# Patient Record
Sex: MALE | Race: WHITE | Employment: OTHER | URBAN - NONMETROPOLITAN AREA
[De-identification: names, ages, dates, MRNs, and addresses within clinical notes are randomized per-mention and may not be internally consistent; named-entity substitution may affect disease eponyms.]

---

## 2018-03-03 ENCOUNTER — APPOINTMENT (OUTPATIENT)
Dept: GENERAL RADIOLOGY | Age: 78
End: 2018-03-03
Payer: COMMERCIAL

## 2018-03-03 ENCOUNTER — HOSPITAL ENCOUNTER (EMERGENCY)
Age: 78
Discharge: HOME | End: 2018-03-04
Attending: EMERGENCY MEDICINE
Payer: COMMERCIAL

## 2018-03-03 VITALS
HEIGHT: 67 IN | RESPIRATION RATE: 16 BRPM | SYSTOLIC BLOOD PRESSURE: 132 MMHG | BODY MASS INDEX: 31.39 KG/M2 | HEART RATE: 80 BPM | TEMPERATURE: 97.7 F | OXYGEN SATURATION: 95 % | DIASTOLIC BLOOD PRESSURE: 61 MMHG | WEIGHT: 200 LBS

## 2018-03-03 DIAGNOSIS — J44.1 COPD EXACERBATION (HCC): ICD-10-CM

## 2018-03-03 DIAGNOSIS — J18.9 PNEUMONIA DUE TO ORGANISM: Primary | ICD-10-CM

## 2018-03-03 LAB
ANION GAP SERPL CALCULATED.3IONS-SCNC: 16 MEQ/L (ref 8–16)
ANISOCYTOSIS: ABNORMAL
BASOPHILS # BLD: 3.2 %
BASOPHILS ABSOLUTE: 0.3 THOU/MM3 (ref 0–0.1)
BUN BLDV-MCNC: 28 MG/DL (ref 7–22)
CALCIUM SERPL-MCNC: 9.2 MG/DL (ref 8.5–10.5)
CHLORIDE BLD-SCNC: 104 MEQ/L (ref 98–111)
CO2: 22 MEQ/L (ref 23–33)
CREAT SERPL-MCNC: 1.6 MG/DL (ref 0.4–1.2)
EKG ATRIAL RATE: 80 BPM
EKG Q-T INTERVAL: 458 MS
EKG QRS DURATION: 170 MS
EKG QTC CALCULATION (BAZETT): 528 MS
EKG R AXIS: -52 DEGREES
EKG T AXIS: 108 DEGREES
EKG VENTRICULAR RATE: 80 BPM
EOSINOPHIL # BLD: 1.2 %
EOSINOPHILS ABSOLUTE: 0.1 THOU/MM3 (ref 0–0.4)
GFR SERPL CREATININE-BSD FRML MDRD: 42 ML/MIN/1.73M2
GLUCOSE BLD-MCNC: 110 MG/DL (ref 70–108)
HCT VFR BLD CALC: 35.3 % (ref 42–52)
HEMOGLOBIN: 11.9 GM/DL (ref 14–18)
LACTIC ACID: 2 MMOL/L (ref 0.5–2.2)
LYMPHOCYTES # BLD: 12.6 %
LYMPHOCYTES ABSOLUTE: 1 THOU/MM3 (ref 1–4.8)
MCH RBC QN AUTO: 32.3 PG (ref 27–31)
MCHC RBC AUTO-ENTMCNC: 33.8 GM/DL (ref 33–37)
MCV RBC AUTO: 95.6 FL (ref 80–94)
MONOCYTES # BLD: 11.7 %
MONOCYTES ABSOLUTE: 0.9 THOU/MM3 (ref 0.4–1.3)
NUCLEATED RED BLOOD CELLS: 0 /100 WBC
OSMOLALITY CALCULATION: 289.2 MOSMOL/KG (ref 275–300)
PDW BLD-RTO: 16.2 % (ref 11.5–14.5)
PLATELET # BLD: 163 THOU/MM3 (ref 130–400)
PMV BLD AUTO: 8.9 FL (ref 7.4–10.4)
POTASSIUM SERPL-SCNC: 4.7 MEQ/L (ref 3.5–5.2)
RBC # BLD: 3.69 MILL/MM3 (ref 4.7–6.1)
SEG NEUTROPHILS: 71.3 %
SEGMENTED NEUTROPHILS ABSOLUTE COUNT: 5.7 THOU/MM3 (ref 1.8–7.7)
SODIUM BLD-SCNC: 142 MEQ/L (ref 135–145)
WBC # BLD: 8 THOU/MM3 (ref 4.8–10.8)

## 2018-03-03 PROCEDURE — 6370000000 HC RX 637 (ALT 250 FOR IP): Performed by: EMERGENCY MEDICINE

## 2018-03-03 PROCEDURE — 85025 COMPLETE CBC W/AUTO DIFF WBC: CPT

## 2018-03-03 PROCEDURE — 93010 ELECTROCARDIOGRAM REPORT: CPT | Performed by: NUCLEAR MEDICINE

## 2018-03-03 PROCEDURE — 94640 AIRWAY INHALATION TREATMENT: CPT

## 2018-03-03 PROCEDURE — 96374 THER/PROPH/DIAG INJ IV PUSH: CPT

## 2018-03-03 PROCEDURE — 99285 EMERGENCY DEPT VISIT HI MDM: CPT

## 2018-03-03 PROCEDURE — 80048 BASIC METABOLIC PNL TOTAL CA: CPT

## 2018-03-03 PROCEDURE — 83605 ASSAY OF LACTIC ACID: CPT

## 2018-03-03 PROCEDURE — 87040 BLOOD CULTURE FOR BACTERIA: CPT

## 2018-03-03 PROCEDURE — 36415 COLL VENOUS BLD VENIPUNCTURE: CPT

## 2018-03-03 PROCEDURE — 6360000002 HC RX W HCPCS: Performed by: EMERGENCY MEDICINE

## 2018-03-03 PROCEDURE — 71046 X-RAY EXAM CHEST 2 VIEWS: CPT

## 2018-03-03 PROCEDURE — 93005 ELECTROCARDIOGRAM TRACING: CPT | Performed by: EMERGENCY MEDICINE

## 2018-03-03 RX ORDER — METOPROLOL SUCCINATE 50 MG/1
50 TABLET, EXTENDED RELEASE ORAL DAILY
Status: ON HOLD | COMMUNITY
End: 2022-04-24 | Stop reason: HOSPADM

## 2018-03-03 RX ORDER — WARFARIN SODIUM 2.5 MG/1
2.5 TABLET ORAL
Status: ON HOLD | COMMUNITY
End: 2018-04-24 | Stop reason: HOSPADM

## 2018-03-03 RX ORDER — IPRATROPIUM BROMIDE AND ALBUTEROL SULFATE 2.5; .5 MG/3ML; MG/3ML
1 SOLUTION RESPIRATORY (INHALATION)
Status: DISCONTINUED | OUTPATIENT
Start: 2018-03-04 | End: 2018-03-03

## 2018-03-03 RX ORDER — NITROGLYCERIN 0.4 MG/1
0.4 TABLET SUBLINGUAL EVERY 5 MIN PRN
COMMUNITY
End: 2018-11-07

## 2018-03-03 RX ORDER — ATORVASTATIN CALCIUM 80 MG/1
80 TABLET, FILM COATED ORAL NIGHTLY
COMMUNITY
End: 2022-06-28

## 2018-03-03 RX ORDER — PREDNISONE 20 MG/1
20 TABLET ORAL 2 TIMES DAILY
Qty: 10 TABLET | Refills: 0 | Status: SHIPPED | OUTPATIENT
Start: 2018-03-03 | End: 2018-03-08

## 2018-03-03 RX ORDER — METHYLPREDNISOLONE SODIUM SUCCINATE 125 MG/2ML
125 INJECTION, POWDER, LYOPHILIZED, FOR SOLUTION INTRAMUSCULAR; INTRAVENOUS ONCE
Status: COMPLETED | OUTPATIENT
Start: 2018-03-03 | End: 2018-03-03

## 2018-03-03 RX ORDER — LEVOFLOXACIN 500 MG/1
500 TABLET, FILM COATED ORAL DAILY
Qty: 7 TABLET | Refills: 0 | Status: SHIPPED | OUTPATIENT
Start: 2018-03-03 | End: 2018-03-10

## 2018-03-03 RX ORDER — LISINOPRIL 20 MG/1
10 TABLET ORAL DAILY
Status: ON HOLD | COMMUNITY
End: 2018-03-26 | Stop reason: SDUPTHER

## 2018-03-03 RX ORDER — IPRATROPIUM BROMIDE AND ALBUTEROL SULFATE 2.5; .5 MG/3ML; MG/3ML
1 SOLUTION RESPIRATORY (INHALATION) ONCE
Status: COMPLETED | OUTPATIENT
Start: 2018-03-03 | End: 2018-03-03

## 2018-03-03 RX ADMIN — METHYLPREDNISOLONE SODIUM SUCCINATE 125 MG: 125 INJECTION, POWDER, FOR SOLUTION INTRAMUSCULAR; INTRAVENOUS at 22:23

## 2018-03-03 RX ADMIN — IPRATROPIUM BROMIDE AND ALBUTEROL SULFATE 1 AMPULE: .5; 3 SOLUTION RESPIRATORY (INHALATION) at 21:43

## 2018-03-04 NOTE — ED PROVIDER NOTES
(Please note that portions of this note were completed with a voice recognition program.  Efforts were made to edit the dictations but occasionally words are mis-transcribed.)    Scribe:  [id number] 03/04/18 9:19 PM Scribing for and in the presence of Dulce Mcclain DO. Provider:  I personally performed the services described in the documentation, reviewed and edited the documentation which was dictated to the scribe in my presence, and it accurately records my words and actions.     Dulce Mcclain DO 03/04/18 12:53 AM                Dulce Mcclain DO  03/04/18 [id number]

## 2018-03-04 NOTE — ED NOTES
Ambulated pt per Dr. Mitch Hemphill. Pt tolerated well. Lowest O2 sat was 93%. Notified Dr. Mitch Hemphill.       Kelley Mccormack RN  03/03/18 1209

## 2018-03-04 NOTE — ED NOTES
Pt resting in bed. VSS at this time. Pt states he is breathing easier. Family at bedside. Call light within reach.      Ciara Campbell RN  03/03/18 6411

## 2018-03-09 LAB
BLOOD CULTURE, ROUTINE: NORMAL
BLOOD CULTURE, ROUTINE: NORMAL

## 2018-03-22 ENCOUNTER — APPOINTMENT (OUTPATIENT)
Dept: GENERAL RADIOLOGY | Age: 78
DRG: 853 | End: 2018-03-22
Payer: MEDICARE

## 2018-03-22 ENCOUNTER — HOSPITAL ENCOUNTER (INPATIENT)
Age: 78
LOS: 20 days | Discharge: NURSING, REHAB OR LONG-TERM CARE | DRG: 853 | End: 2018-04-11
Attending: INTERNAL MEDICINE | Admitting: INTERNAL MEDICINE
Payer: MEDICARE

## 2018-03-22 DIAGNOSIS — J81.0 FLASH PULMONARY EDEMA (HCC): ICD-10-CM

## 2018-03-22 DIAGNOSIS — I48.91 ATRIAL FIBRILLATION WITH RVR (HCC): Primary | ICD-10-CM

## 2018-03-22 DIAGNOSIS — J96.91 RESPIRATORY FAILURE WITH HYPOXIA, UNSPECIFIED CHRONICITY (HCC): ICD-10-CM

## 2018-03-22 LAB
ALBUMIN SERPL-MCNC: 4 G/DL (ref 3.5–5.1)
ALLEN TEST: POSITIVE
ALP BLD-CCNC: 93 U/L (ref 38–126)
ALT SERPL-CCNC: 36 U/L (ref 11–66)
ANION GAP SERPL CALCULATED.3IONS-SCNC: 17 MEQ/L (ref 8–16)
ANISOCYTOSIS: ABNORMAL
APTT: 40.2 SECONDS (ref 22–38)
AST SERPL-CCNC: 47 U/L (ref 5–40)
BACTERIA: ABNORMAL /HPF
BASE EXCESS (CALCULATED): -10.5 MMOL/L (ref -2.5–2.5)
BASE EXCESS (CALCULATED): -2.8 MMOL/L (ref -2.5–2.5)
BASE EXCESS (CALCULATED): -6.9 MMOL/L (ref -2.5–2.5)
BASOPHILS # BLD: 0.1 %
BASOPHILS ABSOLUTE: 0 THOU/MM3 (ref 0–0.1)
BILIRUB SERPL-MCNC: 1.8 MG/DL (ref 0.3–1.2)
BILIRUBIN DIRECT: 0.4 MG/DL (ref 0–0.3)
BILIRUBIN URINE: NEGATIVE
BLOOD, URINE: ABNORMAL
BUN BLDV-MCNC: 28 MG/DL (ref 7–22)
CALCIUM SERPL-MCNC: 9.3 MG/DL (ref 8.5–10.5)
CASTS 2: ABNORMAL /LPF
CASTS UA: ABNORMAL /LPF
CHARACTER, URINE: ABNORMAL
CHLORIDE BLD-SCNC: 98 MEQ/L (ref 98–111)
CO2: 23 MEQ/L (ref 23–33)
COLLECTED BY:: ABNORMAL
COLOR: YELLOW
CREAT SERPL-MCNC: 1.7 MG/DL (ref 0.4–1.2)
CRYSTALS, UA: ABNORMAL
DEVICE: ABNORMAL
EKG ATRIAL RATE: 163 BPM
EKG ATRIAL RATE: 97 BPM
EKG Q-T INTERVAL: 288 MS
EKG Q-T INTERVAL: 302 MS
EKG QRS DURATION: 104 MS
EKG QRS DURATION: 94 MS
EKG QTC CALCULATION (BAZETT): 412 MS
EKG QTC CALCULATION (BAZETT): 510 MS
EKG R AXIS: 76 DEGREES
EKG R AXIS: 98 DEGREES
EKG T AXIS: -54 DEGREES
EKG T AXIS: 1 DEGREES
EKG VENTRICULAR RATE: 123 BPM
EKG VENTRICULAR RATE: 172 BPM
EOSINOPHIL # BLD: 0 %
EOSINOPHILS ABSOLUTE: 0 THOU/MM3 (ref 0–0.4)
EPITHELIAL CELLS, UA: ABNORMAL /HPF
GFR SERPL CREATININE-BSD FRML MDRD: 39 ML/MIN/1.73M2
GLUCOSE BLD-MCNC: 96 MG/DL (ref 70–108)
GLUCOSE URINE: NEGATIVE MG/DL
HCO3: 18 MMOL/L (ref 23–28)
HCO3: 20 MMOL/L (ref 23–28)
HCO3: 22 MMOL/L (ref 23–28)
HCT VFR BLD CALC: 35.7 % (ref 42–52)
HEMOGLOBIN: 11.6 GM/DL (ref 14–18)
IFIO2: 100
IFIO2: 100
IFIO2: 70
INR BLD: 3.7 (ref 0.85–1.13)
KETONES, URINE: NEGATIVE
LACTIC ACID: 3.9 MMOL/L (ref 0.5–2.2)
LEUKOCYTE ESTERASE, URINE: NEGATIVE
LIPASE: 29.4 U/L (ref 5.6–51.3)
LV EF: 18 %
LVEF MODALITY: NORMAL
LYMPHOCYTES # BLD: 7.4 %
LYMPHOCYTES ABSOLUTE: 0.9 THOU/MM3 (ref 1–4.8)
MCH RBC QN AUTO: 31 PG (ref 27–31)
MCHC RBC AUTO-ENTMCNC: 32.5 GM/DL (ref 33–37)
MCV RBC AUTO: 95.3 FL (ref 80–94)
MISCELLANEOUS 2: ABNORMAL
MODE: AC
MODE: AC
MONOCYTES # BLD: 7.7 %
MONOCYTES ABSOLUTE: 1 THOU/MM3 (ref 0.4–1.3)
MRSA SCREEN RT-PCR: NEGATIVE
NITRITE, URINE: NEGATIVE
NUCLEATED RED BLOOD CELLS: 0 /100 WBC
O2 SATURATION: 85 %
O2 SATURATION: 95 %
O2 SATURATION: 98 %
OSMOLALITY CALCULATION: 281 MOSMOL/KG (ref 275–300)
PCO2: 32 MMHG (ref 35–45)
PCO2: 39 MMHG (ref 35–45)
PCO2: 63 MMHG (ref 35–45)
PDW BLD-RTO: 17.4 % (ref 11.5–14.5)
PH BLOOD GAS: 7.11 (ref 7.35–7.45)
PH BLOOD GAS: 7.35 (ref 7.35–7.45)
PH BLOOD GAS: 7.37 (ref 7.35–7.45)
PH UA: 5
PLATELET # BLD: 180 THOU/MM3 (ref 130–400)
PMV BLD AUTO: 9 FL (ref 7.4–10.4)
PO2: 106 MMHG (ref 71–104)
PO2: 68 MMHG (ref 71–104)
PO2: 79 MMHG (ref 71–104)
POTASSIUM SERPL-SCNC: 4.7 MEQ/L (ref 3.5–5.2)
PRO-BNP: 5581 PG/ML (ref 0–1800)
PROTEIN UA: 30
RBC # BLD: 3.75 MILL/MM3 (ref 4.7–6.1)
RBC URINE: ABNORMAL /HPF
RENAL EPITHELIAL, UA: ABNORMAL
SEG NEUTROPHILS: 84.8 %
SEGMENTED NEUTROPHILS ABSOLUTE COUNT: 10.9 THOU/MM3 (ref 1.8–7.7)
SET PEEP: 10 MMHG
SET PEEP: 10 MMHG
SET RESPIRATORY RATE: 20 BPM
SET RESPIRATORY RATE: 24 BPM
SET TIDAL VOLUME: 400 ML
SET TIDAL VOLUME: 400 ML
SODIUM BLD-SCNC: 138 MEQ/L (ref 135–145)
SOURCE, BLOOD GAS: ABNORMAL
SPECIFIC GRAVITY, URINE: 1.01 (ref 1–1.03)
TOTAL PROTEIN: 7.4 G/DL (ref 6.1–8)
TROPONIN T: 0.01 NG/ML
UROBILINOGEN, URINE: 0.2 EU/DL
WBC # BLD: 12.8 THOU/MM3 (ref 4.8–10.8)
WBC UA: ABNORMAL /HPF
YEAST: ABNORMAL

## 2018-03-22 PROCEDURE — 31500 INSERT EMERGENCY AIRWAY: CPT

## 2018-03-22 PROCEDURE — A5120 SKIN BARRIER, WIPE OR SWAB: HCPCS

## 2018-03-22 PROCEDURE — 83605 ASSAY OF LACTIC ACID: CPT

## 2018-03-22 PROCEDURE — 82803 BLOOD GASES ANY COMBINATION: CPT

## 2018-03-22 PROCEDURE — 96368 THER/DIAG CONCURRENT INF: CPT

## 2018-03-22 PROCEDURE — 6360000002 HC RX W HCPCS: Performed by: INTERNAL MEDICINE

## 2018-03-22 PROCEDURE — 93306 TTE W/DOPPLER COMPLETE: CPT

## 2018-03-22 PROCEDURE — 99285 EMERGENCY DEPT VISIT HI MDM: CPT

## 2018-03-22 PROCEDURE — 2500000003 HC RX 250 WO HCPCS: Performed by: INTERNAL MEDICINE

## 2018-03-22 PROCEDURE — 5A02210 ASSISTANCE WITH CARDIAC OUTPUT USING BALLOON PUMP, CONTINUOUS: ICD-10-PCS | Performed by: INTERNAL MEDICINE

## 2018-03-22 PROCEDURE — 93010 ELECTROCARDIOGRAM REPORT: CPT | Performed by: NUCLEAR MEDICINE

## 2018-03-22 PROCEDURE — 36620 INSERTION CATHETER ARTERY: CPT

## 2018-03-22 PROCEDURE — 84484 ASSAY OF TROPONIN QUANT: CPT

## 2018-03-22 PROCEDURE — 87081 CULTURE SCREEN ONLY: CPT

## 2018-03-22 PROCEDURE — 36415 COLL VENOUS BLD VENIPUNCTURE: CPT

## 2018-03-22 PROCEDURE — 93005 ELECTROCARDIOGRAM TRACING: CPT | Performed by: INTERNAL MEDICINE

## 2018-03-22 PROCEDURE — 5A1945Z RESPIRATORY VENTILATION, 24-96 CONSECUTIVE HOURS: ICD-10-PCS | Performed by: FAMILY MEDICINE

## 2018-03-22 PROCEDURE — 6360000002 HC RX W HCPCS

## 2018-03-22 PROCEDURE — 87641 MR-STAPH DNA AMP PROBE: CPT

## 2018-03-22 PROCEDURE — 87070 CULTURE OTHR SPECIMN AEROBIC: CPT

## 2018-03-22 PROCEDURE — 92960 CARDIOVERSION ELECTRIC EXT: CPT

## 2018-03-22 PROCEDURE — 2580000003 HC RX 258: Performed by: INTERNAL MEDICINE

## 2018-03-22 PROCEDURE — 2780000010 HC IMPLANT OTHER

## 2018-03-22 PROCEDURE — 99291 CRITICAL CARE FIRST HOUR: CPT | Performed by: INTERNAL MEDICINE

## 2018-03-22 PROCEDURE — C1769 GUIDE WIRE: HCPCS

## 2018-03-22 PROCEDURE — 81001 URINALYSIS AUTO W/SCOPE: CPT

## 2018-03-22 PROCEDURE — 87205 SMEAR GRAM STAIN: CPT

## 2018-03-22 PROCEDURE — 5A2204Z RESTORATION OF CARDIAC RHYTHM, SINGLE: ICD-10-PCS | Performed by: INTERNAL MEDICINE

## 2018-03-22 PROCEDURE — 83690 ASSAY OF LIPASE: CPT

## 2018-03-22 PROCEDURE — 83880 ASSAY OF NATRIURETIC PEPTIDE: CPT

## 2018-03-22 PROCEDURE — 96376 TX/PRO/DX INJ SAME DRUG ADON: CPT

## 2018-03-22 PROCEDURE — 85610 PROTHROMBIN TIME: CPT

## 2018-03-22 PROCEDURE — 96365 THER/PROPH/DIAG IV INF INIT: CPT

## 2018-03-22 PROCEDURE — 94002 VENT MGMT INPAT INIT DAY: CPT

## 2018-03-22 PROCEDURE — 92960 CARDIOVERSION ELECTRIC EXT: CPT | Performed by: INTERNAL MEDICINE

## 2018-03-22 PROCEDURE — 76000 FLUOROSCOPY <1 HR PHYS/QHP: CPT

## 2018-03-22 PROCEDURE — 80053 COMPREHEN METABOLIC PANEL: CPT

## 2018-03-22 PROCEDURE — 71045 X-RAY EXAM CHEST 1 VIEW: CPT

## 2018-03-22 PROCEDURE — 2500000003 HC RX 250 WO HCPCS

## 2018-03-22 PROCEDURE — 6370000000 HC RX 637 (ALT 250 FOR IP): Performed by: INTERNAL MEDICINE

## 2018-03-22 PROCEDURE — 33967 INSERT I-AORT PERCUT DEVICE: CPT | Performed by: INTERNAL MEDICINE

## 2018-03-22 PROCEDURE — 82248 BILIRUBIN DIRECT: CPT

## 2018-03-22 PROCEDURE — 85730 THROMBOPLASTIN TIME PARTIAL: CPT

## 2018-03-22 PROCEDURE — 2000000000 HC ICU R&B

## 2018-03-22 PROCEDURE — 87086 URINE CULTURE/COLONY COUNT: CPT

## 2018-03-22 PROCEDURE — 0BH17EZ INSERTION OF ENDOTRACHEAL AIRWAY INTO TRACHEA, VIA NATURAL OR ARTIFICIAL OPENING: ICD-10-PCS | Performed by: FAMILY MEDICINE

## 2018-03-22 PROCEDURE — 96375 TX/PRO/DX INJ NEW DRUG ADDON: CPT

## 2018-03-22 PROCEDURE — 2700000000 HC OXYGEN THERAPY PER DAY

## 2018-03-22 PROCEDURE — C1751 CATH, INF, PER/CENT/MIDLINE: HCPCS

## 2018-03-22 PROCEDURE — 85025 COMPLETE CBC W/AUTO DIFF WBC: CPT

## 2018-03-22 PROCEDURE — C1894 INTRO/SHEATH, NON-LASER: HCPCS

## 2018-03-22 PROCEDURE — 36600 WITHDRAWAL OF ARTERIAL BLOOD: CPT

## 2018-03-22 PROCEDURE — 96366 THER/PROPH/DIAG IV INF ADDON: CPT

## 2018-03-22 PROCEDURE — 87040 BLOOD CULTURE FOR BACTERIA: CPT

## 2018-03-22 RX ORDER — ACETAMINOPHEN 650 MG/1
650 SUPPOSITORY RECTAL ONCE
Status: COMPLETED | OUTPATIENT
Start: 2018-03-22 | End: 2018-03-22

## 2018-03-22 RX ORDER — CHLORHEXIDINE GLUCONATE 0.12 MG/ML
15 RINSE ORAL 2 TIMES DAILY
Status: DISCONTINUED | OUTPATIENT
Start: 2018-03-22 | End: 2018-03-22 | Stop reason: SDUPTHER

## 2018-03-22 RX ORDER — FUROSEMIDE 10 MG/ML
40 INJECTION INTRAMUSCULAR; INTRAVENOUS ONCE
Status: COMPLETED | OUTPATIENT
Start: 2018-03-22 | End: 2018-03-22

## 2018-03-22 RX ORDER — METOPROLOL TARTRATE 5 MG/5ML
INJECTION INTRAVENOUS
Status: COMPLETED
Start: 2018-03-22 | End: 2018-03-22

## 2018-03-22 RX ORDER — SODIUM CHLORIDE 0.9 % (FLUSH) 0.9 %
10 SYRINGE (ML) INJECTION PRN
Status: DISCONTINUED | OUTPATIENT
Start: 2018-03-22 | End: 2018-03-30 | Stop reason: SDUPTHER

## 2018-03-22 RX ORDER — LEVOFLOXACIN 5 MG/ML
500 INJECTION, SOLUTION INTRAVENOUS ONCE
Status: COMPLETED | OUTPATIENT
Start: 2018-03-23 | End: 2018-03-23

## 2018-03-22 RX ORDER — MIDAZOLAM HYDROCHLORIDE 1 MG/ML
INJECTION INTRAMUSCULAR; INTRAVENOUS
Status: COMPLETED
Start: 2018-03-22 | End: 2018-03-22

## 2018-03-22 RX ORDER — MIDAZOLAM HYDROCHLORIDE 1 MG/ML
2 INJECTION INTRAMUSCULAR; INTRAVENOUS ONCE
Status: DISCONTINUED | OUTPATIENT
Start: 2018-03-22 | End: 2018-04-11 | Stop reason: HOSPADM

## 2018-03-22 RX ORDER — IPRATROPIUM BROMIDE AND ALBUTEROL SULFATE 2.5; .5 MG/3ML; MG/3ML
1 SOLUTION RESPIRATORY (INHALATION) ONCE
Status: COMPLETED | OUTPATIENT
Start: 2018-03-22 | End: 2018-03-22

## 2018-03-22 RX ORDER — 0.9 % SODIUM CHLORIDE 0.9 %
200 INTRAVENOUS SOLUTION INTRAVENOUS ONCE
Status: COMPLETED | OUTPATIENT
Start: 2018-03-22 | End: 2018-03-22

## 2018-03-22 RX ORDER — NITROGLYCERIN 20 MG/100ML
5 INJECTION INTRAVENOUS CONTINUOUS
Status: DISCONTINUED | OUTPATIENT
Start: 2018-03-22 | End: 2018-03-23

## 2018-03-22 RX ORDER — FENTANYL CITRATE 50 UG/ML
100 INJECTION, SOLUTION INTRAMUSCULAR; INTRAVENOUS ONCE
Status: COMPLETED | OUTPATIENT
Start: 2018-03-22 | End: 2018-03-22

## 2018-03-22 RX ORDER — ATORVASTATIN CALCIUM 80 MG/1
80 TABLET, FILM COATED ORAL NIGHTLY
Status: DISCONTINUED | OUTPATIENT
Start: 2018-03-22 | End: 2018-04-11 | Stop reason: HOSPADM

## 2018-03-22 RX ORDER — 0.9 % SODIUM CHLORIDE 0.9 %
500 INTRAVENOUS SOLUTION INTRAVENOUS ONCE
Status: DISCONTINUED | OUTPATIENT
Start: 2018-03-22 | End: 2018-03-22

## 2018-03-22 RX ORDER — FUROSEMIDE 10 MG/ML
40 INJECTION INTRAMUSCULAR; INTRAVENOUS 2 TIMES DAILY
Status: DISCONTINUED | OUTPATIENT
Start: 2018-03-22 | End: 2018-03-23

## 2018-03-22 RX ORDER — CHLORHEXIDINE GLUCONATE 0.12 MG/ML
15 RINSE ORAL 2 TIMES DAILY
Status: DISCONTINUED | OUTPATIENT
Start: 2018-03-22 | End: 2018-03-31

## 2018-03-22 RX ORDER — BUMETANIDE 0.25 MG/ML
1 INJECTION, SOLUTION INTRAMUSCULAR; INTRAVENOUS ONCE
Status: DISCONTINUED | OUTPATIENT
Start: 2018-03-22 | End: 2018-03-22 | Stop reason: RX

## 2018-03-22 RX ORDER — SUCCINYLCHOLINE CHLORIDE 20 MG/ML
100 INJECTION INTRAMUSCULAR; INTRAVENOUS ONCE
Status: COMPLETED | OUTPATIENT
Start: 2018-03-22 | End: 2018-03-22

## 2018-03-22 RX ORDER — NITROGLYCERIN 0.4 MG/1
0.4 TABLET SUBLINGUAL EVERY 5 MIN PRN
Status: DISCONTINUED | OUTPATIENT
Start: 2018-03-22 | End: 2018-04-11 | Stop reason: HOSPADM

## 2018-03-22 RX ORDER — METOPROLOL SUCCINATE 25 MG/1
25 TABLET, EXTENDED RELEASE ORAL DAILY
Status: DISCONTINUED | OUTPATIENT
Start: 2018-03-23 | End: 2018-03-23

## 2018-03-22 RX ORDER — METOPROLOL TARTRATE 5 MG/5ML
5 INJECTION INTRAVENOUS ONCE
Status: DISCONTINUED | OUTPATIENT
Start: 2018-03-22 | End: 2018-04-11 | Stop reason: HOSPADM

## 2018-03-22 RX ORDER — FENTANYL CITRATE 50 UG/ML
INJECTION, SOLUTION INTRAMUSCULAR; INTRAVENOUS
Status: COMPLETED
Start: 2018-03-22 | End: 2018-03-22

## 2018-03-22 RX ORDER — ETOMIDATE 2 MG/ML
20 INJECTION INTRAVENOUS ONCE
Status: COMPLETED | OUTPATIENT
Start: 2018-03-22 | End: 2018-03-22

## 2018-03-22 RX ORDER — ACETAMINOPHEN 325 MG/1
650 TABLET ORAL EVERY 4 HOURS PRN
Status: DISCONTINUED | OUTPATIENT
Start: 2018-03-22 | End: 2018-03-30 | Stop reason: SDUPTHER

## 2018-03-22 RX ORDER — SODIUM CHLORIDE 0.9 % (FLUSH) 0.9 %
10 SYRINGE (ML) INJECTION EVERY 12 HOURS SCHEDULED
Status: DISCONTINUED | OUTPATIENT
Start: 2018-03-22 | End: 2018-03-30 | Stop reason: SDUPTHER

## 2018-03-22 RX ORDER — ONDANSETRON 2 MG/ML
4 INJECTION INTRAMUSCULAR; INTRAVENOUS EVERY 6 HOURS PRN
Status: DISCONTINUED | OUTPATIENT
Start: 2018-03-22 | End: 2018-03-23

## 2018-03-22 RX ORDER — FENTANYL CITRATE 50 UG/ML
50 INJECTION, SOLUTION INTRAMUSCULAR; INTRAVENOUS ONCE
Status: COMPLETED | OUTPATIENT
Start: 2018-03-22 | End: 2018-03-22

## 2018-03-22 RX ORDER — MIDAZOLAM HYDROCHLORIDE 1 MG/ML
4 INJECTION INTRAMUSCULAR; INTRAVENOUS ONCE
Status: COMPLETED | OUTPATIENT
Start: 2018-03-22 | End: 2018-03-22

## 2018-03-22 RX ADMIN — PROCAINAMIDE HYDROCHLORIDE 2 MG/MIN: 500 INJECTION INTRAMUSCULAR; INTRAVENOUS at 22:59

## 2018-03-22 RX ADMIN — AMIODARONE HYDROCHLORIDE 150 MG: 50 INJECTION, SOLUTION INTRAVENOUS at 15:43

## 2018-03-22 RX ADMIN — Medication 10 ML: at 22:30

## 2018-03-22 RX ADMIN — SODIUM CHLORIDE 200 ML: 9 INJECTION, SOLUTION INTRAVENOUS at 22:19

## 2018-03-22 RX ADMIN — AMIODARONE HYDROCHLORIDE 1 MG/MIN: 50 INJECTION, SOLUTION INTRAVENOUS at 16:10

## 2018-03-22 RX ADMIN — IPRATROPIUM BROMIDE AND ALBUTEROL SULFATE 1 AMPULE: .5; 3 SOLUTION RESPIRATORY (INHALATION) at 15:44

## 2018-03-22 RX ADMIN — VANCOMYCIN HYDROCHLORIDE 1500 MG: 10 INJECTION, POWDER, LYOPHILIZED, FOR SOLUTION INTRAVENOUS at 18:05

## 2018-03-22 RX ADMIN — MIDAZOLAM 1 MG/HR: 5 INJECTION INTRAMUSCULAR; INTRAVENOUS at 22:59

## 2018-03-22 RX ADMIN — FENTANYL CITRATE: 50 INJECTION INTRAMUSCULAR; INTRAVENOUS at 22:14

## 2018-03-22 RX ADMIN — Medication 2 MCG/MIN: at 22:29

## 2018-03-22 RX ADMIN — FENTANYL CITRATE 50 MCG: 50 INJECTION INTRAMUSCULAR; INTRAVENOUS at 22:16

## 2018-03-22 RX ADMIN — CEFEPIME HYDROCHLORIDE 2 G: 2 INJECTION, POWDER, FOR SOLUTION INTRAVENOUS at 17:58

## 2018-03-22 RX ADMIN — METOPROLOL TARTRATE: 5 INJECTION, SOLUTION INTRAVENOUS at 22:17

## 2018-03-22 RX ADMIN — ETOMIDATE 20 MG: 2 INJECTION, SOLUTION INTRAVENOUS at 16:05

## 2018-03-22 RX ADMIN — MIDAZOLAM: 1 INJECTION INTRAMUSCULAR; INTRAVENOUS at 22:15

## 2018-03-22 RX ADMIN — FENTANYL CITRATE 50 MCG/HR: 50 INJECTION, SOLUTION INTRAMUSCULAR; INTRAVENOUS at 16:41

## 2018-03-22 RX ADMIN — MIDAZOLAM HYDROCHLORIDE 4 MG: 1 INJECTION, SOLUTION INTRAMUSCULAR; INTRAVENOUS at 16:23

## 2018-03-22 RX ADMIN — FUROSEMIDE 40 MG: 10 INJECTION, SOLUTION INTRAMUSCULAR; INTRAVENOUS at 17:02

## 2018-03-22 RX ADMIN — FENTANYL CITRATE 100 MCG: 50 INJECTION INTRAMUSCULAR; INTRAVENOUS at 16:44

## 2018-03-22 RX ADMIN — MIDAZOLAM 2 MG/HR: 5 INJECTION INTRAMUSCULAR; INTRAVENOUS at 16:39

## 2018-03-22 RX ADMIN — FUROSEMIDE 40 MG: 10 INJECTION, SOLUTION INTRAMUSCULAR; INTRAVENOUS at 15:51

## 2018-03-22 RX ADMIN — ACETAMINOPHEN 650 MG: 650 SUPPOSITORY RECTAL at 18:21

## 2018-03-22 RX ADMIN — SUCCINYLCHOLINE CHLORIDE 100 MG: 20 INJECTION, SOLUTION INTRAMUSCULAR; INTRAVENOUS at 16:05

## 2018-03-22 ASSESSMENT — ENCOUNTER SYMPTOMS
BLOOD IN STOOL: 0
COUGH: 1
SORE THROAT: 0
DIARRHEA: 0
VOMITING: 0
SHORTNESS OF BREATH: 1
NAUSEA: 0
CHEST TIGHTNESS: 0
CONSTIPATION: 0
ABDOMINAL PAIN: 0
RHINORRHEA: 0
BACK PAIN: 0
WHEEZING: 0

## 2018-03-22 ASSESSMENT — PAIN SCALES - GENERAL
PAINLEVEL_OUTOF10: 0
PAINLEVEL_OUTOF10: 0
PAINLEVEL_OUTOF10: 5
PAINLEVEL_OUTOF10: 0
PAINLEVEL_OUTOF10: 5
PAINLEVEL_OUTOF10: 3

## 2018-03-22 ASSESSMENT — PULMONARY FUNCTION TESTS
PIF_VALUE: 17
PIF_VALUE: 17
PIF_VALUE: 16

## 2018-03-22 ASSESSMENT — PAIN DESCRIPTION - ORIENTATION: ORIENTATION: RIGHT

## 2018-03-22 ASSESSMENT — PAIN DESCRIPTION - PAIN TYPE: TYPE: ACUTE PAIN

## 2018-03-22 ASSESSMENT — PAIN DESCRIPTION - LOCATION: LOCATION: CHEST

## 2018-03-23 ENCOUNTER — APPOINTMENT (OUTPATIENT)
Dept: GENERAL RADIOLOGY | Age: 78
DRG: 853 | End: 2018-03-23
Payer: MEDICARE

## 2018-03-23 LAB
ALBUMIN SERPL-MCNC: 3 G/DL (ref 3.5–5.1)
ALBUMIN SERPL-MCNC: 3.1 G/DL (ref 3.5–5.1)
ALLEN TEST: POSITIVE
ALP BLD-CCNC: 71 U/L (ref 38–126)
ALP BLD-CCNC: 73 U/L (ref 38–126)
ALT SERPL-CCNC: 30 U/L (ref 11–66)
ALT SERPL-CCNC: 32 U/L (ref 11–66)
ANION GAP SERPL CALCULATED.3IONS-SCNC: 14 MEQ/L (ref 8–16)
ANION GAP SERPL CALCULATED.3IONS-SCNC: 15 MEQ/L (ref 8–16)
AST SERPL-CCNC: 53 U/L (ref 5–40)
AST SERPL-CCNC: 55 U/L (ref 5–40)
BASE EXCESS (CALCULATED): -0.2 MMOL/L (ref -2.5–2.5)
BASE EXCESS (CALCULATED): -0.9 MMOL/L (ref -2.5–2.5)
BILIRUB SERPL-MCNC: 1.3 MG/DL (ref 0.3–1.2)
BILIRUB SERPL-MCNC: 1.5 MG/DL (ref 0.3–1.2)
BUN BLDV-MCNC: 33 MG/DL (ref 7–22)
BUN BLDV-MCNC: 34 MG/DL (ref 7–22)
CALCIUM IONIZED: 1.01 MMOL/L (ref 1.12–1.32)
CALCIUM IONIZED: 1.02 MMOL/L (ref 1.12–1.32)
CALCIUM SERPL-MCNC: 7.6 MG/DL (ref 8.5–10.5)
CALCIUM SERPL-MCNC: 7.8 MG/DL (ref 8.5–10.5)
CHLORIDE BLD-SCNC: 98 MEQ/L (ref 98–111)
CHLORIDE BLD-SCNC: 99 MEQ/L (ref 98–111)
CHOLESTEROL, TOTAL: 91 MG/DL (ref 100–199)
CO2: 20 MEQ/L (ref 23–33)
CO2: 22 MEQ/L (ref 23–33)
COLLECTED BY:: NORMAL
COLLECTED BY:: NORMAL
CREAT SERPL-MCNC: 2.1 MG/DL (ref 0.4–1.2)
CREAT SERPL-MCNC: 2.3 MG/DL (ref 0.4–1.2)
DEVICE: NORMAL
DEVICE: NORMAL
EKG ATRIAL RATE: 147 BPM
EKG ATRIAL RATE: 82 BPM
EKG P AXIS: 97 DEGREES
EKG Q-T INTERVAL: 392 MS
EKG Q-T INTERVAL: 512 MS
EKG QRS DURATION: 100 MS
EKG QRS DURATION: 170 MS
EKG QTC CALCULATION (BAZETT): 457 MS
EKG QTC CALCULATION (BAZETT): 590 MS
EKG R AXIS: -67 DEGREES
EKG R AXIS: 74 DEGREES
EKG T AXIS: -121 DEGREES
EKG T AXIS: 115 DEGREES
EKG VENTRICULAR RATE: 80 BPM
EKG VENTRICULAR RATE: 82 BPM
GFR SERPL CREATININE-BSD FRML MDRD: 28 ML/MIN/1.73M2
GFR SERPL CREATININE-BSD FRML MDRD: 31 ML/MIN/1.73M2
GLUCOSE BLD-MCNC: 169 MG/DL (ref 70–108)
GLUCOSE BLD-MCNC: 210 MG/DL (ref 70–108)
GLUCOSE, WHOLE BLOOD: 148 MG/DL (ref 70–108)
GLUCOSE, WHOLE BLOOD: 186 MG/DL (ref 70–108)
GLUCOSE, WHOLE BLOOD: 198 MG/DL (ref 70–108)
HCO3: 24 MMOL/L (ref 23–28)
HCO3: 25 MMOL/L (ref 23–28)
HCT VFR BLD CALC: 30 % (ref 42–52)
HCT VFR BLD CALC: 30.5 % (ref 42–52)
HCT VFR BLD CALC: 31.7 % (ref 42–52)
HDLC SERPL-MCNC: 25 MG/DL
HEMOGLOBIN: 10.1 GM/DL (ref 14–18)
HEMOGLOBIN: 10.6 GM/DL (ref 14–18)
HEMOGLOBIN: 9.9 GM/DL (ref 14–18)
IFIO2: 40
IFIO2: 50
INR BLD: 5.8 (ref 0.85–1.13)
INR BLD: 6.73 (ref 0.85–1.13)
LACTIC ACID: 1.4 MMOL/L (ref 0.5–2.2)
LDL CHOLESTEROL CALCULATED: 36 MG/DL
MAGNESIUM: 2 MG/DL (ref 1.6–2.4)
MAGNESIUM: 2.1 MG/DL (ref 1.6–2.4)
MCH RBC QN AUTO: 30.9 PG (ref 27–31)
MCH RBC QN AUTO: 31.2 PG (ref 27–31)
MCH RBC QN AUTO: 31.8 PG (ref 27–31)
MCHC RBC AUTO-ENTMCNC: 32.9 GM/DL (ref 33–37)
MCHC RBC AUTO-ENTMCNC: 33.3 GM/DL (ref 33–37)
MCHC RBC AUTO-ENTMCNC: 33.5 GM/DL (ref 33–37)
MCV RBC AUTO: 93.9 FL (ref 80–94)
MCV RBC AUTO: 94 FL (ref 80–94)
MCV RBC AUTO: 94.8 FL (ref 80–94)
MODE: AC
MODE: NORMAL
O2 SATURATION: 96 %
O2 SATURATION: 97 %
PCO2: 37 MMHG (ref 35–45)
PCO2: 40 MMHG (ref 35–45)
PDW BLD-RTO: 16.2 % (ref 11.5–14.5)
PDW BLD-RTO: 16.6 % (ref 11.5–14.5)
PDW BLD-RTO: 17 % (ref 11.5–14.5)
PH BLOOD GAS: 7.39 (ref 7.35–7.45)
PH BLOOD GAS: 7.41 (ref 7.35–7.45)
PHOSPHORUS: 3.4 MG/DL (ref 2.4–4.7)
PLATELET # BLD: 120 THOU/MM3 (ref 130–400)
PLATELET # BLD: 134 THOU/MM3 (ref 130–400)
PLATELET # BLD: 164 THOU/MM3 (ref 130–400)
PMV BLD AUTO: 8.8 FL (ref 7.4–10.4)
PMV BLD AUTO: 9.1 FL (ref 7.4–10.4)
PMV BLD AUTO: 9.2 FL (ref 7.4–10.4)
PO2: 78 MMHG (ref 71–104)
PO2: 91 MMHG (ref 71–104)
POTASSIUM REFLEX MAGNESIUM: 4.7 MEQ/L (ref 3.5–5.2)
POTASSIUM SERPL-SCNC: 4.1 MEQ/L (ref 3.5–5.2)
PROCALCITONIN: 12.27 NG/ML (ref 0.01–0.09)
RBC # BLD: 3.19 MILL/MM3 (ref 4.7–6.1)
RBC # BLD: 3.25 MILL/MM3 (ref 4.7–6.1)
RBC # BLD: 3.34 MILL/MM3 (ref 4.7–6.1)
SET PEEP: 10 MMHG
SET PEEP: 5 MMHG
SET RESPIRATORY RATE: 20 BPM
SET TIDAL VOLUME: 400 ML
SODIUM BLD-SCNC: 133 MEQ/L (ref 135–145)
SODIUM BLD-SCNC: 135 MEQ/L (ref 135–145)
SOURCE, BLOOD GAS: NORMAL
SOURCE, BLOOD GAS: NORMAL
T4 FREE: 1.29 NG/DL (ref 0.93–1.76)
TOTAL PROTEIN: 5.5 G/DL (ref 6.1–8)
TOTAL PROTEIN: 5.6 G/DL (ref 6.1–8)
TRIGL SERPL-MCNC: 148 MG/DL (ref 0–199)
TROPONIN T: 0.16 NG/ML
TROPONIN T: 0.28 NG/ML
TROPONIN T: 0.29 NG/ML
WBC # BLD: 13 THOU/MM3 (ref 4.8–10.8)
WBC # BLD: 9.2 THOU/MM3 (ref 4.8–10.8)
WBC # BLD: 9.9 THOU/MM3 (ref 4.8–10.8)

## 2018-03-23 PROCEDURE — 99233 SBSQ HOSP IP/OBS HIGH 50: CPT | Performed by: NURSE PRACTITIONER

## 2018-03-23 PROCEDURE — 71045 X-RAY EXAM CHEST 1 VIEW: CPT

## 2018-03-23 PROCEDURE — 83605 ASSAY OF LACTIC ACID: CPT

## 2018-03-23 PROCEDURE — 94003 VENT MGMT INPAT SUBQ DAY: CPT

## 2018-03-23 PROCEDURE — 2580000003 HC RX 258: Performed by: INTERNAL MEDICINE

## 2018-03-23 PROCEDURE — 93005 ELECTROCARDIOGRAM TRACING: CPT | Performed by: INTERNAL MEDICINE

## 2018-03-23 PROCEDURE — 6360000002 HC RX W HCPCS: Performed by: INTERNAL MEDICINE

## 2018-03-23 PROCEDURE — 83735 ASSAY OF MAGNESIUM: CPT

## 2018-03-23 PROCEDURE — 36415 COLL VENOUS BLD VENIPUNCTURE: CPT

## 2018-03-23 PROCEDURE — 2700000000 HC OXYGEN THERAPY PER DAY

## 2018-03-23 PROCEDURE — 82947 ASSAY GLUCOSE BLOOD QUANT: CPT

## 2018-03-23 PROCEDURE — 84439 ASSAY OF FREE THYROXINE: CPT

## 2018-03-23 PROCEDURE — 85027 COMPLETE CBC AUTOMATED: CPT

## 2018-03-23 PROCEDURE — 84145 PROCALCITONIN (PCT): CPT

## 2018-03-23 PROCEDURE — 2000000000 HC ICU R&B

## 2018-03-23 PROCEDURE — 80053 COMPREHEN METABOLIC PANEL: CPT

## 2018-03-23 PROCEDURE — 80061 LIPID PANEL: CPT

## 2018-03-23 PROCEDURE — 93010 ELECTROCARDIOGRAM REPORT: CPT | Performed by: NUCLEAR MEDICINE

## 2018-03-23 PROCEDURE — 6370000000 HC RX 637 (ALT 250 FOR IP): Performed by: INTERNAL MEDICINE

## 2018-03-23 PROCEDURE — 85610 PROTHROMBIN TIME: CPT

## 2018-03-23 PROCEDURE — 82803 BLOOD GASES ANY COMBINATION: CPT

## 2018-03-23 PROCEDURE — 84484 ASSAY OF TROPONIN QUANT: CPT

## 2018-03-23 PROCEDURE — 84100 ASSAY OF PHOSPHORUS: CPT

## 2018-03-23 PROCEDURE — 82330 ASSAY OF CALCIUM: CPT

## 2018-03-23 PROCEDURE — C9113 INJ PANTOPRAZOLE SODIUM, VIA: HCPCS | Performed by: INTERNAL MEDICINE

## 2018-03-23 PROCEDURE — 37799 UNLISTED PX VASCULAR SURGERY: CPT

## 2018-03-23 RX ORDER — DEXTROSE MONOHYDRATE 50 MG/ML
100 INJECTION, SOLUTION INTRAVENOUS PRN
Status: DISCONTINUED | OUTPATIENT
Start: 2018-03-23 | End: 2018-04-11 | Stop reason: HOSPADM

## 2018-03-23 RX ORDER — NICOTINE POLACRILEX 4 MG
15 LOZENGE BUCCAL PRN
Status: DISCONTINUED | OUTPATIENT
Start: 2018-03-23 | End: 2018-04-11 | Stop reason: HOSPADM

## 2018-03-23 RX ORDER — DEXTROSE MONOHYDRATE 25 G/50ML
12.5 INJECTION, SOLUTION INTRAVENOUS PRN
Status: DISCONTINUED | OUTPATIENT
Start: 2018-03-23 | End: 2018-04-11 | Stop reason: HOSPADM

## 2018-03-23 RX ORDER — SODIUM CHLORIDE 9 MG/ML
INJECTION, SOLUTION INTRAVENOUS CONTINUOUS
Status: DISCONTINUED | OUTPATIENT
Start: 2018-03-23 | End: 2018-03-27

## 2018-03-23 RX ORDER — SODIUM CHLORIDE 0.9 % (FLUSH) 0.9 %
10 SYRINGE (ML) INJECTION EVERY 12 HOURS SCHEDULED
Status: DISCONTINUED | OUTPATIENT
Start: 2018-03-23 | End: 2018-03-23 | Stop reason: SDUPTHER

## 2018-03-23 RX ORDER — SODIUM CHLORIDE 0.9 % (FLUSH) 0.9 %
10 SYRINGE (ML) INJECTION PRN
Status: DISCONTINUED | OUTPATIENT
Start: 2018-03-23 | End: 2018-03-23 | Stop reason: SDUPTHER

## 2018-03-23 RX ORDER — PANTOPRAZOLE SODIUM 40 MG/10ML
40 INJECTION, POWDER, LYOPHILIZED, FOR SOLUTION INTRAVENOUS 2 TIMES DAILY
Status: DISCONTINUED | OUTPATIENT
Start: 2018-03-23 | End: 2018-03-28 | Stop reason: ALTCHOICE

## 2018-03-23 RX ORDER — LIDOCAINE HYDROCHLORIDE 10 MG/ML
5 INJECTION, SOLUTION EPIDURAL; INFILTRATION; INTRACAUDAL; PERINEURAL ONCE
Status: DISCONTINUED | OUTPATIENT
Start: 2018-03-23 | End: 2018-04-11 | Stop reason: HOSPADM

## 2018-03-23 RX ADMIN — PROCAINAMIDE HYDROCHLORIDE 2 MG/MIN: 500 INJECTION INTRAMUSCULAR; INTRAVENOUS at 16:45

## 2018-03-23 RX ADMIN — AMIODARONE HYDROCHLORIDE 1 MG/MIN: 50 INJECTION, SOLUTION INTRAVENOUS at 08:46

## 2018-03-23 RX ADMIN — FENTANYL CITRATE 25 MCG/HR: 50 INJECTION, SOLUTION INTRAMUSCULAR; INTRAVENOUS at 01:00

## 2018-03-23 RX ADMIN — Medication 15 ML: at 21:16

## 2018-03-23 RX ADMIN — Medication 10 ML: at 21:07

## 2018-03-23 RX ADMIN — LEVOFLOXACIN 500 MG: 5 INJECTION, SOLUTION INTRAVENOUS at 01:15

## 2018-03-23 RX ADMIN — SODIUM CHLORIDE: 9 INJECTION, SOLUTION INTRAVENOUS at 18:19

## 2018-03-23 RX ADMIN — Medication 1 UNITS: at 12:52

## 2018-03-23 RX ADMIN — PHYTONADIONE 1 MG: 10 INJECTION, EMULSION INTRAMUSCULAR; INTRAVENOUS; SUBCUTANEOUS at 18:35

## 2018-03-23 RX ADMIN — PANTOPRAZOLE SODIUM 40 MG: 40 INJECTION, POWDER, FOR SOLUTION INTRAVENOUS at 17:24

## 2018-03-23 RX ADMIN — ATORVASTATIN CALCIUM 80 MG: 80 TABLET, FILM COATED ORAL at 21:08

## 2018-03-23 RX ADMIN — Medication 1 UNITS: at 06:03

## 2018-03-23 RX ADMIN — AMIODARONE HYDROCHLORIDE 1 MG/MIN: 50 INJECTION, SOLUTION INTRAVENOUS at 00:31

## 2018-03-23 RX ADMIN — Medication 1 UNITS: at 19:50

## 2018-03-23 RX ADMIN — SODIUM CHLORIDE: 9 INJECTION, SOLUTION INTRAVENOUS at 08:00

## 2018-03-23 RX ADMIN — Medication 15 ML: at 10:46

## 2018-03-23 RX ADMIN — PANTOPRAZOLE SODIUM 40 MG: 40 INJECTION, POWDER, FOR SOLUTION INTRAVENOUS at 06:02

## 2018-03-23 RX ADMIN — CEFEPIME HYDROCHLORIDE 1 G: 2 INJECTION, POWDER, FOR SOLUTION INTRAVENOUS at 10:46

## 2018-03-23 RX ADMIN — FENTANYL CITRATE 25 MCG/HR: 50 INJECTION, SOLUTION INTRAMUSCULAR; INTRAVENOUS at 19:51

## 2018-03-23 ASSESSMENT — PULMONARY FUNCTION TESTS
PIF_VALUE: 15
PIF_VALUE: 20
PIF_VALUE: 17
PIF_VALUE: 10
PIF_VALUE: 17
PIF_VALUE: 18

## 2018-03-23 ASSESSMENT — PAIN SCALES - GENERAL
PAINLEVEL_OUTOF10: 0

## 2018-03-24 LAB
ALLEN TEST: ABNORMAL
ANION GAP SERPL CALCULATED.3IONS-SCNC: 16 MEQ/L (ref 8–16)
BASE EXCESS (CALCULATED): -3.6 MMOL/L (ref -2.5–2.5)
BUN BLDV-MCNC: 31 MG/DL (ref 7–22)
CALCIUM IONIZED: 1.02 MMOL/L (ref 1.12–1.32)
CALCIUM SERPL-MCNC: 7.5 MG/DL (ref 8.5–10.5)
CHLORIDE BLD-SCNC: 98 MEQ/L (ref 98–111)
CO2: 18 MEQ/L (ref 23–33)
COLLECTED BY:: ABNORMAL
COMMENT: ABNORMAL
CREAT SERPL-MCNC: 2.1 MG/DL (ref 0.4–1.2)
DEVICE: ABNORMAL
EKG ATRIAL RATE: 63 BPM
EKG ATRIAL RATE: 85 BPM
EKG P AXIS: 78 DEGREES
EKG P-R INTERVAL: 166 MS
EKG Q-T INTERVAL: 530 MS
EKG Q-T INTERVAL: 600 MS
EKG QRS DURATION: 130 MS
EKG QRS DURATION: 180 MS
EKG QTC CALCULATION (BAZETT): 611 MS
EKG QTC CALCULATION (BAZETT): 629 MS
EKG R AXIS: -62 DEGREES
EKG R AXIS: 8 DEGREES
EKG T AXIS: 113 DEGREES
EKG T AXIS: 115 DEGREES
EKG VENTRICULAR RATE: 66 BPM
EKG VENTRICULAR RATE: 80 BPM
GFR SERPL CREATININE-BSD FRML MDRD: 31 ML/MIN/1.73M2
GLUCOSE BLD-MCNC: 132 MG/DL (ref 70–108)
GLUCOSE BLD-MCNC: 177 MG/DL (ref 70–108)
GLUCOSE BLD-MCNC: 201 MG/DL (ref 70–108)
GLUCOSE, WHOLE BLOOD: 162 MG/DL (ref 70–108)
GLUCOSE, WHOLE BLOOD: 197 MG/DL (ref 70–108)
GRAM STAIN RESULT: NORMAL
HCO3: 21 MMOL/L (ref 23–28)
IFIO2: 40
INR BLD: 3.45 (ref 0.85–1.13)
MAGNESIUM: 2 MG/DL (ref 1.6–2.4)
MRSA SCREEN: NORMAL
O2 SATURATION: 98 %
PCO2: 35 MMHG (ref 35–45)
PH BLOOD GAS: 7.39 (ref 7.35–7.45)
PHOSPHORUS: 2.7 MG/DL (ref 2.4–4.7)
PO2: 98 MMHG (ref 71–104)
POTASSIUM SERPL-SCNC: 3.9 MEQ/L (ref 3.5–5.2)
RESPIRATORY CULTURE: NORMAL
SODIUM BLD-SCNC: 132 MEQ/L (ref 135–145)
SOURCE, BLOOD GAS: ABNORMAL
URINE CULTURE, ROUTINE: NORMAL
VRE CULTURE: NORMAL

## 2018-03-24 PROCEDURE — 2580000003 HC RX 258: Performed by: INTERNAL MEDICINE

## 2018-03-24 PROCEDURE — 36569 INSJ PICC 5 YR+ W/O IMAGING: CPT

## 2018-03-24 PROCEDURE — 82948 REAGENT STRIP/BLOOD GLUCOSE: CPT

## 2018-03-24 PROCEDURE — 93010 ELECTROCARDIOGRAM REPORT: CPT | Performed by: NUCLEAR MEDICINE

## 2018-03-24 PROCEDURE — 37799 UNLISTED PX VASCULAR SURGERY: CPT

## 2018-03-24 PROCEDURE — 6360000002 HC RX W HCPCS: Performed by: INTERNAL MEDICINE

## 2018-03-24 PROCEDURE — 2000000000 HC ICU R&B

## 2018-03-24 PROCEDURE — 2700000000 HC OXYGEN THERAPY PER DAY

## 2018-03-24 PROCEDURE — 02HV33Z INSERTION OF INFUSION DEVICE INTO SUPERIOR VENA CAVA, PERCUTANEOUS APPROACH: ICD-10-PCS | Performed by: INTERNAL MEDICINE

## 2018-03-24 PROCEDURE — 94003 VENT MGMT INPAT SUBQ DAY: CPT

## 2018-03-24 PROCEDURE — 80048 BASIC METABOLIC PNL TOTAL CA: CPT

## 2018-03-24 PROCEDURE — 82330 ASSAY OF CALCIUM: CPT

## 2018-03-24 PROCEDURE — 82947 ASSAY GLUCOSE BLOOD QUANT: CPT

## 2018-03-24 PROCEDURE — 6370000000 HC RX 637 (ALT 250 FOR IP): Performed by: INTERNAL MEDICINE

## 2018-03-24 PROCEDURE — 82803 BLOOD GASES ANY COMBINATION: CPT

## 2018-03-24 PROCEDURE — C1751 CATH, INF, PER/CENT/MIDLINE: HCPCS

## 2018-03-24 PROCEDURE — 83735 ASSAY OF MAGNESIUM: CPT

## 2018-03-24 PROCEDURE — 76937 US GUIDE VASCULAR ACCESS: CPT

## 2018-03-24 PROCEDURE — 36592 COLLECT BLOOD FROM PICC: CPT

## 2018-03-24 PROCEDURE — 93005 ELECTROCARDIOGRAM TRACING: CPT | Performed by: INTERNAL MEDICINE

## 2018-03-24 PROCEDURE — 99233 SBSQ HOSP IP/OBS HIGH 50: CPT | Performed by: INTERNAL MEDICINE

## 2018-03-24 PROCEDURE — 85610 PROTHROMBIN TIME: CPT

## 2018-03-24 PROCEDURE — A4212 NON CORING NEEDLE OR STYLET: HCPCS

## 2018-03-24 PROCEDURE — 84100 ASSAY OF PHOSPHORUS: CPT

## 2018-03-24 PROCEDURE — C9113 INJ PANTOPRAZOLE SODIUM, VIA: HCPCS | Performed by: INTERNAL MEDICINE

## 2018-03-24 PROCEDURE — 36415 COLL VENOUS BLD VENIPUNCTURE: CPT

## 2018-03-24 RX ADMIN — Medication 10 ML: at 21:13

## 2018-03-24 RX ADMIN — Medication 1 UNITS: at 06:55

## 2018-03-24 RX ADMIN — PANTOPRAZOLE SODIUM 40 MG: 40 INJECTION, POWDER, FOR SOLUTION INTRAVENOUS at 15:34

## 2018-03-24 RX ADMIN — FENTANYL CITRATE 25 MCG/HR: 50 INJECTION, SOLUTION INTRAMUSCULAR; INTRAVENOUS at 14:07

## 2018-03-24 RX ADMIN — SODIUM CHLORIDE: 9 INJECTION, SOLUTION INTRAVENOUS at 07:49

## 2018-03-24 RX ADMIN — ATORVASTATIN CALCIUM 80 MG: 80 TABLET, FILM COATED ORAL at 21:13

## 2018-03-24 RX ADMIN — Medication 15 ML: at 09:20

## 2018-03-24 RX ADMIN — Medication 1 UNITS: at 12:17

## 2018-03-24 RX ADMIN — Medication 10 ML: at 09:12

## 2018-03-24 RX ADMIN — Medication 15 ML: at 21:12

## 2018-03-24 RX ADMIN — PANTOPRAZOLE SODIUM 40 MG: 40 INJECTION, POWDER, FOR SOLUTION INTRAVENOUS at 09:12

## 2018-03-24 RX ADMIN — AMIODARONE HYDROCHLORIDE 1 MG/MIN: 50 INJECTION, SOLUTION INTRAVENOUS at 01:08

## 2018-03-24 RX ADMIN — Medication 1 UNITS: at 01:48

## 2018-03-24 RX ADMIN — AMIODARONE HYDROCHLORIDE 1 MG/MIN: 50 INJECTION, SOLUTION INTRAVENOUS at 09:08

## 2018-03-24 RX ADMIN — SODIUM CHLORIDE: 9 INJECTION, SOLUTION INTRAVENOUS at 17:56

## 2018-03-24 RX ADMIN — ACETAMINOPHEN 650 MG: 325 TABLET ORAL at 21:12

## 2018-03-24 RX ADMIN — CEFEPIME HYDROCHLORIDE 1 G: 2 INJECTION, POWDER, FOR SOLUTION INTRAVENOUS at 09:20

## 2018-03-24 ASSESSMENT — PULMONARY FUNCTION TESTS
PIF_VALUE: 12
PIF_VALUE: 14
PIF_VALUE: 13
PIF_VALUE: 11
PIF_VALUE: 11

## 2018-03-25 ENCOUNTER — APPOINTMENT (OUTPATIENT)
Dept: GENERAL RADIOLOGY | Age: 78
DRG: 853 | End: 2018-03-25
Payer: MEDICARE

## 2018-03-25 LAB
ALLEN TEST: ABNORMAL
ANION GAP SERPL CALCULATED.3IONS-SCNC: 11 MEQ/L (ref 8–16)
BASE EXCESS (CALCULATED): -3.6 MMOL/L (ref -2.5–2.5)
BUN BLDV-MCNC: 39 MG/DL (ref 7–22)
CALCIUM SERPL-MCNC: 7.4 MG/DL (ref 8.5–10.5)
CHLORIDE BLD-SCNC: 101 MEQ/L (ref 98–111)
CO2: 20 MEQ/L (ref 23–33)
COLLECTED BY:: ABNORMAL
COMMENT: ABNORMAL
CREAT SERPL-MCNC: 2.1 MG/DL (ref 0.4–1.2)
DEVICE: ABNORMAL
EKG ATRIAL RATE: 68 BPM
EKG P AXIS: -109 DEGREES
EKG Q-T INTERVAL: 490 MS
EKG QRS DURATION: 94 MS
EKG QTC CALCULATION (BAZETT): 521 MS
EKG R AXIS: -4 DEGREES
EKG T AXIS: 134 DEGREES
EKG VENTRICULAR RATE: 68 BPM
GFR SERPL CREATININE-BSD FRML MDRD: 31 ML/MIN/1.73M2
GLUCOSE BLD-MCNC: 148 MG/DL (ref 70–108)
GLUCOSE BLD-MCNC: 191 MG/DL (ref 70–108)
GLUCOSE BLD-MCNC: 206 MG/DL (ref 70–108)
GLUCOSE, WHOLE BLOOD: 225 MG/DL (ref 70–108)
HCO3: 21 MMOL/L (ref 23–28)
IFIO2: 30
INR BLD: 2.43 (ref 0.85–1.13)
MAGNESIUM: 2.2 MG/DL (ref 1.6–2.4)
O2 SATURATION: 98 %
PCO2: 34 MMHG (ref 35–45)
PH BLOOD GAS: 7.4 (ref 7.35–7.45)
PO2: 98 MMHG (ref 71–104)
POTASSIUM SERPL-SCNC: 4.6 MEQ/L (ref 3.5–5.2)
SET PEEP: 5 MMHG
SODIUM BLD-SCNC: 132 MEQ/L (ref 135–145)
SOURCE, BLOOD GAS: ABNORMAL

## 2018-03-25 PROCEDURE — 6360000002 HC RX W HCPCS: Performed by: INTERNAL MEDICINE

## 2018-03-25 PROCEDURE — 80048 BASIC METABOLIC PNL TOTAL CA: CPT

## 2018-03-25 PROCEDURE — 2700000000 HC OXYGEN THERAPY PER DAY

## 2018-03-25 PROCEDURE — 93005 ELECTROCARDIOGRAM TRACING: CPT

## 2018-03-25 PROCEDURE — 94003 VENT MGMT INPAT SUBQ DAY: CPT

## 2018-03-25 PROCEDURE — 6370000000 HC RX 637 (ALT 250 FOR IP): Performed by: INTERNAL MEDICINE

## 2018-03-25 PROCEDURE — 85610 PROTHROMBIN TIME: CPT

## 2018-03-25 PROCEDURE — 82803 BLOOD GASES ANY COMBINATION: CPT

## 2018-03-25 PROCEDURE — 2580000003 HC RX 258: Performed by: INTERNAL MEDICINE

## 2018-03-25 PROCEDURE — 83735 ASSAY OF MAGNESIUM: CPT

## 2018-03-25 PROCEDURE — 82947 ASSAY GLUCOSE BLOOD QUANT: CPT

## 2018-03-25 PROCEDURE — 2000000000 HC ICU R&B

## 2018-03-25 PROCEDURE — 71045 X-RAY EXAM CHEST 1 VIEW: CPT

## 2018-03-25 PROCEDURE — 82948 REAGENT STRIP/BLOOD GLUCOSE: CPT

## 2018-03-25 PROCEDURE — A5120 SKIN BARRIER, WIPE OR SWAB: HCPCS

## 2018-03-25 PROCEDURE — C9113 INJ PANTOPRAZOLE SODIUM, VIA: HCPCS | Performed by: INTERNAL MEDICINE

## 2018-03-25 PROCEDURE — 36415 COLL VENOUS BLD VENIPUNCTURE: CPT

## 2018-03-25 PROCEDURE — 99233 SBSQ HOSP IP/OBS HIGH 50: CPT | Performed by: INTERNAL MEDICINE

## 2018-03-25 PROCEDURE — 37799 UNLISTED PX VASCULAR SURGERY: CPT

## 2018-03-25 RX ORDER — FUROSEMIDE 10 MG/ML
20 INJECTION INTRAMUSCULAR; INTRAVENOUS ONCE
Status: COMPLETED | OUTPATIENT
Start: 2018-03-26 | End: 2018-03-26

## 2018-03-25 RX ORDER — FUROSEMIDE 10 MG/ML
20 INJECTION INTRAMUSCULAR; INTRAVENOUS ONCE
Status: COMPLETED | OUTPATIENT
Start: 2018-03-25 | End: 2018-03-26

## 2018-03-25 RX ORDER — FUROSEMIDE 10 MG/ML
20 INJECTION INTRAMUSCULAR; INTRAVENOUS EVERY 12 HOURS
Status: COMPLETED | OUTPATIENT
Start: 2018-03-26 | End: 2018-03-27

## 2018-03-25 RX ADMIN — PROCAINAMIDE HYDROCHLORIDE 1 MG/MIN: 500 INJECTION INTRAMUSCULAR; INTRAVENOUS at 02:25

## 2018-03-25 RX ADMIN — Medication 2 UNITS: at 06:37

## 2018-03-25 RX ADMIN — CEFEPIME HYDROCHLORIDE 1 G: 2 INJECTION, POWDER, FOR SOLUTION INTRAVENOUS at 10:04

## 2018-03-25 RX ADMIN — ATORVASTATIN CALCIUM 80 MG: 80 TABLET, FILM COATED ORAL at 21:48

## 2018-03-25 RX ADMIN — FENTANYL CITRATE 25 MCG/HR: 50 INJECTION, SOLUTION INTRAMUSCULAR; INTRAVENOUS at 06:38

## 2018-03-25 RX ADMIN — Medication 15 ML: at 21:58

## 2018-03-25 RX ADMIN — Medication 1 UNITS: at 12:42

## 2018-03-25 RX ADMIN — PANTOPRAZOLE SODIUM 40 MG: 40 INJECTION, POWDER, FOR SOLUTION INTRAVENOUS at 15:02

## 2018-03-25 RX ADMIN — SODIUM CHLORIDE: 9 INJECTION, SOLUTION INTRAVENOUS at 04:11

## 2018-03-25 RX ADMIN — FENTANYL CITRATE 100 MCG/HR: 50 INJECTION, SOLUTION INTRAMUSCULAR; INTRAVENOUS at 15:33

## 2018-03-25 RX ADMIN — PANTOPRAZOLE SODIUM 40 MG: 40 INJECTION, POWDER, FOR SOLUTION INTRAVENOUS at 06:44

## 2018-03-25 RX ADMIN — Medication 1 UNITS: at 02:25

## 2018-03-25 RX ADMIN — SODIUM CHLORIDE: 9 INJECTION, SOLUTION INTRAVENOUS at 15:02

## 2018-03-25 RX ADMIN — FENTANYL CITRATE 100 MCG/HR: 50 INJECTION, SOLUTION INTRAMUSCULAR; INTRAVENOUS at 21:14

## 2018-03-25 RX ADMIN — Medication 15 ML: at 08:18

## 2018-03-25 RX ADMIN — Medication 10 ML: at 21:47

## 2018-03-25 ASSESSMENT — PULMONARY FUNCTION TESTS
PIF_VALUE: 10
PIF_VALUE: 10
PIF_VALUE: 11
PIF_VALUE: 10

## 2018-03-25 ASSESSMENT — PAIN SCALES - GENERAL: PAINLEVEL_OUTOF10: 0

## 2018-03-26 ENCOUNTER — APPOINTMENT (OUTPATIENT)
Dept: GENERAL RADIOLOGY | Age: 78
DRG: 853 | End: 2018-03-26
Payer: MEDICARE

## 2018-03-26 LAB
ALLEN TEST: ABNORMAL
ANION GAP SERPL CALCULATED.3IONS-SCNC: 13 MEQ/L (ref 8–16)
BASE EXCESS (CALCULATED): -3.2 MMOL/L (ref -2.5–2.5)
BUN BLDV-MCNC: 37 MG/DL (ref 7–22)
CALCIUM IONIZED: 1.05 MMOL/L (ref 1.12–1.32)
CALCIUM SERPL-MCNC: 7.9 MG/DL (ref 8.5–10.5)
CHLORIDE BLD-SCNC: 105 MEQ/L (ref 98–111)
CO2: 20 MEQ/L (ref 23–33)
COLLECTED BY:: ABNORMAL
COMMENT: ABNORMAL
CREAT SERPL-MCNC: 1.7 MG/DL (ref 0.4–1.2)
DEVICE: ABNORMAL
EKG ATRIAL RATE: 80 BPM
EKG Q-T INTERVAL: 442 MS
EKG QRS DURATION: 112 MS
EKG QTC CALCULATION (BAZETT): 509 MS
EKG R AXIS: 65 DEGREES
EKG T AXIS: -115 DEGREES
EKG VENTRICULAR RATE: 80 BPM
GFR SERPL CREATININE-BSD FRML MDRD: 39 ML/MIN/1.73M2
GLUCOSE BLD-MCNC: 100 MG/DL (ref 70–108)
GLUCOSE BLD-MCNC: 132 MG/DL (ref 70–108)
GLUCOSE BLD-MCNC: 203 MG/DL (ref 70–108)
GLUCOSE BLD-MCNC: 81 MG/DL (ref 70–108)
HCO3: 22 MMOL/L (ref 23–28)
HCT VFR BLD CALC: 27.4 % (ref 42–52)
HEMOGLOBIN: 9.2 GM/DL (ref 14–18)
IFIO2: 30
INR BLD: 2.47 (ref 0.85–1.13)
MAGNESIUM: 2.4 MG/DL (ref 1.6–2.4)
MCH RBC QN AUTO: 31.9 PG (ref 27–31)
MCHC RBC AUTO-ENTMCNC: 33.6 GM/DL (ref 33–37)
MCV RBC AUTO: 95 FL (ref 80–94)
O2 SATURATION: 97 %
PCO2: 38 MMHG (ref 35–45)
PDW BLD-RTO: 16.2 % (ref 11.5–14.5)
PH BLOOD GAS: 7.37 (ref 7.35–7.45)
PLATELET # BLD: 120 THOU/MM3 (ref 130–400)
PMV BLD AUTO: 10 FL (ref 7.4–10.4)
PO2: 92 MMHG (ref 71–104)
POTASSIUM SERPL-SCNC: 4.6 MEQ/L (ref 3.5–5.2)
RBC # BLD: 2.88 MILL/MM3 (ref 4.7–6.1)
SODIUM BLD-SCNC: 138 MEQ/L (ref 135–145)
SOURCE, BLOOD GAS: ABNORMAL
WBC # BLD: 11 THOU/MM3 (ref 4.8–10.8)

## 2018-03-26 PROCEDURE — 85027 COMPLETE CBC AUTOMATED: CPT

## 2018-03-26 PROCEDURE — 82948 REAGENT STRIP/BLOOD GLUCOSE: CPT

## 2018-03-26 PROCEDURE — 83735 ASSAY OF MAGNESIUM: CPT

## 2018-03-26 PROCEDURE — 36415 COLL VENOUS BLD VENIPUNCTURE: CPT

## 2018-03-26 PROCEDURE — 36600 WITHDRAWAL OF ARTERIAL BLOOD: CPT

## 2018-03-26 PROCEDURE — 2580000003 HC RX 258: Performed by: INTERNAL MEDICINE

## 2018-03-26 PROCEDURE — 2000000000 HC ICU R&B

## 2018-03-26 PROCEDURE — 94003 VENT MGMT INPAT SUBQ DAY: CPT

## 2018-03-26 PROCEDURE — 74018 RADEX ABDOMEN 1 VIEW: CPT

## 2018-03-26 PROCEDURE — 99291 CRITICAL CARE FIRST HOUR: CPT | Performed by: INTERNAL MEDICINE

## 2018-03-26 PROCEDURE — 94640 AIRWAY INHALATION TREATMENT: CPT

## 2018-03-26 PROCEDURE — 6370000000 HC RX 637 (ALT 250 FOR IP): Performed by: INTERNAL MEDICINE

## 2018-03-26 PROCEDURE — 6360000002 HC RX W HCPCS: Performed by: INTERNAL MEDICINE

## 2018-03-26 PROCEDURE — 82803 BLOOD GASES ANY COMBINATION: CPT

## 2018-03-26 PROCEDURE — C9113 INJ PANTOPRAZOLE SODIUM, VIA: HCPCS | Performed by: INTERNAL MEDICINE

## 2018-03-26 PROCEDURE — 2700000000 HC OXYGEN THERAPY PER DAY

## 2018-03-26 PROCEDURE — 93005 ELECTROCARDIOGRAM TRACING: CPT | Performed by: INTERNAL MEDICINE

## 2018-03-26 PROCEDURE — 36592 COLLECT BLOOD FROM PICC: CPT

## 2018-03-26 PROCEDURE — 80048 BASIC METABOLIC PNL TOTAL CA: CPT

## 2018-03-26 PROCEDURE — 82330 ASSAY OF CALCIUM: CPT

## 2018-03-26 PROCEDURE — 85610 PROTHROMBIN TIME: CPT

## 2018-03-26 PROCEDURE — 71045 X-RAY EXAM CHEST 1 VIEW: CPT

## 2018-03-26 RX ORDER — FUROSEMIDE 20 MG/1
20 TABLET ORAL DAILY
Status: ON HOLD | COMMUNITY
End: 2018-04-24 | Stop reason: HOSPADM

## 2018-03-26 RX ORDER — DOCUSATE SODIUM 100 MG/1
100 CAPSULE, LIQUID FILLED ORAL DAILY
Status: DISCONTINUED | OUTPATIENT
Start: 2018-03-26 | End: 2018-04-11 | Stop reason: HOSPADM

## 2018-03-26 RX ORDER — METFORMIN HYDROCHLORIDE 500 MG/1
500 TABLET, EXTENDED RELEASE ORAL DAILY
Status: ON HOLD | COMMUNITY
End: 2018-04-24 | Stop reason: HOSPADM

## 2018-03-26 RX ORDER — IPRATROPIUM BROMIDE AND ALBUTEROL SULFATE 2.5; .5 MG/3ML; MG/3ML
1 SOLUTION RESPIRATORY (INHALATION)
Status: DISCONTINUED | OUTPATIENT
Start: 2018-03-26 | End: 2018-03-27

## 2018-03-26 RX ORDER — BISACODYL 10 MG
10 SUPPOSITORY, RECTAL RECTAL DAILY PRN
Status: DISCONTINUED | OUTPATIENT
Start: 2018-03-26 | End: 2018-04-11 | Stop reason: HOSPADM

## 2018-03-26 RX ORDER — LISINOPRIL 10 MG/1
10 TABLET ORAL DAILY
Status: ON HOLD | COMMUNITY
End: 2018-04-24 | Stop reason: HOSPADM

## 2018-03-26 RX ADMIN — FENTANYL CITRATE 75 MCG/HR: 50 INJECTION, SOLUTION INTRAMUSCULAR; INTRAVENOUS at 03:37

## 2018-03-26 RX ADMIN — ATORVASTATIN CALCIUM 80 MG: 80 TABLET, FILM COATED ORAL at 20:46

## 2018-03-26 RX ADMIN — PANTOPRAZOLE SODIUM 40 MG: 40 INJECTION, POWDER, FOR SOLUTION INTRAVENOUS at 16:41

## 2018-03-26 RX ADMIN — IPRATROPIUM BROMIDE AND ALBUTEROL SULFATE 1 AMPULE: .5; 3 SOLUTION RESPIRATORY (INHALATION) at 11:40

## 2018-03-26 RX ADMIN — FUROSEMIDE 20 MG: 10 INJECTION, SOLUTION INTRAMUSCULAR; INTRAVENOUS at 17:58

## 2018-03-26 RX ADMIN — Medication 2 UNITS: at 00:27

## 2018-03-26 RX ADMIN — PROCAINAMIDE HYDROCHLORIDE 2 MG/MIN: 500 INJECTION INTRAMUSCULAR; INTRAVENOUS at 11:35

## 2018-03-26 RX ADMIN — Medication 15 ML: at 08:33

## 2018-03-26 RX ADMIN — SODIUM CHLORIDE: 9 INJECTION, SOLUTION INTRAVENOUS at 01:20

## 2018-03-26 RX ADMIN — PANTOPRAZOLE SODIUM 40 MG: 40 INJECTION, POWDER, FOR SOLUTION INTRAVENOUS at 06:29

## 2018-03-26 RX ADMIN — SODIUM CHLORIDE: 9 INJECTION, SOLUTION INTRAVENOUS at 14:34

## 2018-03-26 RX ADMIN — ACETAMINOPHEN 650 MG: 325 TABLET ORAL at 20:45

## 2018-03-26 RX ADMIN — Medication 10 ML: at 20:46

## 2018-03-26 RX ADMIN — FUROSEMIDE 20 MG: 10 INJECTION, SOLUTION INTRAMUSCULAR; INTRAVENOUS at 00:02

## 2018-03-26 RX ADMIN — DOCUSATE SODIUM 100 MG: 100 CAPSULE ORAL at 20:48

## 2018-03-26 RX ADMIN — IPRATROPIUM BROMIDE AND ALBUTEROL SULFATE 1 AMPULE: .5; 3 SOLUTION RESPIRATORY (INHALATION) at 19:22

## 2018-03-26 RX ADMIN — Medication 10 ML: at 08:34

## 2018-03-26 RX ADMIN — CEFEPIME HYDROCHLORIDE 1 G: 2 INJECTION, POWDER, FOR SOLUTION INTRAVENOUS at 09:46

## 2018-03-26 RX ADMIN — FUROSEMIDE 20 MG: 10 INJECTION, SOLUTION INTRAMUSCULAR; INTRAVENOUS at 06:28

## 2018-03-26 RX ADMIN — IPRATROPIUM BROMIDE AND ALBUTEROL SULFATE 1 AMPULE: .5; 3 SOLUTION RESPIRATORY (INHALATION) at 23:47

## 2018-03-26 RX ADMIN — IPRATROPIUM BROMIDE AND ALBUTEROL SULFATE 1 AMPULE: .5; 3 SOLUTION RESPIRATORY (INHALATION) at 15:21

## 2018-03-26 RX ADMIN — Medication 15 ML: at 20:45

## 2018-03-26 ASSESSMENT — PULMONARY FUNCTION TESTS
PIF_VALUE: 10
PIF_VALUE: 11
PIF_VALUE: 10

## 2018-03-26 ASSESSMENT — PAIN SCALES - GENERAL
PAINLEVEL_OUTOF10: 0

## 2018-03-27 LAB
ALBUMIN SERPL-MCNC: 3 G/DL (ref 3.5–5.1)
ALP BLD-CCNC: 78 U/L (ref 38–126)
ALT SERPL-CCNC: 30 U/L (ref 11–66)
ANION GAP SERPL CALCULATED.3IONS-SCNC: 12 MEQ/L (ref 8–16)
AST SERPL-CCNC: 36 U/L (ref 5–40)
BILIRUB SERPL-MCNC: 1.4 MG/DL (ref 0.3–1.2)
BILIRUBIN DIRECT: 0.4 MG/DL (ref 0–0.3)
BUN BLDV-MCNC: 32 MG/DL (ref 7–22)
CALCIUM SERPL-MCNC: 7.9 MG/DL (ref 8.5–10.5)
CHLORIDE BLD-SCNC: 103 MEQ/L (ref 98–111)
CO2: 24 MEQ/L (ref 23–33)
CREAT SERPL-MCNC: 1.5 MG/DL (ref 0.4–1.2)
EKG ATRIAL RATE: 77 BPM
EKG Q-T INTERVAL: 412 MS
EKG QRS DURATION: 112 MS
EKG QTC CALCULATION (BAZETT): 523 MS
EKG R AXIS: 86 DEGREES
EKG T AXIS: -81 DEGREES
EKG VENTRICULAR RATE: 97 BPM
GFR SERPL CREATININE-BSD FRML MDRD: 45 ML/MIN/1.73M2
GLUCOSE BLD-MCNC: 113 MG/DL (ref 70–108)
GLUCOSE BLD-MCNC: 149 MG/DL (ref 70–108)
GLUCOSE BLD-MCNC: 90 MG/DL (ref 70–108)
GLUCOSE BLD-MCNC: 92 MG/DL (ref 70–108)
GLUCOSE BLD-MCNC: 98 MG/DL (ref 70–108)
INR BLD: 3.1 (ref 0.85–1.13)
MAGNESIUM: 2.1 MG/DL (ref 1.6–2.4)
POTASSIUM SERPL-SCNC: 4.3 MEQ/L (ref 3.5–5.2)
SODIUM BLD-SCNC: 139 MEQ/L (ref 135–145)
TOTAL PROTEIN: 6.3 G/DL (ref 6.1–8)

## 2018-03-27 PROCEDURE — 82948 REAGENT STRIP/BLOOD GLUCOSE: CPT

## 2018-03-27 PROCEDURE — 93005 ELECTROCARDIOGRAM TRACING: CPT | Performed by: INTERNAL MEDICINE

## 2018-03-27 PROCEDURE — 2580000003 HC RX 258: Performed by: INTERNAL MEDICINE

## 2018-03-27 PROCEDURE — 6360000002 HC RX W HCPCS: Performed by: INTERNAL MEDICINE

## 2018-03-27 PROCEDURE — 94669 MECHANICAL CHEST WALL OSCILL: CPT

## 2018-03-27 PROCEDURE — 82248 BILIRUBIN DIRECT: CPT

## 2018-03-27 PROCEDURE — 6370000000 HC RX 637 (ALT 250 FOR IP): Performed by: INTERNAL MEDICINE

## 2018-03-27 PROCEDURE — C9113 INJ PANTOPRAZOLE SODIUM, VIA: HCPCS | Performed by: INTERNAL MEDICINE

## 2018-03-27 PROCEDURE — 83735 ASSAY OF MAGNESIUM: CPT

## 2018-03-27 PROCEDURE — 80053 COMPREHEN METABOLIC PANEL: CPT

## 2018-03-27 PROCEDURE — 36415 COLL VENOUS BLD VENIPUNCTURE: CPT

## 2018-03-27 PROCEDURE — 2500000003 HC RX 250 WO HCPCS: Performed by: INTERNAL MEDICINE

## 2018-03-27 PROCEDURE — 94640 AIRWAY INHALATION TREATMENT: CPT

## 2018-03-27 PROCEDURE — 2700000000 HC OXYGEN THERAPY PER DAY

## 2018-03-27 PROCEDURE — 36592 COLLECT BLOOD FROM PICC: CPT

## 2018-03-27 PROCEDURE — 2000000000 HC ICU R&B

## 2018-03-27 PROCEDURE — 99291 CRITICAL CARE FIRST HOUR: CPT | Performed by: INTERNAL MEDICINE

## 2018-03-27 PROCEDURE — 85610 PROTHROMBIN TIME: CPT

## 2018-03-27 RX ORDER — UREA 10 %
3 LOTION (ML) TOPICAL NIGHTLY PRN
Status: DISCONTINUED | OUTPATIENT
Start: 2018-03-27 | End: 2018-04-11 | Stop reason: HOSPADM

## 2018-03-27 RX ORDER — TRAZODONE HYDROCHLORIDE 50 MG/1
50 TABLET ORAL NIGHTLY
Status: DISCONTINUED | OUTPATIENT
Start: 2018-03-27 | End: 2018-04-11 | Stop reason: HOSPADM

## 2018-03-27 RX ORDER — METOPROLOL TARTRATE 5 MG/5ML
5 INJECTION INTRAVENOUS ONCE
Status: COMPLETED | OUTPATIENT
Start: 2018-03-27 | End: 2018-03-27

## 2018-03-27 RX ORDER — POLYETHYLENE GLYCOL 3350 17 G/17G
17 POWDER, FOR SOLUTION ORAL DAILY
Status: DISCONTINUED | OUTPATIENT
Start: 2018-03-27 | End: 2018-04-11 | Stop reason: HOSPADM

## 2018-03-27 RX ORDER — METOPROLOL TARTRATE 50 MG/1
50 TABLET, FILM COATED ORAL 2 TIMES DAILY
Status: DISCONTINUED | OUTPATIENT
Start: 2018-03-27 | End: 2018-04-11 | Stop reason: HOSPADM

## 2018-03-27 RX ORDER — ALPRAZOLAM 0.5 MG/1
0.5 TABLET ORAL 2 TIMES DAILY PRN
Status: DISCONTINUED | OUTPATIENT
Start: 2018-03-27 | End: 2018-03-28

## 2018-03-27 RX ORDER — FUROSEMIDE 10 MG/ML
20 INJECTION INTRAMUSCULAR; INTRAVENOUS EVERY 12 HOURS
Status: COMPLETED | OUTPATIENT
Start: 2018-03-27 | End: 2018-03-28

## 2018-03-27 RX ADMIN — ACETAMINOPHEN 650 MG: 325 TABLET ORAL at 03:18

## 2018-03-27 RX ADMIN — Medication 1 UNITS: at 18:54

## 2018-03-27 RX ADMIN — IPRATROPIUM BROMIDE 0.5 MG: 0.5 SOLUTION RESPIRATORY (INHALATION) at 12:59

## 2018-03-27 RX ADMIN — PANTOPRAZOLE SODIUM 40 MG: 40 INJECTION, POWDER, FOR SOLUTION INTRAVENOUS at 16:48

## 2018-03-27 RX ADMIN — POLYETHYLENE GLYCOL 3350 17 G: 17 POWDER, FOR SOLUTION ORAL at 16:52

## 2018-03-27 RX ADMIN — TRAZODONE HYDROCHLORIDE 50 MG: 50 TABLET ORAL at 20:59

## 2018-03-27 RX ADMIN — Medication 3 MG: at 20:59

## 2018-03-27 RX ADMIN — ALPRAZOLAM 0.5 MG: 0.5 TABLET ORAL at 14:44

## 2018-03-27 RX ADMIN — METOPROLOL TARTRATE 50 MG: 50 TABLET, FILM COATED ORAL at 20:59

## 2018-03-27 RX ADMIN — PANTOPRAZOLE SODIUM 40 MG: 40 INJECTION, POWDER, FOR SOLUTION INTRAVENOUS at 07:35

## 2018-03-27 RX ADMIN — IPRATROPIUM BROMIDE 0.5 MG: 0.5 SOLUTION RESPIRATORY (INHALATION) at 16:02

## 2018-03-27 RX ADMIN — Medication 15 ML: at 09:02

## 2018-03-27 RX ADMIN — ATORVASTATIN CALCIUM 80 MG: 80 TABLET, FILM COATED ORAL at 20:59

## 2018-03-27 RX ADMIN — IPRATROPIUM BROMIDE 0.5 MG: 0.5 SOLUTION RESPIRATORY (INHALATION) at 19:44

## 2018-03-27 RX ADMIN — DOCUSATE SODIUM 100 MG: 100 CAPSULE ORAL at 09:02

## 2018-03-27 RX ADMIN — METOPROLOL TARTRATE 5 MG: 5 INJECTION INTRAVENOUS at 14:19

## 2018-03-27 RX ADMIN — PROCAINAMIDE HYDROCHLORIDE 2 MG/MIN: 500 INJECTION INTRAMUSCULAR; INTRAVENOUS at 05:57

## 2018-03-27 RX ADMIN — FUROSEMIDE 20 MG: 10 INJECTION, SOLUTION INTRAMUSCULAR; INTRAVENOUS at 23:54

## 2018-03-27 RX ADMIN — METOPROLOL TARTRATE 50 MG: 50 TABLET, FILM COATED ORAL at 14:57

## 2018-03-27 RX ADMIN — PROCAINAMIDE HYDROCHLORIDE 2 MG/MIN: 500 INJECTION INTRAMUSCULAR; INTRAVENOUS at 23:52

## 2018-03-27 RX ADMIN — Medication 10 ML: at 09:02

## 2018-03-27 RX ADMIN — IPRATROPIUM BROMIDE AND ALBUTEROL SULFATE 1 AMPULE: .5; 3 SOLUTION RESPIRATORY (INHALATION) at 06:35

## 2018-03-27 RX ADMIN — ACETAMINOPHEN 650 MG: 325 TABLET ORAL at 14:18

## 2018-03-27 RX ADMIN — FUROSEMIDE 20 MG: 10 INJECTION, SOLUTION INTRAMUSCULAR; INTRAVENOUS at 05:53

## 2018-03-27 RX ADMIN — Medication 10 ML: at 20:59

## 2018-03-27 RX ADMIN — FUROSEMIDE 20 MG: 10 INJECTION, SOLUTION INTRAMUSCULAR; INTRAVENOUS at 13:34

## 2018-03-27 RX ADMIN — Medication 15 ML: at 20:59

## 2018-03-27 RX ADMIN — CEFEPIME HYDROCHLORIDE 1 G: 2 INJECTION, POWDER, FOR SOLUTION INTRAVENOUS at 11:07

## 2018-03-27 ASSESSMENT — PAIN SCALES - GENERAL
PAINLEVEL_OUTOF10: 0
PAINLEVEL_OUTOF10: 2
PAINLEVEL_OUTOF10: 0
PAINLEVEL_OUTOF10: 2

## 2018-03-28 ENCOUNTER — APPOINTMENT (OUTPATIENT)
Dept: GENERAL RADIOLOGY | Age: 78
DRG: 853 | End: 2018-03-28
Payer: MEDICARE

## 2018-03-28 ENCOUNTER — APPOINTMENT (OUTPATIENT)
Dept: ULTRASOUND IMAGING | Age: 78
DRG: 853 | End: 2018-03-28
Payer: MEDICARE

## 2018-03-28 LAB
ABO: NORMAL
ALBUMIN SERPL-MCNC: 2.9 G/DL (ref 3.5–5.1)
ALP BLD-CCNC: 75 U/L (ref 38–126)
ALT SERPL-CCNC: 28 U/L (ref 11–66)
ANION GAP SERPL CALCULATED.3IONS-SCNC: 14 MEQ/L (ref 8–16)
ANTIBODY SCREEN: NORMAL
AST SERPL-CCNC: 35 U/L (ref 5–40)
BILIRUB SERPL-MCNC: 1.4 MG/DL (ref 0.3–1.2)
BILIRUBIN DIRECT: 0.4 MG/DL (ref 0–0.3)
BLOOD CULTURE, ROUTINE: NORMAL
BLOOD CULTURE, ROUTINE: NORMAL
BUN BLDV-MCNC: 34 MG/DL (ref 7–22)
CALCIUM SERPL-MCNC: 8.7 MG/DL (ref 8.5–10.5)
CHLORIDE BLD-SCNC: 101 MEQ/L (ref 98–111)
CO2: 25 MEQ/L (ref 23–33)
CREAT SERPL-MCNC: 1.7 MG/DL (ref 0.4–1.2)
GFR SERPL CREATININE-BSD FRML MDRD: 39 ML/MIN/1.73M2
GLUCOSE BLD-MCNC: 101 MG/DL (ref 70–108)
GLUCOSE BLD-MCNC: 129 MG/DL (ref 70–108)
GLUCOSE BLD-MCNC: 168 MG/DL (ref 70–108)
GLUCOSE BLD-MCNC: 90 MG/DL (ref 70–108)
GLUCOSE BLD-MCNC: 95 MG/DL (ref 70–108)
INR BLD: 4.35 (ref 0.85–1.13)
LV EF: 33 %
LVEF MODALITY: NORMAL
MAGNESIUM: 2.2 MG/DL (ref 1.6–2.4)
PHOSPHORUS: 3.3 MG/DL (ref 2.4–4.7)
POTASSIUM SERPL-SCNC: 4.1 MEQ/L (ref 3.5–5.2)
PRO-BNP: ABNORMAL PG/ML (ref 0–1800)
RH FACTOR: NORMAL
SODIUM BLD-SCNC: 140 MEQ/L (ref 135–145)
TOTAL PROTEIN: 6.3 G/DL (ref 6.1–8)

## 2018-03-28 PROCEDURE — 93306 TTE W/DOPPLER COMPLETE: CPT

## 2018-03-28 PROCEDURE — 6370000000 HC RX 637 (ALT 250 FOR IP): Performed by: INTERNAL MEDICINE

## 2018-03-28 PROCEDURE — 6360000002 HC RX W HCPCS: Performed by: INTERNAL MEDICINE

## 2018-03-28 PROCEDURE — 36415 COLL VENOUS BLD VENIPUNCTURE: CPT

## 2018-03-28 PROCEDURE — 2580000003 HC RX 258: Performed by: INTERNAL MEDICINE

## 2018-03-28 PROCEDURE — 80053 COMPREHEN METABOLIC PANEL: CPT

## 2018-03-28 PROCEDURE — 86900 BLOOD TYPING SEROLOGIC ABO: CPT

## 2018-03-28 PROCEDURE — 36592 COLLECT BLOOD FROM PICC: CPT

## 2018-03-28 PROCEDURE — 83735 ASSAY OF MAGNESIUM: CPT

## 2018-03-28 PROCEDURE — 82948 REAGENT STRIP/BLOOD GLUCOSE: CPT

## 2018-03-28 PROCEDURE — 83880 ASSAY OF NATRIURETIC PEPTIDE: CPT

## 2018-03-28 PROCEDURE — 86901 BLOOD TYPING SEROLOGIC RH(D): CPT

## 2018-03-28 PROCEDURE — 71045 X-RAY EXAM CHEST 1 VIEW: CPT

## 2018-03-28 PROCEDURE — 2700000000 HC OXYGEN THERAPY PER DAY

## 2018-03-28 PROCEDURE — 85610 PROTHROMBIN TIME: CPT

## 2018-03-28 PROCEDURE — 84100 ASSAY OF PHOSPHORUS: CPT

## 2018-03-28 PROCEDURE — 94669 MECHANICAL CHEST WALL OSCILL: CPT

## 2018-03-28 PROCEDURE — 86850 RBC ANTIBODY SCREEN: CPT

## 2018-03-28 PROCEDURE — 94640 AIRWAY INHALATION TREATMENT: CPT

## 2018-03-28 PROCEDURE — 2000000000 HC ICU R&B

## 2018-03-28 PROCEDURE — 99291 CRITICAL CARE FIRST HOUR: CPT | Performed by: INTERNAL MEDICINE

## 2018-03-28 PROCEDURE — 76705 ECHO EXAM OF ABDOMEN: CPT

## 2018-03-28 PROCEDURE — 82248 BILIRUBIN DIRECT: CPT

## 2018-03-28 RX ORDER — 0.9 % SODIUM CHLORIDE 0.9 %
250 INTRAVENOUS SOLUTION INTRAVENOUS ONCE
Status: DISCONTINUED | OUTPATIENT
Start: 2018-03-28 | End: 2018-04-11 | Stop reason: HOSPADM

## 2018-03-28 RX ORDER — PANTOPRAZOLE SODIUM 40 MG/1
40 TABLET, DELAYED RELEASE ORAL
Status: DISCONTINUED | OUTPATIENT
Start: 2018-03-28 | End: 2018-04-05

## 2018-03-28 RX ORDER — ALPRAZOLAM 0.5 MG/1
0.5 TABLET ORAL 3 TIMES DAILY PRN
Status: DISCONTINUED | OUTPATIENT
Start: 2018-03-28 | End: 2018-04-05

## 2018-03-28 RX ORDER — GUAIFENESIN 600 MG/1
600 TABLET, EXTENDED RELEASE ORAL 2 TIMES DAILY
Status: DISCONTINUED | OUTPATIENT
Start: 2018-03-28 | End: 2018-04-11 | Stop reason: HOSPADM

## 2018-03-28 RX ADMIN — TRAZODONE HYDROCHLORIDE 50 MG: 50 TABLET ORAL at 20:22

## 2018-03-28 RX ADMIN — Medication 15 ML: at 20:27

## 2018-03-28 RX ADMIN — PANTOPRAZOLE SODIUM 40 MG: 40 TABLET, DELAYED RELEASE ORAL at 17:37

## 2018-03-28 RX ADMIN — METOPROLOL TARTRATE 50 MG: 50 TABLET, FILM COATED ORAL at 08:32

## 2018-03-28 RX ADMIN — IPRATROPIUM BROMIDE 0.5 MG: 0.5 SOLUTION RESPIRATORY (INHALATION) at 15:43

## 2018-03-28 RX ADMIN — GUAIFENESIN 600 MG: 600 TABLET, EXTENDED RELEASE ORAL at 09:52

## 2018-03-28 RX ADMIN — ALPRAZOLAM 0.5 MG: 0.5 TABLET ORAL at 10:36

## 2018-03-28 RX ADMIN — CEFEPIME HYDROCHLORIDE 1 G: 2 INJECTION, POWDER, FOR SOLUTION INTRAVENOUS at 09:52

## 2018-03-28 RX ADMIN — PHYTONADIONE 1 MG: 10 INJECTION, EMULSION INTRAMUSCULAR; INTRAVENOUS; SUBCUTANEOUS at 13:16

## 2018-03-28 RX ADMIN — IPRATROPIUM BROMIDE 0.5 MG: 0.5 SOLUTION RESPIRATORY (INHALATION) at 19:53

## 2018-03-28 RX ADMIN — FUROSEMIDE 20 MG: 10 INJECTION, SOLUTION INTRAMUSCULAR; INTRAVENOUS at 12:18

## 2018-03-28 RX ADMIN — FUROSEMIDE 20 MG: 10 INJECTION, SOLUTION INTRAMUSCULAR; INTRAVENOUS at 23:11

## 2018-03-28 RX ADMIN — IPRATROPIUM BROMIDE 0.5 MG: 0.5 SOLUTION RESPIRATORY (INHALATION) at 08:31

## 2018-03-28 RX ADMIN — PANTOPRAZOLE SODIUM 40 MG: 40 TABLET, DELAYED RELEASE ORAL at 06:38

## 2018-03-28 RX ADMIN — IPRATROPIUM BROMIDE 0.5 MG: 0.5 SOLUTION RESPIRATORY (INHALATION) at 10:46

## 2018-03-28 RX ADMIN — Medication 10 ML: at 20:23

## 2018-03-28 RX ADMIN — DOCUSATE SODIUM 100 MG: 100 CAPSULE ORAL at 08:32

## 2018-03-28 RX ADMIN — PROCAINAMIDE HYDROCHLORIDE 2 MG/MIN: 500 INJECTION INTRAMUSCULAR; INTRAVENOUS at 20:23

## 2018-03-28 RX ADMIN — GUAIFENESIN 600 MG: 600 TABLET, EXTENDED RELEASE ORAL at 20:22

## 2018-03-28 RX ADMIN — POLYETHYLENE GLYCOL 3350 17 G: 17 POWDER, FOR SOLUTION ORAL at 09:36

## 2018-03-28 RX ADMIN — Medication 10 ML: at 09:37

## 2018-03-28 RX ADMIN — METOPROLOL TARTRATE 50 MG: 50 TABLET, FILM COATED ORAL at 20:22

## 2018-03-28 RX ADMIN — ATORVASTATIN CALCIUM 80 MG: 80 TABLET, FILM COATED ORAL at 20:22

## 2018-03-28 RX ADMIN — Medication 1 UNITS: at 17:41

## 2018-03-28 RX ADMIN — Medication 15 ML: at 09:36

## 2018-03-28 RX ADMIN — BISACODYL 10 MG: 10 SUPPOSITORY RECTAL at 08:33

## 2018-03-28 ASSESSMENT — PAIN SCALES - GENERAL
PAINLEVEL_OUTOF10: 0

## 2018-03-29 ENCOUNTER — APPOINTMENT (OUTPATIENT)
Dept: CARDIAC CATH/INVASIVE PROCEDURES | Age: 78
DRG: 853 | End: 2018-03-29
Payer: MEDICARE

## 2018-03-29 ENCOUNTER — APPOINTMENT (OUTPATIENT)
Dept: GENERAL RADIOLOGY | Age: 78
DRG: 853 | End: 2018-03-29
Payer: MEDICARE

## 2018-03-29 LAB
ANION GAP SERPL CALCULATED.3IONS-SCNC: 13 MEQ/L (ref 8–16)
BUN BLDV-MCNC: 35 MG/DL (ref 7–22)
CALCIUM SERPL-MCNC: 8.2 MG/DL (ref 8.5–10.5)
CHLORIDE BLD-SCNC: 100 MEQ/L (ref 98–111)
CO2: 26 MEQ/L (ref 23–33)
COLLECTED BY:: ABNORMAL
COLLECTED BY:: NORMAL
CREAT SERPL-MCNC: 1.9 MG/DL (ref 0.4–1.2)
GFR SERPL CREATININE-BSD FRML MDRD: 34 ML/MIN/1.73M2
GLUCOSE BLD-MCNC: 113 MG/DL (ref 70–108)
GLUCOSE BLD-MCNC: 117 MG/DL (ref 70–108)
GLUCOSE BLD-MCNC: 80 MG/DL (ref 70–108)
GLUCOSE BLD-MCNC: 86 MG/DL (ref 70–108)
HCT VFR BLD CALC: 27.2 % (ref 42–52)
HEMOGLOBIN: 9.1 GM/DL (ref 14–18)
INR BLD: 1.69 (ref 0.85–1.13)
INR BLD: 2.09 (ref 0.85–1.13)
MAGNESIUM: 2.2 MG/DL (ref 1.6–2.4)
MCH RBC QN AUTO: 31.3 PG (ref 27–31)
MCHC RBC AUTO-ENTMCNC: 33.7 GM/DL (ref 33–37)
MCV RBC AUTO: 92.8 FL (ref 80–94)
PDW BLD-RTO: 16.4 % (ref 11.5–14.5)
PHOSPHORUS: 3.1 MG/DL (ref 2.4–4.7)
PLATELET # BLD: 211 THOU/MM3 (ref 130–400)
PMV BLD AUTO: 9.1 FL (ref 7.4–10.4)
POC O2 SATURATION: 49 % (ref 94–97)
POC O2 SATURATION: 96 % (ref 94–97)
POTASSIUM SERPL-SCNC: 3.9 MEQ/L (ref 3.5–5.2)
RBC # BLD: 2.93 MILL/MM3 (ref 4.7–6.1)
SODIUM BLD-SCNC: 139 MEQ/L (ref 135–145)
SOURCE, BLOOD GAS: ABNORMAL
SOURCE, BLOOD GAS: NORMAL
WBC # BLD: 12.5 THOU/MM3 (ref 4.8–10.8)

## 2018-03-29 PROCEDURE — 6360000002 HC RX W HCPCS: Performed by: INTERNAL MEDICINE

## 2018-03-29 PROCEDURE — 2000000000 HC ICU R&B

## 2018-03-29 PROCEDURE — 2780000010 HC IMPLANT OTHER

## 2018-03-29 PROCEDURE — 2580000003 HC RX 258: Performed by: INTERNAL MEDICINE

## 2018-03-29 PROCEDURE — 84100 ASSAY OF PHOSPHORUS: CPT

## 2018-03-29 PROCEDURE — C1751 CATH, INF, PER/CENT/MIDLINE: HCPCS

## 2018-03-29 PROCEDURE — 93567 NJX CAR CTH SPRVLV AORTGRPHY: CPT | Performed by: INTERNAL MEDICINE

## 2018-03-29 PROCEDURE — B310YZZ FLUOROSCOPY OF THORACIC AORTA USING OTHER CONTRAST: ICD-10-PCS | Performed by: INTERNAL MEDICINE

## 2018-03-29 PROCEDURE — 99291 CRITICAL CARE FIRST HOUR: CPT | Performed by: INTERNAL MEDICINE

## 2018-03-29 PROCEDURE — 83735 ASSAY OF MAGNESIUM: CPT

## 2018-03-29 PROCEDURE — C1769 GUIDE WIRE: HCPCS

## 2018-03-29 PROCEDURE — 2720000010 HC SURG SUPPLY STERILE

## 2018-03-29 PROCEDURE — C1725 CATH, TRANSLUMIN NON-LASER: HCPCS

## 2018-03-29 PROCEDURE — 6370000000 HC RX 637 (ALT 250 FOR IP): Performed by: INTERNAL MEDICINE

## 2018-03-29 PROCEDURE — B215YZZ FLUOROSCOPY OF LEFT HEART USING OTHER CONTRAST: ICD-10-PCS | Performed by: INTERNAL MEDICINE

## 2018-03-29 PROCEDURE — 71045 X-RAY EXAM CHEST 1 VIEW: CPT

## 2018-03-29 PROCEDURE — 94669 MECHANICAL CHEST WALL OSCILL: CPT

## 2018-03-29 PROCEDURE — 82948 REAGENT STRIP/BLOOD GLUCOSE: CPT

## 2018-03-29 PROCEDURE — 93460 R&L HRT ART/VENTRICLE ANGIO: CPT | Performed by: INTERNAL MEDICINE

## 2018-03-29 PROCEDURE — 36415 COLL VENOUS BLD VENIPUNCTURE: CPT

## 2018-03-29 PROCEDURE — 85610 PROTHROMBIN TIME: CPT

## 2018-03-29 PROCEDURE — 94640 AIRWAY INHALATION TREATMENT: CPT

## 2018-03-29 PROCEDURE — B211YZZ FLUOROSCOPY OF MULTIPLE CORONARY ARTERIES USING OTHER CONTRAST: ICD-10-PCS | Performed by: INTERNAL MEDICINE

## 2018-03-29 PROCEDURE — 6360000002 HC RX W HCPCS

## 2018-03-29 PROCEDURE — 2700000000 HC OXYGEN THERAPY PER DAY

## 2018-03-29 PROCEDURE — 80048 BASIC METABOLIC PNL TOTAL CA: CPT

## 2018-03-29 PROCEDURE — 4A023N7 MEASUREMENT OF CARDIAC SAMPLING AND PRESSURE, LEFT HEART, PERCUTANEOUS APPROACH: ICD-10-PCS | Performed by: INTERNAL MEDICINE

## 2018-03-29 PROCEDURE — 82810 BLOOD GASES O2 SAT ONLY: CPT

## 2018-03-29 PROCEDURE — 2500000003 HC RX 250 WO HCPCS

## 2018-03-29 PROCEDURE — C1887 CATHETER, GUIDING: HCPCS

## 2018-03-29 PROCEDURE — 85027 COMPLETE CBC AUTOMATED: CPT

## 2018-03-29 PROCEDURE — C1894 INTRO/SHEATH, NON-LASER: HCPCS

## 2018-03-29 RX ADMIN — IPRATROPIUM BROMIDE 0.5 MG: 0.5 SOLUTION RESPIRATORY (INHALATION) at 21:09

## 2018-03-29 RX ADMIN — PROCAINAMIDE HYDROCHLORIDE 2 MG/MIN: 500 INJECTION INTRAMUSCULAR; INTRAVENOUS at 16:36

## 2018-03-29 RX ADMIN — Medication 15 ML: at 09:45

## 2018-03-29 RX ADMIN — IPRATROPIUM BROMIDE 0.5 MG: 0.5 SOLUTION RESPIRATORY (INHALATION) at 13:00

## 2018-03-29 RX ADMIN — ALPRAZOLAM 0.5 MG: 0.5 TABLET ORAL at 20:22

## 2018-03-29 RX ADMIN — IPRATROPIUM BROMIDE 0.5 MG: 0.5 SOLUTION RESPIRATORY (INHALATION) at 16:08

## 2018-03-29 RX ADMIN — Medication 10 ML: at 20:22

## 2018-03-29 RX ADMIN — ALPRAZOLAM 0.5 MG: 0.5 TABLET ORAL at 00:50

## 2018-03-29 RX ADMIN — TRAZODONE HYDROCHLORIDE 50 MG: 50 TABLET ORAL at 20:22

## 2018-03-29 RX ADMIN — PANTOPRAZOLE SODIUM 40 MG: 40 TABLET, DELAYED RELEASE ORAL at 16:40

## 2018-03-29 RX ADMIN — DOCUSATE SODIUM 100 MG: 100 CAPSULE ORAL at 09:45

## 2018-03-29 RX ADMIN — ATORVASTATIN CALCIUM 80 MG: 80 TABLET, FILM COATED ORAL at 20:22

## 2018-03-29 RX ADMIN — GUAIFENESIN 600 MG: 600 TABLET, EXTENDED RELEASE ORAL at 09:45

## 2018-03-29 RX ADMIN — METOPROLOL TARTRATE 50 MG: 50 TABLET, FILM COATED ORAL at 20:22

## 2018-03-29 RX ADMIN — GUAIFENESIN 600 MG: 600 TABLET, EXTENDED RELEASE ORAL at 20:22

## 2018-03-29 RX ADMIN — METOPROLOL TARTRATE 50 MG: 50 TABLET, FILM COATED ORAL at 09:45

## 2018-03-29 RX ADMIN — IPRATROPIUM BROMIDE 0.5 MG: 0.5 SOLUTION RESPIRATORY (INHALATION) at 09:09

## 2018-03-29 RX ADMIN — PANTOPRAZOLE SODIUM 40 MG: 40 TABLET, DELAYED RELEASE ORAL at 06:39

## 2018-03-29 RX ADMIN — ALPRAZOLAM 0.5 MG: 0.5 TABLET ORAL at 18:16

## 2018-03-29 RX ADMIN — CEFEPIME HYDROCHLORIDE 1 G: 2 INJECTION, POWDER, FOR SOLUTION INTRAVENOUS at 09:54

## 2018-03-29 RX ADMIN — Medication 10 ML: at 09:45

## 2018-03-29 ASSESSMENT — PAIN SCALES - GENERAL
PAINLEVEL_OUTOF10: 0

## 2018-03-30 ENCOUNTER — APPOINTMENT (OUTPATIENT)
Dept: INTERVENTIONAL RADIOLOGY/VASCULAR | Age: 78
DRG: 853 | End: 2018-03-30
Payer: MEDICARE

## 2018-03-30 LAB
ALLEN TEST: ABNORMAL
ANION GAP SERPL CALCULATED.3IONS-SCNC: 13 MEQ/L (ref 8–16)
ANISOCYTOSIS: ABNORMAL
APTT: 30.5 SECONDS (ref 22–38)
BASE EXCESS (CALCULATED): 3.1 MMOL/L (ref -2.5–2.5)
BASOPHILS # BLD: 0.2 %
BASOPHILS ABSOLUTE: 0 THOU/MM3 (ref 0–0.1)
BUN BLDV-MCNC: 38 MG/DL (ref 7–22)
CALCIUM IONIZED: 1.03 MMOL/L (ref 1.12–1.32)
CALCIUM SERPL-MCNC: 8.8 MG/DL (ref 8.5–10.5)
CHLORIDE BLD-SCNC: 100 MEQ/L (ref 98–111)
CO2: 27 MEQ/L (ref 23–33)
COLLECTED BY:: ABNORMAL
CREAT SERPL-MCNC: 1.7 MG/DL (ref 0.4–1.2)
DEVICE: ABNORMAL
EKG ATRIAL RATE: 61 BPM
EKG P AXIS: 71 DEGREES
EKG P-R INTERVAL: 160 MS
EKG Q-T INTERVAL: 546 MS
EKG QRS DURATION: 130 MS
EKG QTC CALCULATION (BAZETT): 549 MS
EKG R AXIS: 51 DEGREES
EKG T AXIS: -119 DEGREES
EKG VENTRICULAR RATE: 61 BPM
EOSINOPHIL # BLD: 0.9 %
EOSINOPHILS ABSOLUTE: 0.1 THOU/MM3 (ref 0–0.4)
GFR SERPL CREATININE-BSD FRML MDRD: 39 ML/MIN/1.73M2
GLUCOSE BLD-MCNC: 79 MG/DL (ref 70–108)
GLUCOSE BLD-MCNC: 88 MG/DL (ref 70–108)
GLUCOSE BLD-MCNC: 89 MG/DL (ref 70–108)
HCO3: 29 MMOL/L (ref 23–28)
HCT VFR BLD CALC: 27.7 % (ref 42–52)
HEMOGLOBIN: 9.1 GM/DL (ref 14–18)
IFIO2: 6
INR BLD: 1.79 (ref 0.85–1.13)
LYMPHOCYTES # BLD: 2.5 %
LYMPHOCYTES ABSOLUTE: 0.4 THOU/MM3 (ref 1–4.8)
MAGNESIUM: 2.2 MG/DL (ref 1.6–2.4)
MCH RBC QN AUTO: 30.7 PG (ref 27–31)
MCHC RBC AUTO-ENTMCNC: 32.9 GM/DL (ref 33–37)
MCV RBC AUTO: 93.4 FL (ref 80–94)
MONOCYTES # BLD: 5.7 %
MONOCYTES ABSOLUTE: 0.9 THOU/MM3 (ref 0.4–1.3)
NUCLEATED RED BLOOD CELLS: 0 /100 WBC
O2 SATURATION: 89 %
PCO2: 49 MMHG (ref 35–45)
PDW BLD-RTO: 16.5 % (ref 11.5–14.5)
PH BLOOD GAS: 7.38 (ref 7.35–7.45)
PHOSPHORUS: 5.1 MG/DL (ref 2.4–4.7)
PLATELET # BLD: 225 THOU/MM3 (ref 130–400)
PMV BLD AUTO: 8.8 FL (ref 7.4–10.4)
PO2: 59 MMHG (ref 71–104)
POTASSIUM SERPL-SCNC: 4.5 MEQ/L (ref 3.5–5.2)
RBC # BLD: 2.97 MILL/MM3 (ref 4.7–6.1)
SEG NEUTROPHILS: 90.7 %
SEGMENTED NEUTROPHILS ABSOLUTE COUNT: 14 THOU/MM3 (ref 1.8–7.7)
SODIUM BLD-SCNC: 140 MEQ/L (ref 135–145)
SOURCE, BLOOD GAS: ABNORMAL
WBC # BLD: 15.4 THOU/MM3 (ref 4.8–10.8)

## 2018-03-30 PROCEDURE — 2720000010 HC SURG SUPPLY STERILE

## 2018-03-30 PROCEDURE — 6370000000 HC RX 637 (ALT 250 FOR IP): Performed by: INTERNAL MEDICINE

## 2018-03-30 PROCEDURE — 82330 ASSAY OF CALCIUM: CPT

## 2018-03-30 PROCEDURE — 82803 BLOOD GASES ANY COMBINATION: CPT

## 2018-03-30 PROCEDURE — 85610 PROTHROMBIN TIME: CPT

## 2018-03-30 PROCEDURE — C1751 CATH, INF, PER/CENT/MIDLINE: HCPCS

## 2018-03-30 PROCEDURE — 99223 1ST HOSP IP/OBS HIGH 75: CPT | Performed by: THORACIC SURGERY (CARDIOTHORACIC VASCULAR SURGERY)

## 2018-03-30 PROCEDURE — 2580000003 HC RX 258: Performed by: INTERNAL MEDICINE

## 2018-03-30 PROCEDURE — 33968 REMOVE AORTIC ASSIST DEVICE: CPT | Performed by: INTERNAL MEDICINE

## 2018-03-30 PROCEDURE — 93005 ELECTROCARDIOGRAM TRACING: CPT | Performed by: INTERNAL MEDICINE

## 2018-03-30 PROCEDURE — 82948 REAGENT STRIP/BLOOD GLUCOSE: CPT

## 2018-03-30 PROCEDURE — 94669 MECHANICAL CHEST WALL OSCILL: CPT

## 2018-03-30 PROCEDURE — 2100000000 HC CCU R&B

## 2018-03-30 PROCEDURE — 2700000000 HC OXYGEN THERAPY PER DAY

## 2018-03-30 PROCEDURE — 6360000002 HC RX W HCPCS: Performed by: INTERNAL MEDICINE

## 2018-03-30 PROCEDURE — 85730 THROMBOPLASTIN TIME PARTIAL: CPT

## 2018-03-30 PROCEDURE — 37799 UNLISTED PX VASCULAR SURGERY: CPT

## 2018-03-30 PROCEDURE — 83735 ASSAY OF MAGNESIUM: CPT

## 2018-03-30 PROCEDURE — C1769 GUIDE WIRE: HCPCS

## 2018-03-30 PROCEDURE — 84100 ASSAY OF PHOSPHORUS: CPT

## 2018-03-30 PROCEDURE — 93971 EXTREMITY STUDY: CPT

## 2018-03-30 PROCEDURE — 85025 COMPLETE CBC W/AUTO DIFF WBC: CPT

## 2018-03-30 PROCEDURE — 36415 COLL VENOUS BLD VENIPUNCTURE: CPT

## 2018-03-30 PROCEDURE — 6360000002 HC RX W HCPCS

## 2018-03-30 PROCEDURE — 80048 BASIC METABOLIC PNL TOTAL CA: CPT

## 2018-03-30 PROCEDURE — 94640 AIRWAY INHALATION TREATMENT: CPT

## 2018-03-30 RX ORDER — AMLODIPINE BESYLATE 5 MG/1
5 TABLET ORAL EVERY 8 HOURS PRN
Status: DISCONTINUED | OUTPATIENT
Start: 2018-03-30 | End: 2018-04-11 | Stop reason: HOSPADM

## 2018-03-30 RX ORDER — ATROPINE SULFATE 0.4 MG/ML
0.5 AMPUL (ML) INJECTION
Status: ACTIVE | OUTPATIENT
Start: 2018-03-30 | End: 2018-03-30

## 2018-03-30 RX ORDER — METOPROLOL TARTRATE 5 MG/5ML
5 INJECTION INTRAVENOUS EVERY 6 HOURS PRN
Status: DISCONTINUED | OUTPATIENT
Start: 2018-03-30 | End: 2018-04-11 | Stop reason: HOSPADM

## 2018-03-30 RX ORDER — LOSARTAN POTASSIUM 50 MG/1
50 TABLET ORAL DAILY
Status: DISCONTINUED | OUTPATIENT
Start: 2018-03-30 | End: 2018-04-05

## 2018-03-30 RX ORDER — LORAZEPAM 2 MG/ML
INJECTION INTRAMUSCULAR
Status: COMPLETED
Start: 2018-03-30 | End: 2018-03-30

## 2018-03-30 RX ORDER — LORAZEPAM 2 MG/ML
1 INJECTION INTRAMUSCULAR EVERY 4 HOURS PRN
Status: DISCONTINUED | OUTPATIENT
Start: 2018-03-30 | End: 2018-04-11 | Stop reason: HOSPADM

## 2018-03-30 RX ORDER — SODIUM CHLORIDE 0.9 % (FLUSH) 0.9 %
10 SYRINGE (ML) INJECTION EVERY 12 HOURS SCHEDULED
Status: DISCONTINUED | OUTPATIENT
Start: 2018-03-30 | End: 2018-04-11 | Stop reason: HOSPADM

## 2018-03-30 RX ORDER — FUROSEMIDE 10 MG/ML
40 INJECTION INTRAMUSCULAR; INTRAVENOUS ONCE
Status: COMPLETED | OUTPATIENT
Start: 2018-03-30 | End: 2018-03-30

## 2018-03-30 RX ORDER — SODIUM CHLORIDE 0.9 % (FLUSH) 0.9 %
10 SYRINGE (ML) INJECTION PRN
Status: DISCONTINUED | OUTPATIENT
Start: 2018-03-30 | End: 2018-04-11 | Stop reason: HOSPADM

## 2018-03-30 RX ORDER — ACETAMINOPHEN 325 MG/1
650 TABLET ORAL EVERY 4 HOURS PRN
Status: DISCONTINUED | OUTPATIENT
Start: 2018-03-30 | End: 2018-04-11 | Stop reason: HOSPADM

## 2018-03-30 RX ADMIN — PANTOPRAZOLE SODIUM 40 MG: 40 TABLET, DELAYED RELEASE ORAL at 10:15

## 2018-03-30 RX ADMIN — GUAIFENESIN 600 MG: 600 TABLET, EXTENDED RELEASE ORAL at 10:16

## 2018-03-30 RX ADMIN — LOSARTAN POTASSIUM 50 MG: 50 TABLET, FILM COATED ORAL at 12:39

## 2018-03-30 RX ADMIN — ALPRAZOLAM 0.5 MG: 0.5 TABLET ORAL at 01:43

## 2018-03-30 RX ADMIN — IPRATROPIUM BROMIDE 0.5 MG: 0.5 SOLUTION RESPIRATORY (INHALATION) at 09:21

## 2018-03-30 RX ADMIN — METOPROLOL TARTRATE 50 MG: 50 TABLET, FILM COATED ORAL at 20:31

## 2018-03-30 RX ADMIN — TRAZODONE HYDROCHLORIDE 50 MG: 50 TABLET ORAL at 20:31

## 2018-03-30 RX ADMIN — PANTOPRAZOLE SODIUM 40 MG: 40 TABLET, DELAYED RELEASE ORAL at 19:56

## 2018-03-30 RX ADMIN — Medication 1 MG: at 02:03

## 2018-03-30 RX ADMIN — DOCUSATE SODIUM 100 MG: 100 CAPSULE ORAL at 10:15

## 2018-03-30 RX ADMIN — LORAZEPAM 1 MG: 2 INJECTION INTRAMUSCULAR at 02:03

## 2018-03-30 RX ADMIN — METOPROLOL TARTRATE 50 MG: 50 TABLET, FILM COATED ORAL at 12:39

## 2018-03-30 RX ADMIN — Medication 10 ML: at 20:31

## 2018-03-30 RX ADMIN — IPRATROPIUM BROMIDE 0.5 MG: 0.5 SOLUTION RESPIRATORY (INHALATION) at 18:39

## 2018-03-30 RX ADMIN — POLYETHYLENE GLYCOL 3350 17 G: 17 POWDER, FOR SOLUTION ORAL at 10:15

## 2018-03-30 RX ADMIN — AZITHROMYCIN MONOHYDRATE 500 MG: 500 INJECTION, POWDER, LYOPHILIZED, FOR SOLUTION INTRAVENOUS at 20:31

## 2018-03-30 RX ADMIN — Medication 10 ML: at 13:33

## 2018-03-30 RX ADMIN — FUROSEMIDE 40 MG: 10 INJECTION, SOLUTION INTRAMUSCULAR; INTRAVENOUS at 19:20

## 2018-03-30 RX ADMIN — CEFTRIAXONE SODIUM 1 G: 1 INJECTION, POWDER, FOR SOLUTION INTRAMUSCULAR; INTRAVENOUS at 20:31

## 2018-03-30 RX ADMIN — IPRATROPIUM BROMIDE 0.5 MG: 0.5 SOLUTION RESPIRATORY (INHALATION) at 14:42

## 2018-03-30 RX ADMIN — ATORVASTATIN CALCIUM 80 MG: 80 TABLET, FILM COATED ORAL at 20:31

## 2018-03-30 RX ADMIN — GUAIFENESIN 600 MG: 600 TABLET, EXTENDED RELEASE ORAL at 22:54

## 2018-03-30 ASSESSMENT — PAIN SCALES - GENERAL: PAINLEVEL_OUTOF10: 0

## 2018-03-31 ENCOUNTER — APPOINTMENT (OUTPATIENT)
Dept: GENERAL RADIOLOGY | Age: 78
DRG: 853 | End: 2018-03-31
Payer: MEDICARE

## 2018-03-31 LAB
ANION GAP SERPL CALCULATED.3IONS-SCNC: 10 MEQ/L (ref 8–16)
ANISOCYTOSIS: ABNORMAL
APTT: 32 SECONDS (ref 22–38)
BASOPHILS # BLD: 1.3 %
BASOPHILS ABSOLUTE: 0.2 THOU/MM3 (ref 0–0.1)
BUN BLDV-MCNC: 39 MG/DL (ref 7–22)
CALCIUM IONIZED: 1.09 MMOL/L (ref 1.12–1.32)
CALCIUM SERPL-MCNC: 8.9 MG/DL (ref 8.5–10.5)
CHLORIDE BLD-SCNC: 101 MEQ/L (ref 98–111)
CO2: 31 MEQ/L (ref 23–33)
CREAT SERPL-MCNC: 1.8 MG/DL (ref 0.4–1.2)
EOSINOPHIL # BLD: 3.1 %
EOSINOPHILS ABSOLUTE: 0.4 THOU/MM3 (ref 0–0.4)
GFR SERPL CREATININE-BSD FRML MDRD: 37 ML/MIN/1.73M2
GLUCOSE BLD-MCNC: 104 MG/DL (ref 70–108)
GLUCOSE BLD-MCNC: 167 MG/DL (ref 70–108)
GLUCOSE BLD-MCNC: 196 MG/DL (ref 70–108)
GLUCOSE BLD-MCNC: 289 MG/DL (ref 70–108)
GLUCOSE BLD-MCNC: 73 MG/DL (ref 70–108)
GLUCOSE BLD-MCNC: 74 MG/DL (ref 70–108)
HCT VFR BLD CALC: 26.7 % (ref 42–52)
HEMOGLOBIN: 9.1 GM/DL (ref 14–18)
INR BLD: 2.48 (ref 0.85–1.13)
LYMPHOCYTES # BLD: 5.1 %
LYMPHOCYTES ABSOLUTE: 0.7 THOU/MM3 (ref 1–4.8)
MAGNESIUM: 2.3 MG/DL (ref 1.6–2.4)
MCH RBC QN AUTO: 31.4 PG (ref 27–31)
MCHC RBC AUTO-ENTMCNC: 34 GM/DL (ref 33–37)
MCV RBC AUTO: 92.4 FL (ref 80–94)
MONOCYTES # BLD: 11.2 %
MONOCYTES ABSOLUTE: 1.6 THOU/MM3 (ref 0.4–1.3)
NUCLEATED RED BLOOD CELLS: 0 /100 WBC
PDW BLD-RTO: 15.8 % (ref 11.5–14.5)
PHOSPHORUS: 3.5 MG/DL (ref 2.4–4.7)
PLATELET # BLD: 242 THOU/MM3 (ref 130–400)
PMV BLD AUTO: 8.9 FL (ref 7.4–10.4)
POTASSIUM SERPL-SCNC: 3.9 MEQ/L (ref 3.5–5.2)
RBC # BLD: 2.89 MILL/MM3 (ref 4.7–6.1)
SEG NEUTROPHILS: 79.3 %
SEGMENTED NEUTROPHILS ABSOLUTE COUNT: 11.3 THOU/MM3 (ref 1.8–7.7)
SODIUM BLD-SCNC: 142 MEQ/L (ref 135–145)
WBC # BLD: 14.3 THOU/MM3 (ref 4.8–10.8)

## 2018-03-31 PROCEDURE — G8978 MOBILITY CURRENT STATUS: HCPCS

## 2018-03-31 PROCEDURE — G8987 SELF CARE CURRENT STATUS: HCPCS

## 2018-03-31 PROCEDURE — 2580000003 HC RX 258: Performed by: INTERNAL MEDICINE

## 2018-03-31 PROCEDURE — G8979 MOBILITY GOAL STATUS: HCPCS

## 2018-03-31 PROCEDURE — 82330 ASSAY OF CALCIUM: CPT

## 2018-03-31 PROCEDURE — 94640 AIRWAY INHALATION TREATMENT: CPT

## 2018-03-31 PROCEDURE — 82948 REAGENT STRIP/BLOOD GLUCOSE: CPT

## 2018-03-31 PROCEDURE — 36592 COLLECT BLOOD FROM PICC: CPT

## 2018-03-31 PROCEDURE — 85730 THROMBOPLASTIN TIME PARTIAL: CPT

## 2018-03-31 PROCEDURE — 6370000000 HC RX 637 (ALT 250 FOR IP): Performed by: INTERNAL MEDICINE

## 2018-03-31 PROCEDURE — 6360000002 HC RX W HCPCS: Performed by: INTERNAL MEDICINE

## 2018-03-31 PROCEDURE — 94669 MECHANICAL CHEST WALL OSCILL: CPT

## 2018-03-31 PROCEDURE — 97166 OT EVAL MOD COMPLEX 45 MIN: CPT

## 2018-03-31 PROCEDURE — 99233 SBSQ HOSP IP/OBS HIGH 50: CPT | Performed by: INTERNAL MEDICINE

## 2018-03-31 PROCEDURE — 83735 ASSAY OF MAGNESIUM: CPT

## 2018-03-31 PROCEDURE — G8988 SELF CARE GOAL STATUS: HCPCS

## 2018-03-31 PROCEDURE — 2700000000 HC OXYGEN THERAPY PER DAY

## 2018-03-31 PROCEDURE — 97110 THERAPEUTIC EXERCISES: CPT

## 2018-03-31 PROCEDURE — 85610 PROTHROMBIN TIME: CPT

## 2018-03-31 PROCEDURE — 84100 ASSAY OF PHOSPHORUS: CPT

## 2018-03-31 PROCEDURE — 97162 PT EVAL MOD COMPLEX 30 MIN: CPT

## 2018-03-31 PROCEDURE — 36415 COLL VENOUS BLD VENIPUNCTURE: CPT

## 2018-03-31 PROCEDURE — 71045 X-RAY EXAM CHEST 1 VIEW: CPT

## 2018-03-31 PROCEDURE — 85025 COMPLETE CBC W/AUTO DIFF WBC: CPT

## 2018-03-31 PROCEDURE — 2100000000 HC CCU R&B

## 2018-03-31 PROCEDURE — 80048 BASIC METABOLIC PNL TOTAL CA: CPT

## 2018-03-31 RX ORDER — POTASSIUM CHLORIDE 750 MG/1
20 TABLET, FILM COATED, EXTENDED RELEASE ORAL ONCE
Status: COMPLETED | OUTPATIENT
Start: 2018-03-31 | End: 2018-03-31

## 2018-03-31 RX ORDER — BUMETANIDE 0.25 MG/ML
2 INJECTION, SOLUTION INTRAMUSCULAR; INTRAVENOUS 2 TIMES DAILY
Status: DISCONTINUED | OUTPATIENT
Start: 2018-03-31 | End: 2018-03-31 | Stop reason: RX

## 2018-03-31 RX ORDER — FUROSEMIDE 10 MG/ML
80 INJECTION INTRAMUSCULAR; INTRAVENOUS 2 TIMES DAILY
Status: DISCONTINUED | OUTPATIENT
Start: 2018-03-31 | End: 2018-04-05 | Stop reason: SDUPTHER

## 2018-03-31 RX ORDER — POTASSIUM CHLORIDE 20 MEQ/1
10 TABLET, EXTENDED RELEASE ORAL 2 TIMES DAILY
Status: DISCONTINUED | OUTPATIENT
Start: 2018-03-31 | End: 2018-04-11 | Stop reason: HOSPADM

## 2018-03-31 RX ORDER — AMIODARONE HYDROCHLORIDE 200 MG/1
200 TABLET ORAL DAILY
Status: DISCONTINUED | OUTPATIENT
Start: 2018-03-31 | End: 2018-04-09

## 2018-03-31 RX ORDER — DIGOXIN 125 MCG
125 TABLET ORAL DAILY
Status: DISCONTINUED | OUTPATIENT
Start: 2018-03-31 | End: 2018-04-11 | Stop reason: HOSPADM

## 2018-03-31 RX ADMIN — Medication 10 ML: at 22:00

## 2018-03-31 RX ADMIN — PROCAINAMIDE HYDROCHLORIDE 2 MG/MIN: 500 INJECTION INTRAMUSCULAR; INTRAVENOUS at 04:32

## 2018-03-31 RX ADMIN — Medication 3 MG: at 19:58

## 2018-03-31 RX ADMIN — METOPROLOL TARTRATE 50 MG: 50 TABLET, FILM COATED ORAL at 19:58

## 2018-03-31 RX ADMIN — DIGOXIN 125 MCG: 0.12 TABLET ORAL at 20:59

## 2018-03-31 RX ADMIN — GUAIFENESIN 600 MG: 600 TABLET, EXTENDED RELEASE ORAL at 07:41

## 2018-03-31 RX ADMIN — TRAZODONE HYDROCHLORIDE 50 MG: 50 TABLET ORAL at 19:58

## 2018-03-31 RX ADMIN — INSULIN LISPRO 2 UNITS: 100 INJECTION, SOLUTION INTRAVENOUS; SUBCUTANEOUS at 20:47

## 2018-03-31 RX ADMIN — ATORVASTATIN CALCIUM 80 MG: 80 TABLET, FILM COATED ORAL at 19:58

## 2018-03-31 RX ADMIN — METOPROLOL TARTRATE 50 MG: 50 TABLET, FILM COATED ORAL at 07:40

## 2018-03-31 RX ADMIN — POTASSIUM CHLORIDE 10 MEQ: 1500 TABLET, EXTENDED RELEASE ORAL at 20:59

## 2018-03-31 RX ADMIN — IPRATROPIUM BROMIDE 0.5 MG: 0.5 SOLUTION RESPIRATORY (INHALATION) at 08:28

## 2018-03-31 RX ADMIN — AMIODARONE HYDROCHLORIDE 200 MG: 200 TABLET ORAL at 20:59

## 2018-03-31 RX ADMIN — POLYETHYLENE GLYCOL 3350 17 G: 17 POWDER, FOR SOLUTION ORAL at 07:42

## 2018-03-31 RX ADMIN — PANTOPRAZOLE SODIUM 40 MG: 40 TABLET, DELAYED RELEASE ORAL at 16:53

## 2018-03-31 RX ADMIN — IPRATROPIUM BROMIDE 0.5 MG: 0.5 SOLUTION RESPIRATORY (INHALATION) at 12:45

## 2018-03-31 RX ADMIN — LOSARTAN POTASSIUM 50 MG: 50 TABLET, FILM COATED ORAL at 07:41

## 2018-03-31 RX ADMIN — PANTOPRAZOLE SODIUM 40 MG: 40 TABLET, DELAYED RELEASE ORAL at 07:40

## 2018-03-31 RX ADMIN — DOCUSATE SODIUM 100 MG: 100 CAPSULE ORAL at 07:41

## 2018-03-31 RX ADMIN — FUROSEMIDE 80 MG: 10 INJECTION, SOLUTION INTRAMUSCULAR; INTRAVENOUS at 20:59

## 2018-03-31 RX ADMIN — IPRATROPIUM BROMIDE 0.5 MG: 0.5 SOLUTION RESPIRATORY (INHALATION) at 17:14

## 2018-03-31 RX ADMIN — GUAIFENESIN 600 MG: 600 TABLET, EXTENDED RELEASE ORAL at 19:58

## 2018-03-31 RX ADMIN — AZITHROMYCIN MONOHYDRATE 500 MG: 500 INJECTION, POWDER, LYOPHILIZED, FOR SOLUTION INTRAVENOUS at 21:00

## 2018-03-31 RX ADMIN — Medication 10 ML: at 07:41

## 2018-03-31 RX ADMIN — POTASSIUM CHLORIDE 20 MEQ: 750 TABLET, FILM COATED, EXTENDED RELEASE ORAL at 09:13

## 2018-03-31 RX ADMIN — CEFTRIAXONE SODIUM 1 G: 1 INJECTION, POWDER, FOR SOLUTION INTRAMUSCULAR; INTRAVENOUS at 20:02

## 2018-03-31 RX ADMIN — IPRATROPIUM BROMIDE 0.5 MG: 0.5 SOLUTION RESPIRATORY (INHALATION) at 22:19

## 2018-03-31 ASSESSMENT — PAIN SCALES - GENERAL
PAINLEVEL_OUTOF10: 0

## 2018-04-01 LAB
ANION GAP SERPL CALCULATED.3IONS-SCNC: 10 MEQ/L (ref 8–16)
ANISOCYTOSIS: ABNORMAL
APTT: 29.8 SECONDS (ref 22–38)
BASOPHILIA: SLIGHT
BASOPHILS # BLD: 0.1 %
BASOPHILS ABSOLUTE: 0 THOU/MM3 (ref 0–0.1)
BUN BLDV-MCNC: 43 MG/DL (ref 7–22)
CALCIUM IONIZED: 1.05 MMOL/L (ref 1.12–1.32)
CALCIUM SERPL-MCNC: 8.8 MG/DL (ref 8.5–10.5)
CHLORIDE BLD-SCNC: 97 MEQ/L (ref 98–111)
CO2: 31 MEQ/L (ref 23–33)
CREAT SERPL-MCNC: 2 MG/DL (ref 0.4–1.2)
EOSINOPHIL # BLD: 4 %
EOSINOPHILS ABSOLUTE: 0.7 THOU/MM3 (ref 0–0.4)
FERRITIN: 474 NG/ML (ref 22–322)
GFR SERPL CREATININE-BSD FRML MDRD: 32 ML/MIN/1.73M2
GLUCOSE BLD-MCNC: 107 MG/DL (ref 70–108)
GLUCOSE BLD-MCNC: 111 MG/DL (ref 70–108)
GLUCOSE BLD-MCNC: 191 MG/DL (ref 70–108)
GLUCOSE BLD-MCNC: 192 MG/DL (ref 70–108)
GLUCOSE BLD-MCNC: 198 MG/DL (ref 70–108)
HCT VFR BLD CALC: 28.8 % (ref 42–52)
HEMOGLOBIN: 9.4 GM/DL (ref 14–18)
INR BLD: 1.83 (ref 0.85–1.13)
LYMPHOCYTES # BLD: 7 %
LYMPHOCYTES ABSOLUTE: 1.2 THOU/MM3 (ref 1–4.8)
MCH RBC QN AUTO: 30.2 PG (ref 27–31)
MCHC RBC AUTO-ENTMCNC: 32.9 GM/DL (ref 33–37)
MCV RBC AUTO: 91.8 FL (ref 80–94)
MONOCYTES # BLD: 2.4 %
MONOCYTES ABSOLUTE: 0.4 THOU/MM3 (ref 0.4–1.3)
NUCLEATED RED BLOOD CELLS: 0 /100 WBC
PDW BLD-RTO: 16.5 % (ref 11.5–14.5)
PLATELET # BLD: 279 THOU/MM3 (ref 130–400)
PLATELET ESTIMATE: ADEQUATE
PMV BLD AUTO: 8.5 FL (ref 7.4–10.4)
POIKILOCYTES: SLIGHT
POTASSIUM SERPL-SCNC: 3.7 MEQ/L (ref 3.5–5.2)
RBC # BLD: 3.13 MILL/MM3 (ref 4.7–6.1)
SCAN OF BLOOD SMEAR: NORMAL
SEG NEUTROPHILS: 86.5 %
SEGMENTED NEUTROPHILS ABSOLUTE COUNT: 14.4 THOU/MM3 (ref 1.8–7.7)
SODIUM BLD-SCNC: 138 MEQ/L (ref 135–145)
WBC # BLD: 16.7 THOU/MM3 (ref 4.8–10.8)

## 2018-04-01 PROCEDURE — 6370000000 HC RX 637 (ALT 250 FOR IP): Performed by: INTERNAL MEDICINE

## 2018-04-01 PROCEDURE — 82728 ASSAY OF FERRITIN: CPT

## 2018-04-01 PROCEDURE — 2100000000 HC CCU R&B

## 2018-04-01 PROCEDURE — 2580000003 HC RX 258: Performed by: INTERNAL MEDICINE

## 2018-04-01 PROCEDURE — 80048 BASIC METABOLIC PNL TOTAL CA: CPT

## 2018-04-01 PROCEDURE — 2700000000 HC OXYGEN THERAPY PER DAY

## 2018-04-01 PROCEDURE — 85730 THROMBOPLASTIN TIME PARTIAL: CPT

## 2018-04-01 PROCEDURE — 82948 REAGENT STRIP/BLOOD GLUCOSE: CPT

## 2018-04-01 PROCEDURE — 82330 ASSAY OF CALCIUM: CPT

## 2018-04-01 PROCEDURE — 94640 AIRWAY INHALATION TREATMENT: CPT

## 2018-04-01 PROCEDURE — 85025 COMPLETE CBC W/AUTO DIFF WBC: CPT

## 2018-04-01 PROCEDURE — 36415 COLL VENOUS BLD VENIPUNCTURE: CPT

## 2018-04-01 PROCEDURE — 94669 MECHANICAL CHEST WALL OSCILL: CPT

## 2018-04-01 PROCEDURE — 85610 PROTHROMBIN TIME: CPT

## 2018-04-01 PROCEDURE — 6360000002 HC RX W HCPCS: Performed by: INTERNAL MEDICINE

## 2018-04-01 RX ORDER — HEPARIN SODIUM 10000 [USP'U]/100ML
12 INJECTION, SOLUTION INTRAVENOUS CONTINUOUS
Status: DISCONTINUED | OUTPATIENT
Start: 2018-04-01 | End: 2018-04-01 | Stop reason: SDUPTHER

## 2018-04-01 RX ADMIN — ATORVASTATIN CALCIUM 80 MG: 80 TABLET, FILM COATED ORAL at 20:13

## 2018-04-01 RX ADMIN — Medication 1 UNITS: at 17:00

## 2018-04-01 RX ADMIN — PANTOPRAZOLE SODIUM 40 MG: 40 TABLET, DELAYED RELEASE ORAL at 17:00

## 2018-04-01 RX ADMIN — TRAZODONE HYDROCHLORIDE 50 MG: 50 TABLET ORAL at 20:13

## 2018-04-01 RX ADMIN — AZITHROMYCIN MONOHYDRATE 500 MG: 500 INJECTION, POWDER, LYOPHILIZED, FOR SOLUTION INTRAVENOUS at 22:05

## 2018-04-01 RX ADMIN — IPRATROPIUM BROMIDE 0.5 MG: 0.5 SOLUTION RESPIRATORY (INHALATION) at 09:37

## 2018-04-01 RX ADMIN — POTASSIUM CHLORIDE 10 MEQ: 1500 TABLET, EXTENDED RELEASE ORAL at 09:57

## 2018-04-01 RX ADMIN — PANTOPRAZOLE SODIUM 40 MG: 40 TABLET, DELAYED RELEASE ORAL at 09:57

## 2018-04-01 RX ADMIN — AMIODARONE HYDROCHLORIDE 200 MG: 200 TABLET ORAL at 09:58

## 2018-04-01 RX ADMIN — POLYETHYLENE GLYCOL 3350 17 G: 17 POWDER, FOR SOLUTION ORAL at 09:56

## 2018-04-01 RX ADMIN — FUROSEMIDE 80 MG: 10 INJECTION, SOLUTION INTRAMUSCULAR; INTRAVENOUS at 17:22

## 2018-04-01 RX ADMIN — IPRATROPIUM BROMIDE 0.5 MG: 0.5 SOLUTION RESPIRATORY (INHALATION) at 14:19

## 2018-04-01 RX ADMIN — POTASSIUM CHLORIDE 10 MEQ: 1500 TABLET, EXTENDED RELEASE ORAL at 20:13

## 2018-04-01 RX ADMIN — CEFTRIAXONE SODIUM 1 G: 1 INJECTION, POWDER, FOR SOLUTION INTRAMUSCULAR; INTRAVENOUS at 21:03

## 2018-04-01 RX ADMIN — Medication 10 ML: at 20:23

## 2018-04-01 RX ADMIN — FUROSEMIDE 80 MG: 10 INJECTION, SOLUTION INTRAMUSCULAR; INTRAVENOUS at 09:58

## 2018-04-01 RX ADMIN — GUAIFENESIN 600 MG: 600 TABLET, EXTENDED RELEASE ORAL at 20:13

## 2018-04-01 RX ADMIN — Medication 3 MG: at 23:01

## 2018-04-01 RX ADMIN — GUAIFENESIN 600 MG: 600 TABLET, EXTENDED RELEASE ORAL at 09:58

## 2018-04-01 RX ADMIN — METOPROLOL TARTRATE 50 MG: 50 TABLET, FILM COATED ORAL at 09:30

## 2018-04-01 RX ADMIN — INSULIN LISPRO 1 UNITS: 100 INJECTION, SOLUTION INTRAVENOUS; SUBCUTANEOUS at 20:13

## 2018-04-01 RX ADMIN — DIGOXIN 125 MCG: 0.12 TABLET ORAL at 12:30

## 2018-04-01 RX ADMIN — LOSARTAN POTASSIUM 50 MG: 50 TABLET, FILM COATED ORAL at 09:58

## 2018-04-01 RX ADMIN — DOCUSATE SODIUM 100 MG: 100 CAPSULE ORAL at 09:58

## 2018-04-01 RX ADMIN — Medication 10 ML: at 10:29

## 2018-04-01 RX ADMIN — IPRATROPIUM BROMIDE 0.5 MG: 0.5 SOLUTION RESPIRATORY (INHALATION) at 20:45

## 2018-04-01 RX ADMIN — HEPARIN SODIUM 12 UNITS/KG/HR: 10000 INJECTION, SOLUTION INTRAVENOUS at 21:03

## 2018-04-01 RX ADMIN — METOPROLOL TARTRATE 50 MG: 50 TABLET, FILM COATED ORAL at 20:13

## 2018-04-01 ASSESSMENT — PAIN SCALES - GENERAL: PAINLEVEL_OUTOF10: 0

## 2018-04-02 LAB
ANISOCYTOSIS: ABNORMAL
APTT: 85.7 SECONDS (ref 22–38)
BACTERIA: ABNORMAL /HPF
BASOPHILS # BLD: 0 %
BASOPHILS ABSOLUTE: 0 THOU/MM3 (ref 0–0.1)
BILIRUBIN URINE: NEGATIVE
BLOOD, URINE: ABNORMAL
CALCIUM IONIZED: 1.07 MMOL/L (ref 1.12–1.32)
CASTS 2: ABNORMAL /LPF
CASTS UA: ABNORMAL /LPF
CHARACTER, URINE: CLEAR
COLOR: YELLOW
CRYSTALS, UA: ABNORMAL
EOSINOPHIL # BLD: 3.2 %
EOSINOPHILS ABSOLUTE: 0.7 THOU/MM3 (ref 0–0.4)
EPITHELIAL CELLS, UA: ABNORMAL /HPF
GLUCOSE BLD-MCNC: 158 MG/DL (ref 70–108)
GLUCOSE BLD-MCNC: 190 MG/DL (ref 70–108)
GLUCOSE URINE: NEGATIVE MG/DL
HCT VFR BLD CALC: 31.1 % (ref 42–52)
HEMOGLOBIN: 10.3 GM/DL (ref 14–18)
KETONES, URINE: NEGATIVE
LEUKOCYTE ESTERASE, URINE: NEGATIVE
LYMPHOCYTES # BLD: 5.8 %
LYMPHOCYTES ABSOLUTE: 1.2 THOU/MM3 (ref 1–4.8)
MCH RBC QN AUTO: 30.5 PG (ref 27–31)
MCHC RBC AUTO-ENTMCNC: 33.1 GM/DL (ref 33–37)
MCV RBC AUTO: 92.3 FL (ref 80–94)
MISCELLANEOUS 2: ABNORMAL
MONOCYTES # BLD: 8.3 %
MONOCYTES ABSOLUTE: 1.7 THOU/MM3 (ref 0.4–1.3)
NITRITE, URINE: NEGATIVE
NUCLEATED RED BLOOD CELLS: 0 /100 WBC
PDW BLD-RTO: 16 % (ref 11.5–14.5)
PH UA: 7.5
PLATELET # BLD: 322 THOU/MM3 (ref 130–400)
PMV BLD AUTO: 8.7 FL (ref 7.4–10.4)
PROTEIN UA: NEGATIVE
RBC # BLD: 3.37 MILL/MM3 (ref 4.7–6.1)
RBC URINE: ABNORMAL /HPF
RENAL EPITHELIAL, UA: ABNORMAL
SEG NEUTROPHILS: 82.7 %
SEGMENTED NEUTROPHILS ABSOLUTE COUNT: 17 THOU/MM3 (ref 1.8–7.7)
SPECIFIC GRAVITY, URINE: 1.01 (ref 1–1.03)
UROBILINOGEN, URINE: 0.2 EU/DL
WBC # BLD: 20.6 THOU/MM3 (ref 4.8–10.8)
WBC UA: ABNORMAL /HPF
YEAST: ABNORMAL

## 2018-04-02 PROCEDURE — 97116 GAIT TRAINING THERAPY: CPT

## 2018-04-02 PROCEDURE — 2580000003 HC RX 258: Performed by: INTERNAL MEDICINE

## 2018-04-02 PROCEDURE — 99233 SBSQ HOSP IP/OBS HIGH 50: CPT | Performed by: THORACIC SURGERY (CARDIOTHORACIC VASCULAR SURGERY)

## 2018-04-02 PROCEDURE — 6370000000 HC RX 637 (ALT 250 FOR IP): Performed by: INTERNAL MEDICINE

## 2018-04-02 PROCEDURE — 97110 THERAPEUTIC EXERCISES: CPT

## 2018-04-02 PROCEDURE — 36415 COLL VENOUS BLD VENIPUNCTURE: CPT

## 2018-04-02 PROCEDURE — 87205 SMEAR GRAM STAIN: CPT

## 2018-04-02 PROCEDURE — 82948 REAGENT STRIP/BLOOD GLUCOSE: CPT

## 2018-04-02 PROCEDURE — 82330 ASSAY OF CALCIUM: CPT

## 2018-04-02 PROCEDURE — 85025 COMPLETE CBC W/AUTO DIFF WBC: CPT

## 2018-04-02 PROCEDURE — 81001 URINALYSIS AUTO W/SCOPE: CPT

## 2018-04-02 PROCEDURE — 85730 THROMBOPLASTIN TIME PARTIAL: CPT

## 2018-04-02 PROCEDURE — 2100000000 HC CCU R&B

## 2018-04-02 PROCEDURE — 6360000002 HC RX W HCPCS: Performed by: INTERNAL MEDICINE

## 2018-04-02 PROCEDURE — 87040 BLOOD CULTURE FOR BACTERIA: CPT

## 2018-04-02 RX ADMIN — METOPROLOL TARTRATE 50 MG: 50 TABLET, FILM COATED ORAL at 08:00

## 2018-04-02 RX ADMIN — TRAZODONE HYDROCHLORIDE 50 MG: 50 TABLET ORAL at 20:59

## 2018-04-02 RX ADMIN — PANTOPRAZOLE SODIUM 40 MG: 40 TABLET, DELAYED RELEASE ORAL at 15:46

## 2018-04-02 RX ADMIN — Medication 10 ML: at 20:56

## 2018-04-02 RX ADMIN — GUAIFENESIN 600 MG: 600 TABLET, EXTENDED RELEASE ORAL at 20:59

## 2018-04-02 RX ADMIN — ACETAMINOPHEN 650 MG: 325 TABLET ORAL at 04:21

## 2018-04-02 RX ADMIN — GUAIFENESIN 600 MG: 600 TABLET, EXTENDED RELEASE ORAL at 07:59

## 2018-04-02 RX ADMIN — HEPARIN SODIUM 12 UNITS/KG/HR: 10000 INJECTION, SOLUTION INTRAVENOUS at 21:13

## 2018-04-02 RX ADMIN — AMIODARONE HYDROCHLORIDE 200 MG: 200 TABLET ORAL at 09:00

## 2018-04-02 RX ADMIN — IPRATROPIUM BROMIDE 0.5 MG: 0.5 SOLUTION RESPIRATORY (INHALATION) at 10:16

## 2018-04-02 RX ADMIN — POTASSIUM CHLORIDE 10 MEQ: 1500 TABLET, EXTENDED RELEASE ORAL at 08:00

## 2018-04-02 RX ADMIN — ATORVASTATIN CALCIUM 80 MG: 80 TABLET, FILM COATED ORAL at 20:58

## 2018-04-02 RX ADMIN — INSULIN LISPRO 1 UNITS: 100 INJECTION, SOLUTION INTRAVENOUS; SUBCUTANEOUS at 21:13

## 2018-04-02 RX ADMIN — FUROSEMIDE 80 MG: 10 INJECTION, SOLUTION INTRAMUSCULAR; INTRAVENOUS at 08:00

## 2018-04-02 RX ADMIN — LOSARTAN POTASSIUM 50 MG: 50 TABLET, FILM COATED ORAL at 08:00

## 2018-04-02 RX ADMIN — IPRATROPIUM BROMIDE 0.5 MG: 0.5 SOLUTION RESPIRATORY (INHALATION) at 18:15

## 2018-04-02 RX ADMIN — FUROSEMIDE 80 MG: 10 INJECTION, SOLUTION INTRAMUSCULAR; INTRAVENOUS at 20:54

## 2018-04-02 RX ADMIN — Medication 10 ML: at 08:01

## 2018-04-02 RX ADMIN — METOPROLOL TARTRATE 50 MG: 50 TABLET, FILM COATED ORAL at 20:58

## 2018-04-02 RX ADMIN — POTASSIUM CHLORIDE 10 MEQ: 1500 TABLET, EXTENDED RELEASE ORAL at 20:59

## 2018-04-02 RX ADMIN — Medication 1 UNITS: at 15:46

## 2018-04-02 RX ADMIN — IPRATROPIUM BROMIDE 0.5 MG: 0.5 SOLUTION RESPIRATORY (INHALATION) at 14:21

## 2018-04-02 RX ADMIN — DIGOXIN 125 MCG: 0.12 TABLET ORAL at 09:00

## 2018-04-02 RX ADMIN — PANTOPRAZOLE SODIUM 40 MG: 40 TABLET, DELAYED RELEASE ORAL at 05:00

## 2018-04-02 ASSESSMENT — PAIN SCALES - GENERAL
PAINLEVEL_OUTOF10: 0

## 2018-04-03 LAB
ANION GAP SERPL CALCULATED.3IONS-SCNC: 13 MEQ/L (ref 8–16)
ANISOCYTOSIS: ABNORMAL
APTT: 82.5 SECONDS (ref 22–38)
BASOPHILIA: SLIGHT
BASOPHILS # BLD: 0.4 %
BASOPHILS ABSOLUTE: 0.1 THOU/MM3 (ref 0–0.1)
BUN BLDV-MCNC: 37 MG/DL (ref 7–22)
CALCIUM IONIZED: 0.96 MMOL/L (ref 1.12–1.32)
CALCIUM IONIZED: 1.03 MMOL/L (ref 1.12–1.32)
CALCIUM SERPL-MCNC: 8.8 MG/DL (ref 8.5–10.5)
CHLORIDE BLD-SCNC: 89 MEQ/L (ref 98–111)
CO2: 35 MEQ/L (ref 23–33)
CREAT SERPL-MCNC: 2.3 MG/DL (ref 0.4–1.2)
EOSINOPHIL # BLD: 3 %
EOSINOPHIL SMEAR: NORMAL
EOSINOPHILS ABSOLUTE: 0.7 THOU/MM3 (ref 0–0.4)
GFR SERPL CREATININE-BSD FRML MDRD: 28 ML/MIN/1.73M2
GLUCOSE BLD-MCNC: 110 MG/DL (ref 70–108)
GLUCOSE BLD-MCNC: 123 MG/DL (ref 70–108)
GLUCOSE BLD-MCNC: 124 MG/DL (ref 70–108)
GLUCOSE BLD-MCNC: 307 MG/DL (ref 70–108)
GLUCOSE BLD-MCNC: 359 MG/DL (ref 70–108)
HCT VFR BLD CALC: 29.5 % (ref 42–52)
HEMOGLOBIN: 9.9 GM/DL (ref 14–18)
LYMPHOCYTES # BLD: 4.5 %
LYMPHOCYTES ABSOLUTE: 1.1 THOU/MM3 (ref 1–4.8)
MCH RBC QN AUTO: 30.8 PG (ref 27–31)
MCHC RBC AUTO-ENTMCNC: 33.6 GM/DL (ref 33–37)
MCV RBC AUTO: 91.7 FL (ref 80–94)
MONOCYTES # BLD: 7.7 %
MONOCYTES ABSOLUTE: 1.8 THOU/MM3 (ref 0.4–1.3)
NUCLEATED RED BLOOD CELLS: 0 /100 WBC
PDW BLD-RTO: 16 % (ref 11.5–14.5)
PLATELET # BLD: 295 THOU/MM3 (ref 130–400)
PLATELET ESTIMATE: ADEQUATE
PMV BLD AUTO: 9.1 FL (ref 7.4–10.4)
POIKILOCYTES: SLIGHT
POTASSIUM REFLEX MAGNESIUM: 3.6 MEQ/L (ref 3.5–5.2)
RBC # BLD: 3.21 MILL/MM3 (ref 4.7–6.1)
SCAN OF BLOOD SMEAR: NORMAL
SEG NEUTROPHILS: 84.4 %
SEGMENTED NEUTROPHILS ABSOLUTE COUNT: 20 THOU/MM3 (ref 1.8–7.7)
SODIUM BLD-SCNC: 137 MEQ/L (ref 135–145)
SPECIMEN: NORMAL
WBC # BLD: 23.7 THOU/MM3 (ref 4.8–10.8)

## 2018-04-03 PROCEDURE — 6370000000 HC RX 637 (ALT 250 FOR IP): Performed by: INTERNAL MEDICINE

## 2018-04-03 PROCEDURE — 87205 SMEAR GRAM STAIN: CPT

## 2018-04-03 PROCEDURE — 97110 THERAPEUTIC EXERCISES: CPT

## 2018-04-03 PROCEDURE — 82948 REAGENT STRIP/BLOOD GLUCOSE: CPT

## 2018-04-03 PROCEDURE — 6360000002 HC RX W HCPCS: Performed by: INTERNAL MEDICINE

## 2018-04-03 PROCEDURE — 85730 THROMBOPLASTIN TIME PARTIAL: CPT

## 2018-04-03 PROCEDURE — 87070 CULTURE OTHR SPECIMN AEROBIC: CPT

## 2018-04-03 PROCEDURE — 97116 GAIT TRAINING THERAPY: CPT

## 2018-04-03 PROCEDURE — 80048 BASIC METABOLIC PNL TOTAL CA: CPT

## 2018-04-03 PROCEDURE — 89190 NASAL SMEAR FOR EOSINOPHILS: CPT

## 2018-04-03 PROCEDURE — 2580000003 HC RX 258: Performed by: INTERNAL MEDICINE

## 2018-04-03 PROCEDURE — 36415 COLL VENOUS BLD VENIPUNCTURE: CPT

## 2018-04-03 PROCEDURE — 94669 MECHANICAL CHEST WALL OSCILL: CPT

## 2018-04-03 PROCEDURE — 94640 AIRWAY INHALATION TREATMENT: CPT

## 2018-04-03 PROCEDURE — 85025 COMPLETE CBC W/AUTO DIFF WBC: CPT

## 2018-04-03 PROCEDURE — 82330 ASSAY OF CALCIUM: CPT

## 2018-04-03 PROCEDURE — 2100000000 HC CCU R&B

## 2018-04-03 RX ORDER — POTASSIUM CHLORIDE 20 MEQ/1
20 TABLET, EXTENDED RELEASE ORAL ONCE
Status: COMPLETED | OUTPATIENT
Start: 2018-04-03 | End: 2018-04-03

## 2018-04-03 RX ADMIN — DIGOXIN 125 MCG: 0.12 TABLET ORAL at 10:24

## 2018-04-03 RX ADMIN — POTASSIUM CHLORIDE 20 MEQ: 1500 TABLET, EXTENDED RELEASE ORAL at 07:03

## 2018-04-03 RX ADMIN — Medication 4 UNITS: at 13:17

## 2018-04-03 RX ADMIN — PANTOPRAZOLE SODIUM 40 MG: 40 TABLET, DELAYED RELEASE ORAL at 07:03

## 2018-04-03 RX ADMIN — HEPARIN SODIUM 12 UNITS/KG/HR: 10000 INJECTION, SOLUTION INTRAVENOUS at 20:42

## 2018-04-03 RX ADMIN — GUAIFENESIN 600 MG: 600 TABLET, EXTENDED RELEASE ORAL at 21:14

## 2018-04-03 RX ADMIN — PANTOPRAZOLE SODIUM 40 MG: 40 TABLET, DELAYED RELEASE ORAL at 15:24

## 2018-04-03 RX ADMIN — CALCIUM GLUCONATE 1.5 G: 94 INJECTION, SOLUTION INTRAVENOUS at 07:00

## 2018-04-03 RX ADMIN — METOPROLOL TARTRATE 50 MG: 50 TABLET, FILM COATED ORAL at 21:14

## 2018-04-03 RX ADMIN — FUROSEMIDE 80 MG: 10 INJECTION, SOLUTION INTRAMUSCULAR; INTRAVENOUS at 10:25

## 2018-04-03 RX ADMIN — METOPROLOL TARTRATE 50 MG: 50 TABLET, FILM COATED ORAL at 10:30

## 2018-04-03 RX ADMIN — INSULIN LISPRO 3 UNITS: 100 INJECTION, SOLUTION INTRAVENOUS; SUBCUTANEOUS at 21:16

## 2018-04-03 RX ADMIN — POTASSIUM CHLORIDE 10 MEQ: 1500 TABLET, EXTENDED RELEASE ORAL at 21:14

## 2018-04-03 RX ADMIN — FUROSEMIDE 80 MG: 10 INJECTION, SOLUTION INTRAMUSCULAR; INTRAVENOUS at 17:35

## 2018-04-03 RX ADMIN — Medication 10 ML: at 17:35

## 2018-04-03 RX ADMIN — AMIODARONE HYDROCHLORIDE 200 MG: 200 TABLET ORAL at 10:25

## 2018-04-03 RX ADMIN — TRAZODONE HYDROCHLORIDE 50 MG: 50 TABLET ORAL at 21:14

## 2018-04-03 RX ADMIN — IPRATROPIUM BROMIDE 0.5 MG: 0.5 SOLUTION RESPIRATORY (INHALATION) at 16:15

## 2018-04-03 RX ADMIN — IPRATROPIUM BROMIDE 0.5 MG: 0.5 SOLUTION RESPIRATORY (INHALATION) at 12:20

## 2018-04-03 RX ADMIN — IPRATROPIUM BROMIDE 0.5 MG: 0.5 SOLUTION RESPIRATORY (INHALATION) at 20:03

## 2018-04-03 RX ADMIN — ATORVASTATIN CALCIUM 80 MG: 80 TABLET, FILM COATED ORAL at 21:14

## 2018-04-03 RX ADMIN — DOCUSATE SODIUM 100 MG: 100 CAPSULE ORAL at 10:24

## 2018-04-03 RX ADMIN — LOSARTAN POTASSIUM 50 MG: 50 TABLET, FILM COATED ORAL at 13:29

## 2018-04-03 RX ADMIN — GUAIFENESIN 600 MG: 600 TABLET, EXTENDED RELEASE ORAL at 10:24

## 2018-04-03 RX ADMIN — POTASSIUM CHLORIDE 10 MEQ: 1500 TABLET, EXTENDED RELEASE ORAL at 10:23

## 2018-04-03 ASSESSMENT — PAIN SCALES - GENERAL
PAINLEVEL_OUTOF10: 0

## 2018-04-03 ASSESSMENT — PAIN SCALES - WONG BAKER
WONGBAKER_NUMERICALRESPONSE: 0
WONGBAKER_NUMERICALRESPONSE: 0

## 2018-04-04 LAB
ANION GAP SERPL CALCULATED.3IONS-SCNC: 13 MEQ/L (ref 8–16)
ANISOCYTOSIS: ABNORMAL
APTT: 92.3 SECONDS (ref 22–38)
BANDED NEUTROPHILS ABSOLUTE COUNT: 0.5 THOU/MM3
BANDS PRESENT: 2 %
BASOPHILIA: SLIGHT
BASOPHILS # BLD: 0 %
BASOPHILS ABSOLUTE: 0 THOU/MM3 (ref 0–0.1)
BUN BLDV-MCNC: 39 MG/DL (ref 7–22)
CALCIUM IONIZED: 0.98 MMOL/L (ref 1.12–1.32)
CALCIUM SERPL-MCNC: 9 MG/DL (ref 8.5–10.5)
CHLORIDE BLD-SCNC: 92 MEQ/L (ref 98–111)
CO2: 32 MEQ/L (ref 23–33)
CREAT SERPL-MCNC: 2.3 MG/DL (ref 0.4–1.2)
DIFFERENTIAL, MANUAL: NORMAL
EOSINOPHIL # BLD: 5 %
EOSINOPHILS ABSOLUTE: 1.1 THOU/MM3 (ref 0–0.4)
GFR SERPL CREATININE-BSD FRML MDRD: 28 ML/MIN/1.73M2
GLUCOSE BLD-MCNC: 117 MG/DL (ref 70–108)
GLUCOSE BLD-MCNC: 142 MG/DL (ref 70–108)
GLUCOSE BLD-MCNC: 167 MG/DL (ref 70–108)
GLUCOSE BLD-MCNC: 231 MG/DL (ref 70–108)
HCT VFR BLD CALC: 29.3 % (ref 42–52)
HEMOGLOBIN: 9.7 GM/DL (ref 14–18)
LYMPHOCYTES # BLD: 8 %
LYMPHOCYTES ABSOLUTE: 1.8 THOU/MM3 (ref 1–4.8)
MCH RBC QN AUTO: 30.3 PG (ref 27–31)
MCHC RBC AUTO-ENTMCNC: 33.1 GM/DL (ref 33–37)
MCV RBC AUTO: 91.6 FL (ref 80–94)
MONOCYTES # BLD: 6 %
MONOCYTES ABSOLUTE: 1.4 THOU/MM3 (ref 0.4–1.3)
NUCLEATED RED BLOOD CELLS: 0 /100 WBC
PDW BLD-RTO: 16.2 % (ref 11.5–14.5)
PLATELET # BLD: 294 THOU/MM3 (ref 130–400)
PLATELET ESTIMATE: ADEQUATE
PMV BLD AUTO: 8.8 FL (ref 7.4–10.4)
POIKILOCYTES: SLIGHT
POTASSIUM REFLEX MAGNESIUM: 3.8 MEQ/L (ref 3.5–5.2)
RBC # BLD: 3.2 MILL/MM3 (ref 4.7–6.1)
SEG NEUTROPHILS: 79 %
SEGMENTED NEUTROPHILS ABSOLUTE COUNT: 18 THOU/MM3 (ref 1.8–7.7)
SODIUM BLD-SCNC: 137 MEQ/L (ref 135–145)
WBC # BLD: 22.8 THOU/MM3 (ref 4.8–10.8)

## 2018-04-04 PROCEDURE — 82948 REAGENT STRIP/BLOOD GLUCOSE: CPT

## 2018-04-04 PROCEDURE — 6370000000 HC RX 637 (ALT 250 FOR IP): Performed by: INTERNAL MEDICINE

## 2018-04-04 PROCEDURE — 97530 THERAPEUTIC ACTIVITIES: CPT

## 2018-04-04 PROCEDURE — 82330 ASSAY OF CALCIUM: CPT

## 2018-04-04 PROCEDURE — 2580000003 HC RX 258: Performed by: INTERNAL MEDICINE

## 2018-04-04 PROCEDURE — 6360000002 HC RX W HCPCS: Performed by: INTERNAL MEDICINE

## 2018-04-04 PROCEDURE — 36415 COLL VENOUS BLD VENIPUNCTURE: CPT

## 2018-04-04 PROCEDURE — 85730 THROMBOPLASTIN TIME PARTIAL: CPT

## 2018-04-04 PROCEDURE — 97535 SELF CARE MNGMENT TRAINING: CPT

## 2018-04-04 PROCEDURE — 2140000000 HC CCU INTERMEDIATE R&B

## 2018-04-04 PROCEDURE — 97110 THERAPEUTIC EXERCISES: CPT

## 2018-04-04 PROCEDURE — 80048 BASIC METABOLIC PNL TOTAL CA: CPT

## 2018-04-04 PROCEDURE — 2700000000 HC OXYGEN THERAPY PER DAY

## 2018-04-04 PROCEDURE — 94669 MECHANICAL CHEST WALL OSCILL: CPT

## 2018-04-04 PROCEDURE — 97116 GAIT TRAINING THERAPY: CPT

## 2018-04-04 PROCEDURE — 94640 AIRWAY INHALATION TREATMENT: CPT

## 2018-04-04 PROCEDURE — 85025 COMPLETE CBC W/AUTO DIFF WBC: CPT

## 2018-04-04 RX ORDER — CALCIUM CARBONATE 500(1250)
1000 TABLET ORAL
Status: DISPENSED | OUTPATIENT
Start: 2018-04-04 | End: 2018-04-04

## 2018-04-04 RX ORDER — LINEZOLID 2 MG/ML
600 INJECTION, SOLUTION INTRAVENOUS EVERY 12 HOURS
Status: DISCONTINUED | OUTPATIENT
Start: 2018-04-04 | End: 2018-04-11 | Stop reason: HOSPADM

## 2018-04-04 RX ORDER — POTASSIUM CHLORIDE 20 MEQ/1
20 TABLET, EXTENDED RELEASE ORAL ONCE
Status: COMPLETED | OUTPATIENT
Start: 2018-04-04 | End: 2018-04-04

## 2018-04-04 RX ADMIN — Medication 3 MG: at 20:09

## 2018-04-04 RX ADMIN — GUAIFENESIN 600 MG: 600 TABLET, EXTENDED RELEASE ORAL at 20:08

## 2018-04-04 RX ADMIN — METOPROLOL TARTRATE 50 MG: 50 TABLET, FILM COATED ORAL at 20:08

## 2018-04-04 RX ADMIN — CALCIUM 1000 MG: 500 TABLET ORAL at 05:44

## 2018-04-04 RX ADMIN — LOSARTAN POTASSIUM 50 MG: 50 TABLET, FILM COATED ORAL at 12:01

## 2018-04-04 RX ADMIN — DIGOXIN 125 MCG: 0.12 TABLET ORAL at 08:44

## 2018-04-04 RX ADMIN — DOCUSATE SODIUM 100 MG: 100 CAPSULE ORAL at 10:42

## 2018-04-04 RX ADMIN — POTASSIUM CHLORIDE 10 MEQ: 1500 TABLET, EXTENDED RELEASE ORAL at 20:09

## 2018-04-04 RX ADMIN — CEFEPIME HYDROCHLORIDE 1 G: 1 INJECTION, POWDER, FOR SOLUTION INTRAMUSCULAR; INTRAVENOUS at 10:44

## 2018-04-04 RX ADMIN — TRAZODONE HYDROCHLORIDE 50 MG: 50 TABLET ORAL at 20:08

## 2018-04-04 RX ADMIN — POTASSIUM CHLORIDE 10 MEQ: 1500 TABLET, EXTENDED RELEASE ORAL at 08:45

## 2018-04-04 RX ADMIN — AMIODARONE HYDROCHLORIDE 200 MG: 200 TABLET ORAL at 10:42

## 2018-04-04 RX ADMIN — METOPROLOL TARTRATE 50 MG: 50 TABLET, FILM COATED ORAL at 08:44

## 2018-04-04 RX ADMIN — Medication 10 ML: at 10:58

## 2018-04-04 RX ADMIN — FUROSEMIDE 80 MG: 10 INJECTION, SOLUTION INTRAMUSCULAR; INTRAVENOUS at 10:43

## 2018-04-04 RX ADMIN — PANTOPRAZOLE SODIUM 40 MG: 40 TABLET, DELAYED RELEASE ORAL at 17:15

## 2018-04-04 RX ADMIN — Medication 10 ML: at 20:08

## 2018-04-04 RX ADMIN — ACETAMINOPHEN 650 MG: 325 TABLET ORAL at 22:37

## 2018-04-04 RX ADMIN — FUROSEMIDE 80 MG: 10 INJECTION, SOLUTION INTRAMUSCULAR; INTRAVENOUS at 17:15

## 2018-04-04 RX ADMIN — IPRATROPIUM BROMIDE 0.5 MG: 0.5 SOLUTION RESPIRATORY (INHALATION) at 17:56

## 2018-04-04 RX ADMIN — CALCIUM 1000 MG: 500 TABLET ORAL at 10:42

## 2018-04-04 RX ADMIN — Medication 1 UNITS: at 08:50

## 2018-04-04 RX ADMIN — IPRATROPIUM BROMIDE 0.5 MG: 0.5 SOLUTION RESPIRATORY (INHALATION) at 10:22

## 2018-04-04 RX ADMIN — GUAIFENESIN 600 MG: 600 TABLET, EXTENDED RELEASE ORAL at 10:42

## 2018-04-04 RX ADMIN — LINEZOLID 600 MG: 600 INJECTION, SOLUTION INTRAVENOUS at 11:57

## 2018-04-04 RX ADMIN — ATORVASTATIN CALCIUM 80 MG: 80 TABLET, FILM COATED ORAL at 20:08

## 2018-04-04 RX ADMIN — HEPARIN SODIUM 12 UNITS/KG/HR: 10000 INJECTION, SOLUTION INTRAVENOUS at 20:05

## 2018-04-04 RX ADMIN — POTASSIUM CHLORIDE 20 MEQ: 1500 TABLET, EXTENDED RELEASE ORAL at 05:47

## 2018-04-04 RX ADMIN — INSULIN LISPRO 1 UNITS: 100 INJECTION, SOLUTION INTRAVENOUS; SUBCUTANEOUS at 20:05

## 2018-04-04 RX ADMIN — Medication 2 UNITS: at 17:15

## 2018-04-04 RX ADMIN — LINEZOLID 600 MG: 600 INJECTION, SOLUTION INTRAVENOUS at 22:37

## 2018-04-04 RX ADMIN — PANTOPRAZOLE SODIUM 40 MG: 40 TABLET, DELAYED RELEASE ORAL at 05:47

## 2018-04-04 ASSESSMENT — PAIN SCALES - GENERAL
PAINLEVEL_OUTOF10: 0

## 2018-04-05 ENCOUNTER — APPOINTMENT (OUTPATIENT)
Dept: GENERAL RADIOLOGY | Age: 78
DRG: 853 | End: 2018-04-05
Payer: MEDICARE

## 2018-04-05 ENCOUNTER — APPOINTMENT (OUTPATIENT)
Dept: ULTRASOUND IMAGING | Age: 78
DRG: 853 | End: 2018-04-05
Payer: MEDICARE

## 2018-04-05 LAB
ANION GAP SERPL CALCULATED.3IONS-SCNC: 13 MEQ/L (ref 8–16)
ANISOCYTOSIS: ABNORMAL
APTT: 82.5 SECONDS (ref 22–38)
BANDED NEUTROPHILS ABSOLUTE COUNT: 4.5 THOU/MM3
BANDS PRESENT: 13 %
BASOPHILIA: SLIGHT
BASOPHILS # BLD: 0 %
BASOPHILS ABSOLUTE: 0 THOU/MM3 (ref 0–0.1)
BUN BLDV-MCNC: 39 MG/DL (ref 7–22)
CALCIUM IONIZED: 0.98 MMOL/L (ref 1.12–1.32)
CALCIUM SERPL-MCNC: 8.7 MG/DL (ref 8.5–10.5)
CHLORIDE BLD-SCNC: 87 MEQ/L (ref 98–111)
CO2: 31 MEQ/L (ref 23–33)
CREAT SERPL-MCNC: 2.6 MG/DL (ref 0.4–1.2)
DIFFERENTIAL, MANUAL: NORMAL
EOSINOPHIL # BLD: 3 %
EOSINOPHILS ABSOLUTE: 1 THOU/MM3 (ref 0–0.4)
GFR SERPL CREATININE-BSD FRML MDRD: 24 ML/MIN/1.73M2
GLUCOSE BLD-MCNC: 141 MG/DL (ref 70–108)
GLUCOSE BLD-MCNC: 148 MG/DL (ref 70–108)
GLUCOSE BLD-MCNC: 160 MG/DL (ref 70–108)
GLUCOSE BLD-MCNC: 194 MG/DL (ref 70–108)
GLUCOSE BLD-MCNC: 205 MG/DL (ref 70–108)
GRAM STAIN RESULT: NORMAL
HCT VFR BLD CALC: 29.3 % (ref 42–52)
HEMOGLOBIN: 9.7 GM/DL (ref 14–18)
LYMPHOCYTES # BLD: 7 %
LYMPHOCYTES ABSOLUTE: 2.4 THOU/MM3 (ref 1–4.8)
MCH RBC QN AUTO: 30.4 PG (ref 27–31)
MCHC RBC AUTO-ENTMCNC: 33.2 GM/DL (ref 33–37)
MCV RBC AUTO: 91.4 FL (ref 80–94)
MONOCYTES # BLD: 6 %
MONOCYTES ABSOLUTE: 2.1 THOU/MM3 (ref 0.4–1.3)
NUCLEATED RED BLOOD CELLS: 0 /100 WBC
PATHOLOGIST REVIEW: ABNORMAL
PDW BLD-RTO: 16.2 % (ref 11.5–14.5)
PHOSPHORUS: 2.8 MG/DL (ref 2.4–4.7)
PLATELET # BLD: 325 THOU/MM3 (ref 130–400)
PLATELET ESTIMATE: ADEQUATE
PMV BLD AUTO: 9.4 FL (ref 7.4–10.4)
POIKILOCYTES: SLIGHT
POTASSIUM REFLEX MAGNESIUM: 4.1 MEQ/L (ref 3.5–5.2)
RBC # BLD: 3.21 MILL/MM3 (ref 4.7–6.1)
RESPIRATORY CULTURE: NORMAL
SEG NEUTROPHILS: 71 %
SEGMENTED NEUTROPHILS ABSOLUTE COUNT: 24.4 THOU/MM3 (ref 1.8–7.7)
SODIUM BLD-SCNC: 131 MEQ/L (ref 135–145)
TOXIC GRANULATION: ABNORMAL
WBC # BLD: 34.3 THOU/MM3 (ref 4.8–10.8)

## 2018-04-05 PROCEDURE — 85025 COMPLETE CBC W/AUTO DIFF WBC: CPT

## 2018-04-05 PROCEDURE — 2700000000 HC OXYGEN THERAPY PER DAY

## 2018-04-05 PROCEDURE — 76770 US EXAM ABDO BACK WALL COMP: CPT

## 2018-04-05 PROCEDURE — 94640 AIRWAY INHALATION TREATMENT: CPT

## 2018-04-05 PROCEDURE — 6370000000 HC RX 637 (ALT 250 FOR IP): Performed by: INTERNAL MEDICINE

## 2018-04-05 PROCEDURE — 6360000002 HC RX W HCPCS: Performed by: INTERNAL MEDICINE

## 2018-04-05 PROCEDURE — 97530 THERAPEUTIC ACTIVITIES: CPT

## 2018-04-05 PROCEDURE — 2580000003 HC RX 258: Performed by: INTERNAL MEDICINE

## 2018-04-05 PROCEDURE — 84100 ASSAY OF PHOSPHORUS: CPT

## 2018-04-05 PROCEDURE — APPSS60 APP SPLIT SHARED TIME 46-60 MINUTES: Performed by: NURSE PRACTITIONER

## 2018-04-05 PROCEDURE — 2140000000 HC CCU INTERMEDIATE R&B

## 2018-04-05 PROCEDURE — 94669 MECHANICAL CHEST WALL OSCILL: CPT

## 2018-04-05 PROCEDURE — 80048 BASIC METABOLIC PNL TOTAL CA: CPT

## 2018-04-05 PROCEDURE — 36415 COLL VENOUS BLD VENIPUNCTURE: CPT

## 2018-04-05 PROCEDURE — 82948 REAGENT STRIP/BLOOD GLUCOSE: CPT

## 2018-04-05 PROCEDURE — 85730 THROMBOPLASTIN TIME PARTIAL: CPT

## 2018-04-05 PROCEDURE — 99999 PR OFFICE/OUTPT VISIT,PROCEDURE ONLY: CPT | Performed by: NURSE PRACTITIONER

## 2018-04-05 PROCEDURE — 97110 THERAPEUTIC EXERCISES: CPT

## 2018-04-05 PROCEDURE — 99232 SBSQ HOSP IP/OBS MODERATE 35: CPT | Performed by: INTERNAL MEDICINE

## 2018-04-05 PROCEDURE — 6370000000 HC RX 637 (ALT 250 FOR IP): Performed by: NURSE PRACTITIONER

## 2018-04-05 PROCEDURE — 71045 X-RAY EXAM CHEST 1 VIEW: CPT

## 2018-04-05 PROCEDURE — 82330 ASSAY OF CALCIUM: CPT

## 2018-04-05 RX ORDER — CALCIUM CARBONATE 500(1250)
1000 TABLET ORAL EVERY 4 HOURS
Status: COMPLETED | OUTPATIENT
Start: 2018-04-05 | End: 2018-04-05

## 2018-04-05 RX ORDER — FUROSEMIDE 10 MG/ML
80 INJECTION INTRAMUSCULAR; INTRAVENOUS 2 TIMES DAILY
Status: DISCONTINUED | OUTPATIENT
Start: 2018-04-06 | End: 2018-04-05

## 2018-04-05 RX ORDER — HEPARIN SODIUM 1000 [USP'U]/ML
4000 INJECTION, SOLUTION INTRAVENOUS; SUBCUTANEOUS PRN
Status: DISCONTINUED | OUTPATIENT
Start: 2018-04-05 | End: 2018-04-09

## 2018-04-05 RX ORDER — HEPARIN SODIUM 1000 [USP'U]/ML
2000 INJECTION, SOLUTION INTRAVENOUS; SUBCUTANEOUS PRN
Status: DISCONTINUED | OUTPATIENT
Start: 2018-04-05 | End: 2018-04-09

## 2018-04-05 RX ORDER — FUROSEMIDE 10 MG/ML
20 INJECTION INTRAMUSCULAR; INTRAVENOUS 2 TIMES DAILY
Status: DISCONTINUED | OUTPATIENT
Start: 2018-04-06 | End: 2018-04-06

## 2018-04-05 RX ORDER — FAMOTIDINE 20 MG/1
20 TABLET, FILM COATED ORAL DAILY
Status: DISCONTINUED | OUTPATIENT
Start: 2018-04-05 | End: 2018-04-11 | Stop reason: HOSPADM

## 2018-04-05 RX ADMIN — GUAIFENESIN 600 MG: 600 TABLET, EXTENDED RELEASE ORAL at 10:09

## 2018-04-05 RX ADMIN — CEFEPIME HYDROCHLORIDE 1 G: 1 INJECTION, POWDER, FOR SOLUTION INTRAMUSCULAR; INTRAVENOUS at 10:19

## 2018-04-05 RX ADMIN — ATORVASTATIN CALCIUM 80 MG: 80 TABLET, FILM COATED ORAL at 21:15

## 2018-04-05 RX ADMIN — LINEZOLID 600 MG: 600 INJECTION, SOLUTION INTRAVENOUS at 21:29

## 2018-04-05 RX ADMIN — IPRATROPIUM BROMIDE 0.5 MG: 0.5 SOLUTION RESPIRATORY (INHALATION) at 08:56

## 2018-04-05 RX ADMIN — IPRATROPIUM BROMIDE 0.5 MG: 0.5 SOLUTION RESPIRATORY (INHALATION) at 20:09

## 2018-04-05 RX ADMIN — AMIODARONE HYDROCHLORIDE 200 MG: 200 TABLET ORAL at 10:10

## 2018-04-05 RX ADMIN — POTASSIUM CHLORIDE 10 MEQ: 1500 TABLET, EXTENDED RELEASE ORAL at 10:11

## 2018-04-05 RX ADMIN — DOCUSATE SODIUM 100 MG: 100 CAPSULE ORAL at 10:10

## 2018-04-05 RX ADMIN — POTASSIUM CHLORIDE 10 MEQ: 1500 TABLET, EXTENDED RELEASE ORAL at 21:15

## 2018-04-05 RX ADMIN — METOPROLOL TARTRATE 50 MG: 50 TABLET, FILM COATED ORAL at 21:15

## 2018-04-05 RX ADMIN — Medication 10 ML: at 10:19

## 2018-04-05 RX ADMIN — HEPARIN SODIUM 12 UNITS/KG/HR: 10000 INJECTION, SOLUTION INTRAVENOUS at 20:46

## 2018-04-05 RX ADMIN — CALCIUM 1000 MG: 500 TABLET ORAL at 08:13

## 2018-04-05 RX ADMIN — FAMOTIDINE 20 MG: 20 TABLET, FILM COATED ORAL at 16:47

## 2018-04-05 RX ADMIN — LINEZOLID 600 MG: 600 INJECTION, SOLUTION INTRAVENOUS at 10:20

## 2018-04-05 RX ADMIN — Medication 2 UNITS: at 11:55

## 2018-04-05 RX ADMIN — Medication 10 ML: at 22:15

## 2018-04-05 RX ADMIN — FUROSEMIDE 80 MG: 10 INJECTION, SOLUTION INTRAMUSCULAR; INTRAVENOUS at 10:12

## 2018-04-05 RX ADMIN — DIGOXIN 125 MCG: 0.12 TABLET ORAL at 10:10

## 2018-04-05 RX ADMIN — CALCIUM 1000 MG: 500 TABLET ORAL at 11:54

## 2018-04-05 RX ADMIN — LOSARTAN POTASSIUM 50 MG: 50 TABLET, FILM COATED ORAL at 11:54

## 2018-04-05 RX ADMIN — PANTOPRAZOLE SODIUM 40 MG: 40 TABLET, DELAYED RELEASE ORAL at 06:24

## 2018-04-05 RX ADMIN — Medication 1 UNITS: at 16:48

## 2018-04-05 RX ADMIN — IPRATROPIUM BROMIDE 0.5 MG: 0.5 SOLUTION RESPIRATORY (INHALATION) at 13:56

## 2018-04-05 RX ADMIN — METOPROLOL TARTRATE 50 MG: 50 TABLET, FILM COATED ORAL at 10:09

## 2018-04-05 RX ADMIN — INSULIN LISPRO 1 UNITS: 100 INJECTION, SOLUTION INTRAVENOUS; SUBCUTANEOUS at 21:15

## 2018-04-05 RX ADMIN — GUAIFENESIN 600 MG: 600 TABLET, EXTENDED RELEASE ORAL at 21:15

## 2018-04-05 RX ADMIN — POLYETHYLENE GLYCOL 3350 17 G: 17 POWDER, FOR SOLUTION ORAL at 10:17

## 2018-04-05 ASSESSMENT — PAIN SCALES - GENERAL
PAINLEVEL_OUTOF10: 0

## 2018-04-06 LAB
ANION GAP SERPL CALCULATED.3IONS-SCNC: 13 MEQ/L (ref 8–16)
ANISOCYTOSIS: ABNORMAL
APTT: 105.2 SECONDS (ref 22–38)
APTT: 87 SECONDS (ref 22–38)
APTT: 89.3 SECONDS (ref 22–38)
APTT: 89.6 SECONDS (ref 22–38)
BANDED NEUTROPHILS ABSOLUTE COUNT: 2.8 THOU/MM3
BANDS PRESENT: 8 %
BASOPHILS # BLD: 0 %
BASOPHILS ABSOLUTE: 0 THOU/MM3 (ref 0–0.1)
BUN BLDV-MCNC: 42 MG/DL (ref 7–22)
CALCIUM IONIZED: 0.97 MMOL/L (ref 1.12–1.32)
CALCIUM IONIZED: 0.99 MMOL/L (ref 1.12–1.32)
CALCIUM SERPL-MCNC: 8.6 MG/DL (ref 8.5–10.5)
CHLORIDE BLD-SCNC: 90 MEQ/L (ref 98–111)
CO2: 32 MEQ/L (ref 23–33)
CREAT SERPL-MCNC: 3.3 MG/DL (ref 0.4–1.2)
DIFFERENTIAL, MANUAL: NORMAL
EOSINOPHIL # BLD: 7 %
EOSINOPHILS ABSOLUTE: 2.4 THOU/MM3 (ref 0–0.4)
GFR SERPL CREATININE-BSD FRML MDRD: 18 ML/MIN/1.73M2
GLUCOSE BLD-MCNC: 120 MG/DL (ref 70–108)
GLUCOSE BLD-MCNC: 126 MG/DL (ref 70–108)
GLUCOSE BLD-MCNC: 130 MG/DL (ref 70–108)
GLUCOSE BLD-MCNC: 153 MG/DL (ref 70–108)
GLUCOSE BLD-MCNC: 214 MG/DL (ref 70–108)
HCT VFR BLD CALC: 29.9 % (ref 42–52)
HEMOGLOBIN: 10 GM/DL (ref 14–18)
INR BLD: 1.53 (ref 0.85–1.13)
LYMPHOCYTES # BLD: 9 %
LYMPHOCYTES ABSOLUTE: 3.1 THOU/MM3 (ref 1–4.8)
MCH RBC QN AUTO: 30.4 PG (ref 27–31)
MCHC RBC AUTO-ENTMCNC: 33.5 GM/DL (ref 33–37)
MCV RBC AUTO: 90.7 FL (ref 80–94)
MONOCYTES # BLD: 5 %
MONOCYTES ABSOLUTE: 1.7 THOU/MM3 (ref 0.4–1.3)
NUCLEATED RED BLOOD CELLS: 0 /100 WBC
PDW BLD-RTO: 16.8 % (ref 11.5–14.5)
PLATELET # BLD: 367 THOU/MM3 (ref 130–400)
PLATELET ESTIMATE: ADEQUATE
PMV BLD AUTO: 8.9 FL (ref 7.4–10.4)
POIKILOCYTES: SLIGHT
POTASSIUM SERPL-SCNC: 4.5 MEQ/L (ref 3.5–5.2)
RBC # BLD: 3.29 MILL/MM3 (ref 4.7–6.1)
SCHISTOCYTES: ABNORMAL
SEG NEUTROPHILS: 71 %
SEGMENTED NEUTROPHILS ABSOLUTE COUNT: 24.5 THOU/MM3 (ref 1.8–7.7)
SODIUM BLD-SCNC: 135 MEQ/L (ref 135–145)
WBC # BLD: 34.5 THOU/MM3 (ref 4.8–10.8)

## 2018-04-06 PROCEDURE — 6360000002 HC RX W HCPCS: Performed by: INTERNAL MEDICINE

## 2018-04-06 PROCEDURE — 97535 SELF CARE MNGMENT TRAINING: CPT

## 2018-04-06 PROCEDURE — 82330 ASSAY OF CALCIUM: CPT

## 2018-04-06 PROCEDURE — 2580000003 HC RX 258: Performed by: INTERNAL MEDICINE

## 2018-04-06 PROCEDURE — 85610 PROTHROMBIN TIME: CPT

## 2018-04-06 PROCEDURE — 6370000000 HC RX 637 (ALT 250 FOR IP): Performed by: INTERNAL MEDICINE

## 2018-04-06 PROCEDURE — 94640 AIRWAY INHALATION TREATMENT: CPT

## 2018-04-06 PROCEDURE — 82948 REAGENT STRIP/BLOOD GLUCOSE: CPT

## 2018-04-06 PROCEDURE — 2700000000 HC OXYGEN THERAPY PER DAY

## 2018-04-06 PROCEDURE — 85025 COMPLETE CBC W/AUTO DIFF WBC: CPT

## 2018-04-06 PROCEDURE — 97110 THERAPEUTIC EXERCISES: CPT

## 2018-04-06 PROCEDURE — 6370000000 HC RX 637 (ALT 250 FOR IP)

## 2018-04-06 PROCEDURE — APPSS30 APP SPLIT SHARED TIME 16-30 MINUTES: Performed by: NURSE PRACTITIONER

## 2018-04-06 PROCEDURE — 85730 THROMBOPLASTIN TIME PARTIAL: CPT

## 2018-04-06 PROCEDURE — 36415 COLL VENOUS BLD VENIPUNCTURE: CPT

## 2018-04-06 PROCEDURE — 6370000000 HC RX 637 (ALT 250 FOR IP): Performed by: NURSE PRACTITIONER

## 2018-04-06 PROCEDURE — 97116 GAIT TRAINING THERAPY: CPT

## 2018-04-06 PROCEDURE — 99232 SBSQ HOSP IP/OBS MODERATE 35: CPT | Performed by: INTERNAL MEDICINE

## 2018-04-06 PROCEDURE — 2140000000 HC CCU INTERMEDIATE R&B

## 2018-04-06 PROCEDURE — 99223 1ST HOSP IP/OBS HIGH 75: CPT | Performed by: INTERNAL MEDICINE

## 2018-04-06 PROCEDURE — 80048 BASIC METABOLIC PNL TOTAL CA: CPT

## 2018-04-06 RX ORDER — WARFARIN SODIUM 2.5 MG/1
2.5 TABLET ORAL
Status: COMPLETED | OUTPATIENT
Start: 2018-04-06 | End: 2018-04-06

## 2018-04-06 RX ORDER — FUROSEMIDE 10 MG/ML
100 INJECTION INTRAMUSCULAR; INTRAVENOUS 2 TIMES DAILY
Status: DISCONTINUED | OUTPATIENT
Start: 2018-04-06 | End: 2018-04-07

## 2018-04-06 RX ORDER — CALCIUM CARBONATE 500(1250)
1000 TABLET ORAL
Status: COMPLETED | OUTPATIENT
Start: 2018-04-06 | End: 2018-04-06

## 2018-04-06 RX ORDER — CALCIUM CARBONATE 200(500)MG
2000 TABLET,CHEWABLE ORAL
Status: COMPLETED | OUTPATIENT
Start: 2018-04-06 | End: 2018-04-06

## 2018-04-06 RX ADMIN — POTASSIUM CHLORIDE 10 MEQ: 1500 TABLET, EXTENDED RELEASE ORAL at 08:56

## 2018-04-06 RX ADMIN — Medication 1 UNITS: at 16:21

## 2018-04-06 RX ADMIN — CALCIUM 1000 MG: 500 TABLET ORAL at 21:43

## 2018-04-06 RX ADMIN — IPRATROPIUM BROMIDE 0.5 MG: 0.5 SOLUTION RESPIRATORY (INHALATION) at 09:32

## 2018-04-06 RX ADMIN — FUROSEMIDE 100 MG: 10 INJECTION INTRAVENOUS at 17:47

## 2018-04-06 RX ADMIN — Medication 10 ML: at 19:56

## 2018-04-06 RX ADMIN — FUROSEMIDE 20 MG: 10 INJECTION, SOLUTION INTRAMUSCULAR; INTRAVENOUS at 08:58

## 2018-04-06 RX ADMIN — LINEZOLID 600 MG: 600 INJECTION, SOLUTION INTRAVENOUS at 21:27

## 2018-04-06 RX ADMIN — POTASSIUM CHLORIDE 10 MEQ: 1500 TABLET, EXTENDED RELEASE ORAL at 19:51

## 2018-04-06 RX ADMIN — IPRATROPIUM BROMIDE 0.5 MG: 0.5 SOLUTION RESPIRATORY (INHALATION) at 21:01

## 2018-04-06 RX ADMIN — GUAIFENESIN 600 MG: 600 TABLET, EXTENDED RELEASE ORAL at 08:57

## 2018-04-06 RX ADMIN — Medication 10 ML: at 08:58

## 2018-04-06 RX ADMIN — Medication 2 UNITS: at 11:12

## 2018-04-06 RX ADMIN — DIGOXIN 125 MCG: 0.12 TABLET ORAL at 08:54

## 2018-04-06 RX ADMIN — IPRATROPIUM BROMIDE 0.5 MG: 0.5 SOLUTION RESPIRATORY (INHALATION) at 17:15

## 2018-04-06 RX ADMIN — GUAIFENESIN 600 MG: 600 TABLET, EXTENDED RELEASE ORAL at 19:51

## 2018-04-06 RX ADMIN — WARFARIN SODIUM 2.5 MG: 2.5 TABLET ORAL at 17:47

## 2018-04-06 RX ADMIN — CALCIUM 1000 MG: 500 TABLET ORAL at 20:27

## 2018-04-06 RX ADMIN — FAMOTIDINE 20 MG: 20 TABLET, FILM COATED ORAL at 08:54

## 2018-04-06 RX ADMIN — VITAMIN D, TAB 1000IU (100/BT) 1000 UNITS: 25 TAB at 08:54

## 2018-04-06 RX ADMIN — AMIODARONE HYDROCHLORIDE 200 MG: 200 TABLET ORAL at 08:54

## 2018-04-06 RX ADMIN — CEFEPIME HYDROCHLORIDE 1 G: 1 INJECTION, POWDER, FOR SOLUTION INTRAMUSCULAR; INTRAVENOUS at 09:02

## 2018-04-06 RX ADMIN — ATORVASTATIN CALCIUM 80 MG: 80 TABLET, FILM COATED ORAL at 19:51

## 2018-04-06 RX ADMIN — METOPROLOL TARTRATE 50 MG: 50 TABLET, FILM COATED ORAL at 19:51

## 2018-04-06 RX ADMIN — HEPARIN SODIUM 10 UNITS/KG/HR: 10000 INJECTION, SOLUTION INTRAVENOUS at 20:02

## 2018-04-06 RX ADMIN — LINEZOLID 600 MG: 600 INJECTION, SOLUTION INTRAVENOUS at 09:38

## 2018-04-06 RX ADMIN — ANTACID TABLETS 2000 MG: 500 TABLET, CHEWABLE ORAL at 11:12

## 2018-04-06 RX ADMIN — METOPROLOL TARTRATE 50 MG: 50 TABLET, FILM COATED ORAL at 08:56

## 2018-04-06 RX ADMIN — ANTACID TABLETS 2000 MG: 500 TABLET, CHEWABLE ORAL at 09:02

## 2018-04-06 RX ADMIN — IPRATROPIUM BROMIDE 0.5 MG: 0.5 SOLUTION RESPIRATORY (INHALATION) at 12:31

## 2018-04-06 ASSESSMENT — PAIN SCALES - GENERAL
PAINLEVEL_OUTOF10: 0

## 2018-04-07 LAB
ALBUMIN SERPL-MCNC: 2.6 G/DL (ref 3.5–5.1)
ALP BLD-CCNC: 93 U/L (ref 38–126)
ALT SERPL-CCNC: 40 U/L (ref 11–66)
ANION GAP SERPL CALCULATED.3IONS-SCNC: 14 MEQ/L (ref 8–16)
ANISOCYTOSIS: ABNORMAL
APTT: 73.1 SECONDS (ref 22–38)
APTT: 83.7 SECONDS (ref 22–38)
APTT: 84.4 SECONDS (ref 22–38)
AST SERPL-CCNC: 44 U/L (ref 5–40)
BANDED NEUTROPHILS ABSOLUTE COUNT: 0.6 THOU/MM3
BANDS PRESENT: 2 %
BASOPHILIA: SLIGHT
BASOPHILS # BLD: 0 %
BASOPHILS ABSOLUTE: 0 THOU/MM3 (ref 0–0.1)
BILIRUB SERPL-MCNC: 0.9 MG/DL (ref 0.3–1.2)
BUN BLDV-MCNC: 47 MG/DL (ref 7–22)
CALCIUM IONIZED: 0.97 MMOL/L (ref 1.12–1.32)
CALCIUM SERPL-MCNC: 8.6 MG/DL (ref 8.5–10.5)
CHLORIDE BLD-SCNC: 87 MEQ/L (ref 98–111)
CO2: 29 MEQ/L (ref 23–33)
CREAT SERPL-MCNC: 3.7 MG/DL (ref 0.4–1.2)
DIFFERENTIAL, MANUAL: NORMAL
EOSINOPHIL # BLD: 12 %
EOSINOPHILS ABSOLUTE: 3.9 THOU/MM3 (ref 0–0.4)
GFR SERPL CREATININE-BSD FRML MDRD: 16 ML/MIN/1.73M2
GLUCOSE BLD-MCNC: 124 MG/DL (ref 70–108)
GLUCOSE BLD-MCNC: 137 MG/DL (ref 70–108)
GLUCOSE BLD-MCNC: 167 MG/DL (ref 70–108)
GLUCOSE BLD-MCNC: 249 MG/DL (ref 70–108)
GLUCOSE BLD-MCNC: 279 MG/DL (ref 70–108)
HCT VFR BLD CALC: 29.4 % (ref 42–52)
HEMOGLOBIN: 9.7 GM/DL (ref 14–18)
HYPOCHROMIA: SLIGHT
INR BLD: 1.45 (ref 0.85–1.13)
LYMPHOCYTES # BLD: 4 %
LYMPHOCYTES ABSOLUTE: 1.3 THOU/MM3 (ref 1–4.8)
MCH RBC QN AUTO: 30.1 PG (ref 27–31)
MCHC RBC AUTO-ENTMCNC: 33.1 GM/DL (ref 33–37)
MCV RBC AUTO: 90.8 FL (ref 80–94)
MONOCYTES # BLD: 2 %
MONOCYTES ABSOLUTE: 0.6 THOU/MM3 (ref 0.4–1.3)
NUCLEATED RED BLOOD CELLS: 0 /100 WBC
OVALOCYTES: ABNORMAL
PDW BLD-RTO: 16.2 % (ref 11.5–14.5)
PLATELET # BLD: 333 THOU/MM3 (ref 130–400)
PLATELET ESTIMATE: ADEQUATE
PMV BLD AUTO: 9.1 FL (ref 7.4–10.4)
POTASSIUM SERPL-SCNC: 4.2 MEQ/L (ref 3.5–5.2)
RBC # BLD: 3.24 MILL/MM3 (ref 4.7–6.1)
SEG NEUTROPHILS: 80 %
SEGMENTED NEUTROPHILS ABSOLUTE COUNT: 25.7 THOU/MM3 (ref 1.8–7.7)
SODIUM BLD-SCNC: 130 MEQ/L (ref 135–145)
TARGET CELLS: ABNORMAL
TOTAL PROTEIN: 6.7 G/DL (ref 6.1–8)
WBC # BLD: 32.1 THOU/MM3 (ref 4.8–10.8)

## 2018-04-07 PROCEDURE — 85610 PROTHROMBIN TIME: CPT

## 2018-04-07 PROCEDURE — 36415 COLL VENOUS BLD VENIPUNCTURE: CPT

## 2018-04-07 PROCEDURE — 2140000000 HC CCU INTERMEDIATE R&B

## 2018-04-07 PROCEDURE — 6360000002 HC RX W HCPCS: Performed by: INTERNAL MEDICINE

## 2018-04-07 PROCEDURE — 99232 SBSQ HOSP IP/OBS MODERATE 35: CPT | Performed by: INTERNAL MEDICINE

## 2018-04-07 PROCEDURE — 2700000000 HC OXYGEN THERAPY PER DAY

## 2018-04-07 PROCEDURE — 6370000000 HC RX 637 (ALT 250 FOR IP): Performed by: INTERNAL MEDICINE

## 2018-04-07 PROCEDURE — 2580000003 HC RX 258: Performed by: INTERNAL MEDICINE

## 2018-04-07 PROCEDURE — 80053 COMPREHEN METABOLIC PANEL: CPT

## 2018-04-07 PROCEDURE — 82330 ASSAY OF CALCIUM: CPT

## 2018-04-07 PROCEDURE — 94640 AIRWAY INHALATION TREATMENT: CPT

## 2018-04-07 PROCEDURE — 85730 THROMBOPLASTIN TIME PARTIAL: CPT

## 2018-04-07 PROCEDURE — 85025 COMPLETE CBC W/AUTO DIFF WBC: CPT

## 2018-04-07 PROCEDURE — 6370000000 HC RX 637 (ALT 250 FOR IP)

## 2018-04-07 PROCEDURE — 6370000000 HC RX 637 (ALT 250 FOR IP): Performed by: NURSE PRACTITIONER

## 2018-04-07 PROCEDURE — 82948 REAGENT STRIP/BLOOD GLUCOSE: CPT

## 2018-04-07 RX ORDER — WARFARIN SODIUM 4 MG/1
4 TABLET ORAL
Status: COMPLETED | OUTPATIENT
Start: 2018-04-07 | End: 2018-04-07

## 2018-04-07 RX ORDER — FUROSEMIDE 10 MG/ML
40 INJECTION INTRAMUSCULAR; INTRAVENOUS 2 TIMES DAILY
Status: DISCONTINUED | OUTPATIENT
Start: 2018-04-08 | End: 2018-04-09

## 2018-04-07 RX ORDER — CALCIUM CARBONATE 500(1250)
1000 TABLET ORAL ONCE
Status: COMPLETED | OUTPATIENT
Start: 2018-04-07 | End: 2018-04-07

## 2018-04-07 RX ADMIN — HEPARIN SODIUM 10 UNITS/KG/HR: 10000 INJECTION, SOLUTION INTRAVENOUS at 23:50

## 2018-04-07 RX ADMIN — CEFEPIME HYDROCHLORIDE 1 G: 1 INJECTION, POWDER, FOR SOLUTION INTRAMUSCULAR; INTRAVENOUS at 10:02

## 2018-04-07 RX ADMIN — Medication 10 ML: at 08:23

## 2018-04-07 RX ADMIN — INSULIN LISPRO 1 UNITS: 100 INJECTION, SOLUTION INTRAVENOUS; SUBCUTANEOUS at 20:28

## 2018-04-07 RX ADMIN — ATORVASTATIN CALCIUM 80 MG: 80 TABLET, FILM COATED ORAL at 20:04

## 2018-04-07 RX ADMIN — POTASSIUM CHLORIDE 10 MEQ: 1500 TABLET, EXTENDED RELEASE ORAL at 08:22

## 2018-04-07 RX ADMIN — VITAMIN D, TAB 1000IU (100/BT) 1000 UNITS: 25 TAB at 08:20

## 2018-04-07 RX ADMIN — LINEZOLID 600 MG: 600 INJECTION, SOLUTION INTRAVENOUS at 22:41

## 2018-04-07 RX ADMIN — METOPROLOL TARTRATE 50 MG: 50 TABLET, FILM COATED ORAL at 08:20

## 2018-04-07 RX ADMIN — Medication 3 UNITS: at 10:57

## 2018-04-07 RX ADMIN — WARFARIN SODIUM 4 MG: 4 TABLET ORAL at 16:44

## 2018-04-07 RX ADMIN — CALCIUM 1000 MG: 500 TABLET ORAL at 06:47

## 2018-04-07 RX ADMIN — IPRATROPIUM BROMIDE 0.5 MG: 0.5 SOLUTION RESPIRATORY (INHALATION) at 15:26

## 2018-04-07 RX ADMIN — AMIODARONE HYDROCHLORIDE 200 MG: 200 TABLET ORAL at 08:21

## 2018-04-07 RX ADMIN — LINEZOLID 600 MG: 600 INJECTION, SOLUTION INTRAVENOUS at 09:53

## 2018-04-07 RX ADMIN — DIGOXIN 125 MCG: 0.12 TABLET ORAL at 08:21

## 2018-04-07 RX ADMIN — GUAIFENESIN 600 MG: 600 TABLET, EXTENDED RELEASE ORAL at 08:20

## 2018-04-07 RX ADMIN — GUAIFENESIN 600 MG: 600 TABLET, EXTENDED RELEASE ORAL at 20:04

## 2018-04-07 RX ADMIN — Medication 10 ML: at 20:04

## 2018-04-07 RX ADMIN — IPRATROPIUM BROMIDE 0.5 MG: 0.5 SOLUTION RESPIRATORY (INHALATION) at 11:55

## 2018-04-07 RX ADMIN — IPRATROPIUM BROMIDE 0.5 MG: 0.5 SOLUTION RESPIRATORY (INHALATION) at 20:37

## 2018-04-07 RX ADMIN — METOPROLOL TARTRATE 50 MG: 50 TABLET, FILM COATED ORAL at 20:04

## 2018-04-07 RX ADMIN — Medication 1 UNITS: at 16:41

## 2018-04-07 RX ADMIN — POTASSIUM CHLORIDE 10 MEQ: 1500 TABLET, EXTENDED RELEASE ORAL at 20:04

## 2018-04-07 RX ADMIN — FAMOTIDINE 20 MG: 20 TABLET, FILM COATED ORAL at 08:20

## 2018-04-07 ASSESSMENT — PAIN SCALES - GENERAL
PAINLEVEL_OUTOF10: 0

## 2018-04-08 ENCOUNTER — APPOINTMENT (OUTPATIENT)
Dept: GENERAL RADIOLOGY | Age: 78
DRG: 853 | End: 2018-04-08
Payer: MEDICARE

## 2018-04-08 LAB
ANION GAP SERPL CALCULATED.3IONS-SCNC: 16 MEQ/L (ref 8–16)
ANISOCYTOSIS: ABNORMAL
APTT: 84.9 SECONDS (ref 22–38)
APTT: 94.6 SECONDS (ref 22–38)
BASOPHILS # BLD: 0.5 %
BASOPHILS ABSOLUTE: 0.1 THOU/MM3 (ref 0–0.1)
BLOOD CULTURE, ROUTINE: NORMAL
BLOOD CULTURE, ROUTINE: NORMAL
BUN BLDV-MCNC: 47 MG/DL (ref 7–22)
CALCIUM IONIZED: 0.83 MMOL/L (ref 1.12–1.32)
CALCIUM SERPL-MCNC: 8.9 MG/DL (ref 8.5–10.5)
CHLORIDE BLD-SCNC: 87 MEQ/L (ref 98–111)
CO2: 25 MEQ/L (ref 23–33)
CREAT SERPL-MCNC: 3.4 MG/DL (ref 0.4–1.2)
EOSINOPHIL # BLD: 26.4 %
EOSINOPHILS ABSOLUTE: 7 THOU/MM3 (ref 0–0.4)
GFR SERPL CREATININE-BSD FRML MDRD: 18 ML/MIN/1.73M2
GLUCOSE BLD-MCNC: 138 MG/DL (ref 70–108)
GLUCOSE BLD-MCNC: 139 MG/DL (ref 70–108)
GLUCOSE BLD-MCNC: 170 MG/DL (ref 70–108)
GLUCOSE BLD-MCNC: 187 MG/DL (ref 70–108)
GLUCOSE BLD-MCNC: 211 MG/DL (ref 70–108)
HCT VFR BLD CALC: 31.7 % (ref 42–52)
HEMOGLOBIN: 10.6 GM/DL (ref 14–18)
INR BLD: 1.43 (ref 0.85–1.13)
LYMPHOCYTES # BLD: 5.1 %
LYMPHOCYTES ABSOLUTE: 1.4 THOU/MM3 (ref 1–4.8)
MCH RBC QN AUTO: 30.5 PG (ref 27–31)
MCHC RBC AUTO-ENTMCNC: 33.4 GM/DL (ref 33–37)
MCV RBC AUTO: 91.1 FL (ref 80–94)
MONOCYTES # BLD: 4.8 %
MONOCYTES ABSOLUTE: 1.3 THOU/MM3 (ref 0.4–1.3)
NUCLEATED RED BLOOD CELLS: 0 /100 WBC
PDW BLD-RTO: 15.8 % (ref 11.5–14.5)
PLATELET # BLD: 341 THOU/MM3 (ref 130–400)
PMV BLD AUTO: 8.9 FL (ref 7.4–10.4)
POTASSIUM SERPL-SCNC: 5 MEQ/L (ref 3.5–5.2)
RBC # BLD: 3.48 MILL/MM3 (ref 4.7–6.1)
SEG NEUTROPHILS: 63.2 %
SEGMENTED NEUTROPHILS ABSOLUTE COUNT: 16.9 THOU/MM3 (ref 1.8–7.7)
SODIUM BLD-SCNC: 128 MEQ/L (ref 135–145)
WBC # BLD: 26.7 THOU/MM3 (ref 4.8–10.8)

## 2018-04-08 PROCEDURE — 6360000002 HC RX W HCPCS: Performed by: INTERNAL MEDICINE

## 2018-04-08 PROCEDURE — 6370000000 HC RX 637 (ALT 250 FOR IP): Performed by: INTERNAL MEDICINE

## 2018-04-08 PROCEDURE — 85025 COMPLETE CBC W/AUTO DIFF WBC: CPT

## 2018-04-08 PROCEDURE — 85730 THROMBOPLASTIN TIME PARTIAL: CPT

## 2018-04-08 PROCEDURE — 82948 REAGENT STRIP/BLOOD GLUCOSE: CPT

## 2018-04-08 PROCEDURE — 85610 PROTHROMBIN TIME: CPT

## 2018-04-08 PROCEDURE — 6370000000 HC RX 637 (ALT 250 FOR IP): Performed by: NURSE PRACTITIONER

## 2018-04-08 PROCEDURE — 2580000003 HC RX 258: Performed by: INTERNAL MEDICINE

## 2018-04-08 PROCEDURE — 2700000000 HC OXYGEN THERAPY PER DAY

## 2018-04-08 PROCEDURE — 82330 ASSAY OF CALCIUM: CPT

## 2018-04-08 PROCEDURE — 99232 SBSQ HOSP IP/OBS MODERATE 35: CPT | Performed by: INTERNAL MEDICINE

## 2018-04-08 PROCEDURE — 94640 AIRWAY INHALATION TREATMENT: CPT

## 2018-04-08 PROCEDURE — 71045 X-RAY EXAM CHEST 1 VIEW: CPT

## 2018-04-08 PROCEDURE — 36415 COLL VENOUS BLD VENIPUNCTURE: CPT

## 2018-04-08 PROCEDURE — 2140000000 HC CCU INTERMEDIATE R&B

## 2018-04-08 PROCEDURE — 6370000000 HC RX 637 (ALT 250 FOR IP): Performed by: PHARMACIST

## 2018-04-08 PROCEDURE — 80048 BASIC METABOLIC PNL TOTAL CA: CPT

## 2018-04-08 RX ORDER — WARFARIN SODIUM 5 MG/1
5 TABLET ORAL ONCE
Status: COMPLETED | OUTPATIENT
Start: 2018-04-08 | End: 2018-04-08

## 2018-04-08 RX ADMIN — FAMOTIDINE 20 MG: 20 TABLET, FILM COATED ORAL at 07:45

## 2018-04-08 RX ADMIN — AMIODARONE HYDROCHLORIDE 200 MG: 200 TABLET ORAL at 07:45

## 2018-04-08 RX ADMIN — IPRATROPIUM BROMIDE 0.5 MG: 0.5 SOLUTION RESPIRATORY (INHALATION) at 20:28

## 2018-04-08 RX ADMIN — Medication 10 ML: at 07:45

## 2018-04-08 RX ADMIN — WARFARIN SODIUM 5 MG: 5 TABLET ORAL at 16:46

## 2018-04-08 RX ADMIN — GUAIFENESIN 600 MG: 600 TABLET, EXTENDED RELEASE ORAL at 07:45

## 2018-04-08 RX ADMIN — INSULIN LISPRO 1 UNITS: 100 INJECTION, SOLUTION INTRAVENOUS; SUBCUTANEOUS at 19:53

## 2018-04-08 RX ADMIN — Medication 2 UNITS: at 11:18

## 2018-04-08 RX ADMIN — IPRATROPIUM BROMIDE 0.5 MG: 0.5 SOLUTION RESPIRATORY (INHALATION) at 07:38

## 2018-04-08 RX ADMIN — POTASSIUM CHLORIDE 10 MEQ: 1500 TABLET, EXTENDED RELEASE ORAL at 19:52

## 2018-04-08 RX ADMIN — CEFEPIME HYDROCHLORIDE 1 G: 1 INJECTION, POWDER, FOR SOLUTION INTRAMUSCULAR; INTRAVENOUS at 09:55

## 2018-04-08 RX ADMIN — METOPROLOL TARTRATE 50 MG: 50 TABLET, FILM COATED ORAL at 19:52

## 2018-04-08 RX ADMIN — IPRATROPIUM BROMIDE 0.5 MG: 0.5 SOLUTION RESPIRATORY (INHALATION) at 15:15

## 2018-04-08 RX ADMIN — FUROSEMIDE 40 MG: 10 INJECTION, SOLUTION INTRAMUSCULAR; INTRAVENOUS at 07:47

## 2018-04-08 RX ADMIN — VITAMIN D, TAB 1000IU (100/BT) 1000 UNITS: 25 TAB at 07:44

## 2018-04-08 RX ADMIN — ATORVASTATIN CALCIUM 80 MG: 80 TABLET, FILM COATED ORAL at 19:53

## 2018-04-08 RX ADMIN — Medication 1 UNITS: at 16:44

## 2018-04-08 RX ADMIN — LINEZOLID 600 MG: 600 INJECTION, SOLUTION INTRAVENOUS at 21:44

## 2018-04-08 RX ADMIN — DIGOXIN 125 MCG: 0.12 TABLET ORAL at 07:45

## 2018-04-08 RX ADMIN — IPRATROPIUM BROMIDE 0.5 MG: 0.5 SOLUTION RESPIRATORY (INHALATION) at 11:22

## 2018-04-08 RX ADMIN — METOPROLOL TARTRATE 50 MG: 50 TABLET, FILM COATED ORAL at 07:44

## 2018-04-08 RX ADMIN — LINEZOLID 600 MG: 600 INJECTION, SOLUTION INTRAVENOUS at 10:28

## 2018-04-08 RX ADMIN — Medication 10 ML: at 19:53

## 2018-04-08 RX ADMIN — CALCIUM GLUCONATE 1.5 G: 94 INJECTION, SOLUTION INTRAVENOUS at 06:04

## 2018-04-08 RX ADMIN — GUAIFENESIN 600 MG: 600 TABLET, EXTENDED RELEASE ORAL at 19:52

## 2018-04-08 ASSESSMENT — PAIN SCALES - GENERAL
PAINLEVEL_OUTOF10: 0
PAINLEVEL_OUTOF10: 0
PAINLEVEL_OUTOF10: 2

## 2018-04-08 ASSESSMENT — PAIN DESCRIPTION - PAIN TYPE: TYPE: ACUTE PAIN

## 2018-04-08 ASSESSMENT — PAIN DESCRIPTION - LOCATION: LOCATION: LEG

## 2018-04-08 ASSESSMENT — PAIN DESCRIPTION - ORIENTATION: ORIENTATION: LEFT

## 2018-04-09 LAB
ANISOCYTOSIS: ABNORMAL
APTT: 83.3 SECONDS (ref 22–38)
BASOPHILIA: SLIGHT
BASOPHILS # BLD: 0.1 %
BASOPHILS ABSOLUTE: 0 THOU/MM3 (ref 0–0.1)
CALCIUM IONIZED: 1.04 MMOL/L (ref 1.12–1.32)
EOSINOPHIL # BLD: 23.8 %
EOSINOPHILS ABSOLUTE: 5.2 THOU/MM3 (ref 0–0.4)
GLUCOSE BLD-MCNC: 139 MG/DL (ref 70–108)
GLUCOSE BLD-MCNC: 144 MG/DL (ref 70–108)
GLUCOSE BLD-MCNC: 162 MG/DL (ref 70–108)
GLUCOSE BLD-MCNC: 207 MG/DL (ref 70–108)
HCT VFR BLD CALC: 30.5 % (ref 42–52)
HEMOGLOBIN: 10 GM/DL (ref 14–18)
INR BLD: 1.99 (ref 0.85–1.13)
LYMPHOCYTES # BLD: 5.4 %
LYMPHOCYTES ABSOLUTE: 1.2 THOU/MM3 (ref 1–4.8)
MCH RBC QN AUTO: 30.1 PG (ref 27–31)
MCHC RBC AUTO-ENTMCNC: 32.9 GM/DL (ref 33–37)
MCV RBC AUTO: 91.4 FL (ref 80–94)
MONOCYTES # BLD: 6.5 %
MONOCYTES ABSOLUTE: 1.4 THOU/MM3 (ref 0.4–1.3)
NUCLEATED RED BLOOD CELLS: 0 /100 WBC
PDW BLD-RTO: 16.7 % (ref 11.5–14.5)
PLATELET # BLD: 352 THOU/MM3 (ref 130–400)
PLATELET ESTIMATE: ADEQUATE
PMV BLD AUTO: 8.7 FL (ref 7.4–10.4)
RBC # BLD: 3.33 MILL/MM3 (ref 4.7–6.1)
SCAN OF BLOOD SMEAR: NORMAL
SEG NEUTROPHILS: 64.2 %
SEGMENTED NEUTROPHILS ABSOLUTE COUNT: 13.9 THOU/MM3 (ref 1.8–7.7)
TARGET CELLS: ABNORMAL
WBC # BLD: 21.7 THOU/MM3 (ref 4.8–10.8)

## 2018-04-09 PROCEDURE — 6360000002 HC RX W HCPCS: Performed by: INTERNAL MEDICINE

## 2018-04-09 PROCEDURE — APPSS30 APP SPLIT SHARED TIME 16-30 MINUTES: Performed by: NURSE PRACTITIONER

## 2018-04-09 PROCEDURE — 2140000000 HC CCU INTERMEDIATE R&B

## 2018-04-09 PROCEDURE — 97116 GAIT TRAINING THERAPY: CPT

## 2018-04-09 PROCEDURE — 94664 DEMO&/EVAL PT USE INHALER: CPT

## 2018-04-09 PROCEDURE — 85610 PROTHROMBIN TIME: CPT

## 2018-04-09 PROCEDURE — 97110 THERAPEUTIC EXERCISES: CPT

## 2018-04-09 PROCEDURE — 97535 SELF CARE MNGMENT TRAINING: CPT

## 2018-04-09 PROCEDURE — 6370000000 HC RX 637 (ALT 250 FOR IP): Performed by: PHARMACIST

## 2018-04-09 PROCEDURE — 82330 ASSAY OF CALCIUM: CPT

## 2018-04-09 PROCEDURE — 6370000000 HC RX 637 (ALT 250 FOR IP): Performed by: INTERNAL MEDICINE

## 2018-04-09 PROCEDURE — 36415 COLL VENOUS BLD VENIPUNCTURE: CPT

## 2018-04-09 PROCEDURE — 2700000000 HC OXYGEN THERAPY PER DAY

## 2018-04-09 PROCEDURE — 94640 AIRWAY INHALATION TREATMENT: CPT

## 2018-04-09 PROCEDURE — 99232 SBSQ HOSP IP/OBS MODERATE 35: CPT | Performed by: INTERNAL MEDICINE

## 2018-04-09 PROCEDURE — 82948 REAGENT STRIP/BLOOD GLUCOSE: CPT

## 2018-04-09 PROCEDURE — 2580000003 HC RX 258: Performed by: INTERNAL MEDICINE

## 2018-04-09 PROCEDURE — 94669 MECHANICAL CHEST WALL OSCILL: CPT

## 2018-04-09 PROCEDURE — 6370000000 HC RX 637 (ALT 250 FOR IP): Performed by: NURSE PRACTITIONER

## 2018-04-09 PROCEDURE — 85730 THROMBOPLASTIN TIME PARTIAL: CPT

## 2018-04-09 PROCEDURE — 85025 COMPLETE CBC W/AUTO DIFF WBC: CPT

## 2018-04-09 RX ORDER — WARFARIN SODIUM 2.5 MG/1
2.5 TABLET ORAL ONCE
Status: COMPLETED | OUTPATIENT
Start: 2018-04-09 | End: 2018-04-09

## 2018-04-09 RX ORDER — BUMETANIDE 1 MG/1
2 TABLET ORAL DAILY
Status: DISCONTINUED | OUTPATIENT
Start: 2018-04-09 | End: 2018-04-11 | Stop reason: HOSPADM

## 2018-04-09 RX ADMIN — LINEZOLID 600 MG: 600 INJECTION, SOLUTION INTRAVENOUS at 22:10

## 2018-04-09 RX ADMIN — IPRATROPIUM BROMIDE 0.5 MG: 0.5 SOLUTION RESPIRATORY (INHALATION) at 17:24

## 2018-04-09 RX ADMIN — VITAMIN D, TAB 1000IU (100/BT) 1000 UNITS: 25 TAB at 08:07

## 2018-04-09 RX ADMIN — LINEZOLID 600 MG: 600 INJECTION, SOLUTION INTRAVENOUS at 10:35

## 2018-04-09 RX ADMIN — Medication 1 UNITS: at 16:26

## 2018-04-09 RX ADMIN — POLYETHYLENE GLYCOL 3350 17 G: 17 POWDER, FOR SOLUTION ORAL at 08:07

## 2018-04-09 RX ADMIN — GUAIFENESIN 600 MG: 600 TABLET, EXTENDED RELEASE ORAL at 22:03

## 2018-04-09 RX ADMIN — Medication 2 UNITS: at 12:41

## 2018-04-09 RX ADMIN — TRAZODONE HYDROCHLORIDE 50 MG: 50 TABLET ORAL at 22:02

## 2018-04-09 RX ADMIN — WARFARIN SODIUM 2.5 MG: 2.5 TABLET ORAL at 18:04

## 2018-04-09 RX ADMIN — Medication 10 ML: at 22:18

## 2018-04-09 RX ADMIN — INSULIN LISPRO 1 UNITS: 100 INJECTION, SOLUTION INTRAVENOUS; SUBCUTANEOUS at 21:24

## 2018-04-09 RX ADMIN — FAMOTIDINE 20 MG: 20 TABLET, FILM COATED ORAL at 08:07

## 2018-04-09 RX ADMIN — Medication 10 ML: at 18:04

## 2018-04-09 RX ADMIN — METOPROLOL TARTRATE 50 MG: 50 TABLET, FILM COATED ORAL at 23:19

## 2018-04-09 RX ADMIN — DOCUSATE SODIUM 100 MG: 100 CAPSULE ORAL at 08:07

## 2018-04-09 RX ADMIN — HEPARIN SODIUM 10 UNITS/KG/HR: 10000 INJECTION, SOLUTION INTRAVENOUS at 04:23

## 2018-04-09 RX ADMIN — IPRATROPIUM BROMIDE 0.5 MG: 0.5 SOLUTION RESPIRATORY (INHALATION) at 11:54

## 2018-04-09 RX ADMIN — AMIODARONE HYDROCHLORIDE 200 MG: 200 TABLET ORAL at 08:07

## 2018-04-09 RX ADMIN — GUAIFENESIN 600 MG: 600 TABLET, EXTENDED RELEASE ORAL at 08:07

## 2018-04-09 RX ADMIN — POTASSIUM CHLORIDE 10 MEQ: 1500 TABLET, EXTENDED RELEASE ORAL at 08:06

## 2018-04-09 RX ADMIN — ATORVASTATIN CALCIUM 80 MG: 80 TABLET, FILM COATED ORAL at 22:03

## 2018-04-09 RX ADMIN — IPRATROPIUM BROMIDE 0.5 MG: 0.5 SOLUTION RESPIRATORY (INHALATION) at 21:48

## 2018-04-09 RX ADMIN — IPRATROPIUM BROMIDE 0.5 MG: 0.5 SOLUTION RESPIRATORY (INHALATION) at 07:38

## 2018-04-09 RX ADMIN — CEFEPIME HYDROCHLORIDE 1 G: 1 INJECTION, POWDER, FOR SOLUTION INTRAMUSCULAR; INTRAVENOUS at 10:35

## 2018-04-09 RX ADMIN — DIGOXIN 125 MCG: 0.12 TABLET ORAL at 08:07

## 2018-04-09 RX ADMIN — FUROSEMIDE 40 MG: 10 INJECTION, SOLUTION INTRAMUSCULAR; INTRAVENOUS at 18:06

## 2018-04-09 RX ADMIN — POTASSIUM CHLORIDE 10 MEQ: 1500 TABLET, EXTENDED RELEASE ORAL at 21:02

## 2018-04-09 RX ADMIN — FUROSEMIDE 40 MG: 10 INJECTION, SOLUTION INTRAMUSCULAR; INTRAVENOUS at 08:07

## 2018-04-09 ASSESSMENT — PAIN DESCRIPTION - ORIENTATION: ORIENTATION: LEFT

## 2018-04-09 ASSESSMENT — PAIN SCALES - GENERAL
PAINLEVEL_OUTOF10: 0
PAINLEVEL_OUTOF10: 4
PAINLEVEL_OUTOF10: 0

## 2018-04-09 ASSESSMENT — PULMONARY FUNCTION TESTS: PEFR_L/MIN: 16

## 2018-04-09 ASSESSMENT — PAIN DESCRIPTION - LOCATION: LOCATION: LEG

## 2018-04-09 ASSESSMENT — PAIN DESCRIPTION - PAIN TYPE: TYPE: ACUTE PAIN

## 2018-04-10 ENCOUNTER — APPOINTMENT (OUTPATIENT)
Dept: INTERVENTIONAL RADIOLOGY/VASCULAR | Age: 78
DRG: 853 | End: 2018-04-10
Payer: MEDICARE

## 2018-04-10 LAB
ANION GAP SERPL CALCULATED.3IONS-SCNC: 16 MEQ/L (ref 8–16)
ANISOCYTOSIS: ABNORMAL
BASOPHILIA: SLIGHT
BASOPHILS # BLD: 0.1 %
BASOPHILS ABSOLUTE: 0 THOU/MM3 (ref 0–0.1)
BUN BLDV-MCNC: 37 MG/DL (ref 7–22)
CALCIUM IONIZED: 1.03 MMOL/L (ref 1.12–1.32)
CALCIUM SERPL-MCNC: 8.9 MG/DL (ref 8.5–10.5)
CHLORIDE BLD-SCNC: 91 MEQ/L (ref 98–111)
CO2: 28 MEQ/L (ref 23–33)
CREAT SERPL-MCNC: 2.9 MG/DL (ref 0.4–1.2)
EKG ATRIAL RATE: 63 BPM
EKG Q-T INTERVAL: 450 MS
EKG QRS DURATION: 180 MS
EKG QTC CALCULATION (BAZETT): 519 MS
EKG R AXIS: -51 DEGREES
EKG T AXIS: 104 DEGREES
EKG VENTRICULAR RATE: 80 BPM
EOSINOPHIL # BLD: 21.5 %
EOSINOPHILS ABSOLUTE: 4.1 THOU/MM3 (ref 0–0.4)
GFR SERPL CREATININE-BSD FRML MDRD: 21 ML/MIN/1.73M2
GLUCOSE BLD-MCNC: 131 MG/DL (ref 70–108)
GLUCOSE BLD-MCNC: 160 MG/DL (ref 70–108)
GLUCOSE BLD-MCNC: 166 MG/DL (ref 70–108)
GLUCOSE BLD-MCNC: 201 MG/DL (ref 70–108)
GLUCOSE BLD-MCNC: 236 MG/DL (ref 70–108)
HCT VFR BLD CALC: 29.6 % (ref 42–52)
HEMOGLOBIN: 9.8 GM/DL (ref 14–18)
HYPOCHROMIA: SLIGHT
INR BLD: 2.65 (ref 0.85–1.13)
LYMPHOCYTES # BLD: 8.2 %
LYMPHOCYTES ABSOLUTE: 1.5 THOU/MM3 (ref 1–4.8)
MCH RBC QN AUTO: 30.3 PG (ref 27–31)
MCHC RBC AUTO-ENTMCNC: 33 GM/DL (ref 33–37)
MCV RBC AUTO: 91.7 FL (ref 80–94)
MONOCYTES # BLD: 7.1 %
MONOCYTES ABSOLUTE: 1.3 THOU/MM3 (ref 0.4–1.3)
NUCLEATED RED BLOOD CELLS: 0 /100 WBC
OVALOCYTES: ABNORMAL
PDW BLD-RTO: 16.9 % (ref 11.5–14.5)
PLATELET # BLD: 309 THOU/MM3 (ref 130–400)
PLATELET ESTIMATE: ADEQUATE
PMV BLD AUTO: 8.5 FL (ref 7.4–10.4)
POTASSIUM SERPL-SCNC: 4.5 MEQ/L (ref 3.5–5.2)
RBC # BLD: 3.23 MILL/MM3 (ref 4.7–6.1)
SCAN OF BLOOD SMEAR: NORMAL
SEG NEUTROPHILS: 63.1 %
SEGMENTED NEUTROPHILS ABSOLUTE COUNT: 11.9 THOU/MM3 (ref 1.8–7.7)
SODIUM BLD-SCNC: 135 MEQ/L (ref 135–145)
STOMATOCYTES: ABNORMAL
WBC # BLD: 18.9 THOU/MM3 (ref 4.8–10.8)

## 2018-04-10 PROCEDURE — 36415 COLL VENOUS BLD VENIPUNCTURE: CPT

## 2018-04-10 PROCEDURE — 99232 SBSQ HOSP IP/OBS MODERATE 35: CPT | Performed by: INTERNAL MEDICINE

## 2018-04-10 PROCEDURE — 85025 COMPLETE CBC W/AUTO DIFF WBC: CPT

## 2018-04-10 PROCEDURE — 2580000003 HC RX 258: Performed by: INTERNAL MEDICINE

## 2018-04-10 PROCEDURE — 82330 ASSAY OF CALCIUM: CPT

## 2018-04-10 PROCEDURE — 6370000000 HC RX 637 (ALT 250 FOR IP): Performed by: INTERNAL MEDICINE

## 2018-04-10 PROCEDURE — 2700000000 HC OXYGEN THERAPY PER DAY

## 2018-04-10 PROCEDURE — 94640 AIRWAY INHALATION TREATMENT: CPT

## 2018-04-10 PROCEDURE — 97530 THERAPEUTIC ACTIVITIES: CPT

## 2018-04-10 PROCEDURE — 6360000002 HC RX W HCPCS: Performed by: INTERNAL MEDICINE

## 2018-04-10 PROCEDURE — 97110 THERAPEUTIC EXERCISES: CPT

## 2018-04-10 PROCEDURE — 85610 PROTHROMBIN TIME: CPT

## 2018-04-10 PROCEDURE — 93010 ELECTROCARDIOGRAM REPORT: CPT | Performed by: NUCLEAR MEDICINE

## 2018-04-10 PROCEDURE — 6370000000 HC RX 637 (ALT 250 FOR IP): Performed by: NURSE PRACTITIONER

## 2018-04-10 PROCEDURE — 2140000000 HC CCU INTERMEDIATE R&B

## 2018-04-10 PROCEDURE — 82948 REAGENT STRIP/BLOOD GLUCOSE: CPT

## 2018-04-10 PROCEDURE — 80048 BASIC METABOLIC PNL TOTAL CA: CPT

## 2018-04-10 PROCEDURE — 97116 GAIT TRAINING THERAPY: CPT

## 2018-04-10 PROCEDURE — 93971 EXTREMITY STUDY: CPT

## 2018-04-10 PROCEDURE — 93005 ELECTROCARDIOGRAM TRACING: CPT | Performed by: INTERNAL MEDICINE

## 2018-04-10 RX ORDER — HYDROCODONE BITARTRATE AND ACETAMINOPHEN 7.5; 325 MG/1; MG/1
1 TABLET ORAL ONCE
Status: COMPLETED | OUTPATIENT
Start: 2018-04-10 | End: 2018-04-10

## 2018-04-10 RX ADMIN — HYDROCODONE BITARTRATE AND ACETAMINOPHEN 1 TABLET: 7.5; 325 TABLET ORAL at 18:37

## 2018-04-10 RX ADMIN — POLYETHYLENE GLYCOL 3350 17 G: 17 POWDER, FOR SOLUTION ORAL at 08:20

## 2018-04-10 RX ADMIN — GUAIFENESIN 600 MG: 600 TABLET, EXTENDED RELEASE ORAL at 08:20

## 2018-04-10 RX ADMIN — POTASSIUM CHLORIDE 10 MEQ: 1500 TABLET, EXTENDED RELEASE ORAL at 08:20

## 2018-04-10 RX ADMIN — POTASSIUM CHLORIDE 10 MEQ: 1500 TABLET, EXTENDED RELEASE ORAL at 20:20

## 2018-04-10 RX ADMIN — Medication 3 MG: at 20:22

## 2018-04-10 RX ADMIN — LINEZOLID 600 MG: 600 INJECTION, SOLUTION INTRAVENOUS at 21:44

## 2018-04-10 RX ADMIN — VITAMIN D, TAB 1000IU (100/BT) 1000 UNITS: 25 TAB at 08:20

## 2018-04-10 RX ADMIN — GUAIFENESIN 600 MG: 600 TABLET, EXTENDED RELEASE ORAL at 20:20

## 2018-04-10 RX ADMIN — ACETAMINOPHEN 650 MG: 325 TABLET ORAL at 17:41

## 2018-04-10 RX ADMIN — Medication 1 UNITS: at 17:08

## 2018-04-10 RX ADMIN — INSULIN LISPRO 1 UNITS: 100 INJECTION, SOLUTION INTRAVENOUS; SUBCUTANEOUS at 20:21

## 2018-04-10 RX ADMIN — IPRATROPIUM BROMIDE 0.5 MG: 0.5 SOLUTION RESPIRATORY (INHALATION) at 21:10

## 2018-04-10 RX ADMIN — IPRATROPIUM BROMIDE 0.5 MG: 0.5 SOLUTION RESPIRATORY (INHALATION) at 09:39

## 2018-04-10 RX ADMIN — IPRATROPIUM BROMIDE 0.5 MG: 0.5 SOLUTION RESPIRATORY (INHALATION) at 13:42

## 2018-04-10 RX ADMIN — DIGOXIN 125 MCG: 0.12 TABLET ORAL at 08:20

## 2018-04-10 RX ADMIN — LINEZOLID 600 MG: 600 INJECTION, SOLUTION INTRAVENOUS at 10:10

## 2018-04-10 RX ADMIN — IPRATROPIUM BROMIDE 0.5 MG: 0.5 SOLUTION RESPIRATORY (INHALATION) at 16:44

## 2018-04-10 RX ADMIN — Medication 10 ML: at 10:15

## 2018-04-10 RX ADMIN — FAMOTIDINE 20 MG: 20 TABLET, FILM COATED ORAL at 08:20

## 2018-04-10 RX ADMIN — BUMETANIDE 2 MG: 1 TABLET ORAL at 08:20

## 2018-04-10 RX ADMIN — Medication 2 UNITS: at 11:24

## 2018-04-10 RX ADMIN — Medication 10 ML: at 20:21

## 2018-04-10 RX ADMIN — Medication 1 UNITS: at 08:21

## 2018-04-10 RX ADMIN — ATORVASTATIN CALCIUM 80 MG: 80 TABLET, FILM COATED ORAL at 20:20

## 2018-04-10 RX ADMIN — METOPROLOL TARTRATE 50 MG: 50 TABLET, FILM COATED ORAL at 20:21

## 2018-04-10 RX ADMIN — METOPROLOL TARTRATE 50 MG: 50 TABLET, FILM COATED ORAL at 08:20

## 2018-04-10 RX ADMIN — ACETAMINOPHEN 650 MG: 325 TABLET ORAL at 08:47

## 2018-04-10 RX ADMIN — DOCUSATE SODIUM 100 MG: 100 CAPSULE ORAL at 08:20

## 2018-04-10 ASSESSMENT — PAIN SCALES - GENERAL
PAINLEVEL_OUTOF10: 0
PAINLEVEL_OUTOF10: 0
PAINLEVEL_OUTOF10: 6
PAINLEVEL_OUTOF10: 0
PAINLEVEL_OUTOF10: 0
PAINLEVEL_OUTOF10: 3
PAINLEVEL_OUTOF10: 3
PAINLEVEL_OUTOF10: 0

## 2018-04-10 ASSESSMENT — PAIN DESCRIPTION - PAIN TYPE: TYPE: ACUTE PAIN

## 2018-04-10 ASSESSMENT — PAIN DESCRIPTION - DESCRIPTORS: DESCRIPTORS: ACHING

## 2018-04-10 ASSESSMENT — PAIN DESCRIPTION - FREQUENCY: FREQUENCY: INTERMITTENT

## 2018-04-10 ASSESSMENT — PAIN DESCRIPTION - ORIENTATION: ORIENTATION: LEFT

## 2018-04-10 ASSESSMENT — PAIN DESCRIPTION - ONSET: ONSET: ON-GOING

## 2018-04-10 ASSESSMENT — PULMONARY FUNCTION TESTS: PEFR_L/MIN: 0

## 2018-04-10 ASSESSMENT — PAIN DESCRIPTION - LOCATION: LOCATION: LEG

## 2018-04-11 ENCOUNTER — HOSPITAL ENCOUNTER (INPATIENT)
Age: 78
LOS: 14 days | Discharge: HOME | DRG: 306 | End: 2018-04-25
Attending: FAMILY MEDICINE | Admitting: FAMILY MEDICINE
Payer: MEDICARE

## 2018-04-11 VITALS
RESPIRATION RATE: 18 BRPM | HEIGHT: 67 IN | HEART RATE: 80 BPM | WEIGHT: 181 LBS | BODY MASS INDEX: 28.41 KG/M2 | SYSTOLIC BLOOD PRESSURE: 129 MMHG | DIASTOLIC BLOOD PRESSURE: 60 MMHG | TEMPERATURE: 98 F | OXYGEN SATURATION: 93 %

## 2018-04-11 DIAGNOSIS — I50.43 CHF (CONGESTIVE HEART FAILURE), NYHA CLASS I, ACUTE ON CHRONIC, COMBINED (HCC): ICD-10-CM

## 2018-04-11 DIAGNOSIS — I35.0 AORTIC STENOSIS, SEVERE: ICD-10-CM

## 2018-04-11 DIAGNOSIS — N18.30 CKD (CHRONIC KIDNEY DISEASE) STAGE 3, GFR 30-59 ML/MIN (HCC): Primary | ICD-10-CM

## 2018-04-11 DIAGNOSIS — N17.9 AKI (ACUTE KIDNEY INJURY) (HCC): ICD-10-CM

## 2018-04-11 LAB
ALBUMIN SERPL-MCNC: 3.3 G/DL (ref 3.5–5.1)
ALP BLD-CCNC: 122 U/L (ref 38–126)
ALT SERPL-CCNC: 51 U/L (ref 11–66)
ANION GAP SERPL CALCULATED.3IONS-SCNC: 18 MEQ/L (ref 8–16)
ANISOCYTOSIS: ABNORMAL
AST SERPL-CCNC: 44 U/L (ref 5–40)
BASOPHILS # BLD: 0.2 %
BASOPHILS ABSOLUTE: 0 THOU/MM3 (ref 0–0.1)
BILIRUB SERPL-MCNC: 0.8 MG/DL (ref 0.3–1.2)
BUN BLDV-MCNC: 39 MG/DL (ref 7–22)
CALCIUM IONIZED: 1.05 MMOL/L (ref 1.12–1.32)
CALCIUM SERPL-MCNC: 9.1 MG/DL (ref 8.5–10.5)
CHLORIDE BLD-SCNC: 90 MEQ/L (ref 98–111)
CO2: 26 MEQ/L (ref 23–33)
CREAT SERPL-MCNC: 2.6 MG/DL (ref 0.4–1.2)
EOSINOPHIL # BLD: 7.5 %
EOSINOPHILS ABSOLUTE: 1.3 THOU/MM3 (ref 0–0.4)
GFR SERPL CREATININE-BSD FRML MDRD: 24 ML/MIN/1.73M2
GLUCOSE BLD-MCNC: 133 MG/DL (ref 70–108)
GLUCOSE BLD-MCNC: 170 MG/DL (ref 70–108)
GLUCOSE BLD-MCNC: 173 MG/DL (ref 70–108)
GLUCOSE BLD-MCNC: 173 MG/DL (ref 70–108)
GLUCOSE BLD-MCNC: 295 MG/DL (ref 70–108)
HCT VFR BLD CALC: 30.5 % (ref 42–52)
HEMOGLOBIN: 10.1 GM/DL (ref 14–18)
INR BLD: 2.92 (ref 0.85–1.13)
LYMPHOCYTES # BLD: 6.7 %
LYMPHOCYTES ABSOLUTE: 1.1 THOU/MM3 (ref 1–4.8)
MCH RBC QN AUTO: 30.4 PG (ref 27–31)
MCHC RBC AUTO-ENTMCNC: 33.1 GM/DL (ref 33–37)
MCV RBC AUTO: 91.7 FL (ref 80–94)
MONOCYTES # BLD: 8.8 %
MONOCYTES ABSOLUTE: 1.5 THOU/MM3 (ref 0.4–1.3)
NUCLEATED RED BLOOD CELLS: 0 /100 WBC
PDW BLD-RTO: 17.1 % (ref 11.5–14.5)
PLATELET # BLD: 308 THOU/MM3 (ref 130–400)
PMV BLD AUTO: 8.6 FL (ref 7.4–10.4)
POTASSIUM SERPL-SCNC: 4.9 MEQ/L (ref 3.5–5.2)
RBC # BLD: 3.33 MILL/MM3 (ref 4.7–6.1)
SEG NEUTROPHILS: 76.8 %
SEGMENTED NEUTROPHILS ABSOLUTE COUNT: 13.1 THOU/MM3 (ref 1.8–7.7)
SODIUM BLD-SCNC: 134 MEQ/L (ref 135–145)
TOTAL PROTEIN: 8.1 G/DL (ref 6.1–8)
WBC # BLD: 17.1 THOU/MM3 (ref 4.8–10.8)

## 2018-04-11 PROCEDURE — 6360000002 HC RX W HCPCS: Performed by: INTERNAL MEDICINE

## 2018-04-11 PROCEDURE — 82330 ASSAY OF CALCIUM: CPT

## 2018-04-11 PROCEDURE — 94640 AIRWAY INHALATION TREATMENT: CPT

## 2018-04-11 PROCEDURE — 85610 PROTHROMBIN TIME: CPT

## 2018-04-11 PROCEDURE — 6370000000 HC RX 637 (ALT 250 FOR IP): Performed by: INTERNAL MEDICINE

## 2018-04-11 PROCEDURE — 80053 COMPREHEN METABOLIC PANEL: CPT

## 2018-04-11 PROCEDURE — 85025 COMPLETE CBC W/AUTO DIFF WBC: CPT

## 2018-04-11 PROCEDURE — 6370000000 HC RX 637 (ALT 250 FOR IP): Performed by: NURSE PRACTITIONER

## 2018-04-11 PROCEDURE — 97530 THERAPEUTIC ACTIVITIES: CPT

## 2018-04-11 PROCEDURE — 1290000000 HC SEMI PRIVATE OTHER R&B

## 2018-04-11 PROCEDURE — 82948 REAGENT STRIP/BLOOD GLUCOSE: CPT

## 2018-04-11 PROCEDURE — 99232 SBSQ HOSP IP/OBS MODERATE 35: CPT | Performed by: INTERNAL MEDICINE

## 2018-04-11 PROCEDURE — 36415 COLL VENOUS BLD VENIPUNCTURE: CPT

## 2018-04-11 PROCEDURE — 0220000000 HC SKILLED NURSING FACILITY

## 2018-04-11 PROCEDURE — 2580000003 HC RX 258: Performed by: INTERNAL MEDICINE

## 2018-04-11 RX ORDER — UREA 10 %
3 LOTION (ML) TOPICAL NIGHTLY PRN
Status: DISCONTINUED | OUTPATIENT
Start: 2018-04-11 | End: 2018-04-25 | Stop reason: HOSPADM

## 2018-04-11 RX ORDER — LIDOCAINE HYDROCHLORIDE 10 MG/ML
5 INJECTION, SOLUTION EPIDURAL; INFILTRATION; INTRACAUDAL; PERINEURAL ONCE
Status: CANCELLED | OUTPATIENT
Start: 2018-04-11 | End: 2018-04-11

## 2018-04-11 RX ORDER — NITROGLYCERIN 0.4 MG/1
0.4 TABLET SUBLINGUAL EVERY 5 MIN PRN
Status: CANCELLED | OUTPATIENT
Start: 2018-04-11

## 2018-04-11 RX ORDER — METOPROLOL TARTRATE 50 MG/1
50 TABLET, FILM COATED ORAL 2 TIMES DAILY
Status: CANCELLED | OUTPATIENT
Start: 2018-04-11

## 2018-04-11 RX ORDER — ATORVASTATIN CALCIUM 80 MG/1
80 TABLET, FILM COATED ORAL NIGHTLY
Status: CANCELLED | OUTPATIENT
Start: 2018-04-11

## 2018-04-11 RX ORDER — SODIUM CHLORIDE 0.9 % (FLUSH) 0.9 %
10 SYRINGE (ML) INJECTION PRN
Status: DISCONTINUED | OUTPATIENT
Start: 2018-04-11 | End: 2018-04-25 | Stop reason: HOSPADM

## 2018-04-11 RX ORDER — DIGOXIN 125 MCG
125 TABLET ORAL DAILY
Status: DISCONTINUED | OUTPATIENT
Start: 2018-04-12 | End: 2018-04-25 | Stop reason: HOSPADM

## 2018-04-11 RX ORDER — FAMOTIDINE 20 MG/1
20 TABLET, FILM COATED ORAL DAILY
Status: CANCELLED | OUTPATIENT
Start: 2018-04-12

## 2018-04-11 RX ORDER — ACETAMINOPHEN 325 MG/1
650 TABLET ORAL EVERY 4 HOURS PRN
Status: CANCELLED | OUTPATIENT
Start: 2018-04-11

## 2018-04-11 RX ORDER — BISACODYL 10 MG
10 SUPPOSITORY, RECTAL RECTAL DAILY PRN
Status: DISCONTINUED | OUTPATIENT
Start: 2018-04-11 | End: 2018-04-25 | Stop reason: HOSPADM

## 2018-04-11 RX ORDER — BUMETANIDE 1 MG/1
2 TABLET ORAL DAILY
Status: CANCELLED | OUTPATIENT
Start: 2018-04-12

## 2018-04-11 RX ORDER — DOCUSATE SODIUM 100 MG/1
100 CAPSULE, LIQUID FILLED ORAL DAILY
Status: CANCELLED | OUTPATIENT
Start: 2018-04-12

## 2018-04-11 RX ORDER — POTASSIUM CHLORIDE 20 MEQ/1
10 TABLET, EXTENDED RELEASE ORAL 2 TIMES DAILY
Status: DISCONTINUED | OUTPATIENT
Start: 2018-04-12 | End: 2018-04-18

## 2018-04-11 RX ORDER — SODIUM CHLORIDE 0.9 % (FLUSH) 0.9 %
10 SYRINGE (ML) INJECTION PRN
Status: CANCELLED | OUTPATIENT
Start: 2018-04-11

## 2018-04-11 RX ORDER — NICOTINE POLACRILEX 4 MG
15 LOZENGE BUCCAL PRN
Status: CANCELLED | OUTPATIENT
Start: 2018-04-11

## 2018-04-11 RX ORDER — METOPROLOL TARTRATE 50 MG/1
50 TABLET, FILM COATED ORAL 2 TIMES DAILY
Status: DISCONTINUED | OUTPATIENT
Start: 2018-04-12 | End: 2018-04-25 | Stop reason: HOSPADM

## 2018-04-11 RX ORDER — NICOTINE POLACRILEX 4 MG
15 LOZENGE BUCCAL PRN
Status: DISCONTINUED | OUTPATIENT
Start: 2018-04-11 | End: 2018-04-25 | Stop reason: HOSPADM

## 2018-04-11 RX ORDER — ATORVASTATIN CALCIUM 80 MG/1
80 TABLET, FILM COATED ORAL NIGHTLY
Status: DISCONTINUED | OUTPATIENT
Start: 2018-04-12 | End: 2018-04-25 | Stop reason: HOSPADM

## 2018-04-11 RX ORDER — DIGOXIN 125 MCG
125 TABLET ORAL DAILY
Status: CANCELLED | OUTPATIENT
Start: 2018-04-12

## 2018-04-11 RX ORDER — LINEZOLID 2 MG/ML
600 INJECTION, SOLUTION INTRAVENOUS EVERY 12 HOURS
Status: CANCELLED | OUTPATIENT
Start: 2018-04-11 | End: 2018-04-11

## 2018-04-11 RX ORDER — GUAIFENESIN 600 MG/1
600 TABLET, EXTENDED RELEASE ORAL 2 TIMES DAILY
Status: DISCONTINUED | OUTPATIENT
Start: 2018-04-12 | End: 2018-04-23

## 2018-04-11 RX ORDER — POLYETHYLENE GLYCOL 3350 17 G/17G
17 POWDER, FOR SOLUTION ORAL DAILY
Status: DISCONTINUED | OUTPATIENT
Start: 2018-04-12 | End: 2018-04-25 | Stop reason: HOSPADM

## 2018-04-11 RX ORDER — AMLODIPINE BESYLATE 5 MG/1
5 TABLET ORAL EVERY 8 HOURS PRN
Status: DISCONTINUED | OUTPATIENT
Start: 2018-04-11 | End: 2018-04-15

## 2018-04-11 RX ORDER — DOCUSATE SODIUM 100 MG/1
100 CAPSULE, LIQUID FILLED ORAL DAILY
Status: DISCONTINUED | OUTPATIENT
Start: 2018-04-12 | End: 2018-04-25 | Stop reason: HOSPADM

## 2018-04-11 RX ORDER — AMLODIPINE BESYLATE 5 MG/1
5 TABLET ORAL EVERY 8 HOURS PRN
Status: CANCELLED | OUTPATIENT
Start: 2018-04-11

## 2018-04-11 RX ORDER — LINEZOLID 2 MG/ML
600 INJECTION, SOLUTION INTRAVENOUS EVERY 12 HOURS
Status: COMPLETED | OUTPATIENT
Start: 2018-04-11 | End: 2018-04-12

## 2018-04-11 RX ORDER — METOPROLOL TARTRATE 5 MG/5ML
5 INJECTION INTRAVENOUS EVERY 6 HOURS PRN
Status: DISCONTINUED | OUTPATIENT
Start: 2018-04-11 | End: 2018-04-12

## 2018-04-11 RX ORDER — UREA 10 %
3 LOTION (ML) TOPICAL NIGHTLY PRN
Status: CANCELLED | OUTPATIENT
Start: 2018-04-11

## 2018-04-11 RX ORDER — LIDOCAINE HYDROCHLORIDE 10 MG/ML
5 INJECTION, SOLUTION EPIDURAL; INFILTRATION; INTRACAUDAL; PERINEURAL ONCE
Status: DISCONTINUED | OUTPATIENT
Start: 2018-04-11 | End: 2018-04-11

## 2018-04-11 RX ORDER — 0.9 % SODIUM CHLORIDE 0.9 %
250 INTRAVENOUS SOLUTION INTRAVENOUS ONCE
Status: CANCELLED | OUTPATIENT
Start: 2018-04-11 | End: 2018-04-11

## 2018-04-11 RX ORDER — FAMOTIDINE 20 MG/1
20 TABLET, FILM COATED ORAL DAILY
Status: DISCONTINUED | OUTPATIENT
Start: 2018-04-12 | End: 2018-04-25 | Stop reason: HOSPADM

## 2018-04-11 RX ORDER — NITROGLYCERIN 0.4 MG/1
0.4 TABLET SUBLINGUAL EVERY 5 MIN PRN
Status: DISCONTINUED | OUTPATIENT
Start: 2018-04-11 | End: 2018-04-25 | Stop reason: HOSPADM

## 2018-04-11 RX ORDER — POTASSIUM CHLORIDE 20 MEQ/1
10 TABLET, EXTENDED RELEASE ORAL 2 TIMES DAILY
Status: CANCELLED | OUTPATIENT
Start: 2018-04-11

## 2018-04-11 RX ORDER — SODIUM CHLORIDE 0.9 % (FLUSH) 0.9 %
10 SYRINGE (ML) INJECTION EVERY 12 HOURS SCHEDULED
Status: DISCONTINUED | OUTPATIENT
Start: 2018-04-12 | End: 2018-04-25 | Stop reason: HOSPADM

## 2018-04-11 RX ORDER — GUAIFENESIN 600 MG/1
600 TABLET, EXTENDED RELEASE ORAL 2 TIMES DAILY
Status: CANCELLED | OUTPATIENT
Start: 2018-04-11

## 2018-04-11 RX ORDER — SODIUM CHLORIDE 0.9 % (FLUSH) 0.9 %
10 SYRINGE (ML) INJECTION EVERY 12 HOURS SCHEDULED
Status: CANCELLED | OUTPATIENT
Start: 2018-04-11

## 2018-04-11 RX ORDER — 0.9 % SODIUM CHLORIDE 0.9 %
250 INTRAVENOUS SOLUTION INTRAVENOUS ONCE
Status: DISCONTINUED | OUTPATIENT
Start: 2018-04-11 | End: 2018-04-11

## 2018-04-11 RX ORDER — DEXTROSE MONOHYDRATE 25 G/50ML
12.5 INJECTION, SOLUTION INTRAVENOUS PRN
Status: CANCELLED | OUTPATIENT
Start: 2018-04-11

## 2018-04-11 RX ORDER — BISACODYL 10 MG
10 SUPPOSITORY, RECTAL RECTAL DAILY PRN
Status: CANCELLED | OUTPATIENT
Start: 2018-04-11

## 2018-04-11 RX ORDER — DEXTROSE MONOHYDRATE 25 G/50ML
12.5 INJECTION, SOLUTION INTRAVENOUS PRN
Status: DISCONTINUED | OUTPATIENT
Start: 2018-04-11 | End: 2018-04-25 | Stop reason: HOSPADM

## 2018-04-11 RX ORDER — BUMETANIDE 1 MG/1
2 TABLET ORAL DAILY
Status: DISCONTINUED | OUTPATIENT
Start: 2018-04-12 | End: 2018-04-18

## 2018-04-11 RX ORDER — ACETAMINOPHEN 325 MG/1
650 TABLET ORAL EVERY 4 HOURS PRN
Status: DISCONTINUED | OUTPATIENT
Start: 2018-04-11 | End: 2018-04-25 | Stop reason: HOSPADM

## 2018-04-11 RX ORDER — METOPROLOL TARTRATE 5 MG/5ML
5 INJECTION INTRAVENOUS EVERY 6 HOURS PRN
Status: CANCELLED | OUTPATIENT
Start: 2018-04-11

## 2018-04-11 RX ORDER — POLYETHYLENE GLYCOL 3350 17 G/17G
17 POWDER, FOR SOLUTION ORAL DAILY
Status: CANCELLED | OUTPATIENT
Start: 2018-04-12

## 2018-04-11 RX ADMIN — IPRATROPIUM BROMIDE 0.5 MG: 0.5 SOLUTION RESPIRATORY (INHALATION) at 14:40

## 2018-04-11 RX ADMIN — BUMETANIDE 2 MG: 1 TABLET ORAL at 09:29

## 2018-04-11 RX ADMIN — POLYETHYLENE GLYCOL 3350 17 G: 17 POWDER, FOR SOLUTION ORAL at 09:30

## 2018-04-11 RX ADMIN — APIXABAN 2.5 MG: 2.5 TABLET, FILM COATED ORAL at 09:28

## 2018-04-11 RX ADMIN — FAMOTIDINE 20 MG: 20 TABLET, FILM COATED ORAL at 09:29

## 2018-04-11 RX ADMIN — Medication 3 UNITS: at 11:53

## 2018-04-11 RX ADMIN — IPRATROPIUM BROMIDE 0.5 MG: 0.5 SOLUTION RESPIRATORY (INHALATION) at 17:39

## 2018-04-11 RX ADMIN — VITAMIN D, TAB 1000IU (100/BT) 1000 UNITS: 25 TAB at 09:29

## 2018-04-11 RX ADMIN — GUAIFENESIN 600 MG: 600 TABLET, EXTENDED RELEASE ORAL at 09:27

## 2018-04-11 RX ADMIN — IPRATROPIUM BROMIDE 0.5 MG: 0.5 SOLUTION RESPIRATORY (INHALATION) at 09:55

## 2018-04-11 RX ADMIN — DIGOXIN 125 MCG: 0.12 TABLET ORAL at 09:29

## 2018-04-11 RX ADMIN — Medication 1 UNITS: at 09:31

## 2018-04-11 RX ADMIN — Medication 10 ML: at 09:32

## 2018-04-11 RX ADMIN — IPRATROPIUM BROMIDE 0.5 MG: 0.5 SOLUTION RESPIRATORY (INHALATION) at 19:56

## 2018-04-11 RX ADMIN — METOPROLOL TARTRATE 50 MG: 50 TABLET, FILM COATED ORAL at 09:27

## 2018-04-11 RX ADMIN — POTASSIUM CHLORIDE 10 MEQ: 1500 TABLET, EXTENDED RELEASE ORAL at 09:30

## 2018-04-11 RX ADMIN — LINEZOLID 600 MG: 600 INJECTION, SOLUTION INTRAVENOUS at 09:33

## 2018-04-11 ASSESSMENT — PAIN SCALES - GENERAL
PAINLEVEL_OUTOF10: 0

## 2018-04-12 PROBLEM — I10 ESSENTIAL HYPERTENSION: Status: ACTIVE | Noted: 2018-04-12

## 2018-04-12 PROBLEM — N18.4 CKD (CHRONIC KIDNEY DISEASE) STAGE 4, GFR 15-29 ML/MIN (HCC): Status: ACTIVE | Noted: 2018-04-12

## 2018-04-12 LAB
GLUCOSE BLD-MCNC: 107 MG/DL (ref 70–108)
GLUCOSE BLD-MCNC: 156 MG/DL (ref 70–108)
GLUCOSE BLD-MCNC: 195 MG/DL (ref 70–108)

## 2018-04-12 PROCEDURE — 1290000000 HC SEMI PRIVATE OTHER R&B

## 2018-04-12 PROCEDURE — 2500000003 HC RX 250 WO HCPCS: Performed by: INTERNAL MEDICINE

## 2018-04-12 PROCEDURE — 99231 SBSQ HOSP IP/OBS SF/LOW 25: CPT | Performed by: INTERNAL MEDICINE

## 2018-04-12 PROCEDURE — 6360000002 HC RX W HCPCS: Performed by: INTERNAL MEDICINE

## 2018-04-12 PROCEDURE — 82948 REAGENT STRIP/BLOOD GLUCOSE: CPT

## 2018-04-12 PROCEDURE — 97162 PT EVAL MOD COMPLEX 30 MIN: CPT

## 2018-04-12 PROCEDURE — 6370000000 HC RX 637 (ALT 250 FOR IP): Performed by: INTERNAL MEDICINE

## 2018-04-12 PROCEDURE — 2700000000 HC OXYGEN THERAPY PER DAY

## 2018-04-12 PROCEDURE — 6370000000 HC RX 637 (ALT 250 FOR IP): Performed by: FAMILY MEDICINE

## 2018-04-12 PROCEDURE — 97530 THERAPEUTIC ACTIVITIES: CPT

## 2018-04-12 PROCEDURE — 97110 THERAPEUTIC EXERCISES: CPT

## 2018-04-12 PROCEDURE — 2580000003 HC RX 258: Performed by: INTERNAL MEDICINE

## 2018-04-12 PROCEDURE — 94640 AIRWAY INHALATION TREATMENT: CPT

## 2018-04-12 PROCEDURE — 97166 OT EVAL MOD COMPLEX 45 MIN: CPT

## 2018-04-12 PROCEDURE — 97535 SELF CARE MNGMENT TRAINING: CPT

## 2018-04-12 RX ORDER — TESTOSTERONE CYPIONATE 200 MG/ML
200 INJECTION INTRAMUSCULAR ONCE
Status: COMPLETED | OUTPATIENT
Start: 2018-04-12 | End: 2018-04-12

## 2018-04-12 RX ADMIN — POTASSIUM CHLORIDE 10 MEQ: 20 TABLET, EXTENDED RELEASE ORAL at 08:08

## 2018-04-12 RX ADMIN — GUAIFENESIN 600 MG: 600 TABLET, EXTENDED RELEASE ORAL at 20:25

## 2018-04-12 RX ADMIN — METOPROLOL TARTRATE 50 MG: 50 TABLET, FILM COATED ORAL at 20:25

## 2018-04-12 RX ADMIN — Medication 1 UNITS: at 09:07

## 2018-04-12 RX ADMIN — Medication 1 UNITS: at 12:31

## 2018-04-12 RX ADMIN — FAMOTIDINE 20 MG: 20 TABLET ORAL at 08:08

## 2018-04-12 RX ADMIN — GUAIFENESIN 600 MG: 600 TABLET, EXTENDED RELEASE ORAL at 08:08

## 2018-04-12 RX ADMIN — Medication 5 UNITS: at 04:00

## 2018-04-12 RX ADMIN — LINAGLIPTIN 5 MG: 5 TABLET, FILM COATED ORAL at 20:25

## 2018-04-12 RX ADMIN — IPRATROPIUM BROMIDE 0.5 MG: 0.5 SOLUTION RESPIRATORY (INHALATION) at 12:49

## 2018-04-12 RX ADMIN — Medication 10 ML: at 20:38

## 2018-04-12 RX ADMIN — METOPROLOL TARTRATE 50 MG: 50 TABLET, FILM COATED ORAL at 08:08

## 2018-04-12 RX ADMIN — POTASSIUM CHLORIDE 10 MEQ: 20 TABLET, EXTENDED RELEASE ORAL at 20:26

## 2018-04-12 RX ADMIN — VITAMIN D, TAB 1000IU (100/BT) 1000 UNITS: 25 TAB at 08:08

## 2018-04-12 RX ADMIN — LINEZOLID 600 MG: 600 INJECTION, SOLUTION INTRAVENOUS at 03:31

## 2018-04-12 RX ADMIN — Medication 10 ML: at 08:09

## 2018-04-12 RX ADMIN — IPRATROPIUM BROMIDE 0.5 MG: 0.5 SOLUTION RESPIRATORY (INHALATION) at 21:32

## 2018-04-12 RX ADMIN — IPRATROPIUM BROMIDE 0.5 MG: 0.5 SOLUTION RESPIRATORY (INHALATION) at 09:13

## 2018-04-12 RX ADMIN — BUMETANIDE 2 MG: 1 TABLET ORAL at 08:08

## 2018-04-12 RX ADMIN — TESTOSTERONE CYPIONATE 200 MG: 200 INJECTION, SOLUTION INTRAMUSCULAR at 12:37

## 2018-04-12 RX ADMIN — DIGOXIN 125 MCG: 0.12 TABLET ORAL at 08:10

## 2018-04-12 RX ADMIN — IPRATROPIUM BROMIDE 0.5 MG: 0.5 SOLUTION RESPIRATORY (INHALATION) at 17:26

## 2018-04-12 RX ADMIN — ATORVASTATIN CALCIUM 80 MG: 80 TABLET, FILM COATED ORAL at 20:25

## 2018-04-12 ASSESSMENT — PAIN SCALES - GENERAL: PAINLEVEL_OUTOF10: 0

## 2018-04-13 LAB
BACTERIA: ABNORMAL /HPF
BILIRUBIN URINE: NEGATIVE
BLOOD, URINE: ABNORMAL
CASTS 2: ABNORMAL /LPF
CASTS UA: ABNORMAL /LPF
CHARACTER, URINE: CLEAR
COLOR: YELLOW
CRYSTALS, UA: ABNORMAL
EPITHELIAL CELLS, UA: ABNORMAL /HPF
GLUCOSE URINE: NEGATIVE MG/DL
KETONES, URINE: NEGATIVE
LEUKOCYTE ESTERASE, URINE: ABNORMAL
MISCELLANEOUS 2: ABNORMAL
NITRITE, URINE: NEGATIVE
PH UA: 6
PROTEIN UA: 30
RBC URINE: ABNORMAL /HPF
RENAL EPITHELIAL, UA: ABNORMAL
SPECIFIC GRAVITY, URINE: 1.02 (ref 1–1.03)
UROBILINOGEN, URINE: 0.2 EU/DL
WBC UA: ABNORMAL /HPF
YEAST: ABNORMAL

## 2018-04-13 PROCEDURE — 99231 SBSQ HOSP IP/OBS SF/LOW 25: CPT | Performed by: INTERNAL MEDICINE

## 2018-04-13 PROCEDURE — 2700000000 HC OXYGEN THERAPY PER DAY

## 2018-04-13 PROCEDURE — 6360000002 HC RX W HCPCS: Performed by: INTERNAL MEDICINE

## 2018-04-13 PROCEDURE — 1290000000 HC SEMI PRIVATE OTHER R&B

## 2018-04-13 PROCEDURE — 97116 GAIT TRAINING THERAPY: CPT

## 2018-04-13 PROCEDURE — 97110 THERAPEUTIC EXERCISES: CPT

## 2018-04-13 PROCEDURE — 6370000000 HC RX 637 (ALT 250 FOR IP): Performed by: FAMILY MEDICINE

## 2018-04-13 PROCEDURE — 97535 SELF CARE MNGMENT TRAINING: CPT

## 2018-04-13 PROCEDURE — 94640 AIRWAY INHALATION TREATMENT: CPT

## 2018-04-13 PROCEDURE — 97530 THERAPEUTIC ACTIVITIES: CPT

## 2018-04-13 PROCEDURE — 87086 URINE CULTURE/COLONY COUNT: CPT

## 2018-04-13 PROCEDURE — 6370000000 HC RX 637 (ALT 250 FOR IP): Performed by: INTERNAL MEDICINE

## 2018-04-13 PROCEDURE — 81001 URINALYSIS AUTO W/SCOPE: CPT

## 2018-04-13 RX ADMIN — POTASSIUM CHLORIDE 10 MEQ: 20 TABLET, EXTENDED RELEASE ORAL at 21:47

## 2018-04-13 RX ADMIN — GUAIFENESIN 600 MG: 600 TABLET, EXTENDED RELEASE ORAL at 08:59

## 2018-04-13 RX ADMIN — APIXABAN 2.5 MG: 2.5 TABLET, FILM COATED ORAL at 08:59

## 2018-04-13 RX ADMIN — GUAIFENESIN 600 MG: 600 TABLET, EXTENDED RELEASE ORAL at 21:47

## 2018-04-13 RX ADMIN — POTASSIUM CHLORIDE 10 MEQ: 20 TABLET, EXTENDED RELEASE ORAL at 09:00

## 2018-04-13 RX ADMIN — BUMETANIDE 2 MG: 1 TABLET ORAL at 08:59

## 2018-04-13 RX ADMIN — IPRATROPIUM BROMIDE 0.5 MG: 0.5 SOLUTION RESPIRATORY (INHALATION) at 12:56

## 2018-04-13 RX ADMIN — METOPROLOL TARTRATE 50 MG: 50 TABLET, FILM COATED ORAL at 21:46

## 2018-04-13 RX ADMIN — FAMOTIDINE 20 MG: 20 TABLET ORAL at 08:59

## 2018-04-13 RX ADMIN — VITAMIN D, TAB 1000IU (100/BT) 1000 UNITS: 25 TAB at 08:59

## 2018-04-13 RX ADMIN — DOCUSATE SODIUM 100 MG: 100 CAPSULE, LIQUID FILLED ORAL at 09:00

## 2018-04-13 RX ADMIN — DIGOXIN 125 MCG: 0.12 TABLET ORAL at 08:59

## 2018-04-13 RX ADMIN — LINAGLIPTIN 5 MG: 5 TABLET, FILM COATED ORAL at 08:59

## 2018-04-13 RX ADMIN — ATORVASTATIN CALCIUM 80 MG: 80 TABLET, FILM COATED ORAL at 21:47

## 2018-04-13 RX ADMIN — APIXABAN 2.5 MG: 2.5 TABLET, FILM COATED ORAL at 21:47

## 2018-04-13 RX ADMIN — METOPROLOL TARTRATE 50 MG: 50 TABLET, FILM COATED ORAL at 08:59

## 2018-04-13 RX ADMIN — IPRATROPIUM BROMIDE 0.5 MG: 0.5 SOLUTION RESPIRATORY (INHALATION) at 16:17

## 2018-04-13 RX ADMIN — IPRATROPIUM BROMIDE 0.5 MG: 0.5 SOLUTION RESPIRATORY (INHALATION) at 20:35

## 2018-04-13 RX ADMIN — IPRATROPIUM BROMIDE 0.5 MG: 0.5 SOLUTION RESPIRATORY (INHALATION) at 07:42

## 2018-04-13 ASSESSMENT — PAIN SCALES - GENERAL
PAINLEVEL_OUTOF10: 0
PAINLEVEL_OUTOF10: 0

## 2018-04-14 ENCOUNTER — APPOINTMENT (OUTPATIENT)
Dept: GENERAL RADIOLOGY | Age: 78
DRG: 306 | End: 2018-04-14
Attending: FAMILY MEDICINE
Payer: MEDICARE

## 2018-04-14 LAB
ALBUMIN SERPL-MCNC: 3.1 G/DL (ref 3.5–5.1)
ALP BLD-CCNC: 98 U/L (ref 38–126)
ALT SERPL-CCNC: 37 U/L (ref 11–66)
ANION GAP SERPL CALCULATED.3IONS-SCNC: 14 MEQ/L (ref 8–16)
AST SERPL-CCNC: 29 U/L (ref 5–40)
BILIRUB SERPL-MCNC: 0.9 MG/DL (ref 0.3–1.2)
BUN BLDV-MCNC: 41 MG/DL (ref 7–22)
CALCIUM SERPL-MCNC: 8.5 MG/DL (ref 8.5–10.5)
CHLORIDE BLD-SCNC: 96 MEQ/L (ref 98–111)
CO2: 27 MEQ/L (ref 23–33)
CREAT SERPL-MCNC: 2.1 MG/DL (ref 0.4–1.2)
EKG ATRIAL RATE: 76 BPM
EKG Q-T INTERVAL: 358 MS
EKG QRS DURATION: 98 MS
EKG QTC CALCULATION (BAZETT): 415 MS
EKG R AXIS: 81 DEGREES
EKG T AXIS: -91 DEGREES
EKG VENTRICULAR RATE: 81 BPM
GFR SERPL CREATININE-BSD FRML MDRD: 31 ML/MIN/1.73M2
GLUCOSE BLD-MCNC: 137 MG/DL (ref 70–108)
GLUCOSE BLD-MCNC: 223 MG/DL (ref 70–108)
HCT VFR BLD CALC: 27.1 % (ref 42–52)
HEMOGLOBIN: 8.9 GM/DL (ref 14–18)
MCH RBC QN AUTO: 29.8 PG (ref 27–31)
MCHC RBC AUTO-ENTMCNC: 33 GM/DL (ref 33–37)
MCV RBC AUTO: 90.2 FL (ref 80–94)
PDW BLD-RTO: 16.8 % (ref 11.5–14.5)
PLATELET # BLD: 197 THOU/MM3 (ref 130–400)
PMV BLD AUTO: 8.4 FL (ref 7.4–10.4)
POTASSIUM SERPL-SCNC: 4.6 MEQ/L (ref 3.5–5.2)
PRO-BNP: 6388 PG/ML (ref 0–1800)
RBC # BLD: 3 MILL/MM3 (ref 4.7–6.1)
SODIUM BLD-SCNC: 137 MEQ/L (ref 135–145)
TOTAL PROTEIN: 6.9 G/DL (ref 6.1–8)
TROPONIN T: 0.03 NG/ML
TROPONIN T: 0.03 NG/ML
WBC # BLD: 12.2 THOU/MM3 (ref 4.8–10.8)

## 2018-04-14 PROCEDURE — 82948 REAGENT STRIP/BLOOD GLUCOSE: CPT

## 2018-04-14 PROCEDURE — 6360000002 HC RX W HCPCS: Performed by: INTERNAL MEDICINE

## 2018-04-14 PROCEDURE — 94640 AIRWAY INHALATION TREATMENT: CPT

## 2018-04-14 PROCEDURE — 93005 ELECTROCARDIOGRAM TRACING: CPT | Performed by: FAMILY MEDICINE

## 2018-04-14 PROCEDURE — 36415 COLL VENOUS BLD VENIPUNCTURE: CPT

## 2018-04-14 PROCEDURE — 99231 SBSQ HOSP IP/OBS SF/LOW 25: CPT | Performed by: INTERNAL MEDICINE

## 2018-04-14 PROCEDURE — 83880 ASSAY OF NATRIURETIC PEPTIDE: CPT

## 2018-04-14 PROCEDURE — 6370000000 HC RX 637 (ALT 250 FOR IP): Performed by: INTERNAL MEDICINE

## 2018-04-14 PROCEDURE — 2700000000 HC OXYGEN THERAPY PER DAY

## 2018-04-14 PROCEDURE — 6370000000 HC RX 637 (ALT 250 FOR IP): Performed by: FAMILY MEDICINE

## 2018-04-14 PROCEDURE — 1290000000 HC SEMI PRIVATE OTHER R&B

## 2018-04-14 PROCEDURE — 71045 X-RAY EXAM CHEST 1 VIEW: CPT

## 2018-04-14 PROCEDURE — 85027 COMPLETE CBC AUTOMATED: CPT

## 2018-04-14 PROCEDURE — 80053 COMPREHEN METABOLIC PANEL: CPT

## 2018-04-14 PROCEDURE — 2580000003 HC RX 258: Performed by: INTERNAL MEDICINE

## 2018-04-14 PROCEDURE — 93010 ELECTROCARDIOGRAM REPORT: CPT | Performed by: NUCLEAR MEDICINE

## 2018-04-14 PROCEDURE — 84484 ASSAY OF TROPONIN QUANT: CPT

## 2018-04-14 RX ORDER — FUROSEMIDE 10 MG/ML
40 INJECTION INTRAMUSCULAR; INTRAVENOUS ONCE
Status: COMPLETED | OUTPATIENT
Start: 2018-04-14 | End: 2018-04-14

## 2018-04-14 RX ADMIN — ACETAMINOPHEN 650 MG: 325 TABLET ORAL at 10:26

## 2018-04-14 RX ADMIN — DIGOXIN 125 MCG: 0.12 TABLET ORAL at 10:00

## 2018-04-14 RX ADMIN — GUAIFENESIN 600 MG: 600 TABLET, EXTENDED RELEASE ORAL at 21:58

## 2018-04-14 RX ADMIN — APIXABAN 2.5 MG: 2.5 TABLET, FILM COATED ORAL at 21:59

## 2018-04-14 RX ADMIN — POTASSIUM CHLORIDE 10 MEQ: 20 TABLET, EXTENDED RELEASE ORAL at 10:26

## 2018-04-14 RX ADMIN — IPRATROPIUM BROMIDE 0.5 MG: 0.5 SOLUTION RESPIRATORY (INHALATION) at 22:38

## 2018-04-14 RX ADMIN — IPRATROPIUM BROMIDE 0.5 MG: 0.5 SOLUTION RESPIRATORY (INHALATION) at 17:54

## 2018-04-14 RX ADMIN — IPRATROPIUM BROMIDE 0.5 MG: 0.5 SOLUTION RESPIRATORY (INHALATION) at 11:43

## 2018-04-14 RX ADMIN — Medication 10 ML: at 21:59

## 2018-04-14 RX ADMIN — POTASSIUM CHLORIDE 10 MEQ: 20 TABLET, EXTENDED RELEASE ORAL at 21:58

## 2018-04-14 RX ADMIN — ATORVASTATIN CALCIUM 80 MG: 80 TABLET, FILM COATED ORAL at 21:59

## 2018-04-14 RX ADMIN — LINAGLIPTIN 5 MG: 5 TABLET, FILM COATED ORAL at 10:00

## 2018-04-14 RX ADMIN — FUROSEMIDE 40 MG: 10 INJECTION, SOLUTION INTRAVENOUS at 13:19

## 2018-04-14 RX ADMIN — VITAMIN D, TAB 1000IU (100/BT) 1000 UNITS: 25 TAB at 10:00

## 2018-04-14 RX ADMIN — FAMOTIDINE 20 MG: 20 TABLET ORAL at 10:00

## 2018-04-14 RX ADMIN — METOPROLOL TARTRATE 50 MG: 50 TABLET, FILM COATED ORAL at 10:00

## 2018-04-14 RX ADMIN — APIXABAN 2.5 MG: 2.5 TABLET, FILM COATED ORAL at 10:01

## 2018-04-14 RX ADMIN — IPRATROPIUM BROMIDE 0.5 MG: 0.5 SOLUTION RESPIRATORY (INHALATION) at 07:29

## 2018-04-14 RX ADMIN — BUMETANIDE 2 MG: 1 TABLET ORAL at 10:00

## 2018-04-14 RX ADMIN — GUAIFENESIN 600 MG: 600 TABLET, EXTENDED RELEASE ORAL at 10:00

## 2018-04-14 RX ADMIN — METOPROLOL TARTRATE 50 MG: 50 TABLET, FILM COATED ORAL at 21:58

## 2018-04-14 ASSESSMENT — PAIN DESCRIPTION - LOCATION: LOCATION: SHOULDER

## 2018-04-14 ASSESSMENT — PAIN SCALES - GENERAL
PAINLEVEL_OUTOF10: 3
PAINLEVEL_OUTOF10: 0
PAINLEVEL_OUTOF10: 3
PAINLEVEL_OUTOF10: 0

## 2018-04-14 ASSESSMENT — PAIN DESCRIPTION - ORIENTATION: ORIENTATION: LEFT

## 2018-04-14 ASSESSMENT — PAIN DESCRIPTION - FREQUENCY: FREQUENCY: INTERMITTENT

## 2018-04-14 ASSESSMENT — PAIN DESCRIPTION - ONSET: ONSET: ON-GOING

## 2018-04-14 ASSESSMENT — PAIN DESCRIPTION - DESCRIPTORS: DESCRIPTORS: ACHING

## 2018-04-14 ASSESSMENT — PAIN DESCRIPTION - PAIN TYPE: TYPE: ACUTE PAIN

## 2018-04-14 ASSESSMENT — PAIN DESCRIPTION - PROGRESSION: CLINICAL_PROGRESSION: NOT CHANGED

## 2018-04-15 PROBLEM — M70.72 ILIOPSOAS BURSITIS OF LEFT HIP: Status: ACTIVE | Noted: 2018-04-15

## 2018-04-15 LAB — URINE CULTURE REFLEX: NORMAL

## 2018-04-15 PROCEDURE — 1290000000 HC SEMI PRIVATE OTHER R&B

## 2018-04-15 PROCEDURE — 6370000000 HC RX 637 (ALT 250 FOR IP): Performed by: INTERNAL MEDICINE

## 2018-04-15 PROCEDURE — 94640 AIRWAY INHALATION TREATMENT: CPT

## 2018-04-15 PROCEDURE — 94669 MECHANICAL CHEST WALL OSCILL: CPT

## 2018-04-15 PROCEDURE — 97110 THERAPEUTIC EXERCISES: CPT

## 2018-04-15 PROCEDURE — 99232 SBSQ HOSP IP/OBS MODERATE 35: CPT | Performed by: INTERNAL MEDICINE

## 2018-04-15 PROCEDURE — 2580000003 HC RX 258: Performed by: INTERNAL MEDICINE

## 2018-04-15 PROCEDURE — 2700000000 HC OXYGEN THERAPY PER DAY

## 2018-04-15 PROCEDURE — 6360000002 HC RX W HCPCS: Performed by: INTERNAL MEDICINE

## 2018-04-15 PROCEDURE — 6370000000 HC RX 637 (ALT 250 FOR IP): Performed by: FAMILY MEDICINE

## 2018-04-15 RX ADMIN — METOPROLOL TARTRATE 50 MG: 50 TABLET, FILM COATED ORAL at 23:02

## 2018-04-15 RX ADMIN — POTASSIUM CHLORIDE 10 MEQ: 20 TABLET, EXTENDED RELEASE ORAL at 08:25

## 2018-04-15 RX ADMIN — BUMETANIDE 2 MG: 1 TABLET ORAL at 08:24

## 2018-04-15 RX ADMIN — IPRATROPIUM BROMIDE 0.5 MG: 0.5 SOLUTION RESPIRATORY (INHALATION) at 18:19

## 2018-04-15 RX ADMIN — DOCUSATE SODIUM 100 MG: 100 CAPSULE, LIQUID FILLED ORAL at 08:26

## 2018-04-15 RX ADMIN — APIXABAN 2.5 MG: 2.5 TABLET, FILM COATED ORAL at 23:02

## 2018-04-15 RX ADMIN — GUAIFENESIN 600 MG: 600 TABLET, EXTENDED RELEASE ORAL at 08:24

## 2018-04-15 RX ADMIN — FAMOTIDINE 20 MG: 20 TABLET ORAL at 08:24

## 2018-04-15 RX ADMIN — VITAMIN D, TAB 1000IU (100/BT) 1000 UNITS: 25 TAB at 08:24

## 2018-04-15 RX ADMIN — APIXABAN 2.5 MG: 2.5 TABLET, FILM COATED ORAL at 08:24

## 2018-04-15 RX ADMIN — METOPROLOL TARTRATE 50 MG: 50 TABLET, FILM COATED ORAL at 08:24

## 2018-04-15 RX ADMIN — IPRATROPIUM BROMIDE 0.5 MG: 0.5 SOLUTION RESPIRATORY (INHALATION) at 22:07

## 2018-04-15 RX ADMIN — POTASSIUM CHLORIDE 10 MEQ: 20 TABLET, EXTENDED RELEASE ORAL at 23:04

## 2018-04-15 RX ADMIN — IPRATROPIUM BROMIDE 0.5 MG: 0.5 SOLUTION RESPIRATORY (INHALATION) at 13:42

## 2018-04-15 RX ADMIN — ATORVASTATIN CALCIUM 80 MG: 80 TABLET, FILM COATED ORAL at 23:02

## 2018-04-15 RX ADMIN — Medication 10 ML: at 23:09

## 2018-04-15 RX ADMIN — Medication 10 ML: at 08:26

## 2018-04-15 RX ADMIN — LINAGLIPTIN 5 MG: 5 TABLET, FILM COATED ORAL at 08:24

## 2018-04-15 RX ADMIN — DIGOXIN 125 MCG: 0.12 TABLET ORAL at 08:24

## 2018-04-15 RX ADMIN — IPRATROPIUM BROMIDE 0.5 MG: 0.5 SOLUTION RESPIRATORY (INHALATION) at 07:39

## 2018-04-15 RX ADMIN — Medication 3 MG: at 23:02

## 2018-04-15 RX ADMIN — GUAIFENESIN 600 MG: 600 TABLET, EXTENDED RELEASE ORAL at 23:02

## 2018-04-15 ASSESSMENT — PAIN SCALES - GENERAL: PAINLEVEL_OUTOF10: 7

## 2018-04-16 ENCOUNTER — APPOINTMENT (OUTPATIENT)
Dept: INTERVENTIONAL RADIOLOGY/VASCULAR | Age: 78
DRG: 306 | End: 2018-04-16
Attending: FAMILY MEDICINE
Payer: MEDICARE

## 2018-04-16 LAB
ANION GAP SERPL CALCULATED.3IONS-SCNC: 13 MEQ/L (ref 8–16)
ANISOCYTOSIS: ABNORMAL
BASOPHILS # BLD: 0.3 %
BASOPHILS ABSOLUTE: 0 THOU/MM3 (ref 0–0.1)
BUN BLDV-MCNC: 41 MG/DL (ref 7–22)
CALCIUM SERPL-MCNC: 8.7 MG/DL (ref 8.5–10.5)
CHLORIDE BLD-SCNC: 96 MEQ/L (ref 98–111)
CO2: 27 MEQ/L (ref 23–33)
CREAT SERPL-MCNC: 2.3 MG/DL (ref 0.4–1.2)
EOSINOPHIL # BLD: 3.7 %
EOSINOPHILS ABSOLUTE: 0.6 THOU/MM3 (ref 0–0.4)
GFR SERPL CREATININE-BSD FRML MDRD: 28 ML/MIN/1.73M2
GLUCOSE BLD-MCNC: 140 MG/DL (ref 70–108)
GLUCOSE BLD-MCNC: 159 MG/DL (ref 70–108)
HCT VFR BLD CALC: 28.8 % (ref 42–52)
HEMOGLOBIN: 9.5 GM/DL (ref 14–18)
LYMPHOCYTES # BLD: 10.7 %
LYMPHOCYTES ABSOLUTE: 1.6 THOU/MM3 (ref 1–4.8)
MCH RBC QN AUTO: 30.1 PG (ref 27–31)
MCHC RBC AUTO-ENTMCNC: 32.9 GM/DL (ref 33–37)
MCV RBC AUTO: 91.5 FL (ref 80–94)
MONOCYTES # BLD: 15.9 %
MONOCYTES ABSOLUTE: 2.4 THOU/MM3 (ref 0.4–1.3)
NUCLEATED RED BLOOD CELLS: 0 /100 WBC
PDW BLD-RTO: 16.8 % (ref 11.5–14.5)
PLATELET # BLD: 185 THOU/MM3 (ref 130–400)
PMV BLD AUTO: 8.6 FL (ref 7.4–10.4)
POTASSIUM SERPL-SCNC: 4.2 MEQ/L (ref 3.5–5.2)
RBC # BLD: 3.15 MILL/MM3 (ref 4.7–6.1)
SEG NEUTROPHILS: 69.4 %
SEGMENTED NEUTROPHILS ABSOLUTE COUNT: 10.5 THOU/MM3 (ref 1.8–7.7)
SODIUM BLD-SCNC: 136 MEQ/L (ref 135–145)
WBC # BLD: 15.2 THOU/MM3 (ref 4.8–10.8)

## 2018-04-16 PROCEDURE — 97530 THERAPEUTIC ACTIVITIES: CPT

## 2018-04-16 PROCEDURE — 97116 GAIT TRAINING THERAPY: CPT

## 2018-04-16 PROCEDURE — 99232 SBSQ HOSP IP/OBS MODERATE 35: CPT | Performed by: INTERNAL MEDICINE

## 2018-04-16 PROCEDURE — 2580000003 HC RX 258: Performed by: INTERNAL MEDICINE

## 2018-04-16 PROCEDURE — 97110 THERAPEUTIC EXERCISES: CPT

## 2018-04-16 PROCEDURE — 36415 COLL VENOUS BLD VENIPUNCTURE: CPT

## 2018-04-16 PROCEDURE — 1290000000 HC SEMI PRIVATE OTHER R&B

## 2018-04-16 PROCEDURE — 6370000000 HC RX 637 (ALT 250 FOR IP): Performed by: INTERNAL MEDICINE

## 2018-04-16 PROCEDURE — 85025 COMPLETE CBC W/AUTO DIFF WBC: CPT

## 2018-04-16 PROCEDURE — 94640 AIRWAY INHALATION TREATMENT: CPT

## 2018-04-16 PROCEDURE — 97535 SELF CARE MNGMENT TRAINING: CPT

## 2018-04-16 PROCEDURE — 6370000000 HC RX 637 (ALT 250 FOR IP): Performed by: FAMILY MEDICINE

## 2018-04-16 PROCEDURE — 93880 EXTRACRANIAL BILAT STUDY: CPT

## 2018-04-16 PROCEDURE — 6360000002 HC RX W HCPCS: Performed by: INTERNAL MEDICINE

## 2018-04-16 PROCEDURE — 82948 REAGENT STRIP/BLOOD GLUCOSE: CPT

## 2018-04-16 PROCEDURE — 80048 BASIC METABOLIC PNL TOTAL CA: CPT

## 2018-04-16 PROCEDURE — 2700000000 HC OXYGEN THERAPY PER DAY

## 2018-04-16 RX ORDER — SODIUM CHLORIDE 9 MG/ML
INJECTION, SOLUTION INTRAVENOUS CONTINUOUS
Status: DISCONTINUED | OUTPATIENT
Start: 2018-04-16 | End: 2018-04-18

## 2018-04-16 RX ADMIN — Medication 10 ML: at 09:10

## 2018-04-16 RX ADMIN — METOPROLOL TARTRATE 50 MG: 50 TABLET, FILM COATED ORAL at 09:04

## 2018-04-16 RX ADMIN — POLYETHYLENE GLYCOL 3350 17 G: 17 POWDER, FOR SOLUTION ORAL at 09:04

## 2018-04-16 RX ADMIN — APIXABAN 2.5 MG: 2.5 TABLET, FILM COATED ORAL at 22:01

## 2018-04-16 RX ADMIN — POTASSIUM CHLORIDE 10 MEQ: 20 TABLET, EXTENDED RELEASE ORAL at 09:09

## 2018-04-16 RX ADMIN — GUAIFENESIN 600 MG: 600 TABLET, EXTENDED RELEASE ORAL at 09:04

## 2018-04-16 RX ADMIN — IPRATROPIUM BROMIDE 0.5 MG: 0.5 SOLUTION RESPIRATORY (INHALATION) at 09:12

## 2018-04-16 RX ADMIN — IPRATROPIUM BROMIDE 0.5 MG: 0.5 SOLUTION RESPIRATORY (INHALATION) at 14:24

## 2018-04-16 RX ADMIN — BUMETANIDE 2 MG: 1 TABLET ORAL at 09:04

## 2018-04-16 RX ADMIN — DIGOXIN 125 MCG: 0.12 TABLET ORAL at 09:04

## 2018-04-16 RX ADMIN — FAMOTIDINE 20 MG: 20 TABLET ORAL at 09:04

## 2018-04-16 RX ADMIN — ACETYLCYSTEINE 1000 MG: 500 TABLET, EFFERVESCENT ORAL at 18:06

## 2018-04-16 RX ADMIN — ATORVASTATIN CALCIUM 80 MG: 80 TABLET, FILM COATED ORAL at 22:01

## 2018-04-16 RX ADMIN — POTASSIUM CHLORIDE 10 MEQ: 20 TABLET, EXTENDED RELEASE ORAL at 22:01

## 2018-04-16 RX ADMIN — SODIUM CHLORIDE: 9 INJECTION, SOLUTION INTRAVENOUS at 18:06

## 2018-04-16 RX ADMIN — METOPROLOL TARTRATE 50 MG: 50 TABLET, FILM COATED ORAL at 22:01

## 2018-04-16 RX ADMIN — DOCUSATE SODIUM 100 MG: 100 CAPSULE, LIQUID FILLED ORAL at 09:09

## 2018-04-16 RX ADMIN — IPRATROPIUM BROMIDE 0.5 MG: 0.5 SOLUTION RESPIRATORY (INHALATION) at 22:05

## 2018-04-16 RX ADMIN — VITAMIN D, TAB 1000IU (100/BT) 1000 UNITS: 25 TAB at 09:04

## 2018-04-16 RX ADMIN — GUAIFENESIN 600 MG: 600 TABLET, EXTENDED RELEASE ORAL at 22:01

## 2018-04-16 RX ADMIN — APIXABAN 2.5 MG: 2.5 TABLET, FILM COATED ORAL at 09:04

## 2018-04-16 RX ADMIN — LINAGLIPTIN 5 MG: 5 TABLET, FILM COATED ORAL at 09:04

## 2018-04-16 RX ADMIN — IPRATROPIUM BROMIDE 0.5 MG: 0.5 SOLUTION RESPIRATORY (INHALATION) at 18:15

## 2018-04-16 ASSESSMENT — PAIN SCALES - GENERAL: PAINLEVEL_OUTOF10: 3

## 2018-04-17 ENCOUNTER — APPOINTMENT (OUTPATIENT)
Dept: INTERVENTIONAL RADIOLOGY/VASCULAR | Age: 78
DRG: 306 | End: 2018-04-17
Attending: FAMILY MEDICINE
Payer: MEDICARE

## 2018-04-17 LAB
ANION GAP SERPL CALCULATED.3IONS-SCNC: 13 MEQ/L (ref 8–16)
BUN BLDV-MCNC: 38 MG/DL (ref 7–22)
CALCIUM SERPL-MCNC: 8.8 MG/DL (ref 8.5–10.5)
CHLORIDE BLD-SCNC: 97 MEQ/L (ref 98–111)
CO2: 26 MEQ/L (ref 23–33)
CREAT SERPL-MCNC: 2 MG/DL (ref 0.4–1.2)
GFR SERPL CREATININE-BSD FRML MDRD: 32 ML/MIN/1.73M2
GLUCOSE BLD-MCNC: 117 MG/DL (ref 70–108)
GLUCOSE BLD-MCNC: 168 MG/DL (ref 70–108)
POTASSIUM SERPL-SCNC: 3.9 MEQ/L (ref 3.5–5.2)
SODIUM BLD-SCNC: 136 MEQ/L (ref 135–145)

## 2018-04-17 PROCEDURE — A6402 STERILE GAUZE <= 16 SQ IN: HCPCS

## 2018-04-17 PROCEDURE — 97116 GAIT TRAINING THERAPY: CPT

## 2018-04-17 PROCEDURE — 97535 SELF CARE MNGMENT TRAINING: CPT

## 2018-04-17 PROCEDURE — 6370000000 HC RX 637 (ALT 250 FOR IP): Performed by: INTERNAL MEDICINE

## 2018-04-17 PROCEDURE — 80048 BASIC METABOLIC PNL TOTAL CA: CPT

## 2018-04-17 PROCEDURE — 82948 REAGENT STRIP/BLOOD GLUCOSE: CPT

## 2018-04-17 PROCEDURE — 36415 COLL VENOUS BLD VENIPUNCTURE: CPT

## 2018-04-17 PROCEDURE — 1290000000 HC SEMI PRIVATE OTHER R&B

## 2018-04-17 PROCEDURE — 2580000003 HC RX 258: Performed by: INTERNAL MEDICINE

## 2018-04-17 PROCEDURE — 94640 AIRWAY INHALATION TREATMENT: CPT

## 2018-04-17 PROCEDURE — 2700000000 HC OXYGEN THERAPY PER DAY

## 2018-04-17 PROCEDURE — 99231 SBSQ HOSP IP/OBS SF/LOW 25: CPT | Performed by: INTERNAL MEDICINE

## 2018-04-17 PROCEDURE — 97110 THERAPEUTIC EXERCISES: CPT

## 2018-04-17 PROCEDURE — 97530 THERAPEUTIC ACTIVITIES: CPT

## 2018-04-17 PROCEDURE — 6360000002 HC RX W HCPCS: Performed by: INTERNAL MEDICINE

## 2018-04-17 PROCEDURE — 93971 EXTREMITY STUDY: CPT

## 2018-04-17 PROCEDURE — 6370000000 HC RX 637 (ALT 250 FOR IP): Performed by: FAMILY MEDICINE

## 2018-04-17 RX ADMIN — DOCUSATE SODIUM 100 MG: 100 CAPSULE, LIQUID FILLED ORAL at 09:50

## 2018-04-17 RX ADMIN — FAMOTIDINE 20 MG: 20 TABLET ORAL at 09:50

## 2018-04-17 RX ADMIN — POTASSIUM CHLORIDE 10 MEQ: 20 TABLET, EXTENDED RELEASE ORAL at 22:20

## 2018-04-17 RX ADMIN — APIXABAN 2.5 MG: 2.5 TABLET, FILM COATED ORAL at 09:50

## 2018-04-17 RX ADMIN — IPRATROPIUM BROMIDE 0.5 MG: 0.5 SOLUTION RESPIRATORY (INHALATION) at 16:03

## 2018-04-17 RX ADMIN — APIXABAN 2.5 MG: 2.5 TABLET, FILM COATED ORAL at 22:19

## 2018-04-17 RX ADMIN — METOPROLOL TARTRATE 50 MG: 50 TABLET, FILM COATED ORAL at 22:29

## 2018-04-17 RX ADMIN — ATORVASTATIN CALCIUM 80 MG: 80 TABLET, FILM COATED ORAL at 22:20

## 2018-04-17 RX ADMIN — SODIUM CHLORIDE: 9 INJECTION, SOLUTION INTRAVENOUS at 17:07

## 2018-04-17 RX ADMIN — METOPROLOL TARTRATE 50 MG: 50 TABLET, FILM COATED ORAL at 09:50

## 2018-04-17 RX ADMIN — VITAMIN D, TAB 1000IU (100/BT) 1000 UNITS: 25 TAB at 09:50

## 2018-04-17 RX ADMIN — GUAIFENESIN 600 MG: 600 TABLET, EXTENDED RELEASE ORAL at 09:50

## 2018-04-17 RX ADMIN — ACETYLCYSTEINE 1000 MG: 500 TABLET, EFFERVESCENT ORAL at 06:42

## 2018-04-17 RX ADMIN — LINAGLIPTIN 5 MG: 5 TABLET, FILM COATED ORAL at 09:50

## 2018-04-17 RX ADMIN — IPRATROPIUM BROMIDE 0.5 MG: 0.5 SOLUTION RESPIRATORY (INHALATION) at 11:36

## 2018-04-17 RX ADMIN — DIGOXIN 125 MCG: 0.12 TABLET ORAL at 09:50

## 2018-04-17 RX ADMIN — POTASSIUM CHLORIDE 10 MEQ: 20 TABLET, EXTENDED RELEASE ORAL at 09:51

## 2018-04-17 RX ADMIN — BUMETANIDE 2 MG: 1 TABLET ORAL at 09:50

## 2018-04-17 RX ADMIN — ACETYLCYSTEINE 1000 MG: 500 TABLET, EFFERVESCENT ORAL at 17:07

## 2018-04-17 RX ADMIN — GUAIFENESIN 600 MG: 600 TABLET, EXTENDED RELEASE ORAL at 22:20

## 2018-04-17 RX ADMIN — IPRATROPIUM BROMIDE 0.5 MG: 0.5 SOLUTION RESPIRATORY (INHALATION) at 21:58

## 2018-04-17 RX ADMIN — IPRATROPIUM BROMIDE 0.5 MG: 0.5 SOLUTION RESPIRATORY (INHALATION) at 08:28

## 2018-04-17 ASSESSMENT — PAIN DESCRIPTION - PAIN TYPE: TYPE: ACUTE PAIN

## 2018-04-17 ASSESSMENT — PAIN SCALES - WONG BAKER: WONGBAKER_NUMERICALRESPONSE: 8

## 2018-04-17 ASSESSMENT — PAIN DESCRIPTION - LOCATION: LOCATION: SHOULDER;ARM

## 2018-04-17 ASSESSMENT — PAIN DESCRIPTION - PROGRESSION: CLINICAL_PROGRESSION: NOT CHANGED

## 2018-04-17 ASSESSMENT — PAIN DESCRIPTION - DESCRIPTORS: DESCRIPTORS: ACHING;CONSTANT;SORE;TIGHTNESS

## 2018-04-17 ASSESSMENT — PAIN SCALES - GENERAL
PAINLEVEL_OUTOF10: 2
PAINLEVEL_OUTOF10: 8

## 2018-04-17 ASSESSMENT — PAIN DESCRIPTION - FREQUENCY: FREQUENCY: CONTINUOUS

## 2018-04-17 ASSESSMENT — PAIN DESCRIPTION - ORIENTATION: ORIENTATION: LEFT;UPPER

## 2018-04-17 ASSESSMENT — PAIN DESCRIPTION - ONSET: ONSET: ON-GOING

## 2018-04-18 LAB
ANION GAP SERPL CALCULATED.3IONS-SCNC: 11 MEQ/L (ref 8–16)
BUN BLDV-MCNC: 34 MG/DL (ref 7–22)
CALCIUM SERPL-MCNC: 8.7 MG/DL (ref 8.5–10.5)
CHLORIDE BLD-SCNC: 97 MEQ/L (ref 98–111)
CO2: 28 MEQ/L (ref 23–33)
CREAT SERPL-MCNC: 2.2 MG/DL (ref 0.4–1.2)
GFR SERPL CREATININE-BSD FRML MDRD: 29 ML/MIN/1.73M2
GLUCOSE BLD-MCNC: 312 MG/DL (ref 70–108)
POTASSIUM SERPL-SCNC: 5.3 MEQ/L (ref 3.5–5.2)
SODIUM BLD-SCNC: 136 MEQ/L (ref 135–145)

## 2018-04-18 PROCEDURE — 36415 COLL VENOUS BLD VENIPUNCTURE: CPT

## 2018-04-18 PROCEDURE — 1290000000 HC SEMI PRIVATE OTHER R&B

## 2018-04-18 PROCEDURE — 6370000000 HC RX 637 (ALT 250 FOR IP): Performed by: INTERNAL MEDICINE

## 2018-04-18 PROCEDURE — 97116 GAIT TRAINING THERAPY: CPT

## 2018-04-18 PROCEDURE — 94640 AIRWAY INHALATION TREATMENT: CPT

## 2018-04-18 PROCEDURE — 80048 BASIC METABOLIC PNL TOTAL CA: CPT

## 2018-04-18 PROCEDURE — 94729 DIFFUSING CAPACITY: CPT

## 2018-04-18 PROCEDURE — 94726 PLETHYSMOGRAPHY LUNG VOLUMES: CPT

## 2018-04-18 PROCEDURE — 97530 THERAPEUTIC ACTIVITIES: CPT

## 2018-04-18 PROCEDURE — 6370000000 HC RX 637 (ALT 250 FOR IP): Performed by: FAMILY MEDICINE

## 2018-04-18 PROCEDURE — 6360000002 HC RX W HCPCS: Performed by: INTERNAL MEDICINE

## 2018-04-18 PROCEDURE — 0220000000 HC SKILLED NURSING FACILITY

## 2018-04-18 PROCEDURE — 97535 SELF CARE MNGMENT TRAINING: CPT

## 2018-04-18 PROCEDURE — 94010 BREATHING CAPACITY TEST: CPT

## 2018-04-18 PROCEDURE — 97110 THERAPEUTIC EXERCISES: CPT

## 2018-04-18 PROCEDURE — 99232 SBSQ HOSP IP/OBS MODERATE 35: CPT | Performed by: NURSE PRACTITIONER

## 2018-04-18 PROCEDURE — 2700000000 HC OXYGEN THERAPY PER DAY

## 2018-04-18 RX ADMIN — VITAMIN D, TAB 1000IU (100/BT) 1000 UNITS: 25 TAB at 11:43

## 2018-04-18 RX ADMIN — ACETYLCYSTEINE 1000 MG: 500 TABLET, EFFERVESCENT ORAL at 19:16

## 2018-04-18 RX ADMIN — APIXABAN 2.5 MG: 2.5 TABLET, FILM COATED ORAL at 22:14

## 2018-04-18 RX ADMIN — IPRATROPIUM BROMIDE 0.5 MG: 0.5 SOLUTION RESPIRATORY (INHALATION) at 16:03

## 2018-04-18 RX ADMIN — APIXABAN 2.5 MG: 2.5 TABLET, FILM COATED ORAL at 11:44

## 2018-04-18 RX ADMIN — ACETYLCYSTEINE 1000 MG: 500 TABLET, EFFERVESCENT ORAL at 06:32

## 2018-04-18 RX ADMIN — GUAIFENESIN 600 MG: 600 TABLET, EXTENDED RELEASE ORAL at 22:14

## 2018-04-18 RX ADMIN — IPRATROPIUM BROMIDE 0.5 MG: 0.5 SOLUTION RESPIRATORY (INHALATION) at 12:16

## 2018-04-18 RX ADMIN — FAMOTIDINE 20 MG: 20 TABLET ORAL at 11:44

## 2018-04-18 RX ADMIN — ATORVASTATIN CALCIUM 80 MG: 80 TABLET, FILM COATED ORAL at 22:14

## 2018-04-18 RX ADMIN — POTASSIUM CHLORIDE 10 MEQ: 20 TABLET, EXTENDED RELEASE ORAL at 11:43

## 2018-04-18 RX ADMIN — SALINE NASAL SPRAY 1 SPRAY: 1.5 SOLUTION NASAL at 22:15

## 2018-04-18 RX ADMIN — IPRATROPIUM BROMIDE 0.5 MG: 0.5 SOLUTION RESPIRATORY (INHALATION) at 20:09

## 2018-04-18 RX ADMIN — DOCUSATE SODIUM 100 MG: 100 CAPSULE, LIQUID FILLED ORAL at 11:43

## 2018-04-18 RX ADMIN — METOPROLOL TARTRATE 50 MG: 50 TABLET, FILM COATED ORAL at 22:14

## 2018-04-18 RX ADMIN — IPRATROPIUM BROMIDE 0.5 MG: 0.5 SOLUTION RESPIRATORY (INHALATION) at 08:11

## 2018-04-18 RX ADMIN — METOPROLOL TARTRATE 50 MG: 50 TABLET, FILM COATED ORAL at 11:43

## 2018-04-18 RX ADMIN — GUAIFENESIN 600 MG: 600 TABLET, EXTENDED RELEASE ORAL at 11:43

## 2018-04-18 RX ADMIN — BUMETANIDE 2 MG: 1 TABLET ORAL at 11:44

## 2018-04-18 RX ADMIN — DIGOXIN 125 MCG: 0.12 TABLET ORAL at 11:44

## 2018-04-18 RX ADMIN — LINAGLIPTIN 5 MG: 5 TABLET, FILM COATED ORAL at 11:44

## 2018-04-18 ASSESSMENT — PAIN SCALES - GENERAL
PAINLEVEL_OUTOF10: 0
PAINLEVEL_OUTOF10: 0

## 2018-04-19 LAB
ANION GAP SERPL CALCULATED.3IONS-SCNC: 14 MEQ/L (ref 8–16)
BUN BLDV-MCNC: 32 MG/DL (ref 7–22)
CALCIUM SERPL-MCNC: 8.8 MG/DL (ref 8.5–10.5)
CHLORIDE BLD-SCNC: 98 MEQ/L (ref 98–111)
CO2: 27 MEQ/L (ref 23–33)
CREAT SERPL-MCNC: 2.1 MG/DL (ref 0.4–1.2)
GFR SERPL CREATININE-BSD FRML MDRD: 31 ML/MIN/1.73M2
GLUCOSE BLD-MCNC: 142 MG/DL (ref 70–108)
GLUCOSE BLD-MCNC: 156 MG/DL (ref 70–108)
POTASSIUM SERPL-SCNC: 4 MEQ/L (ref 3.5–5.2)
SODIUM BLD-SCNC: 139 MEQ/L (ref 135–145)

## 2018-04-19 PROCEDURE — 6360000002 HC RX W HCPCS: Performed by: INTERNAL MEDICINE

## 2018-04-19 PROCEDURE — 97530 THERAPEUTIC ACTIVITIES: CPT

## 2018-04-19 PROCEDURE — 80048 BASIC METABOLIC PNL TOTAL CA: CPT

## 2018-04-19 PROCEDURE — 6370000000 HC RX 637 (ALT 250 FOR IP): Performed by: INTERNAL MEDICINE

## 2018-04-19 PROCEDURE — 97110 THERAPEUTIC EXERCISES: CPT

## 2018-04-19 PROCEDURE — 82948 REAGENT STRIP/BLOOD GLUCOSE: CPT

## 2018-04-19 PROCEDURE — 97535 SELF CARE MNGMENT TRAINING: CPT

## 2018-04-19 PROCEDURE — 1290000000 HC SEMI PRIVATE OTHER R&B

## 2018-04-19 PROCEDURE — 97116 GAIT TRAINING THERAPY: CPT

## 2018-04-19 PROCEDURE — 93005 ELECTROCARDIOGRAM TRACING: CPT | Performed by: FAMILY MEDICINE

## 2018-04-19 PROCEDURE — 99231 SBSQ HOSP IP/OBS SF/LOW 25: CPT | Performed by: INTERNAL MEDICINE

## 2018-04-19 PROCEDURE — 2700000000 HC OXYGEN THERAPY PER DAY

## 2018-04-19 PROCEDURE — 36415 COLL VENOUS BLD VENIPUNCTURE: CPT

## 2018-04-19 PROCEDURE — 2500000003 HC RX 250 WO HCPCS: Performed by: INTERNAL MEDICINE

## 2018-04-19 PROCEDURE — 6370000000 HC RX 637 (ALT 250 FOR IP): Performed by: FAMILY MEDICINE

## 2018-04-19 PROCEDURE — 84484 ASSAY OF TROPONIN QUANT: CPT

## 2018-04-19 PROCEDURE — 94640 AIRWAY INHALATION TREATMENT: CPT

## 2018-04-19 RX ADMIN — IPRATROPIUM BROMIDE 0.5 MG: 0.5 SOLUTION RESPIRATORY (INHALATION) at 11:45

## 2018-04-19 RX ADMIN — METOPROLOL TARTRATE 50 MG: 50 TABLET, FILM COATED ORAL at 09:17

## 2018-04-19 RX ADMIN — GUAIFENESIN 600 MG: 600 TABLET, EXTENDED RELEASE ORAL at 09:17

## 2018-04-19 RX ADMIN — APIXABAN 2.5 MG: 2.5 TABLET, FILM COATED ORAL at 09:17

## 2018-04-19 RX ADMIN — DIGOXIN 125 MCG: 0.12 TABLET ORAL at 09:17

## 2018-04-19 RX ADMIN — ACETYLCYSTEINE 1000 MG: 500 TABLET, EFFERVESCENT ORAL at 06:04

## 2018-04-19 RX ADMIN — ATORVASTATIN CALCIUM 80 MG: 80 TABLET, FILM COATED ORAL at 21:53

## 2018-04-19 RX ADMIN — LINAGLIPTIN 5 MG: 5 TABLET, FILM COATED ORAL at 09:17

## 2018-04-19 RX ADMIN — SALINE NASAL SPRAY 1 SPRAY: 1.5 SOLUTION NASAL at 06:11

## 2018-04-19 RX ADMIN — IPRATROPIUM BROMIDE 0.5 MG: 0.5 SOLUTION RESPIRATORY (INHALATION) at 15:25

## 2018-04-19 RX ADMIN — FAMOTIDINE 20 MG: 20 TABLET ORAL at 09:17

## 2018-04-19 RX ADMIN — GUAIFENESIN 600 MG: 600 TABLET, EXTENDED RELEASE ORAL at 21:53

## 2018-04-19 RX ADMIN — ACETAMINOPHEN 650 MG: 325 TABLET ORAL at 13:42

## 2018-04-19 RX ADMIN — DOCUSATE SODIUM 100 MG: 100 CAPSULE, LIQUID FILLED ORAL at 09:17

## 2018-04-19 RX ADMIN — SALINE NASAL SPRAY 1 SPRAY: 1.5 SOLUTION NASAL at 00:39

## 2018-04-19 RX ADMIN — METOPROLOL TARTRATE 50 MG: 50 TABLET, FILM COATED ORAL at 21:52

## 2018-04-19 RX ADMIN — APIXABAN 2.5 MG: 2.5 TABLET, FILM COATED ORAL at 21:52

## 2018-04-19 RX ADMIN — IPRATROPIUM BROMIDE 0.5 MG: 0.5 SOLUTION RESPIRATORY (INHALATION) at 07:48

## 2018-04-19 RX ADMIN — Medication 5 UNITS: at 09:17

## 2018-04-19 RX ADMIN — ACETYLCYSTEINE 1000 MG: 500 TABLET, EFFERVESCENT ORAL at 17:47

## 2018-04-19 RX ADMIN — VITAMIN D, TAB 1000IU (100/BT) 1000 UNITS: 25 TAB at 09:17

## 2018-04-19 RX ADMIN — IPRATROPIUM BROMIDE 0.5 MG: 0.5 SOLUTION RESPIRATORY (INHALATION) at 20:44

## 2018-04-19 ASSESSMENT — PAIN DESCRIPTION - LOCATION: LOCATION: CHEST

## 2018-04-19 ASSESSMENT — PAIN SCALES - GENERAL
PAINLEVEL_OUTOF10: 5
PAINLEVEL_OUTOF10: 0
PAINLEVEL_OUTOF10: 4
PAINLEVEL_OUTOF10: 0

## 2018-04-19 ASSESSMENT — PAIN DESCRIPTION - PAIN TYPE: TYPE: ACUTE PAIN

## 2018-04-19 ASSESSMENT — PAIN DESCRIPTION - ORIENTATION: ORIENTATION: RIGHT;UPPER

## 2018-04-20 LAB
EKG ATRIAL RATE: 82 BPM
EKG Q-T INTERVAL: 444 MS
EKG QRS DURATION: 174 MS
EKG QTC CALCULATION (BAZETT): 512 MS
EKG R AXIS: -53 DEGREES
EKG T AXIS: 106 DEGREES
EKG VENTRICULAR RATE: 80 BPM
GLUCOSE BLD-MCNC: 148 MG/DL (ref 70–108)
TROPONIN T: 0.04 NG/ML
TROPONIN T: 0.04 NG/ML

## 2018-04-20 PROCEDURE — 6370000000 HC RX 637 (ALT 250 FOR IP): Performed by: FAMILY MEDICINE

## 2018-04-20 PROCEDURE — 36415 COLL VENOUS BLD VENIPUNCTURE: CPT

## 2018-04-20 PROCEDURE — 6370000000 HC RX 637 (ALT 250 FOR IP): Performed by: INTERNAL MEDICINE

## 2018-04-20 PROCEDURE — 82948 REAGENT STRIP/BLOOD GLUCOSE: CPT

## 2018-04-20 PROCEDURE — 97110 THERAPEUTIC EXERCISES: CPT

## 2018-04-20 PROCEDURE — 2700000000 HC OXYGEN THERAPY PER DAY

## 2018-04-20 PROCEDURE — 1290000000 HC SEMI PRIVATE OTHER R&B

## 2018-04-20 PROCEDURE — 6360000002 HC RX W HCPCS: Performed by: INTERNAL MEDICINE

## 2018-04-20 PROCEDURE — 99232 SBSQ HOSP IP/OBS MODERATE 35: CPT | Performed by: NURSE PRACTITIONER

## 2018-04-20 PROCEDURE — 94640 AIRWAY INHALATION TREATMENT: CPT

## 2018-04-20 PROCEDURE — 97530 THERAPEUTIC ACTIVITIES: CPT

## 2018-04-20 PROCEDURE — 97116 GAIT TRAINING THERAPY: CPT

## 2018-04-20 PROCEDURE — 84484 ASSAY OF TROPONIN QUANT: CPT

## 2018-04-20 RX ADMIN — VITAMIN D, TAB 1000IU (100/BT) 1000 UNITS: 25 TAB at 10:24

## 2018-04-20 RX ADMIN — APIXABAN 2.5 MG: 2.5 TABLET, FILM COATED ORAL at 10:24

## 2018-04-20 RX ADMIN — IPRATROPIUM BROMIDE 0.5 MG: 0.5 SOLUTION RESPIRATORY (INHALATION) at 07:52

## 2018-04-20 RX ADMIN — DIGOXIN 125 MCG: 0.12 TABLET ORAL at 10:24

## 2018-04-20 RX ADMIN — GUAIFENESIN 600 MG: 600 TABLET, EXTENDED RELEASE ORAL at 10:23

## 2018-04-20 RX ADMIN — METOPROLOL TARTRATE 50 MG: 50 TABLET, FILM COATED ORAL at 10:24

## 2018-04-20 RX ADMIN — GUAIFENESIN 600 MG: 600 TABLET, EXTENDED RELEASE ORAL at 21:41

## 2018-04-20 RX ADMIN — IPRATROPIUM BROMIDE 0.5 MG: 0.5 SOLUTION RESPIRATORY (INHALATION) at 20:08

## 2018-04-20 RX ADMIN — IPRATROPIUM BROMIDE 0.5 MG: 0.5 SOLUTION RESPIRATORY (INHALATION) at 12:08

## 2018-04-20 RX ADMIN — IPRATROPIUM BROMIDE 0.5 MG: 0.5 SOLUTION RESPIRATORY (INHALATION) at 15:48

## 2018-04-20 RX ADMIN — FAMOTIDINE 20 MG: 20 TABLET ORAL at 10:24

## 2018-04-20 RX ADMIN — LINAGLIPTIN 5 MG: 5 TABLET, FILM COATED ORAL at 10:24

## 2018-04-20 RX ADMIN — APIXABAN 2.5 MG: 2.5 TABLET, FILM COATED ORAL at 21:41

## 2018-04-20 RX ADMIN — ACETYLCYSTEINE 1000 MG: 500 TABLET, EFFERVESCENT ORAL at 05:59

## 2018-04-20 RX ADMIN — METOPROLOL TARTRATE 50 MG: 50 TABLET, FILM COATED ORAL at 21:41

## 2018-04-20 RX ADMIN — ATORVASTATIN CALCIUM 80 MG: 80 TABLET, FILM COATED ORAL at 21:41

## 2018-04-20 ASSESSMENT — PAIN SCALES - GENERAL
PAINLEVEL_OUTOF10: 0
PAINLEVEL_OUTOF10: 0

## 2018-04-21 LAB
ANION GAP SERPL CALCULATED.3IONS-SCNC: 11 MEQ/L (ref 8–16)
BUN BLDV-MCNC: 25 MG/DL (ref 7–22)
CALCIUM SERPL-MCNC: 8.6 MG/DL (ref 8.5–10.5)
CHLORIDE BLD-SCNC: 99 MEQ/L (ref 98–111)
CO2: 29 MEQ/L (ref 23–33)
CREAT SERPL-MCNC: 2 MG/DL (ref 0.4–1.2)
GFR SERPL CREATININE-BSD FRML MDRD: 32 ML/MIN/1.73M2
GLUCOSE BLD-MCNC: 120 MG/DL (ref 70–108)
GLUCOSE BLD-MCNC: 146 MG/DL (ref 70–108)
POTASSIUM SERPL-SCNC: 4.3 MEQ/L (ref 3.5–5.2)
SODIUM BLD-SCNC: 139 MEQ/L (ref 135–145)

## 2018-04-21 PROCEDURE — 36415 COLL VENOUS BLD VENIPUNCTURE: CPT

## 2018-04-21 PROCEDURE — 1290000000 HC SEMI PRIVATE OTHER R&B

## 2018-04-21 PROCEDURE — 82948 REAGENT STRIP/BLOOD GLUCOSE: CPT

## 2018-04-21 PROCEDURE — 6370000000 HC RX 637 (ALT 250 FOR IP): Performed by: INTERNAL MEDICINE

## 2018-04-21 PROCEDURE — 6360000002 HC RX W HCPCS: Performed by: INTERNAL MEDICINE

## 2018-04-21 PROCEDURE — 94640 AIRWAY INHALATION TREATMENT: CPT

## 2018-04-21 PROCEDURE — 80048 BASIC METABOLIC PNL TOTAL CA: CPT

## 2018-04-21 PROCEDURE — 6370000000 HC RX 637 (ALT 250 FOR IP): Performed by: FAMILY MEDICINE

## 2018-04-21 RX ADMIN — GUAIFENESIN 600 MG: 600 TABLET, EXTENDED RELEASE ORAL at 20:20

## 2018-04-21 RX ADMIN — APIXABAN 2.5 MG: 2.5 TABLET, FILM COATED ORAL at 20:20

## 2018-04-21 RX ADMIN — GUAIFENESIN 600 MG: 600 TABLET, EXTENDED RELEASE ORAL at 08:16

## 2018-04-21 RX ADMIN — VITAMIN D, TAB 1000IU (100/BT) 1000 UNITS: 25 TAB at 08:16

## 2018-04-21 RX ADMIN — APIXABAN 2.5 MG: 2.5 TABLET, FILM COATED ORAL at 08:17

## 2018-04-21 RX ADMIN — FAMOTIDINE 20 MG: 20 TABLET ORAL at 08:16

## 2018-04-21 RX ADMIN — IPRATROPIUM BROMIDE 0.5 MG: 0.5 SOLUTION RESPIRATORY (INHALATION) at 12:12

## 2018-04-21 RX ADMIN — METOPROLOL TARTRATE 50 MG: 50 TABLET, FILM COATED ORAL at 20:20

## 2018-04-21 RX ADMIN — IPRATROPIUM BROMIDE 0.5 MG: 0.5 SOLUTION RESPIRATORY (INHALATION) at 15:46

## 2018-04-21 RX ADMIN — LINAGLIPTIN 5 MG: 5 TABLET, FILM COATED ORAL at 08:16

## 2018-04-21 RX ADMIN — METOPROLOL TARTRATE 50 MG: 50 TABLET, FILM COATED ORAL at 08:17

## 2018-04-21 RX ADMIN — IPRATROPIUM BROMIDE 0.5 MG: 0.5 SOLUTION RESPIRATORY (INHALATION) at 07:25

## 2018-04-21 RX ADMIN — IPRATROPIUM BROMIDE 0.5 MG: 0.5 SOLUTION RESPIRATORY (INHALATION) at 19:40

## 2018-04-21 RX ADMIN — DIGOXIN 125 MCG: 0.12 TABLET ORAL at 08:16

## 2018-04-21 RX ADMIN — ATORVASTATIN CALCIUM 80 MG: 80 TABLET, FILM COATED ORAL at 20:20

## 2018-04-21 ASSESSMENT — PAIN SCALES - GENERAL
PAINLEVEL_OUTOF10: 0
PAINLEVEL_OUTOF10: 0

## 2018-04-22 LAB
ANISOCYTOSIS: ABNORMAL
BASOPHILS # BLD: 0.5 %
BASOPHILS ABSOLUTE: 0.1 THOU/MM3 (ref 0–0.1)
EOSINOPHIL # BLD: 3.8 %
EOSINOPHILS ABSOLUTE: 0.5 THOU/MM3 (ref 0–0.4)
GLUCOSE BLD-MCNC: 156 MG/DL (ref 70–108)
HCT VFR BLD CALC: 27.3 % (ref 42–52)
HEMOGLOBIN: 8.9 GM/DL (ref 14–18)
LYMPHOCYTES # BLD: 6.5 %
LYMPHOCYTES ABSOLUTE: 0.9 THOU/MM3 (ref 1–4.8)
MCH RBC QN AUTO: 30.2 PG (ref 27–31)
MCHC RBC AUTO-ENTMCNC: 32.8 GM/DL (ref 33–37)
MCV RBC AUTO: 92.1 FL (ref 80–94)
MONOCYTES # BLD: 10.1 %
MONOCYTES ABSOLUTE: 1.5 THOU/MM3 (ref 0.4–1.3)
NUCLEATED RED BLOOD CELLS: 0 /100 WBC
PDW BLD-RTO: 17.3 % (ref 11.5–14.5)
PLATELET # BLD: 259 THOU/MM3 (ref 130–400)
PMV BLD AUTO: 8.7 FL (ref 7.4–10.4)
RBC # BLD: 2.96 MILL/MM3 (ref 4.7–6.1)
SEG NEUTROPHILS: 79.1 %
SEGMENTED NEUTROPHILS ABSOLUTE COUNT: 11.4 THOU/MM3 (ref 1.8–7.7)
WBC # BLD: 14.4 THOU/MM3 (ref 4.8–10.8)

## 2018-04-22 PROCEDURE — 36415 COLL VENOUS BLD VENIPUNCTURE: CPT

## 2018-04-22 PROCEDURE — 1290000000 HC SEMI PRIVATE OTHER R&B

## 2018-04-22 PROCEDURE — 94640 AIRWAY INHALATION TREATMENT: CPT

## 2018-04-22 PROCEDURE — 6360000002 HC RX W HCPCS: Performed by: INTERNAL MEDICINE

## 2018-04-22 PROCEDURE — 97530 THERAPEUTIC ACTIVITIES: CPT

## 2018-04-22 PROCEDURE — 85025 COMPLETE CBC W/AUTO DIFF WBC: CPT

## 2018-04-22 PROCEDURE — 6370000000 HC RX 637 (ALT 250 FOR IP): Performed by: FAMILY MEDICINE

## 2018-04-22 PROCEDURE — 97110 THERAPEUTIC EXERCISES: CPT

## 2018-04-22 PROCEDURE — 6370000000 HC RX 637 (ALT 250 FOR IP): Performed by: INTERNAL MEDICINE

## 2018-04-22 PROCEDURE — 82948 REAGENT STRIP/BLOOD GLUCOSE: CPT

## 2018-04-22 PROCEDURE — 97116 GAIT TRAINING THERAPY: CPT

## 2018-04-22 RX ADMIN — IPRATROPIUM BROMIDE 0.5 MG: 0.5 SOLUTION RESPIRATORY (INHALATION) at 07:34

## 2018-04-22 RX ADMIN — APIXABAN 2.5 MG: 2.5 TABLET, FILM COATED ORAL at 20:12

## 2018-04-22 RX ADMIN — METOPROLOL TARTRATE 50 MG: 50 TABLET, FILM COATED ORAL at 20:12

## 2018-04-22 RX ADMIN — APIXABAN 2.5 MG: 2.5 TABLET, FILM COATED ORAL at 09:37

## 2018-04-22 RX ADMIN — GUAIFENESIN 600 MG: 600 TABLET, EXTENDED RELEASE ORAL at 20:12

## 2018-04-22 RX ADMIN — IPRATROPIUM BROMIDE 0.5 MG: 0.5 SOLUTION RESPIRATORY (INHALATION) at 15:14

## 2018-04-22 RX ADMIN — LINAGLIPTIN 5 MG: 5 TABLET, FILM COATED ORAL at 09:37

## 2018-04-22 RX ADMIN — IPRATROPIUM BROMIDE 0.5 MG: 0.5 SOLUTION RESPIRATORY (INHALATION) at 19:10

## 2018-04-22 RX ADMIN — METOPROLOL TARTRATE 50 MG: 50 TABLET, FILM COATED ORAL at 09:37

## 2018-04-22 RX ADMIN — IPRATROPIUM BROMIDE 0.5 MG: 0.5 SOLUTION RESPIRATORY (INHALATION) at 11:30

## 2018-04-22 RX ADMIN — GUAIFENESIN 600 MG: 600 TABLET, EXTENDED RELEASE ORAL at 09:37

## 2018-04-22 RX ADMIN — ATORVASTATIN CALCIUM 80 MG: 80 TABLET, FILM COATED ORAL at 20:12

## 2018-04-22 RX ADMIN — VITAMIN D, TAB 1000IU (100/BT) 1000 UNITS: 25 TAB at 09:37

## 2018-04-22 RX ADMIN — DIGOXIN 125 MCG: 0.12 TABLET ORAL at 09:37

## 2018-04-22 RX ADMIN — FAMOTIDINE 20 MG: 20 TABLET ORAL at 15:48

## 2018-04-22 ASSESSMENT — PAIN SCALES - GENERAL
PAINLEVEL_OUTOF10: 0

## 2018-04-23 LAB
D-DIMER QUANTITATIVE: 1248 NG/ML FEU (ref 0–500)
GLUCOSE BLD-MCNC: 128 MG/DL (ref 70–108)

## 2018-04-23 PROCEDURE — 82948 REAGENT STRIP/BLOOD GLUCOSE: CPT

## 2018-04-23 PROCEDURE — 97530 THERAPEUTIC ACTIVITIES: CPT

## 2018-04-23 PROCEDURE — 6370000000 HC RX 637 (ALT 250 FOR IP): Performed by: NURSE PRACTITIONER

## 2018-04-23 PROCEDURE — 6360000002 HC RX W HCPCS: Performed by: INTERNAL MEDICINE

## 2018-04-23 PROCEDURE — 94640 AIRWAY INHALATION TREATMENT: CPT

## 2018-04-23 PROCEDURE — 97116 GAIT TRAINING THERAPY: CPT

## 2018-04-23 PROCEDURE — 6370000000 HC RX 637 (ALT 250 FOR IP): Performed by: FAMILY MEDICINE

## 2018-04-23 PROCEDURE — 6370000000 HC RX 637 (ALT 250 FOR IP): Performed by: INTERNAL MEDICINE

## 2018-04-23 PROCEDURE — 97110 THERAPEUTIC EXERCISES: CPT

## 2018-04-23 PROCEDURE — 99232 SBSQ HOSP IP/OBS MODERATE 35: CPT | Performed by: INTERNAL MEDICINE

## 2018-04-23 PROCEDURE — 1290000000 HC SEMI PRIVATE OTHER R&B

## 2018-04-23 PROCEDURE — 36415 COLL VENOUS BLD VENIPUNCTURE: CPT

## 2018-04-23 PROCEDURE — 85379 FIBRIN DEGRADATION QUANT: CPT

## 2018-04-23 RX ORDER — ACETYLCYSTEINE 200 MG/ML
1200 SOLUTION ORAL; RESPIRATORY (INHALATION) 2 TIMES DAILY
Status: DISCONTINUED | OUTPATIENT
Start: 2018-04-23 | End: 2018-04-23 | Stop reason: CLARIF

## 2018-04-23 RX ORDER — SODIUM CHLORIDE 9 MG/ML
INJECTION, SOLUTION INTRAVENOUS CONTINUOUS
Status: ACTIVE | OUTPATIENT
Start: 2018-04-24 | End: 2018-04-25

## 2018-04-23 RX ADMIN — GUAIFENESIN 600 MG: 600 TABLET, EXTENDED RELEASE ORAL at 09:55

## 2018-04-23 RX ADMIN — ACETYLCYSTEINE 1000 MG: 500 TABLET, EFFERVESCENT ORAL at 23:47

## 2018-04-23 RX ADMIN — IPRATROPIUM BROMIDE 0.5 MG: 0.5 SOLUTION RESPIRATORY (INHALATION) at 20:02

## 2018-04-23 RX ADMIN — METOPROLOL TARTRATE 50 MG: 50 TABLET, FILM COATED ORAL at 23:46

## 2018-04-23 RX ADMIN — ATORVASTATIN CALCIUM 80 MG: 80 TABLET, FILM COATED ORAL at 23:46

## 2018-04-23 RX ADMIN — APIXABAN 2.5 MG: 2.5 TABLET, FILM COATED ORAL at 09:55

## 2018-04-23 RX ADMIN — METOPROLOL TARTRATE 50 MG: 50 TABLET, FILM COATED ORAL at 09:55

## 2018-04-23 RX ADMIN — ACETYLCYSTEINE 1000 MG: 500 TABLET, EFFERVESCENT ORAL at 14:45

## 2018-04-23 RX ADMIN — FAMOTIDINE 20 MG: 20 TABLET ORAL at 09:55

## 2018-04-23 RX ADMIN — LINAGLIPTIN 5 MG: 5 TABLET, FILM COATED ORAL at 09:55

## 2018-04-23 RX ADMIN — DOCUSATE SODIUM 100 MG: 100 CAPSULE, LIQUID FILLED ORAL at 09:55

## 2018-04-23 RX ADMIN — IPRATROPIUM BROMIDE 0.5 MG: 0.5 SOLUTION RESPIRATORY (INHALATION) at 15:35

## 2018-04-23 RX ADMIN — VITAMIN D, TAB 1000IU (100/BT) 1000 UNITS: 25 TAB at 09:55

## 2018-04-23 RX ADMIN — IPRATROPIUM BROMIDE 0.5 MG: 0.5 SOLUTION RESPIRATORY (INHALATION) at 07:45

## 2018-04-23 RX ADMIN — IPRATROPIUM BROMIDE 0.5 MG: 0.5 SOLUTION RESPIRATORY (INHALATION) at 11:40

## 2018-04-23 RX ADMIN — APIXABAN 2.5 MG: 2.5 TABLET, FILM COATED ORAL at 23:46

## 2018-04-23 RX ADMIN — DIGOXIN 125 MCG: 0.12 TABLET ORAL at 09:55

## 2018-04-23 ASSESSMENT — PAIN SCALES - GENERAL
PAINLEVEL_OUTOF10: 0
PAINLEVEL_OUTOF10: 0

## 2018-04-24 LAB — GLUCOSE BLD-MCNC: 141 MG/DL (ref 70–108)

## 2018-04-24 PROCEDURE — 97530 THERAPEUTIC ACTIVITIES: CPT

## 2018-04-24 PROCEDURE — 97110 THERAPEUTIC EXERCISES: CPT

## 2018-04-24 PROCEDURE — 6370000000 HC RX 637 (ALT 250 FOR IP): Performed by: FAMILY MEDICINE

## 2018-04-24 PROCEDURE — 94640 AIRWAY INHALATION TREATMENT: CPT

## 2018-04-24 PROCEDURE — 6370000000 HC RX 637 (ALT 250 FOR IP): Performed by: NURSE PRACTITIONER

## 2018-04-24 PROCEDURE — 1290000000 HC SEMI PRIVATE OTHER R&B

## 2018-04-24 PROCEDURE — 6370000000 HC RX 637 (ALT 250 FOR IP): Performed by: INTERNAL MEDICINE

## 2018-04-24 PROCEDURE — 97116 GAIT TRAINING THERAPY: CPT

## 2018-04-24 PROCEDURE — 82948 REAGENT STRIP/BLOOD GLUCOSE: CPT

## 2018-04-24 PROCEDURE — 99232 SBSQ HOSP IP/OBS MODERATE 35: CPT | Performed by: NURSE PRACTITIONER

## 2018-04-24 PROCEDURE — 2580000003 HC RX 258: Performed by: NURSE PRACTITIONER

## 2018-04-24 PROCEDURE — 6360000002 HC RX W HCPCS: Performed by: INTERNAL MEDICINE

## 2018-04-24 RX ORDER — DIGOXIN 125 MCG
125 TABLET ORAL DAILY
Qty: 30 TABLET | Refills: 0 | Status: SHIPPED | OUTPATIENT
Start: 2018-04-25 | End: 2019-10-23 | Stop reason: ALTCHOICE

## 2018-04-24 RX ORDER — ACETAMINOPHEN 325 MG/1
650 TABLET ORAL EVERY 4 HOURS PRN
COMMUNITY
Start: 2018-04-24 | End: 2019-10-23 | Stop reason: ALTCHOICE

## 2018-04-24 RX ADMIN — VITAMIN D, TAB 1000IU (100/BT) 1000 UNITS: 25 TAB at 08:02

## 2018-04-24 RX ADMIN — APIXABAN 2.5 MG: 2.5 TABLET, FILM COATED ORAL at 08:02

## 2018-04-24 RX ADMIN — SODIUM CHLORIDE: 9 INJECTION, SOLUTION INTRAVENOUS at 19:17

## 2018-04-24 RX ADMIN — ATORVASTATIN CALCIUM 80 MG: 80 TABLET, FILM COATED ORAL at 19:17

## 2018-04-24 RX ADMIN — DOCUSATE SODIUM 100 MG: 100 CAPSULE, LIQUID FILLED ORAL at 08:02

## 2018-04-24 RX ADMIN — METOPROLOL TARTRATE 50 MG: 50 TABLET, FILM COATED ORAL at 08:01

## 2018-04-24 RX ADMIN — DIGOXIN 125 MCG: 0.12 TABLET ORAL at 08:02

## 2018-04-24 RX ADMIN — APIXABAN 2.5 MG: 2.5 TABLET, FILM COATED ORAL at 19:17

## 2018-04-24 RX ADMIN — ACETYLCYSTEINE 1000 MG: 500 TABLET, EFFERVESCENT ORAL at 19:17

## 2018-04-24 RX ADMIN — LINAGLIPTIN 5 MG: 5 TABLET, FILM COATED ORAL at 08:02

## 2018-04-24 RX ADMIN — ACETYLCYSTEINE 1000 MG: 500 TABLET, EFFERVESCENT ORAL at 08:02

## 2018-04-24 RX ADMIN — IPRATROPIUM BROMIDE 0.5 MG: 0.5 SOLUTION RESPIRATORY (INHALATION) at 08:50

## 2018-04-24 RX ADMIN — FAMOTIDINE 20 MG: 20 TABLET ORAL at 08:02

## 2018-04-24 RX ADMIN — METOPROLOL TARTRATE 50 MG: 50 TABLET, FILM COATED ORAL at 19:17

## 2018-04-24 ASSESSMENT — PAIN SCALES - GENERAL
PAINLEVEL_OUTOF10: 0
PAINLEVEL_OUTOF10: 0

## 2018-04-25 VITALS
OXYGEN SATURATION: 97 % | BODY MASS INDEX: 28.5 KG/M2 | HEIGHT: 67 IN | DIASTOLIC BLOOD PRESSURE: 63 MMHG | TEMPERATURE: 97.6 F | HEART RATE: 80 BPM | WEIGHT: 181.6 LBS | RESPIRATION RATE: 18 BRPM | SYSTOLIC BLOOD PRESSURE: 138 MMHG

## 2018-04-25 LAB
ANION GAP SERPL CALCULATED.3IONS-SCNC: 9 MEQ/L (ref 8–16)
BUN BLDV-MCNC: 21 MG/DL (ref 7–22)
CALCIUM SERPL-MCNC: 8.5 MG/DL (ref 8.5–10.5)
CHLORIDE BLD-SCNC: 103 MEQ/L (ref 98–111)
CO2: 28 MEQ/L (ref 23–33)
CREAT SERPL-MCNC: 2 MG/DL (ref 0.4–1.2)
GFR SERPL CREATININE-BSD FRML MDRD: 32 ML/MIN/1.73M2
GLUCOSE BLD-MCNC: 122 MG/DL (ref 70–108)
GLUCOSE BLD-MCNC: 129 MG/DL (ref 70–108)
POTASSIUM SERPL-SCNC: 4.7 MEQ/L (ref 3.5–5.2)
SODIUM BLD-SCNC: 140 MEQ/L (ref 135–145)

## 2018-04-25 PROCEDURE — 97110 THERAPEUTIC EXERCISES: CPT

## 2018-04-25 PROCEDURE — 6360000004 HC RX CONTRAST MEDICATION: Performed by: NURSE PRACTITIONER

## 2018-04-25 PROCEDURE — 97530 THERAPEUTIC ACTIVITIES: CPT

## 2018-04-25 PROCEDURE — 6370000000 HC RX 637 (ALT 250 FOR IP): Performed by: INTERNAL MEDICINE

## 2018-04-25 PROCEDURE — 6370000000 HC RX 637 (ALT 250 FOR IP): Performed by: FAMILY MEDICINE

## 2018-04-25 PROCEDURE — 82948 REAGENT STRIP/BLOOD GLUCOSE: CPT

## 2018-04-25 PROCEDURE — 80048 BASIC METABOLIC PNL TOTAL CA: CPT

## 2018-04-25 PROCEDURE — A6454 SELF-ADHER BAND W>=3" <5"/YD: HCPCS

## 2018-04-25 PROCEDURE — 36415 COLL VENOUS BLD VENIPUNCTURE: CPT

## 2018-04-25 PROCEDURE — 99231 SBSQ HOSP IP/OBS SF/LOW 25: CPT | Performed by: INTERNAL MEDICINE

## 2018-04-25 RX ADMIN — DIGOXIN 125 MCG: 0.12 TABLET ORAL at 10:03

## 2018-04-25 RX ADMIN — APIXABAN 2.5 MG: 2.5 TABLET, FILM COATED ORAL at 10:04

## 2018-04-25 RX ADMIN — LINAGLIPTIN 5 MG: 5 TABLET, FILM COATED ORAL at 10:03

## 2018-04-25 RX ADMIN — IOPAMIDOL 125 ML: 755 INJECTION, SOLUTION INTRAVENOUS at 15:11

## 2018-04-25 RX ADMIN — FAMOTIDINE 20 MG: 20 TABLET ORAL at 10:03

## 2018-04-25 RX ADMIN — METOPROLOL TARTRATE 50 MG: 50 TABLET, FILM COATED ORAL at 10:04

## 2018-04-25 RX ADMIN — VITAMIN D, TAB 1000IU (100/BT) 1000 UNITS: 25 TAB at 10:04

## 2018-04-25 ASSESSMENT — PAIN SCALES - GENERAL: PAINLEVEL_OUTOF10: 0

## 2018-04-30 ENCOUNTER — HOSPITAL ENCOUNTER (OUTPATIENT)
Age: 78
Setting detail: SPECIMEN
Discharge: HOME | End: 2018-04-30
Payer: MEDICARE

## 2018-04-30 LAB
ANION GAP SERPL CALCULATED.3IONS-SCNC: 13 MEQ/L (ref 8–16)
ANISOCYTOSIS: ABNORMAL
BASOPHILS # BLD: 0.7 %
BASOPHILS ABSOLUTE: 0 THOU/MM3 (ref 0–0.1)
BUN BLDV-MCNC: 17 MG/DL (ref 7–22)
CALCIUM SERPL-MCNC: 8.9 MG/DL (ref 8.5–10.5)
CHLORIDE BLD-SCNC: 101 MEQ/L (ref 98–111)
CO2: 25 MEQ/L (ref 23–33)
CREAT SERPL-MCNC: 1.6 MG/DL (ref 0.4–1.2)
EOSINOPHIL # BLD: 3.3 %
EOSINOPHILS ABSOLUTE: 0.2 THOU/MM3 (ref 0–0.4)
GFR SERPL CREATININE-BSD FRML MDRD: 42 ML/MIN/1.73M2
GLUCOSE BLD-MCNC: 225 MG/DL (ref 70–108)
HCT VFR BLD CALC: 33 % (ref 42–52)
HEMOGLOBIN: 10.6 GM/DL (ref 14–18)
LYMPHOCYTES # BLD: 11.3 %
LYMPHOCYTES ABSOLUTE: 0.7 THOU/MM3 (ref 1–4.8)
MCH RBC QN AUTO: 30.2 PG (ref 27–31)
MCHC RBC AUTO-ENTMCNC: 32 GM/DL (ref 33–37)
MCV RBC AUTO: 94.1 FL (ref 80–94)
MONOCYTES # BLD: 12.6 %
MONOCYTES ABSOLUTE: 0.8 THOU/MM3 (ref 0.4–1.3)
NUCLEATED RED BLOOD CELLS: 0 /100 WBC
PDW BLD-RTO: 19.9 % (ref 11.5–14.5)
PLATELET # BLD: 249 THOU/MM3 (ref 130–400)
PMV BLD AUTO: 8.6 FL (ref 7.4–10.4)
POTASSIUM SERPL-SCNC: 4.4 MEQ/L (ref 3.5–5.2)
RBC # BLD: 3.5 MILL/MM3 (ref 4.7–6.1)
SEG NEUTROPHILS: 72.1 %
SEGMENTED NEUTROPHILS ABSOLUTE COUNT: 4.3 THOU/MM3 (ref 1.8–7.7)
SODIUM BLD-SCNC: 139 MEQ/L (ref 135–145)
WBC # BLD: 6 THOU/MM3 (ref 4.8–10.8)

## 2018-04-30 PROCEDURE — 80048 BASIC METABOLIC PNL TOTAL CA: CPT

## 2018-04-30 PROCEDURE — 85025 COMPLETE CBC W/AUTO DIFF WBC: CPT

## 2018-05-02 ENCOUNTER — OFFICE VISIT (OUTPATIENT)
Dept: NEPHROLOGY | Age: 78
End: 2018-05-02
Payer: MEDICARE

## 2018-05-02 VITALS
HEART RATE: 80 BPM | OXYGEN SATURATION: 98 % | RESPIRATION RATE: 18 BRPM | HEIGHT: 68 IN | DIASTOLIC BLOOD PRESSURE: 78 MMHG | BODY MASS INDEX: 27.89 KG/M2 | WEIGHT: 184 LBS | SYSTOLIC BLOOD PRESSURE: 138 MMHG

## 2018-05-02 DIAGNOSIS — N18.30 CKD (CHRONIC KIDNEY DISEASE), STAGE III (HCC): Primary | ICD-10-CM

## 2018-05-02 PROCEDURE — 1123F ACP DISCUSS/DSCN MKR DOCD: CPT | Performed by: INTERNAL MEDICINE

## 2018-05-02 PROCEDURE — G8419 CALC BMI OUT NRM PARAM NOF/U: HCPCS | Performed by: INTERNAL MEDICINE

## 2018-05-02 PROCEDURE — 99213 OFFICE O/P EST LOW 20 MIN: CPT | Performed by: INTERNAL MEDICINE

## 2018-05-02 PROCEDURE — 4040F PNEUMOC VAC/ADMIN/RCVD: CPT | Performed by: INTERNAL MEDICINE

## 2018-05-02 PROCEDURE — G8427 DOCREV CUR MEDS BY ELIG CLIN: HCPCS | Performed by: INTERNAL MEDICINE

## 2018-05-02 PROCEDURE — G8598 ASA/ANTIPLAT THER USED: HCPCS | Performed by: INTERNAL MEDICINE

## 2018-05-02 PROCEDURE — 1036F TOBACCO NON-USER: CPT | Performed by: INTERNAL MEDICINE

## 2018-05-02 PROCEDURE — 1111F DSCHRG MED/CURRENT MED MERGE: CPT | Performed by: INTERNAL MEDICINE

## 2018-05-10 ENCOUNTER — ANESTHESIA (OUTPATIENT)
Dept: CARDIAC CATH/INVASIVE PROCEDURES | Age: 78
End: 2018-05-10

## 2018-05-10 ENCOUNTER — HOSPITAL ENCOUNTER (INPATIENT)
Dept: CARDIAC CATH/INVASIVE PROCEDURES | Age: 78
LOS: 2 days | Discharge: HOME | DRG: 267 | End: 2018-05-12
Attending: INTERNAL MEDICINE | Admitting: INTERNAL MEDICINE
Payer: MEDICARE

## 2018-05-10 ENCOUNTER — ANESTHESIA EVENT (OUTPATIENT)
Dept: CARDIAC CATH/INVASIVE PROCEDURES | Age: 78
End: 2018-05-10

## 2018-05-10 VITALS — OXYGEN SATURATION: 95 %

## 2018-05-10 DIAGNOSIS — I35.0 SEVERE AORTIC STENOSIS: ICD-10-CM

## 2018-05-10 LAB
% CKMB: 14.6 % (ref 0–3.5)
ACTIVATED CLOTTING TIME: 205 SEC (ref 79–149)
ACTIVATED CLOTTING TIME: 261 SEC (ref 79–149)
ACTIVATED CLOTTING TIME: 343 SEC (ref 79–149)
ALBUMIN SERPL-MCNC: 3.3 G/DL (ref 3.5–5.2)
ANION GAP SERPL CALCULATED.3IONS-SCNC: 10 MMOL/L (ref 9–17)
BILIRUB SERPL-MCNC: 0.82 MG/DL (ref 0.3–1.2)
BNP INTERPRETATION: ABNORMAL
BUN BLDV-MCNC: 19 MG/DL (ref 8–23)
BUN/CREAT BLD: ABNORMAL (ref 9–20)
CALCIUM SERPL-MCNC: 8.4 MG/DL (ref 8.6–10.4)
CHLORIDE BLD-SCNC: 107 MMOL/L (ref 98–107)
CK MB: 21 NG/ML
CKMB INTERPRETATION: ABNORMAL
CO2: 25 MMOL/L (ref 20–31)
CREAT SERPL-MCNC: 1.77 MG/DL (ref 0.7–1.2)
EKG ATRIAL RATE: 78 BPM
EKG Q-T INTERVAL: 426 MS
EKG QRS DURATION: 166 MS
EKG QTC CALCULATION (BAZETT): 491 MS
EKG R AXIS: -57 DEGREES
EKG T AXIS: 118 DEGREES
EKG VENTRICULAR RATE: 80 BPM
GFR AFRICAN AMERICAN: 45 ML/MIN
GFR NON-AFRICAN AMERICAN: 37 ML/MIN
GFR SERPL CREATININE-BSD FRML MDRD: ABNORMAL ML/MIN/{1.73_M2}
GFR SERPL CREATININE-BSD FRML MDRD: ABNORMAL ML/MIN/{1.73_M2}
GLUCOSE BLD-MCNC: 109 MG/DL (ref 70–99)
HCT VFR BLD CALC: 35.7 % (ref 40.7–50.3)
HCT VFR BLD CALC: 35.8 % (ref 40.7–50.3)
HEMOGLOBIN: 10.8 G/DL (ref 13–17)
HEMOGLOBIN: 11 G/DL (ref 13–17)
INR BLD: 1.1
MCH RBC QN AUTO: 30 PG (ref 25.2–33.5)
MCHC RBC AUTO-ENTMCNC: 30.3 G/DL (ref 28.4–34.8)
MCV RBC AUTO: 99.2 FL (ref 82.6–102.9)
NRBC AUTOMATED: 0 PER 100 WBC
PARTIAL THROMBOPLASTIN TIME: 28 SEC (ref 20.5–30.5)
PDW BLD-RTO: 18 % (ref 11.8–14.4)
PLATELET # BLD: 172 K/UL (ref 138–453)
PMV BLD AUTO: 10.6 FL (ref 8.1–13.5)
POTASSIUM SERPL-SCNC: 4.3 MMOL/L (ref 3.7–5.3)
PRO-BNP: 4326 PG/ML
PROTHROMBIN TIME: 11.3 SEC (ref 9–12)
RBC # BLD: 3.6 M/UL (ref 4.21–5.77)
SODIUM BLD-SCNC: 142 MMOL/L (ref 135–144)
TOTAL CK: 144 U/L (ref 39–308)
WBC # BLD: 6.7 K/UL (ref 3.5–11.3)

## 2018-05-10 PROCEDURE — 93005 ELECTROCARDIOGRAM TRACING: CPT

## 2018-05-10 PROCEDURE — 02RF3KZ REPLACEMENT OF AORTIC VALVE WITH NONAUTOLOGOUS TISSUE SUBSTITUTE, PERCUTANEOUS APPROACH: ICD-10-PCS | Performed by: THORACIC SURGERY (CARDIOTHORACIC VASCULAR SURGERY)

## 2018-05-10 PROCEDURE — 33361 REPLACE AORTIC VALVE PERQ: CPT | Performed by: THORACIC SURGERY (CARDIOTHORACIC VASCULAR SURGERY)

## 2018-05-10 PROCEDURE — 76937 US GUIDE VASCULAR ACCESS: CPT

## 2018-05-10 PROCEDURE — 2780000006 HC MISC HEART VALVE

## 2018-05-10 PROCEDURE — 33361 REPLACE AORTIC VALVE PERQ: CPT | Performed by: INTERNAL MEDICINE

## 2018-05-10 PROCEDURE — 99223 1ST HOSP IP/OBS HIGH 75: CPT | Performed by: INTERNAL MEDICINE

## 2018-05-10 PROCEDURE — 85347 COAGULATION TIME ACTIVATED: CPT

## 2018-05-10 PROCEDURE — 86901 BLOOD TYPING SEROLOGIC RH(D): CPT

## 2018-05-10 PROCEDURE — 85018 HEMOGLOBIN: CPT

## 2018-05-10 PROCEDURE — 36415 COLL VENOUS BLD VENIPUNCTURE: CPT

## 2018-05-10 PROCEDURE — 82040 ASSAY OF SERUM ALBUMIN: CPT

## 2018-05-10 PROCEDURE — C1760 CLOSURE DEV, VASC: HCPCS

## 2018-05-10 PROCEDURE — C1725 CATH, TRANSLUMIN NON-LASER: HCPCS

## 2018-05-10 PROCEDURE — 85027 COMPLETE CBC AUTOMATED: CPT

## 2018-05-10 PROCEDURE — 6370000000 HC RX 637 (ALT 250 FOR IP): Performed by: INTERNAL MEDICINE

## 2018-05-10 PROCEDURE — 6360000002 HC RX W HCPCS: Performed by: NURSE ANESTHETIST, CERTIFIED REGISTERED

## 2018-05-10 PROCEDURE — 83880 ASSAY OF NATRIURETIC PEPTIDE: CPT

## 2018-05-10 PROCEDURE — 2580000003 HC RX 258: Performed by: NURSE ANESTHETIST, CERTIFIED REGISTERED

## 2018-05-10 PROCEDURE — 86850 RBC ANTIBODY SCREEN: CPT

## 2018-05-10 PROCEDURE — 85014 HEMATOCRIT: CPT

## 2018-05-10 PROCEDURE — 80048 BASIC METABOLIC PNL TOTAL CA: CPT

## 2018-05-10 PROCEDURE — C1769 GUIDE WIRE: HCPCS

## 2018-05-10 PROCEDURE — 85610 PROTHROMBIN TIME: CPT

## 2018-05-10 PROCEDURE — 2500000003 HC RX 250 WO HCPCS: Performed by: NURSE ANESTHETIST, CERTIFIED REGISTERED

## 2018-05-10 PROCEDURE — 3700000000 HC ANESTHESIA ATTENDED CARE

## 2018-05-10 PROCEDURE — 6360000002 HC RX W HCPCS

## 2018-05-10 PROCEDURE — 6360000004 HC RX CONTRAST MEDICATION

## 2018-05-10 PROCEDURE — 2580000003 HC RX 258: Performed by: INTERNAL MEDICINE

## 2018-05-10 PROCEDURE — C1756 CATH, PACING, TRANSESOPH: HCPCS

## 2018-05-10 PROCEDURE — 85730 THROMBOPLASTIN TIME PARTIAL: CPT

## 2018-05-10 PROCEDURE — 3700000001 HC ADD 15 MINUTES (ANESTHESIA)

## 2018-05-10 PROCEDURE — 2720000001 HC MISC SURG SUPPLY STERILE $51-500

## 2018-05-10 PROCEDURE — 86900 BLOOD TYPING SEROLOGIC ABO: CPT

## 2018-05-10 PROCEDURE — 82550 ASSAY OF CK (CPK): CPT

## 2018-05-10 PROCEDURE — 82553 CREATINE MB FRACTION: CPT

## 2018-05-10 PROCEDURE — 2580000003 HC RX 258

## 2018-05-10 PROCEDURE — 6360000002 HC RX W HCPCS: Performed by: INTERNAL MEDICINE

## 2018-05-10 PROCEDURE — 82247 BILIRUBIN TOTAL: CPT

## 2018-05-10 PROCEDURE — C1894 INTRO/SHEATH, NON-LASER: HCPCS

## 2018-05-10 PROCEDURE — 2000000000 HC ICU R&B

## 2018-05-10 PROCEDURE — 86920 COMPATIBILITY TEST SPIN: CPT

## 2018-05-10 RX ORDER — HYDRALAZINE HYDROCHLORIDE 20 MG/ML
5 INJECTION INTRAMUSCULAR; INTRAVENOUS EVERY 10 MIN PRN
Status: DISCONTINUED | OUTPATIENT
Start: 2018-05-10 | End: 2018-05-12 | Stop reason: HOSPADM

## 2018-05-10 RX ORDER — DIPHENHYDRAMINE HYDROCHLORIDE 50 MG/ML
12.5 INJECTION INTRAMUSCULAR; INTRAVENOUS
Status: ACTIVE | OUTPATIENT
Start: 2018-05-10 | End: 2018-05-10

## 2018-05-10 RX ORDER — FENTANYL CITRATE 50 UG/ML
25 INJECTION, SOLUTION INTRAMUSCULAR; INTRAVENOUS EVERY 5 MIN PRN
Status: DISCONTINUED | OUTPATIENT
Start: 2018-05-10 | End: 2018-05-12 | Stop reason: HOSPADM

## 2018-05-10 RX ORDER — HEPARIN SODIUM 1000 [USP'U]/ML
INJECTION, SOLUTION INTRAVENOUS; SUBCUTANEOUS PRN
Status: DISCONTINUED | OUTPATIENT
Start: 2018-05-10 | End: 2018-05-10 | Stop reason: SDUPTHER

## 2018-05-10 RX ORDER — SODIUM CHLORIDE 0.9 % (FLUSH) 0.9 %
10 SYRINGE (ML) INJECTION EVERY 12 HOURS SCHEDULED
Status: DISCONTINUED | OUTPATIENT
Start: 2018-05-10 | End: 2018-05-12 | Stop reason: HOSPADM

## 2018-05-10 RX ORDER — ACETAMINOPHEN 325 MG/1
650 TABLET ORAL EVERY 4 HOURS PRN
Status: DISCONTINUED | OUTPATIENT
Start: 2018-05-10 | End: 2018-05-12 | Stop reason: HOSPADM

## 2018-05-10 RX ORDER — PROTAMINE SULFATE 10 MG/ML
INJECTION, SOLUTION INTRAVENOUS PRN
Status: DISCONTINUED | OUTPATIENT
Start: 2018-05-10 | End: 2018-05-10 | Stop reason: SDUPTHER

## 2018-05-10 RX ORDER — ASPIRIN 81 MG/1
81 TABLET, CHEWABLE ORAL DAILY
Status: DISCONTINUED | OUTPATIENT
Start: 2018-05-11 | End: 2018-05-12 | Stop reason: HOSPADM

## 2018-05-10 RX ORDER — ATORVASTATIN CALCIUM 80 MG/1
80 TABLET, FILM COATED ORAL NIGHTLY
Status: DISCONTINUED | OUTPATIENT
Start: 2018-05-10 | End: 2018-05-12 | Stop reason: HOSPADM

## 2018-05-10 RX ORDER — FENTANYL CITRATE 50 UG/ML
INJECTION, SOLUTION INTRAMUSCULAR; INTRAVENOUS PRN
Status: DISCONTINUED | OUTPATIENT
Start: 2018-05-10 | End: 2018-05-10 | Stop reason: SDUPTHER

## 2018-05-10 RX ORDER — METOPROLOL SUCCINATE 25 MG/1
25 TABLET, EXTENDED RELEASE ORAL DAILY
Status: DISCONTINUED | OUTPATIENT
Start: 2018-05-11 | End: 2018-05-12 | Stop reason: HOSPADM

## 2018-05-10 RX ORDER — MIDAZOLAM HYDROCHLORIDE 1 MG/ML
INJECTION INTRAMUSCULAR; INTRAVENOUS PRN
Status: DISCONTINUED | OUTPATIENT
Start: 2018-05-10 | End: 2018-05-10 | Stop reason: SDUPTHER

## 2018-05-10 RX ORDER — DIGOXIN 125 MCG
125 TABLET ORAL DAILY
Status: DISCONTINUED | OUTPATIENT
Start: 2018-05-11 | End: 2018-05-12 | Stop reason: HOSPADM

## 2018-05-10 RX ORDER — LABETALOL HYDROCHLORIDE 5 MG/ML
INJECTION, SOLUTION INTRAVENOUS PRN
Status: DISCONTINUED | OUTPATIENT
Start: 2018-05-10 | End: 2018-05-10 | Stop reason: SDUPTHER

## 2018-05-10 RX ORDER — SODIUM CHLORIDE 9 MG/ML
INJECTION, SOLUTION INTRAVENOUS CONTINUOUS
Status: DISCONTINUED | OUTPATIENT
Start: 2018-05-10 | End: 2018-05-12 | Stop reason: HOSPADM

## 2018-05-10 RX ORDER — SODIUM CHLORIDE 0.9 % (FLUSH) 0.9 %
10 SYRINGE (ML) INJECTION PRN
Status: DISCONTINUED | OUTPATIENT
Start: 2018-05-10 | End: 2018-05-12 | Stop reason: HOSPADM

## 2018-05-10 RX ORDER — OXYCODONE HYDROCHLORIDE AND ACETAMINOPHEN 5; 325 MG/1; MG/1
1 TABLET ORAL EVERY 4 HOURS PRN
Status: DISCONTINUED | OUTPATIENT
Start: 2018-05-10 | End: 2018-05-12 | Stop reason: HOSPADM

## 2018-05-10 RX ORDER — PROPOFOL 10 MG/ML
INJECTION, EMULSION INTRAVENOUS CONTINUOUS PRN
Status: DISCONTINUED | OUTPATIENT
Start: 2018-05-10 | End: 2018-05-10 | Stop reason: SDUPTHER

## 2018-05-10 RX ORDER — CLOPIDOGREL BISULFATE 75 MG/1
75 TABLET ORAL DAILY
Status: DISCONTINUED | OUTPATIENT
Start: 2018-05-11 | End: 2018-05-12 | Stop reason: HOSPADM

## 2018-05-10 RX ORDER — ONDANSETRON 2 MG/ML
4 INJECTION INTRAMUSCULAR; INTRAVENOUS EVERY 6 HOURS PRN
Status: DISCONTINUED | OUTPATIENT
Start: 2018-05-10 | End: 2018-05-12 | Stop reason: HOSPADM

## 2018-05-10 RX ORDER — ONDANSETRON 2 MG/ML
4 INJECTION INTRAMUSCULAR; INTRAVENOUS
Status: ACTIVE | OUTPATIENT
Start: 2018-05-10 | End: 2018-05-10

## 2018-05-10 RX ORDER — SODIUM CHLORIDE 9 MG/ML
INJECTION, SOLUTION INTRAVENOUS CONTINUOUS PRN
Status: DISCONTINUED | OUTPATIENT
Start: 2018-05-10 | End: 2018-05-10 | Stop reason: SDUPTHER

## 2018-05-10 RX ORDER — VANCOMYCIN HYDROCHLORIDE 1 G/200ML
1000 INJECTION, SOLUTION INTRAVENOUS EVERY 12 HOURS
Status: DISPENSED | OUTPATIENT
Start: 2018-05-10 | End: 2018-05-11

## 2018-05-10 RX ORDER — LABETALOL HYDROCHLORIDE 5 MG/ML
5 INJECTION, SOLUTION INTRAVENOUS EVERY 10 MIN PRN
Status: DISCONTINUED | OUTPATIENT
Start: 2018-05-10 | End: 2018-05-12 | Stop reason: HOSPADM

## 2018-05-10 RX ORDER — ACETAMINOPHEN 325 MG/1
650 TABLET ORAL EVERY 8 HOURS PRN
Status: DISCONTINUED | OUTPATIENT
Start: 2018-05-10 | End: 2018-05-12 | Stop reason: HOSPADM

## 2018-05-10 RX ADMIN — ATORVASTATIN CALCIUM 80 MG: 80 TABLET, FILM COATED ORAL at 21:19

## 2018-05-10 RX ADMIN — Medication 10 ML: at 21:19

## 2018-05-10 RX ADMIN — PHENYLEPHRINE HYDROCHLORIDE 25 MCG/MIN: 10 INJECTION INTRAVENOUS at 13:30

## 2018-05-10 RX ADMIN — SODIUM CHLORIDE: 9 INJECTION, SOLUTION INTRAVENOUS at 13:09

## 2018-05-10 RX ADMIN — FENTANYL CITRATE 50 MCG: 50 INJECTION INTRAMUSCULAR; INTRAVENOUS at 15:44

## 2018-05-10 RX ADMIN — SODIUM CHLORIDE: 9 INJECTION, SOLUTION INTRAVENOUS at 12:24

## 2018-05-10 RX ADMIN — HEPARIN SODIUM 15000 UNITS: 1000 INJECTION, SOLUTION INTRAVENOUS; SUBCUTANEOUS at 14:27

## 2018-05-10 RX ADMIN — SODIUM CHLORIDE: 900 INJECTION INTRAVENOUS at 13:32

## 2018-05-10 RX ADMIN — VANCOMYCIN HYDROCHLORIDE 1000 MG: 1 INJECTION, SOLUTION INTRAVENOUS at 23:58

## 2018-05-10 RX ADMIN — HEPARIN SODIUM 7000 UNITS: 1000 INJECTION, SOLUTION INTRAVENOUS; SUBCUTANEOUS at 14:40

## 2018-05-10 RX ADMIN — VANCOMYCIN HYDROCHLORIDE 1000 MG: 1 INJECTION, POWDER, LYOPHILIZED, FOR SOLUTION INTRAVENOUS at 13:21

## 2018-05-10 RX ADMIN — FENTANYL CITRATE 50 MCG: 50 INJECTION INTRAMUSCULAR; INTRAVENOUS at 15:47

## 2018-05-10 RX ADMIN — LABETALOL HYDROCHLORIDE 10 MG: 5 INJECTION, SOLUTION INTRAVENOUS at 15:50

## 2018-05-10 RX ADMIN — MIDAZOLAM HYDROCHLORIDE 1 MG: 1 INJECTION, SOLUTION INTRAMUSCULAR; INTRAVENOUS at 13:17

## 2018-05-10 RX ADMIN — SODIUM CHLORIDE: 900 INJECTION INTRAVENOUS at 14:40

## 2018-05-10 RX ADMIN — PROTAMINE SULFATE 50 MG: 10 INJECTION, SOLUTION INTRAVENOUS at 15:10

## 2018-05-10 RX ADMIN — FENTANYL CITRATE 50 MCG: 50 INJECTION INTRAMUSCULAR; INTRAVENOUS at 13:17

## 2018-05-10 RX ADMIN — PROPOFOL 50 MCG/KG/MIN: 10 INJECTION, EMULSION INTRAVENOUS at 13:21

## 2018-05-10 ASSESSMENT — LIFESTYLE VARIABLES: SMOKING_STATUS: 1

## 2018-05-11 LAB
ABO/RH: NORMAL
ANION GAP SERPL CALCULATED.3IONS-SCNC: 11 MMOL/L (ref 9–17)
ANTIBODY SCREEN: NEGATIVE
ARM BAND NUMBER: NORMAL
BLD PROD TYP BPU: NORMAL
BLD PROD TYP BPU: NORMAL
BUN BLDV-MCNC: 21 MG/DL (ref 8–23)
BUN/CREAT BLD: ABNORMAL (ref 9–20)
CALCIUM SERPL-MCNC: 7.9 MG/DL (ref 8.6–10.4)
CHLORIDE BLD-SCNC: 103 MMOL/L (ref 98–107)
CO2: 23 MMOL/L (ref 20–31)
CREAT SERPL-MCNC: 1.61 MG/DL (ref 0.7–1.2)
CROSSMATCH RESULT: NORMAL
CROSSMATCH RESULT: NORMAL
DISPENSE STATUS BLOOD BANK: NORMAL
DISPENSE STATUS BLOOD BANK: NORMAL
EXPIRATION DATE: NORMAL
GFR AFRICAN AMERICAN: 50 ML/MIN
GFR NON-AFRICAN AMERICAN: 42 ML/MIN
GFR SERPL CREATININE-BSD FRML MDRD: ABNORMAL ML/MIN/{1.73_M2}
GFR SERPL CREATININE-BSD FRML MDRD: ABNORMAL ML/MIN/{1.73_M2}
GLUCOSE BLD-MCNC: 179 MG/DL (ref 70–99)
INR BLD: 1
MAGNESIUM: 2 MG/DL (ref 1.6–2.6)
POTASSIUM SERPL-SCNC: 4.2 MMOL/L (ref 3.7–5.3)
PROTHROMBIN TIME: 11.1 SEC (ref 9–12)
SODIUM BLD-SCNC: 137 MMOL/L (ref 135–144)
TRANSFUSION STATUS: NORMAL
TRANSFUSION STATUS: NORMAL
UNIT DIVISION: 0
UNIT DIVISION: 0
UNIT NUMBER: NORMAL
UNIT NUMBER: NORMAL

## 2018-05-11 PROCEDURE — 2580000003 HC RX 258: Performed by: INTERNAL MEDICINE

## 2018-05-11 PROCEDURE — 80048 BASIC METABOLIC PNL TOTAL CA: CPT

## 2018-05-11 PROCEDURE — 6370000000 HC RX 637 (ALT 250 FOR IP): Performed by: INTERNAL MEDICINE

## 2018-05-11 PROCEDURE — 99291 CRITICAL CARE FIRST HOUR: CPT | Performed by: INTERNAL MEDICINE

## 2018-05-11 PROCEDURE — 83735 ASSAY OF MAGNESIUM: CPT

## 2018-05-11 PROCEDURE — 2060000000 HC ICU INTERMEDIATE R&B

## 2018-05-11 PROCEDURE — 36415 COLL VENOUS BLD VENIPUNCTURE: CPT

## 2018-05-11 PROCEDURE — 94762 N-INVAS EAR/PLS OXIMTRY CONT: CPT

## 2018-05-11 PROCEDURE — 85610 PROTHROMBIN TIME: CPT

## 2018-05-11 RX ORDER — CLOPIDOGREL 300 MG/1
600 TABLET, FILM COATED ORAL ONCE
Status: COMPLETED | OUTPATIENT
Start: 2018-05-11 | End: 2018-05-11

## 2018-05-11 RX ORDER — WARFARIN SODIUM 5 MG/1
5 TABLET ORAL ONCE
Status: COMPLETED | OUTPATIENT
Start: 2018-05-11 | End: 2018-05-11

## 2018-05-11 RX ADMIN — DIGOXIN 125 MCG: 125 TABLET ORAL at 08:17

## 2018-05-11 RX ADMIN — WARFARIN SODIUM 5 MG: 5 TABLET ORAL at 18:04

## 2018-05-11 RX ADMIN — METOPROLOL SUCCINATE 25 MG: 25 TABLET, FILM COATED, EXTENDED RELEASE ORAL at 08:17

## 2018-05-11 RX ADMIN — ATORVASTATIN CALCIUM 80 MG: 80 TABLET, FILM COATED ORAL at 22:04

## 2018-05-11 RX ADMIN — ASPIRIN 81 MG: 81 TABLET, CHEWABLE ORAL at 08:17

## 2018-05-11 RX ADMIN — CLOPIDOGREL 75 MG: 75 TABLET, FILM COATED ORAL at 08:17

## 2018-05-11 RX ADMIN — VITAMIN D, TAB 1000IU (100/BT) 1000 UNITS: 25 TAB at 08:17

## 2018-05-11 RX ADMIN — CLOPIDOGREL BISULFATE 600 MG: 300 TABLET, FILM COATED ORAL at 10:04

## 2018-05-11 RX ADMIN — Medication 10 ML: at 22:04

## 2018-05-12 VITALS
TEMPERATURE: 98 F | SYSTOLIC BLOOD PRESSURE: 158 MMHG | WEIGHT: 181.5 LBS | OXYGEN SATURATION: 97 % | HEART RATE: 70 BPM | DIASTOLIC BLOOD PRESSURE: 85 MMHG | BODY MASS INDEX: 27.51 KG/M2 | RESPIRATION RATE: 14 BRPM | HEIGHT: 68 IN

## 2018-05-12 LAB
HCT VFR BLD CALC: 29.5 % (ref 40.7–50.3)
HEMOGLOBIN: 8.8 G/DL (ref 13–17)
INR BLD: 1
LV EF: 50 %
LVEF MODALITY: NORMAL
MCH RBC QN AUTO: 29.3 PG (ref 25.2–33.5)
MCHC RBC AUTO-ENTMCNC: 29.8 G/DL (ref 28.4–34.8)
MCV RBC AUTO: 98.3 FL (ref 82.6–102.9)
NRBC AUTOMATED: 0 PER 100 WBC
PDW BLD-RTO: 18.3 % (ref 11.8–14.4)
PLATELET # BLD: 109 K/UL (ref 138–453)
PMV BLD AUTO: 11.3 FL (ref 8.1–13.5)
PROTHROMBIN TIME: 10.9 SEC (ref 9–12)
RBC # BLD: 3 M/UL (ref 4.21–5.77)
WBC # BLD: 9.3 K/UL (ref 3.5–11.3)

## 2018-05-12 PROCEDURE — 85610 PROTHROMBIN TIME: CPT

## 2018-05-12 PROCEDURE — 6370000000 HC RX 637 (ALT 250 FOR IP): Performed by: INTERNAL MEDICINE

## 2018-05-12 PROCEDURE — 85027 COMPLETE CBC AUTOMATED: CPT

## 2018-05-12 PROCEDURE — 36415 COLL VENOUS BLD VENIPUNCTURE: CPT

## 2018-05-12 PROCEDURE — 94762 N-INVAS EAR/PLS OXIMTRY CONT: CPT

## 2018-05-12 PROCEDURE — 2580000003 HC RX 258: Performed by: INTERNAL MEDICINE

## 2018-05-12 PROCEDURE — 93306 TTE W/DOPPLER COMPLETE: CPT

## 2018-05-12 RX ORDER — WARFARIN SODIUM 5 MG/1
5 TABLET ORAL ONCE
Status: DISCONTINUED | OUTPATIENT
Start: 2018-05-12 | End: 2018-05-12 | Stop reason: HOSPADM

## 2018-05-12 RX ORDER — CLOPIDOGREL BISULFATE 75 MG/1
75 TABLET ORAL DAILY
Qty: 30 TABLET | Refills: 3 | Status: SHIPPED | OUTPATIENT
Start: 2018-05-13 | End: 2018-05-17 | Stop reason: SDUPTHER

## 2018-05-12 RX ORDER — ASPIRIN 81 MG/1
81 TABLET, CHEWABLE ORAL DAILY
Qty: 30 TABLET | Refills: 3 | Status: SHIPPED | OUTPATIENT
Start: 2018-05-13 | End: 2018-11-07 | Stop reason: ALTCHOICE

## 2018-05-12 RX ORDER — WARFARIN SODIUM 5 MG/1
5 TABLET ORAL DAILY
Qty: 30 TABLET | Refills: 3 | Status: SHIPPED | OUTPATIENT
Start: 2018-05-12 | End: 2020-08-03

## 2018-05-12 RX ADMIN — CLOPIDOGREL 75 MG: 75 TABLET, FILM COATED ORAL at 08:20

## 2018-05-12 RX ADMIN — ASPIRIN 81 MG: 81 TABLET, CHEWABLE ORAL at 08:20

## 2018-05-12 RX ADMIN — METOPROLOL SUCCINATE 25 MG: 25 TABLET, FILM COATED, EXTENDED RELEASE ORAL at 08:19

## 2018-05-12 RX ADMIN — Medication 10 ML: at 08:19

## 2018-05-12 RX ADMIN — VITAMIN D, TAB 1000IU (100/BT) 1000 UNITS: 25 TAB at 08:19

## 2018-05-12 RX ADMIN — DIGOXIN 125 MCG: 125 TABLET ORAL at 08:20

## 2018-05-17 ENCOUNTER — OFFICE VISIT (OUTPATIENT)
Dept: CARDIOLOGY CLINIC | Age: 78
End: 2018-05-17
Payer: MEDICARE

## 2018-05-17 VITALS
BODY MASS INDEX: 27.17 KG/M2 | HEART RATE: 72 BPM | HEIGHT: 68 IN | DIASTOLIC BLOOD PRESSURE: 62 MMHG | WEIGHT: 179.3 LBS | SYSTOLIC BLOOD PRESSURE: 134 MMHG

## 2018-05-17 DIAGNOSIS — Z95.2 S/P TAVR (TRANSCATHETER AORTIC VALVE REPLACEMENT): Primary | ICD-10-CM

## 2018-05-17 DIAGNOSIS — I48.91 ATRIAL FIBRILLATION WITH RVR (HCC): ICD-10-CM

## 2018-05-17 PROCEDURE — 1123F ACP DISCUSS/DSCN MKR DOCD: CPT | Performed by: INTERNAL MEDICINE

## 2018-05-17 PROCEDURE — 1036F TOBACCO NON-USER: CPT | Performed by: INTERNAL MEDICINE

## 2018-05-17 PROCEDURE — G8598 ASA/ANTIPLAT THER USED: HCPCS | Performed by: INTERNAL MEDICINE

## 2018-05-17 PROCEDURE — 4040F PNEUMOC VAC/ADMIN/RCVD: CPT | Performed by: INTERNAL MEDICINE

## 2018-05-17 PROCEDURE — G8427 DOCREV CUR MEDS BY ELIG CLIN: HCPCS | Performed by: INTERNAL MEDICINE

## 2018-05-17 PROCEDURE — 99213 OFFICE O/P EST LOW 20 MIN: CPT | Performed by: INTERNAL MEDICINE

## 2018-05-17 PROCEDURE — 93000 ELECTROCARDIOGRAM COMPLETE: CPT | Performed by: INTERNAL MEDICINE

## 2018-05-17 PROCEDURE — 1111F DSCHRG MED/CURRENT MED MERGE: CPT | Performed by: INTERNAL MEDICINE

## 2018-05-17 PROCEDURE — G8419 CALC BMI OUT NRM PARAM NOF/U: HCPCS | Performed by: INTERNAL MEDICINE

## 2018-05-20 RX ORDER — CLOPIDOGREL BISULFATE 75 MG/1
75 TABLET ORAL DAILY
Qty: 90 TABLET | Refills: 3 | Status: SHIPPED | OUTPATIENT
Start: 2018-05-20 | End: 2018-11-07 | Stop reason: ALTCHOICE

## 2018-05-20 ASSESSMENT — ENCOUNTER SYMPTOMS
ABDOMINAL DISTENTION: 0
APNEA: 0
EYE PAIN: 0
SORE THROAT: 0
CONSTIPATION: 0
SHORTNESS OF BREATH: 0
EYE DISCHARGE: 0
NAUSEA: 0
BLOOD IN STOOL: 0
CHEST TIGHTNESS: 0
ABDOMINAL PAIN: 0
COUGH: 0
BACK PAIN: 0
SINUS PRESSURE: 0
EYE REDNESS: 0
EYE ITCHING: 0
DIARRHEA: 0

## 2018-06-11 ENCOUNTER — HOSPITAL ENCOUNTER (OUTPATIENT)
Dept: NON INVASIVE DIAGNOSTICS | Age: 78
Discharge: HOME | End: 2018-06-11
Payer: MEDICARE

## 2018-06-11 ENCOUNTER — TELEPHONE (OUTPATIENT)
Dept: CARDIOLOGY CLINIC | Age: 78
End: 2018-06-11

## 2018-06-11 DIAGNOSIS — Z95.2 S/P TAVR (TRANSCATHETER AORTIC VALVE REPLACEMENT): ICD-10-CM

## 2018-06-11 DIAGNOSIS — I48.91 ATRIAL FIBRILLATION WITH RVR (HCC): ICD-10-CM

## 2018-06-11 LAB
LV EF: 53 %
LVEF MODALITY: NORMAL

## 2018-06-11 PROCEDURE — 93306 TTE W/DOPPLER COMPLETE: CPT

## 2018-06-13 ENCOUNTER — OFFICE VISIT (OUTPATIENT)
Dept: CARDIOLOGY CLINIC | Age: 78
End: 2018-06-13
Payer: MEDICARE

## 2018-06-13 ENCOUNTER — HOSPITAL ENCOUNTER (OUTPATIENT)
Age: 78
Discharge: HOME | End: 2018-06-13
Payer: MEDICARE

## 2018-06-13 VITALS
BODY MASS INDEX: 28.82 KG/M2 | HEART RATE: 76 BPM | WEIGHT: 183.6 LBS | SYSTOLIC BLOOD PRESSURE: 150 MMHG | HEIGHT: 67 IN | DIASTOLIC BLOOD PRESSURE: 85 MMHG

## 2018-06-13 DIAGNOSIS — I50.20 NYHA CLASS 2 HEART FAILURE WITH REDUCED EJECTION FRACTION (HCC): Primary | ICD-10-CM

## 2018-06-13 DIAGNOSIS — Z95.2 S/P TAVR (TRANSCATHETER AORTIC VALVE REPLACEMENT): ICD-10-CM

## 2018-06-13 DIAGNOSIS — I50.43 CHF (CONGESTIVE HEART FAILURE), NYHA CLASS III, ACUTE ON CHRONIC, COMBINED (HCC): ICD-10-CM

## 2018-06-13 DIAGNOSIS — I48.91 ATRIAL FIBRILLATION, UNSPECIFIED TYPE (HCC): ICD-10-CM

## 2018-06-13 DIAGNOSIS — I25.119 CORONARY ARTERY DISEASE INVOLVING NATIVE CORONARY ARTERY OF NATIVE HEART WITH ANGINA PECTORIS (HCC): ICD-10-CM

## 2018-06-13 LAB
ANION GAP SERPL CALCULATED.3IONS-SCNC: 14 MEQ/L (ref 8–16)
BUN BLDV-MCNC: 19 MG/DL (ref 7–22)
CALCIUM SERPL-MCNC: 9.3 MG/DL (ref 8.5–10.5)
CHLORIDE BLD-SCNC: 103 MEQ/L (ref 98–111)
CO2: 25 MEQ/L (ref 23–33)
CREAT SERPL-MCNC: 1.8 MG/DL (ref 0.4–1.2)
GFR SERPL CREATININE-BSD FRML MDRD: 37 ML/MIN/1.73M2
GLUCOSE BLD-MCNC: 91 MG/DL (ref 70–108)
HCT VFR BLD CALC: 39.4 % (ref 42–52)
HEMOGLOBIN: 12.9 GM/DL (ref 14–18)
MCH RBC QN AUTO: 29.4 PG (ref 27–31)
MCHC RBC AUTO-ENTMCNC: 32.7 GM/DL (ref 33–37)
MCV RBC AUTO: 89.9 FL (ref 80–94)
PDW BLD-RTO: 18.5 % (ref 11.5–14.5)
PLATELET # BLD: 199 THOU/MM3 (ref 130–400)
PMV BLD AUTO: 9.6 FL (ref 7.4–10.4)
POTASSIUM SERPL-SCNC: 5.2 MEQ/L (ref 3.5–5.2)
RBC # BLD: 4.38 MILL/MM3 (ref 4.7–6.1)
SODIUM BLD-SCNC: 142 MEQ/L (ref 135–145)
WBC # BLD: 8.3 THOU/MM3 (ref 4.8–10.8)

## 2018-06-13 PROCEDURE — G8419 CALC BMI OUT NRM PARAM NOF/U: HCPCS | Performed by: INTERNAL MEDICINE

## 2018-06-13 PROCEDURE — 85027 COMPLETE CBC AUTOMATED: CPT

## 2018-06-13 PROCEDURE — 36415 COLL VENOUS BLD VENIPUNCTURE: CPT

## 2018-06-13 PROCEDURE — 4040F PNEUMOC VAC/ADMIN/RCVD: CPT | Performed by: INTERNAL MEDICINE

## 2018-06-13 PROCEDURE — 80048 BASIC METABOLIC PNL TOTAL CA: CPT

## 2018-06-13 PROCEDURE — 1036F TOBACCO NON-USER: CPT | Performed by: INTERNAL MEDICINE

## 2018-06-13 PROCEDURE — G8427 DOCREV CUR MEDS BY ELIG CLIN: HCPCS | Performed by: INTERNAL MEDICINE

## 2018-06-13 PROCEDURE — 99213 OFFICE O/P EST LOW 20 MIN: CPT | Performed by: INTERNAL MEDICINE

## 2018-06-13 PROCEDURE — G8598 ASA/ANTIPLAT THER USED: HCPCS | Performed by: INTERNAL MEDICINE

## 2018-06-13 PROCEDURE — 1123F ACP DISCUSS/DSCN MKR DOCD: CPT | Performed by: INTERNAL MEDICINE

## 2018-07-23 ENCOUNTER — APPOINTMENT (OUTPATIENT)
Dept: GENERAL RADIOLOGY | Age: 78
End: 2018-07-23
Payer: MEDICARE

## 2018-07-23 ENCOUNTER — HOSPITAL ENCOUNTER (EMERGENCY)
Age: 78
Discharge: HOME | End: 2018-07-23
Attending: EMERGENCY MEDICINE
Payer: MEDICARE

## 2018-07-23 VITALS
HEART RATE: 70 BPM | WEIGHT: 180 LBS | OXYGEN SATURATION: 91 % | BODY MASS INDEX: 28.25 KG/M2 | SYSTOLIC BLOOD PRESSURE: 142 MMHG | RESPIRATION RATE: 18 BRPM | HEIGHT: 67 IN | TEMPERATURE: 97.9 F | DIASTOLIC BLOOD PRESSURE: 60 MMHG

## 2018-07-23 DIAGNOSIS — R77.8 ELEVATED TROPONIN I LEVEL: ICD-10-CM

## 2018-07-23 DIAGNOSIS — R05.9 COUGH: ICD-10-CM

## 2018-07-23 DIAGNOSIS — J40 BRONCHITIS: ICD-10-CM

## 2018-07-23 DIAGNOSIS — R07.9 CHEST PAIN, UNSPECIFIED TYPE: Primary | ICD-10-CM

## 2018-07-23 LAB
ANION GAP SERPL CALCULATED.3IONS-SCNC: 16 MEQ/L (ref 8–16)
BASOPHILS # BLD: 0.3 %
BASOPHILS ABSOLUTE: 0 THOU/MM3 (ref 0–0.1)
BUN BLDV-MCNC: 29 MG/DL (ref 7–22)
CALCIUM SERPL-MCNC: 9.3 MG/DL (ref 8.5–10.5)
CHLORIDE BLD-SCNC: 99 MEQ/L (ref 98–111)
CO2: 25 MEQ/L (ref 23–33)
CREAT SERPL-MCNC: 2.2 MG/DL (ref 0.4–1.2)
EKG ATRIAL RATE: 68 BPM
EKG Q-T INTERVAL: 448 MS
EKG QRS DURATION: 180 MS
EKG QTC CALCULATION (BAZETT): 483 MS
EKG R AXIS: -69 DEGREES
EKG T AXIS: 95 DEGREES
EKG VENTRICULAR RATE: 70 BPM
EOSINOPHIL # BLD: 1.3 %
EOSINOPHILS ABSOLUTE: 0.1 THOU/MM3 (ref 0–0.4)
ERYTHROCYTE [DISTWIDTH] IN BLOOD BY AUTOMATED COUNT: 17.1 % (ref 11.5–14.5)
ERYTHROCYTE [DISTWIDTH] IN BLOOD BY AUTOMATED COUNT: 57.1 FL (ref 35–45)
GFR SERPL CREATININE-BSD FRML MDRD: 29 ML/MIN/1.73M2
GLUCOSE BLD-MCNC: 187 MG/DL (ref 70–108)
HCT VFR BLD CALC: 40.6 % (ref 42–52)
HEMOGLOBIN: 13.1 GM/DL (ref 14–18)
IMMATURE GRANS (ABS): 0.04 THOU/MM3 (ref 0–0.07)
IMMATURE GRANULOCYTES: 0.4 %
LYMPHOCYTES # BLD: 9.1 %
LYMPHOCYTES ABSOLUTE: 1 THOU/MM3 (ref 1–4.8)
MCH RBC QN AUTO: 29.4 PG (ref 26–33)
MCHC RBC AUTO-ENTMCNC: 32.3 GM/DL (ref 32.2–35.5)
MCV RBC AUTO: 91.2 FL (ref 80–94)
MONOCYTES # BLD: 11 %
MONOCYTES ABSOLUTE: 1.2 THOU/MM3 (ref 0.4–1.3)
NUCLEATED RED BLOOD CELLS: 0 /100 WBC
OSMOLALITY CALCULATION: 290.1 MOSMOL/KG (ref 275–300)
PLATELET # BLD: 175 THOU/MM3 (ref 130–400)
PMV BLD AUTO: 11 FL (ref 9.4–12.4)
POTASSIUM SERPL-SCNC: 4 MEQ/L (ref 3.5–5.2)
PRO-BNP: 1981 PG/ML (ref 0–1800)
RBC # BLD: 4.45 MILL/MM3 (ref 4.7–6.1)
SEG NEUTROPHILS: 77.9 %
SEGMENTED NEUTROPHILS ABSOLUTE COUNT: 8.2 THOU/MM3 (ref 1.8–7.7)
SODIUM BLD-SCNC: 140 MEQ/L (ref 135–145)
TROPONIN T: 0.01 NG/ML
WBC # BLD: 10.5 THOU/MM3 (ref 4.8–10.8)

## 2018-07-23 PROCEDURE — 84484 ASSAY OF TROPONIN QUANT: CPT

## 2018-07-23 PROCEDURE — 36415 COLL VENOUS BLD VENIPUNCTURE: CPT

## 2018-07-23 PROCEDURE — 71045 X-RAY EXAM CHEST 1 VIEW: CPT

## 2018-07-23 PROCEDURE — 83880 ASSAY OF NATRIURETIC PEPTIDE: CPT

## 2018-07-23 PROCEDURE — 99285 EMERGENCY DEPT VISIT HI MDM: CPT

## 2018-07-23 PROCEDURE — 93005 ELECTROCARDIOGRAM TRACING: CPT | Performed by: EMERGENCY MEDICINE

## 2018-07-23 PROCEDURE — 85025 COMPLETE CBC W/AUTO DIFF WBC: CPT

## 2018-07-23 PROCEDURE — 80048 BASIC METABOLIC PNL TOTAL CA: CPT

## 2018-07-23 RX ORDER — BUMETANIDE 1 MG/1
1 TABLET ORAL DAILY
COMMUNITY
End: 2021-07-13 | Stop reason: SDUPTHER

## 2018-07-23 RX ORDER — DOXYCYCLINE 100 MG/1
100 TABLET ORAL 2 TIMES DAILY
Qty: 20 TABLET | Refills: 0 | Status: SHIPPED | OUTPATIENT
Start: 2018-07-23 | End: 2018-08-02

## 2018-07-23 ASSESSMENT — PAIN DESCRIPTION - LOCATION: LOCATION: CHEST

## 2018-07-23 ASSESSMENT — PAIN DESCRIPTION - ORIENTATION: ORIENTATION: RIGHT

## 2018-07-23 ASSESSMENT — PAIN DESCRIPTION - FREQUENCY: FREQUENCY: INTERMITTENT

## 2018-07-23 ASSESSMENT — PAIN DESCRIPTION - DESCRIPTORS: DESCRIPTORS: SORE

## 2018-07-23 ASSESSMENT — PAIN SCALES - GENERAL: PAINLEVEL_OUTOF10: 4

## 2018-07-23 ASSESSMENT — PAIN DESCRIPTION - PAIN TYPE: TYPE: ACUTE PAIN

## 2018-07-23 NOTE — Clinical Note
Patient left without being seen after triage. Patient was called for a room three times with no response.

## 2018-07-24 PROCEDURE — 93010 ELECTROCARDIOGRAM REPORT: CPT | Performed by: INTERNAL MEDICINE

## 2018-07-24 NOTE — ED TRIAGE NOTES
Pt to ED with c/o right sided CP that started this am while just sitting around. Pt states the pain is intermittent, not dependent on exertion. Pt denies SOB or nausea. Pt is worried about retaining water as his diuretic has been changed a lot lately. Pt has 1+ pitting edema bilateral extremities.

## 2018-07-27 NOTE — ED PROVIDER NOTES
[facility] EMERGENCY DEPT      CHIEF COMPLAINT       Chief Complaint   Patient presents with    Chest Pain       Nurses Notes reviewed and I agree except as noted in the HPI. HISTORY OF PRESENT ILLNESS    Mike Singh is a 77 y.o. male who presents Sent presented with complaint of chest pain, cough. Also feels like retaining fluid. Onset: subacute  Duration: weeks  Timing: constant when coughing  Location of Pain: anterior chest  Intesity/severity: mild  Modifying Factors: cough makes pain worse  Relieved by;  Previous Episodes; Tx Before arrival: had a course of z-pack afew weeks ago  REVIEW OF SYSTEMS      Review of Systems   Constitutional: Negative for fever, chills, diaphoresis and fatigue. HENT: Negative for congestion, drooling, facial swelling and sore throat. Eyes: Negative for photophobia, pain and discharge. Respiratory: Positive for cough; Neg for shortness of breath, wheezing and stridor. Cardiovascular: Positive for chest pain; Neg for palpitations and leg swelling. Gastrointestinal: Negative for abdominal pain, blood in stool and abdominal distention. Endocrine: Negative for cold intolerance, heat intolerance, polydipsia and polyuria. Genitourinary: Negative for dysuria, urgency, hematuria and difficulty urinating. Musculoskeletal: Negative for gait problem, neck pain and neck stiffness. Allergic/Immunologic: Negative for environmental allergies, food allergies and immunocompromised state. Skin; No rash, No itching  Neurological: Negative for seizures, weakness and numbness. Hematological: Negative for adenopathy. Does not bruise/bleed easily. Psychiatric/Behavioral: Negative for hallucinations, confusion and agitation. PAST MEDICAL HISTORY    has a past medical history of MILLY (acute kidney injury) ([facility] [address] Atrial fibrillation ([facility] [address]  Cerebral artery occlusion with cerebral infarction [facility]); CHF (congestive heart failure), NYHA class III, acute on chronic, combined ([facility] [address] Coronary artery disease involving native coronary artery of native heart with angina pectoris ([facility] [address] Hypertension; and Pneumonia. SURGICAL HISTORY      has a past surgical history that includes Pacemaker insertion; hernia repair; Cardiac surgery; Coronary angioplasty with stent; and other surgical history (05/10/2018). CURRENT MEDICATIONS       Discharge Medication List as of 2018 11:05 PM      CONTINUE these medications which have NOT CHANGED    Details   metFORMIN (GLUCOPHAGE) 500 MG tablet Take 500 mg by mouth daily (with breakfast)Historical Med      bumetanide (BUMEX) 0.5 MG tablet Take 0.5 mg by mouth dailyHistorical Med      clopidogrel (PLAVIX) 75 MG tablet Take 1 tablet by mouth daily, Disp-90 tablet, R-3Normal      aspirin 81 MG chewable tablet Take 1 tablet by mouth daily, Disp-30 tablet, R-3Normal      warfarin (COUMADIN) 5 MG tablet Take 1 tablet by mouth daily, Disp-30 tablet, R-3Normal      acetaminophen (TYLENOL) 325 MG tablet Take 2 tablets by mouth every 4 hours as needed for Pain or FeverOTC      digoxin (LANOXIN) 125 MCG tablet Take 1 tablet by mouth daily Additional doses per Dr Gema Peng, Disp-30 tablet, R-0Normal      vitamin D (CHOLECALCIFEROL) 1000 UNIT TABS tablet Take 1 tablet by mouth dailyOTC      atorvastatin (LIPITOR) 80 MG tablet Take 80 mg by mouth nightlyHistorical Med      metoprolol succinate (TOPROL XL) 50 MG extended release tablet Take 50 mg by mouth daily Half tap daily Historical Med      nitroGLYCERIN (NITROSTAT) 0.4 MG SL tablet Place 0.4 mg under the tongue every 5 minutes as needed for Chest pain up to max of 3 total doses. If no relief after 1 dose, call 911. Historical Med             ALLERGIES     is allergic to xanax [alprazolam]. FAMILY HISTORY     indicated that his mother is . He indicated that his father is . Family history is unknown by patient. SOCIAL HISTORY      reports that he has quit smoking.  His smoking use included Cigarettes. He has a 50.00 pack-year smoking history. He has never used smokeless tobacco. He reports that he drinks alcohol. He reports that he does not use drugs. PHYSICAL EXAM     INITIAL VITALS:  height is 5' 7\" (1.702 m) and weight is 180 lb (81.6 kg). His oral temperature is 97.9 °F (36.6 °C). His blood pressure is 142/60 (abnormal) and his pulse is 70. His respiration is 18 and oxygen saturation is 91%. Physical Exam   Constitutional:  well-developed and well-nourished. HENT: Head: Normocephalic, atraumatic, Bilateral external ears normal, Oropharynx mosit, No oral exudates, Nose normal.   Eyes: PERRL, EOMI, Conjunctiva normal, No discharge. No scleral icterus  Neck: Normal range of motion, No tenderness, Supple  Lympatics: No lymphadenopathy. Cardiovascular: Normal rate, regular rhythm, S1 normal and S2 normal.  Exam reveals no gallop. Pulmonary/Chest: Effort normal and breath sounds normal. No accessory muscle usage or stridor. No respiratory distress. no wheezes. has no rales. exhibits no tenderness. Abdominal: Soft. Bowel sounds are normal.  exhibits no distension. There is no tenderness. There is no rebound and no guarding. Extremities: No edema, no tenderness, no cyanosis, no clubbing. Musculoskeletal: Good range of motion in major joints is observed. No major deformities noted. Neurological: Alert and oriented ×3, normal motor function, normal sensory function, no focal deficits. GCS 15  Skin: Skin is warm, dry and intact. No rash noted. No erythema.    Psychiatric: Affect normal, judgment normal, mood normal.  DIFFERENTIAL DIAGNOSIS:       DIAGNOSTIC RESULTS     EKG: All EKG's are interpreted by the Emergency Department Physician who either signs or Co-signs this chart in the absence of a cardiologist.      RADIOLOGY: non-plain film images(s) such as CT, Ultrasound and MRI are read by the radiologist.  Plain radiographic images are visualized and preliminarily taking these medications    Details   doxycycline monohydrate (ADOXA) 100 MG tablet Take 1 tablet by mouth 2 times daily for 10 days, Disp-20 tablet, R-0Print             (Please note that portions of this note were completed with a voice recognition program.  Efforts were made to edit the dictations but occasionally words are mis-transcribed.)    Floyd Zazueta, [facility],   07/27/18 4941

## 2018-10-30 ENCOUNTER — HOSPITAL ENCOUNTER (OUTPATIENT)
Age: 78
Discharge: HOME | End: 2018-10-30
Payer: MEDICARE

## 2018-10-30 DIAGNOSIS — N18.30 CKD (CHRONIC KIDNEY DISEASE), STAGE III (HCC): ICD-10-CM

## 2018-10-30 LAB
ANION GAP SERPL CALCULATED.3IONS-SCNC: 14 MEQ/L (ref 8–16)
BASOPHILS # BLD: 0.4 %
BASOPHILS ABSOLUTE: 0.1 THOU/MM3 (ref 0–0.1)
BUN BLDV-MCNC: 32 MG/DL (ref 7–22)
CALCIUM SERPL-MCNC: 9.4 MG/DL (ref 8.5–10.5)
CHLORIDE BLD-SCNC: 96 MEQ/L (ref 98–111)
CO2: 28 MEQ/L (ref 23–33)
CREAT SERPL-MCNC: 2.3 MG/DL (ref 0.4–1.2)
EOSINOPHIL # BLD: 1.5 %
EOSINOPHILS ABSOLUTE: 0.2 THOU/MM3 (ref 0–0.4)
ERYTHROCYTE [DISTWIDTH] IN BLOOD BY AUTOMATED COUNT: 16.1 % (ref 11.5–14.5)
ERYTHROCYTE [DISTWIDTH] IN BLOOD BY AUTOMATED COUNT: 56.6 FL (ref 35–45)
GFR SERPL CREATININE-BSD FRML MDRD: 28 ML/MIN/1.73M2
GLUCOSE BLD-MCNC: 222 MG/DL (ref 70–108)
HCT VFR BLD CALC: 41.5 % (ref 42–52)
HEMOGLOBIN: 13.8 GM/DL (ref 14–18)
IMMATURE GRANS (ABS): 0.07 THOU/MM3 (ref 0–0.07)
IMMATURE GRANULOCYTES: 0.5 %
LYMPHOCYTES # BLD: 11.2 %
LYMPHOCYTES ABSOLUTE: 1.5 THOU/MM3 (ref 1–4.8)
MCH RBC QN AUTO: 31.7 PG (ref 26–33)
MCHC RBC AUTO-ENTMCNC: 33.3 GM/DL (ref 32.2–35.5)
MCV RBC AUTO: 95.2 FL (ref 80–94)
MONOCYTES # BLD: 10.2 %
MONOCYTES ABSOLUTE: 1.3 THOU/MM3 (ref 0.4–1.3)
NUCLEATED RED BLOOD CELLS: 0 /100 WBC
PLATELET # BLD: 167 THOU/MM3 (ref 130–400)
PMV BLD AUTO: 11.7 FL (ref 9.4–12.4)
POTASSIUM SERPL-SCNC: 4.2 MEQ/L (ref 3.5–5.2)
RBC # BLD: 4.36 MILL/MM3 (ref 4.7–6.1)
SEG NEUTROPHILS: 76.2 %
SEGMENTED NEUTROPHILS ABSOLUTE COUNT: 10 THOU/MM3 (ref 1.8–7.7)
SODIUM BLD-SCNC: 138 MEQ/L (ref 135–145)
WBC # BLD: 13.1 THOU/MM3 (ref 4.8–10.8)

## 2018-10-30 PROCEDURE — 36415 COLL VENOUS BLD VENIPUNCTURE: CPT

## 2018-10-30 PROCEDURE — 80048 BASIC METABOLIC PNL TOTAL CA: CPT

## 2018-10-30 PROCEDURE — 85025 COMPLETE CBC W/AUTO DIFF WBC: CPT

## 2018-11-07 ENCOUNTER — OFFICE VISIT (OUTPATIENT)
Dept: NEPHROLOGY | Age: 78
End: 2018-11-07
Payer: MEDICARE

## 2018-11-07 VITALS
HEIGHT: 67 IN | HEART RATE: 70 BPM | OXYGEN SATURATION: 96 % | SYSTOLIC BLOOD PRESSURE: 129 MMHG | WEIGHT: 196 LBS | DIASTOLIC BLOOD PRESSURE: 80 MMHG | BODY MASS INDEX: 30.76 KG/M2

## 2018-11-07 DIAGNOSIS — N18.30 CKD (CHRONIC KIDNEY DISEASE), STAGE III (HCC): Primary | ICD-10-CM

## 2018-11-07 PROCEDURE — 1101F PT FALLS ASSESS-DOCD LE1/YR: CPT | Performed by: INTERNAL MEDICINE

## 2018-11-07 PROCEDURE — G8598 ASA/ANTIPLAT THER USED: HCPCS | Performed by: INTERNAL MEDICINE

## 2018-11-07 PROCEDURE — 1123F ACP DISCUSS/DSCN MKR DOCD: CPT | Performed by: INTERNAL MEDICINE

## 2018-11-07 PROCEDURE — 99213 OFFICE O/P EST LOW 20 MIN: CPT | Performed by: INTERNAL MEDICINE

## 2018-11-07 PROCEDURE — G8484 FLU IMMUNIZE NO ADMIN: HCPCS | Performed by: INTERNAL MEDICINE

## 2018-11-07 PROCEDURE — 1036F TOBACCO NON-USER: CPT | Performed by: INTERNAL MEDICINE

## 2018-11-07 PROCEDURE — G8427 DOCREV CUR MEDS BY ELIG CLIN: HCPCS | Performed by: INTERNAL MEDICINE

## 2018-11-07 PROCEDURE — 4040F PNEUMOC VAC/ADMIN/RCVD: CPT | Performed by: INTERNAL MEDICINE

## 2018-11-07 PROCEDURE — G8417 CALC BMI ABV UP PARAM F/U: HCPCS | Performed by: INTERNAL MEDICINE

## 2018-11-07 RX ORDER — LOSARTAN POTASSIUM 25 MG/1
25 TABLET ORAL NIGHTLY
COMMUNITY
End: 2021-05-28 | Stop reason: SDUPTHER

## 2018-11-07 RX ORDER — ALBUTEROL SULFATE 1.25 MG/3ML
1 SOLUTION RESPIRATORY (INHALATION) EVERY 6 HOURS PRN
COMMUNITY
End: 2022-01-04 | Stop reason: SDUPTHER

## 2018-11-07 RX ORDER — LEVOFLOXACIN 500 MG/1
500 TABLET, FILM COATED ORAL DAILY
Qty: 10 TABLET | Refills: 0 | Status: SHIPPED | OUTPATIENT
Start: 2018-11-07 | End: 2018-11-17

## 2018-11-18 ENCOUNTER — APPOINTMENT (OUTPATIENT)
Dept: GENERAL RADIOLOGY | Age: 78
End: 2018-11-18
Payer: MEDICARE

## 2018-11-18 ENCOUNTER — HOSPITAL ENCOUNTER (EMERGENCY)
Age: 78
Discharge: HOME | End: 2018-11-18
Payer: MEDICARE

## 2018-11-18 VITALS
TEMPERATURE: 98 F | DIASTOLIC BLOOD PRESSURE: 67 MMHG | BODY MASS INDEX: 28.98 KG/M2 | SYSTOLIC BLOOD PRESSURE: 150 MMHG | HEART RATE: 73 BPM | WEIGHT: 185 LBS | RESPIRATION RATE: 16 BRPM | OXYGEN SATURATION: 97 %

## 2018-11-18 DIAGNOSIS — J06.9 ACUTE UPPER RESPIRATORY INFECTION: Primary | ICD-10-CM

## 2018-11-18 PROCEDURE — 99283 EMERGENCY DEPT VISIT LOW MDM: CPT

## 2018-11-18 PROCEDURE — 71046 X-RAY EXAM CHEST 2 VIEWS: CPT

## 2018-11-18 PROCEDURE — 6370000000 HC RX 637 (ALT 250 FOR IP): Performed by: NURSE PRACTITIONER

## 2018-11-18 RX ORDER — BENZONATATE 100 MG/1
100 CAPSULE ORAL 3 TIMES DAILY PRN
Qty: 30 CAPSULE | Refills: 0 | Status: SHIPPED | OUTPATIENT
Start: 2018-11-18 | End: 2018-11-25

## 2018-11-18 RX ORDER — BENZONATATE 100 MG/1
100 CAPSULE ORAL ONCE
Status: COMPLETED | OUTPATIENT
Start: 2018-11-18 | End: 2018-11-18

## 2018-11-18 RX ORDER — DOXYCYCLINE 100 MG/1
100 TABLET ORAL 2 TIMES DAILY
Qty: 14 TABLET | Refills: 0 | Status: SHIPPED | OUTPATIENT
Start: 2018-11-18 | End: 2018-11-25

## 2018-11-18 RX ADMIN — BENZONATATE 100 MG: 100 CAPSULE ORAL at 10:56

## 2018-11-18 ASSESSMENT — ENCOUNTER SYMPTOMS
SINUS PRESSURE: 0
ABDOMINAL DISTENTION: 0
RHINORRHEA: 0
COLOR CHANGE: 0
COUGH: 1
NAUSEA: 0
CHEST TIGHTNESS: 0
CONSTIPATION: 0
BLOOD IN STOOL: 0
DIARRHEA: 0
PHOTOPHOBIA: 0
WHEEZING: 0
ABDOMINAL PAIN: 0
SORE THROAT: 1
VOMITING: 0
VOICE CHANGE: 1
EYE REDNESS: 0
SHORTNESS OF BREATH: 0
BACK PAIN: 0

## 2018-11-18 NOTE — ED PROVIDER NOTES
[address]        Chief Complaint   Patient presents with    Cough    Pharyngitis       Nurses Notesreviewed and I agree except as noted in the HPI. HISTORY OF PRESENT ILLNESS   Hugo Barahona is a 77 y.o. male who presents to the ED with a medical history of MI, CHF, HTN, and pneumonia for evaluation of a productive cough and pharyngitis which became present approximately 2 days ago. He states that his symptoms presented similarly to how his pneumonia presented in March 2018 causing him to come to the ED for evaluation. He reports to be experiencing rhinorrhea, voice change, and a decreased appetite secondary to his presenting symptoms. He denies any chest pain, shortness of breath, nausea, or vomiting. He states to have at home nebulizer breathing treatments. The patient reports to have 5 cardiac stents which are overseen by Dr. Washington Franks (cardiology). He reports a past social history of smoking. The patient reports no additional symptoms or complaints at this time. HPI was provided by the patient. REVIEW OF SYSTEMS     Review of Systems   Constitutional: Positive for appetite change (decreased). Negative for chills, diaphoresis, fatigue, fever and unexpected weight change. HENT: Positive for sore throat and voice change. Negative for congestion, hearing loss, postnasal drip, rhinorrhea and sinus pressure. Eyes: Negative for photophobia, redness and visual disturbance. Respiratory: Positive for cough (productive). Negative for chest tightness, shortness of breath and wheezing. Cardiovascular: Negative for chest pain and palpitations. Gastrointestinal: Negative for abdominal distention, abdominal pain, blood in stool, constipation, diarrhea, nausea and vomiting. Endocrine: Negative for cold intolerance, heat intolerance, polydipsia, polyphagia and polyuria.    Genitourinary: Negative for decreased urine volume, difficulty urinating, dysuria, flank

## 2018-12-11 ENCOUNTER — HOSPITAL ENCOUNTER (OUTPATIENT)
Age: 78
Discharge: HOME | End: 2018-12-11
Payer: MEDICARE

## 2018-12-11 DIAGNOSIS — N18.30 CKD (CHRONIC KIDNEY DISEASE), STAGE III (HCC): ICD-10-CM

## 2018-12-11 LAB
ANION GAP SERPL CALCULATED.3IONS-SCNC: 12 MEQ/L (ref 8–16)
BASOPHILS # BLD: 0.5 %
BASOPHILS ABSOLUTE: 0 THOU/MM3 (ref 0–0.1)
BUN BLDV-MCNC: 26 MG/DL (ref 7–22)
CALCIUM SERPL-MCNC: 9.5 MG/DL (ref 8.5–10.5)
CHLORIDE BLD-SCNC: 97 MEQ/L (ref 98–111)
CO2: 29 MEQ/L (ref 23–33)
CREAT SERPL-MCNC: 2.1 MG/DL (ref 0.4–1.2)
EOSINOPHIL # BLD: 3 %
EOSINOPHILS ABSOLUTE: 0.2 THOU/MM3 (ref 0–0.4)
ERYTHROCYTE [DISTWIDTH] IN BLOOD BY AUTOMATED COUNT: 14.7 % (ref 11.5–14.5)
ERYTHROCYTE [DISTWIDTH] IN BLOOD BY AUTOMATED COUNT: 53.1 FL (ref 35–45)
GFR SERPL CREATININE-BSD FRML MDRD: 31 ML/MIN/1.73M2
GLUCOSE BLD-MCNC: 207 MG/DL (ref 70–108)
HCT VFR BLD CALC: 41.8 % (ref 42–52)
HEMOGLOBIN: 13.8 GM/DL (ref 14–18)
IMMATURE GRANS (ABS): 0.03 THOU/MM3 (ref 0–0.07)
IMMATURE GRANULOCYTES: 0.4 %
LYMPHOCYTES # BLD: 17.8 %
LYMPHOCYTES ABSOLUTE: 1.4 THOU/MM3 (ref 1–4.8)
MCH RBC QN AUTO: 31.8 PG (ref 26–33)
MCHC RBC AUTO-ENTMCNC: 33 GM/DL (ref 32.2–35.5)
MCV RBC AUTO: 96.3 FL (ref 80–94)
MONOCYTES # BLD: 13.5 %
MONOCYTES ABSOLUTE: 1.1 THOU/MM3 (ref 0.4–1.3)
NUCLEATED RED BLOOD CELLS: 0 /100 WBC
PLATELET # BLD: 156 THOU/MM3 (ref 130–400)
PMV BLD AUTO: 10.6 FL (ref 9.4–12.4)
POTASSIUM SERPL-SCNC: 4.8 MEQ/L (ref 3.5–5.2)
RBC # BLD: 4.34 MILL/MM3 (ref 4.7–6.1)
SEG NEUTROPHILS: 64.8 %
SEGMENTED NEUTROPHILS ABSOLUTE COUNT: 5.2 THOU/MM3 (ref 1.8–7.7)
SODIUM BLD-SCNC: 138 MEQ/L (ref 135–145)
WBC # BLD: 8 THOU/MM3 (ref 4.8–10.8)

## 2018-12-11 PROCEDURE — 85025 COMPLETE CBC W/AUTO DIFF WBC: CPT

## 2018-12-11 PROCEDURE — 36415 COLL VENOUS BLD VENIPUNCTURE: CPT

## 2018-12-11 PROCEDURE — 80048 BASIC METABOLIC PNL TOTAL CA: CPT

## 2018-12-19 ENCOUNTER — OFFICE VISIT (OUTPATIENT)
Dept: NEPHROLOGY | Age: 78
End: 2018-12-19
Payer: MEDICARE

## 2018-12-19 VITALS
WEIGHT: 195 LBS | HEIGHT: 67 IN | DIASTOLIC BLOOD PRESSURE: 68 MMHG | BODY MASS INDEX: 30.61 KG/M2 | SYSTOLIC BLOOD PRESSURE: 123 MMHG | OXYGEN SATURATION: 96 % | HEART RATE: 70 BPM

## 2018-12-19 DIAGNOSIS — N18.30 CKD (CHRONIC KIDNEY DISEASE), STAGE III (HCC): Primary | ICD-10-CM

## 2018-12-19 PROCEDURE — 1101F PT FALLS ASSESS-DOCD LE1/YR: CPT | Performed by: INTERNAL MEDICINE

## 2018-12-19 PROCEDURE — 99213 OFFICE O/P EST LOW 20 MIN: CPT | Performed by: INTERNAL MEDICINE

## 2018-12-19 PROCEDURE — 1036F TOBACCO NON-USER: CPT | Performed by: INTERNAL MEDICINE

## 2018-12-19 PROCEDURE — G8484 FLU IMMUNIZE NO ADMIN: HCPCS | Performed by: INTERNAL MEDICINE

## 2018-12-19 PROCEDURE — 4040F PNEUMOC VAC/ADMIN/RCVD: CPT | Performed by: INTERNAL MEDICINE

## 2018-12-19 PROCEDURE — G8598 ASA/ANTIPLAT THER USED: HCPCS | Performed by: INTERNAL MEDICINE

## 2018-12-19 PROCEDURE — G8417 CALC BMI ABV UP PARAM F/U: HCPCS | Performed by: INTERNAL MEDICINE

## 2018-12-19 PROCEDURE — G8427 DOCREV CUR MEDS BY ELIG CLIN: HCPCS | Performed by: INTERNAL MEDICINE

## 2018-12-19 PROCEDURE — 1123F ACP DISCUSS/DSCN MKR DOCD: CPT | Performed by: INTERNAL MEDICINE

## 2018-12-19 RX ORDER — NITROGLYCERIN 0.4 MG/1
0.4 TABLET SUBLINGUAL EVERY 5 MIN PRN
COMMUNITY
End: 2020-07-06

## 2018-12-19 NOTE — PROGRESS NOTES
[facility]    [address]  [address], [address]  [phone]       Progress Note    12/19/2018 1:35 PM    Pt Name:    Benito Mack:    1940  Primary Care Physician:  Tina Nguyen MD       Chief Complaint:   Chief Complaint   Patient presents with    Chronic Kidney Disease     iii    Hypertension        History of Chief Complaint: recovery from MILLY and CKD stage IIIA from HTN     Subjective:  I last saw the patient in clinic 11/07/18. I follow the patient for Chronic Kidney disease stage IIIB. Since our last visit the patient has not been hospitalized. The patient is sleeping well at night with 1-2 times per night nocturia. The patient has a good appetite and is remaining active. The patient denied N/V/C/D/SOB/CP. Last six eGFR readings:  Lab Results   Component Value Date    LABGLOM 31 12/11/2018    LABGLOM 28 10/30/2018    LABGLOM 29 07/23/2018    LABGLOM 37 06/13/2018    LABGLOM 42 05/11/2018    LABGLOM 37 05/10/2018    LABGLOM 42 04/30/2018    LABGLOM 32 04/25/2018          Objective:  VITALS:  /68 (Site: Right Upper Arm, Position: Sitting, Cuff Size: Large Adult)   Pulse 70   Ht 5' 7\" (1.702 m)   Wt 195 lb (88.5 kg)   SpO2 96%   BMI 30.54 kg/m²   Weight:   Wt Readings from Last 3 Encounters:   12/19/18 195 lb (88.5 kg)   11/18/18 185 lb (83.9 kg)   11/07/18 196 lb (88.9 kg)     Body mass index is 30.54 kg/m². Physical examination    General:  Alert and cooperative with exam  HEENT:  Head: Normocephalic, no lesions, without obvious abnormality. Neck:   No JVD and no bruits.  Thyroid gland is normal  Lungs:  clear to auscultation bilaterally  Heart:  regular rate and rhythm, S1, S2 normal, no murmur, click, rub or gallop  Abdomen:  soft, non-tender; bowel sounds normal; no masses,  no organomegaly  Extremities:  extremities normal, atraumatic, no cyanosis or edema  Neurologic:  Mental status: Alert, oriented, thought content

## 2019-03-07 ENCOUNTER — HOSPITAL ENCOUNTER (OUTPATIENT)
Age: 79
Discharge: HOME | End: 2019-03-07
Payer: MEDICARE

## 2019-03-07 DIAGNOSIS — N18.30 CKD (CHRONIC KIDNEY DISEASE), STAGE III (HCC): ICD-10-CM

## 2019-03-07 LAB
ANION GAP SERPL CALCULATED.3IONS-SCNC: 16 MEQ/L (ref 8–16)
BASOPHILS # BLD: 0.5 %
BASOPHILS ABSOLUTE: 0.1 THOU/MM3 (ref 0–0.1)
BUN BLDV-MCNC: 30 MG/DL (ref 7–22)
CALCIUM SERPL-MCNC: 9.4 MG/DL (ref 8.5–10.5)
CHLORIDE BLD-SCNC: 96 MEQ/L (ref 98–111)
CO2: 27 MEQ/L (ref 23–33)
CREAT SERPL-MCNC: 2.4 MG/DL (ref 0.4–1.2)
EOSINOPHIL # BLD: 1.5 %
EOSINOPHILS ABSOLUTE: 0.2 THOU/MM3 (ref 0–0.4)
ERYTHROCYTE [DISTWIDTH] IN BLOOD BY AUTOMATED COUNT: 14.6 % (ref 11.5–14.5)
ERYTHROCYTE [DISTWIDTH] IN BLOOD BY AUTOMATED COUNT: 50.8 FL (ref 35–45)
GFR SERPL CREATININE-BSD FRML MDRD: 26 ML/MIN/1.73M2
GLUCOSE BLD-MCNC: 148 MG/DL (ref 70–108)
HCT VFR BLD CALC: 39.8 % (ref 42–52)
HEMOGLOBIN: 13.2 GM/DL (ref 14–18)
IMMATURE GRANS (ABS): 0.06 THOU/MM3 (ref 0–0.07)
IMMATURE GRANULOCYTES: 0.5 %
LYMPHOCYTES # BLD: 16.2 %
LYMPHOCYTES ABSOLUTE: 2.1 THOU/MM3 (ref 1–4.8)
MCH RBC QN AUTO: 31.7 PG (ref 26–33)
MCHC RBC AUTO-ENTMCNC: 33.2 GM/DL (ref 32.2–35.5)
MCV RBC AUTO: 95.4 FL (ref 80–94)
MONOCYTES # BLD: 9.4 %
MONOCYTES ABSOLUTE: 1.2 THOU/MM3 (ref 0.4–1.3)
NUCLEATED RED BLOOD CELLS: 0 /100 WBC
PLATELET # BLD: 174 THOU/MM3 (ref 130–400)
PMV BLD AUTO: 11.6 FL (ref 9.4–12.4)
POTASSIUM SERPL-SCNC: 4.4 MEQ/L (ref 3.5–5.2)
RBC # BLD: 4.17 MILL/MM3 (ref 4.7–6.1)
SEG NEUTROPHILS: 71.9 %
SEGMENTED NEUTROPHILS ABSOLUTE COUNT: 9.3 THOU/MM3 (ref 1.8–7.7)
SODIUM BLD-SCNC: 139 MEQ/L (ref 135–145)
WBC # BLD: 12.9 THOU/MM3 (ref 4.8–10.8)

## 2019-03-07 PROCEDURE — 80048 BASIC METABOLIC PNL TOTAL CA: CPT

## 2019-03-07 PROCEDURE — 85025 COMPLETE CBC W/AUTO DIFF WBC: CPT

## 2019-03-07 PROCEDURE — 36415 COLL VENOUS BLD VENIPUNCTURE: CPT

## 2019-03-14 ENCOUNTER — OFFICE VISIT (OUTPATIENT)
Dept: NEPHROLOGY | Age: 79
End: 2019-03-14
Payer: MEDICARE

## 2019-03-14 VITALS
HEART RATE: 70 BPM | WEIGHT: 191.2 LBS | RESPIRATION RATE: 18 BRPM | BODY MASS INDEX: 29.95 KG/M2 | DIASTOLIC BLOOD PRESSURE: 75 MMHG | SYSTOLIC BLOOD PRESSURE: 108 MMHG | OXYGEN SATURATION: 96 %

## 2019-03-14 DIAGNOSIS — N18.30 CKD (CHRONIC KIDNEY DISEASE), STAGE III (HCC): Primary | ICD-10-CM

## 2019-03-14 PROCEDURE — 4040F PNEUMOC VAC/ADMIN/RCVD: CPT | Performed by: INTERNAL MEDICINE

## 2019-03-14 PROCEDURE — G8427 DOCREV CUR MEDS BY ELIG CLIN: HCPCS | Performed by: INTERNAL MEDICINE

## 2019-03-14 PROCEDURE — 1036F TOBACCO NON-USER: CPT | Performed by: INTERNAL MEDICINE

## 2019-03-14 PROCEDURE — 1123F ACP DISCUSS/DSCN MKR DOCD: CPT | Performed by: INTERNAL MEDICINE

## 2019-03-14 PROCEDURE — G8417 CALC BMI ABV UP PARAM F/U: HCPCS | Performed by: INTERNAL MEDICINE

## 2019-03-14 PROCEDURE — G8599 NO ASA/ANTIPLAT THER USE RNG: HCPCS | Performed by: INTERNAL MEDICINE

## 2019-03-14 PROCEDURE — 99213 OFFICE O/P EST LOW 20 MIN: CPT | Performed by: INTERNAL MEDICINE

## 2019-03-14 PROCEDURE — G8484 FLU IMMUNIZE NO ADMIN: HCPCS | Performed by: INTERNAL MEDICINE

## 2019-03-14 PROCEDURE — 1101F PT FALLS ASSESS-DOCD LE1/YR: CPT | Performed by: INTERNAL MEDICINE

## 2019-03-22 ENCOUNTER — TELEPHONE (OUTPATIENT)
Dept: CARDIOLOGY CLINIC | Age: 79
End: 2019-03-22

## 2019-03-22 DIAGNOSIS — Z95.2 S/P TAVR (TRANSCATHETER AORTIC VALVE REPLACEMENT): Primary | ICD-10-CM

## 2019-03-22 NOTE — TELEPHONE ENCOUNTER
Orders received to obtain a CBC, BMP, and ECHO prior to 1 year post TAVR appointment with Dr. Roscoe Escobedo on 5/15/2019.

## 2019-05-06 ENCOUNTER — HOSPITAL ENCOUNTER (OUTPATIENT)
Dept: NON INVASIVE DIAGNOSTICS | Age: 79
Discharge: HOME | End: 2019-05-06
Payer: MEDICARE

## 2019-05-06 DIAGNOSIS — Z95.2 S/P TAVR (TRANSCATHETER AORTIC VALVE REPLACEMENT): ICD-10-CM

## 2019-05-06 LAB
LV EF: 48 %
LVEF MODALITY: NORMAL

## 2019-05-06 PROCEDURE — 93306 TTE W/DOPPLER COMPLETE: CPT

## 2019-05-14 ENCOUNTER — HOSPITAL ENCOUNTER (EMERGENCY)
Age: 79
Discharge: HOME | End: 2019-05-14
Attending: INTERNAL MEDICINE
Payer: MEDICARE

## 2019-05-14 ENCOUNTER — APPOINTMENT (OUTPATIENT)
Dept: INTERVENTIONAL RADIOLOGY/VASCULAR | Age: 79
End: 2019-05-14
Payer: MEDICARE

## 2019-05-14 VITALS
SYSTOLIC BLOOD PRESSURE: 137 MMHG | WEIGHT: 190 LBS | OXYGEN SATURATION: 92 % | HEART RATE: 70 BPM | RESPIRATION RATE: 18 BRPM | TEMPERATURE: 98.2 F | BODY MASS INDEX: 29.76 KG/M2 | DIASTOLIC BLOOD PRESSURE: 70 MMHG

## 2019-05-14 DIAGNOSIS — M79.604 RIGHT LEG PAIN: Primary | ICD-10-CM

## 2019-05-14 LAB
ALBUMIN SERPL-MCNC: 3.9 G/DL (ref 3.5–5.1)
ALP BLD-CCNC: 86 U/L (ref 38–126)
ALT SERPL-CCNC: 18 U/L (ref 11–66)
ANION GAP SERPL CALCULATED.3IONS-SCNC: 13 MEQ/L (ref 8–16)
AST SERPL-CCNC: 23 U/L (ref 5–40)
BASOPHILS # BLD: 0.5 %
BASOPHILS ABSOLUTE: 0.1 THOU/MM3 (ref 0–0.1)
BILIRUB SERPL-MCNC: 0.6 MG/DL (ref 0.3–1.2)
BUN BLDV-MCNC: 32 MG/DL (ref 7–22)
CALCIUM SERPL-MCNC: 9.7 MG/DL (ref 8.5–10.5)
CHLORIDE BLD-SCNC: 100 MEQ/L (ref 98–111)
CO2: 27 MEQ/L (ref 23–33)
CREAT SERPL-MCNC: 2.2 MG/DL (ref 0.4–1.2)
EKG ATRIAL RATE: 357 BPM
EKG Q-T INTERVAL: 456 MS
EKG QRS DURATION: 172 MS
EKG QTC CALCULATION (BAZETT): 492 MS
EKG R AXIS: -76 DEGREES
EKG T AXIS: 88 DEGREES
EKG VENTRICULAR RATE: 70 BPM
EOSINOPHIL # BLD: 2.8 %
EOSINOPHILS ABSOLUTE: 0.3 THOU/MM3 (ref 0–0.4)
ERYTHROCYTE [DISTWIDTH] IN BLOOD BY AUTOMATED COUNT: 14 % (ref 11.5–14.5)
ERYTHROCYTE [DISTWIDTH] IN BLOOD BY AUTOMATED COUNT: 49.2 FL (ref 35–45)
GFR SERPL CREATININE-BSD FRML MDRD: 29 ML/MIN/1.73M2
GLUCOSE BLD-MCNC: 127 MG/DL (ref 70–108)
HCT VFR BLD CALC: 40.3 % (ref 42–52)
HEMOGLOBIN: 13.7 GM/DL (ref 14–18)
IMMATURE GRANS (ABS): 0.04 THOU/MM3 (ref 0–0.07)
IMMATURE GRANULOCYTES: 0.4 %
LYMPHOCYTES # BLD: 16.1 %
LYMPHOCYTES ABSOLUTE: 1.6 THOU/MM3 (ref 1–4.8)
MCH RBC QN AUTO: 32.5 PG (ref 26–33)
MCHC RBC AUTO-ENTMCNC: 34 GM/DL (ref 32.2–35.5)
MCV RBC AUTO: 95.7 FL (ref 80–94)
MONOCYTES # BLD: 11.3 %
MONOCYTES ABSOLUTE: 1.2 THOU/MM3 (ref 0.4–1.3)
NUCLEATED RED BLOOD CELLS: 0 /100 WBC
OSMOLALITY CALCULATION: 287.9 MOSMOL/KG (ref 275–300)
PLATELET # BLD: 159 THOU/MM3 (ref 130–400)
PMV BLD AUTO: 11.5 FL (ref 9.4–12.4)
POTASSIUM SERPL-SCNC: 4.5 MEQ/L (ref 3.5–5.2)
RBC # BLD: 4.21 MILL/MM3 (ref 4.7–6.1)
SEG NEUTROPHILS: 68.9 %
SEGMENTED NEUTROPHILS ABSOLUTE COUNT: 7 THOU/MM3 (ref 1.8–7.7)
SODIUM BLD-SCNC: 140 MEQ/L (ref 135–145)
TOTAL PROTEIN: 6.9 G/DL (ref 6.1–8)
WBC # BLD: 10.2 THOU/MM3 (ref 4.8–10.8)

## 2019-05-14 PROCEDURE — 85025 COMPLETE CBC W/AUTO DIFF WBC: CPT

## 2019-05-14 PROCEDURE — 93005 ELECTROCARDIOGRAM TRACING: CPT | Performed by: INTERNAL MEDICINE

## 2019-05-14 PROCEDURE — 36415 COLL VENOUS BLD VENIPUNCTURE: CPT

## 2019-05-14 PROCEDURE — 93971 EXTREMITY STUDY: CPT

## 2019-05-14 PROCEDURE — 99284 EMERGENCY DEPT VISIT MOD MDM: CPT

## 2019-05-14 PROCEDURE — 93010 ELECTROCARDIOGRAM REPORT: CPT | Performed by: INTERNAL MEDICINE

## 2019-05-14 PROCEDURE — 80053 COMPREHEN METABOLIC PANEL: CPT

## 2019-05-14 ASSESSMENT — PAIN DESCRIPTION - ORIENTATION: ORIENTATION: RIGHT

## 2019-05-14 ASSESSMENT — ENCOUNTER SYMPTOMS
RHINORRHEA: 0
ABDOMINAL PAIN: 0
WHEEZING: 0
EYE DISCHARGE: 0
COUGH: 0
SHORTNESS OF BREATH: 0
NAUSEA: 0
SORE THROAT: 0
VOMITING: 0
DIARRHEA: 0
EYE REDNESS: 0
BACK PAIN: 0

## 2019-05-14 ASSESSMENT — PAIN DESCRIPTION - LOCATION: LOCATION: LEG

## 2019-05-14 ASSESSMENT — PAIN SCALES - GENERAL: PAINLEVEL_OUTOF10: 4

## 2019-05-14 ASSESSMENT — PAIN DESCRIPTION - PAIN TYPE: TYPE: ACUTE PAIN

## 2019-05-14 NOTE — ED PROVIDER NOTES
adenopathy. Psychiatric/Behavioral: Negative for confusion and suicidal ideas. The patient is not nervous/anxious. PAST MEDICAL HISTORY    has a past medical history of MILLY (acute kidney injury) ([facility] [address] Atrial fibrillation ([facility] [address] Cerebral artery occlusion with cerebral infarction ([facility] [address] CHF (congestive heart failure), NYHA class III, acute on chronic, combined ([facility] [address] Coronary artery disease involving native coronary artery of native heart with angina pectoris ([facility] [address] Hypertension, and Pneumonia. SURGICAL HISTORY    has a past surgical history that includes Pacemaker insertion; hernia repair; Cardiac surgery; Coronary angioplasty with stent; and other surgical history (05/10/2018). CURRENT MEDICATIONS       Previous Medications    ACETAMINOPHEN (TYLENOL) 325 MG TABLET    Take 2 tablets by mouth every 4 hours as needed for Pain or Fever    ALBUTEROL (ACCUNEB) 1.25 MG/3ML NEBULIZER SOLUTION    Inhale 1 ampule into the lungs every 6 hours as needed for Wheezing    ATORVASTATIN (LIPITOR) 80 MG TABLET    Take 80 mg by mouth nightly    BUMETANIDE (BUMEX) 0.5 MG TABLET    Take 1 mg by mouth 2 times daily     DIGOXIN (LANOXIN) 125 MCG TABLET    Take 1 tablet by mouth daily Additional doses per Dr Aggie Tay (COZAAR) 25 MG TABLET    Take 25 mg by mouth daily    METFORMIN (GLUCOPHAGE) 500 MG TABLET    Take 500 mg by mouth 2 times daily (with meals)     METOPROLOL SUCCINATE (TOPROL XL) 50 MG EXTENDED RELEASE TABLET    Take 50 mg by mouth daily Half tap daily     NITROGLYCERIN (NITROSTAT) 0.4 MG SL TABLET    Place 0.4 mg under the tongue every 5 minutes as needed for Chest pain up to max of 3 total doses. If no relief after 1 dose, call 911. VITAMIN D (CHOLECALCIFEROL) 1000 UNIT TABS TABLET    Take 1 tablet by mouth daily    WARFARIN (COUMADIN) 5 MG TABLET    Take 1 tablet by mouth daily       ALLERGIES     is allergic to aspirin; benzonatate; and xanax [alprazolam].     FAMILY HISTORY     indicated that his mother is . He indicated that his father is . Family history is unknown by patient. SOCIAL HISTORY      reports that he has quit smoking. His smoking use included cigarettes. He has a 50.00 pack-year smoking history. He has never used smokeless tobacco. He reports that he drinks alcohol. He reports that he does not use drugs. PHYSICAL EXAM     INITIAL VITALS:  weight is 190 lb (86.2 kg). His oral temperature is 98.2 °F (36.8 °C). His blood pressure is 137/70 and his pulse is 70. His respiration is 18 and oxygen saturation is 92%. Physical Exam   Constitutional: He is oriented to person, place, and time. He appears well-developed and well-nourished. Non-toxic appearance. HENT:   Head: Normocephalic and atraumatic. Right Ear: Tympanic membrane and external ear normal.   Left Ear: Tympanic membrane and external ear normal.   Nose: Nose normal.   Mouth/Throat: Oropharynx is clear and moist and mucous membranes are normal. No oropharyngeal exudate, posterior oropharyngeal edema or posterior oropharyngeal erythema. Eyes: Conjunctivae and EOM are normal.   Neck: Normal range of motion. Neck supple. No JVD present. Cardiovascular: Normal rate, regular rhythm, normal heart sounds, intact distal pulses and normal pulses. Exam reveals no gallop and no friction rub. No murmur heard. Pulmonary/Chest: Effort normal and breath sounds normal. No respiratory distress. He has no decreased breath sounds. He has no wheezes. He has no rhonchi. He has no rales. Abdominal: Soft. Bowel sounds are normal. He exhibits no distension. There is no tenderness. There is no rebound, no guarding and no CVA tenderness. Musculoskeletal: Normal range of motion. He exhibits no edema. RLE swelling and tenderness. No warmth or erythema. Neurological: He is alert and oriented to person, place, and time. He exhibits normal muscle tone. Coordination normal.   Skin: Skin is warm and dry. No rash noted.  He is not diaphoretic. Nursing note and vitals reviewed. DIAGNOSTIC RESULTS     EKG: All EKG's are interpreted by the Emergency Department Physician who either signs or Co-signs this chart in the absence of a cardiologist.  EKG interpreted by Cooper Baez MD:    Kisha. Rate: 70 bpm  GA interval: * ms  QRS duration: 172 ms  QTc: 492 ms  P-R-T axes: *, -76, 88  Ventricular-paced rhythm   No STEMI. No previous ECGs. RADIOLOGY: non-plain film images(s) such as CT, Ultrasound and MRI are read by the radiologist.    VL DUP LOWER EXTREMITY VENOUS RIGHT   Final Result   Normal venous ultrasound. No evidence for acute deep venous thrombosis. **This report has been created using voice recognition software. It may contain minor errors which are inherent in voice recognition technology. **      Final report electronically signed by Dr. Landy Miles on 5/14/2019 9:57 AM          LABS:   Labs Reviewed   CBC WITH AUTO DIFFERENTIAL - Abnormal; Notable for the following components:       Result Value    RBC 4.21 (*)     Hemoglobin 13.7 (*)     Hematocrit 40.3 (*)     MCV 95.7 (*)     RDW-SD 49.2 (*)     All other components within normal limits   COMPREHENSIVE METABOLIC PANEL - Abnormal; Notable for the following components:    Glucose 127 (*)     CREATININE 2.2 (*)     BUN 32 (*)     All other components within normal limits   GLOMERULAR FILTRATION RATE, ESTIMATED - Abnormal; Notable for the following components:    Est, Glom Filt Rate 29 (*)     All other components within normal limits   ANION GAP   OSMOLALITY       EMERGENCY DEPARTMENT COURSE:   Vitals:    Vitals:    05/14/19 0722 05/14/19 0852   BP: (!) 169/81 137/70   Pulse: 70    Resp: 18    Temp: 98.2 °F (36.8 °C)    TempSrc: Oral    SpO2: 99% 92%   Weight: 190 lb (86.2 kg)        7:29 AM: The patient was seen and evaluated. MDM:  Patient did have leg pain since this morning. Patient was worried about a clot. Patient's venous Doppler is negative.  White count, hemoglobin, and other labs are also reviewed and they were good. Patient will be discharged home to follow with his primary care physician. Is also advised to come back to the emergency department with symptoms get worse. Channel history of chronic renal insufficiency. Creatinine was slightly improved today  Patient does not have any real allergy to aspirin, he was advised not to take it with Coumadin. For that reason patient was prescribed diclofenac gel  CRITICAL CARE:   None     CONSULTS:      PROCEDURES:  None     FINAL IMPRESSION      1. Right leg pain          DISPOSITION/PLAN   Discharge    PATIENT REFERRED TO:  Graham Helton MD  [address] [place] [address]  [phone]    Call today      [address] [facility] [address] [address] EMERGENCY DEPT  [address]  Go in 1 day  If symptoms worsen      DISCHARGE MEDICATIONS:  New Prescriptions    No medications on file       (Please note that portions of this note were completed with a voice recognition program.  Efforts were made to edit the dictations but occasionally words are mis-transcribed.)    Scribe:  Phil Ramirez 5/14/19 7:29 AM Scribing for and in the presence of Kim Tinoco MD.    Signed by: Rodrigue Barrios, 05/14/19 10:12 AM    Provider:  I personally performed the services described in the documentation, reviewed and edited the documentation which was dictated to the scribe in my presence, and it accurately records my words and actions.     Kim Tinoco MD 5/14/19 10:12 AM        Kim Tinoco MD  05/14/19 1012       Kim Tinoco MD  05/14/19 1025

## 2019-05-14 NOTE — ED TRIAGE NOTES
Patient complains of right leg pain. Patient right leg is more swollen then the left. Patient has a rash that he is also concerned patient was placed on medication but it isn't helping.

## 2019-05-15 ENCOUNTER — OFFICE VISIT (OUTPATIENT)
Dept: CARDIOLOGY CLINIC | Age: 79
End: 2019-05-15
Payer: MEDICARE

## 2019-05-15 VITALS
HEART RATE: 62 BPM | SYSTOLIC BLOOD PRESSURE: 128 MMHG | BODY MASS INDEX: 28.89 KG/M2 | DIASTOLIC BLOOD PRESSURE: 70 MMHG | HEIGHT: 68 IN

## 2019-05-15 DIAGNOSIS — I50.20 NYHA CLASS 2 HEART FAILURE WITH REDUCED EJECTION FRACTION (HCC): ICD-10-CM

## 2019-05-15 DIAGNOSIS — Z95.2 S/P TAVR (TRANSCATHETER AORTIC VALVE REPLACEMENT): Primary | ICD-10-CM

## 2019-05-15 DIAGNOSIS — I48.91 ATRIAL FIBRILLATION WITH RVR (HCC): ICD-10-CM

## 2019-05-15 PROCEDURE — 1036F TOBACCO NON-USER: CPT | Performed by: INTERNAL MEDICINE

## 2019-05-15 PROCEDURE — 99212 OFFICE O/P EST SF 10 MIN: CPT | Performed by: INTERNAL MEDICINE

## 2019-05-15 PROCEDURE — G8427 DOCREV CUR MEDS BY ELIG CLIN: HCPCS | Performed by: INTERNAL MEDICINE

## 2019-05-15 PROCEDURE — 1123F ACP DISCUSS/DSCN MKR DOCD: CPT | Performed by: INTERNAL MEDICINE

## 2019-05-15 PROCEDURE — G8599 NO ASA/ANTIPLAT THER USE RNG: HCPCS | Performed by: INTERNAL MEDICINE

## 2019-05-15 PROCEDURE — G8417 CALC BMI ABV UP PARAM F/U: HCPCS | Performed by: INTERNAL MEDICINE

## 2019-05-15 PROCEDURE — 4040F PNEUMOC VAC/ADMIN/RCVD: CPT | Performed by: INTERNAL MEDICINE

## 2019-05-15 NOTE — PROGRESS NOTES
[facility]  [address] [address]  Dept: [phone]  Dept Fax: [phone]  Loc: [phone]    Visit Date: 5/15/2019    Mr. Tracy Betancur is a [de-identified] y.o. male  who presented for:  Chief Complaint   Patient presents with    1 Year Follow Up    Follow-Up from [facility]       HPI:   HPI   [de-identified] yo M s/p TAVR 5/11/18 - 1 year follow-up. No chest pain, angina, LESTER, orthopnea, PND, sob at rest, palpitations, LE edema, or syncope. ADLs without issue. Wife states that when he walks he does not get as tired. EF 45-50%, RVSP 34 mmHg, no significant aortic insufficiency. He is on Warfarin only, he d/c'ed ASA due to issues with Warfarin interaction. Current Outpatient Medications:     diclofenac sodium 1 % GEL, Apply 4 g topically 4 times daily, Disp: 4 Tube, Rfl: 1    nitroGLYCERIN (NITROSTAT) 0.4 MG SL tablet, Place 0.4 mg under the tongue every 5 minutes as needed for Chest pain up to max of 3 total doses.  If no relief after 1 dose, call 911., Disp: , Rfl:     losartan (COZAAR) 25 MG tablet, Take 25 mg by mouth daily, Disp: , Rfl:     albuterol (ACCUNEB) 1.25 MG/3ML nebulizer solution, Inhale 1 ampule into the lungs every 6 hours as needed for Wheezing, Disp: , Rfl:     metFORMIN (GLUCOPHAGE) 500 MG tablet, Take 500 mg by mouth 2 times daily (with meals) , Disp: , Rfl:     bumetanide (BUMEX) 0.5 MG tablet, Take 1 mg by mouth 2 times daily , Disp: , Rfl:     warfarin (COUMADIN) 5 MG tablet, Take 1 tablet by mouth daily (Patient taking differently: Take 2.5 mg by mouth daily ), Disp: 30 tablet, Rfl: 3    acetaminophen (TYLENOL) 325 MG tablet, Take 2 tablets by mouth every 4 hours as needed for Pain or Fever, Disp: , Rfl:     digoxin (LANOXIN) 125 MCG tablet, Take 1 tablet by mouth daily Additional doses per Dr Pablo Alcantara, Disp: 30 tablet, Rfl: 0    vitamin D (CHOLECALCIFEROL) 1000 UNIT TABS tablet, Psychiatric/Behavioral: Negative for agitation, confusion, decreased concentration and dysphoric mood. Objective:     /70   Pulse 62   Ht 5' 8\" (1.727 m)   BMI 28.89 kg/m²     Wt Readings from Last 3 Encounters:   05/14/19 190 lb (86.2 kg)   03/14/19 191 lb 3.2 oz (86.7 kg)   12/19/18 195 lb (88.5 kg)     BP Readings from Last 3 Encounters:   05/15/19 128/70   05/14/19 137/70   03/14/19 108/75       Nursing note and vitals reviewed. Physical Exam   Constitutional: Oriented to person, place, and time. Appears well-developed and well-nourished. HENT:   Head: Normocephalic and atraumatic. Eyes: EOM are normal. Pupils are equal, round, and reactive to light. Neck: Normal range of motion. Neck supple. No JVD present. Cardiovascular: Normal rate, regular rhythm, normal heart sounds and intact distal pulses. No murmur heard. Pulmonary/Chest: Effort normal and breath sounds normal. No respiratory distress. No wheezes. No rales. Abdominal: Soft. Bowel sounds are normal. No distension. There is no tenderness. Musculoskeletal: Normal range of motion. No edema. Neurological: Alert and oriented to person, place, and time. No cranial nerve deficit. Coordination normal.   Skin: Skin is warm and dry. Psychiatric: Normal mood and affect.        Lab Results   Component Value Date    CKTOTAL 144 05/10/2018    CKMB 21.0 05/10/2018       Lab Results   Component Value Date    WBC 10.2 05/14/2019    RBC 4.21 05/14/2019    HGB 13.7 05/14/2019    HCT 40.3 05/14/2019    MCV 95.7 05/14/2019    MCH 32.5 05/14/2019    MCHC 34.0 05/14/2019    RDW 18.5 06/13/2018     05/14/2019    MPV 11.5 05/14/2019       Lab Results   Component Value Date     05/14/2019    K 4.5 05/14/2019    K 4.1 04/05/2018     05/14/2019    CO2 27 05/14/2019    BUN 32 05/14/2019    LABALBU 3.9 05/14/2019    CREATININE 2.2 05/14/2019    CALCIUM 9.7 05/14/2019    GFRAA 50 05/11/2018    LABGLOM 29 05/14/2019    GLUCOSE 127 History:Pulmonary edema, A-fib, Chronic kidney disease,  Congestive heart failure, TAVR- 1 year follow up    Patient Status: Routine    Height: 67 inches Weight: 191 pounds BSA: 1.98 m^2 BMI: 29.92 kg/m^2    BP: 108/75 mmHg    Allergies    - See Epic. Conclusions      Summary   Ejection fraction was estimated at 45-50%. Normal left ventricular size and mildly reduced systolic function. There was mild global hypokinesis. Wall thickness was within normal limits. Device lead/catheter seen in the right heart. S/p TAVR. Transaortic velocity was 1.2-1.6 m/s, mean gradient 4-6 mmHg, EOA 1.5-1.8   cm2. There was no evidence of aortic regurgitation. There was trace tricuspid regurgitation. Assuming RAP = 5 mmHg, the estimated RVSP = 34 mmHg. Ascending aorta - 3.1 cm. IVC size is within normal limits with normal respiratory phasic changes. Signature      ----------------------------------------------------------------   Electronically signed by Roselyn Charles MD (Interpreting   physician) on 05/06/2019 at 04:30 PM   ----------------------------------------------------------------      Findings      Mitral Valve   The mitral valve structure was normal with normal leaflet separation. DOPPLER: The transmitral velocity was within the normal range with no   evidence for mitral stenosis. There was no evidence of mitral   regurgitation. Aortic Valve   S/p TAVR. DOPPLER: Transaortic velocity was 1.2-1.6 m/s, mean gradient 4-6   mmHg, EOA 1.5-1.8 cm2. There was no evidence of aortic regurgitation. Tricuspid Valve   The tricuspid valve structure was normal with normal leaflet separation. DOPPLER: There was no evidence of tricuspid stenosis. There was trace   tricuspid regurgitation. Assuming RAP = 5 mmHg, the estimated RVSP = 34   mmHg. Pulmonic Valve   The pulmonic valve leaflets were not well seen.  DOPPLER: The transpulmonic   velocity was within the normal range with no evidence for regurgitation. Left Atrium   Left atrial size was normal.      Left Ventricle   Normal left ventricular size and mildly reduced systolic function. There was mild global hypokinesis. Wall thickness was within normal limits. Ejection fraction was estimated at 45-50%. Right Atrium   Right atrial size was normal.      Right Ventricle   The right ventricular size was normal with normal systolic function and   wall thickness. Device lead/catheter seen in the right heart. Pericardial Effusion   The pericardium was normal in appearance with no evidence of a pericardial   effusion. Pleural Effusion   No evidence of pleural effusion. Aorta / Great Vessels   -Ascending aorta - 3.1 cm. -IVC size is within normal limits with normal respiratory phasic changes.      M-Mode/2D Measurements & Calculations      LV Diastolic    LV Systolic Dimension: 3.6   LA Dimension: 4 cmAO Root   Dimension: 4.7  cm                           Dimension: 2.7 cmLA Area:   cm              LV Volume Diastolic: 861.8   36.7 cm^2   LV FS:23.4 %    ml   LV PW           LV Volume Systolic: 57.2 ml   Diastolic: 1 cm LV EDV/LV EDV Index: 115.4   Septum          ml/58 m^2LV ESV/LV ESV       RV Diastolic Dimension: 3.4   Diastolic: 0.9  Index: 90.8 ml/35 m^2        cm   cm              EF Calculated: 39.2 %                                                LA/Aorta: 1.48                   LV Length: 7.6 cm                                                LA volume/Index: 48.1 ml   LV Area         LVOT: 2.1 cm                 /33U^7   Diastolic: 60.2   cm^2   LV Area   Systolic: 28.0   cm^2     Doppler Measurements & Calculations      MV Peak E-Wave: 122 AV Peak Velocity: 159    LVOT Peak Velocity: 68.7 cm/s   cm/s                cm/s                     LVOT Mean Velocity: 48.4 cm/s   MV Peak A-Wave:     AV Peak Gradient: 10.11  LVOT Peak Gradient: 2   28.4 cm/s           mmHg                     mmHgLVOT Mean Gradient: 1   MV E/A

## 2019-05-19 RX ORDER — WARFARIN SODIUM 2.5 MG/1
2.5 TABLET ORAL DAILY
Qty: 10 TABLET | Refills: 0 | Status: SHIPPED | OUTPATIENT
Start: 2019-05-19 | End: 2021-07-30 | Stop reason: SDUPTHER

## 2019-06-15 LAB
ABSOLUTE BASO #: 0.1 K/UL (ref 0–0.1)
ABSOLUTE EOS #: 0.3 K/UL (ref 0.1–0.4)
ABSOLUTE LYMPH #: 1.7 K/UL (ref 0.8–5.2)
ABSOLUTE MONO #: 1.1 K/UL (ref 0.1–0.9)
ABSOLUTE NEUT #: 7.1 K/UL (ref 1.3–9.1)
ANION GAP SERPL CALCULATED.3IONS-SCNC: 7 MMOL/L (ref 4–12)
BASOPHILS RELATIVE PERCENT: 0.5 %
BUN BLDV-MCNC: 31 MG/DL (ref 5–27)
CALCIUM SERPL-MCNC: 8.7 MG/DL (ref 8.5–10.5)
CHLORIDE BLD-SCNC: 105 MMOL/L (ref 98–109)
CO2: 28 MMOL/L (ref 22–32)
CREAT SERPL-MCNC: 1.78 MG/DL (ref 0.6–1.3)
EGFR AFRICAN AMERICAN: 45 ML/MIN/1.73SQ.M
EGFR IF NONAFRICAN AMERICAN: 37 ML/MIN/1.73SQ.M
EOSINOPHILS RELATIVE PERCENT: 2.7 %
GLUCOSE: 116 MG/DL (ref 65–99)
HCT VFR BLD CALC: 39 % (ref 41.4–51)
HEMOGLOBIN: 13.3 G/DL (ref 13.8–17)
LYMPHOCYTE %: 16.3 %
MCH RBC QN AUTO: 32 PG (ref 27–34)
MCHC RBC AUTO-ENTMCNC: 34.1 G/DL (ref 31–36)
MCV RBC AUTO: 94 FL (ref 80–100)
MONOCYTES # BLD: 10.8 %
NEUTROPHILS RELATIVE PERCENT: 69.4 %
PDW BLD-RTO: 13.1 % (ref 10.8–14.8)
PLATELETS: 165 K/UL (ref 150–450)
POTASSIUM SERPL-SCNC: 4.1 MMOL/L (ref 3.5–5)
RBC: 4.15 M/UL (ref 4–5.5)
SODIUM BLD-SCNC: 140 MMOL/L (ref 134–146)
WBC: 10.2 K/UL (ref 3.7–10.8)

## 2019-06-26 ENCOUNTER — OFFICE VISIT (OUTPATIENT)
Dept: NEPHROLOGY | Age: 79
End: 2019-06-26
Payer: MEDICARE

## 2019-06-26 VITALS
RESPIRATION RATE: 18 BRPM | WEIGHT: 189 LBS | DIASTOLIC BLOOD PRESSURE: 67 MMHG | SYSTOLIC BLOOD PRESSURE: 98 MMHG | BODY MASS INDEX: 28.64 KG/M2 | OXYGEN SATURATION: 97 % | HEART RATE: 69 BPM | HEIGHT: 68 IN

## 2019-06-26 DIAGNOSIS — N18.30 CKD (CHRONIC KIDNEY DISEASE), STAGE III (HCC): Primary | ICD-10-CM

## 2019-06-26 PROCEDURE — 1123F ACP DISCUSS/DSCN MKR DOCD: CPT | Performed by: INTERNAL MEDICINE

## 2019-06-26 PROCEDURE — 4040F PNEUMOC VAC/ADMIN/RCVD: CPT | Performed by: INTERNAL MEDICINE

## 2019-06-26 PROCEDURE — 1036F TOBACCO NON-USER: CPT | Performed by: INTERNAL MEDICINE

## 2019-06-26 PROCEDURE — G8417 CALC BMI ABV UP PARAM F/U: HCPCS | Performed by: INTERNAL MEDICINE

## 2019-06-26 PROCEDURE — G8427 DOCREV CUR MEDS BY ELIG CLIN: HCPCS | Performed by: INTERNAL MEDICINE

## 2019-06-26 PROCEDURE — 99213 OFFICE O/P EST LOW 20 MIN: CPT | Performed by: INTERNAL MEDICINE

## 2019-06-26 PROCEDURE — G8598 ASA/ANTIPLAT THER USED: HCPCS | Performed by: INTERNAL MEDICINE

## 2019-06-26 RX ORDER — LEVOFLOXACIN 750 MG/1
750 TABLET ORAL DAILY
Qty: 10 TABLET | Refills: 0 | Status: SHIPPED | OUTPATIENT
Start: 2019-06-26 | End: 2019-07-06

## 2019-06-26 NOTE — PROGRESS NOTES
[facility]    [address]  [address], [address]  [phone]       Progress Note    6/26/2019 9:55 AM    Pt Name:    Tray Gutierres:    1940  Primary Care Physician:  Chetna Julian MD       Chief Complaint:   Chief Complaint   Patient presents with    Chronic Kidney Disease     iii    Hypertension        History of Chief Complaint: recovery from MILLY and CKD stage IIIA from HTN     Subjective:  I last saw the patient in clinic 03/14/19. I follow the patient for Chronic Kidney disease stage IIIA. Since our last visit the patient has not been hospitalized. The patient is sleeping well at night with 1-2 times per night nocturia. The patient has a good appetite and is remaining active. The patient denied N/V/C/D/SOB/CP. He is coughing up sputum and responded well to levaquin in the past and requested a refill. Last six eGFR readings:  Lab Results   Component Value Date    LABGLOM 29 05/14/2019    LABGLOM 26 03/07/2019    LABGLOM 31 12/11/2018    LABGLOM 28 10/30/2018    LABGLOM 29 07/23/2018    LABGLOM 37 06/13/2018          Objective:  VITALS:  BP 98/67 (Site: Left Upper Arm, Position: Sitting, Cuff Size: Large Adult)   Pulse 69   Resp 18   Ht 5' 8\" (1.727 m)   Wt 189 lb (85.7 kg)   SpO2 97%   BMI 28.74 kg/m²   Weight:   Wt Readings from Last 3 Encounters:   06/26/19 189 lb (85.7 kg)   05/14/19 190 lb (86.2 kg)   03/14/19 191 lb 3.2 oz (86.7 kg)     Body mass index is 28.74 kg/m². Physical examination    General:  Alert and cooperative with exam  HEENT:  Normocephalic. No lesions nor rashes. WENDY. EOMI  Neck:   No JVD and no bruits. Thyroid gland is normal  Lungs:  Breathing easily. No rales nor rhonchi. No cough nor sputum production. Heart[de-identified]            RRR. No murmurs nor rubs. PMI is not enlarged nor displaced. Abdomen:  Soft and non tender. Bowel sounds are active in all four quadrants.    Extremities:  No edema  Neurologic:  CN II-XII are intact. No deficits noted. Muscle strength and tone are equal throughout. Skin:                Warm and dry with no rashes. Muscles:         Hand  and leg strength are equal and strong bilaterally.      Lab Data      CBC:   Lab Results   Component Value Date    WBC 10.2 06/14/2019    HGB 13.3 (L) 06/14/2019    HCT 39.0 (L) 06/14/2019    MCV 94.0 06/14/2019     06/14/2019     BMP:    Lab Results   Component Value Date     06/14/2019     05/14/2019     03/07/2019    K 4.1 06/14/2019    K 4.5 05/14/2019    K 4.4 03/07/2019     06/14/2019     05/14/2019    CL 96 (L) 03/07/2019    CO2 28 06/14/2019    CO2 27 05/14/2019    CO2 27 03/07/2019    BUN 31 (H) 06/14/2019    BUN 32 (H) 05/14/2019    BUN 30 (H) 03/07/2019    CREATININE 1.78 (H) 06/14/2019    CREATININE 2.2 (H) 05/14/2019    CREATININE 2.4 (H) 03/07/2019    GLUCOSE 116 (H) 06/14/2019    GLUCOSE 127 (H) 05/14/2019    GLUCOSE 148 (H) 03/07/2019      Hepatic:   Lab Results   Component Value Date    AST 23 05/14/2019    AST 22 02/12/2019    AST 29 04/14/2018    ALT 18 05/14/2019    ALT 21 02/12/2019    ALT 37 04/14/2018    BILITOT 0.6 05/14/2019    BILITOT 0.7 02/12/2019    BILITOT 0.82 05/10/2018    ALKPHOS 86 05/14/2019    ALKPHOS 101 02/12/2019    ALKPHOS 98 04/14/2018     BNP: No results found for: BNP  Lipids:   Lab Results   Component Value Date    CHOL 135 02/12/2019    HDL 25.4 (L) 02/12/2019     INR:   Lab Results   Component Value Date    INR 1.0 05/12/2018    INR 1.0 05/11/2018    INR 1.1 05/10/2018     URINE: No results found for: NAUR, PROTUR  Lab Results   Component Value Date    NITRU NEGATIVE 04/13/2018    COLORU YELLOW 04/13/2018    PHUR 6.0 04/13/2018    WBCUA 5-10 04/13/2018    RBCUA 3-5 04/13/2018    YEAST NONE SEEN 04/13/2018    BACTERIA NONE 04/13/2018    LEUKOCYTESUR SMALL 04/13/2018    UROBILINOGEN 0.2 04/13/2018    BILIRUBINUR NEGATIVE 04/13/2018    BLOODU MODERATE 04/13/2018    GLUCOSEU NEGATIVE 04/13/2018    KETUA NEGATIVE 04/13/2018      Microalbumen/Creatinine ratio:  No components found for: RUCREAT        Medications:    Current Outpatient Medications   Medication Sig Dispense Refill    warfarin (COUMADIN) 2.5 MG tablet Take 1 tablet by mouth daily 10 tablet 0    nitroGLYCERIN (NITROSTAT) 0.4 MG SL tablet Place 0.4 mg under the tongue every 5 minutes as needed for Chest pain up to max of 3 total doses. If no relief after 1 dose, call 911.  losartan (COZAAR) 25 MG tablet Take 25 mg by mouth daily      albuterol (ACCUNEB) 1.25 MG/3ML nebulizer solution Inhale 1 ampule into the lungs every 6 hours as needed for Wheezing      metFORMIN (GLUCOPHAGE) 500 MG tablet Take 500 mg by mouth 2 times daily (with meals)       bumetanide (BUMEX) 1 MG tablet Take 1 mg by mouth 2 times daily       warfarin (COUMADIN) 5 MG tablet Take 1 tablet by mouth daily (Patient taking differently: Take 2.5 mg by mouth daily ) 30 tablet 3    acetaminophen (TYLENOL) 325 MG tablet Take 2 tablets by mouth every 4 hours as needed for Pain or Fever      digoxin (LANOXIN) 125 MCG tablet Take 1 tablet by mouth daily Additional doses per Dr Ba Pal 30 tablet 0    vitamin D (CHOLECALCIFEROL) 1000 UNIT TABS tablet Take 1 tablet by mouth daily      atorvastatin (LIPITOR) 80 MG tablet Take 80 mg by mouth nightly      metoprolol succinate (TOPROL XL) 50 MG extended release tablet Take 50 mg by mouth daily Half tap daily        No current facility-administered medications for this visit. IMPRESSIONS:      Kidney disease: CKD stage IIIA with improved kidney function  Acute bronchitis  Anemia: Anemia remains stable. No need for an erythrocyte stimulating agent (BRICE). Bone and mineral metabolism: stable       PLAN:  1. We discussed the eGFR today. 2. We will continue all current medications without changes. 3. Will Rx levaquin for bronchitis. He will take coumadin half dose every day while on levaquin.   4. We will see the patient back in 4 months.               _________________________________  Aung Sanford.  Simon Saleem  [facility]      Parker Clancy MD

## 2019-09-06 ENCOUNTER — HOSPITAL ENCOUNTER (OUTPATIENT)
Dept: WOMENS IMAGING | Age: 79
Discharge: HOME | End: 2019-09-06
Payer: MEDICARE

## 2019-09-06 DIAGNOSIS — N62 GYNECOMASTIA, MALE: ICD-10-CM

## 2019-09-06 PROCEDURE — 77066 DX MAMMO INCL CAD BI: CPT

## 2019-10-08 LAB
ABSOLUTE BASO #: 0.1 X10E9/L (ref 0–0.9)
ABSOLUTE EOS #: 0.3 X10E9/L (ref 0–0.4)
ABSOLUTE LYMPH #: 1.8 X10E9/L (ref 1–3.5)
ABSOLUTE MONO #: 1 X10E9/L (ref 0–0.9)
ABSOLUTE NEUT #: 5.5 X10E9/L (ref 1.5–6.6)
ANION GAP SERPL CALCULATED.3IONS-SCNC: 10 MMOL/L (ref 4–12)
BASOPHILS RELATIVE PERCENT: 0.6 %
BUN BLDV-MCNC: 35 MG/DL (ref 5–27)
CALCIUM SERPL-MCNC: 9 MG/DL (ref 8.5–10.5)
CHLORIDE BLD-SCNC: 101 MMOL/L (ref 98–109)
CO2: 29 MMOL/L (ref 22–32)
CREAT SERPL-MCNC: 2.11 MG/DL (ref 0.6–1.3)
EGFR AFRICAN AMERICAN: 37 ML/MIN/1.73SQ.M
EGFR IF NONAFRICAN AMERICAN: 30 ML/MIN/1.73SQ.M
EOSINOPHILS RELATIVE PERCENT: 3.3 %
GLUCOSE: 103 MG/DL (ref 65–99)
HCT VFR BLD CALC: 40.2 % (ref 36–53)
HEMOGLOBIN: 13.7 G/DL (ref 12.6–17.4)
LYMPHOCYTE %: 20.4 %
MCH RBC QN AUTO: 32.4 PG (ref 27–35)
MCHC RBC AUTO-ENTMCNC: 34 G/DL (ref 31–36)
MCV RBC AUTO: 95 FL (ref 81–101)
MONOCYTES # BLD: 11.6 %
NEUTROPHILS RELATIVE PERCENT: 64.1 %
PDW BLD-RTO: 15 % (ref 11.4–14.3)
PLATELETS: 163 X10E9/L (ref 150–450)
PMV BLD AUTO: 10.4 FL (ref 7–12)
POTASSIUM SERPL-SCNC: 3.8 MMOL/L (ref 3.5–5)
RBC: 4.22 X10E12/L (ref 3.3–5.5)
SODIUM BLD-SCNC: 140 MMOL/L (ref 134–146)
WBC: 8.6 X10E9/L (ref 4.4–12)

## 2019-10-23 ENCOUNTER — OFFICE VISIT (OUTPATIENT)
Dept: NEPHROLOGY | Age: 79
End: 2019-10-23
Payer: MEDICARE

## 2019-10-23 VITALS
OXYGEN SATURATION: 98 % | RESPIRATION RATE: 18 BRPM | DIASTOLIC BLOOD PRESSURE: 75 MMHG | HEART RATE: 71 BPM | SYSTOLIC BLOOD PRESSURE: 121 MMHG | WEIGHT: 181 LBS | HEIGHT: 68 IN | BODY MASS INDEX: 27.43 KG/M2

## 2019-10-23 DIAGNOSIS — D50.0 IRON DEFICIENCY ANEMIA DUE TO CHRONIC BLOOD LOSS: Primary | ICD-10-CM

## 2019-10-23 DIAGNOSIS — N18.30 CKD (CHRONIC KIDNEY DISEASE), STAGE III (HCC): ICD-10-CM

## 2019-10-23 PROCEDURE — G8484 FLU IMMUNIZE NO ADMIN: HCPCS | Performed by: INTERNAL MEDICINE

## 2019-10-23 PROCEDURE — 1036F TOBACCO NON-USER: CPT | Performed by: INTERNAL MEDICINE

## 2019-10-23 PROCEDURE — 1123F ACP DISCUSS/DSCN MKR DOCD: CPT | Performed by: INTERNAL MEDICINE

## 2019-10-23 PROCEDURE — G8417 CALC BMI ABV UP PARAM F/U: HCPCS | Performed by: INTERNAL MEDICINE

## 2019-10-23 PROCEDURE — 4040F PNEUMOC VAC/ADMIN/RCVD: CPT | Performed by: INTERNAL MEDICINE

## 2019-10-23 PROCEDURE — 99213 OFFICE O/P EST LOW 20 MIN: CPT | Performed by: INTERNAL MEDICINE

## 2019-10-23 PROCEDURE — G8427 DOCREV CUR MEDS BY ELIG CLIN: HCPCS | Performed by: INTERNAL MEDICINE

## 2019-10-23 PROCEDURE — G8598 ASA/ANTIPLAT THER USED: HCPCS | Performed by: INTERNAL MEDICINE

## 2020-05-12 ENCOUNTER — OFFICE VISIT (OUTPATIENT)
Dept: CARDIOLOGY CLINIC | Age: 80
End: 2020-05-12
Payer: MEDICARE

## 2020-05-12 ENCOUNTER — TELEPHONE (OUTPATIENT)
Dept: CARDIOLOGY CLINIC | Age: 80
End: 2020-05-12

## 2020-05-12 VITALS
BODY MASS INDEX: 28.35 KG/M2 | HEART RATE: 77 BPM | HEIGHT: 67 IN | SYSTOLIC BLOOD PRESSURE: 132 MMHG | DIASTOLIC BLOOD PRESSURE: 86 MMHG

## 2020-05-12 PROCEDURE — 99213 OFFICE O/P EST LOW 20 MIN: CPT | Performed by: NURSE PRACTITIONER

## 2020-05-12 PROCEDURE — 4040F PNEUMOC VAC/ADMIN/RCVD: CPT | Performed by: NURSE PRACTITIONER

## 2020-05-12 PROCEDURE — 1123F ACP DISCUSS/DSCN MKR DOCD: CPT | Performed by: NURSE PRACTITIONER

## 2020-05-12 PROCEDURE — 1036F TOBACCO NON-USER: CPT | Performed by: NURSE PRACTITIONER

## 2020-05-12 PROCEDURE — G8427 DOCREV CUR MEDS BY ELIG CLIN: HCPCS | Performed by: NURSE PRACTITIONER

## 2020-05-12 PROCEDURE — G8417 CALC BMI ABV UP PARAM F/U: HCPCS | Performed by: NURSE PRACTITIONER

## 2020-05-12 PROCEDURE — 93000 ELECTROCARDIOGRAM COMPLETE: CPT | Performed by: NURSE PRACTITIONER

## 2020-05-12 NOTE — PROGRESS NOTES
[facility] PROFESSIONAL SERVICES  [facility] [address]   [address] [address]   Dept: [phone]   Dept Fax: [phone]   Loc: [phone]      Chief Complaint   Patient presents with    1 Year Follow Up     1 year F/u office visit for [de-identified] y/o male with history of TAVR 5/11/18, AFB on coumadin, CHF, CAD, HTN. Denies chest pain, palpitations, sob, TEJAL, lightheadedness, dizziness or syncope. Cardiologist:  Dr. Arvind Cortez:   No fever, no chills, No fatigue or weight loss  Pulmonary:    No dyspnea, no wheezing  Cardiac:    Denies recent chest pain   GI:     No nausea or vomiting, no abdominal pain  Neuro:    No dizziness or light headedness  Musculoskeletal:  No recent active issues  Extremities:   No edema, good peripheral pulses      Past Medical History:   Diagnosis Date    [facility]     Atrial fibrillation ([address]     Cerebral artery occlusion with cerebral infarction ([facility] [address] [facility]     CHF (congestive heart failure), NYHA class III, acute on chronic, combined ([address]     Coronary artery disease involving native coronary artery of native heart with angina pectoris (HCC)     Hypertension     Pneumonia        Allergies   Allergen Reactions    Aspirin Other (See Comments)     Other reaction(s): Other - comment required  Excessive bruising  Excessive bruising    Benzonatate      Other reaction(s): Other - comment required  Pt states it makes him dizzy    Xanax [Alprazolam] Anxiety     Other reaction(s): Other - comment required  Causes confusion  Became combative and confused       Current Outpatient Medications   Medication Sig Dispense Refill    warfarin (COUMADIN) 2.5 MG tablet Take 1 tablet by mouth daily 10 tablet 0    nitroGLYCERIN (NITROSTAT) 0.4 MG SL tablet Place 0.4 mg under the tongue every 5 minutes as needed for Chest pain up to max of 3 total doses. If no relief after 1 dose, call 911.       losartan (COZAAR) 25 MG tablet activity: None   Other Topics Concern    None   Social History Narrative    None       Family History   Family history unknown: Yes       Blood pressure 132/86, pulse 77, height 5' 7\" (1.702 m). General:   Well developed, well nourished  Lungs:   Clear to auscultation  Heart:    Normal S1 S2, No murmur, rubs, or gallops  Abdomen:   Soft, non tender, no organomegalies, positive bowel sounds  Extremities:   No edema, no cyanosis, good peripheral pulses  Neurological:   Awake, alert, oriented. No obvious focal deficits  Musculoskeletal:  No obvious deformities    EKG: Paced     Echo: 5/16/19  Summary   Ejection fraction was estimated at 45-50%. Normal left ventricular size and mildly reduced systolic function. There was mild global hypokinesis. Wall thickness was within normal limits. Device lead/catheter seen in the right heart. S/p TAVR. Transaortic velocity was 1.2-1.6 m/s, mean gradient 4-6 mmHg, EOA 1.5-1.8   cm2. There was no evidence of aortic regurgitation. There was trace tricuspid regurgitation. Assuming RAP = 5 mmHg, the estimated RVSP = 34 mmHg. Ascending aorta - 3.1 cm. IVC size is within normal limits with normal respiratory phasic changes. Signature      ----------------------------------------------------------------   Electronically signed by Cosme Roberts MD    [facility]: 3/29/18  Left heart cath. The radial artery was cannulated with the 6-[place] radial  sheath. The left ventriculogram showed diffuse hypokinesia of the left  ventricle anterolateral wall hypokinesia and ejection fraction about  30-35%. The left ventricular end-diastolic pressure was 43 mmHg consistent  with severe diastolic dysfunction of the left ventricle.     There was no step-up in oxygen saturation from different chambers of the  right side of the heart.     The coronary angiogram showed the RCA was totally occluded.   The RCA has  been stented in the past.     Left main was very short, bifurcates to the LAD and to the circumflex. The  LAD does exhibit about 60% stenosis at the point of the origin of the  septal  branch. The distal LAD is diffusely diseased and becomes  small caliber artery. It is about 1.5 mm in diameter. Hardly reach the  apex.     The circumflex is the dominant artery and the circumflex gives rise to a  first obtuse marginal which gives rise to two large obtuse marginal  branches which were patent. Second obtuse marginal is small caliber artery  and patent. PDA was patent.     It was very hard to cannulate the left main as the patient could not lay  still and he had a very hard respiratory distress.     The patient does have severe stenosis at the ostium of moderate-sized  diagonal artery.     Some calcification of the proximal circumflex also was noticed.     The multiple catheters has to be utilized to be able to perform the  coronary angiogram.     The aortic root injection showed normal size of the aortic root 2+ AI.     The left ventriculogram showed distal anterior apical, apical inferior wall  hypokinesia, ejection fraction about 30-35%. The mean gradient across the  aortic valve was 46 mmHg, aortic valve area 0.5, end at 0.25, consistent  with severe aortic stenosis.     The intra-aortic balloon pump procedure was removed. There is diffuse  disease at iliac artery at the site of the entry not a candidate for  placement of Angio-Seal closure device.   In summary, patient with  moderately severe pulmonary hypertension.     Severe diastolic dysfunction of the left ventricle.     Systolic dysfunction of the left ventricle, ejection fraction about 32-35%  with distal anterior apical, apical inferior wall hypokinesia.     Aortic valve area 0.5 mmHg.     1+ AI, exacerbated by the aortic catheter replacement.     Total occlusion of the proximal RCA in the stented area.     Short left main patent.     Dominant circumflex with nonobstructive disease of the obtuse marginal

## 2020-05-29 ENCOUNTER — TELEPHONE (OUTPATIENT)
Dept: PHARMACY | Age: 80
End: 2020-05-29

## 2020-06-01 NOTE — TELEPHONE ENCOUNTER
Spoke with patient. He states he is next due for INR 6/15/20 and wants to personally come into the clinic. Scheduled for 11 am on 6/15/20.     Anais Delgado, PharmD, BCPS  6/1/2020  2:03 PM

## 2020-06-15 ENCOUNTER — HOSPITAL ENCOUNTER (OUTPATIENT)
Dept: PHARMACY | Age: 80
Setting detail: THERAPIES SERIES
Discharge: HOME | End: 2020-06-15
Payer: MEDICARE

## 2020-06-15 VITALS — TEMPERATURE: 98 F

## 2020-06-15 LAB — POC INR: 2.9 (ref 0.8–1.2)

## 2020-06-15 PROCEDURE — 36416 COLLJ CAPILLARY BLOOD SPEC: CPT

## 2020-06-15 PROCEDURE — 99213 OFFICE O/P EST LOW 20 MIN: CPT

## 2020-06-15 PROCEDURE — 85610 PROTHROMBIN TIME: CPT

## 2020-06-15 NOTE — PROGRESS NOTES
intake: ~3 servings/week, but it varies    Grapefruit/cranberry juice: None   Upcoming surgeries or procedures: None     Review of Systems    Objective    Vitals:    06/15/20 1104   Temp: 98 °F (36.7 °C)     There is no height or weight on file to calculate BMI. Wt Readings from Last 3 Encounters:   10/23/19 181 lb (82.1 kg)   06/26/19 189 lb (85.7 kg)   05/14/19 190 lb (86.2 kg)     Physical Exam    Assessment    Lab Results   Component Value Date    INR 2.90 (H) 06/15/2020    INR 1.0 05/12/2018    INR 1.0 05/11/2018    PROTIME 10.9 05/12/2018    PROTIME 11.1 05/11/2018    PROTIME 11.3 05/10/2018     INR therapeutic   Recent Labs     06/15/20  1114   INR 2.90*     Patient is therapeutic today on current regimen and states he has been stable on current dose for quite some time. Plan  Continue Coumadin 2.5 mg daily. Recheck INR 3 weeks. Patient reminded to call the Anticoagulation Clinic with any signs or symptoms of bleeding or with any medication changes. Patient given instructions utilizing the teach back method.  Printed patient teaching/instruction included with AVS.     Anais Delgado PharmD, BCPS  6/15/2020  11:35 AM    CLINICAL PHARMACY CONSULT: MED RECONCILIATION/REVIEW ADDENDUM    For Pharmacy Admin Tracking Only    PHSO: No  Total # of Interventions Recommended: 0  - New Therapy Lab Monitoring #: 1  Total Interventions Accepted: 0  Time Spent (min): 1154  [employer], PharmD

## 2020-06-17 LAB
ABSOLUTE BASO #: 0.1 X10E9/L (ref 0–0.9)
ABSOLUTE EOS #: 0.3 X10E9/L (ref 0–0.4)
ABSOLUTE LYMPH #: 1.5 X10E9/L (ref 1–3.5)
ABSOLUTE MONO #: 1.3 X10E9/L (ref 0–0.9)
ABSOLUTE NEUT #: 10.3 X10E9/L (ref 1.5–6.6)
ANION GAP SERPL CALCULATED.3IONS-SCNC: 9 MMOL/L (ref 5–15)
BASOPHILS RELATIVE PERCENT: 0.7 %
BUN BLDV-MCNC: 34 MG/DL (ref 5–27)
CALCIUM SERPL-MCNC: 9.5 MG/DL (ref 8.5–10.5)
CHLORIDE BLD-SCNC: 101 MMOL/L (ref 98–109)
CO2: 28 MMOL/L (ref 22–32)
CREAT SERPL-MCNC: 1.93 MG/DL (ref 0.6–1.3)
EGFR AFRICAN AMERICAN: 41 ML/MIN/1.73SQ.M
EGFR IF NONAFRICAN AMERICAN: 34 ML/MIN/1.73SQ.M
EOSINOPHILS RELATIVE PERCENT: 2 %
GLUCOSE: 91 MG/DL (ref 65–99)
HCT VFR BLD CALC: 41.3 % (ref 36–53)
HEMOGLOBIN: 13.4 G/DL (ref 12.6–17.4)
IRON SATURATION: 30 % SATURATION (ref 20–50)
IRON, SERUM: 88 UG/DL (ref 50–212)
LYMPHOCYTE %: 10.9 %
MCH RBC QN AUTO: 31.6 PG (ref 27–35)
MCHC RBC AUTO-ENTMCNC: 32.5 G/DL (ref 31–36)
MCV RBC AUTO: 97 FL (ref 81–101)
MONOCYTES # BLD: 10 %
NEUTROPHILS RELATIVE PERCENT: 76.4 %
PDW BLD-RTO: 14.9 % (ref 11.4–14.3)
PLATELETS: 196 X10E9/L (ref 150–450)
PMV BLD AUTO: 10.6 FL (ref 7–12)
POTASSIUM SERPL-SCNC: 4.3 MMOL/L (ref 3.5–5)
RBC: 4.24 X10E12/L (ref 3.3–5.5)
SODIUM BLD-SCNC: 138 MMOL/L (ref 134–146)
TOTAL IRON BINDING CAPACITY: 297 UG/DL (ref 250–425)
WBC: 13.5 X10E9/L (ref 4.4–12)

## 2020-07-02 PROBLEM — Z79.01 ANTICOAGULATED ON COUMADIN: Status: ACTIVE | Noted: 2020-07-02

## 2020-07-06 ENCOUNTER — HOSPITAL ENCOUNTER (OUTPATIENT)
Dept: PHARMACY | Age: 80
Setting detail: THERAPIES SERIES
Discharge: HOME | End: 2020-07-06
Payer: MEDICARE

## 2020-07-06 ENCOUNTER — OFFICE VISIT (OUTPATIENT)
Dept: NEPHROLOGY | Age: 80
End: 2020-07-06
Payer: MEDICARE

## 2020-07-06 VITALS
HEIGHT: 67 IN | OXYGEN SATURATION: 97 % | DIASTOLIC BLOOD PRESSURE: 82 MMHG | TEMPERATURE: 98.4 F | BODY MASS INDEX: 31.39 KG/M2 | WEIGHT: 200 LBS | SYSTOLIC BLOOD PRESSURE: 138 MMHG | HEART RATE: 69 BPM

## 2020-07-06 VITALS — TEMPERATURE: 97.6 F

## 2020-07-06 LAB — POC INR: 2.5 (ref 0.8–1.2)

## 2020-07-06 PROCEDURE — 1036F TOBACCO NON-USER: CPT | Performed by: INTERNAL MEDICINE

## 2020-07-06 PROCEDURE — 85610 PROTHROMBIN TIME: CPT

## 2020-07-06 PROCEDURE — 99211 OFF/OP EST MAY X REQ PHY/QHP: CPT

## 2020-07-06 PROCEDURE — G8427 DOCREV CUR MEDS BY ELIG CLIN: HCPCS | Performed by: INTERNAL MEDICINE

## 2020-07-06 PROCEDURE — G8417 CALC BMI ABV UP PARAM F/U: HCPCS | Performed by: INTERNAL MEDICINE

## 2020-07-06 PROCEDURE — 1123F ACP DISCUSS/DSCN MKR DOCD: CPT | Performed by: INTERNAL MEDICINE

## 2020-07-06 PROCEDURE — 36416 COLLJ CAPILLARY BLOOD SPEC: CPT

## 2020-07-06 PROCEDURE — 99213 OFFICE O/P EST LOW 20 MIN: CPT | Performed by: INTERNAL MEDICINE

## 2020-07-06 PROCEDURE — 4040F PNEUMOC VAC/ADMIN/RCVD: CPT | Performed by: INTERNAL MEDICINE

## 2020-07-06 NOTE — PROGRESS NOTES
Medication Management [address]   [phone] (phone)  [phone] (fax)      Mr. Enmanuel Spence is a 79 y.o.  male with history of Afib who presents today for anticoagulation monitoring and adjustment. Patient verifies current dosing regimen and tablet strength. No missed or extra doses. Patient denies s/s bleeding/bruising/swelling/SOB/chest pain  No blood in urine or stool. No dietary changes. No changes in medication/OTC agents/Herbals. No change in alcohol use or tobacco use. No change in activity level. Patient denies headaches/dizziness/lightheadedness/falls. No vomiting/diarrhea or acute illness. No Procedures scheduled in the future at this time. Assessment:   Lab Results   Component Value Date    INR 2.50 (H) 07/06/2020    INR 2.90 (H) 06/15/2020    INR 1.0 05/12/2018    PROTIME 10.9 05/12/2018    PROTIME 11.1 05/11/2018    PROTIME 11.3 05/10/2018     INR therapeutic   Recent Labs     07/06/20  0815   INR 2.50*     Patient interview completed and discussed with pharmacist by Josesito Guzman PharmD Candidate     Patient presents with second consecutive therapeutic INR since switching to [facility]. Plan:  Continue Coumadin 2.5 mg daily. Recheck INR in 4 weeks. Patient reminded to call the Anticoagulation Clinic with any signs or symptoms of bleeding or with any medication changes. Patient given instructions utilizing the teach back method. Discharged ambulatory in no apparent distress. After visit summary printed and reviewed with patient.       Medications reviewed and updated on home medication list Yes    Influenza vaccine:     [] given    [x] declined   [x] received previously   [] plans to receive at a later time   [] refused    [x] documented in [address] [place] [address], Ye, BCPS  7/6/2020  8:33 AM    CLINICAL PHARMACY CONSULT: MED RECONCILIATION/REVIEW ADDENDUM    For Pharmacy Admin Tracking Only    PHSO: No  Total # of Interventions Recommended: 0  - Maintenance Safety Lab Monitoring #: 1  Total Interventions Accepted: 0  Time Spent (min): 0405  [facility] [address], PharmD

## 2020-07-06 NOTE — PROGRESS NOTES
[facility]    [address]  [address], [address]  [phone]       Progress Note    7/6/2020 9:53 AM    Pt Name:    Eusebio Hernandez:    1940  Primary Care Physician:  Davon Prieto MD       Chief Complaint:   Chief Complaint   Patient presents with    Chronic Kidney Disease    Anemia    Hypertension    Other     acute kidney injury        History of Chief Complaint: recovery from MILLY and CKD stage G-IIIB-A1 from HTN     Subjective:  I last saw the patient in clinic 10/23/19. I follow the patient for Chronic Kidney disease stage G-IIIB-A1. Since our last visit the patient has not been hospitalized. The patient is sleeping poorly at night with 1-2 times per night nocturia. The patient has a good appetite and is remaining active. The patient denied N/V/C/D/SOB/CP. He has no trouble at bladder emptying. Iron data are normal.     Protein/creatinine ratio:     EGFR=34 ml/min    Last six eGFR readings:  Lab Results   Component Value Date    LABGLOM 29 05/14/2019    LABGLOM 26 03/07/2019    LABGLOM 31 12/11/2018    LABGLOM 28 10/30/2018    LABGLOM 29 07/23/2018    LABGLOM 37 06/13/2018          Objective:  VITALS:  /82 (Site: Left Upper Arm, Position: Sitting, Cuff Size: Small Adult)   Pulse 69   Temp 98.4 °F (36.9 °C)   Ht 5' 7\" (1.702 m)   Wt 200 lb (90.7 kg)   SpO2 97%   BMI 31.32 kg/m²   Weight:   Wt Readings from Last 3 Encounters:   07/06/20 200 lb (90.7 kg)   10/23/19 181 lb (82.1 kg)   06/26/19 189 lb (85.7 kg)     Body mass index is 31.32 kg/m². Physical examination    General:  Alert and cooperative with exam  HEENT:  Normocephalic. No lesions nor rashes. WENDY. EOMI  Neck:   No JVD and no bruits. Thyroid gland is normal  Lungs:  Breathing easily. No rales nor rhonchi. No cough nor sputum production. Heart[de-identified]            RRR. No murmurs nor rubs. PMI is not enlarged nor displaced. Abdomen:  Soft and non tender.  Bowel sounds are active in all four quadrants. Extremities:  No edema  Neurologic:  CN II-XII are intact. No deficits noted. Muscle strength and tone are equal throughout. Skin:                Warm and dry with no rashes. Muscles:         Hand  and leg strength are equal and strong bilaterally.      Lab Data      CBC:   Lab Results   Component Value Date    WBC 13.5 (H) 06/16/2020    HGB 13.4 06/16/2020    HCT 41.3 06/16/2020    MCV 97 06/16/2020     06/16/2020     BMP:    Lab Results   Component Value Date     06/16/2020     10/08/2019     06/14/2019    K 4.3 06/16/2020    K 3.8 10/08/2019    K 4.1 06/14/2019     06/16/2020     10/08/2019     06/14/2019    CO2 28 06/16/2020    CO2 29 10/08/2019    CO2 28 06/14/2019    BUN 34 (H) 06/16/2020    BUN 35 (H) 10/08/2019    BUN 31 (H) 06/14/2019    CREATININE 1.93 (H) 06/16/2020    CREATININE 2.11 (H) 10/08/2019    CREATININE 1.78 (H) 06/14/2019    GLUCOSE 91 06/16/2020    GLUCOSE 103 (H) 10/08/2019    GLUCOSE 116 (H) 06/14/2019      Hepatic:   Lab Results   Component Value Date    AST 23 05/14/2019    AST 22 02/12/2019    AST 29 04/14/2018    ALT 18 05/14/2019    ALT 21 02/12/2019    ALT 37 04/14/2018    BILITOT 0.6 05/14/2019    BILITOT 0.7 02/12/2019    BILITOT 0.82 05/10/2018    ALKPHOS 86 05/14/2019    ALKPHOS 101 02/12/2019    ALKPHOS 98 04/14/2018     BNP: No results found for: BNP  Lipids:   Lab Results   Component Value Date    CHOL 135 02/12/2019    HDL 25.4 (L) 02/12/2019     INR:   Lab Results   Component Value Date    INR 2.50 (H) 07/06/2020    INR 2.90 (H) 06/15/2020    INR 1.0 05/12/2018     URINE: No results found for: NAUR, PROTUR  Lab Results   Component Value Date    NITRU NEGATIVE 04/13/2018    COLORU YELLOW 04/13/2018    PHUR 6.0 04/13/2018    WBCUA 5-10 04/13/2018    RBCUA 3-5 04/13/2018    YEAST NONE SEEN 04/13/2018    BACTERIA NONE 04/13/2018    LEUKOCYTESUR SMALL 04/13/2018    UROBILINOGEN 0.2 04/13/2018    BILIRUBINUR NEGATIVE 04/13/2018    BLOODU MODERATE 04/13/2018    GLUCOSEU NEGATIVE 04/13/2018    KETUA NEGATIVE 04/13/2018      Microalbumen/Creatinine ratio:  No components found for: RUCREAT        Medications:    Current Outpatient Medications   Medication Sig Dispense Refill    warfarin (COUMADIN) 2.5 MG tablet Take 1 tablet by mouth daily 10 tablet 0    losartan (COZAAR) 25 MG tablet Take 25 mg by mouth daily      albuterol (ACCUNEB) 1.25 MG/3ML nebulizer solution Inhale 1 ampule into the lungs every 6 hours as needed for Wheezing      metFORMIN (GLUCOPHAGE) 500 MG tablet Take 500 mg by mouth 2 times daily (with meals)       bumetanide (BUMEX) 1 MG tablet Take 1 mg by mouth daily       warfarin (COUMADIN) 5 MG tablet Take 1 tablet by mouth daily 30 tablet 3    vitamin D (CHOLECALCIFEROL) 1000 UNIT TABS tablet Take 1 tablet by mouth daily      atorvastatin (LIPITOR) 80 MG tablet Take 80 mg by mouth nightly      metoprolol succinate (TOPROL XL) 50 MG extended release tablet Take 50 mg by mouth daily        No current facility-administered medications for this visit. IMPRESSIONS:        Kidney disease: CKD stage G-IIIA-A1  Anemia: Anemia remains stable. No need for an erythrocyte stimulating agent (BRICE). Bone and mineral metabolism: He has no bone pain. PLAN:  1. We discussed the eGFR today. 2. We will continue all current medications without changes. 3. We will see the patient back in 4 months.               _________________________________  Jaspreet Johns.  Mario Crow  [facility]      Chriss Thomas MD

## 2020-07-06 NOTE — PATIENT INSTRUCTIONS

## 2020-08-03 ENCOUNTER — HOSPITAL ENCOUNTER (OUTPATIENT)
Dept: PHARMACY | Age: 80
Setting detail: THERAPIES SERIES
Discharge: HOME | End: 2020-08-03
Payer: MEDICARE

## 2020-08-03 VITALS — TEMPERATURE: 97.9 F

## 2020-08-03 PROBLEM — J44.9 COPD (CHRONIC OBSTRUCTIVE PULMONARY DISEASE) (HCC): Status: ACTIVE | Noted: 2020-08-03

## 2020-08-03 PROBLEM — I48.91 ATRIAL FIBRILLATION (HCC): Status: ACTIVE | Noted: 2020-08-03

## 2020-08-03 PROBLEM — Z95.2 S/P TAVR (TRANSCATHETER AORTIC VALVE REPLACEMENT): Status: ACTIVE | Noted: 2018-06-15

## 2020-08-03 PROBLEM — I05.9 MITRAL VALVE DISEASE: Status: ACTIVE | Noted: 2020-08-03

## 2020-08-03 LAB — POC INR: 2.2 (ref 0.8–1.2)

## 2020-08-03 PROCEDURE — 99211 OFF/OP EST MAY X REQ PHY/QHP: CPT

## 2020-08-03 PROCEDURE — 85610 PROTHROMBIN TIME: CPT

## 2020-08-03 PROCEDURE — 36416 COLLJ CAPILLARY BLOOD SPEC: CPT

## 2020-08-03 RX ORDER — CALCIUM CITRATE/VITAMIN D3 200MG-6.25
TABLET ORAL
COMMUNITY
Start: 2020-07-21 | End: 2022-10-19

## 2020-08-03 RX ORDER — ISOPROPYL ALCOHOL 0.75 G/1
SWAB TOPICAL
COMMUNITY
Start: 2020-07-21

## 2020-08-03 RX ORDER — GLUCOSAM/CHON-MSM1/C/MANG/BOSW 500-416.6
TABLET ORAL
COMMUNITY
Start: 2020-07-21

## 2020-08-03 NOTE — PROGRESS NOTES
Medication Management [address] [place] [address]  [phone] (phone)  [phone] (fax)      Mr. Luci Nj is a 79 y.o.  male with history of atrial fib. , per Dr. Jarocho Davis referral, who presents today for Warfarin monitoring and adjustment (4 week visit). Patient verifies current dosing regimen and tablet strength. No missed or extra doses. Patient denies bleeding/bruising/chest pain. Had some SOB with carrying water into house. Had slight swelling of legs last night. No blood in urine or stool. Dietary changes: had more salad. No changes in medication/OTC agents/herbals. No change in alcohol use or tobacco use. No change in activity level. Patient denies headaches/dizziness/lightheadedness/falls. No vomiting/diarrhea or acute illness. No procedures scheduled in the future at this time. Assessment:   Lab Results   Component Value Date    INR 2.20 (H) 08/03/2020    INR 2.50 (H) 07/06/2020    INR 2.90 (H) 06/15/2020    PROTIME 10.9 05/12/2018    PROTIME 11.1 05/11/2018    PROTIME 11.3 05/10/2018     INR therapeutic - goal 2-3. Recent Labs     08/03/20  0917   INR 2.20*     Plan:  POCT INR ordered/performed/result reviewed. Continue Coumadin 2.5 mg daily PO. Recheck INR in 4.5 weeks; patient requested 9/3 - sees Dr. Finis Galeazzi that day. Patient reminded to call the Anticoagulation Clinic with any signs or symptoms of bleeding or with any medication changes. Patient given instructions utilizing the teach back method. Discharged ambulatory in no apparent distress, wearing mask. After visit summary printed and reviewed with patient.       Medications reviewed and updated on home medication list.    Influenza vaccine:     [] given    [] declined   [] received previously   [] plans to receive at a later time   [] refused    [] documented in EPIC

## 2020-09-03 ENCOUNTER — HOSPITAL ENCOUNTER (OUTPATIENT)
Dept: PHARMACY | Age: 80
Setting detail: THERAPIES SERIES
Discharge: HOME | End: 2020-09-03
Payer: MEDICARE

## 2020-09-03 VITALS — TEMPERATURE: 97.5 F

## 2020-09-03 LAB — POC INR: 2.1 (ref 0.8–1.2)

## 2020-09-03 PROCEDURE — 36416 COLLJ CAPILLARY BLOOD SPEC: CPT | Performed by: PHARMACIST

## 2020-09-03 PROCEDURE — 85610 PROTHROMBIN TIME: CPT | Performed by: PHARMACIST

## 2020-09-03 PROCEDURE — 99211 OFF/OP EST MAY X REQ PHY/QHP: CPT | Performed by: PHARMACIST

## 2020-09-03 NOTE — PROGRESS NOTES
Medication Management [address]  [phone] (phone)  [phone] (fax)      Mr. Maged Murdock is a 79 y.o.  male with history of Afib who presents today for anticoagulation monitoring and adjustment. Patient verifies current dosing regimen and tablet strength. No missed or extra doses. Patient denies s/s bleeding/bruising/swelling/SOB/chest pain  No blood in urine or stool. No dietary changes. No changes in medication/OTC agents/Herbals. No change in alcohol use or tobacco use. No change in activity level. Patient denies headaches/dizziness/lightheadedness/falls. No vomiting/diarrhea or acute illness. No Procedures scheduled in the future at this time. Assessment:   Lab Results   Component Value Date    INR 2.10 (H) 09/03/2020    INR 2.20 (H) 08/03/2020    INR 2.50 (H) 07/06/2020    PROTIME 10.9 05/12/2018    PROTIME 11.1 05/11/2018    PROTIME 11.3 05/10/2018     INR therapeutic   Recent Labs     09/03/20  0910   INR 2.10*       Plan:  Continue Coumadin 2.5mg daily. Recheck INR in 4 week(s). Patient reminded to call the Anticoagulation Clinic with any signs or symptoms of bleeding or with any medication changes. Patient given instructions utilizing the teach back method. Discharged ambulatory in no apparent distress. After visit summary printed and reviewed with patient.       Medications reviewed and updated on home medication list Yes    Influenza vaccine:     [] given    [x] declined   [] received previously   [] plans to receive at a later time   [x] refused    [] documented in [facility]: [address]: No  Total # of Interventions Recommended: 1  - Maintenance Safety Lab Monitoring #: 1  Total Interventions Accepted: 1  Time Spent (min): Rico Waiterad [address] Cassie Leggett, PharmD

## 2020-10-01 ENCOUNTER — HOSPITAL ENCOUNTER (OUTPATIENT)
Dept: PHARMACY | Age: 80
Setting detail: THERAPIES SERIES
Discharge: HOME | End: 2020-10-01
Payer: MEDICARE

## 2020-10-01 VITALS — TEMPERATURE: 97.6 F

## 2020-10-01 LAB — POC INR: 1.8 (ref 0.8–1.2)

## 2020-10-01 PROCEDURE — 36416 COLLJ CAPILLARY BLOOD SPEC: CPT

## 2020-10-01 PROCEDURE — 85610 PROTHROMBIN TIME: CPT

## 2020-10-01 PROCEDURE — 99211 OFF/OP EST MAY X REQ PHY/QHP: CPT

## 2020-10-01 NOTE — PROGRESS NOTES
Medication Management [address]  [phone] (phone)  [phone] (fax)      Mr. Sandy Stewart is a 79 y.o.  male with history of atrial fib. , per Dr. Rian Mercado referral, who presents today for Warfarin monitoring and adjustment (4 week visit). Patient verifies current dosing regimen and tablet strength. Uses pill box. No extra doses. Missed dose 3 nights ago - reminded to call this clinic next business day for instructions. Patient denies bleeding/chest pain. Has usual slight swelling of legs at times/easy bruising. Got SOB once when he overdid it. No blood in urine or stool. No dietary changes. No changes in medication/OTC agents/herbals. No change in alcohol use or tobacco use. No change in activity level. Had more stress with brother's death. Patient denies headaches/dizziness/lightheadedness/falls. No vomiting/diarrhea or acute illness. No procedures scheduled in the future at this time. Assessment:   Lab Results   Component Value Date    INR 1.80 (H) 10/01/2020    INR 2.10 (H) 09/03/2020    INR 2.20 (H) 08/03/2020    PROTIME 10.9 05/12/2018    PROTIME 11.1 05/11/2018    PROTIME 11.3 05/10/2018     INR subtherapeutic - goal 2-3. Recent Labs     10/01/20  0948   INR 1.80*     Plan:  POCT INR ordered/performed/result reviewed. 5 mg today, then continue Coumadin 2.5 mg daily PO. Recheck INR in 4.5 week(s) - wanted to come same day as Dr. Josh Longoria visit (lives a distance away). (Report given - orders entered by Chato Monk., PharmD.)   Patient reminded to call the Anticoagulation Clinic with any signs or symptoms of bleeding or with any medication changes. Patient given instructions utilizing the teach back method. Discharged ambulatory in no apparent distress, wearing mask. After visit summary printed and reviewed with patient.       Medications reviewed and updated on home medication list.    Influenza vaccine:     [] given    [x] declined   [] received previously   [x] plans to receive at a later time   [] refused    [] documented in Epic

## 2020-10-21 LAB
ABSOLUTE BASO #: 0.1 X10E9/L (ref 0–0.2)
ABSOLUTE EOS #: 0.2 X10E9/L (ref 0–0.4)
ABSOLUTE LYMPH #: 1.7 X10E9/L (ref 1–3.5)
ABSOLUTE MONO #: 0.9 X10E9/L (ref 0–0.9)
ABSOLUTE NEUT #: 4.7 X10E9/L (ref 1.5–6.6)
ANION GAP SERPL CALCULATED.3IONS-SCNC: 12 MMOL/L (ref 5–15)
BASOPHILS RELATIVE PERCENT: 0.8 %
BUN BLDV-MCNC: 27 MG/DL (ref 5–27)
CALCIUM SERPL-MCNC: 9.1 MG/DL (ref 8.5–10.5)
CHLORIDE BLD-SCNC: 99 MMOL/L (ref 98–109)
CO2: 27 MMOL/L (ref 22–32)
CREAT SERPL-MCNC: 1.97 MG/DL (ref 0.6–1.3)
EGFR AFRICAN AMERICAN: 40 ML/MIN/1.73SQ.M
EGFR IF NONAFRICAN AMERICAN: 33 ML/MIN/1.73SQ.M
EOSINOPHILS RELATIVE PERCENT: 2.1 %
GLUCOSE: 108 MG/DL (ref 65–99)
HCT VFR BLD CALC: 42 % (ref 39–49)
HEMOGLOBIN: 13.9 G/DL (ref 13–17)
LYMPHOCYTE %: 22.9 %
MCH RBC QN AUTO: 32.1 PG (ref 27–34)
MCHC RBC AUTO-ENTMCNC: 33.2 G/DL (ref 32–36)
MCV RBC AUTO: 97 FL (ref 80–100)
MONOCYTES # BLD: 12.4 %
NEUTROPHILS RELATIVE PERCENT: 61.8 %
PDW BLD-RTO: 15.7 % (ref 11.5–15)
PLATELETS: 166 X10E9/L (ref 150–450)
PMV BLD AUTO: 10.2 FL (ref 7–12)
POTASSIUM SERPL-SCNC: 4.1 MMOL/L (ref 3.5–5)
RBC: 4.33 X10E12/L (ref 4.1–5.7)
SODIUM BLD-SCNC: 138 MMOL/L (ref 134–146)
WBC: 7.6 X10E9/L (ref 4–11)

## 2020-11-02 ENCOUNTER — HOSPITAL ENCOUNTER (OUTPATIENT)
Dept: PHARMACY | Age: 80
Setting detail: THERAPIES SERIES
Discharge: HOME | End: 2020-11-02
Payer: MEDICARE

## 2020-11-02 ENCOUNTER — OFFICE VISIT (OUTPATIENT)
Dept: NEPHROLOGY | Age: 80
End: 2020-11-02
Payer: MEDICARE

## 2020-11-02 VITALS
OXYGEN SATURATION: 95 % | WEIGHT: 201 LBS | RESPIRATION RATE: 18 BRPM | HEART RATE: 70 BPM | SYSTOLIC BLOOD PRESSURE: 135 MMHG | TEMPERATURE: 98.1 F | HEIGHT: 67 IN | DIASTOLIC BLOOD PRESSURE: 88 MMHG | BODY MASS INDEX: 31.55 KG/M2

## 2020-11-02 VITALS — TEMPERATURE: 98 F

## 2020-11-02 LAB — POC INR: 2.7 (ref 0.8–1.2)

## 2020-11-02 PROCEDURE — G8484 FLU IMMUNIZE NO ADMIN: HCPCS | Performed by: INTERNAL MEDICINE

## 2020-11-02 PROCEDURE — G8427 DOCREV CUR MEDS BY ELIG CLIN: HCPCS | Performed by: INTERNAL MEDICINE

## 2020-11-02 PROCEDURE — G8417 CALC BMI ABV UP PARAM F/U: HCPCS | Performed by: INTERNAL MEDICINE

## 2020-11-02 PROCEDURE — 99211 OFF/OP EST MAY X REQ PHY/QHP: CPT

## 2020-11-02 PROCEDURE — 1036F TOBACCO NON-USER: CPT | Performed by: INTERNAL MEDICINE

## 2020-11-02 PROCEDURE — 4040F PNEUMOC VAC/ADMIN/RCVD: CPT | Performed by: INTERNAL MEDICINE

## 2020-11-02 PROCEDURE — 85610 PROTHROMBIN TIME: CPT

## 2020-11-02 PROCEDURE — 99213 OFFICE O/P EST LOW 20 MIN: CPT | Performed by: INTERNAL MEDICINE

## 2020-11-02 PROCEDURE — 1123F ACP DISCUSS/DSCN MKR DOCD: CPT | Performed by: INTERNAL MEDICINE

## 2020-11-02 PROCEDURE — 36416 COLLJ CAPILLARY BLOOD SPEC: CPT

## 2020-11-02 RX ORDER — CIPROFLOXACIN 500 MG/1
500 TABLET, FILM COATED ORAL 2 TIMES DAILY
Qty: 10 TABLET | Refills: 0 | Status: SHIPPED | OUTPATIENT
Start: 2020-11-02 | End: 2020-11-07

## 2020-11-02 NOTE — PATIENT INSTRUCTIONS
KNOW YOUR KIDNEY NUMBERS    Your kidney speed (eGFR) was 37 ml/min this visit (normal is  ml/min)(Ml/min=milliliters of blood filtered per minute). The higher this number is, the better your kidney function is. Your serum creatinine was 1.97 (normal 0.8-1.2 mg/dl at most labs). The higher this number is, the worse your kidney function is. You are in stage G-IIIB-A1 of chronic kidney disease. Your kidney function has improved as compared to your last visit. Your last eGFR was  29 Ml/Min. Stages of kidney disease    EGFR (estimated glomerular filtration rate)    G-I > 90 ml/min Kidney damage with normal kidney function (blood or protein in the urine)  G-II 60-89 ml/min Normal kidney function with mild damage with or without blood or protein in the urine  G-IIIA 45-59 ml/min Mild to moderate loss of kidney function. G-IIIB 30-44 ml/min Moderate to severe loss of kidney function  G-IV 15-29 ml/min Severe loss of kidney function  G-V < 15 mlmin     May need dialysis or kidney transplant    ACR (urine albumin/creatinine ratio) (Mg/Gm)    A-1      ACR<30 Normal to mildly increased protein in the urine. A-2 ACR  Moderate increase in urine protein loss. A-3 ACR >300 Severe increase in urine protein loss    Our goal is to keep your eGFR going as fast as possible ( ml/min is normal). If your eGFR declines to 15-24 ml/min and stays there without recovery,  we will begin to educate you about dialysis or kidney transplant. We also want to keep the protein in your urine as low as possible. The leading cause of kidney disease in the world is diabetes mellitus. Keep your sugar  as much as possible. The second leading cause is hypertension. Keep Your blood pressure 120-140/70-80 as much as possible. If you need refills, call the office or your drug store. You may call the office any time with any questions or concerns.     DUE TO THE CORONAVIRUS CONCERN, PLEASE LIMIT YOUR TIME IN PUBLIC. [address] YOUR HANDS COMPLETELY AND FREQUENTLY. Melissa Julian

## 2020-11-02 NOTE — PROGRESS NOTES
Medication Management [address]  [phone] (phone)  [phone] (fax)      Mr. Sage Mathur is a 79 y.o.  male with history of Afib who presents today for anticoagulation monitoring and adjustment. Patient verifies current dosing regimen and tablet strength. No missed or extra doses. Patient denies s/s bleeding/bruising/swelling/SOB/chest pain  No blood in urine or stool. No dietary changes. No changes in medication/OTC agents/Herbals. No change in alcohol use or tobacco use. No change in activity level. Patient denies headaches/dizziness/lightheadedness/falls. No vomiting/diarrhea or acute illness. No Procedures scheduled in the future at this time. Assessment:   Lab Results   Component Value Date    INR 2.70 (H) 11/02/2020    INR 1.80 (H) 10/01/2020    INR 2.10 (H) 09/03/2020    PROTIME 10.9 05/12/2018    PROTIME 11.1 05/11/2018    PROTIME 11.3 05/10/2018     INR therapeutic   Recent Labs     11/02/20  0910   INR 2.70*     Patient has been therapeutic on current regimen since June 2020 with only one slightly subtherapeutic INR since that time. Patient requests a 4 week INR check. Plan:  Continue Coumadin 2.5 mg daily. Recheck INR in 4 week(s). Patient reminded to call the Anticoagulation Clinic with any signs or symptoms of bleeding or with any medication changes. Patient given instructions utilizing the teach back method. Discharged ambulatory in no apparent distress. After visit summary printed and reviewed with patient.       Medications reviewed and updated on home medication list Yes    Influenza vaccine:     [] given    [x] declined   [x] received previously   [] plans to receive at a later time   [] refused    [x] documented in [address]:  [facility] [address]: No  Total # of Interventions Recommended: 0  - Maintenance Safety Lab Monitoring #: 1  Total Interventions Accepted: 0  Time Spent (min): 8901  [facility], PharmD, BCPS  11/2/2020  10:07 AM

## 2020-11-02 NOTE — PROGRESS NOTES
[facility]    [address]  [address], [address]  [phone]       Progress Note    11/2/2020 9:54 AM    Pt Name:    Waleska Horne  YOB: 1940  Primary Care Physician:  Genaro Simon MD       Chief Complaint:   Chief Complaint   Patient presents with    Chronic Kidney Disease    Hypertension    Other     green sputum production-Bronchitis.  Obesity        History of Chief Complaint: recovery from MILLY and CKD stage G-IIIB-A1 from HTN     Subjective:  I last saw the patient in clinic 07/06/2020. I follow the patient for Chronic Kidney disease stage G-IIIB-A1. Since our last visit the patient has not been hospitalized. The patient is sleeping well at night with 1-2 times per night nocturia. The patient has a good appetite and is remaining active. The patient denied N/V/C/D/SOB/CP. He has no trouble at bladder emptying. COVID-19 screening  Fever: none  Cough: none  Exposure: none  Shortness of breath: none    Protein/creatinine ratio:   eGFR: 33 Ml/min      Last six eGFR readings:  Lab Results   Component Value Date    LABGLOM 29 05/14/2019    LABGLOM 26 03/07/2019    LABGLOM 31 12/11/2018    LABGLOM 28 10/30/2018    LABGLOM 29 07/23/2018    LABGLOM 37 06/13/2018          Objective:  VITALS:  /88 (Site: Left Lower Arm, Position: Sitting, Cuff Size: Large Adult)   Pulse 70   Temp 98.1 °F (36.7 °C)   Resp 18   Ht 5' 7\" (1.702 m)   Wt 201 lb (91.2 kg)   SpO2 95%   BMI 31.48 kg/m²   Weight:   Wt Readings from Last 3 Encounters:   11/02/20 201 lb (91.2 kg)   07/06/20 200 lb (90.7 kg)   10/23/19 181 lb (82.1 kg)     Body mass index is 31.48 kg/m². Physical examination    General:  Alert and cooperative with exam  HEENT:  Normocephalic. No lesions nor rashes. WENDY. EOMI  Neck:   No JVD and no bruits. Thyroid gland is normal  Lungs:  Breathing easily. No rales nor rhonchi. No cough nor sputum production. Heart[de-identified]            RRR.  No murmurs nor rubs. PMI is not enlarged nor displaced. Abdomen:  Soft and non tender. Bowel sounds are active in all four quadrants. Extremities:  1+ edema   Neurologic:  CN II-XII are intact. No deficits noted. Muscle strength and tone are equal throughout. Skin:                Warm and dry with no rashes. Muscles:         Hand  and leg strength are equal and strong bilaterally.      Lab Data      CBC:   Lab Results   Component Value Date    WBC 7.6 10/21/2020    HGB 13.9 10/21/2020    HCT 42.0 10/21/2020    MCV 97 10/21/2020     10/21/2020     BMP:    Lab Results   Component Value Date     10/21/2020     06/16/2020     10/08/2019    K 4.1 10/21/2020    K 4.3 06/16/2020    K 3.8 10/08/2019    CL 99 10/21/2020     06/16/2020     10/08/2019    CO2 27 10/21/2020    CO2 28 06/16/2020    CO2 29 10/08/2019    BUN 27 10/21/2020    BUN 34 (H) 06/16/2020    BUN 35 (H) 10/08/2019    CREATININE 1.97 (H) 10/21/2020    CREATININE 1.93 (H) 06/16/2020    CREATININE 2.11 (H) 10/08/2019    GLUCOSE 108 (H) 10/21/2020    GLUCOSE 91 06/16/2020    GLUCOSE 103 (H) 10/08/2019      Hepatic:   Lab Results   Component Value Date    AST 23 05/14/2019    AST 22 02/12/2019    AST 29 04/14/2018    ALT 18 05/14/2019    ALT 21 02/12/2019    ALT 37 04/14/2018    BILITOT 0.6 05/14/2019    BILITOT 0.7 02/12/2019    BILITOT 0.82 05/10/2018    ALKPHOS 86 05/14/2019    ALKPHOS 101 02/12/2019    ALKPHOS 98 04/14/2018     BNP: No results found for: BNP  Lipids:   Lab Results   Component Value Date    CHOL 135 02/12/2019    HDL 25.4 (L) 02/12/2019     INR:   Lab Results   Component Value Date    INR 2.70 (H) 11/02/2020    INR 1.80 (H) 10/01/2020    INR 2.10 (H) 09/03/2020     URINE: No results found for: Bill López  Lab Results   Component Value Date    NITRU NEGATIVE 04/13/2018    COLORU YELLOW 04/13/2018    PHUR 6.0 04/13/2018    WBCUA 5-10 04/13/2018    RBCUA 3-5 04/13/2018    YEAST NONE SEEN 04/13/2018    BACTERIA NONE 04/13/2018    LEUKOCYTESUR SMALL 04/13/2018    UROBILINOGEN 0.2 04/13/2018    BILIRUBINUR NEGATIVE 04/13/2018    BLOODU MODERATE 04/13/2018    GLUCOSEU NEGATIVE 04/13/2018    KETUA NEGATIVE 04/13/2018      Microalbumen/Creatinine ratio:  No components found for: RUCREAT        Medications:    Current Outpatient Medications   Medication Sig Dispense Refill    TRUEplus Lancets 28G MISC       TRUE METRIX BLOOD GLUCOSE TEST strip       Alcohol Swabs (B-D SINGLE USE SWABS REGULAR) PADS       warfarin (COUMADIN) 2.5 MG tablet Take 1 tablet by mouth daily 10 tablet 0    losartan (COZAAR) 25 MG tablet Take 25 mg by mouth daily      albuterol (ACCUNEB) 1.25 MG/3ML nebulizer solution Inhale 1 ampule into the lungs every 6 hours as needed for Wheezing      metFORMIN (GLUCOPHAGE) 500 MG tablet Take 500 mg by mouth 2 times daily (with meals)       bumetanide (BUMEX) 1 MG tablet Take 1 mg by mouth daily       vitamin D (CHOLECALCIFEROL) 1000 UNIT TABS tablet Take 1 tablet by mouth daily      atorvastatin (LIPITOR) 80 MG tablet Take 80 mg by mouth nightly      metoprolol succinate (TOPROL XL) 50 MG extended release tablet Take 50 mg by mouth daily        No current facility-administered medications for this visit. IMPRESSIONS:        Kidney disease: CKD stage G-IIIB-A1  Anemia: Anemia remains stable. No need for an erythrocyte stimulating agent (BRICE). Bone and mineral metabolism: There is no complaint of bone pain. bronchitis       PLAN:  1. We discussed the eGFR today. 2. We will continue all current medications without changes. 3. cipro 500 mg PO BID for 5 days  4. We will see the patient back in 4 months.       _________________________________  Génesis Wilkins.  Hailey Kapadia  [facility]      Darian Tenorio MD

## 2020-11-03 PROBLEM — I48.91 ATRIAL FIBRILLATION (HCC): Status: RESOLVED | Noted: 2020-08-03 | Resolved: 2020-11-03

## 2020-12-02 ENCOUNTER — HOSPITAL ENCOUNTER (OUTPATIENT)
Dept: PHARMACY | Age: 80
Setting detail: THERAPIES SERIES
Discharge: HOME | End: 2020-12-02
Payer: MEDICARE

## 2020-12-02 VITALS — TEMPERATURE: 97 F

## 2020-12-02 LAB — POC INR: 2.7 (ref 0.8–1.2)

## 2020-12-02 PROCEDURE — 36416 COLLJ CAPILLARY BLOOD SPEC: CPT

## 2020-12-02 PROCEDURE — 85610 PROTHROMBIN TIME: CPT

## 2020-12-02 PROCEDURE — 99211 OFF/OP EST MAY X REQ PHY/QHP: CPT

## 2020-12-02 RX ORDER — LEVOFLOXACIN 750 MG/1
750 TABLET ORAL DAILY
Qty: 10 TABLET | Refills: 0 | Status: SHIPPED | OUTPATIENT
Start: 2020-12-02 | End: 2020-12-12

## 2020-12-02 NOTE — PROGRESS NOTES
Medication Management [address]  [phone] (phone)  [phone] (fax)      Mr. James Isaacs is a 79 y.o.  male with history of Afib who presents today for anticoagulation monitoring and adjustment. Patient verifies current dosing regimen and tablet strength. No missed or extra doses. Patient denies s/s bleeding/bruising/swelling/SOB/chest pain  No blood in urine or stool. No dietary changes. No changes in medication/OTC agents/Herbals. No change in alcohol use or tobacco use. No change in activity level. Patient states that he is a little less active due to the pandemic and not being able to get out much. Patient denies headaches/dizziness/lightheadedness/falls. No vomiting/diarrhea or acute illness. No Procedures scheduled in the future at this time. Assessment:   Lab Results   Component Value Date    INR 2.70 (H) 12/02/2020    INR 2.70 (H) 11/02/2020    INR 1.80 (H) 10/01/2020    PROTIME 10.9 05/12/2018    PROTIME 11.1 05/11/2018    PROTIME 11.3 05/10/2018     INR therapeutic   Recent Labs     12/02/20  1011   INR 2.70*     Patient interview completed and discussed with pharmacist by Kwame Ortiz PharmD Candidate 2021      Plan:  Continue Coumadin 2.5 mg daily. Recheck INR in 4 week(s). Patient reminded to call the Anticoagulation Clinic with any signs or symptoms of bleeding or with any medication changes. Patient given instructions utilizing the teach back method. Discharged ambulatory in no apparent distress. After visit summary printed and reviewed with patient.       Medications reviewed and updated on home medication list Yes    Influenza vaccine:     [] given    [x] declined   [x] received previously   [] plans to receive at a later time   [] refused    [] documented in [address]:  [facility] [address]: No  Total # of Interventions Recommended: 1  - Maintenance Safety Lab Monitoring #: 1  Total Interventions Accepted: 1  Time Spent (min): 30    Addy Martinez PharmD  12/2/2020  10:39 AM

## 2020-12-30 ENCOUNTER — HOSPITAL ENCOUNTER (OUTPATIENT)
Dept: PHARMACY | Age: 80
Setting detail: THERAPIES SERIES
Discharge: HOME | End: 2020-12-30
Payer: MEDICARE

## 2020-12-30 VITALS — TEMPERATURE: 97.4 F

## 2020-12-30 PROBLEM — B35.1 ONYCHOMYCOSIS OF TOENAIL: Status: ACTIVE | Noted: 2020-12-30

## 2020-12-30 PROBLEM — E11.9 DIABETES MELLITUS, TYPE 2 (HCC): Status: ACTIVE | Noted: 2020-12-30

## 2020-12-30 PROBLEM — M79.676 PAIN OF TOE: Status: ACTIVE | Noted: 2020-12-30

## 2020-12-30 LAB — POC INR: 2.6 (ref 0.8–1.2)

## 2020-12-30 PROCEDURE — 85610 PROTHROMBIN TIME: CPT

## 2020-12-30 PROCEDURE — 36416 COLLJ CAPILLARY BLOOD SPEC: CPT

## 2020-12-30 PROCEDURE — 99211 OFF/OP EST MAY X REQ PHY/QHP: CPT

## 2020-12-30 NOTE — PROGRESS NOTES
Medication Management [address]   [phone] (phone)  [phone] (fax)      Mr. Sophy Peraza is a 79 y.o.  male with history of atrial fib. , per Dr. Alessandro Wagoner referral, who presents today for Warfarin  monitoring and adjustment (4 week visit). Patient verifies current dosing regimen and tablet strength. No missed or extra doses. Patient denies bleeding/bruising/swelling/SOB/chest pain. No blood in urine or stool. No dietary changes. No changes in medication/OTC agents/herbals. No change in alcohol use or tobacco use. No change in activity level. Patient denies headaches/dizziness/lightheadedness/falls. No vomiting/diarrhea or acute illness. No procedures scheduled in the future at this time. Assessment:   Lab Results   Component Value Date    INR 2.60 (H) 12/30/2020    INR 2.70 (H) 12/02/2020    INR 2.70 (H) 11/02/2020    PROTIME 10.9 05/12/2018    PROTIME 11.1 05/11/2018    PROTIME 11.3 05/10/2018     INR therapeutic - goal 2-3. Recent Labs     12/30/20  1005   INR 2.60*         Plan:  POCT INR ordered/performed/result reviewed. Continue Coumadin 2.5 mg daily PO. Recheck INR in 4 week(s). Patient reminded to call the Anticoagulation Clinic with any signs or symptoms of bleeding or with any medication changes. Patient given instructions utilizing the teach back method. Discharged ambulatory in no apparent distress, wearing mask. After visit summary printed and reviewed with patient.       Medications reviewed and updated on home medication list.    Influenza vaccine:     [] given    [x] declined   [x] received previously   [] plans to receive at a later time   [] refused    [x] documented in Epic

## 2021-01-27 ENCOUNTER — HOSPITAL ENCOUNTER (OUTPATIENT)
Dept: PHARMACY | Age: 81
Setting detail: THERAPIES SERIES
Discharge: HOME | End: 2021-01-27
Payer: MEDICARE

## 2021-01-27 VITALS — TEMPERATURE: 98.1 F

## 2021-01-27 DIAGNOSIS — I48.91 ATRIAL FIBRILLATION, UNSPECIFIED TYPE (HCC): ICD-10-CM

## 2021-01-27 LAB — POC INR: 2.7 (ref 0.8–1.2)

## 2021-01-27 PROCEDURE — 85610 PROTHROMBIN TIME: CPT

## 2021-01-27 PROCEDURE — 99211 OFF/OP EST MAY X REQ PHY/QHP: CPT

## 2021-01-27 PROCEDURE — 36416 COLLJ CAPILLARY BLOOD SPEC: CPT

## 2021-01-27 NOTE — PROGRESS NOTES
Medication Management [address]  [phone] (phone)  [phone] (fax)      Mr. Lydia Cotto is a 79 y.o.  male with history of atrial fib. , per Dr. Vitor Campos referral, who presents today for Warfarin monitoring and adjustment (4 week visit). Patient verifies current dosing regimen and tablet strength. No missed or extra doses. Patient denies bleeding/chest pain. Had SOB one day with overexertion. Has usual easy bruising. Had swelling in legs yesterday - had fast food; none today. No blood in urine or stool. No dietary changes. No changes in medication/OTC agents/herbals. No change in alcohol use or tobacco use. No change in activity level. Patient denies headaches/dizziness/lightheadedness/falls. No vomiting/diarrhea or acute illness. No procedures scheduled in the future at this time. Has pacemaker check at Dr. Vitor Campos office late March. Assessment:   Lab Results   Component Value Date    INR 2.70 (H) 01/27/2021    INR 2.60 (H) 12/30/2020    INR 2.70 (H) 12/02/2020    PROTIME 10.9 05/12/2018    PROTIME 11.1 05/11/2018    PROTIME 11.3 05/10/2018     INR therapeutic - goal 2-3. Recent Labs     01/27/21  1000   INR 2.70*       Plan:  POCT INR ordered/performed/result reviewed. Continue Coumadin 2.5 mg daily PO. Recheck INR in 5 week(s); same day as Dr. Adalgisa Caba visit - lives out of town. Patient reminded to call the Anticoagulation Clinic with any signs or symptoms of bleeding or with any medication changes. Patient given instructions utilizing the teach back method. Discharged ambulatory in no apparent distress, wearing mask. After visit summary printed and reviewed with patient.       Medications reviewed and updated on home medication list.    Influenza vaccine:     [] given    [x] declined   [x] received previously   [] plans to receive at a later time   [] refused    [x] documented in Epic

## 2021-02-10 ENCOUNTER — TELEPHONE (OUTPATIENT)
Dept: PHARMACY | Age: 81
End: 2021-02-10

## 2021-02-10 NOTE — TELEPHONE ENCOUNTER
Late Entry:  Had received e-mail from manager forwarding e-mail from Informatics team asking that this observer  delete erroneous SARS-CoV-2 vaccines 4/12/18 and 4/19/18. Saw patient 1/27/21 in Coumadin Clinic. Deleted as requested. Was later informed by e-mail it was a communication error between [facility] and [address] [employer] [employer].

## 2021-02-17 ENCOUNTER — NURSE ONLY (OUTPATIENT)
Dept: LAB | Age: 81
End: 2021-02-17

## 2021-02-17 DIAGNOSIS — N18.32 STAGE 3B CHRONIC KIDNEY DISEASE (HCC): ICD-10-CM

## 2021-02-17 LAB
ANION GAP SERPL CALCULATED.3IONS-SCNC: 10 MEQ/L (ref 8–16)
BASOPHILS # BLD: 0.5 %
BASOPHILS ABSOLUTE: 0 THOU/MM3 (ref 0–0.1)
BUN BLDV-MCNC: 23 MG/DL (ref 7–22)
CALCIUM SERPL-MCNC: 9.8 MG/DL (ref 8.5–10.5)
CHLORIDE BLD-SCNC: 100 MEQ/L (ref 98–111)
CO2: 28 MEQ/L (ref 23–33)
CREAT SERPL-MCNC: 2.1 MG/DL (ref 0.4–1.2)
EOSINOPHIL # BLD: 2.3 %
EOSINOPHILS ABSOLUTE: 0.2 THOU/MM3 (ref 0–0.4)
ERYTHROCYTE [DISTWIDTH] IN BLOOD BY AUTOMATED COUNT: 14.8 % (ref 11.5–14.5)
ERYTHROCYTE [DISTWIDTH] IN BLOOD BY AUTOMATED COUNT: 54.3 FL (ref 35–45)
GFR SERPL CREATININE-BSD FRML MDRD: 30 ML/MIN/1.73M2
GLUCOSE BLD-MCNC: 109 MG/DL (ref 70–108)
HCT VFR BLD CALC: 45.2 % (ref 42–52)
HEMOGLOBIN: 14.6 GM/DL (ref 14–18)
IMMATURE GRANS (ABS): 0.03 THOU/MM3 (ref 0–0.07)
IMMATURE GRANULOCYTES: 0.3 %
LYMPHOCYTES # BLD: 18 %
LYMPHOCYTES ABSOLUTE: 1.7 THOU/MM3 (ref 1–4.8)
MCH RBC QN AUTO: 32.2 PG (ref 26–33)
MCHC RBC AUTO-ENTMCNC: 32.3 GM/DL (ref 32.2–35.5)
MCV RBC AUTO: 99.8 FL (ref 80–94)
MONOCYTES # BLD: 13 %
MONOCYTES ABSOLUTE: 1.3 THOU/MM3 (ref 0.4–1.3)
NUCLEATED RED BLOOD CELLS: 0 /100 WBC
PLATELET # BLD: 195 THOU/MM3 (ref 130–400)
PMV BLD AUTO: 11.7 FL (ref 9.4–12.4)
POTASSIUM SERPL-SCNC: 4.4 MEQ/L (ref 3.5–5.2)
RBC # BLD: 4.53 MILL/MM3 (ref 4.7–6.1)
SEG NEUTROPHILS: 65.9 %
SEGMENTED NEUTROPHILS ABSOLUTE COUNT: 6.4 THOU/MM3 (ref 1.8–7.7)
SODIUM BLD-SCNC: 138 MEQ/L (ref 135–145)
WBC # BLD: 9.7 THOU/MM3 (ref 4.8–10.8)

## 2021-03-01 ENCOUNTER — HOSPITAL ENCOUNTER (OUTPATIENT)
Dept: PHARMACY | Age: 81
Setting detail: THERAPIES SERIES
Discharge: HOME | End: 2021-03-01
Payer: MEDICARE

## 2021-03-01 ENCOUNTER — OFFICE VISIT (OUTPATIENT)
Dept: NEPHROLOGY | Age: 81
End: 2021-03-01
Payer: MEDICARE

## 2021-03-01 VITALS — TEMPERATURE: 98.4 F

## 2021-03-01 VITALS
RESPIRATION RATE: 18 BRPM | HEART RATE: 70 BPM | OXYGEN SATURATION: 96 % | SYSTOLIC BLOOD PRESSURE: 114 MMHG | BODY MASS INDEX: 32.49 KG/M2 | HEIGHT: 67 IN | WEIGHT: 207 LBS | TEMPERATURE: 98.4 F | DIASTOLIC BLOOD PRESSURE: 79 MMHG

## 2021-03-01 DIAGNOSIS — I48.91 ATRIAL FIBRILLATION, UNSPECIFIED TYPE (HCC): ICD-10-CM

## 2021-03-01 DIAGNOSIS — N18.32 STAGE 3B CHRONIC KIDNEY DISEASE (HCC): Primary | ICD-10-CM

## 2021-03-01 LAB — POC INR: 2.6 (ref 0.8–1.2)

## 2021-03-01 PROCEDURE — 1123F ACP DISCUSS/DSCN MKR DOCD: CPT | Performed by: INTERNAL MEDICINE

## 2021-03-01 PROCEDURE — G8417 CALC BMI ABV UP PARAM F/U: HCPCS | Performed by: INTERNAL MEDICINE

## 2021-03-01 PROCEDURE — 4040F PNEUMOC VAC/ADMIN/RCVD: CPT | Performed by: INTERNAL MEDICINE

## 2021-03-01 PROCEDURE — 85610 PROTHROMBIN TIME: CPT

## 2021-03-01 PROCEDURE — 99211 OFF/OP EST MAY X REQ PHY/QHP: CPT

## 2021-03-01 PROCEDURE — G8427 DOCREV CUR MEDS BY ELIG CLIN: HCPCS | Performed by: INTERNAL MEDICINE

## 2021-03-01 PROCEDURE — 36416 COLLJ CAPILLARY BLOOD SPEC: CPT

## 2021-03-01 PROCEDURE — 99214 OFFICE O/P EST MOD 30 MIN: CPT | Performed by: INTERNAL MEDICINE

## 2021-03-01 PROCEDURE — G8484 FLU IMMUNIZE NO ADMIN: HCPCS | Performed by: INTERNAL MEDICINE

## 2021-03-01 PROCEDURE — 1036F TOBACCO NON-USER: CPT | Performed by: INTERNAL MEDICINE

## 2021-03-01 NOTE — PROGRESS NOTES
[facility]    [address] [facility]  [address], [address]  [phone]       Progress Note    3/1/2021 10:06 AM    Pt Name:    Sara Arriola:    1940  Primary Care Physician:  Ximena Chen MD       Chief Complaint:   Chief Complaint   Patient presents with    Chronic Kidney Disease    Hypertension    Obesity    Proteinuria    Diabetes    Nephropathy        History of Chief Complaint: recovery from MILLY and CKD stage G-IIIB-A1 from HTN and DM. Subjective:  I last saw the patient in clinic 11/02/2020. I follow the patient for Chronic Kidney disease stage G-IIIB-A1. Since our last visit the patient has not been hospitalized. The patient is sleeping fairly well at night with 1-2 times per night nocturia. The patient has a good appetite and is remaining active. The patient denied N/V/C/D/SOB/CP. He has no trouble at bladder emptying. He had bronchitis at his last visit that improved with Cipro. COVID-19 screening  Fever: none  Cough: none  Exposure: none  Shortness of breath: none    Protein/creatinine ratio:   eGFR: 30 Ml/min (it was 33 Ml/Min last visit)  SCr: 2.1 Mg/Dl (It was 2.0 Mg/Dl last visit)      Last six eGFR readings:  Lab Results   Component Value Date    LABGLOM 30 02/17/2021    LABGLOM 29 05/14/2019    LABGLOM 26 03/07/2019    LABGLOM 31 12/11/2018    LABGLOM 28 10/30/2018    LABGLOM 29 07/23/2018          Objective:  VITALS:  /79 (Site: Left Upper Arm, Position: Sitting, Cuff Size: Large Adult)   Pulse 70   Temp 98.4 °F (36.9 °C)   Resp 18   Ht 5' 7\" (1.702 m)   Wt 207 lb (93.9 kg)   SpO2 96%   BMI 32.42 kg/m²   Weight:   Wt Readings from Last 3 Encounters:   03/01/21 207 lb (93.9 kg)   11/02/20 201 lb (91.2 kg)   07/06/20 200 lb (90.7 kg)     Body mass index is 32.42 kg/m². Physical examination    General:  Alert and cooperative with exam  HEENT:  Normocephalic. No lesions nor rashes. WENDY.  EOMI  Neck:   No JVD and no 04/13/2018    COLORU YELLOW 04/13/2018    PHUR 6.0 04/13/2018    WBCUA 5-10 04/13/2018    RBCUA 3-5 04/13/2018    YEAST NONE SEEN 04/13/2018    BACTERIA NONE 04/13/2018    LEUKOCYTESUR SMALL 04/13/2018    UROBILINOGEN 0.2 04/13/2018    BILIRUBINUR NEGATIVE 04/13/2018    BLOODU MODERATE 04/13/2018    GLUCOSEU NEGATIVE 04/13/2018    KETUA NEGATIVE 04/13/2018      Microalbumen/Creatinine ratio:  No components found for: RUCREAT        Medications:    Current Outpatient Medications   Medication Sig Dispense Refill    TRUEplus Lancets 28G MISC       TRUE METRIX BLOOD GLUCOSE TEST strip       Alcohol Swabs (B-D SINGLE USE SWABS REGULAR) PADS       warfarin (COUMADIN) 2.5 MG tablet Take 1 tablet by mouth daily 10 tablet 0    losartan (COZAAR) 25 MG tablet Take 25 mg by mouth daily      albuterol (ACCUNEB) 1.25 MG/3ML nebulizer solution Inhale 1 ampule into the lungs every 6 hours as needed for Wheezing      metFORMIN (GLUCOPHAGE) 500 MG tablet Take 500 mg by mouth 2 times daily (with meals)       bumetanide (BUMEX) 1 MG tablet Take 1 mg by mouth daily       vitamin D (CHOLECALCIFEROL) 1000 UNIT TABS tablet Take 1 tablet by mouth daily      atorvastatin (LIPITOR) 80 MG tablet Take 80 mg by mouth nightly      metoprolol succinate (TOPROL XL) 50 MG extended release tablet Take 50 mg by mouth daily        No current facility-administered medications for this visit. IMPRESSIONS:        Kidney disease: CKD stage G-IIIB-A1  Anemia: Anemia remains stable. No need for an erythrocyte stimulating agent (BRICE). Bone and mineral metabolism: There is no complaint of bone pain. PLAN:  1. We discussed the eGFR today. 2. We will continue all current medications without changes. 3. We will see the patient back in 2 months.       _________________________________  Adonayry China Cuevas Sickle  [facility]      Alondra Arias MD

## 2021-03-01 NOTE — PATIENT INSTRUCTIONS

## 2021-03-01 NOTE — PROGRESS NOTES
Medication Management [address]   [phone] (phone)  [phone] (fax)      Mr. Anna Encinas is a 79 y.o.  male with history of atrial fib. , per Dr. Radhika Lott referral, who presents today for Warfarin  monitoring and adjustment (5 week visit). Patient verifies current dosing regimen and tablet strength. No extra doses. Missed dose 2/5; reminded to call this clinic. Patient denies bleeding/bruising/swelling/chest pain. Has SOB only if overexerts. No blood in urine or stool. No dietary changes. No changes in medication/OTC agents/herbals. No change in alcohol use or tobacco use. No change in activity level. Patient denies headaches/dizziness/lightheadedness/falls. No vomiting/diarrhea or acute illness. No procedures scheduled in the future at this time. Assessment:   Lab Results   Component Value Date    INR 2.60 (H) 03/01/2021    INR 2.70 (H) 01/27/2021    INR 2.60 (H) 12/30/2020    PROTIME 10.9 05/12/2018    PROTIME 11.1 05/11/2018    PROTIME 11.3 05/10/2018     INR therapeutic - goal 2-3. Recent Labs     03/01/21  0910   INR 2.60*       Plan:  POCT INR ordered/performed/result reviewed. Continue Coumadin 2.5 mg daily PO. Recheck INR in 5 week(s). Patient reminded to call the Anticoagulation Clinic with any signs or symptoms of bleeding or with any medication changes. Patient given instructions utilizing the teach back method. Discharged ambulatory in no apparent distress, wearing mask. Sees Dr. Arlene Au next. After visit summary printed and reviewed with patient.       Medications reviewed and updated on home medication list.    Influenza vaccine:     [] given    [x] declined   [x] received previously   [] plans to receive at a later time   [] refused    [x] documented in Epic

## 2021-04-05 ENCOUNTER — HOSPITAL ENCOUNTER (OUTPATIENT)
Dept: PHARMACY | Age: 81
Setting detail: THERAPIES SERIES
Discharge: HOME | End: 2021-04-05
Payer: MEDICARE

## 2021-04-05 DIAGNOSIS — Z51.81 ENCOUNTER FOR THERAPEUTIC DRUG MONITORING: ICD-10-CM

## 2021-04-05 DIAGNOSIS — Z79.01 ANTICOAGULATED ON COUMADIN: ICD-10-CM

## 2021-04-05 DIAGNOSIS — I48.91 ATRIAL FIBRILLATION, UNSPECIFIED TYPE (HCC): ICD-10-CM

## 2021-04-05 LAB — POC INR: 2.5 (ref 0.8–1.2)

## 2021-04-05 PROCEDURE — 85610 PROTHROMBIN TIME: CPT

## 2021-04-05 PROCEDURE — 36416 COLLJ CAPILLARY BLOOD SPEC: CPT

## 2021-04-05 PROCEDURE — 99211 OFF/OP EST MAY X REQ PHY/QHP: CPT

## 2021-04-05 NOTE — PROGRESS NOTES
Medication Management [address]  [phone] (phone)  [phone] (fax)      Mr. Dominique Breaux is a 79 y.o.  male with history of atrial fib. , per Dr. Elaine Jackson referral, who presents today for Warfarin  monitoring and adjustment (5 week visit). Patient verifies current dosing regimen and tablet strength. Uses pill box. No missed or extra doses. Patient denies bleeding/swelling/chest pain. Has usual easy bruising. Has noticed increased SOB, but starting to improve - using nebulizer and Mucinex. No blood in urine or stool. No dietary changes. Describes finishing a Z-david 3/26 - before that, on something 10 days that was 4 times/day. Stressed importance of calling this clinic with especially any antibiotics. No change in alcohol use or tobacco use. No change in activity level. Patient denies headaches/dizziness/lightheadedness/falls. No vomiting/diarrhea or acute illness. No procedures scheduled in the future at this time. Assessment:   Lab Results   Component Value Date    INR 2.50 (H) 04/05/2021    INR 2.60 (H) 03/01/2021    INR 2.70 (H) 01/27/2021    PROTIME 10.9 05/12/2018    PROTIME 11.1 05/11/2018    PROTIME 11.3 05/10/2018     INR therapeutic - goal 2-3. Recent Labs     04/05/21  1003   INR 2.50*     Plan:  POCT INR ordered/performed/result reviewed. Continue Coumadin 2.5 mg daily PO. Recheck INR in 4 week(s) - patient requested to come same day as Dr. Montes Lob visit (lives out of town). Patient reminded to call the Anticoagulation Clinic with any signs or symptoms of bleeding or with any medication changes. Patient given instructions utilizing the teach back method. Discharged ambulatory in no apparent distress, wearing mask. After visit summary printed and reviewed with patient.       Medications reviewed and updated on home medication list.    Influenza vaccine:     [] given    [x] declined   [x] received previously   [] plans to receive at a later time   [] refused    [x] documented in Epic

## 2021-04-22 ENCOUNTER — HOSPITAL ENCOUNTER (EMERGENCY)
Age: 81
Discharge: HOME | End: 2021-04-22
Attending: EMERGENCY MEDICINE
Payer: MEDICARE

## 2021-04-22 ENCOUNTER — APPOINTMENT (OUTPATIENT)
Dept: CT IMAGING | Age: 81
End: 2021-04-22
Payer: MEDICARE

## 2021-04-22 VITALS
WEIGHT: 205 LBS | OXYGEN SATURATION: 96 % | HEART RATE: 79 BPM | BODY MASS INDEX: 32.18 KG/M2 | TEMPERATURE: 97.8 F | RESPIRATION RATE: 17 BRPM | HEIGHT: 67 IN | DIASTOLIC BLOOD PRESSURE: 71 MMHG | SYSTOLIC BLOOD PRESSURE: 153 MMHG

## 2021-04-22 DIAGNOSIS — R04.2 HEMOPTYSIS: Primary | ICD-10-CM

## 2021-04-22 DIAGNOSIS — Z79.01 ANTICOAGULATED ON COUMADIN: ICD-10-CM

## 2021-04-22 DIAGNOSIS — N18.30 CHRONIC RENAL IMPAIRMENT, STAGE 3 (MODERATE), UNSPECIFIED WHETHER STAGE 3A OR 3B CKD (HCC): ICD-10-CM

## 2021-04-22 DIAGNOSIS — R77.8 ELEVATED TROPONIN: ICD-10-CM

## 2021-04-22 LAB
ALBUMIN SERPL-MCNC: 4.1 G/DL (ref 3.5–5.1)
ALP BLD-CCNC: 99 U/L (ref 38–126)
ALT SERPL-CCNC: 17 U/L (ref 11–66)
ANION GAP SERPL CALCULATED.3IONS-SCNC: 13 MEQ/L (ref 8–16)
APTT: 36.1 SECONDS (ref 22–38)
AST SERPL-CCNC: 26 U/L (ref 5–40)
BASOPHILS # BLD: 0.3 %
BASOPHILS ABSOLUTE: 0 THOU/MM3 (ref 0–0.1)
BILIRUB SERPL-MCNC: 0.5 MG/DL (ref 0.3–1.2)
BUN BLDV-MCNC: 42 MG/DL (ref 7–22)
CALCIUM SERPL-MCNC: 9.2 MG/DL (ref 8.5–10.5)
CHLORIDE BLD-SCNC: 99 MEQ/L (ref 98–111)
CO2: 26 MEQ/L (ref 23–33)
CREAT SERPL-MCNC: 2.1 MG/DL (ref 0.4–1.2)
EOSINOPHIL # BLD: 1.6 %
EOSINOPHILS ABSOLUTE: 0.2 THOU/MM3 (ref 0–0.4)
ERYTHROCYTE [DISTWIDTH] IN BLOOD BY AUTOMATED COUNT: 14.6 % (ref 11.5–14.5)
ERYTHROCYTE [DISTWIDTH] IN BLOOD BY AUTOMATED COUNT: 51.7 FL (ref 35–45)
GFR SERPL CREATININE-BSD FRML MDRD: 30 ML/MIN/1.73M2
GLUCOSE BLD-MCNC: 132 MG/DL (ref 70–108)
HCT VFR BLD CALC: 40.2 % (ref 42–52)
HEMOGLOBIN: 13 GM/DL (ref 14–18)
IMMATURE GRANS (ABS): 0.04 THOU/MM3 (ref 0–0.07)
IMMATURE GRANULOCYTES: 0.4 %
INR BLD: 2.62 (ref 0.85–1.13)
LYMPHOCYTES # BLD: 14.5 %
LYMPHOCYTES ABSOLUTE: 1.4 THOU/MM3 (ref 1–4.8)
MCH RBC QN AUTO: 31.4 PG (ref 26–33)
MCHC RBC AUTO-ENTMCNC: 32.3 GM/DL (ref 32.2–35.5)
MCV RBC AUTO: 97.1 FL (ref 80–94)
MONOCYTES # BLD: 11.4 %
MONOCYTES ABSOLUTE: 1.1 THOU/MM3 (ref 0.4–1.3)
NUCLEATED RED BLOOD CELLS: 0 /100 WBC
OSMOLALITY CALCULATION: 288 MOSMOL/KG (ref 275–300)
PLATELET # BLD: 172 THOU/MM3 (ref 130–400)
PMV BLD AUTO: 11.1 FL (ref 9.4–12.4)
POTASSIUM SERPL-SCNC: 4 MEQ/L (ref 3.5–5.2)
PRO-BNP: 1671 PG/ML (ref 0–1800)
RBC # BLD: 4.14 MILL/MM3 (ref 4.7–6.1)
SEG NEUTROPHILS: 71.8 %
SEGMENTED NEUTROPHILS ABSOLUTE COUNT: 7 THOU/MM3 (ref 1.8–7.7)
SODIUM BLD-SCNC: 138 MEQ/L (ref 135–145)
TOTAL PROTEIN: 7 G/DL (ref 6.1–8)
TROPONIN T: 0.02 NG/ML
WBC # BLD: 9.8 THOU/MM3 (ref 4.8–10.8)

## 2021-04-22 PROCEDURE — 71250 CT THORAX DX C-: CPT

## 2021-04-22 PROCEDURE — 85730 THROMBOPLASTIN TIME PARTIAL: CPT

## 2021-04-22 PROCEDURE — 83880 ASSAY OF NATRIURETIC PEPTIDE: CPT

## 2021-04-22 PROCEDURE — 85610 PROTHROMBIN TIME: CPT

## 2021-04-22 PROCEDURE — 36415 COLL VENOUS BLD VENIPUNCTURE: CPT

## 2021-04-22 PROCEDURE — 80053 COMPREHEN METABOLIC PANEL: CPT

## 2021-04-22 PROCEDURE — 85025 COMPLETE CBC W/AUTO DIFF WBC: CPT

## 2021-04-22 PROCEDURE — 99283 EMERGENCY DEPT VISIT LOW MDM: CPT

## 2021-04-22 PROCEDURE — 84484 ASSAY OF TROPONIN QUANT: CPT

## 2021-04-22 ASSESSMENT — ENCOUNTER SYMPTOMS
EYE PAIN: 0
WHEEZING: 0
VOMITING: 0
COUGH: 0
EYE ITCHING: 0
NAUSEA: 0
ABDOMINAL PAIN: 0
SORE THROAT: 0
BACK PAIN: 0
CHEST TIGHTNESS: 0
EYE REDNESS: 0
ABDOMINAL DISTENTION: 0
PHOTOPHOBIA: 0
SHORTNESS OF BREATH: 0
CONSTIPATION: 0
DIARRHEA: 0
STRIDOR: 0
EYE DISCHARGE: 0
RHINORRHEA: 0

## 2021-04-22 NOTE — ED NOTES
Pt arrives to the ED for a cough producing bloody sputum. Pt states he has had a cough for about a month now and has been coughing up green and gray sputum. Pt states he started coughing up bloody tinged sputum last night into this morning. Pt states this occurred about 3 times. Pt states he was concerned and decided to come in. Pt denies any sob, fever, chest pain or cough. Pt respirations are even and unlabored. Pt vitals are stable.  Pt has pace maker     Barbara Desai RN  04/22/21 9146

## 2021-04-22 NOTE — ED PROVIDER NOTES
[address] ENCOUNTER      PATIENT NAME: Singh Magaña  MRN: [id number]  : 1940  LESTER: 2021  PROVIDER: Omar Ross MD      CHIEF COMPLAINT     No chief complaint on file. Nurses Notes reviewed and I agree except as noted in the HPI. HISTORY OF PRESENT ILLNESS    Singh Magaña is a 79 y.o. male who presents to Emergency Department with No chief complaint on file. Onset: sudden  Location: At home  Quality: Hemoptysis  Radiation: No pain  Severity: Mild  Timing: Since midnight  Modifying factors: None  Duration: Resolved when he arrived in the ED  Context: Past medical history remarkable for CKD, atrial fibrillation, CVA, CHF, COPD, diabetes, hypertension, pacemaker status, hyperlipidemia, and morbid obesity. Patient is on Coumadin. Patient had three episodes cough with blood-tinged phlegm since midnight. Patient has no chest pain, no shortness of breath. No abdominal pain. No hematemesis. No melena. This HPI was provided by the patient. REVIEW OF SYSTEMS     Review of Systems   Constitutional: Negative for activity change, appetite change, chills, fatigue, fever and unexpected weight change. HENT: Negative for congestion, ear discharge, ear pain, hearing loss, nosebleeds, rhinorrhea and sore throat. Eyes: Negative for photophobia, pain, discharge, redness and itching. Respiratory: Negative for cough, chest tightness, shortness of breath, wheezing and stridor. Hemoptysis   Cardiovascular: Negative for chest pain, palpitations and leg swelling. Gastrointestinal: Negative for abdominal distention, abdominal pain, constipation, diarrhea, nausea and vomiting. Endocrine: Negative for cold intolerance, heat intolerance, polydipsia and polyphagia. Genitourinary: Negative for dysuria, flank pain, frequency and hematuria.    Musculoskeletal: Negative for arthralgias, back pain, gait problem, myalgias, neck pain and neck stiffness. Skin: Negative for pallor, rash and wound. Allergic/Immunologic: Negative for environmental allergies and food allergies. Neurological: Negative for dizziness, tremors, syncope, weakness and headaches. Psychiatric/Behavioral: Negative for agitation, behavioral problems, confusion, self-injury, sleep disturbance and suicidal ideas.         PAST MEDICAL HISTORY     Past Medical History:   Diagnosis Date    [facility]) [facility]     Atrial fibrillation ([facility]     Cerebral artery occlusion with cerebral infarction ([facility]     CHF (congestive heart failure), NYHA class III, acute on chronic, combined ([facility]     COPD (chronic obstructive pulmonary disease) ([facility] 8/3/2020    Coronary artery disease involving native coronary artery of native heart with angina pectoris ([facility]     Diabetes mellitus, type 2 ([facility] 12/30/2020    Hyperlipidemia 2/20/2012    Hypertension     Pneumonia        SURGICAL HISTORY       Past Surgical History:   Procedure Laterality Date    CARDIAC SURGERY      heart cath    CORONARY ANGIOPLASTY WITH STENT PLACEMENT      HERNIA REPAIR      OTHER SURGICAL HISTORY  05/10/2018    PACEMAKER INSERTION         CURRENT MEDICATIONS       Previous Medications    ALBUTEROL (ACCUNEB) 1.25 MG/3ML NEBULIZER SOLUTION    Inhale 1 ampule into the lungs every 6 hours as needed for Wheezing    ALCOHOL SWABS (B-D SINGLE USE SWABS REGULAR) PADS        ATORVASTATIN (LIPITOR) 80 MG TABLET    Take 80 mg by mouth nightly    BUMETANIDE (BUMEX) 1 MG TABLET    Take 1 mg by mouth daily     DEXTROMETHORPHAN-GUAIFENESIN (MUCINEX DM PO)    Take by mouth See Admin Instructions Indications: Respiratory Congestion    LOSARTAN (COZAAR) 25 MG TABLET    Take 25 mg by mouth daily    METFORMIN (GLUCOPHAGE) 500 MG TABLET    Take 500 mg by mouth 2 times daily (with meals)     METOPROLOL SUCCINATE (TOPROL XL) 50 MG EXTENDED RELEASE TABLET    Take 50 mg by mouth daily     TRUE METRIX BLOOD GLUCOSE TEST STRIP TRUEPLUS LANCETS 28G MISC        VITAMIN D (CHOLECALCIFEROL) 1000 UNIT TABS TABLET    Take 1 tablet by mouth daily    WARFARIN (COUMADIN) 2.5 MG TABLET    Take 1 tablet by mouth daily       ALLERGIES     Aspirin, Benzonatate, and Xanax [alprazolam]    FAMILY HISTORY     He indicated that his mother is . He indicated that his father is . Family history is unknown by patient. SOCIAL HISTORY      reports that he has quit smoking. His smoking use included cigarettes. He has a 50.00 pack-year smoking history. He has never used smokeless tobacco. He reports current alcohol use. He reports that he does not use drugs. PHYSICAL EXAM     INITIAL VITALS:    height is 5' 7\" (1.702 m) and weight is 205 lb (93 kg). His oral temperature is 97.8 °F (36.6 °C). His blood pressure is 153/71 (abnormal) and his pulse is 79. His respiration is 17 and oxygen saturation is 96%. Physical Exam  Vitals signs and nursing note reviewed. Constitutional:       Appearance: He is well-developed. He is not diaphoretic. HENT:      Head: Normocephalic and atraumatic. Nose: Nose normal.   Eyes:      General: No scleral icterus. Right eye: No discharge. Left eye: No discharge. Conjunctiva/sclera: Conjunctivae normal.      Pupils: Pupils are equal, round, and reactive to light. Neck:      Musculoskeletal: Normal range of motion and neck supple. Vascular: No JVD. Trachea: No tracheal deviation. Cardiovascular:      Rate and Rhythm: Normal rate and regular rhythm. Heart sounds: Normal heart sounds. No murmur. No friction rub. No gallop. Pulmonary:      Effort: Pulmonary effort is normal. No respiratory distress. Breath sounds: Normal breath sounds. No stridor. No wheezing or rales. Chest:      Chest wall: No tenderness. Abdominal:      General: Bowel sounds are normal. There is no distension. Palpations: Abdomen is soft. There is no mass.       Tenderness: There is no abdominal tenderness. There is no guarding or rebound. Hernia: No hernia is present. Musculoskeletal:         General: No tenderness or deformity. Lymphadenopathy:      Cervical: No cervical adenopathy. Skin:     General: Skin is warm and dry. Capillary Refill: Capillary refill takes less than 2 seconds. Coloration: Skin is not pale. Findings: No erythema or rash. Neurological:      Mental Status: He is alert and oriented to person, place, and time. Cranial Nerves: No cranial nerve deficit. Sensory: No sensory deficit. Motor: No abnormal muscle tone. Coordination: Coordination normal.      Deep Tendon Reflexes: Reflexes normal.   Psychiatric:         Behavior: Behavior normal.         Thought Content: Thought content normal.         Judgment: Judgment normal.         MEDICAL DEDISION MAKINGS:   3:11 AM: Patient is seen and evaluated in a timely fashion. ED nurse's documentations are reviewed. DIFFERENTIAL DIAGNOSIS:  Hemoptysis, bronchitis, pneumonia, anticoagulation side effect    EKG:    Interpreted by ED physician  None    LAB RESULTS:  Results for orders placed or performed during the hospital encounter of 04/22/21   CBC Auto Differential   Result Value Ref Range    WBC 9.8 4.8 - 10.8 thou/mm3    RBC 4.14 (L) 4.70 - 6.10 mill/mm3    Hemoglobin 13.0 (L) 14.0 - 18.0 gm/dl    Hematocrit 40.2 (L) 42.0 - 52.0 %    MCV 97.1 (H) 80.0 - 94.0 fL    MCH 31.4 26.0 - 33.0 pg    MCHC 32.3 32.2 - 35.5 gm/dl    RDW-CV 14.6 (H) 11.5 - 14.5 %    RDW-SD 51.7 (H) 35.0 - 45.0 fL    Platelets 361 726 - 618 thou/mm3    MPV 11.1 9.4 - 12.4 fL    Seg Neutrophils 71.8 %    Lymphocytes 14.5 %    Monocytes 11.4 %    Eosinophils 1.6 %    Basophils 0.3 %    Immature Granulocytes 0.4 %    Segs Absolute 7.0 1 - 7 thou/mm3    Lymphocytes Absolute 1.4 1.0 - 4.8 thou/mm3    Monocytes Absolute 1.1 0.4 - 1.3 thou/mm3    Eosinophils Absolute 0.2 0.0 - 0.4 thou/mm3    Basophils Absolute 0.0 0.0 - 0.1 thou/mm3    Immature Grans (Abs) 0.04 0.00 - 0.07 thou/mm3    nRBC 0 /100 wbc   Comprehensive Metabolic Panel   Result Value Ref Range    Glucose 132 (H) 70 - 108 mg/dL    CREATININE 2.1 (H) 0.4 - 1.2 mg/dL    BUN 42 (H) 7 - 22 mg/dL    Sodium 138 135 - 145 meq/L    Potassium 4.0 3.5 - 5.2 meq/L    Chloride 99 98 - 111 meq/L    CO2 26 23 - 33 meq/L    Calcium 9.2 8.5 - 10.5 mg/dL    AST 26 5 - 40 U/L    Alkaline Phosphatase 99 38 - 126 U/L    Total Protein 7.0 6.1 - 8.0 g/dL    Albumin 4.1 3.5 - 5.1 g/dL    Total Bilirubin 0.5 0.3 - 1.2 mg/dL    ALT 17 11 - 66 U/L   Troponin   Result Value Ref Range    Troponin T 0.020 (A) ng/ml   Brain Natriuretic Peptide   Result Value Ref Range    Pro-BNP 1671.0 0.0 - 1800.0 pg/mL   APTT   Result Value Ref Range    aPTT 36.1 22.0 - 38.0 seconds   Protime-INR   Result Value Ref Range    INR 2.62 (H) 0.85 - 1.13   Glomerular Filtration Rate, Estimated   Result Value Ref Range    Est, Glom Filt Rate 30 (A) ml/min/1.73m2   Osmolality   Result Value Ref Range    Osmolality Calc 288.0 275.0 - 300.0 mOsmol/kg   Anion Gap   Result Value Ref Range    Anion Gap 13.0 8.0 - 16.0 meq/L       RADIOLOGY  CT CHEST WO CONTRAST   Final Result   Impression:   No consolidation. Mild nonspecific interstitial disease. Mediastinal adenopathy decreasing previous. This is indeterminate. Other findings as above. This document has been electronically signed by: Kiko Astorga MD on    04/22/2021 04:33 AM      All CTs at this facility use dose modulation techniques and iterative    reconstructions, and/or weight-based dosing   when appropriate to reduce radiation to a low as reasonably achievable. RATIONALE:    Patient has stable hemodynamics. No more hemoptysis in the ED. He has no chest pain, no shortness of breath.     His lab results were reassuring: Hemoglobin 13 at his baseline, metabolic panel showed a stable stage III renal insufficiency with creatinine 2.1 at his baseline. Troponin 0.02 at his baseline. INR 2.62. CT chest with contrast: No consolidation. Mild nonspecific interstitial disease. Mediastinal adenopathy decreasing previous. This is indeterminate. Other findings as above. No fever, no chills, WBC 9.8, no purulent phlegm, low suspicion there is bacterail lung infection. I do not feel he needs antibiotics. Given that patient has no more hemoptysis in ED, patient has no chest pain, no shortness of breath, patient has  therapeutic INR, I still suggest the patient continue Coumadin due to his multiple risk factors including previous atrial fibrillation and CVA. Patient certainly should hold Coumadin if more episodes of hemoptysis occur. Otherwise I feel he can be discharged with cardiology follow-up within the next 2-3 days. CRITICAL CARE:   None    CONSULTS:  None    PROCEDURES:  None    RE-EXAMINATION AND RE-EVALUATION   Stable and feeling better. VITALS IN ED  Vitals:    04/22/21 0224   BP: (!) 153/71   Pulse: 79   Resp: 17   Temp: 97.8 °F (36.6 °C)   TempSrc: Oral   SpO2: 96%   Weight: 205 lb (93 kg)   Height: 5' 7\" (1.702 m)       FINAL IMPRESSION      1. Hemoptysis    2. Anticoagulated on Coumadin    3. Chronic renal impairment, stage 3 (moderate), unspecified whether stage 3a or 3b CKD    4.  Elevated troponin          DISPOSITION/PLAN   Home    PATIENT REFERRED TO:  MD Carmine Stroud  [address] [facility] [id number] [phone]    In 3 days  ED discharge follow up      [address]:  New Prescriptions    No medications on file       (Please note that portions of this note were completed with a voice recognition program.  Efforts were made to edit the dictations but occasionally words aremis-transcribed.)    MD Lavell Chua MD  04/22/21 5908

## 2021-05-03 ENCOUNTER — HOSPITAL ENCOUNTER (OUTPATIENT)
Dept: PHARMACY | Age: 81
Setting detail: THERAPIES SERIES
Discharge: HOME | End: 2021-05-03
Payer: MEDICARE

## 2021-05-03 DIAGNOSIS — Z79.01 ANTICOAGULATED ON COUMADIN: ICD-10-CM

## 2021-05-03 DIAGNOSIS — I48.91 ATRIAL FIBRILLATION, UNSPECIFIED TYPE (HCC): ICD-10-CM

## 2021-05-03 DIAGNOSIS — Z51.81 ENCOUNTER FOR THERAPEUTIC DRUG MONITORING: ICD-10-CM

## 2021-05-03 LAB — POC INR: 2.7 (ref 0.8–1.2)

## 2021-05-03 PROCEDURE — 85610 PROTHROMBIN TIME: CPT

## 2021-05-03 PROCEDURE — 99211 OFF/OP EST MAY X REQ PHY/QHP: CPT

## 2021-05-03 PROCEDURE — 36416 COLLJ CAPILLARY BLOOD SPEC: CPT

## 2021-05-04 ENCOUNTER — NURSE ONLY (OUTPATIENT)
Dept: LAB | Age: 81
End: 2021-05-04

## 2021-05-04 DIAGNOSIS — N18.32 STAGE 3B CHRONIC KIDNEY DISEASE (HCC): ICD-10-CM

## 2021-05-04 LAB
ANION GAP SERPL CALCULATED.3IONS-SCNC: 16 MEQ/L (ref 8–16)
BASOPHILS # BLD: 0.4 %
BASOPHILS ABSOLUTE: 0 THOU/MM3 (ref 0–0.1)
BUN BLDV-MCNC: 33 MG/DL (ref 7–22)
CALCIUM SERPL-MCNC: 9.8 MG/DL (ref 8.5–10.5)
CHLORIDE BLD-SCNC: 99 MEQ/L (ref 98–111)
CO2: 23 MEQ/L (ref 23–33)
CREAT SERPL-MCNC: 2.4 MG/DL (ref 0.4–1.2)
EOSINOPHIL # BLD: 2.2 %
EOSINOPHILS ABSOLUTE: 0.2 THOU/MM3 (ref 0–0.4)
ERYTHROCYTE [DISTWIDTH] IN BLOOD BY AUTOMATED COUNT: 14.6 % (ref 11.5–14.5)
ERYTHROCYTE [DISTWIDTH] IN BLOOD BY AUTOMATED COUNT: 55 FL (ref 35–45)
GFR SERPL CREATININE-BSD FRML MDRD: 26 ML/MIN/1.73M2
GLUCOSE BLD-MCNC: 159 MG/DL (ref 70–108)
HCT VFR BLD CALC: 44.7 % (ref 42–52)
HEMOGLOBIN: 14 GM/DL (ref 14–18)
IMMATURE GRANS (ABS): 0.04 THOU/MM3 (ref 0–0.07)
IMMATURE GRANULOCYTES: 0.4 %
LYMPHOCYTES # BLD: 18.6 %
LYMPHOCYTES ABSOLUTE: 1.7 THOU/MM3 (ref 1–4.8)
MCH RBC QN AUTO: 31.6 PG (ref 26–33)
MCHC RBC AUTO-ENTMCNC: 31.3 GM/DL (ref 32.2–35.5)
MCV RBC AUTO: 100.9 FL (ref 80–94)
MONOCYTES # BLD: 12.8 %
MONOCYTES ABSOLUTE: 1.1 THOU/MM3 (ref 0.4–1.3)
NUCLEATED RED BLOOD CELLS: 0 /100 WBC
PLATELET # BLD: 163 THOU/MM3 (ref 130–400)
PMV BLD AUTO: 12.1 FL (ref 9.4–12.4)
POTASSIUM SERPL-SCNC: 4.1 MEQ/L (ref 3.5–5.2)
RBC # BLD: 4.43 MILL/MM3 (ref 4.7–6.1)
SEG NEUTROPHILS: 65.6 %
SEGMENTED NEUTROPHILS ABSOLUTE COUNT: 5.8 THOU/MM3 (ref 1.8–7.7)
SODIUM BLD-SCNC: 138 MEQ/L (ref 135–145)
WBC # BLD: 8.9 THOU/MM3 (ref 4.8–10.8)

## 2021-05-07 ENCOUNTER — OFFICE VISIT (OUTPATIENT)
Dept: FAMILY MEDICINE CLINIC | Age: 81
End: 2021-05-07
Payer: MEDICARE

## 2021-05-07 VITALS
OXYGEN SATURATION: 100 % | WEIGHT: 211.4 LBS | RESPIRATION RATE: 18 BRPM | BODY MASS INDEX: 35.22 KG/M2 | HEIGHT: 65 IN | SYSTOLIC BLOOD PRESSURE: 135 MMHG | TEMPERATURE: 97.8 F | DIASTOLIC BLOOD PRESSURE: 86 MMHG | HEART RATE: 61 BPM

## 2021-05-07 DIAGNOSIS — Z79.01 ANTICOAGULATED ON COUMADIN: ICD-10-CM

## 2021-05-07 DIAGNOSIS — E11.9 TYPE 2 DIABETES MELLITUS WITHOUT COMPLICATION, WITHOUT LONG-TERM CURRENT USE OF INSULIN (HCC): ICD-10-CM

## 2021-05-07 DIAGNOSIS — I42.8 OTHER CARDIOMYOPATHY (HCC): ICD-10-CM

## 2021-05-07 DIAGNOSIS — I48.91 ATRIAL FIBRILLATION WITH RVR (HCC): ICD-10-CM

## 2021-05-07 DIAGNOSIS — I35.0 AORTIC STENOSIS, SEVERE: ICD-10-CM

## 2021-05-07 DIAGNOSIS — I50.43 CHF (CONGESTIVE HEART FAILURE), NYHA CLASS III, ACUTE ON CHRONIC, COMBINED (HCC): ICD-10-CM

## 2021-05-07 DIAGNOSIS — Z95.2 S/P TAVR (TRANSCATHETER AORTIC VALVE REPLACEMENT): ICD-10-CM

## 2021-05-07 DIAGNOSIS — I10 ESSENTIAL HYPERTENSION: ICD-10-CM

## 2021-05-07 DIAGNOSIS — I49.5 SSS (SICK SINUS SYNDROME) (HCC): ICD-10-CM

## 2021-05-07 DIAGNOSIS — I25.119 CORONARY ARTERY DISEASE INVOLVING NATIVE CORONARY ARTERY OF NATIVE HEART WITH ANGINA PECTORIS (HCC): ICD-10-CM

## 2021-05-07 DIAGNOSIS — J01.90 ACUTE RHINOSINUSITIS: Primary | ICD-10-CM

## 2021-05-07 PROBLEM — B35.1 ONYCHOMYCOSIS OF TOENAIL: Status: RESOLVED | Noted: 2020-12-30 | Resolved: 2021-05-07

## 2021-05-07 PROBLEM — M70.72 ILIOPSOAS BURSITIS OF LEFT HIP: Status: RESOLVED | Noted: 2018-04-15 | Resolved: 2021-05-07

## 2021-05-07 PROBLEM — J81.0 FLASH PULMONARY EDEMA (HCC): Status: RESOLVED | Noted: 2018-03-22 | Resolved: 2021-05-07

## 2021-05-07 PROBLEM — M79.676 PAIN OF TOE: Status: RESOLVED | Noted: 2020-12-30 | Resolved: 2021-05-07

## 2021-05-07 LAB — HBA1C MFR BLD: 6.8 % (ref 4.3–5.7)

## 2021-05-07 PROCEDURE — 1036F TOBACCO NON-USER: CPT | Performed by: FAMILY MEDICINE

## 2021-05-07 PROCEDURE — 1123F ACP DISCUSS/DSCN MKR DOCD: CPT | Performed by: FAMILY MEDICINE

## 2021-05-07 PROCEDURE — 4040F PNEUMOC VAC/ADMIN/RCVD: CPT | Performed by: FAMILY MEDICINE

## 2021-05-07 PROCEDURE — G8427 DOCREV CUR MEDS BY ELIG CLIN: HCPCS | Performed by: FAMILY MEDICINE

## 2021-05-07 PROCEDURE — G8417 CALC BMI ABV UP PARAM F/U: HCPCS | Performed by: FAMILY MEDICINE

## 2021-05-07 PROCEDURE — 99204 OFFICE O/P NEW MOD 45 MIN: CPT | Performed by: FAMILY MEDICINE

## 2021-05-07 RX ORDER — AMOXICILLIN AND CLAVULANATE POTASSIUM 875; 125 MG/1; MG/1
1 TABLET, FILM COATED ORAL 2 TIMES DAILY
Qty: 20 TABLET | Refills: 0 | Status: SHIPPED | OUTPATIENT
Start: 2021-05-07 | End: 2021-05-17

## 2021-05-07 ASSESSMENT — ENCOUNTER SYMPTOMS
WHEEZING: 0
VOMITING: 0
DIARRHEA: 0
NAUSEA: 0
BACK PAIN: 0
COUGH: 1
ABDOMINAL PAIN: 0
SHORTNESS OF BREATH: 1
CONSTIPATION: 0
SORE THROAT: 1

## 2021-05-07 ASSESSMENT — PATIENT HEALTH QUESTIONNAIRE - PHQ9
SUM OF ALL RESPONSES TO PHQ9 QUESTIONS 1 & 2: 0
1. LITTLE INTEREST OR PLEASURE IN DOING THINGS: 0
SUM OF ALL RESPONSES TO PHQ QUESTIONS 1-9: 0

## 2021-05-07 NOTE — PROGRESS NOTES
Lola Kaye is a 79 y.o. male thatpresents for Established New Doctor and Chest Congestion          HPI    URI Symptoms    HPI:  Patient states that he was recently on augmentin for symptoms and states that symptoms did improve, but worsened after he stopped this. Symptoms have been present for 3 week(s). Symptoms did seem to worsen this morning    Fever? No  Runny nose or congestion? Yes   Cough? Yes - mildly productive  Sore throat? No  Headache, fatigue, joint pains, muscle aches? No  Shortness of breath/Wheezing? Yes - mild SOB  Nausea/Vomiting/Diarrhea? No  Double Sickening? yes  Sick contacts? No    Patient has tried augmentin, mucinex with temporary improvement. Diabetes Type 2    Glucose control:   Does patient check blood glucoses at home? Yes, FBG typically in the low 100s  Report of hypoglycemia: no  Lab Results   Component Value Date    LABA1C 6.8 (H) 05/07/2021     No results found for: EAG    Symptoms  Polyuria, Polydipsia or Polyphagia? No  Chest Pain  or Palpitations? -  No  New Vision complaints? No  Paresthesias of the extremities? No    Medications  Current medication were reviewed. Compliant with medications? yes  Medication side effects? No  On ACE-I or ARB? Yes  On antiplatelet therapy? No  On Statin? Yes      Exercise  Exercise? No  Wt Readings from Last 3 Encounters:   05/07/21 211 lb 6.4 oz (95.9 kg)   04/22/21 205 lb (93 kg)   03/01/21 207 lb (93.9 kg)       Diet discipline?:  Low salt, fat, sugar diet?  no    Blood pressure control:  BP Readings from Last 3 Encounters:   05/07/21 135/86   04/22/21 (!) 153/71   03/01/21 114/79       No results found for: Resshannone Bias    Lab Results   Component Value Date    [address] 64 02/12/2019     CAD  Has hx of AFib, pacemaker, aortic valve replacement as well. Follows primarily with Dr Florencio Murillo. Sees Dr Michelle Bautista d/t hx of TAVR. History of myocardial infarction? yes  History of heart failure? Yes.     Current PHYSICAL EXAM:  /86   Pulse 61   Temp 97.8 °F (36.6 °C) (Oral)   Resp 18   Ht 5' 5.4\" (1.661 m)   Wt 211 lb 6.4 oz (95.9 kg)   SpO2 100%   BMI 34.75 kg/m²     General Appearance: well developed and well- nourished, in no acute distress  Head: normocephalic and atraumatic  Eyes: pupils equal, round, and reactive to light,  conjunctivae and eye lids without erythema  ENT: external ear and ear canal normal bilaterally, nose without deformity, nasal mucosa and turbinates congested without polyps, oropharynx normal, dentition is normal for age, no lip or gum lesions noted  Neck: supple and non-tender without mass, no thyromegaly or thyroid nodules, no cervical lymphadenopathy  Pulmonary/Chest: clear to auscultation bilaterally- no wheezes, rales or rhonchi, normal air movement, no respiratory distress orretractions  Cardiovascular: normal rate, regular rhythm, normal S1 and S2, no murmurs, rubs, clicks, or gallops,distal pulses intact  Extremities: no cyanosis, clubbing or edema of the lowerextremities  Musculoskeletal: No joint swelling or gross deformity   Neuro:  Alert, 5/5 strength globally and symmetrically, normal speech, no focal findings or movement disorder noted, gait wnl  Psych:  Normal affect without evidence of depression or anxiety, insight and judgement are appropriate, memoryappears intact  Skin: warm and dry, no rash or erythema  Lymph:  No cervical, auricular or supraclavicular lymph nodes palpated    ASSESSMENT & PLAN  Neto Daniel was seen today for established new doctor and chest congestion. Diagnoses and all orders for this visit:    Acute rhinosinusitis  -     amoxicillin-clavulanate (AUGMENTIN) 875-125 MG per tablet;  Take 1 tablet by mouth 2 times daily for 10 days    Type 2 diabetes mellitus without complication, without long-term current use of insulin ([facility])  -     POCT glycosylated hemoglobin (Hb A1C)    CHF (congestive heart failure), NYHA class III, acute on chronic, combined ([address]    Other cardiomyopathy ([address]    Atrial fibrillation with RVR ([facility]    Aortic stenosis, severe    Anticoagulated on Coumadin    Coronary artery disease involving native coronary artery of native heart with angina pectoris (HCC)    Essential hypertension    SSS (sick sinus syndrome) (HCC)    S/P TAVR (transcatheter aortic valve replacement)    -Lung exam wnl, suspect ARBS as etiology of sx, will restart augmentin  -Other chronic issues are stable, continue current medications  -Advised to call if any issues      Return in about 6 months (around 11/7/2021). All copied or forwarded information in the progress note was verified by me to be accurate at the time of visit  Patient's past medical, surgical, social and family history were reviewed and updated     All patient questions answered. Patient voiced understanding.

## 2021-05-17 ENCOUNTER — OFFICE VISIT (OUTPATIENT)
Dept: CARDIOLOGY CLINIC | Age: 81
End: 2021-05-17
Payer: MEDICARE

## 2021-05-17 VITALS
SYSTOLIC BLOOD PRESSURE: 150 MMHG | BODY MASS INDEX: 32.65 KG/M2 | HEART RATE: 66 BPM | WEIGHT: 208 LBS | DIASTOLIC BLOOD PRESSURE: 82 MMHG | HEIGHT: 67 IN

## 2021-05-17 DIAGNOSIS — Z95.2 S/P TAVR (TRANSCATHETER AORTIC VALVE REPLACEMENT): Primary | ICD-10-CM

## 2021-05-17 PROCEDURE — G8417 CALC BMI ABV UP PARAM F/U: HCPCS | Performed by: INTERNAL MEDICINE

## 2021-05-17 PROCEDURE — 93000 ELECTROCARDIOGRAM COMPLETE: CPT | Performed by: INTERNAL MEDICINE

## 2021-05-17 PROCEDURE — 4040F PNEUMOC VAC/ADMIN/RCVD: CPT | Performed by: INTERNAL MEDICINE

## 2021-05-17 PROCEDURE — 1036F TOBACCO NON-USER: CPT | Performed by: INTERNAL MEDICINE

## 2021-05-17 PROCEDURE — G8427 DOCREV CUR MEDS BY ELIG CLIN: HCPCS | Performed by: INTERNAL MEDICINE

## 2021-05-17 PROCEDURE — 99213 OFFICE O/P EST LOW 20 MIN: CPT | Performed by: INTERNAL MEDICINE

## 2021-05-17 PROCEDURE — 1123F ACP DISCUSS/DSCN MKR DOCD: CPT | Performed by: INTERNAL MEDICINE

## 2021-05-17 NOTE — PROGRESS NOTES
[address] [facility] [place] [address] [place] [address]  Dept: [phone]  Dept Fax: [phone]  Loc: [phone]    Visit Date: 5/17/2021    Mr. Jack Patel is a 79 y.o. male  who presented for:  Chief Complaint   Patient presents with    1 Year Follow Up       HPI:   HPI   80 yo M s/p TAVR 5/11/18 presents for follow-up. No chest pain, angina, LESTER, orthopnea, PND, sob at rest, palpitations, LE edema, or syncope. He is on Coumadin for Afib. Breathing much improved. No bleeding.         Current Outpatient Medications:     amoxicillin-clavulanate (AUGMENTIN) 875-125 MG per tablet, Take 1 tablet by mouth 2 times daily for 10 days, Disp: 20 tablet, Rfl: 0    Dextromethorphan-guaiFENesin (MUCINEX DM PO), Take by mouth See Admin Instructions Indications: Respiratory Congestion, Disp: , Rfl:     TRUEplus Lancets 28G MISC, , Disp: , Rfl:     TRUE METRIX BLOOD GLUCOSE TEST strip, , Disp: , Rfl:     Alcohol Swabs (B-D SINGLE USE SWABS REGULAR) PADS, , Disp: , Rfl:     warfarin (COUMADIN) 2.5 MG tablet, Take 1 tablet by mouth daily, Disp: 10 tablet, Rfl: 0    losartan (COZAAR) 25 MG tablet, Take 25 mg by mouth nightly , Disp: , Rfl:     albuterol (ACCUNEB) 1.25 MG/3ML nebulizer solution, Inhale 1 ampule into the lungs every 6 hours as needed for Wheezing, Disp: , Rfl:     metFORMIN (GLUCOPHAGE) 500 MG tablet, Take 500 mg by mouth 2 times daily (with meals) , Disp: , Rfl:     bumetanide (BUMEX) 1 MG tablet, Take 1 mg by mouth daily , Disp: , Rfl:     vitamin D (CHOLECALCIFEROL) 1000 UNIT TABS tablet, Take 1 tablet by mouth daily, Disp: , Rfl:     atorvastatin (LIPITOR) 80 MG tablet, Take 80 mg by mouth nightly, Disp: , Rfl:     metoprolol succinate (TOPROL XL) 50 MG extended release tablet, Take 50 mg by mouth daily , Disp: , Rfl:     Past Medical History  Crystal Hackett  has a past medical history of MILLY (acute kidney injury) ([facility] Atrial fibrillation ([facility] Cerebral artery occlusion with cerebral infarction ([facility] [facility] CHF (congestive heart failure), NYHA class III, acute on chronic, combined ([facility] [facility] COPD (chronic obstructive pulmonary disease) ([facility] [address] Coronary artery disease involving native coronary artery of native heart with angina pectoris ([facility] [address] [facility] Diabetes mellitus, type 2 ([facility] Hyperlipidemia, Hypertension, and Pneumonia. Social History  Nesha Ardon  reports that he has quit smoking. His smoking use included cigarettes. He has a 50.00 pack-year smoking history. He has never used smokeless tobacco. He reports current alcohol use. He reports that he does not use drugs. Family History  Nesha Ardon Family history is unknown by patient. There is no family history of bicuspid aortic valve, aneurysms, heart transplant, pacemakers, defibrillators, or sudden cardiac death. Past Surgical History   Past Surgical History:   Procedure Laterality Date    CARDIAC SURGERY      heart cath    CORONARY ANGIOPLASTY WITH STENT PLACEMENT      HERNIA REPAIR      OTHER SURGICAL HISTORY  05/10/2018    PACEMAKER INSERTION         Review of Systems   Constitutional: Negative for chills and fever  HENT: Negative for congestion, sinus pressure, sneezing and sore throat. Eyes: Negative for pain, discharge, redness and itching. Respiratory: Negative for apnea, cough  Gastrointestinal: Negative for blood in stool, constipation, diarrhea   Endocrine: Negative for cold intolerance, heat intolerance, polydipsia. Genitourinary: Negative for dysuria, enuresis, flank pain and hematuria. Musculoskeletal: Negative for arthralgias, joint swelling and neck pain. Neurological: Negative for numbness and headaches. Psychiatric/Behavioral: Negative for agitation, confusion, decreased concentration and dysphoric mood.      Objective:     BP (!) 150/82   Pulse 66   Ht 5' 7\" (1.702 m)   Wt 208 lb (94.3 kg)   BMI 32.58 kg/m²     Wt Readings from Last 3 Encounters: 05/17/21 208 lb (94.3 kg)   05/07/21 211 lb 6.4 oz (95.9 kg)   04/22/21 205 lb (93 kg)     BP Readings from Last 3 Encounters:   05/17/21 (!) 150/82   05/07/21 135/86   04/22/21 (!) 153/71       Nursing note and vitals reviewed. Physical Exam   Constitutional: Oriented to person, place, and time. Appears well-developed and well-nourished. HENT:   Head: Normocephalic and atraumatic. Eyes: EOM are normal. Pupils are equal, round, and reactive to light. Neck: Normal range of motion. Neck supple. No JVD present. Cardiovascular: Normal rate, regular rhythm, normal heart sounds and intact distal pulses. No murmur heard. Pulmonary/Chest: Effort normal and breath sounds normal. No respiratory distress. No wheezes. No rales. Abdominal: Soft. Bowel sounds are normal. No distension. There is no tenderness. Musculoskeletal: Normal range of motion. No edema. Neurological: Alert and oriented to person, place, and time. No cranial nerve deficit. Coordination normal.   Skin: Skin is warm and dry. Psychiatric: Normal mood and affect.        Lab Results   Component Value Date    CKTOTAL 144 05/10/2018    CKMB 21.0 05/10/2018       Lab Results   Component Value Date    WBC 8.9 05/04/2021    RBC 4.43 05/04/2021    RBC 4.33 10/21/2020    HGB 14.0 05/04/2021    HCT 44.7 05/04/2021    .9 05/04/2021    MCH 31.6 05/04/2021    MCHC 31.3 05/04/2021    RDW 15.7 10/21/2020     05/04/2021    MPV 12.1 05/04/2021       Lab Results   Component Value Date     05/04/2021    K 4.1 05/04/2021    K 4.1 04/05/2018    CL 99 05/04/2021    CO2 23 05/04/2021    BUN 33 05/04/2021    LABALBU 4.1 04/22/2021    CREATININE 2.4 05/04/2021    CALCIUM 9.8 05/04/2021    GFRAA 50 05/11/2018    LABGLOM 26 05/04/2021    GLUCOSE 159 05/04/2021    GLUCOSE 108 10/21/2020       Lab Results   Component Value Date    ALKPHOS 99 04/22/2021    ALT 17 04/22/2021    AST 26 04/22/2021    PROT 7.0 04/22/2021    BILITOT 0.5 04/22/2021 size and mildly reduced systolic function. There was mild global hypokinesis. Wall thickness was within normal limits. Device lead/catheter seen in the right heart. S/p TAVR. Transaortic velocity was 1.2-1.6 m/s, mean gradient 4-6 mmHg, EOA 1.5-1.8  cm2. There was no evidence of aortic regurgitation. There was trace tricuspid regurgitation. Assuming RAP = 5 mmHg, the estimated RVSP = 34 mmHg. Ascending aorta - 3.1 cm. IVC size is within normal limits with normal respiratory phasic changes. Signature    ----------------------------------------------------------------  Electronically signed by Kelly Garcia MD (Interpreting  physician) on 05/06/2019 at 04:30 PM  ----------------------------------------------------------------    Findings    Mitral Valve  The mitral valve structure was normal with normal leaflet separation. DOPPLER: The transmitral velocity was within the normal range with no  evidence for mitral stenosis. There was no evidence of mitral  regurgitation. Aortic Valve  S/p TAVR. DOPPLER: Transaortic velocity was 1.2-1.6 m/s, mean gradient 4-6  mmHg, EOA 1.5-1.8 cm2. There was no evidence of aortic regurgitation. Tricuspid Valve  The tricuspid valve structure was normal with normal leaflet separation. DOPPLER: There was no evidence of tricuspid stenosis. There was trace  tricuspid regurgitation. Assuming RAP = 5 mmHg, the estimated RVSP = 34  mmHg. Pulmonic Valve  The pulmonic valve leaflets were not well seen. DOPPLER: The transpulmonic  velocity was within the normal range with no evidence for regurgitation. Left Atrium  Left atrial size was normal.    Left Ventricle  Normal left ventricular size and mildly reduced systolic function. There was mild global hypokinesis. Wall thickness was within normal limits. Ejection fraction was estimated at 45-50%.     Right Atrium  Right atrial size was normal.    Right Ventricle  The right ventricular size was normal with normal systolic function and  wall thickness. Device lead/catheter seen in the right heart. Pericardial Effusion  The pericardium was normal in appearance with no evidence of a pericardial  effusion. Pleural Effusion  No evidence of pleural effusion. Aorta / Great Vessels  -Ascending aorta - 3.1 cm. -IVC size is within normal limits with normal respiratory phasic changes.     M-Mode/2D Measurements & Calculations    LV Diastolic    LV Systolic Dimension: 3.6   LA Dimension: 4 cmAO Root  Dimension: 4.7  cm                           Dimension: 2.7 cmLA Area:  cm              LV Volume Diastolic: 385.1   23.9 cm^2  LV FS:23.4 %    ml  LV PW           LV Volume Systolic: 11.7 ml  Diastolic: 1 cm LV EDV/LV EDV Index: 115.4  Septum          ml/58 m^2LV ESV/LV ESV       RV Diastolic Dimension: 3.4  Diastolic: 0.9  Index: 02.2 ml/35 m^2        cm  cm              EF Calculated: 39.2 %  LA/Aorta: 1.48  LV Length: 7.6 cm  LA volume/Index: 48.1 ml  LV Area         LVOT: 2.1 cm                 /19J^1  Diastolic: 29.8  cm^2  LV Area  Systolic: 28.2  cm^2    Doppler Measurements & Calculations    MV Peak E-Wave: 122 AV Peak Velocity: 159    LVOT Peak Velocity: 68.7 cm/s  cm/s                cm/s                     LVOT Mean Velocity: 48.4 cm/s  MV Peak A-Wave:     AV Peak Gradient: 10.11  LVOT Peak Gradient: 2  28.4 cm/s           mmHg                     mmHgLVOT Mean Gradient: 1  MV E/A Ratio: 4.3   AV Mean Velocity: 118    mmHg  MV Peak Gradient:   cm/s  5.95 mmHg           AV Mean Gradient: 5 mmHg  AV VTI: 31.4 cm  MV Deceleration     AV Area                  TR Velocity:259 cm/s  Time: 271 msec      (Continuity):1.65 cm^2   TR Gradient:26.83 mmHg  PV Peak Velocity: 78.5 cm/s  LVOT VTI: 15 cm          PV Peak Gradient: 2.46 mmHg  MV E' Septal  Velocity: 3.2 cm/s  MV A' Septal  Velocity: 4.9 cm/s  AV DVI (VTI): 0.48AV DVI  MV E' Lateral       (Vmax):0.43  Velocity: 8 cm/s  MV A' Lateral  Velocity: 3.6 cm/s  E/E' septal: 38.12  E/E' lateral: 15.25    [id number]       Assessment/Plan   s/p TAVR 5/11/18   Cardiomyopathy, NYHA I-II  Afib  S/p D-pacer  Doing well, follows with Dr. Emi Paulino, no major issues, no bleeding. Re-check BP. Patient with D-pacer. Continue current therapy. Discussed diet/exercise/BP/weight loss/health lifestyle choices/lipids; the patient understands the goals and will try to comply.     Disposition:  prn         Electronically signed by Matthieu Nieto MD   5/17/2021 at 2:25 PM EDT

## 2021-05-26 ENCOUNTER — OFFICE VISIT (OUTPATIENT)
Dept: NEPHROLOGY | Age: 81
End: 2021-05-26
Payer: MEDICARE

## 2021-05-26 VITALS
SYSTOLIC BLOOD PRESSURE: 168 MMHG | HEART RATE: 67 BPM | RESPIRATION RATE: 18 BRPM | DIASTOLIC BLOOD PRESSURE: 81 MMHG | OXYGEN SATURATION: 93 % | TEMPERATURE: 97.3 F | HEIGHT: 67 IN | WEIGHT: 209 LBS | BODY MASS INDEX: 32.8 KG/M2

## 2021-05-26 DIAGNOSIS — N17.9 AKI (ACUTE KIDNEY INJURY) (HCC): Primary | ICD-10-CM

## 2021-05-26 PROCEDURE — 99214 OFFICE O/P EST MOD 30 MIN: CPT | Performed by: INTERNAL MEDICINE

## 2021-05-26 PROCEDURE — 1036F TOBACCO NON-USER: CPT | Performed by: INTERNAL MEDICINE

## 2021-05-26 PROCEDURE — 1123F ACP DISCUSS/DSCN MKR DOCD: CPT | Performed by: INTERNAL MEDICINE

## 2021-05-26 PROCEDURE — 4040F PNEUMOC VAC/ADMIN/RCVD: CPT | Performed by: INTERNAL MEDICINE

## 2021-05-26 PROCEDURE — G8427 DOCREV CUR MEDS BY ELIG CLIN: HCPCS | Performed by: INTERNAL MEDICINE

## 2021-05-26 PROCEDURE — G8417 CALC BMI ABV UP PARAM F/U: HCPCS | Performed by: INTERNAL MEDICINE

## 2021-05-26 RX ORDER — NITROGLYCERIN 0.4 MG/1
0.4 TABLET SUBLINGUAL EVERY 5 MIN PRN
COMMUNITY
End: 2021-09-13 | Stop reason: ALTCHOICE

## 2021-05-26 NOTE — PATIENT INSTRUCTIONS
KNOW YOUR KIDNEY NUMBERS    Your kidney speed (eGFR) was 26 ml/min this visit (normal is  ml/min)(Ml/min=milliliters of blood filtered per minute). The higher this number is, the better your kidney function is. Your serum creatinine was 2.4 (normal 0.8-1.2 mg/dl at most labs). The higher this number is, the worse your kidney function is. You are in stage G-IV-A1 of chronic kidney disease. Your kidney function has declined as compared to your last visit. Your last eGFR was  30 Ml/Min. Stages of kidney disease    EGFR (estimated glomerular filtration rate)    G-I > 90 ml/min Kidney damage with normal kidney function (blood or protein in the urine)  G-II 60-89 ml/min Normal kidney function with mild damage with or without blood or protein in the urine  G-IIIA 45-59 ml/min Mild to moderate loss of kidney function. G-IIIB 30-44 ml/min Moderate to severe loss of kidney function  G-IV 15-29 ml/min Severe loss of kidney function  G-V < 15 mlmin     May need dialysis or kidney transplant    ACR (urine albumin/creatinine ratio) (Mg/Gm)    A-1      ACR<30 Normal to mildly increased protein in the urine. A-2 ACR  Moderate increase in urine protein loss. A-3 ACR >300 Severe increase in urine protein loss    Our goal is to keep your eGFR going as fast as possible ( ml/min is normal). If your eGFR declines to 15-24 ml/min and stays there without recovery,  we will begin to educate you about dialysis or kidney transplant. We also want to keep the protein in your urine as low as possible. The leading cause of kidney disease in the world is diabetes mellitus. Keep your sugar  as much as possible. The second leading cause is hypertension. Keep Your blood pressure 120-140/70-80 as much as possible. If you need refills, call the office or your drug store. You may call the office any time with any questions or concerns. DUE TO THE CORONAVIRUS CONCERN, PLEASE LIMIT YOUR TIME IN PUBLIC. 8 Amna Vazquezidi YOUR HANDS COMPLETELY AND FREQUENTLY. Rolene Friday  Davonte Kumar

## 2021-05-26 NOTE — PROGRESS NOTES
nor rhonchi. No cough nor sputum production. Heart[de-identified]            RRR. No murmurs nor rubs. PMI is not enlarged nor displaced. Abdomen:  Soft and non tender. Bowel sounds are active in all four quadrants. Extremities:  no edema  Neurologic:  CN II-XII are intact. No deficits noted. Muscle strength and tone are equal throughout. Skin:                Warm and dry with no rashes. Muscles:         Hand  and leg strength are equal and strong bilaterally.      Lab Data      CBC:   Lab Results   Component Value Date    WBC 8.9 05/04/2021    HGB 14.0 05/04/2021    HCT 44.7 05/04/2021    .9 (H) 05/04/2021     05/04/2021     BMP:    Lab Results   Component Value Date     05/04/2021     04/22/2021     02/17/2021    K 4.1 05/04/2021    K 4.0 04/22/2021    K 4.4 02/17/2021    CL 99 05/04/2021    CL 99 04/22/2021     02/17/2021    CO2 23 05/04/2021    CO2 26 04/22/2021    CO2 28 02/17/2021    BUN 33 (H) 05/04/2021    BUN 42 (H) 04/22/2021    BUN 23 (H) 02/17/2021    CREATININE 2.4 (H) 05/04/2021    CREATININE 2.1 (H) 04/22/2021    CREATININE 2.1 (H) 02/17/2021    GLUCOSE 159 (H) 05/04/2021    GLUCOSE 132 (H) 04/22/2021    GLUCOSE 109 (H) 02/17/2021      Hepatic:   Lab Results   Component Value Date    AST 26 04/22/2021    AST 23 05/14/2019    AST 22 02/12/2019    ALT 17 04/22/2021    ALT 18 05/14/2019    ALT 21 02/12/2019    BILITOT 0.5 04/22/2021    BILITOT 0.6 05/14/2019    BILITOT 0.7 02/12/2019    ALKPHOS 99 04/22/2021    ALKPHOS 86 05/14/2019    ALKPHOS 101 02/12/2019     BNP: No results found for: BNP  Lipids:   Lab Results   Component Value Date    CHOL 135 02/12/2019    HDL 25.4 (L) 02/12/2019     INR:   Lab Results   Component Value Date    INR 2.70 (H) 05/03/2021    INR 2.62 (H) 04/22/2021    INR 2.50 (H) 04/05/2021     URINE: No results found for: Ronny Rubinstein  Lab Results   Component Value Date    NITRU NEGATIVE 04/13/2018    COLORU YELLOW 04/13/2018    PHUR 6.0 04/13/2018 WBCUA 5-10 04/13/2018    RBCUA 3-5 04/13/2018    YEAST NONE SEEN 04/13/2018    BACTERIA NONE 04/13/2018    LEUKOCYTESUR SMALL 04/13/2018    UROBILINOGEN 0.2 04/13/2018    BILIRUBINUR NEGATIVE 04/13/2018    BLOODU MODERATE 04/13/2018    GLUCOSEU NEGATIVE 04/13/2018    KETUA NEGATIVE 04/13/2018      Microalbumen/Creatinine ratio:  No components found for: RUCREAT        Medications:    Current Outpatient Medications   Medication Sig Dispense Refill    nitroGLYCERIN (NITROSTAT) 0.4 MG SL tablet Place 0.4 mg under the tongue every 5 minutes as needed for Chest pain up to max of 3 total doses. If no relief after 1 dose, call 911.  Dextromethorphan-guaiFENesin ([place] [address] DM PO) Take by mouth See Admin Instructions Indications: Respiratory Congestion      TRUEplus Lancets 28G MISC       TRUE METRIX BLOOD GLUCOSE TEST strip       Alcohol Swabs (B-D SINGLE USE SWABS REGULAR) PADS       warfarin (COUMADIN) 2.5 MG tablet Take 1 tablet by mouth daily 10 tablet 0    losartan (COZAAR) 25 MG tablet Take 25 mg by mouth nightly       albuterol (ACCUNEB) 1.25 MG/3ML nebulizer solution Inhale 1 ampule into the lungs every 6 hours as needed for Wheezing      metFORMIN (GLUCOPHAGE) 500 MG tablet Take 500 mg by mouth 2 times daily (with meals)       bumetanide (BUMEX) 1 MG tablet Take 1 mg by mouth daily       vitamin D (CHOLECALCIFEROL) 1000 UNIT TABS tablet Take 1 tablet by mouth daily      atorvastatin (LIPITOR) 80 MG tablet Take 80 mg by mouth nightly      metoprolol succinate (TOPROL XL) 50 MG extended release tablet Take 50 mg by mouth daily        No current facility-administered medications for this visit. IMPRESSIONS:        Kidney disease: CKD stage G-IV-A1  Anemia: Anemia remains stable. No need for an erythrocyte stimulating agent (BRICE). Bone and mineral metabolism: There is no complaint of bone pain. PLAN:  1. We discussed the eGFR today.   2. We will continue all current medications without changes. 3. He will drink more fluids  4. Urinalysis. 5. Checking urine microalbumin/creatinine ratio. 6. Kidney ultrasound  7. May need a kidney biopsy  8. We will see the patient back in 1 months.       _________________________________  Jacqueline Ardon.  Hany Paredes DO  [facility]      CC  Kelton Whittaker DO

## 2021-05-28 RX ORDER — LOSARTAN POTASSIUM 25 MG/1
25 TABLET ORAL NIGHTLY
Qty: 90 TABLET | Refills: 3 | Status: SHIPPED | OUTPATIENT
Start: 2021-05-28 | End: 2022-06-28

## 2021-06-10 ENCOUNTER — NURSE ONLY (OUTPATIENT)
Dept: LAB | Age: 81
End: 2021-06-10

## 2021-06-10 ENCOUNTER — HOSPITAL ENCOUNTER (OUTPATIENT)
Dept: ULTRASOUND IMAGING | Age: 81
Discharge: HOME | End: 2021-06-10
Payer: MEDICARE

## 2021-06-10 DIAGNOSIS — N17.9 AKI (ACUTE KIDNEY INJURY) (HCC): ICD-10-CM

## 2021-06-10 LAB
ALBUMIN SERPL-MCNC: 4.2 G/DL (ref 3.5–5.1)
ALP BLD-CCNC: 101 U/L (ref 38–126)
ALT SERPL-CCNC: 18 U/L (ref 11–66)
ANION GAP SERPL CALCULATED.3IONS-SCNC: 12 MEQ/L (ref 8–16)
AST SERPL-CCNC: 25 U/L (ref 5–40)
BACTERIA: NORMAL
BASOPHILS # BLD: 0.4 %
BASOPHILS ABSOLUTE: 0 THOU/MM3 (ref 0–0.1)
BILIRUB SERPL-MCNC: 0.9 MG/DL (ref 0.3–1.2)
BILIRUBIN URINE: NEGATIVE
BLOOD, URINE: NEGATIVE
BUN BLDV-MCNC: 22 MG/DL (ref 7–22)
CALCIUM SERPL-MCNC: 10.2 MG/DL (ref 8.5–10.5)
CASTS: NORMAL /LPF
CASTS: NORMAL /LPF
CHARACTER, URINE: CLEAR
CHLORIDE BLD-SCNC: 101 MEQ/L (ref 98–111)
CO2: 25 MEQ/L (ref 23–33)
COLOR: YELLOW
CREAT SERPL-MCNC: 1.9 MG/DL (ref 0.4–1.2)
CREATININE, URINE: 25.3 MG/DL
CRYSTALS: NORMAL
EOSINOPHIL # BLD: 2.4 %
EOSINOPHILS ABSOLUTE: 0.2 THOU/MM3 (ref 0–0.4)
EPITHELIAL CELLS, UA: NORMAL /HPF
ERYTHROCYTE [DISTWIDTH] IN BLOOD BY AUTOMATED COUNT: 14.5 % (ref 11.5–14.5)
ERYTHROCYTE [DISTWIDTH] IN BLOOD BY AUTOMATED COUNT: 52.5 FL (ref 35–45)
GFR SERPL CREATININE-BSD FRML MDRD: 34 ML/MIN/1.73M2
GLUCOSE BLD-MCNC: 107 MG/DL (ref 70–108)
GLUCOSE, URINE: NEGATIVE MG/DL
HCT VFR BLD CALC: 44.1 % (ref 42–52)
HEMOGLOBIN: 14.3 GM/DL (ref 14–18)
IMMATURE GRANS (ABS): 0.03 THOU/MM3 (ref 0–0.07)
IMMATURE GRANULOCYTES: 0.3 %
KETONES, URINE: NEGATIVE
LEUKOCYTE ESTERASE, URINE: NEGATIVE
LYMPHOCYTES # BLD: 16 %
LYMPHOCYTES ABSOLUTE: 1.5 THOU/MM3 (ref 1–4.8)
MCH RBC QN AUTO: 31.8 PG (ref 26–33)
MCHC RBC AUTO-ENTMCNC: 32.4 GM/DL (ref 32.2–35.5)
MCV RBC AUTO: 98.2 FL (ref 80–94)
MICROALBUMIN UR-MCNC: < 1.2 MG/DL
MICROALBUMIN/CREAT UR-RTO: 47 MG/G (ref 0–30)
MISCELLANEOUS LAB TEST RESULT: NORMAL
MONOCYTES # BLD: 10.2 %
MONOCYTES ABSOLUTE: 1 THOU/MM3 (ref 0.4–1.3)
NITRITE, URINE: NEGATIVE
NUCLEATED RED BLOOD CELLS: 0 /100 WBC
PH UA: 6.5 (ref 5–9)
PLATELET # BLD: 166 THOU/MM3 (ref 130–400)
PMV BLD AUTO: 12.2 FL (ref 9.4–12.4)
POTASSIUM SERPL-SCNC: 4.1 MEQ/L (ref 3.5–5.2)
PROTEIN UA: NEGATIVE MG/DL
RBC # BLD: 4.49 MILL/MM3 (ref 4.7–6.1)
RBC URINE: NORMAL /HPF
RENAL EPITHELIAL, UA: NORMAL
SEG NEUTROPHILS: 70.7 %
SEGMENTED NEUTROPHILS ABSOLUTE COUNT: 6.6 THOU/MM3 (ref 1.8–7.7)
SODIUM BLD-SCNC: 138 MEQ/L (ref 135–145)
SPECIFIC GRAVITY UA: 1.01 (ref 1–1.03)
TOTAL PROTEIN: 7.4 G/DL (ref 6.1–8)
UROBILINOGEN, URINE: 0.2 EU/DL (ref 0–1)
WBC # BLD: 9.4 THOU/MM3 (ref 4.8–10.8)
WBC UA: NORMAL /HPF
YEAST: NORMAL

## 2021-06-10 PROCEDURE — 76770 US EXAM ABDO BACK WALL COMP: CPT

## 2021-06-14 ENCOUNTER — HOSPITAL ENCOUNTER (OUTPATIENT)
Dept: PHARMACY | Age: 81
Setting detail: THERAPIES SERIES
Discharge: HOME | End: 2021-06-14
Payer: MEDICARE

## 2021-06-14 DIAGNOSIS — Z79.01 ANTICOAGULATED ON COUMADIN: Primary | ICD-10-CM

## 2021-06-14 DIAGNOSIS — Z51.81 ENCOUNTER FOR THERAPEUTIC DRUG MONITORING: ICD-10-CM

## 2021-06-14 LAB — POC INR: 2.6 (ref 0.8–1.2)

## 2021-06-14 PROCEDURE — 85610 PROTHROMBIN TIME: CPT

## 2021-06-14 PROCEDURE — 99211 OFF/OP EST MAY X REQ PHY/QHP: CPT

## 2021-06-14 PROCEDURE — 36416 COLLJ CAPILLARY BLOOD SPEC: CPT

## 2021-06-14 NOTE — PROGRESS NOTES
Medication Management [address]   [phone] (phone)  [phone] (fax)    Mr. Dominique Breaux is a 79 y.o.  male with history of Afib who presents today for anticoagulation monitoring and adjustment. Patient verifies current dosing regimen and tablet strength. No missed or extra doses. Patient denies s/s bleeding/bruising/swelling/SOB/chest pain  No blood in urine or stool. No dietary changes. No changes in medication/OTC agents/Herbals. No change in alcohol use or tobacco use. No change in activity level. Patient denies headaches/dizziness/lightheadedness/falls. No vomiting/diarrhea or acute illness. No Procedures scheduled in the future at this time. Assessment:   Lab Results   Component Value Date    INR 2.60 (H) 2021    INR 2.70 (H) 2021    INR 2.62 (H) 2021    PROTIME 10.9 2018    PROTIME 11.1 2018    PROTIME 11.3 05/10/2018     INR therapeutic   Recent Labs     21  0914   INR 2.60*         Plan:  Continue Coumadin 2.5 mg daily. Recheck INR in 6 week(s). Patient reminded to call the Anticoagulation Clinic with any signs or symptoms of bleeding or with any medication changes. Patient given instructions utilizing the teach back method. After visit summary printed and reviewed with patient. Discharged ambulatory in no apparent distress.         For Pharmacy Admin Tracking Only     Intervention Detail: Adherence Monitorin   Total # of Interventions Recommended: 1   Total # of Interventions Accepted: 1   Time Spent (min): 2735 Garland Chicas PharmD, BCPS  2021  9:28 AM

## 2021-06-23 ENCOUNTER — HOSPITAL ENCOUNTER (EMERGENCY)
Age: 81
Discharge: HOME | End: 2021-06-24
Attending: EMERGENCY MEDICINE
Payer: MEDICARE

## 2021-06-23 ENCOUNTER — APPOINTMENT (OUTPATIENT)
Dept: GENERAL RADIOLOGY | Age: 81
End: 2021-06-23
Payer: MEDICARE

## 2021-06-23 ENCOUNTER — OFFICE VISIT (OUTPATIENT)
Dept: NEPHROLOGY | Age: 81
End: 2021-06-23
Payer: MEDICARE

## 2021-06-23 VITALS
TEMPERATURE: 97.3 F | WEIGHT: 203 LBS | HEIGHT: 67 IN | DIASTOLIC BLOOD PRESSURE: 85 MMHG | SYSTOLIC BLOOD PRESSURE: 163 MMHG | RESPIRATION RATE: 18 BRPM | OXYGEN SATURATION: 97 % | BODY MASS INDEX: 31.86 KG/M2 | HEART RATE: 63 BPM

## 2021-06-23 DIAGNOSIS — J32.9 CHRONIC SINUSITIS, UNSPECIFIED LOCATION: Primary | ICD-10-CM

## 2021-06-23 DIAGNOSIS — J44.9 CHRONIC OBSTRUCTIVE PULMONARY DISEASE, UNSPECIFIED COPD TYPE (HCC): ICD-10-CM

## 2021-06-23 DIAGNOSIS — N18.32 STAGE 3B CHRONIC KIDNEY DISEASE (HCC): Primary | ICD-10-CM

## 2021-06-23 LAB
ALBUMIN SERPL-MCNC: 4.5 G/DL (ref 3.5–5.1)
ALP BLD-CCNC: 104 U/L (ref 38–126)
ALT SERPL-CCNC: 22 U/L (ref 11–66)
ANION GAP SERPL CALCULATED.3IONS-SCNC: 15 MEQ/L (ref 8–16)
AST SERPL-CCNC: 26 U/L (ref 5–40)
BASOPHILS # BLD: 0.3 %
BASOPHILS ABSOLUTE: 0 THOU/MM3 (ref 0–0.1)
BILIRUB SERPL-MCNC: 0.8 MG/DL (ref 0.3–1.2)
BILIRUBIN DIRECT: < 0.2 MG/DL (ref 0–0.3)
BUN BLDV-MCNC: 39 MG/DL (ref 7–22)
CALCIUM SERPL-MCNC: 9.7 MG/DL (ref 8.5–10.5)
CHLORIDE BLD-SCNC: 98 MEQ/L (ref 98–111)
CO2: 25 MEQ/L (ref 23–33)
CREAT SERPL-MCNC: 2.5 MG/DL (ref 0.4–1.2)
EOSINOPHIL # BLD: 0.3 %
EOSINOPHILS ABSOLUTE: 0 THOU/MM3 (ref 0–0.4)
ERYTHROCYTE [DISTWIDTH] IN BLOOD BY AUTOMATED COUNT: 14.6 % (ref 11.5–14.5)
ERYTHROCYTE [DISTWIDTH] IN BLOOD BY AUTOMATED COUNT: 52.6 FL (ref 35–45)
FLU A ANTIGEN: NEGATIVE
FLU B ANTIGEN: NEGATIVE
GFR SERPL CREATININE-BSD FRML MDRD: 25 ML/MIN/1.73M2
GLUCOSE BLD-MCNC: 134 MG/DL (ref 70–108)
HCT VFR BLD CALC: 44 % (ref 42–52)
HEMOGLOBIN: 14.3 GM/DL (ref 14–18)
IMMATURE GRANS (ABS): 0.05 THOU/MM3 (ref 0–0.07)
IMMATURE GRANULOCYTES: 0.4 %
LIPASE: 28.1 U/L (ref 5.6–51.3)
LYMPHOCYTES # BLD: 3.7 %
LYMPHOCYTES ABSOLUTE: 0.5 THOU/MM3 (ref 1–4.8)
MAGNESIUM: 1.7 MG/DL (ref 1.6–2.4)
MCH RBC QN AUTO: 31.5 PG (ref 26–33)
MCHC RBC AUTO-ENTMCNC: 32.5 GM/DL (ref 32.2–35.5)
MCV RBC AUTO: 96.9 FL (ref 80–94)
MONOCYTES # BLD: 6.5 %
MONOCYTES ABSOLUTE: 0.9 THOU/MM3 (ref 0.4–1.3)
NUCLEATED RED BLOOD CELLS: 0 /100 WBC
OSMOLALITY CALCULATION: 287.1 MOSMOL/KG (ref 275–300)
PLATELET # BLD: 206 THOU/MM3 (ref 130–400)
PMV BLD AUTO: 11.4 FL (ref 9.4–12.4)
POTASSIUM SERPL-SCNC: 4.5 MEQ/L (ref 3.5–5.2)
PRO-BNP: 1930 PG/ML (ref 0–1800)
RBC # BLD: 4.54 MILL/MM3 (ref 4.7–6.1)
SARS-COV-2, NAAT: NOT DETECTED
SEG NEUTROPHILS: 88.8 %
SEGMENTED NEUTROPHILS ABSOLUTE COUNT: 12.1 THOU/MM3 (ref 1.8–7.7)
SODIUM BLD-SCNC: 138 MEQ/L (ref 135–145)
TOTAL PROTEIN: 7.8 G/DL (ref 6.1–8)
TROPONIN T: 0.02 NG/ML
WBC # BLD: 13.6 THOU/MM3 (ref 4.8–10.8)

## 2021-06-23 PROCEDURE — 82248 BILIRUBIN DIRECT: CPT

## 2021-06-23 PROCEDURE — G8417 CALC BMI ABV UP PARAM F/U: HCPCS | Performed by: INTERNAL MEDICINE

## 2021-06-23 PROCEDURE — 4040F PNEUMOC VAC/ADMIN/RCVD: CPT | Performed by: INTERNAL MEDICINE

## 2021-06-23 PROCEDURE — 83690 ASSAY OF LIPASE: CPT

## 2021-06-23 PROCEDURE — 99214 OFFICE O/P EST MOD 30 MIN: CPT | Performed by: INTERNAL MEDICINE

## 2021-06-23 PROCEDURE — 93005 ELECTROCARDIOGRAM TRACING: CPT | Performed by: EMERGENCY MEDICINE

## 2021-06-23 PROCEDURE — 6360000002 HC RX W HCPCS: Performed by: EMERGENCY MEDICINE

## 2021-06-23 PROCEDURE — 36415 COLL VENOUS BLD VENIPUNCTURE: CPT

## 2021-06-23 PROCEDURE — 84484 ASSAY OF TROPONIN QUANT: CPT

## 2021-06-23 PROCEDURE — 1036F TOBACCO NON-USER: CPT | Performed by: INTERNAL MEDICINE

## 2021-06-23 PROCEDURE — G8427 DOCREV CUR MEDS BY ELIG CLIN: HCPCS | Performed by: INTERNAL MEDICINE

## 2021-06-23 PROCEDURE — 80053 COMPREHEN METABOLIC PANEL: CPT

## 2021-06-23 PROCEDURE — 83735 ASSAY OF MAGNESIUM: CPT

## 2021-06-23 PROCEDURE — 87804 INFLUENZA ASSAY W/OPTIC: CPT

## 2021-06-23 PROCEDURE — 1123F ACP DISCUSS/DSCN MKR DOCD: CPT | Performed by: INTERNAL MEDICINE

## 2021-06-23 PROCEDURE — 85025 COMPLETE CBC W/AUTO DIFF WBC: CPT

## 2021-06-23 PROCEDURE — 83880 ASSAY OF NATRIURETIC PEPTIDE: CPT

## 2021-06-23 PROCEDURE — 87635 SARS-COV-2 COVID-19 AMP PRB: CPT

## 2021-06-23 PROCEDURE — 96374 THER/PROPH/DIAG INJ IV PUSH: CPT

## 2021-06-23 PROCEDURE — 71045 X-RAY EXAM CHEST 1 VIEW: CPT

## 2021-06-23 PROCEDURE — 99285 EMERGENCY DEPT VISIT HI MDM: CPT

## 2021-06-23 RX ORDER — ONDANSETRON 2 MG/ML
4 INJECTION INTRAMUSCULAR; INTRAVENOUS ONCE
Status: COMPLETED | OUTPATIENT
Start: 2021-06-23 | End: 2021-06-23

## 2021-06-23 RX ADMIN — ONDANSETRON 4 MG: 2 INJECTION INTRAMUSCULAR; INTRAVENOUS at 22:50

## 2021-06-23 ASSESSMENT — ENCOUNTER SYMPTOMS
EYE DISCHARGE: 0
VOICE CHANGE: 0
SORE THROAT: 0
VOMITING: 0
COUGH: 1
DIARRHEA: 0
PHOTOPHOBIA: 0
TROUBLE SWALLOWING: 0
BLOOD IN STOOL: 0
EYE ITCHING: 0
NAUSEA: 1
SINUS PRESSURE: 0
EYE REDNESS: 0
CHEST TIGHTNESS: 0
ABDOMINAL PAIN: 0
BACK PAIN: 0
EYE PAIN: 0
WHEEZING: 0
ABDOMINAL DISTENTION: 0
CONSTIPATION: 0
RHINORRHEA: 0
SHORTNESS OF BREATH: 0
CHOKING: 0

## 2021-06-23 NOTE — PROGRESS NOTES
nor rashes. WENDY. EOMI  Neck:   No JVD and no bruits. Thyroid gland is normal  Lungs:  Breathing easily. No rales nor rhonchi. No cough nor sputum production. Heart[de-identified]            RRR. No murmurs nor rubs. PMI is not enlarged nor displaced. Abdomen:  Soft and non tender. Bowel sounds are active in all four quadrants. Extremities:  no edema  Neurologic:  CN II-XII are intact. No deficits noted. Muscle strength and tone are equal throughout. Skin:                Warm and dry with no rashes. Muscles:         Hand  and leg strength are equal and strong bilaterally.      Lab Data      CBC:   Lab Results   Component Value Date    WBC 9.4 06/10/2021    HGB 14.3 06/10/2021    HCT 44.1 06/10/2021    MCV 98.2 (H) 06/10/2021     06/10/2021     BMP:    Lab Results   Component Value Date     06/10/2021     05/04/2021     04/22/2021    K 4.1 06/10/2021    K 4.1 05/04/2021    K 4.0 04/22/2021     06/10/2021    CL 99 05/04/2021    CL 99 04/22/2021    CO2 25 06/10/2021    CO2 23 05/04/2021    CO2 26 04/22/2021    BUN 22 06/10/2021    BUN 33 (H) 05/04/2021    BUN 42 (H) 04/22/2021    CREATININE 1.9 (H) 06/10/2021    CREATININE 2.4 (H) 05/04/2021    CREATININE 2.1 (H) 04/22/2021    GLUCOSE 107 06/10/2021    GLUCOSE 159 (H) 05/04/2021    GLUCOSE 132 (H) 04/22/2021      Hepatic:   Lab Results   Component Value Date    AST 25 06/10/2021    AST 26 04/22/2021    AST 23 05/14/2019    ALT 18 06/10/2021    ALT 17 04/22/2021    ALT 18 05/14/2019    BILITOT 0.9 06/10/2021    BILITOT 0.5 04/22/2021    BILITOT 0.6 05/14/2019    ALKPHOS 101 06/10/2021    ALKPHOS 99 04/22/2021    ALKPHOS 86 05/14/2019     BNP: No results found for: BNP  Lipids:   Lab Results   Component Value Date    CHOL 135 02/12/2019    HDL 25.4 (L) 02/12/2019     INR:   Lab Results   Component Value Date    INR 2.60 (H) 06/14/2021    INR 2.70 (H) 05/03/2021    INR 2.62 (H) 04/22/2021     URINE: No results found for: [place] Results   Component Value Date    NITRU NEGATIVE 06/10/2021    COLORU YELLOW 06/10/2021    PHUR 6.5 06/10/2021    LABCAST NONE SEEN 06/10/2021    LABCAST NONE SEEN 06/10/2021    WBCUA NONE SEEN 06/10/2021    RBCUA NONE SEEN 06/10/2021    YEAST NONE SEEN 06/10/2021    BACTERIA NONE SEEN 06/10/2021    SPECGRAV 1.006 06/10/2021    LEUKOCYTESUR NEGATIVE 06/10/2021    UROBILINOGEN 0.2 06/10/2021    BILIRUBINUR NEGATIVE 06/10/2021    BLOODU NEGATIVE 06/10/2021    GLUCOSEU NEGATIVE 04/13/2018    KETUA NEGATIVE 06/10/2021      Microalbumen/Creatinine ratio:  No components found for: RUCREAT        Medications:    Current Outpatient Medications   Medication Sig Dispense Refill    losartan (COZAAR) 25 MG tablet Take 1 tablet by mouth nightly 90 tablet 3    metFORMIN (GLUCOPHAGE) 500 MG tablet Take 1 tablet by mouth 2 times daily (with meals) 180 tablet 3    nitroGLYCERIN (NITROSTAT) 0.4 MG SL tablet Place 0.4 mg under the tongue every 5 minutes as needed for Chest pain up to max of 3 total doses. If no relief after 1 dose, call 911.  Dextromethorphan-guaiFENesin ([place] [address] DM PO) Take by mouth See Admin Instructions Indications: Respiratory Congestion      TRUEplus Lancets 28G MISC       TRUE METRIX BLOOD GLUCOSE TEST strip       Alcohol Swabs (B-D SINGLE USE SWABS REGULAR) PADS       warfarin (COUMADIN) 2.5 MG tablet Take 1 tablet by mouth daily 10 tablet 0    albuterol (ACCUNEB) 1.25 MG/3ML nebulizer solution Inhale 1 ampule into the lungs every 6 hours as needed for Wheezing      bumetanide (BUMEX) 1 MG tablet Take 1 mg by mouth daily       vitamin D (CHOLECALCIFEROL) 1000 UNIT TABS tablet Take 1 tablet by mouth daily      atorvastatin (LIPITOR) 80 MG tablet Take 80 mg by mouth nightly      metoprolol succinate (TOPROL XL) 50 MG extended release tablet Take 50 mg by mouth daily        No current facility-administered medications for this visit.            IMPRESSIONS:      Kidney disease: CKD stage

## 2021-06-23 NOTE — PATIENT INSTRUCTIONS

## 2021-06-24 VITALS
OXYGEN SATURATION: 96 % | DIASTOLIC BLOOD PRESSURE: 60 MMHG | HEART RATE: 72 BPM | RESPIRATION RATE: 18 BRPM | BODY MASS INDEX: 31.86 KG/M2 | WEIGHT: 203 LBS | HEIGHT: 67 IN | TEMPERATURE: 98.9 F | SYSTOLIC BLOOD PRESSURE: 114 MMHG

## 2021-06-24 LAB
APTT: 36.9 SECONDS (ref 22–38)
EKG Q-T INTERVAL: 330 MS
EKG QRS DURATION: 138 MS
EKG QTC CALCULATION (BAZETT): 427 MS
EKG R AXIS: 46 DEGREES
EKG T AXIS: -95 DEGREES
EKG VENTRICULAR RATE: 101 BPM
INR BLD: 3.42 (ref 0.85–1.13)

## 2021-06-24 PROCEDURE — 85730 THROMBOPLASTIN TIME PARTIAL: CPT

## 2021-06-24 PROCEDURE — 93010 ELECTROCARDIOGRAM REPORT: CPT | Performed by: INTERNAL MEDICINE

## 2021-06-24 PROCEDURE — 6370000000 HC RX 637 (ALT 250 FOR IP): Performed by: EMERGENCY MEDICINE

## 2021-06-24 PROCEDURE — 85610 PROTHROMBIN TIME: CPT

## 2021-06-24 RX ORDER — PSEUDOEPHEDRINE HYDROCHLORIDE 30 MG/1
30 TABLET ORAL ONCE
Status: COMPLETED | OUTPATIENT
Start: 2021-06-24 | End: 2021-06-24

## 2021-06-24 RX ORDER — CETIRIZINE HYDROCHLORIDE 10 MG/1
10 TABLET ORAL DAILY
Qty: 30 TABLET | Refills: 0 | Status: SHIPPED | OUTPATIENT
Start: 2021-06-24 | End: 2021-07-24

## 2021-06-24 RX ORDER — GUAIFENESIN 600 MG/1
600 TABLET, EXTENDED RELEASE ORAL 2 TIMES DAILY
Qty: 30 TABLET | Refills: 0 | Status: ON HOLD | OUTPATIENT
Start: 2021-06-24 | End: 2021-07-06 | Stop reason: ALTCHOICE

## 2021-06-24 RX ORDER — ALBUTEROL SULFATE 90 UG/1
2 AEROSOL, METERED RESPIRATORY (INHALATION) EVERY 6 HOURS PRN
Status: DISCONTINUED | OUTPATIENT
Start: 2021-06-24 | End: 2021-06-24 | Stop reason: HOSPADM

## 2021-06-24 RX ORDER — FLUTICASONE PROPIONATE 50 MCG
1 SPRAY, SUSPENSION (ML) NASAL DAILY
Qty: 1 BOTTLE | Refills: 0 | Status: SHIPPED | OUTPATIENT
Start: 2021-06-24 | End: 2021-07-13 | Stop reason: SDUPTHER

## 2021-06-24 RX ADMIN — ALBUTEROL SULFATE 2 PUFF: 90 AEROSOL, METERED RESPIRATORY (INHALATION) at 01:14

## 2021-06-24 RX ADMIN — PSEUDOEPHEDRINE HCL 30 MG: 30 TABLET, FILM COATED ORAL at 01:05

## 2021-06-24 NOTE — ED NOTES
Discharge instructions and new medications discussed and explained with Pt. Pt. Verbalized understanding and has no further questions or needs at this time.         Drew Wallace RN  06/24/21 [id number]

## 2021-06-24 NOTE — ED NOTES
Pt. Resting in bed with even and unlabored respirations. Pt. Denies any pain. Lab at bedside Pt. Updated about plan of care and treatment. Family at bedside. Pt. Has no further concerns, questions or needs at this time. Call light within reach.         Meet Harris RN  06/24/21 [id number]

## 2021-06-24 NOTE — ED PROVIDER NOTES
[facility]  eMERGENCY dEPARTMENT eNCOUnter          [address] [facility]       Chief Complaint   Patient presents with    Cough    Nasal Congestion       Nurses Notes reviewed and I agree except as noted in the HPI. HISTORY OF PRESENT ILLNESS    Bryant Plascencia is a 79 y.o. male who presents cough and congestion. Apparently this is been going going for several days. He was at his nephrologist today who checked him out and said that he was okay. He comes in tonight because he started having coughing and then he felt like he was going to vomit. Patient states that he is not in any pain. He has no chest pain with exertion or shortness of breath with exertion. Patient states that his wife is suffering from bronchitis and he feels that this may be what it is. Patient does have stage IV chronic kidney disease, he is on Coumadin for atrial fibrillation. He has a history of heart failure he is not third spacing any fluids. He also has a history of mild COPD he is no longer a smoker. Currently the patient is resting comfortably on the cot no apparent distress no other physical complaints at this time. REVIEW OF SYSTEMS     Review of Systems   Constitutional: Negative for activity change, appetite change, diaphoresis, fatigue and unexpected weight change. HENT: Negative for congestion, ear discharge, ear pain, hearing loss, rhinorrhea, sinus pressure, sore throat, trouble swallowing and voice change. Eyes: Negative for photophobia, pain, discharge, redness and itching. Respiratory: Positive for cough. Negative for choking, chest tightness, shortness of breath and wheezing. Cardiovascular: Negative for chest pain, palpitations and leg swelling. Gastrointestinal: Positive for nausea. Negative for abdominal distention, abdominal pain, blood in stool, constipation, diarrhea and vomiting. Endocrine: Negative for polydipsia, polyphagia and polyuria.    Genitourinary: Negative for decreased urine volume, difficulty urinating, dysuria, enuresis, frequency, hematuria and urgency. Musculoskeletal: Negative for arthralgias, back pain, gait problem, myalgias, neck pain and neck stiffness. Skin: Negative for pallor and rash. Allergic/Immunologic: Negative for immunocompromised state. Neurological: Negative for dizziness, tremors, seizures, syncope, facial asymmetry, weakness, light-headedness, numbness and headaches. Hematological: Negative for adenopathy. Does not bruise/bleed easily. Psychiatric/Behavioral: Negative for agitation, hallucinations and suicidal ideas. The patient is not nervous/anxious. PAST MEDICAL HISTORY    has a past medical history of MILLY (acute kidney injury) ([facility] [address] Atrial fibrillation ([facility] Cerebral artery occlusion with cerebral infarction ([facility] [address] CHF (congestive heart failure), NYHA class III, acute on chronic, combined ([facility] [address] COPD (chronic obstructive pulmonary disease) ([facility] [address] Coronary artery disease involving native coronary artery of native heart with angina pectoris ([facility] [address] Diabetes mellitus, type 2 ([facility] [address] [facility] Hyperlipidemia, Hypertension, and Pneumonia. SURGICAL HISTORY      has a past surgical history that includes Pacemaker insertion; hernia repair; Cardiac surgery; Coronary angioplasty with stent; and other surgical history (05/10/2018).     CURRENT MEDICATIONS       Previous Medications    ALBUTEROL (ACCUNEB) 1.25 MG/3ML NEBULIZER SOLUTION    Inhale 1 ampule into the lungs every 6 hours as needed for Wheezing    ALCOHOL SWABS (B-D SINGLE USE SWABS REGULAR) PADS        ATORVASTATIN (LIPITOR) 80 MG TABLET    Take 80 mg by mouth nightly    BUMETANIDE (BUMEX) 1 MG TABLET    Take 1 mg by mouth daily     DEXTROMETHORPHAN-GUAIFENESIN (MUCINEX DM PO)    Take by mouth See Admin Instructions Indications: Respiratory Congestion    LOSARTAN (COZAAR) 25 MG TABLET    Take 1 tablet by mouth nightly    METFORMIN (GLUCOPHAGE) 500 MG TABLET    Take 1 tablet by mouth 2 times daily (with meals)    METOPROLOL SUCCINATE (TOPROL XL) 50 MG EXTENDED RELEASE TABLET    Take 50 mg by mouth daily     NITROGLYCERIN (NITROSTAT) 0.4 MG SL TABLET    Place 0.4 mg under the tongue every 5 minutes as needed for Chest pain up to max of 3 total doses. If no relief after 1 dose, call 911. TRUE METRIX BLOOD GLUCOSE TEST STRIP        TRUEPLUS LANCETS 28G MISC        VITAMIN D (CHOLECALCIFEROL) 1000 UNIT TABS TABLET    Take 1 tablet by mouth daily    WARFARIN (COUMADIN) 2.5 MG TABLET    Take 1 tablet by mouth daily       ALLERGIES     is allergic to aspirin, benzonatate, and xanax [alprazolam]. FAMILY HISTORY     He indicated that his mother is . He indicated that his father is . Family history is unknown by patient. SOCIAL HISTORY      reports that he has quit smoking. His smoking use included cigarettes. He has a 50.00 pack-year smoking history. He has never used smokeless tobacco. He reports current alcohol use. He reports that he does not use drugs. PHYSICAL EXAM     INITIAL VITALS:  height is 5' 7\" (1.702 m) and weight is 203 lb (92.1 kg). His oral temperature is 98.9 °F (37.2 °C). His blood pressure is 114/60 and his pulse is 72. His respiration is 18 and oxygen saturation is 96%. Physical Exam  Vitals and nursing note reviewed. Constitutional:       General: He is not in acute distress. Appearance: He is well-developed. He is not diaphoretic. HENT:      Head: Normocephalic and atraumatic. Right Ear: External ear normal.      Left Ear: External ear normal.      Nose: Congestion present. Mouth/Throat:      Mouth: Mucous membranes are moist.      Pharynx: Oropharynx is clear. Eyes:      General: Lids are normal. No scleral icterus. Right eye: No discharge. Left eye: No discharge. Conjunctiva/sclera: Conjunctivae normal.      Right eye: No exudate. Left eye: No exudate.      Pupils: Pupils are equal, round, and reactive to light. Neck:      Thyroid: No thyromegaly. Vascular: No JVD. Trachea: No tracheal deviation. Cardiovascular:      Rate and Rhythm: Normal rate. Rhythm irregularly irregular. Pulses: Normal pulses. Carotid pulses are 2+ on the right side and 2+ on the left side. Radial pulses are 2+ on the right side and 2+ on the left side. Femoral pulses are 2+ on the right side and 2+ on the left side. Popliteal pulses are 2+ on the right side and 2+ on the left side. Dorsalis pedis pulses are 2+ on the right side and 2+ on the left side. Posterior tibial pulses are 2+ on the right side and 2+ on the left side. Heart sounds: Normal heart sounds, S1 normal and S2 normal. No murmur heard. No friction rub. No gallop. Pulmonary:      Effort: Pulmonary effort is normal. No respiratory distress. Breath sounds: Normal breath sounds. No stridor. No decreased breath sounds, wheezing, rhonchi or rales. Chest:      Chest wall: No tenderness. Abdominal:      General: Bowel sounds are normal. There is no distension. Palpations: Abdomen is soft. There is no mass. Tenderness: There is no abdominal tenderness. There is no guarding or rebound. Musculoskeletal:         General: No tenderness. Normal range of motion. Cervical back: Normal range of motion and neck supple. Normal range of motion. Right lower leg: No edema. Left lower leg: No edema. Lymphadenopathy:      Cervical: No cervical adenopathy. Skin:     General: Skin is warm and dry. Capillary Refill: Capillary refill takes less than 2 seconds. Findings: No bruising, ecchymosis, lesion or rash. Neurological:      General: No focal deficit present. Mental Status: He is alert and oriented to person, place, and time. Mental status is at baseline. Cranial Nerves: No cranial nerve deficit. Sensory: No sensory deficit.       Coordination: Coordination normal.      Deep Tendon Reflexes: Reflexes are normal and symmetric. Psychiatric:         Speech: Speech normal.         Behavior: Behavior normal.         Thought Content: Thought content normal.         Judgment: Judgment normal.           DIFFERENTIAL DIAGNOSIS:   Pneumonia bronchitis COPD CHF influenza COVID-19    DIAGNOSTIC RESULTS     EKG: All EKG's are interpreted by the Emergency Department Physician who either signs or Co-signs this chart in the absence of a cardiologist.  EKG reveals atrial fibrillation ventricular rate of 101  QT interval 330 QTC of 427. RADIOLOGY: non-plain film images(s) such as CT, Ultrasound and MRI are read by the radiologist.  XR CHEST PORTABLE   Final Result   1. No acute findings.       This document has been electronically signed by: Bree Agarwal MD on    06/23/2021 11:31 PM          LABS:   Labs Reviewed   PROTIME-INR - Abnormal; Notable for the following components:       Result Value    INR 3.42 (*)     All other components within normal limits   CBC WITH AUTO DIFFERENTIAL - Abnormal; Notable for the following components:    WBC 13.6 (*)     RBC 4.54 (*)     MCV 96.9 (*)     RDW-CV 14.6 (*)     RDW-SD 52.6 (*)     Segs Absolute 12.1 (*)     Lymphocytes Absolute 0.5 (*)     All other components within normal limits   BRAIN NATRIURETIC PEPTIDE - Abnormal; Notable for the following components:    Pro-BNP 1930.0 (*)     All other components within normal limits   BASIC METABOLIC PANEL - Abnormal; Notable for the following components:    Glucose 134 (*)     BUN 39 (*)     CREATININE 2.5 (*)     All other components within normal limits   TROPONIN - Abnormal; Notable for the following components:    Troponin T 0.024 (*)     All other components within normal limits   GLOMERULAR FILTRATION RATE, ESTIMATED - Abnormal; Notable for the following components:    Est, Glom Filt Rate 25 (*)     All other components within normal limits   COVID-19, RAPID   RAPID INFLUENZA A/B ANTIGENS   APTT   HEPATIC FUNCTION PANEL   LIPASE   MAGNESIUM   ANION GAP   OSMOLALITY       EMERGENCY DEPARTMENT COURSE:   Vitals:    Vitals:    06/23/21 2216 06/23/21 2256 06/23/21 2303 06/24/21 0006   BP: (!) 188/78  125/72 114/60   Pulse:  79  72   Resp:  18  18   Temp:       TempSrc:       SpO2:  (!) 87% 92% 96%   Weight:       Height:         Patient was assessed at bedside appropriate labs and imaging were ordered. Patient was evaluated here. I believe he he has very bad sinusitis. He states he gets this way about every year and around the same time. 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 He has COPD. He does not currently have a lung doctor. Patient has no complaints of chest pain or shortness of breath or dyspnea on exertion. Here today his pulse ox went down a little bit while he was in bed however we got him up and walked in. And he states despite the fact that his pulse oximetry did drop down to 89 that he felt the same as usual and he felt better than when he came in. At this point I feel the patient has sinusitis exacerbating his COPD I did make suggestions to him to sleep sitting up. I will give him guaifenesin to be used during the day and I will give him cetirizine to use at night with Flonase. He is instructed to follow-up with his primary care physician and do so within the next 1 to 2 days. He will probably need a pulmonary function test performed to see if he is going to need oxygen. This was discussed extensively at bedside with the patient who understood and agreed with the plan. He felt good and is ready to go home. Patient will be discharged home in stable condition. Patient has what appears to be a sinusitis. This is worsening his COPD. Patient is instructed to use his albuterol nebulizers at home as prescribed. He has been given a puffer here he is instructed to use that as prescribed.   Patient has been given medications for his sinusitis he is instructed to use those as prescribed. Patient is instructed to follow-up with his primary care physician and call today for an appointment. Patient is instructed to return to the nearest emergency room immediately for any new or worsening complaints. CRITICAL CARE:   None    CONSULTS:  None    PROCEDURES:  None    FINAL IMPRESSION      1. Chronic sinusitis, unspecified location    2.  Chronic obstructive pulmonary disease, unspecified COPD type [facility])          DISPOSITION/PLAN   Discharge    PATIENT REFERRED TO:  Fernando Fairchild, DO  [address] [facility]   [phone]    Call today        DISCHARGE MEDICATIONS:  New Prescriptions    CETIRIZINE (ZYRTEC) 10 MG TABLET    Take 1 tablet by mouth daily    FLUTICASONE (FLONASE) 50 MCG/ACT NASAL SPRAY    1 spray by Each Nostril route daily    GUAIFENESIN (MUCINEX) 600 MG EXTENDED RELEASE TABLET    Take 1 tablet by mouth 2 times daily for 15 days       (Please note that portions of this note were completed with a voice recognition program.  Efforts were made to edit the dictations but occasionally words are mis-transcribed.)    Nat Encarnacion, [address] [place] [address], DO  06/24/21 2271

## 2021-06-24 NOTE — ED TRIAGE NOTES
Pt states he is having a \"hard time breathing\". RN asked him to clarify if he was SOB or only congestion. Pt states \"I guess just congestion\"  Pt states he has a productive cough with white phlegm. Pt denies NVD or fevers.

## 2021-06-25 ENCOUNTER — OFFICE VISIT (OUTPATIENT)
Dept: FAMILY MEDICINE CLINIC | Age: 81
End: 2021-06-25
Payer: MEDICARE

## 2021-06-25 VITALS
TEMPERATURE: 97.6 F | HEART RATE: 91 BPM | HEIGHT: 67 IN | SYSTOLIC BLOOD PRESSURE: 122 MMHG | BODY MASS INDEX: 32.11 KG/M2 | OXYGEN SATURATION: 92 % | DIASTOLIC BLOOD PRESSURE: 76 MMHG | WEIGHT: 204.6 LBS | RESPIRATION RATE: 16 BRPM

## 2021-06-25 DIAGNOSIS — I50.43 CHF (CONGESTIVE HEART FAILURE), NYHA CLASS III, ACUTE ON CHRONIC, COMBINED (HCC): ICD-10-CM

## 2021-06-25 DIAGNOSIS — J44.1 COPD EXACERBATION (HCC): Primary | ICD-10-CM

## 2021-06-25 DIAGNOSIS — I10 ESSENTIAL HYPERTENSION: ICD-10-CM

## 2021-06-25 PROCEDURE — 99214 OFFICE O/P EST MOD 30 MIN: CPT | Performed by: FAMILY MEDICINE

## 2021-06-25 PROCEDURE — G8926 SPIRO NO PERF OR DOC: HCPCS | Performed by: FAMILY MEDICINE

## 2021-06-25 PROCEDURE — G8427 DOCREV CUR MEDS BY ELIG CLIN: HCPCS | Performed by: FAMILY MEDICINE

## 2021-06-25 PROCEDURE — 1123F ACP DISCUSS/DSCN MKR DOCD: CPT | Performed by: FAMILY MEDICINE

## 2021-06-25 PROCEDURE — 4040F PNEUMOC VAC/ADMIN/RCVD: CPT | Performed by: FAMILY MEDICINE

## 2021-06-25 PROCEDURE — G8417 CALC BMI ABV UP PARAM F/U: HCPCS | Performed by: FAMILY MEDICINE

## 2021-06-25 PROCEDURE — 3023F SPIROM DOC REV: CPT | Performed by: FAMILY MEDICINE

## 2021-06-25 PROCEDURE — 1036F TOBACCO NON-USER: CPT | Performed by: FAMILY MEDICINE

## 2021-06-25 RX ORDER — PREDNISONE 20 MG/1
40 TABLET ORAL DAILY
Qty: 10 TABLET | Refills: 0 | Status: SHIPPED | OUTPATIENT
Start: 2021-06-25 | End: 2021-06-29

## 2021-06-25 RX ORDER — AZITHROMYCIN 250 MG/1
TABLET, FILM COATED ORAL
Qty: 1 PACKET | Refills: 0 | Status: SHIPPED | OUTPATIENT
Start: 2021-06-25 | End: 2021-06-29 | Stop reason: ALTCHOICE

## 2021-06-25 SDOH — ECONOMIC STABILITY: FOOD INSECURITY: WITHIN THE PAST 12 MONTHS, YOU WORRIED THAT YOUR FOOD WOULD RUN OUT BEFORE YOU GOT MONEY TO BUY MORE.: NEVER TRUE

## 2021-06-25 SDOH — ECONOMIC STABILITY: FOOD INSECURITY: WITHIN THE PAST 12 MONTHS, THE FOOD YOU BOUGHT JUST DIDN'T LAST AND YOU DIDN'T HAVE MONEY TO GET MORE.: NEVER TRUE

## 2021-06-25 ASSESSMENT — SOCIAL DETERMINANTS OF HEALTH (SDOH): HOW HARD IS IT FOR YOU TO PAY FOR THE VERY BASICS LIKE FOOD, HOUSING, MEDICAL CARE, AND HEATING?: NOT HARD AT ALL

## 2021-06-25 NOTE — PROGRESS NOTES
Fatoumata Bangura is a 79 y.o. male that presents for     Chief Complaint   Patient presents with    Follow-up     copd       /76   Pulse 91   Temp 97.6 °F (36.4 °C) (Infrared)   Resp 16   Ht 5' 7\" (1.702 m)   Wt 204 lb 9.6 oz (92.8 kg)   SpO2 92%   BMI 32.04 kg/m²       HPI    ER Follow Up:  Patient presents for ER follow up. Patient recently seen in ED at [facility] for evaluation and treatment of COPD. Symptoms prior to presenting to ER:  'I didn't feel good'. ER Course per ER provider note:      Patient was assessed at bedside appropriate labs and imaging were ordered. Patient was evaluated here. I believe he he has very bad sinusitis. He states he gets this way about every year and around the same time. 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 He has COPD. He does not currently have a lung doctor. Patient has no complaints of chest pain or shortness of breath or dyspnea on exertion. Here today his pulse ox went down a little bit while he was in bed however we got him up and walked in. And he states despite the fact that his pulse oximetry did drop down to 89 that he felt the same as usual and he felt better than when he came in. At this point I feel the patient has sinusitis exacerbating his COPD I did make suggestions to him to sleep sitting up. I will give him guaifenesin to be used during the day and I will give him cetirizine to use at night with Flonase. He is instructed to follow-up with his primary care physician and do so within the next 1 to 2 days. He will probably need a pulmonary function test performed to see if he is going to need oxygen. This was discussed extensively at bedside with the patient who understood and agreed with the plan. He felt good and is ready to go home. Patient will be discharged home in stable condition.     Patient has what appears to be a sinusitis. This is worsening his COPD.   Patient is instructed to use his albuterol nebulizers at home as prescribed. He has been given a puffer here he is instructed to use that as prescribed. Patient has been given medications for his sinusitis he is instructed to use those as prescribed. Patient is instructed to follow-up with his primary care physician and call today for an appointment. Patient is instructed to return to the nearest emergency room immediately for any new or worsening complaints. Medications/Treatments at discharge:  Zyrtec, mucinex    Clinical course since discharge:  Still having a productive cough, no significant improvement. SOB has been mild. Eating ok. Has some decreased exertional tolerance.       Health Maintenance   Topic Date Due    COVID-19 Vaccine (1) Never done    DTaP/Tdap/Td vaccine (1 - Tdap) Never done   [employer] Visit (AWV)  Never done    Lipid screen  02/12/2020    Potassium monitoring  06/23/2022    Creatinine monitoring  06/23/2022    Flu vaccine  Completed    Shingles Vaccine  Completed    Pneumococcal 65+ yrs at Risk Vaccine  Completed    Hepatitis A vaccine  Aged Out    Hib vaccine  Aged Out    Meningococcal (ACWY) vaccine  Aged Out       Past Medical History:   Diagnosis Date    MILLY (acute kidney injury) ([address]     Atrial fibrillation ([address]     Cerebral artery occlusion with cerebral infarction ([address]     CHF (congestive heart failure), NYHA class III, acute on chronic, combined ([address]     COPD (chronic obstructive pulmonary disease) ([address] 8/3/2020    Coronary artery disease involving native coronary artery of native heart with angina pectoris ([address]     Diabetes mellitus, type 2 ([address] 12/30/2020    Hyperlipidemia 2/20/2012    Hypertension     Pneumonia        Past Surgical History:   Procedure Laterality Date    CARDIAC SURGERY      heart cath    CORONARY ANGIOPLASTY WITH STENT PLACEMENT      HERNIA REPAIR      OTHER SURGICAL HISTORY  05/10/2018    PACEMAKER INSERTION         Social History     Tobacco Use    Smoking status: Former Smoker     Packs/day: 1.00     Years: 50.00     Pack years: 50.00     Types: Cigarettes    Smokeless tobacco: Never Used   Vaping Use    Vaping Use: Never used   Substance Use Topics    Alcohol use: Yes     Comment: rarely    Drug use: No       Family History   Family history unknown: Yes         I have reviewed the patient's past medical history, past surgical history, allergies, medications, social and family history and I have made updates where appropriate. Review of Systems        PHYSICAL EXAM:  /76   Pulse 91   Temp 97.6 °F (36.4 °C) (Infrared)   Resp 16   Ht 5' 7\" (1.702 m)   Wt 204 lb 9.6 oz (92.8 kg)   SpO2 92%   BMI 32.04 kg/m²     Physical Exam  Vitals and nursing note reviewed. Constitutional:       General: He is not in acute distress. Appearance: He is well-developed. HENT:      Head: Normocephalic and atraumatic. Right Ear: Hearing and external ear normal.      Left Ear: Hearing and external ear normal.      Nose: Nose normal.   Eyes:      General:         Right eye: No discharge. Left eye: No discharge. Conjunctiva/sclera: Conjunctivae normal.   Neck:      Thyroid: No thyromegaly. Trachea: No tracheal deviation. Cardiovascular:      Rate and Rhythm: Normal rate and regular rhythm. Heart sounds: Normal heart sounds. No murmur heard. No friction rub. No gallop. Pulmonary:      Effort: Pulmonary effort is normal. No respiratory distress. Breath sounds: Wheezing present. No rhonchi or rales. Chest:      Chest wall: No tenderness. Musculoskeletal:         General: No deformity. Cervical back: Normal range of motion and neck supple. Lymphadenopathy:      Cervical: No cervical adenopathy. Skin:     General: Skin is warm and dry. Findings: No erythema or rash. Neurological:      Mental Status: He is alert. Motor: No abnormal muscle tone.       Coordination: Coordination normal.   Psychiatric: Behavior: Behavior normal.         Thought Content: Thought content normal.         Judgment: Judgment normal.             ASSESSMENT & PLAN  Leola Kaye was seen today for follow-up. Diagnoses and all orders for this visit:    COPD exacerbation ([facility]  -     azithromycin (ZITHROMAX Z-NI) 250 MG tablet; 2 tabs PO x 1 on Day 1, then 1 tab PO q daily on Days 2-5.  -     predniSONE (DELTASONE) 20 MG tablet; Take 2 tablets by mouth daily for 5 days    CHF (congestive heart failure), NYHA class III, acute on chronic, combined ([facility]    Essential hypertension    -Sx consistent with a moderate COPD exacerbation, he appears reliable for outpatient treatment. Will start above treatment. ER precautions given to patient today.  -Other chronic issues are stable, continue current medications  -Advised to call if any issues      Return if symptoms worsen or fail to improve. The most recent results of the following tests were reviewed with the patient today: XRay of chest     All copied or forwarded information in the progress note was verified by me to be accurate at the time of visit  Patient's past medical, surgical, social and family history were reviewed and updated     All patient questions answered. Patient voiced understanding.

## 2021-06-29 ENCOUNTER — HOSPITAL ENCOUNTER (OUTPATIENT)
Age: 81
Discharge: HOME | End: 2021-06-29
Payer: MEDICARE

## 2021-06-29 ENCOUNTER — TELEPHONE (OUTPATIENT)
Dept: FAMILY MEDICINE CLINIC | Age: 81
End: 2021-06-29

## 2021-06-29 ENCOUNTER — HOSPITAL ENCOUNTER (OUTPATIENT)
Dept: GENERAL RADIOLOGY | Age: 81
Discharge: HOME | End: 2021-06-29
Payer: MEDICARE

## 2021-06-29 ENCOUNTER — OFFICE VISIT (OUTPATIENT)
Dept: FAMILY MEDICINE CLINIC | Age: 81
End: 2021-06-29
Payer: MEDICARE

## 2021-06-29 VITALS
TEMPERATURE: 97.3 F | DIASTOLIC BLOOD PRESSURE: 86 MMHG | HEIGHT: 67 IN | WEIGHT: 199.8 LBS | OXYGEN SATURATION: 90 % | RESPIRATION RATE: 22 BRPM | HEART RATE: 82 BPM | BODY MASS INDEX: 31.36 KG/M2 | SYSTOLIC BLOOD PRESSURE: 138 MMHG

## 2021-06-29 DIAGNOSIS — J44.1 COPD EXACERBATION (HCC): Primary | ICD-10-CM

## 2021-06-29 DIAGNOSIS — J44.1 COPD EXACERBATION (HCC): ICD-10-CM

## 2021-06-29 PROCEDURE — 4040F PNEUMOC VAC/ADMIN/RCVD: CPT | Performed by: NURSE PRACTITIONER

## 2021-06-29 PROCEDURE — 71046 X-RAY EXAM CHEST 2 VIEWS: CPT

## 2021-06-29 PROCEDURE — G8427 DOCREV CUR MEDS BY ELIG CLIN: HCPCS | Performed by: NURSE PRACTITIONER

## 2021-06-29 PROCEDURE — 1123F ACP DISCUSS/DSCN MKR DOCD: CPT | Performed by: NURSE PRACTITIONER

## 2021-06-29 PROCEDURE — 3023F SPIROM DOC REV: CPT | Performed by: NURSE PRACTITIONER

## 2021-06-29 PROCEDURE — 1036F TOBACCO NON-USER: CPT | Performed by: NURSE PRACTITIONER

## 2021-06-29 PROCEDURE — 99214 OFFICE O/P EST MOD 30 MIN: CPT | Performed by: NURSE PRACTITIONER

## 2021-06-29 PROCEDURE — G8926 SPIRO NO PERF OR DOC: HCPCS | Performed by: NURSE PRACTITIONER

## 2021-06-29 PROCEDURE — G8417 CALC BMI ABV UP PARAM F/U: HCPCS | Performed by: NURSE PRACTITIONER

## 2021-06-29 RX ORDER — AMOXICILLIN AND CLAVULANATE POTASSIUM 875; 125 MG/1; MG/1
1 TABLET, FILM COATED ORAL 2 TIMES DAILY
Qty: 14 TABLET | Refills: 0 | Status: ON HOLD | OUTPATIENT
Start: 2021-06-29 | End: 2021-07-06 | Stop reason: ALTCHOICE

## 2021-06-29 RX ORDER — LEVOFLOXACIN 250 MG/1
TABLET ORAL
Qty: 4 TABLET | Refills: 0 | Status: SHIPPED | OUTPATIENT
Start: 2021-06-29 | End: 2021-06-29

## 2021-06-29 RX ORDER — PREDNISONE 20 MG/1
40 TABLET ORAL DAILY
Qty: 14 TABLET | Refills: 0 | Status: ON HOLD | OUTPATIENT
Start: 2021-06-30 | End: 2021-07-09 | Stop reason: HOSPADM

## 2021-06-29 RX ORDER — LEVOFLOXACIN 500 MG/1
TABLET, FILM COATED ORAL
Qty: 1 TABLET | Refills: 0 | Status: SHIPPED | OUTPATIENT
Start: 2021-06-29 | End: 2021-06-29

## 2021-06-29 NOTE — PROGRESS NOTES
Other Topics Concern    Not on file   Social History Narrative    Not on file     Social Determinants of Health     Financial Resource Strain: Low Risk     Difficulty of Paying Living Expenses: Not hard at all   Food Insecurity: No Food Insecurity    Worried About Running Out of Food in the Last Year: Never true    [address] in the Last Year: Never true   Transportation Needs:     Lack of Transportation (Medical):  Lack of Transportation (Non-Medical):    Physical Activity:     Days of Exercise per Week:     Minutes of Exercise per Session:    Stress:     Feeling of Stress :    Social Connections:     Frequency of Communication with Friends and Family:     Frequency of Social Gatherings with Friends and Family:     Attends [employer] Services:     Active Member of Clubs or Organizations:     Attends Club or Organization Meetings:     Marital Status:    Intimate Partner Violence:     Fear of Current or Ex-Partner:     Emotionally Abused:     Physically Abused:     Sexually Abused:        Family History   Family history unknown: Yes           OBJECTIVE:  /86   Pulse 82   Temp 97.3 °F (36.3 °C) (Infrared)   Resp 22   Ht 5' 7\" (1.702 m)   Wt 199 lb 12.8 oz (90.6 kg)   SpO2 90%   BMI 31.29 kg/m²   General appearance: alert, well appearing, and in no distress. ENT exam reveals - ENT exam normal, no neck nodes or sinus tenderness. CVS exam: normal rate, regular rhythm, normal S1, S2, no murmurs, rubs, clicks or gallops. Chest:wheezing noted scattered t/o posteriorly, decreased air entry noted. Abdominal exam: soft, nontender, nondistended, no masses or organomegaly. Extremities:  No clubbing, cyanosis or edema  Skin exam - normal coloration and turgor, no rashes, no suspicious skin lesions noted. Psych -  Affect appropriate. Thought process is normal without evidence of depression or psychosis. Good insight and appropriae interaction.   Cognition and memory appear to be intact. ASSESSMENT & PLAN  Daly Cohen was seen today for discuss medications. Diagnoses and all orders for this visit:    COPD exacerbation ([facility]  -     predniSONE (DELTASONE) 20 MG tablet; Take 2 tablets by mouth daily for 7 days  -     Discontinue: levoFLOXacin (LEVAQUIN) 500 MG tablet; Take 1 tablet by mouth on 6/30/21  -     Discontinue: levoFLOXacin (LEVAQUIN) 250 MG tablet; Take 1 tab daily beginning 7/1/21      Start Augmentin for persistent cough  Restart Prednisone  Start Mucinex DM  Continue neb tx  ER precautions given  Chest xray today, will call with results  No follow-ups on file.     -Patient advised to call immediately or go to ER if any worsening of symptoms  -Patient counseled on conservative care including fluids, rest and OTC meds    Daly Cohen received counseling on the following healthy behaviors: medication adherence  Reviewed prior labs and health maintenance. Continue current medications, diet and exercise. Discussed use, benefit, and side effects of prescribed medications. Barriers to medication compliance addressed. Patient given educational materials - see patient instructions. All patient questions answered. Patient voiced understanding. I have reviewed this patient's history, habits, and medication list and have updated the chart where appropriate.     Electronically signed by WILBERTO Velasquez CNP on 6/29/2021 at 12:35 PM

## 2021-06-29 NOTE — PATIENT INSTRUCTIONS
Patient Education        Chronic Obstructive Pulmonary Disease (COPD): Care Instructions  Your Care Instructions     Chronic obstructive pulmonary disease (COPD) is a general term for a group of lung diseases, including emphysema and chronic bronchitis. People with COPD have decreased airflow in and out of the lungs, which makes it hard to breathe. The airways also can get clogged with thick mucus. Cigarette smoking is a major cause of COPD. Although there is no cure for COPD, you can slow its progress. Following your treatment plan and taking care of yourself can help you feel better and live longer. Follow-up care is a key part of your treatment and safety. Be sure to make and go to all appointments, and call your doctor if you are having problems. It's also a good idea to know your test results and keep a list of the medicines you take. How can you care for yourself at home? Staying healthy    · Do not smoke. This is the most important step you can take to prevent more damage to your lungs. If you need help quitting, talk to your doctor about stop-smoking programs and medicines. These can increase your chances of quitting for good.     · Avoid colds and flu. Get a pneumococcal vaccine shot. If you have had one before, ask your doctor whether you need a second dose. Get the flu vaccine every fall. If you must be around people with colds or the flu, wash your hands often.     · Avoid secondhand smoke, air pollution, and high altitudes. Also avoid cold, dry air and hot, humid air. Stay at home with your windows closed when air pollution is bad. Medicines and oxygen therapy    · Take your medicines exactly as prescribed. Call your doctor if you think you are having a problem with your medicine. You may be taking medicines such as:  ? Bronchodilators. These help open your airways and make breathing easier. They are either short-acting (work for 6 to 9 hours) or long-acting (work for 24 hours).  You inhale most bronchodilators, so they start to act quickly. Always carry your quick-relief inhaler with you in case you need it while you are away from home. ? Corticosteroids (prednisone, budesonide). These reduce airway inflammation. They come in pill or inhaled form. You must take these medicines every day for them to work well.     · Ask your doctor or pharmacist if a spacer is right for you. A spacer may help you get more inhaled medicine to your lungs. If you use one, ask how to use it properly.     · Do not take any vitamins, over-the-counter medicine, or herbal products without talking to your doctor first.     · If your doctor prescribed antibiotics, take them as directed. Do not stop taking them just because you feel better. You need to take the full course of antibiotics.     · If you use oxygen therapy, use the flow rate your doctor has recommended. Don't change it without talking to your doctor first. Oxygen therapy boosts the amount of oxygen in your blood and helps you breathe easier. Activity    · Get regular exercise. Walking is an easy way to get exercise. Start out slowly, and walk a little more each day.     · Pay attention to your breathing. You are exercising too hard if you can't talk while you exercise.     · Take short rest breaks when doing household chores and other activities.     · Learn breathing methods--such as breathing through pursed lips--to help you become less short of breath.     · If your doctor has not set you up with a pulmonary rehabilitation program, ask if rehab is right for you. Rehab includes exercise programs, education about your disease and how to manage it, help with diet and other changes, and emotional support. Diet    · Eat regular, healthy meals. Use bronchodilators about 1 hour before you eat to make it easier to eat. Eat several small meals instead of three large ones.  Drink beverages at the end of the meal. Avoid foods that are hard to chew.     · Eat foods that contain protein so you don't lose muscle mass.     · Talk with your doctor if you gain too much weight or if you lose weight without trying. Mental health    · Talk to your family, friends, or a therapist about your feelings. Some people feel frightened, angry, hopeless, helpless, and even guilty. Talking openly about bad feelings can help you cope. If these feelings last, talk to your doctor. When should you call for help? Call 911 anytime you think you may need emergency care. For example, call if:    · You have severe trouble breathing. Call your doctor now or seek immediate medical care if:    · You have new or worse trouble breathing.     · You cough up blood.     · You have a fever. Watch closely for changes in your health, and be sure to contact your doctor if:    · You cough more deeply or more often, especially if you notice more mucus or a change in the color of your mucus.     · You have new or worse swelling in your legs or belly.     · You are not getting better as expected. Where can you learn more? Go to https://[email or web address]. org and sign in to your epacube account. Enter R454 in the [employer] box to learn more about \"Chronic Obstructive Pulmonary Disease (COPD): Care Instructions. \"     If you do not have an account, please click on the \"Sign Up Now\" link. Current as of: October 26, 2020               Content Version: 12.9  © 2260-7830 [employer]. Care instructions adapted under license by [facility] ([facility]). If you have questions about a medical condition or this instruction, always ask your healthcare professional. [address] any warranty or liability for your use of this information.

## 2021-06-29 NOTE — TELEPHONE ENCOUNTER
Spoke with pt and SO regarding cxr  cxr showed infiltrates/atelectasis left lung base  Encouraged him to start Augmentin and Prednisone and start using the breathing tx as prescribed

## 2021-07-02 ENCOUNTER — OFFICE VISIT (OUTPATIENT)
Dept: FAMILY MEDICINE CLINIC | Age: 81
End: 2021-07-02
Payer: MEDICARE

## 2021-07-02 VITALS
HEIGHT: 67 IN | BODY MASS INDEX: 31.23 KG/M2 | WEIGHT: 199 LBS | SYSTOLIC BLOOD PRESSURE: 122 MMHG | TEMPERATURE: 97.3 F | HEART RATE: 84 BPM | RESPIRATION RATE: 18 BRPM | DIASTOLIC BLOOD PRESSURE: 82 MMHG | OXYGEN SATURATION: 90 %

## 2021-07-02 DIAGNOSIS — J44.1 COPD EXACERBATION (HCC): Primary | ICD-10-CM

## 2021-07-02 DIAGNOSIS — J01.90 ACUTE RHINOSINUSITIS: ICD-10-CM

## 2021-07-02 PROCEDURE — G8417 CALC BMI ABV UP PARAM F/U: HCPCS | Performed by: NURSE PRACTITIONER

## 2021-07-02 PROCEDURE — G8427 DOCREV CUR MEDS BY ELIG CLIN: HCPCS | Performed by: NURSE PRACTITIONER

## 2021-07-02 PROCEDURE — 1036F TOBACCO NON-USER: CPT | Performed by: NURSE PRACTITIONER

## 2021-07-02 PROCEDURE — 3023F SPIROM DOC REV: CPT | Performed by: NURSE PRACTITIONER

## 2021-07-02 PROCEDURE — G8926 SPIRO NO PERF OR DOC: HCPCS | Performed by: NURSE PRACTITIONER

## 2021-07-02 PROCEDURE — 99214 OFFICE O/P EST MOD 30 MIN: CPT | Performed by: NURSE PRACTITIONER

## 2021-07-02 PROCEDURE — 1123F ACP DISCUSS/DSCN MKR DOCD: CPT | Performed by: NURSE PRACTITIONER

## 2021-07-02 PROCEDURE — 4040F PNEUMOC VAC/ADMIN/RCVD: CPT | Performed by: NURSE PRACTITIONER

## 2021-07-02 RX ORDER — MONTELUKAST SODIUM 10 MG/1
10 TABLET ORAL DAILY
Qty: 30 TABLET | Refills: 3 | Status: SHIPPED | OUTPATIENT
Start: 2021-07-02 | End: 2021-09-27 | Stop reason: ALTCHOICE

## 2021-07-02 ASSESSMENT — ENCOUNTER SYMPTOMS
SHORTNESS OF BREATH: 1
COUGH: 1
WHEEZING: 1

## 2021-07-02 NOTE — PROGRESS NOTES
Augmenting and Prednisone  He will follow up next week     No follow-ups on file. Jerry Clifton received counseling on the following healthy behaviors: nutrition, exercise and medication adherence  Reviewedprior labs and health maintenance. Continue current medications, diet and exercise. Discussed use, benefit, and side effects of prescribed medications. Barriers to medication compliance addressed. Patient given educationalmaterials - see patient instructions. All patient questions answered. Patient voiced understanding.      Electronically signed by WILBERTO Tovar - CNP, APRN on 7/2/21 at 10:22 AM EDT

## 2021-07-03 ENCOUNTER — HOSPITAL ENCOUNTER (INPATIENT)
Age: 81
LOS: 6 days | Discharge: HOME | DRG: 871 | End: 2021-07-09
Attending: EMERGENCY MEDICINE
Payer: MEDICARE

## 2021-07-03 ENCOUNTER — APPOINTMENT (OUTPATIENT)
Dept: GENERAL RADIOLOGY | Age: 81
DRG: 871 | End: 2021-07-03
Payer: MEDICARE

## 2021-07-03 DIAGNOSIS — J18.9 PNEUMONIA DUE TO INFECTIOUS ORGANISM, UNSPECIFIED LATERALITY, UNSPECIFIED PART OF LUNG: Primary | ICD-10-CM

## 2021-07-03 DIAGNOSIS — J44.9 MODERATE COPD (CHRONIC OBSTRUCTIVE PULMONARY DISEASE) (HCC): ICD-10-CM

## 2021-07-03 DIAGNOSIS — J15.1 PNEUMONIA OF RIGHT LOWER LOBE DUE TO PSEUDOMONAS SPECIES (HCC): ICD-10-CM

## 2021-07-03 DIAGNOSIS — N18.30 STAGE 3 CHRONIC KIDNEY DISEASE, UNSPECIFIED WHETHER STAGE 3A OR 3B CKD (HCC): ICD-10-CM

## 2021-07-03 LAB
ANION GAP SERPL CALCULATED.3IONS-SCNC: 19 MEQ/L (ref 8–16)
BILIRUBIN URINE: NEGATIVE
BLOOD, URINE: NEGATIVE
BUN BLDV-MCNC: 52 MG/DL (ref 7–22)
CALCIUM SERPL-MCNC: 9.4 MG/DL (ref 8.5–10.5)
CHARACTER, URINE: CLEAR
CHLORIDE BLD-SCNC: 93 MEQ/L (ref 98–111)
CO2: 19 MEQ/L (ref 23–33)
COLOR: YELLOW
CREAT SERPL-MCNC: 2.2 MG/DL (ref 0.4–1.2)
EKG Q-T INTERVAL: 404 MS
EKG QRS DURATION: 140 MS
EKG QTC CALCULATION (BAZETT): 494 MS
EKG R AXIS: 52 DEGREES
EKG T AXIS: -140 DEGREES
EKG VENTRICULAR RATE: 90 BPM
GFR SERPL CREATININE-BSD FRML MDRD: 29 ML/MIN/1.73M2
GLUCOSE BLD-MCNC: 189 MG/DL (ref 70–108)
GLUCOSE BLD-MCNC: 214 MG/DL (ref 70–108)
GLUCOSE BLD-MCNC: 325 MG/DL (ref 70–108)
GLUCOSE URINE: NEGATIVE MG/DL
INR BLD: 3.84 (ref 0.85–1.13)
KETONES, URINE: NEGATIVE
LACTIC ACID, SEPSIS: 3.9 MMOL/L (ref 0.5–1.9)
LACTIC ACID, SEPSIS: 4.1 MMOL/L (ref 0.5–1.9)
LACTIC ACID: 5.2 MMOL/L (ref 0.5–2)
LEUKOCYTE ESTERASE, URINE: NEGATIVE
NITRITE, URINE: NEGATIVE
OSMOLALITY CALCULATION: 281.7 MOSMOL/KG (ref 275–300)
PH UA: 5 (ref 5–9)
POTASSIUM REFLEX MAGNESIUM: 3.8 MEQ/L (ref 3.5–5.2)
PRO-BNP: 3319 PG/ML (ref 0–1800)
PROCALCITONIN: 0.32 NG/ML (ref 0.01–0.09)
PROTEIN UA: NEGATIVE
SARS-COV-2, NAAT: NOT DETECTED
SCAN OF BLOOD SMEAR: NORMAL
SODIUM BLD-SCNC: 131 MEQ/L (ref 135–145)
SPECIFIC GRAVITY, URINE: 1.01 (ref 1–1.03)
TROPONIN T: 0.03 NG/ML
UROBILINOGEN, URINE: 0.2 EU/DL (ref 0–1)

## 2021-07-03 PROCEDURE — 71045 X-RAY EXAM CHEST 1 VIEW: CPT

## 2021-07-03 PROCEDURE — 6370000000 HC RX 637 (ALT 250 FOR IP): Performed by: EMERGENCY MEDICINE

## 2021-07-03 PROCEDURE — 93005 ELECTROCARDIOGRAM TRACING: CPT | Performed by: EMERGENCY MEDICINE

## 2021-07-03 PROCEDURE — 99223 1ST HOSP IP/OBS HIGH 75: CPT | Performed by: NURSE PRACTITIONER

## 2021-07-03 PROCEDURE — 6370000000 HC RX 637 (ALT 250 FOR IP): Performed by: NURSE PRACTITIONER

## 2021-07-03 PROCEDURE — 99283 EMERGENCY DEPT VISIT LOW MDM: CPT

## 2021-07-03 PROCEDURE — 2700000000 HC OXYGEN THERAPY PER DAY

## 2021-07-03 PROCEDURE — 2140000000 HC CCU INTERMEDIATE R&B

## 2021-07-03 PROCEDURE — 2580000003 HC RX 258: Performed by: EMERGENCY MEDICINE

## 2021-07-03 PROCEDURE — 80048 BASIC METABOLIC PNL TOTAL CA: CPT

## 2021-07-03 PROCEDURE — 87040 BLOOD CULTURE FOR BACTERIA: CPT

## 2021-07-03 PROCEDURE — 87635 SARS-COV-2 COVID-19 AMP PRB: CPT

## 2021-07-03 PROCEDURE — 94761 N-INVAS EAR/PLS OXIMETRY MLT: CPT

## 2021-07-03 PROCEDURE — 82948 REAGENT STRIP/BLOOD GLUCOSE: CPT

## 2021-07-03 PROCEDURE — 94669 MECHANICAL CHEST WALL OSCILL: CPT

## 2021-07-03 PROCEDURE — 81003 URINALYSIS AUTO W/O SCOPE: CPT

## 2021-07-03 PROCEDURE — 2580000003 HC RX 258: Performed by: NURSE PRACTITIONER

## 2021-07-03 PROCEDURE — 85025 COMPLETE CBC W/AUTO DIFF WBC: CPT

## 2021-07-03 PROCEDURE — 85610 PROTHROMBIN TIME: CPT

## 2021-07-03 PROCEDURE — 6360000002 HC RX W HCPCS: Performed by: NURSE PRACTITIONER

## 2021-07-03 PROCEDURE — 84145 PROCALCITONIN (PCT): CPT

## 2021-07-03 PROCEDURE — 83880 ASSAY OF NATRIURETIC PEPTIDE: CPT

## 2021-07-03 PROCEDURE — 83605 ASSAY OF LACTIC ACID: CPT

## 2021-07-03 PROCEDURE — 36415 COLL VENOUS BLD VENIPUNCTURE: CPT

## 2021-07-03 PROCEDURE — 84484 ASSAY OF TROPONIN QUANT: CPT

## 2021-07-03 PROCEDURE — 94640 AIRWAY INHALATION TREATMENT: CPT

## 2021-07-03 RX ORDER — MONTELUKAST SODIUM 10 MG/1
10 TABLET ORAL DAILY
Status: DISCONTINUED | OUTPATIENT
Start: 2021-07-03 | End: 2021-07-09 | Stop reason: HOSPADM

## 2021-07-03 RX ORDER — ALBUTEROL SULFATE 2.5 MG/3ML
2.5 SOLUTION RESPIRATORY (INHALATION)
Status: DISCONTINUED | OUTPATIENT
Start: 2021-07-03 | End: 2021-07-04

## 2021-07-03 RX ORDER — ONDANSETRON 2 MG/ML
4 INJECTION INTRAMUSCULAR; INTRAVENOUS EVERY 6 HOURS PRN
Status: DISCONTINUED | OUTPATIENT
Start: 2021-07-03 | End: 2021-07-09 | Stop reason: HOSPADM

## 2021-07-03 RX ORDER — SODIUM CHLORIDE 0.9 % (FLUSH) 0.9 %
5-40 SYRINGE (ML) INJECTION EVERY 12 HOURS SCHEDULED
Status: DISCONTINUED | OUTPATIENT
Start: 2021-07-03 | End: 2021-07-05

## 2021-07-03 RX ORDER — POLYETHYLENE GLYCOL 3350 17 G/17G
17 POWDER, FOR SOLUTION ORAL DAILY PRN
Status: DISCONTINUED | OUTPATIENT
Start: 2021-07-03 | End: 2021-07-09 | Stop reason: HOSPADM

## 2021-07-03 RX ORDER — DEXTROSE MONOHYDRATE 50 MG/ML
100 INJECTION, SOLUTION INTRAVENOUS PRN
Status: DISCONTINUED | OUTPATIENT
Start: 2021-07-03 | End: 2021-07-09 | Stop reason: HOSPADM

## 2021-07-03 RX ORDER — ONDANSETRON 4 MG/1
4 TABLET, ORALLY DISINTEGRATING ORAL EVERY 8 HOURS PRN
Status: DISCONTINUED | OUTPATIENT
Start: 2021-07-03 | End: 2021-07-09 | Stop reason: HOSPADM

## 2021-07-03 RX ORDER — 0.9 % SODIUM CHLORIDE 0.9 %
500 INTRAVENOUS SOLUTION INTRAVENOUS ONCE
Status: COMPLETED | OUTPATIENT
Start: 2021-07-03 | End: 2021-07-03

## 2021-07-03 RX ORDER — IPRATROPIUM BROMIDE AND ALBUTEROL SULFATE 2.5; .5 MG/3ML; MG/3ML
1 SOLUTION RESPIRATORY (INHALATION)
Status: DISCONTINUED | OUTPATIENT
Start: 2021-07-03 | End: 2021-07-03

## 2021-07-03 RX ORDER — BUMETANIDE 1 MG/1
1 TABLET ORAL DAILY
Status: DISCONTINUED | OUTPATIENT
Start: 2021-07-03 | End: 2021-07-09 | Stop reason: HOSPADM

## 2021-07-03 RX ORDER — NICOTINE POLACRILEX 4 MG
15 LOZENGE BUCCAL PRN
Status: DISCONTINUED | OUTPATIENT
Start: 2021-07-03 | End: 2021-07-09 | Stop reason: HOSPADM

## 2021-07-03 RX ORDER — SODIUM CHLORIDE 9 MG/ML
25 INJECTION, SOLUTION INTRAVENOUS PRN
Status: DISCONTINUED | OUTPATIENT
Start: 2021-07-03 | End: 2021-07-09 | Stop reason: HOSPADM

## 2021-07-03 RX ORDER — SODIUM CHLORIDE 0.9 % (FLUSH) 0.9 %
5-40 SYRINGE (ML) INJECTION PRN
Status: DISCONTINUED | OUTPATIENT
Start: 2021-07-03 | End: 2021-07-09 | Stop reason: HOSPADM

## 2021-07-03 RX ORDER — LOSARTAN POTASSIUM 25 MG/1
25 TABLET ORAL NIGHTLY
Status: DISCONTINUED | OUTPATIENT
Start: 2021-07-03 | End: 2021-07-09 | Stop reason: HOSPADM

## 2021-07-03 RX ORDER — 0.9 % SODIUM CHLORIDE 0.9 %
30 INTRAVENOUS SOLUTION INTRAVENOUS ONCE
Status: DISCONTINUED | OUTPATIENT
Start: 2021-07-03 | End: 2021-07-03

## 2021-07-03 RX ORDER — ATORVASTATIN CALCIUM 80 MG/1
80 TABLET, FILM COATED ORAL NIGHTLY
Status: DISCONTINUED | OUTPATIENT
Start: 2021-07-03 | End: 2021-07-09 | Stop reason: HOSPADM

## 2021-07-03 RX ORDER — ACETAMINOPHEN 650 MG/1
650 SUPPOSITORY RECTAL EVERY 6 HOURS PRN
Status: DISCONTINUED | OUTPATIENT
Start: 2021-07-03 | End: 2021-07-09 | Stop reason: HOSPADM

## 2021-07-03 RX ORDER — METOPROLOL SUCCINATE 50 MG/1
50 TABLET, EXTENDED RELEASE ORAL DAILY
Status: DISCONTINUED | OUTPATIENT
Start: 2021-07-03 | End: 2021-07-09 | Stop reason: HOSPADM

## 2021-07-03 RX ORDER — GUAIFENESIN 600 MG/1
600 TABLET, EXTENDED RELEASE ORAL 2 TIMES DAILY
Status: DISCONTINUED | OUTPATIENT
Start: 2021-07-03 | End: 2021-07-09 | Stop reason: HOSPADM

## 2021-07-03 RX ORDER — FLUTICASONE PROPIONATE 50 MCG
1 SPRAY, SUSPENSION (ML) NASAL DAILY
Status: DISCONTINUED | OUTPATIENT
Start: 2021-07-03 | End: 2021-07-09 | Stop reason: HOSPADM

## 2021-07-03 RX ORDER — DEXTROSE MONOHYDRATE 25 G/50ML
12.5 INJECTION, SOLUTION INTRAVENOUS PRN
Status: DISCONTINUED | OUTPATIENT
Start: 2021-07-03 | End: 2021-07-09 | Stop reason: HOSPADM

## 2021-07-03 RX ORDER — ACETAMINOPHEN 325 MG/1
650 TABLET ORAL EVERY 6 HOURS PRN
Status: DISCONTINUED | OUTPATIENT
Start: 2021-07-03 | End: 2021-07-09 | Stop reason: HOSPADM

## 2021-07-03 RX ORDER — CETIRIZINE HYDROCHLORIDE 10 MG/1
10 TABLET ORAL DAILY
Status: DISCONTINUED | OUTPATIENT
Start: 2021-07-03 | End: 2021-07-09 | Stop reason: HOSPADM

## 2021-07-03 RX ADMIN — CEFTRIAXONE SODIUM 1000 MG: 1 INJECTION, POWDER, FOR SOLUTION INTRAMUSCULAR; INTRAVENOUS at 18:30

## 2021-07-03 RX ADMIN — LOSARTAN POTASSIUM 25 MG: 25 TABLET, FILM COATED ORAL at 21:29

## 2021-07-03 RX ADMIN — ALBUTEROL SULFATE 2.5 MG: 2.5 SOLUTION RESPIRATORY (INHALATION) at 20:59

## 2021-07-03 RX ADMIN — ATORVASTATIN CALCIUM 80 MG: 80 TABLET, FILM COATED ORAL at 21:29

## 2021-07-03 RX ADMIN — AZITHROMYCIN DIHYDRATE 500 MG: 500 INJECTION, POWDER, LYOPHILIZED, FOR SOLUTION INTRAVENOUS at 19:51

## 2021-07-03 RX ADMIN — SODIUM CHLORIDE 500 ML: 9 INJECTION, SOLUTION INTRAVENOUS at 14:20

## 2021-07-03 RX ADMIN — IPRATROPIUM BROMIDE AND ALBUTEROL SULFATE 1 AMPULE: .5; 3 SOLUTION RESPIRATORY (INHALATION) at 14:18

## 2021-07-03 RX ADMIN — GUAIFENESIN 600 MG: 600 TABLET, EXTENDED RELEASE ORAL at 21:29

## 2021-07-03 ASSESSMENT — ENCOUNTER SYMPTOMS
WHEEZING: 1
VOMITING: 0
COUGH: 1
DIARRHEA: 0
NAUSEA: 0
SORE THROAT: 0
RHINORRHEA: 0
ABDOMINAL PAIN: 0
CONSTIPATION: 0
SHORTNESS OF BREATH: 1

## 2021-07-03 NOTE — H&P
prednisone recently along with the possibility of Zithromax; wife states he has been in the emergency department and also to his PCP twice here in the last couple weeks; he continues with a cough and continues with a cough with green phlegm and shortness of breath; he is fully alert and oriented, he denies any pain; he denies any fever chills. In the emergency department, chest x-ray reveals a left lower lobe pneumonia, lactic acid at 5.2, BNP at 3319; he has a chronic troponin elevation along with a CKD; he currently denies any lightheadedness or dizziness, chest pain, states \"I get short of breath when I start coughing\"; he denies any nausea, vomiting or diarrhea; he was started on Zithromax and Rocephin in the emergency department and is being admitted to hospital service for further care and evaluation. Past Medical History:          Diagnosis Date    MILLY (acute kidney injury) ([facility] [address]     Atrial fibrillation (HCC)     Cerebral artery occlusion with cerebral infarction ([facility] [address]     CHF (congestive heart failure), NYHA class III, acute on chronic, combined ([address]     COPD (chronic obstructive pulmonary disease) ([facility] [address] 8/3/2020    Coronary artery disease involving native coronary artery of native heart with angina pectoris ([facility] [address]     Diabetes mellitus, type 2 ([facility] [address] 12/30/2020    Hyperlipidemia 2/20/2012    Hypertension     Pneumonia        Past Surgical History:          Procedure Laterality Date    CARDIAC SURGERY      heart cath    CORONARY ANGIOPLASTY WITH STENT PLACEMENT      HERNIA REPAIR      OTHER SURGICAL HISTORY  05/10/2018    PACEMAKER INSERTION         Medications Prior to Admission:      Prior to Admission medications    Medication Sig Start Date End Date Taking?  Authorizing Provider   montelukast (SINGULAIR) 10 MG tablet Take 1 tablet by mouth daily 7/2/21   WILBERTO Lauren CNP   predniSONE (DELTASONE) 20 MG tablet Take 2 tablets by mouth daily for 7 days 6/30/21 7/7/21 WILBERTO Liu CNP   amoxicillin-clavulanate (AUGMENTIN) 875-125 MG per tablet Take 1 tablet by mouth 2 times daily for 7 days 6/29/21 7/6/21  WILBERTO Liu CNPaiFENesin [facility]) 600 MG extended release tablet Take 1 tablet by mouth 2 times daily for 15 days 6/24/21 7/9/21  Carnhair Rayo, DO   cetirizine (ZYRTEC) 10 MG tablet Take 1 tablet by mouth daily 6/24/21 7/24/21  Halimaella Girma, DO   fluticasone [facility]) 50 MCG/ACT nasal spray 1 spray by Each Nostril route daily 6/24/21   Daniel Rayo, DO   losartan (COZAAR) 25 MG tablet Take 1 tablet by mouth nightly 5/28/21   Cyndi Powell,    metFORMIN (GLUCOPHAGE) 500 MG tablet Take 1 tablet by mouth 2 times daily (with meals) 5/28/21   Cyndi Powell DO   nitroGLYCERIN (NITROSTAT) 0.4 MG SL tablet Place 0.4 mg under the tongue every 5 minutes as needed for Chest pain up to max of 3 total doses. If no relief after 1 dose, call 911.     Historical Provider, MD   Dextromethorphan-guaiFENesin [facility] DM PO) Take by mouth See Admin Instructions Indications: Respiratory Congestion    Historical Provider, MD   TRUEplus Lancets 28G [address] [facility]  7/21/20   Historical Provider, MD   TRUE METRIX BLOOD GLUCOSE TEST strip  7/21/20   Historical Provider, MD   Alcohol Swabs (B-D SINGLE USE SWABS REGULAR) PADS  7/21/20   Historical Provider, MD   warfarin (COUMADIN) 2.5 MG tablet Take 1 tablet by mouth daily 5/19/19   Bessie Pool MD   albuterol (ACCUNEB) 1.25 MG/3ML nebulizer solution Inhale 1 ampule into the lungs every 6 hours as needed for Wheezing    Historical Provider, MD   bumetanide (BUMEX) 1 MG tablet Take 1 mg by mouth daily     Historical Provider, MD   vitamin D (CHOLECALCIFEROL) 1000 UNIT TABS tablet Take 1 tablet by mouth daily 4/25/18   Edie Olszewski, MD   atorvastatin (LIPITOR) 80 MG tablet Take 80 mg by mouth nightly    Historical Provider, MD   metoprolol succinate (TOPROL XL) 50 MG extended release tablet Take 50 mg by mouth daily Historical Provider, MD       Allergies:  Aspirin, Benzonatate, and Xanax [alprazolam]    Social History:   reports that he has quit smoking. His smoking use included cigarettes. He has a 50.00 pack-year smoking history. He has never used smokeless tobacco. He reports current alcohol use. He reports that he does not use drugs. Family History:      Positive as follows:        Family history unknown: Yes       REVIEW OF SYSTEMS:     Constitutional: ROS: negative for - chills or fever  Head: no headache, no head injury, no migraine   Eyes ROS: denies blurred/double vision  Ears ROS: no hearing difficulty, no tinnitus  Mouth and Throat ROS: no ulceration, dysphagia, dental caries  Psychological ROS: no depression, no anxiety, no panic attacks, denies suicide/homicide ideation  Endocrine ROS: denies polyuria, polydypsia, no heat or cold intolerance  Respiratory ROS: positive for - cough, shortness of breath and sputum changes  Cardiovascular ROS: no chest pain or dyspnea on exertion  Gastrointestinal ROS: no abdominal pain, change in bowel habits, or black or bloody stools  Genito-Urinary ROS: denies dysuria, frequency, urgency; denies hematuria  Musculoskeletal ROS: negative  Neurological ROS: no syncope, no seizures, no numbness or tingling of hands, no numbness or tingling of feet, no paresis  Dermatology: no skin rash, no eczema  Endocrine: no polyuria, polydypsia, no heat/cold intolerance  Hematology: denies bruising easily, denies bleeding problems, denies clotting disorders    PHYSICAL EXAM:    BP (!) 169/73   Pulse 82   Temp 97.9 °F (36.6 °C) (Oral)   Resp 19   SpO2 94%     General appearance:  No apparent distress, appears stated age and cooperative. HEENT:  Normal cephalic, atraumatic without obvious deformity. Pupils equal, round, and reactive to light. Conjunctivae/corneas clear. Neck: Supple, with full range of motion. No jugular venous distention. Trachea midline.   Respiratory:  Normal respiratory effort. Rhonchi along with expiratory wheezing to auscultation, bilaterally. Speaks in complete sentences  Cardiovascular:  irregular rate and rhythm   Abdomen: Soft, non-tender, non-distended with normal bowel sounds. Musculoskeletal:  No clubbing, cyanosis or edema bilaterally. Full range of motion without deformity. Skin: Skin color, texture, turgor normal.    Neurologic:  Neurovascularly intact without any focal sensory/motor deficits. Cranial nerves: II-XII intact, grossly non-focal.  Psychiatric:  Alert and oriented x 4, thought content appropriate  Capillary Refill: Brisk,< 3 seconds   Peripheral Pulses: +2 palpable, equal bilaterally       Labs:     Recent Labs     07/03/21  1228   WBC 31.6*   HGB 14.9   HCT 44.4        Recent Labs     07/03/21  1228   *   K 3.8   CL 93*   CO2 19*   BUN 52*   CREATININE 2.2*   CALCIUM 9.4     No results for input(s): AST, ALT, BILIDIR, BILITOT, ALKPHOS in the last 72 hours. No results for input(s): INR in the last 72 hours. Recent Labs     07/03/21  1228   TROPONINT 0.034*     Procalcitonin:  No results for input(s): PROCAL in the last 72 hours. Lactic Acid:   Recent Labs     07/03/21  1308   LACTA 5.2*        Urinalysis:      Lab Results   Component Value Date    NITRU NEGATIVE 06/10/2021    WBCUA NONE SEEN 06/10/2021    BACTERIA NONE SEEN 06/10/2021    RBCUA NONE SEEN 06/10/2021    BLOODU NEGATIVE 06/10/2021    SPECGRAV 1.006 06/10/2021    GLUCOSEU NEGATIVE 04/13/2018       Radiology:     XR CHEST PORTABLE    Result Date: 7/3/2021  PROCEDURE: XR CHEST PORTABLE CLINICAL INFORMATION: Cough TECHNIQUE: Mobile AP chest radiograph. COMPARISON: PA and lateral chest radiographs 6/29/2021 FINDINGS: A left-sided cardiac device is in stable position. There is mild stable enlargement of the cardiac silhouette. Atherosclerotic calcifications are present in the thoracic aorta.  Reticular opacities at the left lung base are slightly more extensive than the prior study. Degenerative changes in the thoracic  spine are poorly visualized. 1. Left basilar atelectasis/infiltrate. 2. Mild stable cardiomegaly. **This report has been created using voice recognition software. It may contain minor errors which are inherent in voice recognition technology. ** Final report electronically signed by Dr. Luci Lockett on 7/3/2021 1:19 PM      EKG:  I have reviewed the EKG with the following interpretation: Atrial fibrillation heart rate 90      Thank you Abhijit Ho DO for the opportunity to be involved in this patient's care.     Electronically signed by WILBERTO Hicks CNP on 7/3/2021 at 2:12 PM

## 2021-07-03 NOTE — ED NOTES
Upon entrance into room, patient SP02 at 90% on 5L NC. Dr. Chandra Whitlock notified and gave verbal order of HI-ANTHONY at this time.      Amairani Rivera RN  07/03/21 1500

## 2021-07-03 NOTE — ED PROVIDER NOTES
[facility] [address] COMPLAINT       Chief Complaint   Patient presents with    Cough       Nurses Notes reviewed and I agree except as noted in the HPI. HISTORY OF PRESENT ILLNESS    Nuria Schulz is a 79 y.o. pleasant male who presents emergency department for cough and chills. Patient was seen by his PCP on June 29 that time for chills and cough and was started on Augmentin and prednisone after being diagnosed with sinusitis. He went back to the office on July 2 due to copious phlegm which has been clear. He was prescribed montelukast at that time. He reports that he was vaccinated for Covid in April and denies any known ill exposures. He has had ongoing cough, wheezing, and some difficulty breathing when he tries to get up and walk around. No chest pain, abdominal pain, vomiting, lower extremity pain or swelling. No other additional complaints or concerns. REVIEW OF SYSTEMS     Review of Systems   Constitutional: Positive for chills. Negative for diaphoresis and fever. HENT: Negative for congestion, rhinorrhea and sore throat. Eyes: Negative for visual disturbance. Respiratory: Positive for cough, shortness of breath and wheezing. Cardiovascular: Negative for chest pain, palpitations and leg swelling. Gastrointestinal: Negative for abdominal pain, constipation, diarrhea, nausea and vomiting. Endocrine: Negative for polyuria. Genitourinary: Negative for dysuria. Musculoskeletal: Negative for joint swelling. Skin: Negative for rash. Neurological: Negative for dizziness, seizures, syncope, speech difficulty, weakness, numbness and headaches. Hematological: Negative for adenopathy. Psychiatric/Behavioral: Negative for confusion and self-injury. All other systems reviewed and are negative.        PAST MEDICAL HISTORY    has a past medical history of MILLY (acute kidney injury) ([facility] [address] Atrial fibrillation ([address] Cerebral artery occlusion with cerebral infarction ([facility] CHF (congestive heart failure), NYHA class III, acute on chronic, combined ([facility] COPD (chronic obstructive pulmonary disease) ([facility] Coronary artery disease involving native coronary artery of native heart with angina pectoris ([facility] Diabetes mellitus, type 2 ([facility] Hyperlipidemia, Hypertension, and Pneumonia. SURGICAL HISTORY      has a past surgical history that includes Pacemaker insertion; hernia repair; Cardiac surgery; Coronary angioplasty with stent; and other surgical history (05/10/2018). CURRENT MEDICATIONS       Current Discharge Medication List      CONTINUE these medications which have NOT CHANGED    Details   montelukast (SINGULAIR) 10 MG tablet Take 1 tablet by mouth daily  Qty: 30 tablet, Refills: 3    Associated Diagnoses: COPD exacerbation ([facility] Acute rhinosinusitis      predniSONE (DELTASONE) 20 MG tablet Take 2 tablets by mouth daily for 7 days  Qty: 14 tablet, Refills: 0    Associated Diagnoses: COPD exacerbation ([facility])      guaiFENesin (MUCINEX) 600 MG extended release tablet Take 1 tablet by mouth 2 times daily for 15 days  Qty: 30 tablet, Refills: 0      cetirizine (ZYRTEC) 10 MG tablet Take 1 tablet by mouth daily  Qty: 30 tablet, Refills: 0      fluticasone (FLONASE) 50 MCG/ACT nasal spray 1 spray by Each Nostril route daily  Qty: 1 Bottle, Refills: 0      losartan (COZAAR) 25 MG tablet Take 1 tablet by mouth nightly  Qty: 90 tablet, Refills: 3      metFORMIN (GLUCOPHAGE) 500 MG tablet Take 1 tablet by mouth 2 times daily (with meals)  Qty: 180 tablet, Refills: 3      nitroGLYCERIN (NITROSTAT) 0.4 MG SL tablet Place 0.4 mg under the tongue every 5 minutes as needed for Chest pain up to max of 3 total doses. If no relief after 1 dose, call 911.       warfarin (COUMADIN) 2.5 MG tablet Take 1 tablet by mouth daily  Qty: 10 tablet, Refills: 0      albuterol (ACCUNEB) 1.25 MG/3ML nebulizer solution Inhale 1 ampule into the lungs every 6 hours as needed for Wheezing      bumetanide (BUMEX) 1 MG tablet Take 1 mg by mouth daily       vitamin D (CHOLECALCIFEROL) 1000 UNIT TABS tablet Take 1 tablet by mouth daily      atorvastatin (LIPITOR) 80 MG tablet Take 80 mg by mouth nightly      metoprolol succinate (TOPROL XL) 50 MG extended release tablet Take 50 mg by mouth daily       amoxicillin-clavulanate (AUGMENTIN) 875-125 MG per tablet Take 1 tablet by mouth 2 times daily for 7 days  Qty: 14 tablet, Refills: 0    Associated Diagnoses: COPD exacerbation ([address]      Dextromethorphan-guaiFENesin (MUCINEX DM PO) Take by mouth See Admin Instructions Indications: Respiratory Congestion      TRUEplus Lancets 28G MISC       TRUE METRIX BLOOD GLUCOSE TEST strip       Alcohol Swabs (B-D SINGLE USE SWABS REGULAR) PADS              ALLERGIES     is allergic to aspirin, benzonatate, and xanax [alprazolam]. FAMILY HISTORY     He indicated that his mother is . He indicated that his father is . Family history is unknown by patient. SOCIAL HISTORY      reports that he has quit smoking. His smoking use included cigarettes. He has a 50.00 pack-year smoking history. He has never used smokeless tobacco. He reports current alcohol use. He reports that he does not use drugs. PHYSICAL EXAM     INITIAL VITALS:  height is 5' 7\" (1.702 m) and weight is 201 lb (91.2 kg). His oral temperature is 99.7 °F (37.6 °C). His blood pressure is 94/62 and his pulse is 88. His respiration is 22 and oxygen saturation is 91%. CONSTITUTIONAL: [Awake, alert, non toxic, well developed, well nourished, no acute distress]  HEAD: [Normocephalic, atraumatic]  EYES: [Pupils equal, round & reactive to light, extraocular movements intact, no nystagmus, clear conjunctiva, non-icteric sclera]  ENT: [External ear canal clear without evidence of cerumen impaction or foreign body, TM's clear without erythema or bulging. Nares patent without drainage, septum appears midline. Moist mucus membranes, oropharynx clear without exudate, erythema, or mass. Uvula midline]  NECK: [Nontender and supple. No meningismus, no appreciated lymphadenopathy. Intact full range of motion. C-spine midline without vertebral tenderness. Trachea midline.]  CHEST: [Inspection normal, no lesions, equal rise. No crepitus or tenderness upon palpation.]  CARDIOVASCULAR: [Regular rate, rhythm, normal S1 and S2. No appreciated murmurs, rubs, or gallops. No pulse deficits appreciated. Intact distal perfusion. JVD not appreciated.]  PULMONARY: [Respiratory distress absent. Respiratory effort normal. Breath sounds with scattered bibasilar rales and wheezing. No accessory muscle use. No stridor]  ABDOMEN: [Inspection normal, without surgical scars. Soft, non-tender, non-distended, with normoactive bowel sounds. No palpable masses, rebound, or guarding]  BACK: [Intact ROM. No midline vertebral tenderness, step off, or crepitus. No CVA tenderness.]  MUSCULOSKELETAL: [Extremities nontender to palpation. No gross deformity or evidence of external trauma. Intact range of motion. Sensation intact. No clubbing, cyanosis, or edema.]  SKIN: [Warm, dry. No jaundice, rash, urticaria, or petechiae]  NEUROLOGIC: [Alert and oriented x 3, GCS 15, normal mentation for age. Moves all four extremities. No gross sensory deficit. Cerebellar function grossly normal.]  PSYCHIATRIC: [Normal mood and affect, thought process is clear and linear]     DIFFERENTIAL DIAGNOSIS:   Pneumonia, CHF, ? ?pleural effusion, and others    DIAGNOSTIC RESULTS     EKG: All EKG's are interpreted by the Emergency Department Physician who either signs or Co-signsthis chart in the absence of a cardiologist.  EKG shows atrial fibrillation with occasional paced complexes, left bundle branch block, rate of 90 bpm, QRS duration 140 ms,  ms, no ST elevation or depression.     RADIOLOGY: non-plain film images(s) such as CT,Ultrasound and MRI are read by the radiologist.    XR CHEST PORTABLE   Final Result   1. Left basilar atelectasis/infiltrate. 2. Mild stable cardiomegaly. **This report has been created using voice recognition software. It may contain minor errors which are inherent in voice recognition technology. **      Final report electronically signed by Dr. Denise Suero on 7/3/2021 1:19 PM        [] Visualized and interpreted by me   [] Radiologist's Wet Read Report Reviewed   [] Discussed withRadiologist.    LABS:   Labs Reviewed   BASIC METABOLIC PANEL W/ REFLEX TO MG FOR LOW K - Abnormal; Notable for the following components:       Result Value    Sodium 131 (*)     Chloride 93 (*)     CO2 19 (*)     Glucose 189 (*)     BUN 52 (*)     CREATININE 2.2 (*)     All other components within normal limits   TROPONIN - Abnormal; Notable for the following components:    Troponin T 0.034 (*)     All other components within normal limits   BRAIN NATRIURETIC PEPTIDE - Abnormal; Notable for the following components:    Pro-BNP 3319.0 (*)     All other components within normal limits   CBC WITH AUTO DIFFERENTIAL - Abnormal; Notable for the following components:    WBC 31.6 (*)     RDW-CV 14.6 (*)     RDW-SD 50.4 (*)     All other components within normal limits   LACTIC ACID, PLASMA - Abnormal; Notable for the following components:    Lactic Acid 5.2 (*)     All other components within normal limits   ANION GAP - Abnormal; Notable for the following components:    Anion Gap 19.0 (*)     All other components within normal limits   GLOMERULAR FILTRATION RATE, ESTIMATED - Abnormal; Notable for the following components:    Est, Glom Filt Rate 29 (*)     All other components within normal limits   LACTATE, SEPSIS - Abnormal; Notable for the following components:    Lactic Acid, Sepsis 4.1 (*)     All other components within normal limits   LACTATE, SEPSIS - Abnormal; Notable for the following components:    Lactic Acid, Sepsis 3.9 (*)     All other components within normal limits   PROTIME-INR - Abnormal; Notable for the following components:    INR 3.84 (*)     All other components within normal limits   PROCALCITONIN - Abnormal; Notable for the following components:    Procalcitonin 0.32 (*)     All other components within normal limits   BASIC METABOLIC PANEL W/ REFLEX TO MG FOR LOW K - Abnormal; Notable for the following components:    Chloride 97 (*)     BUN 45 (*)     CREATININE 2.0 (*)     All other components within normal limits   CBC - Abnormal; Notable for the following components:    WBC 19.6 (*)     RBC 4.25 (*)     Hemoglobin 13.1 (*)     Hematocrit 39.9 (*)     RDW-CV 14.8 (*)     RDW-SD 50.4 (*)     All other components within normal limits   PROTIME-INR - Abnormal; Notable for the following components:    INR 3.75 (*)     All other components within normal limits   GLOMERULAR FILTRATION RATE, ESTIMATED - Abnormal; Notable for the following components:    Est, Glom Filt Rate 32 (*)     All other components within normal limits   POCT GLUCOSE - Abnormal; Notable for the following components:    POC Glucose 214 (*)     All other components within normal limits   POCT GLUCOSE - Abnormal; Notable for the following components:    POC Glucose 325 (*)     All other components within normal limits   CULTURE, BLOOD 1    Narrative:     Source: blood-Adult-suboptimal <5.5oz./set volume       Site: Peripheral Vein            Current Antibiotics: not stated   CULTURE, BLOOD 2    Narrative:     Source: blood-Adult-suboptimal <5.5oz./set volume       Site: Peripheral Vein            Current Antibiotics: not stated   COVID-19, RAPID   STREP PNEUMONIAE ANTIGEN   LEGIONELLA ANTIGEN, URINE   URINE RT REFLEX TO CULTURE   OSMOLALITY   SCAN OF BLOOD SMEAR   ANION GAP   POCT GLUCOSE   POCT GLUCOSE   POCT GLUCOSE       EMERGENCY DEPARTMENT COURSE:   Vitals:    Vitals:    07/04/21 0345 07/04/21 0352 07/04/21 0449 07/04/21 0600   BP:  94/62     Pulse:  88     Resp:  24 22    Temp: 99.7 °F (37.6 °C)     TempSrc:  Oral     SpO2: (!) 86% 90% 91%    Weight:    201 lb (91.2 kg)   Height:         Patient admitted to hospitalist, abx and cultures ordered, sats improved on NC O2 in ED. Plan for admission d/w patient and wife who voice understanding & agreement with plan. CRITICAL CARE:   None    CONSULTS:  Hospitalist    PROCEDURES:  None    FINAL IMPRESSION      1. Pneumonia due to infectious organism, unspecified laterality, unspecified part of lung          DISPOSITION/PLAN   admit    PATIENT REFERRED TO:  No follow-up provider specified. DISCHARGE MEDICATIONS:  Current Discharge Medication List          (Please note that portions of this note were completed with a voice recognition program.  Efforts were made to edit the dictations but occasionally words are mis-transcribed.)    Provider:  I personally performed the services described in the documentation, reviewed and edited the documentation which was dictated, and it accurately records my words and actions.     Susu Munoz MD 7/3/21 7:07 AM                Susu Munoz MD  07/04/21 0938

## 2021-07-03 NOTE — ED PROVIDER NOTES
**This is a Medical/ PA/ APRN Student Note and is charted for educational purposes. The non-physician staff attested note is not to be used for billing purposes or to guide patient care. Please see the physician modifications/ attestation for treatment plan/suggestions. This note has been reviewed and feedback has been provided to the student. **    [facility] [facility], Dr. Villa Marking  ED visit Note  Pt Name: Funmi Ellis  Medical Record Number: [id number]  Date of Birth 1940   Today's Date: 7/3/2021    CHIEF COMPLAINT:     Chief Complaint   Patient presents with    Cough       HISTORY OF PRESENT ILLNESS   Reuben Reyna is a 79 y.o. male who presents to the ED c/o cough and phlegm that has been getting progressively worse. The Augmentin prescribed by his primary care doctor on June 29th has not helped. He also complains of SOB with exertion. {Always document- Location, Severity, Timing, Modifying factors, quality, duration, context, associated symptoms:[id number]}      REVIEW OF SYSTEMS:    Review of Systems  Denies nausea, vomiting, diarrhea, painful urinating, difficulty urinating, fever, night sweats, difficulty swallowing, and abdominal pain.       PAST MEDICAL HISTORY:     Past Medical History:   Diagnosis Date    MILLY (acute kidney injury) ([address]     Atrial fibrillation (HCC)     Cerebral artery occlusion with cerebral infarction ([address]     CHF (congestive heart failure), NYHA class III, acute on chronic, combined ([address]     COPD (chronic obstructive pulmonary disease) ([address] 8/3/2020    Coronary artery disease involving native coronary artery of native heart with angina pectoris ([address]     Diabetes mellitus, type 2 ([address] 12/30/2020    Hyperlipidemia 2/20/2012    Hypertension     Pneumonia      {Always document- Medical, surgical, allergies, medications, OB, GYN, sexual:[id number]}    SURGICAL HISTORY:     Past Surgical History: Procedure Laterality Date    CARDIAC SURGERY      heart cath    CORONARY ANGIOPLASTY WITH STENT PLACEMENT      HERNIA REPAIR      OTHER SURGICAL HISTORY  05/10/2018    PACEMAKER INSERTION         MEDICATIONS   Scheduled Meds:   ipratropium-albuterol  1 ampule Inhalation Q4H WA    sodium chloride  500 mL Intravenous Once    cefTRIAXone (ROCEPHIN) IV  1,000 mg Intravenous Once    azithromycin  500 mg Intravenous Once     Continuous Infusions:  PRN Meds:. ALLERGIES:     Allergies   Allergen Reactions    Aspirin Other (See Comments)     Other reaction(s): Other - comment required  Excessive bruising  Excessive bruising    Benzonatate      Other reaction(s): Other - comment required  Pt states it makes him dizzy    Xanax [Alprazolam] Anxiety     Other reaction(s): Other - comment required  Causes confusion  Became combative and confused       FAMILY HISTORY:     Family History   Family history unknown: Yes       SOCIAL HISTORY:     Social History     Tobacco Use    Smoking status: Former Smoker     Packs/day: 1.00     Years: 50.00     Pack years: 50.00     Types: Cigarettes    Smokeless tobacco: Never Used   Substance Use Topics    Alcohol use: Yes     Comment: rarely     {Always document- occupation, education, marital status, drug/alcohol use, family support, home/structured facility/homeless, pharmacy access:[id number]}    PREVIOUS RECORDS REVIEWED:   {Blank multiple:20240::\"This is this patient's first visit to [facility] ED, no previous records available on EMR. \",\"***\"}.  {Document what you reviewed and found instead of just the fact that you did :[id number]}    VITAL SIGNS   CURRENT VITALS:  blood pressure is 169/73 (abnormal) and his pulse is 82. His respiration is 19 and oxygen saturation is 94%.    Temperature Range (24h):  No data recorded  BP Range (51G): Systolic (68GSF), ITV:861 , Min:151 , VEU:296     Diastolic (34ZSA), RBQ:16, Min:73, Max:95    Pulse Range (24h): Pulse  Av.5  Min: 82  Max: 99  Respiration Range (24h): Resp  Av.5  Min: 16  Max: 19  Current Pulse Ox (24h):  SpO2: 94 %  Pulse Ox Range (24h):  SpO2  Av.7 %  Min: 89 %  Max: 94 %  Oxygen Amount and Delivery:    Initial vital signs and nursing assessment reviewed and {Blank multiple:76243::\"normal\",\"abnormal from ***\",\"***\"}. There is no height or weight on file to calculate BMI. Pulsoximetry is {Blank multiple:70920::\"abnormal\",\"normal\"} per my interpretation. PHYSICAL EXAM:   Physical Exam     +1 pitting edema bilaterally in lower extremities. Crackling and wheezing in lower posterior lung fields bilaterally.   Heart: RRR      LABS     Labs Reviewed   BASIC METABOLIC PANEL W/ REFLEX TO MG FOR LOW K - Abnormal; Notable for the following components:       Result Value    Sodium 131 (*)     Chloride 93 (*)     CO2 19 (*)     Glucose 189 (*)     BUN 52 (*)     CREATININE 2.2 (*)     All other components within normal limits   TROPONIN - Abnormal; Notable for the following components:    Troponin T 0.034 (*)     All other components within normal limits   BRAIN NATRIURETIC PEPTIDE - Abnormal; Notable for the following components:    Pro-BNP 3319.0 (*)     All other components within normal limits   CBC WITH AUTO DIFFERENTIAL - Abnormal; Notable for the following components:    WBC 31.6 (*)     RDW-CV 14.6 (*)     RDW-SD 50.4 (*)     All other components within normal limits   LACTIC ACID, PLASMA - Abnormal; Notable for the following components:    Lactic Acid 5.2 (*)     All other components within normal limits   ANION GAP - Abnormal; Notable for the following components:    Anion Gap 19.0 (*)     All other components within normal limits   GLOMERULAR FILTRATION RATE, ESTIMATED - Abnormal; Notable for the following components:    Est, Glom Filt Rate 29 (*)     All other components within normal limits   CULTURE, BLOOD 1   CULTURE, BLOOD 2   COVID-19, RAPID   OSMOLALITY   SCAN OF BLOOD SMEAR   URINE RT REFLEX TO CULTURE   LACTATE, SEPSIS   LACTATE, SEPSIS   PROTIME-INR       RADIOLOGY     XR CHEST PORTABLE   Final Result   1. Left basilar atelectasis/infiltrate. 2. Mild stable cardiomegaly. **This report has been created using voice recognition software. It may contain minor errors which are inherent in voice recognition technology. **      Final report electronically signed by Dr. Makayla Carlisle on 7/3/2021 1:19 PM          DIFFERENTIALS:   1. Sepsis  2. Pneumonia  3. CHF exacerbation  4. URI  5. covid     PLAN:   1. CBC  2. BMP  3. Sputum culture  4.  covid test  5. UA  6. Duo nebs  7. Conservative IV fluids due to CHF  8. Antibiotics (ceftriaxone)  9. BNP    Electronically signed by Gabrielle Mustafa on 7/3/2021 at 2:01 PM   **This is a Medical/ PA/ APRN Student Note and is charted for educational purposes. The non-physician staff attested note is not to be used for billing purposes or to guide patient care. Please see the physician modifications/ attestation for treatment plan/suggestions. This note has been reviewed and feedback has been provided to the student.  **

## 2021-07-03 NOTE — ED NOTES
Patient lying in bed and taking with resident and wife at the bedside. Patient respirations are regular and unlabored. Patient appears to be in no current distress. Patient VSS. Call light is within reach. Patient expresses no needs at this time.        Julia Fairbanks RN  07/03/21 [id number]

## 2021-07-03 NOTE — ED NOTES
Patient lying in bed with respiratory at the bedside. Patient respirations are regular and unlabored on 5L NC. Patient appears to be in no current distress. Patient VSS. Call light is within reach. Patient expresses no needs at this time.        Crow Valle RN  07/03/21 [id number]

## 2021-07-03 NOTE — PROGRESS NOTES
Clinical Pharmacy Note    Laure Crandall is a 79 y.o. male for whom pharmacy has been asked to manage warfarin therapy. Reason for Admission: Pneumonia    Consulting Physician: Leni Cedeno CNP  Warfarin dose prior to admission: 2.5mg daily  Warfarin indication: Atrial Fibrillation  Target INR range: 2 to 3   Outpatient warfarin provider: [facility]    Past Medical History:   Diagnosis Date    MILLY (acute kidney injury) ([facility]     Atrial fibrillation ([facility]     Cerebral artery occlusion with cerebral infarction ([facility]     CHF (congestive heart failure), NYHA class III, acute on chronic, combined (HCC)     COPD (chronic obstructive pulmonary disease) ([facility] 8/3/2020    Coronary artery disease involving native coronary artery of native heart with angina pectoris ([facility])     Diabetes mellitus, type 2 ([facility] 12/30/2020    Hyperlipidemia 2/20/2012    Hypertension     Pneumonia               Recent Labs     07/03/21  1352   INR 3.84*     Recent Labs     07/03/21  1228   HGB 14.9   HCT 44.4        Current warfarin drug-drug interactions: azithromycin    Date INR Warfarin Dose   7/3/21 3.84 No warfarin                                   Daily PT/INR until stable within therapeutic range. Thank you for the consult.       Prasanna LeavittD, BCPS 7/3/2021 4:22 PM

## 2021-07-04 LAB
ALLEN TEST: POSITIVE
ANION GAP SERPL CALCULATED.3IONS-SCNC: 15 MEQ/L (ref 8–16)
BASE EXCESS (CALCULATED): 1.2 MMOL/L (ref -2.5–2.5)
BASOPHILIA: ABNORMAL
BASOPHILS # BLD: 0.3 %
BASOPHILS ABSOLUTE: 0.1 THOU/MM3 (ref 0–0.1)
BUN BLDV-MCNC: 45 MG/DL (ref 7–22)
CALCIUM SERPL-MCNC: 8.5 MG/DL (ref 8.5–10.5)
CHLORIDE BLD-SCNC: 97 MEQ/L (ref 98–111)
CO2: 23 MEQ/L (ref 23–33)
COLLECTED BY:: ABNORMAL
CREAT SERPL-MCNC: 2 MG/DL (ref 0.4–1.2)
DEVICE: ABNORMAL
EOSINOPHIL # BLD: 0.1 %
EOSINOPHILS ABSOLUTE: 0 THOU/MM3 (ref 0–0.4)
ERYTHROCYTE [DISTWIDTH] IN BLOOD BY AUTOMATED COUNT: 14.6 % (ref 11.5–14.5)
ERYTHROCYTE [DISTWIDTH] IN BLOOD BY AUTOMATED COUNT: 14.8 % (ref 11.5–14.5)
ERYTHROCYTE [DISTWIDTH] IN BLOOD BY AUTOMATED COUNT: 50.4 FL (ref 35–45)
ERYTHROCYTE [DISTWIDTH] IN BLOOD BY AUTOMATED COUNT: 50.4 FL (ref 35–45)
GFR SERPL CREATININE-BSD FRML MDRD: 32 ML/MIN/1.73M2
GLUCOSE BLD-MCNC: 128 MG/DL (ref 70–108)
GLUCOSE BLD-MCNC: 142 MG/DL (ref 70–108)
GLUCOSE BLD-MCNC: 164 MG/DL (ref 70–108)
GLUCOSE BLD-MCNC: 334 MG/DL (ref 70–108)
GLUCOSE BLD-MCNC: 443 MG/DL (ref 70–108)
GLUCOSE BLD-MCNC: 92 MG/DL (ref 70–108)
HCO3: 23 MMOL/L (ref 23–28)
HCT VFR BLD CALC: 39.9 % (ref 42–52)
HCT VFR BLD CALC: 44.4 % (ref 42–52)
HEMOGLOBIN: 13.1 GM/DL (ref 14–18)
HEMOGLOBIN: 14.9 GM/DL (ref 14–18)
IFIO2: 80
IMMATURE GRANS (ABS): 0.46 THOU/MM3 (ref 0–0.07)
IMMATURE GRANULOCYTES: 1.5 %
INR BLD: 3.75 (ref 0.85–1.13)
LACTIC ACID: 2.7 MMOL/L (ref 0.5–2)
LYMPHOCYTES # BLD: 1.9 %
LYMPHOCYTES ABSOLUTE: 0.6 THOU/MM3 (ref 1–4.8)
MCH RBC QN AUTO: 30.8 PG (ref 26–33)
MCH RBC QN AUTO: 31.4 PG (ref 26–33)
MCHC RBC AUTO-ENTMCNC: 32.8 GM/DL (ref 32.2–35.5)
MCHC RBC AUTO-ENTMCNC: 33.6 GM/DL (ref 32.2–35.5)
MCV RBC AUTO: 93.7 FL (ref 80–94)
MCV RBC AUTO: 93.9 FL (ref 80–94)
MONOCYTES # BLD: 6.1 %
MONOCYTES ABSOLUTE: 1.9 THOU/MM3 (ref 0.4–1.3)
MRSA SCREEN RT-PCR: POSITIVE
NUCLEATED RED BLOOD CELLS: 0 /100 WBC
O2 SATURATION: 94 %
PATHOLOGIST REVIEW: ABNORMAL
PCO2: 27 MMHG (ref 35–45)
PH BLOOD GAS: 7.54 (ref 7.35–7.45)
PLATELET # BLD: 195 THOU/MM3 (ref 130–400)
PLATELET # BLD: 252 THOU/MM3 (ref 130–400)
PLATELET ESTIMATE: ABNORMAL
PMV BLD AUTO: 10.6 FL (ref 9.4–12.4)
PMV BLD AUTO: 10.8 FL (ref 9.4–12.4)
PO2: 60 MMHG (ref 71–104)
POIKILOCYTES: ABNORMAL
POTASSIUM REFLEX MAGNESIUM: 3.8 MEQ/L (ref 3.5–5.2)
RBC # BLD: 4.25 MILL/MM3 (ref 4.7–6.1)
RBC # BLD: 4.74 MILL/MM3 (ref 4.7–6.1)
SEG NEUTROPHILS: 90.1 %
SEGMENTED NEUTROPHILS ABSOLUTE COUNT: 28.5 THOU/MM3 (ref 1.8–7.7)
SODIUM BLD-SCNC: 135 MEQ/L (ref 135–145)
SOURCE, BLOOD GAS: ABNORMAL
WBC # BLD: 19.6 THOU/MM3 (ref 4.8–10.8)
WBC # BLD: 31.6 THOU/MM3 (ref 4.8–10.8)

## 2021-07-04 PROCEDURE — 6370000000 HC RX 637 (ALT 250 FOR IP): Performed by: FAMILY MEDICINE

## 2021-07-04 PROCEDURE — 6360000002 HC RX W HCPCS: Performed by: FAMILY MEDICINE

## 2021-07-04 PROCEDURE — 87070 CULTURE OTHR SPECIMN AEROBIC: CPT

## 2021-07-04 PROCEDURE — 94640 AIRWAY INHALATION TREATMENT: CPT

## 2021-07-04 PROCEDURE — 2700000000 HC OXYGEN THERAPY PER DAY

## 2021-07-04 PROCEDURE — 36415 COLL VENOUS BLD VENIPUNCTURE: CPT

## 2021-07-04 PROCEDURE — 80048 BASIC METABOLIC PNL TOTAL CA: CPT

## 2021-07-04 PROCEDURE — 6360000002 HC RX W HCPCS: Performed by: NURSE PRACTITIONER

## 2021-07-04 PROCEDURE — 2580000003 HC RX 258: Performed by: NURSE PRACTITIONER

## 2021-07-04 PROCEDURE — 6370000000 HC RX 637 (ALT 250 FOR IP): Performed by: NURSE PRACTITIONER

## 2021-07-04 PROCEDURE — 87641 MR-STAPH DNA AMP PROBE: CPT

## 2021-07-04 PROCEDURE — 2580000003 HC RX 258: Performed by: FAMILY MEDICINE

## 2021-07-04 PROCEDURE — 82803 BLOOD GASES ANY COMBINATION: CPT

## 2021-07-04 PROCEDURE — 87486 CHLMYD PNEUM DNA AMP PROBE: CPT

## 2021-07-04 PROCEDURE — 94669 MECHANICAL CHEST WALL OSCILL: CPT

## 2021-07-04 PROCEDURE — 87631 RESP VIRUS 3-5 TARGETS: CPT

## 2021-07-04 PROCEDURE — 87581 M.PNEUMON DNA AMP PROBE: CPT

## 2021-07-04 PROCEDURE — 87798 DETECT AGENT NOS DNA AMP: CPT

## 2021-07-04 PROCEDURE — 99222 1ST HOSP IP/OBS MODERATE 55: CPT | Performed by: INTERNAL MEDICINE

## 2021-07-04 PROCEDURE — 2140000000 HC CCU INTERMEDIATE R&B

## 2021-07-04 PROCEDURE — 99232 SBSQ HOSP IP/OBS MODERATE 35: CPT | Performed by: FAMILY MEDICINE

## 2021-07-04 PROCEDURE — 82948 REAGENT STRIP/BLOOD GLUCOSE: CPT

## 2021-07-04 PROCEDURE — 94760 N-INVAS EAR/PLS OXIMETRY 1: CPT

## 2021-07-04 PROCEDURE — 94761 N-INVAS EAR/PLS OXIMETRY MLT: CPT

## 2021-07-04 PROCEDURE — 36600 WITHDRAWAL OF ARTERIAL BLOOD: CPT

## 2021-07-04 PROCEDURE — 87541 LEGION PNEUMO DNA AMP PROB: CPT

## 2021-07-04 PROCEDURE — 85027 COMPLETE CBC AUTOMATED: CPT

## 2021-07-04 PROCEDURE — 87205 SMEAR GRAM STAIN: CPT

## 2021-07-04 PROCEDURE — 85610 PROTHROMBIN TIME: CPT

## 2021-07-04 PROCEDURE — 87150 DNA/RNA AMPLIFIED PROBE: CPT

## 2021-07-04 PROCEDURE — 83605 ASSAY OF LACTIC ACID: CPT

## 2021-07-04 PROCEDURE — 2500000003 HC RX 250 WO HCPCS: Performed by: FAMILY MEDICINE

## 2021-07-04 RX ORDER — PREDNISONE 20 MG/1
40 TABLET ORAL DAILY
Status: COMPLETED | OUTPATIENT
Start: 2021-07-05 | End: 2021-07-09

## 2021-07-04 RX ORDER — IPRATROPIUM BROMIDE AND ALBUTEROL SULFATE 2.5; .5 MG/3ML; MG/3ML
1 SOLUTION RESPIRATORY (INHALATION)
Status: DISCONTINUED | OUTPATIENT
Start: 2021-07-04 | End: 2021-07-09 | Stop reason: HOSPADM

## 2021-07-04 RX ORDER — METHYLPREDNISOLONE SODIUM SUCCINATE 40 MG/ML
40 INJECTION, POWDER, LYOPHILIZED, FOR SOLUTION INTRAMUSCULAR; INTRAVENOUS DAILY
Status: DISCONTINUED | OUTPATIENT
Start: 2021-07-05 | End: 2021-07-04

## 2021-07-04 RX ORDER — BUMETANIDE 0.25 MG/ML
1 INJECTION, SOLUTION INTRAMUSCULAR; INTRAVENOUS ONCE
Status: COMPLETED | OUTPATIENT
Start: 2021-07-04 | End: 2021-07-04

## 2021-07-04 RX ORDER — METHYLPREDNISOLONE SODIUM SUCCINATE 125 MG/2ML
125 INJECTION, POWDER, LYOPHILIZED, FOR SOLUTION INTRAMUSCULAR; INTRAVENOUS ONCE
Status: COMPLETED | OUTPATIENT
Start: 2021-07-04 | End: 2021-07-04

## 2021-07-04 RX ORDER — IPRATROPIUM BROMIDE AND ALBUTEROL SULFATE 2.5; .5 MG/3ML; MG/3ML
1 SOLUTION RESPIRATORY (INHALATION) EVERY 4 HOURS PRN
Status: DISCONTINUED | OUTPATIENT
Start: 2021-07-04 | End: 2021-07-04

## 2021-07-04 RX ADMIN — MONTELUKAST SODIUM 10 MG: 10 TABLET ORAL at 09:27

## 2021-07-04 RX ADMIN — SODIUM CHLORIDE, PRESERVATIVE FREE 10 ML: 5 INJECTION INTRAVENOUS at 20:38

## 2021-07-04 RX ADMIN — INSULIN LISPRO 2 UNITS: 100 INJECTION, SOLUTION INTRAVENOUS; SUBCUTANEOUS at 17:23

## 2021-07-04 RX ADMIN — FLUTICASONE PROPIONATE 1 SPRAY: 50 SPRAY, METERED NASAL at 09:27

## 2021-07-04 RX ADMIN — GUAIFENESIN 600 MG: 600 TABLET, EXTENDED RELEASE ORAL at 09:27

## 2021-07-04 RX ADMIN — BUMETANIDE 1 MG: 1 TABLET ORAL at 09:27

## 2021-07-04 RX ADMIN — PIPERACILLIN AND TAZOBACTAM 3375 MG: 3; .375 INJECTION, POWDER, LYOPHILIZED, FOR SOLUTION INTRAVENOUS at 23:47

## 2021-07-04 RX ADMIN — ALBUTEROL SULFATE 2.5 MG: 2.5 SOLUTION RESPIRATORY (INHALATION) at 12:27

## 2021-07-04 RX ADMIN — GUAIFENESIN 600 MG: 600 TABLET, EXTENDED RELEASE ORAL at 20:38

## 2021-07-04 RX ADMIN — VANCOMYCIN HYDROCHLORIDE 1500 MG: 5 INJECTION, POWDER, LYOPHILIZED, FOR SOLUTION INTRAVENOUS at 18:20

## 2021-07-04 RX ADMIN — METHYLPREDNISOLONE SODIUM SUCCINATE 125 MG: 125 INJECTION, POWDER, FOR SOLUTION INTRAMUSCULAR; INTRAVENOUS at 14:22

## 2021-07-04 RX ADMIN — INSULIN LISPRO 2 UNITS: 100 INJECTION, SOLUTION INTRAVENOUS; SUBCUTANEOUS at 12:58

## 2021-07-04 RX ADMIN — AZITHROMYCIN DIHYDRATE 500 MG: 500 INJECTION, POWDER, LYOPHILIZED, FOR SOLUTION INTRAVENOUS at 23:47

## 2021-07-04 RX ADMIN — METOPROLOL SUCCINATE 50 MG: 50 TABLET, FILM COATED, EXTENDED RELEASE ORAL at 09:27

## 2021-07-04 RX ADMIN — IPRATROPIUM BROMIDE AND ALBUTEROL SULFATE 1 AMPULE: .5; 3 SOLUTION RESPIRATORY (INHALATION) at 20:49

## 2021-07-04 RX ADMIN — BUMETANIDE 1 MG: 0.25 INJECTION, SOLUTION INTRAMUSCULAR; INTRAVENOUS at 14:22

## 2021-07-04 RX ADMIN — ALBUTEROL SULFATE 2.5 MG: 2.5 SOLUTION RESPIRATORY (INHALATION) at 04:48

## 2021-07-04 RX ADMIN — PIPERACILLIN AND TAZOBACTAM 3375 MG: 3; .375 INJECTION, POWDER, LYOPHILIZED, FOR SOLUTION INTRAVENOUS at 16:30

## 2021-07-04 RX ADMIN — CETIRIZINE HYDROCHLORIDE 10 MG: 10 TABLET, FILM COATED ORAL at 09:27

## 2021-07-04 RX ADMIN — ATORVASTATIN CALCIUM 80 MG: 80 TABLET, FILM COATED ORAL at 20:38

## 2021-07-04 RX ADMIN — SODIUM CHLORIDE, PRESERVATIVE FREE 10 ML: 5 INJECTION INTRAVENOUS at 09:27

## 2021-07-04 RX ADMIN — IPRATROPIUM BROMIDE AND ALBUTEROL SULFATE 1 AMPULE: .5; 3 SOLUTION RESPIRATORY (INHALATION) at 16:05

## 2021-07-04 RX ADMIN — ALBUTEROL SULFATE 2.5 MG: 2.5 SOLUTION RESPIRATORY (INHALATION) at 08:13

## 2021-07-04 RX ADMIN — LOSARTAN POTASSIUM 25 MG: 25 TABLET, FILM COATED ORAL at 20:38

## 2021-07-04 NOTE — PLAN OF CARE
Call light within reach, bed in lowest position, bed alarm on, non-slip footwear on, Room located near nurses station, wheels locked. Problem: Falls - Risk of:  Goal: Will remain free from falls  Description: Will remain free from falls  Outcome: Met This Shift  Goal: Absence of physical injury  Description: Absence of physical injury  Outcome: Met This Shift     Problem: Safety:  Goal: Free from accidental physical injury  Description: Free from accidental physical injury  Outcome: Met This Shift  Goal: Free from intentional harm  Description: Free from intentional harm  Outcome: Met This Shift     Patient turns and repositions self, heels elevated off bed.    Problem: Skin Integrity:  Goal: Risk for impaired skin integrity will decrease  Description: Risk for impaired skin integrity will decrease  Outcome: Ongoing

## 2021-07-04 NOTE — CONSULTS
[facility]    Patient - Sage Mathur   MRN -  [id number]   [facility] # - [de-identified]   - 1940      Date of Admission -  7/3/2021 12:11 PM  Date of evaluation -  2021  Room - 3B-25/[facility]-A   Hospital Day - [facility] Reji Hodge MD Primary Care Physician - Eros Varner DO   Chief Complaint   Acute hypoxic respiratory failure  Multilobar pneumonia   Active Hospital Problem List      Active Hospital Problems    Diagnosis Date Noted    Pneumonia [J18.9] 2021     HPI   Sage Mathur is a 79 y.o. male with past medical history of CAD, Afib, DM2, and COPD not on home Oxygen presented to ED due to SOB and productive cough. He was treated as outpatient for sinusitis and bronchitis/COPD exacerbation with oral Azithromycin which was later changed to Augmentin, and prednisone, he didn't report improvement. CXR showed bibasilar infiltrates. He is requiring increasing amount of O2 and is currently on 50% high flow. He smoked until , he was diagnosed with COPD by DT in 2018 and is using albuterol nebulizer. Used to work as a    Past Medical History         Diagnosis Date    MILLY (acute kidney injury) ([address]     Atrial fibrillation ([address]     Cerebral artery occlusion with cerebral infarction ([address]     CHF (congestive heart failure), NYHA class III, acute on chronic, combined ([address]     COPD (chronic obstructive pulmonary disease) ([address] 8/3/2020    Coronary artery disease involving native coronary artery of native heart with angina pectoris ([address]     Diabetes mellitus, type 2 ([address] 2020    Hyperlipidemia 2012    Hypertension     Pneumonia       Past Surgical History           Procedure Laterality Date    CARDIAC SURGERY      heart cath    CORONARY ANGIOPLASTY WITH STENT PLACEMENT      HERNIA REPAIR      OTHER SURGICAL HISTORY  05/10/2018    PACEMAKER INSERTION       Diet    ADULT DIET;  Regular; 4 carb choices (60 gm/meal)  Allergies    Aspirin, Benzonatate, and Xanax [alprazolam]  Social History     Social History     Socioeconomic History    Marital status:      Spouse name: Not on file    Number of children: Not on file    Years of education: Not on file    Highest education level: Not on file   Occupational History    Not on file   Tobacco Use    Smoking status: Former Smoker     Packs/day: 1.00     Years: 50.00     Pack years: 50.00     Types: Cigarettes    Smokeless tobacco: Never Used   Vaping Use    Vaping Use: Never used   Substance and Sexual Activity    Alcohol use: Yes     Comment: rarely    Drug use: No    Sexual activity: Yes     Partners: Female   Other Topics Concern    Not on file   Social History Narrative    Not on file     Social Determinants of Health     Financial Resource Strain: Low Risk     Difficulty of Paying Living Expenses: Not hard at all   Food Insecurity: No Food Insecurity    Worried About [employer] [employer] in the Last Year: Never true    [facility] in the Last Year: Never true   Transportation Needs:     Lack of Transportation (Medical):  Lack of Transportation (Non-Medical):    Physical Activity:     Days of Exercise per Week:     Minutes of Exercise per Session:    Stress:     Feeling of Stress :    Social Connections:     Frequency of Communication with Friends and Family:     Frequency of Social Gatherings with Friends and Family:     Attends Alevism Services:     Active Member of Clubs or Organizations:     Attends Club or Organization Meetings:     Marital Status:    Intimate Partner Violence:     Fear of Current or Ex-Partner:     Emotionally Abused:     Physically Abused:     Sexually Abused:      Family History          Family history unknown: Yes     Sleep History    No historty but positive risk factors   ROS    General/Constitutional: No recent loss of weight or appetite changes. No fever or chills. HENT: Negative.    Eyes: Negative. Upper respiratory tract: No nasal stuffiness or post nasal drip. Lower respiratory tract/ lungs: SOB, Cough, sputum production   Cardiovascular: No palpitations, chest pain or edema. Gastrointestinal: No nausea or vomiting. Neurological: No focal neurological weakness. Extremities: No tenderness. Musculoskeletal: no complaints  Genitourinary: No complaints. Hematological: Negative. Denies easy buising  Skin: No itching. Meds    Current Medications    methylPREDNISolone  125 mg Intravenous Once    bumetanide  1 mg Intravenous Once    ipratropium-albuterol  1 ampule Inhalation Q4H WA    atorvastatin  80 mg Oral Nightly    bumetanide  1 mg Oral Daily    cetirizine  10 mg Oral Daily    fluticasone  1 spray Each Nostril Daily    guaiFENesin  600 mg Oral BID    losartan  25 mg Oral Nightly    metoprolol succinate  50 mg Oral Daily    montelukast  10 mg Oral Daily    sodium chloride flush  5-40 mL Intravenous 2 times per day    cefTRIAXone (ROCEPHIN) IV  1,000 mg Intravenous Q24H    And    azithromycin  500 mg Intravenous Q24H    insulin lispro  0-12 Units Subcutaneous TID WC    insulin lispro  0-6 Units Subcutaneous Nightly    warfarin (COUMADIN) daily dosing (placeholder)   Other RX Placeholder     sodium chloride flush, sodium chloride, ondansetron **OR** ondansetron, polyethylene glycol, acetaminophen **OR** acetaminophen, glucose, dextrose, glucagon (rDNA), dextrose  IV Drips/Infusions   sodium chloride      dextrose       Vitals    Vitals    height is 5' 7\" (1.702 m) and weight is 201 lb (91.2 kg). His oral temperature is 99.4 °F (37.4 °C). His blood pressure is 161/74 (abnormal) and his pulse is 90. His respiration is 18 and oxygen saturation is 92%.      O2 Flow Rate (L/min): 60 L/min  I/O    Intake/Output Summary (Last 24 hours) at 7/4/2021 1345  Last data filed at 7/4/2021 1002  Gross per 24 hour   Intake 1264.24 ml   Output 1975 ml   Net -710.76 ml     Patient Vitals for the past 96 hrs (Last 3 readings):   Weight   07/04/21 0600 201 lb (91.2 kg)   07/03/21 1510 200 lb 8 oz (90.9 kg)   07/03/21 1412 199 lb (90.3 kg)     Exam   Constitutional: Patient appears obese, in mild resp distress    Head: Normocephalic and atraumatic. Mouth/Throat: Oropharynx is clear and moist.  No oral thrush. Eyes: Conjunctivae are normal. Pupils are equal, round, and reactive to light. No scleral icterus. Neck: Neck supple. No JVD or tracheal deviation present. Cardiovascular: Regular rate, regular rhythm, S1 and S2 with no murmur. No peripheral edema  Pulmonary/Chest: Diminished breath sounds bilateral, basal to mid lung rhonchi   Abdominal: Soft. Bowel sounds audible. No distension or tenderness to palp  Musculoskeletal: Moves all extremities  Lymphadenopathy:  No cervical adenopathy. Neurological: Patient is alert and oriented to person, place, and time. Skin: Skin is warm and dry. Labs   ABG  Lab Results   Component Value Date    PH 7.54 07/04/2021    PO2 60 07/04/2021    PCO2 27 07/04/2021    HCO3 23 07/04/2021    O2SAT 94 07/04/2021     Lab Results   Component Value Date    IFIO2 80 07/04/2021    MODE ASV 03/23/2018    SETTIDVOL 400 03/23/2018    SETPEEP 5.0 03/25/2018     CBC  Recent Labs     07/03/21  1228 07/04/21  0334   WBC 31.6* 19.6*   RBC 4.74 4.25*   HGB 14.9 13.1*   HCT 44.4 39.9*   MCV 93.7 93.9   MCH 31.4 30.8   MCHC 33.6 32.8    195   MPV 10.8 10.6      BMP  Recent Labs     07/03/21  1228 07/04/21  0334   * 135   K 3.8 3.8   CL 93* 97*   CO2 19* 23   BUN 52* 45*   CREATININE 2.2* 2.0*   GLUCOSE 189* 92   CALCIUM 9.4 8.5     LFT  No results for input(s): AST, ALT, ALB, BILITOT, ALKPHOS, LIPASE in the last 72 hours. Invalid input(s):   AMYLASE  TROP  Lab Results   Component Value Date    TROPONINT 0.034 07/03/2021    TROPONINT 0.024 06/23/2021    TROPONINT 0.020 04/22/2021     BNP  Lab Results   Component Value Date    PROBNP 3319.0 07/03/2021    PROBNP 1930.0 06/23/2021    PROBNP 1671.0 04/22/2021     D-Dimer  Lab Results   Component Value Date    DDIMER 1248.00 04/23/2018     Lactic Acid  Recent Labs     07/03/21  1308 07/04/21  1302   LACTA 5.2* 2.7*     INR  Recent Labs     07/03/21  1352 07/04/21  0334   INR 3.84* 3.75*     PTT  No results for input(s): APTT in the last 72 hours. Glucose  Recent Labs     07/03/21 2003 07/04/21  0832 07/04/21  1218   POCGLU 325* 128* 142*     UA   Recent Labs     07/03/21  1455   PHUR 5.0   COLORU YELLOW   PROTEINU NEGATIVE   BLOODU NEGATIVE   NITRU NEGATIVE   GLUCOSEU NEGATIVE   BILIRUBINUR NEGATIVE   UROBILINOGEN 0.2   KETUA NEGATIVE     PFTs 2018     Echo     Ejection fraction was estimated at 45-50%. Normal left ventricular size and mildly reduced systolic function. There was mild global hypokinesis. Wall thickness was within normal limits. Device lead/catheter seen in the right heart. S/p TAVR. Transaortic velocity was 1.2-1.6 m/s, mean gradient 4-6 mmHg, EOA 1.5-1.8   cm2. There was no evidence of aortic regurgitation. There was trace tricuspid regurgitation. Assuming RAP = 5 mmHg, the estimated RVSP = 34 mmHg. Ascending aorta - 3.1 cm. IVC size is within normal limits with normal respiratory phasic changes. Cultures    Procalcitonin  Lab Results   Component Value Date    PROCAL 0.32 07/03/2021    PROCAL 12.27 03/23/2018       Radiology    CXR    CT Scans    (See actual reports for details)    Assessment   Acute hypoxic respiratory failure  Multi lobar pneumonia- failed outpatient therapy   COPD exacerbation   CAD  Diastolic CHF  Afib   DM2  Recommendations     Continue HFNC  Sputum culture  Broad spectrum IV antibiotics- Zosyn and Vancomycin, systemic Steroids   Discontinue Diuretics   If no improvement and couldn't get a sputum sample, will check CT chest w/o contrast and consider diagnostic bronchoscopy       Thank you for the consult and allowing us to participate in the care of your patient.      Case discussed with nurse and patient/family. Questions and concerns addressed. Meds and Orders reviewed.     Electronically signed by     Hernan Solano MD on 7/4/2021 at 1:45 PM

## 2021-07-04 NOTE — PROGRESS NOTES
Clinical Pharmacy Note    Warfarin consult follow-up    Recent Labs     07/04/21  0334   INR 3.75*     Recent Labs     07/03/21  1228 07/04/21  0334   HGB 14.9 13.1*   HCT 44.4 39.9*    195       Significant Drug-Drug Interactions:  New warfarin drug-drug interactions: none  Discontinued drug-drug interactions: none  Current warfarin drug-drug interactions: azithromycin     Date INR Warfarin Dose   7/3/21 3.84 No warfarin    7/4/21   3.75   No warfarin                                              Notes:                   Daily PT/INR until stable within therapeutic range.      Rahat Arango, PharmD, BCPS   7/4/2021  8:07 AM

## 2021-07-04 NOTE — PROGRESS NOTES
Pharmacy Note  Vancomycin Consult    Sophy Peraza is a 79 y.o. male started on Vancomycin for pneumonia; consult received from Dr. Cira Mills to manage therapy. Also receiving the following antibiotics: Zosyn. Patient Active Problem List   Diagnosis    Atrial fibrillation with RVR ([facility])    CKD (chronic kidney disease) stage 3, GFR 30-59 ml/min ([facility])    Aortic stenosis, severe    Coronary artery disease involving native coronary artery of native heart with angina pectoris ([facility])    CHF (congestive heart failure), NYHA class III, acute on chronic, combined ([facility]    Cardiomyopathy ([facility]    Essential hypertension    CKD (chronic kidney disease) stage 4, GFR 15-29 ml/min ([facility])    Anticoagulated on Coumadin    CVA (cerebral infarction)    S/P TAVR (transcatheter aortic valve replacement)    S/P PTCA (percutaneous transluminal coronary angioplasty)    SSS (sick sinus syndrome) ([facility])    Hyperlipidemia    Mitral valve disease    COPD (chronic obstructive pulmonary disease) ([facility]    Diabetes mellitus, type 2 ([facility]    Encounter for therapeutic drug monitoring    Pneumonia     Allergies:  Aspirin, Benzonatate, and Xanax [alprazolam]     Temp max: 99.7    Recent Labs     07/03/21  1228 07/04/21  0334   BUN 52* 45*   CREATININE 2.2* 2.0*   WBC 31.6* 19.6*       Intake/Output Summary (Last 24 hours) at 7/4/2021 1640  Last data filed at 7/4/2021 1518  Gross per 24 hour   Intake 1314.24 ml   Output 3625 ml   Net -2310.76 ml     Culture Date      Source                       Results  7/3/21    BCx2   ngtd    Ht Readings from Last 1 Encounters:   07/03/21 5' 7\" (1.702 m)        Wt Readings from Last 1 Encounters:   07/04/21 201 lb (91.2 kg)       Body mass index is 31.48 kg/m². Estimated Creatinine Clearance: 31 mL/min (A) (based on SCr of 2 mg/dL (H)). Goal Trough Level: 10-20 mcg/mL    Assessment/Plan:  Will initiate Vancomycin with a one time loading dose of 1500 mg x1.  Subsequent doses will be determined based on renal function on 7/5/2021    Thank you for the consult. Will continue to follow.

## 2021-07-04 NOTE — PLAN OF CARE
Problem: Impaired respiratory status  Goal: Clear lung sounds  Description: Clear lung sounds  7/3/2021 2128 by Felicity Crenshaw RCP  Outcome: Ongoing   Continue breathing treatments to improve breath sounds.

## 2021-07-04 NOTE — PROGRESS NOTES
Hospitalist Progress Note    Patient:  Kelly Hernández      Unit/Bed:3B-25/025-A    YOB: 1940    MRN: [id number]       Acct: [de-identified]     PCP: Roda Olszewski, DO    Date of Admission: 7/3/2021    Chief Complaint: f/u for worsening cough     Hospital Course:    Per H/P note:    \"79 y.o. male who presented to [address] [place] [address] with a worsening of his cough; he has a past medical history of heart failure, diabetes, TAVR, CKD and atrial fibrillation; this patient has been on Augmentin and prednisone recently along with the possibility of Zithromax; wife states he has been in the emergency department and also to his PCP twice here in the last couple weeks; he continues with a cough and continues with a cough with green phlegm and shortness of breath; he is fully alert and oriented, he denies any pain; he denies any fever chills.     In the emergency department, chest x-ray reveals a left lower lobe pneumonia, lactic acid at 5.2, BNP at 3319; he has a chronic troponin elevation along with a CKD; he currently denies any lightheadedness or dizziness, chest pain, states \"I get short of breath when I start coughing\"; he denies any nausea, vomiting or diarrhea; he was started on Zithromax and Rocephin in the emergency department and is being admitted to hospital service for further care and evaluation. \"     Subjective:     Patient seen and examined. Pt reports that he has chronic productive cough for past few months worsening for past few weeks. He denies sob and fever, though report chills about 1 week ago. He denies chest pain, myalgia, diarrhea. He states that about 1 week ago, he was seen by his PCP and was prescribed azithromycin and apparently was seen in ER 1 week ago and was diagnosed with sinusitis, however, he said his PCP did not agree and was diagnosed with pna by PCP and was placed on augmentin and prednisone prescribed 6/29/21. Per pt, he was taking augmentin and prednisone til 7/3/21. Today, he states that his cough is about the same. He denies chest pain, fever, chills, legs swelling      Medications:  Reviewed    Infusion Medications    sodium chloride      dextrose       Scheduled Medications    methylPREDNISolone  125 mg Intravenous Once    bumetanide  1 mg Intravenous Once    ipratropium-albuterol  1 ampule Inhalation Q4H WA    atorvastatin  80 mg Oral Nightly    bumetanide  1 mg Oral Daily    cetirizine  10 mg Oral Daily    fluticasone  1 spray Each Nostril Daily    guaiFENesin  600 mg Oral BID    losartan  25 mg Oral Nightly    metoprolol succinate  50 mg Oral Daily    montelukast  10 mg Oral Daily    sodium chloride flush  5-40 mL Intravenous 2 times per day    cefTRIAXone (ROCEPHIN) IV  1,000 mg Intravenous Q24H    And    azithromycin  500 mg Intravenous Q24H    insulin lispro  0-12 Units Subcutaneous TID WC    insulin lispro  0-6 Units Subcutaneous Nightly    warfarin (COUMADIN) daily dosing (placeholder)   Other RX Placeholder     PRN Meds: sodium chloride flush, sodium chloride, ondansetron **OR** ondansetron, polyethylene glycol, acetaminophen **OR** acetaminophen, glucose, dextrose, glucagon (rDNA), dextrose      Intake/Output Summary (Last 24 hours) at 7/4/2021 1306  Last data filed at 7/4/2021 1002  Gross per 24 hour   Intake 1264.24 ml   Output 1975 ml   Net -710.76 ml       Diet:  ADULT DIET; Regular; 4 carb choices (60 gm/meal)    Exam:  BP (!) 161/74   Pulse 90   Temp 99.4 °F (37.4 °C) (Oral)   Resp 18   Ht 5' 7\" (1.702 m)   Wt 201 lb (91.2 kg)   SpO2 92%   BMI 31.48 kg/m²     General appearance: alert, not in acute distress   HEENT: Pupils equal, round, and reactive to light. Conjunctivae/corneas clear. Neck: Supple, with full range of motion. No jugular venous distention. Trachea midline. Respiratory: On HFNC.  Scattered wheezing on both anterior and posterior lung fields, no rales, no rhonchi    Cardiovascular: normal rate, irregularly irregular rhythm   Abdomen: Soft, non-tender, non-distended with normal bowel sounds. Musculoskeletal: passive and active ROM x 4 extremities. Skin: No rashes or lesions. Neurological exam reveals alert, oriented, normal speech, no focal findings or movement disorder noted. Exam of extremities: peripheral pulses normal, no pedal edema, (+) trace pitting edema on distal legs, no clubbing or cyanosis        Labs:   Recent Labs     07/03/21  1228 07/04/21  0334   WBC 31.6* 19.6*   HGB 14.9 13.1*   HCT 44.4 39.9*    195     Recent Labs     07/03/21  1228 07/04/21  0334   * 135   K 3.8 3.8   CL 93* 97*   CO2 19* 23   BUN 52* 45*   CREATININE 2.2* 2.0*   CALCIUM 9.4 8.5     No results for input(s): AST, ALT, BILIDIR, BILITOT, ALKPHOS in the last 72 hours. Recent Labs     07/03/21  1352 07/04/21  0334   INR 3.84* 3.75*     No results for input(s): CKTOTAL, TROPONINI in the last 72 hours. Urinalysis:      Lab Results   Component Value Date    NITRU NEGATIVE 07/03/2021    WBCUA NONE SEEN 06/10/2021    BACTERIA NONE SEEN 06/10/2021    RBCUA NONE SEEN 06/10/2021    BLOODU NEGATIVE 07/03/2021    SPECGRAV 1.006 06/10/2021    GLUCOSEU NEGATIVE 07/03/2021       Radiology:  XR CHEST PORTABLE   Final Result   1. Left basilar atelectasis/infiltrate. 2. Mild stable cardiomegaly. **This report has been created using voice recognition software. It may contain minor errors which are inherent in voice recognition technology. **      Final report electronically signed by Dr. Johnathan Montejo on 7/3/2021 1:19 PM          Diet: ADULT DIET; Regular; 4 carb choices (60 gm/meal)    DVT prophylaxis: [] Lovenox                                 [] SCDs                                 [] SQ Heparin                                 [] Encourage ambulation           [] Already on Anticoagulation       Assessment/Plan:      1. Acute hypoxic respiratory failure due to CAP and COPD exacerbation: currently on HFNC.  ABG Status: Full Code    Electronically signed by Sharee Evans MD on 7/4/2021 at 1:06 PM

## 2021-07-05 ENCOUNTER — APPOINTMENT (OUTPATIENT)
Dept: CT IMAGING | Age: 81
DRG: 871 | End: 2021-07-05
Payer: MEDICARE

## 2021-07-05 PROBLEM — J44.1 COPD EXACERBATION (HCC): Status: ACTIVE | Noted: 2020-08-03

## 2021-07-05 PROBLEM — I48.21 PERMANENT ATRIAL FIBRILLATION (HCC): Status: ACTIVE | Noted: 2020-08-03

## 2021-07-05 LAB
ACINETOBACTER CALCOACETICUS-BAUMANNII BY PCR: NOT DETECTED
ADENOVIRUS BY PCR: NOT DETECTED
ALLEN TEST: ABNORMAL
ALLEN TEST: POSITIVE
ANION GAP SERPL CALCULATED.3IONS-SCNC: 15 MEQ/L (ref 8–16)
BASE EXCESS (CALCULATED): 2.1 MMOL/L (ref -2.5–2.5)
BASE EXCESS (CALCULATED): 2.6 MMOL/L (ref -2.5–2.5)
BASOPHILS # BLD: 0.2 %
BASOPHILS ABSOLUTE: 0 THOU/MM3 (ref 0–0.1)
BUN BLDV-MCNC: 42 MG/DL (ref 7–22)
CALCIUM SERPL-MCNC: 9.1 MG/DL (ref 8.5–10.5)
CHLAMYDIA PNEUMONIAE BY PCR: NOT DETECTED
CHLORIDE BLD-SCNC: 97 MEQ/L (ref 98–111)
CO2: 23 MEQ/L (ref 23–33)
COLLECTED BY:: ABNORMAL
COLLECTED BY:: ABNORMAL
CREAT SERPL-MCNC: 1.8 MG/DL (ref 0.4–1.2)
DEVICE: ABNORMAL
DEVICE: ABNORMAL
ENTEROBACTER CLOACAE COMPLEX BY PCR: NOT DETECTED
EOSINOPHIL # BLD: 0.1 %
EOSINOPHILS ABSOLUTE: 0 THOU/MM3 (ref 0–0.4)
ERYTHROCYTE [DISTWIDTH] IN BLOOD BY AUTOMATED COUNT: 14.8 % (ref 11.5–14.5)
ERYTHROCYTE [DISTWIDTH] IN BLOOD BY AUTOMATED COUNT: 50.9 FL (ref 35–45)
ESCHERICHIA COLI BY PCR: NOT DETECTED
GFR SERPL CREATININE-BSD FRML MDRD: 36 ML/MIN/1.73M2
GLUCOSE BLD-MCNC: 115 MG/DL (ref 70–108)
GLUCOSE BLD-MCNC: 119 MG/DL (ref 70–108)
GLUCOSE BLD-MCNC: 203 MG/DL (ref 70–108)
GLUCOSE BLD-MCNC: 204 MG/DL (ref 70–108)
GLUCOSE BLD-MCNC: 251 MG/DL (ref 70–108)
GLUCOSE BLD-MCNC: 322 MG/DL (ref 70–108)
HAEMOPHILUS INFLUENZAE BY PCR: NOT DETECTED
HCO3: 25 MMOL/L (ref 23–28)
HCO3: 25 MMOL/L (ref 23–28)
HCT VFR BLD CALC: 42.8 % (ref 42–52)
HEMOGLOBIN: 14.1 GM/DL (ref 14–18)
IFIO2: 70
IFIO2: 80
IMMATURE GRANS (ABS): 0.21 THOU/MM3 (ref 0–0.07)
IMMATURE GRANULOCYTES: 1.1 %
INFLUENZA A BY PCR: NOT DETECTED
INFLUENZA B BY PCR: NOT DETECTED
INR BLD: 2.46 (ref 0.85–1.13)
KLEBSIELLA AEROGENES BY PCR: NOT DETECTED
KLEBSIELLA OXYTOCA BY PCR: NOT DETECTED
KLEBSIELLA PNEUMONIAE GROUP BY PCR: NOT DETECTED
LACTIC ACID: 1.2 MMOL/L (ref 0.5–2)
LACTIC ACID: 2.4 MMOL/L (ref 0.5–2)
LACTIC ACID: 2.6 MMOL/L (ref 0.5–2)
LEGIONELLA PNEUMOPHILIA BY PCR: NOT DETECTED
LV EF: 50 %
LVEF MODALITY: NORMAL
LYMPHOCYTES # BLD: 3.5 %
LYMPHOCYTES ABSOLUTE: 0.7 THOU/MM3 (ref 1–4.8)
MCH RBC QN AUTO: 31.1 PG (ref 26–33)
MCHC RBC AUTO-ENTMCNC: 32.9 GM/DL (ref 32.2–35.5)
MCV RBC AUTO: 94.5 FL (ref 80–94)
METAPNEUMOVIRUS BY PCR: NOT DETECTED
MODE: ABNORMAL
MONOCYTES # BLD: 4.9 %
MONOCYTES ABSOLUTE: 0.9 THOU/MM3 (ref 0.4–1.3)
MORAXELLA CATARRHALIS BY PCR: NOT DETECTED
MYCOPLASMA PNEUMONIAE BY PCR: NOT DETECTED
NON-SARS CORONAVIRUS: DETECTED
NUCLEATED RED BLOOD CELLS: 0 /100 WBC
O2 SATURATION: 94 %
O2 SATURATION: 99 %
PARAINFLUENZA VIRUS BY PCR: NOT DETECTED
PCO2: 33 MMHG (ref 35–45)
PCO2: 34 MMHG (ref 35–45)
PH BLOOD GAS: 7.48 (ref 7.35–7.45)
PH BLOOD GAS: 7.5 (ref 7.35–7.45)
PLATELET # BLD: 191 THOU/MM3 (ref 130–400)
PMV BLD AUTO: 11 FL (ref 9.4–12.4)
PO2: 104 MMHG (ref 71–104)
PO2: 66 MMHG (ref 71–104)
POTASSIUM SERPL-SCNC: 3.8 MEQ/L (ref 3.5–5.2)
PRO-BNP: 4816 PG/ML (ref 0–1800)
PROTEUS SPECIES BY PCR: NOT DETECTED
PSEUDOMONAS AERUGINOSA BY PCR: DETECTED
RBC # BLD: 4.53 MILL/MM3 (ref 4.7–6.1)
RESISTANT GENE CTX-M BY PCR: NOT DETECTED
RESISTANT GENE IMP BY PCR: NOT DETECTED
RESISTANT GENE KPC BY PCR: NOT DETECTED
RESISTANT GENE MECA/C & MREJ BY PCR: ABNORMAL
RESISTANT GENE NDM BY PCR: NOT DETECTED
RESISTANT GENE OXA-48-LIKE BY PCR: ABNORMAL
RESISTANT GENE VIM BY PCR: NOT DETECTED
RESPIRATORY SYNCYTIAL VIRUS BY PCR: NOT DETECTED
RHINOVIRUS ENTEROVIRUS PCR: NOT DETECTED
SARS-COV-2, PCR: NOT DETECTED
SEG NEUTROPHILS: 90.2 %
SEGMENTED NEUTROPHILS ABSOLUTE COUNT: 17 THOU/MM3 (ref 1.8–7.7)
SERRATIA MARCESCENS BY PCR: NOT DETECTED
SET PEEP: 10 MMHG
SODIUM BLD-SCNC: 135 MEQ/L (ref 135–145)
SOURCE, BLOOD GAS: ABNORMAL
SOURCE, BLOOD GAS: ABNORMAL
SOURCE: ABNORMAL
SPECIMEN ACCEPTABILITY: ABNORMAL
STAPH AUREUS BY PCR: NOT DETECTED
STREP AGALACTIAE BY PCR: NOT DETECTED
STREP PNEUMONIAE BY PCR: NOT DETECTED
STREP PYOGENES BY PCR: NOT DETECTED
VANCOMYCIN TROUGH: 9.9 UG/ML (ref 10–20)
WBC # BLD: 18.9 THOU/MM3 (ref 4.8–10.8)

## 2021-07-05 PROCEDURE — 6360000002 HC RX W HCPCS: Performed by: FAMILY MEDICINE

## 2021-07-05 PROCEDURE — 83880 ASSAY OF NATRIURETIC PEPTIDE: CPT

## 2021-07-05 PROCEDURE — 94660 CPAP INITIATION&MGMT: CPT

## 2021-07-05 PROCEDURE — 2580000003 HC RX 258: Performed by: FAMILY MEDICINE

## 2021-07-05 PROCEDURE — 80202 ASSAY OF VANCOMYCIN: CPT

## 2021-07-05 PROCEDURE — 6370000000 HC RX 637 (ALT 250 FOR IP): Performed by: NURSE PRACTITIONER

## 2021-07-05 PROCEDURE — 6370000000 HC RX 637 (ALT 250 FOR IP): Performed by: FAMILY MEDICINE

## 2021-07-05 PROCEDURE — 6360000002 HC RX W HCPCS: Performed by: STUDENT IN AN ORGANIZED HEALTH CARE EDUCATION/TRAINING PROGRAM

## 2021-07-05 PROCEDURE — 2580000003 HC RX 258: Performed by: STUDENT IN AN ORGANIZED HEALTH CARE EDUCATION/TRAINING PROGRAM

## 2021-07-05 PROCEDURE — 87449 NOS EACH ORGANISM AG IA: CPT

## 2021-07-05 PROCEDURE — 36600 WITHDRAWAL OF ARTERIAL BLOOD: CPT

## 2021-07-05 PROCEDURE — 80048 BASIC METABOLIC PNL TOTAL CA: CPT

## 2021-07-05 PROCEDURE — 2140000000 HC CCU INTERMEDIATE R&B

## 2021-07-05 PROCEDURE — 85025 COMPLETE CBC W/AUTO DIFF WBC: CPT

## 2021-07-05 PROCEDURE — 99233 SBSQ HOSP IP/OBS HIGH 50: CPT | Performed by: INTERNAL MEDICINE

## 2021-07-05 PROCEDURE — 36415 COLL VENOUS BLD VENIPUNCTURE: CPT

## 2021-07-05 PROCEDURE — 71250 CT THORAX DX C-: CPT

## 2021-07-05 PROCEDURE — 2580000003 HC RX 258: Performed by: NURSE PRACTITIONER

## 2021-07-05 PROCEDURE — 83605 ASSAY OF LACTIC ACID: CPT

## 2021-07-05 PROCEDURE — 99232 SBSQ HOSP IP/OBS MODERATE 35: CPT | Performed by: FAMILY MEDICINE

## 2021-07-05 PROCEDURE — 85610 PROTHROMBIN TIME: CPT

## 2021-07-05 PROCEDURE — 94760 N-INVAS EAR/PLS OXIMETRY 1: CPT

## 2021-07-05 PROCEDURE — 94640 AIRWAY INHALATION TREATMENT: CPT

## 2021-07-05 PROCEDURE — 93306 TTE W/DOPPLER COMPLETE: CPT

## 2021-07-05 PROCEDURE — 2580000003 HC RX 258: Performed by: PHARMACIST

## 2021-07-05 PROCEDURE — 87635 SARS-COV-2 COVID-19 AMP PRB: CPT

## 2021-07-05 PROCEDURE — 2700000000 HC OXYGEN THERAPY PER DAY

## 2021-07-05 PROCEDURE — 87899 AGENT NOS ASSAY W/OPTIC: CPT

## 2021-07-05 PROCEDURE — 82948 REAGENT STRIP/BLOOD GLUCOSE: CPT

## 2021-07-05 PROCEDURE — 82803 BLOOD GASES ANY COMBINATION: CPT

## 2021-07-05 PROCEDURE — 6360000002 HC RX W HCPCS: Performed by: PHARMACIST

## 2021-07-05 RX ORDER — WARFARIN SODIUM 2.5 MG/1
2.5 TABLET ORAL ONCE
Status: COMPLETED | OUTPATIENT
Start: 2021-07-05 | End: 2021-07-05

## 2021-07-05 RX ORDER — INSULIN GLARGINE 100 [IU]/ML
10 INJECTION, SOLUTION SUBCUTANEOUS NIGHTLY
Status: DISCONTINUED | OUTPATIENT
Start: 2021-07-05 | End: 2021-07-07

## 2021-07-05 RX ADMIN — IPRATROPIUM BROMIDE AND ALBUTEROL SULFATE 1 AMPULE: .5; 3 SOLUTION RESPIRATORY (INHALATION) at 16:58

## 2021-07-05 RX ADMIN — VANCOMYCIN HYDROCHLORIDE 1250 MG: 5 INJECTION, POWDER, LYOPHILIZED, FOR SOLUTION INTRAVENOUS at 23:38

## 2021-07-05 RX ADMIN — IPRATROPIUM BROMIDE AND ALBUTEROL SULFATE 1 AMPULE: .5; 3 SOLUTION RESPIRATORY (INHALATION) at 09:18

## 2021-07-05 RX ADMIN — CEFEPIME HYDROCHLORIDE 2000 MG: 2 INJECTION, POWDER, FOR SOLUTION INTRAVENOUS at 19:45

## 2021-07-05 RX ADMIN — METOPROLOL SUCCINATE 50 MG: 50 TABLET, FILM COATED, EXTENDED RELEASE ORAL at 09:30

## 2021-07-05 RX ADMIN — MONTELUKAST SODIUM 10 MG: 10 TABLET ORAL at 09:30

## 2021-07-05 RX ADMIN — IPRATROPIUM BROMIDE AND ALBUTEROL SULFATE 1 AMPULE: .5; 3 SOLUTION RESPIRATORY (INHALATION) at 13:53

## 2021-07-05 RX ADMIN — WARFARIN SODIUM 2.5 MG: 2.5 TABLET ORAL at 17:26

## 2021-07-05 RX ADMIN — SODIUM CHLORIDE, PRESERVATIVE FREE 10 ML: 5 INJECTION INTRAVENOUS at 09:30

## 2021-07-05 RX ADMIN — INSULIN GLARGINE 10 UNITS: 100 INJECTION, SOLUTION SUBCUTANEOUS at 20:23

## 2021-07-05 RX ADMIN — IPRATROPIUM BROMIDE AND ALBUTEROL SULFATE 1 AMPULE: .5; 3 SOLUTION RESPIRATORY (INHALATION) at 21:12

## 2021-07-05 RX ADMIN — PREDNISONE 40 MG: 20 TABLET ORAL at 09:30

## 2021-07-05 RX ADMIN — AZITHROMYCIN DIHYDRATE 500 MG: 500 INJECTION, POWDER, LYOPHILIZED, FOR SOLUTION INTRAVENOUS at 23:31

## 2021-07-05 RX ADMIN — FLUTICASONE PROPIONATE 1 SPRAY: 50 SPRAY, METERED NASAL at 09:30

## 2021-07-05 RX ADMIN — LOSARTAN POTASSIUM 25 MG: 25 TABLET, FILM COATED ORAL at 20:21

## 2021-07-05 RX ADMIN — CETIRIZINE HYDROCHLORIDE 10 MG: 10 TABLET, FILM COATED ORAL at 09:30

## 2021-07-05 RX ADMIN — PIPERACILLIN AND TAZOBACTAM 3375 MG: 3; .375 INJECTION, POWDER, LYOPHILIZED, FOR SOLUTION INTRAVENOUS at 17:19

## 2021-07-05 RX ADMIN — GUAIFENESIN 600 MG: 600 TABLET, EXTENDED RELEASE ORAL at 20:21

## 2021-07-05 RX ADMIN — ATORVASTATIN CALCIUM 80 MG: 80 TABLET, FILM COATED ORAL at 20:21

## 2021-07-05 RX ADMIN — GUAIFENESIN 600 MG: 600 TABLET, EXTENDED RELEASE ORAL at 09:30

## 2021-07-05 RX ADMIN — PIPERACILLIN AND TAZOBACTAM 3375 MG: 3; .375 INJECTION, POWDER, LYOPHILIZED, FOR SOLUTION INTRAVENOUS at 09:27

## 2021-07-05 ASSESSMENT — PAIN DESCRIPTION - DESCRIPTORS: DESCRIPTORS: ACHING;DISCOMFORT

## 2021-07-05 ASSESSMENT — PAIN DESCRIPTION - PAIN TYPE: TYPE: ACUTE PAIN

## 2021-07-05 ASSESSMENT — PAIN SCALES - GENERAL
PAINLEVEL_OUTOF10: 0
PAINLEVEL_OUTOF10: 3
PAINLEVEL_OUTOF10: 0

## 2021-07-05 ASSESSMENT — PAIN DESCRIPTION - LOCATION: LOCATION: ANKLE

## 2021-07-05 ASSESSMENT — PAIN DESCRIPTION - ORIENTATION: ORIENTATION: RIGHT;LEFT

## 2021-07-05 NOTE — PROGRESS NOTES
Clinical Pharmacy Note    Warfarin consult follow-up    Recent Labs     07/05/21  0545   INR 2.46*     Recent Labs     07/03/21  1228 07/04/21  0334 07/05/21  0545   HGB 14.9 13.1* 14.1   HCT 44.4 39.9* 42.8    195 191       Significant Drug-Drug Interactions:  New warfarin drug-drug interactions: none  Discontinued drug-drug interactions: none  Current warfarin drug-drug interactions: azithromycin, prednisone     Date INR Warfarin Dose   7/3/21 3.84 No warfarin    7/4/21   3.75   No warfarin    7/5/21  2.45  2.5 mg                                        Notes:                   Daily PT/INR until stable within therapeutic range.      Prasanna SchneiderD, BCPS   7/5/2021  7:44 AM

## 2021-07-05 NOTE — PROGRESS NOTES
[facility]    Patient - Cole Riojas   MRN -  [id number]   [facility] # - [de-identified]   - 1940      Date of Admission -  7/3/2021 12:11 PM  Date of evaluation -  2021  Room - 3B-25/025-A   Hospital Day - [facility] Fredy Lynn MD Primary Care Physician - Selina Brantley DO   Chief Complaint   Acute hypoxic respiratory failure  Multilobar pneumonia   Active Hospital Problem List      Active Hospital Problems    Diagnosis Date Noted    Pneumonia [J18.9] 2021     HPI   Cole Riojas is a 79 y.o. male with past medical history of CAD, Afib, DM2, and COPD not on home Oxygen presented to ED due to SOB and productive cough. He was treated as outpatient for sinusitis and bronchitis/COPD exacerbation with oral Azithromycin which was later changed to Augmentin, and prednisone, he didn't report improvement. CXR showed bibasilar infiltrates. He is requiring increasing amount of O2 and is currently on 50% high flow. He smoked until , he was diagnosed with COPD by DT in 2018 and is using albuterol nebulizer. Used to work as a      Subjective      Remained on HFNC 60%  Less wheezing and respiratory distress  CT chest showed LLL atelectasis and diffuse infiltartes     Biofire showed non SARS coronavirus and pseudomonas        Past Surgical History           Procedure Laterality Date    CARDIAC SURGERY      heart cath    CORONARY ANGIOPLASTY WITH STENT PLACEMENT      HERNIA REPAIR      OTHER SURGICAL HISTORY  05/10/2018    PACEMAKER INSERTION       Diet    ADULT DIET; Regular; 3 carb choices (45 gm/meal)  Allergies    Aspirin, Benzonatate, and Xanax [alprazolam]  Social History     Social History     Socioeconomic History    Marital status:       Spouse name: Not on file    Number of children: Not on file    Years of education: Not on file    Highest education level: Not on file   Occupational History    Not on file   Tobacco Use    Smoking status: Former Smoker     Packs/day: 1.00     Years: 50.00     Pack years: 50.00     Types: Cigarettes    Smokeless tobacco: Never Used   Vaping Use    Vaping Use: Never used   Substance and Sexual Activity    Alcohol use: Yes     Comment: rarely    Drug use: No    Sexual activity: Yes     Partners: Female   Other Topics Concern    Not on file   Social History Narrative    Not on file     Social Determinants of Health     Financial Resource Strain: Low Risk     Difficulty of Paying Living Expenses: Not hard at all   Food Insecurity: No Food Insecurity    Worried About Running Out of Food in the Last Year: Never true    Abiel of Food in the Last Year: Never true   Transportation Needs:     Lack of Transportation (Medical):      Lack of Transportation (Non-Medical):    Physical Activity:     Days of Exercise per Week:     Minutes of Exercise per Session:    Stress:     Feeling of Stress :    Social Connections:     Frequency of Communication with Friends and Family:     Frequency of Social Gatherings with Friends and Family:     Attends Spiritism Services:     Active Member of Clubs or Organizations:     Attends Club or Organization Meetings:     Marital Status:    Intimate Partner Violence:     Fear of Current or Ex-Partner:     Emotionally Abused:     Physically Abused:     Sexually Abused:      Family History          Family history unknown: Yes     Sleep History    No historty but positive risk factors   Meds    Current Medications    insulin glargine  10 Units Subcutaneous Nightly    warfarin  2.5 mg Oral Once    ipratropium-albuterol  1 ampule Inhalation Q4H WA    piperacillin-tazobactam  3,375 mg Intravenous Q8H    vancomycin (VANCOCIN) intermittent dosing (placeholder)   Other RX Placeholder    predniSONE  40 mg Oral Daily    azithromycin  500 mg Intravenous Q24H    insulin lispro  0-18 Units Subcutaneous TID WC    insulin lispro  0-9 Units Subcutaneous Nightly    atorvastatin  80 mg Oral Nightly    [Held by provider] bumetanide  1 mg Oral Daily    cetirizine  10 mg Oral Daily    fluticasone  1 spray Each Nostril Daily    guaiFENesin  600 mg Oral BID    losartan  25 mg Oral Nightly    metoprolol succinate  50 mg Oral Daily    montelukast  10 mg Oral Daily    sodium chloride flush  5-40 mL Intravenous 2 times per day    warfarin (COUMADIN) daily dosing (placeholder)   Other RX Placeholder     sodium chloride flush, sodium chloride, ondansetron **OR** ondansetron, polyethylene glycol, acetaminophen **OR** acetaminophen, glucose, dextrose, glucagon (rDNA), dextrose  IV Drips/Infusions   sodium chloride      dextrose       Vitals    Vitals    height is 5' 7\" (1.702 m) and weight is 193 lb 14.4 oz (88 kg). His axillary temperature is 96.2 °F (35.7 °C). His blood pressure is 133/67 and his pulse is 94. His respiration is 24 and oxygen saturation is 93%. O2 Flow Rate (L/min): 60 L/min  I/O    Intake/Output Summary (Last 24 hours) at 7/5/2021 1442  Last data filed at 7/5/2021 1419  Gross per 24 hour   Intake 2239.74 ml   Output 2375 ml   Net -135.26 ml     Patient Vitals for the past 96 hrs (Last 3 readings):   Weight   07/05/21 0316 193 lb 14.4 oz (88 kg)   07/04/21 0600 201 lb (91.2 kg)   07/03/21 1510 200 lb 8 oz (90.9 kg)     Exam   Constitutional: Patient appears obese, in mild resp distress    Head: Normocephalic and atraumatic. Mouth/Throat: Oropharynx is clear and moist.  No oral thrush. Eyes: Conjunctivae are normal. Pupils are equal, round, and reactive to light. No scleral icterus. Neck: Neck supple. No JVD or tracheal deviation present. Cardiovascular: Regular rate, regular rhythm, S1 and S2 with no murmur. No peripheral edema  Pulmonary/Chest: Diminished breath sounds bilateral, basal to mid lung rhonchi   Abdominal: Soft. Bowel sounds audible.  No distension or tenderness to palp  Musculoskeletal: Moves all extremities  Lymphadenopathy:  No cervical adenopathy. Neurological: Patient is alert and oriented to person, place, and time. Skin: Skin is warm and dry. Labs   ABG  Lab Results   Component Value Date    PH 7.50 07/05/2021    PO2 104 07/05/2021    PCO2 33 07/05/2021    HCO3 25 07/05/2021    O2SAT 99 07/05/2021     Lab Results   Component Value Date    IFIO2 80 07/05/2021    MODE NIV 07/05/2021    SETTIDVOL 400 03/23/2018    SETPEEP 10.0 07/05/2021     CBC  Recent Labs     07/03/21  1228 07/04/21  0334 07/05/21  0545   WBC 31.6* 19.6* 18.9*   RBC 4.74 4.25* 4.53*   HGB 14.9 13.1* 14.1   HCT 44.4 39.9* 42.8   MCV 93.7 93.9 94.5*   MCH 31.4 30.8 31.1   MCHC 33.6 32.8 32.9    195 191   MPV 10.8 10.6 11.0      BMP  Recent Labs     07/03/21  1228 07/04/21  0334 07/05/21  0545   * 135 135   K 3.8 3.8 3.8   CL 93* 97* 97*   CO2 19* 23 23   BUN 52* 45* 42*   CREATININE 2.2* 2.0* 1.8*   GLUCOSE 189* 92 115*   CALCIUM 9.4 8.5 9.1     LFT  No results for input(s): AST, ALT, ALB, BILITOT, ALKPHOS, LIPASE in the last 72 hours. Invalid input(s): AMYLASE  TROP  Lab Results   Component Value Date    TROPONINT 0.034 07/03/2021    TROPONINT 0.024 06/23/2021    TROPONINT 0.020 04/22/2021     BNP  Lab Results   Component Value Date    PROBNP 4816.0 07/05/2021    PROBNP 3319.0 07/03/2021    PROBNP 1930.0 06/23/2021     D-Dimer  Lab Results   Component Value Date    DDIMER 1248.00 04/23/2018     Lactic Acid  Recent Labs     07/04/21  1302 07/04/21  2345 07/05/21  0804   LACTA 2.7* 2.4* 2.6*     INR  Recent Labs     07/03/21  1352 07/04/21  0334 07/05/21  0545   INR 3.84* 3.75* 2.46*     PTT  No results for input(s): APTT in the last 72 hours.   Glucose  Recent Labs     07/05/21  0128 07/05/21  0755 07/05/21  1124   POCGLU 322* 119* 203*     UA   Recent Labs     07/03/21  1455   PHUR 5.0   [address] NEGATIVE   UROBILINOGEN 0. 590 [place]     PFTs 2018     Echo     Ejection fraction was estimated at 45-50%. Normal left ventricular size and mildly reduced systolic function. There was mild global hypokinesis. Wall thickness was within normal limits. Device lead/catheter seen in the right heart. S/p TAVR. Transaortic velocity was 1.2-1.6 m/s, mean gradient 4-6 mmHg, EOA 1.5-1.8   cm2. There was no evidence of aortic regurgitation. There was trace tricuspid regurgitation. Assuming RAP = 5 mmHg, the estimated RVSP = 34 mmHg. Ascending aorta - 3.1 cm. IVC size is within normal limits with normal respiratory phasic changes. Cultures    Procalcitonin  Lab Results   Component Value Date    PROCAL 0.32 07/03/2021    PROCAL 12.27 03/23/2018       Radiology    CXR      CT Scans        Assessment   Acute hypoxic respiratory failure  Multi lobar pneumonia- failed outpatient therapy   COPD exacerbation   LLL basal segment atelectasis suspect mucus plug   CAD  Diastolic CHF  Afib   DM2  Recommendations     Continue HFNC  Continue Zosyn  Solumedrol and Duoneb for COPD Ex  May need bronchoscopy for LLL atelectasis once oxygenation improved     Thank you for the consult and allowing us to participate in the care of your patient. Case discussed with nurse and patient/family. Questions and concerns addressed. Meds and Orders reviewed.     Electronically signed by     Hattie Kent MD on 7/5/2021 at 2:42 PM

## 2021-07-05 NOTE — PLAN OF CARE
Call light within reach, bed in lowest position, bed alarm on, non-slip footwear on, wheels locked, room located near nurses station, door open.      Problem: Falls - Risk of:  Goal: Will remain free from falls  Description: Will remain free from falls  Outcome: Met This Shift  Goal: Absence of physical injury  Description: Absence of physical injury  Outcome: Met This Shift     Problem: Safety:  Goal: Free from accidental physical injury  Description: Free from accidental physical injury  Outcome: Met This Shift  Goal: Free from intentional harm  Description: Free from intentional harm  Outcome: Met This Shift     Pain controlled  Problem: Pain:  Goal: Pain level will decrease  Description: Pain level will decrease  Outcome: Met This Shift  Goal: Control of acute pain  Description: Control of acute pain  Outcome: Met This Shift  Goal: Control of chronic pain  Description: Control of chronic pain  Outcome: Met This Shift

## 2021-07-06 LAB
ANION GAP SERPL CALCULATED.3IONS-SCNC: 13 MEQ/L (ref 8–16)
BASE EXCESS MIXED: 1.3 MMOL/L (ref -2–3)
BASOPHILS # BLD: 0.3 %
BASOPHILS ABSOLUTE: 0.1 THOU/MM3 (ref 0–0.1)
BUN BLDV-MCNC: 58 MG/DL (ref 7–22)
CALCIUM SERPL-MCNC: 8.6 MG/DL (ref 8.5–10.5)
CHLORIDE BLD-SCNC: 95 MEQ/L (ref 98–111)
CO2: 23 MEQ/L (ref 23–33)
COLLECTED BY:: NORMAL
CREAT SERPL-MCNC: 2.6 MG/DL (ref 0.4–1.2)
DEVICE: NORMAL
EOSINOPHIL # BLD: 0 %
EOSINOPHILS ABSOLUTE: 0 THOU/MM3 (ref 0–0.4)
ERYTHROCYTE [DISTWIDTH] IN BLOOD BY AUTOMATED COUNT: 14.5 % (ref 11.5–14.5)
ERYTHROCYTE [DISTWIDTH] IN BLOOD BY AUTOMATED COUNT: 50.8 FL (ref 35–45)
FIO2, MIXED VENOUS: 70
GFR SERPL CREATININE-BSD FRML MDRD: 24 ML/MIN/1.73M2
GLUCOSE BLD-MCNC: 156 MG/DL (ref 70–108)
GLUCOSE BLD-MCNC: 176 MG/DL (ref 70–108)
GLUCOSE BLD-MCNC: 197 MG/DL (ref 70–108)
GLUCOSE BLD-MCNC: 217 MG/DL (ref 70–108)
GLUCOSE BLD-MCNC: 302 MG/DL (ref 70–108)
HCO3, MIXED: 26 MMOL/L (ref 23–28)
HCT VFR BLD CALC: 41.6 % (ref 42–52)
HEMOGLOBIN: 13.6 GM/DL (ref 14–18)
IMMATURE GRANS (ABS): 0.43 THOU/MM3 (ref 0–0.07)
IMMATURE GRANULOCYTES: 1.6 %
INR BLD: 2.22 (ref 0.85–1.13)
LEGIONELLA PNEUMOPHILIA AG, URINE: NEGATIVE
LYMPHOCYTES # BLD: 3.8 %
LYMPHOCYTES ABSOLUTE: 1 THOU/MM3 (ref 1–4.8)
MAGNESIUM: 2.6 MG/DL (ref 1.6–2.4)
MCH RBC QN AUTO: 30.9 PG (ref 26–33)
MCHC RBC AUTO-ENTMCNC: 32.7 GM/DL (ref 32.2–35.5)
MCV RBC AUTO: 94.5 FL (ref 80–94)
MONOCYTES # BLD: 5.5 %
MONOCYTES ABSOLUTE: 1.5 THOU/MM3 (ref 0.4–1.3)
NUCLEATED RED BLOOD CELLS: 0 /100 WBC
O2 SAT, MIXED: 57 %
PCO2, MIXED VENOUS: 42 MMHG (ref 41–51)
PH, MIXED: 7.41 (ref 7.31–7.41)
PLATELET # BLD: 182 THOU/MM3 (ref 130–400)
PMV BLD AUTO: 11.1 FL (ref 9.4–12.4)
PO2 MIXED: 30 MMHG (ref 25–40)
POTASSIUM SERPL-SCNC: 3.5 MEQ/L (ref 3.5–5.2)
RBC # BLD: 4.4 MILL/MM3 (ref 4.7–6.1)
SEG NEUTROPHILS: 88.8 %
SEGMENTED NEUTROPHILS ABSOLUTE COUNT: 23.9 THOU/MM3 (ref 1.8–7.7)
SITE: NORMAL
SODIUM BLD-SCNC: 131 MEQ/L (ref 135–145)
STREP PNEUMO AG, UR: NEGATIVE
WBC # BLD: 26.9 THOU/MM3 (ref 4.8–10.8)

## 2021-07-06 PROCEDURE — 6370000000 HC RX 637 (ALT 250 FOR IP): Performed by: FAMILY MEDICINE

## 2021-07-06 PROCEDURE — 2140000000 HC CCU INTERMEDIATE R&B

## 2021-07-06 PROCEDURE — 6360000002 HC RX W HCPCS: Performed by: STUDENT IN AN ORGANIZED HEALTH CARE EDUCATION/TRAINING PROGRAM

## 2021-07-06 PROCEDURE — 6370000000 HC RX 637 (ALT 250 FOR IP): Performed by: PHARMACIST

## 2021-07-06 PROCEDURE — 36600 WITHDRAWAL OF ARTERIAL BLOOD: CPT

## 2021-07-06 PROCEDURE — 83735 ASSAY OF MAGNESIUM: CPT

## 2021-07-06 PROCEDURE — 6370000000 HC RX 637 (ALT 250 FOR IP): Performed by: NURSE PRACTITIONER

## 2021-07-06 PROCEDURE — 99232 SBSQ HOSP IP/OBS MODERATE 35: CPT | Performed by: INTERNAL MEDICINE

## 2021-07-06 PROCEDURE — 2700000000 HC OXYGEN THERAPY PER DAY

## 2021-07-06 PROCEDURE — APPSS30 APP SPLIT SHARED TIME 16-30 MINUTES: Performed by: NURSE PRACTITIONER

## 2021-07-06 PROCEDURE — 82948 REAGENT STRIP/BLOOD GLUCOSE: CPT

## 2021-07-06 PROCEDURE — 85025 COMPLETE CBC W/AUTO DIFF WBC: CPT

## 2021-07-06 PROCEDURE — 80048 BASIC METABOLIC PNL TOTAL CA: CPT

## 2021-07-06 PROCEDURE — 2580000003 HC RX 258: Performed by: STUDENT IN AN ORGANIZED HEALTH CARE EDUCATION/TRAINING PROGRAM

## 2021-07-06 PROCEDURE — 36415 COLL VENOUS BLD VENIPUNCTURE: CPT

## 2021-07-06 PROCEDURE — 82803 BLOOD GASES ANY COMBINATION: CPT

## 2021-07-06 PROCEDURE — 2580000003 HC RX 258: Performed by: FAMILY MEDICINE

## 2021-07-06 PROCEDURE — 99232 SBSQ HOSP IP/OBS MODERATE 35: CPT | Performed by: FAMILY MEDICINE

## 2021-07-06 PROCEDURE — 94640 AIRWAY INHALATION TREATMENT: CPT

## 2021-07-06 PROCEDURE — 85610 PROTHROMBIN TIME: CPT

## 2021-07-06 PROCEDURE — 94660 CPAP INITIATION&MGMT: CPT

## 2021-07-06 PROCEDURE — 94760 N-INVAS EAR/PLS OXIMETRY 1: CPT

## 2021-07-06 RX ORDER — POTASSIUM CHLORIDE 20 MEQ/1
40 TABLET, EXTENDED RELEASE ORAL PRN
Status: DISCONTINUED | OUTPATIENT
Start: 2021-07-06 | End: 2021-07-09 | Stop reason: HOSPADM

## 2021-07-06 RX ORDER — SODIUM CHLORIDE 9 MG/ML
INJECTION, SOLUTION INTRAVENOUS CONTINUOUS
Status: ACTIVE | OUTPATIENT
Start: 2021-07-06 | End: 2021-07-06

## 2021-07-06 RX ORDER — POTASSIUM CHLORIDE 7.45 MG/ML
10 INJECTION INTRAVENOUS PRN
Status: DISCONTINUED | OUTPATIENT
Start: 2021-07-06 | End: 2021-07-09 | Stop reason: HOSPADM

## 2021-07-06 RX ORDER — HYDRALAZINE HYDROCHLORIDE 25 MG/1
25 TABLET, FILM COATED ORAL EVERY 12 HOURS SCHEDULED
Status: DISCONTINUED | OUTPATIENT
Start: 2021-07-06 | End: 2021-07-07

## 2021-07-06 RX ORDER — POTASSIUM CHLORIDE 20 MEQ/1
20 TABLET, EXTENDED RELEASE ORAL PRN
Status: DISCONTINUED | OUTPATIENT
Start: 2021-07-06 | End: 2021-07-09 | Stop reason: HOSPADM

## 2021-07-06 RX ORDER — WARFARIN SODIUM 2.5 MG/1
2.5 TABLET ORAL ONCE
Status: COMPLETED | OUTPATIENT
Start: 2021-07-06 | End: 2021-07-06

## 2021-07-06 RX ADMIN — IPRATROPIUM BROMIDE AND ALBUTEROL SULFATE 1 AMPULE: .5; 3 SOLUTION RESPIRATORY (INHALATION) at 12:15

## 2021-07-06 RX ADMIN — ATORVASTATIN CALCIUM 80 MG: 80 TABLET, FILM COATED ORAL at 20:17

## 2021-07-06 RX ADMIN — IPRATROPIUM BROMIDE AND ALBUTEROL SULFATE 1 AMPULE: .5; 3 SOLUTION RESPIRATORY (INHALATION) at 15:59

## 2021-07-06 RX ADMIN — CEFEPIME HYDROCHLORIDE 2000 MG: 2 INJECTION, POWDER, FOR SOLUTION INTRAVENOUS at 05:44

## 2021-07-06 RX ADMIN — METOPROLOL SUCCINATE 50 MG: 50 TABLET, FILM COATED, EXTENDED RELEASE ORAL at 10:32

## 2021-07-06 RX ADMIN — INSULIN GLARGINE 10 UNITS: 100 INJECTION, SOLUTION SUBCUTANEOUS at 20:16

## 2021-07-06 RX ADMIN — SODIUM CHLORIDE: 9 INJECTION, SOLUTION INTRAVENOUS at 12:22

## 2021-07-06 RX ADMIN — POTASSIUM CHLORIDE 40 MEQ: 1500 TABLET, EXTENDED RELEASE ORAL at 06:02

## 2021-07-06 RX ADMIN — GUAIFENESIN 600 MG: 600 TABLET, EXTENDED RELEASE ORAL at 10:32

## 2021-07-06 RX ADMIN — CETIRIZINE HYDROCHLORIDE 10 MG: 10 TABLET, FILM COATED ORAL at 10:32

## 2021-07-06 RX ADMIN — HYDRALAZINE HYDROCHLORIDE 25 MG: 25 TABLET, FILM COATED ORAL at 12:17

## 2021-07-06 RX ADMIN — PREDNISONE 40 MG: 20 TABLET ORAL at 10:32

## 2021-07-06 RX ADMIN — MONTELUKAST SODIUM 10 MG: 10 TABLET ORAL at 10:32

## 2021-07-06 RX ADMIN — CEFEPIME HYDROCHLORIDE 2000 MG: 2 INJECTION, POWDER, FOR SOLUTION INTRAVENOUS at 18:45

## 2021-07-06 RX ADMIN — GUAIFENESIN 600 MG: 600 TABLET, EXTENDED RELEASE ORAL at 20:17

## 2021-07-06 RX ADMIN — HYDRALAZINE HYDROCHLORIDE 25 MG: 25 TABLET, FILM COATED ORAL at 20:17

## 2021-07-06 RX ADMIN — IPRATROPIUM BROMIDE AND ALBUTEROL SULFATE 1 AMPULE: .5; 3 SOLUTION RESPIRATORY (INHALATION) at 09:12

## 2021-07-06 RX ADMIN — WARFARIN SODIUM 2.5 MG: 2.5 TABLET ORAL at 18:11

## 2021-07-06 ASSESSMENT — PAIN SCALES - GENERAL
PAINLEVEL_OUTOF10: 0
PAINLEVEL_OUTOF10: 0

## 2021-07-06 NOTE — PROGRESS NOTES
Pharmacy Medication History Note      List of current medications patient is taking is complete. Source of information: Patient and wife    Changes made to medication list:  Medications removed (include reason, ex. therapy complete or physician discontinued):  Amoxicillin-clavulanate [phone]- physician D/C upon admission   Guaifenesin 600 mg ER tablet - patient is taking dextromethophan-guaifenesin combination product only      Other notes (ex. Recent course of antibiotics, Coumadin dosing):  Patient continues to take prednisone 40 mg inpatient     Denies use of other OTC or herbal medications.       Allergies reviewed      Electronically signed by Yuri Love on 7/6/2021 at 2:56 PM

## 2021-07-06 NOTE — PROGRESS NOTES
Comprehensive Nutrition Assessment    Type and Reason for Visit:  Initial, Positive Nutrition Screen (Weight Loss)    Nutrition Recommendations/Plan:   *Recommend a Multivitamin w/minerals daily. *Continue current diet as ordered. *Pt declines all ONS at this time. Nutrition Assessment: Pt. nutritionally compromised AEB pt with decreased appetite and intake of meals a few days PTA with unplanned weight loss of ~17 lbs in 2 months (-8.1% of body weight); however hx of CHF/edema shifts noted on Bumex at home. At risk for further nutrition compromise r/t admit d/t pneumonia and underlying medical condition (hx CHF, COPD, CAD, HTN, HLD and DM). Nutrition recommendations/interventions as per above. Malnutrition Assessment:  Malnutrition Status: At risk for malnutrition (Comment)    Context:  Acute Illness     Findings of the 6 clinical characteristics of malnutrition:  Energy Intake:  Mild decrease in energy intake (Comment) (decreased a few days PTA)  Weight Loss:  Unable to assess (hx CHF; -8.1% weight loss in 2 days PTA)     Body Fat Loss:  No significant body fat loss     Muscle Mass Loss:  No significant muscle mass loss    Fluid Accumulation:  No significant fluid accumulation Extremities   Strength:  Not Performed    Estimated Daily Nutrient Needs:  Energy (kcal):  9512-2603 kcal/day (15-18 kcal/kg); Weight Used for Energy Requirements:  Current (88 kg)     Protein (g):   g/day (1.2-1.5 g/kg);  Weight Used for Protein Requirements:  Ideal (IBW 67.3 kg)          Nutrition Related Findings:  admit d/t pneumonia; pt and spouse seen; pt reports decreased intake over the past few days PTA with unplanned weight loss of 20 lbs over the past few months; however hx CHF/edema/fluid shifts noted; pt denies N/V or chewing/swallowing difficulty with food; no BM since admit; pt states he is avoiding milk products d/t it causing phlem and does not want to try the Ensure Clear d/t it being too high in carbohydrates. Labs: Na 131, BUN 58, Cr. 2.6, POC Glucose 156. Rx includes: Lipitor, Zithromax, Lantus, Humalog and Prednisone. Wounds:  None       Current Nutrition Therapies:    ADULT DIET; Regular; 3 carb choices (45 gm/meal)    Anthropometric Measures:  · Height: 5' 7\" (170.2 cm)  · Current Body Weight: 194 lb 3.2 oz (88.1 kg) (7/6; +1 edema BLE; +non-pitting LUE)   · Admission Body Weight: 199 lb (90.3 kg) (7/3; +1 edema BLE; +non-pitting LUE)    · Usual Body Weight:  (215 lbs per pt report. Per EMR: 204 lb 9.6 oon 6/25/21; 211 lb 6.4 oz on 5/7/21)     · Ideal Body Weight: 148 lbs  · BMI: 30.4  · BMI Categories: Obese Class 1 (BMI 30.0-34. 9)       Nutrition Diagnosis:   · Inadequate oral intake related to inadequate protein-energy intake as evidenced by poor intake prior to admission    Nutrition Interventions:   Food and/or Nutrient Delivery:  Continue Current Diet, Vitamin Supplement  Nutrition Education/Counseling:  Education initiated (Encouraged po intake of meals at best effort)   Coordination of Nutrition Care:  Continue to monitor while inpatient    Goals:  Pt will consume 75% or more of meals during LOS       Nutrition Monitoring and Evaluation:   Behavioral-Environmental Outcomes:  None Identified   Food/Nutrient Intake Outcomes:  Diet Advancement/Tolerance, Food and Nutrient Intake, Vitamin/Mineral Intake  Physical Signs/Symptoms Outcomes:  Biochemical Data, Weight, Skin, Nutrition Focused Physical Findings, Fluid Status or Edema, Constipation, GI Status     Discharge Planning:     Too soon to determine     Electronically signed by Dafne Marte, MS, RD, LD on 7/6/21 at 2:03 PM EDT    Contact: [phone]

## 2021-07-06 NOTE — PROGRESS NOTES
Pharmacy Vancomycin Consult     Vancomycin Day: 2  Current Dosinmg x1  Current indication: CAP    Temp max:  98.2    Recent Labs     21  0334 21  0545   BUN 45* 42*   CREATININE 2.0* 1.8*   WBC 19.6* 18.9*       Intake/Output Summary (Last 24 hours) at 2021 2200  Last data filed at 2021 1817  Gross per 24 hour   Intake 1211.68 ml   Output 1450 ml   Net -238.32 ml     Culture Date      Source                       Results  7/3/21                  BCx2                          ngtd  21                  PNA PCR                   + pseudomonas aeruginosa    Ht Readings from Last 1 Encounters:   21 5' 7\" (1.702 m)        Wt Readings from Last 1 Encounters:   21 193 lb 14.4 oz (88 kg)       Body mass index is 30.37 kg/m². Estimated Creatinine Clearance: 34 mL/min (A) (based on SCr of 1.8 mg/dL (H)). Trough: 9.9    Assessment/Plan:  Vanc 1250mg x1  Scr improving  Unclear if patient needs vanc at the time due to PCR result and ngtd in [facility], [facility]. AND Piter HARGROVE, AMY, BCCCP 2021 10:07 PM

## 2021-07-06 NOTE — PROGRESS NOTES
PROGRESS NOTE      Patient:  Jessie Herzog      Unit/Bed:3B-31/031-A    YOB: 1940    MRN: [id number]       Acct: [de-identified]     PCP: Arnie Xiong DO    Date of Admission: 7/3/2021      Assessment/Plan:    Acute hypoxic respiratory failure  -2/2 to CAP and COPD exacerbation: currently on HFNC. ABG showed acute respiratory alkalosis. CPAP as ordered. Repeat ABG still showing respiratory alkalosis, cont CPAP. Please see below for COPD exacerbation and CAP management. Wean O2 as tolerated. Pulmonology on-board, appreciate pulmonology input. Chest CTA reviewed, pulmonology plan for possible bronchoscopy for LLL atelectasis once oxygenation improved     Sepsis due to CAP, resolving: on arrival, WBC 31.6K, NH 99, lactic acid 5.2. Leukocytosis worsening today most likely due to steroids. VS normal today. Lactic acidosis resolved. pt received IVF bolus 500 cc in ER, cautious in fluid resuscitation due to chronic HFrEF and CKD. Tachycardia resolved. Blood cultures no growth so far. -WBC worsened to 26.9 7/6, likely 2/2 to steroids, will repeat cbc in am    CAP, failed OP treatment: CXR on admission: Left basilar atelectasis/infiltrate, procalcitonin elevated at 0.32. treated with azithromycin 2 weeks ago in OP and Augmentin with prednisone started 6/29/21 to 7/3/21. Started on rocephin and azithromycin on admission, switched to broad spectrum antibiotics vancomycin and zosyn on 7/4/21. Azithromycin continued for atypical coverage. COVID-19 (-). MRSA PCR (+). Incentive spirometry. -pneumonia panel grew pseudomonas, changed zosyn to cefepime 7/5/21 ( re: on vancomycin + zosyn, possible toxicity to kidneys, given CKD IV, switch zosyn to cefepime), will cont vancomycin for now due to (+) MRSA PCR. COPD exacerbation due to CAP: give solumedrol 125 mg IV x 1 dose followed by prednisone 40 mg PO daily x 5 days. Duoneb     MILLY on CKD stage IV: creatinine 2.6 today from 1.8 yesterday.  Baseline creatinine 1.7 to 2.9.   -likely worsened from bumex dose.   -given gentle IV fluids 0.9ns @ 50 for 6 hours. -HOLD losartan     Chronic HFrEF, EF 45-50% per echo 5/2019. Pro-BNP chronically elevated likely due to CKD but more elevated from baseline. Received bumex 1 mg IV x 1 dose on 7/4/21. Repeat pro-BNP on 7/5/21 more elevated, received another bumex 1 mg IV x 1 dose on 7/5/21. Pulmonology recommend to HOLD bumex PO.   -still holding bumex  -ECHO 7/5 showed normal left ventricle size, mild left ventricular hypertrophy, EF estimated at 50%. Normally functioning s/p TAVR. DM type 2: A1C 6.8%, POC glucose uncontrolled, anticipate hyperglycemia from steroids. SSI. Home meds metformin on-hold. accu-check  -started lantus 10 units nightly on 7/5  -adjust insulin regimen prn     Permanent atrial fibrillation, rate controlled. Cont Toprol XL. CHADsVASC score 5, cont coumadin, pharmacy on-board to dose coumadin    Supratherapeutic INR, resolved    Hx of TAVR, noted    Mild hyponatremia, likely due to diuretics: BMP in am       Chief Complaint: worsening cough    Hospital Course:     Per H/P note:     \"79 y.o. male who presented to [address] [place] [address] with a worsening of his cough; he has a past medical history of heart failure, diabetes, TAVR, CKD and atrial fibrillation; this patient has been on Augmentin and prednisone recently along with the possibility of Zithromax; wife states he has been in the emergency department and also to his PCP twice here in the last couple weeks; he continues with a cough and continues with a cough with green phlegm and shortness of breath; he is fully alert and oriented, he denies any pain; he denies any fever chills.      In the emergency department, chest x-ray reveals a left lower lobe pneumonia, lactic acid at 5.2, BNP at 3319; he has a chronic troponin elevation along with a CKD; he currently denies any lightheadedness or dizziness, chest pain, states \"I get short of breath when I start potassium chloride **OR** potassium alternative oral replacement **OR** potassium chloride, sodium chloride flush, sodium chloride, ondansetron **OR** ondansetron, polyethylene glycol, acetaminophen **OR** acetaminophen, glucose, dextrose, glucagon (rDNA), dextrose      Intake/Output Summary (Last 24 hours) at 7/6/2021 0740  Last data filed at 7/6/2021 0325  Gross per 24 hour   Intake 307.68 ml   Output 725 ml   Net -417.32 ml       Diet:  ADULT DIET; Regular; 3 carb choices (45 gm/meal)    Exam:  BP (!) 167/87   Pulse 69   Temp 98.7 °F (37.1 °C) (Oral)   Resp 29   Ht 5' 7\" (1.702 m)   Wt 194 lb 3.2 oz (88.1 kg)   SpO2 99%   BMI 30.42 kg/m²     General appearance: No apparent distress, appears stated age and cooperative. HEENT: Normal cephalic, atraumatic without obvious deformity. Pupils equal, round, and reactive to light. Extra ocular muscles intact. Conjunctivae/corneas clear. Neck: Supple, with full range of motion. No jugular venous distention. Trachea midline. Respiratory:  on CPAP. Diminished breath sounds present bilaterally. Rhonchi have improved, however patient still has significant wheezing bilateral lung fields. Cardiovascular: normal rate, irregularly irregular rhythm with normal S1/S2 without murmurs, rubs or gallops. Abdomen: Soft, non-tender, non-distended with normal bowel sounds. Musculoskeletal:  No clubbing, cyanosis or edema bilaterally. Skin: Skin color, texture, turgor normal.  No rashes or lesions.   Neurologic:  Neurovascularly intact   Psychiatric: thought content appropriate, normal insight  Exam of extremities: peripheral pulses normal, no pedal or leg edema, no clubbing or cyanosis      Labs:   Recent Labs     07/04/21  0334 07/05/21  0545 07/06/21  0431   WBC 19.6* 18.9* 26.9*   HGB 13.1* 14.1 13.6*   HCT 39.9* 42.8 41.6*    191 182     Recent Labs     07/04/21  0334 07/05/21  0545 07/06/21  0431    135 131*   K 3.8 3.8 3.5   CL 97* 97* 95*   CO2 23 23 23 BUN 45* 42* 58*   CREATININE 2.0* 1.8* 2.6*   CALCIUM 8.5 9.1 8.6     No results for input(s): AST, ALT, BILIDIR, BILITOT, ALKPHOS in the last 72 hours. Recent Labs     07/04/21  0334 07/05/21  0545 07/06/21  0431   INR 3.75* 2.46* 2.22*     No results for input(s): CKTOTAL, TROPONINI in the last 72 hours. Urinalysis:      Lab Results   Component Value Date    NITRU NEGATIVE 07/03/2021    WBCUA NONE SEEN 06/10/2021    BACTERIA NONE SEEN 06/10/2021    RBCUA NONE SEEN 06/10/2021    BLOODU NEGATIVE 07/03/2021    SPECGRAV 1.006 06/10/2021    GLUCOSEU NEGATIVE 07/03/2021       Radiology:  CT CHEST WO CONTRAST   Final Result       1. Interval deterioration since previous CT scan of the chest dated 22nd of April 2021 with areas of abnormal density especially in the right lower lobe, lingula and to lesser extent right upper lobe. These are suggestive of inflammatory process possibly    Covid 19 infection. 2. Collapse in the left lower lobe posteriorly, new since previous CT scan of the chest dated 22nd of April 2021. 3. Small mediastinal lymph nodes. Calcified node in the subcarinal space. 4. Status post pacemaker placement. 5. TAVR in place. Atherosclerotic calcification in the thoracic aorta. 6. Thoracic spondylosis. **This report has been created using voice recognition software. It may contain minor errors which are inherent in voice recognition technology. **      Final report electronically signed by DR Daniel Mcdonnell on 7/5/2021 10:52 AM      XR CHEST PORTABLE   Final Result   1. Left basilar atelectasis/infiltrate. 2. Mild stable cardiomegaly. **This report has been created using voice recognition software. It may contain minor errors which are inherent in voice recognition technology. **      Final report electronically signed by Dr. Juliana Ortega on 7/3/2021 1:19 PM          Diet: ADULT DIET;  Regular; 3 carb choices (45 gm/meal)    DVT prophylaxis: [] Lovenox [] SCDs                                 [] SQ Heparin                                 [] Encourage ambulation           [x] Already on Anticoagulation (warfarin)     Disposition:    [] Home       [] TCU       [] Rehab       [] Psych       [] SNF       [] [place]       [x] Other-continue to assess needs.      Code Status: Full Code    PT/OT Eval Status: not yet consulted, consider as needs progress      Electronically signed by Yesica Esteban MD on 7/6/2021 at 7:40 AM

## 2021-07-06 NOTE — PROGRESS NOTES
[facility]    Patient - Cathy Khanna   MRN -  [id number]   [facility]   - 1940      Date of Admission -  7/3/2021 12:11 PM  Date of evaluation -  2021  Room - 3B--A   Hospital Day - [facility] [address] Zazueta MD Primary Care Physician - Mando Ahumada DO   Chief Complaint   Acute hypoxic respiratory failure, multilobar pneumonia  Active Hospital Problem List      Active Hospital Problems    Diagnosis Date Noted    Chronic HFrEF (heart failure with reduced ejection fraction) ([facility]) [I50.22]     Community acquired pneumonia of left lower lobe of lung [J18.9] 2021    COPD exacerbation ([address] [J44.1] 2020    Permanent atrial fibrillation ([address] [I48.21] 2020    History of transcatheter aortic valve replacement (TAVR) [Z95.2] 06/15/2018    Stage 4 chronic kidney disease ([address] [N18.4] 2018    Sepsis without acute organ dysfunction ([address] [A41.9]     Hyponatremia [E87.1]     Acute respiratory failure with hypoxia ([address] [J96.01]      HPI   Cathy Khanna is a 79 y.o. male with past medical history of CAD, Afib, DM2, and COPD not on home Oxygen presented to ED due to SOB and productive cough. He was treated as outpatient for sinusitis and bronchitis/COPD exacerbation with oral Azithromycin which was later changed to Augmentin, and prednisone, he didn't report improvement. CXR showed bibasilar infiltrates. He is requiring increasing amount of O2 and is currently on 50% high flow. He smoked until 2009, he was diagnosed with COPD by DT in 2018 and is using albuterol nebulizer.  Used to work as a      Subjective    -On HFNC at 60 LPM   -Ongoing congested cough  -Denies chest pain or hemoptysis  All other systems reviewed     Past Surgical History           Procedure Laterality Date    CARDIAC SURGERY      heart cath   [id number] [facility]      OTHER SURGICAL HISTORY  05/10/2018    PACEMAKER INSERTION       Diet    ADULT DIET; Regular; 3 carb choices (45 gm/meal)  Allergies    Aspirin, Benzonatate, and Xanax [alprazolam]  Social History     Social History     Socioeconomic History    Marital status:      Spouse name: Not on file    Number of children: Not on file    Years of education: Not on file    Highest education level: Not on file   Occupational History    Not on file   Tobacco Use    Smoking status: Former Smoker     Packs/day: 1.00     Years: 50.00     Pack years: 50.00     Types: Cigarettes    Smokeless tobacco: Never Used   Vaping Use    Vaping Use: Never used   Substance and Sexual Activity    Alcohol use: Yes     Comment: rarely    Drug use: No    Sexual activity: Yes     Partners: Female   Other Topics Concern    Not on file   Social History Narrative    Not on file     Social Determinants of Health     Financial Resource Strain: Low Risk     Difficulty of Paying Living Expenses: Not hard at all   Food Insecurity: No Food Insecurity    Worried About [address] [employer] in the Last Year: Never true    [address] [place] in the Last Year: Never true   Transportation Needs:     Lack of Transportation (Medical):      Lack of Transportation (Non-Medical):    Physical Activity:     Days of Exercise per Week:     Minutes of Exercise per Session:    Stress:     Feeling of Stress :    Social Connections:     Frequency of Communication with Friends and Family:     Frequency of Social Gatherings with Friends and Family:     Attends Yazidi Services:     Active Member of Clubs or Organizations:     Attends Club or Organization Meetings:     Marital Status:    Intimate Partner Violence:     Fear of Current or Ex-Partner:     Emotionally Abused:     Physically Abused:     Sexually Abused:      Family History          Family history unknown: Yes     Sleep History    No historty but positive risk factors   Meds    Current Medications    warfarin  2.5 mg Oral Once    hydrALAZINE  25 mg Oral 2 times per day    potassium (CARDIAC) replacement protocol   Other RX Placeholder    insulin glargine  10 Units Subcutaneous Nightly    cefepime  2,000 mg Intravenous Q12H    ipratropium-albuterol  1 ampule Inhalation Q4H WA    predniSONE  40 mg Oral Daily    azithromycin  500 mg Intravenous Q24H    insulin lispro  0-18 Units Subcutaneous TID WC    insulin lispro  0-9 Units Subcutaneous Nightly    atorvastatin  80 mg Oral Nightly    [Held by provider] bumetanide  1 mg Oral Daily    cetirizine  10 mg Oral Daily    fluticasone  1 spray Each Nostril Daily    guaiFENesin  600 mg Oral BID    [Held by provider] losartan  25 mg Oral Nightly    metoprolol succinate  50 mg Oral Daily    montelukast  10 mg Oral Daily    warfarin (COUMADIN) daily dosing (placeholder)   Other RX Placeholder     potassium chloride **OR** potassium alternative oral replacement **OR** potassium chloride, sodium chloride flush, sodium chloride, ondansetron **OR** ondansetron, polyethylene glycol, acetaminophen **OR** acetaminophen, glucose, dextrose, glucagon (rDNA), dextrose  IV Drips/Infusions   sodium chloride      dextrose       Vitals    Vitals    height is 5' 7\" (1.702 m) and weight is 194 lb 3.2 oz (88.1 kg). His oral temperature is 97.9 °F (36.6 °C). His blood pressure is 132/71 and his pulse is 70. His respiration is 20 and oxygen saturation is 94%. O2 Flow Rate (L/min): 60 L/min  I/O    Intake/Output Summary (Last 24 hours) at 7/6/2021 1025  Last data filed at 7/6/2021 0325  Gross per 24 hour   Intake 177.68 ml   Output 725 ml   Net -547.32 ml     Patient Vitals for the past 96 hrs (Last 3 readings):   Weight   07/06/21 0315 194 lb 3.2 oz (88.1 kg)   07/05/21 0316 193 lb 14.4 oz (88 kg)   07/04/21 0600 201 lb (91.2 kg)     Exam   Physical Exam   Constitutional: No distress on O2 per HFNC. Patient appears well built BMI 30. Head: Normocephalic and atraumatic. Mouth/Throat: Oropharynx is clear and moist.  No oral thrush. Eyes: Conjunctivae are normal. Pupils are equal, round. No scleral icterus. Neck: Neck supple. No tracheal deviation present. Cardiovascular: S1 and S2 with no murmur. No peripheral edema  Pulmonary/Chest: Normal effort with bilateral air entry, noted rhonchi throughout. No stridor. No respiratory distress. Patient exhibits no tenderness. Abdominal: Soft. Bowel sounds audible. No distension or tenderness to palp. Musculoskeletal: Moves all extremities  Neurological: Patient is alert and follows simple commands    Labs   ABG  Lab Results   Component Value Date    PH 7.50 07/05/2021    PO2 104 07/05/2021    PCO2 33 07/05/2021    HCO3 25 07/05/2021    O2SAT 99 07/05/2021     Lab Results   Component Value Date    IFIO2 80 07/05/2021    MODE NIV 07/05/2021    SETTIDVOL 400 03/23/2018    SETPEEP 10.0 07/05/2021     CBC  Recent Labs     07/04/21  0334 07/05/21  0545 07/06/21  0431   WBC 19.6* 18.9* 26.9*   RBC 4.25* 4.53* 4.40*   HGB 13.1* 14.1 13.6*   HCT 39.9* 42.8 41.6*   MCV 93.9 94.5* 94.5*   MCH 30.8 31.1 30.9   MCHC 32.8 32.9 32.7    191 182   MPV 10.6 11.0 11.1      BMP  Recent Labs     07/04/21  0334 07/05/21  0545 07/06/21  0431    135 131*   K 3.8 3.8 3.5   CL 97* 97* 95*   CO2 23 23 23   BUN 45* 42* 58*   CREATININE 2.0* 1.8* 2.6*   GLUCOSE 92 115* 197*   MG  --   --  2.6*   CALCIUM 8.5 9.1 8.6     LFT  No results for input(s): AST, ALT, ALB, BILITOT, ALKPHOS, LIPASE in the last 72 hours. Invalid input(s):   AMYLASE  TROP  Lab Results   Component Value Date    TROPONINT 0.034 07/03/2021    TROPONINT 0.024 06/23/2021    TROPONINT 0.020 04/22/2021     BNP  Lab Results   Component Value Date    PROBNP 4816.0 07/05/2021    PROBNP 3319.0 07/03/2021    PROBNP 1930.0 06/23/2021     D-Dimer  Lab Results   Component Value Date    DDIMER 1248.00 04/23/2018     Lactic Acid  Recent Labs     07/04/21  2345 07/05/21  0804 07/05/21  1331 LACTA 2.4* 2.6* 1.2     INR  Recent Labs     07/04/21  0334 07/05/21  0545 07/06/21  0431   INR 3.75* 2.46* 2.22*     PTT  No results for input(s): APTT in the last 72 hours. Glucose  Recent Labs     07/05/21  1648 07/05/21  1946 07/06/21  0741   POCGLU 251* 204* 176*     UA   Recent Labs     07/03/21  1455   PHUR 5.0   COLORU YELLOW   PROTEINU NEGATIVE   BLOODU NEGATIVE   NITRU NEGATIVE   GLUCOSEU NEGATIVE   BILIRUBINUR NEGATIVE   UROBILINOGEN 0.2   KETUA NEGATIVE     PFTs 2018     Echo     7/5/2021  Conclusions      Summary   Technically difficult examination. Left ventricle size is normal.   Mild concentric left ventricular hypertrophy. There were no regional wall motion abnormalities. Ejection fraction is visually estimated at 50%. S/p TAVR, normally functioning. Signature      ----------------------------------------------------------------   Electronically signed by Sharron Santiago MD (Interpreting   physician) on 07/05/2021 at 04:04 PM   ----------------------------------------------------------------    ECHO 5/6/2019    Ejection fraction was estimated at 45-50%. Normal left ventricular size and mildly reduced systolic function. There was mild global hypokinesis. Wall thickness was within normal limits. Device lead/catheter seen in the right heart. S/p TAVR. Transaortic velocity was 1.2-1.6 m/s, mean gradient 4-6 mmHg, EOA 1.5-1.8   cm2. There was no evidence of aortic regurgitation. There was trace tricuspid regurgitation. Assuming RAP = 5 mmHg, the estimated RVSP = 34 mmHg. Ascending aorta - 3.1 cm. IVC size is within normal limits with normal respiratory phasic changes. Cultures    Procalcitonin  Lab Results   Component Value Date    PROCAL 0.32 07/03/2021    PROCAL 12.27 03/23/2018       Radiology    CXR    XR CHEST PORTABLE   7/3/2021   FINDINGS: A left-sided cardiac device is in stable position. There is mild stable enlargement of the cardiac silhouette. Atherosclerotic calcifications are present in the thoracic aorta. Reticular opacities at the left lung base are slightly more extensive than the prior study. Degenerative changes in the thoracic  spine are poorly visualized. Impression:  1. Left basilar atelectasis/infiltrate. 2. Mild stable cardiomegaly. CT Scans  CT CHEST WO CONTRAST   7/5/2021   FINDINGS:   The heart size is normal in this patient status post pacemaker placement. . There is a TAVR in place. There is atherosclerotic calcification in the thoracic aorta. . There is no gross abnormality of the pulmonary artery. There are small mediastinal lymph nodes especially in the aortopulmonary window. This a calcified lymph node in the subcarinal space. , axillary or hilar adenopathy. There is no pericardial or pleural effusion. There is slight thickening of the interstitial lung markings with superimposed abnormal densities especially in the right lower lobe, lingula and to a lesser extent right upper lobe. These are  clearly worse than on remote CT scan dated 22nd of April 2021 suggestive of inflammatory process,  possibly Covid 19 infection. There is collapse in the left lower lobe posteriorly. There is mild thoracic spondylosis. .   Impression:  1. Interval deterioration since previous CT scan of the chest dated 22nd of April 2021 with areas of abnormal density especially in the right lower lobe, lingula and to lesser extent right upper lobe. These are suggestive of inflammatory process possibly  Covid 19 infection. 2. Collapse in the left lower lobe posteriorly, new since previous CT scan of the chest dated 22nd of April 2021. 3. Small mediastinal lymph nodes. Calcified node in the subcarinal space. 4. Status post pacemaker placement. 5. TAVR in place. Atherosclerotic calcification in the thoracic aorta. 6. Thoracic spondylosis.          Assessment   -Acute hypoxic respiratory failure  -Multi lobar pneumonia- failed outpatient therapy -COPD exacerbation   -LLL basal segment atelectasis suspect mucus plug   -CAD  -Diastolic CHF  -Afib on outpatient coumadin  -DM2  Recommendations   -Continue HFNC wean supplemental O2 as able to maintain SpO2 >90%  -Continue on ATB's azithro/cefepime per primary  -Duonebs Q4 hrs WA   -Add metaneb to improve pulmonary clearance  -Prednisone 40 gm PO Daily x 5 days  -DVT prophylaxis: on Warfarin 2/2 A-fib    Case discussed with nurse and patient/family. Questions and concerns addressed. Meds and Orders reviewed. Electronically signed by     WILBERTO Tyler - CNP on 7/6/2021 at 10:25 AM      Stable  Responding to treatment  Metaneb  Continue Prednisone total 5 days. Patient seen and examined independently by me. Above discussed and I agree with  CNP note Also see my additional comments. Labs, cultures, and radiographs when available were reviewed. Changes were made in the orders as necessary. I discussed patient concerns with [facility] and instructions were given. Respiratory care issues addressed. Please see our orders for the updated patient care plan.     Electronically signed by     Mila Álvarez MD on 7/6/2021 at 1:29 PM

## 2021-07-06 NOTE — CARE COORDINATION
7/6/21, 10:02 AM EDT  DISCHARGE PLANNING EVALUATION:    Jessie Herzog       Admitted: 7/3/2021/ [facility] day: 3   Location: 3B-31/031-A Reason for admit: Pneumonia [J18.9]   PMH:  has a past medical history of MILLY (acute kidney injury) ([facility] Atrial fibrillation ([facility] Cerebral artery occlusion with cerebral infarction ([facility] CHF (congestive heart failure), NYHA class III, acute on chronic, combined ([facility] COPD (chronic obstructive pulmonary disease) ([facility] Coronary artery disease involving native coronary artery of native heart with angina pectoris ([facility] Diabetes mellitus, type 2 ([facility] Hyperlipidemia, Hypertension, and Pneumonia. Procedure:   7/3 CXR - Left basilar atelectasis/infiltrate. 7/5 CTA chest - areas of abnormal density especially in the right lower lobe, lingula and to lesser extent right upper lobe. These are suggestive of inflammatory process possibly COVID 19. Collapse in the left lower lobe posteriorly. Small mediastinal lymph nodes. Calcified node in the subcarinal space. 7/5 Echo - EF 50%. Barriers to Discharge:  Admitted through ED with cough and SOB. COVID negative. WBC 31.6, 19.6, 18.9 and 26.9. Sodium 131, BUN 58, creatinine 2.6. BNP 4816.0. Consulted Pulmonology. Started on High flow O2, currently at 60L and 85% FiO2. Pulmonology feels pt may need bronchoscopy once oxygenation improves. Lipitor, Zithromax iv daily, Cefepime iv q12hr, Mucinex, Hydralazine q12hr, DuoNebs q4hr WA. Cozaar (held), Bumex (held), Toprol XL, Singulair, Prednisone, electrolyte replacements. Coumadin. Stopped Vancomycin. PCP: Arnie Xiong DO  Readmission Risk Score: 29%    Patient Goals/Plan/Treatment Preferences: Spoke with Jeremy Louie and his [facility], they live at home together. States he has a Nebulizer and a cane at home. Does not currently use the cane. Denies any HH currently.  Discussed possible need for [facility] and new Home O2, pt verbalized understanding and states he is unsure of where he would want home O2 from at this time. Explained would provide pt with list of agencies, pt appreciative. Verified pt's PCP and insurance. Transportation/Food Security/Housekeeping Addressed:  No issues identified.

## 2021-07-06 NOTE — FLOWSHEET NOTE
Pt was with his friend at the time of the visit. He was dealing with community acquired pneumonia. He was not giving up. Prayer was appreciated. 07/06/21 1719   Encounter Summary   Services provided to: Patient   Referral/Consult From: [address] [place] [address] Significant other   Continue Visiting Yes  (7/6 )   Complexity of Encounter Low   Length of Encounter 15 minutes   Routine   Type Initial   Assessment Approachable;Calm   Intervention Prayer;[place];Nurtured hope   Outcome Acceptance;Expressed gratitude;Encouraged; Hopeful

## 2021-07-07 LAB
ANION GAP SERPL CALCULATED.3IONS-SCNC: 12 MEQ/L (ref 8–16)
BASOPHILS # BLD: 0.2 %
BASOPHILS ABSOLUTE: 0 THOU/MM3 (ref 0–0.1)
BUN BLDV-MCNC: 56 MG/DL (ref 7–22)
CALCIUM SERPL-MCNC: 8.5 MG/DL (ref 8.5–10.5)
CHLORIDE BLD-SCNC: 98 MEQ/L (ref 98–111)
CO2: 23 MEQ/L (ref 23–33)
CREAT SERPL-MCNC: 1.8 MG/DL (ref 0.4–1.2)
EOSINOPHIL # BLD: 0 %
EOSINOPHILS ABSOLUTE: 0 THOU/MM3 (ref 0–0.4)
ERYTHROCYTE [DISTWIDTH] IN BLOOD BY AUTOMATED COUNT: 14.6 % (ref 11.5–14.5)
ERYTHROCYTE [DISTWIDTH] IN BLOOD BY AUTOMATED COUNT: 52.2 FL (ref 35–45)
GFR SERPL CREATININE-BSD FRML MDRD: 36 ML/MIN/1.73M2
GLUCOSE BLD-MCNC: 108 MG/DL (ref 70–108)
GLUCOSE BLD-MCNC: 119 MG/DL (ref 70–108)
GLUCOSE BLD-MCNC: 143 MG/DL (ref 70–108)
GLUCOSE BLD-MCNC: 188 MG/DL (ref 70–108)
GLUCOSE BLD-MCNC: 239 MG/DL (ref 70–108)
HCT VFR BLD CALC: 40.5 % (ref 42–52)
HEMOGLOBIN: 13.1 GM/DL (ref 14–18)
IMMATURE GRANS (ABS): 0.18 THOU/MM3 (ref 0–0.07)
IMMATURE GRANULOCYTES: 1.1 %
INR BLD: 2.44 (ref 0.85–1.13)
LYMPHOCYTES # BLD: 5.2 %
LYMPHOCYTES ABSOLUTE: 0.8 THOU/MM3 (ref 1–4.8)
MCH RBC QN AUTO: 31.3 PG (ref 26–33)
MCHC RBC AUTO-ENTMCNC: 32.3 GM/DL (ref 32.2–35.5)
MCV RBC AUTO: 96.7 FL (ref 80–94)
MONOCYTES # BLD: 7.2 %
MONOCYTES ABSOLUTE: 1.2 THOU/MM3 (ref 0.4–1.3)
NUCLEATED RED BLOOD CELLS: 0 /100 WBC
PLATELET # BLD: 184 THOU/MM3 (ref 130–400)
PMV BLD AUTO: 11.2 FL (ref 9.4–12.4)
POTASSIUM SERPL-SCNC: 5.3 MEQ/L (ref 3.5–5.2)
RBC # BLD: 4.19 MILL/MM3 (ref 4.7–6.1)
REASON FOR REJECTION: NORMAL
REJECTED TEST: NORMAL
SEG NEUTROPHILS: 86.3 %
SEGMENTED NEUTROPHILS ABSOLUTE COUNT: 14.1 THOU/MM3 (ref 1.8–7.7)
SODIUM BLD-SCNC: 133 MEQ/L (ref 135–145)
WBC # BLD: 16.3 THOU/MM3 (ref 4.8–10.8)

## 2021-07-07 PROCEDURE — 2580000003 HC RX 258: Performed by: STUDENT IN AN ORGANIZED HEALTH CARE EDUCATION/TRAINING PROGRAM

## 2021-07-07 PROCEDURE — 94660 CPAP INITIATION&MGMT: CPT

## 2021-07-07 PROCEDURE — 2700000000 HC OXYGEN THERAPY PER DAY

## 2021-07-07 PROCEDURE — 99232 SBSQ HOSP IP/OBS MODERATE 35: CPT | Performed by: FAMILY MEDICINE

## 2021-07-07 PROCEDURE — 80048 BASIC METABOLIC PNL TOTAL CA: CPT

## 2021-07-07 PROCEDURE — 85610 PROTHROMBIN TIME: CPT

## 2021-07-07 PROCEDURE — 2140000000 HC CCU INTERMEDIATE R&B

## 2021-07-07 PROCEDURE — 94761 N-INVAS EAR/PLS OXIMETRY MLT: CPT

## 2021-07-07 PROCEDURE — 6360000002 HC RX W HCPCS: Performed by: FAMILY MEDICINE

## 2021-07-07 PROCEDURE — 6370000000 HC RX 637 (ALT 250 FOR IP): Performed by: FAMILY MEDICINE

## 2021-07-07 PROCEDURE — 6370000000 HC RX 637 (ALT 250 FOR IP): Performed by: PHARMACIST

## 2021-07-07 PROCEDURE — 94669 MECHANICAL CHEST WALL OSCILL: CPT

## 2021-07-07 PROCEDURE — APPSS30 APP SPLIT SHARED TIME 16-30 MINUTES: Performed by: NURSE PRACTITIONER

## 2021-07-07 PROCEDURE — 2580000003 HC RX 258: Performed by: FAMILY MEDICINE

## 2021-07-07 PROCEDURE — 6370000000 HC RX 637 (ALT 250 FOR IP): Performed by: STUDENT IN AN ORGANIZED HEALTH CARE EDUCATION/TRAINING PROGRAM

## 2021-07-07 PROCEDURE — 82948 REAGENT STRIP/BLOOD GLUCOSE: CPT

## 2021-07-07 PROCEDURE — 6370000000 HC RX 637 (ALT 250 FOR IP): Performed by: NURSE PRACTITIONER

## 2021-07-07 PROCEDURE — 99232 SBSQ HOSP IP/OBS MODERATE 35: CPT | Performed by: INTERNAL MEDICINE

## 2021-07-07 PROCEDURE — 85025 COMPLETE CBC W/AUTO DIFF WBC: CPT

## 2021-07-07 PROCEDURE — 6360000002 HC RX W HCPCS: Performed by: STUDENT IN AN ORGANIZED HEALTH CARE EDUCATION/TRAINING PROGRAM

## 2021-07-07 PROCEDURE — 36415 COLL VENOUS BLD VENIPUNCTURE: CPT

## 2021-07-07 RX ORDER — HYDRALAZINE HYDROCHLORIDE 25 MG/1
25 TABLET, FILM COATED ORAL 4 TIMES DAILY PRN
Status: DISCONTINUED | OUTPATIENT
Start: 2021-07-07 | End: 2021-07-09 | Stop reason: HOSPADM

## 2021-07-07 RX ORDER — WARFARIN SODIUM 2 MG/1
2 TABLET ORAL ONCE
Status: COMPLETED | OUTPATIENT
Start: 2021-07-07 | End: 2021-07-07

## 2021-07-07 RX ORDER — INSULIN GLARGINE 100 [IU]/ML
12 INJECTION, SOLUTION SUBCUTANEOUS NIGHTLY
Status: DISCONTINUED | OUTPATIENT
Start: 2021-07-07 | End: 2021-07-09 | Stop reason: HOSPADM

## 2021-07-07 RX ADMIN — FLUTICASONE PROPIONATE 1 SPRAY: 50 SPRAY, METERED NASAL at 09:49

## 2021-07-07 RX ADMIN — CETIRIZINE HYDROCHLORIDE 10 MG: 10 TABLET, FILM COATED ORAL at 09:49

## 2021-07-07 RX ADMIN — WARFARIN SODIUM 2 MG: 2 TABLET ORAL at 17:53

## 2021-07-07 RX ADMIN — INSULIN GLARGINE 12 UNITS: 100 INJECTION, SOLUTION SUBCUTANEOUS at 20:32

## 2021-07-07 RX ADMIN — CEFEPIME HYDROCHLORIDE 2000 MG: 2 INJECTION, POWDER, FOR SOLUTION INTRAVENOUS at 06:06

## 2021-07-07 RX ADMIN — PREDNISONE 40 MG: 20 TABLET ORAL at 09:49

## 2021-07-07 RX ADMIN — CEFEPIME HYDROCHLORIDE 2000 MG: 2 INJECTION, POWDER, FOR SOLUTION INTRAVENOUS at 20:32

## 2021-07-07 RX ADMIN — METOPROLOL SUCCINATE 50 MG: 50 TABLET, FILM COATED, EXTENDED RELEASE ORAL at 09:56

## 2021-07-07 RX ADMIN — GUAIFENESIN 600 MG: 600 TABLET, EXTENDED RELEASE ORAL at 20:34

## 2021-07-07 RX ADMIN — IPRATROPIUM BROMIDE AND ALBUTEROL SULFATE 1 AMPULE: .5; 3 SOLUTION RESPIRATORY (INHALATION) at 11:10

## 2021-07-07 RX ADMIN — IPRATROPIUM BROMIDE AND ALBUTEROL SULFATE 1 AMPULE: .5; 3 SOLUTION RESPIRATORY (INHALATION) at 22:52

## 2021-07-07 RX ADMIN — AZITHROMYCIN DIHYDRATE 500 MG: 500 INJECTION, POWDER, LYOPHILIZED, FOR SOLUTION INTRAVENOUS at 00:25

## 2021-07-07 RX ADMIN — IPRATROPIUM BROMIDE AND ALBUTEROL SULFATE 1 AMPULE: .5; 3 SOLUTION RESPIRATORY (INHALATION) at 14:50

## 2021-07-07 RX ADMIN — ATORVASTATIN CALCIUM 80 MG: 80 TABLET, FILM COATED ORAL at 20:34

## 2021-07-07 RX ADMIN — MONTELUKAST SODIUM 10 MG: 10 TABLET ORAL at 09:49

## 2021-07-07 RX ADMIN — GUAIFENESIN 600 MG: 600 TABLET, EXTENDED RELEASE ORAL at 09:49

## 2021-07-07 RX ADMIN — IPRATROPIUM BROMIDE AND ALBUTEROL SULFATE 1 AMPULE: .5; 3 SOLUTION RESPIRATORY (INHALATION) at 07:36

## 2021-07-07 ASSESSMENT — PAIN SCALES - GENERAL
PAINLEVEL_OUTOF10: 0
PAINLEVEL_OUTOF10: 0

## 2021-07-07 NOTE — PLAN OF CARE
Problem: Impaired respiratory status  Goal: Clear lung sounds  Description: Clear lung sounds  Outcome: Ongoing  Note: Duoneb with metaneb to improve breath sounds.

## 2021-07-07 NOTE — CARE COORDINATION
7/7/21, 1:50 PM EDT    DISCHARGE ON GOING [address] [place] [address] day: 4  Location: -31/031-A Reason for admit: Pneumonia [J18.9]   Procedure:   7/3 CXR - Left basilar atelectasis/infiltrate. 7/5 CT chest - areas of abnormal density especially in the right lower lobe, lingula and to lesser extent right upper lobe. These are suggestive of inflammatory process possibly COVID 19. Collapse in the left lower lobe posteriorly. Small mediastinal lymph nodes. Calcified node in the subcarinal space. 7/5 Echo - EF 50%. Barriers to Discharge: Remains on high flow, settings down to 40L and 40% FiO2. Sats 96%. WBC 16.3. INR 2.44. Stopping Zithromax. Continues with Cefepime iv q12hr. Mucinex bid. DuoNeb q4hr [employer]. Coumadin. PCP: Bessy Sweeney DO  Readmission Risk Score: 29%  Patient Goals/Plan/Treatment Preferences: Plans home with S.O. Monitor for possible home O2 needs.

## 2021-07-07 NOTE — PROGRESS NOTES
PROGRESS NOTE      Patient:  Wesley Parikh      Unit/Bed:3B-31/031-A    YOB: 1940    MRN: [id number]       Acct: [de-identified]     PCP: Stevenson Albarado DO    Date of Admission: 7/3/2021      Assessment/Plan:    Acute hypoxic respiratory failure  -2/2 to CAP and COPD exacerbation: currently on HFNC. ABG showed acute respiratory alkalosis. CPAP as ordered. Repeat ABG still showing respiratory alkalosis, cont CPAP. Please see below for COPD exacerbation and CAP management. Wean O2 as tolerated. Pulmonology on-board, appreciate pulmonology input. Chest CTA reviewed, pulmonology plan for possible bronchoscopy for LLL atelectasis once oxygenation improved   -continue to wean as tolerated, pulmonology following  -day 3/5 for prednisone    Sepsis due to CAP, resolving: on arrival, WBC 31.6K, MD 99, lactic acid 5.2. Leukocytosis resolving. VS normal today. Lactic acidosis resolved. pt received IVF bolus 500 cc in ER, cautious in fluid resuscitation due to chronic HFrEF and CKD. Tachycardia resolved. Blood cultures no growth so far. -WBC improved to 16.3 today, will repeat cbc am    Pseudomonas Pneumonia, failed OP treatment: CXR on admission: Left basilar atelectasis/infiltrate, procalcitonin elevated at 0.32. treated with azithromycin 2 weeks ago in OP and Augmentin with prednisone started 6/29/21 to 7/3/21. Started on rocephin and azithromycin on admission, switched to broad spectrum antibiotics vancomycin and zosyn on 7/4/21. Azithromycin continued for atypical coverage. COVID-19 (-). MRSA PCR (+). Incentive spirometry. -pneumonia panel grew pseudomonas, changed zosyn to cefepime 7/5/21 ( re: on vancomycin + zosyn, possible toxicity to kidneys, given CKD IV, switch zosyn to cefepime), will cont vancomycin for now due to (+) MRSA PCR. COPD exacerbation due to CAP: give solumedrol 125 mg IV x 1 dose followed by prednisone 40 mg PO daily x 5 days.  Duoneb    -day 3/5 for steroids    Chronic HFrEF, EF 45-50% per echo 5/2019. Pro-BNP chronically elevated likely due to CKD but more elevated from baseline. Received bumex 1 mg IV x 1 dose on 7/4/21. Repeat pro-BNP today more elevated, give bumex 1 mg IV x 1 dose today. Pulmonology recommend to HOLD bumex PO.   -still holding bumex  -ECHO 7/5 showed normal left ventricle size, mild left ventricular hypertrophy, EF estimated at 50%. Normally functioning s/p TAVR. DM type 2: A1C 6.8%, POC glucose uncontrolled, anticipate hyperglycemia from steroids. SSI. Home meds metformin on-hold. accu-check  -started lantus 10 units nightly on 7/5    MILLY on CKD stage IV: Baseline creatinine 1.7 to 2.9.   -creat 1.8 today, improved with holding bumex and given gentle IV fluids yesterday  -repeat bmp am, consider restarting bumex if patient remains at baseline    Permanent atrial fibrillation, rate controlled. Cont Toprol XL. CHADsVASC score 5, cont coumadin, pharmacy on-board to dose coumadin    Supratherapeutic INR, resolved    Hx of TAVR, noted    Mild hyponatremia, resolved: BMP in am       Chief Complaint: worsening cough    Hospital Course:     Per H/P note:     \"79 y.o. male who presented to [address] [place] [address] with a worsening of his cough; he has a past medical history of heart failure, diabetes, TAVR, CKD and atrial fibrillation; this patient has been on Augmentin and prednisone recently along with the possibility of Zithromax; wife states he has been in the emergency department and also to his PCP twice here in the last couple weeks; he continues with a cough and continues with a cough with green phlegm and shortness of breath; he is fully alert and oriented, he denies any pain; he denies any fever chills.      In the emergency department, chest x-ray reveals a left lower lobe pneumonia, lactic acid at 5.2, BNP at 3319; he has a chronic troponin elevation along with a CKD; he currently denies any lightheadedness or dizziness, chest pain, states \"I get short of breath when I start coughing\"; he denies any nausea, vomiting or diarrhea; he was started on Zithromax and Rocephin in the emergency department and is being admitted to hospital service for further care and evaluation. \"     7/5: CT chest showed density in right lower lobe, lingula, and lesser extent right upper lobe suggesting inflammatory process possibly covid 19. Covid rapid and pcr, however were negative. CT also showed collapse in LLL posteriorly. Pneumonia panel positive for pseudomonas, antibiotics changed accordingly. 7/6: patient continues to need heated high flow nasal cannula. Currently 96% with FiO2 of 55 on 60L/min O2.    7/7: patient continues to improve with weaning of high flow nasal cannula. Currently 95% with FiO2 of 40 on 30L/min O2. Subjective:    Patient feel like breathing is getting a little bit better. He is not having any chest pain. He is eating and drinking normally without nausea, vomiting, diarrhea or constipation. He has no acute complaints/concerns at this time.      Medications:  Reviewed    Infusion Medications    sodium chloride      dextrose       Scheduled Medications    insulin glargine  10 Units Subcutaneous Nightly    cefepime  2,000 mg Intravenous Q12H    ipratropium-albuterol  1 ampule Inhalation Q4H WA    predniSONE  40 mg Oral Daily    insulin lispro  0-18 Units Subcutaneous TID WC    insulin lispro  0-9 Units Subcutaneous Nightly    atorvastatin  80 mg Oral Nightly    [Held by provider] bumetanide  1 mg Oral Daily    cetirizine  10 mg Oral Daily    fluticasone  1 spray Each Nostril Daily    guaiFENesin  600 mg Oral BID    [Held by provider] losartan  25 mg Oral Nightly    metoprolol succinate  50 mg Oral Daily    montelukast  10 mg Oral Daily    warfarin (COUMADIN) daily dosing (placeholder)   Other RX Placeholder     PRN Meds: hydrALAZINE, potassium chloride **OR** potassium alternative oral replacement **OR** potassium chloride, potassium chloride, sodium chloride flush, sodium chloride, ondansetron **OR** ondansetron, polyethylene glycol, acetaminophen **OR** acetaminophen, glucose, dextrose, glucagon (rDNA), dextrose      Intake/Output Summary (Last 24 hours) at 7/7/2021 1817  Last data filed at 7/7/2021 1448  Gross per 24 hour   Intake 679.94 ml   Output 1700 ml   Net -1020.06 ml       Diet:  ADULT DIET; Regular; 3 carb choices (45 gm/meal)    Exam:  /65   Pulse 77   Temp 98.2 °F (36.8 °C) (Oral)   Resp 17   Ht 5' 7\" (1.702 m)   Wt 194 lb 3.2 oz (88.1 kg)   SpO2 95%   BMI 30.42 kg/m²     General appearance: No apparent distress, appears stated age and cooperative. HEENT: Normal cephalic, atraumatic without obvious deformity. Pupils equal, round, and reactive to light. Extra ocular muscles intact. Conjunctivae/corneas clear. Neck: Supple, with full range of motion. No jugular venous distention. Trachea midline. Respiratory:  on CPAP. Diminished breath sounds present bilaterally. Better air movement throughout, however expiratory wheezing still present bilaterally. ,   Cardiovascular: normal rate, irregularly irregular rhythm with normal S1/S2 without murmurs, rubs or gallops. Abdomen: Soft, non-tender, non-distended with normal bowel sounds. Musculoskeletal:  No clubbing, cyanosis or edema bilaterally. Skin: Skin color, texture, turgor normal.  No rashes or lesions.   Neurologic:  Neurovascularly intact   Psychiatric: thought content appropriate, normal insight  Exam of extremities: peripheral pulses normal, no pedal or leg edema, no clubbing or cyanosis      Labs:   Recent Labs     07/05/21 0545 07/06/21  0431 07/07/21 0411   WBC 18.9* 26.9* 16.3*   HGB 14.1 13.6* 13.1*   HCT 42.8 41.6* 40.5*    182 184     Recent Labs     07/05/21  0545 07/06/21  0431 07/07/21  0411    131* 133*   K 3.8 3.5 5.3*   CL 97* 95* 98   CO2 23 23 23   BUN 42* 58* 56*   CREATININE 1.8* 2.6* 1.8*   CALCIUM 9.1 8.6 8.5     No results for input(s): AST, ALT, BILIDIR, BILITOT, ALKPHOS in the last 72 hours. Recent Labs     07/05/21  0545 07/06/21  0431 07/07/21  0411   INR 2.46* 2.22* 2.44*     No results for input(s): CKTOTAL, TROPONINI in the last 72 hours. Urinalysis:      Lab Results   Component Value Date    NITRU NEGATIVE 07/03/2021    WBCUA NONE SEEN 06/10/2021    BACTERIA NONE SEEN 06/10/2021    RBCUA NONE SEEN 06/10/2021    BLOODU NEGATIVE 07/03/2021    SPECGRAV 1.006 06/10/2021    GLUCOSEU NEGATIVE 07/03/2021       Radiology:  CT CHEST WO CONTRAST   Final Result       1. Interval deterioration since previous CT scan of the chest dated 22nd of April 2021 with areas of abnormal density especially in the right lower lobe, lingula and to lesser extent right upper lobe. These are suggestive of inflammatory process possibly    Covid 19 infection. 2. Collapse in the left lower lobe posteriorly, new since previous CT scan of the chest dated 22nd of April 2021. 3. Small mediastinal lymph nodes. Calcified node in the subcarinal space. 4. Status post pacemaker placement. 5. TAVR in place. Atherosclerotic calcification in the thoracic aorta. 6. Thoracic spondylosis. **This report has been created using voice recognition software. It may contain minor errors which are inherent in voice recognition technology. **      Final report electronically signed by DR Caitlin Ortiz on 7/5/2021 10:52 AM      XR CHEST PORTABLE   Final Result   1. Left basilar atelectasis/infiltrate. 2. Mild stable cardiomegaly. **This report has been created using voice recognition software. It may contain minor errors which are inherent in voice recognition technology. **      Final report electronically signed by Dr. Modesta Lopez on 7/3/2021 1:19 PM          Diet: ADULT DIET;  Regular; 3 carb choices (45 gm/meal)    DVT prophylaxis: [] Lovenox                                 [] SCDs                                 [] SQ Heparin                                 [] Encourage ambulation           [x] Already on Anticoagulation (warfarin)     Disposition:    [] Home       [] TCU       [] Rehab       [] Psych       [] SNF       [] [place]       [x] Other-continue to assess needs.      Code Status: Full Code    PT/OT Eval Status: to see      Electronically signed by Hernan Lin MD on 7/7/2021 at 6:17 PM

## 2021-07-07 NOTE — PROGRESS NOTES
Clinical Pharmacy Note    Warfarin consult follow-up    Recent Labs     07/07/21  0411   INR 2.44*     Recent Labs     07/05/21  0545 07/06/21  0431 07/07/21  0411   HGB 14.1 13.6* 13.1*   HCT 42.8 41.6* 40.5*    182 184       Significant Drug-Drug Interactions:  New warfarin drug-drug interactions: none  Discontinued drug-drug interactions: azithromycin  Current warfarin drug-drug interactions: prednisone     Date INR Warfarin Dose   7/3/21 3.84 No warfarin    7/4/21   3.75   No warfarin    7/5/21  2.45  2.5 mg     7/6/21 2.22  2.5 mg     7/7/21 2.44  2 mg                      Notes:                   Daily PT/INR until stable within therapeutic range.

## 2021-07-07 NOTE — PROGRESS NOTES
Laterality Date    CARDIAC SURGERY      heart cath    CORONARY ANGIOPLASTY WITH STENT PLACEMENT      HERNIA REPAIR      OTHER SURGICAL HISTORY  05/10/2018    PACEMAKER INSERTION       Diet    ADULT DIET; Regular; 3 carb choices (45 gm/meal)  Allergies    Aspirin, Benzonatate, and Xanax [alprazolam]  Social History     Social History     Socioeconomic History    Marital status:      Spouse name: Not on file    Number of children: Not on file    Years of education: Not on file    Highest education level: Not on file   Occupational History    Not on file   Tobacco Use    Smoking status: Former Smoker     Packs/day: 1.00     Years: 50.00     Pack years: 50.00     Types: Cigarettes    Smokeless tobacco: Never Used   Vaping Use    Vaping Use: Never used   Substance and Sexual Activity    Alcohol use: Yes     Comment: rarely    Drug use: No    Sexual activity: Yes     Partners: Female   Other Topics Concern    Not on file   Social History Narrative    Not on file     Social Determinants of Health     Financial Resource Strain: Low Risk     Difficulty of Paying Living Expenses: Not hard at all   Food Insecurity: No Food Insecurity    Worried About [address] Risk in the Last Year: Never true    [facility] in the Last Year: Never true   Transportation Needs:     Lack of Transportation (Medical):      Lack of Transportation (Non-Medical):    Physical Activity:     Days of Exercise per Week:     Minutes of Exercise per Session:    Stress:     Feeling of Stress :    Social Connections:     Frequency of Communication with Friends and Family:     Frequency of Social Gatherings with Friends and Family:     Attends Christian Services:     Active Member of Clubs or Organizations:     Attends Club or Organization Meetings:     Marital Status:    Intimate Partner Violence:     Fear of Current or Ex-Partner:     Emotionally Abused:     Physically Abused:     Sexually Abused: Family History          Family history unknown: Yes     Sleep History    No historty but positive risk factors   Meds    Current Medications    hydrALAZINE  25 mg Oral 2 times per day    insulin glargine  10 Units Subcutaneous Nightly    cefepime  2,000 mg Intravenous Q12H    ipratropium-albuterol  1 ampule Inhalation Q4H WA    predniSONE  40 mg Oral Daily    azithromycin  500 mg Intravenous Q24H    insulin lispro  0-18 Units Subcutaneous TID WC    insulin lispro  0-9 Units Subcutaneous Nightly    atorvastatin  80 mg Oral Nightly    [Held by provider] bumetanide  1 mg Oral Daily    cetirizine  10 mg Oral Daily    fluticasone  1 spray Each Nostril Daily    guaiFENesin  600 mg Oral BID    [Held by provider] losartan  25 mg Oral Nightly    metoprolol succinate  50 mg Oral Daily    montelukast  10 mg Oral Daily    warfarin (COUMADIN) daily dosing (placeholder)   Other RX Placeholder     potassium chloride **OR** potassium alternative oral replacement **OR** potassium chloride, potassium chloride, sodium chloride flush, sodium chloride, ondansetron **OR** ondansetron, polyethylene glycol, acetaminophen **OR** acetaminophen, glucose, dextrose, glucagon (rDNA), dextrose  IV Drips/Infusions   sodium chloride      dextrose       Vitals    Vitals    height is 5' 7\" (1.702 m) and weight is 194 lb 3.2 oz (88.1 kg). His axillary temperature is 98 °F (36.7 °C). His blood pressure is 136/91 (abnormal) and his pulse is 60. His respiration is 18 and oxygen saturation is 93%.      O2 Flow Rate (L/min): 60 L/min  I/O    Intake/Output Summary (Last 24 hours) at 7/7/2021 0827  Last data filed at 7/7/2021 0703  Gross per 24 hour   Intake 1863.27 ml   Output 1850 ml   Net 13.27 ml     Patient Vitals for the past 96 hrs (Last 3 readings):   Weight   07/06/21 0315 194 lb 3.2 oz (88.1 kg)   07/05/21 0316 193 lb 14.4 oz (88 kg)   07/04/21 0600 201 lb (91.2 kg)     Exam   Physical Exam   Constitutional: No distress on O2 per HFNC. Patient appears well built - BMI 30  Head: Normocephalic and atraumatic. Mouth/Throat: Oropharynx is clear and moist.  No oral thrush. Eyes: Conjunctivae are normal. Pupils are equal, round. No scleral icterus. Neck: Neck supple. No tracheal deviation present. Cardiovascular: S1 and S2 with no murmur. No peripheral edema  Pulmonary/Chest: Normal effort with bilateral air entry, diminished breath sounds with rhonchi. No stridor. No respiratory distress. Patient exhibits no tenderness. Abdominal: Soft. Bowel sounds audible. No distension or tenderness to palp. Musculoskeletal: Moves all extremities  Neurological: Patient is alert and able to follow simple commands. Skin: Warm and dry. Labs   ABG  Lab Results   Component Value Date    PH 7.50 07/05/2021    PO2 104 07/05/2021    PCO2 33 07/05/2021    HCO3 25 07/05/2021    O2SAT 99 07/05/2021     Lab Results   Component Value Date    IFIO2 80 07/05/2021    MODE NIV 07/05/2021    SETTIDVOL 400 03/23/2018    SETPEEP 10.0 07/05/2021     CBC  Recent Labs     07/05/21  0545 07/06/21  0431 07/07/21  0411   WBC 18.9* 26.9* 16.3*   RBC 4.53* 4.40* 4.19*   HGB 14.1 13.6* 13.1*   HCT 42.8 41.6* 40.5*   MCV 94.5* 94.5* 96.7*   MCH 31.1 30.9 31.3   MCHC 32.9 32.7 32.3    182 184   MPV 11.0 11.1 11.2      BMP  Recent Labs     07/05/21  0545 07/06/21  0431    131*   K 3.8 3.5   CL 97* 95*   CO2 23 23   BUN 42* 58*   CREATININE 1.8* 2.6*   GLUCOSE 115* 197*   MG  --  2.6*   CALCIUM 9.1 8.6     LFT  No results for input(s): AST, ALT, ALB, BILITOT, ALKPHOS, LIPASE in the last 72 hours. Invalid input(s):   AMYLASE  TROP  Lab Results   Component Value Date    TROPONINT 0.034 07/03/2021    TROPONINT 0.024 06/23/2021    TROPONINT 0.020 04/22/2021     BNP  Lab Results   Component Value Date    PROBNP 4816.0 07/05/2021    PROBNP 3319.0 07/03/2021    PROBNP 1930.0 06/23/2021     D-Dimer  Lab Results   Component Value Date    DDIMER 1248.00 04/23/2018 Negative  Respiratory Culture: Quality of sputum specimen: Specimen acceptable. Few segmented neutrophils observed. Few epithelial cells observed. Many gram positive cocci occurring singly and in pairs. Moderate budding yeast and pseudohyphae observed. Rare gram negative bacilli. Rapid COVID-19: Not Detected (7/5 & 7/3)  Molecular Pneumonia Panel: non-SARs coronavirus & Pseudomonas aeruginosa by PCR  Blood Cultures: Negative preliminary    Radiology    CXR    XR CHEST PORTABLE   7/3/2021   FINDINGS: A left-sided cardiac device is in stable position. There is mild stable enlargement of the cardiac silhouette. Atherosclerotic calcifications are present in the thoracic aorta. Reticular opacities at the left lung base are slightly more extensive than the prior study. Degenerative changes in the thoracic  spine are poorly visualized. Impression:  1. Left basilar atelectasis/infiltrate. 2. Mild stable cardiomegaly. CT Scans  CT CHEST WO CONTRAST   7/5/2021   FINDINGS:   The heart size is normal in this patient status post pacemaker placement. . There is a TAVR in place. There is atherosclerotic calcification in the thoracic aorta. . There is no gross abnormality of the pulmonary artery. There are small mediastinal lymph nodes especially in the aortopulmonary window. This a calcified lymph node in the subcarinal space. , axillary or hilar adenopathy. There is no pericardial or pleural effusion. There is slight thickening of the interstitial lung markings with superimposed abnormal densities especially in the right lower lobe, lingula and to a lesser extent right upper lobe. These are  clearly worse than on remote CT scan dated 22nd of April 2021 suggestive of inflammatory process,  possibly Covid 19 infection. There is collapse in the left lower lobe posteriorly. There is mild thoracic spondylosis. .   Impression:  1.  Interval deterioration since previous CT scan of the chest dated 22nd of April 2021 with areas of abnormal density especially in the right lower lobe, lingula and to lesser extent right upper lobe. These are suggestive of inflammatory process possibly  Covid 19 infection. 2. Collapse in the left lower lobe posteriorly, new since previous CT scan of the chest dated 22nd of April 2021. 3. Small mediastinal lymph nodes. Calcified node in the subcarinal space. 4. Status post pacemaker placement. 5. TAVR in place. Atherosclerotic calcification in the thoracic aorta. 6. Thoracic spondylosis. Assessment   -Acute hypoxic respiratory failure  -Multi lobar pneumonia- failed outpatient therapy   -COPD exacerbation   -LLL basal segment atelectasis suspect mucus plug   -CAD  -Diastolic CHF  -Afib on outpatient coumadin  -DM2  Recommendations   -Continue HFNC wean supplemental O2 as able to maintain SpO2 >90%  -Continue on ATB's azithro/cefepime per primary  -Duonebs Q4 hrs WA   -Continue metaneb to improve pulmonary clearance  -IS and Acapella (flutter valve) for pulmonary toileting   -Prednisone 40 gm PO Daily x 5 days (today is day 3)  -DVT prophylaxis: on Warfarin 2/2 A-fib    Case discussed with nurse and patient/family. Questions and concerns addressed. Meds and Orders reviewed. Electronically signed by     WILBERTO Matson - CNP on 7/7/2021 at 8:27 AM    Continue weaning HFNC as tolerated  Pulmonary interventions in place. Patient seen and examined independently by me. Above discussed and I agree with  CNP note Also see my additional comments. Labs, cultures, and radiographs when available were reviewed. Changes were made in the orders as necessary. I discussed patient concerns with [facility] Nikole STOREY R.N. and instructions were given. Respiratory care issues addressed. Please see our orders for the updated patient care plan.     Electronically signed by     Lon Moody MD on 7/7/2021 at 1:00 PM

## 2021-07-08 LAB
ANION GAP SERPL CALCULATED.3IONS-SCNC: 13 MEQ/L (ref 8–16)
BASOPHILS # BLD: 0.1 %
BASOPHILS ABSOLUTE: 0 THOU/MM3 (ref 0–0.1)
BUN BLDV-MCNC: 49 MG/DL (ref 7–22)
CALCIUM SERPL-MCNC: 8.8 MG/DL (ref 8.5–10.5)
CHLORIDE BLD-SCNC: 102 MEQ/L (ref 98–111)
CO2: 19 MEQ/L (ref 23–33)
CREAT SERPL-MCNC: 1.7 MG/DL (ref 0.4–1.2)
EKG Q-T INTERVAL: 370 MS
EKG QRS DURATION: 140 MS
EKG QTC CALCULATION (BAZETT): 491 MS
EKG R AXIS: 38 DEGREES
EKG T AXIS: -155 DEGREES
EKG VENTRICULAR RATE: 106 BPM
EOSINOPHIL # BLD: 0.4 %
EOSINOPHILS ABSOLUTE: 0.1 THOU/MM3 (ref 0–0.4)
ERYTHROCYTE [DISTWIDTH] IN BLOOD BY AUTOMATED COUNT: 14.6 % (ref 11.5–14.5)
ERYTHROCYTE [DISTWIDTH] IN BLOOD BY AUTOMATED COUNT: 51 FL (ref 35–45)
GFR SERPL CREATININE-BSD FRML MDRD: 39 ML/MIN/1.73M2
GLUCOSE BLD-MCNC: 116 MG/DL (ref 70–108)
GLUCOSE BLD-MCNC: 179 MG/DL (ref 70–108)
GLUCOSE BLD-MCNC: 264 MG/DL (ref 70–108)
GLUCOSE BLD-MCNC: 287 MG/DL (ref 70–108)
GLUCOSE BLD-MCNC: 83 MG/DL (ref 70–108)
GRAM STAIN RESULT: NORMAL
HCT VFR BLD CALC: 41.1 % (ref 42–52)
HEMOGLOBIN: 13.3 GM/DL (ref 14–18)
IMMATURE GRANS (ABS): 0.16 THOU/MM3 (ref 0–0.07)
IMMATURE GRANULOCYTES: 1 %
INR BLD: 2.33 (ref 0.85–1.13)
LYMPHOCYTES # BLD: 5.9 %
LYMPHOCYTES ABSOLUTE: 1 THOU/MM3 (ref 1–4.8)
MCH RBC QN AUTO: 31 PG (ref 26–33)
MCHC RBC AUTO-ENTMCNC: 32.4 GM/DL (ref 32.2–35.5)
MCV RBC AUTO: 95.8 FL (ref 80–94)
MONOCYTES # BLD: 8.5 %
MONOCYTES ABSOLUTE: 1.4 THOU/MM3 (ref 0.4–1.3)
NUCLEATED RED BLOOD CELLS: 0 /100 WBC
PLATELET # BLD: 156 THOU/MM3 (ref 130–400)
PMV BLD AUTO: 11.3 FL (ref 9.4–12.4)
POTASSIUM SERPL-SCNC: 4.3 MEQ/L (ref 3.5–5.2)
RBC # BLD: 4.29 MILL/MM3 (ref 4.7–6.1)
RESPIRATORY CULTURE: NORMAL
SEG NEUTROPHILS: 84.1 %
SEGMENTED NEUTROPHILS ABSOLUTE COUNT: 14 THOU/MM3 (ref 1.8–7.7)
SODIUM BLD-SCNC: 134 MEQ/L (ref 135–145)
WBC # BLD: 16.7 THOU/MM3 (ref 4.8–10.8)

## 2021-07-08 PROCEDURE — 36415 COLL VENOUS BLD VENIPUNCTURE: CPT

## 2021-07-08 PROCEDURE — 80048 BASIC METABOLIC PNL TOTAL CA: CPT

## 2021-07-08 PROCEDURE — 99232 SBSQ HOSP IP/OBS MODERATE 35: CPT | Performed by: INTERNAL MEDICINE

## 2021-07-08 PROCEDURE — 94761 N-INVAS EAR/PLS OXIMETRY MLT: CPT

## 2021-07-08 PROCEDURE — 99232 SBSQ HOSP IP/OBS MODERATE 35: CPT | Performed by: FAMILY MEDICINE

## 2021-07-08 PROCEDURE — 2140000000 HC CCU INTERMEDIATE R&B

## 2021-07-08 PROCEDURE — 93010 ELECTROCARDIOGRAM REPORT: CPT | Performed by: INTERNAL MEDICINE

## 2021-07-08 PROCEDURE — 6370000000 HC RX 637 (ALT 250 FOR IP): Performed by: FAMILY MEDICINE

## 2021-07-08 PROCEDURE — 6360000002 HC RX W HCPCS: Performed by: STUDENT IN AN ORGANIZED HEALTH CARE EDUCATION/TRAINING PROGRAM

## 2021-07-08 PROCEDURE — 2580000003 HC RX 258: Performed by: NURSE PRACTITIONER

## 2021-07-08 PROCEDURE — APPSS30 APP SPLIT SHARED TIME 16-30 MINUTES: Performed by: NURSE PRACTITIONER

## 2021-07-08 PROCEDURE — 94669 MECHANICAL CHEST WALL OSCILL: CPT

## 2021-07-08 PROCEDURE — 94640 AIRWAY INHALATION TREATMENT: CPT

## 2021-07-08 PROCEDURE — 82948 REAGENT STRIP/BLOOD GLUCOSE: CPT

## 2021-07-08 PROCEDURE — 6370000000 HC RX 637 (ALT 250 FOR IP): Performed by: STUDENT IN AN ORGANIZED HEALTH CARE EDUCATION/TRAINING PROGRAM

## 2021-07-08 PROCEDURE — 2580000003 HC RX 258: Performed by: STUDENT IN AN ORGANIZED HEALTH CARE EDUCATION/TRAINING PROGRAM

## 2021-07-08 PROCEDURE — 6370000000 HC RX 637 (ALT 250 FOR IP): Performed by: NURSE PRACTITIONER

## 2021-07-08 PROCEDURE — 93005 ELECTROCARDIOGRAM TRACING: CPT | Performed by: FAMILY MEDICINE

## 2021-07-08 PROCEDURE — 85610 PROTHROMBIN TIME: CPT

## 2021-07-08 PROCEDURE — 85025 COMPLETE CBC W/AUTO DIFF WBC: CPT

## 2021-07-08 RX ORDER — WARFARIN SODIUM 2.5 MG/1
2.5 TABLET ORAL
Status: COMPLETED | OUTPATIENT
Start: 2021-07-08 | End: 2021-07-08

## 2021-07-08 RX ADMIN — METOPROLOL SUCCINATE 50 MG: 50 TABLET, FILM COATED, EXTENDED RELEASE ORAL at 10:24

## 2021-07-08 RX ADMIN — CEFEPIME HYDROCHLORIDE 2000 MG: 2 INJECTION, POWDER, FOR SOLUTION INTRAVENOUS at 20:20

## 2021-07-08 RX ADMIN — SODIUM CHLORIDE, PRESERVATIVE FREE 10 ML: 5 INJECTION INTRAVENOUS at 20:21

## 2021-07-08 RX ADMIN — FLUTICASONE PROPIONATE 1 SPRAY: 50 SPRAY, METERED NASAL at 10:32

## 2021-07-08 RX ADMIN — WARFARIN SODIUM 2.5 MG: 2.5 TABLET ORAL at 17:24

## 2021-07-08 RX ADMIN — MONTELUKAST SODIUM 10 MG: 10 TABLET ORAL at 10:30

## 2021-07-08 RX ADMIN — CETIRIZINE HYDROCHLORIDE 10 MG: 10 TABLET, FILM COATED ORAL at 10:31

## 2021-07-08 RX ADMIN — GUAIFENESIN 600 MG: 600 TABLET, EXTENDED RELEASE ORAL at 10:31

## 2021-07-08 RX ADMIN — INSULIN GLARGINE 12 UNITS: 100 INJECTION, SOLUTION SUBCUTANEOUS at 20:20

## 2021-07-08 RX ADMIN — CEFEPIME HYDROCHLORIDE 2000 MG: 2 INJECTION, POWDER, FOR SOLUTION INTRAVENOUS at 10:30

## 2021-07-08 RX ADMIN — GUAIFENESIN 600 MG: 600 TABLET, EXTENDED RELEASE ORAL at 20:30

## 2021-07-08 RX ADMIN — ATORVASTATIN CALCIUM 80 MG: 80 TABLET, FILM COATED ORAL at 20:20

## 2021-07-08 RX ADMIN — IPRATROPIUM BROMIDE AND ALBUTEROL SULFATE 1 AMPULE: .5; 3 SOLUTION RESPIRATORY (INHALATION) at 15:04

## 2021-07-08 RX ADMIN — IPRATROPIUM BROMIDE AND ALBUTEROL SULFATE 1 AMPULE: .5; 3 SOLUTION RESPIRATORY (INHALATION) at 09:30

## 2021-07-08 RX ADMIN — IPRATROPIUM BROMIDE AND ALBUTEROL SULFATE 1 AMPULE: .5; 3 SOLUTION RESPIRATORY (INHALATION) at 20:36

## 2021-07-08 RX ADMIN — PREDNISONE 40 MG: 20 TABLET ORAL at 10:30

## 2021-07-08 ASSESSMENT — PAIN SCALES - GENERAL
PAINLEVEL_OUTOF10: 0

## 2021-07-08 NOTE — PLAN OF CARE
Problem: Impaired respiratory status  Goal: Clear lung sounds  Description: Clear lung sounds  Outcome: Ongoing  Goal: Clear lung sounds  Description: Clear lung sounds  Outcome: Ongoing

## 2021-07-08 NOTE — PROGRESS NOTES
[facility]    Patient - Vandana Sparks   MRN -  [id number]   [facility] - [de-identified]   - 1940      Date of Admission -  7/3/2021 12:11 PM  Date of evaluation -  2021  Room - 3B--ALVARADO Mar MD Primary Care Physician - Lorena Carvalho DO   Chief Complaint   Acute hypoxic respiratory failure with multilobar pneumonia  Active Hospital Problem List      Active Hospital Problems    Diagnosis Date Noted    Pneumonia due to Pseudomonas species ([address] [J15.1]     Community acquired pneumonia of left lower lobe of lung [J18.9]     Chronic HFrEF (heart failure with reduced ejection fraction) ([address] [I50.22]     Pneumonia due to infectious organism [J18.9] 2021    COPD exacerbation ([address] [J44.1] 2020    Permanent atrial fibrillation (HCC) [I48.21] 2020    History of transcatheter aortic valve replacement (TAVR) [Z95.2] 06/15/2018    Stage 4 chronic kidney disease ([address] [N18.4] 2018    Sepsis without acute organ dysfunction ([address] [A41.9]     Hyponatremia [E87.1]     Acute respiratory failure with hypoxia ([address] [J96.01]      DURAN Sparks is a 79 y.o. male with past medical history of CAD, Afib, DM2, and COPD not on home Oxygen presented to ED due to SOB and productive cough. He was treated as outpatient for sinusitis and bronchitis/COPD exacerbation with oral Azithromycin which was later changed to Augmentin, and prednisone, he didn't report improvement. CXR showed bibasilar infiltrates. He is requiring increasing amount of O2 and is currently on 50% high flow. He smoked until , he was diagnosed with COPD by DT in 2018 and is using albuterol nebulizer. Used to work as a      Subjective    -Sitting up in chair on HFNC 20 L and FiO2 40%. Has been working on IS and Acapella.  Primary RN, Sav Davila was changing patient to 6 L NC  -Afebrile, still has congested cough with Violence:     Fear of Current or Ex-Partner:     Emotionally Abused:     Physically Abused:     Sexually Abused:      Family History          Family history unknown: Yes     Sleep History    No historty but positive risk factors   Meds    Current Medications    insulin glargine  12 Units Subcutaneous Nightly    cefepime  2,000 mg Intravenous Q12H    ipratropium-albuterol  1 ampule Inhalation Q4H WA    predniSONE  40 mg Oral Daily    insulin lispro  0-18 Units Subcutaneous TID WC    insulin lispro  0-9 Units Subcutaneous Nightly    atorvastatin  80 mg Oral Nightly    [Held by provider] bumetanide  1 mg Oral Daily    cetirizine  10 mg Oral Daily    fluticasone  1 spray Each Nostril Daily    guaiFENesin  600 mg Oral BID    [Held by provider] losartan  25 mg Oral Nightly    metoprolol succinate  50 mg Oral Daily    montelukast  10 mg Oral Daily    warfarin (COUMADIN) daily dosing (placeholder)   Other RX Placeholder     hydrALAZINE, potassium chloride **OR** potassium alternative oral replacement **OR** potassium chloride, potassium chloride, sodium chloride flush, sodium chloride, ondansetron **OR** ondansetron, polyethylene glycol, acetaminophen **OR** acetaminophen, glucose, dextrose, glucagon (rDNA), dextrose  IV Drips/Infusions   sodium chloride      dextrose       Vitals    Vitals    height is 5' 7\" (1.702 m) and weight is 205 lb 1.6 oz (93 kg). His oral temperature is 98.3 °F (36.8 °C). His blood pressure is 173/80 (abnormal) and his pulse is 81. His respiration is 16 and oxygen saturation is 90%.      O2 Flow Rate (L/min): 20 L/min  I/O    Intake/Output Summary (Last 24 hours) at 7/8/2021 0925  Last data filed at 7/8/2021 0424  Gross per 24 hour   Intake 366.77 ml   Output 650 ml   Net -283.23 ml     Patient Vitals for the past 96 hrs (Last 3 readings):   Weight   07/08/21 0424 205 lb 1.6 oz (93 kg)   07/06/21 0315 194 lb 3.2 oz (88.1 kg)   07/05/21 0316 193 lb 14.4 oz (88 kg)     Exam 07/03/2021    PROBNP 1930.0 06/23/2021     D-Dimer  Lab Results   Component Value Date    DDIMER 1248.00 04/23/2018     Lactic Acid  Recent Labs     07/05/21  1331   LACTA 1.2     INR  Recent Labs     07/06/21  0431 07/07/21  0411 07/08/21  0531   INR 2.22* 2.44* 2.33*     PTT  No results for input(s): APTT in the last 72 hours. Glucose  Recent Labs     07/07/21  1701 07/07/21  2031 07/08/21  0751   POCGLU 239* 188* 83     UA   No results for input(s): SPECGRAV, PHUR, COLORU, CLARITYU, MUCUS, PROTEINU, BLOODU, RBCUA, WBCUA, BACTERIA, NITRU, GLUCOSEU, BILIRUBINUR, UROBILINOGEN, KETUA, LABCAST, LABCASTTY, AMORPHOS in the last 72 hours. Invalid input(s): CRYSTALS  PFTs 2018     Echo     7/5/2021  Conclusions      Summary   Technically difficult examination. Left ventricle size is normal.   Mild concentric left ventricular hypertrophy. There were no regional wall motion abnormalities. Ejection fraction is visually estimated at 50%. S/p TAVR, normally functioning. Signature      ----------------------------------------------------------------   Electronically signed by Tarun Matias MD (Interpreting   physician) on 07/05/2021 at 04:04 PM   ----------------------------------------------------------------    ECHO 5/6/2019    Ejection fraction was estimated at 45-50%. Normal left ventricular size and mildly reduced systolic function. There was mild global hypokinesis. Wall thickness was within normal limits. Device lead/catheter seen in the right heart. S/p TAVR. Transaortic velocity was 1.2-1.6 m/s, mean gradient 4-6 mmHg, EOA 1.5-1.8   cm2. There was no evidence of aortic regurgitation. There was trace tricuspid regurgitation. Assuming RAP = 5 mmHg, the estimated RVSP = 34 mmHg. Ascending aorta - 3.1 cm. IVC size is within normal limits with normal respiratory phasic changes.   Cultures    Procalcitonin  Lab Results   Component Value Date    PROCAL 0.32 07/03/2021    PROCAL 12.27 03/23/2018   Strep Pneumonia Antigen: Negative  Legionella Antigen: Negative  Respiratory Culture: Normal keena  Rapid COVID-19: Not Detected (7/5 & 7/3)  Molecular Pneumonia Panel: non-SARs coronavirus & Pseudomonas aeruginosa by PCR  Blood Cultures: Negative preliminary    Radiology    CXR    XR CHEST PORTABLE   7/3/2021   FINDINGS: A left-sided cardiac device is in stable position. There is mild stable enlargement of the cardiac silhouette. Atherosclerotic calcifications are present in the thoracic aorta. Reticular opacities at the left lung base are slightly more extensive than the prior study. Degenerative changes in the thoracic  spine are poorly visualized. Impression:  1. Left basilar atelectasis/infiltrate. 2. Mild stable cardiomegaly. CT Scans  CT CHEST WO CONTRAST   7/5/2021   FINDINGS:   The heart size is normal in this patient status post pacemaker placement. . There is a TAVR in place. There is atherosclerotic calcification in the thoracic aorta. . There is no gross abnormality of the pulmonary artery. There are small mediastinal lymph nodes especially in the aortopulmonary window. This a calcified lymph node in the subcarinal space. , axillary or hilar adenopathy. There is no pericardial or pleural effusion. There is slight thickening of the interstitial lung markings with superimposed abnormal densities especially in the right lower lobe, lingula and to a lesser extent right upper lobe. These are  clearly worse than on remote CT scan dated 22nd of April 2021 suggestive of inflammatory process,  possibly Covid 19 infection. There is collapse in the left lower lobe posteriorly. There is mild thoracic spondylosis. .   Impression:  1. Interval deterioration since previous CT scan of the chest dated 22nd of April 2021 with areas of abnormal density especially in the right lower lobe, lingula and to lesser extent right upper lobe.  These are suggestive of inflammatory process possibly  Covid 19 infection. 2. Collapse in the left lower lobe posteriorly, new since previous CT scan of the chest dated 22nd of April 2021. 3. Small mediastinal lymph nodes. Calcified node in the subcarinal space. 4. Status post pacemaker placement. 5. TAVR in place. Atherosclerotic calcification in the thoracic aorta. 6. Thoracic spondylosis. Assessment   -Acute hypoxic respiratory failure  -Multi lobar pneumonia- failed outpatient therapy   -COPD exacerbation   -LLL basal segment atelectasis suspect mucus plug   -CAD  -Diastolic CHF  -Afib on outpatient coumadin  -DM2  Recommendations   -Continue to wean supplemental O2 as able to maintain SpO2 >90%, will trial on low flow nasal cannula  -Continue on ATB's cefepime per primary. Azithromycin discontinued by primary service. -Duonebs Q4 hrs WA   -Continue metaneb to improve pulmonary clearance  -IS and Acapella (flutter valve) for pulmonary toileting   -Prednisone 40 gm PO Daily x 5 days (today is day 4)  -DVT prophylaxis: on Warfarin 2/2 A-fib    Case discussed with nurse and patient/family. Questions and concerns addressed. Meds and Orders reviewed. Electronically signed by     WILBERTO Flores - CNP on 7/8/2021 at 9:25 AM     Progressing slowly  Afebrile  Change to nasal cannula 6 lpm and monitor  Continue acapella, nebs    Patient seen and examined independently by me. Above discussed and I agree with  CNP note Also see my additional comments. Labs, cultures, and radiographs when available were reviewed. Changes were made in the orders as necessary. I discussed patient concerns with [facility] Nikole STOREY R.N. and instructions were given. Respiratory care issues addressed. Please see our orders for the updated patient care plan.     Electronically signed by     Bobbi Bey MD on 7/8/2021 at 4:57 PM

## 2021-07-08 NOTE — CARE COORDINATION
7/8/21, 2:06 PM EDT    DISCHARGE ON GOING [address] [place] [address] day: 5  Location: 3B-31/031-A Reason for admit: Pneumonia [J18.9]   Procedure:   7/3 CXR - Left basilar atelectasis/infiltrate.   7/5 CT chest - areas of abnormal density especially in the right lower lobe, lingula and to lesser extent right upper lobe. These are suggestive of inflammatory process possibly COVID 19. Collapse in the left lower lobe posteriorly. Small mediastinal lymph nodes. Calcified node in the subcarinal space.   7/5 Echo - EF 50%.   Barriers to Discharge: Pt weaned off high flow O2 this am to 2L/nc. Sats 94%. Pulmonology following with Hospitalist. Sodium 134, BUN 49, creatinine 1.7. WBC 16.7. INR 2.33. Cefepime iv q12hr. Mucinex bid. DuoNeb q4hr [employer]. Prednisone po daily for 5 days (today day #4). IS and Acapella. PT/OT ordered yesterday, await their recommendations. PCP: Twila Hoskins DO  Readmission Risk Score: 29%  Patient Goals/Plan/Treatment Preferences: Plans home with S.O. Monitor for possible home O2 needs. Await therapy recommendations.

## 2021-07-08 NOTE — FLOWSHEET NOTE
Pt was with his significant other at the time of the visit. He was dealing with pneumonia. He said that he was getting better. Prayer was appreciated. 07/08/21 1513   Encounter Summary   Services provided to: Patient and family together   Referral/Consult From: [address] [place] [address] Significant other   Continue Visiting Yes  (7/8 )   Complexity of Encounter Low   Length of Encounter 15 minutes   Routine   Type Follow up   Assessment Approachable;Calm   Intervention [place];Prayer;Nurtured hope; Active listening;Empowerment;Sustaining presence/ Ministry of presence   Outcome Acceptance;Expressed gratitude;Encouraged; Hopeful;Receptive

## 2021-07-08 NOTE — PLAN OF CARE
Problem: Falls - Risk of:  Goal: Will remain free from falls  Description: Will remain free from falls  Outcome: Ongoing  Note: Free from falls  Goal: Absence of physical injury  Description: Absence of physical injury  Outcome: Ongoing  Note: No injury     Problem: Gas Exchange - Impaired:  Goal: Levels of oxygenation will improve  Description: Levels of oxygenation will improve  Outcome: Ongoing  Note: Oxygen levels maintanied     Problem: Cardiac:  Goal: Ability to maintain an adequate cardiac output will improve  Description: Ability to maintain an adequate cardiac output will improve  Outcome: Ongoing  Note: Adequate cardiac output  Goal: Hemodynamic stability will improve  Description: Hemodynamic stability will improve  Outcome: Ongoing  Note: Hemodynamic stability     Problem: Fluid Volume:  Goal: Ability to achieve and maintain adequate urine output will improve  Description: Ability to achieve and maintain adequate urine output will improve  Outcome: Ongoing  Note: Urine output improved  Goal: Ability to maintain a balanced intake and output will improve  Description: Ability to maintain a balanced intake and output will improve  Outcome: Ongoing  Note: Improved intake output     Problem: Respiratory:  Goal: Respiratory status will improve  Description: Respiratory status will improve  Outcome: Ongoing  Note: Respiratory status improved     Problem: Metabolic:  Goal: Ability to maintain appropriate glucose levels will improve  Description: Ability to maintain appropriate glucose levels will improve  Outcome: Ongoing  Note: Glucose levels maintained     Problem: Skin Integrity:  Goal: Risk for impaired skin integrity will decrease  Description: Risk for impaired skin integrity will decrease  Outcome: Ongoing  Note: No skin breakdown     Problem: Infection:  Goal: Will remain free from infection  Description: Will remain free from infection  Outcome: Ongoing  Note:  Iv abx     Problem: Safety:  Goal: Free from accidental physical injury  Description: Free from accidental physical injury  Outcome: Ongoing  Note: No accidental injury  Goal: Free from intentional harm  Description: Free from intentional harm  Outcome: Ongoing  Note: No intentional harm     Problem: Daily Care:  Goal: Daily care needs are met  Description: Daily care needs are met  Outcome: Ongoing  Note: Daily care met     Problem: Pain:  Goal: Pain level will decrease  Description: Pain level will decrease  Outcome: Ongoing  Note: Denies pain  Goal: Control of acute pain  Description: Control of acute pain  Outcome: Ongoing  Note: Denies pain  Goal: Control of chronic pain  Description: Control of chronic pain  Outcome: Ongoing  Note: Denies pain     Problem: Nutrition  Goal: Optimal nutrition therapy  Outcome: Ongoing  Note: Optimal nutrition     . Care plan reviewed with patient and family. Patient and family verbalize understanding of the plan of care and contribute to goal setting.

## 2021-07-08 NOTE — PROGRESS NOTES
PROGRESS NOTE      [facility]  [facility]      Unit/Bed:3B-31/031-A    YOB: 1940    MRN: [id number]       Acct: [de-identified]     PCP: Mando Ahumada DO    Date of Admission: 7/3/2021      Assessment/Plan:    Acute hypoxic respiratory failure, improving  -2/2 to CAP and COPD exacerbation: currently on HFNC. ABG showed acute respiratory alkalosis. CPAP as ordered. Repeat ABG still showing respiratory alkalosis, cont CPAP. Please see below for COPD exacerbation and CAP management. Wean O2 as tolerated. Pulmonology on-board, appreciate pulmonology input. Chest CTA reviewed, pulmonology plan for possible bronchoscopy for LLL atelectasis once oxygenation improved   -continue to wean as tolerated, pulmonology following  -day 4/5 for prednisone  -patient currently satting 92% on 1LNC    Sepsis due to CAP, resolving: on arrival, WBC 31.6K, AR 99, lactic acid 5.2. Leukocytosis resolving. VS normal today. Lactic acidosis resolved. pt received IVF bolus 500 cc in ER, cautious in fluid resuscitation due to chronic HFrEF and CKD. Tachycardia resolved. Blood cultures no growth so far. -WBC improved to 16.7 today, will repeat cbc am    Pseudomonas Pneumonia, failed OP treatment: CXR on admission: Left basilar atelectasis/infiltrate, procalcitonin elevated at 0.32. treated with azithromycin 2 weeks ago in OP and Augmentin with prednisone started 6/29/21 to 7/3/21. Started on rocephin and azithromycin on admission, switched to broad spectrum antibiotics vancomycin and zosyn on 7/4/21. Azithromycin continued for atypical coverage. COVID-19 (-). MRSA PCR (+). Incentive spirometry. -pneumonia panel grew pseudomonas, changed zosyn to cefepime 7/5/21 ( re: on vancomycin + zosyn, possible toxicity to kidneys, given CKD IV, switch zosyn to cefepime), will cont vancomycin for now due to (+) MRSA PCR. COPD exacerbation due to CAP: give solumedrol 125 mg IV x 1 dose followed by prednisone 40 mg PO daily x 5 days. Duoneb    -day 4/5 for steroids    Chronic HFrEF, EF 45-50% per echo 5/2019. Pro-BNP chronically elevated likely due to CKD but more elevated from baseline. Received bumex 1 mg IV x 1 dose on 7/4/21. Repeat pro-BNP today more elevated, give bumex 1 mg IV x 1 dose today. Pulmonology recommend to HOLD bumex PO.   -still holding bumex  -ECHO 7/5 showed normal left ventricle size, mild left ventricular hypertrophy, EF estimated at 50%. Normally functioning s/p TAVR. DM type 2: A1C 6.8%, POC glucose uncontrolled, anticipate hyperglycemia from steroids. SSI. Home meds metformin on-hold. accu-check  -increased lantus to 12 units nightly 7/7    MILLY on CKD stage IV: Baseline creatinine 1.7 to 2.9.   -creat 1.8 today, improved with holding bumex and given gentle IV fluids yesterday  -repeat bmp am, consider restarting bumex if patient remains at baseline    Permanent atrial fibrillation, rate controlled. Cont Toprol XL. CHADsVASC score 5, cont coumadin, pharmacy on-board to dose coumadin    Supratherapeutic INR, resolved    Hx of TAVR, noted    Mild hyponatremia, resolved: BMP in am       Chief Complaint: worsening cough    Hospital Course:     Per H/P note:     \"79 y.o. male who presented to [address] [place] [address] with a worsening of his cough; he has a past medical history of heart failure, diabetes, TAVR, CKD and atrial fibrillation; this patient has been on Augmentin and prednisone recently along with the possibility of Zithromax; wife states he has been in the emergency department and also to his PCP twice here in the last couple weeks; he continues with a cough and continues with a cough with green phlegm and shortness of breath; he is fully alert and oriented, he denies any pain; he denies any fever chills.      In the emergency department, chest x-ray reveals a left lower lobe pneumonia, lactic acid at 5.2, BNP at 3319; he has a chronic troponin elevation along with a CKD; he currently denies any lightheadedness or dizziness, chest pain, states \"I get short of breath when I start coughing\"; he denies any nausea, vomiting or diarrhea; he was started on Zithromax and Rocephin in the emergency department and is being admitted to hospital service for further care and evaluation. \"     7/5: CT chest showed density in right lower lobe, lingula, and lesser extent right upper lobe suggesting inflammatory process possibly covid 19. Covid rapid and pcr, however were negative. CT also showed collapse in LLL posteriorly. Pneumonia panel positive for pseudomonas, antibiotics changed accordingly. 7/6: patient continues to need heated high flow nasal cannula. Currently 96% with FiO2 of 55 on 60L/min O2.    7/7: patient continues to improve with weaning of high flow nasal cannula. Currently 95% with FiO2 of 40 on 30L/min O2.    7/8: patient continues to improve, currently on 1LNC    Subjective:    Patient is working on improving his breathing, feels a little better. He reports no chest pain. He is still eating and drinking without nausea/vomiting/diarrhea/constipation. He has no other acute concerns at this time.      Medications:  Reviewed    Infusion Medications    sodium chloride      dextrose       Scheduled Medications    insulin glargine  12 Units Subcutaneous Nightly    cefepime  2,000 mg Intravenous Q12H    ipratropium-albuterol  1 ampule Inhalation Q4H WA    predniSONE  40 mg Oral Daily    insulin lispro  0-18 Units Subcutaneous TID     insulin lispro  0-9 Units Subcutaneous Nightly    atorvastatin  80 mg Oral Nightly    [Held by provider] bumetanide  1 mg Oral Daily    cetirizine  10 mg Oral Daily    fluticasone  1 spray Each Nostril Daily    guaiFENesin  600 mg Oral BID    [Held by provider] losartan  25 mg Oral Nightly    metoprolol succinate  50 mg Oral Daily    montelukast  10 mg Oral Daily    warfarin (COUMADIN) daily dosing (placeholder)   Other RX Placeholder     PRN Meds: hydrALAZINE, potassium chloride **OR** potassium alternative oral replacement **OR** potassium chloride, potassium chloride, sodium chloride flush, sodium chloride, ondansetron **OR** ondansetron, polyethylene glycol, acetaminophen **OR** acetaminophen, glucose, dextrose, glucagon (rDNA), dextrose      Intake/Output Summary (Last 24 hours) at 7/8/2021 0741  Last data filed at 7/8/2021 0424  Gross per 24 hour   Intake 366.77 ml   Output 650 ml   Net -283.23 ml       Diet:  ADULT DIET; Regular; 3 carb choices (45 gm/meal)    Exam:  BP (!) 155/75   Pulse 65   Temp 97.9 °F (36.6 °C) (Oral)   Resp 19   Ht 5' 7\" (1.702 m)   Wt 205 lb 1.6 oz (93 kg)   SpO2 90%   BMI 32.12 kg/m²     General appearance: No apparent distress, appears stated age and cooperative. HEENT: Normal cephalic, atraumatic without obvious deformity. Pupils equal, round, and reactive to light. Extra ocular muscles intact. Conjunctivae/corneas clear. Neck: Supple, with full range of motion. No jugular venous distention. Trachea midline. Respiratory:  Better air movement throughout, mild expiratory wheezing present bilaterally   Cardiovascular: normal rate, irregularly irregular rhythm with normal S1/S2 without murmurs, rubs or gallops. Abdomen: Soft, non-tender, non-distended with normal bowel sounds. Musculoskeletal:  No clubbing, cyanosis or edema bilaterally. Skin: Skin color, texture, turgor normal.  No rashes or lesions.   Neurologic:  Neurovascularly intact   Psychiatric: thought content appropriate, normal insight  Exam of extremities: peripheral pulses normal, no pedal or leg edema, no clubbing or cyanosis      Labs:   Recent Labs     07/06/21  0431 07/07/21  0411 07/08/21  0531   WBC 26.9* 16.3* 16.7*   HGB 13.6* 13.1* 13.3*   HCT 41.6* 40.5* 41.1*    184 156     Recent Labs     07/06/21  0431 07/07/21  0411 07/08/21  0531   * 133* 134*   K 3.5 5.3* 4.3   CL 95* 98 102   CO2 23 23 19*   BUN 58* 56* 49*   CREATININE 2.6* 1.8* 1.7*   CALCIUM 8.6 8.5 8.8     No results for input(s): AST, ALT, BILIDIR, BILITOT, ALKPHOS in the last 72 hours. Recent Labs     07/06/21  0431 07/07/21  0411 07/08/21  0531   INR 2.22* 2.44* 2.33*     No results for input(s): CKTOTAL, TROPONINI in the last 72 hours. Urinalysis:      Lab Results   Component Value Date    NITRU NEGATIVE 07/03/2021    WBCUA NONE SEEN 06/10/2021    BACTERIA NONE SEEN 06/10/2021    RBCUA NONE SEEN 06/10/2021    BLOODU NEGATIVE 07/03/2021    SPECGRAV 1.006 06/10/2021    GLUCOSEU NEGATIVE 07/03/2021       Radiology:  CT CHEST WO CONTRAST   Final Result       1. Interval deterioration since previous CT scan of the chest dated 22nd of April 2021 with areas of abnormal density especially in the right lower lobe, lingula and to lesser extent right upper lobe. These are suggestive of inflammatory process possibly    Covid 19 infection. 2. Collapse in the left lower lobe posteriorly, new since previous CT scan of the chest dated 22nd of April 2021. 3. Small mediastinal lymph nodes. Calcified node in the subcarinal space. 4. Status post pacemaker placement. 5. TAVR in place. Atherosclerotic calcification in the thoracic aorta. 6. Thoracic spondylosis. **This report has been created using voice recognition software. It may contain minor errors which are inherent in voice recognition technology. **      Final report electronically signed by DR Azam Ramirez on 7/5/2021 10:52 AM      XR CHEST PORTABLE   Final Result   1. Left basilar atelectasis/infiltrate. 2. Mild stable cardiomegaly. **This report has been created using voice recognition software. It may contain minor errors which are inherent in voice recognition technology. **      Final report electronically signed by Dr. Brock Marin on 7/3/2021 1:19 PM          Diet: ADULT DIET;  Regular; 3 carb choices (45 gm/meal)    DVT prophylaxis: [] Lovenox                                 [] SCDs [] SQ Heparin                                 [] Encourage ambulation           [x] Already on Anticoagulation (warfarin)     Disposition:    [] Home       [] TCU       [] Rehab       [] Psych       [] SNF       [] [facility]       [x] Other-continue to assess needs.      Code Status: Full Code    PT/OT Eval Status: to see      Electronically signed by Tarik Diaz MD on 7/8/2021 at 7:41 AM

## 2021-07-08 NOTE — PROGRESS NOTES
Clinical Pharmacy Note    Warfarin consult follow-up    Recent Labs     07/08/21  0531   INR 2.33*     Recent Labs     07/06/21  0431 07/07/21  0411 07/08/21  0531   HGB 13.6* 13.1* 13.3*   HCT 41.6* 40.5* 41.1*    184 156     Significant Drug-Drug Interactions:  New warfarin drug-drug interactions: none  Discontinued drug-drug interactions: azithromycin  Current warfarin drug-drug interactions: prednisone     Date INR Warfarin Dose   7/3/21 3.84 No warfarin    7/4/21   3.75   No warfarin    7/5/21  2.45  2.5 mg     7/6/21 2.22  2.5 mg     7/7/21 2.44  2 mg     7/8/21 2.33  2.5 mg              Notes:                   Daily PT/INR until stable within therapeutic range.

## 2021-07-09 VITALS
HEIGHT: 67 IN | RESPIRATION RATE: 16 BRPM | WEIGHT: 195.2 LBS | OXYGEN SATURATION: 94 % | BODY MASS INDEX: 30.64 KG/M2 | DIASTOLIC BLOOD PRESSURE: 73 MMHG | HEART RATE: 67 BPM | TEMPERATURE: 97.8 F | SYSTOLIC BLOOD PRESSURE: 143 MMHG

## 2021-07-09 LAB
ANION GAP SERPL CALCULATED.3IONS-SCNC: 8 MEQ/L (ref 8–16)
BASOPHILS # BLD: 0.1 %
BASOPHILS ABSOLUTE: 0 THOU/MM3 (ref 0–0.1)
BLOOD CULTURE, ROUTINE: NORMAL
BLOOD CULTURE, ROUTINE: NORMAL
BUN BLDV-MCNC: 45 MG/DL (ref 7–22)
CALCIUM SERPL-MCNC: 8.5 MG/DL (ref 8.5–10.5)
CHLORIDE BLD-SCNC: 101 MEQ/L (ref 98–111)
CO2: 24 MEQ/L (ref 23–33)
CREAT SERPL-MCNC: 1.9 MG/DL (ref 0.4–1.2)
EOSINOPHIL # BLD: 0.2 %
EOSINOPHILS ABSOLUTE: 0 THOU/MM3 (ref 0–0.4)
ERYTHROCYTE [DISTWIDTH] IN BLOOD BY AUTOMATED COUNT: 14.6 % (ref 11.5–14.5)
ERYTHROCYTE [DISTWIDTH] IN BLOOD BY AUTOMATED COUNT: 50.8 FL (ref 35–45)
GFR SERPL CREATININE-BSD FRML MDRD: 34 ML/MIN/1.73M2
GLUCOSE BLD-MCNC: 147 MG/DL (ref 70–108)
GLUCOSE BLD-MCNC: 166 MG/DL (ref 70–108)
GLUCOSE BLD-MCNC: 93 MG/DL (ref 70–108)
HCT VFR BLD CALC: 39.8 % (ref 42–52)
HEMOGLOBIN: 12.9 GM/DL (ref 14–18)
IMMATURE GRANS (ABS): 0.15 THOU/MM3 (ref 0–0.07)
IMMATURE GRANULOCYTES: 1 %
INR BLD: 2.64 (ref 0.85–1.13)
LYMPHOCYTES # BLD: 6.3 %
LYMPHOCYTES ABSOLUTE: 1 THOU/MM3 (ref 1–4.8)
MCH RBC QN AUTO: 30.9 PG (ref 26–33)
MCHC RBC AUTO-ENTMCNC: 32.4 GM/DL (ref 32.2–35.5)
MCV RBC AUTO: 95.2 FL (ref 80–94)
MONOCYTES # BLD: 8.4 %
MONOCYTES ABSOLUTE: 1.3 THOU/MM3 (ref 0.4–1.3)
NUCLEATED RED BLOOD CELLS: 0 /100 WBC
PLATELET # BLD: 180 THOU/MM3 (ref 130–400)
PMV BLD AUTO: 11.5 FL (ref 9.4–12.4)
POTASSIUM SERPL-SCNC: 4.8 MEQ/L (ref 3.5–5.2)
RBC # BLD: 4.18 MILL/MM3 (ref 4.7–6.1)
SEG NEUTROPHILS: 84 %
SEGMENTED NEUTROPHILS ABSOLUTE COUNT: 12.8 THOU/MM3 (ref 1.8–7.7)
SODIUM BLD-SCNC: 133 MEQ/L (ref 135–145)
WBC # BLD: 15.2 THOU/MM3 (ref 4.8–10.8)

## 2021-07-09 PROCEDURE — 82948 REAGENT STRIP/BLOOD GLUCOSE: CPT

## 2021-07-09 PROCEDURE — 36415 COLL VENOUS BLD VENIPUNCTURE: CPT

## 2021-07-09 PROCEDURE — 6370000000 HC RX 637 (ALT 250 FOR IP): Performed by: NURSE PRACTITIONER

## 2021-07-09 PROCEDURE — 6370000000 HC RX 637 (ALT 250 FOR IP): Performed by: FAMILY MEDICINE

## 2021-07-09 PROCEDURE — 99232 SBSQ HOSP IP/OBS MODERATE 35: CPT | Performed by: INTERNAL MEDICINE

## 2021-07-09 PROCEDURE — 99239 HOSP IP/OBS DSCHRG MGMT >30: CPT | Performed by: FAMILY MEDICINE

## 2021-07-09 PROCEDURE — 85025 COMPLETE CBC W/AUTO DIFF WBC: CPT

## 2021-07-09 PROCEDURE — 94669 MECHANICAL CHEST WALL OSCILL: CPT

## 2021-07-09 PROCEDURE — 97530 THERAPEUTIC ACTIVITIES: CPT

## 2021-07-09 PROCEDURE — 97165 OT EVAL LOW COMPLEX 30 MIN: CPT

## 2021-07-09 PROCEDURE — 94761 N-INVAS EAR/PLS OXIMETRY MLT: CPT

## 2021-07-09 PROCEDURE — 6360000002 HC RX W HCPCS: Performed by: STUDENT IN AN ORGANIZED HEALTH CARE EDUCATION/TRAINING PROGRAM

## 2021-07-09 PROCEDURE — 80048 BASIC METABOLIC PNL TOTAL CA: CPT

## 2021-07-09 PROCEDURE — 6370000000 HC RX 637 (ALT 250 FOR IP): Performed by: STUDENT IN AN ORGANIZED HEALTH CARE EDUCATION/TRAINING PROGRAM

## 2021-07-09 PROCEDURE — APPSS30 APP SPLIT SHARED TIME 16-30 MINUTES: Performed by: NURSE PRACTITIONER

## 2021-07-09 PROCEDURE — 85610 PROTHROMBIN TIME: CPT

## 2021-07-09 PROCEDURE — 2580000003 HC RX 258: Performed by: STUDENT IN AN ORGANIZED HEALTH CARE EDUCATION/TRAINING PROGRAM

## 2021-07-09 RX ORDER — CIPROFLOXACIN 500 MG/1
500 TABLET, FILM COATED ORAL 2 TIMES DAILY
Qty: 4 TABLET | Refills: 0 | Status: SHIPPED | OUTPATIENT
Start: 2021-07-09 | End: 2021-07-11

## 2021-07-09 RX ORDER — WARFARIN SODIUM 2 MG/1
2 TABLET ORAL
Status: DISCONTINUED | OUTPATIENT
Start: 2021-07-09 | End: 2021-07-09 | Stop reason: HOSPADM

## 2021-07-09 RX ORDER — ALBUTEROL SULFATE 90 UG/1
2 AEROSOL, METERED RESPIRATORY (INHALATION) EVERY 6 HOURS PRN
Qty: 3 INHALER | Refills: 1 | Status: SHIPPED | OUTPATIENT
Start: 2021-07-09 | End: 2021-09-27 | Stop reason: ALTCHOICE

## 2021-07-09 RX ORDER — GUAIFENESIN 600 MG/1
600 TABLET, EXTENDED RELEASE ORAL 2 TIMES DAILY
Qty: 60 TABLET | Refills: 0 | Status: SHIPPED | OUTPATIENT
Start: 2021-07-09 | End: 2021-09-27 | Stop reason: ALTCHOICE

## 2021-07-09 RX ADMIN — CETIRIZINE HYDROCHLORIDE 10 MG: 10 TABLET, FILM COATED ORAL at 09:16

## 2021-07-09 RX ADMIN — METOPROLOL SUCCINATE 50 MG: 50 TABLET, FILM COATED, EXTENDED RELEASE ORAL at 09:16

## 2021-07-09 RX ADMIN — HYDRALAZINE HYDROCHLORIDE 25 MG: 25 TABLET, FILM COATED ORAL at 04:29

## 2021-07-09 RX ADMIN — FLUTICASONE PROPIONATE 1 SPRAY: 50 SPRAY, METERED NASAL at 09:18

## 2021-07-09 RX ADMIN — IPRATROPIUM BROMIDE AND ALBUTEROL SULFATE 1 AMPULE: .5; 3 SOLUTION RESPIRATORY (INHALATION) at 11:29

## 2021-07-09 RX ADMIN — CEFEPIME HYDROCHLORIDE 2000 MG: 2 INJECTION, POWDER, FOR SOLUTION INTRAVENOUS at 09:20

## 2021-07-09 RX ADMIN — PREDNISONE 40 MG: 20 TABLET ORAL at 09:16

## 2021-07-09 RX ADMIN — HYDRALAZINE HYDROCHLORIDE 25 MG: 25 TABLET, FILM COATED ORAL at 09:17

## 2021-07-09 RX ADMIN — IPRATROPIUM BROMIDE AND ALBUTEROL SULFATE 1 AMPULE: .5; 3 SOLUTION RESPIRATORY (INHALATION) at 07:48

## 2021-07-09 RX ADMIN — MONTELUKAST SODIUM 10 MG: 10 TABLET ORAL at 09:16

## 2021-07-09 RX ADMIN — GUAIFENESIN 600 MG: 600 TABLET, EXTENDED RELEASE ORAL at 09:16

## 2021-07-09 ASSESSMENT — PAIN SCALES - GENERAL
PAINLEVEL_OUTOF10: 0

## 2021-07-09 NOTE — CARE COORDINATION
7/9/21, 12:29 PM EDT    Patient goals/plan/ treatment preferences discussed by  and . Patient goals/plan/ treatment preferences reviewed with patient/ family. Patient/ family verbalize understanding of discharge plan and are in agreement with goal/plan/treatment preferences. Understanding was demonstrated using the teach back method. AVS provided by RN at time of discharge, which includes all necessary medical information pertaining to the patients current course of illness, treatment, post-discharge goals of care, and treatment preferences. IMM Letter  IMM Letter given to Patient/Family/Significant other/Guardian/POA/by[de-identified]   IMM Letter date given[de-identified] 07/09/21  IMM Letter time given[de-identified] [address] discharge home today. Pt is declining HH. Pt did not meet needs for home O2.      Electronically signed by Lindsay Hu RN on 7/9/2021 at 12:30 PM

## 2021-07-09 NOTE — PROGRESS NOTES
Clinical Pharmacy Note    Warfarin consult follow-up    Recent Labs     07/09/21  0413   INR 2.64*     Recent Labs     07/07/21  0411 07/08/21  0531 07/09/21  0413   HGB 13.1* 13.3* 12.9*   HCT 40.5* 41.1* 39.8*    156 180       Significant Drug-Drug Interactions:  New warfarin drug-drug interactions: none  Discontinued drug-drug interactions: azithromycin  Current warfarin drug-drug interactions: prednisone     Date INR Warfarin Dose   7/3/21 3.84 No warfarin    7/4/21   3.75   No warfarin    7/5/21  2.45  2.5 mg     7/6/21 2.22  2.5 mg     7/7/21 2.44  2 mg     7/8/21 2.33  2.5 mg     7/9/21 2.64  2 mg       Notes:                   Daily PT/INR until stable within therapeutic range.

## 2021-07-09 NOTE — PLAN OF CARE
Problem: Falls - Risk of:  Goal: Will remain free from falls  Description: Will remain free from falls  Outcome: Ongoing   Patient remains free from falls. Call light in reach. Bed in low position and locked. Bed alarm on  Problem: Gas Exchange - Impaired:  Goal: Levels of oxygenation will improve  Description: Levels of oxygenation will improve  Outcome: Ongoing   Patient is on 1L NC from highflow  Problem: Cardiac:  Goal: Ability to maintain an adequate cardiac output will improve  Description: Ability to maintain an adequate cardiac output will improve  Outcome: Ongoing   V/S stable  Problem: Respiratory:  Goal: Respiratory status will improve  Description: Respiratory status will improve  Outcome: Ongoing   Rhonchi lung sounds  Problem: Skin Integrity:  Goal: Risk for impaired skin integrity will decrease  Description: Risk for impaired skin integrity will decrease  Outcome: Ongoing   Warm dry and intact    Problem: Infection:  Goal: Will remain free from infection  Description: Will remain free from infection  Outcome: Ongoing   IV Cefepime  Problem: Pain:  Goal: Pain level will decrease  Description: Pain level will decrease  Outcome: Ongoing   Patient denies pain at this time. Care plan reviewed with patient and verbalize understanding of the plan of care and contribute to goal setting.

## 2021-07-09 NOTE — PROGRESS NOTES
History           Procedure Laterality Date    CARDIAC SURGERY      heart cath    CORONARY ANGIOPLASTY WITH STENT PLACEMENT      HERNIA REPAIR      OTHER SURGICAL HISTORY  05/10/2018    PACEMAKER INSERTION       Diet    ADULT DIET; Regular; 3 carb choices (45 gm/meal)  Allergies    Aspirin, Benzonatate, and Xanax [alprazolam]  Social History     Social History     Socioeconomic History    Marital status:      Spouse name: Not on file    Number of children: Not on file    Years of education: Not on file    Highest education level: Not on file   Occupational History    Not on file   Tobacco Use    Smoking status: Former Smoker     Packs/day: 1.00     Years: 50.00     Pack years: 50.00     Types: Cigarettes    Smokeless tobacco: Never Used   Vaping Use    Vaping Use: Never used   Substance and Sexual Activity    Alcohol use: Yes     Comment: rarely    Drug use: No    Sexual activity: Yes     Partners: Female   Other Topics Concern    Not on file   Social History Narrative    Not on file     Social Determinants of Health     Financial Resource Strain: Low Risk     Difficulty of Paying Living Expenses: Not hard at all   Food Insecurity: No Food Insecurity    Worried About [address] [employer] in the Last Year: Never true    [facility] in the Last Year: Never true   Transportation Needs:     Lack of Transportation (Medical):      Lack of Transportation (Non-Medical):    Physical Activity:     Days of Exercise per Week:     Minutes of Exercise per Session:    Stress:     Feeling of Stress :    Social Connections:     Frequency of Communication with Friends and Family:     Frequency of Social Gatherings with Friends and Family:     Attends Hinduism Services:     Active Member of Clubs or Organizations:     Attends Club or Organization Meetings:     Marital Status:    Intimate Partner Violence:     Fear of Current or Ex-Partner:     Emotionally Abused:     Physically Abused:     Sexually Abused:      Family History          Family history unknown: Yes     Sleep History    No historty but positive risk factors   Meds    Current Medications    warfarin  2 mg Oral Once    insulin glargine  12 Units Subcutaneous Nightly    cefepime  2,000 mg Intravenous Q12H    ipratropium-albuterol  1 ampule Inhalation Q4H WA    predniSONE  40 mg Oral Daily    insulin lispro  0-18 Units Subcutaneous TID WC    insulin lispro  0-9 Units Subcutaneous Nightly    atorvastatin  80 mg Oral Nightly    [Held by provider] bumetanide  1 mg Oral Daily    cetirizine  10 mg Oral Daily    fluticasone  1 spray Each Nostril Daily    guaiFENesin  600 mg Oral BID    [Held by provider] losartan  25 mg Oral Nightly    metoprolol succinate  50 mg Oral Daily    montelukast  10 mg Oral Daily    warfarin (COUMADIN) daily dosing (placeholder)   Other RX Placeholder     hydrALAZINE, potassium chloride **OR** potassium alternative oral replacement **OR** potassium chloride, potassium chloride, sodium chloride flush, sodium chloride, ondansetron **OR** ondansetron, polyethylene glycol, acetaminophen **OR** acetaminophen, glucose, dextrose, glucagon (rDNA), dextrose  IV Drips/Infusions   sodium chloride      dextrose       Vitals    Vitals    height is 5' 7\" (1.702 m) and weight is 195 lb 3.2 oz (88.5 kg). His oral temperature is 97.7 °F (36.5 °C). His blood pressure is 164/96 (abnormal) and his pulse is 72. His respiration is 18 and oxygen saturation is 92%. O2 Flow Rate (L/min): 1 L/min  I/O    Intake/Output Summary (Last 24 hours) at 7/9/2021 0855  Last data filed at 7/9/2021 0708  Gross per 24 hour   Intake 1020.22 ml   Output 1725 ml   Net -704.78 ml     Patient Vitals for the past 96 hrs (Last 3 readings):   Weight   07/09/21 0356 195 lb 3.2 oz (88.5 kg)   07/08/21 0424 205 lb 1.6 oz (93 kg)   07/06/21 0315 194 lb 3.2 oz (88.1 kg)     Exam   Physical Exam   Constitutional: No distress on RA.  Patient appears elderly. Head: Normocephalic and atraumatic. Mouth/Throat: Oropharynx is clear and moist.  No oral thrush. Eyes: Conjunctivae are normal. Pupils are equal, round. No scleral icterus. Neck: Neck supple. No tracheal deviation present. Cardiovascular: S1 and S2 with no murmur. No peripheral edema  Pulmonary/Chest: Normal effort with bilateral air entry, wheezing that clears with cough. No stridor. No respiratory distress. Patient exhibits no tenderness. Abdominal: Soft. Bowel sounds audible. No distension or tenderness to palp. Musculoskeletal: Moves all extremities  Neurological: Patient is alert and able to follow simple commands. Skin: Warm and dry. Labs   ABG  Lab Results   Component Value Date    PH 7.50 07/05/2021    PO2 104 07/05/2021    PCO2 33 07/05/2021    HCO3 25 07/05/2021    O2SAT 99 07/05/2021     Lab Results   Component Value Date    IFIO2 80 07/05/2021    MODE NIV 07/05/2021    SETTIDVOL 400 03/23/2018    SETPEEP 10.0 07/05/2021     CBC  Recent Labs     07/07/21  0411 07/08/21  0531 07/09/21  0413   WBC 16.3* 16.7* 15.2*   RBC 4.19* 4.29* 4.18*   HGB 13.1* 13.3* 12.9*   HCT 40.5* 41.1* 39.8*   MCV 96.7* 95.8* 95.2*   MCH 31.3 31.0 30.9   MCHC 32.3 32.4 32.4    156 180   MPV 11.2 11.3 11.5      BMP  Recent Labs     07/07/21  0411 07/08/21  0531 07/09/21  0413   * 134* 133*   K 5.3* 4.3 4.8   CL 98 102 101   CO2 23 19* 24   BUN 56* 49* 45*   CREATININE 1.8* 1.7* 1.9*   GLUCOSE 119* 116* 147*   CALCIUM 8.5 8.8 8.5     LFT  No results for input(s): AST, ALT, ALB, BILITOT, ALKPHOS, LIPASE in the last 72 hours. Invalid input(s):   AMYLASE  TROP  Lab Results   Component Value Date    TROPONINT 0.034 07/03/2021    TROPONINT 0.024 06/23/2021    TROPONINT 0.020 04/22/2021     BNP  Lab Results   Component Value Date    PROBNP 4816.0 07/05/2021    PROBNP 3319.0 07/03/2021    PROBNP 1930.0 06/23/2021     D-Dimer  Lab Results   Component Value Date    DDIMER 1248.00 04/23/2018 Lactic Acid  No results for input(s): LACTA in the last 72 hours. INR  Recent Labs     07/07/21  0411 07/08/21  0531 07/09/21  0413   INR 2.44* 2.33* 2.64*     PTT  No results for input(s): APTT in the last 72 hours. Glucose  Recent Labs     07/08/21  1646 07/08/21 2016 07/09/21  0807   POCGLU 264* 287* 93     UA   No results for input(s): SPECGRAV, PHUR, COLORU, CLARITYU, MUCUS, PROTEINU, BLOODU, RBCUA, WBCUA, BACTERIA, NITRU, GLUCOSEU, BILIRUBINUR, UROBILINOGEN, KETUA, LABCAST, LABCASTTY, AMORPHOS in the last 72 hours. Invalid input(s): CRYSTALS  PFTs 2018     Echo     7/5/2021  Conclusions      Summary   Technically difficult examination. Left ventricle size is normal.   Mild concentric left ventricular hypertrophy. There were no regional wall motion abnormalities. Ejection fraction is visually estimated at 50%. S/p TAVR, normally functioning. Signature      ----------------------------------------------------------------   Electronically signed by Nguyễn Wheeler MD (Interpreting   physician) on 07/05/2021 at 04:04 PM   ----------------------------------------------------------------    ECHO 5/6/2019    Ejection fraction was estimated at 45-50%. Normal left ventricular size and mildly reduced systolic function. There was mild global hypokinesis. Wall thickness was within normal limits. Device lead/catheter seen in the right heart. S/p TAVR. Transaortic velocity was 1.2-1.6 m/s, mean gradient 4-6 mmHg, EOA 1.5-1.8   cm2. There was no evidence of aortic regurgitation. There was trace tricuspid regurgitation. Assuming RAP = 5 mmHg, the estimated RVSP = 34 mmHg. Ascending aorta - 3.1 cm. IVC size is within normal limits with normal respiratory phasic changes.   Cultures    Procalcitonin  Lab Results   Component Value Date    PROCAL 0.32 07/03/2021    PROCAL 12.27 03/23/2018   Strep Pneumonia Antigen: Negative  Legionella Antigen: Negative  Respiratory Culture: Normal keena  Rapid COVID-19: Not Detected (7/5 & 7/3)  Molecular Pneumonia Panel: non-SARs coronavirus & Pseudomonas aeruginosa by PCR  Blood Cultures: Negative preliminary    Radiology    CXR    XR CHEST PORTABLE   7/3/2021   FINDINGS: A left-sided cardiac device is in stable position. There is mild stable enlargement of the cardiac silhouette. Atherosclerotic calcifications are present in the thoracic aorta. Reticular opacities at the left lung base are slightly more extensive than the prior study. Degenerative changes in the thoracic  spine are poorly visualized. Impression:  1. Left basilar atelectasis/infiltrate. 2. Mild stable cardiomegaly. CT Scans  CT CHEST WO CONTRAST   7/5/2021   FINDINGS:   The heart size is normal in this patient status post pacemaker placement. . There is a TAVR in place. There is atherosclerotic calcification in the thoracic aorta. . There is no gross abnormality of the pulmonary artery. There are small mediastinal lymph nodes especially in the aortopulmonary window. This a calcified lymph node in the subcarinal space. , axillary or hilar adenopathy. There is no pericardial or pleural effusion. There is slight thickening of the interstitial lung markings with superimposed abnormal densities especially in the right lower lobe, lingula and to a lesser extent right upper lobe. These are  clearly worse than on remote CT scan dated 22nd of April 2021 suggestive of inflammatory process,  possibly Covid 19 infection. There is collapse in the left lower lobe posteriorly. There is mild thoracic spondylosis. .   Impression:  1. Interval deterioration since previous CT scan of the chest dated 22nd of April 2021 with areas of abnormal density especially in the right lower lobe, lingula and to lesser extent right upper lobe. These are suggestive of inflammatory process possibly  Covid 19 infection.    2. Collapse in the left lower lobe posteriorly, new since previous CT scan of the chest dated 22nd of April 2021. 3. Small mediastinal lymph nodes. Calcified node in the subcarinal space. 4. Status post pacemaker placement. 5. TAVR in place. Atherosclerotic calcification in the thoracic aorta. 6. Thoracic spondylosis. Home O2 eval by Sariah Landeros RCP 7/9/2021  A home oxygen evaluation has been completed.      [x]? Patient is an inpatient. It is expected that the patient will be discharged within the next 48 hours. Qualified provider to write order for home prescription if patient qualifies. Social service/care managers will arrange for home oxygen. If patient is active, arrange for Home Medical supplier to assess for Oxygen Conserving Device per pulse oximetry. []? Patient is an outpatient. Results will be faxed to the ordering provider. Qualified provider to write order for home prescription if patient qualifies and arranges for home oxygen.        Patient was placed on room air for 120 minutes. SpO2 was 92 % on room air at rest. can ambulate for exercise flow rate. Patients was ambulated, SpO2 was 91% on room air  to maintain SpO2 between 90-92% while exercising.           Note: For any SpO2 at 32% see policy and procedure for possible qualifications. Assessment   -Acute hypoxic respiratory failure-improved On RA today  -Multi lobar pneumonia - failed outpatient therapy resp cx pseudomonas  -Stage 2 GOLD Moderate COPD  -COPD exacerbation   -LLL basal segment atelectasis suspect mucus plug   -CAD  -Diastolic CHF  -Afib on outpatient coumadin  -DM2  Recommendations   -Continue to wean supplemental O2 as able to maintain SpO2 >90%  -Continue on ATB's cefepime per primary.  Day 5  -Duonebs Q4 hrs WA   -Continue metaneb to improve pulmonary clearance  -IS and Acapella (flutter valve) for pulmonary toileting   -Prednisone therapy completed  -DVT prophylaxis: on Warfarin 2/2 A-fib  -Start Stiolto at discharge for Stage 2 GOLD Moderate COPD  -Home O2 evaluation completed, patient did not qualify for supplemental O2 at home  -Stable from Pulmonary standpoint, schedule patient for follow-up at [facility]. [facility] in 3 months with CT Chest and Full PFT approximately 3 days prior to appointment with Timi Carias CNP or Dr. Juliet Rivero MD    Case discussed with nurse and patient/family. Questions and concerns addressed. Meds and Orders reviewed. Electronically signed by     Lawerence Bumpers, APRN - CNP on 7/9/2021 at 8:55 AM     Did not qualify for supplemental oxygen  On room air now  [address] upon DC  F/U pulm clinic as above. Patient seen and examined independently by me. Above discussed and I agree with  CNP note Also see my additional comments. Labs, cultures, and radiographs when available were reviewed. Changes were made in the orders as necessary. I discussed patient concerns with [facility] Nikole STOREY R.N. and instructions were given. Respiratory care issues addressed. Please see our orders for the updated patient care plan.     Electronically signed by     Erwin More MD on 7/9/2021 at 3:21 PM

## 2021-07-09 NOTE — PROGRESS NOTES
A home oxygen evaluation has been completed. [x]Patient is an inpatient. It is expected that the patient will be discharged within the next 48 hours. Qualified provider to write order for home prescription if patient qualifies. Social service/care managers will arrange for home oxygen. If patient is active, arrange for Home Medical supplier to assess for Oxygen Conserving Device per pulse oximetry. []Patient is an outpatient. Results will be faxed to the ordering provider. Qualified provider to write order for home prescription if patient qualifies and arranges for home oxygen. Patient was placed on room air for 120 minutes. SpO2 was 92 % on room air at rest. can ambulate for exercise flow rate. Patients was ambulated, SpO2 was 91% on room air  to maintain SpO2 between 90-92% while exercising. Note: For any SpO2 at 98% see policy and procedure for possible qualifications.

## 2021-07-09 NOTE — DISCHARGE SUMMARY
DISCHARGE SUMMARY      Patient Identification:   Mary Charles   : 1940  MRN: [id number]   Account: [de-identified]      Patient's PCP: Priti Ann DO    Admit Date: 7/3/2021     Discharge Date:   21    Admitting Physician: No admitting provider for patient encounter. Discharge Physician: Staci Thurman MD     Discharge Diagnoses: Active Hospital Problems    Diagnosis Date Noted    Pneumonia due to Pseudomonas species ([address] [J15.1]     Community acquired pneumonia of left lower lobe of lung [J18.9]     Chronic HFrEF (heart failure with reduced ejection fraction) ([address] [I50.22]     Pneumonia due to infectious organism [J18.9] 2021    COPD exacerbation ([address] [J44.1] 2020    Permanent atrial fibrillation (HCC) [I48.21] 2020    History of transcatheter aortic valve replacement (TAVR) [Z95.2] 06/15/2018    Stage 4 chronic kidney disease ([address] [N18.4] 2018    Sepsis without acute organ dysfunction ([address] [A41.9]     Hyponatremia [E87.1]     Acute respiratory failure with hypoxia ([address] [J96.01]        The patient was seen and examined on day of discharge and this discharge summary is in conjunction with any daily progress note from day of discharge. Hospital Course:   Mary Charles is a 79 y.o. male with pmhx significant for Afib, CAD, CHF, COPD, HLD who was admitted to [facility] on 7/3/2021 for worsening shortness of breath. HPI:   \"79 y. o. male who presented to [facility] with a worsening of his cough; he has a past medical history of heart failure, diabetes, TAVR, CKD and atrial fibrillation; this patient has been on Augmentin and prednisone recently along with the possibility of Zithromax; wife states he has been in the emergency department and also to his PCP twice here in the last couple weeks; he continues with a cough and continues with a cough with green phlegm and shortness of breath; he is fully alert and oriented, he denies any pain; he denies any fever chills.     In the emergency department, chest x-ray reveals a left lower lobe pneumonia, lactic acid at 5.2, BNP at 3319; he has a chronic troponin elevation along with a CKD; he currently denies any lightheadedness or dizziness, chest pain, states \"I get short of breath when I start coughing\"; he denies any nausea, vomiting or diarrhea; he was started on Zithromax and Rocephin in the emergency department and is being admitted to hospital service for further care and evaluation. \"      Patient had a CT chest showed density in right lower lobe, lingula, and lesser extent right upper lobe suggesting inflammatory process possibly covid 19. Covid rapid and pcr, however were negative. CT also showed collapse in LLL posteriorly. Pneumonia panel positive for pseudomonas, antibiotics changed accordingly. Patient was seen by pulmonology who suggested patient may need bronchoscopy when breathing improved, or outpatient follow up. On 7/6 patient continued to need heated high flow nasal cannula, was weaned as tolerated. On 7/7 patient continued to improve and by 7/8 patient was no longer requiring oxygen. Patient was discharged on 7/9 with two more days of antibiotics and appropriate recommendations and follow-ups in place. Exam:     Vitals:  Vitals:    07/09/21 0356 07/09/21 0748 07/09/21 0800 07/09/21 1210   BP: (!) 164/96  (!) 175/87 (!) 143/73   Pulse: 72  68 67   Resp: 18  18 16   Temp: 97.7 °F (36.5 °C)  97.4 °F (36.3 °C) 97.8 °F (36.6 °C)   TempSrc: Oral  Oral Oral   SpO2: 92% 92% 92% 94%   Weight: 195 lb 3.2 oz (88.5 kg)      Height:         Weight: Weight: 195 lb 3.2 oz (88.5 kg)     24 hour intake/output:    Intake/Output Summary (Last 24 hours) at 7/9/2021 1331  Last data filed at 7/9/2021 0708  Gross per 24 hour   Intake 900.22 ml   Output 1725 ml   Net -824.78 ml       General appearance: No apparent distress, appears stated age and cooperative.   HEENT: Normal cephalic, atraumatic without obvious deformity. Pupils equal, round, and reactive to light. Extra ocular muscles intact. Conjunctivae/corneas clear. Neck: Supple, with full range of motion. No jugular venous distention. Trachea midline. Respiratory:  good air movement throughout, only minimal expiratory wheezing noted. Cardiovascular: Regular rate, irregularly irregular rhythm. Normal S1/S2 without murmurs, rubs or gallops. Abdomen: Soft, non-tender, non-distended with normal bowel sounds. Musculoskeletal:  No clubbing, cyanosis or edema bilaterally. Skin: Skin color, texture, turgor normal.  No rashes or lesions. Neurologic:  Neurovascularly intact . Psychiatric: Alert and oriented, thought content appropriate, normal insight  Peripheral Pulses: +2 palpable, equal bilaterally     Labs: For convenience and continuity at follow-up the following most recent labs are provided:      CBC:    Lab Results   Component Value Date    WBC 15.2 07/09/2021    HGB 12.9 07/09/2021    HCT 39.8 07/09/2021     07/09/2021       Renal:    Lab Results   Component Value Date     07/09/2021    K 4.8 07/09/2021    K 3.8 07/04/2021     07/09/2021    CO2 24 07/09/2021    BUN 45 07/09/2021    CREATININE 1.9 07/09/2021    CALCIUM 8.5 07/09/2021    PHOS 2.8 04/05/2018         Significant Diagnostic Studies    Radiology:   CT CHEST WO CONTRAST   Final Result       1. Interval deterioration since previous CT scan of the chest dated 22nd of April 2021 with areas of abnormal density especially in the right lower lobe, lingula and to lesser extent right upper lobe. These are suggestive of inflammatory process possibly    Covid 19 infection. 2. Collapse in the left lower lobe posteriorly, new since previous CT scan of the chest dated 22nd of April 2021. 3. Small mediastinal lymph nodes. Calcified node in the subcarinal space. 4. Status post pacemaker placement. 5. TAVR in place.  Atherosclerotic calcification in the thoracic aorta. 6. Thoracic spondylosis. **This report has been created using voice recognition software. It may contain minor errors which are inherent in voice recognition technology. **      Final report electronically signed by DR Ted Ozuna on 7/5/2021 10:52 AM      XR CHEST PORTABLE   Final Result   1. Left basilar atelectasis/infiltrate. 2. Mild stable cardiomegaly. **This report has been created using voice recognition software. It may contain minor errors which are inherent in voice recognition technology. **      Final report electronically signed by Dr. Chrystal Guerin on 7/3/2021 1:19 PM      CT CHEST WO CONTRAST    (Results Pending)          Consults:     IP CONSULT TO PULMONOLOGY  IP CONSULT TO PHARMACY  IP CONSULT TO PHARMACY    Disposition:    [x] Home       [] TCU       [] Rehab       [] Psych       [] SNF       [] [place]       [] Other-    Condition at Discharge: Stable    Code Status:  Full Code     Patient Instructions:    Discharge lab work: n/a, follow up with pcp  Activity: activity as tolerated  Diet: ADULT DIET; Regular; 3 carb choices (45 gm/meal)      Follow-up visits:   No follow-up provider specified.        Discharge Medications:      Wade White   Home Medication Instructions QTL:[id number]    Printed on:07/09/21 1331   Medication Information                      albuterol (ACCUNEB) 1.25 MG/3ML nebulizer solution  Inhale 1 ampule into the lungs every 6 hours as needed for Wheezing             albuterol sulfate HFA (VENTOLIN HFA) 108 (90 Base) MCG/ACT inhaler  Inhale 2 puffs into the lungs every 6 hours as needed for Wheezing or Shortness of Breath             Alcohol Swabs (B-D SINGLE USE SWABS REGULAR) PADS               atorvastatin (LIPITOR) 80 MG tablet  Take 80 mg by mouth nightly             bumetanide (BUMEX) 1 MG tablet  Take 1 mg by mouth daily              cetirizine (ZYRTEC) 10 MG tablet  Take 1 tablet by mouth daily             ciprofloxacin (CIPRO) 500 MG tablet  Take 1 tablet by mouth 2 times daily for 2 days             Dextromethorphan-guaiFENesin (MUCINEX DM PO)  Indications: Respiratory Congestion Take 1-2 tablets by mouth every 12 hours             fluticasone (FLONASE) 50 MCG/ACT nasal spray  1 spray by Each Nostril route daily             guaiFENesin (MUCINEX) 600 MG extended release tablet  Take 1 tablet by mouth 2 times daily             losartan (COZAAR) 25 MG tablet  Take 1 tablet by mouth nightly             metFORMIN (GLUCOPHAGE) 500 MG tablet  Take 1 tablet by mouth 2 times daily (with meals)             metoprolol succinate (TOPROL XL) 50 MG extended release tablet  Take 50 mg by mouth daily              montelukast (SINGULAIR) 10 MG tablet  Take 1 tablet by mouth daily             nitroGLYCERIN (NITROSTAT) 0.4 MG SL tablet  Place 0.4 mg under the tongue every 5 minutes as needed for Chest pain up to max of 3 total doses. If no relief after 1 dose, call 911. tiotropium-olodaterol (STIOLTO) 2.5-2.5 MCG/ACT AERS  Inhale 2 puffs into the lungs daily             TRUE METRIX BLOOD GLUCOSE TEST strip               TRUEplus Lancets 28G MISC               vitamin D (CHOLECALCIFEROL) 1000 UNIT TABS tablet  Take 1 tablet by mouth daily             warfarin (COUMADIN) 2.5 MG tablet  Take 1 tablet by mouth daily                 Time Spent on discharge is more than 20 minutes in the examination, evaluation, counseling and review of medications and discharge plan. Signed: Thank you Berny Zaidi DO for the opportunity to be involved in this patient's care.     Electronically signed by Terrance Brown MD on 7/9/2021 at 1:31 PM

## 2021-07-09 NOTE — PROGRESS NOTES
[address] [place] [id number]  PHYSICAL THERAPY MISSED TREATMENT NOTE  ACUTE CARE  [facility] CCU-STEPDOWN 3B              Missed Treatment  Per OT eval this am, Pt is at baseline moving without difficulty with Supervision only. Will defer eval.Pt planning home today.

## 2021-07-09 NOTE — PROGRESS NOTES
[place] [address]  INPATIENT OCCUPATIONAL THERAPY  STRZ CCU-STEPDOWN 3B  EVALUATION    Time:   Time In: 815  Time Out: 0840  Timed Code Treatment Minutes: 10 Minutes  Minutes: 25          Date: 2021  Patient Name: Marilee Ventura,   Gender: male      MRN: [id number]  : 1940  (79 y.o.)  Referring Practitioner: Dr. Yuli Abreu MD  Diagnosis: Pneumonia  Additional Pertinent Hx: Pt presented to [address] [place] [address] with a worsening of his cough; he has a past medical history of heart failure, diabetes, TAVR, CKD and atrial fibrillation; this patient has been on Augmentin and prednisone recently along with the possibility of Zithromax; wife states he has been in the emergency department and also to his PCP twice here in the last couple weeks; he continues with a cough and continues with a cough with green phlegm and shortness of breath; he is fully alert and oriented, he denies any pain; he denies any fever chills. Restrictions/Precautions:  Restrictions/Precautions: Isolation    Subjective  Chart Reviewed: Yes, Orders, History and Physical    Subjective: Pleasant and cooperative  Comments: RN approved session. Pt asked to get up for breakfast.  He was breathing without any cough and was placed on room air. He saturated at 91%.     Pain:  Pain Assessment  Patient Currently in Pain: Denies    Vitals: Oxygen: Pt was using 1L of O2 at the start of the session; when placed on room air, he was showing 91% after he get up into a chair    Social/Functional History:  Lives With: Significant other  Type of Home: [address]: One level  Home Access: Stairs to enter with rails  Entrance Stairs - Number of Steps: 4 steps  Entrance Stairs - Rails: Both  Home Equipment: 4 wheeled walker, Cane   Bathroom Shower/Tub: Tub/Shower unit  Bathroom Toilet: Standard  Bathroom Accessibility: Accessible    Receives Help From: Family  ADL Assistance: [facility] [address] [facility] [address]: Needs assistance  Homemaking Responsibilities: Yes  Ambulation Assistance: Independent  Transfer Assistance: Independent    Active : Yes  Occupation: Retired  Leisure & Hobbies: Mowing the yard, following the news  Additional Comments: Pt was walking without any AD. He was doing the driving. His wife cooks and does the laundry. Pt stated that he is getting help with the yard at this time until the pollen count decreases. VISION:WFL    HEARING:  Hard of hearing    COGNITION: WFL    RANGE OF MOTION:  Bilateral Upper Extremity:  WFL    STRENGTH:  Bilateral Upper Extremity:  WFL    SENSATION:   WFL    ADL:   Feeding: Supervision. No difficulty noted with his breakfast tray   Pt refused other ADLs at this time. Sarah Dinh BALANCE:  Sitting Balance:  Supervision. scooting to the edge of the bed  Standing Balance: Stand By Assistance. preparing to walk; discussing his home set up    BED MOBILITY:  Rolling to Left: Supervision using the bedrail  Supine to Sit: Supervision with head of bed elevated  Scooting: Supervision using the bedrail    TRANSFERS:  Sit to Stand:  Supervision. from the edge of bed  Stand to Sit: Supervision. to the recliner chair    FUNCTIONAL MOBILITY:  Assistive Device: None  Assist Level:  Supervision. Distance: 5 ft   In his room with even step noted and no LOB     Exercise:  None completed at this time. Activity Tolerance:  Patient tolerance of  treatment: fair. Pt was able to stand for 1 minute duration with min fatigue shown. He was having 91% O2 saturation after sitting in the chair. Assessment:  Assessment: Pt presents with pneumonia. He has been having coughing at home and C/O SOB. Pt was not using any AD. He did not use any supplemental O2 at home. Pt walked in his room without difficulty. He appears to be at his baseline for functional activity. He demonstrated standing for a few minutes without any SOB while on room air.   Performance deficits / Impairments: Decreased high-level IADLs  Prognosis: Good  REQUIRES OT FOLLOW UP: No  Decision Making: Low Complexity    Treatment Initiated: Treatment and education initiated within context of evaluation. Evaluation time included review of current medical information, gathering information related to past medical, social and functional history, completion of standardized testing, formal and informal observation of tasks, assessment of data and development of plan of care and goals. Treatment time included skilled education and facilitation of tasks to increase safety and independence with ADL's for improved functional independence and quality of life. Reviewed with pt importance of picking his battles wisely and planning activities each day carefully so that he can minimize his exposure to the heat of the day. Pt indicated that he is having help with moving his yard at this time. Encouraged pt to get exercise in other ways such as walking. Pt states that he walks when he goes to the store. Discharge Recommendations:  Home with assist PRN    Patient Education:  OT Education: OT Role, Plan of Care  Patient Education: Importance of planning daily activities carefully according to when he can best complete them- minimizing his exposure to the humidity during the summer months. Equipment Recommendations: Other: May benefit from having a grabbar in the shower or a shower seat. Plan:  Times per week: Not applicable  Plan Comment: Pt would be able to return home with help from his significant other when medically stable and discharged home. No further OT recommended. Specific instructions for Next Treatment: Not applicable. See long-term goal time frame for expected duration of plan of care. If no long-term goals established, a short length of stay is anticipated. Goals:  Patient goals : \"Go home today. \" pt stated.   Short term goals  Time Frame for Short term goals: None at this time  Long term goals  Time Frame for Long term goals : Not applicable         Following session, patient left in safe position with all fall risk precautions in place.

## 2021-07-09 NOTE — PROGRESS NOTES
Clinical Pharmacy Note    Warfarin consult follow-up    Recent Labs     07/09/21  0991   INR 2.64*     Notes: Take 2 mg Coumadin before discharge today. Continue your home dose of Coumadin 2.5 mg daily starting tomorrow 7/10/2021. Call Coumadin clinic on Monday 7/12/21 for further instructions.

## 2021-07-09 NOTE — PLAN OF CARE
Problem: Impaired respiratory status  Goal: Clear lung sounds  Description: Clear lung sounds  Outcome: Ongoing  Pt coughs up and out into tissue during metaneb tx via mp  1 lpm nc 92%

## 2021-07-11 ENCOUNTER — TELEPHONE (OUTPATIENT)
Dept: FAMILY MEDICINE CLINIC | Age: 81
End: 2021-07-11

## 2021-07-12 ENCOUNTER — CARE COORDINATION (OUTPATIENT)
Dept: CASE MANAGEMENT | Age: 81
End: 2021-07-12

## 2021-07-12 ENCOUNTER — TELEPHONE (OUTPATIENT)
Dept: PHARMACY | Age: 81
End: 2021-07-12

## 2021-07-12 ENCOUNTER — TELEPHONE (OUTPATIENT)
Dept: FAMILY MEDICINE CLINIC | Age: 81
End: 2021-07-12

## 2021-07-12 NOTE — TELEPHONE ENCOUNTER
Patient was discharged from the hospital on 7/9/21 for PNA. Prior to hospitalization he was taking prednisone which is likely the reason for the elevated INR at admission. Spoke with patient and instructed to take Coumadin 2.5 mg tonight and rescheduled patient for 7/13/21 (normally a 6 week check). Patient agreed to plan.     Hospital dosing:      Date INR Warfarin Dose   7/3/21 3.84 No warfarin    7/4/21   3.75   No warfarin    7/5/21  2.45  2.5 mg     7/6/21 2.22  2.5 mg     7/7/21 2.44  2 mg     7/8/21 2.33  2.5 mg     7/9/21 2.64  2 mg           Prasanna MejiaD, BCPS  7/12/2021  10:40 AM

## 2021-07-12 NOTE — TELEPHONE ENCOUNTER
Rickie 45 Transitions Initial Follow Up Call    Outreach made within 2 business days of discharge: yes    Patient: Katty Hawk Patient : 1940   MRN: [id number]  Reason for Admission: There are no discharge diagnoses documented for the most recent discharge.   Discharge Date: 21       Spoke with:BRUCE to [facility]    Discharge department/facility:[facility]     TCM Interactive Patient Contact:      Mera Randle

## 2021-07-12 NOTE — CARE COORDINATION
Care Transitions Outreach Attempt    Call within 2 business days of discharge: Yes   Attempted to reach patient for transitions of care follow up. Unable to reach patient. Patient: Rosmery Lagos Patient : 1940 MRN: [id number]    Last Discharge [address] [address]       Complaint Diagnosis Description Type Department Provider    7/3/21 Cough Pneumonia due to infectious organism, unspecified laterality, unspecified part of lung . .. ED to Hosp-Admission (Discharged) (ADMITTED) STR 3B Sury Donnelly MD; Shabnam Armstrong. .. Was this an external facility discharge?  No Discharge Facility: [facility]    Noted following upcoming appointments from discharge chart review:   1215 Stevie Bennett follow up appointment(s):   Future Appointments   Date Time Provider Talha Almanza   2021 10:40 AM Negar Osborne, [address] [place] [address]   2021  1:00 PM DO SERGIO Kasper [facility]   2021  9:40 AM [facility]   2021  9:20 AM DO SERGIO Kasper [facility]     Non-[facility] follow up appointment(s):     Yamilet Baez [phone]  Care Transitions Nurse

## 2021-07-12 NOTE — TELEPHONE ENCOUNTER
----- Message from Blaine Sep sent at 7/9/2021  1:49 PM EDT -----  Subject: Message to Provider    QUESTIONS  Information for Provider? pt being discharged from the hospital needs a   follow up in 7 to 10 days nothing was available  ---------------------------------------------------------------------------  --------------  [address]  What is the best way for the office to contact you? OK to leave message on   voicemail  Preferred Call Back Phone Number? [phone]  ---------------------------------------------------------------------------  --------------  SCRIPT ANSWERS  Relationship to Patient? Third Party  Representative Name?  Bryanna Gil

## 2021-07-12 NOTE — TELEPHONE ENCOUNTER
Rickie 45 Transitions Initial Follow Up Call    Outreach made within 2 business days of discharge: Yes    Patient: Brandi Graf Patient : 1940   MRN: [id number]  Reason for Admission: There are no discharge diagnoses documented for the most recent discharge. Discharge Date: 21       Spoke with: Shan Gillis     Discharge department/facility: [facility] [address] Interactive Patient Contact:  Was patient able to fill all prescriptions: Yes  Was patient instructed to bring all medications to the follow-up visit: Yes  Is patient taking all medications as directed in the discharge summary?  Yes  Does patient understand their discharge instructions: Yes  Does patient have questions or concerns that need addressed prior to 7-14 day follow up office visit: yes - Questions on albuterol inhaler     Scheduled appointment with PCP within 7-14 days    Follow Up  Future Appointments   Date Time Provider Talha Almanza   2021 10:40 AM Cynthia Joyner [facility] [place]   2021  1:00 PM DO SERGIO Gonsales [facility] [place]   2021  9:40 AM [facility] [place]   2021  9:20 AM DO SERGIO Gonsales [facility] LPN

## 2021-07-13 ENCOUNTER — CARE COORDINATION (OUTPATIENT)
Dept: CASE MANAGEMENT | Age: 81
End: 2021-07-13

## 2021-07-13 ENCOUNTER — OFFICE VISIT (OUTPATIENT)
Dept: FAMILY MEDICINE CLINIC | Age: 81
End: 2021-07-13
Payer: MEDICARE

## 2021-07-13 ENCOUNTER — HOSPITAL ENCOUNTER (OUTPATIENT)
Dept: PHARMACY | Age: 81
Setting detail: THERAPIES SERIES
Discharge: HOME | End: 2021-07-13
Payer: MEDICARE

## 2021-07-13 VITALS
OXYGEN SATURATION: 94 % | SYSTOLIC BLOOD PRESSURE: 114 MMHG | HEIGHT: 67 IN | BODY MASS INDEX: 30.07 KG/M2 | DIASTOLIC BLOOD PRESSURE: 68 MMHG | WEIGHT: 191.6 LBS | RESPIRATION RATE: 16 BRPM | TEMPERATURE: 97.3 F | HEART RATE: 59 BPM

## 2021-07-13 DIAGNOSIS — I48.21 PERMANENT ATRIAL FIBRILLATION (HCC): Primary | ICD-10-CM

## 2021-07-13 DIAGNOSIS — J44.1 COPD EXACERBATION (HCC): Primary | ICD-10-CM

## 2021-07-13 DIAGNOSIS — Z79.01 ANTICOAGULATED ON COUMADIN: ICD-10-CM

## 2021-07-13 DIAGNOSIS — J01.90 ACUTE RHINOSINUSITIS: ICD-10-CM

## 2021-07-13 DIAGNOSIS — Z51.81 ENCOUNTER FOR THERAPEUTIC DRUG MONITORING: ICD-10-CM

## 2021-07-13 DIAGNOSIS — J96.01 ACUTE RESPIRATORY FAILURE WITH HYPOXIA (HCC): Primary | ICD-10-CM

## 2021-07-13 LAB — POC INR: 2.1 (ref 0.8–1.2)

## 2021-07-13 PROCEDURE — 1111F DSCHRG MED/CURRENT MED MERGE: CPT | Performed by: NURSE PRACTITIONER

## 2021-07-13 PROCEDURE — 1036F TOBACCO NON-USER: CPT | Performed by: NURSE PRACTITIONER

## 2021-07-13 PROCEDURE — G8427 DOCREV CUR MEDS BY ELIG CLIN: HCPCS | Performed by: NURSE PRACTITIONER

## 2021-07-13 PROCEDURE — 1123F ACP DISCUSS/DSCN MKR DOCD: CPT | Performed by: NURSE PRACTITIONER

## 2021-07-13 PROCEDURE — 99214 OFFICE O/P EST MOD 30 MIN: CPT | Performed by: NURSE PRACTITIONER

## 2021-07-13 PROCEDURE — 99211 OFF/OP EST MAY X REQ PHY/QHP: CPT | Performed by: PHARMACIST

## 2021-07-13 PROCEDURE — G8417 CALC BMI ABV UP PARAM F/U: HCPCS | Performed by: NURSE PRACTITIONER

## 2021-07-13 PROCEDURE — 1111F DSCHRG MED/CURRENT MED MERGE: CPT | Performed by: FAMILY MEDICINE

## 2021-07-13 PROCEDURE — 3023F SPIROM DOC REV: CPT | Performed by: NURSE PRACTITIONER

## 2021-07-13 PROCEDURE — 85610 PROTHROMBIN TIME: CPT | Performed by: PHARMACIST

## 2021-07-13 PROCEDURE — G8926 SPIRO NO PERF OR DOC: HCPCS | Performed by: NURSE PRACTITIONER

## 2021-07-13 PROCEDURE — 36416 COLLJ CAPILLARY BLOOD SPEC: CPT | Performed by: PHARMACIST

## 2021-07-13 PROCEDURE — 4040F PNEUMOC VAC/ADMIN/RCVD: CPT | Performed by: NURSE PRACTITIONER

## 2021-07-13 RX ORDER — BUMETANIDE 1 MG/1
1 TABLET ORAL DAILY
Qty: 90 TABLET | Refills: 3 | Status: SHIPPED | OUTPATIENT
Start: 2021-07-13 | End: 2022-05-02

## 2021-07-13 RX ORDER — FLUTICASONE PROPIONATE 50 MCG
2 SPRAY, SUSPENSION (ML) NASAL DAILY
Qty: 1 BOTTLE | Refills: 3 | Status: SHIPPED | OUTPATIENT
Start: 2021-07-13 | End: 2021-09-27 | Stop reason: ALTCHOICE

## 2021-07-13 NOTE — CARE COORDINATION
Rickie 45 Transitions Initial Follow Up Call    Call within 2 business days of discharge: Yes    Patient: Jessie Herzog Patient : 1940   MRN: [id number]  Reason for Admission: Pneumonia due to infectious organism, unspecified laterality, unspecified part of lung  Discharge Date: 21 RARS: Readmission Risk Score: 29      Last Discharge [address] [address]       Complaint Diagnosis Description Type Department Provider    7/3/21 Cough Pneumonia due to infectious organism, unspecified laterality, unspecified part of lung . .. ED to Hosp-Admission (Discharged) (ADMITTED) ABIMAEL 3B Shahid Calvert MD; Parrish Hudson. .. Spoke with: Maurice Miller, states he is doing good, has a SPO2 monitor at home, getting around 96 % room air, no oxygen. He is walking around denies SOB. He has all medicines, aware of upcoming appts today and tomorrow. He does not check his blood sugars will discuss with PCP. Transitions of Care Initial Call    Was this an external facility discharge? No Discharge Facility: [facility]      Challenges to be reviewed by the provider   Additional needs identified to be addressed with provider: No  none             Method of communication with provider : none      Advance Care Planning:   Does patient have an Advance Directive: reviewed and current. Was this a readmission? No  Patient stated reason for admission:  Pneumonia  Patients top risk factors for readmission: lack of knowledge about disease and polypharmacy    Care Transition Nurse (CTN) contacted the patient by telephone to perform post hospital discharge assessment. Verified name and  with patient as identifiers. Provided introduction to self, and explanation of the CTN role. CTN reviewed discharge instructions, medical action plan and red flags with patient who verbalized understanding. Patient given an opportunity to ask questions and does not have any further questions or concerns at this time.  Were discharge instructions available to patient? Yes. Reviewed appropriate site of care based on symptoms and resources available to patient including: PCP and Specialist. The patient agrees to contact the PCP office for questions related to their healthcare. Medication reconciliation was performed with patient, who verbalizes understanding of administration of home medications. Advised obtaining a 90-day supply of all daily and as-needed medications. Covid Risk Education     Educated patient about risk for severe COVID-19 due to risk factors according to CDC guidelines. CTN reviewed discharge instructions, medical action plan and red flag symptoms with the patient who verbalized understanding. Discussed COVID vaccination status: No. Education provided on COVID-19 vaccination as appropriate. Discussed exposure protocols and quarantine with CDC Guidelines. Patient was given an opportunity to verbalize any questions and concerns and agrees to contact CTN or health care provider for questions related to their healthcare. Reviewed and educated patient on any new and changed medications related to discharge diagnosis. Was patient discharged with a pulse oximeter? Yes Discussed and confirmed pulse oximeter discharge instructions and when to notify provider or seek emergency care. CTN provided contact information. Plan for follow-up call in 3-5 days based on severity of symptoms and risk factors.   Plan for next call: symptom management-sob, follow up appointment-PCP  and medication management-Coumadin      Non-face-to-face services provided:  Obtained and reviewed discharge summary and/or continuity of care documents    Care Transitions 24 Hour Call    Do you have any ongoing symptoms?: Yes  Patient-reported symptoms: Shortness of Breath  Do you have a copy of your discharge instructions?: Yes  Do you have all of your prescriptions and are they filled?: Yes  Have you been contacted by a [facility] Pharmacist?: No  Have you scheduled your follow up appointment?: Yes  How are you going to get to your appointment?: Car - family or friend to transport  Were you discharged with any Home Care or Post Acute Services: No  Do you feel like you have everything you need to keep you well at home?: Yes  Care Transitions Interventions         Follow Up  Future Appointments   Date Time Provider Talha Almanza   7/13/2021 10:40 AM Sai Kearney, [address] [facility] [place] [address] [place]   7/16/2021  1:00 PM DO SERGIO Frye [facility] - [place]   9/27/2021  9:40 AM DO MARGARITO Harris [facility] - [place] [address] [place] [address]   11/11/2021  9:20 AM DO SERGIO Frye [facility] RN

## 2021-07-13 NOTE — PROGRESS NOTES
[facility] MEDICINE  [address] [facility]  Dept: [phone]  Loc: [phone]  Visit Date: 7/13/2021      Chief Complaint:   Rosmery Lagos is a 79 y.o. male thatpresents for Shortness of Breath (worse when lying down wheezes) and Cough        HPI:  Comes in today with c/o not being able to breath through his nose well  Still having some SOB, not taking his breathing tx, said he was told to only take them if he needs them  Cough continues but significantly improved  Denies any CP, fever, chills  Tolerating diet  Voiding well  Having BMs    The patient comes in with his SO today. History obtained from pt, SO, and medical records. I have reviewed the patient's past medical history, past surgical history, allergies,medications, social and family history and I have made updates where appropriate. Past Medical History:   Diagnosis Date    MILLY (acute kidney injury) ([address]     Atrial fibrillation (HCC)     Cerebral artery occlusion with cerebral infarction ([address]     CHF (congestive heart failure), NYHA class III, acute on chronic, combined ([address]     COPD (chronic obstructive pulmonary disease) ([address] 8/3/2020    Coronary artery disease involving native coronary artery of native heart with angina pectoris ([address]     Diabetes mellitus, type 2 ([address] 12/30/2020    Hyperlipidemia 2/20/2012    Hypertension     Pneumonia        Past Surgical History:   Procedure Laterality Date    CARDIAC SURGERY      heart cath    CORONARY ANGIOPLASTY WITH STENT PLACEMENT      HERNIA REPAIR      OTHER SURGICAL HISTORY  05/10/2018    PACEMAKER INSERTION         Social History     Tobacco Use    Smoking status: Former Smoker     Packs/day: 1.00     Years: 50.00     Pack years: 50.00     Types: Cigarettes    Smokeless tobacco: Never Used   Vaping Use    Vaping Use: Never used   Substance Use Topics    Alcohol use: Yes     Comment: rarely    Drug use:  No Family History   Family history unknown: Yes         Review of Systems   HENT: Positive for congestion. Respiratory: Positive for cough and shortness of breath. PHYSICAL EXAM:  /68   Pulse 59   Temp 97.3 °F (36.3 °C) (Infrared)   Resp 16   Ht 5' 7\" (1.702 m)   Wt 191 lb 9.6 oz (86.9 kg)   SpO2 94%   BMI 30.01 kg/m²     Physical Exam  Constitutional:       Appearance: Normal appearance. HENT:      Head: Normocephalic and atraumatic. Right Ear: External ear normal.      Left Ear: External ear normal.      Nose: Congestion present. Mouth/Throat:      Mouth: Mucous membranes are moist.   Eyes:      Conjunctiva/sclera: Conjunctivae normal.   Cardiovascular:      Rate and Rhythm: Normal rate and regular rhythm. Pulses: Normal pulses. Heart sounds: Normal heart sounds. Pulmonary:      Effort: Pulmonary effort is normal.      Breath sounds: Wheezing present. Abdominal:      General: Bowel sounds are normal.      Palpations: Abdomen is soft. Musculoskeletal:         General: Normal range of motion. Cervical back: Normal range of motion. Skin:     General: Skin is warm and dry. Neurological:      General: No focal deficit present. Mental Status: He is alert and oriented to person, place, and time. Psychiatric:         Mood and Affect: Mood normal.         Behavior: Behavior normal.             ASSESSMENT & PLAN  Cee Caldera was seen today for shortness of breath and cough. Diagnoses and all orders for this visit:    COPD exacerbation ([address]  -     fluticasone (FLONASE) 50 MCG/ACT nasal spray; 2 sprays by Each Nostril route daily    Acute rhinosinusitis  -     bumetanide (BUMEX) 1 MG tablet;  Take 1 tablet by mouth daily  -     fluticasone (FLONASE) 50 MCG/ACT nasal spray; 2 sprays by Each Nostril route daily    Went over his AVS from the hospital at length with him and his SO  Breathing tx are prn, explained to him that it is ordered prn for SOB and wheezing, which he has both  Instructed him to begin nebulizers consistently over the next few days until his f/u Fri  Encouraged him to keep his Albuterol inhaler on him in case he needs it while he is out and about  Start Flonase for the congestion  Explained to him and wife several times that it is safe to take both Albuterol and Flonase  He was not taking his tiotropium-ololdaterol as ordered, explained several times to him and SO to take 2 puffs of this every morning. Reordered Bumex as they were getting letters from [facility] needing a new script  Instructed him to begin walking around inside his house hourly while awake, rest if needed   Continue Mucinex for cough and congestion  Continue Singulair at night  Drink plenty of water      No follow-ups on file. Nicole Iverson received counseling on the following healthy behaviors: nutrition, exercise and medication adherence  Reviewedprior labs and health maintenance. Continue current medications, diet and exercise. Discussed use, benefit, and side effects of prescribed medications. Barriers to medication compliance addressed. Patient given educationalmaterials - see patient instructions. All patient questions answered. Patient voiced understanding.      Electronically signed by WILBERTO Martinez - CNP, APRN on 7/13/21 at 3:37 PM EDT

## 2021-07-13 NOTE — PROGRESS NOTES
Medication Management [address]   [phone] (phone)  [phone] (fax)    Mr. Kelly Hernández is a 79 y.o.  male with history of Afib who presents today for anticoagulation monitoring and adjustment. Patient verifies current dosing regimen and tablet strength. Patient was admitted to the hospital 7/3/21-7/9/21 trackboard updated to reflect doses given in Hospital and at discharge. No missed or extra doses. Patient denies s/s bleeding/bruising/swelling/SOB/chest pain  No blood in urine or stool. No dietary changes. Changes in medication/OTC agents/Herbals. New tiotropium 2 puff daily on discharge from hospital. New albuterol sulfate inhaler 2puff q6H prn wheezing or SOB on discharge-has not had to use. Discussed proper technique with him and wife per request. Completed with antibiotics and prednisone (was on cipro at discharge for 2 days 7/9-7/10.)  No change in alcohol use or tobacco use. Change in activity level. Still weak but up moving daily now. Patient denies headaches/dizziness/lightheadedness/falls. No vomiting/diarrhea or acute illness. No Procedures scheduled in the future at this time. Patient is following up with Dr. Jacquelyn Boss this Friday 7/16/21. Assessment:   Lab Results   Component Value Date    INR 2.10 (H) 07/13/2021    INR 2.64 (H) 07/09/2021    INR 2.33 (H) 07/08/2021    PROTIME 10.9 05/12/2018    PROTIME 11.1 05/11/2018    PROTIME 11.3 05/10/2018     INR therapeutic   Recent Labs     07/13/21  1110   INR 2.10*       Plan:  Continue Coumadin 2.5mg daily. Recheck INR in 4 weeks (patient was a 6 week check). Patient reminded to call the Anticoagulation Clinic with any signs or symptoms of bleeding or with any medication changes. Patient given instructions utilizing the teach back method. After visit summary printed and reviewed with patient. Discharged ambulatory in no apparent distress.     For Pharmacy Admin Tracking Only     Intervention Detail:    Total # of Interventions Recommended: 0   Total # of Interventions Accepted: 0   Time Spent (min): Moises Mariscal, PharmD 7/13/2021 12:01 PM

## 2021-07-13 NOTE — PATIENT INSTRUCTIONS
Patient Education        COPD Exacerbation Plan: Care Instructions  Your Care Instructions     If you have chronic obstructive pulmonary disease (COPD), your usual shortness of breath could suddenly get worse. You may start coughing more and have more mucus. This flare-up is called a COPD exacerbation (say \"[id number]-[id number]-BAY-[id number]\"). A lung infection or air pollution could set off an exacerbation. Sometimes it can happen after a quick change in temperature or being around chemicals. Work with your doctor to make a plan for dealing with an exacerbation. You can better manage it if you plan ahead. Follow-up care is a key part of your treatment and safety. Be sure to make and go to all appointments, and call your doctor if you are having problems. It's also a good idea to know your test results and keep a list of the medicines you take. How can you care for yourself at home? During an exacerbation  · Do not panic if you start to have one. Quick treatment at home may help you prevent serious breathing problems. If you have a COPD exacerbation plan that you developed with your doctor, follow it. · Take your medicines exactly as your doctor tells you.  ? Use your inhaler as directed by your doctor. If your symptoms do not get better after you use your medicine, have someone take you to the emergency room. Call an ambulance if necessary. ? With inhaled medicines, a spacer or a nebulizer may help you get more medicine to your lungs. Ask your doctor or pharmacist how to use them properly. Practice using the spacer in front of a mirror before you have an exacerbation. This may help you get the medicine into your lungs quickly. ? If your doctor has given you steroid pills, take them as directed. ? Your doctor may have given you a prescription for antibiotics, which you can fill if you need it. ? Talk to your doctor if you have any problems with your medicine.  And call your doctor if you have to use your antibiotic or steroid pills. Preventing an exacerbation  · Do not smoke. This is the most important step you can take to prevent more damage to your lungs and prevent problems. If you already smoke, it is never too late to stop. If you need help quitting, talk to your doctor about stop-smoking programs and medicines. These can increase your chances of quitting for good. · Take your daily medicines as prescribed. · Avoid colds and flu. ? Get a pneumococcal vaccine. ? Get a flu vaccine each year, as soon as it is available. Ask those you live or work with to do the same, so they will not get the flu and infect you. ? Try to stay away from people with colds or the flu. ? Wash your hands often. · Avoid secondhand smoke; air pollution; cold, dry air; hot, humid air; and high altitudes. Stay at home with your windows closed when air pollution is bad. · Learn breathing techniques for COPD, such as breathing through pursed lips. These techniques can help you breathe easier during an exacerbation. When should you call for help? Call 911 anytime you think you may need emergency care. For example, call if:    · You have severe trouble breathing.     · You have severe chest pain. Call your doctor now or seek immediate medical care if:    · You have new or worse shortness of breath.     · You develop new chest pain.     · You are coughing more deeply or more often, especially if you notice more mucus or a change in the color of your mucus.     · You cough up blood.     · You have new or increased swelling in your legs or belly.     · You have a fever. Watch closely for changes in your health, and be sure to contact your doctor if:    · You need to use your antibiotic or steroid pills.     · Your symptoms are getting worse. Where can you learn more? Go to [email or web address] org and sign in to your [employer] account.  Enter V320 in the [facility]) box to learn more about \"COPD Exacerbation Plan: Care Instructions. \"     If you do not have an account, please click on the \"Sign Up Now\" link. Current as of: October 26, 2020               Content Version: 12.9  © 2006-2021 Healthwise, Incorporated. Care instructions adapted under license by [facility] ([facility]). If you have questions about a medical condition or this instruction, always ask your healthcare professional. [address] any warranty or liability for your use of this information.

## 2021-07-14 ASSESSMENT — ENCOUNTER SYMPTOMS
SHORTNESS OF BREATH: 1
COUGH: 1

## 2021-07-16 ENCOUNTER — OFFICE VISIT (OUTPATIENT)
Dept: FAMILY MEDICINE CLINIC | Age: 81
End: 2021-07-16
Payer: MEDICARE

## 2021-07-16 VITALS
DIASTOLIC BLOOD PRESSURE: 62 MMHG | SYSTOLIC BLOOD PRESSURE: 100 MMHG | BODY MASS INDEX: 30.29 KG/M2 | WEIGHT: 193 LBS | RESPIRATION RATE: 18 BRPM | HEART RATE: 84 BPM | HEIGHT: 67 IN | OXYGEN SATURATION: 95 % | TEMPERATURE: 97.2 F

## 2021-07-16 DIAGNOSIS — J18.9 PNEUMONIA OF LEFT LOWER LOBE DUE TO INFECTIOUS ORGANISM: ICD-10-CM

## 2021-07-16 DIAGNOSIS — J01.90 ACUTE RHINOSINUSITIS: ICD-10-CM

## 2021-07-16 DIAGNOSIS — I50.43 CHF (CONGESTIVE HEART FAILURE), NYHA CLASS III, ACUTE ON CHRONIC, COMBINED (HCC): ICD-10-CM

## 2021-07-16 DIAGNOSIS — J44.1 COPD EXACERBATION (HCC): Primary | ICD-10-CM

## 2021-07-16 DIAGNOSIS — I10 ESSENTIAL HYPERTENSION: ICD-10-CM

## 2021-07-16 PROCEDURE — 99495 TRANSJ CARE MGMT MOD F2F 14D: CPT | Performed by: FAMILY MEDICINE

## 2021-07-16 PROCEDURE — 1111F DSCHRG MED/CURRENT MED MERGE: CPT | Performed by: FAMILY MEDICINE

## 2021-07-16 NOTE — PROGRESS NOTES
Transition of Care Visit/Hospital Follow Up:      Coleen Arauz is a 79 y.o. male that presents for Follow-up        Date of Discharge:   7/9/21  Was patient contacted within 2 business days of discharge (see chart for documentation):  yes - 7/13/21      Patient presents for hospital follow up. Patient recently hospitalized at [facility] for treatment of COPD. Symptoms prior to admission:  SOB     Hospital Course per DC Summary:    Hospital Course:   Coleen Arauz is a 79 y.o. male with pmhx significant for Afib, CAD, CHF, COPD, HLD who was admitted to [address] [place] [address] on 7/3/2021 for worsening shortness of breath.         HPI:   \"79 y. o. male who presented to [address] [place] [address] with a worsening of his cough; he has a past medical history of heart failure, diabetes, TAVR, CKD and atrial fibrillation; this patient has been on Augmentin and prednisone recently along with the possibility of Zithromax; wife states he has been in the emergency department and also to his PCP twice here in the last couple weeks; he continues with a cough and continues with a cough with green phlegm and shortness of breath; he is fully alert and oriented, he denies any pain; he denies any fever chills.     In the emergency department, chest x-ray reveals a left lower lobe pneumonia, lactic acid at 5.2, BNP at 3319; he has a chronic troponin elevation along with a CKD; he currently denies any lightheadedness or dizziness, chest pain, states \"I get short of breath when I start coughing\"; he denies any nausea, vomiting or diarrhea; he was started on Zithromax and Rocephin in the emergency department and is being admitted to hospital service for further care and evaluation. \"      Patient had a CT chest showed density in right lower lobe, lingula, and lesser extent right upper lobe suggesting inflammatory process possibly covid 19. Covid rapid and pcr, however were negative. CT also showed collapse in LLL posteriorly. Pneumonia panel positive for pseudomonas, antibiotics changed accordingly. Patient was seen by pulmonology who suggested patient may need bronchoscopy when breathing improved, or outpatient follow up. On 7/6 patient continued to need heated high flow nasal cannula, was weaned as tolerated. On 7/7 patient continued to improve and by 7/8 patient was no longer requiring oxygen. Patient was discharged on 7/9 with two more days of antibiotics and appropriate recommendations and follow-ups in place. Medication changes at discharge:  Med list reconciled today    Clinical course since discharge:  States that he is feeling better overall. He is using his nebulizer more frequently now and this is helping more. Flonase is helping with his nasal congestion. Still having some exertional dyspnea. Cough is more productive now. Appetite is doing pretty well overall. Sleeping well recently. No constipation.           Current Outpatient Medications   Medication Sig Dispense Refill    bumetanide (BUMEX) 1 MG tablet Take 1 tablet by mouth daily 90 tablet 3    fluticasone (FLONASE) 50 MCG/ACT nasal spray 2 sprays by Each Nostril route daily 1 Bottle 3    tiotropium-olodaterol (STIOLTO) 2.5-2.5 MCG/ACT AERS Inhale 2 puffs into the lungs daily 1 Inhaler 11    albuterol sulfate HFA (VENTOLIN HFA) 108 (90 Base) MCG/ACT inhaler Inhale 2 puffs into the lungs every 6 hours as needed for Wheezing or Shortness of Breath 3 Inhaler 1    guaiFENesin (MUCINEX) 600 MG extended release tablet Take 1 tablet by mouth 2 times daily 60 tablet 0    montelukast (SINGULAIR) 10 MG tablet Take 1 tablet by mouth daily 30 tablet 3    cetirizine (ZYRTEC) 10 MG tablet Take 1 tablet by mouth daily 30 tablet 0    losartan (COZAAR) 25 MG tablet Take 1 tablet by mouth nightly 90 tablet 3    metFORMIN (GLUCOPHAGE) 500 MG tablet Take 1 tablet by mouth 2 times daily (with meals) 180 tablet 3    nitroGLYCERIN (NITROSTAT) 0.4 MG SL tablet Place 0.4 mg Vaping Use    Vaping Use: Never used   Substance Use Topics    Alcohol use: Yes     Comment: rarely    Drug use: No       Family History   Family history unknown: Yes         I have reviewed the patient's past medical history, past surgical history, allergies, medications, social and family history and I have made updates where appropriate. PHYSICAL EXAM:  /62   Pulse 84   Temp 97.2 °F (36.2 °C) (Infrared)   Resp 18   Ht 5' 7\" (1.702 m)   Wt 193 lb (87.5 kg)   SpO2 95%   BMI 30.23 kg/m²   General Appearance: well developed and well- nourished, in no acute distress  Head: normocephalic and atraumatic  ENT: external ear and ear canal normal bilaterally, nose without deformity, nasal mucosa and turbinates normal without polyps, oropharynx normal, dentition is normal for age, no lip or gum lesions noted  Neck: supple and non-tender without mass, no thyromegaly or thyroid nodules, no cervical lymphadenopathy  Pulmonary/Chest: clear to auscultation bilaterally- no wheezes, rales or rhonchi, normal air movement, no respiratory distress or retractions  Cardiovascular: normal rate, regular rhythm, normal S1 and S2, no murmurs, rubs, clicks, or gallops, distal pulses intact  Abdomen: soft, non-tender, non-distended, no rebound or guarding, no masses or hernias noted, no hepatospleenomegaly  Extremities: no cyanosis, clubbing or edema of the lower extremities  Psych:  Normal affect without evidence of depression or anxiety, insight and judgement are appropriate, memory appears intact  Skin: warm and dry, no rash or erythema      Diagnostic test results reviewed: inpatient labs    Patient risk of morbidity and mortality: moderate      ASSESSMENT & PLAN  Chapo Obrien was seen today for follow-up.     Diagnoses and all orders for this visit:    COPD exacerbation ([address]  -     OR DISCHARGE MEDS RECONCILED W/ CURRENT OUTPATIENT MED LIST    Acute rhinosinusitis    CHF (congestive heart failure), NYHA class III,

## 2021-07-21 ENCOUNTER — CARE COORDINATION (OUTPATIENT)
Dept: CASE MANAGEMENT | Age: 81
End: 2021-07-21

## 2021-07-21 NOTE — CARE COORDINATION
Rickie [facility] Transitions Follow Up Call    2021    Patient: Waleska Horne  Patient : 1940   MRN: <[id number]>  Reason for Admission: Pneumonia due to infectious organism  COPD  Discharge Date: 21 RARS: Readmission Risk Score: 29         Spoke with: Simon Monterroso states he is doing ok. He denies chest pain or shortness of breath at this time. SPO2 today is 95%. Pt. Uses his inhalers and nebulizer with good results. Appetite is good. Pt. States he is on a low sodium , diabetic diet. FBS today was 101. Takes all meds as directed. Last PT/INR was on 21 and he gets it checked every 4 weeks next check is 8/10/21. Santosdy  Pt. Denies need for HHC. His next appt with PCP is 21. will continue to follow. Care Transitions Follow Up Call    Needs to be reviewed by the provider   Additional needs identified to be addressed with provider: No  none             Method of communication with provider : none      Care Transition Nurse (CTN) contacted the patient by telephone to follow up after admission on7/3/21. Verified name and  with patient as identifiers. Addressed changes since last contact: none  Discussed follow-up appointments. If no appointment was previously scheduled, appointment scheduling offered: completed  Is follow up appointment scheduled within 7 days of discharge? Yes. Advance Care Planning:   Does patient have an Advance Directive: reviewed and current. CTN reviewed discharge instructions, medical action plan and red flags with patient and discussed any barriers to care and/or understanding of plan of care after discharge. Discussed appropriate site of care based on symptoms and resources available to patient including: PCP and Specialist. The patient agrees to contact the PCP office for questions related to their healthcare.      Patients top risk factors for readmission: medical condition-COPD  PNEUMONIA  Interventions to address risk factors: Obtained and reviewed discharge summary and/or continuity of care documents      Non-[facility] follow up appointment(s): N/A    CTN provided contact information for future needs. Plan for follow-up call in 7-10 days based on severity of symptoms and risk factors. Plan for next call: symptom management-DYSPNEA          Care Transitions Subsequent and Final Call    Subsequent and Final Calls  Do you have any ongoing symptoms?: No  Have your medications changed?: No  Do you have any questions related to your medications?: Yes  Do you currently have any active services?: No  Do you have any needs or concerns that I can assist you with?: No  Identified Barriers: None  Care Transitions Interventions  No Identified Needs  Other Interventions: Follow Up  Future Appointments   Date Time Provider Talha Almanza   8/10/2021 10:00 AM JUAN Chandra [facility]   9/27/2021  9:40 AM DO MARGARITO Duffy [facility], [facility]. [facility] - [facility]   11/11/2021  9:20 AM DO SERGIO Caldwell PCT [facility] - [facility] Jamil LPN     . Delgado Lemon LPN Care Transitions Nurse   [phone]

## 2021-07-26 ENCOUNTER — APPOINTMENT (OUTPATIENT)
Dept: PHARMACY | Age: 81
End: 2021-07-26
Payer: MEDICARE

## 2021-07-29 ENCOUNTER — CARE COORDINATION (OUTPATIENT)
Dept: CARE COORDINATION | Age: 81
End: 2021-07-29

## 2021-07-29 NOTE — CARE COORDINATION
Care Transitions Outreach Attempt    Call within 2 business days of discharge: No   Attempted to reach patient for transitions of care follow up. Was not able to leave due to  not being set up. Unable to reach patient. Patient: Funmi Ellis Patient : 1940 MRN: <[id number]>    Last Discharge [address] [address]       Complaint Diagnosis Description Type Department Provider    7/3/21 Cough Pneumonia due to infectious organism, unspecified laterality, unspecified part of lung . .. ED to Hosp-Admission (Discharged) (ADMITTED) [facility] Nicholas Jaimes MD; Tim Manning. .. Was this an external facility discharge?  No Discharge Facility: [facility]    Noted following upcoming appointments from discharge chart review:   [facility] follow up appointment(s):   Future Appointments   Date Time Provider Talha Almanza   8/10/2021 10:00 AM Triston Ordaz, [facility] [address]   2021  9:40 AM DO [facility]ITO [facility] [address]   2021  9:20 AM DO SERGIO Rios [address]     Non-[facility] follow up appointment(s):

## 2021-07-30 ENCOUNTER — OFFICE VISIT (OUTPATIENT)
Dept: FAMILY MEDICINE CLINIC | Age: 81
End: 2021-07-30
Payer: MEDICARE

## 2021-07-30 VITALS
BODY MASS INDEX: 31.11 KG/M2 | SYSTOLIC BLOOD PRESSURE: 104 MMHG | TEMPERATURE: 97.3 F | DIASTOLIC BLOOD PRESSURE: 72 MMHG | OXYGEN SATURATION: 95 % | HEIGHT: 67 IN | WEIGHT: 198.2 LBS | RESPIRATION RATE: 22 BRPM | HEART RATE: 62 BPM

## 2021-07-30 DIAGNOSIS — J44.9 MODERATE COPD (CHRONIC OBSTRUCTIVE PULMONARY DISEASE) (HCC): ICD-10-CM

## 2021-07-30 PROCEDURE — 1111F DSCHRG MED/CURRENT MED MERGE: CPT | Performed by: FAMILY MEDICINE

## 2021-07-30 PROCEDURE — 4040F PNEUMOC VAC/ADMIN/RCVD: CPT | Performed by: FAMILY MEDICINE

## 2021-07-30 PROCEDURE — 1036F TOBACCO NON-USER: CPT | Performed by: FAMILY MEDICINE

## 2021-07-30 PROCEDURE — 99213 OFFICE O/P EST LOW 20 MIN: CPT | Performed by: FAMILY MEDICINE

## 2021-07-30 PROCEDURE — 1123F ACP DISCUSS/DSCN MKR DOCD: CPT | Performed by: FAMILY MEDICINE

## 2021-07-30 PROCEDURE — 3023F SPIROM DOC REV: CPT | Performed by: FAMILY MEDICINE

## 2021-07-30 PROCEDURE — G8926 SPIRO NO PERF OR DOC: HCPCS | Performed by: FAMILY MEDICINE

## 2021-07-30 PROCEDURE — G8417 CALC BMI ABV UP PARAM F/U: HCPCS | Performed by: FAMILY MEDICINE

## 2021-07-30 PROCEDURE — G8427 DOCREV CUR MEDS BY ELIG CLIN: HCPCS | Performed by: FAMILY MEDICINE

## 2021-07-30 RX ORDER — WARFARIN SODIUM 2.5 MG/1
2.5 TABLET ORAL DAILY
Qty: 90 TABLET | Refills: 3 | Status: SHIPPED | OUTPATIENT
Start: 2021-07-30 | End: 2022-06-28

## 2021-07-30 NOTE — PROGRESS NOTES
SUBJECTIVE:  Jessie Herzog is a 79 y.o. y/o male that presents with Shortness of Breath (coughing up phelgm doesnt feel breathing treatment is helping ) and Edema (left foot at night)  . HPI:      Symptoms have been present for 1 day(s). Hospitalized earlier this month with COPD exacerbation. States that symptoms improved quite a bit until last night. Took a breath treatment and did not seem much improvement. Has not had to cough since this morning. He is scheduled with pulm. Symptoms are better since they initially started. Fever? No  Runny nose or congestion? No   Cough? Yes - but better now  Sore throat? No  Shortness of breath/Wheezing? No  Other associated symptoms?  none      Known COVID exposures or RFs? No  Smoker? No  Preexisting Respiratory conditions?  copd      Past Medical History:   Diagnosis Date    [facility]     Atrial fibrillation (HCC)     [facility]erebral artery occlusion with cerebral infarction ([facility]     CHF (congestive heart failure), NYHA class III, acute on chronic, combined ([facility]     COPD (chronic obstructive pulmonary disease) ([facility] 8/3/2020    Coronary artery disease involving native coronary artery of native heart with angina pectoris ([facility]     Diabetes mellitus, type 2 ([facility] 12/30/2020    Hyperlipidemia 2/20/2012    Hypertension     Pneumonia        Social History     Socioeconomic History    Marital status:       Spouse name: Not on file    Number of children: Not on file    Years of education: Not on file    Highest education level: Not on file   Occupational History    Not on file   Tobacco Use    Smoking status: Former Smoker     Packs/day: 1.00     Years: 50.00     Pack years: 50.00     Types: Cigarettes    Smokeless tobacco: Never Used   Vaping Use    Vaping Use: Never used   Substance and Sexual Activity    Alcohol use: Yes     Comment: rarely    Drug use: No    Sexual activity: Yes     Partners: Female   Other Topics shortness of breath and edema. Diagnoses and all orders for this visit:    Moderate COPD [facility]chronic obstructive [facility] disease) [facility]  -     tiotropium-olodaterol (STIOLTO) 2.5-2.5 MCG/ACT AERS; Inhale 2 puffs into the lungs daily        Return if symptoms worsen or fail to improve.     -Exam normal today, patient and wife just wanted to clarify when to take his COPD meds. All questions answered today. Advised to let me know if they have any further concerns. On the date of the visit, I have spent 20 minutes in face to face time with patient as well as coordinating care, reviewing previous notes and test results. I have reviewed this patient's history, habits, and medication list and have updated the chart where appropriate.

## 2021-08-03 ENCOUNTER — CARE COORDINATION (OUTPATIENT)
Dept: CASE MANAGEMENT | Age: 81
End: 2021-08-03

## 2021-08-03 NOTE — CARE COORDINATION
[facility]ie [facility] Transitions Initial Follow Up Call    Call within 2 business days of discharge: Yes    Patient: Marilee Ventura Patient : 1940   MRN: [id number]  Reason for Admission: Pneumonia due to infectious organism, unspecified laterality, unspecified part of lung  Discharge Date: 21 RARS: Readmission Risk Score: 29      Last Discharge [facility]       Complaint Diagnosis Description Type Department Provider    7/3/21 Cough Pneumonia due to infectious organism, unspecified laterality, unspecified part of lung . .. ED to Hosp-Admission (Discharged) (ADMITTED) [facility] Ciara Moreau MD; Marin Mcintyre. .. Spoke with: Beck Barahona, states he is doing good, has a SPO2 monitor at home, getting around 96 % room air, no oxygen. He saw his PCP last week no changes to medicatoins. He did have an issue with [employer] charging too miuch for inhalers but it supposed to be fixed this coming Friday. Transitions of Care Initial Call    Was this an external facility discharge? No Discharge Facility: [facility]      Challenges to be reviewed by the provider   Additional needs identified to be addressed with provider: No  none             Method of communication with provider : none      Advance Care Planning:   Does patient have an Advance Directive: reviewed and current. Was this a readmission? No  Patient stated reason for admission:  Pneumonia  Patients top risk factors for readmission: lack of knowledge about disease and polypharmacy    Care Transition Nurse (CTN) contacted the patient by telephone to perform post hospital discharge assessment. Verified name and  with patient as identifiers. Provided introduction to self, and explanation of the CTN role. CTN reviewed discharge instructions, medical action plan and red flags with patient who verbalized understanding. Patient given an opportunity to ask questions and does not have any further questions or concerns at this time.  Were discharge instructions available to patient? Yes. Reviewed appropriate site of care based on symptoms and resources available to patient including: PCP and Specialist. The patient agrees to contact the PCP office for questions related to their healthcare. Medication reconciliation was performed with patient, who verbalizes understanding of administration of home medications. Advised obtaining a 90-day supply of all daily and as-needed medications. Covid Risk Education     Educated patient about risk for severe COVID-19 due to risk factors according to CDC guidelines. CTN reviewed discharge instructions, medical action plan and red flag symptoms with the patient who verbalized understanding. Discussed COVID vaccination status: No. Education provided on COVID-19 vaccination as appropriate. Discussed exposure protocols and quarantine with CDC Guidelines. Patient was given an opportunity to verbalize any questions and concerns and agrees to contact CTN or health care provider for questions related to their healthcare. Reviewed and educated patient on any new and changed medications related to discharge diagnosis. Was patient discharged with a pulse oximeter? Yes Discussed and confirmed pulse oximeter discharge instructions and when to notify provider or seek emergency care. CTN provided contact information. Plan for follow-up call in 3-5 days based on severity of symptoms and risk factors.   Plan for next call: symptom management-sob, follow up appointment-PCP  and medication management-Coumadin      Non-face-to-face services provided:  Obtained and reviewed discharge summary and/or continuity of care documents    Care Transitions 24 Hour Call    Do you have any ongoing symptoms?: No  Do you have all of your prescriptions and are they filled?: Yes  Were you discharged with any Home Care or Post Acute Services: No  Care Transitions Interventions         Follow Up  Future Appointments   Date Time Provider Talha Almanza   8/10/2021 10:00 AM [facility]bin[facility] [address]   9/27/2021  9:40 AM [facility]  [address]   11/11/2021  9:20 AM Magy Vallejo DO [facility] Nikole Leos RN

## 2021-08-05 ENCOUNTER — TELEPHONE (OUTPATIENT)
Dept: FAMILY MEDICINE CLINIC | Age: 81
End: 2021-08-05

## 2021-08-05 ENCOUNTER — OFFICE VISIT (OUTPATIENT)
Dept: FAMILY MEDICINE CLINIC | Age: 81
End: 2021-08-05
Payer: MEDICARE

## 2021-08-05 VITALS
HEIGHT: 67 IN | BODY MASS INDEX: 30.89 KG/M2 | HEART RATE: 68 BPM | DIASTOLIC BLOOD PRESSURE: 82 MMHG | RESPIRATION RATE: 18 BRPM | OXYGEN SATURATION: 98 % | WEIGHT: 196.8 LBS | SYSTOLIC BLOOD PRESSURE: 136 MMHG | TEMPERATURE: 97.3 F

## 2021-08-05 DIAGNOSIS — K62.5 RECTAL BLEEDING: Primary | ICD-10-CM

## 2021-08-05 PROCEDURE — 1036F TOBACCO NON-USER: CPT | Performed by: NURSE PRACTITIONER

## 2021-08-05 PROCEDURE — 99214 OFFICE O/P EST MOD 30 MIN: CPT | Performed by: NURSE PRACTITIONER

## 2021-08-05 PROCEDURE — 1123F ACP DISCUSS/DSCN MKR DOCD: CPT | Performed by: NURSE PRACTITIONER

## 2021-08-05 PROCEDURE — 1111F DSCHRG MED/CURRENT MED MERGE: CPT | Performed by: NURSE PRACTITIONER

## 2021-08-05 PROCEDURE — 4040F PNEUMOC VAC/ADMIN/RCVD: CPT | Performed by: NURSE PRACTITIONER

## 2021-08-05 PROCEDURE — G8417 CALC BMI ABV UP PARAM F/U: HCPCS | Performed by: NURSE PRACTITIONER

## 2021-08-05 PROCEDURE — G8427 DOCREV CUR MEDS BY ELIG CLIN: HCPCS | Performed by: NURSE PRACTITIONER

## 2021-08-05 NOTE — PROGRESS NOTES
[address] MEDICINE [address]  [address] [place] [address]  Dept: [phone]  Loc: [phone]  Visit Date: 8/5/2021      Chief Complaint:   Brittany Finley is a 79 y.o. male thatpresents for Rectal Bleeding (3rd time in a week  doesnt happen everytime )        HPI:  Started about a week ago  Happened 3 times  Denies fever, chills, N, V, abdominal pain  Been constipated intermittently  On Coumadin, follows up with Med Mgmt next week    The patient comes in with his SO today. History obtained from pt and medical records. I have reviewed the patient's past medical history, past surgical history, allergies,medications, social and family history and I have made updates where appropriate. Past Medical History:   Diagnosis Date    MILLY (acute kidney injury) ([address]     Atrial fibrillation (HCC)     Cerebral artery occlusion with [facility] infarction [facility]     CHF (congestive heart failure), NYHA class III, acute on chronic, combined ([address]     COPD (chronic obstructive pulmonary disease) ([address] 8/3/2020    Coronary artery disease involving native coronary artery of native heart with angina pectoris ([address]     Diabetes mellitus, type 2 ([address] 12/30/2020    Hyperlipidemia 2/20/2012    Hypertension     Pneumonia        Past Surgical History:   Procedure Laterality Date    CARDIAC SURGERY      heart cath    CORONARY ANGIOPLASTY WITH STENT PLACEMENT      HERNIA REPAIR      OTHER SURGICAL HISTORY  05/10/2018    PACEMAKER INSERTION         Social History     Tobacco Use    Smoking status: Former Smoker     Packs/day: 1.00     Years: 50.00     Pack years: 50.00     Types: Cigarettes    Smokeless tobacco: Never Used   Vaping Use    Vaping Use: Never used   Substance Use Topics    Alcohol use: Yes     Comment: rarely    Drug use: No       Family History   Family history unknown:  Yes               PHYSICAL EXAM:  /82   Pulse 68   Temp 97.3 °F (36.3 °C)   Resp 18   Ht 5' 7\" (1.702 m)   Wt 196 lb 12.8 oz (89.3 kg)   SpO2 98%   BMI 30.82 kg/m²     Physical Exam  Constitutional:       Appearance: Normal appearance. HENT:      Head: Normocephalic and atraumatic. Right Ear: External ear normal.      Left Ear: External ear normal.      Nose: Nose normal.      Mouth/Throat:      Mouth: Mucous membranes are moist.   Eyes:      Conjunctiva/sclera: Conjunctivae normal.   Cardiovascular:      Rate and Rhythm: Normal rate and regular rhythm. Pulses: Normal pulses. Heart sounds: Normal heart sounds. Pulmonary:      Effort: Pulmonary effort is normal.      Breath sounds: Normal breath sounds. Abdominal:      General: Bowel sounds are normal.      Palpations: Abdomen is soft. Genitourinary:     Comments: Rectal exam done, difficult to palpate anything, some swelling noted, no masses palpated  Musculoskeletal:         General: Normal range of motion. Cervical back: Normal range of motion. Skin:     General: Skin is warm and dry. Neurological:      General: No focal deficit present. Mental Status: He is alert and oriented to person, place, and time. Psychiatric:         Mood and Affect: Mood normal.         Behavior: Behavior normal.             ASSESSMENT & PLAN  Judith Stephen was seen today for rectal bleeding. Diagnoses and all orders for this visit:    Rectal bleeding  -     AFL - Margareth Lowe MD, Gastroenterology, [facility] and Prep H supp  Will notify [facility] Clinic of rectal bleeding  ER precautions given    No follow-ups on file. Judith Stephen received counseling on the following healthy behaviors: medication adherence  Reviewedprior labs and health maintenance. Continue current medications, diet and exercise. Discussed use, benefit, and side effects of prescribed medications. Barriers to medication compliance addressed. Patient given educationalmaterials - see patient instructions. All patient questions answered. Patient voiced understanding.      Electronically signed by WILBERTO Giron - CNP, APRN on 8/5/21 at 10:42 AM EDT

## 2021-08-05 NOTE — PATIENT INSTRUCTIONS
Start Colace (also called Docusate) 100 mg once per day. May increase to 1 in the morning and 1 in the evening if needed. This is the stool softener    Start Prep H suppositories. Use as directed on the box.   Do not use longer than 7 days

## 2021-08-09 ENCOUNTER — TELEPHONE (OUTPATIENT)
Dept: PHARMACY | Age: 81
End: 2021-08-09

## 2021-08-09 NOTE — TELEPHONE ENCOUNTER
Received phone call from Kellee at Dr. Arslan Montoya office stating that patient is having rectal bleeding currently. Spoke with patient. He states that he only had it one time and has not had it since. He states he is to follow-up with a GI physician (Dr. Kiersten Lawson) on Saturday.  He is scheduled to see us in clinic tomorrow at [place], [place], [facility]  8/9/2021  3:58 PM

## 2021-08-10 ENCOUNTER — HOSPITAL ENCOUNTER (OUTPATIENT)
Dept: PHARMACY | Age: 81
Setting detail: THERAPIES SERIES
Discharge: HOME | End: 2021-08-10
Payer: MEDICARE

## 2021-08-10 DIAGNOSIS — I48.21 PERMANENT ATRIAL FIBRILLATION (HCC): Primary | ICD-10-CM

## 2021-08-10 DIAGNOSIS — Z51.81 ENCOUNTER FOR THERAPEUTIC DRUG MONITORING: ICD-10-CM

## 2021-08-10 DIAGNOSIS — Z79.01 ANTICOAGULATED ON COUMADIN: ICD-10-CM

## 2021-08-10 LAB — POC INR: 3.2 (ref 0.8–1.2)

## 2021-08-10 PROCEDURE — 85610 PROTHROMBIN TIME: CPT

## 2021-08-10 PROCEDURE — 99211 OFF/OP EST MAY X REQ PHY/QHP: CPT

## 2021-08-10 PROCEDURE — 36416 COLLJ CAPILLARY BLOOD SPEC: CPT

## 2021-08-10 NOTE — PROGRESS NOTES
Medication Management [address]  [phone] (phone)  [phone] (fax)    Mr. Chel Galo is a 79 y.o.  male with history of Afib who presents today for anticoagulation monitoring and adjustment. Patient verifies current dosing regimen and tablet strength. No missed or extra doses. Patient denies s/s bleeding/bruising/swelling/SOB/chest pain. Seeing GI associates this Saturday for follow up - blood in stool last week (resolved). No reference of colonoscopy. No blood in urine. No dietary changes. No changes in OTC agents/Herbals. Patient recently started Stiolto inhaler and has been taking Mucinex as needed. No change in alcohol use or tobacco use. No change in activity level. Patient denies headaches/dizziness/lightheadedness/falls. No vomiting/diarrhea or acute illness. No Procedures scheduled in the future at this time. Assessment:   Lab Results   Component Value Date    INR 3.20 (H) 08/10/2021    INR 2.10 (H) 07/13/2021    INR 2.64 (H) 07/09/2021    PROTIME 10.9 05/12/2018    PROTIME 11.1 05/11/2018    PROTIME 11.3 05/10/2018     INR supratherapeutic   Recent Labs     08/10/21  1016   INR 3.20*       Plan:  Coumadin 1.25mg today then continue Coumadin 2.5mg every day. Recheck INR in 3 week(s) due to recent blood in stool. Patient reminded to call the Anticoagulation Clinic with signs or symptoms of bleeding or with any medication changes. Patient given instructions utilizing the teach back method. After visit summary printed and reviewed with patient. Discharged ambulatory in no apparent distress.     For Pharmacy Admin Tracking Only     Intervention Detail: Dose Adjustment: 1, reason: Therapy De-escalation   Total # of Interventions Recommended: 1   Total # of Interventions Accepted: 1   Time Spent (min): 20        '

## 2021-08-11 ENCOUNTER — CARE COORDINATION (OUTPATIENT)
Dept: CASE MANAGEMENT | Age: 81
End: 2021-08-11

## 2021-08-11 NOTE — CARE COORDINATION
[facility] Transitions Follow Up Call    2021    Patient: Wesley Parikh  Patient : 1940   MRN: [id number]  Reason for Admission:  Pneumonia due to infectious organism, unspecified laterality, unspecified part of lung  Discharge Date: 21 RARS: Readmission Risk Score: 29    Spoke with: Saad Juárez states he is doing well, he barely has to use his nebulizer machine since getting out of the hospital and starting new medications. He is talking in full complete sentences denies any questions or concerns. Transitions of Care Initial Call    Was this an external facility discharge? No Discharge Facility: [facility]    Challenges to be reviewed by the provider   Additional needs identified to be addressed with provider: No  none             Method of communication with provider : none      Advance Care Planning:   Does patient have an Advance Directive: reviewed and current. Was this a readmission? No  Patient stated reason for admission:  Pneumonia  Patients top risk factors for readmission: Pneumonia   Care Transition Nurse (CTN) contacted the patient by telephone to perform post hospital discharge assessment. Verified name and  with patient as identifiers. Provided introduction to self, and explanation of the CTN role. CTN reviewed discharge instructions, medical action plan and red flags with patient who verbalized understanding. Patient given an opportunity to ask questions and does not have any further questions or concerns at this time. Were discharge instructions available to patient? No. Reviewed appropriate site of care based on symptoms and resources available to patient including: PCP. The patient agrees to contact the PCP office for questions related to their healthcare. Medication reconciliation was performed with patient, who verbalizes understanding of administration of home medications. Advised obtaining a 90-day supply of all daily and as-needed medications.      Covid Risk Education     Educated patient about risk for severe COVID-19 due to risk factors according to CDC guidelines. CTN reviewed discharge instructions, medical action plan and red flag symptoms with the patient who verbalized understanding. Discussed COVID vaccination status: No. Education provided on COVID-19 vaccination as appropriate. Discussed exposure protocols and quarantine with CDC Guidelines. Patient was given an opportunity to verbalize any questions and concerns and agrees to contact CTN or health care provider for questions related to their healthcare. Reviewed and educated patient on any new and changed medications related to discharge diagnosis. Was patient discharged with a pulse oximeter? No Discussed and confirmed pulse oximeter discharge instructions and when to notify provider or seek emergency care. CTN provided contact information. Plan for follow-up call in 5-7 days based on severity of symptoms and risk factors. Plan for next call: symptom management-shortness of breath and self management-pneumonia        Care Transitions Subsequent and Final Call    Subsequent and Final Calls  Do you have any ongoing symptoms?: No  Have your medications changed?: No  Do you have any questions related to your medications?: No  Do you currently have any active services?: No  Do you have any needs or concerns that I can assist you with?: No  Identified Barriers: None  Care Transitions Interventions  Other Interventions:            Follow Up  Future Appointments   Date Time Provider Talha Almanza   8/31/2021  9:40 AM JUAN Thornton [facility] Nikole [place] [email or web address]   9/27/2021  9:40 AM [facility] Nikole [place] [email or web address]   11/11/2021  9:20 AM DO SERGIO Gunter [facility] JACIEL Hurt RN

## 2021-08-20 ENCOUNTER — NURSE ONLY (OUTPATIENT)
Dept: LAB | Age: 81
End: 2021-08-20

## 2021-08-20 LAB
ALBUMIN SERPL-MCNC: 4.3 G/DL (ref 3.5–5.1)
ALP BLD-CCNC: 104 U/L (ref 38–126)
ALT SERPL-CCNC: 24 U/L (ref 11–66)
ANION GAP SERPL CALCULATED.3IONS-SCNC: 14 MEQ/L (ref 8–16)
AST SERPL-CCNC: 27 U/L (ref 5–40)
BILIRUB SERPL-MCNC: 0.7 MG/DL (ref 0.3–1.2)
BILIRUBIN DIRECT: < 0.2 MG/DL (ref 0–0.3)
BUN BLDV-MCNC: 30 MG/DL (ref 7–22)
CALCIUM SERPL-MCNC: 9.7 MG/DL (ref 8.5–10.5)
CHLORIDE BLD-SCNC: 102 MEQ/L (ref 98–111)
CHOLESTEROL, TOTAL: 120 MG/DL (ref 100–199)
CO2: 23 MEQ/L (ref 23–33)
CREAT SERPL-MCNC: 2.3 MG/DL (ref 0.4–1.2)
ERYTHROCYTE [DISTWIDTH] IN BLOOD BY AUTOMATED COUNT: 16.2 % (ref 11.5–14.5)
ERYTHROCYTE [DISTWIDTH] IN BLOOD BY AUTOMATED COUNT: 58 FL (ref 35–45)
GFR SERPL CREATININE-BSD FRML MDRD: 27 ML/MIN/1.73M2
GLUCOSE BLD-MCNC: 112 MG/DL (ref 70–108)
HCT VFR BLD CALC: 40.8 % (ref 42–52)
HDLC SERPL-MCNC: 33 MG/DL
HEMOGLOBIN: 12.9 GM/DL (ref 14–18)
LDL CHOLESTEROL CALCULATED: 63 MG/DL
MCH RBC QN AUTO: 31 PG (ref 26–33)
MCHC RBC AUTO-ENTMCNC: 31.6 GM/DL (ref 32.2–35.5)
MCV RBC AUTO: 98.1 FL (ref 80–94)
PLATELET # BLD: 184 THOU/MM3 (ref 130–400)
PMV BLD AUTO: 11.7 FL (ref 9.4–12.4)
POTASSIUM SERPL-SCNC: 3.7 MEQ/L (ref 3.5–5.2)
RBC # BLD: 4.16 MILL/MM3 (ref 4.7–6.1)
SODIUM BLD-SCNC: 139 MEQ/L (ref 135–145)
TOTAL PROTEIN: 7.1 G/DL (ref 6.1–8)
TRIGL SERPL-MCNC: 120 MG/DL (ref 0–199)
WBC # BLD: 10 THOU/MM3 (ref 4.8–10.8)

## 2021-08-27 ENCOUNTER — OFFICE VISIT (OUTPATIENT)
Dept: FAMILY MEDICINE CLINIC | Age: 81
End: 2021-08-27
Payer: MEDICARE

## 2021-08-27 VITALS
OXYGEN SATURATION: 96 % | HEART RATE: 69 BPM | DIASTOLIC BLOOD PRESSURE: 86 MMHG | TEMPERATURE: 97.7 F | WEIGHT: 198.2 LBS | BODY MASS INDEX: 31.11 KG/M2 | HEIGHT: 67 IN | SYSTOLIC BLOOD PRESSURE: 138 MMHG

## 2021-08-27 DIAGNOSIS — E11.9 TYPE 2 DIABETES MELLITUS WITHOUT COMPLICATION, WITHOUT LONG-TERM CURRENT USE OF INSULIN (HCC): ICD-10-CM

## 2021-08-27 DIAGNOSIS — I25.119 CORONARY ARTERY DISEASE INVOLVING NATIVE CORONARY ARTERY OF NATIVE HEART WITH ANGINA PECTORIS (HCC): ICD-10-CM

## 2021-08-27 DIAGNOSIS — N18.4 STAGE 4 CHRONIC KIDNEY DISEASE (HCC): ICD-10-CM

## 2021-08-27 DIAGNOSIS — J41.1 MUCOPURULENT CHRONIC BRONCHITIS (HCC): Primary | ICD-10-CM

## 2021-08-27 DIAGNOSIS — J44.1 COPD EXACERBATION (HCC): ICD-10-CM

## 2021-08-27 DIAGNOSIS — I10 ESSENTIAL HYPERTENSION: ICD-10-CM

## 2021-08-27 DIAGNOSIS — Z95.2 HISTORY OF TRANSCATHETER AORTIC VALVE REPLACEMENT (TAVR): ICD-10-CM

## 2021-08-27 DIAGNOSIS — J15.1 PNEUMONIA OF RIGHT LOWER LOBE DUE TO PSEUDOMONAS SPECIES (HCC): ICD-10-CM

## 2021-08-27 PROCEDURE — 4040F PNEUMOC VAC/ADMIN/RCVD: CPT | Performed by: NURSE PRACTITIONER

## 2021-08-27 PROCEDURE — 1036F TOBACCO NON-USER: CPT | Performed by: NURSE PRACTITIONER

## 2021-08-27 PROCEDURE — G8926 SPIRO NO PERF OR DOC: HCPCS | Performed by: NURSE PRACTITIONER

## 2021-08-27 PROCEDURE — 99214 OFFICE O/P EST MOD 30 MIN: CPT | Performed by: NURSE PRACTITIONER

## 2021-08-27 PROCEDURE — 1123F ACP DISCUSS/DSCN MKR DOCD: CPT | Performed by: NURSE PRACTITIONER

## 2021-08-27 PROCEDURE — G8417 CALC BMI ABV UP PARAM F/U: HCPCS | Performed by: NURSE PRACTITIONER

## 2021-08-27 PROCEDURE — G8427 DOCREV CUR MEDS BY ELIG CLIN: HCPCS | Performed by: NURSE PRACTITIONER

## 2021-08-27 PROCEDURE — 3023F SPIROM DOC REV: CPT | Performed by: NURSE PRACTITIONER

## 2021-08-27 RX ORDER — AMOXICILLIN AND CLAVULANATE POTASSIUM 500; 125 MG/1; MG/1
1 TABLET, FILM COATED ORAL 2 TIMES DAILY
Qty: 20 TABLET | Refills: 0 | Status: SHIPPED | OUTPATIENT
Start: 2021-08-27 | End: 2021-08-27

## 2021-08-27 RX ORDER — AMOXICILLIN AND CLAVULANATE POTASSIUM 500; 125 MG/1; MG/1
1 TABLET, FILM COATED ORAL 2 TIMES DAILY
Qty: 20 TABLET | Refills: 0 | Status: SHIPPED | OUTPATIENT
Start: 2021-08-27 | End: 2021-12-28 | Stop reason: SDUPTHER

## 2021-08-27 ASSESSMENT — ENCOUNTER SYMPTOMS
DIARRHEA: 0
EYE PAIN: 0
CHEST TIGHTNESS: 0
ABDOMINAL DISTENTION: 0
EYE REDNESS: 0
WHEEZING: 0
COUGH: 1
SHORTNESS OF BREATH: 0
RHINORRHEA: 1
ABDOMINAL PAIN: 0
CONSTIPATION: 0

## 2021-08-27 NOTE — PROGRESS NOTES
[address]  Dept: Abel Chaves (:  1940) is a 79 y.o. male,Established patient, here for evaluation of the following chief complaint(s):  New Patient (Establish Care) and Nasal Congestion (on going problem but can't get rid of it.)      ASSESSMENT/PLAN:  I have reviewed the patient's medical history in detail and updated the computerized patient record. HPI/ROS per the patient and caregiver. Overall non toxic in appearance. Answers questions appropriately. Conditions discussed and addressed this visit include:     Complex pt with multi system issues. Has had several COPD exac over the last 8 weeks. Pseudomonas pneumonia. Completed treatment for this. He has not see pulmonology. Will refer to them today. Treat for bronchitis for now, and follow up in 2 weeks. The patient is advised to reduce salt in diet and cooking, reduce exposure to stress, improve dietary compliance, continue current medications, continue current healthy lifestyle patterns and return for routine annual checkups. 1. Mucopurulent chronic bronchitis (HCC)  -     XR CHEST STANDARD (2 VW); Future  2. Pneumonia of right lower lobe due to Pseudomonas species ([address]  3. Coronary artery disease involving native coronary artery of native heart with angina pectoris ([address]  4. Essential hypertension  5. Stage 4 chronic kidney disease ([address]  6. Type 2 diabetes mellitus without complication, without long-term current use of insulin (HCC)  7. History of transcatheter aortic valve replacement (TAVR)      No follow-ups on file. SUBJECTIVE/OBJECTIVE:  HPI   Pt here to establish care   Complex pt.  Seeing multiple specialist   Chronic copd, large amount of phlegm singh at night   Weight stable   Ongoing issues with phlegm production that wakes him at night, can cause him to choke   No fever   Phlegm remains discolored.     INR stable     Review of Systems Constitutional: Positive for activity change, appetite change and fatigue. Negative for unexpected weight change. HENT: Positive for congestion, postnasal drip and rhinorrhea. Negative for ear discharge, ear pain, nosebleeds and tinnitus. Eyes: Negative for pain and redness. Respiratory: Positive for cough. Negative for chest tightness, shortness of breath and wheezing. Cardiovascular: Positive for leg swelling. Negative for chest pain. Gastrointestinal: Negative for abdominal distention, abdominal pain, constipation and diarrhea. Endocrine: Negative for cold intolerance and heat intolerance. Genitourinary: Negative for difficulty urinating and dysuria. Musculoskeletal: Positive for arthralgias and myalgias. Skin: Negative for pallor and rash. Allergic/Immunologic: Positive for environmental allergies. Negative for food allergies and immunocompromised state. Neurological: Negative for dizziness, weakness, light-headedness and numbness. Hematological: Negative for adenopathy. Does not bruise/bleed easily. Psychiatric/Behavioral: Negative for behavioral problems and decreased concentration. The patient is not nervous/anxious. All other systems reviewed and are negative. Physical Exam  Vitals and nursing note reviewed. Constitutional:       Appearance: Normal appearance. He is well-developed and normal weight. He is not ill-appearing. HENT:      Head: Normocephalic and atraumatic. Right Ear: Tympanic membrane, ear canal and external ear normal.      Left Ear: Tympanic membrane, ear canal and external ear normal.      Nose: Rhinorrhea present. Mouth/Throat:      Mouth: Mucous membranes are moist.      Pharynx: No oropharyngeal exudate. Eyes:      General:         Right eye: No discharge. Left eye: No discharge. Conjunctiva/sclera: Conjunctivae normal.   Neck:      Thyroid: No thyromegaly. Vascular: No carotid bruit.    Cardiovascular:      Rate and

## 2021-08-27 NOTE — PATIENT INSTRUCTIONS
Patient Education        Learning About Chronic Bronchitis  What is chronic bronchitis? Chronic bronchitis is long-term swelling and the buildup of mucus in the airways of your lungs. The airways (bronchial tubes) get inflamed and make a lot of mucus. This can narrow or block the airways, making it hard for you to breathe. It is a form of COPD (chronic obstructive pulmonary disease). Chronic bronchitis is usually caused by smoking. But chemical fumes, dust, or air pollution also can cause it over time. What can you expect when you have chronic bronchitis? Chronic bronchitis gets worse over time. You cannot undo the damage to your lungs. Over time, you may find that:  · You get short of breath even when you do simple things like get dressed or fix a meal.  · It is hard to eat or exercise. · You lose weight and feel weaker. Over many years, the swelling and mucus from chronic bronchitis make it more likely that you will get lung infections. But there are things you can do to prevent more damage and feel better. What are the symptoms? The main symptoms of chronic bronchitis are:  · A cough that will not go away. · Mucus that comes up when you cough. · Shortness of breath that gets worse when you exercise. At times, your symptoms may suddenly flare up and get much worse. This is a called an exacerbation (say \"egg-JOHNNA-er-BAY-lucille\"). When this happens, your usual symptoms quickly get worse and stay bad. This can be dangerous. You may have to go to the hospital.  How can you keep chronic bronchitis from getting worse? Don't smoke. That is the best way to keep chronic bronchitis from getting worse. If you already smoke, it is never too late to stop. If you need help quitting, talk to your doctor about stop-smoking programs and medicines. These can increase your chances of quitting for good. You can do other things to keep chronic bronchitis from getting worse:  · Avoid bad air.  Air pollution, chemical is right for you. Rehab includes exercise programs, education about your disease and how to manage it, help with diet and other changes, and emotional support. · Eat regular, healthy meals. Use bronchodilators about 1 hour before you eat to make it easier to eat. Eat several small meals instead of three large ones. Drink beverages at the end of the meal. Avoid foods that are hard to chew. Follow-up care is a key part of your treatment and safety. Be sure to make and go to all appointments, and call your doctor if you are having problems. It's also a good idea to know your test results and keep a list of the medicines you take. Where can you learn more? Go to https://WellocitiespeReplise.Spontacts. org and sign in to your [employer] account. Enter R364 in the [employer] box to learn more about \"Learning About Chronic Bronchitis. \"     If you do not have an account, please click on the \"Sign Up Now\" link. Current as of: October 26, 2020               Content Version: 12.9  © 2006-2021 [employer]. Care instructions adapted under license by [facility] If you have questions about a medical condition or this instruction, always ask your healthcare professional. [place] [address] any warranty or liability for your use of this information. Patient Education        Learning About Saving Energy When You Have a Chronic Condition  Introduction    Everyday tasks can be tiring when you have COPD, heart failure, or another long-term (chronic) condition. You may feel at times that you've lost your ability to live your life. But learning to conserve, or save, your energy can help you be less tired. Conserving your energy means finding ways of doing daily activities with as little effort as possible. With some small changes in the way you do things, you can get your tasks done more easily. Some treatments are available that might help.  Pulmonary rehabilitation can teach you ways to breathe easier. Cardiac rehabilitation can help make your heart stronger. You also may want to see an occupational or physical therapist. The therapist can give you more tips on building strength and moving with less effort. What can you do to conserve your energy? Planning  · Make a list of what you have to do every day. Group the tasks by location. · Do all the chores in one part of your house around the same time. · Go out for errands or do chores at the time of day when you have the most energy. · Plan rest periods into your day. Getting things done  · Sit down as often as you can when you get dressed, do chores, or cook. · Use a cart with wheels to roll items, such as laundry, from one room to another. · Push or slide boxes or other large items instead of lifting them. Reaching and bending  · Put things you use the most on shelves that are at the level of your waist or shoulder. · Use long-handled grabbers or other tools to reach items on a high shelf or to  things off the floor. Use long-handled dusters when you clean the house. · Use a raised toilet seat to avoid bending too far to sit or stand up. Eating  · Eat several small meals instead of three larger meals. · If you get too tired to eat much, try to choose healthy foods that have more calories. Have a yogurt-and-fruit smoothie for breakfast. Put avocado on a sandwich. Or add cheese or peanut butter to snacks. · If you don't feel very hungry, try to eat first and drink water or other fluids later, after a meal. This can help keep you from losing weight. Sip small amounts of fluids if you need to drink while you eat. Having sex  · Choose the time of day when you have more energy. · A dtoo-tp-ipiu position for sex can be less tiring. Sometimes you may want to focus more on caressing. Watch closely for changes in your health, and be sure to contact your doctor if you have any problems. Where can you learn more?   Go to https://chpepiceweb.Jobool. org and sign in to your [employer] account. Enter H190 in the [facility] box to learn more about \"Learning About Saving Energy When You Have a Chronic Condition. \"     If you do not have an account, please click on the \"Sign Up Now\" link. Current as of: October 26, 2020               Content Version: 12.9  © 2006-2021 [employer]. Care instructions adapted under license by [facility] ([facility]). If you have questions about a medical condition or this instruction, always ask your healthcare professional. [place] [address] any warranty or liability for your use of this information. Patient Education        Chronic Cough: Care Instructions  Your Care Instructions     A cough is your body's response to something that bothers your throat or airways. Many things can cause a cough. You might cough because of a cold or the flu, bronchitis, or asthma. Smoking, postnasal drip, allergies, and stomach acid that backs up into your throat also can cause a cough. A cough can be short-term (acute) or long-term (chronic). A chronic cough lasts more than 3 weeks. A chronic cough is often caused by a long-term problem, such as asthma. Another cause might be a medicine, such as an ACE inhibitor. A cough is a symptom, not a disease. To treat a chronic cough, you may need to treat the problem that causes it. You can take a few steps at home to cough less and feel better. Some people cough or clear their throat out of habit for no clear reason. Follow-up care is a key part of your treatment and safety. Be sure to make and go to all appointments, and call your doctor if you are having problems. It's also a good idea to know your test results and keep a list of the medicines you take. How can you care for yourself at home? · Drink plenty of water and other fluids. This may help soothe a dry or sore throat.  Honey or lemon juice in hot water or tea may ease a dry cough. · Prop up your head on pillows to help you breathe and ease a cough. · Do not smoke or allow others to smoke around you. Smoke can make a cough worse. If you need help quitting, talk to your doctor about stop-smoking programs and medicines. These can increase your chances of quitting for good. · Avoid exposure to smoke, dust, or other pollutants, or wear a face mask. Check with your doctor or pharmacist to find out which type of face mask will give you the most benefit. · Take cough medicine as directed by your doctor. · Try cough drops to soothe a dry or sore throat. Cough drops don't stop a cough. Medicine-flavored cough drops are no better than candy-flavored drops or hard candy. Throat clearing  When you have a chronic cough or a disease that may cause this type of cough, you may often feel like you want to clear your throat. This helps bring up mucus. But throat clearing does not always have a cause. Throat clearing can become a habit. The more you do it, the more you feel like you need to do it. But frequent throat clearing can be hard on your vocal cords. It's like slamming them together. To help lessen throat clearing, you can try:  · Taking small sips of water. · Not clearing your throat when you feel you need to. · Swallowing hard when you want to clear your throat. You may want to ask your doctor if a medicine that thins mucus would help. When should you call for help? Call 911 anytime you think you may need emergency care. For example, call if:    · You have severe trouble breathing. Call your doctor now or seek immediate medical care if:    · You cough up blood.     · You have new or worse trouble breathing.     · You have a new or higher fever.    Watch closely for changes in your health, and be sure to contact your doctor if:    · You cough more deeply or more often, especially if you notice more mucus or a change in the color of your mucus.     · You do not get better as expected. Where can you learn more? Go to https://chpepiceweb.healthJoincube.com. org and sign in to your [employer] account. Enter I413 in the [employer] box to learn more about \"Chronic Cough: Care Instructions. \"     If you do not have an account, please click on the \"Sign Up Now\" link. Current as of: October 26, 2020               Content Version: 12.9  © 2006-2021 [employer]. Care instructions adapted under license by [facility] ([facility]). If you have questions about a medical condition or this instruction, always ask your healthcare professional. [address] any warranty or liability for your use of this information.

## 2021-08-31 ENCOUNTER — HOSPITAL ENCOUNTER (OUTPATIENT)
Dept: PHARMACY | Age: 81
Setting detail: THERAPIES SERIES
Discharge: HOME | End: 2021-08-31
Payer: MEDICARE

## 2021-08-31 DIAGNOSIS — Z51.81 ENCOUNTER FOR THERAPEUTIC DRUG MONITORING: ICD-10-CM

## 2021-08-31 DIAGNOSIS — Z79.01 ANTICOAGULATED ON COUMADIN: ICD-10-CM

## 2021-08-31 DIAGNOSIS — I48.21 PERMANENT ATRIAL FIBRILLATION (HCC): Primary | ICD-10-CM

## 2021-08-31 LAB — POC INR: 2.2 (ref 0.8–1.2)

## 2021-08-31 PROCEDURE — 99211 OFF/OP EST MAY X REQ PHY/QHP: CPT | Performed by: PHARMACIST

## 2021-08-31 PROCEDURE — 36416 COLLJ CAPILLARY BLOOD SPEC: CPT | Performed by: PHARMACIST

## 2021-08-31 PROCEDURE — 85610 PROTHROMBIN TIME: CPT | Performed by: PHARMACIST

## 2021-08-31 NOTE — PROGRESS NOTES
Medication Management [address]  [phone] (phone)  [phone] (fax)    Mr. Greg Whitman is a 79 y.o.  male with history of Afib who presents today for anticoagulation monitoring and adjustment. Patient verifies current dosing regimen and tablet strength. No missed or extra doses. Patient denies s/s bleeding/bruising/swelling/SOB/chest pain  No blood in urine or stool. No dietary changes. No changes in OTC agents/Herbals. Taking Augmentin 500mg BID x 10 days. No change in alcohol use or tobacco use. No change in activity level. Patient denies headaches/dizziness/lightheadedness/falls. No vomiting/diarrhea or acute illness. No Procedures scheduled in the future at this time. Assessment:   Lab Results   Component Value Date    INR 2.20 (H) 08/31/2021    INR 3.20 (H) 08/10/2021    INR 2.10 (H) 07/13/2021    PROTIME 10.9 05/12/2018    PROTIME 11.1 05/11/2018    PROTIME 11.3 05/10/2018     INR therapeutic   Recent Labs     08/31/21  1003   INR 2.20*       Plan:  Continue Coumadin 2.5mg daily. Recheck INR in 4 week(s). Patient reminded to call the Anticoagulation Clinic with any signs or symptoms of bleeding or with any medication changes. Patient given instructions utilizing the teach back method. After visit summary printed and reviewed with patient. Discharged ambulatory in no apparent distress.       For Pharmacy Admin Tracking Only     Time Spent (min): 20

## 2021-09-02 ENCOUNTER — TELEPHONE (OUTPATIENT)
Dept: FAMILY MEDICINE CLINIC | Age: 81
End: 2021-09-02

## 2021-09-02 NOTE — TELEPHONE ENCOUNTER
----- Message from Meganisidoro Mortensen sent at 9/2/2021  3:11 PM EDT -----  Subject: Medication Problem    QUESTIONS  Name of Medication? amoxicillin-clavulanate (AUGMENTIN) 500-125 MG per   tablet  Patient-reported dosage and instructions? 0  What question or problem do you have with the medication? patient is not   sure if he should start taking medication or wait until he comes in on the   13th   Preferred Pharmacy? [facility], [address] [place] [address] [phone] - F [phone]  Pharmacy phone number (if available)? [phone]  Additional Information for Provider?   ---------------------------------------------------------------------------  --------------  [address]  What is the best way for the office to contact you? OK to leave message on   voicemail  Preferred Call Back Phone Number? [phone]  ---------------------------------------------------------------------------  --------------  SCRIPT ANSWERS  Relationship to Patient?  Self

## 2021-09-03 ENCOUNTER — TELEPHONE (OUTPATIENT)
Dept: FAMILY MEDICINE CLINIC | Age: 81
End: 2021-09-03

## 2021-09-03 NOTE — TELEPHONE ENCOUNTER
Yes, patient should have started that following his visit on 8/27. Start today unless respiratory symptoms have improved.

## 2021-09-03 NOTE — TELEPHONE ENCOUNTER
----- Message from Asia Saleem sent at 9/2/2021  4:01 PM EDT -----  Subject: Medication Problem    QUESTIONS  Name of Medication? amoxicillin-clavulanate (AUGMENTIN) 875-125 MG per   tablet  Patient-reported dosage and instructions? 875-125mg per tablet, 2 tablets   per day  What question or problem do you have with the medication? Pt is unclear if   he needs to continue taking the Augmentin prescribed by Ciara Manrique until his appt on 9/13. Preferred Pharmacy? [facility], [address] [place] [address] [phone] - F [phone]  Pharmacy phone number (if available)? [phone]  Additional Information for Provider?   ---------------------------------------------------------------------------  --------------  [address]  What is the best way for the office to contact you? OK to leave message on   voicemail  Preferred Call Back Phone Number? [phone]  ---------------------------------------------------------------------------  --------------  SCRIPT ANSWERS  Relationship to Patient?  Self

## 2021-09-03 NOTE — TELEPHONE ENCOUNTER
I called the patient and received voicemail. I left a detailed message that he is to take the Augmentin for the 10 days and we would see him in the office on 09- and if he had any other questions he can give the office a call back.

## 2021-09-13 ENCOUNTER — NURSE ONLY (OUTPATIENT)
Dept: LAB | Age: 81
End: 2021-09-13

## 2021-09-13 ENCOUNTER — HOSPITAL ENCOUNTER (OUTPATIENT)
Dept: GENERAL RADIOLOGY | Age: 81
Discharge: HOME | End: 2021-09-13
Payer: MEDICARE

## 2021-09-13 ENCOUNTER — OFFICE VISIT (OUTPATIENT)
Dept: FAMILY MEDICINE CLINIC | Age: 81
End: 2021-09-13
Payer: MEDICARE

## 2021-09-13 ENCOUNTER — HOSPITAL ENCOUNTER (OUTPATIENT)
Age: 81
Discharge: HOME | End: 2021-09-13
Payer: MEDICARE

## 2021-09-13 VITALS
SYSTOLIC BLOOD PRESSURE: 122 MMHG | DIASTOLIC BLOOD PRESSURE: 80 MMHG | HEART RATE: 74 BPM | TEMPERATURE: 97.9 F | OXYGEN SATURATION: 97 % | WEIGHT: 197.6 LBS | BODY MASS INDEX: 31.32 KG/M2

## 2021-09-13 DIAGNOSIS — N18.32 STAGE 3B CHRONIC KIDNEY DISEASE (HCC): ICD-10-CM

## 2021-09-13 DIAGNOSIS — J41.1 MUCOPURULENT CHRONIC BRONCHITIS (HCC): Primary | ICD-10-CM

## 2021-09-13 DIAGNOSIS — J41.1 MUCOPURULENT CHRONIC BRONCHITIS (HCC): ICD-10-CM

## 2021-09-13 LAB
ANION GAP SERPL CALCULATED.3IONS-SCNC: 13 MEQ/L (ref 8–16)
BASOPHILS # BLD: 0.5 %
BASOPHILS ABSOLUTE: 0 THOU/MM3 (ref 0–0.1)
BUN BLDV-MCNC: 26 MG/DL (ref 7–22)
CALCIUM SERPL-MCNC: 9.5 MG/DL (ref 8.5–10.5)
CHLORIDE BLD-SCNC: 102 MEQ/L (ref 98–111)
CO2: 24 MEQ/L (ref 23–33)
CREAT SERPL-MCNC: 2.1 MG/DL (ref 0.4–1.2)
CREATININE, URINE: 18.3 MG/DL
EOSINOPHIL # BLD: 3.1 %
EOSINOPHILS ABSOLUTE: 0.2 THOU/MM3 (ref 0–0.4)
ERYTHROCYTE [DISTWIDTH] IN BLOOD BY AUTOMATED COUNT: 16.4 % (ref 11.5–14.5)
ERYTHROCYTE [DISTWIDTH] IN BLOOD BY AUTOMATED COUNT: 60.4 FL (ref 35–45)
GFR SERPL CREATININE-BSD FRML MDRD: 30 ML/MIN/1.73M2
GLUCOSE BLD-MCNC: 113 MG/DL (ref 70–108)
HCT VFR BLD CALC: 40.7 % (ref 42–52)
HEMOGLOBIN: 12.9 GM/DL (ref 14–18)
IMMATURE GRANS (ABS): 0.04 THOU/MM3 (ref 0–0.07)
IMMATURE GRANULOCYTES: 0.5 %
LYMPHOCYTES # BLD: 20.2 %
LYMPHOCYTES ABSOLUTE: 1.5 THOU/MM3 (ref 1–4.8)
MCH RBC QN AUTO: 31.4 PG (ref 26–33)
MCHC RBC AUTO-ENTMCNC: 31.7 GM/DL (ref 32.2–35.5)
MCV RBC AUTO: 99 FL (ref 80–94)
MICROALBUMIN UR-MCNC: < 1.2 MG/DL
MICROALBUMIN/CREAT UR-RTO: 66 MG/G (ref 0–30)
MONOCYTES # BLD: 15.6 %
MONOCYTES ABSOLUTE: 1.2 THOU/MM3 (ref 0.4–1.3)
NUCLEATED RED BLOOD CELLS: 0 /100 WBC
PLATELET # BLD: 186 THOU/MM3 (ref 130–400)
PMV BLD AUTO: 11.4 FL (ref 9.4–12.4)
POTASSIUM SERPL-SCNC: 4 MEQ/L (ref 3.5–5.2)
RBC # BLD: 4.11 MILL/MM3 (ref 4.7–6.1)
SEG NEUTROPHILS: 60.1 %
SEGMENTED NEUTROPHILS ABSOLUTE COUNT: 4.4 THOU/MM3 (ref 1.8–7.7)
SODIUM BLD-SCNC: 139 MEQ/L (ref 135–145)
WBC # BLD: 7.4 THOU/MM3 (ref 4.8–10.8)

## 2021-09-13 PROCEDURE — G8926 SPIRO NO PERF OR DOC: HCPCS | Performed by: NURSE PRACTITIONER

## 2021-09-13 PROCEDURE — G8417 CALC BMI ABV UP PARAM F/U: HCPCS | Performed by: NURSE PRACTITIONER

## 2021-09-13 PROCEDURE — 71046 X-RAY EXAM CHEST 2 VIEWS: CPT

## 2021-09-13 PROCEDURE — G8427 DOCREV CUR MEDS BY ELIG CLIN: HCPCS | Performed by: NURSE PRACTITIONER

## 2021-09-13 PROCEDURE — 3023F SPIROM DOC REV: CPT | Performed by: NURSE PRACTITIONER

## 2021-09-13 PROCEDURE — 1123F ACP DISCUSS/DSCN MKR DOCD: CPT | Performed by: NURSE PRACTITIONER

## 2021-09-13 PROCEDURE — 99213 OFFICE O/P EST LOW 20 MIN: CPT | Performed by: NURSE PRACTITIONER

## 2021-09-13 PROCEDURE — 4040F PNEUMOC VAC/ADMIN/RCVD: CPT | Performed by: NURSE PRACTITIONER

## 2021-09-13 PROCEDURE — 1036F TOBACCO NON-USER: CPT | Performed by: NURSE PRACTITIONER

## 2021-09-13 RX ORDER — DEXTROMETHORPHAN HYDROBROMIDE AND PROMETHAZINE HYDROCHLORIDE 15; 6.25 MG/5ML; MG/5ML
5 SYRUP ORAL NIGHTLY
Qty: 150 ML | Refills: 0
Start: 2021-09-13 | End: 2021-10-08 | Stop reason: SDUPTHER

## 2021-09-13 RX ORDER — DEXTROMETHORPHAN HYDROBROMIDE AND PROMETHAZINE HYDROCHLORIDE 15; 6.25 MG/5ML; MG/5ML
5 SYRUP ORAL 4 TIMES DAILY PRN
Qty: 150 ML | Refills: 0 | Status: SHIPPED | OUTPATIENT
Start: 2021-09-13 | End: 2021-09-13

## 2021-09-13 ASSESSMENT — ENCOUNTER SYMPTOMS
DIARRHEA: 0
CHEST TIGHTNESS: 0
SHORTNESS OF BREATH: 1
EYE REDNESS: 0
EYE PAIN: 0
WHEEZING: 0
CONSTIPATION: 0
SINUS PRESSURE: 1
COUGH: 1
RHINORRHEA: 1
ABDOMINAL PAIN: 0
ABDOMINAL DISTENTION: 0

## 2021-09-13 NOTE — PATIENT INSTRUCTIONS
Patient Education        Learning About Chronic Bronchitis  What is chronic bronchitis? Chronic bronchitis is long-term swelling and the buildup of mucus in the airways of your lungs. The airways (bronchial tubes) get inflamed and make a lot of mucus. This can narrow or block the airways, making it hard for you to breathe. It is a form of COPD (chronic obstructive pulmonary disease). Chronic bronchitis is usually caused by smoking. But chemical fumes, dust, or air pollution also can cause it over time. What can you expect when you have chronic bronchitis? Chronic bronchitis gets worse over time. You cannot undo the damage to your lungs. Over time, you may find that:  · You get short of breath even when you do simple things like get dressed or fix a meal.  · It is hard to eat or exercise. · You lose weight and feel weaker. Over many years, the swelling and mucus from chronic bronchitis make it more likely that you will get lung infections. But there are things you can do to prevent more damage and feel better. What are the symptoms? The main symptoms of chronic bronchitis are:  · A cough that will not go away. · Mucus that comes up when you cough. · Shortness of breath that gets worse when you exercise. At times, your symptoms may suddenly flare up and get much worse. This is a called an exacerbation (say \"egg-JOHNNA-er-BAY-lucille\"). When this happens, your usual symptoms quickly get worse and stay bad. This can be dangerous. You may have to go to the hospital.  How can you keep chronic bronchitis from getting worse? Don't smoke. That is the best way to keep chronic bronchitis from getting worse. If you already smoke, it is never too late to stop. If you need help quitting, talk to your doctor about stop-smoking programs and medicines. These can increase your chances of quitting for good. You can do other things to keep chronic bronchitis from getting worse:  · Avoid bad air.  Air pollution, chemical fumes, and dust also can make chronic bronchitis worse. · Get a flu shot every year. A shot may keep the flu from turning into something more serious, like pneumonia. A flu shot also may lower your chances of having a flare-up. · Get a pneumococcal shot. A shot can prevent some of the serious complications of pneumonia. Ask your doctor how often you should get this shot. How is chronic bronchitis treated? Chronic bronchitis is treated with medicines and oxygen. You also can take steps at home to stay healthy and keep your condition from getting worse. Medicines and oxygen therapy  · You may be taking medicines such as:  ? Bronchodilators. These help open your airways and make breathing easier. Bronchodilators are either short-acting (work for 6 to 9 hours) or long-acting (work for 24 hours). You inhale most bronchodilators, so they start to act quickly. Always carry your quick-relief inhaler with you in case you need it while you are away from home. ? Corticosteroids. These reduce airway inflammation. They come in pill or inhaled form. You must take these medicines every day for them to work well. ? Antibiotics. These medicines are used when you have a bacterial lung infection. · Take your medicines exactly as prescribed. Call your doctor if you think you are having a problem with your medicine. · Oxygen therapy boosts the amount of oxygen in your blood and helps you breathe easier. Use the flow rate your doctor has recommended, and do not change it without talking to your doctor first.  Other care at home  · If your doctor recommends it, get more exercise. Walking is a good choice. Bit by bit, increase the amount you walk every day. Try for at least 30 minutes on most days of the week. · Learn breathing methods--such as breathing through pursed lips--to help you become less short of breath.   · If your doctor has not set you up with a pulmonary rehabilitation program, talk to him or her about whether rehab is right for you. Rehab includes exercise programs, education about your disease and how to manage it, help with diet and other changes, and emotional support. · Eat regular, healthy meals. Use bronchodilators about 1 hour before you eat to make it easier to eat. Eat several small meals instead of three large ones. Drink beverages at the end of the meal. Avoid foods that are hard to chew. Follow-up care is a key part of your treatment and safety. Be sure to make and go to all appointments, and call your doctor if you are having problems. It's also a good idea to know your test results and keep a list of the medicines you take. Where can you learn more? Go to https://Engage.Gen9. org and sign in to your Revolve. account. Enter H546 in the [employer] box to learn more about \"Learning About Chronic Bronchitis. \"     If you do not have an account, please click on the \"Sign Up Now\" link. Current as of: October 26, 2020               Content Version: 12.9  © 2006-2021 [employer]. Care instructions adapted under license by [facility] ([facility]). If you have questions about a medical condition or this instruction, always ask your healthcare professional. [place] [address] any warranty or liability for your use of this information. Patient Education        Chronic Obstructive Pulmonary Disease (COPD): Care Instructions  Your Care Instructions     Chronic obstructive pulmonary disease (COPD) is a general term for a group of lung diseases, including emphysema and chronic bronchitis. People with COPD have decreased airflow in and out of the lungs, which makes it hard to breathe. The airways also can get clogged with thick mucus. Cigarette smoking is a major cause of COPD. Although there is no cure for COPD, you can slow its progress. Following your treatment plan and taking care of yourself can help you feel better and live longer.   Follow-up care is a key part of your treatment and safety. Be sure to make and go to all appointments, and call your doctor if you are having problems. It's also a good idea to know your test results and keep a list of the medicines you take. How can you care for yourself at home? Staying healthy    · Do not smoke. This is the most important step you can take to prevent more damage to your lungs. If you need help quitting, talk to your doctor about stop-smoking programs and medicines. These can increase your chances of quitting for good.     · Avoid colds and flu. Get a pneumococcal vaccine shot. If you have had one before, ask your doctor whether you need a second dose. Get the flu vaccine every fall. If you must be around people with colds or the flu, wash your hands often.     · Avoid secondhand smoke, air pollution, and high altitudes. Also avoid cold, dry air and hot, humid air. Stay at home with your windows closed when air pollution is bad. Medicines and oxygen therapy    · Take your medicines exactly as prescribed. Call your doctor if you think you are having a problem with your medicine. You may be taking medicines such as:  ? Bronchodilators. These help open your airways and make breathing easier. They are either short-acting (work for 6 to 9 hours) or long-acting (work for 24 hours). You inhale most bronchodilators, so they start to act quickly. Always carry your quick-relief inhaler with you in case you need it while you are away from home. ? Corticosteroids (prednisone, budesonide). These reduce airway inflammation. They come in pill or inhaled form. You must take these medicines every day for them to work well.     · Ask your doctor or pharmacist if a spacer is right for you. A spacer may help you get more inhaled medicine to your lungs.  If you use one, ask how to use it properly.     · Do not take any vitamins, over-the-counter medicine, or herbal products without talking to your doctor first.     · If your doctor prescribed antibiotics, take them as directed. Do not stop taking them just because you feel better. You need to take the full course of antibiotics.     · If you use oxygen therapy, use the flow rate your doctor has recommended. Don't change it without talking to your doctor first. Oxygen therapy boosts the amount of oxygen in your blood and helps you breathe easier. Activity    · Get regular exercise. Walking is an easy way to get exercise. Start out slowly, and walk a little more each day.     · Pay attention to your breathing. You are exercising too hard if you can't talk while you exercise.     · Take short rest breaks when doing household chores and other activities.     · Learn breathing methods--such as breathing through pursed lips--to help you become less short of breath.     · If your doctor has not set you up with a pulmonary rehabilitation program, ask if rehab is right for you. Rehab includes exercise programs, education about your disease and how to manage it, help with diet and other changes, and emotional support. Diet    · Eat regular, healthy meals. Use bronchodilators about 1 hour before you eat to make it easier to eat. Eat several small meals instead of three large ones. Drink beverages at the end of the meal. Avoid foods that are hard to chew.     · Eat foods that contain protein so you don't lose muscle mass.     · Talk with your doctor if you gain too much weight or if you lose weight without trying. Mental health    · Talk to your family, friends, or a therapist about your feelings. Some people feel frightened, angry, hopeless, helpless, and even guilty. Talking openly about bad feelings can help you cope. If these feelings last, talk to your doctor. When should you call for help? Call 911 anytime you think you may need emergency care. For example, call if:    · You have severe trouble breathing.    Call your doctor now or seek immediate medical care if:    · You have new or worse trouble breathing.     · You cough up blood.     · You have a fever. Watch closely for changes in your health, and be sure to contact your doctor if:    · You cough more deeply or more often, especially if you notice more mucus or a change in the color of your mucus.     · You have new or worse swelling in your legs or belly.     · You are not getting better as expected. Where can you learn more? Go to https://Horseman Investigationspepatrickeweb.Tapatap. org and sign in to your mBeat Media account. Enter Y500 in the [employer] box to learn more about \"Chronic Obstructive Pulmonary Disease (COPD): Care Instructions. \"     If you do not have an account, please click on the \"Sign Up Now\" link. Current as of: October 26, 2020               Content Version: 12.9  © 2006-2021 [employer]. Care instructions adapted under license by [facility] ([facility]). If you have questions about a medical condition or this instruction, always ask your healthcare professional. [place] [address] any warranty or liability for your use of this information. Patient Education        Learning About Saving Energy When You Have a Chronic Condition  Introduction    Everyday tasks can be tiring when you have COPD, heart failure, or another long-term (chronic) condition. You may feel at times that you've lost your ability to live your life. But learning to conserve, or save, your energy can help you be less tired. Conserving your energy means finding ways of doing daily activities with as little effort as possible. With some small changes in the way you do things, you can get your tasks done more easily. Some treatments are available that might help. Pulmonary rehabilitation can teach you ways to breathe easier. Cardiac rehabilitation can help make your heart stronger. You also may want to see an occupational or physical therapist. The therapist can give you more tips on building strength and moving with less effort.   What can you do to conserve your energy? Planning  · Make a list of what you have to do every day. Group the tasks by location. · Do all the chores in one part of your house around the same time. · Go out for errands or do chores at the time of day when you have the most energy. · Plan rest periods into your day. Getting things done  · Sit down as often as you can when you get dressed, do chores, or cook. · Use a cart with wheels to roll items, such as laundry, from one room to another. · Push or slide boxes or other large items instead of lifting them. Reaching and bending  · Put things you use the most on shelves that are at the level of your waist or shoulder. · Use long-handled grabbers or other tools to reach items on a high shelf or to  things off the floor. Use long-handled dusters when you clean the house. · Use a raised toilet seat to avoid bending too far to sit or stand up. Eating  · Eat several small meals instead of three larger meals. · If you get too tired to eat much, try to choose healthy foods that have more calories. Have a yogurt-and-fruit smoothie for breakfast. Put avocado on a sandwich. Or add cheese or peanut butter to snacks. · If you don't feel very hungry, try to eat first and drink water or other fluids later, after a meal. This can help keep you from losing weight. Sip small amounts of fluids if you need to drink while you eat. Having sex  · Choose the time of day when you have more energy. · A kube-fn-sboo position for sex can be less tiring. Sometimes you may want to focus more on caressing. Watch closely for changes in your health, and be sure to contact your doctor if you have any problems. Where can you learn more? Go to https://XODISquentin.healthGreen Vision Systemspartners. org and sign in to your GenieTown account. Enter H190 in the W[employer]ch box to learn more about \"Learning About Saving Energy When You Have a Chronic Condition. \"     If you do not have an account, please click on the \"Sign Up Now\" link. Current as of: October 26, 2020               Content Version: 12.9  © 2006-2021 Healthwise, Incorporated. Care instructions adapted under license by [facility] ([facility]). If you have questions about a medical condition or this instruction, always ask your healthcare professional. [address] any warranty or liability for your use of this information.

## 2021-09-13 NOTE — PROGRESS NOTES
[address]  Dept: Abel Chaves (:  1940) is a 79 y.o. male,Established patient, here for evaluation of the following chief complaint(s):  Results (Chest x ray)      ASSESSMENT/PLAN:  I have reviewed the patient's medical history in detail and updated the computerized patient record. HPI/ROS per the patient and caregiver. Overall non toxic in appearance. Answers questions appropriately. Conditions discussed and addressed this visit include:     Chest xray today is clear, no pneumonia or atelectasis. Discussed with the patient the chest xray, heart size and congestive heart failure. Discussed patho of his cough and measures to help including elevating his HOB. Weight is stable. Minimal leg edema at this time  Discussing using the prom/dm only at night and side effects of the medication. 1. Mucopurulent chronic bronchitis (HCC)  -     promethazine-dextromethorphan (PROMETHAZINE-DM) 6.25-15 MG/5ML syrup; Take 5 mLs by mouth 4 times daily as needed for Cough, Disp-150 mL, R-0Normal      Return in about 3 months (around 2021) for Medication evaluation, Results review, Routine follow up. SUBJECTIVE/OBJECTIVE:  HPI   Pt here for follow up on his chest congestion.  He did complete the augmentin   Cardiology visit last week   Overall feels improved.  Chest congestion much less, coughing only at night.  Weight stable   Blood pressure stable.    Review of Systems   Constitutional: Negative for activity change, appetite change, fatigue and unexpected weight change. HENT: Positive for postnasal drip, rhinorrhea and sinus pressure. Negative for ear discharge, ear pain, nosebleeds and tinnitus. Eyes: Negative for pain and redness. Respiratory: Positive for cough and shortness of breath. Negative for chest tightness and wheezing. Cardiovascular: Negative for chest pain, palpitations and leg swelling. Gastrointestinal: Negative for abdominal distention, abdominal pain, constipation and diarrhea. Endocrine: Negative for cold intolerance and heat intolerance. Genitourinary: Negative for difficulty urinating and dysuria. Musculoskeletal: Negative for arthralgias and myalgias. Skin: Negative for pallor and rash. Allergic/Immunologic: Negative for environmental allergies, food allergies and immunocompromised state. Neurological: Negative for dizziness, weakness, light-headedness and numbness. Hematological: Negative for adenopathy. Does not bruise/bleed easily. Psychiatric/Behavioral: Negative for behavioral problems and decreased concentration. The patient is not nervous/anxious. All other systems reviewed and are negative. Physical Exam  Vitals and nursing note reviewed. Constitutional:       Appearance: Normal appearance. He is well-developed and normal weight. He is not ill-appearing. HENT:      Head: Normocephalic and atraumatic. Right Ear: Tympanic membrane, ear canal and external ear normal.      Left Ear: Tympanic membrane, ear canal and external ear normal.      Nose: Nose normal.      Mouth/Throat:      Mouth: Mucous membranes are moist.      Pharynx: No oropharyngeal exudate. Eyes:      General:         Right eye: No discharge. Left eye: No discharge. Conjunctiva/sclera: Conjunctivae normal.   Neck:      Thyroid: No thyromegaly. Vascular: No carotid bruit. Cardiovascular:      Rate and Rhythm: Normal rate and regular rhythm. Pulses: Normal pulses. Heart sounds: Normal heart sounds. Pulmonary:      Effort: Pulmonary effort is normal. No respiratory distress. Breath sounds: Normal breath sounds. No wheezing, rhonchi or rales. Chest:      Chest wall: No tenderness. Abdominal:      General: Bowel sounds are normal. There is no distension. Palpations: Abdomen is soft. Tenderness: There is no abdominal tenderness. Musculoskeletal:         General: No tenderness. Normal range of motion. Cervical back: Normal range of motion and neck supple. No tenderness. No muscular tenderness. Right lower leg: Edema present. Left lower leg: Edema (minimal today at bilateral ankles) present. Lymphadenopathy:      Cervical: No cervical adenopathy. Skin:     General: Skin is warm and dry. Capillary Refill: Capillary refill takes less than 2 seconds. Coloration: Skin is not pale. Findings: No erythema or rash. Neurological:      General: No focal deficit present. Mental Status: He is alert and oriented to person, place, and time. Mental status is at baseline. Cranial Nerves: No cranial nerve deficit. Motor: No abnormal muscle tone. Coordination: Coordination normal.      Deep Tendon Reflexes: Reflexes are normal and symmetric. Reflexes normal.   Psychiatric:         Behavior: Behavior normal.         Thought Content: Thought content normal.         Judgment: Judgment normal.                 An electronic signature was used to authenticate this note.     --Jocy Marinelli, APRMARGARITO - CNP

## 2021-09-14 NOTE — TELEPHONE ENCOUNTER
I called and spoke to the patient. I let him know Bailee's response. The patient voiced understanding and did not have any questions.

## 2021-09-14 NOTE — TELEPHONE ENCOUNTER
Marisa Cee came in requesting the opinion of Arturo Russell regarding the cough medicine she sent to the pharmacy yesterday. He stated that the cough medicine ordered is on backorder and the pharmacy recommended and over the counter (patient is unsure of name) and he wanted Bailee's opinion regarding the same. He requested a call on his cell at [phone].

## 2021-09-27 ENCOUNTER — OFFICE VISIT (OUTPATIENT)
Dept: NEPHROLOGY | Age: 81
End: 2021-09-27
Payer: MEDICARE

## 2021-09-27 ENCOUNTER — HOSPITAL ENCOUNTER (OUTPATIENT)
Dept: PHARMACY | Age: 81
Setting detail: THERAPIES SERIES
Discharge: HOME | End: 2021-09-27
Payer: MEDICARE

## 2021-09-27 VITALS
HEART RATE: 65 BPM | RESPIRATION RATE: 18 BRPM | TEMPERATURE: 97.7 F | OXYGEN SATURATION: 95 % | DIASTOLIC BLOOD PRESSURE: 85 MMHG | SYSTOLIC BLOOD PRESSURE: 159 MMHG | HEIGHT: 67 IN | BODY MASS INDEX: 31.39 KG/M2 | WEIGHT: 200 LBS

## 2021-09-27 DIAGNOSIS — Z51.81 ENCOUNTER FOR THERAPEUTIC DRUG MONITORING: ICD-10-CM

## 2021-09-27 DIAGNOSIS — I48.21 PERMANENT ATRIAL FIBRILLATION (HCC): Primary | ICD-10-CM

## 2021-09-27 DIAGNOSIS — N18.32 STAGE 3B CHRONIC KIDNEY DISEASE (HCC): Primary | ICD-10-CM

## 2021-09-27 DIAGNOSIS — Z79.01 ANTICOAGULATED ON COUMADIN: ICD-10-CM

## 2021-09-27 LAB — POC INR: 3.1 (ref 0.8–1.2)

## 2021-09-27 PROCEDURE — 99211 OFF/OP EST MAY X REQ PHY/QHP: CPT

## 2021-09-27 PROCEDURE — 99214 OFFICE O/P EST MOD 30 MIN: CPT | Performed by: INTERNAL MEDICINE

## 2021-09-27 PROCEDURE — 36416 COLLJ CAPILLARY BLOOD SPEC: CPT

## 2021-09-27 PROCEDURE — 1123F ACP DISCUSS/DSCN MKR DOCD: CPT | Performed by: INTERNAL MEDICINE

## 2021-09-27 PROCEDURE — 4040F PNEUMOC VAC/ADMIN/RCVD: CPT | Performed by: INTERNAL MEDICINE

## 2021-09-27 PROCEDURE — 85610 PROTHROMBIN TIME: CPT

## 2021-09-27 PROCEDURE — G8417 CALC BMI ABV UP PARAM F/U: HCPCS | Performed by: INTERNAL MEDICINE

## 2021-09-27 PROCEDURE — 1036F TOBACCO NON-USER: CPT | Performed by: INTERNAL MEDICINE

## 2021-09-27 PROCEDURE — G8427 DOCREV CUR MEDS BY ELIG CLIN: HCPCS | Performed by: INTERNAL MEDICINE

## 2021-09-27 RX ORDER — NITROGLYCERIN 0.4 MG/1
0.4 TABLET SUBLINGUAL EVERY 5 MIN PRN
COMMUNITY
End: 2021-09-27 | Stop reason: ALTCHOICE

## 2021-09-27 NOTE — PATIENT INSTRUCTIONS

## 2021-09-27 NOTE — PROGRESS NOTES
[facility]    [address]  [address], [address]  [phone]       Progress Note    9/27/2021 9:49 AM    Pt Name:    Rosmery Lagos  YOB: 1940  Primary Care Physician:  WILBERTO Parikh - CNP       Chief Complaint:   Chief Complaint   Patient presents with    Chronic Kidney Disease    Diabetes    Hypertension    Nephropathy    Obesity    Proteinuria    Other     history of cardiac related acute kidney injury. History of Chief Complaint: recovery from MILLY and CKD stage [id number] from HTN and DM. Subjective:  I last saw the patient in clinic 06/23/2021. I follow the patient for Chronic Kidney disease stage G-IIIB-A1. Since our last visit the patient has been hospitalized at [facility] for pneumonia from pseudomonas. The patient is sleeping well at night with 1-2 times per night nocturia. The patient has a good appetite and is remaining active. The patient denied N/V/C/D/SOB/CP. He has no trouble at bladder emptying. COVID-19 screening  Fever: none  Cough: none  Exposure: none  Shortness of breath: none    Micro albumin/creatinine ratio: 66  eGFR: 30 Ml/min (it was 34 Ml/Min last visit)  SCr: 2.1 Mg/Dl (It was 1.9 Mg/Dl last visit)      Last six eGFR readings:  Lab Results   Component Value Date    LABGLOM 30 09/13/2021    LABGLOM 27 08/20/2021    LABGLOM 34 07/09/2021    LABGLOM 39 07/08/2021    LABGLOM 36 07/07/2021    LABGLOM 24 07/06/2021          Objective:  VITALS:  BP (!) 159/85 (Site: Right Upper Arm, Position: Sitting, Cuff Size: Large Adult)   Pulse 65   Temp 97.7 °F (36.5 °C)   Resp 18   Ht 5' 6.6\" (1.692 m)   Wt 200 lb (90.7 kg)   SpO2 95%   BMI 31.70 kg/m²   Weight:   Wt Readings from Last 3 Encounters:   09/27/21 200 lb (90.7 kg)   09/13/21 197 lb 9.6 oz (89.6 kg)   08/27/21 198 lb 3.2 oz (89.9 kg)     Body mass index is 31.7 kg/m².      Physical examination    General:  Alert and cooperative with exam  HEENT: Normocephalic. No lesions nor rashes. WENDY. EOMI  Neck:   No JVD and no bruits. Thyroid gland is normal  Lungs:  Breathing easily. No rales nor rhonchi. No cough nor sputum production. Heart[de-identified]            RRR. No murmurs nor rubs. PMI is not enlarged nor displaced. Abdomen:  Soft and non tender. Bowel sounds are active in all four quadrants. Extremities:  1+ edema  Neurologic:  CN II-XII are intact. No deficits noted. Muscle strength and tone are equal throughout. Skin:                Warm and dry with no rashes. Muscles:         Hand  and leg strength are equal and strong bilaterally.      Lab Data      CBC:   Lab Results   Component Value Date    WBC 7.4 09/13/2021    HGB 12.9 (L) 09/13/2021    HCT 40.7 (L) 09/13/2021    MCV 99.0 (H) 09/13/2021     09/13/2021     BMP:    Lab Results   Component Value Date     09/13/2021     08/20/2021     (L) 07/09/2021    K 4.0 09/13/2021    K 3.7 08/20/2021    K 4.8 07/09/2021     09/13/2021     08/20/2021     07/09/2021    CO2 24 09/13/2021    CO2 23 08/20/2021    CO2 24 07/09/2021    BUN 26 (H) 09/13/2021    BUN 30 (H) 08/20/2021    BUN 45 (H) 07/09/2021    CREATININE 2.1 (H) 09/13/2021    CREATININE 2.3 (H) 08/20/2021    CREATININE 1.9 (H) 07/09/2021    GLUCOSE 113 (H) 09/13/2021    GLUCOSE 112 (H) 08/20/2021    GLUCOSE 147 (H) 07/09/2021      Hepatic:   Lab Results   Component Value Date    AST 27 08/20/2021    AST 26 06/23/2021    AST 25 06/10/2021    ALT 24 08/20/2021    ALT 22 06/23/2021    ALT 18 06/10/2021    BILITOT 0.7 08/20/2021    BILITOT 0.8 06/23/2021    BILITOT 0.9 06/10/2021    ALKPHOS 104 08/20/2021    ALKPHOS 104 06/23/2021    ALKPHOS 101 06/10/2021     BNP: No results found for: BNP  Lipids:   Lab Results   Component Value Date    CHOL 120 08/20/2021    HDL 33 08/20/2021     INR:   Lab Results   Component Value Date    INR 2.20 (H) 08/31/2021    INR 3.20 (H) 08/10/2021    INR 2.10 (H) 07/13/2021     URINE: No results found for: Aayush Hawkins  Lab Results   Component Value Date    NITRU NEGATIVE 07/03/2021    COLORU YELLOW 07/03/2021    PHUR 5.0 07/03/2021    LABCAST NONE SEEN 06/10/2021    LABCAST NONE SEEN 06/10/2021    WBCUA NONE SEEN 06/10/2021    RBCUA NONE SEEN 06/10/2021    YEAST NONE SEEN 06/10/2021    BACTERIA NONE SEEN 06/10/2021    SPECGRAV 1.006 06/10/2021    LEUKOCYTESUR NEGATIVE 07/03/2021    UROBILINOGEN 0.2 07/03/2021    BILIRUBINUR NEGATIVE 07/03/2021    BLOODU NEGATIVE 07/03/2021    GLUCOSEU NEGATIVE 07/03/2021    KETUA NEGATIVE 07/03/2021      Microalbumen/Creatinine ratio:  No components found for: RUCREAT        Medications:    Current Outpatient Medications   Medication Sig Dispense Refill    tiotropium-olodaterol (STIOLTO) 2.5-2.5 MCG/ACT AERS Inhale 2 puffs into the lungs daily 3 Inhaler 3    warfarin (COUMADIN) 2.5 MG tablet Take 1 tablet by mouth daily 90 tablet 3    bumetanide (BUMEX) 1 MG tablet Take 1 tablet by mouth daily 90 tablet 3    albuterol sulfate HFA (VENTOLIN HFA) 108 (90 Base) MCG/ACT inhaler Inhale 2 puffs into the lungs every 6 hours as needed for Wheezing or Shortness of Breath 3 Inhaler 1    losartan (COZAAR) 25 MG tablet Take 1 tablet by mouth nightly 90 tablet 3    metFORMIN (GLUCOPHAGE) 500 MG tablet Take 1 tablet by mouth 2 times daily (with meals) 180 tablet 3    TRUEplus Lancets 28G MISC       TRUE METRIX BLOOD GLUCOSE TEST strip       Alcohol Swabs (B-D SINGLE USE SWABS REGULAR) PADS       albuterol (ACCUNEB) 1.25 MG/3ML nebulizer solution Inhale 1 ampule into the lungs every 6 hours as needed for Wheezing      vitamin D (CHOLECALCIFEROL) 1000 UNIT TABS tablet Take 1 tablet by mouth daily      atorvastatin (LIPITOR) 80 MG tablet Take 80 mg by mouth nightly      metoprolol succinate (TOPROL XL) 50 MG extended release tablet Take 50 mg by mouth daily       promethazine-dextromethorphan (PROMETHAZINE-DM) 6.25-15 MG/5ML syrup Take 5 mLs by mouth nightly

## 2021-09-27 NOTE — PROGRESS NOTES
[facility] [address]   [phone] (phone)  [phone] (fax)    Mr. Ava Carlson is a 79 y.o.  male with history of Afib who presents today for anticoagulation monitoring and adjustment. Patient verifies current dosing regimen and tablet strength. No missed or extra doses. Patient denies s/s bleeding/bruising/swelling/chest pain. Patient reports some SOB only if he tries to do too much activity. No blood in urine or stool. No dietary changes. No changes in medication/OTC agents/Herbals. Not taking Ventolin inhaler, removed from medication list.  No change in alcohol use or tobacco use. No change in activity level. Patient denies headaches/dizziness/lightheadedness/falls. No vomiting/diarrhea or acute illness. No Procedures scheduled in the future at this time. Assessment:   Lab Results   Component Value Date    INR 3.10 (H) 09/27/2021    INR 2.20 (H) 08/31/2021    INR 3.20 (H) 08/10/2021    PROTIME 10.9 05/12/2018    PROTIME 11.1 05/11/2018    PROTIME 11.3 05/10/2018     INR supratherapeutic   Recent Labs     09/27/21  1104   INR 3.10*       Plan:  Take 1.25 mg today then continue Coumadin 2.5 mg daily. Recheck INR in 5 week(s). Plan discussed with Elizabeth Perez, 91Arian Gutierrez. Patient reminded to call the Anticoagulation Clinic with any signs or symptoms of bleeding or with any medication changes. Patient given instructions utilizing the teach back method. After visit summary printed and reviewed with patient. Discharged ambulatory in no apparent distress.     Kendal Knight, Ye  9/27/2021  11:27 AM    For Pharmacy Admin Tracking Only     Intervention Detail: Dose Adjustment: 1, reason: Therapy Optimization   Total # of Interventions Recommended: 1   Total # of Interventions Accepted: 1   Time Spent (min): 20

## 2021-10-08 ENCOUNTER — OFFICE VISIT (OUTPATIENT)
Dept: FAMILY MEDICINE CLINIC | Age: 81
End: 2021-10-08
Payer: MEDICARE

## 2021-10-08 VITALS
OXYGEN SATURATION: 98 % | BODY MASS INDEX: 31.58 KG/M2 | DIASTOLIC BLOOD PRESSURE: 84 MMHG | HEART RATE: 70 BPM | SYSTOLIC BLOOD PRESSURE: 136 MMHG | TEMPERATURE: 97.5 F | WEIGHT: 199.2 LBS

## 2021-10-08 DIAGNOSIS — J41.1 MUCOPURULENT CHRONIC BRONCHITIS (HCC): ICD-10-CM

## 2021-10-08 PROCEDURE — 99213 OFFICE O/P EST LOW 20 MIN: CPT | Performed by: NURSE PRACTITIONER

## 2021-10-08 PROCEDURE — 1036F TOBACCO NON-USER: CPT | Performed by: NURSE PRACTITIONER

## 2021-10-08 PROCEDURE — 3023F SPIROM DOC REV: CPT | Performed by: NURSE PRACTITIONER

## 2021-10-08 PROCEDURE — 1123F ACP DISCUSS/DSCN MKR DOCD: CPT | Performed by: NURSE PRACTITIONER

## 2021-10-08 PROCEDURE — G8417 CALC BMI ABV UP PARAM F/U: HCPCS | Performed by: NURSE PRACTITIONER

## 2021-10-08 PROCEDURE — G8484 FLU IMMUNIZE NO ADMIN: HCPCS | Performed by: NURSE PRACTITIONER

## 2021-10-08 PROCEDURE — 4040F PNEUMOC VAC/ADMIN/RCVD: CPT | Performed by: NURSE PRACTITIONER

## 2021-10-08 PROCEDURE — G8926 SPIRO NO PERF OR DOC: HCPCS | Performed by: NURSE PRACTITIONER

## 2021-10-08 PROCEDURE — G8427 DOCREV CUR MEDS BY ELIG CLIN: HCPCS | Performed by: NURSE PRACTITIONER

## 2021-10-08 RX ORDER — DEXTROMETHORPHAN HYDROBROMIDE AND PROMETHAZINE HYDROCHLORIDE 15; 6.25 MG/5ML; MG/5ML
5 SYRUP ORAL NIGHTLY
Qty: 450 ML | Refills: 0 | Status: SHIPPED | OUTPATIENT
Start: 2021-10-08 | End: 2021-12-07 | Stop reason: SDUPTHER

## 2021-10-08 ASSESSMENT — ENCOUNTER SYMPTOMS
CHEST TIGHTNESS: 1
EYES NEGATIVE: 1
SHORTNESS OF BREATH: 0
WHEEZING: 0
GASTROINTESTINAL NEGATIVE: 1
COUGH: 1

## 2021-10-08 NOTE — PATIENT INSTRUCTIONS
Patient Education        Learning About Chronic Bronchitis  What is chronic bronchitis? Chronic bronchitis is long-term swelling and the buildup of mucus in the airways of your lungs. The airways (bronchial tubes) get inflamed and make a lot of mucus. This can narrow or block the airways, making it hard for you to breathe. It can also make you cough. It is a type of COPD (chronic obstructive pulmonary disease). Chronic bronchitis is usually caused by smoking. But chemical fumes, dust, or air pollution also can cause it over time. What can you expect when you have chronic bronchitis? Chronic bronchitis gets worse over time. You cannot undo the damage to your lungs. Over time, you may find that:  · You get short of breath even when you do things like get dressed or fix a meal.  · It is hard to eat or exercise. · You feel weaker and limit activity. Over many years, the swelling and mucus from chronic bronchitis make it more likely that you will get lung infections. But there are things you can do to prevent more damage and feel better. What are the symptoms? The main symptoms of chronic bronchitis are:  · A cough that will not go away. · Mucus that comes up when you cough. · Shortness of breath that gets worse when you exercise. At times, your symptoms may suddenly flare up and get much worse. This is a called an exacerbation (say \"egg-ZAUSHA--BAY-lucille\"). When this happens, your usual symptoms quickly get worse and stay bad. This can be dangerous. You may have to go to the hospital.  How can you keep chronic bronchitis from getting worse? Don't smoke. That is the best way to keep chronic bronchitis from getting worse. If you already smoke, it is never too late to stop. If you need help quitting, talk to your doctor about stop-smoking programs and medicines. These can increase your chances of quitting for good. You can do other things to keep chronic bronchitis from getting worse:  · Avoid bad air. Air pollution, chemical fumes, and dust also can make chronic bronchitis worse. · Get a flu shot every year. A shot may keep the flu from turning into something more serious, like pneumonia. A flu shot also may lower your chances of having a flare-up. · Get a pneumococcal shot. A shot can prevent some of the serious complications of pneumonia. Ask your doctor how often you should get this shot. · Get a pertussis shot. A whooping cough (pertussis) shot may help protect you from getting whooping cough. How is chronic bronchitis treated? Chronic bronchitis is treated with medicines and oxygen. You also can take steps to stay healthy and keep your condition from getting worse. Medicines and oxygen therapy  · You may be taking medicines such as:  ? Bronchodilators. These help open your airways and make breathing easier. Bronchodilators are either short-acting (work for 4 to 9 hours) or long-acting (work for 12 to 24 hours). You inhale most bronchodilators, so they start to act quickly. Always carry your quick-relief inhaler with you in case you need it. ? Corticosteroids. These reduce airway inflammation. They come in inhaled or pill form. ? Antibiotics. These medicines are used when you have a bacterial lung infection. · Take your medicines exactly as prescribed. Call your doctor if you think you are having a problem with your medicine. · Oxygen therapy boosts the amount of oxygen in your blood and helps you breathe easier. Use the flow rate your doctor has recommended, and do not change it without talking to your doctor first.  Other care  · If your doctor recommends it, get more exercise. Walking is a good choice. Bit by bit, increase the amount you walk every day. Try for at least 30 minutes on most days of the week. · Learn breathing methods--such as breathing through pursed lips--to help you become less short of breath.   · If your doctor has not set you up with a pulmonary rehabilitation program, ask your doctor if rehab is right for you. Rehab includes exercise programs, education about your disease and how to manage it, help with diet and other changes, and emotional support. · Eat regular, healthy meals. Use bronchodilators about 1 hour before you eat to make it easier to eat. Try eating smaller, frequent meals so your stomach is never too full. A full stomach can push on the muscle that helps you breathe (your diaphragm) and make it harder to breathe. Drink beverages at the end of the meal. Avoid foods that are hard to chew. Follow-up care is a key part of your treatment and safety. Be sure to make and go to all appointments, and call your doctor if you are having problems. It's also a good idea to know your test results and keep a list of the medicines you take. Where can you learn more? Go to https://Muzeekvenkataeb.Glo [email or web address] org and sign in to your TopFun account. Enter B621 in the Palm box to learn more about \"Learning About Chronic Bronchitis. \"     If you do not have an account, please click on the \"Sign Up Now\" link. Current as of: July 6, 2021               Content Version: 13.0  © 9273-9337 [employer]. Care instructions adapted under license by [facility] ([facility]). If you have questions about a medical condition or this instruction, always ask your healthcare professional. [place] [address] any warranty or liability for your use of this information. Patient Education        Learning About Saving Energy When You Have a Chronic Condition  Introduction     Everyday tasks can be tiring when you have COPD, heart failure, or another long-term (chronic) condition. You may feel at times that you've lost your ability to live your life. But learning to conserve, or save, your energy can help you be less tired. Conserving your energy means finding ways of doing daily activities with as little effort as possible.  With some small changes in the way you do things, you can get your tasks done more easily. Some treatments are available that might help. Pulmonary rehabilitation can teach you ways to breathe easier. Cardiac rehabilitation can help make your heart stronger. You also may want to see an occupational or physical therapist. The therapist can give you more tips on building strength and moving with less effort. What can you do to conserve your energy? Planning  · Make a list of what you have to do every day. Group the tasks by location. · Do all the chores in one part of your house around the same time. · Go out for errands or do chores at the time of day when you have the most energy. · Plan rest periods into your day. Getting things done  · Sit down as often as you can when you get dressed, do chores, or cook. · Use a cart with wheels to roll items, such as laundry, from one room to another. · Push or slide boxes or other large items instead of lifting them. Reaching and bending  · Put things you use the most on shelves that are at the level of your waist or shoulder. · Use long-handled grabbers or other tools to reach items on a high shelf or to  things off the floor. Use long-handled dusters when you clean the house. · Use a raised toilet seat to avoid bending too far to sit or stand up. Eating  · Try eating small, frequent meals instead of three larger meals so your stomach is never too full. A full stomach can push on the muscle that helps you breathe (your diaphragm) and make it harder to breathe. · If you get too tired to eat much, try to choose healthy foods that have more calories. Have a yogurt-and-fruit smoothie for breakfast. Put avocado on a sandwich. Or add cheese or peanut butter to snacks. · If you don't feel very hungry, try to eat first and drink water or other fluids later, after a meal. This can help keep you from losing weight. Sip small amounts of fluids if you need to drink while you eat.   Having sex  · Choose the time of day when you have more energy. · A nxqr-xo-bsud position for sex can be less tiring. Sometimes you may want to focus more on caressing. Watch closely for changes in your health, and be sure to contact your doctor if you have any problems. Where can you learn more? Go to https://chpepicewchari.[email or web address] org and sign in to your [employer] account. Enter H190 in the [employer] box to learn more about \"Learning About Saving Energy When You Have a Chronic Condition. \"     If you do not have an account, please click on the \"Sign Up Now\" link. Current as of: July 6, 2021               Content Version: 13.0  © 2006-2021 [employer]. Care instructions adapted under license by [facility] ([facility]). If you have questions about a medical condition or this instruction, always ask your healthcare professional. [place] [address] any warranty or liability for your use of this information. Patient Education        Chronic Cough: Care Instructions  Your Care Instructions     A cough is your body's response to something that bothers your throat or airways. Many things can cause a cough. You might cough because of a cold or the flu, bronchitis, or asthma. Smoking, postnasal drip, allergies, and stomach acid that backs up into your throat also can cause a cough. A cough can be short-term (acute) or long-term (chronic). A chronic cough lasts more than 3 weeks. A chronic cough is often caused by a long-term problem, such as asthma. Another cause might be a medicine, such as an ACE inhibitor. A cough is a symptom, not a disease. To treat a chronic cough, you may need to treat the problem that causes it. You can take a few steps at home to cough less and feel better. Some people cough or clear their throat out of habit for no clear reason. Follow-up care is a key part of your treatment and safety.  Be sure to make and go to all appointments, and call your doctor if you are having problems. It's also a good idea to know your test results and keep a list of the medicines you take. How can you care for yourself at home? · Drink plenty of water and other fluids. This may help soothe a dry or sore throat. Honey or lemon juice in hot water or tea may ease a dry cough. · Prop up your head on pillows to help you breathe and ease a cough. · Do not smoke or allow others to smoke around you. Smoke can make a cough worse. If you need help quitting, talk to your doctor about stop-smoking programs and medicines. These can increase your chances of quitting for good. · Avoid exposure to smoke, dust, or other pollutants, or wear a face mask. Check with your doctor or pharmacist to find out which type of face mask will give you the most benefit. · Take cough medicine as directed by your doctor. · Try cough drops to soothe a dry or sore throat. Cough drops don't stop a cough. Medicine-flavored cough drops are no better than candy-flavored drops or hard candy. Throat clearing  When you have a chronic cough or a disease that may cause this type of cough, you may often feel like you want to clear your throat. This helps bring up mucus. But throat clearing does not always have a cause. Throat clearing can become a habit. The more you do it, the more you feel like you need to do it. But frequent throat clearing can be hard on your vocal cords. It's like slamming them together. To help lessen throat clearing, you can try:  · Taking small sips of water. · Not clearing your throat when you feel you need to. · Swallowing hard when you want to clear your throat. You may want to ask your doctor if a medicine that thins mucus would help. When should you call for help? Call 911 anytime you think you may need emergency care. For example, call if:    · You have severe trouble breathing.    Call your doctor now or seek immediate medical care if:    · You cough up blood.     · You have new or worse trouble breathing.     · You have a new or higher fever. Watch closely for changes in your health, and be sure to contact your doctor if:    · You cough more deeply or more often, especially if you notice more mucus or a change in the color of your mucus.     · You do not get better as expected. Where can you learn more? Go to https://Marro.wspepiceweb.Directly. org and sign in to your Behavio account. Enter K193 in the [employer] box to learn more about \"Chronic Cough: Care Instructions. \"     If you do not have an account, please click on the \"Sign Up Now\" link. Current as of: July 6, 2021               Content Version: 13.0  © 2006-2021 [employer]. Care instructions adapted under license by [facility] ([facility]). If you have questions about a medical condition or this instruction, always ask your healthcare professional. [place] [address] any warranty or liability for your use of this information. Patient Education        dextromethorphan  Pronunciation:  DEX troe me THOR fan  Brand:  Babee Cof, Buckleys Mixture, Creomulsion, DayQuil Cough, Delsym, Robafen Cough Liquidgels, Scot-Tussin Diabetic, Silphen DM  What is the most important information I should know about dextromethorphan? Do not use this medicine if you have used an MAO inhibitor in the past 14 days, such as isocarboxazid, linezolid, methylene blue injection, phenelzine, rasagiline, selegiline, or tranylcypromine. What is dextromethorphan? Dextromethorphan is a cough suppressant that is used to treat cough caused by the common cold or flu. Dextromethorphan will not treat a cough caused by smoking, asthma, or emphysema. There are many brands and forms of dextromethorphan available. Not all brands are listed on this leaflet. Dextromethorphan may also be used for purposes not listed in this medication guide. What should I discuss with my healthcare provider before taking dextromethorphan?   Do not use dextromethorphan if you have used an MAO inhibitor in the past 14 days. A dangerous drug interaction could occur. MAO inhibitors include isocarboxazid, linezolid, methylene blue injection, phenelzine, rasagiline, selegiline, tranylcypromine, and others. You should not use dextromethorphan if you are allergic to it. Ask a doctor or pharmacist if this medicine is safe to use if you have any medical conditions. Dextromethorphan should not be given to a child younger than 15years old. Ask a doctor before using this medicine if you are pregnant or breast-feeding. This medicine may contain phenylalanine. Check the medication label if you have phenylketonuria (PKU). How should I take dextromethorphan? Use exactly as directed on the label, or as prescribed by your doctor. Cold or cough medicine is only for short-term use until your symptoms clear up. Measure liquid medicine carefully. Use the dosing syringe provided, or use a medicine dose-measuring device (not a kitchen spoon). Call your doctor if your symptoms do not improve after 7 days, or if you have a fever, rash, or headaches. If you need surgery or medical tests, tell your surgeon or doctor ahead of time that you have taken dextromethorphan in the past few days. Store at room temperature away from moisture, heat, and light. Do not freeze. What happens if I miss a dose? Since cough or cold medicine is used when needed, you may not be on a dosing schedule. Skip any missed dose if it's almost time for your next dose. Do not use two doses at one time. What happens if I overdose? Seek emergency medical attention or call the Poison Help line at 1-920.914.5338. What should I avoid while taking dextromethorphan? Avoid driving or hazardous activity until you know how this medicine will affect you. Your reactions could be impaired. Drinking alcohol can increase certain side effects of this medicine.   Ask a doctor or pharmacist before using over-the-counter medicines that may contain ingredients similar to dextromethorphan. What are the possible side effects of dextromethorphan? Get emergency medical help if you have signs of an allergic reaction: hives; difficult breathing; swelling of your face, lips, tongue, or throat. Stop using dextromethorphan and call your doctor at once if you have:  · severe nervousness, confusion;  · severe dizziness or drowsiness;  · blurred vision; or  · painful or difficult urination. Common side effects may include:  · nausea, diarrhea, loss of appetite;  · dizziness;  · tingling; or  · feeling restless. This is not a complete list of side effects and others may occur. Call your doctor for medical advice about side effects. You may report side effects to FDA at 0-575-FDA-1674. What other drugs will affect dextromethorphan? Avoid using this medicine with other drugs that cause drowsiness or slow your breathing (such as opioid medicine, a muscle relaxer, or medicine for anxiety or seizures). Ask a doctor or pharmacist before using any other medication, including prescription and over-the-counter medicines, vitamins, and herbal products. Not all possible drug interactions are listed in this medication guide. Where can I get more information? Your pharmacist can provide more information about dextromethorphan. Remember, keep this and all other medicines out of the reach of children, never share your medicines with others, and use this medication only for the indication prescribed. Every effort has been made to ensure that the information provided by [address] is accurate, up-to-date, and complete, but no guarantee is made to that effect. Drug information contained herein may be time sensitive.  [facility] information has been compiled for use by healthcare practitioners and consumers in the [place] and therefore [employer] does not warrant that uses outside of the [place] [employer] are appropriate, unless specifically indicated otherwise. [facility] drug information does not endorse drugs, diagnose patients or recommend therapy. [facility] drug information is an informational resource designed to assist licensed healthcare practitioners in caring for their patients and/or to serve consumers viewing this service as a supplement to, and not a substitute for, the expertise, skill, knowledge and judgment of healthcare practitioners. The absence of a warning for a given drug or drug combination in no way should be construed to indicate that the drug or drug combination is safe, effective or appropriate for any given patient. [facility] does not assume any responsibility for any aspect of healthcare administered with the aid of information [facility] provides. The information contained herein is not intended to cover all possible uses, directions, precautions, warnings, drug interactions, allergic reactions, or adverse effects. If you have questions about the drugs you are taking, check with your doctor, nurse or pharmacist.  Copyright 3807-0846 [address]. Version: 8.01. Revision date: 4/22/2019. Care instructions adapted under license by [facility] ([facility]). If you have questions about a medical condition or this instruction, always ask your healthcare professional. [place] [address] any warranty or liability for your use of this information.

## 2021-10-08 NOTE — PROGRESS NOTES
[address]  Dept: Abel Chaves (:  1940) is a 79 y.o. male,Established patient, here for evaluation of the following chief complaint(s):  Cough      ASSESSMENT/PLAN:  I have reviewed the patient's medical history in detail and updated the computerized patient record. HPI/ROS per the patient and caregiver. Overall non toxic in appearance. Answers questions appropriately. Conditions discussed and addressed this visit include:     Overall unchanged at this time. Will order the prom/dm for at night only for cough and congestion  Use cool mist humidifer  Continue to weigh daily  Use the albuterol neb every 4-6 hours as needeed for sob and coughing    1. Mucopurulent chronic bronchitis (HCC)  -     promethazine-dextromethorphan (PROMETHAZINE-DM) 6.25-15 MG/5ML syrup; Take 5 mLs by mouth nightly, Disp-450 mL, R-0Normal      Return in about 3 months (around 2022), or if symptoms worsen or fail to improve, for Medication evaluation, Results review, Routine follow up. SUBJECTIVE/OBJECTIVE:  HPI   Here for follow up on cough   Overall feels better   Still coughing at night   Due to see pulmonology on 10/21   Cannot afford the inhalers, especially the stiolto   Denies chest pain, no sob   Edema is controlled   Saw kidney and no changes at this time   inr is stable.    Review of Systems   HENT: Negative. Eyes: Negative. Respiratory: Positive for cough and chest tightness. Negative for shortness of breath and wheezing. Cardiovascular: Negative for chest pain and leg swelling. Gastrointestinal: Negative. Endocrine: Negative. Genitourinary: Negative. Allergic/Immunologic: Positive for environmental allergies. Hematological: Negative for adenopathy. Psychiatric/Behavioral: Negative. Physical Exam  Vitals and nursing note reviewed. Constitutional:       Appearance: Normal appearance.  He is not ill-appearing. HENT:      Head: Normocephalic. Right Ear: External ear normal.      Left Ear: External ear normal.      Nose: Nose normal.      Mouth/Throat:      Pharynx: Oropharynx is clear. Eyes:      Conjunctiva/sclera: Conjunctivae normal.   Neck:      Vascular: No carotid bruit. Cardiovascular:      Rate and Rhythm: Normal rate and regular rhythm. Pulses: Normal pulses. Pulmonary:      Breath sounds: No wheezing, rhonchi or rales. Abdominal:      General: Abdomen is flat. Musculoskeletal:         General: Normal range of motion. Cervical back: Normal range of motion and neck supple. No tenderness. Lymphadenopathy:      Cervical: No cervical adenopathy. Skin:     General: Skin is warm and dry. Capillary Refill: Capillary refill takes less than 2 seconds. Neurological:      General: No focal deficit present. Mental Status: He is alert. Mental status is at baseline. Psychiatric:         Mood and Affect: Mood normal.         Behavior: Behavior normal.                 An electronic signature was used to authenticate this note.     --WILBERTO Narayan - CNP

## 2021-10-11 DIAGNOSIS — J44.9 MODERATE COPD (CHRONIC OBSTRUCTIVE PULMONARY DISEASE) (HCC): ICD-10-CM

## 2021-10-21 ENCOUNTER — OFFICE VISIT (OUTPATIENT)
Dept: PULMONOLOGY | Age: 81
End: 2021-10-21
Payer: MEDICARE

## 2021-10-21 VITALS
TEMPERATURE: 98 F | OXYGEN SATURATION: 95 % | HEART RATE: 84 BPM | WEIGHT: 202.8 LBS | BODY MASS INDEX: 31.83 KG/M2 | DIASTOLIC BLOOD PRESSURE: 78 MMHG | SYSTOLIC BLOOD PRESSURE: 122 MMHG | HEIGHT: 67 IN

## 2021-10-21 DIAGNOSIS — J44.9 CHRONIC OBSTRUCTIVE PULMONARY DISEASE, UNSPECIFIED COPD TYPE (HCC): ICD-10-CM

## 2021-10-21 DIAGNOSIS — J41.1 MUCOPURULENT CHRONIC BRONCHITIS (HCC): ICD-10-CM

## 2021-10-21 DIAGNOSIS — J47.9 BRONCHIECTASIS WITHOUT COMPLICATION (HCC): Primary | ICD-10-CM

## 2021-10-21 PROCEDURE — 1123F ACP DISCUSS/DSCN MKR DOCD: CPT | Performed by: INTERNAL MEDICINE

## 2021-10-21 PROCEDURE — G8427 DOCREV CUR MEDS BY ELIG CLIN: HCPCS | Performed by: INTERNAL MEDICINE

## 2021-10-21 PROCEDURE — G8926 SPIRO NO PERF OR DOC: HCPCS | Performed by: INTERNAL MEDICINE

## 2021-10-21 PROCEDURE — G8484 FLU IMMUNIZE NO ADMIN: HCPCS | Performed by: INTERNAL MEDICINE

## 2021-10-21 PROCEDURE — G8417 CALC BMI ABV UP PARAM F/U: HCPCS | Performed by: INTERNAL MEDICINE

## 2021-10-21 PROCEDURE — 4040F PNEUMOC VAC/ADMIN/RCVD: CPT | Performed by: INTERNAL MEDICINE

## 2021-10-21 PROCEDURE — 3023F SPIROM DOC REV: CPT | Performed by: INTERNAL MEDICINE

## 2021-10-21 PROCEDURE — 1036F TOBACCO NON-USER: CPT | Performed by: INTERNAL MEDICINE

## 2021-10-21 PROCEDURE — 99214 OFFICE O/P EST MOD 30 MIN: CPT | Performed by: INTERNAL MEDICINE

## 2021-10-21 RX ORDER — BUDESONIDE, GLYCOPYRROLATE, AND FORMOTEROL FUMARATE 160; 9; 4.8 UG/1; UG/1; UG/1
2 AEROSOL, METERED RESPIRATORY (INHALATION) 2 TIMES DAILY
Qty: 3 EACH | Refills: 1 | Status: SHIPPED | OUTPATIENT
Start: 2021-10-21 | End: 2022-01-19

## 2021-10-21 ASSESSMENT — ENCOUNTER SYMPTOMS
ALLERGIC/IMMUNOLOGIC NEGATIVE: 1
SHORTNESS OF BREATH: 1

## 2021-10-21 NOTE — PROGRESS NOTES
Subjective:      Patient ID: Nell Leblanc is a 79 y.o. male. Cc: COPD    HPI      Known to us from hospital admit with pseudomonas pneumonia, COPD, bronchiectasis    Did not do pre visit PFTs/CT chest for follow up. Wishes to stop Stiolto due ti price and perception the is causing hair loss. Otherwise stable from respiratory standpoint  Available PFTs in from 2018--moderate COPD  Minimal cough and no sputum production,     Hospital notes  Nell Leblanc is a 79 y.o. male with past medical history of CAD, Afib, DM2, and COPD not on home Oxygen presented to ED due to SOB and productive cough. He was treated as outpatient for sinusitis and bronchitis/COPD exacerbation with oral Azithromycin which was later changed to Augmentin, and prednisone, he didn't report improvement. CXR showed bibasilar infiltrates. He is requiring increasing amount of O2 and is currently on 50% high flow. He smoked until 2009, he was diagnosed with COPD by DT in 2018 and is using albuterol nebulizer. Used to work as a        Review of Systems   Constitutional: Positive for fatigue. Respiratory: Positive for shortness of breath. Cardiovascular: Positive for palpitations and leg swelling. Endocrine: Negative. Genitourinary: Negative. Allergic/Immunologic: Negative. Neurological: Negative. Hematological: Negative.           All other systems reviewed and are negative    Lung Nodule Screening     [] Qualifies    [x] Does not qualify   [] Declined   [] Completed  Past Medical History:   Diagnosis Date    MILLY (acute kidney injury) ([address]     Atrial fibrillation ([address]     Cerebral artery occlusion with cerebral infarction ([address]     CHF (congestive heart failure), NYHA class III, acute on chronic, combined ([address]     COPD (chronic obstructive pulmonary disease) ([address] 8/3/2020    Coronary artery disease involving native coronary artery of native heart with angina pectoris ([address]     Diabetes mellitus, for Wheezing      vitamin D (CHOLECALCIFEROL) 1000 UNIT TABS tablet Take 1 tablet by mouth daily      atorvastatin (LIPITOR) 80 MG tablet Take 80 mg by mouth nightly      metoprolol succinate (TOPROL XL) 50 MG extended release tablet Take 50 mg by mouth daily        No current facility-administered medications for this visit. LABS - none    There were no vitals taken for this visit. Wt Readings from Last 3 Encounters:   10/08/21 199 lb 3.2 oz (90.4 kg)   09/27/21 200 lb (90.7 kg)   09/13/21 197 lb 9.6 oz (89.6 kg)     Neck Circumference - 17.75 in;   Mallampati Score - 4              Objective:   Physical Exam  Constitutional:       Appearance: Normal appearance. He is obese. HENT:      Nose: Nose normal.      Mouth/Throat:      Pharynx: Oropharynx is clear. Eyes:      Pupils: Pupils are equal, round, and reactive to light. Pulmonary:      Effort: Pulmonary effort is normal.      Breath sounds: Wheezing and rhonchi present. Abdominal:      Palpations: Abdomen is soft. Musculoskeletal:         General: No swelling. Cervical back: Normal range of motion and neck supple. Right lower leg: No edema. Left lower leg: No edema. Neurological:      Mental Status: He is alert. CT chest:    CT CHEST WO CONTRAST   7/5/2021   FINDINGS:   The heart size is normal in this patient status post pacemaker placement. . There is a TAVR in place. There is atherosclerotic calcification in the thoracic aorta. . There is no gross abnormality of the pulmonary artery. There are small mediastinal lymph nodes especially in the aortopulmonary window. This a calcified lymph node in the subcarinal space. , axillary or hilar adenopathy. There is no pericardial or pleural effusion.  There is slight thickening of the interstitial lung markings with superimposed abnormal densities especially in the right lower lobe, lingula and to a lesser extent right upper lobe.  These are  clearly worse than on remote CT scan dated 22nd of April 2021 suggestive of inflammatory process,  possibly Covid 19 infection. There is collapse in the left lower lobe posteriorly. There is mild thoracic spondylosis. .   Impression:  1. Interval deterioration since previous CT scan of the chest dated 22nd of April 2021 with areas of abnormal density especially in the right lower lobe, lingula and to lesser extent right upper lobe. These are suggestive of inflammatory process possibly  Covid 19 infection. 2. Collapse in the left lower lobe posteriorly, new since previous CT scan of the chest dated 22nd of April 2021. 3. Small mediastinal lymph nodes. Calcified node in the subcarinal space. 4. Status post pacemaker placement. 5. TAVR in place. Atherosclerotic calcification in the thoracic aorta. 6. Thoracic spondylosis.      ECHO:     7/5/2021  Conclusions      Summary   Technically difficult examination.   Left ventricle size is normal.   Mild concentric left ventricular hypertrophy.   There were no regional wall motion abnormalities.   Ejection fraction is visually estimated at 50%.  S/p TAVR, normally functioning.      Signature      ----------------------------------------------------------------   Electronically signed by Concepcion Martinez MD (Interpreting   [id number]) on 07/05/2021 at 04:04 PM   ----------------------------------------------------------------     ECHO 5/6/2019     Ejection fraction was estimated at 45-50%.   Normal left ventricular size and mildly reduced systolic function.   There was mild global hypokinesis.   Wall thickness was within normal limits.   Device lead/catheter seen in the right heart.   S/p TAVR.   Transaortic velocity was 1.2-1.6 m/s, mean gradient 4-6 mmHg, EOA 1.5-1.8   cm2. There was no evidence of aortic regurgitation.   There was trace tricuspid regurgitation.   Assuming RAP = 5 mmHg, the estimated RVSP = 34 mmHg.   Ascending aorta - 3.1 cm.   [address] size is within normal limits with normal respiratory phasic changes. Assessment:       Diagnosis Orders   1. Bronchiectasis without complication ([facility]  CT CHEST WO CONTRAST    Full PFT Study With Bronchodilator   2. Chronic obstructive pulmonary disease, unspecified COPD type ([facility]  CT CHEST WO CONTRAST    Full PFT Study With Bronchodilator   3.  Mucopurulent chronic bronchitis ([facility]  CT CHEST WO CONTRAST    Full PFT Study With Bronchodilator           Plan:      Orders Placed This Encounter   Procedures    CT CHEST WO CONTRAST     Standing Status:   Future     Standing Expiration Date:   10/21/2022     Order Specific Question:   Reason for exam:     Answer:   Lung Nodules    Full PFT Study With Bronchodilator     If an ABG is needed along with this PFT procedure, please place the appropriate lab order     Standing Status:   Future     Standing Expiration Date:   10/21/2022      Orders Placed This Encounter   Medications    Budeson-Glycopyrrol-Formoterol (modulRZTRI AEROSPHERE) 160-9-4.8 MCG/ACT AERO     Sig: Inhale 2 puffs into the lungs 2 times daily     Dispense:  3 each     Refill:  1

## 2021-10-22 ENCOUNTER — TELEPHONE (OUTPATIENT)
Dept: FAMILY MEDICINE CLINIC | Age: 81
End: 2021-10-22

## 2021-10-22 NOTE — TELEPHONE ENCOUNTER
Ham Lopez called the office stating he had recently seen a lung specialist who switched him to [facility] aerosphere 160 mcg/9mcg/4.8 mcg 2 puffs q.a.m. and 2 puffs q.p.m. He is asking if he should still take his cough syrup with this? Ham Lopez can be reached at [phone].

## 2021-10-29 ENCOUNTER — HOSPITAL ENCOUNTER (EMERGENCY)
Age: 81
Discharge: LEFT BEFORE DISCHARGE | End: 2021-10-29
Payer: MEDICARE

## 2021-11-01 ENCOUNTER — HOSPITAL ENCOUNTER (OUTPATIENT)
Dept: PHARMACY | Age: 81
Setting detail: THERAPIES SERIES
Discharge: HOME | End: 2021-11-01
Payer: MEDICARE

## 2021-11-01 DIAGNOSIS — Z79.01 ANTICOAGULATED ON COUMADIN: ICD-10-CM

## 2021-11-01 DIAGNOSIS — Z51.81 ENCOUNTER FOR THERAPEUTIC DRUG MONITORING: ICD-10-CM

## 2021-11-01 DIAGNOSIS — I48.21 PERMANENT ATRIAL FIBRILLATION (HCC): Primary | ICD-10-CM

## 2021-11-01 LAB — POC INR: 2.4 (ref 0.8–1.2)

## 2021-11-01 PROCEDURE — 99211 OFF/OP EST MAY X REQ PHY/QHP: CPT

## 2021-11-01 PROCEDURE — 36416 COLLJ CAPILLARY BLOOD SPEC: CPT

## 2021-11-01 PROCEDURE — 85610 PROTHROMBIN TIME: CPT

## 2021-11-01 NOTE — PROGRESS NOTES
Medication Management [address]   [phone] (phone)  [phone] (fax)    Mr. Nell Leblanc is a 79 y.o.  male with history of atrial fib. , per Dr. Rory Marquez referral, who presents today for Warfarin monitoring and adjustment (5 week visit). Patient verifies current dosing regimen and tablet strength. No missed or extra doses. Patient denies bleeding/SOB/chest pain. Has usual swelling of legs/feet, plus usual easy bruising. No blood in urine or stool. No dietary changes. Changes in medication/OTC agents/herbals: now on Breztri. Using Promethazine-DM for cough per PCP's 10/8 note (went to ER 10/29 for cough/bringing up phlegm, but symptoms stopped on arrival, so left). No change in alcohol use or tobacco use. No change in activity level. Patient denies headaches/dizziness/lightheadedness/falls. No vomiting/diarrhea or acute illness. No procedures scheduled in the future at this time. Had high-dose flu vaccine 10/19. Assessment:   Lab Results   Component Value Date    INR 2.40 (H) 11/01/2021    INR 3.10 (H) 09/27/2021    INR 2.20 (H) 08/31/2021    PROTIME 10.9 05/12/2018    PROTIME 11.1 05/11/2018    PROTIME 11.3 05/10/2018     INR therapeutic - goal 2-3. Recent Labs     11/01/21  1024   INR 2.40*     Plan:  POCT INR ordered/performed/result reviewed. Continue Coumadin 2.5 mg daily PO. Recheck INR in 5 week(s). Patient reminded to call the Anticoagulation Clinic with any signs or symptoms of bleeding or with any medication changes. Patient given instructions utilizing the teach back method. After visit summary printed and reviewed with patient. Discharged ambulatory in no apparent distress, wearing mask.

## 2021-12-06 ENCOUNTER — HOSPITAL ENCOUNTER (OUTPATIENT)
Dept: PHARMACY | Age: 81
Setting detail: THERAPIES SERIES
Discharge: HOME | End: 2021-12-06
Payer: MEDICARE

## 2021-12-06 DIAGNOSIS — Z51.81 ENCOUNTER FOR THERAPEUTIC DRUG MONITORING: ICD-10-CM

## 2021-12-06 DIAGNOSIS — I48.21 PERMANENT ATRIAL FIBRILLATION (HCC): Primary | ICD-10-CM

## 2021-12-06 DIAGNOSIS — Z79.01 ANTICOAGULATED ON COUMADIN: ICD-10-CM

## 2021-12-06 LAB — POC INR: 3.2 (ref 0.8–1.2)

## 2021-12-06 PROCEDURE — 85610 PROTHROMBIN TIME: CPT

## 2021-12-06 PROCEDURE — 36416 COLLJ CAPILLARY BLOOD SPEC: CPT

## 2021-12-06 PROCEDURE — 99211 OFF/OP EST MAY X REQ PHY/QHP: CPT

## 2021-12-06 NOTE — PROGRESS NOTES
Medication Management [address]   [phone] (phone)  [phone] (fax)    Mr. Darrel Potts is a 79 y.o.  male with history of atrial fib. , per Dr. Misa López referral, who presents today for Warfarin monitoring and adjustment (5 week visit). Patient verifies current dosing regimen and tablet strength. No missed or extra doses. Patient denies bleeding/chest pain. Has usual easy bruising. Has SOB if rushes. Has usual intermittent slight swelling of legs. No blood in urine or stool. No dietary changes. No changes in medication/OTC agents/herbals. No change in alcohol use or tobacco use. No change in activity level. Patient denies headaches/dizziness/lightheadedness/falls. No vomiting/diarrhea or acute illness. No procedures scheduled in the future at this time. [place]. Assessment:   Lab Results   Component Value Date    INR 3.20 (H) 12/06/2021    INR 2.40 (H) 11/01/2021    INR 3.10 (H) 09/27/2021    PROTIME 10.9 05/12/2018    PROTIME 11.1 05/11/2018    PROTIME 11.3 05/10/2018     INR supratherapeutic - goal 2-3. Recent Labs     12/06/21  0909   INR 3.20*       Plan:  POCT INR ordered/performed/result reviewed. 1.25 mg today, M, then decrease PO Coumadin to 1.25 mg W, 2.5 mg MTThFSS (from 2.5 mg daily=7.1% decrease). Recheck INR in 3-4 week(s). (Report given - orders entered by WILMER Ordoenz President., PharmD.)   Patient reminded to call the Anticoagulation Clinic with any signs or symptoms of bleeding or with any medication changes. Patient given instructions utilizing the teach back method. After visit summary printed and reviewed with patient. Advised extra caution. Discharged ambulatory in no apparent distress, wearing mask.     For Pharmacy Admin Tracking Only     Intervention Detail: Dose Adjustment: 1, reason: Therapy De-escalation   Total # of Interventions Recommended: 1   Total # of Interventions Accepted: 1   Time Spent (min): 1

## 2021-12-07 ENCOUNTER — OFFICE VISIT (OUTPATIENT)
Dept: FAMILY MEDICINE CLINIC | Age: 81
End: 2021-12-07
Payer: MEDICARE

## 2021-12-07 VITALS
TEMPERATURE: 97.5 F | HEART RATE: 80 BPM | WEIGHT: 208.8 LBS | DIASTOLIC BLOOD PRESSURE: 88 MMHG | OXYGEN SATURATION: 98 % | BODY MASS INDEX: 32.7 KG/M2 | SYSTOLIC BLOOD PRESSURE: 136 MMHG

## 2021-12-07 DIAGNOSIS — J41.1 MUCOPURULENT CHRONIC BRONCHITIS (HCC): ICD-10-CM

## 2021-12-07 PROCEDURE — 99213 OFFICE O/P EST LOW 20 MIN: CPT | Performed by: NURSE PRACTITIONER

## 2021-12-07 PROCEDURE — G8427 DOCREV CUR MEDS BY ELIG CLIN: HCPCS | Performed by: NURSE PRACTITIONER

## 2021-12-07 PROCEDURE — G8417 CALC BMI ABV UP PARAM F/U: HCPCS | Performed by: NURSE PRACTITIONER

## 2021-12-07 PROCEDURE — 4040F PNEUMOC VAC/ADMIN/RCVD: CPT | Performed by: NURSE PRACTITIONER

## 2021-12-07 PROCEDURE — G8482 FLU IMMUNIZE ORDER/ADMIN: HCPCS | Performed by: NURSE PRACTITIONER

## 2021-12-07 PROCEDURE — 1123F ACP DISCUSS/DSCN MKR DOCD: CPT | Performed by: NURSE PRACTITIONER

## 2021-12-07 PROCEDURE — 1036F TOBACCO NON-USER: CPT | Performed by: NURSE PRACTITIONER

## 2021-12-07 PROCEDURE — G8926 SPIRO NO PERF OR DOC: HCPCS | Performed by: NURSE PRACTITIONER

## 2021-12-07 PROCEDURE — 3023F SPIROM DOC REV: CPT | Performed by: NURSE PRACTITIONER

## 2021-12-07 RX ORDER — DEXTROMETHORPHAN HYDROBROMIDE AND PROMETHAZINE HYDROCHLORIDE 15; 6.25 MG/5ML; MG/5ML
5 SYRUP ORAL NIGHTLY
Qty: 450 ML | Refills: 0 | Status: SHIPPED | OUTPATIENT
Start: 2021-12-07 | End: 2022-03-07 | Stop reason: SDUPTHER

## 2021-12-07 ASSESSMENT — ENCOUNTER SYMPTOMS
CHEST TIGHTNESS: 1
EYES NEGATIVE: 1
SHORTNESS OF BREATH: 0
COUGH: 1
WHEEZING: 0
GASTROINTESTINAL NEGATIVE: 1

## 2021-12-07 NOTE — PROGRESS NOTES
[address]  Dept: Abel Chaves (:  1940) is a 79 y.o. male,Established patient, here for evaluation of the following chief complaint(s):  Cough (follow up) and Medication Refill (cough medication)      ASSESSMENT/PLAN:  I have reviewed the patient's medical history in detail and updated the computerized patient record. HPI/ROS per the patient and caregiver. Overall non toxic in appearance. Answers questions appropriately. Conditions discussed and addressed this visit include:     Overall doing well   Continue meds as ordered. See back in 3 months or sooner for acute changes   1. Mucopurulent chronic bronchitis (HCC)  -     promethazine-dextromethorphan (PROMETHAZINE-DM) 6.25-15 MG/5ML syrup; Take 5 mLs by mouth nightly, Disp-450 mL, R-0Normal      Return for Results review, Routine follow up. SUBJECTIVE/OBJECTIVE:  HPI   Here for follow up on cough   Doing well with bevespi   Sleeping well   Taking the prom dm every night before bed   No recent falls   Secretions are clear.  inr stable, no recent changes. Review of Systems   HENT: Negative. Eyes: Negative. Respiratory: Positive for cough and chest tightness. Negative for shortness of breath and wheezing. Cardiovascular: Negative for chest pain and leg swelling. Gastrointestinal: Negative. Endocrine: Negative. Genitourinary: Negative. Allergic/Immunologic: Positive for environmental allergies. Hematological: Negative for adenopathy. Psychiatric/Behavioral: Negative. Physical Exam  Vitals and nursing note reviewed. Constitutional:       Appearance: Normal appearance. He is not ill-appearing. HENT:      Head: Normocephalic. Right Ear: External ear normal.      Left Ear: External ear normal.      Nose: Nose normal.      Mouth/Throat:      Pharynx: Oropharynx is clear.    Eyes:      Conjunctiva/sclera: Conjunctivae normal.   Neck: Vascular: No carotid bruit. Cardiovascular:      Rate and Rhythm: Normal rate and regular rhythm. Pulses: Normal pulses. Pulmonary:      Breath sounds: No wheezing, rhonchi or rales. Abdominal:      General: Abdomen is flat. Musculoskeletal:         General: Normal range of motion. Cervical back: Normal range of motion and neck supple. No tenderness. Lymphadenopathy:      Cervical: No cervical adenopathy. Skin:     General: Skin is warm and dry. Capillary Refill: Capillary refill takes less than 2 seconds. Neurological:      General: No focal deficit present. Mental Status: He is alert. Mental status is at baseline. Psychiatric:         Mood and Affect: Mood normal.         Behavior: Behavior normal.                 An electronic signature was used to authenticate this note.     --WILBERTO Varma - CNP

## 2021-12-07 NOTE — PATIENT INSTRUCTIONS
Patient Education        Chronic Obstructive Pulmonary Disease (COPD): Care Instructions  Overview     Chronic obstructive pulmonary disease (COPD) is a lung disease that makes it hard to breathe. With COPD, the airways that lead to the lungs are narrowed, and the tiny air sacs in the lungs are damaged and lose their stretch. People with COPD have decreased airflow in and out of the lungs, which makes it hard to breathe. The airways also can get clogged with thick mucus. Cigarette smoking is a major cause of COPD. Although there is no cure for COPD, you can slow its progress. Following your treatment plan and taking care of yourself can help you feel better and live longer. Follow-up care is a key part of your treatment and safety. Be sure to make and go to all appointments, and call your doctor if you are having problems. It's also a good idea to know your test results and keep a list of the medicines you take. How can you care for yourself at home? Staying healthy    · Do not smoke. This is the most important step you can take to prevent more damage to your lungs. If you need help quitting, talk to your doctor about stop-smoking programs and medicines. These can increase your chances of quitting for good.     · Avoid colds and other infections. Get the pneumococcal and whooping cough (pertussis) vaccines. If you have had these vaccines before, ask your doctor if you need another dose. Get the flu vaccine every fall. If you must be around people with colds or the flu, wash your hands often.     · Avoid secondhand smoke and air pollution. Try to stay inside with your windows closed when air pollution is bad. Medicines and oxygen therapy    · Take your medicines exactly as prescribed. Call your doctor if you think you are having a problem with your medicine. You may be taking medicines such as:  ? Bronchodilators. These help open your airways and make breathing easier.  They are either short-acting (work for 4 to 9 hours) or long-acting (work for 12 to 24 hours). You inhale most bronchodilators, so they start to act quickly. Always carry your quick-relief inhaler with you in case you need it. ? Corticosteroids (prednisone, budesonide). These reduce airway inflammation. They come in inhaled or pill form.     · Ask your doctor or pharmacist if you are using each type of inhaler correctly. With correct use, the medicine is more likely to get to your lungs.     · See if a spacer is right for you. A spacer may also help you get more inhaled medicine to your lungs. If you use one, ask how to use it properly.     · Do not take any vitamins, over-the-counter medicine, or herbal products without talking to your doctor first.     · If your doctor prescribed antibiotics, take them as directed. Do not stop taking them just because you feel better. You need to take the full course of antibiotics.     · If you use oxygen therapy, use the flow rate your doctor has recommended. Don't change it without talking to your doctor first. Oxygen therapy boosts the amount of oxygen in your blood and helps you breathe easier. Activity    · Get regular exercise. Walking is an easy way to get exercise. Start out slowly, and walk a little more each day.     · Pay attention to your breathing. You are exercising too hard if you can't talk while you exercise.     · Take short rest breaks when doing household chores and other activities.     · Learn breathing methods--such as breathing through pursed lips--to help you become less short of breath.     · If your doctor has not set you up with a pulmonary rehabilitation program, ask if rehab is right for you. Rehab includes exercise programs, education about your disease and how to manage it, help with diet and other changes, and emotional support.    Diet    · Eat regular, healthy meals.     · Use bronchodilators about 1 hour before you eat to make it easier to eat.     · Eat several smaller, frequent your healthcare professional. [address] any warranty or liability for your use of this information. Patient Education        Learning About COPD and Clearing Your Lungs  How does clearing your lungs affect COPD? When you have COPD, you may have too much mucus in your lungs. Learning to clear your lungs may help you save energy and improves your breathing. It may also help prevent lung infections. There are three ways to clear your lungs:  · Controlled coughing  · Postural drainage  · Chest percussion  How do you do controlled coughing? Coughing is how your body tries to get rid of mucus. But the kind of coughing you cannot control makes things worse. It causes your airways to close. It also traps the mucus in your lungs. Controlled coughing comes from deep in your lungs. It loosens mucus and moves it though your airways. It is best to do it after you use your inhaler or other medicine. 1. Sit on the edge of a chair. Keep both feet on the floor. 2. Lean forward a little. Relax. 3. Breathe in slowly through your nose. Fold your arms over your belly. 4. Lean forward as you breathe out. Push your arms against your belly. 5. Cough 2 or 3 times as you exhale with your mouth slightly open. Make the coughs short and sharp. Push on your belly with your arms as you cough. The first cough brings the mucus through the lung airways. The next coughs bring it up and out. 6. Inhale again, but do it slowly and gently through your nose. Do not take quick or deep breaths through your mouth. It can block the mucus coming out of the lungs. It also can cause uncontrolled coughing. 7. Rest, and repeat if you need to. How do you do postural drainage? Postural drainage means lying down in different positions to help drain mucus from your lungs. Hold each position for 5 minutes. Do it about 30 minutes after you use your inhaler. Make sure you have an empty stomach.  If you need to cough, sit up and do controlled coughing. 1. To drain the front of your lungs: Make sure your chest is lower than your hips. Put two pillows under your hips. Use a small pillow under your head. Keep your arms at your sides. Then follow these instructions for breathing:  ? Breathe in: With one hand on your belly and the other on your chest, breathe in. Push your belly out as far as possible. You should be able to feel the hand on your belly move out, while the hand on your chest should not move. ? Breathe out: When you breathe out, you should be able to feel the hand on your belly move in. This is called diaphragmatic breathing. You will use it in the other drainage positions too. 2. To drain the sides of your lungs: Place two or three pillows under your hips. Use a small pillow under your head. Make sure your chest is lower than your hips. Use diaphragmatic breathing. After 5 or 10 minutes, switch sides. 3. To drain the back of your lungs: Place two or three pillows under your hips. Use a small pillow under your head. Kneel over the pillows. Place your arms by your head. Use diaphragmatic breathing. How do you do chest percussion? Chest percussion means that you lightly tap your chest and back. The tapping loosens the mucus in your lungs. · Cup your hand, and lightly tap your chest and back. · Ask your doctor where the best spots are to tap. Avoid your spine and breastbone. · It may be easier to have someone do the tapping for you. Follow-up care is a key part of your treatment and safety. Be sure to make and go to all appointments, and call your doctor if you are having problems. It's also a good idea to know your test results and keep a list of the medicines you take. Where can you learn more? Go to https://chpeheroeb.[facility]. org and sign in to your [employer] account. Enter I722 in the [employer] box to learn more about \"Learning About COPD and Clearing Your Lungs. \"     If you do not have an account, please click on the \"Sign Up Now\" link. Current as of: July 6, 2021               Content Version: 13.0  © 2006-2021 Healthwise, Incorporated. Care instructions adapted under license by [facility] ([facility]). If you have questions about a medical condition or this instruction, always ask your healthcare professional. [address] any warranty or liability for your use of this information.

## 2021-12-15 DIAGNOSIS — J41.1 MUCOPURULENT CHRONIC BRONCHITIS (HCC): ICD-10-CM

## 2021-12-15 RX ORDER — DEXTROMETHORPHAN HYDROBROMIDE AND PROMETHAZINE HYDROCHLORIDE 15; 6.25 MG/5ML; MG/5ML
5 SYRUP ORAL NIGHTLY
OUTPATIENT
Start: 2021-12-15 | End: 2022-03-15

## 2021-12-15 NOTE — TELEPHONE ENCOUNTER
Clarence Daly needs refill of   Requested Prescriptions     Pending Prescriptions Disp Refills    promethazine-dextromethorphan (PROMETHAZINE-DM) 6.25-15 MG/5ML syrup [Pharmacy Med Name: PROMETHAZINE/DEXTROMETHORPHAN 6.25-15 MG/5ML Syrup] 450 mL 0     Sig: TAKE 5 MLS BY MOUTH NIGHTLY       Last Filled on:  12- #450 mL R-0 and sent to [place] [id number] by Ashish Curran CNP.       Last Visit Date:  12/07/2021    Next Visit Date:  03/07/2022

## 2021-12-27 ENCOUNTER — HOSPITAL ENCOUNTER (OUTPATIENT)
Dept: PHARMACY | Age: 81
Setting detail: THERAPIES SERIES
Discharge: HOME | End: 2021-12-27
Payer: MEDICARE

## 2021-12-27 DIAGNOSIS — Z51.81 ENCOUNTER FOR THERAPEUTIC DRUG MONITORING: ICD-10-CM

## 2021-12-27 DIAGNOSIS — I48.21 PERMANENT ATRIAL FIBRILLATION (HCC): Primary | ICD-10-CM

## 2021-12-27 DIAGNOSIS — Z79.01 ANTICOAGULATED ON COUMADIN: ICD-10-CM

## 2021-12-27 LAB — POC INR: 2.3 (ref 0.8–1.2)

## 2021-12-27 PROCEDURE — 99211 OFF/OP EST MAY X REQ PHY/QHP: CPT

## 2021-12-27 PROCEDURE — 36416 COLLJ CAPILLARY BLOOD SPEC: CPT

## 2021-12-27 PROCEDURE — 85610 PROTHROMBIN TIME: CPT

## 2021-12-27 NOTE — PROGRESS NOTES
Medication Management [address]   [phone] (phone)  [phone] (fax)    Mr. Randee Rolle is a 79 y.o.  male with history of atrial fib. , per Dr. Wu Ocasio referral, who presents today for Warfarin monitoring and adjustment (3 week visit after decreasing dose by 7.1%). Patient verifies current dosing regimen and tablet strength. No missed or extra doses. Patient denies bleeding/swelling/chest pain. Has usual easy bruising. Got SOB walking in today - was hurrying. No blood in urine or stool. No dietary changes. No changes in medication/OTC agents/herbals. No change in alcohol use or tobacco use. Change in activity level: didn't get to take his usual walk yesterday. Patient denies headaches/dizziness/lightheadedness/falls. No vomiting or acute illness. Had diarrhea yesterday - took nothing. No procedures scheduled in the future at this time. Assessment:   Lab Results   Component Value Date    INR 2.30 (H) 12/27/2021    INR 3.20 (H) 12/06/2021    INR 2.40 (H) 11/01/2021    PROTIME 10.9 05/12/2018    PROTIME 11.1 05/11/2018    PROTIME 11.3 05/10/2018     INR therapeutic - goal 2-3. Recent Labs     12/27/21  0904   INR 2.30*       Plan:  POCT INR ordered/performed/result reviewed. Continue PO Coumadin 1.25 mg W, 2.5 mg MTThFSS. Recheck INR in 4 week(s). Patient reminded to call the Anticoagulation Clinic with any signs or symptoms of bleeding or with any medication changes. Patient given instructions utilizing the teach back method. After visit summary printed and reviewed with patient. Discharged ambulatory in no apparent distress, wearing mask.

## 2021-12-28 ENCOUNTER — OFFICE VISIT (OUTPATIENT)
Dept: FAMILY MEDICINE CLINIC | Age: 81
End: 2021-12-28
Payer: MEDICARE

## 2021-12-28 VITALS
DIASTOLIC BLOOD PRESSURE: 76 MMHG | HEART RATE: 81 BPM | WEIGHT: 199.4 LBS | BODY MASS INDEX: 31.3 KG/M2 | HEIGHT: 67 IN | TEMPERATURE: 97.5 F | SYSTOLIC BLOOD PRESSURE: 118 MMHG

## 2021-12-28 DIAGNOSIS — J40 BRONCHITIS: ICD-10-CM

## 2021-12-28 DIAGNOSIS — R19.7 DIARRHEA, UNSPECIFIED TYPE: Primary | ICD-10-CM

## 2021-12-28 DIAGNOSIS — J44.9 MODERATE COPD (CHRONIC OBSTRUCTIVE PULMONARY DISEASE) (HCC): ICD-10-CM

## 2021-12-28 LAB
Lab: NORMAL
QC PASS/FAIL: NORMAL
SARS-COV-2 RDRP RESP QL NAA+PROBE: NEGATIVE

## 2021-12-28 PROCEDURE — G8417 CALC BMI ABV UP PARAM F/U: HCPCS | Performed by: NURSE PRACTITIONER

## 2021-12-28 PROCEDURE — G8427 DOCREV CUR MEDS BY ELIG CLIN: HCPCS | Performed by: NURSE PRACTITIONER

## 2021-12-28 PROCEDURE — G8926 SPIRO NO PERF OR DOC: HCPCS | Performed by: NURSE PRACTITIONER

## 2021-12-28 PROCEDURE — 4040F PNEUMOC VAC/ADMIN/RCVD: CPT | Performed by: NURSE PRACTITIONER

## 2021-12-28 PROCEDURE — 99213 OFFICE O/P EST LOW 20 MIN: CPT | Performed by: NURSE PRACTITIONER

## 2021-12-28 PROCEDURE — 87635 SARS-COV-2 COVID-19 AMP PRB: CPT | Performed by: NURSE PRACTITIONER

## 2021-12-28 PROCEDURE — 3023F SPIROM DOC REV: CPT | Performed by: NURSE PRACTITIONER

## 2021-12-28 PROCEDURE — 1036F TOBACCO NON-USER: CPT | Performed by: NURSE PRACTITIONER

## 2021-12-28 PROCEDURE — G8482 FLU IMMUNIZE ORDER/ADMIN: HCPCS | Performed by: NURSE PRACTITIONER

## 2021-12-28 PROCEDURE — 1123F ACP DISCUSS/DSCN MKR DOCD: CPT | Performed by: NURSE PRACTITIONER

## 2021-12-28 RX ORDER — AMOXICILLIN AND CLAVULANATE POTASSIUM 500; 125 MG/1; MG/1
1 TABLET, FILM COATED ORAL 2 TIMES DAILY
Qty: 20 TABLET | Refills: 0 | Status: SHIPPED | OUTPATIENT
Start: 2021-12-28 | End: 2022-01-04 | Stop reason: SINTOL

## 2021-12-28 ASSESSMENT — ENCOUNTER SYMPTOMS
ABDOMINAL PAIN: 0
ABDOMINAL DISTENTION: 0
ALLERGIC/IMMUNOLOGIC NEGATIVE: 1
EYES NEGATIVE: 1
VOMITING: 0
COUGH: 1
CHEST TIGHTNESS: 0
WHEEZING: 0
NAUSEA: 1
DIARRHEA: 1
SHORTNESS OF BREATH: 0

## 2021-12-28 NOTE — PATIENT INSTRUCTIONS
Patient Education        Diarrhea: Care Instructions  Overview     Diarrhea is loose, watery stools (bowel movements). The exact cause is often hard to find. Sometimes diarrhea is your body's way of getting rid of what caused an upset stomach. Viruses, food poisoning, and many medicines can cause diarrhea. Some people get diarrhea in response to emotional stress, anxiety, or certain foods. Almost everyone has diarrhea now and then. It usually isn't serious, and your stools will return to normal soon. The important thing to do is replace the fluids you have lost, so you can prevent dehydration. The doctor has checked you carefully, but problems can develop later. If you notice any problems or new symptoms, get medical treatment right away. Follow-up care is a key part of your treatment and safety. Be sure to make and go to all appointments, and call your doctor if you are having problems. It's also a good idea to know your test results and keep a list of the medicines you take. How can you care for yourself at home? · Watch for signs of dehydration, which means your body has lost too much water. Dehydration is a serious condition and should be treated right away. Signs of dehydration are:  ? Increasing thirst and dry eyes and mouth. ? Feeling faint or lightheaded. ? A smaller amount of urine than normal.  · To prevent dehydration, drink plenty of fluids. Choose water and other clear liquids until you feel better. If you have kidney, heart, or liver disease and have to limit fluids, talk with your doctor before you increase the amount of fluids you drink. · When you feel like eating, start with small amounts of food. · The doctor may recommend that you take over-the-counter medicine, such as loperamide (Imodium). Read and follow all instructions on the label. Do not use this medicine if you have bloody diarrhea, a high fever, or other signs of serious illness.  Call your doctor if you think you are having a problem with your medicine. When should you call for help? Call 911 anytime you think you may need emergency care. For example, call if:    · You passed out (lost consciousness).     · Your stools are maroon or very bloody. Call your doctor now or seek immediate medical care if:    · You are dizzy or lightheaded, or you feel like you may faint.     · Your stools are black and look like tar, or they have streaks of blood.     · You have new or worse belly pain.     · You have symptoms of dehydration, such as:  ? Dry eyes and a dry mouth. ? Passing only a little urine. ? Cannot keep fluids down.     · You have a new or higher fever. Watch closely for changes in your health, and be sure to contact your doctor if:    · Your diarrhea is getting worse.     · You see pus in the diarrhea.     · You are not getting better after 2 days (48 hours). Where can you learn more? Go to https://[employer]. org and sign in to your [employer] account. Enter U208 in the [place]epa box to learn more about \"Diarrhea: Care Instructions. \"     If you do not have an account, please click on the \"Sign Up Now\" link. Current as of: July 1, 2021               Content Version: 13.1  © 9149-9363 [employer], [employer]. Care instructions adapted under license by [address] If you have questions about a medical condition or this instruction, always ask your healthcare professional. [place] [address] any warranty or liability for your use of this information.

## 2021-12-28 NOTE — PROGRESS NOTES
[address] [facility]  Dept: Abel Chaves (:  1940) is a 79 y.o. male,Established patient, here for evaluation of the following chief complaint(s):  Diarrhea (x3days)      ASSESSMENT/PLAN:  I have reviewed the patient's medical history in detail and updated the computerized patient record. HPI/ROS per the patient and caregiver. Overall non toxic in appearance. Answers questions appropriately. Conditions discussed and addressed this visit include:     Covid test negative today  Symptoms have mostly resolved. Continue bland diet, and clear liquids  Will treat for bronchitis with worsening of lung sounds. Needs to do albuterol nebs at least twice per day  Continue to push fluids  Monitor blood sugar and o2 at home. Pt and spouse agreeable to plan of care. 1. Diarrhea, unspecified type  -     POCT COVID-19, Rapid  2. Bronchitis  -     amoxicillin-clavulanate (AUGMENTIN) 500-125 MG per tablet; Take 1 tablet by mouth 2 times daily for 10 days, Disp-20 tablet, R-0Normal  3. Moderate COPD (chronic obstructive pulmonary disease) (HCC)  -     amoxicillin-clavulanate (AUGMENTIN) 500-125 MG per tablet; Take 1 tablet by mouth 2 times daily for 10 days, Disp-20 tablet, R-0Normal      Return for Results review, Routine follow up. SUBJECTIVE/OBJECTIVE:  HPI   Here for diarrhea   Last 3 days   Change in taste per the patient.  Had covid booster on 21.  Watery stools, no abdominal pain no fever   Tolerated regular diet today. Paul Godoy   Review of Systems   Constitutional: Positive for activity change, appetite change and fatigue. Negative for fever. HENT: Negative. Eyes: Negative. Respiratory: Positive for cough. Negative for chest tightness, shortness of breath and wheezing. Cardiovascular: Negative for chest pain, palpitations and leg swelling. Gastrointestinal: Positive for diarrhea and nausea.  Negative for abdominal distention, abdominal pain and vomiting. Endocrine: Negative for cold intolerance and heat intolerance. Genitourinary: Negative. Musculoskeletal: Positive for arthralgias and myalgias. Skin: Negative. Allergic/Immunologic: Negative. Neurological: Negative for dizziness, light-headedness and headaches. Hematological: Negative for adenopathy. Does not bruise/bleed easily. Psychiatric/Behavioral: Negative. Physical Exam  Vitals and nursing note reviewed. Constitutional:       Appearance: Normal appearance. He is normal weight. HENT:      Head: Normocephalic. Right Ear: External ear normal.      Left Ear: External ear normal.      Nose: Nose normal.      Mouth/Throat:      Mouth: Mucous membranes are moist.   Eyes:      Conjunctiva/sclera: Conjunctivae normal.   Neck:      Vascular: No carotid bruit. Cardiovascular:      Rate and Rhythm: Normal rate and regular rhythm. Pulses: Normal pulses. Heart sounds: Normal heart sounds. Pulmonary:      Breath sounds: Rhonchi (bilateral upper and middle lobes. ) present. No wheezing or rales. Abdominal:      General: Abdomen is flat. Bowel sounds are normal.      Palpations: Abdomen is soft. Musculoskeletal:         General: Normal range of motion. Cervical back: Normal range of motion and neck supple. No tenderness. Skin:     General: Skin is warm and dry. Capillary Refill: Capillary refill takes less than 2 seconds. Coloration: Skin is not pale. Neurological:      General: No focal deficit present. Mental Status: He is alert and oriented to person, place, and time. Mental status is at baseline. Psychiatric:         Mood and Affect: Mood normal.         Thought Content: Thought content normal.                 An electronic signature was used to authenticate this note.     --Georgiana Springer, WILBERTO - CNP

## 2022-01-04 ENCOUNTER — NURSE ONLY (OUTPATIENT)
Dept: LAB | Age: 82
End: 2022-01-04

## 2022-01-04 DIAGNOSIS — N18.32 STAGE 3B CHRONIC KIDNEY DISEASE (HCC): ICD-10-CM

## 2022-01-04 LAB
ANION GAP SERPL CALCULATED.3IONS-SCNC: 16 MEQ/L (ref 8–16)
ATYPICAL LYMPHOCYTES: ABNORMAL %
BASOPHILS # BLD: 0.5 %
BASOPHILS ABSOLUTE: 0.1 THOU/MM3 (ref 0–0.1)
BUN BLDV-MCNC: 23 MG/DL (ref 7–22)
CALCIUM SERPL-MCNC: 9.2 MG/DL (ref 8.5–10.5)
CHLORIDE BLD-SCNC: 99 MEQ/L (ref 98–111)
CO2: 24 MEQ/L (ref 23–33)
CREAT SERPL-MCNC: 2.1 MG/DL (ref 0.4–1.2)
CREATININE, URINE: 25 MG/DL
EOSINOPHIL # BLD: 4.5 %
EOSINOPHILS ABSOLUTE: 0.6 THOU/MM3 (ref 0–0.4)
ERYTHROCYTE [DISTWIDTH] IN BLOOD BY AUTOMATED COUNT: 15.1 % (ref 11.5–14.5)
ERYTHROCYTE [DISTWIDTH] IN BLOOD BY AUTOMATED COUNT: 54 FL (ref 35–45)
GFR SERPL CREATININE-BSD FRML MDRD: 30 ML/MIN/1.73M2
GLUCOSE BLD-MCNC: 138 MG/DL (ref 70–108)
HCT VFR BLD CALC: 42.5 % (ref 42–52)
HEMOGLOBIN: 13.2 GM/DL (ref 14–18)
IMMATURE GRANS (ABS): 0.1 THOU/MM3 (ref 0–0.07)
IMMATURE GRANULOCYTES: 0.8 %
LYMPHOCYTES # BLD: 14.7 %
LYMPHOCYTES ABSOLUTE: 1.8 THOU/MM3 (ref 1–4.8)
MCH RBC QN AUTO: 30.7 PG (ref 26–33)
MCHC RBC AUTO-ENTMCNC: 31.1 GM/DL (ref 32.2–35.5)
MCV RBC AUTO: 98.8 FL (ref 80–94)
MICROALBUMIN UR-MCNC: < 1.2 MG/DL
MICROALBUMIN/CREAT UR-RTO: 48 MG/G (ref 0–30)
MONOCYTES # BLD: 9.3 %
MONOCYTES ABSOLUTE: 1.2 THOU/MM3 (ref 0.4–1.3)
NUCLEATED RED BLOOD CELLS: 0 /100 WBC
PLATELET # BLD: 249 THOU/MM3 (ref 130–400)
PMV BLD AUTO: 11.3 FL (ref 9.4–12.4)
POTASSIUM SERPL-SCNC: 3.8 MEQ/L (ref 3.5–5.2)
RBC # BLD: 4.3 MILL/MM3 (ref 4.7–6.1)
SCAN OF BLOOD SMEAR: NORMAL
SEG NEUTROPHILS: 70.2 %
SEGMENTED NEUTROPHILS ABSOLUTE COUNT: 8.7 THOU/MM3 (ref 1.8–7.7)
SODIUM BLD-SCNC: 139 MEQ/L (ref 135–145)
WBC # BLD: 12.4 THOU/MM3 (ref 4.8–10.8)

## 2022-01-04 RX ORDER — ALBUTEROL SULFATE 1.25 MG/3ML
1 SOLUTION RESPIRATORY (INHALATION) EVERY 6 HOURS PRN
Qty: 360 ML | Refills: 3 | Status: SHIPPED | OUTPATIENT
Start: 2022-01-04 | End: 2022-05-02 | Stop reason: SDUPTHER

## 2022-01-04 NOTE — TELEPHONE ENCOUNTER
Patient stopped in he states he took the antibiotic for 5 days but stopped because it gave him diarrhea he also states he did not  the medication for the nebulizer because it was $400.

## 2022-01-04 NOTE — TELEPHONE ENCOUNTER
----- Message from Rosemarie Guzmán sent at 2021 12:43 PM EST -----  Subject: Refill Request    QUESTIONS  Name of Medication? albuterol (ACCUNEB) 1.25 MG/3ML nebulizer solution  Patient-reported dosage and instructions? sometimes twice a day  How many days do you have left? 0  Preferred Pharmacy? [facility] phone number (if available)? [phone]  Additional Information for Provider? patient was unaware that his   albuterol he has at home is . He can be reached at [phone] i   ok to leave a message  ---------------------------------------------------------------------------  --------------  [address]  What is the best way for the office to contact you? OK to leave message on   voicemail  Preferred Call Back Phone Number?  [phone]

## 2022-01-10 NOTE — TELEPHONE ENCOUNTER
The patient called back because he never received a call back. I sent it right to Karan Landis and she answered it right away while the patient was still on the phone. I let the patient know Bailee's response and he said that he did not have any more cold symptoms. He said that he received the nebulizer on Saturday and wanted to know if he is suppose to take it twice a day or as needed because the directions say twice a day and Karan Landis told him to take it as needed. I asked Karan Landis and she said he can take it as needed but only up to twice a day. I let the patient know. The patient voiced understanding.

## 2022-01-10 NOTE — TELEPHONE ENCOUNTER
Pt will need to meet deductible before cost of the medication comes down. The price is higher if he has not met his deductible. . If pt is still having cold symptoms then I can send in different antibiotic.

## 2022-01-12 ENCOUNTER — HOSPITAL ENCOUNTER (OUTPATIENT)
Dept: PULMONOLOGY | Age: 82
Discharge: HOME | End: 2022-01-12
Payer: MEDICARE

## 2022-01-12 ENCOUNTER — HOSPITAL ENCOUNTER (OUTPATIENT)
Dept: CT IMAGING | Age: 82
Discharge: HOME | End: 2022-01-12
Payer: MEDICARE

## 2022-01-12 DIAGNOSIS — J44.9 CHRONIC OBSTRUCTIVE PULMONARY DISEASE, UNSPECIFIED COPD TYPE (HCC): ICD-10-CM

## 2022-01-12 DIAGNOSIS — J47.9 BRONCHIECTASIS WITHOUT COMPLICATION (HCC): ICD-10-CM

## 2022-01-12 DIAGNOSIS — J41.1 MUCOPURULENT CHRONIC BRONCHITIS (HCC): ICD-10-CM

## 2022-01-12 PROCEDURE — 94729 DIFFUSING CAPACITY: CPT

## 2022-01-12 PROCEDURE — 94060 EVALUATION OF WHEEZING: CPT

## 2022-01-12 PROCEDURE — 94726 PLETHYSMOGRAPHY LUNG VOLUMES: CPT

## 2022-01-12 PROCEDURE — 71250 CT THORAX DX C-: CPT

## 2022-01-19 ENCOUNTER — OFFICE VISIT (OUTPATIENT)
Dept: NEPHROLOGY | Age: 82
End: 2022-01-19
Payer: MEDICARE

## 2022-01-19 ENCOUNTER — OFFICE VISIT (OUTPATIENT)
Dept: PULMONOLOGY | Age: 82
End: 2022-01-19
Payer: MEDICARE

## 2022-01-19 VITALS
HEART RATE: 78 BPM | DIASTOLIC BLOOD PRESSURE: 81 MMHG | BODY MASS INDEX: 32.95 KG/M2 | HEIGHT: 66 IN | WEIGHT: 205 LBS | OXYGEN SATURATION: 98 % | TEMPERATURE: 98.3 F | SYSTOLIC BLOOD PRESSURE: 130 MMHG

## 2022-01-19 VITALS
DIASTOLIC BLOOD PRESSURE: 72 MMHG | BODY MASS INDEX: 32.85 KG/M2 | OXYGEN SATURATION: 93 % | WEIGHT: 204.4 LBS | SYSTOLIC BLOOD PRESSURE: 118 MMHG | TEMPERATURE: 97.9 F | HEART RATE: 72 BPM | HEIGHT: 66 IN

## 2022-01-19 DIAGNOSIS — N18.32 STAGE 3B CHRONIC KIDNEY DISEASE (HCC): Primary | ICD-10-CM

## 2022-01-19 DIAGNOSIS — R94.2 DIFFUSION CAPACITY OF LUNG (DL), DECREASED: ICD-10-CM

## 2022-01-19 DIAGNOSIS — J44.9 STAGE 1 MILD COPD BY GOLD CLASSIFICATION (HCC): ICD-10-CM

## 2022-01-19 DIAGNOSIS — J47.9 BRONCHIECTASIS WITHOUT COMPLICATION (HCC): Primary | ICD-10-CM

## 2022-01-19 PROCEDURE — 1123F ACP DISCUSS/DSCN MKR DOCD: CPT | Performed by: INTERNAL MEDICINE

## 2022-01-19 PROCEDURE — G8482 FLU IMMUNIZE ORDER/ADMIN: HCPCS | Performed by: INTERNAL MEDICINE

## 2022-01-19 PROCEDURE — G8427 DOCREV CUR MEDS BY ELIG CLIN: HCPCS | Performed by: INTERNAL MEDICINE

## 2022-01-19 PROCEDURE — 99213 OFFICE O/P EST LOW 20 MIN: CPT | Performed by: INTERNAL MEDICINE

## 2022-01-19 PROCEDURE — 4040F PNEUMOC VAC/ADMIN/RCVD: CPT | Performed by: INTERNAL MEDICINE

## 2022-01-19 PROCEDURE — 3023F SPIROM DOC REV: CPT | Performed by: INTERNAL MEDICINE

## 2022-01-19 PROCEDURE — 1036F TOBACCO NON-USER: CPT | Performed by: INTERNAL MEDICINE

## 2022-01-19 PROCEDURE — G8417 CALC BMI ABV UP PARAM F/U: HCPCS | Performed by: INTERNAL MEDICINE

## 2022-01-19 RX ORDER — NITROGLYCERIN 80 MG/1
1 PATCH TRANSDERMAL DAILY
Status: ON HOLD | COMMUNITY
End: 2022-05-09

## 2022-01-19 RX ORDER — ALBUTEROL SULFATE 90 UG/1
2 AEROSOL, METERED RESPIRATORY (INHALATION) 4 TIMES DAILY
Qty: 54 G | Refills: 1 | Status: SHIPPED | OUTPATIENT
Start: 2022-01-19 | End: 2022-03-11

## 2022-01-19 ASSESSMENT — ENCOUNTER SYMPTOMS
SHORTNESS OF BREATH: 1
ALLERGIC/IMMUNOLOGIC NEGATIVE: 1

## 2022-01-19 NOTE — PROGRESS NOTES
Subjective:      Patient ID: Susan Patten is a 79 y.o. male. Cc: COPD, Bronchiectasis    HPI    Progressing well, coughing some but mainly non productive  Comes with PFTand CT chest  PFTs reveal a decreased diffusion capacity and minimal  airway obstruction  CT chest (1/12/22) with resolution of RML opacity, mild bronchiectasis LLL, other findings as per CT report. Previous notes  Known to us from hospital admit with pseudomonas pneumonia, COPD, bronchiectasis    Did not do pre visit PFTs/CT chest for follow up. Wishes to stop Stiolto due ti price and perception the is causing hair loss. Otherwise stable from respiratory standpoint  Available PFTs in from 2018--moderate COPD  Minimal cough and no sputum production,     Hospital notes  Susan Patten is a 79 y.o. male with past medical history of CAD, Afib, DM2, and COPD not on home Oxygen presented to ED due to SOB and productive cough. He was treated as outpatient for sinusitis and bronchitis/COPD exacerbation with oral Azithromycin which was later changed to Augmentin, and prednisone, he didn't report improvement. CXR showed bibasilar infiltrates. He is requiring increasing amount of O2 and is currently on 50% high flow. He smoked until 2009, he was diagnosed with COPD by DT in 2018 and is using albuterol nebulizer. Used to work as a        Review of Systems   Constitutional: Positive for fatigue. Respiratory: Positive for shortness of breath. Cardiovascular: Positive for palpitations and leg swelling. Endocrine: Negative. Genitourinary: Negative. Allergic/Immunologic: Negative. Neurological: Negative. Hematological: Negative.           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 ([facility]     CHF (congestive heart failure), NYHA class III, acute on chronic, combined ([facility]     COPD (chronic obstructive pulmonary disease) ([facility] 8/3/2020    Coronary artery disease involving native coronary artery of native heart with angina pectoris ([facility]     Diabetes mellitus, type 2 ([facility] 12/30/2020    Hyperlipidemia 2/20/2012    Hypertension     Pneumonia      Past Surgical History:   Procedure Laterality Date    CARDIAC SURGERY      heart cath    CORONARY ANGIOPLASTY WITH STENT PLACEMENT      HERNIA REPAIR      OTHER SURGICAL HISTORY  05/10/2018    PACEMAKER INSERTION       Social History     Tobacco Use    Smoking status: Former Smoker     Packs/day: 1.00     Years: 50.00     Pack years: 50.00     Types: Cigarettes    Smokeless tobacco: Never Used   Vaping Use    Vaping Use: Never used   Substance Use Topics    Alcohol use: Not Currently     Comment: rarely    Drug use: No      Allergies   Allergen Reactions    Benzonatate      Other reaction(s): Other - comment required  Pt states it makes him dizzy    Xanax [Alprazolam] Anxiety     Other reaction(s):  Other - comment required  Causes confusion  Became combative and confused      Family History   Family history unknown: Yes     Current Outpatient Medications   Medication Sig Dispense Refill    nitroGLYCERIN (NITRODUR) 0.4 MG/HR Place 1 patch onto the skin daily      albuterol (ACCUNEB) 1.25 MG/3ML nebulizer solution Inhale 3 mLs into the lungs every 6 hours as needed for Wheezing 360 mL 3    promethazine-dextromethorphan (PROMETHAZINE-DM) 6.25-15 MG/5ML syrup Take 5 mLs by mouth nightly 450 mL 0    Budeson-Glycopyrrol-Formoterol (BREZTRI AEROSPHERE) 160-9-4.8 MCG/ACT AERO Inhale 2 puffs into the lungs 2 times daily 3 each 1    warfarin (COUMADIN) 2.5 MG tablet Take 1 tablet by mouth daily 90 tablet 3    bumetanide (BUMEX) 1 MG tablet Take 1 tablet by mouth daily 90 tablet 3    losartan (COZAAR) 25 MG tablet Take 1 tablet by mouth nightly 90 tablet 3    metFORMIN (GLUCOPHAGE) 500 MG tablet Take 1 tablet by mouth 2 times daily (with meals) 180 tablet 3    TRUEplus Lancets 28G MISC       TRUE METRIX BLOOD GLUCOSE TEST strip       Alcohol Swabs (B-D SINGLE USE SWABS REGULAR) PADS       vitamin D (CHOLECALCIFEROL) 1000 UNIT TABS tablet Take 1 tablet by mouth daily      atorvastatin (LIPITOR) 80 MG tablet Take 80 mg by mouth nightly      metoprolol succinate (TOPROL XL) 50 MG extended release tablet Take 50 mg by mouth daily        No current facility-administered medications for this visit. LABS - none    /72 (Site: Right Upper Arm, Position: Sitting, Cuff Size: Medium Adult)   Pulse 72   Temp 97.9 °F (36.6 °C) (Temporal)   Ht 5' 6\" (1.676 m)   Wt 204 lb 6.4 oz (92.7 kg)   SpO2 93%   BMI 32.99 kg/m²    Wt Readings from Last 3 Encounters:   01/19/22 204 lb 6.4 oz (92.7 kg)   01/19/22 205 lb (93 kg)   12/28/21 199 lb 6.4 oz (90.4 kg)     Neck Circumference - 17.75 in;   Mallampati Score - 2              Objective:   Physical Exam  Constitutional:       Appearance: Normal appearance. He is obese. HENT:      Nose: Nose normal.      Mouth/Throat:      Pharynx: Oropharynx is clear. Eyes:      Pupils: Pupils are equal, round, and reactive to light. Pulmonary:      Effort: Pulmonary effort is normal.      Breath sounds: Wheezing and rhonchi present. Abdominal:      Palpations: Abdomen is soft. Musculoskeletal:         General: No swelling. Cervical back: Normal range of motion and neck supple. Right lower leg: No edema. Left lower leg: No edema. Neurological:      Mental Status: He is alert.                    CT chest: 1/12/22  FINDINGS:        The central airways appear patent.       There is evidence of prior aortic valve repair.       There is a left-sided pacemaker present.       There is a 2 mm pulmonary nodule within the anterior right midlung on axial image 33 which is unchanged from April 2016 likely representing a poorly calcified granuloma.       There is thickening of the pleura at the dependent left lung base. This could be related to pleural scarring. However, recommend continued follow-up to document stability. Otherwise, the previously demonstrated area of focal confluent opacity within the    medial left lower lobe appears resolved.       Mild paraseptal emphysematous changes are noted at the right apex.       There are mild reticular linear and groundglass opacities at the lung bases posteriorly bilaterally which may be related to mild fibrotic scarring. There is bronchiectasis noted at the medial left lower lobe.       There is a mildly prominent lymph node within the pretracheal region of the mediastinum on axial image 28 along with several adjacent smaller mediastinal lymph nodes which do not appear pathologically enlarged. These appear increased in size and number    from the prior examination and are indeterminate. These may be reactive in nature. However, recommend short-term follow-up CT imaging in 3 months to document stability or resolution.       There is mild sludge and/or small stones demonstrated within the dependent gallbladder lumen. There is a small hiatal hernia.       No acute osseous findings are seen.           Impression   1. There is thickening of the pleura at the dependent left lung base. This could be related to pleural scarring. However, recommend continued follow-up to document stability. Otherwise, the previously demonstrated area of focal confluent opacity within    the medial left lower lobe appears resolved.       2. There are mild reticular linear and groundglass opacities at the lung bases posteriorly bilaterally which may be related to mild fibrotic scarring.  There is bronchiectasis noted at the medial left lower lobe.       **This report has been created using voice recognition software.  It may contain minor errors which are inherent in voice recognition technology. **       Final report electronically signed by Dr. Roscoe Herring on 1/12/2022 9:58 AM         CT CHEST WO CONTRAST   7/5/2021   FINDINGS:   The heart size is normal in this patient status post pacemaker placement. . There is a TAVR in place. There is atherosclerotic calcification in the thoracic aorta. . There is no gross abnormality of the pulmonary artery. There are small mediastinal lymph nodes especially in the aortopulmonary window. This a calcified lymph node in the subcarinal space. , axillary or hilar adenopathy. There is no pericardial or pleural effusion.  There is slight thickening of the interstitial lung markings with superimposed abnormal densities especially in the right lower lobe, lingula and to a lesser extent right upper lobe. These are  clearly worse than on remote CT scan dated 22nd of April 2021 suggestive of inflammatory process,  possibly Covid 19 infection. There is collapse in the left lower lobe posteriorly. There is mild thoracic spondylosis. .   Impression:  1. Interval deterioration since previous CT scan of the chest dated 22nd of April 2021 with areas of abnormal density especially in the right lower lobe, lingula and to lesser extent right upper lobe. These are suggestive of inflammatory process possibly  Covid 19 infection. 2. Collapse in the left lower lobe posteriorly, new since previous CT scan of the chest dated 22nd of April 2021. 3. Small mediastinal lymph nodes. Calcified node in the subcarinal space. 4. Status post pacemaker placement. 5. TAVR in place. Atherosclerotic calcification in the thoracic aorta. 6. Thoracic spondylosis.      ECHO:     7/5/2021  Conclusions      Summary   Technically difficult examination.   Left ventricle size is normal.   Mild concentric left ventricular hypertrophy.   There were no regional wall motion abnormalities.   Ejection fraction is visually estimated at 50%.    S/p TAVR, normally functioning.      Signature      ----------------------------------------------------------------   Electronically signed by Kayleen Santana MD (Interpreting   [email or web address]) on 07/05/2021 at 04:04 PM   ----------------------------------------------------------------     ECHO 5/6/2019     Ejection fraction was estimated at 45-50%.   Normal left ventricular size and mildly reduced systolic function.   There was mild global hypokinesis.   Wall thickness was within normal limits.   Device lead/catheter seen in the right heart.   S/p TAVR.   Transaortic velocity was 1.2-1.6 m/s, mean gradient 4-6 mmHg, EOA 1.5-1.8   cm2. There was no evidence of aortic regurgitation.   There was trace tricuspid regurgitation.   Assuming RAP = 5 mmHg, the estimated RVSP = 34 mmHg.   Ascending aorta - 3.1 cm. [address] size is within normal limits with normal respiratory phasic changes. PFTs:    PF Interpretation:  Although the FVC and FEV1 are within normal limits, the FEV1/FVC ratio is reduced. The lung volumes are reduced. Following  administration of bronchodilators, there is no significant response. The reduced diffusing capacity indicates a severe loss of functional  alveolar capillary surface. Pulmonary Function Diagnosis:  Spirometry: Spirometry and shape of flow volume loop are consistent with minimal obstructive defect with involvement of peripheral  airways. Lung Volumes: Lung volumes are consistent with mild restrictive defect. Diffusion: Severely decreased diffusion capacity. ««This interpretation has been electronically signed: Jhoan Wesley 01/21/2022 04:43:58 PM»»      Assessment:       Diagnosis Orders   1. Bronchiectasis without complication ([facility]     2. Diffusion capacity of lung (dl), decreased     3.  Stage 1 mild COPD by GOLD classification ([facility]             Plan:          Orders Placed This Encounter   Medications    albuterol sulfate HFA (PROVENTIL HFA) 108 (90 Base) MCG/ACT inhaler     Sig: Inhale 2 puffs into the lungs 4 times daily     Dispense:  54 g     Refill:  1      Stop Bevespi

## 2022-01-19 NOTE — PROGRESS NOTES
Neck Circumference -   17.75  Mallampati - 4    Lung Nodule Screening     [] Qualifies    [x] Does not qualify   [] Declined    [] Completed

## 2022-01-19 NOTE — PROGRESS NOTES
[address] [facility]  [address]  [facility] [address]  Dept: [phone]  Loc: [phone]  Progress Note  1/19/2022 10:42 AM      Pt Name:    Linden Webb  YOB: 1940  Primary Care Physician:  Marge Lemus, APRN - CNP     Chief Complaint:   Chief Complaint   Patient presents with    Follow-up     CKD III        History of Present Illness: This is a follow-up visit for CKD IIIB/IV. He is a former patient of Dr. Demetria Mathew. This is the first time I am seeing him. He has hx of CAD, DM, HTN, hyperlipidemia, Afib, pacemaker, EF 50%. Hx TAVR, COPD. Past renal US showed hypoplastic left kidney. Overall doing well. No recent hospitalizations. BP controlled. Sugars well controlled per patient. On Metformin. Has occasional edema. On bumex 1 mg daily. Pertinent items are noted in HPI.          Past History:  Past Medical History:   Diagnosis Date    MILLY (acute kidney injury) ([address]     Atrial fibrillation (HCC)     Cerebral artery occlusion with cerebral infarction ([address]     CHF (congestive heart failure), NYHA class III, acute on chronic, combined ([address]     COPD (chronic obstructive pulmonary disease) ([address] 8/3/2020    Coronary artery disease involving native coronary artery of native heart with angina pectoris ([address]     Diabetes mellitus, type 2 ([address] 12/30/2020    Hyperlipidemia 2/20/2012    Hypertension     Pneumonia      Past Surgical History:   Procedure Laterality Date    CARDIAC SURGERY      heart cath    CORONARY ANGIOPLASTY WITH STENT PLACEMENT      HERNIA REPAIR      OTHER SURGICAL HISTORY  05/10/2018    PACEMAKER INSERTION          VITALS:  /81 (Site: Left Upper Arm, Position: Sitting, Cuff Size: Large Adult)   Pulse 78   Temp 98.3 °F (36.8 °C) (Temporal)   Ht 5' 6\" (1.676 m)   Wt 205 lb (93 kg)   SpO2 98%   BMI 33.09 kg/m²   Wt Readings from Last 3 Encounters: 01/19/22 205 lb (93 kg)   12/28/21 199 lb 6.4 oz (90.4 kg)   12/07/21 208 lb 12.8 oz (94.7 kg)     Body mass index is 33.09 kg/m². General Appearance: alert and cooperative with exam, appears comfortable, no distress  HEENT: EOMI, moist oral mucus membranes  Neck: No jugular venous distention,   Lungs: Air entry B/L, no crackles or rales, no use of accessory muscles  Heart: S1, S2 heard,   GI: soft, non-tender, no guarding,   Extremities: trace-1+ LE edema B/L  Skin: warm, dry  Neurologic: no tremor, no asterixis, no focal neurologic deficits     Medications:  Current Outpatient Medications   Medication Sig Dispense Refill    albuterol (ACCUNEB) 1.25 MG/3ML nebulizer solution Inhale 3 mLs into the lungs every 6 hours as needed for Wheezing 360 mL 3    Budeson-Glycopyrrol-Formoterol (BREZTRI AEROSPHERE) 160-9-4.8 MCG/ACT AERO Inhale 2 puffs into the lungs 2 times daily 3 each 1    warfarin (COUMADIN) 2.5 MG tablet Take 1 tablet by mouth daily 90 tablet 3    bumetanide (BUMEX) 1 MG tablet Take 1 tablet by mouth daily 90 tablet 3    losartan (COZAAR) 25 MG tablet Take 1 tablet by mouth nightly 90 tablet 3    metFORMIN (GLUCOPHAGE) 500 MG tablet Take 1 tablet by mouth 2 times daily (with meals) 180 tablet 3    TRUEplus Lancets 28G MISC       TRUE METRIX BLOOD GLUCOSE TEST strip       Alcohol Swabs (B-D SINGLE USE SWABS REGULAR) PADS       vitamin D (CHOLECALCIFEROL) 1000 UNIT TABS tablet Take 1 tablet by mouth daily      atorvastatin (LIPITOR) 80 MG tablet Take 80 mg by mouth nightly      metoprolol succinate (TOPROL XL) 50 MG extended release tablet Take 50 mg by mouth daily       promethazine-dextromethorphan (PROMETHAZINE-DM) 6.25-15 MG/5ML syrup Take 5 mLs by mouth nightly (Patient not taking: Reported on 12/27/2021) 450 mL 0     No current facility-administered medications for this visit.         Laboratory & Diagnostics:  CBC:   Lab Results   Component Value Date    WBC 12.4 (H) 01/04/2022    HGB 13.2 (L) 01/04/2022    HCT 42.5 01/04/2022    MCV 98.8 (H) 01/04/2022     01/04/2022     BMP:    Lab Results   Component Value Date     01/04/2022     09/13/2021     08/20/2021    K 3.8 01/04/2022    K 4.0 09/13/2021    K 3.7 08/20/2021    CL 99 01/04/2022     09/13/2021     08/20/2021    CO2 24 01/04/2022    CO2 24 09/13/2021    CO2 23 08/20/2021    BUN 23 (H) 01/04/2022    BUN 26 (H) 09/13/2021    BUN 30 (H) 08/20/2021    CREATININE 2.1 (H) 01/04/2022    CREATININE 2.1 (H) 09/13/2021    CREATININE 2.3 (H) 08/20/2021    GLUCOSE 138 (H) 01/04/2022    GLUCOSE 113 (H) 09/13/2021    GLUCOSE 112 (H) 08/20/2021      Hepatic:   Lab Results   Component Value Date    AST 27 08/20/2021    AST 26 06/23/2021    AST 25 06/10/2021    ALT 24 08/20/2021    ALT 22 06/23/2021    ALT 18 06/10/2021    BILITOT 0.7 08/20/2021    BILITOT 0.8 06/23/2021    BILITOT 0.9 06/10/2021    ALKPHOS 104 08/20/2021    ALKPHOS 104 06/23/2021    ALKPHOS 101 06/10/2021     BNP: No results found for: BNP  Lipids:   Lab Results   Component Value Date    CHOL 120 08/20/2021    HDL 33 08/20/2021     INR:   Lab Results   Component Value Date    INR 2.30 (H) 12/27/2021    INR 3.20 (H) 12/06/2021    INR 2.40 (H) 11/01/2021     URINE: No results found for: NAUR, PROTUR  Lab Results   Component Value Date    NITRU NEGATIVE 07/03/2021    COLORU YELLOW 07/03/2021    PHUR 5.0 07/03/2021    LABCAST NONE SEEN 06/10/2021    LABCAST NONE SEEN 06/10/2021    WBCUA NONE SEEN 06/10/2021    RBCUA NONE SEEN 06/10/2021    YEAST NONE SEEN 06/10/2021    BACTERIA NONE SEEN 06/10/2021    SPECGRAV 1.006 06/10/2021    LEUKOCYTESUR NEGATIVE 07/03/2021    UROBILINOGEN 0.2 07/03/2021    BILIRUBINUR NEGATIVE 07/03/2021    BLOODU NEGATIVE 07/03/2021    GLUCOSEU NEGATIVE 07/03/2021    KETUA NEGATIVE 07/03/2021      Microalbumen/Creatinine ratio:  No components found for: RUCREAT        Impression/Plan:   1.   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 Goals of care include slowing rate of progression by controlling blood pressure, blood glucoses and albuminuria and by avoiding nephrotoxins such as NSAIDs and IV contrast.   - ok to stay on Metformin as long as GFR > 30 ml/min    2. Mild microalbuminuria: continue ARB  3. Hypoplastic left kidney  4. HTN; stable  5. DM   6. CAD, hx TAVR    Bloodwork and medications were reviewed and plan of care discussed with the patient. Return to clinic in 6 months  or sooner if the need arises.       Catherine Ghotra DO  [facility]

## 2022-01-24 ENCOUNTER — HOSPITAL ENCOUNTER (OUTPATIENT)
Dept: PHARMACY | Age: 82
Setting detail: THERAPIES SERIES
Discharge: HOME | End: 2022-01-24
Payer: MEDICARE

## 2022-01-24 DIAGNOSIS — Z79.01 ANTICOAGULATED ON COUMADIN: ICD-10-CM

## 2022-01-24 DIAGNOSIS — Z51.81 ENCOUNTER FOR THERAPEUTIC DRUG MONITORING: ICD-10-CM

## 2022-01-24 DIAGNOSIS — I48.21 PERMANENT ATRIAL FIBRILLATION (HCC): Primary | ICD-10-CM

## 2022-01-24 LAB — POC INR: 2.2 (ref 0.8–1.2)

## 2022-01-24 PROCEDURE — 36416 COLLJ CAPILLARY BLOOD SPEC: CPT

## 2022-01-24 PROCEDURE — 85610 PROTHROMBIN TIME: CPT

## 2022-01-24 PROCEDURE — 99211 OFF/OP EST MAY X REQ PHY/QHP: CPT

## 2022-01-24 NOTE — PROGRESS NOTES
Medication Management [address]   [phone] (phone)  [phone] (fax)    Mr. Ezio House is a 79 y.o.  male with history of atrial fib. , per Dr. Jose Agee referral, who presents today for Warfarin monitoring and adjustment (4 week visit). Patient verifies current dosing regimen and tablet strength. No missed or extra doses. Patient denies bleeding/swelling/chest pain. Has usual slight SOB/easy bruising. No blood in urine or stool. No dietary changes. Changes in medication/OTC agents/herbals: noticed his MVI had Vitamin K, so he stopped it 2 days ago. Added Vitamin C to list - not new. No change in alcohol use or tobacco use. Change in activity level: decreased. Patient denies headaches/dizziness/lightheadedness/falls. No vomiting/diarrhea or acute illness. (Was ordered 10 day course of Augmentin 12/28 for bronchitis, but notes reveal he stopped 1/2 way through due to diarrhea.)  No procedures scheduled in the future at this time. Has pacemaker check next month at Dr. Jose Agee office. Assessment:   Lab Results   Component Value Date    INR 2.20 (H) 01/24/2022    INR 2.30 (H) 12/27/2021    INR 3.20 (H) 12/06/2021    PROTIME 10.9 05/12/2018    PROTIME 11.1 05/11/2018    PROTIME 11.3 05/10/2018     INR therapeutic - goal 2-3. Recent Labs     01/24/22  0909   INR 2.20*       Plan:  POCT INR ordered/performed/result reviewed. Continue PO Coumadin 1.25 mg W, 2.5 mg MTThFSS. Recheck INR in 4 week(s). Patient reminded to call the Anticoagulation Clinic with any signs or symptoms of bleeding or with any medication changes. Patient given instructions utilizing the teach back method. After visit summary printed and reviewed with patient. Discharged ambulatory in no apparent distress, wearing mask.

## 2022-02-03 ENCOUNTER — NURSE TRIAGE (OUTPATIENT)
Dept: OTHER | Facility: CLINIC | Age: 82
End: 2022-02-03

## 2022-02-03 NOTE — TELEPHONE ENCOUNTER
Received call from [address] at [facility] with Red Flag Complaint. Subjective: Caller states \"swelling in legs, pain in let leg at night\"     Current Symptoms: swelling in both legs, pain in left leg at night, denies SOB, denies chest pain    Onset: 2 days ago; improving    Associated Symptoms: NA    Pain Severity: 0/10; N/A; none left leg pain only at night     Temperature: denies     What has been tried: albuterol neb-helps,     Recommended disposition: see in the office within 3 days, clinic/[facility] if unable to schedule    Care advice provided, patient verbalizes understanding; denies any other questions or concerns; instructed to call back for any new or worsening symptoms. Writer provided warm transfer to [facility] for appointment scheduling     Attention Provider: Thank you for allowing me to participate in the care of your patient. The patient was connected to triage in response to information provided to the E[facility]/PSC. Please do not respond through this encounter as the response is not directed to a shared pool.         Reason for Disposition   MILD swelling of both ankles (i.e., pedal edema) AND new-onset or worsening    Protocols used: LEG SWELLING AND EDEMA-ADULT-OH

## 2022-02-04 ENCOUNTER — TELEPHONE (OUTPATIENT)
Dept: FAMILY MEDICINE CLINIC | Age: 82
End: 2022-02-04

## 2022-02-04 NOTE — TELEPHONE ENCOUNTER
----- Message from Sandra Logan sent at 2/3/2022  1:58 PM EST -----  Subject: Message to Provider    QUESTIONS  Information for Provider? Pt has bronchitis and wants to know if Dr wants   to see him or if there is something he should be taking for it. Also has   swelling in leg. Has pain in the left leg but only at night.  ---------------------------------------------------------------------------  --------------  CALL BACK INFO  What is the best way for the office to contact you? OK to leave message on   voicemail  Preferred Call Back Phone Number? [phone]  ---------------------------------------------------------------------------  --------------  SCRIPT ANSWERS  Relationship to Patient?  Self

## 2022-02-07 ENCOUNTER — OFFICE VISIT (OUTPATIENT)
Dept: FAMILY MEDICINE CLINIC | Age: 82
End: 2022-02-07
Payer: MEDICARE

## 2022-02-07 VITALS
DIASTOLIC BLOOD PRESSURE: 82 MMHG | HEART RATE: 98 BPM | TEMPERATURE: 98.1 F | SYSTOLIC BLOOD PRESSURE: 148 MMHG | BODY MASS INDEX: 33.67 KG/M2 | OXYGEN SATURATION: 99 % | WEIGHT: 208.6 LBS

## 2022-02-07 DIAGNOSIS — I48.91 ATRIAL FIBRILLATION WITH RVR (HCC): ICD-10-CM

## 2022-02-07 DIAGNOSIS — J47.1 BRONCHIECTASIS WITH ACUTE EXACERBATION (HCC): Primary | ICD-10-CM

## 2022-02-07 DIAGNOSIS — I50.43 CHF (CONGESTIVE HEART FAILURE), NYHA CLASS III, ACUTE ON CHRONIC, COMBINED (HCC): ICD-10-CM

## 2022-02-07 PROCEDURE — G8482 FLU IMMUNIZE ORDER/ADMIN: HCPCS | Performed by: NURSE PRACTITIONER

## 2022-02-07 PROCEDURE — 1123F ACP DISCUSS/DSCN MKR DOCD: CPT | Performed by: NURSE PRACTITIONER

## 2022-02-07 PROCEDURE — 4040F PNEUMOC VAC/ADMIN/RCVD: CPT | Performed by: NURSE PRACTITIONER

## 2022-02-07 PROCEDURE — G8417 CALC BMI ABV UP PARAM F/U: HCPCS | Performed by: NURSE PRACTITIONER

## 2022-02-07 PROCEDURE — 99213 OFFICE O/P EST LOW 20 MIN: CPT | Performed by: NURSE PRACTITIONER

## 2022-02-07 PROCEDURE — G8427 DOCREV CUR MEDS BY ELIG CLIN: HCPCS | Performed by: NURSE PRACTITIONER

## 2022-02-07 PROCEDURE — 1036F TOBACCO NON-USER: CPT | Performed by: NURSE PRACTITIONER

## 2022-02-07 RX ORDER — GUAIFENESIN 600 MG/1
600 TABLET, EXTENDED RELEASE ORAL 2 TIMES DAILY
Qty: 28 TABLET | Refills: 1 | Status: SHIPPED | OUTPATIENT
Start: 2022-02-07 | End: 2022-02-21

## 2022-02-07 ASSESSMENT — ENCOUNTER SYMPTOMS
GASTROINTESTINAL NEGATIVE: 1
WHEEZING: 0
EYES NEGATIVE: 1
COUGH: 1

## 2022-02-07 NOTE — PATIENT INSTRUCTIONS
Patient Education        Learning About Chronic Bronchitis  What is chronic bronchitis? Chronic bronchitis is long-term swelling and the buildup of mucus in the airways of your lungs. The airways (bronchial tubes) get inflamed and make a lot of mucus. This can narrow or block the airways, making it hard for you to breathe. It can also make you cough. It is a type of COPD (chronic obstructive pulmonary disease). Chronic bronchitis is usually caused by smoking. But chemical fumes, dust, or air pollution also can cause it over time. What can you expect when you have chronic bronchitis? Chronic bronchitis gets worse over time. You cannot undo the damage to your lungs. Over time, you may find that:  · You get short of breath even when you do things like get dressed or fix a meal.  · It is hard to eat or exercise. · You feel weaker and limit activity. Over many years, the swelling and mucus from chronic bronchitis make it more likely that you will get lung infections. But there are things you can do to prevent more damage and feel better. What are the symptoms? The main symptoms of chronic bronchitis are:  · A cough that will not go away. · Mucus that comes up when you cough. · Shortness of breath that gets worse when you exercise. At times, your symptoms may suddenly flare up and get much worse. This is a called an exacerbation (say \"egg-ZAUSHA--BAY-lucille\"). When this happens, your usual symptoms quickly get worse and stay bad. This can be dangerous. You may have to go to the hospital.  How can you keep chronic bronchitis from getting worse? Don't smoke. That is the best way to keep chronic bronchitis from getting worse. If you already smoke, it is never too late to stop. If you need help quitting, talk to your doctor about stop-smoking programs and medicines. These can increase your chances of quitting for good. You can do other things to keep chronic bronchitis from getting worse:  · Avoid bad air. Air pollution, chemical fumes, and dust also can make chronic bronchitis worse. · Get a flu shot every year. A shot may keep the flu from turning into something more serious, like pneumonia. A flu shot also may lower your chances of having a flare-up. · Get a pneumococcal shot. A shot can prevent some of the serious complications of pneumonia. Ask your doctor how often you should get this shot. · Get a pertussis shot. A whooping cough (pertussis) shot may help protect you from getting whooping cough. How is chronic bronchitis treated? Chronic bronchitis is treated with medicines and oxygen. You also can take steps to stay healthy and keep your condition from getting worse. Medicines and oxygen therapy  · You may be taking medicines such as:  ? Bronchodilators. These help open your airways and make breathing easier. Bronchodilators are either short-acting (work for 4 to 9 hours) or long-acting (work for 12 to 24 hours). You inhale most bronchodilators, so they start to act quickly. Always carry your quick-relief inhaler with you in case you need it. ? Corticosteroids. These reduce airway inflammation. They come in inhaled or pill form. ? Antibiotics. These medicines are used when you have a bacterial lung infection. · Take your medicines exactly as prescribed. Call your doctor if you think you are having a problem with your medicine. · Oxygen therapy boosts the amount of oxygen in your blood and helps you breathe easier. Use the flow rate your doctor has recommended, and do not change it without talking to your doctor first.  Other care  · If your doctor recommends it, get more exercise. Walking is a good choice. Bit by bit, increase the amount you walk every day. Try for at least 30 minutes on most days of the week. · Learn breathing methods--such as breathing through pursed lips--to help you become less short of breath.   · If your doctor has not set you up with a pulmonary rehabilitation program, ask your doctor if rehab is right for you. Rehab includes exercise programs, education about your disease and how to manage it, help with diet and other changes, and emotional support. · Eat regular, healthy meals. Use bronchodilators about 1 hour before you eat to make it easier to eat. Try eating smaller, frequent meals so your stomach is never too full. A full stomach can push on the muscle that helps you breathe (your diaphragm) and make it harder to breathe. Drink beverages at the end of the meal. Avoid foods that are hard to chew. Follow-up care is a key part of your treatment and safety. Be sure to make and go to all appointments, and call your doctor if you are having problems. It's also a good idea to know your test results and keep a list of the medicines you take. Where can you learn more? Go to https://LYYNvenkataeb.NightOwl. org and sign in to your Visiarc account. Enter F351 in the Cotap box to learn more about \"Learning About Chronic Bronchitis. \"     If you do not have an account, please click on the \"Sign Up Now\" link. Current as of: July 6, 2021               Content Version: 13.1  © 6137-7762 JustInvesting. Care instructions adapted under license by [facility] ([facility]). If you have questions about a medical condition or this instruction, always ask your healthcare professional. [place] [address] any warranty or liability for your use of this information. Patient Education        Learning About Saving Energy When You Have a Chronic Condition  Introduction     Everyday tasks can be tiring when you have COPD, heart failure, or another long-term (chronic) condition. You may feel at times that you've lost your ability to live your life. But learning to conserve, or save, your energy can help you be less tired. Conserving your energy means finding ways of doing daily activities with as little effort as possible.  With some small changes in the way you do things, you can get your tasks done more easily. Some treatments are available that might help. Pulmonary rehabilitation can teach you ways to breathe easier. Cardiac rehabilitation can help make your heart stronger. You also may want to see an occupational or physical therapist. The therapist can give you more tips on building strength and moving with less effort. What can you do to conserve your energy? Planning  · Make a list of what you have to do every day. Group the tasks by location. · Do all the chores in one part of your house around the same time. · Go out for errands or do chores at the time of day when you have the most energy. · Plan rest periods into your day. Getting things done  · Sit down as often as you can when you get dressed, do chores, or cook. · Use a cart with wheels to roll items, such as laundry, from one room to another. · Push or slide boxes or other large items instead of lifting them. Reaching and bending  · Put things you use the most on shelves that are at the level of your waist or shoulder. · Use long-handled grabbers or other tools to reach items on a high shelf or to  things off the floor. Use long-handled dusters when you clean the house. · Use a raised toilet seat to avoid bending too far to sit or stand up. Eating  · Try eating small, frequent meals instead of three larger meals so your stomach is never too full. A full stomach can push on the muscle that helps you breathe (your diaphragm) and make it harder to breathe. · If you get too tired to eat much, try to choose healthy foods that have more calories. Have a yogurt-and-fruit smoothie for breakfast. Put avocado on a sandwich. Or add cheese or peanut butter to snacks. · If you don't feel very hungry, try to eat first and drink water or other fluids later, after a meal. This can help keep you from losing weight. Sip small amounts of fluids if you need to drink while you eat.   Having sex  · Choose the time of day when you have more energy. · A mxtz-go-clwv position for sex can be less tiring. Sometimes you may want to focus more on caressing. Watch closely for changes in your health, and be sure to contact your doctor if you have any problems. Where can you learn more? Go to https://chpetorres.healthHighfive. org and sign in to your readfy account. Enter H190 in the ZillionTV box to learn more about \"Learning About Saving Energy When You Have a Chronic Condition. \"     If you do not have an account, please click on the \"Sign Up Now\" link. Current as of: July 6, 2021               Content Version: 13.1  © 2006-2021 BrightScope. Care instructions adapted under license by [facility] ([facility]). If you have questions about a medical condition or this instruction, always ask your healthcare professional. [place] [address] any warranty or liability for your use of this information. Patient Education        Learning About Clearing Your Lungs  How does clearing your lungs help you breathe? When you have too much mucus in your lungs, learning to clear your lungs may help you save energy and improve your breathing. It may also help prevent lung infections. Here are three ways to clear your lungs:  · Postural drainage  · Chest and back percussion  · Controlled coughing  How do you do postural drainage? Postural drainage means lying down in different positions to help drain mucus from your lungs. Hold each position for at least 3 minutes. Your doctor or therapist will tell you how long. Do it about 30 minutes after you use your inhaler. Make sure you have an empty stomach. If you need to cough, sit up and do controlled coughing. 1. To drain the front of your lungs: Make sure your chest is lower than your hips. Put two pillows under your hips. Use a small pillow under your head if it helps you feel more comfortable. Keep your arms at your sides.  Then follow these instructions for breathing:  ? Breathe in: With one hand on your belly and the other on your chest, breathe in through your nose. Push your belly out as far as possible. You should be able to feel the hand on your belly move out, while the hand on your chest should not move. ? Breathe out: When you breathe out through your mouth, you should be able to feel the hand on your belly move in. This is called belly breathing, or diaphragmatic breathing. You will use it in the other drainage positions too. 2. To drain the sides of your lungs: Place two or three pillows under your hips. Use a small pillow under your head. Make sure your chest is lower than your hips. Do belly breathing (diaphragmatic breathing). After you drain one side, turn over and drain the other side. 3. To drain the back of your lungs: Place two or three pillows under your hips. Use a small pillow under your head if it helps you feel more comfortable. Kneel over the pillows. Place your arms by your head. Do belly breathing (diaphragmatic breathing). How do you do chest percussion? Chest percussion means that you lightly clap your chest and back. The clapping loosens the mucus in your lungs. · Cup your hand, and lightly clap your chest and back. · Ask your doctor where the best spots are to clap. Avoid your spine and breastbone. · It may be easier to have someone do the clapping for you. How do you do controlled coughing? Coughing is how your body tries to get rid of mucus. But the kind of coughing you cannot control makes things worse. It causes your airways to close. It also traps the mucus in your lungs. Controlled coughing loosens mucus and moves it though your airways. It is best to do it after you use your inhaler or other medicine. 1. Sit on the edge of a chair. Keep both feet on the floor. 2. Lean forward a little. Relax. 3. Breathe in slowly through your nose. Fold your arms over your belly.   4. Hold your breath for 3 to 5 seconds before the exhale. 5. Lean forward as you breathe out. Push your arms against your belly. 6. Cough 2 or 3 times as you exhale with your mouth slightly open. Make the coughs short and sharp. Push on your belly with your arms as you cough. The first cough brings the mucus through the lung airways. The next coughs bring it up and out. 7. Inhale again, but do it slowly and gently through your nose. Do not take quick or deep breaths through your mouth. It can block the mucus coming out of the lungs. It also can cause uncontrolled coughing. 8. Rest, and repeat if you need to. Follow-up care is a key part of your treatment and safety. Be sure to make and go to all appointments, and call your doctor if you are having problems. It's also a good idea to know your test results and keep a list of the medicines you take. Where can you learn more? Go to https://Night Node Software.HeyCrowd. org and sign in to your Tokutek account. Enter I065 in the Shenzhen Globalegrow E-Commerce box to learn more about \"Learning About Clearing Your Lungs. \"     If you do not have an account, please click on the \"Sign Up Now\" link. Current as of: July 6, 2021               Content Version: 13.1  © 6805-9154 Healthwise, Incorporated. Care instructions adapted under license by [facility] ([facility]). If you have questions about a medical condition or this instruction, always ask your healthcare professional. [place] [address] any warranty or liability for your use of this information. Patient Education        Fluid Restriction: Care Instructions  Your Care Instructions     A buildup of fluid in the body can cause low sodium levels in the blood. It may also cause symptoms such as swelling and pain. Your doctor may suggest that you limit liquids, including foods that contain a lot of liquid. Limiting liquids is called fluid restriction. Keeping track of the amount of fluids you take in may help you feel better.  Your doctor will tell you how much fluid you can have in a day. Follow-up care is a key part of your treatment and safety. Be sure to make and go to all appointments, and call your doctor if you are having problems. It's also a good idea to know your test results and keep a list of the medicines you take. How can you care for yourself at home? · Find a way of tracking the fluids you take in that works for you. Here are two methods you can try:  ? Write down how much you drink throughout the day. ? Keep a container filled with the amount of liquid allowed for the day. As you drink liquids during the day, such as a 6-ounce cup of coffee, pour that same amount out of the container. When the container is empty, you've had your liquid for the day. · Count any foods that will melt (such as ice cream, gelatin, or flavored ice treats) or liquid foods (such as soup) as part of your fluids for the day. Also count the liquid in canned fruits and vegetables as part of your daily intake, or drain them well before serving. · Space your liquids throughout the day. Then you won't be tempted to drink more than the amount your doctor recommends. · To relieve thirst without taking in extra water, try chewing gum, sucking on hard candy (sugarless if you have diabetes), or rinsing your mouth with water and spitting it out. Where can you learn more? Go to https://GroupTalent.Arktis Radiation Detectors. org and sign in to your ZAP account. Enter J781 in the Go-Page Digital Media box to learn more about \"Fluid Restriction: Care Instructions. \"     If you do not have an account, please click on the \"Sign Up Now\" link. Current as of: April 29, 2021               Content Version: 13.1  © 9050-0802 Healthwise, Incorporated. Care instructions adapted under license by [facility] ([facility]).  If you have questions about a medical condition or this instruction, always ask your healthcare professional. Cleve Rocha disclaims any warranty or liability for your use of this information.

## 2022-02-07 NOTE — PROGRESS NOTES
[address]  Dept: Abel Chaves (:  1940) is a 79 y.o. male,Established patient, here for evaluation of the following chief complaint(s):  Leg Swelling (and feet) and Other (Bronchitis)      ASSESSMENT/PLAN:  I have reviewed the patient's medical history in detail and updated the computerized patient record. HPI/ROS per the patient and caregiver. Overall non toxic in appearance. Answers questions appropriately. Conditions discussed and addressed this visit include:     Pt with worsening edema most likely related to his chronic disease states as well as his poor diet intake. High sodium intake on  Most days while eating out. Discussed his chronic conditions and reasons why he would have swelling  Low NA diet information given  Discussed using support hose and pt is adamant he will not wear these  Continue to monitor weight and blood sugar, blood pressure  No further diuretics due to his kidney function  Pt agreeable to plan of care. 1. Bronchiectasis with acute exacerbation (HCC)  -     guaiFENesin (MUCINEX) 600 MG extended release tablet; Take 1 tablet by mouth 2 times daily for 14 days, Disp-28 tablet, R-1Normal  2. Atrial fibrillation with RVR ([facility] [address]  3. CHF (congestive heart failure), NYHA class III, acute on chronic, combined ([address]      Return if symptoms worsen or fail to improve, for Results review, Routine follow up, Medication refill. SUBJECTIVE/OBJECTIVE:  HPI   Here for leg swelling for the last week   No chest pain   Sob with exertion and at times at rest   No change in urine output   INR stable   Not regularly checking blood sugar   Swelling of the legs does go down after elevation.  Is not wearing compression hose.  Eating at [facility] steer several times per week     Review of Systems   Constitutional: Positive for fatigue. Negative for activity change, appetite change and fever. HENT: Negative.     Eyes: Negative. Respiratory: Positive for cough and shortness of breath. Negative for chest tightness and wheezing. Cardiovascular: Positive for leg swelling. Negative for chest pain and palpitations. Gastrointestinal: Negative. Endocrine: Negative. Genitourinary: Negative. Musculoskeletal: Negative. Skin: Negative for pallor. Allergic/Immunologic: Positive for environmental allergies. Neurological: Negative for dizziness, weakness, light-headedness and headaches. Hematological: Negative for adenopathy. Psychiatric/Behavioral: Negative. Physical Exam  Vitals and nursing note reviewed. Constitutional:       Appearance: Normal appearance. He is not ill-appearing. HENT:      Head: Normocephalic. Right Ear: External ear normal.      Left Ear: External ear normal.      Nose: Nose normal. No rhinorrhea. Mouth/Throat:      Pharynx: Oropharynx is clear. No posterior oropharyngeal erythema. Eyes:      Conjunctiva/sclera: Conjunctivae normal.   Neck:      Vascular: No carotid bruit. Cardiovascular:      Rate and Rhythm: Normal rate and regular rhythm. Pulses: Normal pulses. Pulmonary:      Effort: Pulmonary effort is normal.      Breath sounds: Normal breath sounds. No wheezing, rhonchi ( right upper lobe only. ) or rales. Abdominal:      General: Abdomen is flat. Bowel sounds are normal.   Musculoskeletal:         General: Normal range of motion. Cervical back: Normal range of motion and neck supple. No tenderness. Right lower leg: Edema present. Left lower leg: Edema (mild ankle bilateral pitting. ) present. Lymphadenopathy:      Cervical: No cervical adenopathy. Skin:     General: Skin is warm and dry. Capillary Refill: Capillary refill takes less than 2 seconds. Neurological:      General: No focal deficit present. Mental Status: He is alert. Mental status is at baseline.    Psychiatric:         Mood and Affect: Mood normal.

## 2022-02-08 ENCOUNTER — TELEPHONE (OUTPATIENT)
Dept: PULMONOLOGY | Age: 82
End: 2022-02-08

## 2022-02-08 ASSESSMENT — ENCOUNTER SYMPTOMS
CHEST TIGHTNESS: 0
SHORTNESS OF BREATH: 1

## 2022-02-08 NOTE — TELEPHONE ENCOUNTER
Patient called in stating that on his office visit on 1/19/22 and was told that he didn't have Copd  and was to stop his inhalers, he states that he went to [facility] office and she states that she didn't see where patient did not have Copd, patient called upset because he threw all his inhalers away. He wanted clarification from you to see if he was suppose to be on his inhalers or not and his DX . Please advise.  Thank you

## 2022-02-15 ENCOUNTER — TELEPHONE (OUTPATIENT)
Dept: FAMILY MEDICINE CLINIC | Age: 82
End: 2022-02-15

## 2022-02-15 RX ORDER — BUDESONIDE, GLYCOPYRROLATE, AND FORMOTEROL FUMARATE 160; 9; 4.8 UG/1; UG/1; UG/1
2 AEROSOL, METERED RESPIRATORY (INHALATION) 2 TIMES DAILY
Qty: 3 EACH | Refills: 3 | Status: SHIPPED | OUTPATIENT
Start: 2022-02-15 | End: 2022-03-11 | Stop reason: SDUPTHER

## 2022-02-21 ENCOUNTER — TELEPHONE (OUTPATIENT)
Dept: FAMILY MEDICINE CLINIC | Age: 82
End: 2022-02-21

## 2022-02-21 ENCOUNTER — HOSPITAL ENCOUNTER (OUTPATIENT)
Dept: PHARMACY | Age: 82
Setting detail: THERAPIES SERIES
Discharge: HOME | End: 2022-02-21
Payer: MEDICARE

## 2022-02-21 DIAGNOSIS — I48.21 PERMANENT ATRIAL FIBRILLATION (HCC): Primary | ICD-10-CM

## 2022-02-21 DIAGNOSIS — Z79.01 ANTICOAGULATED ON COUMADIN: ICD-10-CM

## 2022-02-21 DIAGNOSIS — Z51.81 ENCOUNTER FOR THERAPEUTIC DRUG MONITORING: ICD-10-CM

## 2022-02-21 LAB — POC INR: 2.4 (ref 0.8–1.2)

## 2022-02-21 PROCEDURE — 99211 OFF/OP EST MAY X REQ PHY/QHP: CPT

## 2022-02-21 PROCEDURE — 85610 PROTHROMBIN TIME: CPT

## 2022-02-21 PROCEDURE — 36416 COLLJ CAPILLARY BLOOD SPEC: CPT

## 2022-02-21 NOTE — PROGRESS NOTES
Medication Management [address] [place] [address]  [phone] (phone)  [phone] (fax)    Mr. Brooklynn Julian is a 79 y.o.  male with history of atrial fib. , per Dr. Kena Vang referral, who presents today for Warfarin monitoring and adjustment (4 week visit - late for today's visit due to parking issues). Patient verifies current dosing regimen and tablet strength. Uses pill box. No missed or extra doses. Patient denies bleeding/chest pain. Has usual easy bruising. Having more SOB - waiting on Breztri to come by mail. Still using Mucinex. Continues to have swelling of legs - left>right. Eats salty foods - reminded that causes swelling. No blood in urine or stool. No dietary changes. No changes in medication/OTC agents/herbals. No change in alcohol use or tobacco use. No change in activity level. Had more stress. Patient denies headaches/dizziness/lightheadedness/falls. No vomiting/diarrhea or acute illness. No procedures scheduled in the future at this time. Assessment:   Lab Results   Component Value Date    INR 2.40 (H) 02/21/2022    INR 2.20 (H) 01/24/2022    INR 2.30 (H) 12/27/2021    PROTIME 10.9 05/12/2018    PROTIME 11.1 05/11/2018    PROTIME 11.3 05/10/2018     INR therapeutic - goal 2-3. Recent Labs     02/21/22  1027   INR 2.40*     Plan:  POCT INR ordered/performed/result reviewed. Continue PO Coumadin 1.25 mg W, 2.5 mg MTThFSS. Recheck INR in 4 week(s). Patient reminded to call the Anticoagulation Clinic with any signs or symptoms of bleeding or with any medication changes. Patient given instructions utilizing the teach back method. After visit summary printed and reviewed with patient. Discharged ambulatory in no apparent distress, wearing mask.

## 2022-02-21 NOTE — TELEPHONE ENCOUNTER
The patient called and said that he should be getting the inhaler that you prescribed to him maybe tomorrow and wanted to know if he should stop taking the mucinex once he gets his inhaler? Please advise.

## 2022-03-01 ENCOUNTER — TELEPHONE (OUTPATIENT)
Dept: FAMILY MEDICINE CLINIC | Age: 82
End: 2022-03-01

## 2022-03-01 NOTE — TELEPHONE ENCOUNTER
I called and spoke to the patient. I let him know Bailee's response. The patient voiced understanding.

## 2022-03-01 NOTE — TELEPHONE ENCOUNTER
The patient came into the office and said when he is coughing up phlegm after he uses the [place] he gets shortness of breath. He wants to know if he can use the nebulizer at that time? He said he has been and it helps him. Please advise.

## 2022-03-07 ENCOUNTER — OFFICE VISIT (OUTPATIENT)
Dept: FAMILY MEDICINE CLINIC | Age: 82
End: 2022-03-07
Payer: MEDICARE

## 2022-03-07 VITALS
SYSTOLIC BLOOD PRESSURE: 138 MMHG | WEIGHT: 210 LBS | OXYGEN SATURATION: 97 % | TEMPERATURE: 97.7 F | DIASTOLIC BLOOD PRESSURE: 82 MMHG | BODY MASS INDEX: 33.89 KG/M2 | HEART RATE: 53 BPM

## 2022-03-07 DIAGNOSIS — J44.1 COPD EXACERBATION (HCC): ICD-10-CM

## 2022-03-07 DIAGNOSIS — R25.2 CRAMPING OF HANDS: ICD-10-CM

## 2022-03-07 DIAGNOSIS — I25.119 CORONARY ARTERY DISEASE INVOLVING NATIVE CORONARY ARTERY OF NATIVE HEART WITH ANGINA PECTORIS (HCC): ICD-10-CM

## 2022-03-07 DIAGNOSIS — J41.1 MUCOPURULENT CHRONIC BRONCHITIS (HCC): ICD-10-CM

## 2022-03-07 DIAGNOSIS — R25.1 TREMOR OF RIGHT HAND: Primary | ICD-10-CM

## 2022-03-07 DIAGNOSIS — I48.91 ATRIAL FIBRILLATION WITH RVR (HCC): ICD-10-CM

## 2022-03-07 PROCEDURE — G8427 DOCREV CUR MEDS BY ELIG CLIN: HCPCS | Performed by: NURSE PRACTITIONER

## 2022-03-07 PROCEDURE — 99213 OFFICE O/P EST LOW 20 MIN: CPT | Performed by: NURSE PRACTITIONER

## 2022-03-07 PROCEDURE — 1123F ACP DISCUSS/DSCN MKR DOCD: CPT | Performed by: NURSE PRACTITIONER

## 2022-03-07 PROCEDURE — G8417 CALC BMI ABV UP PARAM F/U: HCPCS | Performed by: NURSE PRACTITIONER

## 2022-03-07 PROCEDURE — 1036F TOBACCO NON-USER: CPT | Performed by: NURSE PRACTITIONER

## 2022-03-07 PROCEDURE — 3023F SPIROM DOC REV: CPT | Performed by: NURSE PRACTITIONER

## 2022-03-07 PROCEDURE — G8482 FLU IMMUNIZE ORDER/ADMIN: HCPCS | Performed by: NURSE PRACTITIONER

## 2022-03-07 PROCEDURE — 4040F PNEUMOC VAC/ADMIN/RCVD: CPT | Performed by: NURSE PRACTITIONER

## 2022-03-07 RX ORDER — DEXTROMETHORPHAN HYDROBROMIDE AND PROMETHAZINE HYDROCHLORIDE 15; 6.25 MG/5ML; MG/5ML
5 SYRUP ORAL NIGHTLY
Qty: 450 ML | Refills: 0 | Status: ON HOLD | OUTPATIENT
Start: 2022-03-07 | End: 2022-05-09

## 2022-03-07 ASSESSMENT — ENCOUNTER SYMPTOMS
GASTROINTESTINAL NEGATIVE: 1
CHEST TIGHTNESS: 0
COUGH: 1
SHORTNESS OF BREATH: 0
WHEEZING: 0
EYES NEGATIVE: 1

## 2022-03-07 NOTE — PROGRESS NOTES
[address]  Dept: Abel Chaves (:  1940) is a 79 y.o. male,Established patient, here for evaluation of the following chief complaint(s):  3 Month Follow-Up, Discuss Medications (can he take the mucinex at night?), Referral - General (Pulmonary doctor), and Other (hands are cramping, it comes and goes)      ASSESSMENT/PLAN:  1. Tremor of right hand  -     AFL [facility] [place]  2. Cramping of hands  -     AFL - Tammy Love MD, Orthopedic Surgery, [facility] [place] [address]  3. Atrial fibrillation with RVR (HCC)  -     Lipid Panel; Future  -     Comprehensive Metabolic Panel; Future  4. Coronary artery disease involving native coronary artery of native heart with angina pectoris ([facility]  -     Lipid Panel; Future  -     Comprehensive Metabolic Panel; Future  5. COPD exacerbation ([facility]  6. Mucopurulent chronic bronchitis (HCC)  -     promethazine-dextromethorphan (PROMETHAZINE-DM) 6.25-15 MG/5ML syrup; Take 5 mLs by mouth nightly, Disp-450 mL, R-0Normal      Return in about 3 months (around 2022) for annual well visit. SUBJECTIVE/OBJECTIVE:  HPI   Here for follow up   Has breztri now for the last 2 weeks. Cost is a hardship for the patient.  Hands are cramping, hard to  objects, tremors at times.  Right hand mostly.  Happens at least weekly   Review of Systems   Constitutional: Positive for fatigue. Negative for activity change, appetite change and fever. HENT: Negative. Eyes: Negative. Respiratory: Positive for cough. Negative for chest tightness, shortness of breath and wheezing. Cardiovascular: Positive for leg swelling. Negative for chest pain and palpitations. Gastrointestinal: Negative. Endocrine: Negative. Genitourinary: Negative. Musculoskeletal: Positive for arthralgias, joint swelling and myalgias. Negative for neck pain. Skin: Negative for pallor.    Allergic/Immunologic: Positive for environmental allergies. Neurological: Negative for dizziness, weakness, light-headedness and headaches. Hematological: Negative for adenopathy. Psychiatric/Behavioral: Negative. Physical Exam  Vitals and nursing note reviewed. Constitutional:       Appearance: Normal appearance. He is normal weight. He is not ill-appearing. HENT:      Head: Normocephalic. Right Ear: External ear normal.      Left Ear: External ear normal.      Nose: Nose normal. No rhinorrhea. Mouth/Throat:      Pharynx: Oropharynx is clear. No posterior oropharyngeal erythema. Eyes:      Conjunctiva/sclera: Conjunctivae normal.   Neck:      Vascular: No carotid bruit. Cardiovascular:      Rate and Rhythm: Normal rate and regular rhythm. Pulses: Normal pulses. Pulmonary:      Effort: Pulmonary effort is normal.      Breath sounds: Rhonchi (bilateral upper lobes) present. No wheezing or rales. Abdominal:      General: Abdomen is flat. Bowel sounds are normal.   Musculoskeletal:         General: Normal range of motion. Cervical back: Normal range of motion and neck supple. No tenderness. Right lower leg: Edema present. Left lower leg: Edema (mild ankle bilateral pitting. ) present. Lymphadenopathy:      Cervical: No cervical adenopathy. Skin:     General: Skin is warm and dry. Capillary Refill: Capillary refill takes less than 2 seconds. Neurological:      General: No focal deficit present. Mental Status: He is alert. Mental status is at baseline. Psychiatric:         Mood and Affect: Mood normal.         Behavior: Behavior normal.         Thought Content: Thought content normal.                 An electronic signature was used to authenticate this note.     --Naman Valles, APRN - CNP

## 2022-03-07 NOTE — PATIENT INSTRUCTIONS
Patient Education        Carpal Tunnel Syndrome: Care Instructions  Overview     Carpal tunnel syndrome is numbness, tingling, weakness, and pain in your hand, wrist, and sometimes forearm. It is caused by pressure on the median nerve. This nerve and several tough tissues called tendons run through a space in the wrist. This space is called the carpal tunnel. The repeated hand motions used in work and some hobbies and sports can put pressure on the median nerve. Pregnancy can cause carpal tunnel syndrome. Several conditions, such as diabetes, arthritis, and an underactive thyroid, can also cause it. You may be able to limit an activity or change the way you do it to reduce your symptoms. You also can take other steps to feel better. If your symptoms are mild, 1 to 2 weeks of home treatment are likely to ease your pain. Surgery is needed only if other treatments do not work. Follow-up care is a key part of your treatment and safety. Be sure to make and go to all appointments, and call your doctor if you are having problems. It's also a good idea to know your test results and keep a list of the medicines you take. How can you care for yourself at home? · If possible, stop or reduce the activity that causes your symptoms. If you cannot stop the activity, take frequent breaks to rest and stretch or change hand positions to do a task. Try switching hands, such as when using a computer mouse. · Try to avoid bending or twisting your wrists. · Ask your doctor if you can take an over-the-counter pain medicine, such as acetaminophen (Tylenol), ibuprofen (Advil, Motrin), or naproxen (Aleve). Be safe with medicines. Read and follow all instructions on the label. · If your doctor prescribes corticosteroid medicine to help reduce pain and swelling, take it exactly as prescribed. Call your doctor if you think you are having a problem with your medicine.   · Put ice or a cold pack on your wrist for 10 to 20 minutes at a time to ease pain. Put a thin cloth between the ice and your skin. · If your doctor or your physical or occupational therapist tells you to wear a wrist splint, wear it as directed to keep your wrist in a neutral position. This also eases pressure on your median nerve. · Ask your doctor whether you should have physical or occupational therapy to learn how to do tasks differently. · Try a yoga class to stretch your muscles and build strength in your hands and wrists. Yoga has been shown to ease carpal tunnel symptoms. To prevent carpal tunnel  · When working at a [employer], keep your hands and wrists in line with your forearms. Hold your elbows close to your sides. Take a break every 10 to 15 minutes. · Try these exercises:  ? Warm up: Rotate your wrist up, down, and from side to side. Repeat this 4 times. Stretch your fingers far apart, relax them, then stretch them again. Repeat 4 times. Stretch your thumb by pulling it back gently, holding it, and then releasing it. Repeat 4 times. ? Prayer stretch: Start with your palms together in front of your chest just below your chin. Slowly lower your hands toward your waistline while keeping your hands close to your stomach and your palms together until you feel a mild to moderate stretch under your forearms. Hold for 10 to 20 seconds. Repeat 4 times. ? Wrist flexor stretch: Hold your arm in front of you with your palm up. Bend your wrist, pointing your hand toward the floor. With your other hand, gently bend your wrist further until you feel a mild to moderate stretch in your forearm. Hold for 10 to 20 seconds. Repeat 4 times. ? Wrist extensor stretch: Repeat the steps for the wrist flexor stretch, but begin with your extended hand palm down. · Squeeze a rubber exercise ball several times a day to keep your hands and fingers strong. · Avoid holding objects (such as a book) in one position for a long time. When possible, use your whole hand to grasp an object. Using just the thumb and index finger can put stress on the wrist.  · Do not smoke. It can make this condition worse by reducing blood flow to the median nerve. If you need help quitting, talk to your doctor about stop-smoking programs and medicines. These can increase your chances of quitting for good. When should you call for help? Watch closely for changes in your health, and be sure to contact your doctor if:    · Your pain or other problems do not get better with home care.     · You want more information about physical or occupational therapy.     · You have side effects of your corticosteroid medicine, such as:  ? Weight gain. ? Mood changes. ? Trouble sleeping. ? Bruising easily.     · You have any other problems with your medicine. Where can you learn more? Go to [email or web address]man.Ticketland. org and sign in to your [employer] account. Enter R432 in the eSnips box to learn more about \"Carpal Tunnel Syndrome: Care Instructions. \"     If you do not have an account, please click on the \"Sign Up Now\" link. Current as of: July 1, 2021               Content Version: 13.1  © 1230-0294 [employer]. Care instructions adapted under license by [facility] ([facility]). If you have questions about a medical condition or this instruction, always ask your healthcare professional. [place] [address] any warranty or liability for your use of this information. Patient Education        Carpal Tunnel Syndrome: Exercises  Introduction  Here are some examples of exercises for you to try. The exercises may be suggested for a condition or for rehabilitation. Start each exercise slowly. Ease off the exercises if you start to have pain. You will be told when to start these exercises and which ones will work best for you. Warm-up stretches  When you no longer have pain or numbness, you can do exercises to help prevent carpal tunnel syndrome from coming back.  Do not do any stretch or movement that is uncomfortable or painful. 1. Rotate your wrist up, down, and from side to side. Repeat 4 times. 2. Stretch your fingers far apart. Relax them, and then stretch them again. Repeat 4 times. 3. Stretch your thumb by pulling it back gently, holding it, and then releasing it. Repeat 4 times. How to do the exercises  Prayer stretch    1. Start with your palms together in front of your chest just below your chin. 2. Slowly lower your hands toward your waistline, keeping your hands close to your stomach and your palms together until you feel a mild to moderate stretch under your forearms. 3. Hold for at least 15 to 30 seconds. Repeat 2 to 4 times. Wrist flexor stretch    1. Extend your arm in front of you with your palm up. 2. Bend your wrist, pointing your hand toward the floor. 3. With your other hand, gently bend your wrist farther until you feel a mild to moderate stretch in your forearm. 4. Hold for at least 15 to 30 seconds. Repeat 2 to 4 times. Wrist extensor stretch    1. Repeat steps 1 through 4 of the stretch above, but begin with your extended hand palm down. Follow-up care is a key part of your treatment and safety. Be sure to make and go to all appointments, and call your doctor if you are having problems. It's also a good idea to know your test results and keep a list of the medicines you take. Where can you learn more? Go to https://"[employer]pepatrickewPermabit Technology.ChipIn. org and sign in to your [employer] account. Enter W905 in the [facility] box to learn more about \"Carpal Tunnel Syndrome: Exercises. \"     If you do not have an account, please click on the \"Sign Up Now\" link. Current as of: July 1, 2021               Content Version: 13.1  © 0190-1044 [employer] Incorporated. Care instructions adapted under license by [facility] ([facility]).  If you have questions about a medical condition or this instruction, always ask your healthcare professional. [employer] disclaims any warranty or liability for your use of this information.

## 2022-03-08 ENCOUNTER — APPOINTMENT (OUTPATIENT)
Dept: GENERAL RADIOLOGY | Age: 82
End: 2022-03-08
Payer: MEDICARE

## 2022-03-08 ENCOUNTER — HOSPITAL ENCOUNTER (OUTPATIENT)
Age: 82
Setting detail: OBSERVATION
Discharge: HOME | End: 2022-03-09
Attending: EMERGENCY MEDICINE | Admitting: FAMILY MEDICINE
Payer: MEDICARE

## 2022-03-08 DIAGNOSIS — J44.1 COPD EXACERBATION (HCC): Primary | ICD-10-CM

## 2022-03-08 DIAGNOSIS — Z95.2 HISTORY OF TRANSCATHETER AORTIC VALVE REPLACEMENT (TAVR): ICD-10-CM

## 2022-03-08 DIAGNOSIS — I48.21 PERMANENT ATRIAL FIBRILLATION (HCC): ICD-10-CM

## 2022-03-08 DIAGNOSIS — R77.8 TROPONIN LEVEL ELEVATED: ICD-10-CM

## 2022-03-08 PROBLEM — J44.9 COPD (CHRONIC OBSTRUCTIVE PULMONARY DISEASE) (HCC): Status: ACTIVE | Noted: 2022-03-08

## 2022-03-08 LAB
ANION GAP SERPL CALCULATED.3IONS-SCNC: 15 MEQ/L (ref 8–16)
BASOPHILS # BLD: 0.3 %
BASOPHILS ABSOLUTE: 0 THOU/MM3 (ref 0–0.1)
BUN BLDV-MCNC: 31 MG/DL (ref 7–22)
CALCIUM SERPL-MCNC: 9.1 MG/DL (ref 8.5–10.5)
CHLORIDE BLD-SCNC: 106 MEQ/L (ref 98–111)
CO2: 21 MEQ/L (ref 23–33)
CREAT SERPL-MCNC: 2.1 MG/DL (ref 0.4–1.2)
D-DIMER QUANTITATIVE: 480 NG/ML FEU (ref 0–500)
EKG Q-T INTERVAL: 436 MS
EKG QRS DURATION: 138 MS
EKG QTC CALCULATION (BAZETT): 486 MS
EKG R AXIS: 40 DEGREES
EKG T AXIS: -98 DEGREES
EKG VENTRICULAR RATE: 75 BPM
EOSINOPHIL # BLD: 1.3 %
EOSINOPHILS ABSOLUTE: 0.1 THOU/MM3 (ref 0–0.4)
ERYTHROCYTE [DISTWIDTH] IN BLOOD BY AUTOMATED COUNT: 16.4 % (ref 11.5–14.5)
ERYTHROCYTE [DISTWIDTH] IN BLOOD BY AUTOMATED COUNT: 61.6 FL (ref 35–45)
FLU A ANTIGEN: NEGATIVE
FLU B ANTIGEN: NEGATIVE
GFR SERPL CREATININE-BSD FRML MDRD: 30 ML/MIN/1.73M2
GLUCOSE BLD-MCNC: 175 MG/DL (ref 70–108)
HCT VFR BLD CALC: 40 % (ref 42–52)
HEMOGLOBIN: 12.7 GM/DL (ref 14–18)
IMMATURE GRANS (ABS): 0.07 THOU/MM3 (ref 0–0.07)
IMMATURE GRANULOCYTES: 0.7 %
INR BLD: 3.35 (ref 0.85–1.13)
LYMPHOCYTES # BLD: 12.4 %
LYMPHOCYTES ABSOLUTE: 1.2 THOU/MM3 (ref 1–4.8)
MCH RBC QN AUTO: 32.3 PG (ref 26–33)
MCHC RBC AUTO-ENTMCNC: 31.8 GM/DL (ref 32.2–35.5)
MCV RBC AUTO: 101.8 FL (ref 80–94)
MONOCYTES # BLD: 10 %
MONOCYTES ABSOLUTE: 0.9 THOU/MM3 (ref 0.4–1.3)
NUCLEATED RED BLOOD CELLS: 0 /100 WBC
OSMOLALITY CALCULATION: 293.9 MOSMOL/KG (ref 275–300)
PLATELET # BLD: 187 THOU/MM3 (ref 130–400)
PMV BLD AUTO: 10.7 FL (ref 9.4–12.4)
POTASSIUM REFLEX MAGNESIUM: 4.1 MEQ/L (ref 3.5–5.2)
PRO-BNP: 1636 PG/ML (ref 0–1800)
RBC # BLD: 3.93 MILL/MM3 (ref 4.7–6.1)
SARS-COV-2, NAAT: NOT  DETECTED
SEG NEUTROPHILS: 75.3 %
SEGMENTED NEUTROPHILS ABSOLUTE COUNT: 7.1 THOU/MM3 (ref 1.8–7.7)
SODIUM BLD-SCNC: 142 MEQ/L (ref 135–145)
TROPONIN T: 0.02 NG/ML
TROPONIN T: 0.03 NG/ML
WBC # BLD: 9.4 THOU/MM3 (ref 4.8–10.8)

## 2022-03-08 PROCEDURE — 94760 N-INVAS EAR/PLS OXIMETRY 1: CPT

## 2022-03-08 PROCEDURE — 93010 ELECTROCARDIOGRAM REPORT: CPT | Performed by: NUCLEAR MEDICINE

## 2022-03-08 PROCEDURE — 99285 EMERGENCY DEPT VISIT HI MDM: CPT

## 2022-03-08 PROCEDURE — 84484 ASSAY OF TROPONIN QUANT: CPT

## 2022-03-08 PROCEDURE — 80048 BASIC METABOLIC PNL TOTAL CA: CPT

## 2022-03-08 PROCEDURE — 85610 PROTHROMBIN TIME: CPT

## 2022-03-08 PROCEDURE — 99219 PR INITIAL OBSERVATION CARE/DAY 50 MINUTES: CPT | Performed by: FAMILY MEDICINE

## 2022-03-08 PROCEDURE — 6360000002 HC RX W HCPCS: Performed by: EMERGENCY MEDICINE

## 2022-03-08 PROCEDURE — 71045 X-RAY EXAM CHEST 1 VIEW: CPT

## 2022-03-08 PROCEDURE — 85379 FIBRIN DEGRADATION QUANT: CPT

## 2022-03-08 PROCEDURE — 83880 ASSAY OF NATRIURETIC PEPTIDE: CPT

## 2022-03-08 PROCEDURE — 6370000000 HC RX 637 (ALT 250 FOR IP)

## 2022-03-08 PROCEDURE — 87635 SARS-COV-2 COVID-19 AMP PRB: CPT

## 2022-03-08 PROCEDURE — 6370000000 HC RX 637 (ALT 250 FOR IP): Performed by: EMERGENCY MEDICINE

## 2022-03-08 PROCEDURE — 36415 COLL VENOUS BLD VENIPUNCTURE: CPT

## 2022-03-08 PROCEDURE — 87804 INFLUENZA ASSAY W/OPTIC: CPT

## 2022-03-08 PROCEDURE — G0378 HOSPITAL OBSERVATION PER HR: HCPCS

## 2022-03-08 PROCEDURE — 85025 COMPLETE CBC W/AUTO DIFF WBC: CPT

## 2022-03-08 PROCEDURE — 2580000003 HC RX 258

## 2022-03-08 PROCEDURE — 94640 AIRWAY INHALATION TREATMENT: CPT

## 2022-03-08 PROCEDURE — 93005 ELECTROCARDIOGRAM TRACING: CPT | Performed by: EMERGENCY MEDICINE

## 2022-03-08 RX ORDER — ATORVASTATIN CALCIUM 80 MG/1
80 TABLET, FILM COATED ORAL NIGHTLY
Status: DISCONTINUED | OUTPATIENT
Start: 2022-03-08 | End: 2022-03-09 | Stop reason: HOSPADM

## 2022-03-08 RX ORDER — SODIUM CHLORIDE 9 MG/ML
25 INJECTION, SOLUTION INTRAVENOUS PRN
Status: DISCONTINUED | OUTPATIENT
Start: 2022-03-08 | End: 2022-03-09 | Stop reason: HOSPADM

## 2022-03-08 RX ORDER — LOSARTAN POTASSIUM 25 MG/1
25 TABLET ORAL NIGHTLY
Status: DISCONTINUED | OUTPATIENT
Start: 2022-03-08 | End: 2022-03-09 | Stop reason: HOSPADM

## 2022-03-08 RX ORDER — PREDNISONE 20 MG/1
40 TABLET ORAL DAILY
Status: DISCONTINUED | OUTPATIENT
Start: 2022-03-09 | End: 2022-03-09 | Stop reason: HOSPADM

## 2022-03-08 RX ORDER — SODIUM CHLORIDE 0.9 % (FLUSH) 0.9 %
5-40 SYRINGE (ML) INJECTION PRN
Status: DISCONTINUED | OUTPATIENT
Start: 2022-03-08 | End: 2022-03-09 | Stop reason: HOSPADM

## 2022-03-08 RX ORDER — ALBUTEROL SULFATE 2.5 MG/3ML
10 SOLUTION RESPIRATORY (INHALATION) EVERY 6 HOURS PRN
Status: DISCONTINUED | OUTPATIENT
Start: 2022-03-08 | End: 2022-03-08 | Stop reason: HOSPADM

## 2022-03-08 RX ORDER — SODIUM CHLORIDE 0.9 % (FLUSH) 0.9 %
5-40 SYRINGE (ML) INJECTION EVERY 12 HOURS SCHEDULED
Status: DISCONTINUED | OUTPATIENT
Start: 2022-03-08 | End: 2022-03-09 | Stop reason: HOSPADM

## 2022-03-08 RX ORDER — ONDANSETRON 2 MG/ML
4 INJECTION INTRAMUSCULAR; INTRAVENOUS EVERY 6 HOURS PRN
Status: DISCONTINUED | OUTPATIENT
Start: 2022-03-08 | End: 2022-03-09 | Stop reason: HOSPADM

## 2022-03-08 RX ORDER — ONDANSETRON 4 MG/1
4 TABLET, ORALLY DISINTEGRATING ORAL EVERY 8 HOURS PRN
Status: DISCONTINUED | OUTPATIENT
Start: 2022-03-08 | End: 2022-03-09 | Stop reason: HOSPADM

## 2022-03-08 RX ORDER — WARFARIN SODIUM 2.5 MG/1
2.5 TABLET ORAL DAILY
Status: DISCONTINUED | OUTPATIENT
Start: 2022-03-09 | End: 2022-03-08

## 2022-03-08 RX ORDER — NITROGLYCERIN 80 MG/1
1 PATCH TRANSDERMAL DAILY
Status: DISCONTINUED | OUTPATIENT
Start: 2022-03-09 | End: 2022-03-09 | Stop reason: HOSPADM

## 2022-03-08 RX ORDER — BUMETANIDE 1 MG/1
1 TABLET ORAL DAILY
Status: DISCONTINUED | OUTPATIENT
Start: 2022-03-09 | End: 2022-03-09 | Stop reason: HOSPADM

## 2022-03-08 RX ORDER — IPRATROPIUM BROMIDE AND ALBUTEROL SULFATE 2.5; .5 MG/3ML; MG/3ML
1 SOLUTION RESPIRATORY (INHALATION) EVERY 4 HOURS
Status: DISCONTINUED | OUTPATIENT
Start: 2022-03-08 | End: 2022-03-09

## 2022-03-08 RX ORDER — POLYETHYLENE GLYCOL 3350 17 G/17G
17 POWDER, FOR SOLUTION ORAL DAILY PRN
Status: DISCONTINUED | OUTPATIENT
Start: 2022-03-08 | End: 2022-03-09 | Stop reason: HOSPADM

## 2022-03-08 RX ORDER — IPRATROPIUM BROMIDE AND ALBUTEROL SULFATE 2.5; .5 MG/3ML; MG/3ML
1 SOLUTION RESPIRATORY (INHALATION) EVERY 4 HOURS PRN
Status: DISCONTINUED | OUTPATIENT
Start: 2022-03-08 | End: 2022-03-09 | Stop reason: HOSPADM

## 2022-03-08 RX ORDER — METOPROLOL SUCCINATE 50 MG/1
50 TABLET, EXTENDED RELEASE ORAL DAILY
Status: DISCONTINUED | OUTPATIENT
Start: 2022-03-09 | End: 2022-03-09 | Stop reason: HOSPADM

## 2022-03-08 RX ORDER — ACETAMINOPHEN 650 MG/1
650 SUPPOSITORY RECTAL EVERY 6 HOURS PRN
Status: DISCONTINUED | OUTPATIENT
Start: 2022-03-08 | End: 2022-03-09 | Stop reason: HOSPADM

## 2022-03-08 RX ORDER — ACETAMINOPHEN 325 MG/1
650 TABLET ORAL EVERY 6 HOURS PRN
Status: DISCONTINUED | OUTPATIENT
Start: 2022-03-08 | End: 2022-03-09 | Stop reason: HOSPADM

## 2022-03-08 RX ORDER — PREDNISONE 20 MG/1
60 TABLET ORAL ONCE
Status: COMPLETED | OUTPATIENT
Start: 2022-03-08 | End: 2022-03-08

## 2022-03-08 RX ADMIN — ALBUTEROL SULFATE 10 MG: 2.5 SOLUTION RESPIRATORY (INHALATION) at 17:29

## 2022-03-08 RX ADMIN — IPRATROPIUM BROMIDE AND ALBUTEROL SULFATE 1 AMPULE: .5; 3 SOLUTION RESPIRATORY (INHALATION) at 23:30

## 2022-03-08 RX ADMIN — SODIUM CHLORIDE, PRESERVATIVE FREE 10 ML: 5 INJECTION INTRAVENOUS at 22:38

## 2022-03-08 RX ADMIN — PREDNISONE 60 MG: 20 TABLET ORAL at 17:27

## 2022-03-08 ASSESSMENT — ENCOUNTER SYMPTOMS
COUGH: 0
DIARRHEA: 0
SHORTNESS OF BREATH: 1
CONSTIPATION: 0
SINUS PAIN: 0
CHEST TIGHTNESS: 0
NAUSEA: 0
SINUS PRESSURE: 0
ABDOMINAL PAIN: 0
EYE PAIN: 0
BACK PAIN: 0

## 2022-03-08 ASSESSMENT — PAIN SCALES - GENERAL: PAINLEVEL_OUTOF10: 0

## 2022-03-08 NOTE — ED TRIAGE NOTES
Pt to the ED with c/o SOB. Pt states this has been ongoing for months. States he was taken off one of his inhalers and his breathing began getting worse over time. Pt states when he is active he starts getting more SOB.

## 2022-03-08 NOTE — ED NOTES
Patient resting in bed. Respirations easy and unlabored. No distress noted. Call light within reach.        Chiqui Daigle RN  03/08/22 2583

## 2022-03-08 NOTE — ED PROVIDER NOTES
[place] ENCOUNTER          Pt Name: Rosalie Hernandez  MRN: [id number]  [place] 1940  Date of evaluation: 3/8/2022  Physician: Jani Beckham MD, Shemar Moore, [address] [place] [address]       Chief Complaint   Patient presents with    Shortness of Breath     History obtained from chart review and the patient. HISTORY OF PRESENT ILLNESS    HPI  Rosalie Hernandez is a 79 y.o. male who presents to the emergency department for evaluation of shortness of breath. Patient states since about 5 AM today has been progressively feeling more and more short of breath. He uses albuterol nebulizer once and his long-acting inhaler once as well, after which he felt slightly better but his symptoms returned couple hours after that. Currently complains of mild shortness of breath. Patient has been evaluated by pulmonology and had pulmonary function test done recently. He is to smoke 1 to 2 packs a day, quit 20 years ago. Patient does have expectoration every day but states is not worse than usual.  The patient has no other acute complaints at this time. REVIEW OF SYSTEMS   Review of Systems   Constitutional: Negative for chills, fever and unexpected weight change. HENT: Negative for congestion, ear pain, nosebleeds, sinus pressure and sinus pain. Eyes: Negative for pain. Respiratory: Positive for shortness of breath. Negative for cough and chest tightness. Cardiovascular: Negative for chest pain. Gastrointestinal: Negative for abdominal pain, constipation, diarrhea and nausea. Endocrine: Negative for polyuria. Genitourinary: Negative for dysuria and flank pain. Musculoskeletal: Negative for arthralgias, back pain, myalgias and neck pain. Skin: Negative for rash. Neurological: Negative for weakness and headaches. All other systems reviewed and are negative.         PAST MEDICAL AND SURGICAL HISTORY     Past Medical History: Diagnosis Date    [facility]ILL[facility] kidney [facility]) [facility]     Atrial fibrillation ([facility]     Cerebral artery occlusion with cerebral infarction ([facility]     CHF (congestive heart failure), NYHA class III, acute on chronic, combined ([facility]     COPD (chronic obstructive pulmonary disease) ([facility] 8/3/2020    Coronary artery disease involving native coronary artery of native heart with angina pectoris ([facility]     Diabetes mellitus, type 2 ([facility] 12/30/2020    Hyperlipidemia 2/20/2012    Hypertension     Pneumonia      Past Surgical History:   Procedure Laterality Date    CARDIAC SURGERY      heart cath    CORONARY ANGIOPLASTY WITH STENT PLACEMENT      HERNIA REPAIR      OTHER SURGICAL HISTORY  05/10/2018    PACEMAKER INSERTION           MEDICATIONS     Current Facility-Administered Medications:     albuterol (PROVENTIL) nebulizer solution 10 mg, 10 mg, Nebulization, Q6H PRN, Zena Balbuena MD, 10 mg at 03/08/22 1729    Current Outpatient Medications:     promethazine-dextromethorphan (PROMETHAZINE-DM) 6.25-15 MG/5ML syrup, Take 5 mLs by mouth nightly, Disp: 450 mL, Rfl: 0    Budeson-Glycopyrrol-Formoterol (BREZTRI AEROSPHERE) 160-9-4.8 MCG/ACT AERO, Inhale 2 puffs into the lungs 2 times daily, Disp: 3 each, Rfl: 3    Ascorbic Acid (VITAMIN C ER PO), Take 1 tablet by mouth daily, Disp: , Rfl:     nitroGLYCERIN (NITRODUR) 0.4 MG/HR, Place 1 patch onto the skin daily, Disp: , Rfl:     albuterol sulfate HFA (PROVENTIL HFA) 108 (90 Base) MCG/ACT inhaler, Inhale 2 puffs into the lungs 4 times daily, Disp: 54 g, Rfl: 1    albuterol (ACCUNEB) 1.25 MG/3ML nebulizer solution, Inhale 3 mLs into the lungs every 6 hours as needed for Wheezing, Disp: 360 mL, Rfl: 3    warfarin (COUMADIN) 2.5 MG tablet, Take 1 tablet by mouth daily, Disp: 90 tablet, Rfl: 3    bumetanide (BUMEX) 1 MG tablet, Take 1 tablet by mouth daily, Disp: 90 tablet, Rfl: 3    losartan (COZAAR) 25 MG tablet, Take 1 tablet by mouth nightly, Disp: 90 tachypnea. Body mass index is 33.09 kg/m². Pulsoximetry is Borderline low per my interpretation. Additional Vital Signs:  Vitals:    03/08/22 1844   BP: (!) 164/90   Pulse: 78   Resp: 22   Temp:    SpO2: 91%       Physical Exam  Vitals and nursing note reviewed. Constitutional:       General: He is not in acute distress. Appearance: He is well-developed. Comments: Speaks full sentences   HENT:      Head: Normocephalic and atraumatic. Right Ear: External ear normal.      Left Ear: External ear normal.      Nose: Nose normal.      Mouth/Throat:      Mouth: Mucous membranes are moist.   Eyes:      Conjunctiva/sclera: Conjunctivae normal.      Pupils: Pupils are equal, round, and reactive to light. Cardiovascular:      Rate and Rhythm: Normal rate and regular rhythm. Heart sounds: Normal heart sounds. No murmur heard. No friction rub. No gallop. Pulmonary:      Effort: Pulmonary effort is normal. No respiratory distress. Breath sounds: No stridor. Examination of the right-lower field reveals wheezing and rhonchi. Examination of the left-lower field reveals wheezing. Wheezing and rhonchi present. No decreased breath sounds or rales. Musculoskeletal:      Cervical back: Neck supple. Skin:     General: Skin is warm and dry. Neurological:      Mental Status: He is alert and oriented to person, place, and time. Psychiatric:         Behavior: Behavior normal.             MEDICAL DECISION MAKING   Initial Assessment:   1. COPD exacerbation  2. Rule out pneumonia  3.  Rule out ACS  Plan:    I V line, labs   Imaging   Nebulized medication   Steroids   Observation        ED RESULTS   Laboratory results:  Labs Reviewed   CBC WITH AUTO DIFFERENTIAL - Abnormal; Notable for the following components:       Result Value    RBC 3.93 (*)     Hemoglobin 12.7 (*)     Hematocrit 40.0 (*)     .8 (*)     MCHC 31.8 (*)     RDW-CV 16.4 (*)     RDW-SD 61.6 (*)     All other components within normal limits   BASIC METABOLIC PANEL W/ REFLEX TO MG FOR LOW K - Abnormal; Notable for the following components:    CO2 21 (*)     Glucose 175 (*)     BUN 31 (*)     CREATININE 2.1 (*)     All other components within normal limits   TROPONIN - Abnormal; Notable for the following components:    Troponin T 0.020 (*)     All other components within normal limits   GLOMERULAR FILTRATION RATE, ESTIMATED - Abnormal; Notable for the following components:    Est, Glom Filt Rate 30 (*)     All other components within normal limits   COVID-19, RAPID   RAPID INFLUENZA A/B ANTIGENS   BRAIN NATRIURETIC PEPTIDE   ANION GAP   OSMOLALITY   D-DIMER, QUANTITATIVE   TROPONIN       Radiologic studies results:  XR CHEST PORTABLE   Final Result   1. No acute cardiopulmonary process. **This report has been created using voice recognition software. It may contain minor errors which are inherent in voice recognition technology. **      Final report electronically signed by Dr Khushbu Hudson on 3/8/2022 4:59 PM                ED COURSE   ED Medications administered this visit:   Medications   albuterol (PROVENTIL) nebulizer solution 10 mg (10 mg Nebulization Given 3/8/22 1729)   predniSONE (DELTASONE) tablet 60 mg (60 mg Oral Given 3/8/22 1727)       ED Course as of 03/08/22 1851   Tue Mar 08, 2022   1845 Evaluated patient, feeling slightly better after nebulized treatments. Oxygen saturation remains borderline low, which is a new finding for this patient. We will obtain a D-dimer with repeat troponin to consider pulmonary imaging if elevated.  [DT]      ED Course User Index  [DT] Sheldon Villa MD         MEDICATION CHANGES     New Prescriptions    No medications on file         FINAL DISPOSITION     Final diagnoses:   COPD exacerbation (HCC)   Troponin level elevated     Condition: condition: fair  Dispo: Transfer of care to Dr. Jovany Christian, pending lab results for likely admission and lung scan imaging in the morning. This transcription was electronically signed. It was dictated by use of voice recognition software and electronically transcribed. The transcription may contain errors not detected in proofreading.        Phil Moulton MD  03/08/22 1493

## 2022-03-08 NOTE — ED NOTES
Patient resting in bed. Respirations easy and unlabored. No distress noted. Medicated per order. Call light within reach.        Nichole Gallardo RN  03/08/22 9529

## 2022-03-08 NOTE — ACP (ADVANCE CARE PLANNING)
Advance Care Planning     Advance Care Planning Activator (Inpatient)  Conversation Note      Date of ACP Conversation: 3/8/2022     Conversation Conducted with: Patient with Anitha 51: Named in Advance Directive or Healthcare Power of  (name) Flores Jung and thaddeus dec maker    ACP Activator: [address] [place] Decision Maker:     Current Designated Health Care Decision Maker:     Primary Decision Maker: Maliha Carbajal - Domestic Partner - [phone]    Secondary Decision Maker: Erwin Vazquez - Niece/Nephew - [phone]    Secondary Decision Maker: Mark [place] - [phone]    Today we documented Decision Maker(s) consistent with ACP documents on file. Care Preferences    Ventilation: \"If you were in your present state of health and suddenly became very ill and were unable to breathe on your own, what would your preference be about the use of a ventilator (breathing machine) if it were available to you? \"      Would the patient desire the use of ventilator (breathing machine)?: no    \"If your health worsens and it becomes clear that your chance of recovery is unlikely, what would your preference be about the use of a ventilator (breathing machine) if it were available to you? \"     Would the patient desire the use of ventilator (breathing machine)?: No      Resuscitation  \"CPR works best to restart the heart when there is a sudden event, like a heart attack, in someone who is otherwise healthy. Unfortunately, CPR does not typically restart the heart for people who have serious health conditions or who are very sick. \"    \"In the event your heart stopped as a result of an underlying serious health condition, would you want attempts to be made to restart your heart (answer \"yes\" for attempt to resuscitate) or would you prefer a natural death (answer \"no\" for do not attempt to resuscitate)? \" no       [x] Yes   [] No   Educated Patient / Anna Oliveira regarding differences between Advance Directives and portable DNR orders. Length of ACP Conversation in minutes:  39  Conversation Outcomes:  [x] ACP discussion completed  [x] Existing advance directive reviewed with patient; no changes to patient's previously recorded wishes  [] New Advance Directive completed  [x] Portable Do Not Rescitate prepared for Provider review and signature  [] POLST/POST/MOLST/MOST prepared for Provider review and signature      Follow-up plan:    [] Schedule follow-up conversation to continue planning  [] Referred individual to Provider for additional questions/concerns   [x] Advised patient/agent/surrogate to review completed ACP document and update if needed with changes in condition, patient preferences or care setting    [] This note routed to one or more involved healthcare providers    Beverley Pacheco in ED w/ breathing difficulty; s/o Carissa Means bedside. Reviewed exisiting POA/LW; no changes. Corrected contacts in EPIC. New Limited x4 (& portable DNR-CCA ) prepared ; signed by Dr. Mary Ruiz. Copies to patient with instructions for posting at home.

## 2022-03-09 VITALS
HEART RATE: 83 BPM | OXYGEN SATURATION: 93 % | HEIGHT: 66 IN | BODY MASS INDEX: 33.75 KG/M2 | WEIGHT: 210 LBS | DIASTOLIC BLOOD PRESSURE: 81 MMHG | RESPIRATION RATE: 18 BRPM | SYSTOLIC BLOOD PRESSURE: 136 MMHG | TEMPERATURE: 97.7 F

## 2022-03-09 LAB
ANION GAP SERPL CALCULATED.3IONS-SCNC: 16 MEQ/L (ref 8–16)
BASOPHILS # BLD: 0.1 %
BASOPHILS ABSOLUTE: 0 THOU/MM3 (ref 0–0.1)
BUN BLDV-MCNC: 36 MG/DL (ref 7–22)
CALCIUM SERPL-MCNC: 8.9 MG/DL (ref 8.5–10.5)
CHLORIDE BLD-SCNC: 102 MEQ/L (ref 98–111)
CO2: 19 MEQ/L (ref 23–33)
CREAT SERPL-MCNC: 2.1 MG/DL (ref 0.4–1.2)
EOSINOPHIL # BLD: 0 %
EOSINOPHILS ABSOLUTE: 0 THOU/MM3 (ref 0–0.4)
ERYTHROCYTE [DISTWIDTH] IN BLOOD BY AUTOMATED COUNT: 16.1 % (ref 11.5–14.5)
ERYTHROCYTE [DISTWIDTH] IN BLOOD BY AUTOMATED COUNT: 59.7 FL (ref 35–45)
GFR SERPL CREATININE-BSD FRML MDRD: 30 ML/MIN/1.73M2
GLUCOSE BLD-MCNC: 184 MG/DL (ref 70–108)
GLUCOSE BLD-MCNC: 208 MG/DL (ref 70–108)
HCT VFR BLD CALC: 33.6 % (ref 42–52)
HEMOGLOBIN: 10.7 GM/DL (ref 14–18)
IMMATURE GRANS (ABS): 0.05 THOU/MM3 (ref 0–0.07)
IMMATURE GRANULOCYTES: 0.6 %
INR BLD: 3.53 (ref 0.85–1.13)
LYMPHOCYTES # BLD: 6 %
LYMPHOCYTES ABSOLUTE: 0.5 THOU/MM3 (ref 1–4.8)
MCH RBC QN AUTO: 31.8 PG (ref 26–33)
MCHC RBC AUTO-ENTMCNC: 31.8 GM/DL (ref 32.2–35.5)
MCV RBC AUTO: 100 FL (ref 80–94)
MONOCYTES # BLD: 4.1 %
MONOCYTES ABSOLUTE: 0.4 THOU/MM3 (ref 0.4–1.3)
NUCLEATED RED BLOOD CELLS: 0 /100 WBC
OSMOLALITY CALCULATION: 288.2 MOSMOL/KG (ref 275–300)
PLATELET # BLD: 164 THOU/MM3 (ref 130–400)
PMV BLD AUTO: 10.3 FL (ref 9.4–12.4)
POTASSIUM REFLEX MAGNESIUM: 4.4 MEQ/L (ref 3.5–5.2)
RBC # BLD: 3.36 MILL/MM3 (ref 4.7–6.1)
SEG NEUTROPHILS: 89.2 %
SEGMENTED NEUTROPHILS ABSOLUTE COUNT: 7.9 THOU/MM3 (ref 1.8–7.7)
SODIUM BLD-SCNC: 137 MEQ/L (ref 135–145)
WBC # BLD: 8.9 THOU/MM3 (ref 4.8–10.8)

## 2022-03-09 PROCEDURE — 94640 AIRWAY INHALATION TREATMENT: CPT

## 2022-03-09 PROCEDURE — 99225 PR SBSQ OBSERVATION CARE/DAY 25 MINUTES: CPT

## 2022-03-09 PROCEDURE — 80048 BASIC METABOLIC PNL TOTAL CA: CPT

## 2022-03-09 PROCEDURE — 85025 COMPLETE CBC W/AUTO DIFF WBC: CPT

## 2022-03-09 PROCEDURE — 6370000000 HC RX 637 (ALT 250 FOR IP)

## 2022-03-09 PROCEDURE — 82948 REAGENT STRIP/BLOOD GLUCOSE: CPT

## 2022-03-09 PROCEDURE — 94760 N-INVAS EAR/PLS OXIMETRY 1: CPT

## 2022-03-09 PROCEDURE — G0378 HOSPITAL OBSERVATION PER HR: HCPCS

## 2022-03-09 PROCEDURE — 85610 PROTHROMBIN TIME: CPT

## 2022-03-09 PROCEDURE — 2580000003 HC RX 258

## 2022-03-09 PROCEDURE — 36415 COLL VENOUS BLD VENIPUNCTURE: CPT

## 2022-03-09 RX ORDER — ALBUTEROL SULFATE 90 UG/1
2 AEROSOL, METERED RESPIRATORY (INHALATION) 4 TIMES DAILY PRN
Qty: 54 G | Refills: 1 | Status: SHIPPED | OUTPATIENT
Start: 2022-03-09 | End: 2022-08-17

## 2022-03-09 RX ORDER — BUDESONIDE AND FORMOTEROL FUMARATE DIHYDRATE 160; 4.5 UG/1; UG/1
2 AEROSOL RESPIRATORY (INHALATION) 2 TIMES DAILY
Qty: 1 EACH | Refills: 1 | Status: SHIPPED | OUTPATIENT
Start: 2022-03-09 | End: 2022-03-11 | Stop reason: ALTCHOICE

## 2022-03-09 RX ORDER — DEXTROSE MONOHYDRATE 50 MG/ML
100 INJECTION, SOLUTION INTRAVENOUS PRN
Status: DISCONTINUED | OUTPATIENT
Start: 2022-03-09 | End: 2022-03-09 | Stop reason: HOSPADM

## 2022-03-09 RX ORDER — IPRATROPIUM BROMIDE AND ALBUTEROL SULFATE 2.5; .5 MG/3ML; MG/3ML
1 SOLUTION RESPIRATORY (INHALATION) EVERY 12 HOURS SCHEDULED
Status: DISCONTINUED | OUTPATIENT
Start: 2022-03-09 | End: 2022-03-09 | Stop reason: HOSPADM

## 2022-03-09 RX ORDER — PREDNISONE 20 MG/1
40 TABLET ORAL DAILY
Qty: 6 TABLET | Refills: 0 | Status: SHIPPED | OUTPATIENT
Start: 2022-03-10 | End: 2022-03-13

## 2022-03-09 RX ORDER — TIOTROPIUM BROMIDE 18 UG/1
18 CAPSULE ORAL; RESPIRATORY (INHALATION) DAILY
Qty: 90 CAPSULE | Refills: 1 | Status: SHIPPED | OUTPATIENT
Start: 2022-03-09 | End: 2022-03-11 | Stop reason: ALTCHOICE

## 2022-03-09 RX ORDER — DEXTROSE MONOHYDRATE 25 G/50ML
12.5 INJECTION, SOLUTION INTRAVENOUS PRN
Status: DISCONTINUED | OUTPATIENT
Start: 2022-03-09 | End: 2022-03-09 | Stop reason: HOSPADM

## 2022-03-09 RX ADMIN — INSULIN LISPRO 1 UNITS: 100 INJECTION, SOLUTION INTRAVENOUS; SUBCUTANEOUS at 12:25

## 2022-03-09 RX ADMIN — PREDNISONE 40 MG: 20 TABLET ORAL at 08:05

## 2022-03-09 RX ADMIN — IPRATROPIUM BROMIDE AND ALBUTEROL SULFATE 1 AMPULE: .5; 3 SOLUTION RESPIRATORY (INHALATION) at 07:25

## 2022-03-09 RX ADMIN — SODIUM CHLORIDE, PRESERVATIVE FREE 10 ML: 5 INJECTION INTRAVENOUS at 08:05

## 2022-03-09 RX ADMIN — BUMETANIDE 1 MG: 1 TABLET ORAL at 08:05

## 2022-03-09 RX ADMIN — METOPROLOL SUCCINATE 50 MG: 50 TABLET, EXTENDED RELEASE ORAL at 08:05

## 2022-03-09 ASSESSMENT — PAIN SCALES - GENERAL: PAINLEVEL_OUTOF10: 0

## 2022-03-09 NOTE — PROGRESS NOTES
Pharmacy Medication History Note      List of current medications patient is taking is complete. Source of information: patient, patient's wife, medication list, medication fill history    Changes made to medication list:  Medications removed (include reason, ex. therapy complete or physician discontinued):  None    Medications added/doses adjusted:  None    Other notes (ex. Recent course of antibiotics, Coumadin dosing):  Patient follows with [address]  Patient reports paying ~$700 for Breztri - Is this something we could help with? Denies use of other OTC or herbal medications.       Allergies reviewed      Electronically signed by Manjinder Rodriguez [facility] on 3/8/2022 at 9:46 PM

## 2022-03-09 NOTE — DISCHARGE SUMMARY
Hospital Medicine Discharge Summary      Patient Identification:   Ramona Gonzalez   : 1940  MRN: [id number]   Account: [de-identified]      Patient's PCP: WILBERTO Carrasquillo CNP    Admit Date: 3/8/2022     Discharge Date: 3/9/2022      Admitting Physician: Piper Rosario MD     Discharging Nurse Practitioner: WILBERTO Mcwilliams CNP     Discharge Diagnoses with Assessment/Plan:    1. COPD w/ acute exacerbation:              - Satting 93% on RA. No hypoxic desaturations overnight. Denies worsening SOB at rest or LESTER.              - PFT 2018 notable for moderate obstructive pattern (FEV1 66%)              - CXR 3/8/22 negative for acute cardiopulmonary process. - Takes Breztri combination inhaler (ICS/LAMA/LABA) at home. Continue. - PRN bronchodilators. - Prednisone 40 mg x's 5 days. Received 60 mg in ED on 3/8. To be d/c home with remaining doses. -Pulmonary toilet: IS, Acapella, cough, deep breathe   -Of note patient was complaining that Breztri inhaler was costing him approximately $600. I wrote prescription for Symbicort and Spiriva for patient as alternative to current home inhaler to see if possible cheaper alternative. In addition, I wrote a refill prescription for patient albuterol rescue inhaler. Instructed patient that if he takes his Breztri inhaler, he should NOT take the symbicort or spiriva inhaler. Instructed that if he did decide he wanted to try the symbicort and spiriva inhaler, then he should NOT take the [facility] inhaler. Instructed patient to f/u with his pulmonologist within 1-2 weeks for further management of COPD. Patient stated \"I do not like my pulmonologist and he is the whole reason I'm in here in the first place. \" Patient stated \"I will follow up with my primary, [facility]. \" Instructed patient to f/u with PCP within 1 week of discharge for further management of COPD.      2.  CAD              - S/p PCI  - ECG (3/8) notable for stable LBBB. LBBB noted on telemetry. - Continue home losartan, lipitor, warfarin     3. [id number]              - Hemoglobin A1c (5/7/21) 6.8              - Takes metformin at home, continue              - POC Glucose 208 (3/9) prior to metformin administration, and then 184 after taking it. Acute elevation in glucoses likely d/t steroid therapy. - Carb controlled diet              - Hypoglycemia protocol PRN   - Patient refused to go home with low dose SSI or additional oral diabetic medications. Instructed patient to f/u with PCP within 1 wk for further management of diabetes.     4. Essential HTN, controlled              -Hypertensive prior to medication administration, but blood pressure response after meds are taken.              -Continue losartan, Bumex, metoprolol              - Monitor.     5. Hyperlipidemeia              -Continue home Lipitor     6. Chronic Persistent Atrial fibrillation              - S/p pacemaker, currently rate controlled              - [id number] score 8              - Takes warfarin at home. Goal range 2 - 3.               - Supra therapeutic INR 3.53 (3/9). Pharmacy consulted to help manage dose Warfarin while in patient. - Patient instructed to not take half tablet warfarin 3/9 & 3/10 given supratherapeutic INR. Patient scheduled with [facility] for 3/11/22 @ 9 a.m.               - Instructed to f/u with PCP within 1-2 wk for further management of Coumadin/INR levels.     7. Chronic Hypertroponinemia              - Chronically elevated troponin at baseline. Baseline range 0.02 - 0.04.               - Current troponin 0.032 (3/9), in patient baseline range. - Denies CP, no ischemic ECG changes noted              - Monitor.      8. HFrEF compensated              - Echo (7/3/21) notable for mild concentric LVH, no regional WMA, EF estimated at 50%. S/p TAVR, normally functioning.  Noted to have [facility] sore throat, body aches, or any other symptoms.     He has been hospitalized for COPD exacerbation in the past. He reports compliance with his medications. He is a former smoker with 1-2 PPD history for 55 years, quit 15-20 years ago. He denies any current alcohol or drug use. He denies any oxygen use at home.     Patient is a LIMITED x4 code.     ED Course:  Patient Vitals on Arrival T 97.5F RR 26 HR 79 /89 SpO2 95% on RA. Labwork significant for CO2 21, BUN 31, Cr 2.1, eGFR 30, glucose 175, Troponin 0.020 -> 0.032 over 2.5 hours, Hgb 12.7, INR 3.35. D-Dimer and Pro-BNP WNL, COVID and flu negative. Imaging showed no acute intrathoracic process. \"        3/9/22: Patient sitting up at the bedside, well-appearing and in no acute distress. Currently is satting 93% on room air, remains afebrile with hemodynamically stable vital signs. Patient states \"I am feeling a lot better today. \"  States he is not feeling short of breath while at rest or when he exerts himself walking to the bathroom or out in the hallways with his wife. Reports that his Breztri inhaler is costing him more than $600 for 90 days, and is asking for possible cheaper alternatives upon discharge. I wrote prescription for Symbicort and Spiriva for patient as alternative to current home inhaler to see if possible cheaper alternative. In addition, I wrote a refill prescription for patient albuterol rescue inhaler. Instructed patient that if he takes his Breztri inhaler, he should NOT take the symbicort or spiriva inhaler. Instructed that if he did decide he wanted to try the symbicort and spiriva inhaler, then he should NOT take the [facility] inhaler. Instructed patient to f/u with his pulmonologist within 1-2 weeks for further management of COPD. Patient states that he is not happy with his current pulmonologist, who practices with [facility]. States that his PCP told him he did not need to follow-up with pulmonology anymore. States that he intends to follow-up with his PCP upon discharge for further evaluation of his COPD and inhaler medications. Instructed to continue to use his incentive spirometer and Acapella devices a minimum of 10 times every hour to help exercise his lungs. She was also noted to have supratherapeutic INR of 3.53 today. Patient does take warfarin at home for goal INR of 2-3. Pharmacy was consulted to help with dosing and management of Coumadin therapy and INR levels. During discussion upon discharge patient informed me that his wife has been giving him his home medications while he was here in the hospital, and last took his Coumadin yesterday evening. Instructed patient to hold off on taking his half tablet of warfarin tonight 3/9 and his dose on 3/10, with instruction to follow-up at the Coumadin clinic on 3/11/2022 at 9 AM for repeat INR further management of Coumadin therapy. Patient was agreeable to this. Of note patient also noted to have mildly elevated blood sugar of 208 on his renal panel this morning. States he took his Metformin, and repeat POC glucose was 184. Advised patient that his glucoses might run high given steroid therapy. Patient was not agreeable to low-dose sliding scale insulin or other oral diabetic medications to go home with. Instructed patient that he would need to follow-up with his PCP within 1 week for further evaluation and monitoring of his glucoses while on steroid therapy. Patient and his wife were educated on hypoglycemic and hyperglycemic signs and symptoms, as well as diabetic emergencies. Prior to discharging the patient much time was spent discussing medications and follow-up with the patient and his wife. Much time was provided for the patient and his wife to ask questions, in which they were answered.         Exam:     Vitals:  Vitals:    03/09/22 0241 03/09/22 0728 03/09/22 0800 03/09/22 1114   BP: (!) 133/58  (!) 111/51 136/81   Pulse: 92  98 83   Resp: 20 18 18 18   Temp: 97.7 °F (36.5 °C)  97.8 °F (36.6 °C) 97.7 °F (36.5 °C)   TempSrc: Oral  Oral Oral   SpO2: 93% 91% 93% 93%   Weight:       Height:         Weight: Weight: 210 lb (95.3 kg)     24 hour intake/output:    Intake/Output Summary (Last 24 hours) at 3/9/2022 1715  Last data filed at 3/9/2022 1047  Gross per 24 hour   Intake 440 ml   Output --   Net 440 ml         General appearance: No apparent distress, appears older than stated age and cooperative. Chronically ill-appearing  HEENT: Pupils equal, round, and reactive to light. Conjunctivae/corneas clear. Neck: Supple, with full range of motion. No jugular venous distention. Trachea midline. Respiratory:  Normal respiratory effort. Clear to auscultation, bilaterally with Rales that cleared with coughing. Able to speak full clear sentences without dyspnea noted. No Wheezes/Rhonchi. Cardiovascular: Regular rate and rhythm with normal S1/S2 without murmurs, rubs or gallops. Abdomen: Soft, non-tender, non-distended with normal bowel sounds. Musculoskeletal: passive and active ROM x 4 extremities. Nonpitting dependent ankle edema. Skin: Skin color, texture, turgor normal.  No rashes or lesions. Neurologic:  Neurovascularly intact without any focal sensory/motor deficits. Cranial nerves: II-XII intact, grossly non-focal.  Psychiatric: Alert and oriented, thought content appropriate, normal insight  Capillary Refill: Brisk,< 3 seconds   Peripheral Pulses: +2 palpable, equal bilaterally       Labs:  For convenience and continuity at follow-up the following most recent labs are provided:      CBC:    Lab Results   Component Value Date    WBC 8.9 03/09/2022    HGB 10.7 03/09/2022    HCT 33.6 03/09/2022     03/09/2022       Renal:    Lab Results   Component Value Date     03/09/2022    K 4.4 03/09/2022     03/09/2022    CO2 19 03/09/2022    BUN 36 03/09/2022    CREATININE 2.1 03/09/2022    CALCIUM 8.9 03/09/2022    PHOS 2.8 04/05/2018 Cardiac:   Recent Labs     03/08/22  1640 03/08/22  1838   TROPONINT 0.020* 0.032*       Significant Diagnostic Studies    Radiology:   XR CHEST PORTABLE   Final Result   1. No acute cardiopulmonary process. **This report has been created using voice recognition software. It may contain minor errors which are inherent in voice recognition technology. **      Final report electronically signed by Dr Sheryl Tucker on 3/8/2022 4:59 PM             Consults:     IP CONSULT TO PHARMACY    Disposition:    [x] Home       [] TCU       [] Rehab       [] Psych       [] SNF       [] [place]       [] Other-    Condition at Discharge: Stable    Code Status:  Limited     Pending tests at discharge:     Patient Instructions:    Discharge lab work: PT-INR to be drawn on 3/11/2022  Activity: activity as tolerated  Diet: ADULT DIET; Regular; 4 carb choices (60 gm/meal)      Follow-up visits:   WILBERTO Damon - CNP  [id number]  [phone]    On 3/11/2022  Follow-up appointment March 11, 2022 at 11:30am.    Jeffery Dominguez MD  [address] [place] [address]    In 2 weeks  If symptoms worsen    [facility]  [address]  [address]  On 3/11/2022  Please follow up with the Coumadin Clinic at 9 a.m. for a repeat INR level and further dosing managment of Coumadin         Discharge Medications:        Medication List      START taking these medications    budesonide-formoterol 160-4.5 MCG/ACT Aero  Commonly known as: Symbicort  Inhale 2 puffs into the lungs 2 times daily     predniSONE 20 MG tablet  Commonly known as: DELTASONE  Take 2 tablets by mouth daily for 3 doses  Start taking on: March 10, 2022     Spiriva HandiHaler 18 MCG inhalation capsule  Generic drug: tiotropium  Inhale 1 capsule into the lungs daily        CHANGE how you take these medications * albuterol 1.25 MG/3ML nebulizer solution  Commonly known as: ACCUNEB  Inhale 3 mLs into the lungs every 6 hours as needed for Wheezing  What changed: Another medication with the same name was added. Make sure you understand how and when to take each. * albuterol sulfate  (90 Base) MCG/ACT inhaler  Commonly known as: Proventil HFA  Inhale 2 puffs into the lungs 4 times daily  What changed: Another medication with the same name was added. Make sure you understand how and when to take each. * albuterol sulfate  (90 Base) MCG/ACT inhaler  Commonly known as: Ventolin HFA  Inhale 2 puffs into the lungs 4 times daily as needed for Wheezing  What changed: You were already taking a medication with the same name, and this prescription was added. Make sure you understand how and when to take each. * This list has 3 medication(s) that are the same as other medications prescribed for you. Read the directions carefully, and ask your doctor or other care provider to review them with you.             CONTINUE taking these medications    atorvastatin 80 MG tablet  Commonly known as: LIPITOR     B-D SINGLE USE SWABS REGULAR Pads     Cecelianicolettebaldo Aerosphere 160-9-4.8 MCG/ACT Aero  Generic drug: Budeson-Glycopyrrol-Formoterol  Inhale 2 puffs into the lungs 2 times daily     bumetanide 1 MG tablet  Commonly known as: BUMEX  Take 1 tablet by mouth daily     losartan 25 MG tablet  Commonly known as: COZAAR  Take 1 tablet by mouth nightly     metFORMIN 500 MG tablet  Commonly known as: GLUCOPHAGE  Take 1 tablet by mouth 2 times daily (with meals)     metoprolol succinate 50 MG extended release tablet  Commonly known as: TOPROL XL     nitroGLYCERIN 0.4 MG/HR  Commonly known as: NITRODUR     promethazine-dextromethorphan 6.25-15 MG/5ML syrup  Commonly known as: PROMETHAZINE-DM  Take 5 mLs by mouth nightly     True Metrix Blood Glucose Test strip  Generic drug: blood glucose test strips     TRUEplus Lancets 28G Misc     VITAMIN C ER PO     vitamin D 1000 UNIT Tabs tablet  Commonly known as: CHOLECALCIFEROL  Take 1 tablet by mouth daily     warfarin 2.5 MG tablet  Commonly known as: COUMADIN  Take as directed. If you are unsure how to take this medication, talk to your nurse or doctor. Original instructions: Take 1 tablet by mouth daily           Where to Get Your Medications      These medications were sent to [address] [place] [address]  [address], [address] [facility]    Phone: [phone]   · albuterol sulfate  (90 Base) MCG/ACT inhaler  · budesonide-formoterol 160-4.5 MCG/ACT Aero  · predniSONE 20 MG tablet  · Spiriva HandiHaler 18 MCG inhalation capsule         Time Spent on discharge is more than 1.5 hours in the examination, evaluation, counseling and review of medications and discharge plan. Signed: Thank you WILBERTO Baer CNP for the opportunity to be involved in this patient's care.     Electronically signed by WILBERTO Decker CNP on 3/9/2022 at 5:15 PM

## 2022-03-09 NOTE — PROGRESS NOTES
Clinical Pharmacy Note    Ramona Gonzalez is a 79 y.o. male for whom pharmacy has been asked to manage warfarin therapy. Reason for Admission: COPD Exacerbation    Consulting Provider: Dr. Taz Ramos  Warfarin dose prior to admission: 1.25 mg W and 2.5 mg [id number]  Warfarin indication: Atrial fibrillation  Target INR range: 2-3   Outpatient warfarin provider: [facility]    Past Medical History:   Diagnosis Date    MILLY (acute kidney injury) [facility])     Atrial fibrillation ([facility]     Cerebral artery occlusion with cerebral infarction ([facility]     CHF (congestive heart failure), NYHA class III, acute on chronic, combined ([facility]     COPD (chronic obstructive pulmonary disease) ([facility] 8/3/2020    Coronary artery disease involving native coronary artery of native heart with angina pectoris (HCC)     Diabetes mellitus, type 2 ([facility] 12/30/2020    Hyperlipidemia 2/20/2012    Hypertension     Pneumonia               Recent Labs     03/09/22  0642   INR 3.53*     Recent Labs     03/08/22  1640 03/09/22  0642   HGB 12.7* 10.7*   HCT 40.0* 33.6*    164     Current warfarin drug-drug interactions: Prednisone    Date INR Warfarin Dose   3/9/22 3.53 No Coumadin                                   PT/INR or POC-INR will be monitored routinely until therapeutic INR is achieved.     Thank you for the consult,     Ricki Martinez, PrasannaD, BCPS  3/9/2022  8:44 AM

## 2022-03-09 NOTE — PLAN OF CARE
Problem: Breathing Pattern - Ineffective:  Goal: Ability to achieve and maintain a regular respiratory rate will improve  Description: Ability to achieve and maintain a regular respiratory rate will improve  Outcome: Met This Shift  Note: RR even and unlabored so far this shift. Patient with regular respiratory rate so far this shift. SOB with exertion noted. Problem: Gas Exchange - Impaired:  Goal: Levels of oxygenation will improve  Description: Levels of oxygenation will improve  Outcome: Met This Shift  Note: Patient with SOB with exertion. Respiratory treatments per MAR. Lung sounds rhonchi. O2 saturation above 90% on room air. Problem: Discharge Planning:  Goal: Discharged to appropriate level of care  Description: Discharged to appropriate level of care  Outcome: Ongoing  Note: Patient from home with wife, plans to return home with wife at discharge. Unknown discharge date at this time. Will continue to monitor for discharge needs. Patient on room air. Respiratory treatments per MAR. Problem: Activity Intolerance:  Goal: Ability to tolerate increased activity will improve  Description: Ability to tolerate increased activity will improve  Outcome: Ongoing  Note: Patient with SOB with exertion. Respiratory treatments per MAR. Lung sounds rhonchi. O2 saturation above 90% on room air. Problem: Airway Clearance - Ineffective:  Goal: Ability to maintain a clear airway will improve  Description: Ability to maintain a clear airway will improve  Outcome: Ongoing  Note: Patient with SOB with exertion. Respiratory treatments per MAR. Lung sounds rhonchi. O2 saturation above 90% on room air. RR even and unlabored. Patient able to maintain clear airway so far this shift. Patient participated in plan of care and contributed to goal setting.

## 2022-03-09 NOTE — PROGRESS NOTES
Patient and wife educated on discharge instructions. Provided patient with follow-up appointments and medication education/changes. Answered all questions appropriately.

## 2022-03-09 NOTE — PROGRESS NOTES
Pt admitted to  8A09 via in a wheelchair from ED. Complaints: Shortness of breath. 20g INT. No fluids infusing. IV site free of s/s of infection or infiltration. Vital signs obtained. Assessment and data collection initiated. Two nurse skin assessment performed by Suasn Haney and Lanette ALMANZA. Patient oriented to room and call light. Policies and procedures for 8AB explained. All questions answered with no further questions at this time. Fall prevention and safety brochure discussed with patient. Bed alarm on. Call light in reach.

## 2022-03-09 NOTE — PROGRESS NOTES
Hospitalist Progress Note    Patient:  Brooklynn Julian      Unit/Bed:8A-09/009-A    YOB: 1940    MRN: [id number]       Acct: [de-identified]     PCP: WILBERTO Arteaga CNP    Date of Admission: 3/8/2022    Assessment/Plan:    1. COPD w/ acute exacerbation:   - Satting 93% on RA. No hypoxic desaturations overnight. Denies worsening SOB at rest or LESTER.   - PFT 2018 notable for moderate obstructive pattern (FEV1 66%)   - CXR 3/8/22 negative for acute cardiopulmonary process. - Takes Breztri combination inhaler at home. Will hold. - Duonebs BID scheduled and Q4H PRN   - Prednisone 40 mg x's 5 days. Received 60 mg in ED on 3/8. -  Telemetry.   -Pulmonary toilet: IS, Acapella, cough, deep breathe    2. CAD   - S/p PCI 2002   - ECG (3/8) notable for stable LBBB. LBBB noted on telemetry. - Continue home losartan, lipitor, warfarin    3. [id number]   - Hemoglobin A1c (5/7/21) 6.8   - Takes metformin at home, continue   - POC Glucose 208 (3/9); acute elevation likely d/t steroid therapy. - Start low dose SSI.     - Carb controlled diet   - Hypoglycemia protocol PRN    4. Essential HTN   -Hypertensive prior to medication administration, but blood pressure response after meds are taken.   -Continue losartan, Bumex, metoprolol   - Monitor. 5. Hyperlipidemeia   -Continue home Lipitor    6. Chronic Persistent Atrial fibrillation   - S/p pacemaker, currently rate controlled   - [id number] score 8   - Takes warfarin at home. Goal range 2 - 3.    - Supra therapeutic INR 3.53 (3/9). Pharmacy consulted to help manage dose Warfarin while in patient. - Telemetry    7. Chronic Hypertroponinemia   - Chronically elevated troponin at baseline. Baseline range 0.02 - 0.04.    - Current troponin 0.032 (3/9), in patient baseline range. - Denies CP, no ECG changes noted   - Monitor.      8. HFrEF compensated   - Echo (7/3/21) notable for mild concentric LVH, no regional WMA, EF estimated at 50%. S/p TAVR, normally functioning. Noted to have [facility] Stage III CHF with EF 30-35% and 15-20% on previous ECHOs in 2018.   -Pro-BNP normal (3/8)   - Patient euvolemic and denies chest pain   -Daily weight   - Monitor I&O's    9. Chronic kidney disease stage IV, stable   - Baseline creatinine 1.7-2.3    -Creatinine 2.1 (3/9), stable from admission   - eGFR 30 (3/9)   - Monitor    10. Aortic stenosis s/p TAVR 5/11/2018:   -Noted. On warfarin, for goal INR of 2-3  . -Pharmacy consulted to help in dosage and management of warfarin therapy. 6. H/o CVA:   -Noted. States he has no deficits at baseline.   -Continue blood pressure control, glucose control of diabetes, and statin therapy. 12. Obesity   -BMI 33.89 kg/m²   -Discussed and educated lifestyle modifications. Disposition:    [x] Home       [] TCU       [] Rehab       [] Psych       [] SNF       [] [place]       [] Other-    Chief Complaint: Shortness of breath    Hospital Course: Per HPI documented 3/8/2022: Hayde Carlson is a 79 y.o. male who presents to [facility] ED 3/8/2022 with SOB. Patient is a former smoker with a PMH significant for A.Fib, stroke, [facility] Class III combined CHF, GOLD stage 2 COPD, CAD, type 2 DM, HTN, HLD.     The patient presented with SOB that started at around 12 PM today, with mildly improvement after breathing treatment at 1 PM. He states that he took a test with pulmonology in January 2021 and was told that \"he did not need his Bevespi inhaler anymore. \" However, after he stopped his inhaler, he noted that his symptoms have gotten worse and his PCP ordered another inhaler for him. He states that he chronically coughs up yellow-green phlegm, which decreased following his breathing treatment.  He denies any fever, chills, wheezing, chest pain, worsening leg swelling, nausea, vomiting, diarrhea, headache, dizziness, congestion, sore throat, body aches, or any other symptoms.     He has been hospitalized for COPD exacerbation in the past. He reports compliance with his medications. He is a former smoker with 1-2 PPD history for 55 years, quit 15-20 years ago. He denies any current alcohol or drug use. He denies any oxygen use at home.     Patient is a LIMITED x4 code.     ED Course:  Patient Vitals on Arrival T 97.5F RR 26 HR 79 /89 SpO2 95% on RA. Labwork significant for CO2 21, BUN 31, Cr 2.1, eGFR 30, glucose 175, Troponin 0.020 -> 0.032 over 2.5 hours, Hgb 12.7, INR 3.35. D-Dimer and Pro-BNP WNL, COVID and flu negative. Imaging showed no acute intrathoracic process. \"      3/9/22: Patient sitting up at the bedside, well-appearing and in no acute distress. Currently is satting 93% on room air, remains afebrile with hemodynamically stable vital signs. Patient states \"I am feeling a lot better today. \"  States he is not feeling short of breath while at rest or when he exerts himself walking to the bathroom or out in the hallways with his wife. Reports that his Breztri inhaler is costing him more than $600 for 90 days, and is asking for possible cheaper alternatives upon discharge. I discussed with patient needing to follow-up with pulmonology outpatient. Patient states that he is not happy with his current pulmonologist, who practices with [facility]. States that his PCP told him he did not need to follow-up with pulmonology anymore. States that he intends to follow-up with his PCP upon discharge for further evaluation of his COPD and inhaler medications. Subjective (past 24 hours):    Denies chest pain, palpitations, worsening shortness of breath, rest, dyspnea on exertion, abdominal pain, nausea, vomiting, fever, chills, productive cough, dizziness, lightheadedness, weakness.       Medications:  Reviewed    Infusion Medications    sodium chloride       Scheduled Medications    ipratropium-albuterol  1 ampule Inhalation 2 times per day    atorvastatin  80 mg Oral Nightly    bumetanide 1 mg Oral Daily    losartan  25 mg Oral Nightly    metoprolol succinate  50 mg Oral Daily    nitroGLYCERIN  1 patch TransDERmal Daily    sodium chloride flush  5-40 mL IntraVENous 2 times per day    predniSONE  40 mg Oral Daily    warfarin placeholder: dosing by pharmacy   Other RX Placeholder     PRN Meds: sodium chloride flush, sodium chloride, ondansetron **OR** ondansetron, polyethylene glycol, acetaminophen **OR** acetaminophen, ipratropium-albuterol      Intake/Output Summary (Last 24 hours) at 3/9/2022 0744  Last data filed at 3/9/2022 0244  Gross per 24 hour   Intake 260 ml   Output --   Net 260 ml       Diet:  ADULT DIET; Regular; 4 carb choices (60 gm/meal)    Exam:  BP (!) 133/58   Pulse 92   Temp 97.7 °F (36.5 °C) (Oral)   Resp 18   Ht 5' 6\" (1.676 m)   Wt 210 lb (95.3 kg)   SpO2 91%   BMI 33.89 kg/m²     General appearance: No apparent distress, appears older than stated age and cooperative. Chronically ill-appearing  HEENT: Pupils equal, round, and reactive to light. Conjunctivae/corneas clear. Neck: Supple, with full range of motion. No jugular venous distention. Trachea midline. Respiratory:  Normal respiratory effort. Clear to auscultation, bilaterally with Rales that cleared with coughing. No Wheezes/Rhonchi. Cardiovascular: Regular rate and rhythm with normal S1/S2 without murmurs, rubs or gallops. Abdomen: Soft, non-tender, non-distended with normal bowel sounds. Musculoskeletal: passive and active ROM x 4 extremities. Skin: Skin color, texture, turgor normal.  No rashes or lesions. Neurologic:  Neurovascularly intact without any focal sensory/motor deficits.  Cranial nerves: II-XII intact, grossly non-focal.  Psychiatric: Alert and oriented, thought content appropriate, normal insight  Capillary Refill: Brisk,< 3 seconds   Peripheral Pulses: +2 palpable, equal bilaterally       Labs:   Recent Labs     03/08/22  1640 03/09/22  0642   WBC 9.4 8.9   HGB 12.7* 10.7*   HCT 40.0* 33.6*    164     Recent Labs     03/08/22  1640 03/09/22  0642    137   K 4.1 4.4    102   CO2 21* 19*   BUN 31* 36*   CREATININE 2.1* 2.1*   CALCIUM 9.1 8.9     No results for input(s): AST, ALT, BILIDIR, BILITOT, ALKPHOS in the last 72 hours. Recent Labs     03/08/22  1838 03/09/22  0642   INR 3.35* 3.53*     No results for input(s): Myrarcenio Sotoh in the last 72 hours. Microbiology:      Urinalysis:      Lab Results   Component Value Date    NITRU NEGATIVE 07/03/2021    WBCUA NONE SEEN 06/10/2021    BACTERIA NONE SEEN 06/10/2021    RBCUA NONE SEEN 06/10/2021    BLOODU NEGATIVE 07/03/2021    SPECGRAV 1.006 06/10/2021    GLUCOSEU NEGATIVE 07/03/2021       Radiology:  XR CHEST PORTABLE    Result Date: 3/8/2022  PROCEDURE: XR CHEST PORTABLE CLINICAL INFORMATION: Shortness of breath COMPARISON: Chest radiograph 9/13/2021 TECHNIQUE: AP portable chest radiograph performed. FINDINGS: Cardiac conduction device is seen with 2 leads. The leads are intact. Aortic valve prosthesis is seen. No focal pulmonary consolidation. Cardiac silhouette is mildly enlarged. No pleural effusion. No pneumothorax. Aortic arch calcifications. No acute bony abnormality. 1. No acute cardiopulmonary process. **This report has been created using voice recognition software. It may contain minor errors which are inherent in voice recognition technology. ** Final report electronically signed by Dr Kamini High on 3/8/2022 4:59 PM      DVT prophylaxis: [] Lovenox                                 [] SCDs                                 [] SQ Heparin                                 [] Encourage ambulation           [x] Already on Anticoagulation     Code Status: Limited    PT/OT Eval Status: None    Tele:   [x] yes             [] no    Atrial fibrillation with controlled rate and LBBB.     Active Hospital Problems    Diagnosis Date Noted    [facility] [J44.9] 03/08/2022    [facility] [J44.1] 08/03/2020       Electronically signed by WILBERTO Roca CNP on 3/9/2022 at 7:44 AM

## 2022-03-09 NOTE — ED NOTES
Vs reassessed. PT updated on POC. Pt given ice water. Pt asking what is taking so long to go upstairs. RN explained process of admission. Wife and pt both verbalized understanding. No other needs at this time.       Raciel Mccain RN  03/08/22 2100

## 2022-03-09 NOTE — H&P
Hospitalist History and Physical          Patient:  Liam Benitez  YOB: 1940    MRN: [id number]     Acct: [de-identified]    PCP: WILBERTO Macias CNP    Date of Admission: 3/8/2022    Date of Service: Pt seen/examined on 03/08/22  and Admitted to Observation with expected LOS less than two midnights due to medical therapy. Chief Complaint:  SOB    ASSESSMENT/PLAN:    GOLD Stage II COPD Exacerbation: Moderate obstructive defect (FEV1 66% predicted) noted on PFT 2018. Physical exam findings consistent with COPD exacerbation. Currently not hypoxic. Patient does not meet GOLD criteria for antibiotic treatment at this time (no increased sputum production or purulence). - Duonebs BID scheduled and Q4H PRN. - Oral prednisone 40 mg for a total of 5 days (received 1 dose of 60 mg in the ED). - Recommend patient to be discharged with rescue inhaler and LAMA or LABA per GOLD guidelines. NOTE: Patient refused duonebs Q4H scheduled, stating that he normally only takes it in the morning and at night and he feels that he does not need it currently. He is currently maintaining O2 sat >90% and denies any SOB or respiratory symptoms. I spoke with the patient about the risks of worsening hypoxia and dyspnea, and he verbalized understanding and acceptance. [facility] Class I-II Combined CHF with Recovered EF, Compensated: Per ECHO on 7/3/21, EF 50% with no significant abnormalities. Noted to have [facility] Stage III CHF with EF 30-35% and 15-20% on previous ECHOs in 2018. BNP WNL. Currently stable. - Continue home bumex, losartan, metoprolol succinate    Long-Standing Persistent Atrial Fibrillation: s/p D-pacer. Currently rate-controlled. [id number] score 8, HAS-BLED 4. Supratherapeutic INR on admission. No evidence of current bleeding.  - Continue home warfarin, metoprolol (pharmacy to dose warfarin inpatient). CAD: s/p PCI 2002. Currently stable. Patient asymptomatic with chronic LBBB on EKG. Chronically elevated troponin.  - Continue home losartan, atorvastatin, warfarin  - Monitor clinically. Non-Insulin Dependent Type 2 DM: Glucose 170 on admission.  - Hold metformin. - Will defer insulin treatment at this time. - Consider starting low-dose SSI if glucose increases following prednisone treatment. Chronic Kidney Disease Stage IV: eGFR 30 on admission.  - Continue to monitor clinically    Hypertension: Mildly elevated on arrival.  - Continue home losartan, bumex, metoprolol. Hyperlipidemia:  - Continue home atorvastatin. Aortic stenosis: s/p TAVR 5/11/18.  - Noted. Obesity: BMI 33.09  -  weight loss    DVT Prophylaxis: Lovenox    History Of Present Illness:    Sae Franko is a 79 y.o. male who presents to [facility] ED 3/8/2022 with SOB. Patient is a former smoker with a PMH significant for A.Fib, stroke, [address] Class III combined CHF, GOLD stage 2 COPD, CAD, type 2 DM, HTN, HLD. The patient presented with SOB that started at around 12 PM today, with mildly improvement after breathing treatment at 1 PM. He states that he took a test with pulmonology in January 2021 and was told that \"he did not need his Bevespi inhaler anymore. \" However, after he stopped his inhaler, he noted that his symptoms have gotten worse and his PCP ordered another inhaler for him. He states that he chronically coughs up yellow-green phlegm, which decreased following his breathing treatment. He denies any fever, chills, wheezing, chest pain, worsening leg swelling, nausea, vomiting, diarrhea, headache, dizziness, congestion, sore throat, body aches, or any other symptoms. He has been hospitalized for COPD exacerbation in the past. He reports compliance with his medications. He is a former smoker with 1-2 PPD history for 55 years, quit 15-20 years ago. He denies any current alcohol or drug use. He denies any oxygen use at home. Patient is a LIMITED x4 code.     ED Course:  Patient Vitals on Arrival T 97.5F RR 26 HR 79 /89 SpO2 95% on RA. Labwork significant for CO2 21, BUN 31, Cr 2.1, eGFR 30, glucose 175, Troponin 0.020 -> 0.032 over 2.5 hours, Hgb 12.7, INR 3.35. D-Dimer and Pro-BNP WNL, COVID and flu negative. Imaging showed no acute intrathoracic process. Past Medical History:          Diagnosis Date    [facility]ILLY [facility]) [facility]     Atrial fibrillation (HCC)     Cerebral artery occlusion with cerebral infarction ([facility]     CHF (congestive heart failure), NYHA class III, acute on chronic, combined ([facility]     COPD (chronic obstructive pulmonary disease) ([facility] 8/3/2020    Coronary artery disease involving native coronary artery of native heart with angina pectoris ([facility]     [facility] 12/30/2020    Hyperlipidemia 2/20/2012    Hypertension     Pneumonia        Past Surgical History:          Procedure Laterality Date    CARDIAC SURGERY      heart cath    CORONARY ANGIOPLASTY WITH STENT PLACEMENT      HERNIA REPAIR      OTHER SURGICAL HISTORY  05/10/2018    PACEMAKER INSERTION         Medications Prior to Admission:      Prior to Admission medications    Medication Sig Start Date End Date Taking?  Authorizing Provider   losartan (COZAAR) 25 MG tablet Take 1 tablet by mouth nightly 5/28/21  Yes Ag Tracey DO   metFORMIN (GLUCOPHAGE) 500 MG tablet Take 1 tablet by mouth 2 times daily (with meals) 5/28/21  Yes Ag Tracey DO   vitamin D (CHOLECALCIFEROL) 1000 UNIT TABS tablet Take 1 tablet by mouth daily 4/25/18  Yes Lul Keller MD   atorvastatin (LIPITOR) 80 MG tablet Take 80 mg by mouth nightly   Yes Historical Provider, MD   promethazine-dextromethorphan (PROMETHAZINE-DM) 6.25-15 MG/5ML syrup Take 5 mLs by mouth nightly 3/7/22 6/5/22  WILBERTO Parish - CNP   Budeson-Glycopyrrol-Formoterol (BREZTRI AEROSPHERE) 160-9-4.8 MCG/ACT AERO Inhale 2 puffs into the lungs 2 times daily 2/15/22 5/16/22  WILBERTO Parish CNP   Ascorbic Acid (VITAMIN C ER PO) Take 1 tablet by mouth daily    Historical Provider, MD   nitroGLYCERIN (NITRODUR) 0.4 MG/HR Place 1 patch onto the skin daily    Historical Provider, MD   albuterol sulfate HFA (PROVENTIL HFA) 108 (90 Base) MCG/ACT inhaler Inhale 2 puffs into the lungs 4 times daily 1/19/22 4/19/22  Kelly Mccord MD   albuterol (ACCUNEB) 1.25 MG/3ML nebulizer solution Inhale 3 mLs into the lungs every 6 hours as needed for Wheezing 1/4/22   WILBERTO Santana Cha, CNP   warfarin (COUMADIN) 2.5 MG tablet Take 1 tablet by mouth daily 7/30/21   Loan Liao DO   bumetanide (BUMEX) 1 MG tablet Take 1 tablet by mouth daily 7/13/21   WILBERTO Barfield CNP   TRUEplus Lancets 28G MISC  7/21/20   Historical Provider, MD   TRUE METRIX BLOOD GLUCOSE TEST strip  7/21/20   Historical Provider, MD   Alcohol Swabs (B-D SINGLE USE SWABS REGULAR) PADS  7/21/20   Historical Provider, MD   metoprolol succinate (TOPROL XL) 50 MG extended release tablet Take 50 mg by mouth daily     Historical Provider, MD       Allergies:  Benzonatate and Xanax [alprazolam]    Social History:      The patient currently lives at home. TOBACCO:   reports that he has quit smoking. His smoking use included cigarettes. He has a 50.00 pack-year smoking history. He has never used smokeless tobacco.  ETOH:   reports previous alcohol use. Family History:      Reviewed in detail and negative for DM, CAD, Cancer, CVA. Positive as follows:        Family history unknown: Yes       Diet:  No diet orders on file    REVIEW OF SYSTEMS:   Pertinent positives as noted in the HPI. All other systems reviewed and negative. PHYSICAL EXAM:    BP (!) 173/89   Pulse 70   Temp 97.5 °F (36.4 °C) (Oral)   Resp 15   Ht 5' 6\" (1.676 m)   Wt 205 lb (93 kg)   SpO2 95%   BMI 33.09 kg/m²     General appearance:  No apparent distress, appears stated age and cooperative. HEENT:  Normal cephalic, atraumatic without obvious deformity. Pupils equal, round, and reactive to light. Extra ocular muscles intact. Conjunctivae/corneas clear. Neck: Supple, with full range of motion. No jugular venous distention. Trachea midline. Respiratory:  Frequent nonproductive cough during exam. Good air movement noted in lungs. Rhonchi and expiratory wheezes heard in bilateral lungs. No rales noted. Cardiovascular:  Irregular rate and rhythm. Normal S1 and S2 without murmurs, rubs, gallops, or ectopic beat  Abdomen: Soft, non-tender, non-distended with normal bowel sounds. Musculoskeletal:  No clubbing, cyanosis. 1+ pitting edema bilateral LEs. Skin: Skin color, texture, turgor normal.  No rashes or lesions. Neurologic: Alert and oriented x3, speech intact, no focal neurologic deficits. Psychiatric:  Thought content appropriate, normal insight  Capillary Refill: Brisk,< 3 seconds   Peripheral Pulses: +3 palpable, equal bilaterally       Labs:     Recent Labs     03/08/22  1640   WBC 9.4   HGB 12.7*   HCT 40.0*        Recent Labs     03/08/22  1640      K 4.1      CO2 21*   BUN 31*   CREATININE 2.1*   CALCIUM 9.1     No results for input(s): AST, ALT, BILIDIR, BILITOT, ALKPHOS in the last 72 hours. Recent Labs     03/08/22  1838   INR 3.35*     No results for input(s): Trip Min in the last 72 hours. Urinalysis:      Lab Results   Component Value Date    NITRU NEGATIVE 07/03/2021    WBCUA NONE SEEN 06/10/2021    BACTERIA NONE SEEN 06/10/2021    RBCUA NONE SEEN 06/10/2021    BLOODU NEGATIVE 07/03/2021    SPECGRAV 1.006 06/10/2021    GLUCOSEU NEGATIVE 07/03/2021       Intake & Output:  No intake/output data recorded. No intake/output data recorded. Radiology:   XR CHEST PORTABLE   Final Result   1. No acute cardiopulmonary process. **This report has been created using voice recognition software. It may contain minor errors which are inherent in voice recognition technology. **      Final report electronically signed by Dr Piyush Shahid on 3/8/2022 4:59 PM          CXR: I have reviewed the CXR with the following interpretation: No acute intrathoracic process  EKG:  I have reviewed the EKG with the following interpretation: rate-controlled atrial fibrillation with chronic LBBB    XR CHEST PORTABLE   Final Result   1. No acute cardiopulmonary process. **This report has been created using voice recognition software. It may contain minor errors which are inherent in voice recognition technology. **      Final report electronically signed by Dr Piyush Shahid on 3/8/2022 4:59 PM           DVT prophylaxis: Lovenox    Code Status: Limited      PT/OT Eval Status: Not Consulted    Disposition:Home      Thank you WILBERTO Santana Cha, CNP for the opportunity to be involved in this patient's care.     Electronically signed by Zena Schuler DO on 3/8/2022 at 9:08 PM

## 2022-03-09 NOTE — ED NOTES
Assumed care at this time. VS reassessed. Dr. Mariposa Moser in to assess pt. No acute respiratory distress noted at this time.  Will continue to monitor     Raciel Mccain RN  03/08/22 3882

## 2022-03-09 NOTE — PLAN OF CARE
Problem: Discharge Planning:  Goal: Discharged to appropriate level of care  Description: Discharged to appropriate level of care  Outcome: Completed     Problem:  Activity Intolerance:  Goal: Ability to tolerate increased activity will improve  Description: Ability to tolerate increased activity will improve  Outcome: Completed     Problem: Airway Clearance - Ineffective:  Goal: Ability to maintain a clear airway will improve  Description: Ability to maintain a clear airway will improve  Outcome: Completed     Problem: Breathing Pattern - Ineffective:  Goal: Ability to achieve and maintain a regular respiratory rate will improve  Description: Ability to achieve and maintain a regular respiratory rate will improve  Outcome: Completed     Problem: Gas Exchange - Impaired:  Goal: Levels of oxygenation will improve  Description: Levels of oxygenation will improve  Outcome: Completed

## 2022-03-09 NOTE — ED PROVIDER NOTES
Sign out received from Dr. Maureen Soto, patient here with sob, wheezing, c/w copd exacerbation. Pt given 10 mg continuous neb treatment. Noted to have borderline hypoxemia with o2 sats 90-92% at rest, baseline in upper 90s. Upon reeval after neb. Patient reports he is signficantly improved but still feeling short of breath. He is still wheezing, no respiratory distress. No LE edema/calf tenderness. Patient awake and alert, updated on plan. Will admit for copd exacerbation. Diagnosis: COPD exacerbating, elevated troponin level    Dispo:  Cole Loredo MD  03/08/22 4997

## 2022-03-09 NOTE — CARE COORDINATION
3/9/22, 7:44 AM EST  DISCHARGE PLANNING EVALUATION:    Jesusita Limb       Admitted: 3/8/2022/ [facility] day: 1   Location: 8A-09/[id number]-A Reason for admit: COPD (chronic obstructive pulmonary disease) ([facility] [J44.9]  COPD exacerbation ([facility] [J44.1]  Troponin level elevated [R77.8]   PMH:  has a past medical history of MILLY (acute kidney injury) ([facility] Atrial fibrillation ([facility] Cerebral artery occlusion with cerebral infarction ([facility] CHF (congestive heart failure), NYHA class III, acute on chronic, combined ([facility] COPD (chronic obstructive pulmonary disease) ([facility] Coronary artery disease involving native coronary artery of native heart with angina pectoris ([facility] Diabetes mellitus, type 2 ([facility] Hyperlipidemia, Hypertension, and Pneumonia. Procedure: No.  Barriers to Discharge: To ER with SOB. Follows with Pulmonology. Wheezes and rhonchi. Creat 2.1. Troponin bumped. PCP: WILBERTO Arrington CNP  Readmission Risk Score: 17 ( )%    Patient Goals/Plan/Treatment Preferences: Met with pt and his SO who resides with him. Pt typically self sufficient. He has no current home services. He does use a nebulizer. He has a PCP, he drives and no issues obtaining medications, although some are expensive and he does have med questions for his provider. CM to follow for needs. Transportation/Food Security/Housekeeping Addressed:  No issues identified.

## 2022-03-11 ENCOUNTER — OFFICE VISIT (OUTPATIENT)
Dept: FAMILY MEDICINE CLINIC | Age: 82
End: 2022-03-11
Payer: MEDICARE

## 2022-03-11 ENCOUNTER — HOSPITAL ENCOUNTER (OUTPATIENT)
Dept: PHARMACY | Age: 82
Setting detail: THERAPIES SERIES
Discharge: HOME | End: 2022-03-11
Payer: MEDICARE

## 2022-03-11 VITALS
HEART RATE: 80 BPM | SYSTOLIC BLOOD PRESSURE: 120 MMHG | DIASTOLIC BLOOD PRESSURE: 78 MMHG | OXYGEN SATURATION: 97 % | WEIGHT: 211.4 LBS | TEMPERATURE: 97.9 F | BODY MASS INDEX: 34.12 KG/M2

## 2022-03-11 DIAGNOSIS — Z79.01 ANTICOAGULATED ON COUMADIN: ICD-10-CM

## 2022-03-11 DIAGNOSIS — Z51.81 ENCOUNTER FOR THERAPEUTIC DRUG MONITORING: ICD-10-CM

## 2022-03-11 DIAGNOSIS — J44.1 COPD EXACERBATION (HCC): ICD-10-CM

## 2022-03-11 DIAGNOSIS — Z00.00 MEDICARE ANNUAL WELLNESS VISIT, SUBSEQUENT: Primary | ICD-10-CM

## 2022-03-11 DIAGNOSIS — I48.21 PERMANENT ATRIAL FIBRILLATION (HCC): Primary | ICD-10-CM

## 2022-03-11 LAB — POC INR: 1.9 (ref 0.8–1.2)

## 2022-03-11 PROCEDURE — 85610 PROTHROMBIN TIME: CPT | Performed by: PHARMACIST

## 2022-03-11 PROCEDURE — G0439 PPPS, SUBSEQ VISIT: HCPCS | Performed by: NURSE PRACTITIONER

## 2022-03-11 PROCEDURE — G8482 FLU IMMUNIZE ORDER/ADMIN: HCPCS | Performed by: NURSE PRACTITIONER

## 2022-03-11 PROCEDURE — 99211 OFF/OP EST MAY X REQ PHY/QHP: CPT | Performed by: PHARMACIST

## 2022-03-11 PROCEDURE — 1123F ACP DISCUSS/DSCN MKR DOCD: CPT | Performed by: NURSE PRACTITIONER

## 2022-03-11 PROCEDURE — 36416 COLLJ CAPILLARY BLOOD SPEC: CPT | Performed by: PHARMACIST

## 2022-03-11 PROCEDURE — 4040F PNEUMOC VAC/ADMIN/RCVD: CPT | Performed by: NURSE PRACTITIONER

## 2022-03-11 RX ORDER — BUDESONIDE, GLYCOPYRROLATE, AND FORMOTEROL FUMARATE 160; 9; 4.8 UG/1; UG/1; UG/1
2 AEROSOL, METERED RESPIRATORY (INHALATION) 2 TIMES DAILY
Qty: 3 EACH | Refills: 3 | Status: SHIPPED | OUTPATIENT
Start: 2022-03-11 | End: 2022-06-09

## 2022-03-11 RX ORDER — PREDNISONE 20 MG/1
20 TABLET ORAL 2 TIMES DAILY
Qty: 10 TABLET | Refills: 0 | Status: SHIPPED | OUTPATIENT
Start: 2022-03-11 | End: 2022-03-14

## 2022-03-11 SDOH — HEALTH STABILITY: PHYSICAL HEALTH: ON AVERAGE, HOW MANY MINUTES DO YOU ENGAGE IN EXERCISE AT THIS LEVEL?: 30 MIN

## 2022-03-11 SDOH — HEALTH STABILITY: PHYSICAL HEALTH: ON AVERAGE, HOW MANY DAYS PER WEEK DO YOU ENGAGE IN MODERATE TO STRENUOUS EXERCISE (LIKE A BRISK WALK)?: 6 DAYS

## 2022-03-11 ASSESSMENT — LIFESTYLE VARIABLES
HOW MANY STANDARD DRINKS CONTAINING ALCOHOL DO YOU HAVE ON A TYPICAL DAY: 1 OR 2
HOW OFTEN DO YOU HAVE A DRINK CONTAINING ALCOHOL: 1
HOW OFTEN DO YOU HAVE SIX OR MORE DRINKS ON ONE OCCASION: 1
HOW OFTEN DO YOU HAVE A DRINK CONTAINING ALCOHOL: NEVER
HOW MANY STANDARD DRINKS CONTAINING ALCOHOL DO YOU HAVE ON A TYPICAL DAY: 1
HOW MANY STANDARD DRINKS CONTAINING ALCOHOL DO YOU HAVE ON A TYPICAL DAY: 1 OR 2
HOW OFTEN DO YOU HAVE A DRINK CONTAINING ALCOHOL: NEVER

## 2022-03-11 ASSESSMENT — PATIENT HEALTH QUESTIONNAIRE - PHQ9
SUM OF ALL RESPONSES TO PHQ9 QUESTIONS 1 & 2: 0
SUM OF ALL RESPONSES TO PHQ QUESTIONS 1-9: 0
2. FEELING DOWN, DEPRESSED OR HOPELESS: 0
1. LITTLE INTEREST OR PLEASURE IN DOING THINGS: 0
SUM OF ALL RESPONSES TO PHQ QUESTIONS 1-9: 0

## 2022-03-11 ASSESSMENT — ENCOUNTER SYMPTOMS
WHEEZING: 0
COUGH: 1
SHORTNESS OF BREATH: 0
GASTROINTESTINAL NEGATIVE: 1
CHEST TIGHTNESS: 0
EYES NEGATIVE: 1

## 2022-03-11 NOTE — PATIENT INSTRUCTIONS
1. Keep the prednisone on hand for next copd exacerbation. 2. Only take for shortness of breath/COPD symptoms. 3. Let the office know if you have to take the as needed prednisone. 4. Take the full 3 days of the prednisone for a COPD exacerbation. 5. You should hear from Prescription Brady from [facility] regarding help with the [facility]. Personalized Preventive Plan for Diana Smith - 3/11/2022  Medicare offers a range of preventive health benefits. Some of the tests and screenings are paid in full while other may be subject to a deductible, co-insurance, and/or copay. Some of these benefits include a comprehensive review of your medical history including lifestyle, illnesses that may run in your family, and various assessments and screenings as appropriate. After reviewing your medical record and screening and assessments performed today your provider may have ordered immunizations, labs, imaging, and/or referrals for you. A list of these orders (if applicable) as well as your Preventive Care list are included within your After Visit Summary for your review. Other Preventive Recommendations:    · A preventive eye exam performed by an eye specialist is recommended every 1-2 years to screen for glaucoma; cataracts, macular degeneration, and other eye disorders. · A preventive dental visit is recommended every 6 months. · Try to get at least 150 minutes of exercise per week or 10,000 steps per day on a pedometer . · Order or download the FREE \"Exercise & Physical Activity: Your Everyday Guide\" from [employer]. Call 7-740.945.4992 or search [employer] online. · You need 8179-2406 mg of calcium and 2690-1612 IU of vitamin D per day.  It is possible to meet your calcium requirement with diet alone, but a vitamin D supplement is usually necessary to meet this goal.  · When exposed to the sun, use a sunscreen that protects against both UVA and UVB radiation with an SPF of 30 or greater. Reapply every 2 to 3 hours or after sweating, drying off with a towel, or swimming. · Always wear a seat belt when traveling in a car. Always wear a helmet when riding a bicycle or motorcycle. Patient Education        Chronic Obstructive Pulmonary Disease (COPD): Care Instructions  Overview     Chronic obstructive pulmonary disease (COPD) is a lung disease that makes it hard to breathe. With COPD, the airways that lead to the lungs are narrowed, and the tiny air sacs in the lungs are damaged and lose their stretch. People with COPD have decreased airflow in and out of the lungs, which makes it hard to breathe. The airways also can get clogged with thick mucus. Cigarette smoking is a major cause of COPD. Although there is no cure for COPD, you can slow its progress. Following your treatment plan and taking care of yourself can help you feel better and live longer. Follow-up care is a key part of your treatment and safety. Be sure to make and go to all appointments, and call your doctor if you are having problems. It's also a good idea to know your test results and keep a list of the medicines you take. How can you care for yourself at home? Staying healthy    Do not smoke. This is the most important step you can take to prevent more damage to your lungs. If you need help quitting, talk to your doctor about stop-smoking programs and medicines. These can increase your chances of quitting for good.     Avoid colds and other infections. Get the pneumococcal and whooping cough (pertussis) vaccines. If you have had these vaccines before, ask your doctor if you need another dose. Get the flu vaccine every fall. If you must be around people with colds or the flu, wash your hands often.     Avoid secondhand smoke and air pollution. Try to stay inside with your windows closed when air pollution is bad. Medicines and oxygen therapy    Take your medicines exactly as prescribed. Call your doctor if you think you are having a problem with your medicine. You may be taking medicines such as:  Bronchodilators. These help open your airways and make breathing easier. They are either short-acting (work for 4 to 9 hours) or long-acting (work for 12 to 24 hours). You inhale most bronchodilators, so they start to act quickly. Always carry your quick-relief inhaler with you in case you need it. Corticosteroids (prednisone, budesonide). These reduce airway inflammation. They come in inhaled or pill form.     Ask your doctor or pharmacist if you are using each type of inhaler correctly. With correct use, the medicine is more likely to get to your lungs.     See if a spacer is right for you. A spacer may also help you get more inhaled medicine to your lungs. If you use one, ask how to use it properly.     Do not take any vitamins, over-the-counter medicine, or herbal products without talking to your doctor first.     If your doctor prescribed antibiotics, take them as directed. Do not stop taking them just because you feel better. You need to take the full course of antibiotics.     If you use oxygen therapy, use the flow rate your doctor has recommended. Don't change it without talking to your doctor first. Oxygen therapy boosts the amount of oxygen in your blood and helps you breathe easier. Activity    Get regular exercise. Walking is an easy way to get exercise. Start out slowly, and walk a little more each day.     Pay attention to your breathing. You are exercising too hard if you can't talk while you exercise.     Take short rest breaks when doing household chores and other activities.     Learn breathing methods--such as breathing through pursed lips--to help you become less short of breath.     If your doctor has not set you up with a pulmonary rehabilitation program, ask if rehab is right for you.  Rehab includes exercise programs, education about your disease and how to manage it, help with diet and other changes, and emotional support. Diet    Eat regular, healthy meals.     Use bronchodilators about 1 hour before you eat to make it easier to eat.     Eat several smaller, frequent meals to prevent getting too full. A full stomach can push on the muscle that helps you breathe (your diaphragm) and make it harder to breathe.     Drink beverages at the end of the meal.     Avoid foods that are hard to chew.     Eat foods that contain protein so you don't lose muscle mass.     Talk with your doctor if you gain too much weight or if you lose weight without trying. Mental health    Talk to your family, friends, or a therapist about your feelings. Some people feel frightened, angry, hopeless, helpless, and even guilty. Talking openly about feelings may help you cope. If these feelings last, talk to your doctor. When should you call for help? Call 911 anytime you think you may need emergency care. For example, call if:    You have severe trouble breathing.     You are having chest pain that is different or worse than usual.   Call your doctor now or seek immediate medical care if:    You have new or worse trouble breathing.     You cough up blood.     You have a fever.     You have feelings of anxiety or depression. Watch closely for changes in your health, and be sure to contact your doctor if:    You cough more deeply or more often, especially if you notice more mucus or a change in the color of your mucus.     You have new or worse swelling in your legs or belly.     You are not getting better as expected. Where can you learn more? Go to https://Idle Gamingquentin.[employer]. org and sign in to your [employer] account. Enter C856 in the fav.or.it box to learn more about \"Chronic Obstructive Pulmonary Disease (COPD): Care Instructions. \"     If you do not have an account, please click on the \"Sign Up Now\" link.   Current as of: July 6, 2021               Content Version: 13.1  © 3992-4950 Healthwise, [employer]. Care instructions adapted under license by [facility] ([facility]). If you have questions about a medical condition or this instruction, always ask your healthcare professional. [address] any warranty or liability for your use of this information. Patient Education        Chronic Obstructive Pulmonary Disease (COPD) Flare-Ups: Care Instructions  Overview     Chronic obstructive pulmonary disease (COPD) is a lung disease that makes it hard to breathe. It is caused by damage to the lungs over many years, usually from smoking. Chronic bronchitis and emphysema are two lung problems that are types of COPD:  Chronic bronchitis: The airways that carry air to the lungs (bronchial tubes) get inflamed and make a lot of mucus. This can narrow or block the airways. It can also make you cough. Emphysema: The tiny air sacs (alveoli) at the end of the airways in the lungs are damaged. When the air sacs are damaged or destroyed, the inner walls break down, and the sacs become larger. These larger air sacs move less oxygen into the blood. This causes difficulty breathing or shortness of breath that gets worse over time. After air sacs are destroyed, they cannot be replaced. Many people with COPD have attacks called flare-ups or exacerbations. This is when your usual symptoms quickly get worse and stay worse. If you notice any problems or new symptoms, get medical treatment right away. Follow-up care is a key part of your treatment and safety. Be sure to make and go to all appointments, and call your doctor if you are having problems. It's also a good idea to know your test results and keep a list of the medicines you take. How can you care for yourself at home? Be safe with medicines. Take your medicines exactly as prescribed. Call your doctor if you think you are having a problem with your medicine. You may be taking medicines such as:  Bronchodilators.  These help open your airways and make breathing easier. Corticosteroids. These reduce airway inflammation. They may be given as pills, in a vein, or in an inhaled form. You may go home with pills in addition to an inhaler that you already use. A spacer may help you get more inhaled medicine to your lungs. Ask your doctor or pharmacist if a spacer is right for you. If it is, ask how to use it properly. If your doctor prescribed antibiotics, take them as directed. Do not stop taking them just because you feel better. You need to take the full course of antibiotics. If your doctor prescribed oxygen, use the flow rate your doctor has recommended. Do not change it without talking to your doctor first.  Di Fus not smoke. Smoking makes COPD worse. If you need help quitting, talk to your doctor about stop-smoking programs and medicines. These can increase your chances of quitting for good. When should you call for help? Call 911 anytime you think you may need emergency care. For example, call if:    You have severe trouble breathing. Call your doctor now or seek immediate medical care if:    You have new or worse trouble breathing.     Your coughing or wheezing gets worse.     You cough up dark brown or bloody mucus (sputum).     You have a new or higher fever.     You have severe chest pain, or chest pain is quickly getting worse. Watch closely for changes in your health, and be sure to contact your doctor if:    You notice more mucus or a change in the color of your mucus.     You need to use your antibiotic or steroid pills.     You do not get better as expected. Where can you learn more? Go to https://beti.[employer]. org and sign in to your [email or web address].[email or web address] account. Enter J666 in the [employer] box to learn more about \"Chronic Obstructive Pulmonary Disease (COPD) Flare-Ups: Care Instructions. \"     If you do not have an account, please click on the \"Sign Up Now\" link.   Current as of: July 6, 2021               Content Version: 13.1  © 3098-8103 [employer]. Care instructions adapted under license by [facility] ([facility]). If you have questions about a medical condition or this instruction, always ask your healthcare professional. [address] any warranty or liability for your use of this information. Patient Education        Learning About COPD Triggers  What are COPD triggers? When you have COPD, certain things can make your symptoms worse. These things are called triggers. Avoiding triggers can help you to keep your symptoms under control and reduce the impact of COPD on your life. Common COPD triggers include:  Illnesses like colds, flu, or pneumonia. Tobacco smoke or air pollution. Fumes from cleaning supplies or other chemicals. Wood or kerosene home heaters. Things you are allergic to, such as pollen, mold, dust mites, or pet hair. Not all people have the same triggers. What may cause symptoms in one person may not be a problem for another person. How do triggers affect COPD? Triggers can make it harder for your lungs to work as they should and can lead to sudden difficulty breathing and other symptoms. When you are around a trigger, a COPD flare-up is more likely. If your symptoms are severe, you may need emergency treatment or have to go to the hospital for treatment. If you know what your triggers are and can avoid them, you can reduce how often you have flare-ups and how much COPD affects your life. How can you manage a flare-up? Do not panic if you start to have a COPD flare-up. If you have a COPD action plan, follow the plan. In general:  Use your quick-relief inhaler as directed by your doctor. If your symptoms do not get better after you use your medicine, have someone take you to the emergency room. Call an ambulance if needed. Use a spacer with your metered-dose inhaler (MDI). If you have a nebulizer for inhaled medicine, use it.  A spacer or nebulizer may help get more medicine to your lungs. If your doctor has given you other inhaled medicines or steroid pills, take them as directed. If your doctor has given you a prescription for an antibiotic, fill it if you need to. Call your doctor if you have to use your antibiotic or steroid pills. How can you avoid COPD triggers? The first thing is to know your triggers. When you are having symptoms, note the things around you that might be causing them. Then look for patterns in what may be triggering your symptoms. When you have your list of possible triggers, work with your doctor to find ways to avoid them. Here are some ways to avoid a few common triggers. Do not smoke or allow others to smoke around you. If there is a lot of pollution, pollen, or dust outside, try to stay inside and keep your windows closed. Use an air conditioner or air filter. Check your local weather report or news for air quality and pollen reports. Get a flu vaccine every year. Talk to your doctor about getting the pneumococcal and whooping cough (pertussis) shots. Wash your hands often to prevent infections. Where can you learn more? Go to https://Yugma.[employer]. org and sign in to your Comparameglio.it account. Enter E839 in the [facility] box to learn more about \"Learning About COPD Triggers. \"     If you do not have an account, please click on the \"Sign Up Now\" link. Current as of: July 6, 2021               Content Version: 13.1  © 1062-1680 Healthwise, [employer]. Care instructions adapted under license by [facility] ([facility]). If you have questions about a medical condition or this instruction, always ask your healthcare professional. [place] [address] any warranty or liability for your use of this information.

## 2022-03-11 NOTE — PROGRESS NOTES
Medication Management [address]  [phone] (phone)  [phone] (fax)    Mr. Mercedes Araiza is a 79 y.o.  male with history of Afib who presents today for anticoagulation monitoring and adjustment. Patient verifies current dosing regimen and tablet strength. Patient took his own 2.5 mg tablet on Tuesday night at the hospital. Then the Wednesday and Thursday he held his doses as instructed on discharge. Patient denies s/s bleeding/bruising/swelling/chest pain. COPD exacerbation hospital stay Tuesday- Wednesday  No blood in urine or stool. No dietary changes. No changes in OTC agents/Herbals. On discharge was prescribed Prednisone 20 mg two tablets daily x 3 days, last dose due tomorrow am.     No change in alcohol use or tobacco use. No change in activity level. Patient denies headaches/dizziness/lightheadedness/falls. No vomiting/diarrhea or acute illness. No Procedures scheduled in the future at this time. Assessment:   Lab Results   Component Value Date    INR 1.90 (H) 03/11/2022    INR 3.53 (H) 03/09/2022    INR 3.35 (H) 03/08/2022    PROTIME 10.9 05/12/2018    PROTIME 11.1 05/11/2018    PROTIME 11.3 05/10/2018     INR subtherapeutic   Recent Labs     03/11/22  0911   INR 1.90*     Patient interview completed and discussed with pharmacist by Дмитрий John     Plan:  Continue Coumadin 1.25mg W and 2.5mg [id number]. Recheck INR in 2 week(s). Patient reminded to call the Anticoagulation Clinic with any signs or symptoms of bleeding or with any medication changes. Patient given instructions utilizing the teach back method. After visit summary printed and reviewed with patient.       Discharged ambulatory in no apparent distress.     For Pharmacy Admin Tracking Only     Time Spent (min): 20

## 2022-03-11 NOTE — PROGRESS NOTES
[address]  Dept: Abel Chaves (:  1940) is a 79 y.o. male,Established patient, here for evaluation of the following chief complaint(s):  Medicare AWV and Follow-Up from Hospital      ASSESSMENT/PLAN:  I have reviewed the patient's medical history in detail and updated the computerized patient record. HPI/ROS per the patient and caregiver. Overall non toxic in appearance. Answers questions appropriately. Conditions discussed and addressed this visit include:     Prn prednisone given to pt for copd exacerbations. Instructions provided on how to use  Also will send BREZTRI to [facility] for consideration for prescription assistance. 1. Medicare annual wellness visit, subsequent  2. COPD exacerbation (HCC)  -     predniSONE (DELTASONE) 20 MG tablet; Take 1 tablet by mouth 2 times daily for 3 days Only for copd exacerbation. , Disp-10 tablet, R-0Normal      Return in 3 months (on 2022) for Medicare Annual Wellness Visit in 1 year, Results review, Routine follow up, Medication refill. SUBJECTIVE/OBJECTIVE:  HPI   Here for copd follow up   Overall feels better   Little to no mucous   No fever   No sob or chest pain    No edema   Has 1 more day of prednisone left.    Review of Systems   Constitutional: Positive for fatigue. Negative for activity change, appetite change and fever. HENT: Negative. Eyes: Negative. Respiratory: Positive for cough. Negative for chest tightness, shortness of breath and wheezing. Cardiovascular: Positive for leg swelling. Negative for chest pain and palpitations. Gastrointestinal: Negative. Endocrine: Negative. Genitourinary: Negative. Musculoskeletal: Negative for arthralgias, joint swelling, myalgias and neck pain. Skin: Negative for pallor. Allergic/Immunologic: Positive for environmental allergies.    Neurological: Negative for dizziness, weakness, light-headedness and headaches. Hematological: Negative for adenopathy. Psychiatric/Behavioral: Negative. Physical Exam  Vitals and nursing note reviewed. Constitutional:       Appearance: Normal appearance. He is normal weight. He is not ill-appearing. HENT:      Head: Normocephalic. Right Ear: External ear normal.      Left Ear: External ear normal.      Nose: Nose normal. No rhinorrhea. Mouth/Throat:      Pharynx: Oropharynx is clear. No posterior oropharyngeal erythema. Eyes:      Conjunctiva/sclera: Conjunctivae normal.   Neck:      Vascular: No carotid bruit. Cardiovascular:      Rate and Rhythm: Normal rate and regular rhythm. Pulses: Normal pulses. Pulmonary:      Effort: Pulmonary effort is normal.      Breath sounds: Rhonchi (bilateral upper lobes) present. No wheezing or rales. Abdominal:      General: Abdomen is flat. Bowel sounds are normal.   Musculoskeletal:         General: Normal range of motion. Cervical back: Normal range of motion and neck supple. No tenderness. Right lower leg: No edema. Left lower leg: No edema. Lymphadenopathy:      Cervical: No cervical adenopathy. Skin:     General: Skin is warm and dry. Capillary Refill: Capillary refill takes less than 2 seconds. Neurological:      General: No focal deficit present. Mental Status: He is alert. Mental status is at baseline. Psychiatric:         Mood and Affect: Mood normal.         Behavior: Behavior normal.         Thought Content: Thought content normal.                 An electronic signature was used to authenticate this note. --Deny November, APRN - CNP   Medicare Annual Wellness Visit    Barrett Esquivel is here for Medicare AWV and Follow-Up from [facility]   Medicare annual wellness visit, subsequent  [facility] ([facility]  -     predniSONE (DELTASONE) 20 MG tablet;  Take 1 tablet by mouth 2 times daily for 3 days Only for copd exacerbation. , Disp-10 tablet, R-0Normal      Recommendations for Preventive Services Due: see orders and patient instructions/AVS.  Recommended screening schedule for the next 5-10 years is provided to the patient in written form: see Patient Instructions/AVS.     Return in 3 months (on 6/11/2022) for Medicare Annual Wellness Visit in 1 year, Results review, Routine follow up, Medication refill. Subjective   The following acute and/or chronic problems were also addressed today:  copd    Patient's complete Health Risk Assessment and screening values have been reviewed and are found in Flowsheets. The following problems were reviewed today and where indicated follow up appointments were made and/or referrals ordered. Positive Risk Factor Screenings with Interventions:              Health Habits/Nutrition:     Physical Activity: Sufficiently Active    Days of Exercise per Week: 6 days    Minutes of Exercise per Session: 30 min     Have you lost any weight without trying in the past 3 months?: No     Have you seen the dentist within the past year?: N/A - wear dentures    Health Habits/Nutrition Interventions:  High salt diet. Eating out 4-5 x per week.    · Nutritional issues:  educational materials for healthy, well-balanced diet provided, educational materials to promote weight loss provided     Safety:  Do you have working smoke detectors?: Yes  Do you have any tripping hazards - loose or unsecured carpets or rugs?: (!) Yes  Do you have any tripping hazards - clutter in doorways, halls, or stairs?: (!) Yes  Do you have either shower bars, grab bars, non-slip mats or non-slip surfaces in your shower or bathtub?: Yes  Do all of your stairways have a railing or banister?: Yes  Do you always fasten your seatbelt when you are in a car?: (!) No    Safety Interventions:  · Home safety tips provided           Objective   Vitals:    03/11/22 1126   BP: 120/78   Site: Right Upper Arm   Position: Sitting   Cuff Size: Medium Adult   Pulse: 80   Temp: 97.9 °F (36.6 °C)   TempSrc: Temporal   SpO2: 97%   Weight: 211 lb 6.4 oz (95.9 kg)      Body mass index is 34.12 kg/m². Allergies   Allergen Reactions    Benzonatate      Other reaction(s): Other - comment required  Pt states it makes him dizzy    Xanax [Alprazolam] Anxiety     Other reaction(s): Other - comment required  Causes confusion  Became combative and confused     Prior to Visit Medications    Medication Sig Taking? Authorizing Provider   predniSONE (DELTASONE) 20 MG tablet Take 1 tablet by mouth 2 times daily for 3 days Only for copd exacerbation.  Yes Leonardo SpaceWILBERTO - CNP   Budeson-Glycopyrrol-Formoterol (BREZTRI AEROSPHERE) 160-9-4.8 MCG/ACT AERO Inhale 2 puffs into the lungs 2 times daily Yes WILBERTO Arnold CNP   albuterol sulfate HFA (VENTOLIN HFA) 108 (90 Base) MCG/ACT inhaler Inhale 2 puffs into the lungs 4 times daily as needed for Wheezing Yes WILBERTO Boss CNP   predniSONE (DELTASONE) 20 MG tablet Take 2 tablets by mouth daily for 3 doses Yes WILBERTO Boss CNP   promethazine-dextromethorphan (PROMETHAZINE-DM) 6.25-15 MG/5ML syrup Take 5 mLs by mouth nightly Yes WILBERTO Arnold CNP   Ascorbic Acid (VITAMIN C ER PO) Take 1 tablet by mouth daily Yes Historical Provider, MD   nitroGLYCERIN (NITRODUR) 0.4 MG/HR Place 1 patch onto the skin daily Yes Historical Provider, MD   albuterol (ACCUNEB) 1.25 MG/3ML nebulizer solution Inhale 3 mLs into the lungs every 6 hours as needed for Wheezing Yes WILBERTO Arnold CNP   warfarin (COUMADIN) 2.5 MG tablet Take 1 tablet by mouth daily Yes Mele Mathis,    bumetanide (BUMEX) 1 MG tablet Take 1 tablet by mouth daily Yes WILBERTO Colon CNP   losartan (COZAAR) 25 MG tablet Take 1 tablet by mouth nightly Yes Danielito Garcia,    metFORMIN (GLUCOPHAGE) 500 MG tablet Take 1 tablet by mouth 2 times daily (with meals) Yes Fermin Ballesteros Del,    TRUEplus Lancets 28G MISC Use to test blood sugar as needed Yes Historical Provider, MD   TRUE METRIX BLOOD GLUCOSE TEST strip Use to test blood sugar as needed Yes Historical Provider, MD   Alcohol Swabs (B-D SINGLE USE SWABS REGULAR) PADS Use to test blood sugar as needed Yes Historical Provider, MD   vitamin D (CHOLECALCIFEROL) 1000 UNIT TABS tablet Take 1 tablet by mouth daily Yes Madeleine Marin MD   atorvastatin (LIPITOR) 80 MG tablet Take 80 mg by mouth nightly Yes Historical Provider, MD   metoprolol succinate (TOPROL XL) 50 MG extended release tablet Take 50 mg by mouth daily  Yes Historical Provider, MD [place] (Including outside providers/suppliers regularly involved in providing care):   Patient Care Team:  Elodia Dubin, APRN - CNP as PCP - General (Family Nurse Practitioner)  Elodia Dubin, APRN - CNP as PCP - [address] [place] [address] [facility]  Nba Hurley MD as Consulting Physician (Cardiology)    Reviewed and updated this visit:  Tobacco  Allergies  Meds  Problems  Med Hx  Surg Hx  Soc Hx  Fam Hx

## 2022-03-14 ENCOUNTER — TELEPHONE (OUTPATIENT)
Dept: FAMILY MEDICINE CLINIC | Age: 82
End: 2022-03-14

## 2022-03-14 RX ORDER — FLUTICASONE PROPIONATE 50 MCG
2 SPRAY, SUSPENSION (ML) NASAL DAILY
Qty: 48 G | Refills: 1 | Status: SHIPPED | OUTPATIENT
Start: 2022-03-14 | End: 2022-03-15 | Stop reason: SDUPTHER

## 2022-03-14 RX ORDER — LORATADINE 10 MG/1
10 TABLET ORAL DAILY
Qty: 90 TABLET | Refills: 1 | COMMUNITY
Start: 2022-03-14 | End: 2022-03-15 | Stop reason: SDUPTHER

## 2022-03-14 NOTE — TELEPHONE ENCOUNTER
Sent in claritin and flonase for him. Can also use plain nasal saline spray to each nostril as needed for congestion.

## 2022-03-14 NOTE — TELEPHONE ENCOUNTER
The patient's emergency contact, [place] called and said that the patient wanted her to call and ask Wendelyn Carry if there is something the patient can take for a stuffed up nose. He is having a hard time breathing through his nose and he wants something to help open it up. Please advise.

## 2022-03-14 NOTE — TELEPHONE ENCOUNTER
I called and spoke to the patient. I let him know Bailee's response. The patient voiced understanding. Chung Celaya, it looks like you sent the patient's Claritin to [place] [address] instead of [address] [facility]

## 2022-03-15 RX ORDER — FLUTICASONE PROPIONATE 50 MCG
2 SPRAY, SUSPENSION (ML) NASAL DAILY
Qty: 1 EACH | Refills: 0 | Status: ON HOLD | OUTPATIENT
Start: 2022-03-15 | End: 2022-05-09

## 2022-03-15 RX ORDER — LORATADINE 10 MG/1
10 TABLET ORAL DAILY
Qty: 30 TABLET | Refills: 0 | Status: ON HOLD | OUTPATIENT
Start: 2022-03-15 | End: 2022-05-09

## 2022-03-21 ENCOUNTER — OFFICE VISIT (OUTPATIENT)
Dept: FAMILY MEDICINE CLINIC | Age: 82
End: 2022-03-21
Payer: MEDICARE

## 2022-03-21 ENCOUNTER — APPOINTMENT (OUTPATIENT)
Dept: PHARMACY | Age: 82
End: 2022-03-21
Payer: MEDICARE

## 2022-03-21 VITALS
BODY MASS INDEX: 33.11 KG/M2 | SYSTOLIC BLOOD PRESSURE: 130 MMHG | HEART RATE: 105 BPM | DIASTOLIC BLOOD PRESSURE: 66 MMHG | OXYGEN SATURATION: 95 % | HEIGHT: 66 IN | TEMPERATURE: 97.2 F | WEIGHT: 206 LBS

## 2022-03-21 DIAGNOSIS — J44.1 COPD EXACERBATION (HCC): ICD-10-CM

## 2022-03-21 DIAGNOSIS — H61.23 BILATERAL IMPACTED CERUMEN: Primary | ICD-10-CM

## 2022-03-21 PROCEDURE — 99213 OFFICE O/P EST LOW 20 MIN: CPT | Performed by: NURSE PRACTITIONER

## 2022-03-21 PROCEDURE — 4040F PNEUMOC VAC/ADMIN/RCVD: CPT | Performed by: NURSE PRACTITIONER

## 2022-03-21 PROCEDURE — G8482 FLU IMMUNIZE ORDER/ADMIN: HCPCS | Performed by: NURSE PRACTITIONER

## 2022-03-21 PROCEDURE — 1123F ACP DISCUSS/DSCN MKR DOCD: CPT | Performed by: NURSE PRACTITIONER

## 2022-03-21 PROCEDURE — 1036F TOBACCO NON-USER: CPT | Performed by: NURSE PRACTITIONER

## 2022-03-21 PROCEDURE — G8427 DOCREV CUR MEDS BY ELIG CLIN: HCPCS | Performed by: NURSE PRACTITIONER

## 2022-03-21 PROCEDURE — 69210 REMOVE IMPACTED EAR WAX UNI: CPT | Performed by: NURSE PRACTITIONER

## 2022-03-21 PROCEDURE — G8417 CALC BMI ABV UP PARAM F/U: HCPCS | Performed by: NURSE PRACTITIONER

## 2022-03-21 ASSESSMENT — ENCOUNTER SYMPTOMS
GASTROINTESTINAL NEGATIVE: 1
BACK PAIN: 1
COUGH: 1
EYES NEGATIVE: 1

## 2022-03-21 NOTE — PATIENT INSTRUCTIONS
Patient Education        Earwax Blockage: Care Instructions  Your Care Instructions     Earwax is a natural substance that protects the ear canal. Normally, earwax drains from the ears and does not cause problems. Sometimes earwax builds up and hardens. Earwax blockage (also called cerumen impaction) can cause some loss of hearing and pain. When wax is tightly packed, you will need to have your doctor remove it. Follow-up care is a key part of your treatment and safety. Be sure to make and go to all appointments, and call your doctor if you are having problems. It's also a good idea to know your test results and keep a list of the medicines you take. How can you care for yourself at home? · Do not try to remove earwax with cotton swabs, fingers, or other objects. This can make the blockage worse and damage the eardrum. · If your doctor recommends that you try to remove earwax at home:  ? Soften and loosen the earwax with warm mineral oil. You also can try hydrogen peroxide mixed with an equal amount of room temperature water. Place 2 drops of the fluid, warmed to body temperature, in the ear two times a day for up to 5 days. ? Once the wax is loose and soft, all that is usually needed to remove it from the ear canal is a gentle, warm shower. Direct the water into the ear, then tip your head to let the earwax drain out. Dry your ear thoroughly with a hair dryer set on low. Hold the dryer several inches from your ear. ? If the warm mineral oil and shower do not work, use an over-the-counter wax softener. Read and follow all instructions on the label. After using the wax softener, use an ear syringe to gently flush the ear. Make sure the flushing solution is body temperature. Cool or hot fluids in the ear can cause dizziness. When should you call for help?    Call your doctor now or seek immediate medical care if:    · Pus or blood drains from your ear.     · Your ears are ringing or feel full.     · You have a loss of hearing. Watch closely for changes in your health, and be sure to contact your doctor if:    · You have pain or reduced hearing after 1 week of home treatment.     · You have any new symptoms, such as nausea or balance problems. Where can you learn more? Go to https://[email or web address].[email or web address]. org and sign in to your [employer] account. Enter F785 in the [employer] box to learn more about \"Earwax Blockage: Care Instructions. \"     If you do not have an account, please click on the \"Sign Up Now\" link. Current as of: July 1, 2021               Content Version: 13.1  © 1228-8976 Healthwise, [employer] [place]. Care instructions adapted under license by [facility] [address] If you have questions about a medical condition or this instruction, always ask your healthcare professional. [address] any warranty or liability for your use of this information.

## 2022-03-21 NOTE — PROGRESS NOTES
[address]  Dept: Abel Chaves (:  1940) is a 79 y.o. male,Established patient, here for evaluation of the following chief complaint(s):  Ear Fullness      ASSESSMENT/PLAN:  I have reviewed the patient's medical history in detail and updated the computerized patient record. HPI/ROS per the patient and caregiver. Overall non toxic in appearance. Answers questions appropriately. Conditions discussed and addressed this visit include:     Ceruminosis is noted. Wax is removed by syringing and manual debridement. Instructions for home care to prevent wax buildup are given. 1. Bilateral impacted cerumen  -     [address] - NE REMOVE IMPACTED EAR WAX  2. COPD exacerbation ([address]  Radha Maxwell was evaluated today and a DME order was entered for a nebulizer compressor in order to administer Albuterol due to the diagnosis of COPD. The need for this equipment and treatment was discussed with the patient and he understands and is in agreement. Using nebulizer up to 4 x per day as needed for sob and wheezing. Order sent to pharmacy for new machine. Return if symptoms worsen or fail to improve, for Results review, Routine follow up. SUBJECTIVE/OBJECTIVE:  HPI   Here for hearing difficulty   His SO reports he is having more trouble hearing   Had tried miracle ear int he past and this did not work for him   No drainage, pain, pressure in either ear   Denies dizziness.  Usually uses qtip to clean his ears. Review of Systems   Constitutional: Negative for activity change and appetite change. HENT: Positive for hearing loss. Negative for ear discharge and ear pain. Eyes: Negative. Respiratory: Positive for cough. Cardiovascular: Negative for chest pain and leg swelling. Gastrointestinal: Negative. Musculoskeletal: Positive for back pain and myalgias. Skin: Negative.     Neurological: Negative for facial asymmetry, weakness, light-headedness and headaches. Hematological: Negative for adenopathy. Does not bruise/bleed easily. Psychiatric/Behavioral: Negative. Physical Exam  Vitals and nursing note reviewed. Constitutional:       Appearance: Normal appearance. HENT:      Head: Normocephalic. Right Ear: Ear canal and external ear normal. There is impacted cerumen. Left Ear: Ear canal and external ear normal. There is impacted cerumen. Nose: Nose normal.      Mouth/Throat:      Mouth: Mucous membranes are moist.   Eyes:      Conjunctiva/sclera: Conjunctivae normal.   Cardiovascular:      Rate and Rhythm: Normal rate and regular rhythm. Pulses: Normal pulses. Heart sounds: Normal heart sounds. Pulmonary:      Effort: Pulmonary effort is normal.   Abdominal:      General: Abdomen is flat. Bowel sounds are normal.   Musculoskeletal:         General: Normal range of motion. Cervical back: Normal range of motion and neck supple. Skin:     General: Skin is warm and dry. Capillary Refill: Capillary refill takes less than 2 seconds. Neurological:      General: No focal deficit present. Mental Status: He is alert and oriented to person, place, and time. Mental status is at baseline. Psychiatric:         Mood and Affect: Mood normal.         Behavior: Behavior normal.         Thought Content: Thought content normal.                 An electronic signature was used to authenticate this note.     --Ashish Schaeffer, WILBERTO - CNP

## 2022-03-28 ENCOUNTER — HOSPITAL ENCOUNTER (OUTPATIENT)
Dept: PHARMACY | Age: 82
Setting detail: THERAPIES SERIES
Discharge: HOME | End: 2022-03-28
Payer: MEDICARE

## 2022-03-28 DIAGNOSIS — Z79.01 ANTICOAGULATED ON COUMADIN: ICD-10-CM

## 2022-03-28 DIAGNOSIS — I48.21 PERMANENT ATRIAL FIBRILLATION (HCC): Primary | ICD-10-CM

## 2022-03-28 DIAGNOSIS — Z51.81 ENCOUNTER FOR THERAPEUTIC DRUG MONITORING: ICD-10-CM

## 2022-03-28 LAB — POC INR: 2.4 (ref 0.8–1.2)

## 2022-03-28 PROCEDURE — 85610 PROTHROMBIN TIME: CPT

## 2022-03-28 PROCEDURE — 36416 COLLJ CAPILLARY BLOOD SPEC: CPT

## 2022-03-28 PROCEDURE — 99211 OFF/OP EST MAY X REQ PHY/QHP: CPT

## 2022-03-28 RX ORDER — PREDNISONE 20 MG/1
20 TABLET ORAL 2 TIMES DAILY
Status: ON HOLD | COMMUNITY
End: 2022-04-13

## 2022-03-28 NOTE — PROGRESS NOTES
Medication Management [address]  [phone] (phone)  [phone] (fax)    Mr. Susan Patten is a 79 y.o.  male with history of atrial fib. , per Dr. Karsten Lancaster referral, who presents today for Warfarin monitoring and adjustment (2.5 week visit). Patient verifies current dosing regimen and tablet strength. No missed or extra doses. Patient denies bleeding/chest pain. Has usual easy bruising/intermittent left leg swelling. Had SOB this morning when out in the cold air. No blood in urine or stool. No dietary changes. Changes in medication/OTC agents/herbals: back on Breztri, plus now on Flonase and Claritin. Finished Prednisone day after last visit, but has a prescription for 3 days of Prednisone 20 mg BID for COPD exacerbation (reminded to call this clinic if has to use it). No change in alcohol use or tobacco use. Change in activity level: decreased. Patient denies headaches/dizziness/lightheadedness/falls. No vomiting/diarrhea or acute illness. No procedures scheduled in the future at this time. [place]. Assessment:   Lab Results   Component Value Date    INR 2.40 (H) 03/28/2022    INR 1.90 (H) 03/11/2022    INR 3.53 (H) 03/09/2022    PROTIME 10.9 05/12/2018    PROTIME 11.1 05/11/2018    PROTIME 11.3 05/10/2018     INR therapeutic - goal 2-3. Recent Labs     03/28/22  0953   INR 2.40*       Plan:  POCT INR ordered/performed/result reviewed. Continue PO Coumadin 1.25 mg W, 2.5 mg MTThFSS. Recheck INR in 3 week(s). Patient reminded to call the Anticoagulation Clinic with any signs or symptoms of bleeding or with any medication changes. Patient given instructions utilizing the teach back method. After visit summary printed and reviewed with patient. Discharged ambulatory in no apparent distress, wearing mask.

## 2022-04-06 ENCOUNTER — TELEPHONE (OUTPATIENT)
Dept: FAMILY MEDICINE CLINIC | Age: 82
End: 2022-04-06

## 2022-04-06 DIAGNOSIS — J44.1 COPD EXACERBATION (HCC): Primary | ICD-10-CM

## 2022-04-06 NOTE — TELEPHONE ENCOUNTER
The patient called and said that he does not think his nebulizer machine is working. He changed the hose, etc but that did not help. He is not sure how old it is. He tried to call [facility] but can't get a hold of anyone. He said Harwood Prader would probably have to order him one.

## 2022-04-07 ENCOUNTER — TELEPHONE (OUTPATIENT)
Dept: FAMILY MEDICINE CLINIC | Age: 82
End: 2022-04-07

## 2022-04-07 NOTE — TELEPHONE ENCOUNTER
Patient came in and states that he called in yesterday about his nebulizer machine. He needs a new order for a new nebulizer machine sent to [facility] [place]. His nebulizer was ordered in 2005 by Dr. Elia Arriaga. Could we get a new order for this patient and get it faxed to them for him.  Please advise thanks

## 2022-04-13 ENCOUNTER — HOSPITAL ENCOUNTER (INPATIENT)
Age: 82
LOS: 11 days | Discharge: HOME | DRG: 291 | End: 2022-04-24
Attending: EMERGENCY MEDICINE | Admitting: INTERNAL MEDICINE
Payer: MEDICARE

## 2022-04-13 ENCOUNTER — APPOINTMENT (OUTPATIENT)
Dept: GENERAL RADIOLOGY | Age: 82
DRG: 291 | End: 2022-04-13
Payer: MEDICARE

## 2022-04-13 DIAGNOSIS — N18.30 STAGE 3 CHRONIC KIDNEY DISEASE, UNSPECIFIED WHETHER STAGE 3A OR 3B CKD (HCC): ICD-10-CM

## 2022-04-13 DIAGNOSIS — R77.8 TROPONIN LEVEL ELEVATED: ICD-10-CM

## 2022-04-13 DIAGNOSIS — J96.01 ACUTE HYPOXEMIC RESPIRATORY FAILURE (HCC): ICD-10-CM

## 2022-04-13 DIAGNOSIS — R06.02 SHORTNESS OF BREATH: Primary | ICD-10-CM

## 2022-04-13 DIAGNOSIS — N18.4 CHRONIC KIDNEY DISEASE (CKD), STAGE IV (SEVERE) (HCC): ICD-10-CM

## 2022-04-13 DIAGNOSIS — J12.9 VIRAL PNEUMONIA: ICD-10-CM

## 2022-04-13 DIAGNOSIS — Z79.01 CURRENT USE OF LONG TERM ANTICOAGULATION: ICD-10-CM

## 2022-04-13 DIAGNOSIS — J44.1 COPD EXACERBATION (HCC): ICD-10-CM

## 2022-04-13 DIAGNOSIS — I50.9 ACUTE ON CHRONIC CONGESTIVE HEART FAILURE, UNSPECIFIED HEART FAILURE TYPE (HCC): ICD-10-CM

## 2022-04-13 LAB
ALBUMIN SERPL-MCNC: 4 G/DL (ref 3.5–5.1)
ALP BLD-CCNC: 104 U/L (ref 38–126)
ALT SERPL-CCNC: 15 U/L (ref 11–66)
ANION GAP SERPL CALCULATED.3IONS-SCNC: 14 MEQ/L (ref 8–16)
APTT: 41.4 SECONDS (ref 22–38)
AST SERPL-CCNC: 18 U/L (ref 5–40)
BASOPHILS # BLD: 0.4 %
BASOPHILS ABSOLUTE: 0 THOU/MM3 (ref 0–0.1)
BILIRUB SERPL-MCNC: 1.1 MG/DL (ref 0.3–1.2)
BILIRUBIN DIRECT: 0.3 MG/DL (ref 0–0.3)
BUN BLDV-MCNC: 25 MG/DL (ref 7–22)
CALCIUM SERPL-MCNC: 9.1 MG/DL (ref 8.5–10.5)
CHLORIDE BLD-SCNC: 100 MEQ/L (ref 98–111)
CO2: 23 MEQ/L (ref 23–33)
CREAT SERPL-MCNC: 2.2 MG/DL (ref 0.4–1.2)
EOSINOPHIL # BLD: 0.9 %
EOSINOPHILS ABSOLUTE: 0.1 THOU/MM3 (ref 0–0.4)
ERYTHROCYTE [DISTWIDTH] IN BLOOD BY AUTOMATED COUNT: 15.1 % (ref 11.5–14.5)
ERYTHROCYTE [DISTWIDTH] IN BLOOD BY AUTOMATED COUNT: 54.5 FL (ref 35–45)
FLU A ANTIGEN: NEGATIVE
FLU B ANTIGEN: NEGATIVE
FOLATE: 17.9 NG/ML (ref 4.8–24.2)
GFR SERPL CREATININE-BSD FRML MDRD: 29 ML/MIN/1.73M2
GLUCOSE BLD-MCNC: 134 MG/DL (ref 70–108)
GLUCOSE BLD-MCNC: 308 MG/DL (ref 70–108)
GLUCOSE BLD-MCNC: 330 MG/DL (ref 70–108)
GLUCOSE BLD-MCNC: 339 MG/DL (ref 70–108)
HCT VFR BLD CALC: 39.8 % (ref 42–52)
HEMOGLOBIN: 13.1 GM/DL (ref 14–18)
IMMATURE GRANS (ABS): 0.07 THOU/MM3 (ref 0–0.07)
IMMATURE GRANULOCYTES: 0.7 %
INR BLD: 2.76 (ref 0.85–1.13)
LIPASE: 23.7 U/L (ref 5.6–51.3)
LYMPHOCYTES # BLD: 7.6 %
LYMPHOCYTES ABSOLUTE: 0.8 THOU/MM3 (ref 1–4.8)
MAGNESIUM: 1.9 MG/DL (ref 1.6–2.4)
MCH RBC QN AUTO: 32.3 PG (ref 26–33)
MCHC RBC AUTO-ENTMCNC: 32.9 GM/DL (ref 32.2–35.5)
MCV RBC AUTO: 98.3 FL (ref 80–94)
MONOCYTES # BLD: 10.8 %
MONOCYTES ABSOLUTE: 1.1 THOU/MM3 (ref 0.4–1.3)
NUCLEATED RED BLOOD CELLS: 0 /100 WBC
OSMOLALITY CALCULATION: 280.2 MOSMOL/KG (ref 275–300)
PLATELET # BLD: 170 THOU/MM3 (ref 130–400)
PMV BLD AUTO: 10.1 FL (ref 9.4–12.4)
POTASSIUM REFLEX MAGNESIUM: 4.3 MEQ/L (ref 3.5–5.2)
PRO-BNP: 2002 PG/ML (ref 0–1800)
PROCALCITONIN: 0.12 NG/ML (ref 0.01–0.09)
RBC # BLD: 4.05 MILL/MM3 (ref 4.7–6.1)
SARS-COV-2, NAAT: NOT  DETECTED
SEG NEUTROPHILS: 79.6 %
SEGMENTED NEUTROPHILS ABSOLUTE COUNT: 8.4 THOU/MM3 (ref 1.8–7.7)
SODIUM BLD-SCNC: 137 MEQ/L (ref 135–145)
TOTAL PROTEIN: 7.2 G/DL (ref 6.1–8)
TROPONIN T: 0.03 NG/ML
VITAMIN B-12: < 150 PG/ML (ref 211–911)
WBC # BLD: 10.6 THOU/MM3 (ref 4.8–10.8)

## 2022-04-13 PROCEDURE — 83690 ASSAY OF LIPASE: CPT

## 2022-04-13 PROCEDURE — 87804 INFLUENZA ASSAY W/OPTIC: CPT

## 2022-04-13 PROCEDURE — 85610 PROTHROMBIN TIME: CPT

## 2022-04-13 PROCEDURE — 71045 X-RAY EXAM CHEST 1 VIEW: CPT

## 2022-04-13 PROCEDURE — 94760 N-INVAS EAR/PLS OXIMETRY 1: CPT

## 2022-04-13 PROCEDURE — 2580000003 HC RX 258

## 2022-04-13 PROCEDURE — 6370000000 HC RX 637 (ALT 250 FOR IP)

## 2022-04-13 PROCEDURE — 82948 REAGENT STRIP/BLOOD GLUCOSE: CPT

## 2022-04-13 PROCEDURE — 85025 COMPLETE CBC W/AUTO DIFF WBC: CPT

## 2022-04-13 PROCEDURE — 83735 ASSAY OF MAGNESIUM: CPT

## 2022-04-13 PROCEDURE — 99285 EMERGENCY DEPT VISIT HI MDM: CPT

## 2022-04-13 PROCEDURE — 96374 THER/PROPH/DIAG INJ IV PUSH: CPT

## 2022-04-13 PROCEDURE — 82607 VITAMIN B-12: CPT

## 2022-04-13 PROCEDURE — 82746 ASSAY OF FOLIC ACID SERUM: CPT

## 2022-04-13 PROCEDURE — 6360000002 HC RX W HCPCS: Performed by: EMERGENCY MEDICINE

## 2022-04-13 PROCEDURE — 87635 SARS-COV-2 COVID-19 AMP PRB: CPT

## 2022-04-13 PROCEDURE — 6370000000 HC RX 637 (ALT 250 FOR IP): Performed by: EMERGENCY MEDICINE

## 2022-04-13 PROCEDURE — 96375 TX/PRO/DX INJ NEW DRUG ADDON: CPT

## 2022-04-13 PROCEDURE — 84145 PROCALCITONIN (PCT): CPT

## 2022-04-13 PROCEDURE — 1200000003 HC TELEMETRY R&B

## 2022-04-13 PROCEDURE — 84484 ASSAY OF TROPONIN QUANT: CPT

## 2022-04-13 PROCEDURE — 80048 BASIC METABOLIC PNL TOTAL CA: CPT

## 2022-04-13 PROCEDURE — 2700000000 HC OXYGEN THERAPY PER DAY

## 2022-04-13 PROCEDURE — 99223 1ST HOSP IP/OBS HIGH 75: CPT

## 2022-04-13 PROCEDURE — 94640 AIRWAY INHALATION TREATMENT: CPT

## 2022-04-13 PROCEDURE — 93005 ELECTROCARDIOGRAM TRACING: CPT | Performed by: EMERGENCY MEDICINE

## 2022-04-13 PROCEDURE — 80076 HEPATIC FUNCTION PANEL: CPT

## 2022-04-13 PROCEDURE — 83880 ASSAY OF NATRIURETIC PEPTIDE: CPT

## 2022-04-13 PROCEDURE — 85730 THROMBOPLASTIN TIME PARTIAL: CPT

## 2022-04-13 RX ORDER — CODEINE PHOSPHATE AND GUAIFENESIN 10; 100 MG/5ML; MG/5ML
5 SOLUTION ORAL ONCE
Status: COMPLETED | OUTPATIENT
Start: 2022-04-13 | End: 2022-04-13

## 2022-04-13 RX ORDER — BUMETANIDE 0.25 MG/ML
1 INJECTION, SOLUTION INTRAMUSCULAR; INTRAVENOUS 2 TIMES DAILY
Status: DISCONTINUED | OUTPATIENT
Start: 2022-04-14 | End: 2022-04-18

## 2022-04-13 RX ORDER — IPRATROPIUM BROMIDE AND ALBUTEROL SULFATE 2.5; .5 MG/3ML; MG/3ML
1 SOLUTION RESPIRATORY (INHALATION) ONCE
Status: COMPLETED | OUTPATIENT
Start: 2022-04-13 | End: 2022-04-13

## 2022-04-13 RX ORDER — ONDANSETRON 4 MG/1
4 TABLET, ORALLY DISINTEGRATING ORAL EVERY 8 HOURS PRN
Status: DISCONTINUED | OUTPATIENT
Start: 2022-04-13 | End: 2022-04-20 | Stop reason: CLARIF

## 2022-04-13 RX ORDER — DEXTROSE MONOHYDRATE 50 MG/ML
100 INJECTION, SOLUTION INTRAVENOUS PRN
Status: DISCONTINUED | OUTPATIENT
Start: 2022-04-13 | End: 2022-04-24 | Stop reason: HOSPADM

## 2022-04-13 RX ORDER — POLYETHYLENE GLYCOL 3350 17 G/17G
17 POWDER, FOR SOLUTION ORAL DAILY PRN
Status: DISCONTINUED | OUTPATIENT
Start: 2022-04-13 | End: 2022-04-24 | Stop reason: HOSPADM

## 2022-04-13 RX ORDER — ASCORBIC ACID 500 MG
500 TABLET ORAL DAILY
Status: DISCONTINUED | OUTPATIENT
Start: 2022-04-14 | End: 2022-04-24 | Stop reason: HOSPADM

## 2022-04-13 RX ORDER — LOSARTAN POTASSIUM 25 MG/1
25 TABLET ORAL NIGHTLY
Status: DISCONTINUED | OUTPATIENT
Start: 2022-04-13 | End: 2022-04-24 | Stop reason: HOSPADM

## 2022-04-13 RX ORDER — GUAIFENESIN/DEXTROMETHORPHAN 100-10MG/5
5 SYRUP ORAL EVERY 4 HOURS PRN
Status: DISCONTINUED | OUTPATIENT
Start: 2022-04-13 | End: 2022-04-14

## 2022-04-13 RX ORDER — WARFARIN SODIUM 1 MG/1
1 TABLET ORAL
Status: COMPLETED | OUTPATIENT
Start: 2022-04-13 | End: 2022-04-13

## 2022-04-13 RX ORDER — ALBUTEROL SULFATE 90 UG/1
2 AEROSOL, METERED RESPIRATORY (INHALATION) 4 TIMES DAILY PRN
Status: DISCONTINUED | OUTPATIENT
Start: 2022-04-13 | End: 2022-04-24 | Stop reason: HOSPADM

## 2022-04-13 RX ORDER — GUAIFENESIN 600 MG/1
600 TABLET, EXTENDED RELEASE ORAL 2 TIMES DAILY
Status: DISCONTINUED | OUTPATIENT
Start: 2022-04-13 | End: 2022-04-24 | Stop reason: HOSPADM

## 2022-04-13 RX ORDER — GUAIFENESIN 600 MG/1
600 TABLET, EXTENDED RELEASE ORAL 2 TIMES DAILY
COMMUNITY
End: 2022-05-02 | Stop reason: ALTCHOICE

## 2022-04-13 RX ORDER — ATORVASTATIN CALCIUM 80 MG/1
80 TABLET, FILM COATED ORAL NIGHTLY
Status: DISCONTINUED | OUTPATIENT
Start: 2022-04-13 | End: 2022-04-24 | Stop reason: HOSPADM

## 2022-04-13 RX ORDER — ACETAMINOPHEN 325 MG/1
650 TABLET ORAL EVERY 6 HOURS PRN
Status: DISCONTINUED | OUTPATIENT
Start: 2022-04-13 | End: 2022-04-24 | Stop reason: HOSPADM

## 2022-04-13 RX ORDER — FUROSEMIDE 10 MG/ML
40 INJECTION INTRAMUSCULAR; INTRAVENOUS ONCE
Status: COMPLETED | OUTPATIENT
Start: 2022-04-13 | End: 2022-04-13

## 2022-04-13 RX ORDER — SODIUM CHLORIDE 9 MG/ML
INJECTION, SOLUTION INTRAVENOUS PRN
Status: DISCONTINUED | OUTPATIENT
Start: 2022-04-13 | End: 2022-04-24 | Stop reason: HOSPADM

## 2022-04-13 RX ORDER — VITAMIN B COMPLEX
1000 TABLET ORAL DAILY
Status: DISCONTINUED | OUTPATIENT
Start: 2022-04-13 | End: 2022-04-24 | Stop reason: HOSPADM

## 2022-04-13 RX ORDER — DEXTROSE MONOHYDRATE 25 G/50ML
12.5 INJECTION, SOLUTION INTRAVENOUS PRN
Status: DISCONTINUED | OUTPATIENT
Start: 2022-04-13 | End: 2022-04-13

## 2022-04-13 RX ORDER — ONDANSETRON 2 MG/ML
4 INJECTION INTRAMUSCULAR; INTRAVENOUS EVERY 6 HOURS PRN
Status: DISCONTINUED | OUTPATIENT
Start: 2022-04-13 | End: 2022-04-20 | Stop reason: CLARIF

## 2022-04-13 RX ORDER — CETIRIZINE HYDROCHLORIDE 10 MG/1
5 TABLET ORAL DAILY
Status: DISCONTINUED | OUTPATIENT
Start: 2022-04-14 | End: 2022-04-24 | Stop reason: HOSPADM

## 2022-04-13 RX ORDER — METOPROLOL SUCCINATE 50 MG/1
50 TABLET, EXTENDED RELEASE ORAL DAILY
Status: DISCONTINUED | OUTPATIENT
Start: 2022-04-13 | End: 2022-04-20

## 2022-04-13 RX ORDER — ACETAMINOPHEN 650 MG/1
650 SUPPOSITORY RECTAL EVERY 6 HOURS PRN
Status: DISCONTINUED | OUTPATIENT
Start: 2022-04-13 | End: 2022-04-24 | Stop reason: HOSPADM

## 2022-04-13 RX ORDER — SODIUM CHLORIDE 0.9 % (FLUSH) 0.9 %
10 SYRINGE (ML) INJECTION PRN
Status: DISCONTINUED | OUTPATIENT
Start: 2022-04-13 | End: 2022-04-24 | Stop reason: HOSPADM

## 2022-04-13 RX ORDER — NICOTINE POLACRILEX 4 MG
15 LOZENGE BUCCAL PRN
Status: DISCONTINUED | OUTPATIENT
Start: 2022-04-13 | End: 2022-04-13

## 2022-04-13 RX ORDER — SODIUM CHLORIDE 0.9 % (FLUSH) 0.9 %
10 SYRINGE (ML) INJECTION EVERY 12 HOURS SCHEDULED
Status: DISCONTINUED | OUTPATIENT
Start: 2022-04-13 | End: 2022-04-24 | Stop reason: HOSPADM

## 2022-04-13 RX ORDER — METHYLPREDNISOLONE SODIUM SUCCINATE 125 MG/2ML
125 INJECTION, POWDER, LYOPHILIZED, FOR SOLUTION INTRAMUSCULAR; INTRAVENOUS ONCE
Status: COMPLETED | OUTPATIENT
Start: 2022-04-13 | End: 2022-04-13

## 2022-04-13 RX ORDER — PREDNISONE 20 MG/1
40 TABLET ORAL DAILY
Status: COMPLETED | OUTPATIENT
Start: 2022-04-13 | End: 2022-04-17

## 2022-04-13 RX ADMIN — LOSARTAN POTASSIUM 25 MG: 25 TABLET, FILM COATED ORAL at 21:01

## 2022-04-13 RX ADMIN — INSULIN LISPRO 4 UNITS: 100 INJECTION, SOLUTION INTRAVENOUS; SUBCUTANEOUS at 17:26

## 2022-04-13 RX ADMIN — SODIUM CHLORIDE, PRESERVATIVE FREE 10 ML: 5 INJECTION INTRAVENOUS at 12:31

## 2022-04-13 RX ADMIN — GUAIFENESIN AND CODEINE PHOSPHATE 5 ML: 100; 10 SOLUTION ORAL at 07:09

## 2022-04-13 RX ADMIN — FUROSEMIDE 40 MG: 10 INJECTION, SOLUTION INTRAMUSCULAR; INTRAVENOUS at 06:53

## 2022-04-13 RX ADMIN — GUAIFENESIN 600 MG: 600 TABLET, EXTENDED RELEASE ORAL at 12:30

## 2022-04-13 RX ADMIN — Medication 1000 UNITS: at 12:30

## 2022-04-13 RX ADMIN — INSULIN LISPRO 3 UNITS: 100 INJECTION, SOLUTION INTRAVENOUS; SUBCUTANEOUS at 21:02

## 2022-04-13 RX ADMIN — SODIUM CHLORIDE, PRESERVATIVE FREE 10 ML: 5 INJECTION INTRAVENOUS at 21:02

## 2022-04-13 RX ADMIN — GUAIFENESIN 600 MG: 600 TABLET, EXTENDED RELEASE ORAL at 21:02

## 2022-04-13 RX ADMIN — IPRATROPIUM BROMIDE AND ALBUTEROL SULFATE 1 AMPULE: .5; 3 SOLUTION RESPIRATORY (INHALATION) at 06:02

## 2022-04-13 RX ADMIN — WARFARIN SODIUM 1 MG: 1 TABLET ORAL at 21:01

## 2022-04-13 RX ADMIN — METHYLPREDNISOLONE SODIUM SUCCINATE 125 MG: 125 INJECTION, POWDER, FOR SOLUTION INTRAMUSCULAR; INTRAVENOUS at 06:00

## 2022-04-13 RX ADMIN — METOPROLOL SUCCINATE 50 MG: 50 TABLET, EXTENDED RELEASE ORAL at 12:30

## 2022-04-13 RX ADMIN — MOMETASONE FUROATE AND FORMOTEROL FUMARATE DIHYDRATE 2 PUFF: 200; 5 AEROSOL RESPIRATORY (INHALATION) at 16:38

## 2022-04-13 RX ADMIN — ATORVASTATIN CALCIUM 80 MG: 80 TABLET, FILM COATED ORAL at 21:02

## 2022-04-13 RX ADMIN — INSULIN LISPRO 4 UNITS: 100 INJECTION, SOLUTION INTRAVENOUS; SUBCUTANEOUS at 12:24

## 2022-04-13 RX ADMIN — PREDNISONE 40 MG: 20 TABLET ORAL at 12:30

## 2022-04-13 RX ADMIN — TIOTROPIUM BROMIDE INHALATION SPRAY 2 PUFF: 3.12 SPRAY, METERED RESPIRATORY (INHALATION) at 16:38

## 2022-04-13 ASSESSMENT — ENCOUNTER SYMPTOMS
WHEEZING: 0
ABDOMINAL PAIN: 0
CHEST TIGHTNESS: 0
EYE PAIN: 0
CONSTIPATION: 0
SORE THROAT: 0
VOICE CHANGE: 0
ABDOMINAL DISTENTION: 0
SHORTNESS OF BREATH: 1
BACK PAIN: 0
TROUBLE SWALLOWING: 0
SINUS PRESSURE: 0
NAUSEA: 0
RHINORRHEA: 0
DIARRHEA: 0
VOMITING: 0
CHOKING: 0
EYE DISCHARGE: 0
COUGH: 1
EYE ITCHING: 0
EYE REDNESS: 0
BLOOD IN STOOL: 0
PHOTOPHOBIA: 0

## 2022-04-13 ASSESSMENT — PAIN SCALES - GENERAL
PAINLEVEL_OUTOF10: 0

## 2022-04-13 NOTE — ED NOTES
ED nurse-to-nurse bedside report    Chief Complaint   Patient presents with    Cough      LOC: alert and orientated to name, place, date  Vital signs   Vitals:    04/13/22 0528 04/13/22 0600 04/13/22 0652   BP: (!) 159/82 (!) 171/75 (!) 146/93   Pulse: 85 75 103   Resp: 20 23 17   Temp: 98.5 °F (36.9 °C)     TempSrc: Oral     SpO2: 92% 91% 92%   Weight: 203 lb (92.1 kg)     Height: 5' 6\" (1.676 m)        Pain:    Pain Interventions: not applicable   Pain Goal: 0  Oxygen: No    Current needs required room air    Telemetry: Yes  LDAs:   Peripheral IV 04/13/22 Left (Active)   Site Assessment Clean;Dry; Intact 04/13/22 0537   Line Status Blood return noted; Flushed 04/13/22 0537   Dressing Status Clean;Dry; Intact 04/13/22 0537     Continuous Infusions:   Mobility: Independent  Esposito Fall Risk Score:    Fall Risk 3/11/2022 3/11/2022 5/7/2021   2 or more falls in past year? no - no   Fall with injury in past year? no no no     Fall Interventions: side rails raised x2, siva light is within reach  Report given to: Krystal Sims RN  04/13/22 5753

## 2022-04-13 NOTE — PROGRESS NOTES
Clinical Pharmacy Note    Waleska Horne is a 79 y.o. male for whom pharmacy has been asked to manage warfarin therapy. Reason for Admission: shortness of breath    Referring provider: KILEY Waldron  Warfarin dose PTA: 1.25 mg W, 2.5 mg all other days  Indication: a. fib  Goal INR: 2-3  Warfarin followed by: Amarilis Cotton     Past Medical History:   Diagnosis Date    MILLY (acute kidney injury) ([facility]     [facility]     Cerebral artery occlusion with cerebral infarction ([facility]     CHF (congestive heart failure), NYHA class III, acute on chronic, combined ([facility]     COPD (chronic obstructive pulmonary disease) ([facility] 8/3/2020    Coronary artery disease involving native coronary artery of native heart with angina pectoris (HCC)     Diabetes mellitus, type 2 ([facility] 12/30/2020    Hyperlipidemia 2/20/2012    Hypertension     Pneumonia               Recent Labs     04/13/22  0550   INR 2.76*     Recent Labs     04/13/22  0535   HGB 13.1*   HCT 39.8*        Current warfarin drug-drug interactions: prednisone    Date INR Warfarin Dose   04/13/22 2.76 1 mg                                    PT/INR or POC-INR will be monitored routinely until therapeutic INR is achieved.     Thank you for the consult,      Elma Liu PharmD  4/13/2022  11:28 AM

## 2022-04-13 NOTE — Clinical Note
Discharge Plan[de-identified] Other/[facility])   Telemetry/Cardiac Monitoring Required?: Yes

## 2022-04-13 NOTE — ED NOTES
Rn medicated pt per [facility] and gave bedside report to Maira dominguez RN at this time.       Jamel Albert RN  04/13/22 0700

## 2022-04-13 NOTE — ED PROVIDER NOTES
Signed out to me at shift change 7:15 AM. This patient has been seen and evaluated by previous shift physician, Dr. Juhi Campos. Please refer to his/her note for detailed history of present illness, physical exam and medical decision making. I was signed out to follow up CXR reading and possible discharge home. I have personally seen and evaluated this patient at time of sign-out. ED COURSE / MEDICAL DECISION MAKING:       Patient presents to ED with increasing shortness of breath. Past medical history of COPD. ED work-ups are reassuring including chronic elevated troponin, therapeutic INR, and a normal WBC. BNP is 2002, previous provider already gave patient a dose of IV Lasix. Patient received DuoNeb x2 and Solu-Medrol 125 mg IV. Vital signs stable at frist. Initial plan was to discharge home but his SaO2 dropped to 86% RA during re-assessment while he was resting. He is not on home oxygen. Admission is warranted. Discussed and admitted to Hospitalist service.      VITALS  Vitals:    04/13/22 0528 04/13/22 0600 04/13/22 0652   BP: (!) 159/82 (!) 171/75 (!) 146/93   Pulse: 85 75 103   Resp: 20 23 17   Temp: 98.5 °F (36.9 °C)     TempSrc: Oral     SpO2: 92% 91% 92%   Weight: 203 lb (92.1 kg)     Height: 5' 6\" (1.676 m)           LABS  Results for orders placed or performed during the hospital encounter of 04/13/22   COVID-19, Rapid    Specimen: Nasopharyngeal Swab   Result Value Ref Range    SARS-CoV-2, NAAT NOT  DETECTED NOT DETECTED   Rapid influenza A/B antigens    Specimen: Nasopharyngeal   Result Value Ref Range    Flu A Antigen Negative NEGATIVE    Flu B Antigen Negative NEGATIVE   Basic Metabolic Panel w/ Reflex to MG   Result Value Ref Range    Sodium 137 135 - 145 meq/L    Potassium reflex Magnesium 4.3 3.5 - 5.2 meq/L    Chloride 100 98 - 111 meq/L    CO2 23 23 - 33 meq/L    Glucose 134 (H) 70 - 108 mg/dL    BUN 25 (H) 7 - 22 mg/dL    CREATININE 2.2 (H) 0.4 - 1.2 mg/dL    Calcium 9.1 8.5 - 10.5 mg/dL   CBC with Auto Differential   Result Value Ref Range    WBC 10.6 4.8 - 10.8 thou/mm3    RBC 4.05 (L) 4.70 - 6.10 mill/mm3    Hemoglobin 13.1 (L) 14.0 - 18.0 gm/dl    Hematocrit 39.8 (L) 42.0 - 52.0 %    MCV 98.3 (H) 80.0 - 94.0 fL    MCH 32.3 26.0 - 33.0 pg    MCHC 32.9 32.2 - 35.5 gm/dl    RDW-CV 15.1 (H) 11.5 - 14.5 %    RDW-SD 54.5 (H) 35.0 - 45.0 fL    Platelets 052 421 - 210 thou/mm3    MPV 10.1 9.4 - 12.4 fL    Seg Neutrophils 79.6 %    Lymphocytes 7.6 %    Monocytes 10.8 %    Eosinophils 0.9 %    Basophils 0.4 %    Immature Granulocytes 0.7 %    Segs Absolute 8.4 (H) 1.8 - 7.7 thou/mm3    Lymphocytes Absolute 0.8 (L) 1.0 - 4.8 thou/mm3    Monocytes Absolute 1.1 0.4 - 1.3 thou/mm3    Eosinophils Absolute 0.1 0.0 - 0.4 thou/mm3    Basophils Absolute 0.0 0.0 - 0.1 thou/mm3    Immature Grans (Abs) 0.07 0.00 - 0.07 thou/mm3    nRBC 0 /100 wbc   Protime-INR   Result Value Ref Range    INR 2.76 (H) 0.85 - 1.13   APTT   Result Value Ref Range    aPTT 41.4 (H) 22.0 - 38.0 seconds   Brain Natriuretic Peptide   Result Value Ref Range    Pro-BNP 2002.0 (H) 0.0 - 1800.0 pg/mL   Magnesium   Result Value Ref Range    Magnesium 1.9 1.6 - 2.4 mg/dL   Hepatic Function Panel   Result Value Ref Range    Albumin 4.0 3.5 - 5.1 g/dL    Total Bilirubin 1.1 0.3 - 1.2 mg/dL    Bilirubin, Direct 0.3 0.0 - 0.3 mg/dL    Alkaline Phosphatase 104 38 - 126 U/L    AST 18 5 - 40 U/L    ALT 15 11 - 66 U/L    Total Protein 7.2 6.1 - 8.0 g/dL   Lipase   Result Value Ref Range    Lipase 23.7 5.6 - 51.3 U/L   Anion Gap   Result Value Ref Range    Anion Gap 14.0 8.0 - 16.0 meq/L   Osmolality   Result Value Ref Range    Osmolality Calc 280.2 275.0 - 300.0 mOsmol/kg   Glomerular Filtration Rate, Estimated   Result Value Ref Range    Est, Glom Filt Rate 29 (A) ml/min/1.73m2   Troponin   Result Value Ref Range    Troponin T 0.033 (A) ng/ml   EKG 12 Lead   Result Value Ref Range    Ventricular Rate 85 BPM    QRS Duration 142 ms    Q-T Interval 398 ms    QTc Calculation (Bazett) 473 ms    R Axis 9 degrees    T Axis 153 degrees         RADIOLOGY  XR CHEST PORTABLE   Final Result   Faint bibasilar interstitial opacities. This can represent mild pulmonary edema. **This report has been created using voice recognition software. It may contain minor errors which are inherent in voice recognition technology. **      Final report electronically signed by Dr. Christian Lazcano on 4/13/2022 7:19 AM            ED MEDICATIONS  Medications   ipratropium-albuterol (DUONEB) nebulizer solution 1 ampule (1 ampule Inhalation Given 4/13/22 0602)   ipratropium-albuterol (DUONEB) nebulizer solution 1 ampule (1 ampule Inhalation Given 4/13/22 0602)   methylPREDNISolone sodium (SOLU-MEDROL) injection 125 mg (125 mg IntraVENous Given 4/13/22 0600)   guaiFENesin-codeine (GUAIFENESIN AC) 100-10 MG/5ML liquid 5 mL (5 mLs Oral Given 4/13/22 0709)   furosemide (LASIX) injection 40 mg (40 mg IntraVENous Given 4/13/22 0653)     DIAGNOSIS  1. Shortness of breath    2. Acute hypoxemic respiratory failure (HCC)    3. COPD exacerbation ([address]    4. Current use of long term anticoagulation    5. Chronic kidney disease (CKD), stage IV (severe) ([address]    6. Acute on chronic congestive heart failure, unspecified heart failure type ([address]    7.  Troponin level elevated          DISPOSITION and PLAN  DISPOSITION Decision To Admit 04/13/2022 08:14:34 AM        MEDICATIONS ON DISCHARGE  New Prescriptions    No medications on file         Domenico Rivas M.D.       Domenico Rivas MD  04/13/22 1778

## 2022-04-13 NOTE — ED NOTES
Pt transported to [facility] by cart in stable condition.    Called [facility] and informed the charge nurse that the patient was on their way to the unit     Irena Schlatter, [facility]  12/93/52 1127

## 2022-04-13 NOTE — ED NOTES
RN medicated pt per [facility]. Pt is resting in cot with respirations even and unlabored. Pt voices no concern or need at this time. Call light is within reach. Will continue to monitor.       Neeta Calles RN  04/13/22 3529

## 2022-04-13 NOTE — ED NOTES
ED to inpatient nurses report    Chief Complaint   Patient presents with    Cough      Present to ED from home  LOC: alert and orientated to name, place, date  Vital signs   Vitals:    04/13/22 0722 04/13/22 0749 04/13/22 0809 04/13/22 0810   BP: 138/72 (!) 146/76     Pulse:  72     Resp: 20 20     Temp:       TempSrc:       SpO2: 91% 90% (!) 87% 95%   Weight:       Height:          Oxygen Baseline room air    Current needs required 2L NC Bipap/Cpap No  LDAs:   Peripheral IV 04/13/22 Left (Active)   Site Assessment Clean;Dry; Intact 04/13/22 0750   Line Status Normal saline locked 04/13/22 0750   Dressing Status Clean;Dry; Intact 04/13/22 0750     Mobility: Independent  Pending ED orders: complete  Present condition: stable      Electronically signed by Maddy Irving RN on 4/13/2022 at 8:27 AM       Maddy Irving RN  04/13/22 7069

## 2022-04-13 NOTE — ED PROVIDER NOTES
[facility]  eMERGENCY dEPARTMENT eNCOUnter          CHIEF COMPLAINT       Chief Complaint   Patient presents with    Cough       Nurses Notes reviewed and I agree except as noted in the HPI. HISTORY OF PRESENT ILLNESS    Raleigh Trujillo is a 79 y.o. male who presents with cough shortness of breath. Patient has a history of COPD also has a history of CHF. Patient states that he has been coughing up some mild phlegm. Patient states that he was recently taken off medication by his pulmonologist.  He had to be put back on it because he was having shortness of breath. Patient does have a history of bronchiectasis, COPD, CHF. Patient denies any fevers chills sweats at home. He has had no chest pain shortness of breath. Patient states he did his breathing treatments throughout the night but it did not help so he decided come in for evaluation and treatment. Patient is otherwise resting comfortably on the cot no apparent distress. Hemodynamically stable, oxygen saturation anywhere between 94 to 97%. No conversational dyspnea. REVIEW OF SYSTEMS     Review of Systems   Constitutional: Negative for activity change, appetite change, diaphoresis, fatigue and unexpected weight change. HENT: Negative for congestion, ear discharge, ear pain, hearing loss, rhinorrhea, sinus pressure, sore throat, trouble swallowing and voice change. Eyes: Negative for photophobia, pain, discharge, redness and itching. Respiratory: Positive for cough and shortness of breath. Negative for choking, chest tightness and wheezing. Cardiovascular: Negative for chest pain, palpitations and leg swelling. Gastrointestinal: Negative for abdominal distention, abdominal pain, blood in stool, constipation, diarrhea, nausea and vomiting. Endocrine: Negative for polydipsia, polyphagia and polyuria.    Genitourinary: Negative for decreased urine volume, difficulty urinating, dysuria, enuresis, frequency, hematuria and urgency. Musculoskeletal: Negative for arthralgias, back pain, gait problem, myalgias, neck pain and neck stiffness. Skin: Negative for pallor and rash. Allergic/Immunologic: Negative for immunocompromised state. Neurological: Negative for dizziness, tremors, seizures, syncope, facial asymmetry, weakness, light-headedness, numbness and headaches. Hematological: Negative for adenopathy. Does not bruise/bleed easily. Psychiatric/Behavioral: Negative for agitation, confusion, decreased concentration, hallucinations and suicidal ideas. The patient is not nervous/anxious. PAST MEDICAL HISTORY    has a past medical history of MILLY (acute kidney injury) ([facility] [address] Atrial fibrillation ([facility] [address] Cerebral artery occlusion with cerebral infarction ([facility] [address] CHF (congestive heart failure), NYHA class III, acute on chronic, combined ([facility] [address] COPD (chronic obstructive pulmonary disease) ([facility] [address] Coronary artery disease involving native coronary artery of native heart with angina pectoris ([facility] [address] Diabetes mellitus, type 2 ([facility] [address] Hyperlipidemia, Hypertension, and Pneumonia. SURGICAL HISTORY      has a past surgical history that includes Pacemaker insertion; hernia repair; Cardiac surgery; Coronary angioplasty with stent; other surgical history (05/10/2018); and Aortic valve replacement.     CURRENT MEDICATIONS       Previous Medications    ALBUTEROL (ACCUNEB) 1.25 MG/3ML NEBULIZER SOLUTION    Inhale 3 mLs into the lungs every 6 hours as needed for Wheezing    ALBUTEROL SULFATE HFA (VENTOLIN HFA) 108 (90 BASE) MCG/ACT INHALER    Inhale 2 puffs into the lungs 4 times daily as needed for Wheezing    ALCOHOL SWABS (B-D SINGLE USE SWABS REGULAR) PADS    Use to test blood sugar as needed    ASCORBIC ACID (VITAMIN C ER PO)    Take 1 tablet by mouth daily    ATORVASTATIN (LIPITOR) 80 MG TABLET    Take 80 mg by mouth nightly    BUDESON-GLYCOPYRROL-FORMOTEROL (BREZTRI AEROSPHERE) 160-9-4.8 MCG/ACT AERO    Inhale 2 puffs into the lungs 2 times daily    BUMETANIDE (BUMEX) 1 MG TABLET    Take 1 tablet by mouth daily    FLUTICASONE (FLONASE) 50 MCG/ACT NASAL SPRAY    2 sprays by Each Nostril route daily    LORATADINE (CLARITIN) 10 MG TABLET    Take 1 tablet by mouth daily    LOSARTAN (COZAAR) 25 MG TABLET    Take 1 tablet by mouth nightly    METFORMIN (GLUCOPHAGE) 500 MG TABLET    Take 1 tablet by mouth 2 times daily (with meals)    METOPROLOL SUCCINATE (TOPROL XL) 50 MG EXTENDED RELEASE TABLET    Take 50 mg by mouth daily     NITROGLYCERIN (NITRODUR) 0.4 MG/HR    Place 1 patch onto the skin daily    PREDNISONE (DELTASONE) 20 MG TABLET    Take 20 mg by mouth 2 times daily For 3 days for COPD flare up. Take with food. Call Coumad[facility] Clinic if have to take it. PROMETHAZINE-DEXTROMETHORPHAN (PROMETHAZINE-DM) 6.25-15 MG/5ML SYRUP    Take 5 mLs by mouth nightly    TRUE METRIX BLOOD GLUCOSE TEST STRIP    Use to test blood sugar as needed    TRUEPLUS LANCETS 28G MISC    Use to test blood sugar as needed    VITAMIN D (CHOLECALCIFEROL) 1000 UNIT TABS TABLET    Take 1 tablet by mouth daily    WARFARIN (COUMADIN) 2.5 MG TABLET    Take 1 tablet by mouth daily       ALLERGIES     is allergic to benzonatate and xanax [alprazolam]. FAMILY HISTORY     He indicated that his mother is . He indicated that his father is . Family history is unknown by patient. SOCIAL HISTORY      reports that he has quit smoking. His smoking use included cigarettes. He has a 50.00 pack-year smoking history. He has never used smokeless tobacco. He reports previous alcohol use. He reports that he does not use drugs. PHYSICAL EXAM     INITIAL VITALS:  height is 5' 6\" (1.676 m) and weight is 203 lb (92.1 kg). His oral temperature is 98.5 °F (36.9 °C). His blood pressure is 146/93 (abnormal) and his pulse is 103. His respiration is 17 and oxygen saturation is 92%. Physical Exam  Vitals and nursing note reviewed.    Constitutional:       General: He is not in acute distress. Appearance: He is well-developed. He is not diaphoretic. HENT:      Head: Normocephalic and atraumatic. Right Ear: External ear normal.      Left Ear: External ear normal.      Nose: Nose normal.   Eyes:      General: Lids are normal. No scleral icterus. Right eye: No discharge. Left eye: No discharge. Conjunctiva/sclera: Conjunctivae normal.      Right eye: No exudate. Left eye: No exudate. Pupils: Pupils are equal, round, and reactive to light. Neck:      Thyroid: No thyromegaly. Vascular: No JVD. Trachea: No tracheal deviation. Cardiovascular:      Rate and Rhythm: Normal rate and regular rhythm. Pulses: Normal pulses. Carotid pulses are 2+ on the right side and 2+ on the left side. Radial pulses are 2+ on the right side and 2+ on the left side. Femoral pulses are 2+ on the right side and 2+ on the left side. Popliteal pulses are 2+ on the right side and 2+ on the left side. Dorsalis pedis pulses are 2+ on the right side and 2+ on the left side. Posterior tibial pulses are 2+ on the right side and 2+ on the left side. Heart sounds: Normal heart sounds, S1 normal and S2 normal. No murmur heard. No friction rub. No gallop. Pulmonary:      Effort: Pulmonary effort is normal. No respiratory distress. Breath sounds: No stridor. Examination of the right-upper field reveals wheezing. Examination of the left-upper field reveals wheezing. Examination of the right-middle field reveals wheezing. Examination of the left-middle field reveals wheezing. Examination of the right-lower field reveals decreased breath sounds. Examination of the left-lower field reveals decreased breath sounds. Decreased breath sounds and wheezing present. No rhonchi or rales. Chest:      Chest wall: No tenderness. Abdominal:      General: Bowel sounds are normal. There is no distension.       Palpations: Abdomen is soft. There is no mass. Tenderness: There is no abdominal tenderness. There is no guarding or rebound. Musculoskeletal:         General: No tenderness. Normal range of motion. Cervical back: Normal range of motion and neck supple. Normal range of motion. Right lower le+ Pitting Edema present. Left lower le+ Pitting Edema present. Lymphadenopathy:      Cervical: No cervical adenopathy. Skin:     General: Skin is warm and dry. Capillary Refill: Capillary refill takes less than 2 seconds. Findings: No bruising, ecchymosis, lesion or rash. Neurological:      General: No focal deficit present. Mental Status: He is alert and oriented to person, place, and time. Mental status is at baseline. Cranial Nerves: No cranial nerve deficit. Sensory: No sensory deficit. Motor: No weakness. Coordination: Coordination normal.      Gait: Gait normal.      Deep Tendon Reflexes: Reflexes are normal and symmetric. Reflexes normal.   Psychiatric:         Mood and Affect: Mood normal.         Speech: Speech normal.         Behavior: Behavior normal.         Thought Content: Thought content normal.         Judgment: Judgment normal.           DIFFERENTIAL DIAGNOSIS:   COPD CHF pneumonia bronchitis influenza COVID    DIAGNOSTIC RESULTS     EKG: All EKG's are interpreted by the Emergency Department Physician who either signs or Co-signs this chart in the absence of a cardiologist.  EKG reveals atrial fibrillation left bundle branch block, ventricular rate 85 NE interval indeterminate QRS duration 142 QT interval of 398 QTC of 473. When compared with EKG dated 3/8/2022 no significant changes appreciated.     RADIOLOGY: non-plain film images(s) such as CT, Ultrasound and MRI are read by the radiologist.  XR CHEST PORTABLE    (Results Pending)         LABS:   Labs Reviewed   BASIC METABOLIC PANEL W/ REFLEX TO MG FOR LOW K - Abnormal; Notable for the following components:       Result Value    Glucose 134 (*)     BUN 25 (*)     CREATININE 2.2 (*)     All other components within normal limits   CBC WITH AUTO DIFFERENTIAL - Abnormal; Notable for the following components:    RBC 4.05 (*)     Hemoglobin 13.1 (*)     Hematocrit 39.8 (*)     MCV 98.3 (*)     RDW-CV 15.1 (*)     RDW-SD 54.5 (*)     Segs Absolute 8.4 (*)     Lymphocytes Absolute 0.8 (*)     All other components within normal limits   PROTIME-INR - Abnormal; Notable for the following components:    INR 2.76 (*)     All other components within normal limits   APTT - Abnormal; Notable for the following components:    aPTT 41.4 (*)     All other components within normal limits   BRAIN NATRIURETIC PEPTIDE - Abnormal; Notable for the following components:    Pro-BNP 2002.0 (*)     All other components within normal limits   GLOMERULAR FILTRATION RATE, ESTIMATED - Abnormal; Notable for the following components:    Est, Glom Filt Rate 29 (*)     All other components within normal limits   COVID-19, RAPID   RAPID INFLUENZA A/B ANTIGENS   MAGNESIUM   HEPATIC FUNCTION PANEL   LIPASE   ANION GAP   OSMOLALITY       EMERGENCY DEPARTMENT COURSE:   Vitals:    Vitals:    04/13/22 0528 04/13/22 0600 04/13/22 0652   BP: (!) 159/82 (!) 171/75 (!) 146/93   Pulse: 85 75 103   Resp: 20 23 17   Temp: 98.5 °F (36.9 °C)     TempSrc: Oral     SpO2: 92% 91% 92%   Weight: 203 lb (92.1 kg)     Height: 5' 6\" (1.676 m)       Patient was assessed at bedside labs and imaging were ordered. Breathing treatments ordered. Labs ordered. Patient's BNP is slightly elevated he was given 40 Lasix. Patient had improvement of his symptoms. He was also given cough medication for his bothersome cough. At this point we had been waiting for quite some time for radiology to read the chest x-ray. I came to the end of my shift. Patient is signed out to my morning colleague in stable condition.     CRITICAL CARE: None    CONSULTS:  None    PROCEDURES:  None    FINAL IMPRESSION      1. Shortness of breath          DISPOSITION/PLAN   ED observation/signout    PATIENT REFERRED TO:  No follow-up provider specified.     DISCHARGE MEDICATIONS:  New Prescriptions    No medications on file       (Please note that portions of this note were completed with a voice recognition program.  Efforts were made to edit the dictations but occasionally words are mis-transcribed.)    DO Reji Cho DO  04/13/22 0701

## 2022-04-13 NOTE — H&P
Hospitalist - History & Physical      Patient: Randolph Christie    Unit/Bed:12/012A  YOB: 1940  MRN: [id number]   Acct: [de-identified]   PCP: Franc Pop, WILBERTO - CNP    Date of Service: Pt seen/examined on 04/13/22  and Admitted to Inpatient with expected LOS greater than two midnights due to medical therapy. Chief Complaint:  Cough and shortness of breath    Assessment and Plan:  1. Acute hypoxic respiratory failure, secondary to acute HFpEF exacerbation:   SpO2 87% on RA in ED, now requiring 2L O2 NC. Pt afebrile, non-toxic appearing, with no acute leukocytosis. Has associated productive cough, and LE edema bilaterally, and rales appreciated on exam. Labs reveal elevated BNP, chronically elevated troponin. CXR today reveals mild bilateral pulmonary edema. Last ECHO 7/3/2021 reveals EF 50% with mild left ventricular hypertrophy. Folows with Dr. Mariaelena Johns. Given Lasix 40mg IV, duoneb, and solu-medrol in ED. Pt reports taking home bumex this AM. Continue Bumex 1mg BID, start prednisone 40mg QD x 5 days, mucinex PRN. Strict I&Os, daily weights, Cardiac diet and 2L fluid restriction. Repeat ECHO. Repeat BMP in the AM. Telemetry. 2. Possible acute COPD exacerbation:    Pt requiring 2L O2NC- baseline RA. Pt endorses productive cough with yellow-green sputum. Pt afebrile with no acute leukocytosis, Procal negative. Given solumedrol in ED. Continue home inhalers, prednisone 40mg QD x 5 days. Repeat CBC, BMP  in the AM. Wean O2 as tolerated. 3. Essential HTN:    Appears controlled. Continue Losartan, BB. Currently receiving additional diuretics. Continue to monitor BP closely. 4. Persistent A. Fib, with pacemaker:    INR 2.76 today. Pt appears rate controlled today. Continue BB. Pharmacy to dose warfarin. Telemetry. 5. CKD, stage III:    Cr. 2.2 today. Follows with Dr. Allan Grider. Appears to be around baseline. Pt reports no difficulties voiding.  To note, Patient is receiving diuretics for #1. Continue to monitor with I&Os, and repeat BMP in the AM.    6. CAD:    Stable. Pt does not endorse chest pain today. Troponin noted to be chronically elevated and appears to be around baseline today. EKG with no ischemic changes. Continue BB, statin, warfarin. 7. T2DM:    Glucose 134 today. Pt reports taking medications this AM. Hold home metformin. Start Low Dose SSI. Hypoglycemia protocol in place. POC glucose checks. Carb limited diet. Repeat BMP in the AM.     8. Chronic macrocytic anemia:    Hgb 13.1, Hct 39.8. VitB12 and folate ordered. No signs of acute bleeding. Repeat CBC in the AM.         History Of Present Illness:    Jhony Glez is a 78 y/o  male with a PMHx of COPD, HFpEF, HTN, Hx of CVA, CAD, S/p TAVR, persistent A. Fib on warfarin who presents to [facility] ED today for the evaluation of cough and shortness of breath that started last evening. The patient states he took a nebulizer treatment at home for shortness of breath before bed, but has had worsening SOB and cough all night. The cough is chronic, but significantly worsened and consistently productive of yellow-green sputum. Otherwise, the patient has also noted associated worsening SOB for the last few days with activity. He states his shortness of breath resolves with rest. He denies associated chest pain, lightheadedness, dizziness, abdominal pain, fever, chills, worsening LE edema. He states he has not noticed an unexpected weight change, and other wise states he feels well overall.        Past Medical History:        Diagnosis Date    MILLY (acute kidney injury) ([address]     Atrial fibrillation (HCC)     Cerebral artery occlusion with cerebral infarction ([address]     CHF (congestive heart failure), NYHA class III, acute on chronic, combined ([address]     COPD (chronic obstructive pulmonary disease) ([address] 8/3/2020    Coronary artery disease involving native coronary artery of native heart with angina pectoris ([address]     Diabetes mellitus, type 2 ([facility] 12/30/2020    Hyperlipidemia 2/20/2012    Hypertension     Pneumonia        Past Surgical History:        Procedure Laterality Date    AORTIC VALVE REPLACEMENT      CARDIAC SURGERY      heart cath    CORONARY ANGIOPLASTY WITH STENT PLACEMENT      HERNIA REPAIR      OTHER SURGICAL HISTORY  05/10/2018    PACEMAKER INSERTION         Home Medications:   No current facility-administered medications on file prior to encounter. Current Outpatient Medications on File Prior to Encounter   Medication Sig Dispense Refill    predniSONE (DELTASONE) 20 MG tablet Take 20 mg by mouth 2 times daily For 3 days for COPD flare up. Take with food. Call [facility] if have to take it.  (Patient not taking: Reported on 3/28/2022)      loratadine (CLARITIN) 10 MG tablet Take 1 tablet by mouth daily 30 tablet 0    fluticasone (FLONASE) 50 MCG/ACT nasal spray 2 sprays by Each Nostril route daily 1 each 0    Budeson-Glycopyrrol-Formoterol (BREZTRI AEROSPHERE) 160-9-4.8 MCG/ACT AERO Inhale 2 puffs into the lungs 2 times daily 3 each 3    albuterol sulfate HFA (VENTOLIN HFA) 108 (90 Base) MCG/ACT inhaler Inhale 2 puffs into the lungs 4 times daily as needed for Wheezing 54 g 1    promethazine-dextromethorphan (PROMETHAZINE-DM) 6.25-15 MG/5ML syrup Take 5 mLs by mouth nightly (Patient not taking: Reported on 3/28/2022) 450 mL 0    Ascorbic Acid (VITAMIN C ER PO) Take 1 tablet by mouth daily      nitroGLYCERIN (NITRODUR) 0.4 MG/HR Place 1 patch onto the skin daily      albuterol (ACCUNEB) 1.25 MG/3ML nebulizer solution Inhale 3 mLs into the lungs every 6 hours as needed for Wheezing (Patient not taking: Reported on 3/28/2022) 360 mL 3    warfarin (COUMADIN) 2.5 MG tablet Take 1 tablet by mouth daily 90 tablet 3    bumetanide (BUMEX) 1 MG tablet Take 1 tablet by mouth daily 90 tablet 3    losartan (COZAAR) 25 MG tablet Take 1 tablet by mouth nightly 90 tablet 3    metFORMIN (GLUCOPHAGE) 500 MG tablet Take 1 tablet by mouth 2 times daily (with meals) 180 tablet 3    TRUEplus Lancets 28G MISC Use to test blood sugar as needed      TRUE METRIX BLOOD GLUCOSE TEST strip Use to test blood sugar as needed      Alcohol Swabs (B-D SINGLE USE SWABS REGULAR) PADS Use to test blood sugar as needed      vitamin D (CHOLECALCIFEROL) 1000 UNIT TABS tablet Take 1 tablet by mouth daily      atorvastatin (LIPITOR) 80 MG tablet Take 80 mg by mouth nightly      metoprolol succinate (TOPROL XL) 50 MG extended release tablet Take 50 mg by mouth daily          Allergies:    Benzonatate and Xanax [alprazolam]    Social History:    reports that he has quit smoking. His smoking use included cigarettes. He has a 50.00 pack-year smoking history. He has never used smokeless tobacco. He reports previous alcohol use. He reports that he does not use drugs. Family History:       Family history unknown: Yes       Diet:  No diet orders on file    Review of systems:     Review of Systems   Constitutional: Positive for activity change. Negative for appetite change, chills, diaphoresis, fatigue, fever and unexpected weight change. HENT: Negative for congestion, rhinorrhea and sore throat. Respiratory: Positive for cough and shortness of breath. Negative for chest tightness and wheezing. Cardiovascular: Positive for leg swelling. Negative for chest pain and palpitations. Gastrointestinal: Negative for abdominal distention, abdominal pain, constipation, diarrhea, nausea and vomiting. Genitourinary: Negative for difficulty urinating, dysuria, frequency and urgency. Neurological: Negative for dizziness, tremors, weakness, light-headedness, numbness and headaches. PHYSICAL EXAM:  BP (!) 146/76   Pulse 72   Temp 98.5 °F (36.9 °C) (Oral)   Resp 20   Ht 5' 6\" (1.676 m)   Wt 203 lb (92.1 kg)   SpO2 95%   BMI 32.77 kg/m²   General appearance: No apparent distress. Appears stated age and cooperative.   Skin: Skin color, texture, turgor normal.  No rashes or lesions. HEENT: Normal cephalic, atraumatic without obvious deformity. Pupils equal, round, and reactive to light. Extra-ocular muscles intact. Conjunctivae/corneas clear. Neck: Trachea midline. Supple, with full range of motion. No jugular venous distention. Cardiovascular: Irregular rate and irregular rhythm with normal S1/S2. No murmurs, rubs or gallops. Respiratory: Rhonchi heard at upper right lung field. Light rales heard at left lower lung field. Normal respiratory effort. Abdomen: Epigastric and left lower quadrant tenderness noted with palpation. Slightly distended, but otherwise soft. Normal bowel sounds. Musculoskeletal:  Pitting edema noted in LE bilaterally. No weakness or instability noted. No edema, erythema, or gross deformity noted. Vascular:  Pulses +2 palpable, equal bilaterally. Neurologic:  Neurovascularly intact without any focal sensory/motor deficits. Cranial nerves: II-XII grossly intact. Psychiatric: Alert and oriented, thought content appropriate, normal insight      Labs:   Recent Labs     04/13/22  0535   WBC 10.6   HGB 13.1*   HCT 39.8*        Recent Labs     04/13/22  0535      K 4.3      CO2 23   BUN 25*   CREATININE 2.2*   CALCIUM 9.1     Recent Labs     04/13/22  0535   AST 18   ALT 15   BILIDIR 0.3   BILITOT 1.1   ALKPHOS 104     Recent Labs     04/13/22  0550   INR 2.76*     No results for input(s): [place] in the last 72 hours. Urinalysis:    Lab Results   Component Value Date    NITRU NEGATIVE 07/03/2021    WBCUA NONE SEEN 06/10/2021    BACTERIA NONE SEEN 06/10/2021    RBCUA NONE SEEN 06/10/2021    BLOODU NEGATIVE 07/03/2021    SPECGRAV 1.006 06/10/2021    GLUCOSEU NEGATIVE 07/03/2021       Radiology:   XR CHEST PORTABLE   Final Result   Faint bibasilar interstitial opacities. This can represent mild pulmonary edema.                **This report has been created using voice recognition software. It may contain minor errors which are inherent in voice recognition technology. **      Final report electronically signed by Dr. Amy Busby on 4/13/2022 7:19 AM        XR CHEST PORTABLE    Result Date: 4/13/2022  PROCEDURE: XR CHEST PORTABLE CLINICAL INFORMATION: Cough shortness of breath history of COPD and CHF. COMPARISON: Chest x-ray 3/8/2022. TECHNIQUE: AP upright view of the chest. FINDINGS: There is a pacemaker. There is a replaced aortic valve. There is cardiac negative. The mediastinum is not widened. There are some faint interstitial opacities in the mid and lower lung zones. There are no definite pleural effusions. There is no pneumothorax. The pulmonary vascularity is normal.     Faint bibasilar interstitial opacities. This can represent mild pulmonary edema. **This report has been created using voice recognition software. It may contain minor errors which are inherent in voice recognition technology. ** Final report electronically signed by Dr. Amy Busby on 4/13/2022 7:19 AM        EKG:  Atrial fibrillation with LBBB    Electronically signed by Samir Kc PA-C on 4/13/2022 at 8:37 AM              \

## 2022-04-13 NOTE — ED TRIAGE NOTES
Pt presents to the ED from home with complaints of having a productive cough all night long. Pt states last night he used did one of his respiratory treatments and after that he has been coughing and coughing up phlem. Pt states he is a little short of breath. Pt was 92% on room air. Pt states he has COPD. Pt denies any pain at this time. Pt is alert and oriented x4, with respirations even and unlabored.

## 2022-04-14 LAB
ANION GAP SERPL CALCULATED.3IONS-SCNC: 16 MEQ/L (ref 8–16)
BASOPHILS # BLD: 0.1 %
BASOPHILS ABSOLUTE: 0 THOU/MM3 (ref 0–0.1)
BUN BLDV-MCNC: 41 MG/DL (ref 7–22)
CALCIUM SERPL-MCNC: 8.8 MG/DL (ref 8.5–10.5)
CHLORIDE BLD-SCNC: 99 MEQ/L (ref 98–111)
CO2: 20 MEQ/L (ref 23–33)
CREAT SERPL-MCNC: 2.4 MG/DL (ref 0.4–1.2)
EKG Q-T INTERVAL: 398 MS
EKG QRS DURATION: 142 MS
EKG QTC CALCULATION (BAZETT): 473 MS
EKG R AXIS: 9 DEGREES
EKG T AXIS: 153 DEGREES
EKG VENTRICULAR RATE: 85 BPM
EOSINOPHIL # BLD: 0 %
EOSINOPHILS ABSOLUTE: 0 THOU/MM3 (ref 0–0.4)
ERYTHROCYTE [DISTWIDTH] IN BLOOD BY AUTOMATED COUNT: 14.6 % (ref 11.5–14.5)
ERYTHROCYTE [DISTWIDTH] IN BLOOD BY AUTOMATED COUNT: 53 FL (ref 35–45)
GFR SERPL CREATININE-BSD FRML MDRD: 26 ML/MIN/1.73M2
GLUCOSE BLD-MCNC: 208 MG/DL (ref 70–108)
GLUCOSE BLD-MCNC: 210 MG/DL (ref 70–108)
GLUCOSE BLD-MCNC: 243 MG/DL (ref 70–108)
GLUCOSE BLD-MCNC: 276 MG/DL (ref 70–108)
GLUCOSE BLD-MCNC: 328 MG/DL (ref 70–108)
HCT VFR BLD CALC: 36 % (ref 42–52)
HEMOGLOBIN: 11.7 GM/DL (ref 14–18)
IMMATURE GRANS (ABS): 0.08 THOU/MM3 (ref 0–0.07)
IMMATURE GRANULOCYTES: 0.8 %
INR BLD: 2.47 (ref 0.85–1.13)
LYMPHOCYTES # BLD: 6.2 %
LYMPHOCYTES ABSOLUTE: 0.6 THOU/MM3 (ref 1–4.8)
MCH RBC QN AUTO: 31.7 PG (ref 26–33)
MCHC RBC AUTO-ENTMCNC: 32.5 GM/DL (ref 32.2–35.5)
MCV RBC AUTO: 97.6 FL (ref 80–94)
MONOCYTES # BLD: 8.7 %
MONOCYTES ABSOLUTE: 0.9 THOU/MM3 (ref 0.4–1.3)
NUCLEATED RED BLOOD CELLS: 0 /100 WBC
PLATELET # BLD: 171 THOU/MM3 (ref 130–400)
PMV BLD AUTO: 11.1 FL (ref 9.4–12.4)
POTASSIUM REFLEX MAGNESIUM: 4.4 MEQ/L (ref 3.5–5.2)
RBC # BLD: 3.69 MILL/MM3 (ref 4.7–6.1)
SEG NEUTROPHILS: 84.2 %
SEGMENTED NEUTROPHILS ABSOLUTE COUNT: 8.5 THOU/MM3 (ref 1.8–7.7)
SODIUM BLD-SCNC: 135 MEQ/L (ref 135–145)
WBC # BLD: 10.1 THOU/MM3 (ref 4.8–10.8)

## 2022-04-14 PROCEDURE — 6370000000 HC RX 637 (ALT 250 FOR IP)

## 2022-04-14 PROCEDURE — 97162 PT EVAL MOD COMPLEX 30 MIN: CPT

## 2022-04-14 PROCEDURE — 85025 COMPLETE CBC W/AUTO DIFF WBC: CPT

## 2022-04-14 PROCEDURE — 94640 AIRWAY INHALATION TREATMENT: CPT

## 2022-04-14 PROCEDURE — 85610 PROTHROMBIN TIME: CPT

## 2022-04-14 PROCEDURE — 6370000000 HC RX 637 (ALT 250 FOR IP): Performed by: PHYSICIAN ASSISTANT

## 2022-04-14 PROCEDURE — 93325 DOPPLER ECHO COLOR FLOW MAPG: CPT

## 2022-04-14 PROCEDURE — 1200000003 HC TELEMETRY R&B

## 2022-04-14 PROCEDURE — 93307 TTE W/O DOPPLER COMPLETE: CPT

## 2022-04-14 PROCEDURE — 2700000000 HC OXYGEN THERAPY PER DAY

## 2022-04-14 PROCEDURE — 82948 REAGENT STRIP/BLOOD GLUCOSE: CPT

## 2022-04-14 PROCEDURE — 97116 GAIT TRAINING THERAPY: CPT

## 2022-04-14 PROCEDURE — 94760 N-INVAS EAR/PLS OXIMETRY 1: CPT

## 2022-04-14 PROCEDURE — 2500000003 HC RX 250 WO HCPCS

## 2022-04-14 PROCEDURE — 93010 ELECTROCARDIOGRAM REPORT: CPT | Performed by: INTERNAL MEDICINE

## 2022-04-14 PROCEDURE — 36415 COLL VENOUS BLD VENIPUNCTURE: CPT

## 2022-04-14 PROCEDURE — 99232 SBSQ HOSP IP/OBS MODERATE 35: CPT | Performed by: HOSPITALIST

## 2022-04-14 PROCEDURE — 80048 BASIC METABOLIC PNL TOTAL CA: CPT

## 2022-04-14 PROCEDURE — 2580000003 HC RX 258

## 2022-04-14 RX ORDER — WARFARIN SODIUM 2.5 MG/1
2.5 TABLET ORAL
Status: COMPLETED | OUTPATIENT
Start: 2022-04-14 | End: 2022-04-14

## 2022-04-14 RX ORDER — CODEINE PHOSPHATE AND GUAIFENESIN 10; 100 MG/5ML; MG/5ML
5 SOLUTION ORAL EVERY 6 HOURS PRN
Status: DISCONTINUED | OUTPATIENT
Start: 2022-04-14 | End: 2022-04-24 | Stop reason: HOSPADM

## 2022-04-14 RX ADMIN — SODIUM CHLORIDE, PRESERVATIVE FREE 10 ML: 5 INJECTION INTRAVENOUS at 21:21

## 2022-04-14 RX ADMIN — CETIRIZINE HYDROCHLORIDE 5 MG: 10 TABLET, FILM COATED ORAL at 08:35

## 2022-04-14 RX ADMIN — INSULIN LISPRO 2 UNITS: 100 INJECTION, SOLUTION INTRAVENOUS; SUBCUTANEOUS at 12:41

## 2022-04-14 RX ADMIN — BUMETANIDE 1 MG: 0.25 INJECTION, SOLUTION INTRAMUSCULAR; INTRAVENOUS at 08:30

## 2022-04-14 RX ADMIN — GUAIFENESIN 600 MG: 600 TABLET, EXTENDED RELEASE ORAL at 21:21

## 2022-04-14 RX ADMIN — MOMETASONE FUROATE AND FORMOTEROL FUMARATE DIHYDRATE 2 PUFF: 200; 5 AEROSOL RESPIRATORY (INHALATION) at 08:58

## 2022-04-14 RX ADMIN — INSULIN LISPRO 2 UNITS: 100 INJECTION, SOLUTION INTRAVENOUS; SUBCUTANEOUS at 08:37

## 2022-04-14 RX ADMIN — SODIUM CHLORIDE, PRESERVATIVE FREE 10 ML: 5 INJECTION INTRAVENOUS at 08:35

## 2022-04-14 RX ADMIN — GUAIFENESIN 600 MG: 600 TABLET, EXTENDED RELEASE ORAL at 08:35

## 2022-04-14 RX ADMIN — INSULIN LISPRO 1 UNITS: 100 INJECTION, SOLUTION INTRAVENOUS; SUBCUTANEOUS at 21:44

## 2022-04-14 RX ADMIN — GUAIFENESIN AND CODEINE PHOSPHATE 5 ML: 100; 10 SOLUTION ORAL at 05:23

## 2022-04-14 RX ADMIN — INSULIN LISPRO 4 UNITS: 100 INJECTION, SOLUTION INTRAVENOUS; SUBCUTANEOUS at 17:13

## 2022-04-14 RX ADMIN — METOPROLOL SUCCINATE 50 MG: 50 TABLET, EXTENDED RELEASE ORAL at 08:35

## 2022-04-14 RX ADMIN — WARFARIN SODIUM 2.5 MG: 2.5 TABLET ORAL at 17:48

## 2022-04-14 RX ADMIN — MOMETASONE FUROATE AND FORMOTEROL FUMARATE DIHYDRATE 2 PUFF: 200; 5 AEROSOL RESPIRATORY (INHALATION) at 18:27

## 2022-04-14 RX ADMIN — Medication 1000 UNITS: at 08:32

## 2022-04-14 RX ADMIN — LOSARTAN POTASSIUM 25 MG: 25 TABLET, FILM COATED ORAL at 21:21

## 2022-04-14 RX ADMIN — TIOTROPIUM BROMIDE INHALATION SPRAY 2 PUFF: 3.12 SPRAY, METERED RESPIRATORY (INHALATION) at 08:57

## 2022-04-14 RX ADMIN — OXYCODONE HYDROCHLORIDE AND ACETAMINOPHEN 500 MG: 500 TABLET ORAL at 08:34

## 2022-04-14 RX ADMIN — ATORVASTATIN CALCIUM 80 MG: 80 TABLET, FILM COATED ORAL at 21:21

## 2022-04-14 RX ADMIN — PREDNISONE 40 MG: 20 TABLET ORAL at 08:34

## 2022-04-14 ASSESSMENT — PAIN SCALES - GENERAL
PAINLEVEL_OUTOF10: 0

## 2022-04-14 NOTE — PROGRESS NOTES
[address] [address]  INPATIENT PHYSICAL THERAPY  EVALUATION  [facility] ONC MED 5K - [id number]    Time In: 8944  Time Out: 2145  Timed Code Treatment Minutes: 23 Minutes  Minutes: 32          Date: 2022  Patient Name: Vandana Sparks,  Gender:  male        MRN: [id number]  : 1940  (79 y.o.)      Referring Practitioner: Norman Gray MD  Diagnosis: shortness of breath  Additional Pertinent Hx: Negar Hernandez is a 78 y/o  male with a PMHx of COPD, HFpEF, HTN, Hx of CVA, CAD, S/p TAVR, persistent A. Fib on warfarin who presents to [facility] ED today for the evaluation of cough and shortness of breath that started last evening. The patient states he took a nebulizer treatment at home for shortness of breath before bed, but has had worsening SOB and cough all night. The cough is chronic, but significantly worsened and consistently productive of yellow-green sputum. Otherwise, the patient has also noted associated worsening SOB for the last few days with activity. He states his shortness of breath resolves with rest. He denies associated chest pain, lightheadedness, dizziness, abdominal pain, fever, chills, worsening LE edema. He states he has not noticed an unexpected weight change, and other wise states he feels well overall. Restrictions/Precautions:  Restrictions/Precautions: General Precautions,Fall Risk    Subjective:  Chart Reviewed: Yes  Patient assessed for rehabilitation services?: Yes  Family / Caregiver Present: Yes (s.o. and brother in law)  Subjective: RN approved session.  Pt pleasant and agreeable to therapy session    General:  Overall Orientation Status: Within Functional Limits  Follows Commands: Within Functional Limits    Vision: Within Functional Limits    Hearing: Within functional limits         Pain: 0/10: denies pain     Vitals: Nurse checked vitals prior to session  Oxygen: Pt on Room Air upon arrival, RN assessed vitals and pt PO2 88%, 2L NC placed on pt and he remained >90% rest of session    Social/Functional History:    Lives With: Significant other  Type of Home: Trailer  Home Layout: One level  Home Access: Stairs to enter with rails  Entrance Stairs - Number of Steps: 5  Entrance Stairs - Rails: Both  Home Equipment: Rolling walker             ADL Assistance: Independent  Homemaking Assistance: Independent  Ambulation Assistance: Independent (no AD)  Transfer Assistance: Independent               OBJECTIVE:  Range of Motion:  Bilateral Lower Extremity: WFL    Strength:  Bilateral Lower Extremity: WFL    Balance:  Static Sitting Balance:  Supervision  Static Standing Balance: Supervision    Bed Mobility:  Supine to Sit: Supervision  Sit to Supine: Supervision     Transfers:  Sit to Stand: Supervision  Stand to Sit:Supervision    Ambulation:  Stand By Assistance  Distance: 400'   Surface: Level Tile  Device:No Device  Gait Deviations:  Slow Krystle, Decreased Step Length Bilaterally and Decreased Gait Speed    Functional Outcome Measures: Completed  AM-PAC Inpatient Mobility Raw Score : 18  AM-PAC Inpatient T-Scale Score : 43.63    ASSESSMENT:  Activity Tolerance:  Patient tolerance of  treatment: good. Pt does demonstrates mild SOB with ambulation but saturations >90%       Treatment Initiated: Treatment and education initiated within context of evaluation. Evaluation time included review of current medical information, gathering information related to past medical, social and functional history, completion of standardized testing, formal and informal observation of tasks, assessment of data and development of plan of care and goals. Treatment time included skilled education and facilitation of tasks to increase safety and independence with functional mobility for improved independence and quality of life. Assessment:   Body structures, Functions, Activity limitations: Decreased functional mobility ,Decreased endurance,Decreased strength,Decreased balance  Assessment: Pt demonstrates a decrease in baseline by way of bed mobility, transfers and ambulation secondary to decreased activity tolerance, strength, fatigue, and balance deficits. Pt will benefit from skilled PT services throughout admission and beyond hospital discharge for improvements in functional mobility and in order to decrease fall risk and return pt to PLOF. Prognosis: Good    REQUIRES PT FOLLOW UP: Yes    Discharge Recommendations:  Discharge Recommendations: Home with assist PRN    Patient Education:  PT Education: Lydia Hermans of Care,Functional Mobility Training    Equipment Recommendations:  Equipment Needed: No    Plan:  Times per week: 3-5x GM  Times per day: Daily  Current Treatment Recommendations: Strengthening,Gait Training,Stair training,Functional Mobility Training    Goals:  Patient goals : none stated  Short term goals  Time Frame for Short term goals: by discharge  Short term goal 1: bed mobility with HOB flat, no rails, mod I for increased functional ind  Short term goal 2: sit<>stand from various surfaces with LRD mod I for safe transfers  Short term goal 3: ambulate 200' wiht LRD mod I and good O2 saturations for safe household/community distances  Short term goal 4: Navigate 5 steps with LRD mod I for safe household distances  Long term goals  Time Frame for Long term goals : NA d/t short ELOS    Following session, patient left in safe position with all fall risk precautions in place.     Mckenna Rae PT, DPT

## 2022-04-14 NOTE — PLAN OF CARE
Problem: Impaired respiratory status  Goal: Clear lung sounds  Outcome: Met This Shift  Note: Spiriva and Dulera to maintain clear breath sounds.

## 2022-04-14 NOTE — PROGRESS NOTES
Clinical Pharmacy Note    Warfarin consult follow-up    Recent Labs     04/14/22  0612   INR 2.47*     Recent Labs     04/13/22  0535 04/14/22  0612   HGB 13.1* 11.7*   HCT 39.8* 36.0*    171     Significant Drug-Drug Interactions:  New warfarin drug-drug interactions: ascorbic acid  Discontinued drug-drug interactions: none    Current warfarin drug-drug interactions: prednisone     Date INR Warfarin Dose   04/13/22 2.76 1 mg     04/14/22  2.47  2.5 mg                                             Notes:                   PT/INR or POC-INR will be monitored routinely until therapeutic INR is achieved.      Amelia Garzon, PharmD  4/14/2022  7:55 AM

## 2022-04-14 NOTE — PLAN OF CARE
Problem: Falls - Risk of:  Goal: Will remain free from falls  Description: Will remain free from falls  Outcome: Ongoing   All fall precautions in place. Bed in low position, alarm activated and appropriate use of call light. Problem: Pain:  Goal: Patient's pain/discomfort is manageable  Description: Patient's pain/discomfort is manageable  Outcome: Ongoing   Pain Assessment: 0-10  Pain Level: 0   Patient's Stated Pain Goal: 1   Is pain goal met at this time? Yes   Patient denies pain at this time, will continue to monitor and reassess. Problem: Discharge Planning:  Goal: Patients continuum of care needs are met  Description: Patients continuum of care needs are met  Outcome: Ongoing   Discharge date is unclear, plan is to return to home with significant other. Problem: Skin Integrity:  Goal: Skin integrity will stabilize  Description: Skin integrity will stabilize  Outcome: Ongoing   Skin assessment completed. Patient turned every 2 hours and as needed. No skin breakdown this shift. Care plan reviewed with patient. Patient verbalizes understanding of the plan of care and contributes to goal setting.

## 2022-04-14 NOTE — CARE COORDINATION
4/14/22, 11:53 AM EDT  DISCHARGE PLANNING EVALUATION:    Joanna Post       [facility]   Location: [address] [place] [address] Reason for admit: Shortness of breath [R06.02]  Chronic kidney disease (CKD), stage IV (severe) (HCC) [N18.4]  COPD exacerbation (HCC) [J44.1]  Troponin level elevated [R77.8]  Current use of long term anticoagulation [Z79.01]  Acute hypoxemic respiratory failure (HCC) [J96.01]  Acute on chronic congestive heart failure, unspecified heart failure type ([address] [I50.9]   PMH:  has a past medical history of MILLY (acute kidney injury) ([address] Atrial fibrillation ([address] Cerebral artery occlusion with cerebral infarction ([address] CHF (congestive heart failure), NYHA class III, acute on chronic, combined ([address] COPD (chronic obstructive pulmonary disease) ([address] Coronary artery disease involving native coronary artery of native heart with angina pectoris ([address] Diabetes mellitus, type 2 ([address] Hyperlipidemia, Hypertension, and Pneumonia. Procedure:   4/13 CXR:   Faint bibasilar interstitial opacities. This can represent mild pulmonary edema.           Barriers to Discharge:  Presented with sob. On room air. Receiving IV bumex bid. PCP: WILBERTO Kolb CNP  Readmission Risk Score: 19.5 ( )%    Patient Goals/Plan/Treatment Preferences: Spoke with Lester Hernandez and wife, plan is to return home at discharge. He is independent and does not use dme. He does not anticipate discharge needs. Transportation/Food Security/Housekeeping Addressed:  No issues identified.

## 2022-04-14 NOTE — PROGRESS NOTES
Hospitalist Progress Note    Patient:  Kingsley Hayes      Unit/Bed:5K-26/026-A    YOB: 1940    MRN: [id number]       Acct: [de-identified]     PCP: WILBERTO Hancock CNP    Date of Admission: 4/13/2022    Assessment/Plan:    1. Acute hypoxemic respiratory failure: Patient was hypoxic in the ED requiring 2 L nasal cannula wean O2, encourage. Pulmonary toiletry and incentive spirometry. 2. Acute on chronic systolic heart failure: Patient diuresed with IV Bumex, 2D echo shows ejection fraction 45 to 50%. We will hold diuretics at this time. 3. Acute on chronic kidney disease stage III: Creatinine is climbing up to 2.4 today, will hold evening time of Bumex and repeat renal function in a.m.  4. Acute exacerbation of COPD: Continue with prednisone 40 mg daily, patient reports feeling better after initiation of Mucinex. Continue with pulmonary toiletry and as needed bronchodilators  5. Hypertension: Continue with current blood pressure medications and monitor. 6. Persistent A. fib with history of pacemaker placement: Continue with beta-blocker and Coumadin      Subjective (past 24 hours):   Patient was seen and examined at bedside. Reports shortness of breath is slightly improved. Patient states his cough became more productive and felt better after taking Mucinex. No reports of chest pain.   No new complaints or acute overnight events      Medications:  Reviewed    Infusion Medications    sodium chloride      dextrose       Scheduled Medications    warfarin  2.5 mg Oral Once    ascorbic acid  500 mg Oral Daily    atorvastatin  80 mg Oral Nightly    cetirizine  5 mg Oral Daily    losartan  25 mg Oral Nightly    metoprolol succinate  50 mg Oral Daily    Vitamin D  1,000 Units Oral Daily    sodium chloride flush  10 mL IntraVENous 2 times per day    insulin lispro  0-6 Units SubCUTAneous TID     insulin lispro  0-3 Units SubCUTAneous Nightly    guaiFENesin  600 mg Oral BID    predniSONE  40 mg Oral Daily    warfarin placeholder: dosing by pharmacy   Other RX Placeholder    mometasone-formoterol  2 puff Inhalation BID    tiotropium  2 puff Inhalation Daily    bumetanide  1 mg IntraVENous BID     PRN Meds: guaiFENesin-codeine, albuterol sulfate HFA, sodium chloride flush, sodium chloride, ondansetron **OR** ondansetron, polyethylene glycol, acetaminophen **OR** acetaminophen, glucagon (rDNA), dextrose, dextrose bolus (hypoglycemia) **OR** dextrose bolus (hypoglycemia), glucose **OR** glucose, benzocaine-menthol      Intake/Output Summary (Last 24 hours) at 4/14/2022 1337  Last data filed at 4/14/2022 1311  Gross per 24 hour   Intake 610 ml   Output 850 ml   Net -240 ml       Diet:  ADULT DIET; Regular; 4 carb choices (60 gm/meal); Low Sodium (2 gm); 2000 ml    Exam:  BP (!) 106/56   Pulse 59   Temp 98.3 °F (36.8 °C) (Oral)   Resp 18   Ht 5' 5.98\" (1.676 m)   Wt 204 lb (92.5 kg)   SpO2 93%   BMI 32.94 kg/m²     General appearance: No apparent distress, appears stated age and cooperative. Respiratory:  Normal respiratory effort. Clear to auscultation, bilaterally without Rales/Wheezes/Rhonchi. Cardiovascular: Regular rate and rhythm with normal S1/S2 without murmurs, rubs or gallops. Abdomen: Soft, non-tender, non-distended with normal bowel sounds. Extremities: no pedal edema  Skin:  no rashes    Labs:   Recent Labs     04/13/22  0535 04/14/22  0612   WBC 10.6 10.1   HGB 13.1* 11.7*   HCT 39.8* 36.0*    171     Recent Labs     04/13/22  0535 04/14/22  0612    135   K 4.3 4.4    99   CO2 23 20*   BUN 25* 41*   CREATININE 2.2* 2.4*   CALCIUM 9.1 8.8     Recent Labs     04/13/22  0535   AST 18   ALT 15   BILIDIR 0.3   BILITOT 1.1   ALKPHOS 104     Recent Labs     04/13/22  0550 04/14/22  0612   INR 2.76* 2.47*     No results for input(s): Benji Alcantar in the last 72 hours.   Recent Labs     04/13/22  0535   PROCAL 0.12*       Microbiology: Urinalysis:      Lab Results   Component Value Date    NITRU NEGATIVE 07/03/2021    WBCUA NONE SEEN 06/10/2021    BACTERIA NONE SEEN 06/10/2021    RBCUA NONE SEEN 06/10/2021    BLOODU NEGATIVE 07/03/2021    SPECGRAV 1.006 06/10/2021    GLUCOSEU NEGATIVE 07/03/2021       Radiology:  Echocardiogram limited    Result Date: 4/14/2022  Transthoracic Echocardiography Report (TTE)  Demographics   Patient Name  Carlos Muro Gender            Male                L   MR #          [id number]      Race                                              Ethnicity   Account #     [de-identified]      Room Number       9680   Accession     [id number]     Date of Study     04/14/2022  Number   Date of Birth 1940     Referring         [facility]                               Physician         Mario Sasm PA-C   Age           79 year(s)     Sonographer       BRO Singh, RDCS,                                                 RDMS, RVT                                Interpreting      Nikkie Melvin MD                               Physician  Procedure Type of Study   TTE procedure:ECHOCARDIOGRAM LIMITED. Procedure Date Date: 04/14/2022 Start: 06:37 AM Study Location: Bedside Technical Quality: Limited visualization due to body habitus. Indications:Cardiomyopathy, Post TAVR and Shortness of breath. Additional Medical History:diabetes, pneumonia, hyperlipidemia, acute hypoxic respiratory failure, chronic obstructive pulmonary disease, hypertenison, atrial fibrillation, pacemaker, chronic kidney disease, coronary artery disease, anemia, history of cerebrovascular accident, TAVR, shortness of breath, stent, congestive heart failure, cardiomyopathy Patient Status: Routine Height: 65 inches Weight: 204 pounds BSA: 1.99 m^2 BMI: 33.95 kg/m^2 BP: 127/75 mmHg Allergies   - See Epic. Conclusions   Summary  Technically difficult examination. This is a suboptimal study due to poor echocardiographic window. Left ventricle size is normal.  Normal left ventricular wall thickness. Ejection fraction is visually estimated in the range of 45% to 50%. Unable to determine wall motion abnormalities due to poor image quality. s/p TAVAR WITH NORMAL FUNCTION  Structurally normal mitral valve. Mitral annular calcification is present. Normal mobility of both mitral valve leaflets. No evidence of mitral valve stenosis. Mild mitral regurgitation is present. Signature   ----------------------------------------------------------------  Electronically signed by Dada Monaco MD (Interpreting  physician) on 04/14/2022 at 12:10 PM  ----------------------------------------------------------------   Findings   Mitral Valve  Structurally normal mitral valve. Mitral annular calcification is present. Normal mobility of both mitral valve leaflets. No evidence of mitral valve stenosis. Mild mitral regurgitation is present. Aortic Valve  s/p TAVAR WITH NORMAL FUNCTION   Tricuspid Valve  Tricuspid valve is structurally normal.  No evidence of tricuspid stenosis. Mild tricuspid regurgitation visualized. Pulmonic Valve  The pulmonic valve was not well visualized . Left Atrium  Left atrium is of upper normal size. Left Ventricle  Left ventricle size is normal.  Normal left ventricular wall thickness. Ejection fraction is visually estimated in the range of 45% to 50%. Unable to determine wall motion abnormalities due to poor image quality. Right Atrium  The right atrium is of normal size. Right Ventricle  Mildly dilated right ventricle. Pericardial Effusion  No evidence of any pericardial effusion. Pleural Effusion  No evidence of pleural effusion. Aorta / Great Vessels  The aorta is within normal limits.   M-Mode/2D Measurements & Calculations   LV Diastolic      LV Systolic Dimension: 3 cm      LA Dimension: 3.9 cmLA  Dimension: 4.1 cm LV Volume Diastolic: 77.6 ml     Area: 20 cm^2  LV FS:26.8 %      LV Volume Systolic: 35 ml  LV PW Diastolic:  LV EDV/LV EDV Index: 74.2 ml/37  1.3 cm            m^2LV ESV/LV ESV Index: 35 EV/89  Septum Diastolic: m^2                              RV Diastolic Dimension:  1.4 cm            EF Calculated: 52.8 %            3 cm                                                      Ascending Aorta: 3.3 cm                                                     LA volume/Index: 69.8                    LVOT: 2 cm                       ml /35m^2  Doppler Measurements & Calculations    AV Peak Velocity: 172 cm/s      LVOT Peak Velocity: 58.7 cm/s   AV Peak Gradient: 11.83 mmHg    LVOT Mean Velocity: 41.4 cm/s   AV Mean Velocity: 115 cm/s      LVOT Peak Gradient: 1 mmHgLVOT Mean   AV Mean Gradient: 6 mmHg        Gradient: 1 mmHg   AV VTI: 37.5 cm   AV Area (Continuity):1.12 cm^2    LVOT VTI: 13.4 cm    AV DVI (VTI): 0.36AV DVI   (Vmax):0.34  [email or web address] [id number]    XR CHEST PORTABLE    Result Date: 4/13/2022  PROCEDURE: XR CHEST PORTABLE CLINICAL INFORMATION: Cough shortness of breath history of COPD and CHF. COMPARISON: Chest x-ray 3/8/2022. TECHNIQUE: AP upright view of the chest. FINDINGS: There is a pacemaker. There is a replaced aortic valve. There is cardiac negative. The mediastinum is not widened. There are some faint interstitial opacities in the mid and lower lung zones. There are no definite pleural effusions. There is no pneumothorax. The pulmonary vascularity is normal.     Faint bibasilar interstitial opacities. This can represent mild pulmonary edema. **This report has been created using voice recognition software. It may contain minor errors which are inherent in voice recognition technology. ** Final report electronically signed by Dr. Willy Zuniga on 4/13/2022 7:19 AM      DVT prophylaxis: [] Lovenox                                 [] SCDs [] SQ Heparin                                 [] Encourage ambulation           [x] Already on Anticoagulation     Code Status: Full Code    Tele:   [x] yes             [] no    Active Hospital Problems    Diagnosis Date Noted    Acute hypoxemic respiratory failure ([facility] [address] [J96.01] 04/13/2022       Electronically signed by Claudene Stabs, MD on 4/14/2022 at 1:37 PM

## 2022-04-15 ENCOUNTER — APPOINTMENT (OUTPATIENT)
Dept: GENERAL RADIOLOGY | Age: 82
DRG: 291 | End: 2022-04-15
Payer: MEDICARE

## 2022-04-15 LAB
ANION GAP SERPL CALCULATED.3IONS-SCNC: 11 MEQ/L (ref 8–16)
BASOPHILS # BLD: 0.1 %
BASOPHILS ABSOLUTE: 0 THOU/MM3 (ref 0–0.1)
BUN BLDV-MCNC: 49 MG/DL (ref 7–22)
CALCIUM SERPL-MCNC: 8.5 MG/DL (ref 8.5–10.5)
CHLORIDE BLD-SCNC: 99 MEQ/L (ref 98–111)
CO2: 25 MEQ/L (ref 23–33)
CREAT SERPL-MCNC: 2.3 MG/DL (ref 0.4–1.2)
EOSINOPHIL # BLD: 0 %
EOSINOPHILS ABSOLUTE: 0 THOU/MM3 (ref 0–0.4)
ERYTHROCYTE [DISTWIDTH] IN BLOOD BY AUTOMATED COUNT: 15 % (ref 11.5–14.5)
ERYTHROCYTE [DISTWIDTH] IN BLOOD BY AUTOMATED COUNT: 53.7 FL (ref 35–45)
GFR SERPL CREATININE-BSD FRML MDRD: 27 ML/MIN/1.73M2
GLUCOSE BLD-MCNC: 138 MG/DL (ref 70–108)
GLUCOSE BLD-MCNC: 152 MG/DL (ref 70–108)
GLUCOSE BLD-MCNC: 209 MG/DL (ref 70–108)
GLUCOSE BLD-MCNC: 312 MG/DL (ref 70–108)
GLUCOSE BLD-MCNC: 447 MG/DL (ref 70–108)
GLUCOSE BLD-MCNC: 457 MG/DL (ref 70–108)
HCT VFR BLD CALC: 37.5 % (ref 42–52)
HEMOGLOBIN: 12 GM/DL (ref 14–18)
IMMATURE GRANS (ABS): 0.1 THOU/MM3 (ref 0–0.07)
IMMATURE GRANULOCYTES: 0.7 %
INR BLD: 2.39 (ref 0.85–1.13)
LYMPHOCYTES # BLD: 7.3 %
LYMPHOCYTES ABSOLUTE: 1.1 THOU/MM3 (ref 1–4.8)
MCH RBC QN AUTO: 31.3 PG (ref 26–33)
MCHC RBC AUTO-ENTMCNC: 32 GM/DL (ref 32.2–35.5)
MCV RBC AUTO: 97.7 FL (ref 80–94)
MONOCYTES # BLD: 8.3 %
MONOCYTES ABSOLUTE: 1.2 THOU/MM3 (ref 0.4–1.3)
NUCLEATED RED BLOOD CELLS: 0 /100 WBC
PLATELET # BLD: 174 THOU/MM3 (ref 130–400)
PMV BLD AUTO: 10.7 FL (ref 9.4–12.4)
POTASSIUM SERPL-SCNC: 4.1 MEQ/L (ref 3.5–5.2)
RBC # BLD: 3.84 MILL/MM3 (ref 4.7–6.1)
SEG NEUTROPHILS: 83.6 %
SEGMENTED NEUTROPHILS ABSOLUTE COUNT: 12.5 THOU/MM3 (ref 1.8–7.7)
SODIUM BLD-SCNC: 135 MEQ/L (ref 135–145)
WBC # BLD: 15 THOU/MM3 (ref 4.8–10.8)

## 2022-04-15 PROCEDURE — 93005 ELECTROCARDIOGRAM TRACING: CPT | Performed by: HOSPITALIST

## 2022-04-15 PROCEDURE — 99232 SBSQ HOSP IP/OBS MODERATE 35: CPT | Performed by: HOSPITALIST

## 2022-04-15 PROCEDURE — 6360000002 HC RX W HCPCS: Performed by: HOSPITALIST

## 2022-04-15 PROCEDURE — 94760 N-INVAS EAR/PLS OXIMETRY 1: CPT

## 2022-04-15 PROCEDURE — 2500000003 HC RX 250 WO HCPCS: Performed by: HOSPITALIST

## 2022-04-15 PROCEDURE — 2700000000 HC OXYGEN THERAPY PER DAY

## 2022-04-15 PROCEDURE — 6370000000 HC RX 637 (ALT 250 FOR IP)

## 2022-04-15 PROCEDURE — 2580000003 HC RX 258: Performed by: HOSPITALIST

## 2022-04-15 PROCEDURE — 1200000003 HC TELEMETRY R&B

## 2022-04-15 PROCEDURE — 82948 REAGENT STRIP/BLOOD GLUCOSE: CPT

## 2022-04-15 PROCEDURE — 2580000003 HC RX 258

## 2022-04-15 PROCEDURE — 94640 AIRWAY INHALATION TREATMENT: CPT

## 2022-04-15 PROCEDURE — 36415 COLL VENOUS BLD VENIPUNCTURE: CPT

## 2022-04-15 PROCEDURE — 2500000003 HC RX 250 WO HCPCS

## 2022-04-15 PROCEDURE — 85025 COMPLETE CBC W/AUTO DIFF WBC: CPT

## 2022-04-15 PROCEDURE — 94761 N-INVAS EAR/PLS OXIMETRY MLT: CPT

## 2022-04-15 PROCEDURE — 6370000000 HC RX 637 (ALT 250 FOR IP): Performed by: HOSPITALIST

## 2022-04-15 PROCEDURE — 85610 PROTHROMBIN TIME: CPT

## 2022-04-15 PROCEDURE — 71045 X-RAY EXAM CHEST 1 VIEW: CPT

## 2022-04-15 PROCEDURE — 80048 BASIC METABOLIC PNL TOTAL CA: CPT

## 2022-04-15 RX ORDER — METHYLPREDNISOLONE SODIUM SUCCINATE 125 MG/2ML
125 INJECTION, POWDER, LYOPHILIZED, FOR SOLUTION INTRAMUSCULAR; INTRAVENOUS ONCE
Status: COMPLETED | OUTPATIENT
Start: 2022-04-15 | End: 2022-04-15

## 2022-04-15 RX ORDER — LEVALBUTEROL INHALATION SOLUTION 1.25 MG/3ML
1.25 SOLUTION RESPIRATORY (INHALATION)
Status: DISCONTINUED | OUTPATIENT
Start: 2022-04-15 | End: 2022-04-24 | Stop reason: HOSPADM

## 2022-04-15 RX ORDER — WARFARIN SODIUM 2.5 MG/1
2.5 TABLET ORAL
Status: COMPLETED | OUTPATIENT
Start: 2022-04-15 | End: 2022-04-15

## 2022-04-15 RX ADMIN — SODIUM CHLORIDE, PRESERVATIVE FREE 10 ML: 5 INJECTION INTRAVENOUS at 20:50

## 2022-04-15 RX ADMIN — MOMETASONE FUROATE AND FORMOTEROL FUMARATE DIHYDRATE 2 PUFF: 200; 5 AEROSOL RESPIRATORY (INHALATION) at 20:57

## 2022-04-15 RX ADMIN — METHYLPREDNISOLONE SODIUM SUCCINATE 125 MG: 125 INJECTION, POWDER, FOR SOLUTION INTRAMUSCULAR; INTRAVENOUS at 13:42

## 2022-04-15 RX ADMIN — BUMETANIDE 1 MG: 0.25 INJECTION, SOLUTION INTRAMUSCULAR; INTRAVENOUS at 20:49

## 2022-04-15 RX ADMIN — CEFTRIAXONE SODIUM 1000 MG: 10 INJECTION, POWDER, FOR SOLUTION INTRAVENOUS at 16:55

## 2022-04-15 RX ADMIN — PREDNISONE 40 MG: 20 TABLET ORAL at 09:16

## 2022-04-15 RX ADMIN — GUAIFENESIN 600 MG: 600 TABLET, EXTENDED RELEASE ORAL at 09:16

## 2022-04-15 RX ADMIN — BUMETANIDE 1 MG: 0.25 INJECTION, SOLUTION INTRAMUSCULAR; INTRAVENOUS at 12:42

## 2022-04-15 RX ADMIN — ATORVASTATIN CALCIUM 80 MG: 80 TABLET, FILM COATED ORAL at 20:49

## 2022-04-15 RX ADMIN — GUAIFENESIN 600 MG: 600 TABLET, EXTENDED RELEASE ORAL at 20:49

## 2022-04-15 RX ADMIN — TIOTROPIUM BROMIDE INHALATION SPRAY 2 PUFF: 3.12 SPRAY, METERED RESPIRATORY (INHALATION) at 06:33

## 2022-04-15 RX ADMIN — LEVALBUTEROL HYDROCHLORIDE 1.25 MG: 1.25 SOLUTION RESPIRATORY (INHALATION) at 17:01

## 2022-04-15 RX ADMIN — INSULIN LISPRO 3 UNITS: 100 INJECTION, SOLUTION INTRAVENOUS; SUBCUTANEOUS at 20:50

## 2022-04-15 RX ADMIN — CETIRIZINE HYDROCHLORIDE 5 MG: 10 TABLET, FILM COATED ORAL at 09:16

## 2022-04-15 RX ADMIN — MOMETASONE FUROATE AND FORMOTEROL FUMARATE DIHYDRATE 2 PUFF: 200; 5 AEROSOL RESPIRATORY (INHALATION) at 06:34

## 2022-04-15 RX ADMIN — AZITHROMYCIN DIHYDRATE 500 MG: 500 INJECTION, POWDER, LYOPHILIZED, FOR SOLUTION INTRAVENOUS at 13:47

## 2022-04-15 RX ADMIN — LEVALBUTEROL HYDROCHLORIDE 1.25 MG: 1.25 SOLUTION RESPIRATORY (INHALATION) at 13:22

## 2022-04-15 RX ADMIN — Medication 1000 UNITS: at 09:16

## 2022-04-15 RX ADMIN — INSULIN LISPRO 15 UNITS: 100 INJECTION, SOLUTION INTRAVENOUS; SUBCUTANEOUS at 23:20

## 2022-04-15 RX ADMIN — WARFARIN SODIUM 2.5 MG: 2.5 TABLET ORAL at 17:31

## 2022-04-15 RX ADMIN — OXYCODONE HYDROCHLORIDE AND ACETAMINOPHEN 500 MG: 500 TABLET ORAL at 09:16

## 2022-04-15 RX ADMIN — METOPROLOL TARTRATE 25 MG: 25 TABLET, FILM COATED ORAL at 15:48

## 2022-04-15 RX ADMIN — METOPROLOL SUCCINATE 50 MG: 50 TABLET, EXTENDED RELEASE ORAL at 09:16

## 2022-04-15 RX ADMIN — INSULIN LISPRO 4 UNITS: 100 INJECTION, SOLUTION INTRAVENOUS; SUBCUTANEOUS at 17:30

## 2022-04-15 RX ADMIN — ACETAMINOPHEN 650 MG: 325 TABLET ORAL at 12:50

## 2022-04-15 RX ADMIN — ALBUTEROL SULFATE 2 PUFF: 90 AEROSOL, METERED RESPIRATORY (INHALATION) at 10:33

## 2022-04-15 RX ADMIN — LEVALBUTEROL HYDROCHLORIDE 1.25 MG: 1.25 SOLUTION RESPIRATORY (INHALATION) at 20:57

## 2022-04-15 RX ADMIN — SODIUM CHLORIDE, PRESERVATIVE FREE 10 ML: 5 INJECTION INTRAVENOUS at 10:26

## 2022-04-15 ASSESSMENT — PAIN SCALES - GENERAL
PAINLEVEL_OUTOF10: 0

## 2022-04-15 NOTE — PLAN OF CARE
Problem: Falls - Risk of:  Goal: Will remain free from falls  Description: Will remain free from falls  7/43/4381 [id number] by Tucker Tomlin RN  Outcome: Ongoing   All fall precautions in place. Bed in low position, alarm activated and appropriate use of call light. Problem: Pain:  Goal: Patient's pain/discomfort is manageable  Description: Patient's pain/discomfort is manageable  0/98/8548 3130 by Tucker Tomlin RN  Outcome: Ongoing   Pain Assessment: 0-10  Pain Level: 0   Patient's Stated Pain Goal: 1   Is pain goal met at this time? No   Patient denies pain thus far during shift, will continue to monitor and reassess. Problem: Discharge Planning:  Goal: Patients continuum of care needs are met  Description: Patients continuum of care needs are met  1/74/0504 1148 by Tucker Tomlin RN  Outcome: Ongoing   Discharge date is unclear, plan is to return to home with wife. Problem: Impaired respiratory status  Goal: Clear lung sounds  0/88/0524 3326 by Tucker Tomlin RN  Outcome: Ongoing   Patient now on 6LO2 satting between 90-92%, patient does not wear oxygen at home. Will wean as able. Care plan reviewed with patient. Patient verbalizes understanding of the plan of care and contributes to goal setting.

## 2022-04-15 NOTE — CARE COORDINATION
4/15/22, 2:20 PM EDT    DISCHARGE ON [address] day: 2  Location: [facility]- Reason for admit: Shortness of breath [R06.02]  Chronic kidney disease (CKD), stage IV (severe) (HCC) [N18.4]  COPD exacerbation ([facility] [J44.1]  Troponin level elevated [R77.8]  Current use of long term anticoagulation [Z79.01]  Acute hypoxemic respiratory failure (HCC) [J96.01]  Acute on chronic congestive heart failure, unspecified heart failure type ([facility] [I50.9]   Procedure:   4/13 CXR:   Faint bibasilar interstitial opacities. This can represent mild pulmonary edema. 4/15 CXR:   1. Bilateral lower lung atelectasis/infiltrate. 2. Mild stable cardiomegaly. Barriers to Discharge: Remains on 6L NC (does not normally use home O2). Primary RN ambulated and sats dropped to 85%, did not recover with 5L. CXR completed. IV zithromax. IV bumex. IV rocephin. PCP: WILBERTO Parikh CNP  Readmission Risk Score: 19 ( )%  Patient Goals/Plan/Treatment Preferences: Plans home with wife, follow for O2 needs.

## 2022-04-15 NOTE — PROGRESS NOTES
[address] THERAPY MISSED TREATMENT NOTE  [facility]  MED 5K  5K-26/026-A      Date: 4/15/2022  Patient Name: Soraida Santos        CSN: [id number]   : 1940  (79 y.o.)  Gender: male   Referring Practitioner: Berny Rodrigez MD  Diagnosis: Shortness of breath    REASON FOR MISSED TREATMENT: Hold Treatment per Nursing     RN approving of OT session, pt resting in bed upon arrival and agreeable to OT session. Pt endorses feeling SOB when resting supine in bed with HOB elevated to approx 30*. This therapist checked O2 prior to initiating evaluation and mobility with O2 83% on 3L. Titrated O2 to 5L with pt remaining at 84%. RN notified and presenting to room to check vitals and care for patient. Will hold OT evaluation at this time and check back when patient is medically appropriate.

## 2022-04-15 NOTE — FLOWSHEET NOTE
[address] [facility] [address]  PHYSICAL THERAPY MISSED TREATMENT NOTE  [facility] ONC MED 5K    Date: 4/15/2022  Patient Name: Jessie Herzog        MRN: [id number]   : 1940  (79 y.o.)  Gender: male   Referring Practitioner: Mavis Ramirez MD  Diagnosis: shortness of breath         REASON FOR MISSED TREATMENT:  Hold treatment per nursing request. O2 has been increased and O2 sats are decreased. Will try back at a more appropriate date.

## 2022-04-15 NOTE — PROGRESS NOTES
First thing this morning patient was breathing fine and OO2 sats were 92 on 2L. Home O2 eval was done and after a short distance his O2 dropped to 85%. O2 turned up to 5L and SaO2 didn't rebound. O2 increased to 6L. Lung sounds were diminished from nipple line down. Dr Karl Marvin called and notified. She came to see the patient and orders were written  PCXray done. O2 rebounded to 93% on 6L.

## 2022-04-15 NOTE — PROGRESS NOTES
Hospitalist Progress Note    Patient:  Darryle Banning      Unit/Bed:5K-26/026-A    YOB: 1940    MRN: [id number]       Acct: [de-identified]     PCP: WILBERTO Recio CNP    Date of Admission: 4/13/2022    Assessment/Plan:    1. Acute hypoxemic respiratory failure: Patient was weaned to room air however desaturated when up and ambulating for home O2 eval not requiring 6 L. Complains of worsening shortness of breath. 2. Community-acquired pneumonia: Continue with IV Rocephin and azithromycin. 3. Acute on chronic systolic heart failure: Most recent ejection fraction 45 to 50%. We will resume Bumex 1 mg IV twice daily. Monitor intake and output, monitor electrolytes and renal function closely. 4. Acute on chronic kidney disease stage III: Monitor renal function  5. Acute exacerbation of COPD: Given worsening symptoms will give Solu-Medrol 125 mg IV x1, continue with prednisone 40 mg daily, patient reports feeling better after initiation of Mucinex. Continue with pulmonary toiletry and as needed bronchodilators  6. Hypertension: Continue with current blood pressure medications and monitor. 7. Persistent A. fib with history of pacemaker placement: Continue with beta-blocker and Coumadin      Subjective (past 24 hours):   Patient was seen and examined at bedside.   Complained of worsening SOB after home O2 eval    Medications:  Reviewed    Infusion Medications    sodium chloride      dextrose       Scheduled Medications    warfarin  2.5 mg Oral Once    cefTRIAXone (ROCEPHIN) IV  1,000 mg IntraVENous Q24H    azithromycin  500 mg IntraVENous Q24H    levalbuterol  1.25 mg Nebulization Q4H WA    metoprolol tartrate  25 mg Oral Once    ascorbic acid  500 mg Oral Daily    atorvastatin  80 mg Oral Nightly    cetirizine  5 mg Oral Daily    losartan  25 mg Oral Nightly    metoprolol succinate  50 mg Oral Daily    Vitamin D  1,000 Units Oral Daily    sodium chloride flush  10 mL IntraVENous 2 times per day    insulin lispro  0-6 Units SubCUTAneous TID WC    insulin lispro  0-3 Units SubCUTAneous Nightly    guaiFENesin  600 mg Oral BID    predniSONE  40 mg Oral Daily    warfarin placeholder: dosing by pharmacy   Other RX Placeholder    mometasone-formoterol  2 puff Inhalation BID    tiotropium  2 puff Inhalation Daily    bumetanide  1 mg IntraVENous BID     PRN Meds: guaiFENesin-codeine, albuterol sulfate HFA, sodium chloride flush, sodium chloride, ondansetron **OR** ondansetron, polyethylene glycol, acetaminophen **OR** acetaminophen, glucagon (rDNA), dextrose, dextrose bolus (hypoglycemia) **OR** dextrose bolus (hypoglycemia), glucose **OR** glucose, benzocaine-menthol      Intake/Output Summary (Last 24 hours) at 4/15/2022 1458  Last data filed at 4/15/2022 1322  Gross per 24 hour   Intake 710 ml   Output 2050 ml   Net -1340 ml       Diet:  ADULT DIET; Regular; 4 carb choices (60 gm/meal); Low Sodium (2 gm); 2000 ml    Exam:  /80   Pulse 110   Temp 99 °F (37.2 °C) (Oral)   Resp 24   Ht 5' 5.98\" (1.676 m)   Wt 204 lb (92.5 kg)   SpO2 93%   BMI 32.94 kg/m²     General appearance: No apparent distress, appears stated age and cooperative. Respiratory:  Diminished breath sounds  Cardiovascular: Regular rate and rhythm with normal S1/S2 without murmurs, rubs or gallops. Abdomen: Soft, non-tender, non-distended with normal bowel sounds.   Extremities: no pedal edema  Skin:  no rashes    Labs:   Recent Labs     04/13/22  0535 04/14/22  0612 04/15/22  0516   WBC 10.6 10.1 15.0*   HGB 13.1* 11.7* 12.0*   HCT 39.8* 36.0* 37.5*    171 174     Recent Labs     04/13/22  0535 04/14/22  0612 04/15/22  0516    135 135   K 4.3 4.4 4.1    99 99   CO2 23 20* 25   BUN 25* 41* 49*   CREATININE 2.2* 2.4* 2.3*   CALCIUM 9.1 8.8 8.5     Recent Labs     04/13/22  0535   AST 18   ALT 15   BILIDIR 0.3   BILITOT 1.1   ALKPHOS 104     Recent Labs     04/13/22  0550 04/14/22  0612 04/15/22  0516   INR 2.76* 2.47* 2.39*     No results for input(s): CKTOTAL, TROPONINI in the last 72 hours. Recent Labs     04/13/22  0535   PROCAL 0.12*       Microbiology:      Urinalysis:      Lab Results   Component Value Date    NITRU NEGATIVE 07/03/2021    WBCUA NONE SEEN 06/10/2021    BACTERIA NONE SEEN 06/10/2021    RBCUA NONE SEEN 06/10/2021    BLOODU NEGATIVE 07/03/2021    SPECGRAV 1.006 06/10/2021    GLUCOSEU NEGATIVE 07/03/2021       Radiology:  Echocardiogram limited    Result Date: 4/14/2022  Transthoracic Echocardiography Report (TTE)  Demographics   Patient Name  Kita Guzmán Gender            Male                KAILEY   MR #          [id number]      Race                                              Ethnicity   Account #     [de-identified]      Room Number       4007   Accession     [id number]     Date of Study     04/14/2022  Number   Date of Birth 1940     Referring         Donal Al CNP                               Physician         Jose LYNN   Age           79 year(s)     Sonographer       BRO Lara, RDCS,                                                 RDMS, RVT                                Interpreting      Bhavna Borrero MD                               Physician  Procedure Type of Study   TTE procedure:ECHOCARDIOGRAM LIMITED. Procedure Date Date: 04/14/2022 Start: 06:37 AM Study Location: Bedside Technical Quality: Limited visualization due to body habitus. Indications:Cardiomyopathy, Post TAVR and Shortness of breath.  Additional Medical History:diabetes, pneumonia, hyperlipidemia, acute hypoxic respiratory failure, chronic obstructive pulmonary disease, hypertenison, atrial fibrillation, pacemaker, chronic kidney disease, coronary artery disease, anemia, history of cerebrovascular accident, TAVR, shortness of breath, stent, congestive heart failure, cardiomyopathy Patient Status: Routine Height: 65 inches Weight: 204 pounds BSA: 1.99 m^2 BMI: 33.95 kg/m^2 BP: 127/75 mmHg Allergies   - See Epic. Conclusions   Summary  Technically difficult examination. This is a suboptimal study due to poor echocardiographic window. Left ventricle size is normal.  Normal left ventricular wall thickness. Ejection fraction is visually estimated in the range of 45% to 50%. Unable to determine wall motion abnormalities due to poor image quality. s/p TAVAR WITH NORMAL FUNCTION  Structurally normal mitral valve. Mitral annular calcification is present. Normal mobility of both mitral valve leaflets. No evidence of mitral valve stenosis. Mild mitral regurgitation is present. Signature   ----------------------------------------------------------------  Electronically signed by Tobias Graf MD (Interpreting  physician) on 04/14/2022 at 12:10 PM  ----------------------------------------------------------------   Findings   Mitral Valve  Structurally normal mitral valve. Mitral annular calcification is present. Normal mobility of both mitral valve leaflets. No evidence of mitral valve stenosis. Mild mitral regurgitation is present. Aortic Valve  s/p TAVAR WITH NORMAL FUNCTION   Tricuspid Valve  Tricuspid valve is structurally normal.  No evidence of tricuspid stenosis. Mild tricuspid regurgitation visualized. Pulmonic Valve  The pulmonic valve was not well visualized . Left Atrium  Left atrium is of upper normal size. Left Ventricle  Left ventricle size is normal.  Normal left ventricular wall thickness. Ejection fraction is visually estimated in the range of 45% to 50%. Unable to determine wall motion abnormalities due to poor image quality. Right Atrium  The right atrium is of normal size. Right Ventricle  Mildly dilated right ventricle. Pericardial Effusion  No evidence of any pericardial effusion. Pleural Effusion  No evidence of pleural effusion. Aorta / Great Vessels  The aorta is within normal limits.   M-Mode/2D Measurements & Calculations   LV Diastolic      LV Systolic Dimension: 3 cm      LA Dimension: 3.9 cmLA  Dimension: 4.1 cm LV Volume Diastolic: 78.7 ml     Area: 20 cm^2  LV FS:26.8 %      LV Volume Systolic: 35 ml  LV PW Diastolic:  LV EDV/LV EDV Index: 74.2 ml/37  1.3 cm            m^2LV ESV/LV ESV Index: 35 SD/95  Septum Diastolic: m^2                              RV Diastolic Dimension:  1.4 cm            EF Calculated: 52.8 %            3 cm                                                      Ascending Aorta: 3.3 cm                                                     LA volume/Index: 69.8                    LVOT: 2 cm                       ml /35m^2  Doppler Measurements & Calculations    AV Peak Velocity: 172 cm/s      LVOT Peak Velocity: 58.7 cm/s   AV Peak Gradient: 11.83 mmHg    LVOT Mean Velocity: 41.4 cm/s   AV Mean Velocity: 115 cm/s      LVOT Peak Gradient: 1 mmHgLVOT Mean   AV Mean Gradient: 6 mmHg        Gradient: 1 mmHg   AV VTI: 37.5 cm   AV Area (Continuity):1.12 cm^2    LVOT VTI: 13.4 cm    AV DVI (VTI): 0.36AV DVI   (Vmax):0.34  [email or web address] [id number]%3d    XR CHEST PORTABLE    Result Date: 4/13/2022  PROCEDURE: XR CHEST PORTABLE CLINICAL INFORMATION: Cough shortness of breath history of COPD and CHF. COMPARISON: Chest x-ray 3/8/2022. TECHNIQUE: AP upright view of the chest. FINDINGS: There is a pacemaker. There is a replaced aortic valve. There is cardiac negative. The mediastinum is not widened. There are some faint interstitial opacities in the mid and lower lung zones. There are no definite pleural effusions. There is no pneumothorax. The pulmonary vascularity is normal.     Faint bibasilar interstitial opacities. This can represent mild pulmonary edema. **This report has been created using voice recognition software.  It may contain minor errors which are inherent in voice recognition technology. ** Final report electronically signed by Dr. Amy Busby on 4/13/2022 7:19 AM      DVT prophylaxis: [] Lovenox                                 [] SCDs                                 [] SQ Heparin                                 [] Encourage ambulation           [x] Already on Anticoagulation     Code Status: Full Code    Tele:   [x] yes             [] no    Active Hospital Problems    Diagnosis Date Noted    Acute hypoxemic respiratory failure ([facility] [address] [J96.01] 04/13/2022       Electronically signed by Ashly Armenta MD on 4/15/2022 at 2:58 PM

## 2022-04-15 NOTE — PROGRESS NOTES
A home oxygen evaluation has been completed. [x]Patient is an inpatient. It is expected that the patient will be discharged within the next 48 hours. Qualified provider to write order for home prescription if patient qualifies. Social service/care managers will arrange for home oxygen. If patient is active, arrange for Home Medical supplier to assess for Oxygen Conserving Device per pulse oximetry. []Patient is an outpatient. Results will be faxed to the ordering provider. Qualified provider to write order for home prescription if patient qualifies and arranges for home oxygen. Patient was placed on room air for 3 minutes. SpO2 was 87 % on room air at rest. Patients SpO2 was below 89% and qualified for home oxygen. Oxygen was applied at 2 lpm via nasal cannula to maintain a SpO2 between 90-92% while at rest. Actual SpO2 was 92 %. Patient can ambulate for exercise flow rate. Patients was ambulated, SpO2 was 87% on 6 lpm to maintain SpO2 between 90-92% while exercising. If oxygen need is greater than 4 lpm the SpO2 on 4 lpm was 84%. Notified RN that patient was not ready to go home due to O2 requirements.

## 2022-04-15 NOTE — PLAN OF CARE
Problem: Falls - Risk of:  Goal: Will remain free from falls  Description: Will remain free from falls  Outcome: Ongoing  Note: Fall precautions in place. Bed in lowest position. Call light within reach. Bed/chair alarm on. Problem: Infection:  Goal: Will remain free from infection  Description: Will remain free from infection  Outcome: Ongoing  Note: Patient presents afebrile this shift. Vital signs WNL. Problem: Safety:  Goal: Free from accidental physical injury  Description: Free from accidental physical injury  Outcome: Ongoing  Note: Patient educated on safety measures for prevention of accidental injury. Patient and wife verablizes understanding. Problem: Pain:  Goal: Patient's pain/discomfort is manageable  Description: Patient's pain/discomfort is manageable  Outcome: Ongoing  Note: Patient denies c/o pain at this time. Will continue to assess and monitor. Problem: Discharge Planning:  Goal: Patients continuum of care needs are met  Description: Patients continuum of care needs are met  Outcome: Ongoing  Note: Discharge plans to home discussed with patient and wife. Discharge teaching and planning ongoing. Problem: Impaired respiratory status  Goal: Clear lung sounds  Outcome: Ongoing  Note: Pulse ox fluctating this shift between 89-92% on 2L NC. Patient and wife educated on importance of deep breathing to allow for lungs to expand and improve resp status. Will continue to monitor pulse ox and try to wean supplemental oxygen this shift. Care plan reviewed with patient and wife. Patient and wife verbalize understanding of the plan of care and contribute to goal setting.

## 2022-04-15 NOTE — FLOWSHEET NOTE
[facility]      Patient: Darryle Banning  Room #: 0A-60/661-J            YOB: 1940  Age: 79 y.o. Gender: male            Admit Date & Time: 4/13/2022  5:24 AM    Assessment:  Pt was sitting up in bed. Pt's nurse was attending to his needs during much of the encounter. Pt's significant other of 9 years was present. Pt is not have practicing Christianity beliefs. Pt shared and extensive life review. Interventions:   provided a listening and supportive presence.  explored pt's Christianity beliefs, support network and concerns about his health. Outcomes:  Pt's significant other expressed gratitude for the encounter. Plan:  1. Provide spiritual care and support. Electronically signed by Kamini Wan on 4/15/2022 at 4:34 PM.  [address] [facility]  [phone]       04/15/22 2186   Encounter Summary   Services provided to: Patient;Significant other   Referral/Consult From: [facility]   Support System Significant other;Family members   Continue Visiting Yes  (4/15)   Complexity of Encounter Moderate   Length of Encounter 30 minutes   Spiritual Assessment Completed Yes   Routine   Type Initial   Assessment Approachable;Calm   Intervention Active listening;Explored coping resources;Sustaining presence/ Ministry of presence   Outcome Shared life review;Engaged in conversation; Acceptance

## 2022-04-15 NOTE — PLAN OF CARE
Problem: Impaired respiratory status  Goal: Clear lung sounds  4/15/2022 0640 by Jannie Goldsmith RCP  Outcome: Ongoing  Note: Mdi to maintain open airways

## 2022-04-15 NOTE — PROGRESS NOTES
Clinical Pharmacy Note    Warfarin consult follow-up    Recent Labs     04/15/22  0516   INR 2.39*     Recent Labs     04/13/22  0535 04/14/22  0612 04/15/22  0516   HGB 13.1* 11.7* 12.0*   HCT 39.8* 36.0* 37.5*    171 174     Significant Drug-Drug Interactions:  New warfarin drug-drug interactions: none  Discontinued drug-drug interactions: none  Current warfarin drug-drug interactions: prednisone, ascorbic acid     Date INR Warfarin Dose   04/13/22 2.76 1 mg     04/14/22  2.47  2.5 mg    04/15/22  2.39  2.5 mg                                     Notes:                   PT/INR or POC-INR will be monitored routinely until therapeutic INR is achieved.       Elma Liu PharmD  4/15/2022  7:53 AM

## 2022-04-16 LAB
ANION GAP SERPL CALCULATED.3IONS-SCNC: 16 MEQ/L (ref 8–16)
BASOPHILS # BLD: 0.1 %
BASOPHILS ABSOLUTE: 0 THOU/MM3 (ref 0–0.1)
BUN BLDV-MCNC: 50 MG/DL (ref 7–22)
CALCIUM SERPL-MCNC: 8.7 MG/DL (ref 8.5–10.5)
CHLORIDE BLD-SCNC: 99 MEQ/L (ref 98–111)
CO2: 22 MEQ/L (ref 23–33)
CREAT SERPL-MCNC: 2.4 MG/DL (ref 0.4–1.2)
EOSINOPHIL # BLD: 0 %
EOSINOPHILS ABSOLUTE: 0 THOU/MM3 (ref 0–0.4)
ERYTHROCYTE [DISTWIDTH] IN BLOOD BY AUTOMATED COUNT: 15.1 % (ref 11.5–14.5)
ERYTHROCYTE [DISTWIDTH] IN BLOOD BY AUTOMATED COUNT: 54.3 FL (ref 35–45)
GFR SERPL CREATININE-BSD FRML MDRD: 26 ML/MIN/1.73M2
GLUCOSE BLD-MCNC: 127 MG/DL (ref 70–108)
GLUCOSE BLD-MCNC: 170 MG/DL (ref 70–108)
GLUCOSE BLD-MCNC: 243 MG/DL (ref 70–108)
GLUCOSE BLD-MCNC: 300 MG/DL (ref 70–108)
GLUCOSE BLD-MCNC: 301 MG/DL (ref 70–108)
GLUCOSE BLD-MCNC: 302 MG/DL (ref 70–108)
GLUCOSE BLD-MCNC: 318 MG/DL (ref 70–108)
GLUCOSE BLD-MCNC: 325 MG/DL (ref 70–108)
GLUCOSE BLD-MCNC: 428 MG/DL (ref 70–108)
HCT VFR BLD CALC: 39.3 % (ref 42–52)
HEMOGLOBIN: 12.9 GM/DL (ref 14–18)
IMMATURE GRANS (ABS): 0.14 THOU/MM3 (ref 0–0.07)
IMMATURE GRANULOCYTES: 0.8 %
INR BLD: 3.44 (ref 0.85–1.13)
LYMPHOCYTES # BLD: 3.3 %
LYMPHOCYTES ABSOLUTE: 0.6 THOU/MM3 (ref 1–4.8)
MCH RBC QN AUTO: 31.7 PG (ref 26–33)
MCHC RBC AUTO-ENTMCNC: 32.8 GM/DL (ref 32.2–35.5)
MCV RBC AUTO: 96.6 FL (ref 80–94)
MONOCYTES # BLD: 5.6 %
MONOCYTES ABSOLUTE: 1 THOU/MM3 (ref 0.4–1.3)
NUCLEATED RED BLOOD CELLS: 0 /100 WBC
PLATELET # BLD: 187 THOU/MM3 (ref 130–400)
PMV BLD AUTO: 11 FL (ref 9.4–12.4)
POTASSIUM SERPL-SCNC: 3.9 MEQ/L (ref 3.5–5.2)
RBC # BLD: 4.07 MILL/MM3 (ref 4.7–6.1)
SEG NEUTROPHILS: 90.2 %
SEGMENTED NEUTROPHILS ABSOLUTE COUNT: 16.4 THOU/MM3 (ref 1.8–7.7)
SODIUM BLD-SCNC: 137 MEQ/L (ref 135–145)
WBC # BLD: 18.2 THOU/MM3 (ref 4.8–10.8)

## 2022-04-16 PROCEDURE — 94640 AIRWAY INHALATION TREATMENT: CPT

## 2022-04-16 PROCEDURE — 94760 N-INVAS EAR/PLS OXIMETRY 1: CPT

## 2022-04-16 PROCEDURE — 99232 SBSQ HOSP IP/OBS MODERATE 35: CPT | Performed by: HOSPITALIST

## 2022-04-16 PROCEDURE — 80048 BASIC METABOLIC PNL TOTAL CA: CPT

## 2022-04-16 PROCEDURE — 2700000000 HC OXYGEN THERAPY PER DAY

## 2022-04-16 PROCEDURE — 97530 THERAPEUTIC ACTIVITIES: CPT

## 2022-04-16 PROCEDURE — 82948 REAGENT STRIP/BLOOD GLUCOSE: CPT

## 2022-04-16 PROCEDURE — 85610 PROTHROMBIN TIME: CPT

## 2022-04-16 PROCEDURE — 2580000003 HC RX 258: Performed by: HOSPITALIST

## 2022-04-16 PROCEDURE — 36415 COLL VENOUS BLD VENIPUNCTURE: CPT

## 2022-04-16 PROCEDURE — 1200000003 HC TELEMETRY R&B

## 2022-04-16 PROCEDURE — 2580000003 HC RX 258

## 2022-04-16 PROCEDURE — 97166 OT EVAL MOD COMPLEX 45 MIN: CPT

## 2022-04-16 PROCEDURE — 85025 COMPLETE CBC W/AUTO DIFF WBC: CPT

## 2022-04-16 PROCEDURE — 2500000003 HC RX 250 WO HCPCS

## 2022-04-16 PROCEDURE — 2500000003 HC RX 250 WO HCPCS: Performed by: HOSPITALIST

## 2022-04-16 PROCEDURE — 97535 SELF CARE MNGMENT TRAINING: CPT

## 2022-04-16 PROCEDURE — 6360000002 HC RX W HCPCS: Performed by: HOSPITALIST

## 2022-04-16 PROCEDURE — 6370000000 HC RX 637 (ALT 250 FOR IP): Performed by: HOSPITALIST

## 2022-04-16 PROCEDURE — 6370000000 HC RX 637 (ALT 250 FOR IP)

## 2022-04-16 RX ADMIN — LEVALBUTEROL HYDROCHLORIDE 1.25 MG: 1.25 SOLUTION RESPIRATORY (INHALATION) at 13:13

## 2022-04-16 RX ADMIN — MOMETASONE FUROATE AND FORMOTEROL FUMARATE DIHYDRATE 2 PUFF: 200; 5 AEROSOL RESPIRATORY (INHALATION) at 20:53

## 2022-04-16 RX ADMIN — PREDNISONE 40 MG: 20 TABLET ORAL at 07:56

## 2022-04-16 RX ADMIN — LEVALBUTEROL HYDROCHLORIDE 1.25 MG: 1.25 SOLUTION RESPIRATORY (INHALATION) at 07:26

## 2022-04-16 RX ADMIN — OXYCODONE HYDROCHLORIDE AND ACETAMINOPHEN 500 MG: 500 TABLET ORAL at 07:56

## 2022-04-16 RX ADMIN — GUAIFENESIN 600 MG: 600 TABLET, EXTENDED RELEASE ORAL at 20:38

## 2022-04-16 RX ADMIN — INSULIN LISPRO 4 UNITS: 100 INJECTION, SOLUTION INTRAVENOUS; SUBCUTANEOUS at 12:27

## 2022-04-16 RX ADMIN — BUMETANIDE 1 MG: 0.25 INJECTION, SOLUTION INTRAMUSCULAR; INTRAVENOUS at 07:56

## 2022-04-16 RX ADMIN — CEFTRIAXONE SODIUM 1000 MG: 10 INJECTION, POWDER, FOR SOLUTION INTRAVENOUS at 12:29

## 2022-04-16 RX ADMIN — GUAIFENESIN 600 MG: 600 TABLET, EXTENDED RELEASE ORAL at 07:56

## 2022-04-16 RX ADMIN — LEVALBUTEROL HYDROCHLORIDE 1.25 MG: 1.25 SOLUTION RESPIRATORY (INHALATION) at 02:59

## 2022-04-16 RX ADMIN — LEVALBUTEROL HYDROCHLORIDE 1.25 MG: 1.25 SOLUTION RESPIRATORY (INHALATION) at 17:16

## 2022-04-16 RX ADMIN — INSULIN LISPRO 15 UNITS: 100 INJECTION, SOLUTION INTRAVENOUS; SUBCUTANEOUS at 01:04

## 2022-04-16 RX ADMIN — AZITHROMYCIN DIHYDRATE 500 MG: 500 INJECTION, POWDER, LYOPHILIZED, FOR SOLUTION INTRAVENOUS at 12:37

## 2022-04-16 RX ADMIN — INSULIN LISPRO 15 UNITS: 100 INJECTION, SOLUTION INTRAVENOUS; SUBCUTANEOUS at 03:12

## 2022-04-16 RX ADMIN — METOPROLOL SUCCINATE 50 MG: 50 TABLET, EXTENDED RELEASE ORAL at 07:56

## 2022-04-16 RX ADMIN — SODIUM CHLORIDE, PRESERVATIVE FREE 10 ML: 5 INJECTION INTRAVENOUS at 07:57

## 2022-04-16 RX ADMIN — TIOTROPIUM BROMIDE INHALATION SPRAY 2 PUFF: 3.12 SPRAY, METERED RESPIRATORY (INHALATION) at 07:28

## 2022-04-16 RX ADMIN — MOMETASONE FUROATE AND FORMOTEROL FUMARATE DIHYDRATE 2 PUFF: 200; 5 AEROSOL RESPIRATORY (INHALATION) at 07:28

## 2022-04-16 RX ADMIN — Medication 1000 UNITS: at 07:56

## 2022-04-16 RX ADMIN — LEVALBUTEROL HYDROCHLORIDE 1.25 MG: 1.25 SOLUTION RESPIRATORY (INHALATION) at 20:53

## 2022-04-16 RX ADMIN — INSULIN LISPRO 2 UNITS: 100 INJECTION, SOLUTION INTRAVENOUS; SUBCUTANEOUS at 20:39

## 2022-04-16 RX ADMIN — INSULIN LISPRO 4 UNITS: 100 INJECTION, SOLUTION INTRAVENOUS; SUBCUTANEOUS at 17:26

## 2022-04-16 RX ADMIN — CETIRIZINE HYDROCHLORIDE 5 MG: 10 TABLET, FILM COATED ORAL at 07:56

## 2022-04-16 RX ADMIN — SODIUM CHLORIDE, PRESERVATIVE FREE 10 ML: 5 INJECTION INTRAVENOUS at 20:39

## 2022-04-16 RX ADMIN — ATORVASTATIN CALCIUM 80 MG: 80 TABLET, FILM COATED ORAL at 20:38

## 2022-04-16 RX ADMIN — LOSARTAN POTASSIUM 25 MG: 25 TABLET, FILM COATED ORAL at 20:38

## 2022-04-16 ASSESSMENT — PAIN SCALES - GENERAL: PAINLEVEL_OUTOF10: 0

## 2022-04-16 NOTE — PLAN OF CARE
Problem: Impaired respiratory status  Goal: Clear lung sounds  4/15/2022 2214 by Dk Lamb RCP  Outcome: Ongoing

## 2022-04-16 NOTE — PROGRESS NOTES
[facility]  INPATIENT OCCUPATIONAL THERAPY  [facility] ONC MED 5K  EVALUATION    Time:   Time In: 1137  Time Out: 1216  Timed Code Treatment Minutes: 31 Minutes  Minutes: 39          Date: 2022  Patient Name: Tr Carcamo,   Gender: male      MRN: [id number]  : 1940  (79 y.o.)  Referring Practitioner: Ezequiel Trimble MD  Diagnosis: Shortness of breath  Additional Pertinent Hx: Merline Doing is a 80 y/o  male with a PMHx of COPD, HFpEF, HTN, Hx of CVA, CAD, S/p TAVR, persistent A. Fib on warfarin who presents to [facility] ED today for the evaluation of cough and shortness of breath that started last evening. The patient states he took a nebulizer treatment at home for shortness of breath before bed, but has had worsening SOB and cough all night. The cough is chronic, but significantly worsened and consistently productive of yellow-green sputum. Otherwise, the patient has also noted associated worsening SOB for the last few days with activity. He states his shortness of breath resolves with rest. He denies associated chest pain, lightheadedness, dizziness, abdominal pain, fever, chills, worsening LE edema. He states he has not noticed an unexpected weight change, and other wise states he feels well overall. Restrictions/Precautions:  Restrictions/Precautions: General Precautions,Fall Risk  Position Activity Restriction  Other position/activity restrictions: monitor O2    Subjective  Chart Reviewed: Yes,Orders,Progress Notes,History and Physical  Patient assessed for rehabilitation services?: Yes    Subjective: RN okayed OT session. Upon arrival patient was resting in bed. Pt was agreeable to OT session. Pain:  Pain Assessment  Patient Currently in Pain: Denies    Vitals: Oxygen: Pt on 4 Liters O2 upon arrival at 91%. Pt increased to 6 Liters during ambulation and able to maintain above 90% throughout activities.     Social/Functional History:  Lives With: Significant other  Type of Home: Trailer  Home Layout: One level  Home Access: Stairs to enter with rails  Entrance Stairs - Number of Steps: 5  Entrance Stairs - Rails: Both  Home Equipment: Rolling walker   Bathroom Shower/Tub: Tub/Shower unit  Bathroom Toilet: Standard  Bathroom Accessibility: Accessible       ADL Assistance: Independent  Homemaking Assistance: Independent  Ambulation Assistance: Independent (no AD)  Transfer Assistance: Independent    Active : Yes  Mode of Transportation: Car  Occupation: Retired       VISION:Corrected    HEARING:  [place] [employer]    COGNITION: Decreased Problem Solving and Decreased Safety Awareness    RANGE OF MOTION:  Bilateral Upper Extremity:  WFL    STRENGTH:  Bilateral Upper Extremity:  Impaired - deconditioned    SENSATION:   WFL    ADL:   Upper Extremity Dressing: Supervision and with set-up. Lower Extremity Dressing: Minimal Assistance. Zina Graven BALANCE:  Sitting Balance:  Stand By Assistance. Standing Balance: Contact Guard Assistance. BED MOBILITY:  Not Tested    TRANSFERS:  Sit to Stand:  Contact Guard Assistance. Stand to Sit: Contact Guard Assistance. FUNCTIONAL MOBILITY:  Assistive Device: None  Assist Level:  Contact Guard Assistance. Distance: around unit  Slow pace, no LOB. Activity Tolerance:  Patient tolerance of  treatment: good. Assessment:  Assessment: This 79year old male requires skilled OT intervention to increase indep and endurance with all self cares, transfers, mobiliy, and IADLs while O2 stats remain above 90% to return to Indep PLOF. Performance deficits / Impairments: Decreased functional mobility ,Decreased ADL status,Decreased endurance,Decreased strength,Decreased balance  Prognosis: Good  REQUIRES OT FOLLOW UP: Yes    Treatment Initiated: Treatment and education initiated within context of evaluation.   Evaluation time included review of current medical information, gathering information related to past medical, social and functional history, completion of standardized testing, formal and informal observation of tasks, assessment of data and development of plan of care and goals. Treatment time included skilled education and facilitation of tasks to increase safety and independence with ADL's for improved functional independence and quality of life. Discharge Recommendations:  Continue to assess pending progress,Home with Home health OT    Patient Education:  OT Education: OT Role,Plan of Care,Precautions,ADL Adaptive Strategies,Transfer Training,Energy Conservation    Equipment Recommendations:  Equipment Needed: No    Plan:  Times per week: 3-5x  Current Treatment Recommendations: Strengthening,Functional Mobility Training,Endurance Training,Balance Training,Safety Education & Training,Self-Care / ADL,Patient/Caregiver Education & Training,Home Management Training,Equipment Evaluation, Education, & procurement. See long-term goal time frame for expected duration of plan of care. If no long-term goals established, a short length of stay is anticipated. Goals:  Patient goals : Go home  Short term goals  Time Frame for Short term goals: Until discharge  Short term goal 1: Pt will complete BUE strengthening exercises with min vcs for technique to increase indep and endurance with self cares. Short term goal 2: Pt will complete standing task x 4 minutes with SBA and min vcs for safety while O2 stats remain above 90% to increase endurance with grooming . Short term goal 3: Pt will complete functional mobility to/from BR and HH distaces with S while O2 stats remain above 90%. Short term goal 4: Pt will complete BADL routine with S and EC techniques to increase endurance in home environment. Following session, patient left in safe position with all fall risk precautions in place.

## 2022-04-16 NOTE — PLAN OF CARE
Problem: Falls - Risk of:  Goal: Will remain free from falls  Description: Will remain free from falls  Outcome: Ongoing   All fall precautions in place. Bed in low position, alarm activated and appropriate use of call light. Problem: Infection:  Goal: Will remain free from infection  Description: Will remain free from infection  Outcome: Ongoing   Patient receiving Rocephin and Zithromax daily. Problem: Pain:  Goal: Patient's pain/discomfort is manageable  Description: Patient's pain/discomfort is manageable  Outcome: Ongoing   Pain Assessment: 0-10  Pain Level: 0   Patient's Stated Pain Goal: 1   Is pain goal met at this time? Yes   Patient denies pain at this time, will continue to monitor and reassess. Problem: Discharge Planning:  Goal: Patients continuum of care needs are met  Description: Patients continuum of care needs are met  Outcome: Ongoing  Discharge date is unclear, plan is to return to home with significant other. Care plan reviewed with patient. Patient verbalizes understanding of the plan of care and contributes to goal setting.

## 2022-04-16 NOTE — PLAN OF CARE
Problem: Impaired respiratory status  Goal: Clear lung sounds  4/16/2022 0854 by Giovanny Cardenas RCP  Outcome: Ongoing  Patient receiving multiple breathing treatments to manage and treat impaired breath sounds.

## 2022-04-16 NOTE — PROGRESS NOTES
Hospitalist Progress Note    Patient:  Andrew Vaz      Unit/Bed:5K-26/026-A    YOB: 1940    MRN: [id number]       Acct: [de-identified]     PCP: WILBERTO Villalba CNP    Date of Admission: 4/13/2022    Assessment/Plan:    1. Acute hypoxemic respiratory failure: weaned down to [place] today. 2. Community-acquired pneumonia: Continue with IV Rocephin and azithromycin. 3. Acute on chronic systolic heart failure: Most recent ejection fraction 45 to 50%. Hold Bumex. Monitor intake and output, monitor electrolytes and renal function closely. 4. Acute on chronic kidney disease stage III: Monitor renal function  5. Acute exacerbation of COPD:  continue with prednisone 40 mg daily, mucinex pulmonary toiletry and as needed bronchodilators  6. Hypertension: Continue with current blood pressure medications and monitor. 7. Persistent A. fib with history of pacemaker placement: Continue with beta-blocker and Coumadin      Subjective (past 24 hours):   Patient was seen and examined at bedside. Reports SOB is improving. No acute overnight events, weaning O2.     Medications:  Reviewed    Infusion Medications    sodium chloride      dextrose       Scheduled Medications    cefTRIAXone (ROCEPHIN) IV  1,000 mg IntraVENous Q24H    azithromycin  500 mg IntraVENous Q24H    levalbuterol  1.25 mg Nebulization Q4H WA    ascorbic acid  500 mg Oral Daily    atorvastatin  80 mg Oral Nightly    cetirizine  5 mg Oral Daily    losartan  25 mg Oral Nightly    metoprolol succinate  50 mg Oral Daily    Vitamin D  1,000 Units Oral Daily    sodium chloride flush  10 mL IntraVENous 2 times per day    insulin lispro  0-6 Units SubCUTAneous TID     insulin lispro  0-3 Units SubCUTAneous Nightly    guaiFENesin  600 mg Oral BID    predniSONE  40 mg Oral Daily    warfarin placeholder: dosing by pharmacy   Other RX Placeholder    mometasone-formoterol  2 puff Inhalation BID    tiotropium  2 puff Inhalation Daily    bumetanide  1 mg IntraVENous BID     PRN Meds: guaiFENesin-codeine, albuterol sulfate HFA, sodium chloride flush, sodium chloride, ondansetron **OR** ondansetron, polyethylene glycol, acetaminophen **OR** acetaminophen, glucagon (rDNA), dextrose, dextrose bolus (hypoglycemia) **OR** dextrose bolus (hypoglycemia), glucose **OR** glucose, benzocaine-menthol      Intake/Output Summary (Last 24 hours) at 4/16/2022 1322  Last data filed at 4/16/2022 1030  Gross per 24 hour   Intake 380 ml   Output 3171 ml   Net -2791 ml       Diet:  ADULT DIET; Regular; 4 carb choices (60 gm/meal); Low Sodium (2 gm); 2000 ml    Exam:  /84   Pulse 82   Temp 97.7 °F (36.5 °C) (Oral)   Resp 18   Ht 5' 5.98\" (1.676 m)   Wt 203 lb 1.6 oz (92.1 kg)   SpO2 92%   BMI 32.80 kg/m²     General appearance: No apparent distress, appears stated age and cooperative. Respiratory:  Coarse air entry, no wheeze  Cardiovascular: Regular rate and rhythm with normal S1/S2 without murmurs, rubs or gallops. Abdomen: Soft, non-tender, non-distended with normal bowel sounds. Extremities: no pedal edema  Skin:  no rashes    Labs:   Recent Labs     04/14/22  0612 04/15/22  0516 04/16/22  0506   WBC 10.1 15.0* 18.2*   HGB 11.7* 12.0* 12.9*   HCT 36.0* 37.5* 39.3*    174 187     Recent Labs     04/14/22  0612 04/15/22  0516 04/16/22  0506    135 137   K 4.4 4.1 3.9   CL 99 99 99   CO2 20* 25 22*   BUN 41* 49* 50*   CREATININE 2.4* 2.3* 2.4*   CALCIUM 8.8 8.5 8.7     No results for input(s): AST, ALT, BILIDIR, BILITOT, ALKPHOS in the last 72 hours. Recent Labs     04/14/22  0612 04/15/22  0516 04/16/22  0506   INR 2.47* 2.39* 3.44*     No results for input(s): CKTOTAL, TROPONINI in the last 72 hours. No results for input(s): PROCAL in the last 72 hours.     Microbiology:      Urinalysis:      Lab Results   Component Value Date    NITRU NEGATIVE 07/03/2021    WBCUA NONE SEEN 06/10/2021    BACTERIA NONE SEEN 06/10/2021    RBCUA NONE SEEN 06/10/2021    BLOODU NEGATIVE 07/03/2021    SPECGRAV 1.006 06/10/2021    GLUCOSEU NEGATIVE 07/03/2021       Radiology:  Echocardiogram limited    Result Date: 4/14/2022  Transthoracic Echocardiography Report (TTE)  Demographics   Patient Name  Maria Luz Hunt Gender            Male                L   MR #          [id number]      Race                                              Ethnicity   Account #     [de-identified]      Room Number       8903   Accession     [id number]     Date of Study     04/14/2022  Number   Date of Birth 1940     Referring         Lucy Peña CNP                               Physician         Ashu Ta PA-C   Age           79 year(s)     Sonographer       BRO Murillo, RDCS,                                                 RDMS, RVT                                Interpreting      Parish Grider MD                               Physician  Procedure Type of Study   TTE procedure:ECHOCARDIOGRAM LIMITED. Procedure Date Date: 04/14/2022 Start: 06:37 AM Study Location: Bedside Technical Quality: Limited visualization due to body habitus. Indications:Cardiomyopathy, Post TAVR and Shortness of breath. Additional Medical History:diabetes, pneumonia, hyperlipidemia, acute hypoxic respiratory failure, chronic obstructive pulmonary disease, hypertenison, atrial fibrillation, pacemaker, chronic kidney disease, coronary artery disease, anemia, history of cerebrovascular accident, TAVR, shortness of breath, stent, congestive heart failure, cardiomyopathy Patient Status: Routine Height: 65 inches Weight: 204 pounds BSA: 1.99 m^2 BMI: 33.95 kg/m^2 BP: 127/75 mmHg Allergies   - See Epic. Conclusions   Summary  Technically difficult examination. This is a suboptimal study due to poor echocardiographic window. Left ventricle size is normal.  Normal left ventricular wall thickness.   Ejection fraction is visually estimated in the range of 45% to 50%.  Unable to determine wall motion abnormalities due to poor image quality. s/p TAVAR WITH NORMAL FUNCTION  Structurally normal mitral valve. Mitral annular calcification is present. Normal mobility of both mitral valve leaflets. No evidence of mitral valve stenosis. Mild mitral regurgitation is present. Signature   ----------------------------------------------------------------  Electronically signed by Mary Pearson MD (Interpreting  physician) on 04/14/2022 at 12:10 PM  ----------------------------------------------------------------   Findings   Mitral Valve  Structurally normal mitral valve. Mitral annular calcification is present. Normal mobility of both mitral valve leaflets. No evidence of mitral valve stenosis. Mild mitral regurgitation is present. Aortic Valve  s/p TAVAR WITH NORMAL FUNCTION   Tricuspid Valve  Tricuspid valve is structurally normal.  No evidence of tricuspid stenosis. Mild tricuspid regurgitation visualized. Pulmonic Valve  The pulmonic valve was not well visualized . Left Atrium  Left atrium is of upper normal size. Left Ventricle  Left ventricle size is normal.  Normal left ventricular wall thickness. Ejection fraction is visually estimated in the range of 45% to 50%. Unable to determine wall motion abnormalities due to poor image quality. Right Atrium  The right atrium is of normal size. Right Ventricle  Mildly dilated right ventricle. Pericardial Effusion  No evidence of any pericardial effusion. Pleural Effusion  No evidence of pleural effusion. Aorta / Great Vessels  The aorta is within normal limits.   M-Mode/2D Measurements & Calculations   LV Diastolic      LV Systolic Dimension: 3 cm      LA Dimension: 3.9 cmLA  Dimension: 4.1 cm LV Volume Diastolic: 38.7 ml     Area: 20 cm^2  LV FS:26.8 %      LV Volume Systolic: 35 ml  LV PW Diastolic:  LV EDV/LV EDV Index: 74.2 ml/37  1.3 cm            m^2LV ESV/LV ESV Index: 35 KU/69  Septum Diastolic: m^2 RV Diastolic Dimension:  1.4 cm            EF Calculated: 52.8 %            3 cm                                                      Ascending Aorta: 3.3 cm                                                     LA volume/Index: 69.8                    LVOT: 2 cm                       ml /35m^2  Doppler Measurements & Calculations    AV Peak Velocity: 172 cm/s      LVOT Peak Velocity: 58.7 cm/s   AV Peak Gradient: 11.83 mmHg    LVOT Mean Velocity: 41.4 cm/s   AV Mean Velocity: 115 cm/s      LVOT Peak Gradient: 1 mmHgLVOT Mean   AV Mean Gradient: 6 mmHg        Gradient: 1 mmHg   AV VTI: 37.5 cm   AV Area (Continuity):1.12 cm^2    LVOT VTI: 13.4 cm    AV DVI (VTI): 0.36AV DVI   (Vmax):0.34  [email or web address] [id number]    XR CHEST PORTABLE    Result Date: 4/13/2022  PROCEDURE: XR CHEST PORTABLE CLINICAL INFORMATION: Cough shortness of breath history of COPD and CHF. COMPARISON: Chest x-ray 3/8/2022. TECHNIQUE: AP upright view of the chest. FINDINGS: There is a pacemaker. There is a replaced aortic valve. There is cardiac negative. The mediastinum is not widened. There are some faint interstitial opacities in the mid and lower lung zones. There are no definite pleural effusions. There is no pneumothorax. The pulmonary vascularity is normal.     Faint bibasilar interstitial opacities. This can represent mild pulmonary edema. **This report has been created using voice recognition software. It may contain minor errors which are inherent in voice recognition technology. ** Final report electronically signed by Dr. Lorena Zamudio on 4/13/2022 7:19 AM      DVT prophylaxis: [] Lovenox                                 [] SCDs                                 [] SQ Heparin                                 [] Encourage ambulation           [x] Already on Anticoagulation     Code Status: Full Code    Tele:   [x] yes             [] no    Active Hospital Problems    Diagnosis Date Noted    [facility] [J96.01] 04/13/2022       Electronically signed by Ovidio Calderon MD on 4/16/2022 at 1:22 PM

## 2022-04-16 NOTE — PROGRESS NOTES
Clinical Pharmacy Note    Warfarin consult follow-up    Recent Labs     04/16/22  0506   INR 3.44*     Recent Labs     04/14/22  0612 04/15/22  0516 04/16/22  0506   HGB 11.7* 12.0* 12.9*   HCT 36.0* 37.5* 39.3*    174 187     Significant Drug-Drug Interactions:  New warfarin drug-drug interactions: none  Discontinued drug-drug interactions: none  Current warfarin drug-drug interactions: prednisone, ascorbic acid     Date INR Warfarin Dose   04/13/22 2.76 1 mg     04/14/22  2.47  2.5 mg    04/15/22  2.39  2.5 mg   04/16/22   3.44  No warfarin                              Notes:                   PT/INR or POC-INR will be monitored routinely until therapeutic INR is achieved.        Ar[place] Aranda, PharmD, BCPS   4/16/2022  2:32 PM

## 2022-04-17 LAB
ANION GAP SERPL CALCULATED.3IONS-SCNC: 13 MEQ/L (ref 8–16)
BASOPHILS # BLD: 0.1 %
BASOPHILS ABSOLUTE: 0 THOU/MM3 (ref 0–0.1)
BUN BLDV-MCNC: 60 MG/DL (ref 7–22)
CALCIUM SERPL-MCNC: 8.6 MG/DL (ref 8.5–10.5)
CHLORIDE BLD-SCNC: 99 MEQ/L (ref 98–111)
CO2: 25 MEQ/L (ref 23–33)
CREAT SERPL-MCNC: 2.7 MG/DL (ref 0.4–1.2)
EKG Q-T INTERVAL: 372 MS
EKG QRS DURATION: 138 MS
EKG QTC CALCULATION (BAZETT): 491 MS
EKG R AXIS: -25 DEGREES
EKG T AXIS: 102 DEGREES
EKG VENTRICULAR RATE: 105 BPM
EOSINOPHIL # BLD: 0 %
EOSINOPHILS ABSOLUTE: 0 THOU/MM3 (ref 0–0.4)
ERYTHROCYTE [DISTWIDTH] IN BLOOD BY AUTOMATED COUNT: 15.1 % (ref 11.5–14.5)
ERYTHROCYTE [DISTWIDTH] IN BLOOD BY AUTOMATED COUNT: 54.3 FL (ref 35–45)
GFR SERPL CREATININE-BSD FRML MDRD: 23 ML/MIN/1.73M2
GLUCOSE BLD-MCNC: 173 MG/DL (ref 70–108)
GLUCOSE BLD-MCNC: 215 MG/DL (ref 70–108)
GLUCOSE BLD-MCNC: 218 MG/DL (ref 70–108)
GLUCOSE BLD-MCNC: 306 MG/DL (ref 70–108)
GLUCOSE BLD-MCNC: 312 MG/DL (ref 70–108)
HCT VFR BLD CALC: 36.5 % (ref 42–52)
HEMOGLOBIN: 11.9 GM/DL (ref 14–18)
IMMATURE GRANS (ABS): 0.13 THOU/MM3 (ref 0–0.07)
IMMATURE GRANULOCYTES: 1.1 %
INR BLD: 3.25 (ref 0.85–1.13)
LYMPHOCYTES # BLD: 7.5 %
LYMPHOCYTES ABSOLUTE: 0.9 THOU/MM3 (ref 1–4.8)
MCH RBC QN AUTO: 31.8 PG (ref 26–33)
MCHC RBC AUTO-ENTMCNC: 32.6 GM/DL (ref 32.2–35.5)
MCV RBC AUTO: 97.6 FL (ref 80–94)
MONOCYTES # BLD: 8.2 %
MONOCYTES ABSOLUTE: 1 THOU/MM3 (ref 0.4–1.3)
NUCLEATED RED BLOOD CELLS: 0 /100 WBC
PLATELET # BLD: 172 THOU/MM3 (ref 130–400)
PMV BLD AUTO: 11.2 FL (ref 9.4–12.4)
POTASSIUM SERPL-SCNC: 4.7 MEQ/L (ref 3.5–5.2)
RBC # BLD: 3.74 MILL/MM3 (ref 4.7–6.1)
SEG NEUTROPHILS: 83.1 %
SEGMENTED NEUTROPHILS ABSOLUTE COUNT: 9.8 THOU/MM3 (ref 1.8–7.7)
SODIUM BLD-SCNC: 137 MEQ/L (ref 135–145)
WBC # BLD: 11.8 THOU/MM3 (ref 4.8–10.8)

## 2022-04-17 PROCEDURE — 85610 PROTHROMBIN TIME: CPT

## 2022-04-17 PROCEDURE — 6370000000 HC RX 637 (ALT 250 FOR IP)

## 2022-04-17 PROCEDURE — 99232 SBSQ HOSP IP/OBS MODERATE 35: CPT | Performed by: HOSPITALIST

## 2022-04-17 PROCEDURE — 93010 ELECTROCARDIOGRAM REPORT: CPT | Performed by: INTERNAL MEDICINE

## 2022-04-17 PROCEDURE — 85025 COMPLETE CBC W/AUTO DIFF WBC: CPT

## 2022-04-17 PROCEDURE — 2580000003 HC RX 258: Performed by: HOSPITALIST

## 2022-04-17 PROCEDURE — 82948 REAGENT STRIP/BLOOD GLUCOSE: CPT

## 2022-04-17 PROCEDURE — 6360000002 HC RX W HCPCS: Performed by: HOSPITALIST

## 2022-04-17 PROCEDURE — 80048 BASIC METABOLIC PNL TOTAL CA: CPT

## 2022-04-17 PROCEDURE — 1200000003 HC TELEMETRY R&B

## 2022-04-17 PROCEDURE — 94761 N-INVAS EAR/PLS OXIMETRY MLT: CPT

## 2022-04-17 PROCEDURE — 36415 COLL VENOUS BLD VENIPUNCTURE: CPT

## 2022-04-17 PROCEDURE — 94640 AIRWAY INHALATION TREATMENT: CPT

## 2022-04-17 PROCEDURE — 2500000003 HC RX 250 WO HCPCS: Performed by: HOSPITALIST

## 2022-04-17 PROCEDURE — 2700000000 HC OXYGEN THERAPY PER DAY

## 2022-04-17 PROCEDURE — 2580000003 HC RX 258

## 2022-04-17 RX ORDER — FLUTICASONE PROPIONATE 50 MCG
1 SPRAY, SUSPENSION (ML) NASAL DAILY
Status: DISCONTINUED | OUTPATIENT
Start: 2022-04-17 | End: 2022-04-24 | Stop reason: HOSPADM

## 2022-04-17 RX ADMIN — LEVALBUTEROL HYDROCHLORIDE 1.25 MG: 1.25 SOLUTION RESPIRATORY (INHALATION) at 08:11

## 2022-04-17 RX ADMIN — TIOTROPIUM BROMIDE INHALATION SPRAY 2 PUFF: 3.12 SPRAY, METERED RESPIRATORY (INHALATION) at 08:11

## 2022-04-17 RX ADMIN — INSULIN LISPRO 2 UNITS: 100 INJECTION, SOLUTION INTRAVENOUS; SUBCUTANEOUS at 08:47

## 2022-04-17 RX ADMIN — INSULIN LISPRO 4 UNITS: 100 INJECTION, SOLUTION INTRAVENOUS; SUBCUTANEOUS at 16:56

## 2022-04-17 RX ADMIN — METOPROLOL SUCCINATE 50 MG: 50 TABLET, EXTENDED RELEASE ORAL at 09:16

## 2022-04-17 RX ADMIN — MOMETASONE FUROATE AND FORMOTEROL FUMARATE DIHYDRATE 2 PUFF: 200; 5 AEROSOL RESPIRATORY (INHALATION) at 08:11

## 2022-04-17 RX ADMIN — GUAIFENESIN 600 MG: 600 TABLET, EXTENDED RELEASE ORAL at 09:15

## 2022-04-17 RX ADMIN — MOMETASONE FUROATE AND FORMOTEROL FUMARATE DIHYDRATE 2 PUFF: 200; 5 AEROSOL RESPIRATORY (INHALATION) at 20:53

## 2022-04-17 RX ADMIN — CEFTRIAXONE SODIUM 1000 MG: 10 INJECTION, POWDER, FOR SOLUTION INTRAVENOUS at 12:23

## 2022-04-17 RX ADMIN — PREDNISONE 40 MG: 20 TABLET ORAL at 09:15

## 2022-04-17 RX ADMIN — LOSARTAN POTASSIUM 25 MG: 25 TABLET, FILM COATED ORAL at 20:13

## 2022-04-17 RX ADMIN — Medication 1000 UNITS: at 09:15

## 2022-04-17 RX ADMIN — SODIUM CHLORIDE: 9 INJECTION, SOLUTION INTRAVENOUS at 13:29

## 2022-04-17 RX ADMIN — LEVALBUTEROL HYDROCHLORIDE 1.25 MG: 1.25 SOLUTION RESPIRATORY (INHALATION) at 16:04

## 2022-04-17 RX ADMIN — LEVALBUTEROL HYDROCHLORIDE 1.25 MG: 1.25 SOLUTION RESPIRATORY (INHALATION) at 11:52

## 2022-04-17 RX ADMIN — OXYCODONE HYDROCHLORIDE AND ACETAMINOPHEN 500 MG: 500 TABLET ORAL at 09:15

## 2022-04-17 RX ADMIN — INSULIN LISPRO 2 UNITS: 100 INJECTION, SOLUTION INTRAVENOUS; SUBCUTANEOUS at 20:20

## 2022-04-17 RX ADMIN — INSULIN LISPRO 1 UNITS: 100 INJECTION, SOLUTION INTRAVENOUS; SUBCUTANEOUS at 12:22

## 2022-04-17 RX ADMIN — SODIUM CHLORIDE, PRESERVATIVE FREE 10 ML: 5 INJECTION INTRAVENOUS at 20:14

## 2022-04-17 RX ADMIN — CETIRIZINE HYDROCHLORIDE 5 MG: 10 TABLET, FILM COATED ORAL at 09:15

## 2022-04-17 RX ADMIN — AZITHROMYCIN DIHYDRATE 500 MG: 500 INJECTION, POWDER, LYOPHILIZED, FOR SOLUTION INTRAVENOUS at 13:30

## 2022-04-17 RX ADMIN — SODIUM CHLORIDE, PRESERVATIVE FREE 10 ML: 5 INJECTION INTRAVENOUS at 09:15

## 2022-04-17 RX ADMIN — LEVALBUTEROL HYDROCHLORIDE 1.25 MG: 1.25 SOLUTION RESPIRATORY (INHALATION) at 20:53

## 2022-04-17 RX ADMIN — ATORVASTATIN CALCIUM 80 MG: 80 TABLET, FILM COATED ORAL at 20:13

## 2022-04-17 RX ADMIN — GUAIFENESIN 600 MG: 600 TABLET, EXTENDED RELEASE ORAL at 20:13

## 2022-04-17 ASSESSMENT — PAIN SCALES - GENERAL
PAINLEVEL_OUTOF10: 0

## 2022-04-17 NOTE — PLAN OF CARE
Problem: Impaired respiratory status  Goal: Clear lung sounds  4/17/2022 0817 by Yanci Ayala RCP  Outcome: Ongoing    Continue breathing treatments to improve breath sounds, increase aeration and decrease WOB.

## 2022-04-17 NOTE — PROGRESS NOTES
Clinical Pharmacy Note    Warfarin consult follow-up    Recent Labs     04/17/22  0416   INR 3.25*     Recent Labs     04/15/22  0516 04/16/22  0506 04/17/22  0416   HGB 12.0* 12.9* 11.9*   HCT 37.5* 39.3* 36.5*    187 172     Significant Drug-Drug Interactions:  New warfarin drug-drug interactions: none  Discontinued drug-drug interactions: prednisone,  Current warfarin drug-drug interactions: ascorbic acid     Date INR Warfarin Dose   04/13/22 2.76 1 mg     04/14/22  2.47  2.5 mg    04/15/22  2.39  2.5 mg   04/16/22   3.44  No warfarin     4/17/22 3.25  No warfarin                         Notes:                   PT/INR or POC-INR will be monitored routinely until therapeutic INR is achieved.     Laura Weber PharmD, BCPS 4/17/2022 2:22 PM

## 2022-04-17 NOTE — PLAN OF CARE
Problem: Falls - Risk of:  Goal: Will remain free from falls  Description: Will remain free from falls  4/17/2022 1412 by Jeana Winn RN  Outcome: Ongoing  Note: Patient remains free from falls this shift. Fall risk assessment complete. Fall sign posted. Non skid socks on. Bed in lowest position, 2/4 siderails up. Bed alarm on. Call light and all possessions within reach. Ambulates with assist. Rounding hourly       Problem: Infection:  Goal: Will remain free from infection  Description: Will remain free from infection  4/17/2022 1412 by Jeana Winn RN  Outcome: Ongoing  Note: Receiving IV zithromax and rocephin q24h. Problem: Safety:  Goal: Free from accidental physical injury  Description: Free from accidental physical injury  Outcome: Ongoing  Note: Patient free from injury this shift. Problem: Daily Care:  Goal: Daily care needs are met  Description: Daily care needs are met  Outcome: Ongoing  Note: Completes ADLs with minimal assist.      Problem: Pain:  Goal: Patient's pain/discomfort is manageable  Description: Patient's pain/discomfort is manageable  4/17/2022 1412 by Jeana Winn RN  Outcome: Ongoing  Note: No complaints of pain this shift. Will continue to monitor hourly. Problem: Skin Integrity:  Goal: Skin integrity will stabilize  Description: Skin integrity will stabilize  4/17/2022 1412 by Jeana Winn RN  Outcome: Ongoing  Note: No new skin issues noted this shift. Patient repositions self frequently in bed. Problem: Discharge Planning:  Goal: Patients continuum of care needs are met  Description: Patients continuum of care needs are met  4/17/2022 1412 by Jeana Winn RN  Outcome: Ongoing  Note: Plan to return home with wife at discharge. May need home O2     Problem: Impaired respiratory status  Goal: Clear lung sounds  4/17/2022 1412 by Jeana Winn RN  Outcome: Ongoing  Note: Rhonchi present throughout. Scheduled breathing treatments and PO predisone.       Problem: Metabolic:  Goal: Ability to maintain appropriate glucose levels will improve  Description: Ability to maintain appropriate glucose levels will improve  Outcome: Ongoing  Note: [place] ACHS. Patient receiving PO prednisone. Humalog sliding scale given per MAR. Care plan reviewed with patient and wife. Patient and wife verbalize understanding of the plan of care and contribute to goal setting.

## 2022-04-17 NOTE — PLAN OF CARE
Problem: Impaired respiratory status  Goal: Clear lung sounds  4/16/2022 2057 by Westley Zapata RCP  Outcome: Ongoing     Patient with rhonchi/expiratory wheezes at this time, will continue treatments as ordered to improve lung aeration. Will continue to work on [address] [facility] for bronchial hygiene.

## 2022-04-17 NOTE — PROGRESS NOTES
Hospitalist Progress Note    Patient:  Randolph Christie      Unit/Bed:5K-26/026-A    YOB: 1940    MRN: [id number]       Acct: [de-identified]     PCP: WILBERTO Gamez CNP    Date of Admission: 4/13/2022    Assessment/Plan:    1. Acute hypoxemic respiratory failure: currently on [facility], continue to wean. Home o2 eval on discharge   2. Community-acquired pneumonia: Continue with IV Rocephin and azithromycin. 3. Acute on chronic systolic heart failure: Most recent ejection fraction 45 to 50%. Hold Bumex. Monitor intake and output, monitor electrolytes and renal function closely. 4. Acute on chronic kidney disease stage III: Monitor renal function, diuretics on hold  5. Acute exacerbation of COPD:  continue with prednisone 40 mg daily, mucinex pulmonary toiletry and as needed bronchodilators  6. Hypertension: Continue with current blood pressure medications and monitor. 7. Persistent A. fib with history of pacemaker placement: Continue with beta-blocker and Coumadin      Subjective (past 24 hours):   Patient was seen and examined at bedside. SOB continues to improve. Weaning O2  No acute overnight events.     Medications:  Reviewed    Infusion Medications    sodium chloride      dextrose       Scheduled Medications    cefTRIAXone (ROCEPHIN) IV  1,000 mg IntraVENous Q24H    azithromycin  500 mg IntraVENous Q24H    levalbuterol  1.25 mg Nebulization Q4H WA    ascorbic acid  500 mg Oral Daily    atorvastatin  80 mg Oral Nightly    cetirizine  5 mg Oral Daily    losartan  25 mg Oral Nightly    metoprolol succinate  50 mg Oral Daily    Vitamin D  1,000 Units Oral Daily    sodium chloride flush  10 mL IntraVENous 2 times per day    insulin lispro  0-6 Units SubCUTAneous TID     insulin lispro  0-3 Units SubCUTAneous Nightly    guaiFENesin  600 mg Oral BID    warfarin placeholder: dosing by pharmacy   Other RX Placeholder    mometasone-formoterol  2 puff Inhalation BID  tiotropium  2 puff Inhalation Daily    [Held by provider] bumetanide  1 mg IntraVENous BID     PRN Meds: guaiFENesin-codeine, albuterol sulfate HFA, sodium chloride flush, sodium chloride, ondansetron **OR** ondansetron, polyethylene glycol, acetaminophen **OR** acetaminophen, glucagon (rDNA), dextrose, dextrose bolus (hypoglycemia) **OR** dextrose bolus (hypoglycemia), glucose **OR** glucose, benzocaine-menthol      Intake/Output Summary (Last 24 hours) at 4/17/2022 1235  Last data filed at 4/17/2022 1005  Gross per 24 hour   Intake 540 ml   Output 1875 ml   Net -1335 ml       Diet:  ADULT DIET; Regular; 4 carb choices (60 gm/meal); Low Sodium (2 gm); 2000 ml    Exam:  /74   Pulse 80   Temp 97.9 °F (36.6 °C) (Oral)   Resp 20   Ht 5' 5.98\" (1.676 m)   Wt 203 lb 1.6 oz (92.1 kg)   SpO2 91%   BMI 32.80 kg/m²     General appearance: No apparent distress, appears stated age and cooperative. Respiratory:  Coarse air entry, no wheeze  Cardiovascular: Regular rate and rhythm with normal S1/S2 without murmurs, rubs or gallops. Abdomen: Soft, non-tender, non-distended with normal bowel sounds. Extremities: no pedal edema  Skin:  no rashes    Labs:   Recent Labs     04/15/22  0516 04/16/22  0506 04/17/22  0416   WBC 15.0* 18.2* 11.8*   HGB 12.0* 12.9* 11.9*   HCT 37.5* 39.3* 36.5*    187 172     Recent Labs     04/15/22  0516 04/16/22  0506 04/17/22  0416    137 137   K 4.1 3.9 4.7   CL 99 99 99   CO2 25 22* 25   BUN 49* 50* 60*   CREATININE 2.3* 2.4* 2.7*   CALCIUM 8.5 8.7 8.6     No results for input(s): AST, ALT, BILIDIR, BILITOT, ALKPHOS in the last 72 hours. Recent Labs     04/15/22  0516 04/16/22  0506 04/17/22  0416   INR 2.39* 3.44* 3.25*     No results for input(s): CKTOTAL, TROPONINI in the last 72 hours. No results for input(s): PROCAL in the last 72 hours.     Microbiology:      Urinalysis:      Lab Results   Component Value Date    NITRU NEGATIVE 07/03/2021    WBCUA NONE SEEN 06/10/2021    BACTERIA NONE SEEN 06/10/2021    RBCUA NONE SEEN 06/10/2021    BLOODU NEGATIVE 07/03/2021    SPECGRAV 1.006 06/10/2021    GLUCOSEU NEGATIVE 07/03/2021       Radiology:  Echocardiogram limited    Result Date: 4/14/2022  Transthoracic Echocardiography Report (TTE)  Demographics   Patient Name  Arelis Warren Gender            Male                L   MR #          [id number]      Race                                              Ethnicity   Account #     [de-identified]      Room Number       8327   Accession     [id number]     Date of Study     04/14/2022  Number   Date of Birth 1940     Referring         Cori Will CNP                               Physician         Sabina Grider PA-C   Age           79 year(s)     Sonographer       BRO Jay, SHERWIN,                                                 RDMS, RVT                                Interpreting      Viola Calderón MD                               Physician  Procedure Type of Study   TTE procedure:ECHOCARDIOGRAM LIMITED. Procedure Date Date: 04/14/2022 Start: 06:37 AM Study Location: Bedside Technical Quality: Limited visualization due to body habitus. Indications:Cardiomyopathy, Post TAVR and Shortness of breath. Additional Medical History:diabetes, pneumonia, hyperlipidemia, acute hypoxic respiratory failure, chronic obstructive pulmonary disease, hypertenison, atrial fibrillation, pacemaker, chronic kidney disease, coronary artery disease, anemia, history of cerebrovascular accident, TAVR, shortness of breath, stent, congestive heart failure, cardiomyopathy Patient Status: Routine Height: 65 inches Weight: 204 pounds BSA: 1.99 m^2 BMI: 33.95 kg/m^2 BP: 127/75 mmHg Allergies   - See Epic. Conclusions   Summary  Technically difficult examination. This is a suboptimal study due to poor echocardiographic window. Left ventricle size is normal.  Normal left ventricular wall thickness.   Ejection fraction is visually estimated in the range of 45% to 50%. Unable to determine wall motion abnormalities due to poor image quality. s/p TAVAR WITH NORMAL FUNCTION  Structurally normal mitral valve. Mitral annular calcification is present. Normal mobility of both mitral valve leaflets. No evidence of mitral valve stenosis. Mild mitral regurgitation is present. Signature   ----------------------------------------------------------------  Electronically signed by Esther Abreu MD (Interpreting  physician) on 04/14/2022 at 12:10 PM  ----------------------------------------------------------------   Findings   Mitral Valve  Structurally normal mitral valve. Mitral annular calcification is present. Normal mobility of both mitral valve leaflets. No evidence of mitral valve stenosis. Mild mitral regurgitation is present. Aortic Valve  s/p TAVAR WITH NORMAL FUNCTION   Tricuspid Valve  Tricuspid valve is structurally normal.  No evidence of tricuspid stenosis. Mild tricuspid regurgitation visualized. Pulmonic Valve  The pulmonic valve was not well visualized . Left Atrium  Left atrium is of upper normal size. Left Ventricle  Left ventricle size is normal.  Normal left ventricular wall thickness. Ejection fraction is visually estimated in the range of 45% to 50%. Unable to determine wall motion abnormalities due to poor image quality. Right Atrium  The right atrium is of normal size. Right Ventricle  Mildly dilated right ventricle. Pericardial Effusion  No evidence of any pericardial effusion. Pleural Effusion  No evidence of pleural effusion. Aorta / Great Vessels  The aorta is within normal limits.   M-Mode/2D Measurements & Calculations   LV Diastolic      LV Systolic Dimension: 3 cm      LA Dimension: 3.9 cmLA  Dimension: 4.1 cm LV Volume Diastolic: 59.8 ml     Area: 20 cm^2  LV FS:26.8 %      LV Volume Systolic: 35 ml  LV PW Diastolic:  LV EDV/LV EDV Index: 74.2 ml/37  1.3 cm            m^2LV ESV/LV ESV Index: 35 BG/12  Septum Diastolic: m^2                              RV Diastolic Dimension:  1.4 cm            EF Calculated: 52.8 %            3 cm                                                      Ascending Aorta: 3.3 cm                                                     LA volume/Index: 69.8                    LVOT: 2 cm                       ml /35m^2  Doppler Measurements & Calculations    AV Peak Velocity: 172 cm/s      LVOT Peak Velocity: 58.7 cm/s   AV Peak Gradient: 11.83 mmHg    LVOT Mean Velocity: 41.4 cm/s   AV Mean Velocity: 115 cm/s      LVOT Peak Gradient: 1 mmHgLVOT Mean   AV Mean Gradient: 6 mmHg        Gradient: 1 mmHg   AV VTI: 37.5 cm   AV Area (Continuity):1.12 cm^2    LVOT VTI: 13.4 cm    AV DVI (VTI): 0.36AV DVI   (Vmax):0.34  [email or web address] [id number]    XR CHEST PORTABLE    Result Date: 4/13/2022  PROCEDURE: XR CHEST PORTABLE CLINICAL INFORMATION: Cough shortness of breath history of COPD and CHF. COMPARISON: Chest x-ray 3/8/2022. TECHNIQUE: AP upright view of the chest. FINDINGS: There is a pacemaker. There is a replaced aortic valve. There is cardiac negative. The mediastinum is not widened. There are some faint interstitial opacities in the mid and lower lung zones. There are no definite pleural effusions. There is no pneumothorax. The pulmonary vascularity is normal.     Faint bibasilar interstitial opacities. This can represent mild pulmonary edema. **This report has been created using voice recognition software. It may contain minor errors which are inherent in voice recognition technology. ** Final report electronically signed by Dr. Pat Rao on 4/13/2022 7:19 AM      DVT prophylaxis: [] Lovenox                                 [] SCDs                                 [] SQ Heparin                                 [] Encourage ambulation           [x] Already on Anticoagulation     Code Status: Full Code    Tele:   [x] yes             [] no    Active Hospital Problems    Diagnosis Date Noted    Acute hypoxemic respiratory failure ([facility] [address] [J96.01] 04/13/2022       Electronically signed by Zander Stephen MD on 4/17/2022 at 12:35 PM

## 2022-04-17 NOTE — PLAN OF CARE
Problem: Falls - Risk of:  Goal: Will remain free from falls  Description: Will remain free from falls  4/17/2022 0018 by Clem Watkins RN  Outcome: Ongoing   Fall sign posted. Arm band in place. Non-skid slippers on. Bed alarm on. Patient agreeable to use of call light. Call light within reach. Bed in lowest position. Problem: Impaired respiratory status  Goal: Clear lung sounds  4/17/2022 0018 by Clem Watkins RN  Outcome: Ongoing   Patient having rhonchi in lower bases this shift. Encouraging incentive spirometer and acapella usage. Breathing treatments ordered. On 3L O2. Will continue to wean if possible. Problem: Infection:  Goal: Will remain free from infection  Description: Will remain free from infection  4/17/2022 0018 by Clem Watkins RN  Outcome: Ongoing   IV atb given as ordered. VS within normal limits. Will continue to assess for new signs of infection. Problem: Pain:  Goal: Patient's pain/discomfort is manageable  Description: Patient's pain/discomfort is manageable  4/17/2022 0018 by Clem Watkins RN  Outcome: Ongoing   Patient denying pain so far this shift. Pain goal 0/10. Pain goal met this shift. Encouraged to rest and reposition for comfort. Problem: Skin Integrity:  Goal: Skin integrity will stabilize  Description: Skin integrity will stabilize  4/17/2022 0018 by Clem Watkins RN  Outcome: Ongoing   No new signs of skin breakdown so far this shift. Patient ambulates in room and turns self frequently in bed. Will continue to assess for signs of skin breakdown. Problem: Discharge Planning:  Goal: Patients continuum of care needs are met  Description: Patients continuum of care needs are met  4/17/2022 0018 by Clem Watkins RN  Outcome: Ongoing   Patient from home with significant other. Plans to return home at time of discharge. Unknown discharge date at this time. Care plan reviewed with patient and family.   Patient and family verbalize understanding of the plan of care and contribute to goal setting.

## 2022-04-18 ENCOUNTER — APPOINTMENT (OUTPATIENT)
Dept: PHARMACY | Age: 82
End: 2022-04-18
Payer: MEDICARE

## 2022-04-18 ENCOUNTER — APPOINTMENT (OUTPATIENT)
Dept: GENERAL RADIOLOGY | Age: 82
DRG: 291 | End: 2022-04-18
Payer: MEDICARE

## 2022-04-18 LAB
ANION GAP SERPL CALCULATED.3IONS-SCNC: 14 MEQ/L (ref 8–16)
BASOPHILS # BLD: 0.2 %
BASOPHILS ABSOLUTE: 0 THOU/MM3 (ref 0–0.1)
BUN BLDV-MCNC: 53 MG/DL (ref 7–22)
CALCIUM SERPL-MCNC: 8.6 MG/DL (ref 8.5–10.5)
CHLORIDE BLD-SCNC: 97 MEQ/L (ref 98–111)
CO2: 23 MEQ/L (ref 23–33)
CREAT SERPL-MCNC: 2 MG/DL (ref 0.4–1.2)
EKG Q-T INTERVAL: 392 MS
EKG QRS DURATION: 140 MS
EKG QTC CALCULATION (BAZETT): 484 MS
EKG R AXIS: 65 DEGREES
EKG T AXIS: -81 DEGREES
EKG VENTRICULAR RATE: 92 BPM
EOSINOPHIL # BLD: 0.8 %
EOSINOPHILS ABSOLUTE: 0.1 THOU/MM3 (ref 0–0.4)
ERYTHROCYTE [DISTWIDTH] IN BLOOD BY AUTOMATED COUNT: 14.9 % (ref 11.5–14.5)
ERYTHROCYTE [DISTWIDTH] IN BLOOD BY AUTOMATED COUNT: 54.3 FL (ref 35–45)
GFR SERPL CREATININE-BSD FRML MDRD: 32 ML/MIN/1.73M2
GLUCOSE BLD-MCNC: 165 MG/DL (ref 70–108)
GLUCOSE BLD-MCNC: 168 MG/DL (ref 70–108)
GLUCOSE BLD-MCNC: 211 MG/DL (ref 70–108)
GLUCOSE BLD-MCNC: 217 MG/DL (ref 70–108)
GLUCOSE BLD-MCNC: 228 MG/DL (ref 70–108)
HCT VFR BLD CALC: 37.8 % (ref 42–52)
HEMOGLOBIN: 12.2 GM/DL (ref 14–18)
IMMATURE GRANS (ABS): 0.16 THOU/MM3 (ref 0–0.07)
IMMATURE GRANULOCYTES: 1.4 %
INR BLD: 2.16 (ref 0.85–1.13)
LYMPHOCYTES # BLD: 5.7 %
LYMPHOCYTES ABSOLUTE: 0.6 THOU/MM3 (ref 1–4.8)
MCH RBC QN AUTO: 31.8 PG (ref 26–33)
MCHC RBC AUTO-ENTMCNC: 32.3 GM/DL (ref 32.2–35.5)
MCV RBC AUTO: 98.4 FL (ref 80–94)
MONOCYTES # BLD: 6.2 %
MONOCYTES ABSOLUTE: 0.7 THOU/MM3 (ref 0.4–1.3)
NUCLEATED RED BLOOD CELLS: 0 /100 WBC
PLATELET # BLD: 175 THOU/MM3 (ref 130–400)
PMV BLD AUTO: 10.8 FL (ref 9.4–12.4)
POTASSIUM SERPL-SCNC: 4.7 MEQ/L (ref 3.5–5.2)
RBC # BLD: 3.84 MILL/MM3 (ref 4.7–6.1)
SEG NEUTROPHILS: 85.7 %
SEGMENTED NEUTROPHILS ABSOLUTE COUNT: 9.6 THOU/MM3 (ref 1.8–7.7)
SODIUM BLD-SCNC: 134 MEQ/L (ref 135–145)
WBC # BLD: 11.2 THOU/MM3 (ref 4.8–10.8)

## 2022-04-18 PROCEDURE — 97116 GAIT TRAINING THERAPY: CPT

## 2022-04-18 PROCEDURE — 6370000000 HC RX 637 (ALT 250 FOR IP)

## 2022-04-18 PROCEDURE — 2700000000 HC OXYGEN THERAPY PER DAY

## 2022-04-18 PROCEDURE — 99232 SBSQ HOSP IP/OBS MODERATE 35: CPT | Performed by: HOSPITALIST

## 2022-04-18 PROCEDURE — 93005 ELECTROCARDIOGRAM TRACING: CPT | Performed by: HOSPITALIST

## 2022-04-18 PROCEDURE — 6360000002 HC RX W HCPCS: Performed by: HOSPITALIST

## 2022-04-18 PROCEDURE — 6370000000 HC RX 637 (ALT 250 FOR IP): Performed by: HOSPITALIST

## 2022-04-18 PROCEDURE — 6370000000 HC RX 637 (ALT 250 FOR IP): Performed by: INTERNAL MEDICINE

## 2022-04-18 PROCEDURE — 85025 COMPLETE CBC W/AUTO DIFF WBC: CPT

## 2022-04-18 PROCEDURE — 2580000003 HC RX 258: Performed by: HOSPITALIST

## 2022-04-18 PROCEDURE — 71045 X-RAY EXAM CHEST 1 VIEW: CPT

## 2022-04-18 PROCEDURE — 2500000003 HC RX 250 WO HCPCS: Performed by: HOSPITALIST

## 2022-04-18 PROCEDURE — 94640 AIRWAY INHALATION TREATMENT: CPT

## 2022-04-18 PROCEDURE — 85610 PROTHROMBIN TIME: CPT

## 2022-04-18 PROCEDURE — 97110 THERAPEUTIC EXERCISES: CPT

## 2022-04-18 PROCEDURE — 94761 N-INVAS EAR/PLS OXIMETRY MLT: CPT

## 2022-04-18 PROCEDURE — 1200000003 HC TELEMETRY R&B

## 2022-04-18 PROCEDURE — 82948 REAGENT STRIP/BLOOD GLUCOSE: CPT

## 2022-04-18 PROCEDURE — 36415 COLL VENOUS BLD VENIPUNCTURE: CPT

## 2022-04-18 PROCEDURE — 80048 BASIC METABOLIC PNL TOTAL CA: CPT

## 2022-04-18 PROCEDURE — 2580000003 HC RX 258

## 2022-04-18 PROCEDURE — 97530 THERAPEUTIC ACTIVITIES: CPT

## 2022-04-18 PROCEDURE — 97535 SELF CARE MNGMENT TRAINING: CPT

## 2022-04-18 RX ORDER — BUMETANIDE 1 MG/1
1 TABLET ORAL DAILY
Status: DISCONTINUED | OUTPATIENT
Start: 2022-04-18 | End: 2022-04-24 | Stop reason: HOSPADM

## 2022-04-18 RX ORDER — WARFARIN SODIUM 2.5 MG/1
2.5 TABLET ORAL
Status: COMPLETED | OUTPATIENT
Start: 2022-04-18 | End: 2022-04-18

## 2022-04-18 RX ADMIN — METOPROLOL SUCCINATE 50 MG: 50 TABLET, EXTENDED RELEASE ORAL at 08:26

## 2022-04-18 RX ADMIN — LEVALBUTEROL HYDROCHLORIDE 1.25 MG: 1.25 SOLUTION RESPIRATORY (INHALATION) at 20:16

## 2022-04-18 RX ADMIN — CETIRIZINE HYDROCHLORIDE 5 MG: 10 TABLET, FILM COATED ORAL at 08:25

## 2022-04-18 RX ADMIN — WARFARIN SODIUM 2.5 MG: 2.5 TABLET ORAL at 17:34

## 2022-04-18 RX ADMIN — GUAIFENESIN 600 MG: 600 TABLET, EXTENDED RELEASE ORAL at 08:25

## 2022-04-18 RX ADMIN — INSULIN LISPRO 1 UNITS: 100 INJECTION, SOLUTION INTRAVENOUS; SUBCUTANEOUS at 12:29

## 2022-04-18 RX ADMIN — INSULIN LISPRO 1 UNITS: 100 INJECTION, SOLUTION INTRAVENOUS; SUBCUTANEOUS at 08:26

## 2022-04-18 RX ADMIN — SODIUM CHLORIDE, PRESERVATIVE FREE 10 ML: 5 INJECTION INTRAVENOUS at 20:47

## 2022-04-18 RX ADMIN — GUAIFENESIN 600 MG: 600 TABLET, EXTENDED RELEASE ORAL at 20:47

## 2022-04-18 RX ADMIN — OXYCODONE HYDROCHLORIDE AND ACETAMINOPHEN 500 MG: 500 TABLET ORAL at 08:25

## 2022-04-18 RX ADMIN — LOSARTAN POTASSIUM 25 MG: 25 TABLET, FILM COATED ORAL at 20:47

## 2022-04-18 RX ADMIN — LEVALBUTEROL HYDROCHLORIDE 1.25 MG: 1.25 SOLUTION RESPIRATORY (INHALATION) at 08:43

## 2022-04-18 RX ADMIN — CEFTRIAXONE SODIUM 1000 MG: 10 INJECTION, POWDER, FOR SOLUTION INTRAVENOUS at 12:39

## 2022-04-18 RX ADMIN — FLUTICASONE PROPIONATE 1 SPRAY: 50 SPRAY, METERED NASAL at 08:25

## 2022-04-18 RX ADMIN — Medication 1000 UNITS: at 08:25

## 2022-04-18 RX ADMIN — AZITHROMYCIN DIHYDRATE 500 MG: 500 INJECTION, POWDER, LYOPHILIZED, FOR SOLUTION INTRAVENOUS at 12:33

## 2022-04-18 RX ADMIN — MOMETASONE FUROATE AND FORMOTEROL FUMARATE DIHYDRATE 2 PUFF: 200; 5 AEROSOL RESPIRATORY (INHALATION) at 16:33

## 2022-04-18 RX ADMIN — INSULIN LISPRO 1 UNITS: 100 INJECTION, SOLUTION INTRAVENOUS; SUBCUTANEOUS at 20:46

## 2022-04-18 RX ADMIN — LEVALBUTEROL HYDROCHLORIDE 1.25 MG: 1.25 SOLUTION RESPIRATORY (INHALATION) at 16:33

## 2022-04-18 RX ADMIN — TIOTROPIUM BROMIDE INHALATION SPRAY 2 PUFF: 3.12 SPRAY, METERED RESPIRATORY (INHALATION) at 08:55

## 2022-04-18 RX ADMIN — LEVALBUTEROL HYDROCHLORIDE 1.25 MG: 1.25 SOLUTION RESPIRATORY (INHALATION) at 13:07

## 2022-04-18 RX ADMIN — BUMETANIDE 1 MG: 1 TABLET ORAL at 13:45

## 2022-04-18 RX ADMIN — INSULIN LISPRO 2 UNITS: 100 INJECTION, SOLUTION INTRAVENOUS; SUBCUTANEOUS at 17:01

## 2022-04-18 RX ADMIN — ATORVASTATIN CALCIUM 80 MG: 80 TABLET, FILM COATED ORAL at 20:47

## 2022-04-18 RX ADMIN — SODIUM CHLORIDE, PRESERVATIVE FREE 10 ML: 5 INJECTION INTRAVENOUS at 08:25

## 2022-04-18 ASSESSMENT — PAIN SCALES - GENERAL
PAINLEVEL_OUTOF10: 0

## 2022-04-18 NOTE — PROGRESS NOTES
Clinical Pharmacy Note    Warfarin consult follow-up    Recent Labs     04/18/22  0436   INR 2.16*     Recent Labs     04/16/22  0506 04/17/22  0416 04/18/22  0436   HGB 12.9* 11.9* 12.2*   HCT 39.3* 36.5* 37.8*    172 175     Significant Drug-Drug Interactions:  New warfarin drug-drug interactions: none  Discontinued drug-drug interactions: none  Current warfarin drug-drug interactions: ascorbic acid     Date INR Warfarin Dose   04/13/22 2.76 1 mg     04/14/22  2.47  2.5 mg    04/15/22  2.39  2.5 mg   04/16/22   3.44  No warfarin     4/17/22 3.25  No warfarin     04/18/22  2.16  2.5 mg             Notes:                   PT/INR or POC-INR will be monitored routinely until therapeutic INR is achieved.       Dave Garcia, PharmD  4/18/2022  8:46 AM

## 2022-04-18 NOTE — PLAN OF CARE
Problem: Impaired respiratory status  Goal: Clear lung sounds  4/18/2022 0902 by Bairon Valles RCP  Outcome: Ongoing  Note: Txs to help improve lung aeration.

## 2022-04-18 NOTE — PLAN OF CARE
Problem: Impaired respiratory status  Goal: Clear lung sounds  4/17/2022 2100 by Julisa Earl RCP  Outcome: Ongoing  Note: Patient receiving Q4 WA Xopenex nebulizer treatments to improve aeration and Dulera MDI BID for maintenance. Patient also has IS for lung expansion. Will continue with therapies as ordered.

## 2022-04-18 NOTE — PLAN OF CARE
Problem: Falls - Risk of:  Goal: Will remain free from falls  Description: Will remain free from falls  4/17/2022 2257 by Morteza Givens RN  Outcome: Ongoing  Note: Pt free from falls this shift, non skid socks on when up, ambulates with steady gait, does not use ambulatory devices, uses call light for assistance, bed alarm zone 2, A&Ox4. Problem: Impaired respiratory status  Goal: Clear lung sounds  4/17/2022 2257 by Morteza Givens RN  Outcome: Ongoing  Note: Lung sounds clear with diminished bases. Occasional productive cough. NC 3L sating 95% this shift. Problem: Infection:  Goal: Will remain free from infection  Description: Will remain free from infection  4/17/2022 2257 by Morteza Givens RN  Outcome: Ongoing  Note: VSS, pt on Azithromycin Q24. Problem: Daily Care:  Goal: Daily care needs are met  Description: Daily care needs are met  4/17/2022 2257 by Morteza Givens RN  Outcome: Ongoing  Note: Pt able to do ADL's with minimal assist.      Problem: Pain:  Goal: Patient's pain/discomfort is manageable  Description: Patient's pain/discomfort is manageable  4/17/2022 2257 by Morteza Givens RN  Outcome: Ongoing  Note: Pt denies pain this shift. Problem: Skin Integrity:  Goal: Skin integrity will stabilize  Description: Skin integrity will stabilize  4/17/2022 2257 by Morteza Givens RN  Outcome: Ongoing  Note: Scattered abrasions and bruising noted, no other skin issues noted this shift. Turns self. Problem: Discharge Planning:  Goal: Patients continuum of care needs are met  Description: Patients continuum of care needs are met  4/17/2022 2257 by Morteza Givens RN  Outcome: Ongoing  Note: Pt plans to return home with support of wife at discharge. Denies needs this shift.      Problem: Metabolic:  Goal: Ability to maintain appropriate glucose levels will improve  Description: Ability to maintain appropriate glucose levels will improve  4/17/2022 2257 by Taryn Borja, RN  Outcome: Ongoing  Note:  this shift, 2 units humalog given. Care plan reviewed with patient and wife. Patient and wife verbalize understanding of the plan of care and contribute to goal setting.

## 2022-04-18 NOTE — PROGRESS NOTES
[address] [id number]  INPATIENT PHYSICAL THERAPY  DAILY NOTE  [facility] ON[facility] MED 5K - 4N-63/385-L    Time In: 2810  Time Out: 5504  Timed Code Treatment Minutes: 27 Minutes  Minutes: 27          Date: 2022  Patient Name: Greg Whitman,  Gender:  male        MRN: [id number]  : 1940  (79 y.o.)     Referring Practitioner: Tony Romero MD  Diagnosis: shortness of breath  Additional Pertinent Hx: Maya Bradford is a 78 y/o  male with a PMHx of COPD, HFpEF, HTN, Hx of CVA, CAD, S/p TAVR, persistent A. Fib on warfarin who presents to [facility] ED today for the evaluation of cough and shortness of breath that started last evening. The patient states he took a nebulizer treatment at home for shortness of breath before bed, but has had worsening SOB and cough all night. The cough is chronic, but significantly worsened and consistently productive of yellow-green sputum. Otherwise, the patient has also noted associated worsening SOB for the last few days with activity. He states his shortness of breath resolves with rest. He denies associated chest pain, lightheadedness, dizziness, abdominal pain, fever, chills, worsening LE edema. He states he has not noticed an unexpected weight change, and other wise states he feels well overall.      Prior Level of Function:  Lives With: Significant other  Type of Home: Trailer  Home Layout: One level  Home Access: Stairs to enter with rails  Entrance Stairs - Number of Steps: 5  Entrance Stairs - Rails: Both  Home Equipment: Rolling walker   Bathroom Shower/Tub: Tub/Shower unit  Bathroom Toilet: Standard  Bathroom Accessibility: Accessible    ADL Assistance: Independent  Homemaking Assistance: Independent  Ambulation Assistance: Independent (no AD)  Transfer Assistance: Independent  Active : Yes    Restrictions/Precautions:  Restrictions/Precautions: General Precautions,Fall Risk  Position Activity Restriction  Other position/activity restrictions: monitor O2 SUBJECTIVE: RN approved session. Patient in bed upon arrival, agreed and cooperative for therapy. Requesting to sit on EOB when finished with session. PAIN: No pain noted. Vitals: Oxygen: 91-96% while on 2L of 02 with/following activity  Heart Rate:  bpm with/following activity    OBJECTIVE:  Bed Mobility:  Supine to Sit: Supervision, with head of bed raised    Transfers:  Sit to Stand: Supervision  Stand to Sit:Supervision    Ambulation:  Stand By Assistance  Distance: 225 ft. x1 (several short standing rest breaks to monitor 02)   Surface: Level Tile  Device:No Device  Gait Deviations: Forward Flexed Posture, Slow Krystle, Decreased Step Length Bilaterally and Decreased Gait Speed    Balance:  Static Standing Balance: Supervision  Dynamic Standing Balance: Stand By Assistance    Exercise:  Patient was guided in 1 set(s) 15 reps of exercise to both lower extremities. Glut sets, Upper trunk rotations, Seated marches, Seated heel/toe raises, Long arc quads and Seated abduction/adduction. Exercises were completed for increased independence with functional mobility. Functional Outcome Measures: Not completed       ASSESSMENT:  Assessment: Patient progressing toward established goals. The patient tolerated session well, SBA for safety when ambulating longer distances. Sp02 staying at 96% with ambulation when on 2 L but did decrease to 91-92% after sitting and resting for short time. Would benefit from additional skilled PT to increase endurance for improved functional mobility. Activity Tolerance:  Patient tolerance of  treatment: good.       Equipment Recommendations:Equipment Needed: No  Discharge Recommendations: Home with assist PRN, HH PT  Plan: Times per week: 3-5x GM  Times per day: Daily  Current Treatment Recommendations: Strengthening,Gait Training,Stair training,Functional Mobility Training    Patient Education  Patient Education: Plan of Care, Precautions/Restrictions, Bed Mobility, Transfers, Reviewed Prior Education, Gait, Health Promotion and Wellness Education, Home Safety Education, Pursed Lip Breathing,  - Patient Verbalized Understanding, - Patient Requires Continued Education    Goals:  Patient goals : none stated  Short term goals  Time Frame for Short term goals: by discharge  Short term goal 1: bed mobility with HOB flat, no rails, mod I for increased functional ind  Short term goal 2: sit<>stand from various surfaces with LRD mod I for safe transfers  Short term goal 3: ambulate 200' wiht LRD mod I and good O2 saturations for safe household/community distances  Short term goal 4: Navigate 5 steps with LRD mod I for safe household distances  Long term goals  Time Frame for Long term goals : NA d/t short ELOS    Following session, patient left in safe position with all fall risk precautions in place.

## 2022-04-18 NOTE — PROGRESS NOTES
Hospitalist Progress Note    Patient:  Kingsley Hayes      Unit/Bed:5K-26/026-A    YOB: 1940    MRN: [id number]       Acct: [de-identified]     PCP: WILBERTO Hancock CNP    Date of Admission: 4/13/2022    Assessment/Plan:    1. Acute hypoxemic respiratory failure: currently on [facility], continue to wean. Home o2 eval done again today and he requires 2 L at rest and 3 with ambulation. 2. Community-acquired pneumonia: Continue with IV Rocephin and azithromycin. Repeat chest x-ray  3. Acute on chronic systolic heart failure: Most recent ejection fraction 45 to 50%. Monitor intake and output, monitor electrolytes and renal function closely. We will resume home Bumex dose 1 mg daily. 4. Acute on chronic kidney disease stage III: Monitor renal function, creatinine back to baseline. Home diuretics resumed. 5. Acute exacerbation of COPD:  continue with prednisone 40 mg daily, mucinex pulmonary toiletry and as needed bronchodilators  6. Hypertension: Continue with current blood pressure medications and monitor. 7. Persistent A. fib with history of pacemaker placement: Continue with beta-blocker and Coumadin  8. Dispo: Anticipate discharge in 1 to 2 days. Subjective (past 24 hours):   Patient was seen and examined at bedside. States he is still short of breath and coughing. He did better on his home O2 eval today and only required 3 L on ambulation. No acute overnight events.     Medications:  Reviewed    Infusion Medications    sodium chloride 250 mL/hr at 04/17/22 1329    dextrose       Scheduled Medications    warfarin  2.5 mg Oral Once    bumetanide  1 mg Oral Daily    fluticasone  1 spray Each Nostril Daily    cefTRIAXone (ROCEPHIN) IV  1,000 mg IntraVENous Q24H    azithromycin  500 mg IntraVENous Q24H    levalbuterol  1.25 mg Nebulization Q4H WA    ascorbic acid  500 mg Oral Daily    atorvastatin  80 mg Oral Nightly    cetirizine  5 mg Oral Daily    losartan  25 mg Oral Nightly    metoprolol succinate  50 mg Oral Daily    Vitamin D  1,000 Units Oral Daily    sodium chloride flush  10 mL IntraVENous 2 times per day    insulin lispro  0-6 Units SubCUTAneous TID     insulin lispro  0-3 Units SubCUTAneous Nightly    guaiFENesin  600 mg Oral BID    warfarin placeholder: dosing by pharmacy   Other RX Placeholder    mometasone-formoterol  2 puff Inhalation BID    tiotropium  2 puff Inhalation Daily     PRN Meds: guaiFENesin-codeine, albuterol sulfate HFA, sodium chloride flush, sodium chloride, ondansetron **OR** ondansetron, polyethylene glycol, acetaminophen **OR** acetaminophen, glucagon (rDNA), dextrose, dextrose bolus (hypoglycemia) **OR** dextrose bolus (hypoglycemia), glucose **OR** glucose, benzocaine-menthol      Intake/Output Summary (Last 24 hours) at 4/18/2022 1305  Last data filed at 4/18/2022 0526  Gross per 24 hour   Intake 830 ml   Output 1100 ml   Net -270 ml       Diet:  ADULT DIET; Regular; 4 carb choices (60 gm/meal); Low Sodium (2 gm); 2000 ml    Exam:  BP (!) 156/74   Pulse 85   Temp 97.8 °F (36.6 °C) (Oral)   Resp 18   Ht 5' 5.98\" (1.676 m)   Wt 201 lb 4.8 oz (91.3 kg)   SpO2 91%   BMI 32.51 kg/m²     General appearance: No apparent distress, appears stated age and cooperative. Respiratory:  Coarse air entry, no wheeze  Cardiovascular: Regular rate and rhythm with normal S1/S2 without murmurs, rubs or gallops. Abdomen: Soft, non-tender, non-distended with normal bowel sounds.   Extremities: no pedal edema  Skin:  no rashes    Labs:   Recent Labs     04/16/22  0506 04/17/22  0416 04/18/22  0436   WBC 18.2* 11.8* 11.2*   HGB 12.9* 11.9* 12.2*   HCT 39.3* 36.5* 37.8*    172 175     Recent Labs     04/16/22  0506 04/17/22  0416 04/18/22  0436    137 134*   K 3.9 4.7 4.7   CL 99 99 97*   CO2 22* 25 23   BUN 50* 60* 53*   CREATININE 2.4* 2.7* 2.0*   CALCIUM 8.7 8.6 8.6     No results for input(s): AST, ALT, BILIDIR, BILITOT, ALKPHOS Status: Routine Height: 65 inches Weight: 204 pounds BSA: 1.99 m^2 BMI: 33.95 kg/m^2 BP: 127/75 mmHg Allergies   - See Epic. Conclusions   Summary  Technically difficult examination. This is a suboptimal study due to poor echocardiographic window. Left ventricle size is normal.  Normal left ventricular wall thickness. Ejection fraction is visually estimated in the range of 45% to 50%. Unable to determine wall motion abnormalities due to poor image quality. s/p TAVAR WITH NORMAL FUNCTION  Structurally normal mitral valve. Mitral annular calcification is present. Normal mobility of both mitral valve leaflets. No evidence of mitral valve stenosis. Mild mitral regurgitation is present. Signature   ----------------------------------------------------------------  Electronically signed by Henrry Washington MD (Interpreting  physician) on 04/14/2022 at 12:10 PM  ----------------------------------------------------------------   Findings   Mitral Valve  Structurally normal mitral valve. Mitral annular calcification is present. Normal mobility of both mitral valve leaflets. No evidence of mitral valve stenosis. Mild mitral regurgitation is present. Aortic Valve  s/p TAVAR WITH NORMAL FUNCTION   Tricuspid Valve  Tricuspid valve is structurally normal.  No evidence of tricuspid stenosis. Mild tricuspid regurgitation visualized. Pulmonic Valve  The pulmonic valve was not well visualized . Left Atrium  Left atrium is of upper normal size. Left Ventricle  Left ventricle size is normal.  Normal left ventricular wall thickness. Ejection fraction is visually estimated in the range of 45% to 50%. Unable to determine wall motion abnormalities due to poor image quality. Right Atrium  The right atrium is of normal size. Right Ventricle  Mildly dilated right ventricle. Pericardial Effusion  No evidence of any pericardial effusion. Pleural Effusion  No evidence of pleural effusion.    Aorta / Great Vessels The aorta is within normal limits. M-Mode/2D Measurements & Calculations   LV Diastolic      LV Systolic Dimension: 3 cm      LA Dimension: 3.9 cmLA  Dimension: 4.1 cm LV Volume Diastolic: 27.0 ml     Area: 20 cm^2  LV FS:26.8 %      LV Volume Systolic: 35 ml  LV PW Diastolic:  LV EDV/LV EDV Index: 74.2 ml/37  1.3 cm            m^2LV ESV/LV ESV Index: 35 FT/03  Septum Diastolic: m^2                              RV Diastolic Dimension:  1.4 cm            EF Calculated: 52.8 %            3 cm                                                      Ascending Aorta: 3.3 cm                                                     LA volume/Index: 69.8                    LVOT: 2 cm                       ml /35m^2  Doppler Measurements & Calculations    AV Peak Velocity: 172 cm/s      LVOT Peak Velocity: 58.7 cm/s   AV Peak Gradient: 11.83 mmHg    LVOT Mean Velocity: 41.4 cm/s   AV Mean Velocity: 115 cm/s      LVOT Peak Gradient: 1 mmHgLVOT Mean   AV Mean Gradient: 6 mmHg        Gradient: 1 mmHg   AV VTI: 37.5 cm   AV Area (Continuity):1.12 cm^2    LVOT VTI: 13.4 cm    AV DVI (VTI): 0.36AV DVI   (Vmax):0.34  [email or web address] [id number]    XR CHEST PORTABLE    Result Date: 4/13/2022  PROCEDURE: XR CHEST PORTABLE CLINICAL INFORMATION: Cough shortness of breath history of COPD and CHF. COMPARISON: Chest x-ray 3/8/2022. TECHNIQUE: AP upright view of the chest. FINDINGS: There is a pacemaker. There is a replaced aortic valve. There is cardiac negative. The mediastinum is not widened. There are some faint interstitial opacities in the mid and lower lung zones. There are no definite pleural effusions. There is no pneumothorax. The pulmonary vascularity is normal.     Faint bibasilar interstitial opacities. This can represent mild pulmonary edema. **This report has been created using voice recognition software.  It may contain minor errors which are inherent in voice recognition technology. ** Final report electronically signed by Dr. Hillary Weiss on 4/13/2022 7:19 AM      DVT prophylaxis: [] Lovenox                                 [] SCDs                                 [] SQ Heparin                                 [] Encourage ambulation           [x] Already on Anticoagulation     Code Status: Full Code    Tele:   [x] yes             [] no    Active Hospital Problems    Diagnosis Date Noted    Acute hypoxemic respiratory failure ([facility] [address] [J96.01] 04/13/2022       Electronically signed by Alla Cooper MD on 4/18/2022 at 1:05 PM

## 2022-04-18 NOTE — PROGRESS NOTES
A home oxygen evaluation has been completed. [x]Patient is an inpatient. It is expected that the patient will be discharged within the next 48 hours. Qualified provider to write order for home prescription if patient qualifies. Social service/care managers will arrange for home oxygen. If patient is active, arrange for Home Medical supplier to assess for Oxygen Conserving Device per pulse oximetry. []Patient is an outpatient. Results will be faxed to the ordering provider. Qualified provider to write order for home prescription if patient qualifies and arranges for home oxygen. Patient was placed on room air for 30 minutes. SpO2 was 86 % on room air at rest. Patients SpO2 was below 89% and qualified for home oxygen. Oxygen was applied at 2 lpm via nasal cannula to maintain a SpO2 between 90-92% while at rest. Actual SpO2 was 91 %. Patient can ambulate for exercise flow rate. Patients was ambulated, SpO2 was 92% on 3 lpm to maintain SpO2 between 90-92% while exercising.

## 2022-04-18 NOTE — PROGRESS NOTES
[facility] 5[facility]  Occupational Therapy  Daily Note  Time:   Time In: 4443  Time Out: 1011  Timed Code Treatment Minutes: 26 Minutes  Minutes: 26          Date: 2022  Patient Name: Sage Mathur,   Gender: male      Room: [facility]026-A  MRN: [id number]  : 1940  (79 y.o.)  Referring Practitioner: Douglas Stroud MD  Diagnosis: Shortness of breath  Additional Pertinent Hx: Luigi Bland is a 80 y/o  male with a PMHx of COPD, HFpEF, HTN, Hx of CVA, CAD, S/p TAVR, persistent A. Fib on warfarin who presents to [facility] ED today for the evaluation of cough and shortness of breath that started last evening. The patient states he took a nebulizer treatment at home for shortness of breath before bed, but has had worsening SOB and cough all night. The cough is chronic, but significantly worsened and consistently productive of yellow-green sputum. Otherwise, the patient has also noted associated worsening SOB for the last few days with activity. He states his shortness of breath resolves with rest. He denies associated chest pain, lightheadedness, dizziness, abdominal pain, fever, chills, worsening LE edema. He states he has not noticed an unexpected weight change, and other wise states he feels well overall. Restrictions/Precautions:  Restrictions/Precautions: General Precautions,Fall Risk  Position Activity Restriction  Other position/activity restrictions: monitor O2     SUBJECTIVE: Patient supine in bed upon arrival; agreeable to therapy this date. Patient pleasant and cooperative throughout. Patient states he is doing well and will have help from girlfriend at home. PAIN: 0/10:     Vitals: Oxygen: 2L upon arrival requiring increase to 3L with mobility >92%    COGNITION: WFL    ADL:   Lower Extremity Dressing: Supervision. seated EOB in order to doff/marko B socks requiring increased time. BALANCE:  Sitting Balance:  Supervision. Standing Balance: Supervision.  no device    BED MOBILITY:  Supine to Sit: Supervision, X 1, with head of bed flat, with rail, with increased time for completion      TRANSFERS:  Sit to Stand:  Supervision. Stand to Sit: Supervision. FUNCTIONAL MOBILITY:  Assistive Device: None  Assist Level:  Stand By Assistance. Distance: 250 feet within unit  Steady pace, no LOB     ASSESSMENT:     Activity Tolerance:  Patient tolerance of  treatment: good. Discharge Recommendations: Home with assist as needed  Equipment Recommendations: Equipment Needed: No  Plan: Times per week: 3-5x  Current Treatment Recommendations: Strengthening,Functional Mobility Training,Endurance Training,Balance Training,Safety Education & Training,Self-Care / ADL,Patient/Caregiver Education & Training,Home Management Training,Equipment Evaluation, Education, & procurement    Patient Education  Patient Education: Role of OT, Plan of Care, ADL's, IADL's, Energy Conservation, Home Safety, Importance of Increasing Activity     Goals  Short term goals  Time Frame for Short term goals: Until discharge  Short term goal 1: Pt will complete BUE strengthening exercises with min vcs for technique to increase indep and endurance with self cares. Short term goal 2: Pt will complete standing task x 4 minutes with SBA and min vcs for safety while O2 stats remain above 90% to increase endurance with grooming . Short term goal 3: Pt will complete functional mobility to/from BR and HH distaces with S while O2 stats remain above 90%. Short term goal 4: Pt will complete BADL routine with S and EC techniques to increase endurance in home environment. Following session, patient left in safe position with all fall risk precautions in place.

## 2022-04-18 NOTE — CARE COORDINATION
4/18/22, 7:55 AM EDT    DISCHARGE ON [address] day: 5  Location: [address] [place] [address] Reason for admit: Shortness of breath [R06.02]  Chronic kidney disease (CKD), stage IV (severe) (HCC) [N18.4]  COPD exacerbation ([facility] [address] [J44.1]  Troponin level elevated [R77.8]  Current use of long term anticoagulation [Z79.01]  Acute hypoxemic respiratory failure (HCC) [J96.01]  Acute on chronic congestive heart failure, unspecified heart failure type ([facility] [address] [I50.9]   Procedure:   4/13 CXR: Faint bibasilar interstitial opacities. This can represent mild pulmonary edema  4/13 EKG: Atrial fibrillation, Left bundle branch block  4/13 ECHO: EF 45-50%, s/p TAVR  4/15 CXR: Bilateral lower lung atelectasis/infiltrate. Mild stable cardiomegaly. Barriers to Discharge: Admitted 4/13  Home O2 eval ordered. Pt is currently on 2L nasal cannula, baseline on admission is RA. On IV Zithromax q24hr, IV Rocephin q24hr. PO Mucinex, IV Bumex on hold  PCP: WILBERTO Fernandez CNP  Readmission Risk Score: 22.4 ( )%  Patient Goals/Plan/Treatment Preferences: Plans home with wife. Denies any needs for services. Pt would like [facility] if oxygen for his O2 needs. Message sent to provider for orders and CM to call to set up.      4/18/22, 12:08 PM EDT    Patient goals/plan/ treatment preferences discussed by  and . Patient goals/plan/ treatment preferences reviewed with patient/ family. Patient/ family verbalize understanding of discharge plan and are in agreement with goal/plan/treatment preferences. Understanding was demonstrated using the teach back method. AVS provided by RN at time of discharge, which includes all necessary medical information pertaining to the patients current course of illness, treatment, post-discharge goals of care, and treatment preferences.         IMM Letter  IMM Letter given to Patient/Family/Significant other/Guardian/POA/by[de-identified] [employer] RN CM  IMM Letter date

## 2022-04-18 NOTE — PLAN OF CARE
Problem: Falls - Risk of:  Goal: Will remain free from falls  Description: Will remain free from falls  Outcome: Ongoing  Note: Patient remains free from falls this shift. Fall risk assessment complete. Fall sign posted. Non skid socks on. Bed in lowest position, 2/4 siderails up. Bed alarm on. Call light and all possessions within reach. Ambulates with assist. Rounding hourly       Problem: Infection:  Goal: Will remain free from infection  Description: Will remain free from infection  Outcome: Ongoing  Note: Receiving IV zithromax and rocephin q24h. Problem: Safety:  Goal: Free from accidental physical injury  Description: Free from accidental physical injury  Outcome: Ongoing  Note: Patient free from injury this shift. Problem: Daily Care:  Goal: Daily care needs are met  Description: Daily care needs are met  Outcome: Ongoing  Note: Completes ADLs with minimal assist.      Problem: Pain:  Goal: Patient's pain/discomfort is manageable  Description: Patient's pain/discomfort is manageable  Outcome: Ongoing  Note: No complaints of pain this shift. Will continue to monitor hourly. Problem: Skin Integrity:  Goal: Skin integrity will stabilize  Description: Skin integrity will stabilize  Outcome: Ongoing  Note: No new skin issues noted this shift. Patient repositions self frequently in bed. Problem: Discharge Planning:  Goal: Patients continuum of care needs are met  Description: Patients continuum of care needs are met  Outcome: Ongoing  Note: Plan to return home with wife at discharge. Home O2 eval complete     Problem: Impaired respiratory status  Goal: Clear lung sounds  Outcome: Ongoing  Note: Rhonchi present throughout. Scheduled breathing treatments and PO predisone. Problem: Metabolic:  Goal: Ability to maintain appropriate glucose levels will improve  Description: Ability to maintain appropriate glucose levels will improve  Outcome: Ongoing  Note: Chems ACHS.  Patient receiving PO prednisone. Humalog sliding scale given per MAR. Care plan reviewed with patient and wife. Patient and wife verbalize understanding of the plan of care and contribute to goal setting.

## 2022-04-19 ENCOUNTER — APPOINTMENT (OUTPATIENT)
Dept: CT IMAGING | Age: 82
DRG: 291 | End: 2022-04-19
Payer: MEDICARE

## 2022-04-19 LAB
ACINETOBACTER CALCOACETICUS-BAUMANNII BY PCR: NOT DETECTED
ADENOVIRUS BY PCR: NOT DETECTED
ANION GAP SERPL CALCULATED.3IONS-SCNC: 15 MEQ/L (ref 8–16)
BASOPHILS # BLD: 0.3 %
BASOPHILS ABSOLUTE: 0 THOU/MM3 (ref 0–0.1)
BUN BLDV-MCNC: 54 MG/DL (ref 7–22)
CALCIUM SERPL-MCNC: 8.6 MG/DL (ref 8.5–10.5)
CHLAMYDIA PNEUMONIAE BY PCR: NOT DETECTED
CHLORIDE BLD-SCNC: 98 MEQ/L (ref 98–111)
CO2: 21 MEQ/L (ref 23–33)
CREAT SERPL-MCNC: 2.2 MG/DL (ref 0.4–1.2)
EKG Q-T INTERVAL: 390 MS
EKG QRS DURATION: 140 MS
EKG QTC CALCULATION (BAZETT): 490 MS
EKG R AXIS: 19 DEGREES
EKG T AXIS: -139 DEGREES
EKG VENTRICULAR RATE: 95 BPM
ENTEROBACTER CLOACAE COMPLEX BY PCR: NOT DETECTED
EOSINOPHIL # BLD: 4.1 %
EOSINOPHILS ABSOLUTE: 0.6 THOU/MM3 (ref 0–0.4)
ERYTHROCYTE [DISTWIDTH] IN BLOOD BY AUTOMATED COUNT: 14.9 % (ref 11.5–14.5)
ERYTHROCYTE [DISTWIDTH] IN BLOOD BY AUTOMATED COUNT: 52.3 FL (ref 35–45)
ESCHERICHIA COLI BY PCR: NOT DETECTED
GFR SERPL CREATININE-BSD FRML MDRD: 29 ML/MIN/1.73M2
GLUCOSE BLD-MCNC: 121 MG/DL (ref 70–108)
GLUCOSE BLD-MCNC: 168 MG/DL (ref 70–108)
GLUCOSE BLD-MCNC: 177 MG/DL (ref 70–108)
GLUCOSE BLD-MCNC: 185 MG/DL (ref 70–108)
GLUCOSE BLD-MCNC: 238 MG/DL (ref 70–108)
HAEMOPHILUS INFLUENZAE BY PCR: NOT DETECTED
HCT VFR BLD CALC: 39.4 % (ref 42–52)
HEMOGLOBIN: 13 GM/DL (ref 14–18)
IMMATURE GRANS (ABS): 0.36 THOU/MM3 (ref 0–0.07)
IMMATURE GRANULOCYTES: 2.4 %
INFLUENZA A BY PCR: NOT DETECTED
INFLUENZA B BY PCR: NOT DETECTED
INR BLD: 1.86 (ref 0.85–1.13)
KLEBSIELLA AEROGENES BY PCR: NOT DETECTED
KLEBSIELLA OXYTOCA BY PCR: NOT DETECTED
KLEBSIELLA PNEUMONIAE GROUP BY PCR: NOT DETECTED
LEGIONELLA PNEUMOPHILIA BY PCR: NOT DETECTED
LYMPHOCYTES # BLD: 4.4 %
LYMPHOCYTES ABSOLUTE: 0.7 THOU/MM3 (ref 1–4.8)
MCH RBC QN AUTO: 31.7 PG (ref 26–33)
MCHC RBC AUTO-ENTMCNC: 33 GM/DL (ref 32.2–35.5)
MCV RBC AUTO: 96.1 FL (ref 80–94)
METAPNEUMOVIRUS BY PCR: NOT DETECTED
MONOCYTES # BLD: 5.6 %
MONOCYTES ABSOLUTE: 0.9 THOU/MM3 (ref 0.4–1.3)
MORAXELLA CATARRHALIS BY PCR: NOT DETECTED
MYCOPLASMA PNEUMONIAE BY PCR: NOT DETECTED
NON-SARS CORONAVIRUS: NOT DETECTED
NUCLEATED RED BLOOD CELLS: 0 /100 WBC
PARAINFLUENZA VIRUS BY PCR: NOT DETECTED
PLATELET # BLD: 182 THOU/MM3 (ref 130–400)
PMV BLD AUTO: 10.6 FL (ref 9.4–12.4)
POTASSIUM SERPL-SCNC: 4.3 MEQ/L (ref 3.5–5.2)
PRO-BNP: 3774 PG/ML (ref 0–1800)
PROTEUS SPECIES BY PCR: NOT DETECTED
PSEUDOMONAS AERUGINOSA BY PCR: NOT DETECTED
RBC # BLD: 4.1 MILL/MM3 (ref 4.7–6.1)
REASON FOR REJECTION: NORMAL
REASON FOR REJECTION: NORMAL
REJECTED TEST: NORMAL
REJECTED TEST: NORMAL
RESISTANT GENE CTX-M BY PCR: ABNORMAL
RESISTANT GENE IMP BY PCR: ABNORMAL
RESISTANT GENE KPC BY PCR: ABNORMAL
RESISTANT GENE MECA/C & MREJ BY PCR: ABNORMAL
RESISTANT GENE NDM BY PCR: ABNORMAL
RESISTANT GENE OXA-48-LIKE BY PCR: ABNORMAL
RESISTANT GENE VIM BY PCR: ABNORMAL
RESPIRATORY SYNCYTIAL VIRUS BY PCR: NOT DETECTED
RHINOVIRUS ENTEROVIRUS PCR: DETECTED
SEG NEUTROPHILS: 83.2 %
SEGMENTED NEUTROPHILS ABSOLUTE COUNT: 12.6 THOU/MM3 (ref 1.8–7.7)
SERRATIA MARCESCENS BY PCR: NOT DETECTED
SODIUM BLD-SCNC: 134 MEQ/L (ref 135–145)
SOURCE: ABNORMAL
SPECIMEN ACCEPTABILITY: ABNORMAL
STAPH AUREUS BY PCR: NOT DETECTED
STREP AGALACTIAE BY PCR: NOT DETECTED
STREP PNEUMONIAE BY PCR: NOT DETECTED
STREP PYOGENES BY PCR: NOT DETECTED
WBC # BLD: 15.2 THOU/MM3 (ref 4.8–10.8)

## 2022-04-19 PROCEDURE — 36415 COLL VENOUS BLD VENIPUNCTURE: CPT

## 2022-04-19 PROCEDURE — 2700000000 HC OXYGEN THERAPY PER DAY

## 2022-04-19 PROCEDURE — 2580000003 HC RX 258: Performed by: HOSPITALIST

## 2022-04-19 PROCEDURE — 87486 CHLMYD PNEUM DNA AMP PROBE: CPT

## 2022-04-19 PROCEDURE — 80048 BASIC METABOLIC PNL TOTAL CA: CPT

## 2022-04-19 PROCEDURE — 85025 COMPLETE CBC W/AUTO DIFF WBC: CPT

## 2022-04-19 PROCEDURE — 87205 SMEAR GRAM STAIN: CPT

## 2022-04-19 PROCEDURE — 6370000000 HC RX 637 (ALT 250 FOR IP): Performed by: HOSPITALIST

## 2022-04-19 PROCEDURE — 85610 PROTHROMBIN TIME: CPT

## 2022-04-19 PROCEDURE — 87581 M.PNEUMON DNA AMP PROBE: CPT

## 2022-04-19 PROCEDURE — 1200000003 HC TELEMETRY R&B

## 2022-04-19 PROCEDURE — 83880 ASSAY OF NATRIURETIC PEPTIDE: CPT

## 2022-04-19 PROCEDURE — 82948 REAGENT STRIP/BLOOD GLUCOSE: CPT

## 2022-04-19 PROCEDURE — 94640 AIRWAY INHALATION TREATMENT: CPT

## 2022-04-19 PROCEDURE — 87631 RESP VIRUS 3-5 TARGETS: CPT

## 2022-04-19 PROCEDURE — 6360000002 HC RX W HCPCS: Performed by: HOSPITALIST

## 2022-04-19 PROCEDURE — 6370000000 HC RX 637 (ALT 250 FOR IP)

## 2022-04-19 PROCEDURE — 87798 DETECT AGENT NOS DNA AMP: CPT

## 2022-04-19 PROCEDURE — 93005 ELECTROCARDIOGRAM TRACING: CPT | Performed by: HOSPITALIST

## 2022-04-19 PROCEDURE — 2500000003 HC RX 250 WO HCPCS: Performed by: HOSPITALIST

## 2022-04-19 PROCEDURE — 71250 CT THORAX DX C-: CPT

## 2022-04-19 PROCEDURE — 87070 CULTURE OTHR SPECIMN AEROBIC: CPT

## 2022-04-19 PROCEDURE — 99232 SBSQ HOSP IP/OBS MODERATE 35: CPT | Performed by: HOSPITALIST

## 2022-04-19 PROCEDURE — 2580000003 HC RX 258

## 2022-04-19 PROCEDURE — 87541 LEGION PNEUMO DNA AMP PROB: CPT

## 2022-04-19 PROCEDURE — 6370000000 HC RX 637 (ALT 250 FOR IP): Performed by: PHARMACIST

## 2022-04-19 PROCEDURE — 94761 N-INVAS EAR/PLS OXIMETRY MLT: CPT

## 2022-04-19 PROCEDURE — 6370000000 HC RX 637 (ALT 250 FOR IP): Performed by: PHYSICIAN ASSISTANT

## 2022-04-19 RX ORDER — WARFARIN SODIUM 4 MG/1
4 TABLET ORAL ONCE
Status: COMPLETED | OUTPATIENT
Start: 2022-04-19 | End: 2022-04-19

## 2022-04-19 RX ORDER — METOPROLOL TARTRATE 5 MG/5ML
5 INJECTION INTRAVENOUS ONCE
Status: COMPLETED | OUTPATIENT
Start: 2022-04-19 | End: 2022-04-19

## 2022-04-19 RX ADMIN — GUAIFENESIN AND CODEINE PHOSPHATE 5 ML: 100; 10 SOLUTION ORAL at 14:55

## 2022-04-19 RX ADMIN — WARFARIN SODIUM 4 MG: 4 TABLET ORAL at 17:20

## 2022-04-19 RX ADMIN — SODIUM CHLORIDE, PRESERVATIVE FREE 10 ML: 5 INJECTION INTRAVENOUS at 20:17

## 2022-04-19 RX ADMIN — INSULIN LISPRO 1 UNITS: 100 INJECTION, SOLUTION INTRAVENOUS; SUBCUTANEOUS at 17:55

## 2022-04-19 RX ADMIN — MOMETASONE FUROATE AND FORMOTEROL FUMARATE DIHYDRATE 2 PUFF: 200; 5 AEROSOL RESPIRATORY (INHALATION) at 17:24

## 2022-04-19 RX ADMIN — SODIUM CHLORIDE, PRESERVATIVE FREE 10 ML: 5 INJECTION INTRAVENOUS at 09:00

## 2022-04-19 RX ADMIN — INSULIN LISPRO 2 UNITS: 100 INJECTION, SOLUTION INTRAVENOUS; SUBCUTANEOUS at 08:26

## 2022-04-19 RX ADMIN — METOPROLOL SUCCINATE 50 MG: 50 TABLET, EXTENDED RELEASE ORAL at 08:02

## 2022-04-19 RX ADMIN — LEVALBUTEROL HYDROCHLORIDE 1.25 MG: 1.25 SOLUTION RESPIRATORY (INHALATION) at 22:24

## 2022-04-19 RX ADMIN — LOSARTAN POTASSIUM 25 MG: 25 TABLET, FILM COATED ORAL at 20:17

## 2022-04-19 RX ADMIN — Medication 1000 UNITS: at 08:01

## 2022-04-19 RX ADMIN — LEVALBUTEROL HYDROCHLORIDE 1.25 MG: 1.25 SOLUTION RESPIRATORY (INHALATION) at 13:31

## 2022-04-19 RX ADMIN — LEVALBUTEROL HYDROCHLORIDE 1.25 MG: 1.25 SOLUTION RESPIRATORY (INHALATION) at 17:16

## 2022-04-19 RX ADMIN — METOPROLOL TARTRATE 5 MG: 5 INJECTION INTRAVENOUS at 09:11

## 2022-04-19 RX ADMIN — GUAIFENESIN 600 MG: 600 TABLET, EXTENDED RELEASE ORAL at 20:17

## 2022-04-19 RX ADMIN — ACETAMINOPHEN 650 MG: 325 TABLET ORAL at 08:10

## 2022-04-19 RX ADMIN — CETIRIZINE HYDROCHLORIDE 5 MG: 10 TABLET, FILM COATED ORAL at 08:01

## 2022-04-19 RX ADMIN — AZITHROMYCIN DIHYDRATE 500 MG: 500 INJECTION, POWDER, LYOPHILIZED, FOR SOLUTION INTRAVENOUS at 12:24

## 2022-04-19 RX ADMIN — GUAIFENESIN 600 MG: 600 TABLET, EXTENDED RELEASE ORAL at 08:12

## 2022-04-19 RX ADMIN — CEFTRIAXONE SODIUM 1000 MG: 10 INJECTION, POWDER, FOR SOLUTION INTRAVENOUS at 12:19

## 2022-04-19 RX ADMIN — ATORVASTATIN CALCIUM 80 MG: 80 TABLET, FILM COATED ORAL at 20:17

## 2022-04-19 RX ADMIN — MOMETASONE FUROATE AND FORMOTEROL FUMARATE DIHYDRATE 2 PUFF: 200; 5 AEROSOL RESPIRATORY (INHALATION) at 06:36

## 2022-04-19 RX ADMIN — LEVALBUTEROL HYDROCHLORIDE 1.25 MG: 1.25 SOLUTION RESPIRATORY (INHALATION) at 06:31

## 2022-04-19 RX ADMIN — TIOTROPIUM BROMIDE INHALATION SPRAY 2 PUFF: 3.12 SPRAY, METERED RESPIRATORY (INHALATION) at 06:36

## 2022-04-19 RX ADMIN — BUMETANIDE 1 MG: 1 TABLET ORAL at 08:02

## 2022-04-19 RX ADMIN — INSULIN LISPRO 1 UNITS: 100 INJECTION, SOLUTION INTRAVENOUS; SUBCUTANEOUS at 20:17

## 2022-04-19 RX ADMIN — FLUTICASONE PROPIONATE 1 SPRAY: 50 SPRAY, METERED NASAL at 09:45

## 2022-04-19 RX ADMIN — OXYCODONE HYDROCHLORIDE AND ACETAMINOPHEN 500 MG: 500 TABLET ORAL at 08:12

## 2022-04-19 RX ADMIN — GUAIFENESIN AND CODEINE PHOSPHATE 5 ML: 100; 10 SOLUTION ORAL at 08:10

## 2022-04-19 ASSESSMENT — PAIN SCALES - GENERAL
PAINLEVEL_OUTOF10: 0
PAINLEVEL_OUTOF10: 0
PAINLEVEL_OUTOF10: 4
PAINLEVEL_OUTOF10: 4
PAINLEVEL_OUTOF10: 0
PAINLEVEL_OUTOF10: 3

## 2022-04-19 NOTE — PROGRESS NOTES
Hospitalist Progress Note    Patient:  Ava Carlson      Unit/Bed:5K-26/026-A    YOB: 1940    MRN: [id number]       Acct: [de-identified]     PCP: WILBERTO Michelle CNP    Date of Admission: 4/13/2022    Assessment/Plan:    1. Acute hypoxemic respiratory failure: Patient with increasing shortness of breath and tachypnea today requiring 3 L nasal cannula. Will obtain CT chest, sputum cultures and pneumonia panel. 2. Community-acquired pneumonia: Patient completed his IV antibiotics today but having worsening symptoms. CT chest and pneumonia panel ordered. 3. Acute on chronic systolic heart failure: Most recent ejection fraction 45 to 50%. Monitor intake and output, monitor electrolytes and renal function closely. Currently on Bumex home dose of 1 mg daily. Check BNP and evaluate if further diuresis is needed. 4. Acute on chronic kidney disease stage III: Monitor renal function, creatinine back to baseline. Home diuretics resumed. 5. Acute exacerbation of COPD:  continue with prednisone 40 mg daily, mucinex pulmonary toiletry and as needed bronchodilators  6. Hypertension: Continue with current blood pressure medications and monitor. 7. Persistent A. fib with history of pacemaker placement: Continue with beta-blocker and Coumadin  8. Dispo: Anticipate discharge in 1 to 2 days. Subjective (past 24 hours):   Patient was seen and examined at bedside. Significant other at bedside. Patient reporting increase in shortness of breath and tachypnea today. Per staff A. fib with RVR.     Medications:  Reviewed    Infusion Medications    sodium chloride 250 mL/hr at 04/17/22 1329    dextrose       Scheduled Medications    warfarin  4 mg Oral Once    bumetanide  1 mg Oral Daily    fluticasone  1 spray Each Nostril Daily    cefTRIAXone (ROCEPHIN) IV  1,000 mg IntraVENous Q24H    azithromycin  500 mg IntraVENous Q24H    levalbuterol  1.25 mg Nebulization Q4H WA    ascorbic acid  500 mg Oral Daily    atorvastatin  80 mg Oral Nightly    cetirizine  5 mg Oral Daily    losartan  25 mg Oral Nightly    metoprolol succinate  50 mg Oral Daily    Vitamin D  1,000 Units Oral Daily    sodium chloride flush  10 mL IntraVENous 2 times per day    insulin lispro  0-6 Units SubCUTAneous TID WC    insulin lispro  0-3 Units SubCUTAneous Nightly    guaiFENesin  600 mg Oral BID    warfarin placeholder: dosing by pharmacy   Other RX Placeholder    mometasone-formoterol  2 puff Inhalation BID    tiotropium  2 puff Inhalation Daily     PRN Meds: guaiFENesin-codeine, albuterol sulfate HFA, sodium chloride flush, sodium chloride, ondansetron **OR** ondansetron, polyethylene glycol, acetaminophen **OR** acetaminophen, glucagon (rDNA), dextrose, dextrose bolus (hypoglycemia) **OR** dextrose bolus (hypoglycemia), glucose **OR** glucose, benzocaine-menthol      Intake/Output Summary (Last 24 hours) at 4/19/2022 0901  Last data filed at 4/19/2022 0222  Gross per 24 hour   Intake 480 ml   Output 2000 ml   Net -1520 ml       Diet:  ADULT DIET; Regular; 4 carb choices (60 gm/meal); Low Sodium (2 gm); 2000 ml    Exam:  /82   Pulse 140   Temp 98.4 °F (36.9 °C) (Oral)   Resp 24   Ht 5' 5.98\" (1.676 m)   Wt 202 lb 11.2 oz (91.9 kg)   SpO2 92%   BMI 32.73 kg/m²     General appearance: No apparent distress, appears stated age and cooperative. Respiratory:  Coarse air entry, no wheeze  Cardiovascular: Regular rate and rhythm with normal S1/S2 without murmurs, rubs or gallops. Abdomen: Soft, non-tender, non-distended with normal bowel sounds.   Extremities: no pedal edema  Skin:  no rashes    Labs:   Recent Labs     04/17/22 0416 04/18/22  0436 04/19/22  0526   WBC 11.8* 11.2* 15.2*   HGB 11.9* 12.2* 13.0*   HCT 36.5* 37.8* 39.4*    175 182     Recent Labs     04/17/22  0416 04/18/22  0436 04/19/22  0526    134* 134*   K 4.7 4.7 4.3   CL 99 97* 98   CO2 25 23 21*   BUN 60* 53* 54* CREATININE 2.7* 2.0* 2.2*   CALCIUM 8.6 8.6 8.6     No results for input(s): AST, ALT, BILIDIR, BILITOT, ALKPHOS in the last 72 hours. Recent Labs     04/17/22  0416 04/18/22  0436 04/19/22  0526   INR 3.25* 2.16* 1.86*     No results for input(s): CKTOTAL, TROPONINI in the last 72 hours. No results for input(s): PROCAL in the last 72 hours. Microbiology:      Urinalysis:      Lab Results   Component Value Date    NITRU NEGATIVE 07/03/2021    WBCUA NONE SEEN 06/10/2021    BACTERIA NONE SEEN 06/10/2021    RBCUA NONE SEEN 06/10/2021    BLOODU NEGATIVE 07/03/2021    SPECGRAV 1.006 06/10/2021    GLUCOSEU NEGATIVE 07/03/2021       Radiology:  Echocardiogram limited    Result Date: 4/14/2022  Transthoracic Echocardiography Report (TTE)  Demographics   Patient Name  Keith Vidal Gender            Male                L   MR #          [id number]      Race                                              Ethnicity   Account #     [de-identified]      Room Number       1195   Accession     [id number]     Date of Study     04/14/2022  Number   Date of Birth 1940     Referring         Nathan Woo CNP                               Physician         Noe Stanton PA-C   Age           79 year(s)     Sonographer       BRO Love, RDCS,                                                 RDMS, RVT                                Interpreting      Carrie Dallas MD                               Physician  Procedure Type of Study   TTE procedure:ECHOCARDIOGRAM LIMITED. Procedure Date Date: 04/14/2022 Start: 06:37 AM Study Location: Bedside Technical Quality: Limited visualization due to body habitus. Indications:Cardiomyopathy, Post TAVR and Shortness of breath.  Additional Medical History:diabetes, pneumonia, hyperlipidemia, acute hypoxic respiratory failure, chronic obstructive pulmonary disease, hypertenison, atrial fibrillation, pacemaker, chronic kidney disease, coronary artery disease, anemia, history of cerebrovascular accident, TAVR, shortness of breath, stent, congestive heart failure, cardiomyopathy Patient Status: Routine Height: 65 inches Weight: 204 pounds BSA: 1.99 m^2 BMI: 33.95 kg/m^2 BP: 127/75 mmHg Allergies   - See Epic. Conclusions   Summary  Technically difficult examination. This is a suboptimal study due to poor echocardiographic window. Left ventricle size is normal.  Normal left ventricular wall thickness. Ejection fraction is visually estimated in the range of 45% to 50%. Unable to determine wall motion abnormalities due to poor image quality. s/p TAVAR WITH NORMAL FUNCTION  Structurally normal mitral valve. Mitral annular calcification is present. Normal mobility of both mitral valve leaflets. No evidence of mitral valve stenosis. Mild mitral regurgitation is present. Signature   ----------------------------------------------------------------  Electronically signed by Mary Pearson MD (Interpreting  physician) on 04/14/2022 at 12:10 PM  ----------------------------------------------------------------   Findings   Mitral Valve  Structurally normal mitral valve. Mitral annular calcification is present. Normal mobility of both mitral valve leaflets. No evidence of mitral valve stenosis. Mild mitral regurgitation is present. Aortic Valve  s/p TAVAR WITH NORMAL FUNCTION   Tricuspid Valve  Tricuspid valve is structurally normal.  No evidence of tricuspid stenosis. Mild tricuspid regurgitation visualized. Pulmonic Valve  The pulmonic valve was not well visualized . Left Atrium  Left atrium is of upper normal size. Left Ventricle  Left ventricle size is normal.  Normal left ventricular wall thickness. Ejection fraction is visually estimated in the range of 45% to 50%. Unable to determine wall motion abnormalities due to poor image quality. Right Atrium  The right atrium is of normal size. Right Ventricle  Mildly dilated right ventricle.    Pericardial Effusion No evidence of any pericardial effusion. Pleural Effusion  No evidence of pleural effusion. Aorta / Great Vessels  The aorta is within normal limits. M-Mode/2D Measurements & Calculations   LV Diastolic      LV Systolic Dimension: 3 cm      LA Dimension: 3.9 cmLA  Dimension: 4.1 cm LV Volume Diastolic: 37.8 ml     Area: 20 cm^2  LV FS:26.8 %      LV Volume Systolic: 35 ml  LV PW Diastolic:  LV EDV/LV EDV Index: 74.2 ml/37  1.3 cm            m^2LV ESV/LV ESV Index: 35 MI/97  Septum Diastolic: m^2                              RV Diastolic Dimension:  1.4 cm            EF Calculated: 52.8 %            3 cm                                                      Ascending Aorta: 3.3 cm                                                     LA volume/Index: 69.8                    LVOT: 2 cm                       ml /35m^2  Doppler Measurements & Calculations    AV Peak Velocity: 172 cm/s      LVOT Peak Velocity: 58.7 cm/s   AV Peak Gradient: 11.83 mmHg    LVOT Mean Velocity: 41.4 cm/s   AV Mean Velocity: 115 cm/s      LVOT Peak Gradient: 1 mmHgLVOT Mean   AV Mean Gradient: 6 mmHg        Gradient: 1 mmHg   AV VTI: 37.5 cm   AV Area (Continuity):1.12 cm^2    LVOT VTI: 13.4 cm    AV DVI (VTI): 0.36AV DVI   (Vmax):0.34  [email or web address] [id number]    XR CHEST PORTABLE    Result Date: 4/13/2022  PROCEDURE: XR CHEST PORTABLE CLINICAL INFORMATION: Cough shortness of breath history of COPD and CHF. COMPARISON: Chest x-ray 3/8/2022. TECHNIQUE: AP upright view of the chest. FINDINGS: There is a pacemaker. There is a replaced aortic valve. There is cardiac negative. The mediastinum is not widened. There are some faint interstitial opacities in the mid and lower lung zones. There are no definite pleural effusions. There is no pneumothorax. The pulmonary vascularity is normal.     Faint bibasilar interstitial opacities.  This can represent mild pulmonary edema. **This report has been created using voice recognition software. It may contain minor errors which are inherent in voice recognition technology. ** Final report electronically signed by Dr. Yolanda Nassar on 4/13/2022 7:19 AM      DVT prophylaxis: [] Lovenox                                 [] SCDs                                 [] SQ Heparin                                 [] Encourage ambulation           [x] Already on Anticoagulation     Code Status: Full Code    Tele:   [x] yes             [] no    Active Hospital Problems    Diagnosis Date Noted    Acute hypoxemic respiratory failure ([facility] [address] [J96.01] 04/13/2022       Electronically signed by Berny Rodrigez MD on 4/19/2022 at 9:01 AM

## 2022-04-19 NOTE — PROGRESS NOTES
Clinical Pharmacy Note    Warfarin consult follow-up    Recent Labs     04/19/22  0526   INR 1.86*     Recent Labs     04/17/22  0416 04/18/22  0436 04/19/22  0526   HGB 11.9* 12.2* 13.0*   HCT 36.5* 37.8* 39.4*    175 182     Significant Drug-Drug Interactions:  New warfarin drug-drug interactions: none  Discontinued drug-drug interactions: none  Current warfarin drug-drug interactions: ascorbic acid. azithromycin     Date INR Warfarin Dose   04/13/22 2.76 1 mg     04/14/22  2.47  2.5 mg    04/15/22  2.39  2.5 mg   04/16/22   3.44  No warfarin     4/17/22 3.25  No warfarin     04/18/22  2.16  2.5 mg   4/19/22  1.86  3 mg      Notes:                   PT/INR or POC-INR will be monitored routinely until therapeutic INR is achieved.

## 2022-04-19 NOTE — CARE COORDINATION
4/19/22, 3:01 PM EDT    DISCHARGE ON [address] day: 6  Location: [facility] Reason for admit: Shortness of breath [R06.02]  Chronic kidney disease (CKD), stage IV (severe) (HCC) [N18.4]  COPD exacerbation ([facility] [J44.1]  Troponin level elevated [R77.8]  Current use of long term anticoagulation [Z79.01]  Acute hypoxemic respiratory failure (HCC) [J96.01]  Acute on chronic congestive heart failure, unspecified heart failure type ([facility] [I50.9]   Procedure:   4/13 CXR: Faint bibasilar interstitial opacities. This can represent mild pulmonary edema  4/13 EKG: Atrial fibrillation, Left bundle branch block  4/13 ECHO: EF 45-50%, s/p TAVR  4/19 CT Chest: Patchy multifocal bilateral upper and lower lobe groundglass opacities are consistent pneumonia in the appropriate clinical setting. Close clinical and imaging follow-up to ensure resolution is advised; Stable chronic findings are discussed    Barriers to Discharge: Home bumex resumed yesterday. Kept yesterday d/t patient c/o still short of breath and coughing. Today patient had increasing SOB and tachypnea, requiring 3L O2. CT Chest done. Respiratory culture sent. Tmax 99.7. Afib 90's. Sats 95% on 3L O2 - decreased to 2L. Ox4. PT/OT. Vit C, lipitor, po bumex 1 mg daily, IV rocephin, zyrtec, flonase, mucinex, SSI, nebs, cozaar, toprol xl, inhaler, coumadin - pharmacy dosing. IV zithromax stopped. Na+ 134, BUN 54, Creat 2.2, pro- bnp 3774, wbc 15.2, hgb 13, INR 1.86. PCP: WILBERTO Branch - CNP  Readmission Risk Score: 20.3 ( )%  Patient Goals/Plan/Treatment Preferences: Home with wife. Denies needs, declines HH. Wants [facility] if needs home O2.

## 2022-04-19 NOTE — PLAN OF CARE
Problem: Falls - Risk of:  Goal: Will remain free from falls  Description: Will remain free from falls  Outcome: Ongoing   Patient remains free from falls this shift. Fall risk assessment complete. Fall sign posted. Non skid socks on. Bed in lowest position, 2/4 siderails up. Bed alarm on. Call light and all possessions within reach. Ambulates  in room with  assistive devices. Rounding hourly. Wife at bedside. Problem: Pain:  Goal: Patient's pain/discomfort is manageable  Description: Patient's pain/discomfort is manageable  Outcome: Ongoing   Pain Assessment: 0-10  Pain Level: 0   Patient's Stated Pain Goal: No pain   Is pain goal met at this time? Yes   Denies pain this shift. Problem: Skin Integrity:  Goal: Skin integrity will stabilize  Description: Skin integrity will stabilize  Outcome: Ongoing   No new skin breakdown this shift. Turns self. Problem: Impaired respiratory status  Goal: Clear lung sounds  4/18/2022 2353 by Thanh Romero RN  Outcome: Ongoing  Rhonchi noted throughout. Scheduled breathing treatments and oral Mucinex given. IV Rocephin and Zithromax daily. Remains on 2L NC. Problem: Discharge Planning:  Goal: Patients continuum of care needs are met  Description: Patients continuum of care needs are met  Outcome: Ongoing   Plan to return home with wife. Will require home O2 at discharge. Care plan reviewed with patient and wife. Patient and wife verbalize understanding of the plan of care and contribute to goal setting.

## 2022-04-19 NOTE — PLAN OF CARE
Problem: Impaired respiratory status  Goal: Clear lung sounds  4/18/2022 2020 by Manolo Chen RCP  Outcome: Ongoing   Patient on scheduled treatments to improve lung aeration

## 2022-04-20 LAB
ANION GAP SERPL CALCULATED.3IONS-SCNC: 12 MEQ/L (ref 8–16)
BASOPHILS # BLD: 0.4 %
BASOPHILS ABSOLUTE: 0.1 THOU/MM3 (ref 0–0.1)
BUN BLDV-MCNC: 58 MG/DL (ref 7–22)
CALCIUM SERPL-MCNC: 8.7 MG/DL (ref 8.5–10.5)
CHLORIDE BLD-SCNC: 97 MEQ/L (ref 98–111)
CO2: 22 MEQ/L (ref 23–33)
CREAT SERPL-MCNC: 2.5 MG/DL (ref 0.4–1.2)
EKG Q-T INTERVAL: 346 MS
EKG QRS DURATION: 136 MS
EKG QTC CALCULATION (BAZETT): 524 MS
EKG R AXIS: 37 DEGREES
EKG T AXIS: -173 DEGREES
EKG VENTRICULAR RATE: 138 BPM
EOSINOPHIL # BLD: 3.2 %
EOSINOPHILS ABSOLUTE: 0.6 THOU/MM3 (ref 0–0.4)
ERYTHROCYTE [DISTWIDTH] IN BLOOD BY AUTOMATED COUNT: 15 % (ref 11.5–14.5)
ERYTHROCYTE [DISTWIDTH] IN BLOOD BY AUTOMATED COUNT: 53.6 FL (ref 35–45)
GFR SERPL CREATININE-BSD FRML MDRD: 25 ML/MIN/1.73M2
GLUCOSE BLD-MCNC: 151 MG/DL (ref 70–108)
GLUCOSE BLD-MCNC: 161 MG/DL (ref 70–108)
GLUCOSE BLD-MCNC: 178 MG/DL (ref 70–108)
GLUCOSE BLD-MCNC: 269 MG/DL (ref 70–108)
GLUCOSE BLD-MCNC: 276 MG/DL (ref 70–108)
HCT VFR BLD CALC: 39.3 % (ref 42–52)
HEMOGLOBIN: 12.8 GM/DL (ref 14–18)
IMMATURE GRANS (ABS): 0.44 THOU/MM3 (ref 0–0.07)
IMMATURE GRANULOCYTES: 2.4 %
INR BLD: 1.8 (ref 0.85–1.13)
LYMPHOCYTES # BLD: 3.6 %
LYMPHOCYTES ABSOLUTE: 0.7 THOU/MM3 (ref 1–4.8)
MCH RBC QN AUTO: 31.4 PG (ref 26–33)
MCHC RBC AUTO-ENTMCNC: 32.6 GM/DL (ref 32.2–35.5)
MCV RBC AUTO: 96.6 FL (ref 80–94)
MONOCYTES # BLD: 5.1 %
MONOCYTES ABSOLUTE: 0.9 THOU/MM3 (ref 0.4–1.3)
NUCLEATED RED BLOOD CELLS: 0 /100 WBC
PLATELET # BLD: 182 THOU/MM3 (ref 130–400)
PMV BLD AUTO: 11 FL (ref 9.4–12.4)
POTASSIUM SERPL-SCNC: 4.7 MEQ/L (ref 3.5–5.2)
RBC # BLD: 4.07 MILL/MM3 (ref 4.7–6.1)
SEG NEUTROPHILS: 85.3 %
SEGMENTED NEUTROPHILS ABSOLUTE COUNT: 15.6 THOU/MM3 (ref 1.8–7.7)
SODIUM BLD-SCNC: 131 MEQ/L (ref 135–145)
WBC # BLD: 18.3 THOU/MM3 (ref 4.8–10.8)

## 2022-04-20 PROCEDURE — 82948 REAGENT STRIP/BLOOD GLUCOSE: CPT

## 2022-04-20 PROCEDURE — 6360000002 HC RX W HCPCS: Performed by: HOSPITALIST

## 2022-04-20 PROCEDURE — 80048 BASIC METABOLIC PNL TOTAL CA: CPT

## 2022-04-20 PROCEDURE — 1200000003 HC TELEMETRY R&B

## 2022-04-20 PROCEDURE — 93005 ELECTROCARDIOGRAM TRACING: CPT

## 2022-04-20 PROCEDURE — 6370000000 HC RX 637 (ALT 250 FOR IP): Performed by: PHYSICIAN ASSISTANT

## 2022-04-20 PROCEDURE — 94760 N-INVAS EAR/PLS OXIMETRY 1: CPT

## 2022-04-20 PROCEDURE — 85025 COMPLETE CBC W/AUTO DIFF WBC: CPT

## 2022-04-20 PROCEDURE — 83036 HEMOGLOBIN GLYCOSYLATED A1C: CPT

## 2022-04-20 PROCEDURE — 85610 PROTHROMBIN TIME: CPT

## 2022-04-20 PROCEDURE — 94669 MECHANICAL CHEST WALL OSCILL: CPT

## 2022-04-20 PROCEDURE — 36415 COLL VENOUS BLD VENIPUNCTURE: CPT

## 2022-04-20 PROCEDURE — 2580000003 HC RX 258

## 2022-04-20 PROCEDURE — 6370000000 HC RX 637 (ALT 250 FOR IP)

## 2022-04-20 PROCEDURE — 2500000003 HC RX 250 WO HCPCS

## 2022-04-20 PROCEDURE — 2700000000 HC OXYGEN THERAPY PER DAY

## 2022-04-20 PROCEDURE — 51798 US URINE CAPACITY MEASURE: CPT

## 2022-04-20 PROCEDURE — 97110 THERAPEUTIC EXERCISES: CPT

## 2022-04-20 PROCEDURE — 99232 SBSQ HOSP IP/OBS MODERATE 35: CPT

## 2022-04-20 PROCEDURE — 94640 AIRWAY INHALATION TREATMENT: CPT

## 2022-04-20 PROCEDURE — 6360000002 HC RX W HCPCS

## 2022-04-20 RX ORDER — INSULIN LISPRO 100 [IU]/ML
0-3 INJECTION, SOLUTION INTRAVENOUS; SUBCUTANEOUS NIGHTLY
Status: DISCONTINUED | OUTPATIENT
Start: 2022-04-20 | End: 2022-04-24 | Stop reason: HOSPADM

## 2022-04-20 RX ORDER — METOPROLOL SUCCINATE 100 MG/1
100 TABLET, EXTENDED RELEASE ORAL DAILY
Status: DISCONTINUED | OUTPATIENT
Start: 2022-04-21 | End: 2022-04-24 | Stop reason: HOSPADM

## 2022-04-20 RX ORDER — INSULIN LISPRO 100 [IU]/ML
0-6 INJECTION, SOLUTION INTRAVENOUS; SUBCUTANEOUS
Status: DISCONTINUED | OUTPATIENT
Start: 2022-04-20 | End: 2022-04-24 | Stop reason: HOSPADM

## 2022-04-20 RX ORDER — LANOLIN ALCOHOL/MO/W.PET/CERES
1000 CREAM (GRAM) TOPICAL DAILY
Status: DISCONTINUED | OUTPATIENT
Start: 2022-04-20 | End: 2022-04-24 | Stop reason: HOSPADM

## 2022-04-20 RX ORDER — WARFARIN SODIUM 3 MG/1
3 TABLET ORAL
Status: COMPLETED | OUTPATIENT
Start: 2022-04-20 | End: 2022-04-20

## 2022-04-20 RX ORDER — METOPROLOL TARTRATE 5 MG/5ML
5 INJECTION INTRAVENOUS ONCE
Status: DISCONTINUED | OUTPATIENT
Start: 2022-04-20 | End: 2022-04-24 | Stop reason: HOSPADM

## 2022-04-20 RX ORDER — ONDANSETRON 4 MG/1
4 TABLET, ORALLY DISINTEGRATING ORAL EVERY 8 HOURS PRN
Status: DISCONTINUED | OUTPATIENT
Start: 2022-04-20 | End: 2022-04-24 | Stop reason: HOSPADM

## 2022-04-20 RX ORDER — ONDANSETRON 2 MG/ML
4 INJECTION INTRAMUSCULAR; INTRAVENOUS EVERY 6 HOURS PRN
Status: DISCONTINUED | OUTPATIENT
Start: 2022-04-20 | End: 2022-04-24 | Stop reason: HOSPADM

## 2022-04-20 RX ADMIN — LEVALBUTEROL HYDROCHLORIDE 1.25 MG: 1.25 SOLUTION RESPIRATORY (INHALATION) at 12:29

## 2022-04-20 RX ADMIN — ATORVASTATIN CALCIUM 80 MG: 80 TABLET, FILM COATED ORAL at 20:42

## 2022-04-20 RX ADMIN — Medication 1000 UNITS: at 09:18

## 2022-04-20 RX ADMIN — GUAIFENESIN 600 MG: 600 TABLET, EXTENDED RELEASE ORAL at 20:42

## 2022-04-20 RX ADMIN — METOPROLOL SUCCINATE 50 MG: 50 TABLET, EXTENDED RELEASE ORAL at 09:19

## 2022-04-20 RX ADMIN — LEVALBUTEROL HYDROCHLORIDE 1.25 MG: 1.25 SOLUTION RESPIRATORY (INHALATION) at 16:40

## 2022-04-20 RX ADMIN — GUAIFENESIN 600 MG: 600 TABLET, EXTENDED RELEASE ORAL at 09:18

## 2022-04-20 RX ADMIN — FLUTICASONE PROPIONATE 1 SPRAY: 50 SPRAY, METERED NASAL at 09:18

## 2022-04-20 RX ADMIN — LEVALBUTEROL HYDROCHLORIDE 1.25 MG: 1.25 SOLUTION RESPIRATORY (INHALATION) at 20:11

## 2022-04-20 RX ADMIN — INSULIN LISPRO 1 UNITS: 100 INJECTION, SOLUTION INTRAVENOUS; SUBCUTANEOUS at 09:26

## 2022-04-20 RX ADMIN — SODIUM CHLORIDE, PRESERVATIVE FREE 10 ML: 5 INJECTION INTRAVENOUS at 12:38

## 2022-04-20 RX ADMIN — CEFTRIAXONE SODIUM 1000 MG: 10 INJECTION, POWDER, FOR SOLUTION INTRAVENOUS at 12:37

## 2022-04-20 RX ADMIN — TIOTROPIUM BROMIDE INHALATION SPRAY 2 PUFF: 3.12 SPRAY, METERED RESPIRATORY (INHALATION) at 08:54

## 2022-04-20 RX ADMIN — SODIUM CHLORIDE, PRESERVATIVE FREE 10 ML: 5 INJECTION INTRAVENOUS at 20:42

## 2022-04-20 RX ADMIN — INSULIN LISPRO 2 UNITS: 100 INJECTION, SOLUTION INTRAVENOUS; SUBCUTANEOUS at 20:43

## 2022-04-20 RX ADMIN — Medication 1000 MCG: at 18:56

## 2022-04-20 RX ADMIN — CETIRIZINE HYDROCHLORIDE 5 MG: 10 TABLET, FILM COATED ORAL at 09:18

## 2022-04-20 RX ADMIN — LOSARTAN POTASSIUM 25 MG: 25 TABLET, FILM COATED ORAL at 20:42

## 2022-04-20 RX ADMIN — INSULIN LISPRO 1 UNITS: 100 INJECTION, SOLUTION INTRAVENOUS; SUBCUTANEOUS at 18:55

## 2022-04-20 RX ADMIN — OXYCODONE HYDROCHLORIDE AND ACETAMINOPHEN 500 MG: 500 TABLET ORAL at 09:19

## 2022-04-20 RX ADMIN — MOMETASONE FUROATE AND FORMOTEROL FUMARATE DIHYDRATE 2 PUFF: 200; 5 AEROSOL RESPIRATORY (INHALATION) at 20:13

## 2022-04-20 RX ADMIN — INSULIN LISPRO 3 UNITS: 100 INJECTION, SOLUTION INTRAVENOUS; SUBCUTANEOUS at 12:46

## 2022-04-20 RX ADMIN — WARFARIN SODIUM 3 MG: 3 TABLET ORAL at 18:56

## 2022-04-20 RX ADMIN — LEVALBUTEROL HYDROCHLORIDE 1.25 MG: 1.25 SOLUTION RESPIRATORY (INHALATION) at 08:46

## 2022-04-20 RX ADMIN — GUAIFENESIN AND CODEINE PHOSPHATE 5 ML: 100; 10 SOLUTION ORAL at 15:25

## 2022-04-20 RX ADMIN — MOMETASONE FUROATE AND FORMOTEROL FUMARATE DIHYDRATE 2 PUFF: 200; 5 AEROSOL RESPIRATORY (INHALATION) at 08:49

## 2022-04-20 ASSESSMENT — PAIN SCALES - GENERAL: PAINLEVEL_OUTOF10: 0

## 2022-04-20 NOTE — PROGRESS NOTES
Clinical Pharmacy Note    Warfarin consult follow-up    Recent Labs     04/20/22  0600   INR 1.80*     Recent Labs     04/18/22  0436 04/19/22  0526 04/20/22  0600   HGB 12.2* 13.0* 12.8*   HCT 37.8* 39.4* 39.3*    182 182     Significant Drug-Drug Interactions:  New warfarin drug-drug interactions: none  Discontinued drug-drug interactions: azithromycin  Current warfarin drug-drug interactions: ascorbic acid     Date INR Warfarin Dose   04/13/22 2.76 1 mg     04/14/22  2.47  2.5 mg    04/15/22  2.39  2.5 mg   04/16/22   3.44  No warfarin     4/17/22 3.25  No warfarin     04/18/22  2.16  2.5 mg   4/19/22  1.86  4 mg     04/20/22  1.80 3 mg                             Notes:                   PT/INR or POC-INR will be monitored routinely until therapeutic INR is achieved.        Tarun Velasquez, PharmD  4/20/2022  8:08 AM

## 2022-04-20 NOTE — PROGRESS NOTES
[facility]  Occupational Therapy  Daily Note  Time:  Time In: 7  Time Out: 1410  Timed Code Treatment Minutes: 38 Minutes  Minutes: 38          Date: 2022  Patient Name: Soraida Santos,   Gender: male      Room: [facility]/026-A  MRN: [id number]  : 1940  (79 y.o.)  Referring Practitioner: Berny Rodrigez MD  Diagnosis: Shortness of breath  Additional Pertinent Hx: Sheridan Su is a 78 y/o  male with a PMHx of COPD, HFpEF, HTN, Hx of CVA, CAD, S/p TAVR, persistent A. Fib on warfarin who presents to [facility] ED today for the evaluation of cough and shortness of breath that started last evening. The patient states he took a nebulizer treatment at home for shortness of breath before bed, but has had worsening SOB and cough all night. The cough is chronic, but significantly worsened and consistently productive of yellow-green sputum. Otherwise, the patient has also noted associated worsening SOB for the last few days with activity. He states his shortness of breath resolves with rest. He denies associated chest pain, lightheadedness, dizziness, abdominal pain, fever, chills, worsening LE edema. He states he has not noticed an unexpected weight change, and other wise states he feels well overall. Restrictions/Precautions:  Restrictions/Precautions: General Precautions,Fall Risk  Position Activity Restriction  Other position/activity restrictions: monitor O2     SUBJECTIVE: Nurse ok'd session. Patient call light on, patient requesting to get repositioned in bed. Patient agreeable to OT session     PAIN: Denies    Vitals: Oxygen: 2L at rest maintaining between 92-93%. Increased to 3L with activity fluctuating between 90-94%. Encouraged deep breathing in through nose and out through mouth  Heart Rate: Fluctuating between 62-95 bpm    COGNITION: Decreased Insight    ADL:   No ADL's completed this session. Barrett Caba BALANCE:  Sitting Balance:  Supervision.     Standing Balance: Stand By Assistance. BED MOBILITY:  Rolling to Left: Supervision - using side rail  Supine to Sit: Supervision - using side rail, increased time  Sit to Supine: Stand By Assistance - increased time to bring LEs into bed    TRANSFERS:  Sit to Stand:  Stand By Assistance. From EOB (multiple trials)  Stand to Sit: Stand By Assistance. To EOB (multiple trials)    FUNCTIONAL MOBILITY:  Assistive Device: None  Assist Level:  Contact Guard Assistance. - close SBA   Distance: Approx 220 ft x1 trial  Slow pace, slightly unsteady at times. Frequent standing rest breaks required due to increased SOB with activity. Cueing provided throughout for deep breathing in through nose and out through mouth. Increased O2 to 3L with mobility to maintain >90%. ADDITIONAL ACTIVITIES:  Patient completed BUE AROM exercises in all joints/planes, shoulder rolls and scapular retraction x10 reps x1 set. Completed 4 exercises in standing position requiring SBA for balance with no LOB noted. Completed remaining in seated position. Patient required occasional lengthy seated rest break due to increased fatigue/SOB. Patient began with coughing spell during exercises requiring increased time to complete exercises     ASSESSMENT:     Activity Tolerance:  Patient tolerance of  treatment: fair.  Limited by decreased activity tolerance      Discharge Recommendations: Continue to assess pending progress and Home with Home Health OT  Equipment Recommendations: Equipment Needed: No  Plan: Times per Week: 3-5x  Current Treatment Recommendations: Strengthening,Functional Mobility Training,Endurance Training,Balance Training,Safety Education & Training,Self-Care / ADL,Patient/Caregiver Education & Training,Home Management Training,Equipment Evaluation, Education, & procurement    Patient Education  Patient Education: safety with transfers and mobility, AROM, deep breathing    Goals  Short Term Goals  Time Frame for Short term goals: Until discharge  Short Term Goal 1: Pt will complete BUE strengthening exercises with min vcs for technique to increase indep and endurance with self cares. Short Term Goal 2: Pt will complete standing task x 4 minutes with SBA and min vcs for safety while O2 stats remain above 90% to increase endurance with grooming . Short Term Goal 3: Pt will complete functional mobility to/from BR and HH distaces with S while O2 stats remain above 90%. Short Term Goal 4: Pt will complete BADL routine with S and EC techniques to increase endurance in home environment. Following session, patient left in safe position with all fall risk precautions in place.

## 2022-04-20 NOTE — PLAN OF CARE
Problem: Impaired respiratory status  Goal: Clear lung sounds  4/20/2022 0900 by Pamela Rosales RCP  Outcome: Progressing

## 2022-04-20 NOTE — PROGRESS NOTES
Hospitalist Progress Note    Patient:  Soraida Poll      Unit/Bed:5K-26/026-A    YOB: 1940    MRN: [id number]       Acct: [de-identified]     PCP: WILBERTO Beal CNP    Date of Admission: 4/13/2022    Assessment/Plan:    1. Acute on chronic hypoxic respiratory failure    - Satting 91% on 2L/NC. Does not normally wear oxygen at baseline. Continue to wean oxygen as tolerated.   -Likely secondary to #2, #3, #4   -CT chest (4/19) notable for patchy multifocal bilateral upper and lower lobe groundglass opacities consistent with pneumonia R>L   -Pulmonary toilet: Incentive spirometer, Acapella, cough, deep breathe, OOB with assistance    2. Acute on chronic systolic heart failure   -Echo (4/14) notable for EF estimated range of 45-50%, s/p TAVR with normal function, normal mitral valve, mild mitral regurg   - BNP elevated, however patient has chronically elevated BNP, could be in the setting of CKD. - Holding home Bumex given creatinine elevation   - Strict I&O's   - Daily weight   - Cardiac diet: 2 g Na restriction, 2L fluid restriction      3. Acute COPD exacerbation   - Received Prednisone 40 mg x's 5 days (4/13 - 4/17)   - S/p IV Solu medrol (4/13, 4/15)   - Continue Mucinex   - Continue scheduled and as needed bronchodilators    4. Community-acquired pneumonia secondary to rhinovirus enterovirus   - Received 500 mg IV azithromycin 4/15 - 4/19   - Continue empiric ceftriaxone (initiated 4/14)    - Molecular pneumonia panel (4/19) notable for rhinovirus enterovirus   -Sputum culture showing normal keena   - Symptomatic support    5. Essential hypertension, controlled   -Continue Cozaar    6.  Chronic Persistent atrial fibrillation, with pacemaker   - Was in afib with -142 bpm this morning   - S/p 1 time dose IV metoprolol 4/19 & 4/20    -[id number] score 8   - Increase home metoprolol XL to 100 mg from 50 mg    - Continue Coumadin, goal INR range 2-3 -> pharmacy consulted, appreciate recs   -Continuous telemetry    7. Aortic stenosis status post TAVR 5/11/2018   -Noted. On warfarin, goal INR of 2-3   -Pharmacy consulted to help in dosage and management of warfarin therapy    8. MILLY vs CKD, stage IV   -Baseline creatinine 1.7-2.3   -Creatinine 2.5 (4/20)   -Hold Bumex   -Avoid nephrotoxic agents    9. CAD   - S/p PCI 2002   - ECG (4/20) notable for atrial fibrillation with RVR, left bundle branch block with T wave inversions in lateral leads, no new ischemic changes   -Continue home losartan, Lipitor, warfarin, Toprol    10. HLD   - Statin    11. T2DM   -Normally takes metformin at home. Hold metformin given #8    -Hemoglobin A1c ordered and pending   - Continue low dose SSI   -Accu-Cheks   - Hypoglycemia protocol   - Diabetic diet    12. Chronic macrocytic anemia   -H&H stable. Transfuse for hemoglobin < 7 g/dL   - Folic acid (3/11) normal, vitamin B12 low   - Start oral cyanocobalamin     13. History of CVA:    -Noted. States he has no deficits at baseline. Continue blood pressure control, glucose control of diabetes and statin therapy. 14. Obesity   -BMI 32.93 kg/m²   -Discussed and educated on lifestyle modifications  15. Expected discharge date: Pending clinical course    Disposition:    [x] Home       [] TCU       [] Rehab       [] Psych       [] SNF       [] [place]       [] Other-    Chief Complaint: Cough and shortness of breath    Hospital Course: Per HPI documented 4/13/2022: Rafiq Solano is a 78 y/o  male with a PMHx of COPD, HFpEF, HTN, Hx of CVA, CAD, S/p TAVR, persistent A. Fib on warfarin who presents to [facility] ED today for the evaluation of cough and shortness of breath that started last evening. The patient states he took a nebulizer treatment at home for shortness of breath before bed, but has had worsening SOB and cough all night.  The cough is chronic, but significantly worsened and consistently productive of yellow-green sputum. Otherwise, the patient has also noted associated worsening SOB for the last few days with activity. He states his shortness of breath resolves with rest. He denies associated chest pain, lightheadedness, dizziness, abdominal pain, fever, chills, worsening LE edema. He states he has not noticed an unexpected weight change, and other wise states he feels well overall. \"    4/20/22: Patient resting in bed, no apparent distress. Chronically ill-appearing. Patient is currently satting 91% on 2 L nasal cannula. Patient does not reportedly wear oxygen at baseline. Patient noted to be in atrial fibrillation RVR with rate of 112-142 bpm.  Patient is asymptomatic. Blood pressures remain hemodynamically stable. He remains afebrile. Ordered to give home medication 50 mg metoprolol XR, and one-time dose IV push metoprolol 5 mg. We will increase patient's oral metoprolol to 100 mg starting tomorrow. Creatinine elevated to 2.5 today after starting Bumex. We will hold his Bumex. Continue breathing treatments. Continue to wean oxygen. Of note, PT/OT evaluated patient and is recommending home health assistance for continued PT/OT. Discussed this with patient and his wife. Patient and his wife are refusing home health assistance at this time. Subjective (past 24 hours):     Denies chest pain, palpitations, inspiratory chest pain, abdominal pain, nausea, vomiting, fever, chills. Does report worsening shortness of breath at rest and with exertion. Reports intermittent productive cough with yellow phlegm.   Denies hemoptysis    Medications:  Reviewed    Infusion Medications    sodium chloride 250 mL/hr at 04/17/22 1329    dextrose       Scheduled Medications    warfarin  3 mg Oral Once    bumetanide  1 mg Oral Daily    fluticasone  1 spray Each Nostril Daily    cefTRIAXone (ROCEPHIN) IV  1,000 mg IntraVENous Q24H    levalbuterol  1.25 mg Nebulization Q4H WA    ascorbic acid  500 mg Oral Daily    atorvastatin  80 mg Oral Nightly    cetirizine  5 mg Oral Daily    losartan  25 mg Oral Nightly    metoprolol succinate  50 mg Oral Daily    Vitamin D  1,000 Units Oral Daily    sodium chloride flush  10 mL IntraVENous 2 times per day    insulin lispro  0-6 Units SubCUTAneous TID     insulin lispro  0-3 Units SubCUTAneous Nightly    guaiFENesin  600 mg Oral BID    warfarin placeholder: dosing by pharmacy   Other RX Placeholder    mometasone-formoterol  2 puff Inhalation BID    tiotropium  2 puff Inhalation Daily     PRN Meds: guaiFENesin-codeine, albuterol sulfate HFA, sodium chloride flush, sodium chloride, ondansetron **OR** ondansetron, polyethylene glycol, acetaminophen **OR** acetaminophen, glucagon (rDNA), dextrose, dextrose bolus (hypoglycemia) **OR** dextrose bolus (hypoglycemia), glucose **OR** glucose, benzocaine-menthol      Intake/Output Summary (Last 24 hours) at 4/20/2022 0837  Last data filed at 4/19/2022 2204  Gross per 24 hour   Intake 550 ml   Output 1650 ml   Net -1100 ml       Diet:  ADULT DIET; Regular; 4 carb choices (60 gm/meal); Low Sodium (2 gm); 2000 ml    Exam:  /70   Pulse 85   Temp 98.4 °F (36.9 °C) (Oral)   Resp 18   Ht 5' 5.98\" (1.676 m)   Wt 203 lb 14.4 oz (92.5 kg)   SpO2 92%   BMI 32.93 kg/m²     General appearance: No apparent distress, appears older than stated age and cooperative. Chronically ill-appearing  HEENT: Pupils equal, round, and reactive to light. Conjunctivae/corneas clear. Neck: Supple, with full range of motion. No jugular venous distention. Trachea midline. Respiratory:  Normal respiratory effort, able to speak full clear sentences. Clear diminished to auscultation, bilaterally without Rales/Wheezes/Rhonchi. Cardiovascular: Fast regular rate and irregular rhythm with normal S1/S2 without murmurs, rubs or gallops. No pitting or pedal edema noted  Abdomen: Soft, non-tender, non-distended with normal bowel sounds.   Musculoskeletal: passive and active ROM x 4 extremities. Skin: Skin color, texture, turgor normal.  No rashes or lesions. Neurologic:  Neurovascularly intact without any focal sensory/motor deficits. Cranial nerves: II-XII intact, grossly non-focal.  Psychiatric: Alert and oriented, thought content appropriate, normal insight  Capillary Refill: Brisk,< 3 seconds   Peripheral Pulses: +2 palpable, equal bilaterally       Labs:   Recent Labs     04/18/22 0436 04/19/22  0526 04/20/22  0600   WBC 11.2* 15.2* 18.3*   HGB 12.2* 13.0* 12.8*   HCT 37.8* 39.4* 39.3*    182 182     Recent Labs     04/18/22 0436 04/19/22  0526 04/20/22  0600   * 134* 131*   K 4.7 4.3 4.7   CL 97* 98 97*   CO2 23 21* 22*   BUN 53* 54* 58*   CREATININE 2.0* 2.2* 2.5*   CALCIUM 8.6 8.6 8.7     No results for input(s): AST, ALT, BILIDIR, BILITOT, ALKPHOS in the last 72 hours. Recent Labs     04/18/22 0436 04/19/22  0526 04/20/22  0600   INR 2.16* 1.86* 1.80*     No results for input(s): CKTOTAL, TROPONINI in the last 72 hours.     Microbiology:    Molecular pneumonia panel positive for rhinovirus enterovirus  Sputum culture showing normal keena    Urinalysis:      Lab Results   Component Value Date    NITRU NEGATIVE 07/03/2021    WBCUA NONE SEEN 06/10/2021    BACTERIA NONE SEEN 06/10/2021    RBCUA NONE SEEN 06/10/2021    BLOODU NEGATIVE 07/03/2021    SPECGRAV 1.006 06/10/2021    GLUCOSEU NEGATIVE 07/03/2021       Radiology:  Echocardiogram limited    Result Date: 4/14/2022  Transthoracic Echocardiography Report (TTE)  Demographics   Patient Name  Caleb Weller Gender            Male                L   MR #          [id number]      Race                                              Ethnicity   Account #     [de-identified]      Room Number       6511   Accession     [id number]     Date of Study     04/14/2022  Number   Date of Birth 1940     Referring         Heather Garcia CNP                               Physician         Nik Hemphill Saeid LYNN   Age           79 year(s)     Sonographer       BRO Santamaria, RDCS,                                                 RDMS, RVT                                Interpreting      Jakob Petersen MD                               Physician  Procedure Type of Study   TTE procedure:ECHOCARDIOGRAM LIMITED. Procedure Date Date: 04/14/2022 Start: 06:37 AM Study Location: Bedside Technical Quality: Limited visualization due to body habitus. Indications:Cardiomyopathy, Post TAVR and Shortness of breath. Additional Medical History:diabetes, pneumonia, hyperlipidemia, acute hypoxic respiratory failure, chronic obstructive pulmonary disease, hypertenison, atrial fibrillation, pacemaker, chronic kidney disease, coronary artery disease, anemia, history of cerebrovascular accident, TAVR, shortness of breath, stent, congestive heart failure, cardiomyopathy Patient Status: Routine Height: 65 inches Weight: 204 pounds BSA: 1.99 m^2 BMI: 33.95 kg/m^2 BP: 127/75 mmHg Allergies   - See Epic. Conclusions   Summary  Technically difficult examination. This is a suboptimal study due to poor echocardiographic window. Left ventricle size is normal.  Normal left ventricular wall thickness. Ejection fraction is visually estimated in the range of 45% to 50%. Unable to determine wall motion abnormalities due to poor image quality. s/p TAVAR WITH NORMAL FUNCTION  Structurally normal mitral valve. Mitral annular calcification is present. Normal mobility of both mitral valve leaflets. No evidence of mitral valve stenosis. Mild mitral regurgitation is present. Signature   ----------------------------------------------------------------  Electronically signed by Jakob Petersen MD (Interpreting  physician) on 04/14/2022 at 12:10 PM  ----------------------------------------------------------------   Findings   Mitral Valve  Structurally normal mitral valve. Mitral annular calcification is present.   Normal mobility of both mitral valve leaflets. No evidence of mitral valve stenosis. Mild mitral regurgitation is present. Aortic Valve  s/p TAVAR WITH NORMAL FUNCTION   Tricuspid Valve  Tricuspid valve is structurally normal.  No evidence of tricuspid stenosis. Mild tricuspid regurgitation visualized. Pulmonic Valve  The pulmonic valve was not well visualized . Left Atrium  Left atrium is of upper normal size. Left Ventricle  Left ventricle size is normal.  Normal left ventricular wall thickness. Ejection fraction is visually estimated in the range of 45% to 50%. Unable to determine wall motion abnormalities due to poor image quality. Right Atrium  The right atrium is of normal size. Right Ventricle  Mildly dilated right ventricle. Pericardial Effusion  No evidence of any pericardial effusion. Pleural Effusion  No evidence of pleural effusion. Aorta / Great Vessels  The aorta is within normal limits.   M-Mode/2D Measurements & Calculations   LV Diastolic      LV Systolic Dimension: 3 cm      LA Dimension: 3.9 cmLA  Dimension: 4.1 cm LV Volume Diastolic: 49.5 ml     Area: 20 cm^2  LV FS:26.8 %      LV Volume Systolic: 35 ml  LV PW Diastolic:  LV EDV/LV EDV Index: 74.2 ml/37  1.3 cm            m^2LV ESV/LV ESV Index: 35 RE/58  Septum Diastolic: m^2                              RV Diastolic Dimension:  1.4 cm            EF Calculated: 52.8 %            3 cm                                                      Ascending Aorta: 3.3 cm                                                     LA volume/Index: 69.8                    LVOT: 2 cm                       ml /35m^2  Doppler Measurements & Calculations    AV Peak Velocity: 172 cm/s      LVOT Peak Velocity: 58.7 cm/s   AV Peak Gradient: 11.83 mmHg    LVOT Mean Velocity: 41.4 cm/s   AV Mean Velocity: 115 cm/s      LVOT Peak Gradient: 1 mmHgLVOT Mean   AV Mean Gradient: 6 mmHg        Gradient: 1 mmHg   AV VTI: 37.5 cm   AV Area (Continuity):1.12 cm^2    LVOT VTI: 13.4 cm AV DVI (VTI): 0.36AV DVI   (Vmax):0.34  [id number]    XR CHEST PORTABLE    Result Date: 4/13/2022  PROCEDURE: XR CHEST PORTABLE CLINICAL INFORMATION: Cough shortness of breath history of COPD and CHF. COMPARISON: Chest x-ray 3/8/2022. TECHNIQUE: AP upright view of the chest. FINDINGS: There is a pacemaker. There is a replaced aortic valve. There is cardiac negative. The mediastinum is not widened. There are some faint interstitial opacities in the mid and lower lung zones. There are no definite pleural effusions. There is no pneumothorax. The pulmonary vascularity is normal.     Faint bibasilar interstitial opacities. This can represent mild pulmonary edema. **This report has been created using voice recognition software. It may contain minor errors which are inherent in voice recognition technology. ** Final report electronically signed by Dr. Enrike Klein on 4/13/2022 7:19 AM      DVT prophylaxis: [] Lovenox                                 [] SCDs                                 [] SQ Heparin                                 [x] Encourage ambulation           [x] Already on Anticoagulation     Code Status: Full Code    PT/OT Eval Status: Per OT note documented 4/20/2022: \"ASSESSMENT:  Activity Tolerance:  Patient tolerance of  treatment: fair.  Limited by decreased activity tolerance      Discharge Recommendations: Continue to assess pending progress and Home with Home Health OT  Equipment Recommendations: Equipment Needed: No  Plan: Times per Week: 3-5x  Current Treatment Recommendations: Strengthening,Functional Mobility Training,Endurance Training,Balance Training,Safety Education & Training,Self-Care / ADL,Patient/Caregiver Education & Training,Home Management Training,Equipment Evaluation, Education, & procurement\"    Per PT note documented 4/18/2022: \"ASSESSMENT:  Assessment: Patient progressing toward established goals. The patient tolerated session well, SBA for safety when ambulating longer distances. Sp02 staying at 96% with ambulation when on 2 L but did decrease to 91-92% after sitting and resting for short time. Would benefit from additional skilled PT to increase endurance for improved functional mobility. Activity Tolerance:  Patient tolerance of  treatment: good. Equipment Recommendations:Equipment Needed: No  Discharge Recommendations: Home with assist PRN, HH PT  Plan: Times per week: 3-5x GM  Times per day: Daily  Current Treatment Recommendations: Strengthening,Gait Training,Stair training,Functional Mobility Training\"      Tele:   [x] yes             [] no    Atrial fibrillation RVR rate 112-142 bpm with left bundle branch block noted.   No ectopy    Active Hospital Problems    Diagnosis Date Noted    Acute hypoxemic respiratory failure ([facility] [J96.01] 04/13/2022       Electronically signed by WILBERTO Hairston CNP on 4/20/2022 at 8:37 AM

## 2022-04-20 NOTE — PROGRESS NOTES
[address] THERAPY MISSED TREATMENT NOTE  [facility]  5K-26/026-A      Date: 2022  Patient Name: Kelly Hernández        CSN: [id number]   : 1940  (79 y.o.)  Gender: male   Referring Practitioner: Naina Wilkerson MD  Diagnosis: Shortness of breath         REASON FOR MISSED TREATMENT: Hold Treatment per Nursing. Nursing requesting to hold at this time and check back later. Patient just received breathing treatment and heart rate increased to 140s. Will check back as time allows.

## 2022-04-20 NOTE — PLAN OF CARE
Problem: Falls - Risk of:  Goal: Will remain free from falls  Description: Will remain free from falls  4/20/2022 0347 by Guy Dempsey RN  Outcome: Ongoing   Patient remains free from falls this shift. Fall risk assessment complete. Fall sign posted. Non skid socks on. Bed in lowest position, 2/4 siderails up. Bed alarm on. Call light and all possessions within reach. Ambulates  in room with  assistive devices. Rounding hourly   Problem: Infection:  Goal: Will remain free from infection  Description: Will remain free from infection  Outcome: Ongoing  No new s/s of infection. Afebrile. IV Rocephin daily. Problem: Pain:  Goal: Patient's pain/discomfort is manageable  Description: Patient's pain/discomfort is manageable  Outcome: Ongoing   Pain Assessment: 0-10  Pain Level: 0   Patient's Stated Pain Goal: No pain   Is pain goal met at this time? Yes   Patient denies pain this shift. Problem: Skin Integrity:  Goal: Skin integrity will stabilize  Description: Skin integrity will stabilize  Outcome: Ongoing   No new skin breakdown this shift. Turns self. Problem: Impaired respiratory status  Goal: Clear lung sounds  Outcome: Ongoing   Rhonchi noted in bilateral lung bases. Remains on 2L NC. Problem: Discharge Planning:  Goal: Patients continuum of care needs are met  Description: Patients continuum of care needs are met  Outcome: Ongoing   Plan to return home with wife at discharge. Care plan reviewed with patient and wife. Patient and wife verbalize understanding of the plan of care and contribute to goal setting.

## 2022-04-20 NOTE — CARE COORDINATION
4/20/22, 11:56 AM EDT    DISCHARGE ON [address] day: 7  Location: [address] [place] [address] Reason for admit: Shortness of breath [R06.02]  Chronic kidney disease (CKD), stage IV (severe) (HCC) [N18.4]  COPD exacerbation ([facility] [J44.1]  Troponin level elevated [R77.8]  Current use of long term anticoagulation [Z79.01]  Acute hypoxemic respiratory failure (HCC) [J96.01]  Acute on chronic congestive heart failure, unspecified heart failure type ([facility] [I50.9]   Procedure:   4/13 CXR: Faint bibasilar interstitial opacities. This can represent mild pulmonary edema  4/13 EKG: Atrial fibrillation, Left bundle branch block  4/13 ECHO: EF 45-50%, s/p TAVR  4/19 CT Chest: Patchy multifocal bilateral upper and lower lobe groundglass opacities are consistent pneumonia in the appropriate clinical setting. Close clinical and imaging follow-up to ensure resolution is advised; Stable chronic findings are discussed   4/20 EKG: Atrial fibrillation with rapid ventricular response  Barriers to Discharge:   Continues having issues with HR. Continues on supplemental O2 (2L). Pneumonia panel positive for Rhinovirus. Bumex held today. IV Rocephin. Mucinex BID. 1 x IV lopressor. Tmax 98.9. WBC 18.3  PCP: WILBERTO Hurtado CNP  Readmission Risk Score: 21.5 ( )%  Patient Goals/Plan/Treatment Preferences: Home with wife. Denies needs, declines HH.  Wants [facility] if needs home O2.

## 2022-04-21 LAB
ANION GAP SERPL CALCULATED.3IONS-SCNC: 12 MEQ/L (ref 8–16)
AVERAGE GLUCOSE: 177 MG/DL (ref 70–126)
BASOPHILIA: ABNORMAL
BASOPHILS # BLD: 0.3 %
BASOPHILS ABSOLUTE: 0.1 THOU/MM3 (ref 0–0.1)
BUN BLDV-MCNC: 52 MG/DL (ref 7–22)
CALCIUM SERPL-MCNC: 8.3 MG/DL (ref 8.5–10.5)
CHLORIDE BLD-SCNC: 99 MEQ/L (ref 98–111)
CO2: 23 MEQ/L (ref 23–33)
CREAT SERPL-MCNC: 2.3 MG/DL (ref 0.4–1.2)
EOSINOPHIL # BLD: 2.9 %
EOSINOPHILS ABSOLUTE: 0.5 THOU/MM3 (ref 0–0.4)
ERYTHROCYTE [DISTWIDTH] IN BLOOD BY AUTOMATED COUNT: 14.9 % (ref 11.5–14.5)
ERYTHROCYTE [DISTWIDTH] IN BLOOD BY AUTOMATED COUNT: 52.6 FL (ref 35–45)
GFR SERPL CREATININE-BSD FRML MDRD: 27 ML/MIN/1.73M2
GLUCOSE BLD-MCNC: 142 MG/DL (ref 70–108)
GLUCOSE BLD-MCNC: 144 MG/DL (ref 70–108)
GLUCOSE BLD-MCNC: 165 MG/DL (ref 70–108)
GLUCOSE BLD-MCNC: 207 MG/DL (ref 70–108)
GLUCOSE BLD-MCNC: 258 MG/DL (ref 70–108)
GRAM STAIN RESULT: ABNORMAL
HBA1C MFR BLD: 7.9 % (ref 4.4–6.4)
HCT VFR BLD CALC: 36.7 % (ref 42–52)
HEMOGLOBIN: 12.1 GM/DL (ref 14–18)
IMMATURE GRANS (ABS): 0.54 THOU/MM3 (ref 0–0.07)
IMMATURE GRANULOCYTES: 2.9 %
INR BLD: 2.21 (ref 0.85–1.13)
LYMPHOCYTES # BLD: 4.4 %
LYMPHOCYTES ABSOLUTE: 0.8 THOU/MM3 (ref 1–4.8)
MCH RBC QN AUTO: 31.7 PG (ref 26–33)
MCHC RBC AUTO-ENTMCNC: 33 GM/DL (ref 32.2–35.5)
MCV RBC AUTO: 96.1 FL (ref 80–94)
MONOCYTES # BLD: 5.7 %
MONOCYTES ABSOLUTE: 1.1 THOU/MM3 (ref 0.4–1.3)
NUCLEATED RED BLOOD CELLS: 0 /100 WBC
ORGANISM: ABNORMAL
PLATELET # BLD: 167 THOU/MM3 (ref 130–400)
PLATELET ESTIMATE: ADEQUATE
PMV BLD AUTO: 10.3 FL (ref 9.4–12.4)
POTASSIUM SERPL-SCNC: 4.4 MEQ/L (ref 3.5–5.2)
RBC # BLD: 3.82 MILL/MM3 (ref 4.7–6.1)
RESPIRATORY CULTURE: ABNORMAL
RESPIRATORY CULTURE: ABNORMAL
SCAN OF BLOOD SMEAR: NORMAL
SEG NEUTROPHILS: 83.8 %
SEGMENTED NEUTROPHILS ABSOLUTE COUNT: 15.5 THOU/MM3 (ref 1.8–7.7)
SODIUM BLD-SCNC: 134 MEQ/L (ref 135–145)
WBC # BLD: 18.5 THOU/MM3 (ref 4.8–10.8)

## 2022-04-21 PROCEDURE — 94640 AIRWAY INHALATION TREATMENT: CPT

## 2022-04-21 PROCEDURE — 6360000002 HC RX W HCPCS

## 2022-04-21 PROCEDURE — 2580000003 HC RX 258

## 2022-04-21 PROCEDURE — 2700000000 HC OXYGEN THERAPY PER DAY

## 2022-04-21 PROCEDURE — 6370000000 HC RX 637 (ALT 250 FOR IP)

## 2022-04-21 PROCEDURE — 85025 COMPLETE CBC W/AUTO DIFF WBC: CPT

## 2022-04-21 PROCEDURE — 94761 N-INVAS EAR/PLS OXIMETRY MLT: CPT

## 2022-04-21 PROCEDURE — 97116 GAIT TRAINING THERAPY: CPT

## 2022-04-21 PROCEDURE — 82948 REAGENT STRIP/BLOOD GLUCOSE: CPT

## 2022-04-21 PROCEDURE — 85610 PROTHROMBIN TIME: CPT

## 2022-04-21 PROCEDURE — 6360000002 HC RX W HCPCS: Performed by: HOSPITALIST

## 2022-04-21 PROCEDURE — 36415 COLL VENOUS BLD VENIPUNCTURE: CPT

## 2022-04-21 PROCEDURE — 6370000000 HC RX 637 (ALT 250 FOR IP): Performed by: PHYSICIAN ASSISTANT

## 2022-04-21 PROCEDURE — 97535 SELF CARE MNGMENT TRAINING: CPT

## 2022-04-21 PROCEDURE — 80048 BASIC METABOLIC PNL TOTAL CA: CPT

## 2022-04-21 PROCEDURE — 99232 SBSQ HOSP IP/OBS MODERATE 35: CPT

## 2022-04-21 PROCEDURE — 1200000003 HC TELEMETRY R&B

## 2022-04-21 PROCEDURE — 2500000003 HC RX 250 WO HCPCS

## 2022-04-21 PROCEDURE — 97530 THERAPEUTIC ACTIVITIES: CPT

## 2022-04-21 RX ORDER — WARFARIN SODIUM 1 MG/1
1 TABLET ORAL
Status: COMPLETED | OUTPATIENT
Start: 2022-04-21 | End: 2022-04-21

## 2022-04-21 RX ADMIN — GUAIFENESIN AND CODEINE PHOSPHATE 5 ML: 100; 10 SOLUTION ORAL at 22:56

## 2022-04-21 RX ADMIN — Medication 1000 UNITS: at 08:58

## 2022-04-21 RX ADMIN — LEVALBUTEROL HYDROCHLORIDE 1.25 MG: 1.25 SOLUTION RESPIRATORY (INHALATION) at 16:25

## 2022-04-21 RX ADMIN — FLUTICASONE PROPIONATE 1 SPRAY: 50 SPRAY, METERED NASAL at 09:01

## 2022-04-21 RX ADMIN — LOSARTAN POTASSIUM 25 MG: 25 TABLET, FILM COATED ORAL at 21:09

## 2022-04-21 RX ADMIN — TIOTROPIUM BROMIDE INHALATION SPRAY 2 PUFF: 3.12 SPRAY, METERED RESPIRATORY (INHALATION) at 12:25

## 2022-04-21 RX ADMIN — INSULIN LISPRO 1 UNITS: 100 INJECTION, SOLUTION INTRAVENOUS; SUBCUTANEOUS at 18:44

## 2022-04-21 RX ADMIN — SODIUM CHLORIDE, PRESERVATIVE FREE 10 ML: 5 INJECTION INTRAVENOUS at 21:09

## 2022-04-21 RX ADMIN — CEFTRIAXONE SODIUM 1000 MG: 10 INJECTION, POWDER, FOR SOLUTION INTRAVENOUS at 11:55

## 2022-04-21 RX ADMIN — LEVALBUTEROL HYDROCHLORIDE 1.25 MG: 1.25 SOLUTION RESPIRATORY (INHALATION) at 12:23

## 2022-04-21 RX ADMIN — GUAIFENESIN AND CODEINE PHOSPHATE 5 ML: 100; 10 SOLUTION ORAL at 10:11

## 2022-04-21 RX ADMIN — WARFARIN SODIUM 1 MG: 1 TABLET ORAL at 18:45

## 2022-04-21 RX ADMIN — METOPROLOL SUCCINATE 100 MG: 100 TABLET, FILM COATED, EXTENDED RELEASE ORAL at 08:58

## 2022-04-21 RX ADMIN — GUAIFENESIN 600 MG: 600 TABLET, EXTENDED RELEASE ORAL at 21:09

## 2022-04-21 RX ADMIN — MOMETASONE FUROATE AND FORMOTEROL FUMARATE DIHYDRATE 2 PUFF: 200; 5 AEROSOL RESPIRATORY (INHALATION) at 08:22

## 2022-04-21 RX ADMIN — ATORVASTATIN CALCIUM 80 MG: 80 TABLET, FILM COATED ORAL at 21:09

## 2022-04-21 RX ADMIN — Medication 1000 MCG: at 08:59

## 2022-04-21 RX ADMIN — LEVALBUTEROL HYDROCHLORIDE 1.25 MG: 1.25 SOLUTION RESPIRATORY (INHALATION) at 08:22

## 2022-04-21 RX ADMIN — OXYCODONE HYDROCHLORIDE AND ACETAMINOPHEN 500 MG: 500 TABLET ORAL at 08:58

## 2022-04-21 RX ADMIN — MOMETASONE FUROATE AND FORMOTEROL FUMARATE DIHYDRATE 2 PUFF: 200; 5 AEROSOL RESPIRATORY (INHALATION) at 20:53

## 2022-04-21 RX ADMIN — LEVALBUTEROL HYDROCHLORIDE 1.25 MG: 1.25 SOLUTION RESPIRATORY (INHALATION) at 20:51

## 2022-04-21 RX ADMIN — GUAIFENESIN 600 MG: 600 TABLET, EXTENDED RELEASE ORAL at 08:58

## 2022-04-21 RX ADMIN — INSULIN LISPRO 2 UNITS: 100 INJECTION, SOLUTION INTRAVENOUS; SUBCUTANEOUS at 21:10

## 2022-04-21 RX ADMIN — CETIRIZINE HYDROCHLORIDE 5 MG: 10 TABLET, FILM COATED ORAL at 08:59

## 2022-04-21 RX ADMIN — INSULIN LISPRO 2 UNITS: 100 INJECTION, SOLUTION INTRAVENOUS; SUBCUTANEOUS at 11:45

## 2022-04-21 ASSESSMENT — PAIN SCALES - GENERAL
PAINLEVEL_OUTOF10: 0
PAINLEVEL_OUTOF10: 0

## 2022-04-21 NOTE — PLAN OF CARE
Problem: Respiratory - Adult  Goal: Clear lung sounds  Description: Clear lung sounds  Outcome: Progressing  Note: Cont aerosol to improve aeration and cough effectiveness, mdis for home med maintenance

## 2022-04-21 NOTE — PROGRESS NOTES
Clinical Pharmacy Note    Warfarin consult follow-up    Recent Labs     04/21/22  0448   INR 2.21*     Recent Labs     04/19/22  0526 04/20/22  0600 04/21/22  0448   HGB 13.0* 12.8* 12.1*   HCT 39.4* 39.3* 36.7*    182 167     Significant Drug-Drug Interactions:  New warfarin drug-drug interactions: none  Discontinued drug-drug interactions: none  Current warfarin drug-drug interactions: ascorbic acid     Date INR Warfarin Dose   04/13/22 2.76 1 mg     04/14/22  2.47  2.5 mg    04/15/22  2.39  2.5 mg   04/16/22   3.44  No warfarin     4/17/22 3.25  No warfarin     04/18/22  2.16  2.5 mg   4/19/22  1.86  4 mg     04/20/22  1.80 3 mg     04/21/22  2.21 1 mg                     Notes:                   PT/INR or POC-INR will be monitored routinely until therapeutic INR is achieved.       Ovidio Hunter, PharmD  4/21/2022  8:20 AM

## 2022-04-21 NOTE — PROGRESS NOTES
[facility]  INPATIENT PHYSICAL THERAPY  DAILY NOTE  [facility] ONC MED 5K - [id number]-[id number]    Time In: 1034  Time Out: 1058  Timed Code Treatment Minutes: 24 Minutes  Minutes: 24          Date: 2022  Patient Name: Tr Carcamo,  Gender:  male        MRN: [id number]  : 1940  (79 y.o.)     Referring Practitioner: Ezequiel Trimble MD  Diagnosis: shortness of breath  Additional Pertinent Hx: Merline Doing is a 78 y/o  male with a PMHx of COPD, HFpEF, HTN, Hx of CVA, CAD, S/p TAVR, persistent A. Fib on warfarin who presents to [facility] ED today for the evaluation of cough and shortness of breath that started last evening. The patient states he took a nebulizer treatment at home for shortness of breath before bed, but has had worsening SOB and cough all night. The cough is chronic, but significantly worsened and consistently productive of yellow-green sputum. Otherwise, the patient has also noted associated worsening SOB for the last few days with activity. He states his shortness of breath resolves with rest. He denies associated chest pain, lightheadedness, dizziness, abdominal pain, fever, chills, worsening LE edema. He states he has not noticed an unexpected weight change, and other wise states he feels well overall.      Prior Level of Function:  Lives With: Significant other  Type of Home: Trailer  Home Layout: One level  Home Access: Stairs to enter with rails  Entrance Stairs - Number of Steps: 5  Entrance Stairs - Rails: Both  Home Equipment: Rolling walker   Bathroom Shower/Tub: Tub/Shower unit  Bathroom Toilet: Standard  Bathroom Accessibility: Accessible    ADL Assistance: Independent  Homemaking Assistance: Independent  Ambulation Assistance: Independent (no AD)  Transfer Assistance: Independent  Active : Yes    Restrictions/Precautions:  Restrictions/Precautions: General Precautions,Fall Risk  Position Activity Restriction  Other position/activity restrictions: monitor O2 SUBJECTIVE: RN approved session. Patient laying in bed upon arrival and agreeable to therapy. Patient's wife present in room but did not observe session. PAIN: denies    Vitals: Oxygen: on 2L nasal cannula, O2 sats maintained 88-91% with ambulation    OBJECTIVE:  Bed Mobility:  Supine to Sit: Stand By Assistance, with head of bed raised, with rail, with increased time for completion  Scooting: Stand By Assistance    Transfers:  Sit to Stand: Stand By Assistance, [employer], X 1  Stand to Sit:Stand By Assistance    Ambulation:  Stand By Assistance, [employer], X 1  Distance: ~200ft with few short standing rest breaks to catch breath and monitor O2 sats  Surface: Level Tile  Device:No Device  Gait Deviations: Forward Flexed Posture, Slow Krystle, Decreased Step Length Bilaterally, Decreased Arm Swing, Decreased Gait Speed and Decreased Heel Strike Bilaterally    Balance:  Static Standing Balance: Stand By Assistance    Exercise:  Patient was guided in 1 set(s) 10 reps of exercise to both lower extremities. Seated marches, Seated heel/toe raises and Long arc quads. Exercises were completed for increased independence with functional mobility. Functional Outcome Measures: Completed  AM-PAC Inpatient Mobility Raw Score : 18  AM-PAC Inpatient T-Scale Score : 43.63    ASSESSMENT:  Assessment: Patient progressing toward established goals. Activity Tolerance:  Patient tolerance of  treatment: good.       Equipment Recommendations:Equipment Needed: No  Discharge Recommendations: Continue to assess pending progress and Patient would benefit from continued PT at discharge  Plan: Times per week: 3-5x GM  Times per day: Daily  Current Treatment Recommendations: Strengthening,Gait Training,Stair training,Functional Mobility Training  Plan:  (3-5xGM)    Patient Education  Patient Education: Plan of Care, Precautions/Restrictions, Bed Mobility, Transfers, Gait, Up in Chair for All Meals, Verbal Exercise Instruction    Goals:  Patient goals : none stated  Short Term Goals  Time Frame for Short term goals: by discharge  Short term goal 1: bed mobility with HOB flat, no rails, mod I for increased functional ind  Short term goal 2: sit<>stand from various surfaces with LRD mod I for safe transfers  Short term goal 3: ambulate 200' wiht LRD mod I and good O2 saturations for safe household/community distances  Short term goal 4: Navigate 5 steps with LRD mod I for safe household distances  Long Term Goals  Time Frame for Long term goals : NA d/t short ELOS    Following session, patient left in safe position with all fall risk precautions in place.

## 2022-04-21 NOTE — PROGRESS NOTES
[address] [facility]  Occupational Therapy  Daily Note  Time:   Time In:   Time Out: 6262  Timed Code Treatment Minutes: 23 Minutes  Minutes: 23          Date: 2022  Patient Name: Laure Crandall,   Gender: male      Room: [facility]26/026-A  MRN: [id number]  : 1940  (79 y.o.)  Referring Practitioner: Ashly Armenta MD  Diagnosis: Shortness of breath  Additional Pertinent Hx: Karan Lyn is a 78 y/o  male with a PMHx of COPD, HFpEF, HTN, Hx of CVA, CAD, S/p TAVR, persistent A. Fib on warfarin who presents to [facility] ED today for the evaluation of cough and shortness of breath that started last evening. The patient states he took a nebulizer treatment at home for shortness of breath before bed, but has had worsening SOB and cough all night. The cough is chronic, but significantly worsened and consistently productive of yellow-green sputum. Otherwise, the patient has also noted associated worsening SOB for the last few days with activity. He states his shortness of breath resolves with rest. He denies associated chest pain, lightheadedness, dizziness, abdominal pain, fever, chills, worsening LE edema. He states he has not noticed an unexpected weight change, and other wise states he feels well overall. Restrictions/Precautions:  Restrictions/Precautions: General Precautions,Fall Risk  Position Activity Restriction  Other position/activity restrictions: monitor O2     SUBJECTIVE: Pt friendly and agreeable to Tx. Pt supine in bed upon arrival. Pt demo'd SOB/labored breathing throughout tx. Pt educated on deep breathing technique and vc's to breath through nose, not mouth      PAIN: No pain reported     Vitals: Oxygen: SpO2 on 2L at start of Tx 92% SpO2 on 2L at end of Tx 90/91% on 2L of O2  Heart Rate: HR at begining of Tx 66BPM and end of TX 76 BPM    COGNITION: WNL    ADL:   Toileting: Stand By Assistance.   Pt demo'd clothing manga and standing HH urinal during toileting  Toilet Transfer: Stand By Assistance. Pt demo'd good t/f to and from toilet with use of grab bar . BALANCE:  Sitting Balance:  Stand By Assistance. Standing Balance: Stand By Assistance. Pt demo'd good standing balance static and dynamic. Demo'd 0 LOB    BED MOBILITY:  Supine to Sit: Supervision      TRANSFERS:  Sit to Stand:  Stand By Assistance, [employer]. Stand to Sit: Stand By Assistance, [employer]. FUNCTIONAL MOBILITY:  Assistive Device: None  Assist Level:  Stand By Assistance and Contact Guard Assistance. Distance: To and from bathroom  Pt marco'freddy tubing manag and good safety awareness. ASSESSMENT:     Activity Tolerance:  Patient tolerance of  treatment: fair. Discharge Recommendations: Home with Home Health OT  Equipment Recommendations: Equipment Needed: No  Plan: Times per Week: 3-5x  Current Treatment Recommendations: Strengthening,Functional Mobility Training,Endurance Training,Balance Training,Safety Education & Training,Self-Care / ADL,Patient/Caregiver Education & Training,Home Management Training,Equipment Evaluation, Education, & procurement    Patient Education  Patient Education: Role of OT, Plan of Care, ADL's and Home Safety     Goals  Short Term Goals  Time Frame for Short term goals: Until discharge  Short Term Goal 1: Pt will complete BUE strengthening exercises with min vcs for technique to increase indep and endurance with self cares. Short Term Goal 2: Pt will complete standing task x 4 minutes with SBA and min vcs for safety while O2 stats remain above 90% to increase endurance with grooming . Short Term Goal 3: Pt will complete functional mobility to/from BR and HH distaces with S while O2 stats remain above 90%. Short Term Goal 4: Pt will complete BADL routine with S and EC techniques to increase endurance in home environment. Following session, patient left in safe position with all fall risk precautions in place.

## 2022-04-21 NOTE — CARE COORDINATION
4/21/22, 2:54 PM EDT    DISCHARGE ON [address] day: 8  Location: -26/026-A Reason for admit: Shortness of breath [R06.02]  Chronic kidney disease (CKD), stage IV (severe) (HCC) [N18.4]  COPD exacerbation ([facility] [J44.1]  Troponin level elevated [R77.8]  Current use of long term anticoagulation [Z79.01]  Acute hypoxemic respiratory failure (HCC) [J96.01]  Acute on chronic congestive heart failure, unspecified heart failure type ([facility] [I50.9]   Procedure:   4/13 CXR: Faint bibasilar interstitial opacities. This can represent mild pulmonary edema  4/13 EKG: Atrial fibrillation, Left bundle branch block  4/13 ECHO: EF 45-50%, s/p TAVR  4/19 CT Chest: Patchy multifocal bilateral upper and lower lobe groundglass opacities are consistent pneumonia in the appropriate clinical setting. Close clinical and imaging follow-up to ensure resolution is advised; Stable chronic findings are discussed   4/20 EKG: Atrial fibrillation with rapid ventricular response    Barriers to Discharge: Patient was in Afib 130's when this CM was on 5K this morning. Message sent to attending asking if may consider Cardiology consult. Attending responded stating toprol xl dose increased today and if does not consistently stay with rate , will consult and start diltiazem. Afebrile. Afib 110's. Sats 92% on 2L O2. Ox4. PT/OT. Telemetry, IS, acapella, SCDs. Lipitor, zyrtec, flonase, mucinex, SSI, nebs, cozaar, toprol xl, inhaler, coumadin- pharmacy dosing. IV rocephin stopped. Na+ 134, BUN 52, Creat down to 2.3, wbc 18.5, hgb 12.1, INR 2.21. PCP: WILBERTO Coates - CNP  Readmission Risk Score: 22.6 ( )%  Patient Goals/Plan/Treatment Preferences: Home with wife. Denies needs, declines HH.  Wants [facility] if needs home O2.

## 2022-04-21 NOTE — PROGRESS NOTES
Hospitalist Progress Note    Patient:  James Isaacs      Unit/Bed:5K-26/026-A    YOB: 1940    MRN: [id number]       Acct: [de-identified]     PCP: WILBERTO Walker - CNP    Date of Admission: 4/13/2022    Assessment/Plan:    1. Acute on chronic hypoxic respiratory failure    - Satting 93% on 2L/NC. Does not normally wear oxygen at baseline. Continue to wean oxygen as tolerated.   -Likely secondary to #2, #3, #4   -CT chest (4/19) notable for patchy multifocal bilateral upper and lower lobe groundglass opacities consistent with pneumonia R>L   -Pulmonary toilet: Incentive spirometer, Acapella, cough, deep breathe, OOB with assistance    2. Acute on chronic systolic heart failure   -Echo (4/14) notable for EF estimated range of 45-50%, s/p TAVR with normal function, normal mitral valve, mild mitral regurg   - BNP elevated, however patient has chronically elevated BNP, could be in the setting of CKD. - Holding home Bumex given creatinine elevation   - Strict I&O's   - Daily weight   - Cardiac diet: 2 g Na restriction, 2L fluid restriction      3. Acute COPD exacerbation   - Received Prednisone 40 mg x's 5 days (4/13 - 4/17)   - S/p IV Solu medrol (4/13, 4/15)   - Continue Mucinex   - Continue scheduled and as needed bronchodilators   - Pulmonary toilet   - Patient had recent COPD also patient requiring hospitalization back in March 2022. Discussed with patient and his wife that he needs to follow-up with a pulmonologist as outpatient for further management of COPD including recurrent exacerbations. Patient stated that he was to follow-up with his PCP, and states \"Bailee says I do not need a pulmonologist so I'm not going to see one. \"  We will continue to encourage patient and his wife to see pulmonologist as outpatient upon discharge from hospital.    4. Community-acquired pneumonia secondary to rhinovirus enterovirus   - Received 500 mg IV azithromycin 4/15 - 4/19   - Continue empiric ceftriaxone (initiated 4/15)    - Molecular pneumonia panel (4/19) notable for rhinovirus enterovirus   -Sputum culture showing normal keena   - Symptomatic support, pulmonary toilet    5. Essential hypertension, controlled   -Continue Cozaar    6. Chronic Persistent atrial fibrillation, with pacemaker   - Was in afib with controlled rate  bpm this morning   - S/p 1 time dose IV metoprolol 4/19     -[id number] score 8   - Increase home metoprolol XL to 100 mg from 50 mg    - Continue Coumadin, goal INR range 2-3 -> pharmacy consulted, appreciate recs   -Continuous telemetry    7. Aortic stenosis status post TAVR 5/11/2018   -Noted. On warfarin, goal INR of 2-3   -Pharmacy consulted to help in dosage and management of warfarin therapy    8. 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 CAD   - S/p PCI 2002   - ECG (4/20) notable for atrial fibrillation with RVR, left bundle branch block with T wave inversions in lateral leads, no new ischemic changes   -Continue home losartan, Lipitor, warfarin, Toprol    10. HLD   - Statin    11. T2DM   -Normally takes metformin at home. Hold metformin given #8    -Hemoglobin A1c 7.9 (4/20)   - Continue low dose SSI   - Accu-Cheks   - Hypoglycemia protocol   - Diabetic diet   - Recommend f/u with PCP 1-2 wks after d/c    12. Chronic macrocytic anemia   -H&H stable. Transfuse for hemoglobin < 7 g/dL   - Folic acid (3/85) normal, vitamin B12 low   - Continue oral cyanocobalamin     13. History of CVA:    -Noted. States he has no deficits at baseline. Continue blood pressure control, glucose control of diabetes and statin therapy. 14. Obesity   -BMI 32.93 kg/m²   -Discussed and educated on lifestyle modifications  15.        Expected discharge date: Pending clinical course    Disposition:    [x] Home       [] TCU       [] Rehab       [] Psych       [] SNF       [] Adriel       [] Other-    Chief Complaint: Cough and shortness of breath    Hospital Course: Per HPI documented 4/13/2022: Miguel Hull is a 80 y/o  male with a PMHx of COPD, HFpEF, HTN, Hx of CVA, CAD, S/p TAVR, persistent A. Fib on warfarin who presents to [facility] ED today for the evaluation of cough and shortness of breath that started last evening. The patient states he took a nebulizer treatment at home for shortness of breath before bed, but has had worsening SOB and cough all night. The cough is chronic, but significantly worsened and consistently productive of yellow-green sputum. Otherwise, the patient has also noted associated worsening SOB for the last few days with activity. He states his shortness of breath resolves with rest. He denies associated chest pain, lightheadedness, dizziness, abdominal pain, fever, chills, worsening LE edema. He states he has not noticed an unexpected weight change, and other wise states he feels well overall. \"    4/20/22: Patient resting in bed, no apparent distress. Chronically ill-appearing. Patient is currently satting 91% on 2 L nasal cannula. Patient does not reportedly wear oxygen at baseline. Patient noted to be in atrial fibrillation RVR with rate of 112-142 bpm.  Patient is asymptomatic. Blood pressures remain hemodynamically stable. He remains afebrile. Ordered to give home medication 50 mg metoprolol XR, and one-time dose IV push metoprolol 5 mg. We will increase patient's oral metoprolol to 100 mg starting tomorrow. Creatinine elevated to 2.5 today after starting Bumex. We will hold his Bumex. Continue breathing treatments. Continue to wean oxygen. Of note, PT/OT evaluated patient and is recommending home health assistance for continued PT/OT. Discussed this with patient and his wife. Patient and his wife are refusing home health assistance at this time.     4/21/22: Patient resting in bed, satting 93% on 2 L nasal cannula, remains afebrile with hemodynamically stable vital signs. Patient heart rate noted to be in atrial fibrillation with left bundle branch block with a rate controlled at  bpm.  I have increased his Toprol  mg which she will receive his first dose today. If patient remains in atrial fibrillation with RVR will consult cardiology and start diltiazem. Patient's creatinine continues to improve. We will hold his Bumex for now. Repeat home O2 eval to evaluate need for home oxygen upon discharge. I discussed with patient and his wife at length about home health assistance and patient is refusing this at this time. I also discussed with patient needing to follow-up with pulmonology outpatient given his recurrent COPD exacerbations with his most recent one being in March of this year. Patient stated that he was to follow-up with his PCP, and states \"Bailee says I do not need a pulmonologist so I'm not going to see one. \"  We will continue to encourage patient and his wife to see pulmonologist as outpatient upon discharge from hospital.      Subjective (past 24 hours):     Denies chest pain, palpitations, inspiratory chest pain, abdominal pain, nausea, vomiting, fever, chills. Reports some shortness of breath while at rest, and shortness of breath with exertion. States that he still having a productive cough with \"whitish to yellow\" mucus. Reports he is using his Acapella and incentive spirometer regularly.       Medications:  Reviewed    Infusion Medications    sodium chloride 250 mL/hr at 04/17/22 1329    dextrose       Scheduled Medications    cefTRIAXone (ROCEPHIN) IV  1,000 mg IntraVENous Q24H    metoprolol succinate  100 mg Oral Daily    metoprolol  5 mg IntraVENous Once    vitamin B-12  1,000 mcg Oral Daily    insulin lispro  0-3 Units SubCUTAneous Nightly    insulin lispro  0-6 Units SubCUTAneous TID WC    [Held by provider] bumetanide  1 mg Oral Daily    fluticasone  1 spray Each Nostril Daily  levalbuterol  1.25 mg Nebulization Q4H WA    ascorbic acid  500 mg Oral Daily    atorvastatin  80 mg Oral Nightly    cetirizine  5 mg Oral Daily    losartan  25 mg Oral Nightly    Vitamin D  1,000 Units Oral Daily    sodium chloride flush  10 mL IntraVENous 2 times per day    guaiFENesin  600 mg Oral BID    warfarin placeholder: dosing by pharmacy   Other RX Placeholder    mometasone-formoterol  2 puff Inhalation BID    tiotropium  2 puff Inhalation Daily     PRN Meds: ondansetron **OR** ondansetron, guaiFENesin-codeine, albuterol sulfate HFA, sodium chloride flush, sodium chloride, polyethylene glycol, acetaminophen **OR** acetaminophen, glucagon (rDNA), dextrose, dextrose bolus (hypoglycemia) **OR** dextrose bolus (hypoglycemia), glucose **OR** glucose, benzocaine-menthol      Intake/Output Summary (Last 24 hours) at 4/21/2022 0194  Last data filed at 4/21/2022 0516  Gross per 24 hour   Intake 280 ml   Output 1600 ml   Net -1320 ml       Diet:  ADULT DIET; Regular; 4 carb choices (60 gm/meal); Low Sodium (2 gm); 2000 ml    Exam:  BP (!) 161/97   Pulse 100   Temp 98.9 °F (37.2 °C) (Oral)   Resp 18   Ht 5' 5.98\" (1.676 m)   Wt 201 lb 11.2 oz (91.5 kg)   SpO2 93%   BMI 32.57 kg/m²     General appearance: No apparent distress, appears older than stated age and cooperative. Chronically ill-appearing  HEENT: Pupils equal, round, and reactive to light. Conjunctivae/corneas clear. Neck: Supple, with full range of motion. No jugular venous distention. Trachea midline. Respiratory:  Normal respiratory effort, able to speak full clear sentences. Clear diminished to auscultation, bilaterally with scattered rhonchi. No wheezes/rales  Cardiovascular:  Irregular rate and irregular rhythm with normal S1/S2 without murmurs, rubs or gallops. +1-2 RLE ankle edema, no edema LLE. Abdomen: Soft, non-tender, non-distended with normal bowel sounds.   Musculoskeletal: passive and active ROM x 4 extremities. Skin: Skin color, texture, turgor normal.  No rashes or lesions. Neurologic:  Neurovascularly intact without any focal sensory/motor deficits. Cranial nerves: II-XII intact, grossly non-focal.  Psychiatric: Alert and oriented, thought content appropriate, normal insight  Capillary Refill: Brisk,< 3 seconds   Peripheral Pulses: +2 palpable, equal bilaterally       Labs:   Recent Labs     04/19/22  0526 04/20/22  0600 04/21/22  0448   WBC 15.2* 18.3* 18.5*   HGB 13.0* 12.8* 12.1*   HCT 39.4* 39.3* 36.7*    182 167     Recent Labs     04/19/22 0526 04/20/22  0600 04/21/22  0448   * 131* 134*   K 4.3 4.7 4.4   CL 98 97* 99   CO2 21* 22* 23   BUN 54* 58* 52*   CREATININE 2.2* 2.5* 2.3*   CALCIUM 8.6 8.7 8.3*     No results for input(s): AST, ALT, BILIDIR, BILITOT, ALKPHOS in the last 72 hours. Recent Labs     04/19/22  0526 04/20/22  0600 04/21/22  0448   INR 1.86* 1.80* 2.21*     No results for input(s): CKTOTAL, TROPONINI in the last 72 hours.     Microbiology:    Molecular pneumonia panel positive for rhinovirus enterovirus  Sputum culture showing normal keena    Urinalysis:      Lab Results   Component Value Date    NITRU NEGATIVE 07/03/2021    WBCUA NONE SEEN 06/10/2021    BACTERIA NONE SEEN 06/10/2021    RBCUA NONE SEEN 06/10/2021    BLOODU NEGATIVE 07/03/2021    SPECGRAV 1.006 06/10/2021    GLUCOSEU NEGATIVE 07/03/2021       Radiology:  Echocardiogram limited    Result Date: 4/14/2022  Transthoracic Echocardiography Report (TTE)  Demographics   Patient Name  Rachael Jiang Gender            Male                L   MR #          [id number]      Race                                              Ethnicity   Account #     [de-identified]      Room Number       3809   Accession     [id number]     Date of Study     04/14/2022  Number   Date of Birth 1940     Referring         Antoinette Hodgkins CNP                               Physician         Tae Man PA-C   Age 81 year(s)     Sonographer       BRO Casper, RDCS,                                                 RDMS, RVT                                Interpreting      Nicki Mandujano MD                               Physician  Procedure Type of Study   TTE procedure:ECHOCARDIOGRAM LIMITED. Procedure Date Date: 04/14/2022 Start: 06:37 AM Study Location: Bedside Technical Quality: Limited visualization due to body habitus. Indications:Cardiomyopathy, Post TAVR and Shortness of breath. Additional Medical History:diabetes, pneumonia, hyperlipidemia, acute hypoxic respiratory failure, chronic obstructive pulmonary disease, hypertenison, atrial fibrillation, pacemaker, chronic kidney disease, coronary artery disease, anemia, history of cerebrovascular accident, TAVR, shortness of breath, stent, congestive heart failure, cardiomyopathy Patient Status: Routine Height: 65 inches Weight: 204 pounds BSA: 1.99 m^2 BMI: 33.95 kg/m^2 BP: 127/75 mmHg Allergies   - See Epic. Conclusions   Summary  Technically difficult examination. This is a suboptimal study due to poor echocardiographic window. Left ventricle size is normal.  Normal left ventricular wall thickness. Ejection fraction is visually estimated in the range of 45% to 50%. Unable to determine wall motion abnormalities due to poor image quality. s/p TAVAR WITH NORMAL FUNCTION  Structurally normal mitral valve. Mitral annular calcification is present. Normal mobility of both mitral valve leaflets. No evidence of mitral valve stenosis. Mild mitral regurgitation is present. Signature   ----------------------------------------------------------------  Electronically signed by Nicki Mandujano MD (Interpreting  physician) on 04/14/2022 at 12:10 PM  ----------------------------------------------------------------   Findings   Mitral Valve  Structurally normal mitral valve. Mitral annular calcification is present. Normal mobility of both mitral valve leaflets.   No evidence of mitral valve stenosis. Mild mitral regurgitation is present. Aortic Valve  s/p TAVAR WITH NORMAL FUNCTION   Tricuspid Valve  Tricuspid valve is structurally normal.  No evidence of tricuspid stenosis. Mild tricuspid regurgitation visualized. Pulmonic Valve  The pulmonic valve was not well visualized . Left Atrium  Left atrium is of upper normal size. Left Ventricle  Left ventricle size is normal.  Normal left ventricular wall thickness. Ejection fraction is visually estimated in the range of 45% to 50%. Unable to determine wall motion abnormalities due to poor image quality. Right Atrium  The right atrium is of normal size. Right Ventricle  Mildly dilated right ventricle. Pericardial Effusion  No evidence of any pericardial effusion. Pleural Effusion  No evidence of pleural effusion. Aorta / Great Vessels  The aorta is within normal limits.   M-Mode/2D Measurements & Calculations   LV Diastolic      LV Systolic Dimension: 3 cm      LA Dimension: 3.9 cmLA  Dimension: 4.1 cm LV Volume Diastolic: 09.8 ml     Area: 20 cm^2  LV FS:26.8 %      LV Volume Systolic: 35 ml  LV PW Diastolic:  LV EDV/LV EDV Index: 74.2 ml/37  1.3 cm            m^2LV ESV/LV ESV Index: 35 AZ/47  Septum Diastolic: m^2                              RV Diastolic Dimension:  1.4 cm            EF Calculated: 52.8 %            3 cm                                                      Ascending Aorta: 3.3 cm                                                     LA volume/Index: 69.8                    LVOT: 2 cm                       ml /35m^2  Doppler Measurements & Calculations    AV Peak Velocity: 172 cm/s      LVOT Peak Velocity: 58.7 cm/s   AV Peak Gradient: 11.83 mmHg    LVOT Mean Velocity: 41.4 cm/s   AV Mean Velocity: 115 cm/s      LVOT Peak Gradient: 1 mmHgLVOT Mean   AV Mean Gradient: 6 mmHg        Gradient: 1 mmHg   AV VTI: 37.5 cm   AV Area (Continuity):1.12 cm^2    LVOT VTI: 13.4 cm    AV DVI (VTI): 0.36AV DVI (Vmax):0.34  [email or web address] [id number]    XR CHEST PORTABLE    Result Date: 4/13/2022  PROCEDURE: XR CHEST PORTABLE CLINICAL INFORMATION: Cough shortness of breath history of COPD and CHF. COMPARISON: Chest x-ray 3/8/2022. TECHNIQUE: AP upright view of the chest. FINDINGS: There is a pacemaker. There is a replaced aortic valve. There is cardiac negative. The mediastinum is not widened. There are some faint interstitial opacities in the mid and lower lung zones. There are no definite pleural effusions. There is no pneumothorax. The pulmonary vascularity is normal.     Faint bibasilar interstitial opacities. This can represent mild pulmonary edema. **This report has been created using voice recognition software. It may contain minor errors which are inherent in voice recognition technology. ** Final report electronically signed by Dr. Kvng Starks on 4/13/2022 7:19 AM      DVT prophylaxis: [] Lovenox                                 [] SCDs                                 [] SQ Heparin                                 [x] Encourage ambulation           [x] Already on Anticoagulation     Code Status: Full Code    PT/OT Eval Status: Per OT note documented 4/20/2022: \"ASSESSMENT:  Activity Tolerance:  Patient tolerance of  treatment: fair.  Limited by decreased activity tolerance      Discharge Recommendations: Continue to assess pending progress and Home with Home Health OT  Equipment Recommendations: Equipment Needed: No  Plan: Times per Week: 3-5x  Current Treatment Recommendations: Strengthening,Functional Mobility Training,Endurance Training,Balance Training,Safety Education & Training,Self-Care / ADL,Patient/Caregiver Education & Training,Home Management Training,Equipment Evaluation, Education, & procurement\"    Per PT note documented 4/18/2022: \"ASSESSMENT:  Assessment: Patient progressing toward established goals. The patient tolerated session well, SBA for safety when ambulating longer distances. Sp02 staying at 96% with ambulation when on 2 L but did decrease to 91-92% after sitting and resting for short time. Would benefit from additional skilled PT to increase endurance for improved functional mobility. Activity Tolerance:  Patient tolerance of  treatment: good. Equipment Recommendations:Equipment Needed: No  Discharge Recommendations: Home with assist PRN, HH PT  Plan: Times per week: 3-5x GM  Times per day: Daily  Current Treatment Recommendations: Strengthening,Gait Training,Stair training,Functional Mobility Training\"      Tele:   [x] yes             [] no    Atrial fibrillation rate  bpm, with LBBB. No ectopy.     Active Hospital Problems    Diagnosis Date Noted    [facility] [J96.01] 04/13/2022       Electronically signed by WILBERTO Larsen CNP on 4/21/2022 at 6:42 AM

## 2022-04-21 NOTE — PLAN OF CARE
Problem: Impaired respiratory status  Goal: Clear lung sounds  4/20/2022 2015 by Clearance AROLDO Morrow  Outcome: Progressing   Breath sounds are diminished with rhonchi at this time. Continue with treatments as ordered.

## 2022-04-21 NOTE — PROGRESS NOTES
Type and Reason for Visit:    Initial,RD Nutrition Re-Screen/LOS     Nutrition Recommendations/Plan:   Continue current diet. Pt. Denies any questions on diet - follows pta. Nutrition Assessment:      Pt. At low nutrition risk per RD evaluation. Pt. Eating well - consuming % of meals. Tolerating diet well      Malnutrition Assessment:  No Malnutrition      Wounds:    None     Current Nutrition Therapies:    ADULT DIET; Regular; 4 carb choices (60 gm/meal); Low Sodium (2 gm); 2000 ml     Anthropometric Measures:  · Height:    5' 5.98\" (167.6 cm)  · Current Body Weight:   201 lb 11.2 oz (91.5 kg) (4/21 +1 edema)  · Admission Body Weight:    204 lb 9.6 oz (92.8 kg) (4/13 trace edema)  · Usual Body Weight:      (per pt. ~203#; per EMR: 2/7/22: 208# 9.6 oz, 3/11/22: 211# 6.4 oz)  · Ideal Body Weight:     142 lbs   · BMI:   32.6  · BMI Categories:    Obese Class 1 (BMI 30.0-34. 9)     Nutrition Diagnosis:   · No nutrition diagnosis at this time           Nutrition Interventions:   Food and/or Nutrient Delivery:    Continue Current Diet  Nutrition Education/Counseling:    Education initiated (4/21 Pt./wife deny any questions on diet - have been instructed on previous admission and report following pta.)  Coordination of Nutrition Care:   Continue to monitor while inpatient     Nutrition Monitoring and Evaluation:   Will monitor nutritional needs during LOS.        Discharge Planning:    RD following    Jose Goel RD, LD:    Contact Number: [phone]

## 2022-04-22 LAB
ALBUMIN SERPL-MCNC: 2.5 G/DL (ref 3.5–5.1)
ALP BLD-CCNC: 98 U/L (ref 38–126)
ALT SERPL-CCNC: 70 U/L (ref 11–66)
ANION GAP SERPL CALCULATED.3IONS-SCNC: 13 MEQ/L (ref 8–16)
AST SERPL-CCNC: 40 U/L (ref 5–40)
BACTERIA: ABNORMAL
BASOPHILS # BLD: 0.3 %
BASOPHILS ABSOLUTE: 0.1 THOU/MM3 (ref 0–0.1)
BILIRUB SERPL-MCNC: 0.8 MG/DL (ref 0.3–1.2)
BILIRUBIN DIRECT: 0.3 MG/DL (ref 0–0.3)
BILIRUBIN URINE: NEGATIVE
BLOOD, URINE: NEGATIVE
BUN BLDV-MCNC: 50 MG/DL (ref 7–22)
CALCIUM SERPL-MCNC: 8.5 MG/DL (ref 8.5–10.5)
CASTS: ABNORMAL /LPF
CASTS: ABNORMAL /LPF
CHARACTER, URINE: CLEAR
CHLORIDE BLD-SCNC: 99 MEQ/L (ref 98–111)
CO2: 20 MEQ/L (ref 23–33)
COLOR: YELLOW
CREAT SERPL-MCNC: 2 MG/DL (ref 0.4–1.2)
CRYSTALS: ABNORMAL
EOSINOPHIL # BLD: 3 %
EOSINOPHILS ABSOLUTE: 0.5 THOU/MM3 (ref 0–0.4)
EPITHELIAL CELLS, UA: ABNORMAL /HPF
ERYTHROCYTE [DISTWIDTH] IN BLOOD BY AUTOMATED COUNT: 14.9 % (ref 11.5–14.5)
ERYTHROCYTE [DISTWIDTH] IN BLOOD BY AUTOMATED COUNT: 53.2 FL (ref 35–45)
GFR SERPL CREATININE-BSD FRML MDRD: 32 ML/MIN/1.73M2
GLUCOSE BLD-MCNC: 119 MG/DL (ref 70–108)
GLUCOSE BLD-MCNC: 127 MG/DL (ref 70–108)
GLUCOSE BLD-MCNC: 129 MG/DL (ref 70–108)
GLUCOSE BLD-MCNC: 222 MG/DL (ref 70–108)
GLUCOSE BLD-MCNC: 250 MG/DL (ref 70–108)
GLUCOSE, URINE: NEGATIVE MG/DL
HCT VFR BLD CALC: 37.5 % (ref 42–52)
HEMOGLOBIN: 12.2 GM/DL (ref 14–18)
IMMATURE GRANS (ABS): 0.6 THOU/MM3 (ref 0–0.07)
IMMATURE GRANULOCYTES: 3.4 %
INR BLD: 2.8 (ref 0.85–1.13)
KETONES, URINE: NEGATIVE
LEUKOCYTE ESTERASE, URINE: NEGATIVE
LYMPHOCYTES # BLD: 5 %
LYMPHOCYTES ABSOLUTE: 0.9 THOU/MM3 (ref 1–4.8)
MCH RBC QN AUTO: 31.5 PG (ref 26–33)
MCHC RBC AUTO-ENTMCNC: 32.5 GM/DL (ref 32.2–35.5)
MCV RBC AUTO: 96.9 FL (ref 80–94)
MISCELLANEOUS LAB TEST RESULT: ABNORMAL
MONOCYTES # BLD: 5.2 %
MONOCYTES ABSOLUTE: 0.9 THOU/MM3 (ref 0.4–1.3)
MRSA SCREEN RT-PCR: POSITIVE
NITRITE, URINE: NEGATIVE
NUCLEATED RED BLOOD CELLS: 0 /100 WBC
PH UA: 5.5 (ref 5–9)
PLATELET # BLD: 159 THOU/MM3 (ref 130–400)
PLATELET ESTIMATE: ADEQUATE
PMV BLD AUTO: 10.6 FL (ref 9.4–12.4)
POTASSIUM SERPL-SCNC: 4.6 MEQ/L (ref 3.5–5.2)
PROCALCITONIN: 0.31 NG/ML (ref 0.01–0.09)
PROTEIN UA: ABNORMAL MG/DL
RBC # BLD: 3.87 MILL/MM3 (ref 4.7–6.1)
RBC URINE: ABNORMAL /HPF
RENAL EPITHELIAL, UA: ABNORMAL
SCAN OF BLOOD SMEAR: NORMAL
SEG NEUTROPHILS: 83.1 %
SEGMENTED NEUTROPHILS ABSOLUTE COUNT: 14.5 THOU/MM3 (ref 1.8–7.7)
SODIUM BLD-SCNC: 132 MEQ/L (ref 135–145)
SPECIFIC GRAVITY UA: 1.01 (ref 1–1.03)
TOTAL PROTEIN: 6.1 G/DL (ref 6.1–8)
UROBILINOGEN, URINE: 0.2 EU/DL (ref 0–1)
WBC # BLD: 17.5 THOU/MM3 (ref 4.8–10.8)
WBC UA: ABNORMAL /HPF
YEAST: ABNORMAL

## 2022-04-22 PROCEDURE — 1200000003 HC TELEMETRY R&B

## 2022-04-22 PROCEDURE — 82948 REAGENT STRIP/BLOOD GLUCOSE: CPT

## 2022-04-22 PROCEDURE — 99232 SBSQ HOSP IP/OBS MODERATE 35: CPT

## 2022-04-22 PROCEDURE — 84145 PROCALCITONIN (PCT): CPT

## 2022-04-22 PROCEDURE — 94640 AIRWAY INHALATION TREATMENT: CPT

## 2022-04-22 PROCEDURE — 85610 PROTHROMBIN TIME: CPT

## 2022-04-22 PROCEDURE — 6370000000 HC RX 637 (ALT 250 FOR IP)

## 2022-04-22 PROCEDURE — 80053 COMPREHEN METABOLIC PANEL: CPT

## 2022-04-22 PROCEDURE — 6360000002 HC RX W HCPCS: Performed by: HOSPITALIST

## 2022-04-22 PROCEDURE — 6370000000 HC RX 637 (ALT 250 FOR IP): Performed by: HOSPITALIST

## 2022-04-22 PROCEDURE — 85025 COMPLETE CBC W/AUTO DIFF WBC: CPT

## 2022-04-22 PROCEDURE — 6370000000 HC RX 637 (ALT 250 FOR IP): Performed by: PHYSICIAN ASSISTANT

## 2022-04-22 PROCEDURE — 2580000003 HC RX 258

## 2022-04-22 PROCEDURE — 2700000000 HC OXYGEN THERAPY PER DAY

## 2022-04-22 PROCEDURE — 94761 N-INVAS EAR/PLS OXIMETRY MLT: CPT

## 2022-04-22 PROCEDURE — 82248 BILIRUBIN DIRECT: CPT

## 2022-04-22 PROCEDURE — 94760 N-INVAS EAR/PLS OXIMETRY 1: CPT

## 2022-04-22 PROCEDURE — 81001 URINALYSIS AUTO W/SCOPE: CPT

## 2022-04-22 PROCEDURE — 87641 MR-STAPH DNA AMP PROBE: CPT

## 2022-04-22 PROCEDURE — 36415 COLL VENOUS BLD VENIPUNCTURE: CPT

## 2022-04-22 RX ORDER — WARFARIN SODIUM 1 MG/1
1 TABLET ORAL
Status: COMPLETED | OUTPATIENT
Start: 2022-04-22 | End: 2022-04-22

## 2022-04-22 RX ORDER — PROMETHAZINE HYDROCHLORIDE AND CODEINE PHOSPHATE 6.25; 1 MG/5ML; MG/5ML
5 SYRUP ORAL NIGHTLY PRN
Status: DISCONTINUED | OUTPATIENT
Start: 2022-04-22 | End: 2022-04-22

## 2022-04-22 RX ADMIN — METOPROLOL SUCCINATE 100 MG: 100 TABLET, FILM COATED, EXTENDED RELEASE ORAL at 09:14

## 2022-04-22 RX ADMIN — ACETAMINOPHEN 650 MG: 325 TABLET ORAL at 08:28

## 2022-04-22 RX ADMIN — GUAIFENESIN AND CODEINE PHOSPHATE 5 ML: 100; 10 SOLUTION ORAL at 22:10

## 2022-04-22 RX ADMIN — BUMETANIDE 1 MG: 1 TABLET ORAL at 09:16

## 2022-04-22 RX ADMIN — FLUTICASONE PROPIONATE 1 SPRAY: 50 SPRAY, METERED NASAL at 09:16

## 2022-04-22 RX ADMIN — CETIRIZINE HYDROCHLORIDE 5 MG: 10 TABLET, FILM COATED ORAL at 09:14

## 2022-04-22 RX ADMIN — MOMETASONE FUROATE AND FORMOTEROL FUMARATE DIHYDRATE 2 PUFF: 200; 5 AEROSOL RESPIRATORY (INHALATION) at 10:26

## 2022-04-22 RX ADMIN — OXYCODONE HYDROCHLORIDE AND ACETAMINOPHEN 500 MG: 500 TABLET ORAL at 09:15

## 2022-04-22 RX ADMIN — INSULIN LISPRO 2 UNITS: 100 INJECTION, SOLUTION INTRAVENOUS; SUBCUTANEOUS at 12:37

## 2022-04-22 RX ADMIN — GUAIFENESIN AND CODEINE PHOSPHATE 5 ML: 100; 10 SOLUTION ORAL at 15:19

## 2022-04-22 RX ADMIN — GUAIFENESIN 600 MG: 600 TABLET, EXTENDED RELEASE ORAL at 20:15

## 2022-04-22 RX ADMIN — Medication 1000 MCG: at 09:15

## 2022-04-22 RX ADMIN — LEVALBUTEROL HYDROCHLORIDE 1.25 MG: 1.25 SOLUTION RESPIRATORY (INHALATION) at 15:22

## 2022-04-22 RX ADMIN — ATORVASTATIN CALCIUM 80 MG: 80 TABLET, FILM COATED ORAL at 20:15

## 2022-04-22 RX ADMIN — LEVALBUTEROL HYDROCHLORIDE 1.25 MG: 1.25 SOLUTION RESPIRATORY (INHALATION) at 20:47

## 2022-04-22 RX ADMIN — LOSARTAN POTASSIUM 25 MG: 25 TABLET, FILM COATED ORAL at 20:15

## 2022-04-22 RX ADMIN — LEVALBUTEROL HYDROCHLORIDE 1.25 MG: 1.25 SOLUTION RESPIRATORY (INHALATION) at 10:26

## 2022-04-22 RX ADMIN — SODIUM CHLORIDE, PRESERVATIVE FREE 10 ML: 5 INJECTION INTRAVENOUS at 20:20

## 2022-04-22 RX ADMIN — WARFARIN SODIUM 1 MG: 1 TABLET ORAL at 17:44

## 2022-04-22 RX ADMIN — GUAIFENESIN AND CODEINE PHOSPHATE 5 ML: 100; 10 SOLUTION ORAL at 08:28

## 2022-04-22 RX ADMIN — INSULIN LISPRO 2 UNITS: 100 INJECTION, SOLUTION INTRAVENOUS; SUBCUTANEOUS at 20:14

## 2022-04-22 RX ADMIN — Medication 1000 UNITS: at 09:15

## 2022-04-22 RX ADMIN — TIOTROPIUM BROMIDE INHALATION SPRAY 2 PUFF: 3.12 SPRAY, METERED RESPIRATORY (INHALATION) at 10:26

## 2022-04-22 RX ADMIN — MOMETASONE FUROATE AND FORMOTEROL FUMARATE DIHYDRATE 2 PUFF: 200; 5 AEROSOL RESPIRATORY (INHALATION) at 20:47

## 2022-04-22 RX ADMIN — GUAIFENESIN 600 MG: 600 TABLET, EXTENDED RELEASE ORAL at 09:15

## 2022-04-22 ASSESSMENT — PAIN SCALES - GENERAL
PAINLEVEL_OUTOF10: 0
PAINLEVEL_OUTOF10: 3
PAINLEVEL_OUTOF10: 3

## 2022-04-22 ASSESSMENT — PAIN - FUNCTIONAL ASSESSMENT: PAIN_FUNCTIONAL_ASSESSMENT: ACTIVITIES ARE NOT PREVENTED

## 2022-04-22 ASSESSMENT — PAIN DESCRIPTION - ORIENTATION: ORIENTATION: MID

## 2022-04-22 ASSESSMENT — PAIN DESCRIPTION - DESCRIPTORS: DESCRIPTORS: ACHING

## 2022-04-22 ASSESSMENT — PAIN DESCRIPTION - LOCATION: LOCATION: ABDOMEN

## 2022-04-22 NOTE — PLAN OF CARE
Problem: Falls - Risk of:  Goal: Will remain free from falls  Description: Will remain free from falls  Outcome: Progressing  Fall assessment completed. Patient using call light appropriately to call for assistance with ambulation to bathroom. Personal items within reach. Patient is also compliant with use of non-skid slippers. Problem: Falls - Risk of:  Goal: Absence of physical injury  Description: Absence of physical injury  Outcome: Progressing  No falls noted this shift. Patient ambulates with x1 staff assistance without difficulty. Family member at bedside, spent the day. Bed kept in low position. Safe environment maintained. Bedside table & call light in reach. Uses call light appropriately when needing assistance. Problem: Infection:  Goal: Will remain free from infection  Description: Will remain free from infection  Outcome: Progressing  No s/s infection for shift. Problem: Safety:  Goal: Free from accidental physical injury  Description: Free from accidental physical injury  Outcome: Progressing     Patient free from injuries     Problem: Daily Care:  Goal: Daily care needs are met  Description: Daily care needs are met  Outcome: Progressing  Patient able to perform daily care. Problem: Pain:  Goal: Patient's pain/discomfort is manageable  Description: Patient's pain/discomfort is manageable  Outcome: Progressing  Patient states pain relief from PRN pain medications. Pain reassessed one hour post PRN pain medication given. Patient rates pain 3 on LILLIANA 0-10 scale. Problem: Skin Integrity:  Goal: Skin integrity will stabilize  Description: Skin integrity will stabilize  Outcome: Progressing  No skin breakdown this shift. Patient being assisted with turning. Patients states understanding of repositioning every two hours.       Problem: Discharge Planning:  Goal: Patients continuum of care needs are met  Description: Patients continuum of care needs are met  Outcome:

## 2022-04-22 NOTE — PLAN OF CARE
Problem: Respiratory - Adult  Goal: Clear lung sounds  Description: Clear lung sounds  4/21/2022 2054 by Rachael Clark RCP  Outcome: Progressing   Breath sounds are clear and diminished at this time. Continue with treatments as ordered.

## 2022-04-22 NOTE — PROGRESS NOTES
Clinical Pharmacy Note    Warfarin consult follow-up    Recent Labs     04/22/22  0442   INR 2.80*     Recent Labs     04/20/22  0600 04/21/22  0448 04/22/22  0442   HGB 12.8* 12.1* 12.2*   HCT 39.3* 36.7* 37.5*    167 159     Significant Drug-Drug Interactions:  New warfarin drug-drug interactions: none  Discontinued drug-drug interactions: none  Current warfarin drug-drug interactions: ascorbic acid     Date INR Warfarin Dose   04/13/22 2.76 1 mg     04/14/22  2.47  2.5 mg    04/15/22  2.39  2.5 mg   04/16/22   3.44  No warfarin     4/17/22 3.25  No warfarin     04/18/22  2.16  2.5 mg   4/19/22  1.86  4 mg     04/20/22  1.80 3 mg     04/21/22  2.21 1 mg   04/22/22   2.80 1 mg              Notes:                   PT/INR or POC-INR will be monitored routinely until therapeutic INR is achieved.     Leni Hidalgo, PharmD  4/22/2022  8:18 AM

## 2022-04-22 NOTE — PROGRESS NOTES
Hospitalist Progress Note    Patient:  Kelly Hernández      Unit/Bed:5K-26/026-A    YOB: 1940    MRN: [id number]       Acct: [de-identified]     PCP: WILBERTO Sanchez CNP    Date of Admission: 4/13/2022    Assessment/Plan:    1. Acute on chronic hypoxic respiratory failure    - Satting 93% on 2L/NC. Does not normally wear oxygen at baseline. Continue to wean oxygen as tolerated.   -Likely secondary to #2, #3, #4   - Home O2 eval ordered and pending result. -CT chest (4/19) notable for patchy multifocal bilateral upper and lower lobe groundglass opacities consistent with pneumonia R>L   -Pulmonary toilet: Incentive spirometer, Acapella, cough, deep breathe, OOB with assistance    2. Acute on chronic systolic heart failure   -Echo (4/14) notable for EF estimated range of 45-50%, s/p TAVR with normal function, normal mitral valve, mild mitral regurg   - BNP elevated, however patient has chronically elevated BNP, could be in the setting of CKD. - Restart home Bumex   - Strict I&O's   - Daily weight   - Cardiac diet: 2 g Na restriction, 2L fluid restriction      3. Acute COPD exacerbation   - Received Prednisone 40 mg x's 5 days (4/13 - 4/17)   - S/p IV Solu medrol (4/13, 4/15)   - Continue Mucinex   - Continue scheduled and as needed bronchodilators   - Pulmonary toilet   - Patient had recent COPD exacerbation requiring hospitalization back in March 2022. Discussed with patient and his wife that he needs to follow-up with a pulmonologist as outpatient for further management of COPD given recurrent exacerbations. Patient stated that he wants to follow-up with his PCP, and states \"Bailee says I do not need a pulmonologist so I'm not going to see one. \"  We will continue to encourage patient and his wife to see pulmonologist as outpatient upon discharge from hospital.    4. Community-acquired pneumonia secondary to rhinovirus enterovirus   - Received 500 mg IV azithromycin 4/15 - 4/19   - Received empiric ceftriaxone 4/15 - 4/21    - Molecular pneumonia panel (4/19) notable for rhinovirus enterovirus   -Sputum culture showing normal keena with moderate candida albicans growth -> patient is on ICS inhaler BID -> instructed and educated patient on need to rinse mouth out with water (without swallowing) after every dose. - Symptomatic support, pulmonary toilet    5. Essential hypertension, controlled   -Continue Cozaar    6. Chronic Persistent atrial fibrillation, with pacemaker   - Currently in afib with controlled rate   - S/p 1 time dose IV metoprolol 4/19     -RVO3AR2-LZBe score 8   - Continue metoprolol XL to 100 mg, increased from 50 mg    - Continue Coumadin, goal INR range 2-3 -> pharmacy consulted, appreciate recs   -Continuous telemetry    7. Aortic stenosis status post TAVR 5/11/2018   -Noted. On warfarin, goal INR of 2-3   -Pharmacy consulted to help in dosage and management of warfarin therapy    8. MILLY vs CKD, stage IV, improving   -Baseline creatinine ~ 1.7-2.3   -Creatinine 2.0 (4/22)   -Avoid nephrotoxic agents   - Daily lab, monitor    9. CAD   - S/p PCI 2002   -Continue home losartan, Lipitor, warfarin, Toprol    10. HLD   - Statin    11. T2DM   -Normally takes metformin at home. Hold metformin given #8    - Hemoglobin A1c 7.9 (4/20)   - Continue low dose SSI   - Accu-Cheks   - Hypoglycemia protocol   - Diabetic diet   - Recommend f/u with PCP 1-2 wks after d/c    12. Chronic macrocytic anemia   -H&H stable. Transfuse for hemoglobin < 7 g/dL   - Folic acid (0/26) normal, vitamin B12 low   - Continue oral cyanocobalamin     13. History of CVA:    -Noted. States he has no deficits at baseline. Continue blood pressure control, glucose control of diabetes and statin therapy. 14. Obesity   -BMI 32.93 kg/m²   -Discussed and educated on lifestyle modifications  15.        Expected discharge date: Hopefully tomorrow    Disposition:    [x] Home       [] TCU       [] Rehab       [] Psych       [] SNF       [] [place]       [] Other-    Chief Complaint: Cough and shortness of breath    Hospital Course: Per HPI documented 4/13/2022: Frank Hughes is a 80 y/o  male with a PMHx of COPD, HFpEF, HTN, Hx of CVA, CAD, S/p TAVR, persistent A. Fib on warfarin who presents to [facility] ED today for the evaluation of cough and shortness of breath that started last evening. The patient states he took a nebulizer treatment at home for shortness of breath before bed, but has had worsening SOB and cough all night. The cough is chronic, but significantly worsened and consistently productive of yellow-green sputum. Otherwise, the patient has also noted associated worsening SOB for the last few days with activity. He states his shortness of breath resolves with rest. He denies associated chest pain, lightheadedness, dizziness, abdominal pain, fever, chills, worsening LE edema. He states he has not noticed an unexpected weight change, and other wise states he feels well overall. \"    4/20/22: Patient resting in bed, no apparent distress. Chronically ill-appearing. Patient is currently satting 91% on 2 L nasal cannula. Patient does not reportedly wear oxygen at baseline. Patient noted to be in atrial fibrillation RVR with rate of 112-142 bpm.  Patient is asymptomatic. Blood pressures remain hemodynamically stable. He remains afebrile. Ordered to give home medication 50 mg metoprolol XR, and one-time dose IV push metoprolol 5 mg. We will increase patient's oral metoprolol to 100 mg starting tomorrow. Creatinine elevated to 2.5 today after starting Bumex. We will hold his Bumex. Continue breathing treatments. Continue to wean oxygen. Of note, PT/OT evaluated patient and is recommending home health assistance for continued PT/OT. Discussed this with patient and his wife. Patient and his wife are refusing home health assistance at this time.     4/21/22: Patient resting in bed, satting 93% on 2 L nasal cannula, remains afebrile with hemodynamically stable vital signs. Patient heart rate noted to be in atrial fibrillation with left bundle branch block with a rate controlled at  bpm.  I have increased his Toprol  mg which she will receive his first dose today. If patient remains in atrial fibrillation with RVR will consult cardiology and start diltiazem. Patient's creatinine continues to improve. We will hold his Bumex for now. Repeat home O2 eval to evaluate need for home oxygen upon discharge. I discussed with patient and his wife at length about home health assistance and patient is refusing this at this time. I also discussed with patient needing to follow-up with pulmonology outpatient given his recurrent COPD exacerbations with his most recent one being in March of this year. Patient stated that he was to follow-up with his PCP, and states \"Bailee says I do not need a pulmonologist so I'm not going to see one. \"  We will continue to encourage patient and his wife to see pulmonologist as outpatient upon discharge from hospital.    4/22/22: Patient afebrile, satting 93% on 2 L nasal cannula, hemodynamically stable vital signs. Patient's heart rate remains well controlled. Did notice a few episodes of A. fib RVR this morning on telemetry, however. Patient's nurse states he was coughing during that time. He reports that he still feels short of breath with rest and has some shortness of breath with exertion however he states he was able to work with physical therapy yesterday to walk the unit without feeling \"overly short of breath. \"  Patient's kidney function is doing better, we will restart his Bumex and repeat a BMP in the morning. Of note patient's sodium level is at 132, will continue to monitor. Additionally patient's white blood cell count has remained elevated. Currently downtrending to 17.5 from 18.5 yesterday.   Low suspicion for acute bacterial component given patient has completed IV antibiotic therapy and has remained afebrile. However will obtain MRSA swab further rule out. Leukocytosis likely secondary to IV steroid and p.o. steroid therapy. We will repeat patient's home oxygen evaluation as his prior is 3days old      Subjective (past 24 hours):     Denies chest pain, palpitations, inspiratory chest pain, abdominal pain, nausea, vomiting, fever, chills. Reports some shortness of breath while at rest, and LESTER. Continues to report productive cough with \"yellowish-green\" sputum. States that he was able to work with physical therapy and walk around the unit yesterday without feeling \"overly short of breath\" yesterday.     Medications:  Reviewed    Infusion Medications    sodium chloride 250 mL/hr at 04/17/22 1329    dextrose       Scheduled Medications    warfarin  1 mg Oral Once    metoprolol succinate  100 mg Oral Daily    metoprolol  5 mg IntraVENous Once    vitamin B-12  1,000 mcg Oral Daily    insulin lispro  0-3 Units SubCUTAneous Nightly    insulin lispro  0-6 Units SubCUTAneous TID     bumetanide  1 mg Oral Daily    fluticasone  1 spray Each Nostril Daily    levalbuterol  1.25 mg Nebulization Q4H WA    ascorbic acid  500 mg Oral Daily    atorvastatin  80 mg Oral Nightly    cetirizine  5 mg Oral Daily    losartan  25 mg Oral Nightly    Vitamin D  1,000 Units Oral Daily    sodium chloride flush  10 mL IntraVENous 2 times per day    guaiFENesin  600 mg Oral BID    warfarin placeholder: dosing by pharmacy   Other RX Placeholder    mometasone-formoterol  2 puff Inhalation BID    tiotropium  2 puff Inhalation Daily     PRN Meds: ondansetron **OR** ondansetron, guaiFENesin-codeine, albuterol sulfate HFA, sodium chloride flush, sodium chloride, polyethylene glycol, acetaminophen **OR** acetaminophen, glucagon (rDNA), dextrose, dextrose bolus (hypoglycemia) **OR** dextrose bolus (hypoglycemia), glucose **OR** glucose, benzocaine-menthol      Intake/Output Summary (Last 24 hours) at 4/22/2022 1413  Last data filed at 4/22/2022 1000  Gross per 24 hour   Intake 360 ml   Output 650 ml   Net -290 ml       Diet:  ADULT DIET; Regular; 4 carb choices (60 gm/meal); Low Sodium (2 gm); 1800 ml    Exam:  BP (!) 146/69   Pulse 80   Temp 98 °F (36.7 °C) (Oral)   Resp 16   Ht 5' 5.98\" (1.676 m)   Wt 202 lb 6.4 oz (91.8 kg)   SpO2 93%   BMI 32.68 kg/m²     General appearance: No apparent distress, appears older than stated age and cooperative. Chronically ill-appearing  HEENT: Pupils equal, round, and reactive to light. Conjunctivae/corneas clear. Neck: Supple, with full range of motion. No jugular venous distention. Trachea midline. Respiratory:  Normal respiratory effort, able to speak full clear sentences. Clear diminished to auscultation, bilaterally with scattered rhonchi that clears with coughing. No wheezes/rales  Cardiovascular:  Irregular rate and irregular rhythm with normal S1/S2 without murmurs, rubs or gallops. +0-1 RLE ankle edema, no edema LLE. Abdomen: Soft, non-tender, non-distended with normal bowel sounds. Musculoskeletal: passive and active ROM x 4 extremities. Skin: Skin color, texture, turgor normal.  No rashes or lesions. Neurologic:  Neurovascularly intact without any focal sensory/motor deficits.  Cranial nerves: II-XII intact, grossly non-focal.  Psychiatric: Alert and oriented, thought content appropriate, normal insight  Capillary Refill: Brisk,< 3 seconds   Peripheral Pulses: +2 palpable, equal bilaterally       Labs:   Recent Labs     04/20/22  0600 04/21/22  0448 04/22/22  0442   WBC 18.3* 18.5* 17.5*   HGB 12.8* 12.1* 12.2*   HCT 39.3* 36.7* 37.5*    167 159     Recent Labs     04/20/22  0600 04/21/22  0448 04/22/22  0442   * 134* 132*   K 4.7 4.4 4.6   CL 97* 99 99   CO2 22* 23 20*   BUN 58* 52* 50*   CREATININE 2.5* 2.3* 2.0*   CALCIUM 8.7 8.3* 8.5     Recent Labs     04/22/22  7048 AST 40   ALT 70*   BILIDIR 0.3   BILITOT 0.8   ALKPHOS 98     Recent Labs     04/20/22  0600 04/21/22  0448 04/22/22  0442   INR 1.80* 2.21* 2.80*     No results for input(s): CKTOTAL, TROPONINI in the last 72 hours. Microbiology:    Molecular pneumonia panel positive for rhinovirus enterovirus  Sputum culture showing normal keena    Urinalysis:      Lab Results   Component Value Date    NITRU NEGATIVE 07/03/2021    WBCUA NONE SEEN 06/10/2021    BACTERIA NONE SEEN 06/10/2021    RBCUA NONE SEEN 06/10/2021    BLOODU NEGATIVE 07/03/2021    SPECGRAV 1.006 06/10/2021    GLUCOSEU NEGATIVE 07/03/2021       Radiology:  Echocardiogram limited    Result Date: 4/14/2022  Transthoracic Echocardiography Report (TTE)  Demographics   Patient Name  Celia Wiggins Gender            Male                L   MR #          [id number]      Race                                              Ethnicity   Account #     [de-identified]      Room Number       8561   Accession     [id number]     Date of Study     04/14/2022  Number   Date of Birth 1940     Referring         Kamini Ely CNP                               Physician         Zainab Vázquez PA-C   Age           79 year(s)     Sonographer       BRO Best, RDCS,                                                 RDMS, RVT                                Interpreting      Sage Sequeira MD                               Physician  Procedure Type of Study   TTE procedure:ECHOCARDIOGRAM LIMITED. Procedure Date Date: 04/14/2022 Start: 06:37 AM Study Location: Bedside Technical Quality: Limited visualization due to body habitus. Indications:Cardiomyopathy, Post TAVR and Shortness of breath.  Additional Medical History:diabetes, pneumonia, hyperlipidemia, acute hypoxic respiratory failure, chronic obstructive pulmonary disease, hypertenison, atrial fibrillation, pacemaker, chronic kidney disease, coronary artery disease, anemia, history of cerebrovascular pericardial effusion. Pleural Effusion  No evidence of pleural effusion. Aorta / Great Vessels  The aorta is within normal limits. M-Mode/2D Measurements & Calculations   LV Diastolic      LV Systolic Dimension: 3 cm      LA Dimension: 3.9 cmLA  Dimension: 4.1 cm LV Volume Diastolic: 10.4 ml     Area: 20 cm^2  LV FS:26.8 %      LV Volume Systolic: 35 ml  LV PW Diastolic:  LV EDV/LV EDV Index: 74.2 ml/37  1.3 cm            m^2LV ESV/LV ESV Index: 35 ZW/73  Septum Diastolic: m^2                              RV Diastolic Dimension:  1.4 cm            EF Calculated: 52.8 %            3 cm                                                      Ascending Aorta: 3.3 cm                                                     LA volume/Index: 69.8                    LVOT: 2 cm                       ml /35m^2  Doppler Measurements & Calculations    AV Peak Velocity: 172 cm/s      LVOT Peak Velocity: 58.7 cm/s   AV Peak Gradient: 11.83 mmHg    LVOT Mean Velocity: 41.4 cm/s   AV Mean Velocity: 115 cm/s      LVOT Peak Gradient: 1 mmHgLVOT Mean   AV Mean Gradient: 6 mmHg        Gradient: 1 mmHg   AV VTI: 37.5 cm   AV Area (Continuity):1.12 cm^2    LVOT VTI: 13.4 cm    AV DVI (VTI): 0.36AV DVI   (Vmax):0.34  [email or web address] [id number]    XR CHEST PORTABLE    Result Date: 4/13/2022  PROCEDURE: XR CHEST PORTABLE CLINICAL INFORMATION: Cough shortness of breath history of COPD and CHF. COMPARISON: Chest x-ray 3/8/2022. TECHNIQUE: AP upright view of the chest. FINDINGS: There is a pacemaker. There is a replaced aortic valve. There is cardiac negative. The mediastinum is not widened. There are some faint interstitial opacities in the mid and lower lung zones. There are no definite pleural effusions. There is no pneumothorax. The pulmonary vascularity is normal.     Faint bibasilar interstitial opacities.  This can represent mild Diagnosis Date Noted    Acute hypoxemic respiratory failure ([facility] [J96.01] 04/13/2022       Electronically signed by WILBERTO Damon CNP on 4/22/2022 at 2:13 PM

## 2022-04-22 NOTE — CARE COORDINATION
4/22/22, 10:01 AM EDT    DISCHARGE ON [address] day: 9  Location: [address] [place] [address] Reason for admit: Shortness of breath [R06.02]  Chronic kidney disease (CKD), stage IV (severe) (HCC) [N18.4]  COPD exacerbation ([facility] [J44.1]  Troponin level elevated [R77.8]  Current use of long term anticoagulation [Z79.01]  Acute hypoxemic respiratory failure (HCC) [J96.01]  Acute on chronic congestive heart failure, unspecified heart failure type ([facility] [I50.9]   Procedure:   4/13 CXR: Faint bibasilar interstitial opacities. This can represent mild pulmonary edema  4/13 EKG: Atrial fibrillation, Left bundle branch block  4/13 ECHO: EF 45-50%, s/p TAVR  4/19 CT Chest: Patchy multifocal bilateral upper and lower lobe groundglass opacities are consistent pneumonia in the appropriate clinical setting. Close clinical and imaging follow-up to ensure resolution is advised; Stable chronic findings are discussed  4/20 EKG: Atrial fibrillation with rapid ventricular response  Barriers to Discharge:   HGB 12.2, HCT 37.5, INR 2.8. Request for cariology consult by CM 4/21. PCP: WILBERTO Michelle CNP  Readmission Risk Score: 23.1 ( )%  Patient Goals/Plan/Treatment Preferences: Home with wife. Denies needs, declines HH.  Wants [facility] if needs home O2.

## 2022-04-22 NOTE — PROGRESS NOTES
Initial Evaluation          Patient:   Sophy Peraza  YOB: 1940  Age:  79 y.o. Room:  [facility]/026  MRN:  [id number]   Acct: [de-identified]    Date of Admission:  4/13/2022  5:24 AM  Date of Service:  4/22/2022  Completed By:  Anuja Renee RN                 Reason for Palliative Care Evaluation:-             [x] Code Status Discussion              [] Goals of Care              [] Pain/Symptom Management               [] Emotional Support              [] Other:                   Current Issues:-  []  Pain  []  Fatigue  []  Nausea  []  Anxiety  []  Depression  [x]  Shortness of Breath  []  Constipation  []  Appetite  []  Other:             Advance Directives:-  [x] [place] DNR Form  [x] Living Will  [x] Medical POA             Current Code Status:-changed to DNR CCA after discussion  [x] Full Resuscitation  [] DNR-Comfort Care-Arrest  [] DNR-Comfort Care       [] Limited Resuscitation             [] No CPR            [] No shock            [] No ET intubation/reintubation            [] No resuscitative medications            [] Other limitation:              Palliative Performance Status:          [x] 60%  Ambulation reduced; Significant disease;Can't do hobbies/housework; intake normal or reduced; occasional assist; LOC full/confusion        [] 50%  Mainly sit/lie; Extensive disease; Can't do any work; Considerable assist; intake normal or reduced; LOC full/confusion        [] 40%  Mainly in bed; Extensive disease; Mainly assist; intake normal or reduced; LOC full/confusion         [] 30%  Bed Bound; Extensive disease; Total care; intake reduced; LOC full/confusion        [] 20%  Bed Bound; Extensive disease; Total care; intake minimal; Drowsy/coma        [] 10%  Bed Bound; Extensive disease;  Total care; Mouth care only; Drowsy/coma        [] 0  Death        Goals of care evaluation:-        The patient goals of care are to provide comfort care/supportive services/palliation & relieve suffering:  Goals of care discussed with:  [] Patient independently  [x] Patient and Family  [] Family or Healthcare DPOA independently  [] Unable to discuss with patient, family/DPOA not present         Family/Patient Discussion:  Patient sitting on the edge of the bed. Patient is alert and oriented to all spheres. Patient's significant other, Jose Garcia is at the bedside. Palliative care introduced. Discussed patient's advance directives on file and these are consistent with whom patient desires to make his medical decisions if he were unable. Discussed that it was noted that the patient has a DNR on file. Discussed with patient the difference in code status levels and patient confirms that he would not want resuscitated if his heart or breathing were to stop. Did discuss intubation for respiratory failure and patient would be OK with this if it were only for short term. Discussed [place] D[place] form and its use. New [place] DNR form completed (patient/SO unsure if they have a dnr at home) with patient signature. Emotional support provided. Plan/Follow-Up:  Updated primary RN, Johann Devine. Updated Candice Garcia CNP and she will change code status. [place] DNR form signed by Dr. Aliza Soto and original and 2 copies given to patient's significant other and copy placed in the current paper chart. Please call palliative care if further needs arise.         Electronically signed by Driss Paul RN on 4/22/2022 at [facility] Office: [phone]

## 2022-04-22 NOTE — PROGRESS NOTES
A home oxygen evaluation has been completed. []Patient is an inpatient. It is expected that the patient will be discharged within the next 48 hours. Qualified provider to write order for home prescription if patient qualifies. Social service/care managers will arrange for home oxygen. If patient is active, arrange for Home Medical supplier to assess for Oxygen Conserving Device per pulse oximetry. []Patient is an outpatient. Results will be faxed to the ordering provider. Qualified provider to write order for home prescription if patient qualifies and arranges for home oxygen. Patient was placed on room air for 20 minutes. SpO2 was 88 % on room air at rest. Patients SpO2 was below 89% and qualified for home oxygen. Oxygen was applied at 2 lpm via nasal cannula to maintain a SpO2 between 90-92% while at rest. Actual SpO2 was 92 %. Patient can ambulate for exercise flow rate. Patients was ambulated, SpO2 was 91% on 4 lpm to maintain SpO2 between 90-92% while exercising. If oxygen need is greater than 4 lpm the SpO2 on 4 lpm was . Note: For any SpO2 at 48% see policy and procedure for possible qualifications.

## 2022-04-23 LAB
ANION GAP SERPL CALCULATED.3IONS-SCNC: 11 MEQ/L (ref 8–16)
BASOPHILS # BLD: 0.3 %
BASOPHILS ABSOLUTE: 0 THOU/MM3 (ref 0–0.1)
BUN BLDV-MCNC: 56 MG/DL (ref 7–22)
CALCIUM SERPL-MCNC: 8.4 MG/DL (ref 8.5–10.5)
CHLORIDE BLD-SCNC: 100 MEQ/L (ref 98–111)
CO2: 21 MEQ/L (ref 23–33)
CREAT SERPL-MCNC: 2.1 MG/DL (ref 0.4–1.2)
EOSINOPHIL # BLD: 3.5 %
EOSINOPHILS ABSOLUTE: 0.5 THOU/MM3 (ref 0–0.4)
ERYTHROCYTE [DISTWIDTH] IN BLOOD BY AUTOMATED COUNT: 14.7 % (ref 11.5–14.5)
ERYTHROCYTE [DISTWIDTH] IN BLOOD BY AUTOMATED COUNT: 52.6 FL (ref 35–45)
GFR SERPL CREATININE-BSD FRML MDRD: 30 ML/MIN/1.73M2
GLUCOSE BLD-MCNC: 128 MG/DL (ref 70–108)
GLUCOSE BLD-MCNC: 135 MG/DL (ref 70–108)
GLUCOSE BLD-MCNC: 138 MG/DL (ref 70–108)
GLUCOSE BLD-MCNC: 214 MG/DL (ref 70–108)
GLUCOSE BLD-MCNC: 254 MG/DL (ref 70–108)
HCT VFR BLD CALC: 35.9 % (ref 42–52)
HEMOGLOBIN: 11.6 GM/DL (ref 14–18)
IMMATURE GRANS (ABS): 0.48 THOU/MM3 (ref 0–0.07)
IMMATURE GRANULOCYTES: 3.2 %
INR BLD: 3 (ref 0.85–1.13)
LYMPHOCYTES # BLD: 6.5 %
LYMPHOCYTES ABSOLUTE: 1 THOU/MM3 (ref 1–4.8)
MCH RBC QN AUTO: 31.3 PG (ref 26–33)
MCHC RBC AUTO-ENTMCNC: 32.3 GM/DL (ref 32.2–35.5)
MCV RBC AUTO: 96.8 FL (ref 80–94)
MONOCYTES # BLD: 5.5 %
MONOCYTES ABSOLUTE: 0.8 THOU/MM3 (ref 0.4–1.3)
NUCLEATED RED BLOOD CELLS: 0 /100 WBC
PLATELET # BLD: 169 THOU/MM3 (ref 130–400)
PLATELET ESTIMATE: ADEQUATE
PMV BLD AUTO: 10.3 FL (ref 9.4–12.4)
POTASSIUM SERPL-SCNC: 4.6 MEQ/L (ref 3.5–5.2)
RBC # BLD: 3.71 MILL/MM3 (ref 4.7–6.1)
SCAN OF BLOOD SMEAR: NORMAL
SEG NEUTROPHILS: 81 %
SEGMENTED NEUTROPHILS ABSOLUTE COUNT: 12.2 THOU/MM3 (ref 1.8–7.7)
SODIUM BLD-SCNC: 132 MEQ/L (ref 135–145)
WBC # BLD: 15 THOU/MM3 (ref 4.8–10.8)

## 2022-04-23 PROCEDURE — 6370000000 HC RX 637 (ALT 250 FOR IP)

## 2022-04-23 PROCEDURE — 94760 N-INVAS EAR/PLS OXIMETRY 1: CPT

## 2022-04-23 PROCEDURE — 85025 COMPLETE CBC W/AUTO DIFF WBC: CPT

## 2022-04-23 PROCEDURE — 85610 PROTHROMBIN TIME: CPT

## 2022-04-23 PROCEDURE — 99232 SBSQ HOSP IP/OBS MODERATE 35: CPT

## 2022-04-23 PROCEDURE — 1200000003 HC TELEMETRY R&B

## 2022-04-23 PROCEDURE — 2580000003 HC RX 258

## 2022-04-23 PROCEDURE — 80048 BASIC METABOLIC PNL TOTAL CA: CPT

## 2022-04-23 PROCEDURE — 94640 AIRWAY INHALATION TREATMENT: CPT

## 2022-04-23 PROCEDURE — 6370000000 HC RX 637 (ALT 250 FOR IP): Performed by: PHYSICIAN ASSISTANT

## 2022-04-23 PROCEDURE — 6370000000 HC RX 637 (ALT 250 FOR IP): Performed by: HOSPITALIST

## 2022-04-23 PROCEDURE — 6360000002 HC RX W HCPCS: Performed by: HOSPITALIST

## 2022-04-23 PROCEDURE — 82948 REAGENT STRIP/BLOOD GLUCOSE: CPT

## 2022-04-23 PROCEDURE — 36415 COLL VENOUS BLD VENIPUNCTURE: CPT

## 2022-04-23 PROCEDURE — 2700000000 HC OXYGEN THERAPY PER DAY

## 2022-04-23 RX ORDER — WARFARIN SODIUM 1 MG/1
1 TABLET ORAL ONCE
Status: COMPLETED | OUTPATIENT
Start: 2022-04-23 | End: 2022-04-23

## 2022-04-23 RX ADMIN — GUAIFENESIN 600 MG: 600 TABLET, EXTENDED RELEASE ORAL at 08:47

## 2022-04-23 RX ADMIN — METOPROLOL SUCCINATE 100 MG: 100 TABLET, FILM COATED, EXTENDED RELEASE ORAL at 08:47

## 2022-04-23 RX ADMIN — TIOTROPIUM BROMIDE INHALATION SPRAY 2 PUFF: 3.12 SPRAY, METERED RESPIRATORY (INHALATION) at 09:31

## 2022-04-23 RX ADMIN — Medication 1000 MCG: at 08:47

## 2022-04-23 RX ADMIN — LEVALBUTEROL HYDROCHLORIDE 1.25 MG: 1.25 SOLUTION RESPIRATORY (INHALATION) at 16:27

## 2022-04-23 RX ADMIN — INSULIN LISPRO 2 UNITS: 100 INJECTION, SOLUTION INTRAVENOUS; SUBCUTANEOUS at 20:43

## 2022-04-23 RX ADMIN — OXYCODONE HYDROCHLORIDE AND ACETAMINOPHEN 500 MG: 500 TABLET ORAL at 08:47

## 2022-04-23 RX ADMIN — LEVALBUTEROL HYDROCHLORIDE 1.25 MG: 1.25 SOLUTION RESPIRATORY (INHALATION) at 12:46

## 2022-04-23 RX ADMIN — FLUTICASONE PROPIONATE 1 SPRAY: 50 SPRAY, METERED NASAL at 08:48

## 2022-04-23 RX ADMIN — INSULIN LISPRO 2 UNITS: 100 INJECTION, SOLUTION INTRAVENOUS; SUBCUTANEOUS at 13:03

## 2022-04-23 RX ADMIN — BUMETANIDE 1 MG: 1 TABLET ORAL at 08:47

## 2022-04-23 RX ADMIN — ATORVASTATIN CALCIUM 80 MG: 80 TABLET, FILM COATED ORAL at 20:49

## 2022-04-23 RX ADMIN — GUAIFENESIN AND CODEINE PHOSPHATE 5 ML: 100; 10 SOLUTION ORAL at 20:49

## 2022-04-23 RX ADMIN — GUAIFENESIN 600 MG: 600 TABLET, EXTENDED RELEASE ORAL at 20:49

## 2022-04-23 RX ADMIN — GUAIFENESIN AND CODEINE PHOSPHATE 5 ML: 100; 10 SOLUTION ORAL at 08:54

## 2022-04-23 RX ADMIN — Medication 1000 UNITS: at 08:47

## 2022-04-23 RX ADMIN — MOMETASONE FUROATE AND FORMOTEROL FUMARATE DIHYDRATE 2 PUFF: 200; 5 AEROSOL RESPIRATORY (INHALATION) at 21:35

## 2022-04-23 RX ADMIN — WARFARIN SODIUM 1 MG: 1 TABLET ORAL at 18:05

## 2022-04-23 RX ADMIN — MOMETASONE FUROATE AND FORMOTEROL FUMARATE DIHYDRATE 2 PUFF: 200; 5 AEROSOL RESPIRATORY (INHALATION) at 09:17

## 2022-04-23 RX ADMIN — LEVALBUTEROL HYDROCHLORIDE 1.25 MG: 1.25 SOLUTION RESPIRATORY (INHALATION) at 09:16

## 2022-04-23 RX ADMIN — SODIUM CHLORIDE, PRESERVATIVE FREE 10 ML: 5 INJECTION INTRAVENOUS at 09:00

## 2022-04-23 RX ADMIN — SODIUM CHLORIDE, PRESERVATIVE FREE 10 ML: 5 INJECTION INTRAVENOUS at 20:50

## 2022-04-23 RX ADMIN — LEVALBUTEROL HYDROCHLORIDE 1.25 MG: 1.25 SOLUTION RESPIRATORY (INHALATION) at 21:35

## 2022-04-23 RX ADMIN — CETIRIZINE HYDROCHLORIDE 5 MG: 10 TABLET, FILM COATED ORAL at 08:47

## 2022-04-23 ASSESSMENT — PAIN SCALES - GENERAL
PAINLEVEL_OUTOF10: 0
PAINLEVEL_OUTOF10: 0

## 2022-04-23 NOTE — PLAN OF CARE
Per RT note documented 4/22/22: \"A home oxygen evaluation has been completed.      []? Patient is an inpatient. It is expected that the patient will be discharged within the next 48 hours. Qualified provider to write order for home prescription if patient qualifies. Social service/care managers will arrange for home oxygen. If patient is active, arrange for Home Medical supplier to assess for Oxygen Conserving Device per pulse oximetry. []? Patient is an outpatient. Results will be faxed to the ordering provider. Qualified provider to write order for home prescription if patient qualifies and arranges for home oxygen.        Patient was placed on room air for 20 minutes. SpO2 was 88 % on room air at rest. Patients SpO2 was below 89% and qualified for home oxygen. Oxygen was applied at 2 lpm via nasal cannula to maintain a SpO2 between 90-92% while at rest. Actual SpO2 was 92 %. Patient can ambulate for exercise flow rate. Patients was ambulated, SpO2 was 91% on 4 lpm to maintain SpO2 between 90-92% while exercising.     If oxygen need is greater than 4 lpm the SpO2 on 4 lpm was .      Note: For any SpO2 at 40% see policy and procedure for possible qualifications\"      Patient was evaluated today for the diagnosis of Acute on chronic hypoxic respiratory failure secondary to viral PNA with subsequent h/o COPD. I entered a DME order for home oxygen because the diagnosis and testing requires the patient to have supplemental oxygen. Condition will improve or be benefited by oxygen use. The patient is  able to perform good mobility in a home setting and therefore does require the use of a portable oxygen system. The need for this equipment was discussed with the patient and he understands and is in agreement.

## 2022-04-23 NOTE — PLAN OF CARE
Problem: Respiratory - Adult  Goal: Clear lung sounds  Description: Clear lung sounds  Outcome: Progressing     Continue breathing treatments to improve lung sounds. Deep breathing with cough and use of acapella 10x each hour encouraged to improve pulmonary toilet.

## 2022-04-23 NOTE — PLAN OF CARE
Problem: Falls - Risk of:  Goal: Will remain free from falls  Description: Will remain free from falls  4/22/2022 2245 by Enrique Beltran RN  Outcome: Progressing   All fall precautions in place. Bed in low position, alarm activated and appropriate use of call light. Problem: Infection:  Goal: Will remain free from infection  Description: Will remain free from infection  4/22/2022 2245 by Enrique Beltran RN  Outcome: Progressing     Problem: Safety:  Goal: Free from accidental physical injury  Description: Free from accidental physical injury  4/22/2022 2245 by Enrique Beltran RN  Outcome: Progressing     Problem: Pain:  Goal: Patient's pain/discomfort is manageable  Description: Patient's pain/discomfort is manageable  4/22/2022 2245 by Enrique Beltran RN  Outcome: Progressing   Pain Assessment: 0-10  Pain Level: 0   Patient's Stated Pain Goal: 0 - No pain   Is pain goal met at this time? Yes       Problem: Skin Integrity:  Goal: Skin integrity will stabilize  Description: Skin integrity will stabilize  4/22/2022 2245 by Enrique Beltran RN  Outcome: Progressing   Skin assessment completed. Patient turned every 2 hours and as needed. No skin breakdown this shift. Problem: ABCDS Injury Assessment  Goal: Absence of physical injury  4/22/2022 2245 by Enrique Beltran RN  Outcome: Progressing     Problem: Respiratory - Adult  Goal: Clear lung sounds  Description: Clear lung sounds  4/22/2022 2120 by Cate Narayanan RCP  Outcome: Progressing     Problem: Discharge Planning:  Goal: Patients continuum of care needs are met  Description: Patients continuum of care needs are met  4/22/2022 2245 by Enrique Beltran RN  Outcome: Progressing   Discharge is unclear at this time. Care plan reviewed with patient. Patient verbalizes understanding of the plan of care and contributes to goal setting.     Electronically signed by Enrique Beltran RN on 4/22/2022 at 10:46 PM

## 2022-04-23 NOTE — PROGRESS NOTES
Clinical Pharmacy Note    Warfarin consult follow-up    Recent Labs     04/23/22  0450   INR 3.00*     Recent Labs     04/21/22  0448 04/22/22  0442 04/23/22  0450   HGB 12.1* 12.2* 11.6*   HCT 36.7* 37.5* 35.9*    159 169     Significant Drug-Drug Interactions:  New warfarin drug-drug interactions: none  Discontinued drug-drug interactions: none  Current warfarin drug-drug interactions: ascorbic acid     Date INR Warfarin Dose   4/13/22 2.76 1 mg    4/14/22 2.47 2.5 mg   4/15/22 2.39 2.5 mg   4/16/22 3.44 No warfarin   4/17/22 3.25 No warfarin   4/18/22 2.16 2.5 mg   4/19/22 1.86 4 mg   4/20/22 1.80 3 mg   4/21/22 2.21 1 mg   4/22/22 2.80 1 mg   4/23/22 3 1 mg       Notes:                   PT/INR or POC-INR will be monitored routinely until therapeutic INR is achieved.

## 2022-04-23 NOTE — PROGRESS NOTES
Hospitalist Progress Note    Patient:  Ava Carlson      Unit/Bed:5K-26/026-A    YOB: 1940    MRN: [id number]       Acct: [de-identified]     PCP: WILBERTO Michelle CNP    Date of Admission: 4/13/2022    Assessment/Plan:    1. Acute on chronic hypoxic respiratory failure    - Satting 93% on 2L/NC. Does not normally wear oxygen at baseline. Continue to wean oxygen as tolerated.   -Likely secondary to #2, #3, #4   - Home O2 eval (4/22) notable that patient requires 2 L oxygen at rest and 4 L with activity. DME order placed for portable O2.   -CT chest (4/19) notable for patchy multifocal bilateral upper and lower lobe groundglass opacities consistent with pneumonia R>L   -Pulmonary toilet: Incentive spirometer, Acapella, cough, deep breathe, OOB with assistance    2. Acute on chronic systolic heart failure   -Echo (4/14) notable for EF estimated range of 45-50%, s/p TAVR with normal function, normal mitral valve, mild mitral regurg   - BNP elevated, however patient has chronically elevated BNP, could be in the setting of CKD. - Continue home Bumex   - Strict I&O's   - Daily weight   - Cardiac diet: 2 g Na restriction, 2L fluid restriction      3. Acute COPD exacerbation, improving   - Received Prednisone 40 mg x's 5 days (4/13 - 4/17)   - S/p IV Solu medrol (4/13, 4/15)   - Continue Mucinex   - Continue scheduled and as needed bronchodilators   - Pulmonary toilet   - Patient had recent COPD exacerbation requiring hospitalization back in March 2022. Discussed with patient and his wife that he needs to follow-up with a pulmonologist as outpatient for further management of COPD given recurrent exacerbations. Patient stated that he wants to follow-up with his PCP, and states \"Bailee says I do not need a pulmonologist so I'm not going to see one. \"  We will continue to encourage patient and his wife to see pulmonologist as outpatient upon discharge from hospital.    4. Community-acquired pneumonia secondary to rhinovirus enterovirus   - Received 500 mg IV azithromycin 4/15 - 4/19   - Received empiric ceftriaxone 4/15 - 4/21    - Molecular pneumonia panel (4/19) notable for rhinovirus enterovirus   -Sputum culture showing normal keena with moderate candida albicans growth -> patient is on ICS inhaler BID -> instructed and educated patient on need to rinse mouth out with water (without swallowing) after every dose and on good oral care   - Symptomatic support, pulmonary toilet    5. Essential hypertension, controlled   -Continue Cozaar    6. Chronic Persistent atrial fibrillation, with pacemaker   - Currently in afib with controlled rate. - S/p 1 time dose IV metoprolol 4/19     -[id number] score 8   - Continue metoprolol  mg, increased from 50 mg    - Continue Coumadin, goal INR range 2-3 -> pharmacy consulted, appreciate recs   -Continuous telemetry    7. Aortic stenosis status post TAVR 5/11/2018   -Noted. On warfarin, goal INR of 2-3   -Pharmacy consulted to help in dosage and management of warfarin therapy    8. 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 Follows Dr. Grace Landeros, [address] nephrology.   -Creatinine stable in baseline range   -Avoid nephrotoxic agents   - Daily lab, monitor   - Will need to f/u Dr. Grace Landeros in 1-2 weeks,upon discharge. 9. CAD   - S/p PCI 2002   -Continue home losartan, Lipitor, warfarin, Toprol    10. HLD   - Statin    11. T2DM   -Normally takes metformin at home. Hold metformin given #8    - Hemoglobin A1c 7.9 (4/20)   - Continue low dose SSI   - Accu-Cheks   - Hypoglycemia protocol   - Diabetic diet   - Recommend f/u with PCP 1-2 wks after d/c for further management    12. Chronic macrocytic anemia   - H&H stable. Transfuse for hemoglobin < 7 g/dL   - Folic acid (9/92) normal, vitamin B12 low   - Continue oral cyanocobalamin     13. History of CVA:    -Noted.   States he has no deficits at baseline. Continue blood pressure control, glucose control of diabetes and statin therapy. 14. Obesity   -BMI 32.93 kg/m²   -Discussed and educated on lifestyle modifications  15. Expected discharge date: Hopefully tomorrow    Disposition:    [x] Home       [] TCU       [] Rehab       [] Psych       [] SNF       [] [place]       [] Other-    Chief Complaint: Cough and shortness of breath    Hospital Course: Per HPI documented 4/13/2022: Marcos Jaimes is a 80 y/o  male with a PMHx of COPD, HFpEF, HTN, Hx of CVA, CAD, S/p TAVR, persistent A. Fib on warfarin who presents to [facility] ED today for the evaluation of cough and shortness of breath that started last evening. The patient states he took a nebulizer treatment at home for shortness of breath before bed, but has had worsening SOB and cough all night. The cough is chronic, but significantly worsened and consistently productive of yellow-green sputum. Otherwise, the patient has also noted associated worsening SOB for the last few days with activity. He states his shortness of breath resolves with rest. He denies associated chest pain, lightheadedness, dizziness, abdominal pain, fever, chills, worsening LE edema. He states he has not noticed an unexpected weight change, and other wise states he feels well overall. \"    4/20/22: Patient resting in bed, no apparent distress. Chronically ill-appearing. Patient is currently satting 91% on 2 L nasal cannula. Patient does not reportedly wear oxygen at baseline. Patient noted to be in atrial fibrillation RVR with rate of 112-142 bpm.  Patient is asymptomatic. Blood pressures remain hemodynamically stable. He remains afebrile. Ordered to give home medication 50 mg metoprolol XR, and one-time dose IV push metoprolol 5 mg. We will increase patient's oral metoprolol to 100 mg starting tomorrow. Creatinine elevated to 2.5 today after starting Bumex. We will hold his Bumex.   Continue breathing treatments. Continue to wean oxygen. Of note, PT/OT evaluated patient and is recommending home health assistance for continued PT/OT. Discussed this with patient and his wife. Patient and his wife are refusing home health assistance at this time. 4/21/22: Patient resting in bed, satting 93% on 2 L nasal cannula, remains afebrile with hemodynamically stable vital signs. Patient heart rate noted to be in atrial fibrillation with left bundle branch block with a rate controlled at  bpm.  I have increased his Toprol  mg which she will receive his first dose today. If patient remains in atrial fibrillation with RVR will consult cardiology and start diltiazem. Patient's creatinine continues to improve. We will hold his Bumex for now. Repeat home O2 eval to evaluate need for home oxygen upon discharge. I discussed with patient and his wife at length about home health assistance and patient is refusing this at this time. I also discussed with patient needing to follow-up with pulmonology outpatient given his recurrent COPD exacerbations with his most recent one being in March of this year. Patient stated that he was to follow-up with his PCP, and states \"Bailee says I do not need a pulmonologist so I'm not going to see one. \"  We will continue to encourage patient and his wife to see pulmonologist as outpatient upon discharge from hospital.    4/22/22: Patient afebrile, satting 93% on 2 L nasal cannula, hemodynamically stable vital signs. Patient's heart rate remains well controlled. Did notice a few episodes of A. fib RVR this morning on telemetry, however. Patient's nurse states he was coughing during that time. He reports that he still feels short of breath with rest and has some shortness of breath with exertion however he states he was able to work with physical therapy yesterday to walk the unit without feeling \"overly short of breath. \"  Patient's kidney function is doing better, we will restart his Bumex and repeat a BMP in the morning. Of note patient's sodium level is at 132, will continue to monitor. Additionally patient's white blood cell count has remained elevated. Currently downtrending to 17.5 from 18.5 yesterday. Low suspicion for acute bacterial component given patient has completed IV antibiotic therapy and has remained afebrile. However will obtain MRSA swab further rule out. Leukocytosis likely secondary to IV steroid and p.o. steroid therapy. We will repeat patient's home oxygen evaluation as his prior is 3days old      4/23/22: Patient afebrile, satting 93% on 2L with hemodynamically stable vital signs. Reports that he is feeling better and is hoping to go home soon. Heart rate of 157 bpm was documented yesterday afternoon. Nursing staff unclear if patient was ambulating or coughing during this time when HR was documented. They were also unsure if the patient's heart rate was actually assessed by PCA or just taken off telemetry monitor screen. I reviewed the telemetry which showed patient to be in 120s range with significant artifact baseline. Currently patient is in afib with controlled rate and has been throughout the night. Given prior occurrences of afib w/ RVR on this admission, will keep patient overnight for further monitoring. 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 Leukocytosis is downtrending and elevation most likely from IV and p.o. steroids administered on this admission. Kidney fxn is stable even after restarting Bumex. Subjective (past 24 hours):     Denies chest pain, worsening SOB, worsening LESTER, abd pain, N/V/D/F/C, dizziness. Reports he is feeling better today and that he is starting to be able to walk further distances without feeling \"overly short of breath. \" Patient wanted to know when he could start mowing his grass after discharge.  Educated patient to take a few weeks up and f/u with PCP and OP pulmonology for further medical clearance. Medications:  Reviewed    Infusion Medications    sodium chloride 250 mL/hr at 04/17/22 1329    dextrose       Scheduled Medications    warfarin  1 mg Oral Once    metoprolol succinate  100 mg Oral Daily    metoprolol  5 mg IntraVENous Once    vitamin B-12  1,000 mcg Oral Daily    insulin lispro  0-3 Units SubCUTAneous Nightly    insulin lispro  0-6 Units SubCUTAneous TID WC    bumetanide  1 mg Oral Daily    fluticasone  1 spray Each Nostril Daily    levalbuterol  1.25 mg Nebulization Q4H WA    ascorbic acid  500 mg Oral Daily    atorvastatin  80 mg Oral Nightly    cetirizine  5 mg Oral Daily    losartan  25 mg Oral Nightly    Vitamin D  1,000 Units Oral Daily    sodium chloride flush  10 mL IntraVENous 2 times per day    guaiFENesin  600 mg Oral BID    warfarin placeholder: dosing by pharmacy   Other RX Placeholder    mometasone-formoterol  2 puff Inhalation BID    tiotropium  2 puff Inhalation Daily     PRN Meds: ondansetron **OR** ondansetron, guaiFENesin-codeine, albuterol sulfate HFA, sodium chloride flush, sodium chloride, polyethylene glycol, acetaminophen **OR** acetaminophen, glucagon (rDNA), dextrose, dextrose bolus (hypoglycemia) **OR** dextrose bolus (hypoglycemia), glucose **OR** glucose, benzocaine-menthol      Intake/Output Summary (Last 24 hours) at 4/23/2022 1337  Last data filed at 4/23/2022 1323  Gross per 24 hour   Intake 680 ml   Output 700 ml   Net -20 ml       Diet:  ADULT DIET; Regular; 4 carb choices (60 gm/meal); Low Sodium (2 gm); 1800 ml    Exam:  BP (!) 121/56   Pulse 68   Temp 97.6 °F (36.4 °C) (Oral)   Resp 20   Ht 5' 5.98\" (1.676 m)   Wt 203 lb (92.1 kg)   SpO2 93%   BMI 32.78 kg/m²     General appearance: No apparent distress, appears older than stated age and cooperative. Chronically ill-appearing  HEENT: Pupils equal, round, and reactive to light. Conjunctivae/corneas clear.   Neck: Supple, with full range of motion. No jugular venous distention. Trachea midline. Respiratory:  Normal respiratory effort, able to speak full clear sentences. Clear diminished to auscultation, bilaterally faint rales in RLL, clears with coughing. No wheezes/rhonchi. Cardiovascular:  Irregular rate and irregular rhythm with normal S1/S2 without murmurs, rubs or gallops. No RLE ankle edema, no edema LLE. Abdomen: Soft, non-tender, non-distended with normal bowel sounds. Musculoskeletal: passive and active ROM x 4 extremities. Skin: Skin color, texture, turgor normal.  No rashes or lesions. Neurologic:  Neurovascularly intact without any focal sensory/motor deficits. Cranial nerves: II-XII intact, grossly non-focal.  Psychiatric: Alert and oriented, thought content appropriate, normal insight  Capillary Refill: Brisk,< 3 seconds   Peripheral Pulses: +2 palpable, equal bilaterally       Labs:   Recent Labs     04/21/22 0448 04/22/22 0442 04/23/22  0450   WBC 18.5* 17.5* 15.0*   HGB 12.1* 12.2* 11.6*   HCT 36.7* 37.5* 35.9*    159 169     Recent Labs     04/21/22 0448 04/22/22 0442 04/23/22  0450   * 132* 132*   K 4.4 4.6 4.6   CL 99 99 100   CO2 23 20* 21*   BUN 52* 50* 56*   CREATININE 2.3* 2.0* 2.1*   CALCIUM 8.3* 8.5 8.4*     Recent Labs     04/22/22 0442   AST 40   ALT 70*   BILIDIR 0.3   BILITOT 0.8   ALKPHOS 98     Recent Labs     04/21/22 0448 04/22/22 0442 04/23/22  0450   INR 2.21* 2.80* 3.00*     No results for input(s): CKTOTAL, TROPONINI in the last 72 hours.     Microbiology:    Molecular pneumonia panel positive for rhinovirus enterovirus  Sputum culture showing normal keena    Urinalysis:      Lab Results   Component Value Date    NITRU NEGATIVE 04/22/2022    WBCUA 0-2 04/22/2022    BACTERIA NONE SEEN 04/22/2022    RBCUA 0-2 04/22/2022    BLOODU NEGATIVE 04/22/2022    SPECGRAV 1.012 04/22/2022    GLUCOSEU NEGATIVE 07/03/2021       Radiology:  Echocardiogram limited    Result Date: 4/14/2022  Transthoracic Echocardiography Report (TTE)  Demographics   Patient Name  Nate Garcia Gender            Male                L   MR #          [id number]      Race                                              Ethnicity   Account #     [de-identified]      Room Number       4319   Accession     [id number]     Date of Study     04/14/2022  Number   Date of Birth 1940     Referring         Jocy Marinelli CNP                               Physician         Viktoriya Richards PA-C   Age           79 year(s)     Sonographer       BRO Ryao, RDCS,                                                 RDMS, RVT                                Interpreting      Josephine Figueroa MD                               Physician  Procedure Type of Study   TTE procedure:ECHOCARDIOGRAM LIMITED. Procedure Date Date: 04/14/2022 Start: 06:37 AM Study Location: Bedside Technical Quality: Limited visualization due to body habitus. Indications:Cardiomyopathy, Post TAVR and Shortness of breath. Additional Medical History:diabetes, pneumonia, hyperlipidemia, acute hypoxic respiratory failure, chronic obstructive pulmonary disease, hypertenison, atrial fibrillation, pacemaker, chronic kidney disease, coronary artery disease, anemia, history of cerebrovascular accident, TAVR, shortness of breath, stent, congestive heart failure, cardiomyopathy Patient Status: Routine Height: 65 inches Weight: 204 pounds BSA: 1.99 m^2 BMI: 33.95 kg/m^2 BP: 127/75 mmHg Allergies   - See Epic. Conclusions   Summary  Technically difficult examination. This is a suboptimal study due to poor echocardiographic window. Left ventricle size is normal.  Normal left ventricular wall thickness. Ejection fraction is visually estimated in the range of 45% to 50%. Unable to determine wall motion abnormalities due to poor image quality. s/p TAVAR WITH NORMAL FUNCTION  Structurally normal mitral valve. Mitral annular calcification is present. Normal mobility of both mitral valve leaflets. No evidence of mitral valve stenosis. Mild mitral regurgitation is present. Signature   ----------------------------------------------------------------  Electronically signed by Radha Unger MD (Interpreting  physician) on 04/14/2022 at 12:10 PM  ----------------------------------------------------------------   Findings   Mitral Valve  Structurally normal mitral valve. Mitral annular calcification is present. Normal mobility of both mitral valve leaflets. No evidence of mitral valve stenosis. Mild mitral regurgitation is present. Aortic Valve  s/p TAVAR WITH NORMAL FUNCTION   Tricuspid Valve  Tricuspid valve is structurally normal.  No evidence of tricuspid stenosis. Mild tricuspid regurgitation visualized. Pulmonic Valve  The pulmonic valve was not well visualized . Left Atrium  Left atrium is of upper normal size. Left Ventricle  Left ventricle size is normal.  Normal left ventricular wall thickness. Ejection fraction is visually estimated in the range of 45% to 50%. Unable to determine wall motion abnormalities due to poor image quality. Right Atrium  The right atrium is of normal size. Right Ventricle  Mildly dilated right ventricle. Pericardial Effusion  No evidence of any pericardial effusion. Pleural Effusion  No evidence of pleural effusion. Aorta / Great Vessels  The aorta is within normal limits.   M-Mode/2D Measurements & Calculations   LV Diastolic      LV Systolic Dimension: 3 cm      LA Dimension: 3.9 cmLA  Dimension: 4.1 cm LV Volume Diastolic: 14.7 ml     Area: 20 cm^2  LV FS:26.8 %      LV Volume Systolic: 35 ml  LV PW Diastolic:  LV EDV/LV EDV Index: 74.2 ml/37  1.3 cm            m^2LV ESV/LV ESV Index: 35 UD/58  Septum Diastolic: m^2                              RV Diastolic Dimension:  1.4 cm            EF Calculated: 52.8 %            3 cm                                                      Ascending Aorta: 3.3 cm LA volume/Index: 69.8                    LVOT: 2 cm                       ml /35m^2  Doppler Measurements & Calculations    AV Peak Velocity: 172 cm/s      LVOT Peak Velocity: 58.7 cm/s   AV Peak Gradient: 11.83 mmHg    LVOT Mean Velocity: 41.4 cm/s   AV Mean Velocity: 115 cm/s      LVOT Peak Gradient: 1 mmHgLVOT Mean   AV Mean Gradient: 6 mmHg        Gradient: 1 mmHg   AV VTI: 37.5 cm   AV Area (Continuity):1.12 cm^2    LVOT VTI: 13.4 cm    AV DVI (VTI): 0.36AV DVI   (Vmax):0.34  [email or web address] [id number]    XR CHEST PORTABLE    Result Date: 4/13/2022  PROCEDURE: XR CHEST PORTABLE CLINICAL INFORMATION: Cough shortness of breath history of COPD and CHF. COMPARISON: Chest x-ray 3/8/2022. TECHNIQUE: AP upright view of the chest. FINDINGS: There is a pacemaker. There is a replaced aortic valve. There is cardiac negative. The mediastinum is not widened. There are some faint interstitial opacities in the mid and lower lung zones. There are no definite pleural effusions. There is no pneumothorax. The pulmonary vascularity is normal.     Faint bibasilar interstitial opacities. This can represent mild pulmonary edema. **This report has been created using voice recognition software. It may contain minor errors which are inherent in voice recognition technology. ** Final report electronically signed by Dr. Sharron Ballesteros on 4/13/2022 7:19 AM      DVT prophylaxis: [] Lovenox                                 [] SCDs                                 [] SQ Heparin                                 [x] Encourage ambulation           [x] Already on Anticoagulation     Code Status: DNR-CCA    PT/OT Eval Status:     Per OT note documented 4/21/2022: \"ASSESSMENT:  Activity Tolerance:  Patient tolerance of  treatment: fair.         Discharge Recommendations: Home with Home Health OT  Equipment Recommendations: Equipment Needed: No  Plan: Times per Week: 3-5x  Current Treatment Recommendations: Strengthening,Functional Mobility Training,Endurance Training,Balance Training,Safety Education & Training,Self-Care / ADL,Patient/Caregiver Education & Training,Home Management Training,Equipment Evaluation, Education, & procurement\"      Per PT note documented 4/21/2022: \"ASSESSMENT:  Assessment: Patient progressing toward established goals. Activity Tolerance:  Patient tolerance of  treatment: good. Equipment Recommendations:Equipment Needed: No  Discharge Recommendations: Continue to assess pending progress and Patient would benefit from continued PT at discharge  Plan: Times per week: 3-5x GM  Times per day: Daily  Current Treatment Recommendations: Strengthening,Gait Training,Stair training,Functional Mobility Training  Plan:  (3-5xGM)\"    Patient and his wife are still refusing HH at this time. Tele:   [x] yes             [] no    Atrial fibrillation with controlled rate.     Active Hospital Problems    Diagnosis Date Noted    Acute hypoxemic respiratory failure ([facility] [address] [J96.01] 04/13/2022       Electronically signed by WILBERTO Emanuel CNP on 4/23/2022 at 1:37 PM

## 2022-04-23 NOTE — PLAN OF CARE
Problem: Falls - Risk of:  Goal: Will remain free from falls  Description: Will remain free from falls  Outcome: Progressing  Fall assessment completed. Patient using call light appropriately to call for assistance with ambulation to bathroom. Personal items within reach. Patient is also compliant with use of non-skid slippers. Problem: Falls - Risk of:  Goal: Absence of physical injury  Description: Absence of physical injury  Outcome: Progressing  No falls noted this shift. Patient ambulates with x1 staff assistance without difficulty. Family member at bedside, spent the day. Bed kept in low position. Safe environment maintained. Bedside table & call light in reach. Uses call light appropriately when needing assistance. Problem: Infection:  Goal: Will remain free from infection  Description: Will remain free from infection  Outcome: Progressing  No s/s infection for shift. Problem: Safety:  Goal: Free from accidental physical injury  Description: Free from accidental physical injury  Outcome: Progressing     Patient free from injuries     Problem: Daily Care:  Goal: Daily care needs are met  Description: Daily care needs are met  Outcome: Progressing  Patient able to perform daily care. Problem: Pain:  Goal: Patient's pain/discomfort is manageable  Description: Patient's pain/discomfort is manageable  Outcome: Progressing  Patient states pain relief from PRN pain medications. Pain reassessed one hour post PRN pain medication given. Patient rates pain 3 on LILLIANA 0-10 scale. Problem: Skin Integrity:  Goal: Skin integrity will stabilize  Description: Skin integrity will stabilize  Outcome: Progressing  No skin breakdown this shift. Patient being assisted with turning. Patients states understanding of repositioning every two hours.       Problem: Discharge Planning:  Goal: Patients continuum of care needs are met  Description: Patients continuum of care needs are met  Outcome: Progressing  Discharge plan is in process. Plan discharge home with spouse. Problem: Metabolic:  Goal: Ability to maintain appropriate glucose levels will improve  Description: Ability to maintain appropriate glucose levels will improve  Outcome: Progressing  Glucose level - WNL. Problem: Pain  Goal: Verbalizes/displays adequate comfort level or baseline comfort level  Outcome: Progressing  Patient verbalized pain level improving. Problem: ABCDS Injury Assessment  Goal: Absence of physical injury  Outcome: Progressing  No injuries for  shift. Care plan reviewed with patient and family. Patient and family verbalize understanding of the plan of care and contribute to goal setting.

## 2022-04-24 VITALS
OXYGEN SATURATION: 93 % | WEIGHT: 204.1 LBS | RESPIRATION RATE: 16 BRPM | TEMPERATURE: 97.7 F | HEART RATE: 88 BPM | HEIGHT: 66 IN | BODY MASS INDEX: 32.8 KG/M2 | SYSTOLIC BLOOD PRESSURE: 129 MMHG | DIASTOLIC BLOOD PRESSURE: 92 MMHG

## 2022-04-24 LAB
ANION GAP SERPL CALCULATED.3IONS-SCNC: 15 MEQ/L (ref 8–16)
ATYPICAL LYMPHOCYTES: ABNORMAL %
BASOPHILS # BLD: 0.4 %
BASOPHILS ABSOLUTE: 0.1 THOU/MM3 (ref 0–0.1)
BUN BLDV-MCNC: 57 MG/DL (ref 7–22)
CALCIUM SERPL-MCNC: 8.6 MG/DL (ref 8.5–10.5)
CHLORIDE BLD-SCNC: 98 MEQ/L (ref 98–111)
CO2: 19 MEQ/L (ref 23–33)
CREAT SERPL-MCNC: 2.3 MG/DL (ref 0.4–1.2)
EOSINOPHIL # BLD: 2.8 %
EOSINOPHILS ABSOLUTE: 0.5 THOU/MM3 (ref 0–0.4)
ERYTHROCYTE [DISTWIDTH] IN BLOOD BY AUTOMATED COUNT: 14.8 % (ref 11.5–14.5)
ERYTHROCYTE [DISTWIDTH] IN BLOOD BY AUTOMATED COUNT: 53.8 FL (ref 35–45)
GFR SERPL CREATININE-BSD FRML MDRD: 27 ML/MIN/1.73M2
GLUCOSE BLD-MCNC: 132 MG/DL (ref 70–108)
GLUCOSE BLD-MCNC: 133 MG/DL (ref 70–108)
HCT VFR BLD CALC: 39.2 % (ref 42–52)
HEMOGLOBIN: 12.5 GM/DL (ref 14–18)
IMMATURE GRANS (ABS): 0.57 THOU/MM3 (ref 0–0.07)
IMMATURE GRANULOCYTES: 3.2 %
INR BLD: 2.59 (ref 0.85–1.13)
LYMPHOCYTES # BLD: 6.2 %
LYMPHOCYTES ABSOLUTE: 1.1 THOU/MM3 (ref 1–4.8)
MCH RBC QN AUTO: 31.4 PG (ref 26–33)
MCHC RBC AUTO-ENTMCNC: 31.9 GM/DL (ref 32.2–35.5)
MCV RBC AUTO: 98.5 FL (ref 80–94)
MONOCYTES # BLD: 5.6 %
MONOCYTES ABSOLUTE: 1 THOU/MM3 (ref 0.4–1.3)
NUCLEATED RED BLOOD CELLS: 0 /100 WBC
PLATELET # BLD: 192 THOU/MM3 (ref 130–400)
PMV BLD AUTO: 10.6 FL (ref 9.4–12.4)
POTASSIUM SERPL-SCNC: 4.8 MEQ/L (ref 3.5–5.2)
RBC # BLD: 3.98 MILL/MM3 (ref 4.7–6.1)
SCAN OF BLOOD SMEAR: NORMAL
SEG NEUTROPHILS: 81.8 %
SEGMENTED NEUTROPHILS ABSOLUTE COUNT: 14.6 THOU/MM3 (ref 1.8–7.7)
SODIUM BLD-SCNC: 132 MEQ/L (ref 135–145)
WBC # BLD: 17.9 THOU/MM3 (ref 4.8–10.8)

## 2022-04-24 PROCEDURE — 94760 N-INVAS EAR/PLS OXIMETRY 1: CPT

## 2022-04-24 PROCEDURE — 6370000000 HC RX 637 (ALT 250 FOR IP)

## 2022-04-24 PROCEDURE — 82948 REAGENT STRIP/BLOOD GLUCOSE: CPT

## 2022-04-24 PROCEDURE — 6360000002 HC RX W HCPCS: Performed by: HOSPITALIST

## 2022-04-24 PROCEDURE — 2700000000 HC OXYGEN THERAPY PER DAY

## 2022-04-24 PROCEDURE — 99239 HOSP IP/OBS DSCHRG MGMT >30: CPT

## 2022-04-24 PROCEDURE — 6370000000 HC RX 637 (ALT 250 FOR IP): Performed by: HOSPITALIST

## 2022-04-24 PROCEDURE — 85610 PROTHROMBIN TIME: CPT

## 2022-04-24 PROCEDURE — 80048 BASIC METABOLIC PNL TOTAL CA: CPT

## 2022-04-24 PROCEDURE — 85025 COMPLETE CBC W/AUTO DIFF WBC: CPT

## 2022-04-24 PROCEDURE — 36415 COLL VENOUS BLD VENIPUNCTURE: CPT

## 2022-04-24 PROCEDURE — 94640 AIRWAY INHALATION TREATMENT: CPT

## 2022-04-24 PROCEDURE — 2580000003 HC RX 258

## 2022-04-24 RX ORDER — WARFARIN SODIUM 2 MG/1
2 TABLET ORAL ONCE
Status: COMPLETED | OUTPATIENT
Start: 2022-04-24 | End: 2022-04-24

## 2022-04-24 RX ORDER — METOPROLOL SUCCINATE 100 MG/1
100 TABLET, EXTENDED RELEASE ORAL DAILY
Qty: 30 TABLET | Refills: 3 | Status: SHIPPED | OUTPATIENT
Start: 2022-04-24 | End: 2022-07-01 | Stop reason: SDUPTHER

## 2022-04-24 RX ADMIN — FLUTICASONE PROPIONATE 1 SPRAY: 50 SPRAY, METERED NASAL at 08:42

## 2022-04-24 RX ADMIN — GUAIFENESIN 600 MG: 600 TABLET, EXTENDED RELEASE ORAL at 08:42

## 2022-04-24 RX ADMIN — OXYCODONE HYDROCHLORIDE AND ACETAMINOPHEN 500 MG: 500 TABLET ORAL at 08:42

## 2022-04-24 RX ADMIN — Medication 1000 UNITS: at 08:42

## 2022-04-24 RX ADMIN — LEVALBUTEROL HYDROCHLORIDE 1.25 MG: 1.25 SOLUTION RESPIRATORY (INHALATION) at 10:34

## 2022-04-24 RX ADMIN — BUMETANIDE 1 MG: 1 TABLET ORAL at 08:42

## 2022-04-24 RX ADMIN — WARFARIN SODIUM 2 MG: 2 TABLET ORAL at 11:38

## 2022-04-24 RX ADMIN — Medication 1000 MCG: at 08:42

## 2022-04-24 RX ADMIN — CETIRIZINE HYDROCHLORIDE 5 MG: 10 TABLET, FILM COATED ORAL at 08:42

## 2022-04-24 RX ADMIN — METOPROLOL SUCCINATE 100 MG: 100 TABLET, FILM COATED, EXTENDED RELEASE ORAL at 08:42

## 2022-04-24 RX ADMIN — TIOTROPIUM BROMIDE INHALATION SPRAY 2 PUFF: 3.12 SPRAY, METERED RESPIRATORY (INHALATION) at 10:34

## 2022-04-24 RX ADMIN — SODIUM CHLORIDE, PRESERVATIVE FREE 10 ML: 5 INJECTION INTRAVENOUS at 08:42

## 2022-04-24 NOTE — CARE COORDINATION
4/24/22, 11:34 AM EDT    Patient goals/plan/ treatment preferences discussed by  and . Patient goals/plan/ treatment preferences reviewed with patient/ family. Patient/ family verbalize understanding of discharge plan and are in agreement with goal/plan/treatment preferences. Understanding was demonstrated using the teach back method. AVS provided by RN at time of discharge, which includes all necessary medical information pertaining to the patients current course of illness, treatment, post-discharge goals of care, and treatment preferences.         IMM Letter  IMM Letter given to Patient/Family/Significant other/Guardian/POA/by[de-identified] Alexis Rodríguez, RN, BSN, CM  IMM Letter date given[de-identified] 04/22/22  IMM Letter time given[de-identified] 6101

## 2022-04-24 NOTE — DISCHARGE SUMMARY
Hospital Medicine Discharge Summary      Patient Identification:   Greg Whitman   : 1940  MRN: [id number]   Account: [de-identified]      Patient's PCP: WILBERTO Weeks CNP    Admit Date: 2022     Discharge Date:   22     Admitting Physician: Kojo Goode MD     Discharging Nurse Practitioner: WILBERTO Larsen CNP     Discharge Diagnoses with Assessment/Plan:    1. Acute on chronic hypoxic respiratory failure               - Satting >90% on 2L/NC. Does not normally wear oxygen at baseline. Continue to wean oxygen as tolerated.              -Likely secondary to #2, #3, #4              - Home O2 eval notable that patient requires 2 L oxygen at rest and 4 L with activity. DME order placed for portable O2.                  -Pulmonary toilet: Incentive spirometer, Acapella, cough, deep breathe, OOB with assistance     2. Acute on chronic systolic heart failure              -Echo () notable for EF estimated range of 45-50%, s/p TAVR with normal function, normal mitral valve, mild mitral regurg              - BNP elevated, however patient has chronically elevated BNP, could be in the setting of CKD. - Continue home Bumex              - Strict I&O's              - Daily weight              - Cardiac diet: 2 g Na restriction, 2L fluid restriction        3. Acute COPD exacerbation, improving              - Received Prednisone 40 mg x's 5 days ( - )              - S/p IV Solu medrol (, 4/15)              - Continue Mucinex              - Continue scheduled and as needed bronchodilators              - Pulmonary toilet              - Patient had recent COPD exacerbation requiring hospitalization back in 2022.   Discussed with patient and his wife that he needs to follow-up with a pulmonologist as outpatient for further management of COPD given recurrent exacerbations.      4. Community-acquired pneumonia secondary to rhinovirus enterovirus, improving              - Received 500 mg IV azithromycin 4/15 - 4/19              - Received empiric ceftriaxone 4/15 - 4/21               -CT chest (4/19) notable for patchy multifocal bilateral upper and lower lobe groundglass opacities consistent with pneumonia R>L               - Molecular pneumonia panel (4/19) notable for rhinovirus enterovirus              -Sputum culture showing normal keena with moderate candida albicans growth -> patient is on ICS inhaler BID -> instructed and educated patient on need to rinse mouth out with water (without swallowing) after every dose and on good oral care              - Symptomatic support, pulmonary toilet   - Pulmonary toilet     5. Essential hypertension, controlled              - Continue Cozaar     6. Chronic Persistent atrial fibrillation, with pacemaker              - Currently in afib with controlled rate. Throughout hospital admission patient did have go into afib w/ RVR. Toprolol   XL was increased to 100 mg from 50 mg which has kept his heart rate controlled. - S/p 1 time dose IV metoprolol 4/19                -[id number] score 8              - Continue metoprolol  mg -> rx sent              - Continue Coumadin, goal INR range 2-3. Current INR in therapeutic range -> pharmacy consulted while in hospital. Will need to f/u w/ Coumadin clinic within 2 days.               -Continuous telemetry while in hospital, discontinued upon discharge   - F/u with Dr. Isaac Maier OP cardiology within 1-2 weeks for further management of afb.     7. Aortic stenosis status post TAVR 5/11/2018              -Noted. On warfarin, goal INR of 2-3              -Pharmacy consulted to help in dosage and management of warfarin therapy while in hospital.   -Follow-up outpatient with Coumadin clinic.     8. 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  Follows Dr. Ziggy Abdul [address] nephrology.              -Creatinine stable in baseline range              -Avoid nephrotoxic agents              - Daily lab, monitor              - Will need to f/u Dr. Kvng Stevenson in 1-2 weeks,upon discharge.     9. CAD              - S/p PCI 2002              -Continue home losartan, Lipitor, warfarin, Toprol     10. HLD              - Statin     11. T2DM              -Normally takes metformin at home -> okay to resume at discharge              - Hemoglobin A1c 7.9 (4/20)              - Received low dose SSI while in hospital.  Patient refusing to go home with additional oral hypoglycemic agent or insulin. - Accu-Cheks              - Hypoglycemia protocol              - Diabetic diet              - Recommend f/u with PCP 1-2 wks after d/c for further management     12. Chronic macrocytic anemia              - H&H stable. Transfuse for hemoglobin < 7 g/dL              - Folic acid (8/57) normal, vitamin B12 low              - Continue oral cyanocobalamin      13. History of CVA:               -Noted. States he has no deficits at baseline. Continue blood pressure control, glucose control of diabetes and statin therapy.     14. Obesity              -BMI 32.93 kg/m²              -Discussed and educated on lifestyle modifications  15.        The patient was seen and examined on day of discharge and this discharge summary is in conjunction with any daily progress note from day of discharge. Hospital Course:   Per HPI documented 4/13/2022: Daquan Dunne is a 78 y/o  male with a PMHx of COPD, HFpEF, HTN, Hx of CVA, CAD, S/p TAVR, persistent A. Fib on warfarin who presents to [facility] ED today for the evaluation of cough and shortness of breath that started last evening. The patient states he took a nebulizer treatment at home for shortness of breath before bed, but has had worsening SOB and cough all night. The cough is chronic, but significantly worsened and consistently productive of yellow-green sputum.  Otherwise, the patient has also noted associated worsening SOB for the last few days with activity. He states his shortness of breath resolves with rest. He denies associated chest pain, lightheadedness, dizziness, abdominal pain, fever, chills, worsening LE edema. He states he has not noticed an unexpected weight change, and other wise states he feels well overall. \"     4/20/22: Patient resting in bed, no apparent distress. Chronically ill-appearing. Patient is currently satting 91% on 2 L nasal cannula. Patient does not reportedly wear oxygen at baseline. Patient noted to be in atrial fibrillation RVR with rate of 112-142 bpm.  Patient is asymptomatic. Blood pressures remain hemodynamically stable. He remains afebrile. Ordered to give home medication 50 mg metoprolol XR, and one-time dose IV push metoprolol 5 mg. We will increase patient's oral metoprolol to 100 mg starting tomorrow. Creatinine elevated to 2.5 today after starting Bumex. We will hold his Bumex. Continue breathing treatments. Continue to wean oxygen. Of note, PT/OT evaluated patient and is recommending home health assistance for continued PT/OT. Discussed this with patient and his wife. Patient and his wife are refusing home health assistance at this time.     4/21/22: Patient resting in bed, satting 93% on 2 L nasal cannula, remains afebrile with hemodynamically stable vital signs. Patient heart rate noted to be in atrial fibrillation with left bundle branch block with a rate controlled at  bpm.  I have increased his Toprol  mg which she will receive his first dose today. If patient remains in atrial fibrillation with RVR will consult cardiology and start diltiazem. Patient's creatinine continues to improve. We will hold his Bumex for now. Repeat home O2 eval to evaluate need for home oxygen upon discharge. I discussed with patient and his wife at length about home health assistance and patient is refusing this at this time.   I also discussed with patient needing to follow-up with pulmonology outpatient given his recurrent COPD exacerbations with his most recent one being in March of this year. Patient stated that he was to follow-up with his PCP, and states \"Bailee says I do not need a pulmonologist so I'm not going to see one. \"  We will continue to encourage patient and his wife to see pulmonologist as outpatient upon discharge from hospital.     4/22/22: Patient afebrile, satting 93% on 2 L nasal cannula, hemodynamically stable vital signs. Patient's heart rate remains well controlled. Did notice a few episodes of A. fib RVR this morning on telemetry, however. Patient's nurse states he was coughing during that time. He reports that he still feels short of breath with rest and has some shortness of breath with exertion however he states he was able to work with physical therapy yesterday to walk the unit without feeling \"overly short of breath. \"  Patient's kidney function is doing better, we will restart his Bumex and repeat a BMP in the morning. Of note patient's sodium level is at 132, will continue to monitor. Additionally patient's white blood cell count has remained elevated. Currently downtrending to 17.5 from 18.5 yesterday. Low suspicion for acute bacterial component given patient has completed IV antibiotic therapy and has remained afebrile. However will obtain MRSA swab further rule out. Leukocytosis likely secondary to IV steroid and p.o. steroid therapy. We will repeat patient's home oxygen evaluation as his prior is 3days old        4/23/22: Patient afebrile, satting 93% on 2L with hemodynamically stable vital signs. Reports that he is feeling better and is hoping to go home soon. Heart rate of 157 bpm was documented yesterday afternoon. Nursing staff unclear if patient was ambulating or coughing during this time when HR was documented.  They were also unsure if the patient's heart rate was actually assessed by PCA or just taken off telemetry monitor screen. I reviewed the telemetry which showed patient to be in 120s range with significant artifact baseline. Currently patient is in afib with controlled rate and has been throughout the night. Given prior occurrences of afib w/ RVR on this admission, will keep patient overnight for further monitoring. 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 Leukocytosis is downtrending and elevation most likely from IV and p.o. steroids administered on this admission. Kidney fxn is stable even after restarting Bumex. 4/24/22: Patient remains afebrile, satting 91% on 2 L nasal cannula, with hemodynamically stable vital signs. Patient states that he feels well this morning. He denies chest pain, worsening shortness of breath at rest, worsening dyspnea on exertion, abdominal pain, nausea, vomiting, diarrhea, constipation, fever, chills, dizziness, palpitations. States that he still has a cough however reports his sputum is white in nature with tinges of yellow. States that he is able to walk around the hospital unit without excessive fatigue or shortness of breath. Patient electrolytes remained stable, sodium has remained stable and consistent at 132. Patient does have intermittent history of mild hyponatremia noted on prior encounters. Of note patient's white blood cell count was initially elevated however was downtrending yesterday. Repeat white blood cell count 17.9 today. Through chart review patient has history of labile leukocytosis. Current leukocytosis likely reactive to viral pneumonia and stress response. Patient is well-appearing on physical examination, lung sounds are clear diminished without rhonchi/rales. Low suspicion for superimposed bacterial component as patient has been empirically treated with IV antibiotics for CAP, remains afebrile hemodynamically stable vital signs. .  Patient had recent COPD exacerbation requiring hospitalization back in March 2022. Discussed with patient and his wife that he needs to follow-up with a pulmonologist as outpatient for further management of COPD given recurrent exacerbations. Patient stating that he will consider this however he prefers to follow-up with his PCP. Instructed patient that he will need to continue to take Toprol  mg that he was noted to be in RVR during this hospital admission while on 50 mg. Instructed patient that he needs to follow-up with Dr. Jhon Sanchez within 1 to 2 weeks for further management of his atrial fibrillation. Instructed him to follow-up with his nephrologist within 1 week for further management of his kidney function. Patient instructed to get repeat BMP and CBC collected within 1 week with follow-up at his PCPs office for further evaluation and management of kidney fxn and electrolytes. Patient's vitamin B12 level was noted to be low upon this admission. We will be sending him home with a prescription for cyanocobalamin. Patient's INR in therapeutic range. We will need to follow-up with Coumadin clinic upon discharge within 1-2 days. Prior to discharge home oxygen evaluation was performed noting that patient required 2 L of oxygen at rest and 4 L with exertion. DME order was placed for portable oxygen to go home with. Instructed patient that he needs to continue to use his incentive spirometer and Acapella device a minimum of 10 times every hour to help exercise his lungs. Prior to discharge patient and his wife were provided opportunity to ask questions. At this time patient is stable for discharge.           Exam:     Vitals:  Vitals:    04/23/22 2030 04/23/22 2136 04/24/22 0313 04/24/22 0407   BP: 99/63   106/83   Pulse: 81   66   Resp: 16 16  16   Temp: 98.7 °F (37.1 °C)   97.9 °F (36.6 °C)   TempSrc: Oral   Oral   SpO2: 95% 92%  91%   Weight:   204 lb 1.6 oz (92.6 kg)    Height:         Weight: Weight: 204 lb 1.6 oz (92.6 kg)     24 hour intake/output:    Intake/Output Summary (Last 24 hours) at 4/24/2022 0933  Last data filed at 4/24/2022 2325  Gross per 24 hour   Intake 510 ml   Output 300 ml   Net 210 ml     General appearance: No apparent distress, appears older than stated age and cooperative. Chronically ill-appearing  HEENT: Pupils equal, round, and reactive to light. Conjunctivae/corneas clear. Neck: Supple, with full range of motion. No jugular venous distention. Trachea midline. Respiratory:  Normal respiratory effort, able to speak full clear sentences. Clear diminished in bases to auscultation, bilaterally with faint expiratory wheezes in LL's. Cardiovascular:  Controlled irregular rate and irregular rhythm with normal S1/S2 without murmurs, rubs or gallops. No RLE ankle edema, no edema LLE. Abdomen: Soft, non-tender, non-distended with normal bowel sounds. Musculoskeletal: passive and active ROM x 4 extremities. Skin: Skin color, texture, turgor normal.  No rashes or lesions. Neurologic:  Neurovascularly intact without any focal sensory/motor deficits. Cranial nerves: II-XII intact, grossly non-focal.  Psychiatric: Alert and oriented, thought content appropriate, normal insight  Capillary Refill: Brisk,< 3 seconds   Peripheral Pulses: +2 palpable, equal bilaterally           Labs: For convenience and continuity at follow-up the following most recent labs are provided:      CBC:    Lab Results   Component Value Date    WBC 17.9 04/24/2022    HGB 12.5 04/24/2022    HCT 39.2 04/24/2022     04/24/2022       Renal:    Lab Results   Component Value Date     04/24/2022    K 4.8 04/24/2022    K 4.4 04/14/2022    CL 98 04/24/2022    CO2 19 04/24/2022    BUN 57 04/24/2022    CREATININE 2.3 04/24/2022    CALCIUM 8.6 04/24/2022    PHOS 2.8 04/05/2018       Cardiac: No results for input(s): Corky Magdalena in the last 72 hours. Significant Diagnostic Studies    Radiology:   CT CHEST WO CONTRAST   Final Result   1.  Patchy multifocal bilateral upper and lower lobe groundglass opacities are consistent pneumonia in the appropriate clinical setting. Close clinical and imaging follow-up to ensure resolution is advised. 2. Stable chronic findings are discussed. **This report has been created using voice recognition software. It may contain minor errors which are inherent in voice recognition technology. **      Final report electronically signed by Dr aKtlyn Kim on 4/19/2022 10:09 AM      XR CHEST PORTABLE   Final Result   Persistent by lateral lower lobe infiltrates            **This report has been created using voice recognition software. It may contain minor errors which are inherent in voice recognition technology. **      Final report electronically signed by Dr. Anshu Mcgee on 4/18/2022 3:29 PM      XR CHEST PORTABLE   Final Result   1. Bilateral lower lung atelectasis/infiltrate. 2. Mild stable cardiomegaly. **This report has been created using voice recognition software. It may contain minor errors which are inherent in voice recognition technology. **      Final report electronically signed by Dr. Christiano Vazquez on 4/15/2022 10:54 AM      XR CHEST PORTABLE   Final Result   Faint bibasilar interstitial opacities. This can represent mild pulmonary edema. **This report has been created using voice recognition software. It may contain minor errors which are inherent in voice recognition technology. **      Final report electronically signed by Dr. Yolanda Nassar on 4/13/2022 7:19 AM             Consults:     [facility]    Disposition:    [x] Home       [] TCU       [] Rehab       [] Psych       [] SNF       [] [place]       [] Other-    Condition at Discharge: Stable    Code Status:  DNR-CCA     Pending tests at discharge: None    Patient Instructions:    Discharge lab work: BMP & CBC   Activity: activity as tolerated  Diet: ADULT DIET; Regular; 4 carb choices (60 gm/meal); Low Sodium (2 gm); 1800 ml      Follow-up visits:   [address], [address] [facility]  [address] [facility] [address] [phone]    In 1 week  for further management of atrial fibrillation / post hospital follow up. Call for appointment. WILBERTO Hayden - CNP  [address] [facility]  [address] [facility]  [phone]    In 1 week  for post hospital follow up. Call for appointment. [address] [facility]  [phone]  In 2 weeks  For further management of Joby ShineDO PRASAD/ [address]  [phone]    In 1 week  Management of your chronic kidney disease         Discharge Medications:        Medication List      START taking these medications    cyanocobalamin 1000 MCG tablet  Take 1 tablet by mouth daily        CHANGE how you take these medications    fluticasone 50 MCG/ACT nasal spray  Commonly known as: FLONASE  2 sprays by Each Nostril route daily  What changed:   · when to take this  · reasons to take this     loratadine 10 MG tablet  Commonly known as: Claritin  Take 1 tablet by mouth daily  What changed:   · when to take this  · reasons to take this     metoprolol succinate 100 MG extended release tablet  Commonly known as: TOPROL XL  Take 1 tablet by mouth daily  What changed:   · medication strength  · how much to take     warfarin 2.5 MG tablet  Commonly known as: COUMADIN  Take as directed. If you are unsure how to take this medication, talk to your nurse or doctor. Original instructions:  Take 1 tablet by mouth daily  What changed: additional instructions        CONTINUE taking these medications    * albuterol 1.25 MG/3ML nebulizer solution  Commonly known as: ACCUNEB  Inhale 3 mLs into the lungs every 6 hours as needed for Wheezing     * albuterol sulfate  (90 Base) MCG/ACT inhaler  Commonly known as: Ventolin HFA  Inhale 2 puffs into the lungs 4 times daily as needed for Wheezing     atorvastatin 80 MG tablet  Commonly known as: LIPITOR     B-D SINGLE USE SWABS REGULAR Pads     Sonjai Aerosphere 160-9-4.8 MCG/ACT Aero  Generic drug: Budeson-Glycopyrrol-Formoterol  Inhale 2 puffs into the lungs 2 times daily     bumetanide 1 MG tablet  Commonly known as: BUMEX  Take 1 tablet by mouth daily     guaiFENesin 600 MG extended release tablet  Commonly known as: MUCINEX     losartan 25 MG tablet  Commonly known as: COZAAR  Take 1 tablet by mouth nightly     metFORMIN 500 MG tablet  Commonly known as: GLUCOPHAGE  Take 1 tablet by mouth 2 times daily (with meals)     nitroGLYCERIN 0.4 MG/HR  Commonly known as: NITRODUR     promethazine-dextromethorphan 6.25-15 MG/5ML syrup  Commonly known as: PROMETHAZINE-DM  Take 5 mLs by mouth nightly     True Metrix Blood Glucose Test strip  Generic drug: blood glucose test strips     TRUEplus Lancets 28G Misc     VITAMIN C ER PO     vitamin D 1000 UNIT Tabs tablet  Commonly known as: CHOLECALCIFEROL  Take 1 tablet by mouth daily         * This list has 2 medication(s) that are the same as other medications prescribed for you. Read the directions carefully, and ask your doctor or other care provider to review them with you. Where to Get Your Medications      These medications were sent to [address] [place] [address]  [address], [address] [facility]    Phone: [phone]   · cyanocobalamin 1000 MCG tablet  · metoprolol succinate 100 MG extended release tablet         Time Spent on discharge is more than 1.5 hours in the examination, evaluation, counseling and review of medications and discharge plan. Signed: Thank you WILBERTO Michelle CNP for the opportunity to be involved in this patient's care.     Electronically signed by WILBERTO Figueroa CNP on 4/24/2022 at 9:33 AM

## 2022-04-24 NOTE — DISCHARGE INSTR - MEDS
INR today:  Recent Labs     04/24/22  0539   INR 2.59*     Recommendations for discharge:   Date Warfarin Dose   4/24/22 2.5 mg   4/25/22 2.5 mg   4/26/22 2.5 mg   4/27/22 INR     Provider dosing warfarin: [facility]    Recheck INR:  Wednesday 4/27/22 at 10:40 AM at [address]

## 2022-04-24 NOTE — PROGRESS NOTES
Clinical Pharmacy Note    Warfarin consult follow-up    Recent Labs     04/24/22  0539   INR 2.59*     Recent Labs     04/22/22  0442 04/23/22  0450 04/24/22  0539   HGB 12.2* 11.6* 12.5*   HCT 37.5* 35.9* 39.2*    169 192     Significant Drug-Drug Interactions:  New warfarin drug-drug interactions: none  Discontinued drug-drug interactions: none  Current warfarin drug-drug interactions: ascorbic acid     Date INR Warfarin Dose   4/13/22 2.76 1 mg    4/14/22 2.47 2.5 mg   4/15/22 2.39 2.5 mg   4/16/22 3.44 No warfarin   4/17/22 3.25 No warfarin   4/18/22 2.16 2.5 mg   4/19/22 1.86 4 mg   4/20/22 1.80 3 mg   4/21/22 2.21 1 mg   4/22/22 2.80 1 mg   4/23/22 3 1 mg   4/24/22 2.59 2 mg       Notes:                   PT/INR or POC-INR will be monitored routinely until therapeutic INR is achieved.

## 2022-04-24 NOTE — PLAN OF CARE
Problem: Respiratory - Adult  Goal: Clear lung sounds  Description: Clear lung sounds  4/24/2022 1040 by Talia Norris RCP  Outcome: Adequate for Discharge  4/23/2022 2140 by Gus Reynolds RCP  Outcome: Progressing   Pt has clear breath sounds, good oxygen saturation on 2lpm nc, and did well with breathing tx's and mdi's.

## 2022-04-24 NOTE — PLAN OF CARE
Problem: Respiratory - Adult  Goal: Clear lung sounds  Description: Clear lung sounds  4/23/2022 2140 by Renu Rodriguez RCP  Outcome: Progressing     Continue breathing treatments for maintenance and to improve aeration of lungs

## 2022-04-24 NOTE — PLAN OF CARE
Problem: Falls - Risk of:  Goal: Will remain free from falls  Description: Will remain free from falls  Outcome: Progressing  Patient remains free from falls this shift. Fall risk assessment complete. Fall sign posted. Non skid socks on. Bed in lowest position, 2/4 siderails up. Bed alarm on. Call light and all possessions within reach. Ambulates  in room with  assistive devices. Rounding hourly   Problem: Pain:  Goal: Patient's pain/discomfort is manageable  Description: Patient's pain/discomfort is manageable  Outcome: Progressing   Pain Assessment: 0-10  Pain Level: 0   Patient's Stated Pain Goal: 0 - No pain   Is pain goal met at this time? Yes   Patient denies pain this shift. Problem: Skin Integrity:  Goal: Skin integrity will stabilize  Description: Skin integrity will stabilize  Outcome: Progressing   No new skin breakdown. Turns self. Problem: Respiratory - Adult  Goal: Clear lung sounds  Description: Clear lung sounds  4/23/2022 2140 by Yoel Melgar RCP  Outcome: Progressing   Rhonchi noted upon auscultation. Continues having productive cough. 2L NC. Problem: Discharge Planning:  Goal: Patients continuum of care needs are met  Description: Patients continuum of care needs are met  Outcome: Progressing   Plan to return home with wife at discharge. Care plan reviewed with patient and wife. Patient and wife verbalize understanding of the plan of care and contribute to goal setting.

## 2022-04-24 NOTE — PROGRESS NOTES
Clinical Pharmacy Note                                               Warfarin Discharge Recommendations      Patient discharged from [facility] today after admission for pneumonia    INR today:  Recent Labs     04/24/22  0539   INR 2.59*     Coumadin 2.5 mg tabs    Interacting medications at discharge: ascorbic acid    INR goal during admission: 2-3    Recommendations for discharge:   Date Warfarin Dose   4/24/22 2.5 mg   4/25/22 2.5 mg   4/26/22 2.5 mg   4/27/22 INR     Provider dosing warfarin: [facility]    Recheck INR:  Wednesday 4/27/22 at 10:40 AM at [address]

## 2022-04-26 ENCOUNTER — TELEPHONE (OUTPATIENT)
Dept: FAMILY MEDICINE CLINIC | Age: 82
End: 2022-04-26

## 2022-04-26 DIAGNOSIS — J41.1 MUCOPURULENT CHRONIC BRONCHITIS (HCC): ICD-10-CM

## 2022-04-26 DIAGNOSIS — I48.91 ATRIAL FIBRILLATION WITH RVR (HCC): ICD-10-CM

## 2022-04-26 DIAGNOSIS — I50.43 CHF (CONGESTIVE HEART FAILURE), NYHA CLASS III, ACUTE ON CHRONIC, COMBINED (HCC): ICD-10-CM

## 2022-04-26 DIAGNOSIS — J44.1 COPD EXACERBATION (HCC): Primary | ICD-10-CM

## 2022-04-26 DIAGNOSIS — N18.4 STAGE 4 CHRONIC KIDNEY DISEASE (HCC): ICD-10-CM

## 2022-04-26 DIAGNOSIS — E11.9 TYPE 2 DIABETES MELLITUS WITHOUT COMPLICATION, WITHOUT LONG-TERM CURRENT USE OF INSULIN (HCC): ICD-10-CM

## 2022-04-26 NOTE — TELEPHONE ENCOUNTER
The patient's emergency contact, Raleigh See stopped by the office and said that the patient started to have diarrhea today and wanted to know what he should take. Please advise.

## 2022-04-26 NOTE — TELEPHONE ENCOUNTER
I faxed the referral to the referring office along with a Referral Cover Sheet and received a fax confirmation back.

## 2022-04-26 NOTE — TELEPHONE ENCOUNTER
The patient's emergency contact, Ingrid Ding came into the office and wanted [facility] to order home health for the patient. They offered it to the patient when he was in the hospital but he refused at that time. I asked her if the patient had a preference and she said [facility]

## 2022-04-26 NOTE — TELEPHONE ENCOUNTER
I called and spoke to the patient's emergency contact, Danyelle Jorge. I let her know Bailee's response. She said that the patient is not having the diarrhea right now and that she went to the drug store and the pharmacist something for him to take. I did tell her not to give him anything for the diarrhea if he is no longer having the symptoms. I did let her know that I would get the referral sent over as soon as I could. She voiced understanding.

## 2022-04-27 ENCOUNTER — TELEPHONE (OUTPATIENT)
Dept: FAMILY MEDICINE CLINIC | Age: 82
End: 2022-04-27

## 2022-04-27 ENCOUNTER — CARE COORDINATION (OUTPATIENT)
Dept: CARE COORDINATION | Age: 82
End: 2022-04-27

## 2022-04-27 ENCOUNTER — HOSPITAL ENCOUNTER (OUTPATIENT)
Dept: PHARMACY | Age: 82
Setting detail: THERAPIES SERIES
Discharge: HOME | End: 2022-04-27
Payer: MEDICARE

## 2022-04-27 DIAGNOSIS — I48.21 PERMANENT ATRIAL FIBRILLATION (HCC): Primary | ICD-10-CM

## 2022-04-27 DIAGNOSIS — Z79.01 ANTICOAGULATED ON COUMADIN: ICD-10-CM

## 2022-04-27 DIAGNOSIS — Z51.81 ENCOUNTER FOR THERAPEUTIC DRUG MONITORING: ICD-10-CM

## 2022-04-27 LAB — POC INR: 3.6 (ref 0.8–1.2)

## 2022-04-27 PROCEDURE — 85610 PROTHROMBIN TIME: CPT

## 2022-04-27 PROCEDURE — 99212 OFFICE O/P EST SF 10 MIN: CPT

## 2022-04-27 PROCEDURE — 36416 COLLJ CAPILLARY BLOOD SPEC: CPT

## 2022-04-27 NOTE — TELEPHONE ENCOUNTER
Pt called to report his blood sugar was 200 today. Was elevated in hospital as well. Last GFR at 26 ml/min. Continue metformin. Low carb diet for now. Recheck on Monday in office visit. Pt to continue to check over the weekend. He is on fluid restriction it appears, was instructed to meet the restriction but do not go over the 1500 ml restriction. Pt agreeable to plan of care.

## 2022-04-27 NOTE — CARE COORDINATION
I called and spoke with the patient in regards to getting him some assistance on his Breztri inhaler through the PAP program. This referral came from the coumadin clinic in [facility]. I am faxing the MD now their portion and will mail him his portion next time I am at the office.           [address] [place] [address]   Medication Assistance  [address] (o) [phone] (o) [phone]

## 2022-04-27 NOTE — PROGRESS NOTES
Medication Management [address]   [phone] (phone)  [phone] (fax)    Mr. Greg Whitman is a 79 y.o.  male with history of Afib who presents today for anticoagulation monitoring and adjustment. Patient verifies current dosing regimen and tablet strength. - Updated trackboard to doses received in the hospital.  No missed or extra doses. Patient denies s/s bleeding/bruising/swelling/SOB/chest pain  - Both feet swelled up yesterday. Swelling was down. - Having pains when moves in chest/abdominal area. Pointing to rib area. - Shortness of breath secondary to recent pneumonia and CHF. Now on home oxygen. No blood in urine or stool. No dietary changes. No changes in medication/OTC agents/Herbals. - Dose of metoprolol succinate increased to 100 mg daily (from 50 mg daily on hospital discharge. - Patient also started on vitamin B12 on discharge from hospital.  No change in alcohol use or tobacco use. No change in activity level. - Low to minimum  Patient denies headaches/dizziness/lightheadedness/falls. No vomiting/diarrhea or acute illness.   - Diarrhea, but last night it stopped  No Procedures scheduled in the future at this time. Patient complaining of how thirsty he has been. Advised patient to check blood sugar when he gets home. If it is elevated (over baseline), he is to call his PCP. Patient stated that he was receiving insulin while admitted to the hospital, but did not go home on any. Discharged from hospital on 4/24/22 for pneumonia and CHF. May be transitioned to home health this week,. Assessment:   Lab Results   Component Value Date    INR 3.60 (H) 04/27/2022    INR 2.59 (H) 04/24/2022    INR 3.00 (H) 04/23/2022     INR supratherapeutic   Recent Labs     04/27/22  1124   INR 3.60*         Plan:  Hold Coumadin today, then take Coumadin 1.25 mg tomorrow, then continue Coumadin 2.5 mg daily, except 1.25 mg on Wednesdays.   Recheck INR in 1 week(s). Patient reminded to call the Anticoagulation Clinic with any signs or symptoms of bleeding or with any medication changes. Patient given instructions utilizing the teach back method. After visit summary printed and reviewed with patient. Discharged ambulatory in no apparent distress.       For Pharmacy Admin Tracking Only     Intervention Detail: Adherence Monitorin and Dose Adjustment: 1, reason: Therapy Optimization   Total # of Interventions Recommended: 2   Total # of Interventions Accepted: 2   Time Spent (min): [address], PharmD, BCPS  2022  12:54 PM

## 2022-04-29 ENCOUNTER — APPOINTMENT (OUTPATIENT)
Dept: GENERAL RADIOLOGY | Age: 82
End: 2022-04-29
Payer: MEDICARE

## 2022-04-29 ENCOUNTER — HOSPITAL ENCOUNTER (EMERGENCY)
Age: 82
Discharge: HOME | End: 2022-04-29
Payer: MEDICARE

## 2022-04-29 VITALS
WEIGHT: 203 LBS | TEMPERATURE: 97.8 F | SYSTOLIC BLOOD PRESSURE: 134 MMHG | HEART RATE: 77 BPM | DIASTOLIC BLOOD PRESSURE: 95 MMHG | BODY MASS INDEX: 32.62 KG/M2 | OXYGEN SATURATION: 94 % | RESPIRATION RATE: 22 BRPM | HEIGHT: 66 IN

## 2022-04-29 DIAGNOSIS — R06.02 SOB (SHORTNESS OF BREATH): Primary | ICD-10-CM

## 2022-04-29 DIAGNOSIS — N18.9 CHRONIC KIDNEY DISEASE, UNSPECIFIED CKD STAGE: ICD-10-CM

## 2022-04-29 DIAGNOSIS — I50.9 CHRONIC CONGESTIVE HEART FAILURE, UNSPECIFIED HEART FAILURE TYPE (HCC): ICD-10-CM

## 2022-04-29 LAB
ANION GAP SERPL CALCULATED.3IONS-SCNC: 16 MEQ/L (ref 8–16)
BASOPHILS # BLD: 0.4 %
BASOPHILS ABSOLUTE: 0.1 THOU/MM3 (ref 0–0.1)
BUN BLDV-MCNC: 49 MG/DL (ref 7–22)
CALCIUM SERPL-MCNC: 9.2 MG/DL (ref 8.5–10.5)
CHLORIDE BLD-SCNC: 97 MEQ/L (ref 98–111)
CO2: 22 MEQ/L (ref 23–33)
CREAT SERPL-MCNC: 2.4 MG/DL (ref 0.4–1.2)
EKG Q-T INTERVAL: 424 MS
EKG QRS DURATION: 136 MS
EKG QTC CALCULATION (BAZETT): 492 MS
EKG R AXIS: 48 DEGREES
EKG T AXIS: 114 DEGREES
EKG VENTRICULAR RATE: 81 BPM
EOSINOPHIL # BLD: 1.3 %
EOSINOPHILS ABSOLUTE: 0.2 THOU/MM3 (ref 0–0.4)
ERYTHROCYTE [DISTWIDTH] IN BLOOD BY AUTOMATED COUNT: 15 % (ref 11.5–14.5)
ERYTHROCYTE [DISTWIDTH] IN BLOOD BY AUTOMATED COUNT: 53.1 FL (ref 35–45)
GFR SERPL CREATININE-BSD FRML MDRD: 26 ML/MIN/1.73M2
GLUCOSE BLD-MCNC: 113 MG/DL (ref 70–108)
HCT VFR BLD CALC: 39.7 % (ref 42–52)
HEMOGLOBIN: 12.8 GM/DL (ref 14–18)
IMMATURE GRANS (ABS): 0.12 THOU/MM3 (ref 0–0.07)
IMMATURE GRANULOCYTES: 0.7 %
LYMPHOCYTES # BLD: 6.9 %
LYMPHOCYTES ABSOLUTE: 1.1 THOU/MM3 (ref 1–4.8)
MCH RBC QN AUTO: 31.1 PG (ref 26–33)
MCHC RBC AUTO-ENTMCNC: 32.2 GM/DL (ref 32.2–35.5)
MCV RBC AUTO: 96.4 FL (ref 80–94)
MONOCYTES # BLD: 8.5 %
MONOCYTES ABSOLUTE: 1.4 THOU/MM3 (ref 0.4–1.3)
NUCLEATED RED BLOOD CELLS: 0 /100 WBC
OSMOLALITY CALCULATION: 283.9 MOSMOL/KG (ref 275–300)
PLATELET # BLD: 221 THOU/MM3 (ref 130–400)
PMV BLD AUTO: 10.8 FL (ref 9.4–12.4)
POTASSIUM SERPL-SCNC: 4.3 MEQ/L (ref 3.5–5.2)
PRO-BNP: 3265 PG/ML (ref 0–1800)
RBC # BLD: 4.12 MILL/MM3 (ref 4.7–6.1)
SEG NEUTROPHILS: 82.2 %
SEGMENTED NEUTROPHILS ABSOLUTE COUNT: 13.3 THOU/MM3 (ref 1.8–7.7)
SODIUM BLD-SCNC: 135 MEQ/L (ref 135–145)
TROPONIN T: 0.04 NG/ML
WBC # BLD: 16.2 THOU/MM3 (ref 4.8–10.8)

## 2022-04-29 PROCEDURE — 85025 COMPLETE CBC W/AUTO DIFF WBC: CPT

## 2022-04-29 PROCEDURE — 96374 THER/PROPH/DIAG INJ IV PUSH: CPT

## 2022-04-29 PROCEDURE — 93005 ELECTROCARDIOGRAM TRACING: CPT | Performed by: NURSE PRACTITIONER

## 2022-04-29 PROCEDURE — 93010 ELECTROCARDIOGRAM REPORT: CPT | Performed by: INTERNAL MEDICINE

## 2022-04-29 PROCEDURE — 99285 EMERGENCY DEPT VISIT HI MDM: CPT

## 2022-04-29 PROCEDURE — 80048 BASIC METABOLIC PNL TOTAL CA: CPT

## 2022-04-29 PROCEDURE — 84484 ASSAY OF TROPONIN QUANT: CPT

## 2022-04-29 PROCEDURE — 2500000003 HC RX 250 WO HCPCS: Performed by: NURSE PRACTITIONER

## 2022-04-29 PROCEDURE — 83880 ASSAY OF NATRIURETIC PEPTIDE: CPT

## 2022-04-29 PROCEDURE — 71046 X-RAY EXAM CHEST 2 VIEWS: CPT

## 2022-04-29 RX ORDER — BUMETANIDE 0.25 MG/ML
0.5 INJECTION, SOLUTION INTRAMUSCULAR; INTRAVENOUS ONCE
Status: COMPLETED | OUTPATIENT
Start: 2022-04-29 | End: 2022-04-29

## 2022-04-29 RX ORDER — ECHINACEA PURPUREA EXTRACT 125 MG
1 TABLET ORAL PRN
Qty: 1 EACH | Refills: 3 | Status: SHIPPED | OUTPATIENT
Start: 2022-04-29

## 2022-04-29 RX ADMIN — BUMETANIDE 0.5 MG: 0.25 INJECTION, SOLUTION INTRAMUSCULAR; INTRAVENOUS at 18:54

## 2022-04-29 ASSESSMENT — PAIN DESCRIPTION - LOCATION: LOCATION: CHEST

## 2022-04-29 ASSESSMENT — PAIN DESCRIPTION - DESCRIPTORS: DESCRIPTORS: DISCOMFORT

## 2022-04-29 ASSESSMENT — ENCOUNTER SYMPTOMS
NAUSEA: 0
VOMITING: 0
CHEST TIGHTNESS: 0
RHINORRHEA: 0
SORE THROAT: 0
DIARRHEA: 0
SHORTNESS OF BREATH: 1
COLOR CHANGE: 0
ABDOMINAL PAIN: 0
COUGH: 1
ABDOMINAL DISTENTION: 0
SINUS PRESSURE: 0

## 2022-04-29 ASSESSMENT — PAIN - FUNCTIONAL ASSESSMENT: PAIN_FUNCTIONAL_ASSESSMENT: 0-10

## 2022-04-29 NOTE — ED NOTES
Patient to ED for increased SOB. Patient released last week from the hospital with pneumonia. Patient was given 02 to wear at home.  Patient states he has not improved and has been getting worse      Rl Preston RN  04/29/22 2314

## 2022-04-30 NOTE — ED PROVIDER NOTES
[facility] Emergency Department    CHIEF COMPLAINT       Chief Complaint   Patient presents with    Shortness of Breath       Nurses Notes reviewed and I agree except as noted in the HPI. HISTORY OF PRESENT ILLNESS   Patient has a history of Cathy Khanna is a 79 y.o. male who presents to the ED for evaluation of shortness of breath. Patient notes history of recent pneumonia, discharged from the hospital recently. Wearing 3 L of oxygen at home. Notes a history of COPD and congestive heart failure as well. Denies any wheezing. Notes significant amount of sputum which she has coughed up and his breathing has improved. Denies any fevers or chills. Denies any nausea or vomiting. Notes he tried to call home health, evaluated today but they were unable to, so he came to the ER. On a kidney disease, atrial fibrillation, CHF, COPD, diabetes. HPI was provided by the patient. REVIEW OF SYSTEMS     Review of Systems   Constitutional: Positive for fatigue. Negative for activity change, chills and fever. HENT: Positive for congestion. Negative for rhinorrhea, sinus pressure and sore throat. Respiratory: Positive for cough and shortness of breath. Negative for chest tightness. Cardiovascular: Negative for chest pain. Gastrointestinal: Negative for abdominal distention, abdominal pain, diarrhea, nausea and vomiting. Genitourinary: Negative for decreased urine volume and difficulty urinating. Musculoskeletal: Negative for arthralgias and myalgias. Skin: Negative for color change and rash. Allergic/Immunologic: Negative for immunocompromised state. Neurological: Negative for dizziness, weakness, light-headedness, numbness and headaches. Hematological: Does not bruise/bleed easily. Psychiatric/Behavioral: Negative for agitation and confusion.         PAST MEDICAL HISTORY     Past Medical History:   Diagnosis Date    [facility]     Atrial fibrillation ([facility]     Cerebral artery occlusion with [facility] infarction [facility]     CHF (congestive heart failure), NYHA class III, acute on chronic, combined ([facility]     COPD (chronic obstructive pulmonary disease) ([facility] 8/3/2020    Coronary artery disease involving native coronary artery of native heart with angina pectoris ([facility]     Diabetes mellitus, type 2 ([facility] 12/30/2020    Hyperlipidemia 2/20/2012    Hypertension     Pneumonia        SURGICALHISTORY      has a past surgical history that includes Pacemaker insertion; hernia repair; Cardiac surgery; Coronary angioplasty with stent; other surgical history (05/10/2018); and Aortic valve replacement.     CURRENT MEDICATIONS       Discharge Medication List as of 4/29/2022  7:12 PM      CONTINUE these medications which have NOT CHANGED    Details   metoprolol succinate (TOPROL XL) 100 MG extended release tablet Take 1 tablet by mouth daily, Disp-30 tablet, R-3Normal      vitamin B-12 1000 MCG tablet Take 1 tablet by mouth daily, Disp-30 tablet, R-3Normal      guaiFENesin (MUCINEX) 600 MG extended release tablet Take 600 mg by mouth 2 times dailyHistorical Med      loratadine (CLARITIN) 10 MG tablet Take 1 tablet by mouth daily, Disp-30 tablet, R-0Normal      fluticasone (FLONASE) 50 MCG/ACT nasal spray 2 sprays by Each Nostril route daily, Disp-1 each, R-0Normal      Budeson-Glycopyrrol-Formoterol (BREZTRI AEROSPHERE) 160-9-4.8 MCG/ACT AERO Inhale 2 puffs into the lungs 2 times daily, Disp-3 each, R-3Print      albuterol sulfate HFA (VENTOLIN HFA) 108 (90 Base) MCG/ACT inhaler Inhale 2 puffs into the lungs 4 times daily as needed for Wheezing, Disp-54 g, R-1Normal      promethazine-dextromethorphan (PROMETHAZINE-DM) 6.25-15 MG/5ML syrup Take 5 mLs by mouth nightly, Disp-450 mL, R-0Normal      Ascorbic Acid (VITAMIN C ER PO) Take 1 tablet by mouth dailyHistorical Med      nitroGLYCERIN (NITRODUR) 0.4 MG/HR Place 1 patch onto the skin dailyHistorical Med      albuterol (ACCUNEB) 1.25 MG/3ML nebulizer solution Inhale 3 mLs into the lungs every 6 hours as needed for Wheezing, Disp-360 mL, R-3Normal      warfarin (COUMADIN) 2.5 MG tablet Take 1 tablet by mouth daily, Disp-90 tablet, R-3Normal      bumetanide (BUMEX) 1 MG tablet Take 1 tablet by mouth daily, Disp-90 tablet, R-3Normal      losartan (COZAAR) 25 MG tablet Take 1 tablet by mouth nightly, Disp-90 tablet, R-3Normal      metFORMIN (GLUCOPHAGE) 500 MG tablet Take 1 tablet by mouth 2 times daily (with meals), Disp-180 tablet, R-3Normal      TRUEplus Lancets 28G MISC Starting 2020, Historical MedUse to test blood sugar as needed      TRUE METRIX BLOOD GLUCOSE TEST strip Use to test blood sugar as needed, DAWHistorical Med      Alcohol Swabs (B-D SINGLE USE SWABS REGULAR) PADS Starting 2020, Historical MedUse to test blood sugar as needed      vitamin D (CHOLECALCIFEROL) 1000 UNIT TABS tablet Take 1 tablet by mouth dailyOTC      atorvastatin (LIPITOR) 80 MG tablet Take 80 mg by mouth nightlyHistorical Med             ALLERGIES     is allergic to benzonatate and xanax [alprazolam]. FAMILY HISTORY     He indicated that his mother is . He indicated that his father is . Family history is unknown by patient. SOCIAL HISTORY       Social History     Socioeconomic History    Marital status:       Spouse name: Not on file    Number of children: Not on file    Years of education: Not on file    Highest education level: Not on file   Occupational History    Not on file   Tobacco Use    Smoking status: Former Smoker     Packs/day: 1.00     Years: 50.00     Pack years: 50.00     Types: Cigarettes    Smokeless tobacco: Never Used    Tobacco comment: quit 20 years ago   Vaping Use    Vaping Use: Never used   Substance and Sexual Activity    Alcohol use: Not Currently     Comment: rarely    Drug use: No    Sexual activity: Yes     Partners: Female   Other Topics Concern    Not on file   Social History Narrative    Not on file     Social Determinants of Health     Financial Resource Strain: Low Risk     Difficulty of Paying Living Expenses: Not hard at all   Food Insecurity: No Food Insecurity    Worried About Running Out of Food in the Last Year: Never true    [address] in the Last Year: Never true   Transportation Needs:     Lack of Transportation (Medical): Not on file    Lack of Transportation (Non-Medical): Not on file   Physical Activity: Sufficiently Active    Days of Exercise per Week: 6 days    Minutes of Exercise per Session: 30 min   Stress:     Feeling of Stress : Not on file   Social Connections:     Frequency of Communication with Friends and Family: Not on file    Frequency of Social Gatherings with Friends and Family: Not on file    Attends Jehovah's witness Services: Not on file    Active Member of Clubs or Organizations: Not on file    Attends Club or Organization Meetings: Not on file    Marital Status: Not on file   Intimate Partner Violence:     Fear of Current or Ex-Partner: Not on file    Emotionally Abused: Not on file    Physically Abused: Not on file    Sexually Abused: Not on file   Housing Stability:     Unable to Pay for Housing in the Last Year: Not on file    Number of [place] in the Last Year: Not on file    Unstable Housing in the Last Year: Not on file       PHYSICAL EXAM     INITIAL VITALS:  height is 5' 6\" (1.676 m) and weight is 203 lb (92.1 kg). His oral temperature is 97.8 °F (36.6 °C). His blood pressure is 134/95 (abnormal) and his pulse is 77. His respiration is 22 and oxygen saturation is 94%. Physical Exam  Vitals and nursing note reviewed. Constitutional:       Appearance: Normal appearance. He is well-developed. He is obese. HENT:      Head: Normocephalic. Right Ear: External ear normal.      Left Ear: External ear normal.      Nose: Nose normal.      Mouth/Throat:      Pharynx: Uvula midline.    Eyes: Conjunctiva/sclera: Conjunctivae normal.   Cardiovascular:      Rate and Rhythm: Normal rate and regular rhythm. Heart sounds: Normal heart sounds, S1 normal and S2 normal.   Pulmonary:      Effort: Pulmonary effort is normal. No respiratory distress. Breath sounds: Rhonchi and rales present. Chest:      Chest wall: No tenderness. Abdominal:      General: Bowel sounds are normal. There is no distension. Palpations: Abdomen is soft. Tenderness: There is no abdominal tenderness. Musculoskeletal:         General: Normal range of motion. Cervical back: Normal range of motion and neck supple. Skin:     General: Skin is warm and dry. Capillary Refill: Capillary refill takes less than 2 seconds. Coloration: Skin is not pale. Findings: No erythema or rash. Neurological:      General: No focal deficit present. Mental Status: He is alert and oriented to person, place, and time. Psychiatric:         Behavior: Behavior normal.         Thought Content: Thought content normal.         Judgment: Judgment normal.         DIFFERENTIAL DIAGNOSIS:   Pneumonia, CHF exacerbation, atelectasis, dehydration, electrolyte abnormality, ACS    DIAGNOSTIC RESULTS     EKG: All EKG's are interpreted by the Emergency Department Physician who eithersigns or Co-signs this chart in the absence of a cardiologist.    EKG read and interpreted by myself with comparison to April 20, 2022 gives impression of atrial fibrillation with heart rate of 81; ;QTc 492; axis R-48, T-114. RADIOLOGY: non-plainfilm images(s) such as CT, Ultrasound and MRI are read by the radiologist.  Plain radiographic images are visualized and preliminarily interpreted by the emergency physician unless otherwise stated below. XR CHEST (2 VW)   Final Result   Impression:   Interstitial and airspace disease in the lower lungs suspicious for edema    or infection.       This document has been electronically signed by: Sharyn Jurado MD on    04/29/2022 06:17 PM            LABS:   Labs Reviewed   BASIC METABOLIC PANEL - Abnormal; Notable for the following components:       Result Value    Chloride 97 (*)     CO2 22 (*)     Glucose 113 (*)     BUN 49 (*)     CREATININE 2.4 (*)     All other components within normal limits   CBC WITH AUTO DIFFERENTIAL - Abnormal; Notable for the following components:    WBC 16.2 (*)     RBC 4.12 (*)     Hemoglobin 12.8 (*)     Hematocrit 39.7 (*)     MCV 96.4 (*)     RDW-CV 15.0 (*)     RDW-SD 53.1 (*)     Segs Absolute 13.3 (*)     Monocytes Absolute 1.4 (*)     Immature Grans (Abs) 0.12 (*)     All other components within normal limits   BRAIN NATRIURETIC PEPTIDE - Abnormal; Notable for the following components:    Pro-BNP 3265.0 (*)     All other components within normal limits   TROPONIN - Abnormal; Notable for the following components:    Troponin T 0.040 (*)     All other components within normal limits   GLOMERULAR FILTRATION RATE, ESTIMATED - Abnormal; Notable for the following components:    Est, Glom Filt Rate 26 (*)     All other components within normal limits   ANION GAP   OSMOLALITY       EMERGENCY DEPARTMENT COURSE:   Vitals:    Vitals:    04/29/22 1628 04/29/22 1637 04/29/22 1719 04/29/22 1857   BP:   139/61 (!) 134/95   Pulse:    77   Resp:  22  22   Temp:       TempSrc:       SpO2:  94%  94%   Weight: 203 lb (92.1 kg)      Height: 5' 6\" (1.676 m)          MDM    Patient was seen and evaluated in the emergency department, patient appears to be in no acute distress, vital signs reviewed, no significant findings noted. Physical exam is completed, rhonchi and rales noted on exam. Labs were obtained stable findings noted. Chronically elevated d-dimer, ckd, stable BNP. Patient was given one dose of bumex. Discussed plan of care with patient who was agreeable with discharge advised to return to ER if symptoms worsen.    Medications   bumetanide (BUMEX) injection 0.5 mg (0.5 mg IntraVENous Given 4/29/22 1854)       Patient was seenindependently by myself. The patient's final impression and disposition and plan was determined by myself. CRITICAL CARE:   None    CONSULTS:  None    PROCEDURES:  None    FINAL IMPRESSION     1. SOB (shortness of breath)    2. Chronic congestive heart failure, unspecified heart failure type ([facility]    3. Chronic kidney disease, unspecified CKD stage          DISPOSITION/PLAN   Patient was discharged in stable condition. PATIENT REFERREDTO:  Chance Antonio, WILBERTO - CNP  [address] [facility]  [address] [facility]  [phone]    Go on 5/2/2022      [facility] EMERGENCY DEPT  [address]  Go to   If symptoms worsen      DISCHARGE MEDICATIONS:  Discharge Medication List as of 4/29/2022  7:12 PM      START taking these medications    Details   sodium chloride (OCEAN NASAL SPRAY) 0.65 % nasal spray 1 spray by Nasal route as needed for Congestion, Disp-1 each, R-3Normal             (Please note that portions of this note were completed with a voice recognition program.  Efforts were made to edit the dictations but occasionally words are mis-transcribed.)      Provider:  I personally performed the services described in the documentation,reviewed and edited the documentation which was dictated to the scribe in my presence, and it accurately records my words and actions.     Mono Rey CNP 04/29/22 8:29 PM    Maureen Rey, APRN - YOLANDE        INSOMENIA, WILBERTO - CNP  04/29/22 2040

## 2022-05-02 ENCOUNTER — OFFICE VISIT (OUTPATIENT)
Dept: FAMILY MEDICINE CLINIC | Age: 82
End: 2022-05-02
Payer: MEDICARE

## 2022-05-02 VITALS
SYSTOLIC BLOOD PRESSURE: 138 MMHG | WEIGHT: 201 LBS | HEART RATE: 89 BPM | OXYGEN SATURATION: 91 % | DIASTOLIC BLOOD PRESSURE: 86 MMHG | TEMPERATURE: 97.3 F | BODY MASS INDEX: 32.44 KG/M2

## 2022-05-02 DIAGNOSIS — J01.90 ACUTE RHINOSINUSITIS: ICD-10-CM

## 2022-05-02 DIAGNOSIS — Z09 HOSPITAL DISCHARGE FOLLOW-UP: Primary | ICD-10-CM

## 2022-05-02 DIAGNOSIS — J44.9 MODERATE COPD (CHRONIC OBSTRUCTIVE PULMONARY DISEASE) (HCC): ICD-10-CM

## 2022-05-02 DIAGNOSIS — N18.4 STAGE 4 CHRONIC KIDNEY DISEASE (HCC): ICD-10-CM

## 2022-05-02 DIAGNOSIS — J44.1 COPD EXACERBATION (HCC): ICD-10-CM

## 2022-05-02 DIAGNOSIS — I50.43 CHF (CONGESTIVE HEART FAILURE), NYHA CLASS III, ACUTE ON CHRONIC, COMBINED (HCC): ICD-10-CM

## 2022-05-02 DIAGNOSIS — J41.1 MUCOPURULENT CHRONIC BRONCHITIS (HCC): ICD-10-CM

## 2022-05-02 DIAGNOSIS — J18.9 PNEUMONIA OF LEFT LOWER LOBE DUE TO INFECTIOUS ORGANISM: ICD-10-CM

## 2022-05-02 PROCEDURE — 1111F DSCHRG MED/CURRENT MED MERGE: CPT | Performed by: NURSE PRACTITIONER

## 2022-05-02 PROCEDURE — G8417 CALC BMI ABV UP PARAM F/U: HCPCS | Performed by: NURSE PRACTITIONER

## 2022-05-02 PROCEDURE — 4040F PNEUMOC VAC/ADMIN/RCVD: CPT | Performed by: NURSE PRACTITIONER

## 2022-05-02 PROCEDURE — 3023F SPIROM DOC REV: CPT | Performed by: NURSE PRACTITIONER

## 2022-05-02 PROCEDURE — 99214 OFFICE O/P EST MOD 30 MIN: CPT | Performed by: NURSE PRACTITIONER

## 2022-05-02 PROCEDURE — 1036F TOBACCO NON-USER: CPT | Performed by: NURSE PRACTITIONER

## 2022-05-02 PROCEDURE — 1123F ACP DISCUSS/DSCN MKR DOCD: CPT | Performed by: NURSE PRACTITIONER

## 2022-05-02 PROCEDURE — G8427 DOCREV CUR MEDS BY ELIG CLIN: HCPCS | Performed by: NURSE PRACTITIONER

## 2022-05-02 RX ORDER — BUMETANIDE 1 MG/1
TABLET ORAL
Qty: 90 TABLET | Refills: 3 | Status: ON HOLD | OUTPATIENT
Start: 2022-05-02 | End: 2022-05-10 | Stop reason: HOSPADM

## 2022-05-02 RX ORDER — LEVOFLOXACIN 250 MG/1
250 TABLET ORAL DAILY
Qty: 10 TABLET | Refills: 0 | Status: SHIPPED | OUTPATIENT
Start: 2022-05-02 | End: 2022-05-12

## 2022-05-02 RX ORDER — ALBUTEROL SULFATE 1.25 MG/3ML
1 SOLUTION RESPIRATORY (INHALATION) EVERY 6 HOURS PRN
Qty: 360 ML | Refills: 3 | Status: SHIPPED | OUTPATIENT
Start: 2022-05-02

## 2022-05-02 RX ORDER — PREDNISONE 10 MG/1
10 TABLET ORAL 2 TIMES DAILY
Qty: 10 TABLET | Refills: 0 | Status: SHIPPED | OUTPATIENT
Start: 2022-05-02 | End: 2022-05-07

## 2022-05-02 RX ORDER — GUAIFENESIN 600 MG/1
1200 TABLET, EXTENDED RELEASE ORAL 2 TIMES DAILY
Qty: 40 TABLET | Refills: 0 | Status: SHIPPED | OUTPATIENT
Start: 2022-05-02 | End: 2022-05-12

## 2022-05-02 ASSESSMENT — ENCOUNTER SYMPTOMS
ALLERGIC/IMMUNOLOGIC NEGATIVE: 1
DIARRHEA: 0
NAUSEA: 0
COUGH: 1
ABDOMINAL DISTENTION: 1
WHEEZING: 1
VOMITING: 0
ABDOMINAL PAIN: 0
CHEST TIGHTNESS: 1
SHORTNESS OF BREATH: 1
EYES NEGATIVE: 1

## 2022-05-02 NOTE — PATIENT INSTRUCTIONS
1. Bumetadine take 1 mg tablet twice daily x 3 days. Take the 2nd tablet before 2 pm.     2. Take the antibiotic levaquin and prednisone until completed. 3. Take the guaifenesin twice daily for 10 days. 4. Continue to use the albuterol every 4-6 hours up to 4 x per day. Patient Education        Chronic Obstructive Pulmonary Disease (COPD): Care Instructions  Overview     Chronic obstructive pulmonary disease (COPD) is a lung disease that makes it hard to breathe. With COPD, the airways that lead to the lungs are narrowed, and the tiny air sacs in the lungs are damaged and lose their stretch. People with COPD have decreased airflow in and out of the lungs, which makes it hard to breathe. The airways also can get clogged with thick mucus. Cigarettesmoking is a major cause of COPD. Although there is no cure for COPD, you can slow its progress. Following your treatment plan and taking care of yourself can help you feel better and livelonger. Follow-up care is a key part of your treatment and safety. Be sure to make and go to all appointments, and call your doctor if you are having problems. It's also a good idea to know your test results and keep alist of the medicines you take. How can you care for yourself at home? Staying healthy     Do not smoke. This is the most important step you can take to prevent more damage to your lungs. If you need help quitting, talk to your doctor about stop-smoking programs and medicines. These can increase your chances of quitting for good.      Avoid colds and other infections. Get the pneumococcal and whooping cough (pertussis) vaccines. If you have had these vaccines before, ask your doctor if you need another dose. Get the flu vaccine every fall. If you must be around people with colds or the flu, wash your hands often.      Avoid secondhand smoke and air pollution. Try to stay inside with your windows closed when air pollution is bad.    Medicines and oxygen therapy     Take your medicines exactly as prescribed. Call your doctor if you think you are having a problem with your medicine. You may be taking medicines such as:  ? Bronchodilators. These help open your airways and make breathing easier. They are either short-acting (work for 4 to 9 hours) or long-acting (work for 12 to 24 hours). You inhale most bronchodilators, so they start to act quickly. Always carry your quick-relief inhaler with you in case you need it. ? Corticosteroids (prednisone, budesonide). These reduce airway inflammation. They come in inhaled or pill form.      Ask your doctor or pharmacist if you are using each type of inhaler correctly. With correct use, the medicine is more likely to get to your lungs.      See if a spacer is right for you. A spacer may also help you get more inhaled medicine to your lungs. If you use one, ask how to use it properly.      Do not take any vitamins, over-the-counter medicine, or herbal products without talking to your doctor first.      If your doctor prescribed antibiotics, take them as directed. Do not stop taking them just because you feel better. You need to take the full course of antibiotics.      If you use oxygen therapy, use the flow rate your doctor has recommended. Don't change it without talking to your doctor first. Oxygen therapy boosts the amount of oxygen in your blood and helps you breathe easier. Activity     Get regular exercise. Walking is an easy way to get exercise. Start out slowly, and walk a little more each day.      Pay attention to your breathing. You are exercising too hard if you can't talk while you exercise.      Take short rest breaks when doing household chores and other activities.      Learn breathing methods--such as breathing through pursed lips--to help you become less short of breath.      If your doctor has not set you up with a pulmonary rehabilitation program, ask if rehab is right for you.  Rehab includes exercise programs, education about your disease and how to manage it, help with diet and other changes, and emotional support. Diet     Eat regular, healthy meals.      Use bronchodilators about 1 hour before you eat to make it easier to eat.      Eat several smaller, frequent meals to prevent getting too full. A full stomach can push on the muscle that helps you breathe (your diaphragm) and make it harder to breathe.      Drink beverages at the end of the meal.      Avoid foods that are hard to chew.      Eat foods that contain protein so you don't lose muscle mass.      Talk with your doctor if you gain too much weight or if you lose weight without trying. Mental health     Talk to your family, friends, or a therapist about your feelings. Some people feel frightened, angry, hopeless, helpless, and even guilty. Talking openly about feelings may help you cope. If these feelings last, talk to your doctor. When should you call for help? Call 911 anytime you think you may need emergency care. For example, call if:     You have severe trouble breathing.      You are having chest pain that is different or worse than usual.   Call your doctor now or seek immediate medical care if:     You have new or worse trouble breathing.      You cough up blood.      You have a fever.      You have feelings of anxiety or depression. Watch closely for changes in your health, and be sure to contact your doctor if:     You cough more deeply or more often, especially if you notice more mucus or a change in the color of your mucus.      You have new or worse swelling in your legs or belly.      You are not getting better as expected. Where can you learn more? Go to https://beti.RuiYi. org and sign in to your MideoMe account. Enter E158 in the [email or web address] box to learn more about \"Chronic Obstructive Pulmonary Disease (COPD): Care Instructions. \"     If you do not have an account, please click on the \"Sign Up Now\" link. Current as of: July 6, 2021               Content Version: 13.2  © 2006-2022 [employer]. Care instructions adapted under license by [facility] ([facility]). If you have questions about a medical condition or this instruction, always ask your healthcare professional. [address] any warranty or liability for your use of this information. Patient Education        Learning About Clearing Your Lungs  How does clearing your lungs help you breathe? When you have too much mucus in your lungs, learning to clear your lungs may help you save energy and improve your breathing. It may also help prevent lunginfections. Here are three ways to clear your lungs:   Postural drainage   Chest and back percussion   Controlled coughing  How do you do postural drainage? Postural drainage means lying down in different positions to help drain mucusfrom your lungs. Hold each position for at least 3 minutes. Your doctor or therapist will tell you how long. Do it about 30 minutes after you use your inhaler. Make sure youhave an empty stomach. If you need to cough, sit up and do controlled coughing. 1. To drain the front of your lungs: Make sure your chest is lower than your hips. Put two pillows under your hips. Use a small pillow under your head if it helps you feel more comfortable. Keep your arms at your sides. Then follow these instructions for breathing:  ? Breathe in: With one hand on your belly and the other on your chest, breathe in through your nose. Push your belly out as far as possible. You should be able to feel the hand on your belly move out, while the hand on your chest should not move. ? Breathe out: When you breathe out through your mouth, you should be able to feel the hand on your belly move in. This is called belly breathing, or diaphragmatic breathing. You will use it in the other drainage positions too.   2. To drain the sides of your lungs: Place two or three pillows under your hips. Use a small pillow under your head. Make sure your chest is lower than your hips. Do belly breathing (diaphragmatic breathing). After you drain one side, turn over and drain the other side. 3. To drain the back of your lungs: Place two or three pillows under your hips. Use a small pillow under your head if it helps you feel more comfortable. Kneel over the pillows. Place your arms by your head. Do belly breathing (diaphragmatic breathing). How do you do chest percussion? Chest percussion means that you lightly clap your chest and back. The clappingloosens the mucus in your lungs.  Cup your hand, and lightly clap your chest and back.  Ask your doctor where the best spots are to clap. Avoid your spine and breastbone.  It may be easier to have someone do the clapping for you. How do you do controlled coughing? Coughing is how your body tries to get rid of mucus. But the kind of coughing you cannot control makes things worse. It causes your airways to close. It alsotraps the mucus in your lungs. Controlled coughing loosens mucus and moves it though your airways. It is bestto do it after you use your inhaler or other medicine. 1. Sit on the edge of a chair. Keep both feet on the floor. 2. Lean forward a little. Relax. 3. Breathe in slowly through your nose. Fold your arms over your belly. 4. Hold your breath for 3 to 5 seconds before the exhale. 5. Lean forward as you breathe out. Push your arms against your belly. 6. Cough 2 or 3 times as you exhale with your mouth slightly open. Make the coughs short and sharp. Push on your belly with your arms as you cough. The first cough brings the mucus through the lung airways. The next coughs bring it up and out. 7. Inhale again, but do it slowly and gently through your nose. Do not take quick or deep breaths through your mouth. It can block the mucus coming out of the lungs.  It also can cause uncontrolled coughing. 8. Rest, and repeat if you need to. Follow-up care is a key part of your treatment and safety. Be sure to make and go to all appointments, and call your doctor if you are having problems. It's also a good idea to know your test results and keep alist of the medicines you take. Where can you learn more? Go to [email or web address] org and sign in to your [employer] account. Enter E373 in the [facility] box to learn more about \"Learning About Clearing Your Lungs. \"     If you do not have an account, please click on the \"Sign Up Now\" link. Current as of: July 6, 2021               Content Version: 13.2  © 2006-2022 [employer]. Care instructions adapted under license by [facility] ([facility]). If you have questions about a medical condition or this instruction, always ask your healthcare professional. [address] any warranty or liability for your use of this information.

## 2022-05-02 NOTE — PROGRESS NOTES
[address]  [place] [address]  Dept: [phone]           Post-Discharge Transitional Care  Follow Up      Andrew Vaz   YOB: 1940    Date of Office Visit:  5/2/2022  Date of Hospital Admission: 4/29/22  Date of Hospital Discharge: 4/29/22  Risk of hospital readmission (high >=14%. Medium >=10%) :Readmission Risk Score: 23.9 ( )      Care management risk score Rising risk (score 2-5) and Complex Care (Scores >=6): 4     Non face to face  following discharge, date last encounter closed (first attempt may have been earlier):No  Call initiated 2 business days of discharge: No    ASSESSMENT/PLAN:   Hospital follow up  Overall has not improved  Visiting nurse out over the weekend. Will have PT and OT as well in home  Oxygen dependent  New referral to pulmonology made  Still has gross adventitious lung sounds bilaterally. Phlegm is thick and purulent in nature. Monitor o2 sats at home. Keep oxygen at 4 liters while up and 2 liters at rest.   Continue to weight daily   Labs and chest xray end of week. Hospital discharge follow-up  -     [place] DISCHARGE MEDS RECONCILED W/ CURRENT OUTPATIENT MED LIST  -     External Referral To Pulmonology  -     Brain Natriuretic Peptide; Future  COPD exacerbation ([facility]  -     External Referral To Pulmonology  -     OXYGEN; Inhale 4 L into the lungs continuous, Disp-4 L, R-0NO PRINT  -     guaiFENesin (MUCINEX) 600 MG extended release tablet; Take 2 tablets by mouth 2 times daily for 10 days, Disp-40 tablet, R-0Normal  -     albuterol (ACCUNEB) 1.25 MG/3ML nebulizer solution; Inhale 3 mLs into the lungs every 6 hours as needed for Wheezing, Disp-360 mL, R-3Normal  -     Basic Metabolic Panel; Future  -     XR CHEST STANDARD (2 VW); Future  Mucopurulent chronic bronchitis ([facility]  -     External Referral To Pulmonology  -     OXYGEN;  Inhale 4 L into the lungs continuous, Disp-4 L, R-0NO PRINT  -     guaiFENesin (MUCINEX) 600 MG extended release tablet; Take 2 tablets by mouth 2 times daily for 10 days, Disp-40 tablet, R-0Normal  -     albuterol (ACCUNEB) 1.25 MG/3ML nebulizer solution; Inhale 3 mLs into the lungs every 6 hours as needed for Wheezing, Disp-360 mL, R-3Normal  -     Basic Metabolic Panel; Future  -     Brain Natriuretic Peptide; Future  -     XR CHEST STANDARD (2 VW); Future  Stage 4 chronic kidney disease ([facility]  -     External Referral To Pulmonology  CHF (congestive heart failure), NYHA class III, acute on chronic, combined ([facility]  -     External Referral To Pulmonology  -     bumetanide (BUMEX) 1 MG tablet; 1 tab twice daily x 3 days then back to once tablet daily. , Disp-90 tablet, R-3Normal  -     Basic Metabolic Panel; Future  -     Brain Natriuretic Peptide; Future  -     XR CHEST STANDARD (2 VW); Future  Moderate COPD (chronic obstructive pulmonary disease) ([facility]  -     External Referral To Pulmonology  -     OXYGEN; Inhale 4 L into the lungs continuous, Disp-4 L, R-0NO PRINT  Pneumonia of left lower lobe due to infectious organism  -     External Referral To Pulmonology  -     OXYGEN; Inhale 4 L into the lungs continuous, Disp-4 L, R-0NO PRINT  -     guaiFENesin (MUCINEX) 600 MG extended release tablet; Take 2 tablets by mouth 2 times daily for 10 days, Disp-40 tablet, R-0Normal  -     albuterol (ACCUNEB) 1.25 MG/3ML nebulizer solution; Inhale 3 mLs into the lungs every 6 hours as needed for Wheezing, Disp-360 mL, R-3Normal  -     levoFLOXacin (LEVAQUIN) 250 MG tablet; Take 1 tablet by mouth daily for 10 days, Disp-10 tablet, R-0Normal  -     predniSONE (DELTASONE) 10 MG tablet; Take 1 tablet by mouth 2 times daily for 5 days, Disp-10 tablet, R-0Normal  -     Basic Metabolic Panel; Future  -     Brain Natriuretic Peptide; Future  -     XR CHEST STANDARD (2 VW); Future  Acute rhinosinusitis      Medical Decision Making: moderate complexity  Return in 3 months (on 8/2/2022).            Subjective:   HPI:  Follow up of CARLO SMITH VILMA MCDANIEL problems/diagnosis(es): CHF, COPD, bilateral pneumonia. Inpatient course: Discharge summary reviewed- see chart. Interval history/Current status: Back to ER on 4/29 due to SOB  Sob, moist cough, mild edema, anxious      Patient Active Problem List   Diagnosis    Atrial fibrillation with RVR ([address]    Acute respiratory failure with hypoxia (HCC)    CKD (chronic kidney disease) stage 3, GFR 30-59 ml/min ([facility])    Hyponatremia    Aortic stenosis, severe    Coronary artery disease involving native coronary artery of native heart with angina pectoris ([address]    CHF (congestive heart failure), NYHA class III, acute on chronic, combined ([address]    Cardiomyopathy ([address]    Sepsis without acute organ dysfunction ([address]    Essential hypertension    Stage 4 chronic kidney disease ([address]    Anticoagulated on Coumadin    Permanent atrial fibrillation ([address]    CVA (cerebral infarction)    History of transcatheter aortic valve replacement (TAVR)    S/P PTCA (percutaneous transluminal coronary angioplasty)    SSS (sick sinus syndrome) ([address]    Hyperlipidemia    Mitral valve disease    COPD exacerbation ([address]    Diabetes mellitus, type 2 ([address]    Encounter for therapeutic drug monitoring    Pneumonia due to infectious organism    Chronic HFrEF (heart failure with reduced ejection fraction) ([address]    Community acquired pneumonia of left lower lobe of lung    Pneumonia due to Pseudomonas species ([address]    COPD (chronic obstructive pulmonary disease) ([address]    Acute hypoxemic respiratory failure ([address]       Medications listed as ordered at the time of discharge from hospital     Medication List          Accurate as of May 2, 2022 12:52 PM. If you have any questions, ask your nurse or doctor.             START taking these medications    levoFLOXacin 250 MG tablet  Commonly known as: Levaquin  Take 1 tablet by mouth daily for 10 days  Started by: WILBERTO Hurtado CNP     OXYGEN  Inhale 4 L into the lungs continuous  Started by: WILBERTO Michelle CNP     predniSONE 10 MG tablet  Commonly known as: DELTASONE  Take 1 tablet by mouth 2 times daily for 5 days  Started by: WILBERTO Michelle CNP        CHANGE how you take these medications    bumetanide 1 MG tablet  Commonly known as: BUMEX  1 tab twice daily x 3 days then back to once tablet daily. What changed:   · how much to take  · how to take this  · when to take this  · additional instructions  Changed by: WILBERTO Mihcelle CNP     fluticasone 50 MCG/ACT nasal spray  Commonly known as: FLONASE  2 sprays by Each Nostril route daily  What changed:   · when to take this  · reasons to take this     guaiFENesin 600 MG extended release tablet  Commonly known as: MUCINEX  Take 2 tablets by mouth 2 times daily for 10 days  What changed: how much to take  Changed by: WILBERTO Michelle CNP     loratadine 10 MG tablet  Commonly known as: Claritin  Take 1 tablet by mouth daily  What changed:   · when to take this  · reasons to take this     warfarin 2.5 MG tablet  Commonly known as: COUMADIN  Take as directed by the anticoagulation clinic. If you are unsure how to take this medication, talk to your nurse or doctor. Original instructions:  Take 1 tablet by mouth daily  What changed: additional instructions        CONTINUE taking these medications    * albuterol sulfate  (90 Base) MCG/ACT inhaler  Commonly known as: Ventolin HFA  Inhale 2 puffs into the lungs 4 times daily as needed for Wheezing     * albuterol 1.25 MG/3ML nebulizer solution  Commonly known as: ACCUNEB  Inhale 3 mLs into the lungs every 6 hours as needed for Wheezing     atorvastatin 80 MG tablet  Commonly known as: LIPITOR     B-D SINGLE USE SWABS REGULAR Pads     Breyohannesi Aerosphere 160-9-4.8 MCG/ACT Aero  Generic drug: Budeson-Glycopyrrol-Formoterol  Inhale 2 puffs into the lungs 2 times daily     cyanocobalamin 1000 MCG tablet  Take 1 tablet by mouth daily     losartan 25 MG tablet  Commonly known as: COZAAR  Take 1 tablet by mouth nightly     metFORMIN 500 MG tablet  Commonly known as: GLUCOPHAGE  Take 1 tablet by mouth 2 times daily (with meals)     metoprolol succinate 100 MG extended release tablet  Commonly known as: TOPROL XL  Take 1 tablet by mouth daily     nitroGLYCERIN 0.4 MG/HR  Commonly known as: NITRODUR     promethazine-dextromethorphan 6.25-15 MG/5ML syrup  Commonly known as: PROMETHAZINE-DM  Take 5 mLs by mouth nightly     sodium chloride 0.65 % nasal spray  Commonly known as: Ocean Nasal [place]  1 spray by Nasal route as needed for Congestion     True Metrix Blood Glucose Test strip  Generic drug: blood glucose test strips     TRUEplus Lancets 28G Misc     VITAMIN C ER PO     vitamin D 1000 UNIT Tabs tablet  Commonly known as: CHOLECALCIFEROL  Take 1 tablet by mouth daily         * This list has 2 medication(s) that are the same as other medications prescribed for you. Read the directions carefully, and ask your doctor or other care provider to review them with you.                Where to Get Your Medications      These medications were sent to [address] [place] [address] [phone] - F [phone]  [address] [facility] [address]    Phone: [phone]   · albuterol 1.25 MG/3ML nebulizer solution  · bumetanide 1 MG tablet     These medications were sent to [address] [place] [address]  [address], [address] [facility]    Phone: [phone]   · guaiFENesin 600 MG extended release tablet  · levoFLOXacin 250 MG tablet  · predniSONE 10 MG tablet     Information about where to get these medications is not yet available    Ask your nurse or doctor about these medications  · OXYGEN           Medications marked \"taking\" at this time  Outpatient Medications Marked as Taking for the 5/2/22 encounter (Office Visit) with WILBERTO Griffiths CNP   Medication Sig Dispense Refill    OXYGEN Inhale 4 L into the lungs continuous 4 L 0    guaiFENesin (MUCINEX) 600 MG extended release tablet Take 2 tablets by mouth 2 times daily for 10 days 40 tablet 0    albuterol (ACCUNEB) 1.25 MG/3ML nebulizer solution Inhale 3 mLs into the lungs every 6 hours as needed for Wheezing 360 mL 3    bumetanide (BUMEX) 1 MG tablet 1 tab twice daily x 3 days then back to once tablet daily.  90 tablet 3    levoFLOXacin (LEVAQUIN) 250 MG tablet Take 1 tablet by mouth daily for 10 days 10 tablet 0    predniSONE (DELTASONE) 10 MG tablet Take 1 tablet by mouth 2 times daily for 5 days 10 tablet 0    sodium chloride (OCEAN NASAL SPRAY) 0.65 % nasal spray 1 spray by Nasal route as needed for Congestion 1 each 3    metoprolol succinate (TOPROL XL) 100 MG extended release tablet Take 1 tablet by mouth daily 30 tablet 3    vitamin B-12 1000 MCG tablet Take 1 tablet by mouth daily 30 tablet 3    loratadine (CLARITIN) 10 MG tablet Take 1 tablet by mouth daily (Patient taking differently: Take 10 mg by mouth daily as needed ) 30 tablet 0    fluticasone (FLONASE) 50 MCG/ACT nasal spray 2 sprays by Each Nostril route daily (Patient taking differently: 2 sprays by Each Nostril route daily as needed ) 1 each 0    Budeson-Glycopyrrol-Formoterol (BREZTRI AEROSPHERE) 160-9-4.8 MCG/ACT AERO Inhale 2 puffs into the lungs 2 times daily 3 each 3    albuterol sulfate HFA (VENTOLIN HFA) 108 (90 Base) MCG/ACT inhaler Inhale 2 puffs into the lungs 4 times daily as needed for Wheezing 54 g 1    promethazine-dextromethorphan (PROMETHAZINE-DM) 6.25-15 MG/5ML syrup Take 5 mLs by mouth nightly 450 mL 0    Ascorbic Acid (VITAMIN C ER PO) Take 1 tablet by mouth daily      nitroGLYCERIN (NITRODUR) 0.4 MG/HR Place 1 patch onto the skin daily      warfarin (COUMADIN) 2.5 MG tablet Take 1 tablet by mouth daily (Patient taking differently: Take 2.5 mg by mouth daily 2.5 Every day except on Wednesday. 1.25mg on Wednesdays) 90 tablet 3    losartan (COZAAR) 25 MG tablet Take 1 tablet by mouth nightly 90 tablet 3    metFORMIN (GLUCOPHAGE) 500 MG tablet Take 1 tablet by mouth 2 times daily (with meals) 180 tablet 3    TRUEplus Lancets 28G MISC Use to test blood sugar as needed      TRUE METRIX BLOOD GLUCOSE TEST strip Use to test blood sugar as needed      Alcohol Swabs (B-D SINGLE USE SWABS REGULAR) PADS Use to test blood sugar as needed      vitamin D (CHOLECALCIFEROL) 1000 UNIT TABS tablet Take 1 tablet by mouth daily      atorvastatin (LIPITOR) 80 MG tablet Take 80 mg by mouth nightly          Medications patient taking as of now reconciled against medications ordered at time of hospital discharge: Yes      Review of Systems   Constitutional: Positive for activity change, appetite change and fatigue. Negative for fever. HENT: Negative. Eyes: Negative. Respiratory: Positive for cough, chest tightness, shortness of breath and wheezing. Cardiovascular: Positive for leg swelling. Negative for chest pain and palpitations. Gastrointestinal: Positive for abdominal distention. Negative for abdominal pain, diarrhea, nausea and vomiting. Endocrine: Negative for cold intolerance and heat intolerance. Genitourinary: Negative. Musculoskeletal: Positive for myalgias. Negative for arthralgias. Allergic/Immunologic: Negative. Neurological: Positive for weakness. Negative for dizziness, light-headedness and headaches. Hematological: Negative for adenopathy. Does not bruise/bleed easily. Psychiatric/Behavioral: Negative. Objective:    /86 (Site: Right Upper Arm, Position: Sitting, Cuff Size: Medium Adult)   Pulse 89   Temp 97.3 °F (36.3 °C) (Temporal)   Wt 201 lb (91.2 kg) Comment: Shoes on. SpO2 91% Comment: with setting 3 on O2 tank  BMI 32.44 kg/m²     Physical Exam  Vitals and nursing note reviewed.    Constitutional: Appearance: Normal appearance. HENT:      Head: Normocephalic. Right Ear: Ear canal and external ear normal.      Left Ear: Ear canal and external ear normal.      Nose: Nose normal.      Mouth/Throat:      Mouth: Mucous membranes are moist.      Pharynx: No posterior oropharyngeal erythema. Eyes:      General: No scleral icterus. Conjunctiva/sclera: Conjunctivae normal.   Neck:      Vascular: No carotid bruit. Cardiovascular:      Rate and Rhythm: Tachycardia present. Pulses: Normal pulses. Heart sounds: Normal heart sounds. Pulmonary:      Breath sounds: Rhonchi (diffuse throughout anterior and posterior airways. ) and rales (bilateral lower lobes) present. Abdominal:      General: Bowel sounds are normal. There is distension. Tenderness: There is no abdominal tenderness. Musculoskeletal:         General: No swelling or tenderness. Normal range of motion. Cervical back: Normal range of motion and neck supple. No tenderness. Right lower leg: Edema present. Left lower leg: Edema (2 pitting ble) present. Lymphadenopathy:      Cervical: No cervical adenopathy. Skin:     General: Skin is warm and dry. Capillary Refill: Capillary refill takes 2 to 3 seconds. Coloration: Skin is not pale. Findings: No erythema. Neurological:      General: No focal deficit present. Mental Status: He is alert and oriented to person, place, and time. Mental status is at baseline. Psychiatric:         Mood and Affect: Mood normal.         Behavior: Behavior normal.         Thought Content: Thought content normal.         Judgment: Judgment normal.           An electronic signature was used to authenticate this note.   --Deanna Lowe, WILBERTO - CNP

## 2022-05-03 ENCOUNTER — TELEPHONE (OUTPATIENT)
Dept: PHARMACY | Age: 82
End: 2022-05-03

## 2022-05-03 NOTE — TELEPHONE ENCOUNTER
Called and spoke with patient. Informed him that since the levofloxacin and prednisone are lower doses that I am not going to make any changes to his Coumadin dose today. He should continue to take 2.5 mg daily except 1.25 mg on W. Patient verbalized understanding. He has an appointment on 05/05 with home health to draw INR.

## 2022-05-03 NOTE — TELEPHONE ENCOUNTER
Noted patient was ordered 10 days of Levaquin 250 mg daily and 5 days of Prednisone 10 mg BID yesterday. Currently on clinic schedule 5/5.   Message forwarded to clinic pharmacist.

## 2022-05-05 ENCOUNTER — CARE COORDINATION (OUTPATIENT)
Dept: CARE COORDINATION | Age: 82
End: 2022-05-05

## 2022-05-05 ENCOUNTER — APPOINTMENT (OUTPATIENT)
Dept: PHARMACY | Age: 82
End: 2022-05-05
Payer: MEDICARE

## 2022-05-05 NOTE — CARE COORDINATION
I was able to send the patient his portion of the PAP application, once I get his information back I can submit to the .         [address] [facility]   Medication Assistance  [address] (h) [phone] (u) [phone]

## 2022-05-06 ENCOUNTER — TELEPHONE (OUTPATIENT)
Dept: FAMILY MEDICINE CLINIC | Age: 82
End: 2022-05-06

## 2022-05-06 ENCOUNTER — HOSPITAL ENCOUNTER (OUTPATIENT)
Dept: GENERAL RADIOLOGY | Age: 82
Discharge: HOME | End: 2022-05-06
Payer: MEDICARE

## 2022-05-06 ENCOUNTER — HOSPITAL ENCOUNTER (OUTPATIENT)
Dept: PHARMACY | Age: 82
Setting detail: THERAPIES SERIES
Discharge: HOME | End: 2022-05-06
Payer: MEDICARE

## 2022-05-06 ENCOUNTER — NURSE ONLY (OUTPATIENT)
Dept: LAB | Age: 82
End: 2022-05-06

## 2022-05-06 ENCOUNTER — HOSPITAL ENCOUNTER (OUTPATIENT)
Age: 82
Discharge: HOME | End: 2022-05-06
Payer: MEDICARE

## 2022-05-06 VITALS — DIASTOLIC BLOOD PRESSURE: 82 MMHG | HEART RATE: 77 BPM | SYSTOLIC BLOOD PRESSURE: 124 MMHG

## 2022-05-06 DIAGNOSIS — J44.1 COPD EXACERBATION (HCC): ICD-10-CM

## 2022-05-06 DIAGNOSIS — J18.9 PNEUMONIA OF LEFT LOWER LOBE DUE TO INFECTIOUS ORGANISM: ICD-10-CM

## 2022-05-06 DIAGNOSIS — J41.1 MUCOPURULENT CHRONIC BRONCHITIS (HCC): ICD-10-CM

## 2022-05-06 DIAGNOSIS — I50.43 CHF (CONGESTIVE HEART FAILURE), NYHA CLASS III, ACUTE ON CHRONIC, COMBINED (HCC): ICD-10-CM

## 2022-05-06 DIAGNOSIS — Z51.81 ENCOUNTER FOR THERAPEUTIC DRUG MONITORING: ICD-10-CM

## 2022-05-06 DIAGNOSIS — I48.21 PERMANENT ATRIAL FIBRILLATION (HCC): Primary | ICD-10-CM

## 2022-05-06 DIAGNOSIS — Z79.01 ANTICOAGULATED ON COUMADIN: ICD-10-CM

## 2022-05-06 DIAGNOSIS — I48.91 ATRIAL FIBRILLATION WITH RVR (HCC): ICD-10-CM

## 2022-05-06 DIAGNOSIS — Z09 HOSPITAL DISCHARGE FOLLOW-UP: ICD-10-CM

## 2022-05-06 LAB
ANION GAP SERPL CALCULATED.3IONS-SCNC: 17 MEQ/L (ref 8–16)
BUN BLDV-MCNC: 64 MG/DL (ref 7–22)
CALCIUM SERPL-MCNC: 9 MG/DL (ref 8.5–10.5)
CHLORIDE BLD-SCNC: 95 MEQ/L (ref 98–111)
CO2: 23 MEQ/L (ref 23–33)
CREAT SERPL-MCNC: 2.8 MG/DL (ref 0.4–1.2)
GFR SERPL CREATININE-BSD FRML MDRD: 22 ML/MIN/1.73M2
GLUCOSE BLD-MCNC: 116 MG/DL (ref 70–108)
HCT VFR BLD CALC: 37.7 % (ref 42–52)
HEMOGLOBIN: 12.1 GM/DL (ref 14–18)
INR BLD: 7.57 (ref 0.85–1.13)
POTASSIUM SERPL-SCNC: 4.2 MEQ/L (ref 3.5–5.2)
PRO-BNP: 3626 PG/ML (ref 0–1800)
SODIUM BLD-SCNC: 135 MEQ/L (ref 135–145)

## 2022-05-06 PROCEDURE — 99212 OFFICE O/P EST SF 10 MIN: CPT | Performed by: PHARMACIST

## 2022-05-06 PROCEDURE — 85014 HEMATOCRIT: CPT

## 2022-05-06 PROCEDURE — 71046 X-RAY EXAM CHEST 2 VIEWS: CPT

## 2022-05-06 PROCEDURE — 85610 PROTHROMBIN TIME: CPT

## 2022-05-06 PROCEDURE — 85018 HEMOGLOBIN: CPT

## 2022-05-06 NOTE — TELEPHONE ENCOUNTER
I called and spoke to the patient. I let him know Bailee's response regarding his lab results. The patient voiced understanding and said that he will start the one daily Bumex tomorrow. He said that he can not get into the lung doctor until July 6. He sees [facility]o Schroeder [facility]. Sees the heart doctor in June. He said his coumadin and 7.5 so no medication for now. I let him know that BODØ can see that information in the computer. He said that he is only 2 ounces more today then yesterday.

## 2022-05-06 NOTE — PROGRESS NOTES
Medication Management [address]  [phone] (phone)  [phone] (fax)    Mr. Randolph Christie is a 79 y.o.  male with history of Afib who presents today for anticoagulation monitoring and adjustment. Patient verifies current dosing regimen and tablet strength. No missed or extra doses. Patient denies s/s bleeding/bruising/chest pain - BLE swelling, states newer problem, Bumex recently increased by PCP, pt states he will contact PCP today to review. SOB - on home O2. No blood in urine or stool. No dietary changes. No changes in OTC agents/Herbals. Levaquin 250mg daily x 10 days, has 5 days left;  Prednisone 10mg BID - last dose today. No change in alcohol use or tobacco use. Less active due to being on home O2. Patient denies headaches/dizziness/lightheadedness/falls. No vomiting/diarrhea or acute illness. No Procedures scheduled in the future at this time. Assessment:   Lab Results   Component Value Date    INR 7.57 (HH) 05/06/2022    INR 3.60 (H) 04/27/2022    INR 2.59 (H) 04/24/2022     INR supratherapeutic   Recent Labs     05/06/22  0919   INR 7.57*       POC INR= 7.2    Results for Mago Samuel (MRN [id number]) as of 5/6/2022 10:12   Ref. Range 4/24/2022 05:39 4/29/2022 16:50 5/6/2022 09:19   Hemoglobin Quant Latest Ref Range: 14.0 - 18.0 gm/dl 12.5 (L) 12.8 (L) 12.1 (L)   Hematocrit Latest Ref Range: 42.0 - 52.0 % 39.2 (L) 39.7 (L) 37.7 (L)       Vitals:    05/06/22 0926   BP: 124/82   Pulse: 77         Plan:  Hold Coumadin x 3 days. Recheck INR in 3 day(s). Patient reminded to call the Anticoagulation Clinic with any signs or symptoms of bleeding or with any medication changes. Patient given instructions utilizing the teach back method. After visit summary printed and reviewed with patient. Discharged ambulatory in no apparent distress.     For Pharmacy Admin Tracking Only     Intervention Detail: Dose Adjustment: 1, reason: Therapy De-escalation and Lab(s) Ordered   Total # of Interventions Recommended: 2   Total # of Interventions Accepted: 2   Time Spent (min): 30

## 2022-05-06 NOTE — TELEPHONE ENCOUNTER
----- Message from Sempra EnergyWILBERTO CNP sent at 5/6/2022  3:12 PM EDT -----  Labs are stable. Go back to 1 bumex per day. Be sure to be drinking at least 1500 ml or 50 0z of water per day. Needs to see Pulmonary as ordered on last visit due to ongoing fluid in lungs. Use the nebulizer every 4-6 hours even if he thinks he does not need it. Continue to keep track of weight daily. Let us know Monday his weekend weights. Keep legs elevated when sitting to alleviate edema. Use pillow at night under them when in bed. See back in office in 2 weeks.

## 2022-05-06 NOTE — TELEPHONE ENCOUNTER
The patient came in to get lab work done and told [address] at the  that he is still having swelling in the leg. I told [address] to let the patient know that we would let Bertrand Dominguez know and give him a call. Please advise.

## 2022-05-08 ENCOUNTER — APPOINTMENT (OUTPATIENT)
Dept: GENERAL RADIOLOGY | Age: 82
End: 2022-05-08
Payer: MEDICARE

## 2022-05-08 ENCOUNTER — HOSPITAL ENCOUNTER (OUTPATIENT)
Age: 82
Setting detail: OBSERVATION
Discharge: HOME | End: 2022-05-10
Attending: EMERGENCY MEDICINE | Admitting: INTERNAL MEDICINE
Payer: MEDICARE

## 2022-05-08 DIAGNOSIS — N17.9 ACUTE KIDNEY INJURY SUPERIMPOSED ON CKD (HCC): ICD-10-CM

## 2022-05-08 DIAGNOSIS — N18.9 ACUTE KIDNEY INJURY SUPERIMPOSED ON CKD (HCC): ICD-10-CM

## 2022-05-08 DIAGNOSIS — I50.9 ACUTE ON CHRONIC CONGESTIVE HEART FAILURE, UNSPECIFIED HEART FAILURE TYPE (HCC): Primary | ICD-10-CM

## 2022-05-08 LAB
ALBUMIN SERPL-MCNC: 3.3 G/DL (ref 3.5–5.1)
ALP BLD-CCNC: 83 U/L (ref 38–126)
ALT SERPL-CCNC: 28 U/L (ref 11–66)
ANION GAP SERPL CALCULATED.3IONS-SCNC: 14 MEQ/L (ref 8–16)
AST SERPL-CCNC: 21 U/L (ref 5–40)
BASOPHILS # BLD: 0.4 %
BASOPHILS ABSOLUTE: 0 THOU/MM3 (ref 0–0.1)
BILIRUB SERPL-MCNC: 0.4 MG/DL (ref 0.3–1.2)
BILIRUBIN DIRECT: < 0.2 MG/DL (ref 0–0.3)
BUN BLDV-MCNC: 87 MG/DL (ref 7–22)
CALCIUM SERPL-MCNC: 8.6 MG/DL (ref 8.5–10.5)
CHLORIDE BLD-SCNC: 97 MEQ/L (ref 98–111)
CO2: 23 MEQ/L (ref 23–33)
CREAT SERPL-MCNC: 3.3 MG/DL (ref 0.4–1.2)
EOSINOPHIL # BLD: 2.2 %
EOSINOPHILS ABSOLUTE: 0.2 THOU/MM3 (ref 0–0.4)
ERYTHROCYTE [DISTWIDTH] IN BLOOD BY AUTOMATED COUNT: 14.9 % (ref 11.5–14.5)
ERYTHROCYTE [DISTWIDTH] IN BLOOD BY AUTOMATED COUNT: 53.1 FL (ref 35–45)
GFR SERPL CREATININE-BSD FRML MDRD: 18 ML/MIN/1.73M2
GLUCOSE BLD-MCNC: 149 MG/DL (ref 70–108)
HCT VFR BLD CALC: 35.6 % (ref 42–52)
HEMOGLOBIN: 11.5 GM/DL (ref 14–18)
IMMATURE GRANS (ABS): 0.1 THOU/MM3 (ref 0–0.07)
IMMATURE GRANULOCYTES: 1.2 %
INR BLD: 4.72 (ref 0.85–1.13)
LYMPHOCYTES # BLD: 12.5 %
LYMPHOCYTES ABSOLUTE: 1 THOU/MM3 (ref 1–4.8)
MCH RBC QN AUTO: 31.3 PG (ref 26–33)
MCHC RBC AUTO-ENTMCNC: 32.3 GM/DL (ref 32.2–35.5)
MCV RBC AUTO: 97 FL (ref 80–94)
MONOCYTES # BLD: 12.9 %
MONOCYTES ABSOLUTE: 1.1 THOU/MM3 (ref 0.4–1.3)
NUCLEATED RED BLOOD CELLS: 0 /100 WBC
OSMOLALITY CALCULATION: 297.6 MOSMOL/KG (ref 275–300)
PLATELET # BLD: 240 THOU/MM3 (ref 130–400)
PMV BLD AUTO: 9.9 FL (ref 9.4–12.4)
POTASSIUM SERPL-SCNC: 4.7 MEQ/L (ref 3.5–5.2)
PRO-BNP: 2886 PG/ML (ref 0–1800)
RBC # BLD: 3.67 MILL/MM3 (ref 4.7–6.1)
SEG NEUTROPHILS: 70.8 %
SEGMENTED NEUTROPHILS ABSOLUTE COUNT: 5.9 THOU/MM3 (ref 1.8–7.7)
SODIUM BLD-SCNC: 134 MEQ/L (ref 135–145)
TOTAL PROTEIN: 6.1 G/DL (ref 6.1–8)
WBC # BLD: 8.3 THOU/MM3 (ref 4.8–10.8)

## 2022-05-08 PROCEDURE — 96374 THER/PROPH/DIAG INJ IV PUSH: CPT

## 2022-05-08 PROCEDURE — 85610 PROTHROMBIN TIME: CPT

## 2022-05-08 PROCEDURE — 36415 COLL VENOUS BLD VENIPUNCTURE: CPT

## 2022-05-08 PROCEDURE — 2500000003 HC RX 250 WO HCPCS

## 2022-05-08 PROCEDURE — 85025 COMPLETE CBC W/AUTO DIFF WBC: CPT

## 2022-05-08 PROCEDURE — 82248 BILIRUBIN DIRECT: CPT

## 2022-05-08 PROCEDURE — 99285 EMERGENCY DEPT VISIT HI MDM: CPT

## 2022-05-08 PROCEDURE — G0378 HOSPITAL OBSERVATION PER HR: HCPCS

## 2022-05-08 PROCEDURE — 99223 1ST HOSP IP/OBS HIGH 75: CPT | Performed by: INTERNAL MEDICINE

## 2022-05-08 PROCEDURE — 71045 X-RAY EXAM CHEST 1 VIEW: CPT

## 2022-05-08 PROCEDURE — 83880 ASSAY OF NATRIURETIC PEPTIDE: CPT

## 2022-05-08 PROCEDURE — 80053 COMPREHEN METABOLIC PANEL: CPT

## 2022-05-08 RX ORDER — BUMETANIDE 0.25 MG/ML
2 INJECTION, SOLUTION INTRAMUSCULAR; INTRAVENOUS ONCE
Status: COMPLETED | OUTPATIENT
Start: 2022-05-08 | End: 2022-05-08

## 2022-05-08 RX ADMIN — BUMETANIDE 2 MG: 0.25 INJECTION, SOLUTION INTRAMUSCULAR; INTRAVENOUS at 18:56

## 2022-05-08 ASSESSMENT — ENCOUNTER SYMPTOMS
EYES NEGATIVE: 1
VOMITING: 0
COUGH: 1
EYE PAIN: 0
SORE THROAT: 0
DIARRHEA: 0
SHORTNESS OF BREATH: 1
BACK PAIN: 0
CHEST TIGHTNESS: 0
ABDOMINAL DISTENTION: 0
CONSTIPATION: 0
NAUSEA: 0
SHORTNESS OF BREATH: 0
ABDOMINAL PAIN: 0
STRIDOR: 0

## 2022-05-08 ASSESSMENT — PAIN - FUNCTIONAL ASSESSMENT: PAIN_FUNCTIONAL_ASSESSMENT: NONE - DENIES PAIN

## 2022-05-08 NOTE — ED PROVIDER NOTES
limits   BASIC METABOLIC PANEL - Abnormal; Notable for the following components:    Sodium 134 (*)     Chloride 97 (*)     Glucose 149 (*)     BUN 87 (*)     CREATININE 3.3 (*)     All other components within normal limits   PROTIME-INR - Abnormal; Notable for the following components:    INR 4.72 (*)     All other components within normal limits   GLOMERULAR FILTRATION RATE, ESTIMATED - Abnormal; Notable for the following components:    Est, Glom Filt Rate 18 (*)     All other components within normal limits   HEPATIC FUNCTION PANEL - Abnormal; Notable for the following components:    Albumin 3.3 (*)     All other components within normal limits   ANION GAP   OSMOLALITY       XR CHEST PORTABLE   Final Result   1. Mild hepatomegaly. An aortic stent/valve is present. Permanent dual-chamber pacemaker. No effusion. 2. Moderate bibasilar atelectasis/pneumonia. Overall appearance of chest slightly worse than prior. **This report has been created using voice recognition software. It may contain minor errors which are inherent in voice recognition technology. **      Final report electronically signed by Dr. Jeannie Woodward on 5/8/2022 4:46 PM            FINAL IMPRESSION AND DISPOSITION      1. Acute on chronic congestive heart failure, unspecified heart failure type ([facility]    2. Acute kidney injury superimposed on CKD ([facility]        DISPOSITION Admitted 05/08/2022 06:11:05 PM        PATIENT REFERRED TO:  No follow-up provider specified.     DISCHARGE MEDICATIONS:  New Prescriptions    No medications on file       (Please note that portions of this note were completed with a voice recognition program.  Efforts were made to edit the dictations but occasionally words aremis-transcribed.)    MD Declan Telles MD  05/08/22 [id number]

## 2022-05-08 NOTE — ED NOTES
Report received from [facility]. Patient resting in bed. Respirations easy and unlabored. Denies further needs at this time. Call light within reach.        Hugo Solano RN  05/08/22 0459

## 2022-05-08 NOTE — ED NOTES
Pt adjusted in bed. Updated on poc at this time. Vitals assessed. No concerns voiced.      Johny Brannon RN  05/08/22 3178

## 2022-05-08 NOTE — CONSULTS
[facility]          [facility]        [address]        [place], [facility]leg[facility]        [phone]           Inpatient Initial consult note         5/8/2022 7:10 PM    Patient Name:   Nba Edwards:    1940  Primary Care Physician:  WILBERTO Tucker CNP     History Obtained From:  patient     Consultation requested by : [address], MD    requested for  : Evaluation of  worsening renal function     History of presentingillness   Orvil Severance is a 79 y.o.   male with Past Medical History as stated below presented with a chief complaint of Leg Swelling (bilateral)   on 5/8/2022 . Patient presented with chief complaints of swelling, bilateral lower extremities which has started to get worse within the last 2 days. Symptoms moderate severity associated with some shortness of breath no associated fever chills nausea or vomiting. No specific modifying factors. He was having some cough    Patient diuretic apparently has been increased recently to 1 mg twice a day for 3 days and subsequently decrease it down to 1 mg daily as of Friday and symptoms started to get worse patient states he has been drinking a lot of liquids at least 3-4 bottles of water plus other liquids.      Past History      Past Medical History:   Diagnosis Date    MILLY (acute kidney injury) ([address]     Atrial fibrillation (HCC)     Cerebral artery occlusion with cerebral infarction ([address]     CHF (congestive heart failure), NYHA class III, acute on chronic, combined ([address]     COPD (chronic obstructive pulmonary disease) ([address] 8/3/2020    Coronary artery disease involving native coronary artery of native heart with angina pectoris ([address]     Diabetes mellitus, type 2 ([address] 12/30/2020    Hyperlipidemia 2/20/2012    Hypertension     Pneumonia      Past Surgical History:   Procedure Laterality Date    AORTIC VALVE REPLACEMENT      CARDIAC SURGERY      heart cath   Nette Chaney CORONARY ANGIOPLASTY WITH STENT PLACEMENT      HERNIA REPAIR      OTHER SURGICAL HISTORY  05/10/2018    PACEMAKER INSERTION       Social History     Socioeconomic History    Marital status:      Spouse name: Not on file    Number of children: Not on file    Years of education: Not on file    Highest education level: Not on file   Occupational History    Not on file   Tobacco Use    Smoking status: Former Smoker     Packs/day: 1.00     Years: 50.00     Pack years: 50.00     Types: Cigarettes    Smokeless tobacco: Never Used    Tobacco comment: quit 20 years ago   Vaping Use    Vaping Use: Never used   Substance and Sexual Activity    Alcohol use: Not Currently     Comment: rarely    Drug use: No    Sexual activity: Yes     Partners: Female   Other Topics Concern    Not on file   Social History Narrative    Not on file     Social Determinants of Health     Financial Resource Strain: Low Risk     Difficulty of Paying Living Expenses: Not hard at all   Food Insecurity: No Food Insecurity    Worried About Running Out of Food in the Last Year: Never true    Abiel of Food in the Last Year: Never true   Transportation Needs:     Lack of Transportation (Medical): Not on file    Lack of Transportation (Non-Medical):  Not on file   Physical Activity: Sufficiently Active    Days of Exercise per Week: 6 days    Minutes of Exercise per Session: 30 min   Stress:     Feeling of Stress : Not on file   Social Connections:     Frequency of Communication with Friends and Family: Not on file    Frequency of Social Gatherings with Friends and Family: Not on file    Attends Yazdanism Services: Not on file    Active Member of Clubs or Organizations: Not on file    Attends Club or Organization Meetings: Not on file    Marital Status: Not on file   Intimate Partner Violence:     Fear of Current or Ex-Partner: Not on file    Emotionally Abused: Not on file    Physically Abused: Not on file   Kavitha Sexually Abused: Not on file   Housing Stability:     Unable to Pay for Housing in the Last Year: Not on file    Number of Places Lived in the Last Year: Not on file    Unstable Housing in the Last Year: Not on file     Family History   Family history unknown: Yes     Medications & Allergies      Prior to Admission medications    Medication Sig Start Date End Date Taking? Authorizing Provider   OXYGEN Inhale 4 L into the lungs continuous 5/2/22   WILBERTO Macias CNP   guaiFENesin (MUCINEX) 600 MG extended release tablet Take 2 tablets by mouth 2 times daily for 10 days 5/2/22 5/12/22  WILBERTO Macias CNP   albuterol (ACCUNEB) 1.25 MG/3ML nebulizer solution Inhale 3 mLs into the lungs every 6 hours as needed for Wheezing 5/2/22   WILBERTO Macias CNP   bumetanide (BUMEX) 1 MG tablet 1 tab twice daily x 3 days then back to once tablet daily.  5/2/22   WILBERTO Macias CNP   levoFLOXacin (LEVAQUIN) 250 MG tablet Take 1 tablet by mouth daily for 10 days 5/2/22 5/12/22  WILBERTO Macias CNP   sodium chloride (OCEAN NASAL SPRAY) 0.65 % nasal spray 1 spray by Nasal route as needed for Congestion 4/29/22   Wash Brilliant Counts, WILBERTO Agustin CNP   metoprolol succinate (TOPROL XL) 100 MG extended release tablet Take 1 tablet by mouth daily 4/24/22   WILBERTO Ribeiro CNP   vitamin B-12 1000 MCG tablet Take 1 tablet by mouth daily 4/24/22   WILBERTO Ribeiro CNP   loratadine (CLARITIN) 10 MG tablet Take 1 tablet by mouth daily  Patient taking differently: Take 10 mg by mouth daily as needed  3/15/22   WILBERTO Macias CNP   fluticasone (FLONASE) 50 MCG/ACT nasal spray 2 sprays by Each Nostril route daily  Patient taking differently: 2 sprays by Each Nostril route daily as needed  3/15/22   WILBERTO Macias CNP   Budeson-Glycopyrrol-Formoterol (BREZTRI AEROSPHERE) 160-9-4.8 MCG/ACT AERO Inhale 2 puffs into the lungs 2 times daily 3/11/22 6/9/22  WILBERTO Villalba CNP   albuterol sulfate HFA (VENTOLIN HFA) 108 (90 Base) MCG/ACT inhaler Inhale 2 puffs into the lungs 4 times daily as needed for Wheezing 3/9/22   WILBERTO Yates CNP   promethazine-dextromethorphan (PROMETHAZINE-DM) 6.25-15 MG/5ML syrup Take 5 mLs by mouth nightly 3/7/22 6/5/22  WILBERTO Villalba CNP   Ascorbic Acid (VITAMIN C ER PO) Take 1 tablet by mouth daily    Historical Provider, MD   nitroGLYCERIN (NITRODUR) 0.4 MG/HR Place 1 patch onto the skin daily    Historical Provider, MD   warfarin (COUMADIN) 2.5 MG tablet Take 1 tablet by mouth daily  Patient taking differently: Take 2.5 mg by mouth daily 2.5 Every day except on Wednesday. 1.25mg on Wednesdays 7/30/21   [place] Done, DO   losartan (COZAAR) 25 MG tablet Take 1 tablet by mouth nightly 5/28/21   [place] Done, DO   metFORMIN (GLUCOPHAGE) 500 MG tablet Take 1 tablet by mouth 2 times daily (with meals) 5/28/21   [place] Done, DO   TRUEplus Lancets 28G MISC Use to test blood sugar as needed 7/21/20   Historical Provider, MD   TRUE METRIX BLOOD GLUCOSE TEST strip Use to test blood sugar as needed 7/21/20   Historical Provider, MD   Alcohol Swabs (B-D SINGLE USE SWABS REGULAR) PADS Use to test blood sugar as needed 7/21/20   Historical Provider, MD   vitamin D (CHOLECALCIFEROL) 1000 UNIT TABS tablet Take 1 tablet by mouth daily 4/25/18   Ham Moody MD   atorvastatin (LIPITOR) 80 MG tablet Take 80 mg by mouth nightly    Historical Provider, MD     Allergies: Benzonatate and Xanax [alprazolam]  IP meds : Scheduled Meds:  Continuous Infusions:  Review of Systems Physical Exam   Review of Systems   Constitutional: Positive for fatigue. Negative for chills and fever. HENT: Negative for ear pain and sore throat. Eyes: Negative. Negative for pain. Respiratory: Positive for cough and shortness of breath. Cardiovascular: Positive for leg swelling.  Negative for chest 05/08/22 1824   BP: 123/61   Pulse: 71   Resp: 23   Temp:    SpO2: 99%     Labs, Radiology and Tests       Recent Labs     05/06/22  0919 05/08/22  1620   WBC  --  8.3   RBC  --  3.67*   HGB 12.1* 11.5*   HCT 37.7* 35.6*   MCV  --  97.0*   MCH  --  31.3   MCHC  --  32.3   PLT  --  240     Recent Labs     05/06/22  1055 05/08/22  1620    134*   K 4.2 4.7   CL 95* 97*   CO2 23 23   BUN 64* 87*   CREATININE 2.8* 3.3*   CALCIUM 9.0 8.6   PROT  --  6.1   LABALBU  --  3.3*   BILITOT  --  0.4   ALKPHOS  --  83   AST  --  21   ALT  --  28       Radiology : Chest x-ray reviewed by me appears reasonably clear mild cardiomegaly no significant congestion noted    Other : Old lab data have been reviewed and noted patient baseline creatinine normally runs around 2.3-2.4    Echocardiogram-4/22 shows ejection fraction 45-50%    Assessment    1 Renal -mild MILLY on CKD stage IV most likely secondary to withdrawal recent increase in diuretics and he is also on losartan at home which we will hold at this time  ? Currently has bilateral lower extremity edema moderate severity, has received 2 mg of Bumex IV 1 dose today  ? Overall monitor the renal function in the morning and based on that adjust the diuretics  ? Check a BMP in the morning and 1500 mL fluid restriction    2 Electrolytes -mild hyponatremia  3 Essential hypertension  4 Hx of diabetes mellitus  5 Anemia  6 Hx of TAVR  7 Fluid overload plan as mentioned above  8 Meds reviewed and discussed with patient and family      **This report has been created using voice recognition software. It maycontain minor  errors which are inherent in voice recognition technology. **    Rajesh Bernabe MD,M.D  [facility]. yes/Wound on leg

## 2022-05-08 NOTE — ED PROVIDER NOTES
Neurological: Negative for dizziness, light-headedness and headaches. Psychiatric/Behavioral: Negative for agitation and confusion. PAST MEDICAL AND SURGICAL HISTORY     Past Medical History:   Diagnosis Date    [facility]) [facility]     Atrial fibrillation (HCC)     Cerebral artery occlusion with cerebral infarction ([facility]     CHF (congestive heart failure), NYHA class III, acute on chronic, combined ([facility]     COPD (chronic obstructive pulmonary disease) ([facility] 8/3/2020    Coronary artery disease involving native coronary artery of native heart with angina pectoris ([facility]     Diabetes mellitus, type 2 ([facility] 12/30/2020    Hyperlipidemia 2/20/2012    Hypertension     Pneumonia      Past Surgical History:   Procedure Laterality Date    AORTIC VALVE REPLACEMENT      CARDIAC SURGERY      heart cath    CORONARY ANGIOPLASTY WITH STENT PLACEMENT      HERNIA REPAIR      OTHER SURGICAL HISTORY  05/10/2018    PACEMAKER INSERTION           MEDICATIONS   No current facility-administered medications for this encounter. Current Outpatient Medications:     OXYGEN, Inhale 4 L into the lungs continuous, Disp: 4 L, Rfl: 0    guaiFENesin (MUCINEX) 600 MG extended release tablet, Take 2 tablets by mouth 2 times daily for 10 days, Disp: 40 tablet, Rfl: 0    albuterol (ACCUNEB) 1.25 MG/3ML nebulizer solution, Inhale 3 mLs into the lungs every 6 hours as needed for Wheezing, Disp: 360 mL, Rfl: 3    bumetanide (BUMEX) 1 MG tablet, 1 tab twice daily x 3 days then back to once tablet daily. , Disp: 90 tablet, Rfl: 3    levoFLOXacin (LEVAQUIN) 250 MG tablet, Take 1 tablet by mouth daily for 10 days, Disp: 10 tablet, Rfl: 0    sodium chloride (OCEAN NASAL SPRAY) 0.65 % nasal spray, 1 spray by Nasal route as needed for Congestion, Disp: 1 each, Rfl: 3    metoprolol succinate (TOPROL XL) 100 MG extended release tablet, Take 1 tablet by mouth daily, Disp: 30 tablet, Rfl: 3    vitamin B-12 1000 MCG tablet, Take 1 tablet by mouth daily, Disp: 30 tablet, Rfl: 3    loratadine (CLARITIN) 10 MG tablet, Take 1 tablet by mouth daily (Patient taking differently: Take 10 mg by mouth daily as needed ), Disp: 30 tablet, Rfl: 0    fluticasone (FLONASE) 50 MCG/ACT nasal spray, 2 sprays by Each Nostril route daily (Patient taking differently: 2 sprays by Each Nostril route daily as needed ), Disp: 1 each, Rfl: 0    Budeson-Glycopyrrol-Formoterol (BREZTRI AEROSPHERE) 160-9-4.8 MCG/ACT AERO, Inhale 2 puffs into the lungs 2 times daily, Disp: 3 each, Rfl: 3    albuterol sulfate HFA (VENTOLIN HFA) 108 (90 Base) MCG/ACT inhaler, Inhale 2 puffs into the lungs 4 times daily as needed for Wheezing, Disp: 54 g, Rfl: 1    promethazine-dextromethorphan (PROMETHAZINE-DM) 6.25-15 MG/5ML syrup, Take 5 mLs by mouth nightly, Disp: 450 mL, Rfl: 0    Ascorbic Acid (VITAMIN C ER PO), Take 1 tablet by mouth daily, Disp: , Rfl:     nitroGLYCERIN (NITRODUR) 0.4 MG/HR, Place 1 patch onto the skin daily, Disp: , Rfl:     warfarin (COUMADIN) 2.5 MG tablet, Take 1 tablet by mouth daily (Patient taking differently: Take 2.5 mg by mouth daily 2.5 Every day except on Wednesday.  1.25mg on Wednesdays), Disp: 90 tablet, Rfl: 3    losartan (COZAAR) 25 MG tablet, Take 1 tablet by mouth nightly, Disp: 90 tablet, Rfl: 3    metFORMIN (GLUCOPHAGE) 500 MG tablet, Take 1 tablet by mouth 2 times daily (with meals), Disp: 180 tablet, Rfl: 3    TRUEplus Lancets 28G MISC, Use to test blood sugar as needed, Disp: , Rfl:     TRUE METRIX BLOOD GLUCOSE TEST strip, Use to test blood sugar as needed, Disp: , Rfl:     Alcohol Swabs (B-D SINGLE USE SWABS REGULAR) PADS, Use to test blood sugar as needed, Disp: , Rfl:     vitamin D (CHOLECALCIFEROL) 1000 UNIT TABS tablet, Take 1 tablet by mouth daily, Disp: , Rfl:     atorvastatin (LIPITOR) 80 MG tablet, Take 80 mg by mouth nightly, Disp: , Rfl:       SOCIAL HISTORY     Social History     Social History Narrative    Not on file     Social History     Tobacco Use    Smoking status: Former Smoker     Packs/day: 1.00     Years: 50.00     Pack years: 50.00     Types: Cigarettes    Smokeless tobacco: Never Used    Tobacco comment: quit 20 years ago   Vaping Use    Vaping Use: Never used   Substance Use Topics    Alcohol use: Not Currently     Comment: rarely    Drug use: No         ALLERGIES     Allergies   Allergen Reactions    Benzonatate      Other reaction(s): Other - comment required  Pt states it makes him dizzy    Xanax [Alprazolam] Anxiety     Other reaction(s): Other - comment required  Causes confusion  Became combative and confused         FAMILY HISTORY     Family History   Family history unknown: Yes       PHYSICAL EXAM     ED Triage Vitals [05/08/22 1534]   BP Temp Temp Source Pulse Resp SpO2 Height Weight   122/82 97.6 °F (36.4 °C) Oral 74 12 98 % 5' 6\" (1.676 m) 201 lb (91.2 kg)     Initial vital signs and nursing assessment reviewed and normal. Body mass index is 32.44 kg/m². Pulsoximetry is normal per my interpretation. Additional Vital Signs:  Vitals:    05/08/22 2025   BP: 112/70   Pulse: 84   Resp: 14   Temp:    SpO2: 100%       Physical Exam  Constitutional:       Appearance: Normal appearance. HENT:      Head: Normocephalic and atraumatic. Cardiovascular:      Rate and Rhythm: Normal rate and regular rhythm. Pulmonary:      Effort: Pulmonary effort is normal.      Breath sounds: Normal breath sounds. Abdominal:      General: Abdomen is flat. Palpations: Abdomen is soft. Musculoskeletal:      Right lower leg: Edema present. Left lower leg: Edema present. Comments: Bilateral 3+ lower extremity pitting edema   Skin:     General: Skin is warm and dry. Capillary Refill: Capillary refill takes less than 2 seconds. Neurological:      General: No focal deficit present. Mental Status: He is alert and oriented to person, place, and time.  Mental status is at baseline. MEDICAL DECISION MAKING   Initial Assessment: Patient has a bilateral lower extremity swelling. History of CHF, last EF 45-50% 4/22. Also has history of CKD stage IV, noted MILLY on CKD on presentation. Creatinine 3.3 on arrival, baseline appears 2.3. No shortness of breath noted on exam, patient on home 2-3 L nasal cannula. CXR obtained, demonstrating moderate bibasilar atelectasis/pneumonia. Discussed case with nephrology, who stated patient okay for 2 mg Bumex for diuresis. Dr. Gema Batista contacted, requested admission for observation to . Patient given 2 mg IV Bumex and admitted for further monitoring. 1. Bilateral lower extremity swelling  2. COPD  3. Atrial fibrillation on warfarin  4. Chronic systolic heart failure, EF 45-50%  5. CKD stage IV.   Plan:    2mg IV Bumex x1   Admit for observation        ED RESULTS   Laboratory results:  Labs Reviewed   CBC WITH AUTO DIFFERENTIAL - Abnormal; Notable for the following components:       Result Value    RBC 3.67 (*)     Hemoglobin 11.5 (*)     Hematocrit 35.6 (*)     MCV 97.0 (*)     RDW-CV 14.9 (*)     RDW-SD 53.1 (*)     Immature Grans (Abs) 0.10 (*)     All other components within normal limits   BRAIN NATRIURETIC PEPTIDE - Abnormal; Notable for the following components:    Pro-BNP 2886.0 (*)     All other components within normal limits   BASIC METABOLIC PANEL - Abnormal; Notable for the following components:    Sodium 134 (*)     Chloride 97 (*)     Glucose 149 (*)     BUN 87 (*)     CREATININE 3.3 (*)     All other components within normal limits   PROTIME-INR - Abnormal; Notable for the following components:    INR 4.72 (*)     All other components within normal limits   GLOMERULAR FILTRATION RATE, ESTIMATED - Abnormal; Notable for the following components:    Est, Glom Filt Rate 18 (*)     All other components within normal limits   HEPATIC FUNCTION PANEL - Abnormal; Notable for the following components:    Albumin 3.3 (*) All other components within normal limits   ANION GAP   OSMOLALITY       Radiologic studies results:  XR CHEST PORTABLE   Final Result   1. Mild hepatomegaly. An aortic stent/valve is present. Permanent dual-chamber pacemaker. No effusion. 2. Moderate bibasilar atelectasis/pneumonia. Overall appearance of chest slightly worse than prior. **This report has been created using voice recognition software. It may contain minor errors which are inherent in voice recognition technology. **      Final report electronically signed by Dr. Rafat Rondon on 5/8/2022 4:46 PM          ED Medications administered this visit:   Medications   bumetanide (BUMEX) injection 2 mg (2 mg IntraVENous Given 5/8/22 8236)         MEDICATION CHANGES     New Prescriptions    No medications on file         FINAL DISPOSITION     Final diagnoses:   Acute on chronic congestive heart failure, unspecified heart failure type ([address]   Acute kidney injury superimposed on CKD (HCC)     Condition: condition: stable  Dispo: Admit to Observation      This transcription was electronically signed. Parts of this transcriptions may have been dictated by use of voice recognition software and electronically transcribed, and parts may have been transcribed with the assistance of an ED scribe. The transcription may contain errors not detected in proofreading. Please refer to my supervising physician's documentation if my documentation differs.     Electronically Signed: Ruben Urbano MD, 05/08/22, 9:57 PM         Deja Danielle MD  Resident  05/08/22 [address] Carlos Goetz MD  Resident  05/08/22 2070

## 2022-05-09 ENCOUNTER — APPOINTMENT (OUTPATIENT)
Dept: ULTRASOUND IMAGING | Age: 82
End: 2022-05-09
Payer: MEDICARE

## 2022-05-09 LAB
ANION GAP SERPL CALCULATED.3IONS-SCNC: 16 MEQ/L (ref 8–16)
BUN BLDV-MCNC: 86 MG/DL (ref 7–22)
CALCIUM SERPL-MCNC: 8.7 MG/DL (ref 8.5–10.5)
CHLORIDE BLD-SCNC: 98 MEQ/L (ref 98–111)
CHLORIDE, URINE: 51 MEQ/L
CO2: 22 MEQ/L (ref 23–33)
CREAT SERPL-MCNC: 3.2 MG/DL (ref 0.4–1.2)
GFR SERPL CREATININE-BSD FRML MDRD: 19 ML/MIN/1.73M2
GLUCOSE BLD-MCNC: 114 MG/DL (ref 70–108)
INR BLD: 2.54 (ref 0.85–1.13)
POTASSIUM SERPL-SCNC: 3.9 MEQ/L (ref 3.5–5.2)
POTASSIUM, URINE: 24.9 MEQ/L
REASON FOR REJECTION: NORMAL
REJECTED TEST: NORMAL
SODIUM BLD-SCNC: 136 MEQ/L (ref 135–145)
SODIUM URINE: 59 MEQ/L
TROPONIN T: 0.06 NG/ML
TROPONIN T: 0.06 NG/ML

## 2022-05-09 PROCEDURE — 94640 AIRWAY INHALATION TREATMENT: CPT

## 2022-05-09 PROCEDURE — 36415 COLL VENOUS BLD VENIPUNCTURE: CPT

## 2022-05-09 PROCEDURE — 6370000000 HC RX 637 (ALT 250 FOR IP): Performed by: INTERNAL MEDICINE

## 2022-05-09 PROCEDURE — 6360000002 HC RX W HCPCS: Performed by: INTERNAL MEDICINE

## 2022-05-09 PROCEDURE — 94760 N-INVAS EAR/PLS OXIMETRY 1: CPT

## 2022-05-09 PROCEDURE — 2500000003 HC RX 250 WO HCPCS: Performed by: INTERNAL MEDICINE

## 2022-05-09 PROCEDURE — 80048 BASIC METABOLIC PNL TOTAL CA: CPT

## 2022-05-09 PROCEDURE — 96376 TX/PRO/DX INJ SAME DRUG ADON: CPT

## 2022-05-09 PROCEDURE — G0378 HOSPITAL OBSERVATION PER HR: HCPCS

## 2022-05-09 PROCEDURE — 6370000000 HC RX 637 (ALT 250 FOR IP)

## 2022-05-09 PROCEDURE — 84484 ASSAY OF TROPONIN QUANT: CPT

## 2022-05-09 PROCEDURE — 82436 ASSAY OF URINE CHLORIDE: CPT

## 2022-05-09 PROCEDURE — 84133 ASSAY OF URINE POTASSIUM: CPT

## 2022-05-09 PROCEDURE — 2580000003 HC RX 258: Performed by: INTERNAL MEDICINE

## 2022-05-09 PROCEDURE — 2700000000 HC OXYGEN THERAPY PER DAY

## 2022-05-09 PROCEDURE — 76770 US EXAM ABDO BACK WALL COMP: CPT

## 2022-05-09 PROCEDURE — 99232 SBSQ HOSP IP/OBS MODERATE 35: CPT | Performed by: INTERNAL MEDICINE

## 2022-05-09 PROCEDURE — 99220 PR INITIAL OBSERVATION CARE/DAY 70 MINUTES: CPT | Performed by: INTERNAL MEDICINE

## 2022-05-09 PROCEDURE — 84300 ASSAY OF URINE SODIUM: CPT

## 2022-05-09 PROCEDURE — 85610 PROTHROMBIN TIME: CPT

## 2022-05-09 RX ORDER — SODIUM CHLORIDE 9 MG/ML
INJECTION, SOLUTION INTRAVENOUS PRN
Status: DISCONTINUED | OUTPATIENT
Start: 2022-05-09 | End: 2022-05-10 | Stop reason: HOSPADM

## 2022-05-09 RX ORDER — BUMETANIDE 0.25 MG/ML
2 INJECTION, SOLUTION INTRAMUSCULAR; INTRAVENOUS 2 TIMES DAILY
Status: DISCONTINUED | OUTPATIENT
Start: 2022-05-09 | End: 2022-05-10 | Stop reason: HOSPADM

## 2022-05-09 RX ORDER — ACETAMINOPHEN 325 MG/1
650 TABLET ORAL EVERY 6 HOURS PRN
Status: DISCONTINUED | OUTPATIENT
Start: 2022-05-09 | End: 2022-05-10 | Stop reason: HOSPADM

## 2022-05-09 RX ORDER — SODIUM CHLORIDE 0.9 % (FLUSH) 0.9 %
5-40 SYRINGE (ML) INJECTION EVERY 12 HOURS SCHEDULED
Status: DISCONTINUED | OUTPATIENT
Start: 2022-05-09 | End: 2022-05-10 | Stop reason: HOSPADM

## 2022-05-09 RX ORDER — FLUTICASONE PROPIONATE 50 MCG
2 SPRAY, SUSPENSION (ML) NASAL DAILY
Status: DISCONTINUED | OUTPATIENT
Start: 2022-05-09 | End: 2022-05-10 | Stop reason: HOSPADM

## 2022-05-09 RX ORDER — VITAMIN B COMPLEX
1000 TABLET ORAL DAILY
Status: DISCONTINUED | OUTPATIENT
Start: 2022-05-09 | End: 2022-05-10 | Stop reason: HOSPADM

## 2022-05-09 RX ORDER — ASPIRIN 81 MG/1
81 TABLET, CHEWABLE ORAL DAILY
Status: DISCONTINUED | OUTPATIENT
Start: 2022-05-10 | End: 2022-05-10 | Stop reason: HOSPADM

## 2022-05-09 RX ORDER — DEXTROMETHORPHAN HYDROBROMIDE AND PROMETHAZINE HYDROCHLORIDE 15; 6.25 MG/5ML; MG/5ML
5 SYRUP ORAL NIGHTLY
Status: DISCONTINUED | OUTPATIENT
Start: 2022-05-09 | End: 2022-05-09 | Stop reason: RX

## 2022-05-09 RX ORDER — NITROGLYCERIN 80 MG/1
1 PATCH TRANSDERMAL DAILY
Status: DISCONTINUED | OUTPATIENT
Start: 2022-05-09 | End: 2022-05-09 | Stop reason: ALTCHOICE

## 2022-05-09 RX ORDER — ALBUTEROL SULFATE 90 UG/1
2 AEROSOL, METERED RESPIRATORY (INHALATION) 4 TIMES DAILY PRN
Status: DISCONTINUED | OUTPATIENT
Start: 2022-05-09 | End: 2022-05-10 | Stop reason: HOSPADM

## 2022-05-09 RX ORDER — BUMETANIDE 1 MG/1
1 TABLET ORAL DAILY
Status: DISCONTINUED | OUTPATIENT
Start: 2022-05-09 | End: 2022-05-09

## 2022-05-09 RX ORDER — ACETAMINOPHEN 650 MG/1
650 SUPPOSITORY RECTAL EVERY 6 HOURS PRN
Status: DISCONTINUED | OUTPATIENT
Start: 2022-05-09 | End: 2022-05-10 | Stop reason: HOSPADM

## 2022-05-09 RX ORDER — BUMETANIDE 0.25 MG/ML
1 INJECTION, SOLUTION INTRAMUSCULAR; INTRAVENOUS 2 TIMES DAILY
Status: DISCONTINUED | OUTPATIENT
Start: 2022-05-09 | End: 2022-05-09

## 2022-05-09 RX ORDER — SODIUM CHLORIDE 0.9 % (FLUSH) 0.9 %
5-40 SYRINGE (ML) INJECTION PRN
Status: DISCONTINUED | OUTPATIENT
Start: 2022-05-09 | End: 2022-05-10 | Stop reason: HOSPADM

## 2022-05-09 RX ORDER — ALBUTEROL SULFATE 2.5 MG/3ML
2.5 SOLUTION RESPIRATORY (INHALATION) EVERY 6 HOURS PRN
Status: DISCONTINUED | OUTPATIENT
Start: 2022-05-09 | End: 2022-05-10 | Stop reason: HOSPADM

## 2022-05-09 RX ORDER — ONDANSETRON 2 MG/ML
4 INJECTION INTRAMUSCULAR; INTRAVENOUS EVERY 6 HOURS PRN
Status: DISCONTINUED | OUTPATIENT
Start: 2022-05-09 | End: 2022-05-10 | Stop reason: HOSPADM

## 2022-05-09 RX ORDER — METOPROLOL SUCCINATE 100 MG/1
100 TABLET, EXTENDED RELEASE ORAL DAILY
Status: DISCONTINUED | OUTPATIENT
Start: 2022-05-09 | End: 2022-05-10 | Stop reason: HOSPADM

## 2022-05-09 RX ORDER — POLYETHYLENE GLYCOL 3350 17 G/17G
17 POWDER, FOR SOLUTION ORAL DAILY PRN
Status: DISCONTINUED | OUTPATIENT
Start: 2022-05-09 | End: 2022-05-10 | Stop reason: HOSPADM

## 2022-05-09 RX ORDER — LEVOFLOXACIN 500 MG/1
250 TABLET, FILM COATED ORAL
Status: DISCONTINUED | OUTPATIENT
Start: 2022-05-10 | End: 2022-05-10 | Stop reason: HOSPADM

## 2022-05-09 RX ORDER — ASCORBIC ACID 500 MG
500 TABLET ORAL DAILY
Status: DISCONTINUED | OUTPATIENT
Start: 2022-05-09 | End: 2022-05-10 | Stop reason: HOSPADM

## 2022-05-09 RX ORDER — ATORVASTATIN CALCIUM 40 MG/1
40 TABLET, FILM COATED ORAL NIGHTLY
Status: DISCONTINUED | OUTPATIENT
Start: 2022-05-09 | End: 2022-05-09 | Stop reason: SDUPTHER

## 2022-05-09 RX ORDER — ONDANSETRON 4 MG/1
4 TABLET, ORALLY DISINTEGRATING ORAL EVERY 8 HOURS PRN
Status: DISCONTINUED | OUTPATIENT
Start: 2022-05-09 | End: 2022-05-10 | Stop reason: HOSPADM

## 2022-05-09 RX ORDER — ATORVASTATIN CALCIUM 80 MG/1
80 TABLET, FILM COATED ORAL NIGHTLY
Status: DISCONTINUED | OUTPATIENT
Start: 2022-05-09 | End: 2022-05-10 | Stop reason: HOSPADM

## 2022-05-09 RX ORDER — FLUTICASONE PROPIONATE 50 MCG
2 SPRAY, SUSPENSION (ML) NASAL DAILY PRN
COMMUNITY

## 2022-05-09 RX ORDER — LANOLIN ALCOHOL/MO/W.PET/CERES
1000 CREAM (GRAM) TOPICAL DAILY
Status: DISCONTINUED | OUTPATIENT
Start: 2022-05-09 | End: 2022-05-10 | Stop reason: HOSPADM

## 2022-05-09 RX ORDER — WARFARIN SODIUM 2.5 MG/1
2.5 TABLET ORAL
Status: COMPLETED | OUTPATIENT
Start: 2022-05-09 | End: 2022-05-09

## 2022-05-09 RX ORDER — GUAIFENESIN 600 MG/1
1200 TABLET, EXTENDED RELEASE ORAL 2 TIMES DAILY
Status: DISCONTINUED | OUTPATIENT
Start: 2022-05-09 | End: 2022-05-10 | Stop reason: HOSPADM

## 2022-05-09 RX ORDER — BUMETANIDE 1 MG/1
1 TABLET ORAL 2 TIMES DAILY
Status: DISCONTINUED | OUTPATIENT
Start: 2022-05-09 | End: 2022-05-09

## 2022-05-09 RX ORDER — LOSARTAN POTASSIUM 25 MG/1
25 TABLET ORAL NIGHTLY
Status: DISCONTINUED | OUTPATIENT
Start: 2022-05-09 | End: 2022-05-10 | Stop reason: HOSPADM

## 2022-05-09 RX ADMIN — GUAIFENESIN 1200 MG: 600 TABLET, EXTENDED RELEASE ORAL at 13:03

## 2022-05-09 RX ADMIN — TIOTROPIUM BROMIDE INHALATION SPRAY 2 PUFF: 3.12 SPRAY, METERED RESPIRATORY (INHALATION) at 08:55

## 2022-05-09 RX ADMIN — METOPROLOL SUCCINATE 100 MG: 100 TABLET, FILM COATED, EXTENDED RELEASE ORAL at 13:03

## 2022-05-09 RX ADMIN — Medication 1000 MCG: at 13:03

## 2022-05-09 RX ADMIN — SODIUM CHLORIDE, PRESERVATIVE FREE 10 ML: 5 INJECTION INTRAVENOUS at 13:04

## 2022-05-09 RX ADMIN — MOMETASONE FUROATE AND FORMOTEROL FUMARATE DIHYDRATE 2 PUFF: 200; 5 AEROSOL RESPIRATORY (INHALATION) at 18:28

## 2022-05-09 RX ADMIN — ATORVASTATIN CALCIUM 80 MG: 80 TABLET, FILM COATED ORAL at 21:04

## 2022-05-09 RX ADMIN — BUMETANIDE 2 MG: 0.25 INJECTION, SOLUTION INTRAMUSCULAR; INTRAVENOUS at 18:40

## 2022-05-09 RX ADMIN — OXYCODONE HYDROCHLORIDE AND ACETAMINOPHEN 500 MG: 500 TABLET ORAL at 13:03

## 2022-05-09 RX ADMIN — BUMETANIDE 1 MG: 0.25 INJECTION, SOLUTION INTRAMUSCULAR; INTRAVENOUS at 13:03

## 2022-05-09 RX ADMIN — GUAIFENESIN 1200 MG: 600 TABLET, EXTENDED RELEASE ORAL at 21:04

## 2022-05-09 RX ADMIN — WARFARIN SODIUM 2.5 MG: 2.5 TABLET ORAL at 21:17

## 2022-05-09 RX ADMIN — SODIUM CHLORIDE, PRESERVATIVE FREE 10 ML: 5 INJECTION INTRAVENOUS at 21:03

## 2022-05-09 RX ADMIN — FLUTICASONE PROPIONATE 2 SPRAY: 50 SPRAY, METERED NASAL at 13:03

## 2022-05-09 RX ADMIN — ALBUTEROL SULFATE 2.5 MG: 2.5 SOLUTION RESPIRATORY (INHALATION) at 15:39

## 2022-05-09 RX ADMIN — Medication 1000 UNITS: at 13:03

## 2022-05-09 RX ADMIN — MOMETASONE FUROATE AND FORMOTEROL FUMARATE DIHYDRATE 2 PUFF: 200; 5 AEROSOL RESPIRATORY (INHALATION) at 08:55

## 2022-05-09 ASSESSMENT — PAIN SCALES - GENERAL: PAINLEVEL_OUTOF10: 0

## 2022-05-09 NOTE — PROGRESS NOTES
Pharmacy Medication History Note      List of current medications patient is taking is complete. Source of information: Patient/wife, PCP notes (Epic)    Changes made to medication list:  Medications removed (include reason, ex. therapy complete or physician discontinued):  Flonase (dose adjusted)  Claritin--therapy complete  Nitroglycerin patch--not taking  Phenergan DM--not taking    Medications added/doses adjusted:  Bumex 1mg bid was x3 days, then back to 1mg po daily on 5/7/22      Other notes (ex. Recent course of antibiotics, Coumadin dosing): Warfarin (Coumadin) currently on hold per [facility] due to high INR. Previous dosing was 1.25mg W and 2.5mg all other days. Patient has been holding since 5/6/22 with planned recheck today (yesterday's INR on admission was 4.72)  Started on Levaquin 250mg daily x10 days on 5/2/22 (dose adjusted while inpatient due to MILLY)--pt should have 3 days remaining  Denies use of other OTC or herbal medications. Allergies reviewed      Electronically signed by Alberto Hwang, [facility] on 5/9/2022 at 11:13 AM

## 2022-05-09 NOTE — ED NOTES
ED nurse-to-nurse bedside report    Chief Complaint   Patient presents with    Leg Swelling     bilateral      LOC: alert and orientated to name, place, date  Vital signs   Vitals:    05/08/22 2315 05/09/22 0015 05/09/22 0115 05/09/22 0215   BP: 103/60 (!) 100/56 (!) 109/57 126/78   Pulse: 76 62 73 80   Resp: 19 15 16 16   Temp:       TempSrc:       SpO2: 99% 100% 95% 97%   Weight:       Height:          Pain:    Pain Interventions: rest/reposition  Pain Goal: 0  Oxygen: Yes    Current needs required 2L   Telemetry: Yes  LDAs:   Peripheral IV 05/08/22 Left Forearm (Active)   Site Assessment Clean, dry & intact 05/08/22 1856   Line Status Normal saline locked; Blood return noted; Flushed 05/08/22 1856   Dressing Status Clean, dry & intact 05/08/22 1856   Dressing Type Transparent 05/08/22 1856   Dressing Intervention New 05/08/22 1856     Continuous Infusions:   Mobility: Requires assistance * 1  Esposito Fall Risk Score:    Fall Risk 3/11/2022 3/11/2022 5/7/2021   2 or more falls in past year? no - no   Fall with injury in past year? no no no     Fall Interventions: siderails x2, call light within reach  Report given to: Rosalia Santiago RN  05/09/22 0802

## 2022-05-09 NOTE — H&P
Patient was seen and examined face-to-face by me (Dr. Dominique Chapa MD). The chart, progress notes, labs and radiographs were reviewed. Case discussed with patient's primary nurse. Questions and concerns were addressed.   I agree with the note as created with additional comments as noted    Dr. Dominique Chapa MD   Department of Cardiology

## 2022-05-09 NOTE — CARE COORDINATION
5/9/22, 8:21 AM EDT  DISCHARGE PLANNING EVALUATION:    Darryle Banning       Admitted: 5/8/2022/ 1826 [facility] day: 0   Location: -06/006-A Reason for admit: Heart failure exacerbated by sotalol ([address] [I50.9]  Acute kidney injury superimposed on CKD ([address] [N17.9, N18.9]  Acute on chronic congestive heart failure, unspecified heart failure type ([address] [I50.9]   PMH:  has a past medical history of MILLY (acute kidney injury) ([address] Atrial fibrillation ([address] Cerebral artery occlusion with cerebral infarction ([address] CHF (congestive heart failure), NYHA class III, acute on chronic, combined ([address] COPD (chronic obstructive pulmonary disease) ([address] Coronary artery disease involving native coronary artery of native heart with angina pectoris ([address] Diabetes mellitus, type 2 ([address] Hyperlipidemia, Hypertension, and Pneumonia. Procedure:   CXR    Impression:       1. Mild hepatomegaly. An aortic stent/valve is present. Permanent dual-chamber pacemaker. No effusion. 2. Moderate bibasilar atelectasis/pneumonia. Overall appearance of chest slightly worse than prior. Barriers to Discharge:  From ED, creatinine 3.3, Nephrology consult, telemetry, Albuterol nebs, Bumex. PCP: WILBERTO Recio - CNP   %  Patient's Healthcare Decision Maker: Named in [address] [place] [address]    Patient Goals/Plan/Treatment Preferences: Met with Karely Monroe. He currently lives at home with his significant other. He is on 2 liters nasal cannula at rest and 3 with activity supplied by [facility]. Plan is to return home at discharge. He denies need for DME and declines HH. Will follow for potential needs. Insurance and PCP verified. Transportation/Food Security/Housekeeping Addressed:  No issues identified.

## 2022-05-09 NOTE — ED NOTES
Patient resting in chair. Respirations easy and unlabored. Denies further needs at this time. Family at bedside. Call light within reach.         Vladislav Franco RN  05/09/22 0007

## 2022-05-09 NOTE — ED NOTES
Patient resting in chair with eyes closed. Respirations easy and unlabored. No distress noted. Call light within reach. Wife at bedside.        Faisal Esteban RN  05/09/22 5610

## 2022-05-09 NOTE — ED NOTES
Patient resting in chair with eyes closed. Respirations easy and unlabored. No distress noted. Call light within reach.        Edgar Torres RN  05/09/22 0020

## 2022-05-09 NOTE — PLAN OF CARE
Problem: Respiratory - Adult  Goal: Clear lung sounds  5/9/2022 1832 by Gustavo Gómez RCP  Outcome: Progressing  Note: Mdi to maintain open airways

## 2022-05-09 NOTE — ED NOTES
ED to inpatient nurses report    Chief Complaint   Patient presents with    Leg Swelling     bilateral      Present to ED from home  LOC: alert and orientated to name, place, date  Vital signs   Vitals:    05/08/22 2115 05/08/22 2215 05/08/22 2315 05/09/22 0015   BP: 114/60 116/63 103/60 (!) 100/56   Pulse: 70 82 76 62   Resp: 21 20 19 15   Temp:       TempSrc:       SpO2: 99% 99% 99% 100%   Weight:       Height:          Oxygen Baseline 2L    Current needs required 2L   LDAs:   Peripheral IV 05/08/22 Left Forearm (Active)   Site Assessment Clean, dry & intact 05/08/22 1856   Line Status Normal saline locked; Blood return noted; Flushed 05/08/22 1856   Dressing Status Clean, dry & intact 05/08/22 1856   Dressing Type Transparent 05/08/22 1856   Dressing Intervention New 05/08/22 1856     Mobility: Requires assistance * 1  Pending ED orders: None  Present condition: Stable      Electronically signed by James Prieto RN on 5/9/2022 at 12:48 AM       James Prieto RN  05/09/22 6124

## 2022-05-09 NOTE — ED NOTES
Patient resting in chair. Respirations easy and unlabored. Family at bedside. Pt's med list updated at this time. Call light within reach.         Pato Yen RN  05/09/22 0010

## 2022-05-09 NOTE — FLOWSHEET NOTE
05/09/22 0951   Encounter Summary   Service Provided For: Patient   Referral/Consult From: Yessy   Last Encounter  05/09/22   Complexity of Encounter Moderate   Begin Time 1014   End Time  1022   Total Time Calculated 8 min   Spiritual/Emotional needs   Type Spiritual Support   Assessment/Intervention/Outcome   Assessment Coping   Intervention Active listening   Outcome Comfort   Assessment: In my encounter with the 79 yr old patient, while rounding  the unit 4A. I provided spiritual care to patient through conversation, I also came to assess the patients spiritual needs present. The pt was admitted due to heart failure exacerbated by sotalol. Interventions:  I provided prayer, emotional support and words of comfort.  provided a listening presence and encouraged pt to share their beliefs and how they support him during their hospitalization. Outcomes: The patient was encouraged and didnt share any further spiritual needs at this time. Plan:  Chaplains will follow-up at a later time for assessment of any spiritual care needs present.

## 2022-05-09 NOTE — PROGRESS NOTES
Pharmacy Renal Adjustment    Tr Carcamo is a 79 y.o. male. Pharmacy renally adjust the following medications per P&T approved policy: Levaquin, Metformin    Height:   Ht Readings from Last 1 Encounters:   05/08/22 5' 6\" (1.676 m)     Weight:  Wt Readings from Last 1 Encounters:   05/08/22 201 lb (91.2 kg)     Recent Labs     05/06/22  1055 05/08/22  1620   BUN 64* 87*   CREATININE 2.8* 3.3*     Estimated Creatinine Clearance: 18 mL/min (A) (based on SCr of 3.3 mg/dL [facility])). Calculated CrCl:    Assessment:  MILLY    Plan:   Decrease Levaquin from 250mg daily to 250mg every other day. Stop Metformin for now (may be restarted when kidney function recovers).

## 2022-05-09 NOTE — PROGRESS NOTES
Renal Progress Note  [facility]    Patient :  Bethann Kawasaki; 79 y.o. MRN# [id number]  Location:  4A-06/006-A  Attending:  Tracy Alanis MD  Admit Date:  5/8/2022   Hospital Day: 0      Subjective:     Nephrology is following the patient for MILLY/CKD, volume overload. Patient was seen this morning. Wife at bedside. States he urinated a lot more yesterday after the IV bumex. Legs still swollen. Has some SOB. On 2 L NC. Outpatient Medications:     Medications Prior to Admission: OXYGEN, Inhale 4 L into the lungs continuous  guaiFENesin (MUCINEX) 600 MG extended release tablet, Take 2 tablets by mouth 2 times daily for 10 days  albuterol (ACCUNEB) 1.25 MG/3ML nebulizer solution, Inhale 3 mLs into the lungs every 6 hours as needed for Wheezing  bumetanide (BUMEX) 1 MG tablet, 1 tab twice daily x 3 days then back to once tablet daily.   levoFLOXacin (LEVAQUIN) 250 MG tablet, Take 1 tablet by mouth daily for 10 days  sodium chloride (OCEAN NASAL SPRAY) 0.65 % nasal spray, 1 spray by Nasal route as needed for Congestion  metoprolol succinate (TOPROL XL) 100 MG extended release tablet, Take 1 tablet by mouth daily  vitamin B-12 1000 MCG tablet, Take 1 tablet by mouth daily  loratadine (CLARITIN) 10 MG tablet, Take 1 tablet by mouth daily (Patient not taking: Reported on 5/8/2022)  fluticasone (FLONASE) 50 MCG/ACT nasal spray, 2 sprays by Each Nostril route daily (Patient taking differently: 2 sprays by Each Nostril route daily as needed )  Budeson-Glycopyrrol-Formoterol (BREZTRI AEROSPHERE) 160-9-4.8 MCG/ACT AERO, Inhale 2 puffs into the lungs 2 times daily  albuterol sulfate HFA (VENTOLIN HFA) 108 (90 Base) MCG/ACT inhaler, Inhale 2 puffs into the lungs 4 times daily as needed for Wheezing  promethazine-dextromethorphan (PROMETHAZINE-DM) 6.25-15 MG/5ML syrup, Take 5 mLs by mouth nightly (Patient not taking: Reported on 5/8/2022)  Ascorbic Acid (VITAMIN C ER PO), Take 1 tablet by mouth daily  nitroGLYCERIN (NITRODUR) 0.4 MG/HR, Place 1 patch onto the skin daily  warfarin (COUMADIN) 2.5 MG tablet, Take 1 tablet by mouth daily (Patient not taking: Reported on 2022)  losartan (COZAAR) 25 MG tablet, Take 1 tablet by mouth nightly  metFORMIN (GLUCOPHAGE) 500 MG tablet, Take 1 tablet by mouth 2 times daily (with meals)  TRUEplus Lancets 28G MISC, Use to test blood sugar as needed  TRUE METRIX BLOOD GLUCOSE TEST strip, Use to test blood sugar as needed  Alcohol Swabs (B-D SINGLE USE SWABS REGULAR) PADS, Use to test blood sugar as needed  vitamin D (CHOLECALCIFEROL) 1000 UNIT TABS tablet, Take 1 tablet by mouth daily  atorvastatin (LIPITOR) 80 MG tablet, Take 80 mg by mouth nightly    Current Medications:     Scheduled Meds:    Vitamin C CR  500 mg Oral Daily    atorvastatin  80 mg Oral Nightly    Budeson-Glycopyrrol-Formoterol  2 puff Inhalation BID    bumetanide  1 mg Oral BID    fluticasone  2 spray Each Nostril Daily    guaiFENesin  1,200 mg Oral BID    levoFLOXacin  250 mg Oral Daily    losartan  25 mg Oral Nightly    metFORMIN  500 mg Oral BID WC    metoprolol succinate  100 mg Oral Daily    nitroGLYCERIN  1 patch TransDERmal Daily    promethazine-dextromethorphan  5 mL Oral Nightly    cyanocobalamin  1,000 mcg Oral Daily    vitamin D  1,000 Units Oral Daily    sodium chloride flush  5-40 mL IntraVENous 2 times per day    [START ON 5/10/2022] aspirin  81 mg Oral Daily     Continuous Infusions:    sodium chloride       PRN Meds:  albuterol, albuterol sulfate HFA, sodium chloride, sodium chloride flush, sodium chloride, ondansetron **OR** ondansetron, acetaminophen **OR** acetaminophen, polyethylene glycol    Input/Output:       No intake/output data recorded. .      Patient Vitals for the past 96 hrs (Last 3 readings):   Weight   22 1534 201 lb (91.2 kg)       Vital Signs:   Temperature:  Temp: 97.6 °F (36.4 °C)  TMax:   Temp (24hrs), Av.6 °F (36.4 °C), Min:97.6 °F (36.4 °C), Max:97.6 °F (36.4 °C)    Respirations:  Resp: 18  Pulse:   Pulse: 87  BP:    BP: 111/64  BP Range: Systolic (40PVF), JYQ:890 , Min:100 , GSH:498       Diastolic (42IYA), TAD:60, Min:51, Max:82      Physical Examination:     General:  Awake, alert, no acute distress  HEENT: NC/AT/ MMM  Chest:              Rhonchi noted  Cardiac:  Irregularly irregular  Abdomen: Soft, non-tender,  Neuro:  No facial droop, No Asterixis  SKIN:  No rashes, good skin turgor. Extremities:      2+ LE edema B/L    Labs:       Recent Labs     05/06/22  0919 05/08/22  1620   WBC  --  8.3   RBC  --  3.67*   HGB 12.1* 11.5*   HCT 37.7* 35.6*   MCV  --  97.0*   MCH  --  31.3   MCHC  --  32.3   PLT  --  240   MPV  --  9.9      BMP:   Recent Labs     05/06/22  1055 05/08/22  1620    134*   K 4.2 4.7   CL 95* 97*   CO2 23 23   BUN 64* 87*   CREATININE 2.8* 3.3*   GLUCOSE 116* 149*   CALCIUM 9.0 8.6      Phosphorus:   No results for input(s): PHOS in the last 72 hours. Magnesium:  No results for input(s): MG in the last 72 hours.   Albumin:    Recent Labs     05/08/22  1620   LABALBU 3.3*     BNP:    No results found for: BNP  ANDRES:    No results found for: ANDRES  SPEP:  Lab Results   Component Value Date    PROT 6.1 05/08/2022     UPEP:   No results found for: LABPE  C3:   No results found for: C3  C4:   No results found for: C4  MPO ANCA:   No results found for: MPO  PR3 ANCA:   No results found for: PR3  Anti-GBM:   No results found for: GBMABIGG  Hep BsAg:       No results found for: HEPBSAG  Hep C AB:        No results found for: HEPCAB    Urinalysis/Chemistries:      Lab Results   Component Value Date    NITRU NEGATIVE 04/22/2022    COLORU YELLOW 04/22/2022    PHUR 5.5 04/22/2022    LABCAST NONE SEEN 04/22/2022    LABCAST NONE SEEN 04/22/2022    WBCUA 0-2 04/22/2022    RBCUA 0-2 04/22/2022    YEAST NONE SEEN 04/22/2022    BACTERIA NONE SEEN 04/22/2022    SPECGRAV 1.012 04/22/2022    LEUKOCYTESUR NEGATIVE 04/22/2022    UROBILINOGEN 0.2 04/22/2022    BILIRUBINUR NEGATIVE 04/22/2022    BLOODU NEGATIVE 04/22/2022    GLUCOSEU NEGATIVE 07/03/2021    KETUA NEGATIVE 04/22/2022     Urine Sodium:   No results found for: DRAGAN  Urine Potassium:  No results found for: KUR  Urine Chloride:  No results found for: CLUR  Urine Osmolarity: No results found for: OSMOU  Urine Protein:   No components found for: TOTALPROTEIN, URINE   Urine Creatinine:     Lab Results   Component Value Date    LABCREA 25.0 01/04/2022     Urine Eosinophils:  No components found for: UEOS        Impression and Plan:  1. MILLY on CKD III possibly cardiorenal  -start bumex 1 mg IV BID. Monitor response, low sodium diet, fluid restriction  -check renal US and urine lytes  -hold losartan and hold Metformin     2. Systolic CHF with volume overload  3. Possible pneumonia  4. HTN  5. aFib  6. DM  7. Hx TAVR  8. Anemia        Please don't hesitate to call with any questions.   Electronically signed by Ryan Snow DO on 5/9/2022 at 7:53 AM

## 2022-05-09 NOTE — ED NOTES
Patient resting in chair. Respirations easy and unlabored. No distress noted. Call light within reach.        Alley Ashley RN  05/08/22 2026

## 2022-05-09 NOTE — ED NOTES
Patient resting in chair. Respirations easy and unlabored. No distress noted. Call light within reach.        Edgardo Lauren RN  05/09/22 6048

## 2022-05-09 NOTE — ED NOTES
Patient resting in chair. Respirations easy and unlabored. No distress noted. Call light within reach.        Yina Gonzalez RN  05/09/22 1966

## 2022-05-09 NOTE — ED NOTES
Pt sitting in recliner after being moved to room 31. Wife at bedside. Updated on plan of care. Call light in reach.      Corby Smith RN  05/09/22 7894

## 2022-05-09 NOTE — ED NOTES
Pt ambulated to restroom and back with no assistance. Steady gait. Call light in reach.      Kike Poster, RN  05/09/22 3645

## 2022-05-09 NOTE — ED NOTES
Pt transferred to room 031 and report given to Favio Rothman RN.       Edgar Torres RN  05/09/22 9619

## 2022-05-09 NOTE — PROGRESS NOTES
Patient admitted to [facility] Room 06 from ED. IV site free of s/s of infection or infiltration. Vital signs obtained, assessment completed. Oriented to room. Policies and procedures for 4a explained. All questions answered with no further questions at this time. Fall prevention and safety discussed with patient. 2 person skin check completed. Dr. Jose Faustin paged for orders.

## 2022-05-10 VITALS
TEMPERATURE: 97.7 F | HEART RATE: 80 BPM | WEIGHT: 195.3 LBS | OXYGEN SATURATION: 92 % | DIASTOLIC BLOOD PRESSURE: 62 MMHG | BODY MASS INDEX: 31.39 KG/M2 | SYSTOLIC BLOOD PRESSURE: 124 MMHG | HEIGHT: 66 IN | RESPIRATION RATE: 16 BRPM

## 2022-05-10 LAB
ANION GAP SERPL CALCULATED.3IONS-SCNC: 15 MEQ/L (ref 8–16)
BUN BLDV-MCNC: 77 MG/DL (ref 7–22)
CALCIUM SERPL-MCNC: 8.5 MG/DL (ref 8.5–10.5)
CHLORIDE BLD-SCNC: 99 MEQ/L (ref 98–111)
CO2: 25 MEQ/L (ref 23–33)
CREAT SERPL-MCNC: 2.6 MG/DL (ref 0.4–1.2)
EKG Q-T INTERVAL: 468 MS
EKG QRS DURATION: 148 MS
EKG QTC CALCULATION (BAZETT): 519 MS
EKG R AXIS: 25 DEGREES
EKG T AXIS: -60 DEGREES
EKG VENTRICULAR RATE: 74 BPM
ERYTHROCYTE [DISTWIDTH] IN BLOOD BY AUTOMATED COUNT: 14.9 % (ref 11.5–14.5)
ERYTHROCYTE [DISTWIDTH] IN BLOOD BY AUTOMATED COUNT: 51.3 FL (ref 35–45)
GFR SERPL CREATININE-BSD FRML MDRD: 24 ML/MIN/1.73M2
GLUCOSE BLD-MCNC: 106 MG/DL (ref 70–108)
HCT VFR BLD CALC: 35.9 % (ref 42–52)
HEMOGLOBIN: 12 GM/DL (ref 14–18)
INR BLD: 2.25 (ref 0.85–1.13)
MCH RBC QN AUTO: 31.3 PG (ref 26–33)
MCHC RBC AUTO-ENTMCNC: 33.4 GM/DL (ref 32.2–35.5)
MCV RBC AUTO: 93.7 FL (ref 80–94)
PLATELET # BLD: 227 THOU/MM3 (ref 130–400)
PMV BLD AUTO: 10.6 FL (ref 9.4–12.4)
POTASSIUM SERPL-SCNC: 4 MEQ/L (ref 3.5–5.2)
RBC # BLD: 3.83 MILL/MM3 (ref 4.7–6.1)
SODIUM BLD-SCNC: 139 MEQ/L (ref 135–145)
WBC # BLD: 9.4 THOU/MM3 (ref 4.8–10.8)

## 2022-05-10 PROCEDURE — 96376 TX/PRO/DX INJ SAME DRUG ADON: CPT

## 2022-05-10 PROCEDURE — 93010 ELECTROCARDIOGRAM REPORT: CPT | Performed by: NUCLEAR MEDICINE

## 2022-05-10 PROCEDURE — 85610 PROTHROMBIN TIME: CPT

## 2022-05-10 PROCEDURE — G0378 HOSPITAL OBSERVATION PER HR: HCPCS

## 2022-05-10 PROCEDURE — 6360000002 HC RX W HCPCS: Performed by: INTERNAL MEDICINE

## 2022-05-10 PROCEDURE — 2580000003 HC RX 258: Performed by: INTERNAL MEDICINE

## 2022-05-10 PROCEDURE — 99232 SBSQ HOSP IP/OBS MODERATE 35: CPT | Performed by: INTERNAL MEDICINE

## 2022-05-10 PROCEDURE — 36415 COLL VENOUS BLD VENIPUNCTURE: CPT

## 2022-05-10 PROCEDURE — 94640 AIRWAY INHALATION TREATMENT: CPT

## 2022-05-10 PROCEDURE — 94760 N-INVAS EAR/PLS OXIMETRY 1: CPT

## 2022-05-10 PROCEDURE — 6370000000 HC RX 637 (ALT 250 FOR IP)

## 2022-05-10 PROCEDURE — 94761 N-INVAS EAR/PLS OXIMETRY MLT: CPT

## 2022-05-10 PROCEDURE — 2700000000 HC OXYGEN THERAPY PER DAY

## 2022-05-10 PROCEDURE — 99217 PR OBSERVATION CARE DISCHARGE MANAGEMENT: CPT | Performed by: INTERNAL MEDICINE

## 2022-05-10 PROCEDURE — 2500000003 HC RX 250 WO HCPCS: Performed by: INTERNAL MEDICINE

## 2022-05-10 PROCEDURE — 6370000000 HC RX 637 (ALT 250 FOR IP): Performed by: INTERNAL MEDICINE

## 2022-05-10 PROCEDURE — 93005 ELECTROCARDIOGRAM TRACING: CPT | Performed by: INTERNAL MEDICINE

## 2022-05-10 PROCEDURE — 85027 COMPLETE CBC AUTOMATED: CPT

## 2022-05-10 PROCEDURE — 80048 BASIC METABOLIC PNL TOTAL CA: CPT

## 2022-05-10 RX ORDER — BUMETANIDE 2 MG/1
2 TABLET ORAL 2 TIMES DAILY
Qty: 30 TABLET | Refills: 3 | Status: SHIPPED | OUTPATIENT
Start: 2022-05-10 | End: 2022-06-28 | Stop reason: SDUPTHER

## 2022-05-10 RX ORDER — WARFARIN SODIUM 2.5 MG/1
2.5 TABLET ORAL
Status: COMPLETED | OUTPATIENT
Start: 2022-05-10 | End: 2022-05-10

## 2022-05-10 RX ADMIN — TIOTROPIUM BROMIDE INHALATION SPRAY 2 PUFF: 3.12 SPRAY, METERED RESPIRATORY (INHALATION) at 08:43

## 2022-05-10 RX ADMIN — BUMETANIDE 2 MG: 0.25 INJECTION, SOLUTION INTRAMUSCULAR; INTRAVENOUS at 10:25

## 2022-05-10 RX ADMIN — LEVOFLOXACIN 250 MG: 500 TABLET, FILM COATED ORAL at 10:24

## 2022-05-10 RX ADMIN — ASPIRIN 81 MG 81 MG: 81 TABLET ORAL at 10:25

## 2022-05-10 RX ADMIN — GUAIFENESIN 1200 MG: 600 TABLET, EXTENDED RELEASE ORAL at 10:24

## 2022-05-10 RX ADMIN — ALBUTEROL SULFATE 2.5 MG: 2.5 SOLUTION RESPIRATORY (INHALATION) at 00:20

## 2022-05-10 RX ADMIN — SODIUM CHLORIDE, PRESERVATIVE FREE 10 ML: 5 INJECTION INTRAVENOUS at 10:41

## 2022-05-10 RX ADMIN — ALBUTEROL SULFATE 2.5 MG: 2.5 SOLUTION RESPIRATORY (INHALATION) at 15:28

## 2022-05-10 RX ADMIN — FLUTICASONE PROPIONATE 2 SPRAY: 50 SPRAY, METERED NASAL at 08:43

## 2022-05-10 RX ADMIN — Medication 1000 UNITS: at 10:24

## 2022-05-10 RX ADMIN — Medication 1000 MCG: at 10:24

## 2022-05-10 RX ADMIN — MOMETASONE FUROATE AND FORMOTEROL FUMARATE DIHYDRATE 2 PUFF: 200; 5 AEROSOL RESPIRATORY (INHALATION) at 08:43

## 2022-05-10 RX ADMIN — OXYCODONE HYDROCHLORIDE AND ACETAMINOPHEN 500 MG: 500 TABLET ORAL at 10:25

## 2022-05-10 RX ADMIN — WARFARIN SODIUM 2.5 MG: 2.5 TABLET ORAL at 18:53

## 2022-05-10 RX ADMIN — METOPROLOL SUCCINATE 100 MG: 100 TABLET, FILM COATED, EXTENDED RELEASE ORAL at 10:24

## 2022-05-10 RX ADMIN — MOMETASONE FUROATE AND FORMOTEROL FUMARATE DIHYDRATE 2 PUFF: 200; 5 AEROSOL RESPIRATORY (INHALATION) at 18:47

## 2022-05-10 ASSESSMENT — PAIN SCALES - GENERAL: PAINLEVEL_OUTOF10: 0

## 2022-05-10 NOTE — PROGRESS NOTES
Clinical Pharmacy Note    Greg Whitman is a 79 y.o. male for whom pharmacy has been asked to manage warfarin therapy. Reason for Admission: CHF    Consulting Provider: Dr. Sal Young  Warfarin dose prior to admission: 2.5mg daily except 1.25mg on Wed  Warfarin indication: Afib  Target INR range: 2-3   Outpatient warfarin provider: [facility]    Past Medical History:   Diagnosis Date    MILLY (acute kidney injury) ([facility]     Atrial fibrillation ([facility]     Cerebral artery occlusion with cerebral infarction ([facility]     CHF (congestive heart failure), NYHA class III, acute on chronic, combined ([facility]     COPD (chronic obstructive pulmonary disease) ([facility] 8/3/2020    Coronary artery disease involving native coronary artery of native heart with angina pectoris ([facility]     Diabetes mellitus, type 2 ([facility] 12/30/2020    Hyperlipidemia 2/20/2012    Hypertension     Pneumonia               Recent Labs     05/09/22  1918   INR 2.54*     Recent Labs     05/08/22  1620   HGB 11.5*   HCT 35.6*        Current warfarin drug-drug interactions: ASA, levofloxacin, atorvastatin (HM)    Date INR Warfarin Dose   5/6/22 7.57 Held per [facility]   5/6 - 5/8 - - No warfarin   5/9/22 2.54 2.5mg                         PT/INR or POC-INR will be monitored routinely until therapeutic INR is achieved.     Thank you for the consult,   Jamia Castro, PharmD  5/9/2022 8:34 PM

## 2022-05-10 NOTE — PLAN OF CARE
Problem: Discharge Planning  Goal: Discharge to home or other facility with appropriate resources  5/10/2022 1336 by DEMIAN Manjarrez  Outcome: Progressing      Consult received. Please see SW note dated 5/10.

## 2022-05-10 NOTE — CARE COORDINATION
DISCHARGE/PLANNING EVALUATION  5/10/22, 1:37 PM EDT    Reason for Referral: HH  Mental Status: Answered questions appropriately  Decision Making: Independent with help from significant other  Family/Social/Home Environment: Lives at home with significant other  Current Services including food security, transportation and housekeeping: Patient was driving up until he was given oxygen. Helps sometimes with cooking and cleaning. Current with [facility] for RN, PT, OT and SW.  Current Equipment: None  Payment Source: [employer] Medicare  Concerns or Barriers to Discharge:  Patient wants to cancel his therapy HH, says they made him sick. Post acute provider list with quality measures, geographic area and applicable managed care information provided. Questions regarding selection process answered: None    Teach Back Method used with Yang Fernández regarding care plan and discharge planning. Patient and  Significant other, Claudia Beyer, verbalize understanding of the plan of care and contribute to goal setting. Patient goals, treatment preferences and discharge plan: Yang Fernández would like to return to home with his significant other and current [facility]. However, he would like to cancel the PT and OT because \"they're the reason I'm here\". Patient states that they made him do exercises that he wasn't able to do and pushed him too hard. This made him have to have oxygen and he is refusing to do it again. Spoke with Gela Mahoney at [facility], confirmed the services and let her know patient would like to keep the RN and SW services but cancel PT and OT. She is agreeable to this.      Electronically signed by DEMIAN Leon on 5/10/2022 at 1:37 PM

## 2022-05-10 NOTE — PLAN OF CARE
Problem: Discharge Planning  Goal: Discharge to home or other facility with appropriate resources  Outcome: Progressing  Note: Patient actively involved in POC. Discharge planning in progress at this time. Will continue to monitor. Problem: Discharge Planning  Goal: Discharge to home or other facility with appropriate resources  Outcome: Progressing  Note: Patient actively involved in POC. Discharge planning in progress at this time. Will continue to monitor. Problem: Respiratory - Adult  Goal: Clear lung sounds  5/10/2022 0023 by Albertina Tapia RCP  Outcome: Progressing     Problem: Safety - Adult  Goal: Free from fall injury  Note: Patient educated on and encouraged to use the call light for assistance from staff with needs. Patient verbalizes an understanding of this. Bed wheels locked and bed in lowest position; bed alarm on. Patient possessions within reach; pathways clear at this time. Problem: ABCDS Injury Assessment  Goal: Absence of physical injury  Note: Patient educated on and encouraged to use the call light for assistance from staff with needs. Patient verbalizes an understanding of this. Bed wheels locked and bed in lowest position; bed alarm on. Patient possessions within reach; pathways clear at this time. Problem: ABCDS Injury Assessment  Goal: Absence of physical injury  Note: Patient educated on and encouraged to use the call light for assistance from staff with needs. Patient verbalizes an understanding of this. Bed wheels locked and bed in lowest position; bed alarm on. Patient possessions within reach; pathways clear at this time. Problem: Skin/Tissue Integrity  Goal: Absence of new skin breakdown  Description: 1. Monitor for areas of redness and/or skin breakdown  2. Assess vascular access sites hourly  3. Every 4-6 hours minimum:  Change oxygen saturation probe site  4.   Every 4-6 hours:  If on nasal continuous positive airway pressure, respiratory therapy assess nares and determine need for appliance change or resting period. Note: No signs and/or symptoms of infection noted during this shift. Patient afebrile during this shift. No new skin breakdown noted during this shift. Patient able to turn self in bed; staff assistance provided as needed. Foot of bed elevated. Care plan reviewed with patient. Patient verbalizes understanding of the plan of care and contribute to goal setting.

## 2022-05-10 NOTE — PROGRESS NOTES
Renal Progress Note  [facility]    Patient :  Nuria Schulz; 79 y.o. MRN# [id number]  Location:  4A-06/006-A  Attending:  Ramo Ace MD  Admit Date:  5/8/2022   Hospital Day: 0      Subjective:     Nephrology is following the patient for MILLY/CKD, volume overload. Patient was seen this morning. Swelling is improving. Outpatient Medications:     Medications Prior to Admission: fluticasone (FLONASE) 50 MCG/ACT nasal spray, 2 sprays by Each Nostril route daily as needed for Rhinitis or Allergies  OXYGEN, Inhale 4 L into the lungs continuous  guaiFENesin (MUCINEX) 600 MG extended release tablet, Take 2 tablets by mouth 2 times daily for 10 days  albuterol (ACCUNEB) 1.25 MG/3ML nebulizer solution, Inhale 3 mLs into the lungs every 6 hours as needed for Wheezing  bumetanide (BUMEX) 1 MG tablet, 1 tab twice daily x 3 days then back to once tablet daily.   levoFLOXacin (LEVAQUIN) 250 MG tablet, Take 1 tablet by mouth daily for 10 days  sodium chloride (OCEAN NASAL SPRAY) 0.65 % nasal spray, 1 spray by Nasal route as needed for Congestion  metoprolol succinate (TOPROL XL) 100 MG extended release tablet, Take 1 tablet by mouth daily  vitamin B-12 1000 MCG tablet, Take 1 tablet by mouth daily  [DISCONTINUED] loratadine (CLARITIN) 10 MG tablet, Take 1 tablet by mouth daily (Patient not taking: Reported on 5/8/2022)  [DISCONTINUED] fluticasone (FLONASE) 50 MCG/ACT nasal spray, 2 sprays by Each Nostril route daily (Patient taking differently: 2 sprays by Each Nostril route daily as needed )  Budeson-Glycopyrrol-Formoterol (BREZTRI AEROSPHERE) 160-9-4.8 MCG/ACT AERO, Inhale 2 puffs into the lungs 2 times daily  albuterol sulfate HFA (VENTOLIN HFA) 108 (90 Base) MCG/ACT inhaler, Inhale 2 puffs into the lungs 4 times daily as needed for Wheezing  [DISCONTINUED] promethazine-dextromethorphan (PROMETHAZINE-DM) 6.25-15 MG/5ML syrup, Take 5 mLs by mouth nightly (Patient not taking: Reported on 5/8/2022)  Ascorbic Acid (VITAMIN C ER PO), Take 1 tablet by mouth daily  [DISCONTINUED] nitroGLYCERIN (NITRODUR) 0.4 MG/HR, Place 1 patch onto the skin daily (Patient not taking: Reported on 5/9/2022)  warfarin (COUMADIN) 2.5 MG tablet, Take 1 tablet by mouth daily  losartan (COZAAR) 25 MG tablet, Take 1 tablet by mouth nightly  metFORMIN (GLUCOPHAGE) 500 MG tablet, Take 1 tablet by mouth 2 times daily (with meals)  TRUEplus Lancets 28G MISC, Use to test blood sugar as needed  TRUE METRIX BLOOD GLUCOSE TEST strip, Use to test blood sugar as needed  Alcohol Swabs (B-D SINGLE USE SWABS REGULAR) PADS, Use to test blood sugar as needed  vitamin D (CHOLECALCIFEROL) 1000 UNIT TABS tablet, Take 1 tablet by mouth daily  atorvastatin (LIPITOR) 80 MG tablet, Take 80 mg by mouth nightly    Current Medications:     Scheduled Meds:    ascorbic acid  500 mg Oral Daily    atorvastatin  80 mg Oral Nightly    fluticasone  2 spray Each Nostril Daily    guaiFENesin  1,200 mg Oral BID    levoFLOXacin  250 mg Oral Q48H    [Held by provider] losartan  25 mg Oral Nightly    metoprolol succinate  100 mg Oral Daily    cyanocobalamin  1,000 mcg Oral Daily    Vitamin D  1,000 Units Oral Daily    sodium chloride flush  5-40 mL IntraVENous 2 times per day    aspirin  81 mg Oral Daily    mometasone-formoterol  2 puff Inhalation BID    tiotropium  2 puff Inhalation Daily    bumetanide  2 mg IntraVENous BID    warfarin placeholder: dosing by pharmacy   Other RX Placeholder     Continuous Infusions:    sodium chloride       PRN Meds:  albuterol, albuterol sulfate HFA, sodium chloride, sodium chloride flush, sodium chloride, ondansetron **OR** ondansetron, acetaminophen **OR** acetaminophen, polyethylene glycol    Input/Output:       I/O last 3 completed shifts: In: 120 [P.O.:120]  Out: 300 [Urine:300].       Patient Vitals for the past 96 hrs (Last 3 readings):   Weight   05/10/22 0330 195 lb 4.8 oz (88.6 kg)   05/08/22 1534 201 lb (91.2 kg)       Vital Signs:   Temperature:  Temp: 97.6 °F (36.4 °C)  TMax:   Temp (24hrs), Av.6 °F (36.4 °C), Min:97.3 °F (36.3 °C), Max:98 °F (36.7 °C)    Respirations:  Resp: 18  Pulse:   Pulse: 63  BP:    BP: (!) 136/59  BP Range: Systolic (16HBR), OMF:396 , Min:111 , IZM:029       Diastolic (56ASE), VQZ:47, Min:59, Max:69      Physical Examination:     General:  Awake, alert, no acute distress  HEENT: NC/AT/ MMM  Chest:              Wheezes and rhonchi noted  Cardiac:  Irregularly irregular  Abdomen: Soft, non-tender,  Neuro:  No facial droop, No Asterixis  SKIN:  No rashes, good skin turgor. Extremities:      1+ LE edema    Labs:       Recent Labs     22  1620 05/10/22  0358   WBC 8.3 9.4   RBC 3.67* 3.83*   HGB 11.5* 12.0*   HCT 35.6* 35.9*   MCV 97.0* 93.7   MCH 31.3 31.3   MCHC 32.3 33.4    227   MPV 9.9 10.6      BMP:   Recent Labs     22  1620 22  0818 05/10/22  0358   * 136 139   K 4.7 3.9 4.0   CL 97* 98 99   CO2 23 22* 25   BUN 87* 86* 77*   CREATININE 3.3* 3.2* 2.6*   GLUCOSE 149* 114* 106   CALCIUM 8.6 8.7 8.5      Phosphorus:   No results for input(s): PHOS in the last 72 hours. Magnesium:  No results for input(s): MG in the last 72 hours.   Albumin:    Recent Labs     22   LABALBU 3.3*     BNP:    No results found for: BNP  ANDRES:    No results found for: ANDRES  SPEP:  Lab Results   Component Value Date    PROT 6.1 2022     UPEP:   No results found for: LABPE  C3:   No results found for: C3  C4:   No results found for: C4  MPO ANCA:   No results found for: MPO  PR3 ANCA:   No results found for: PR3  Anti-GBM:   No results found for: GBMABIGG  Hep BsAg:       No results found for: HEPBSAG  Hep C AB:        No results found for: HEPCAB    Urinalysis/Chemistries:      Lab Results   Component Value Date    NITRU NEGATIVE 2022    COLORU YELLOW 2022    PHUR 5.5 2022    LABCAST NONE SEEN 2022    LABCAST NONE SEEN 2022    WBCUA 0-2 04/22/2022    RBCUA 0-2 04/22/2022    YEAST NONE SEEN 04/22/2022    BACTERIA NONE SEEN 04/22/2022    SPECGRAV 1.012 04/22/2022    LEUKOCYTESUR NEGATIVE 04/22/2022    UROBILINOGEN 0.2 04/22/2022    BILIRUBINUR NEGATIVE 04/22/2022    BLOODU NEGATIVE 04/22/2022    GLUCOSEU NEGATIVE 07/03/2021    KETUA NEGATIVE 04/22/2022     Urine Sodium:   No results found for: DRAGAN  Urine Potassium:  No results found for: KUR  Urine Chloride:  No results found for: CLUR  Urine Osmolarity: No results found for: OSMOU  Urine Protein:   No components found for: TOTALPROTEIN, URINE   Urine Creatinine:     Lab Results   Component Value Date    LABCREA 25.0 01/04/2022     Urine Eosinophils:  No components found for: UEOS        Impression and Plan:  1. MILLY on CKD III likely cardiorenal  -improving  -pt states he may get discharged today. Ok from renal standpoint. Stay off Metformin at discharge. Hold losartan until creat back to baseline. I see him in office next week and will resume if appropriate    2. Systolic CHF with volume overload, improving  3. Possible pneumonia  4. HTN  5. aFib  6. DM  7. Hx TAVR  8. Anemia        Please don't hesitate to call with any questions.   Electronically signed by Ankur Ventura DO on 5/10/2022 at 6:55 AM

## 2022-05-10 NOTE — PROGRESS NOTES
A home oxygen evaluation has been completed. [x]Patient is an inpatient. It is expected that the patient will be discharged within the next 48 hours. Qualified provider to write order for home prescription if patient qualifies. Social service/care managers will arrange for home oxygen. If patient is active, arrange for Home Medical supplier to assess for Oxygen Conserving Device per pulse oximetry. []Patient is an outpatient. Results will be faxed to the ordering provider. Qualified provider to write order for home prescription if patient qualifies and arranges for home oxygen. Patient was placed on room air for 25 minutes. SpO2 was 92 % on room air at rest. Patients SpO2 was 89% or above and did not qualify for home oxygen. Patient was walked for 2 minutes. SpO2 was 86 % during walking on room air. Patients SpO2 was below 89% and qualified for home oxygen. Oxygen was applied at 3 lpm via nasal cannula to maintain a SpO2 between 90-92% while walking. Actual SpO2 was 93 %. Note: For any SpO2 at 81% see policy and procedure for possible qualifications.

## 2022-05-10 NOTE — CARE COORDINATION
05/10/22 0941   Readmission Assessment   Number of Days since last admission? 8-30 days   Previous Disposition Home with Home Health   Who is being Interviewed Patient   What was the patient's/caregiver's perception as to why they think they needed to return back to the hospital? Other (Comment)  (became more SOB)   Did you visit your Primary Care Physician after you left the hospital, before you returned this time? Yes   Did you see a specialist, such as Cardiac, Pulmonary, Orthopedic Physician, etc. after you left the hospital? No   Who advised the patient to return to the hospital? Self-referral   Does the patient report anything that got in the way of taking their medications? No   In our efforts to provide the best possible care to you and others like you, can you think of anything that we could have done to help you after you left the hospital the first time, so that you might not have needed to return so soon?  Other (Comment)

## 2022-05-10 NOTE — PLAN OF CARE
Problem: Respiratory - Adult  Goal: Clear lung sounds  5/10/2022 0023 by Dorette Opitz, RCP  Outcome: Progressing    Patient lung sounds are considered normal for their current lung condition. No signs of distress noted.  Current treatment regimen appropriate

## 2022-05-10 NOTE — PROGRESS NOTES
Clinical Pharmacy Note    Warfarin consult follow-up    Recent Labs     05/10/22  0358   INR 2.25*     Recent Labs     05/08/22  1620 05/10/22  0358   HGB 11.5* 12.0*   HCT 35.6* 35.9*    227     Significant Drug-Drug Interactions:  New warfarin drug-drug interactions: None  Discontinued drug-drug interactions: None  Current warfarin drug-drug interactions: ASA, levofloxacin     Date INR Warfarin Dose   5/6/22 7.57 Held per [facility]   5/6 - 5/8 - - No warfarin   5/9/22 2.54 2.5 mg   5/10/22  2.25  2.5 mg                              Notes:                   PT/INR or POC-INR will be monitored routinely until therapeutic INR is achieved.     Hina Song, PharmD, BCPS  5/10/2022  7:32 AM

## 2022-05-11 ENCOUNTER — TELEPHONE (OUTPATIENT)
Dept: PHARMACY | Age: 82
End: 2022-05-11

## 2022-05-11 NOTE — H&P
[address] [place] [address]                              HISTORY AND PHYSICAL    PATIENT NAME: Lashell Griffith                   :        1940  MED REC NO:   [id number]                           ROOM:       0006  ACCOUNT NO:   [de-identified]                           ADMIT DATE: 2022  PROVIDER:     Daniel Carlisle. James Vizcaino M.D.    [address]:  The patient is seen because of shortness of breath. HISTORY OF PRESENT ILLNESS:  This is a patient, 65-year-old gentleman. Came to the emergency room because of shortness of breath and pedal  edema. He was in atrial fib, rapid ventricular response. He has been  on Coumadin. The patient was admitted to the hospital for exacerbation  of his congestive heart failure. He is known to have a history of  chronic systolic congestive heart failure with ejection fraction around  40%. He is known to have a history of coronary artery disease. He has  a history of TAVR. The patient has a history of pacemaker. He denied PND or orthopnea. He noticed pedal edema. REVIEW OF SYSTEMS:  He denied shortness of breath. He has a history of  CVA, but very little residual effect. He has a history of diabetes  mellitus type 2. The patient has history of hypertension, history of  hypercholesterolemia. The patient is somewhat overweight. He has a  history of arthritis and he has no GI bleed. Had history of chronic  renal failure with possible acute exacerbation secondary to congestive  heart failure. ALLERGIES:  THE PATIENT HAS ALLERGY TO _____ AND Rosalia Chol. CODE STATUS:  He is a DNR-CCA. HOSPITALIZATION COURSE:  The patient was admitted to the telemetry bed. He was in atrial fib with pacer rhythm. The patient was afebrile and  the patient had positive JVD, soft carotid bruit. There were bilateral  rales, 2/6 systolic murmur. His abdomen was soft. Good femoral  pulsation.   2+ pedal edema up to the knees. LABORATORY WORK:  The renal function showed his BUN was 87 and  creatinine 3.3. Potassium was 4. The troponins were negative. SUMMARY:  1. The patient was admitted with an acute exacerbation of chronic  systolic congestive heart failure. The patient will receive IV  diuretics. Cardiac enzymes will be obtained. 2.  History of coronary artery disease, history of TAVR. 3.  History of pacemaker implantation. 4.  Chronic atrial fib, he has been on Coumadin. 5.  History of COPD. 6.  History of CVA. 7.  Morbid obesity. 8.  Diabetes mellitus. 9.  Hypercholesterolemia. The patient will be receiving IV diuretics. Continue his home meds. Placed on a sliding scale. Monitor his electrolytes and monitor his  renal function. Pending the results of the above tests, further recommendation will be  made. I discussed the plan of treatment with the patient and his wife  in great detail and they are in agreement with this plan of management.         Lorenzo Fam M.D.    D: 05/10/2022 18:09:45       T: 05/10/2022 19:52:56     [email or web address]  Job#: [id number]     Doc#: [id number]    CC:

## 2022-05-11 NOTE — TELEPHONE ENCOUNTER
Discharged from hospital 5/10/22. Resuming 1.25 mg W, 2.5 mg MTThFSaS until next INR check, which has been rescheduled for 5/16/22.

## 2022-05-11 NOTE — DISCHARGE SUMMARY
[address] [id number]                               DISCHARGE SUMMARY    PATIENT NAME: Beny Perry                   :        1940  MED REC NO:   [id number]                           ROOM:       0006  ACCOUNT NO:   [de-identified]                           ADMIT DATE: 2022  PROVIDER:     Sandra Ignacio. SRAVANTHI Joaquin Aid: 05/10/2022    FINAL DIAGNOSES:  1. Acute exacerbation of chronic systolic congestive heart failure. 2.  History of atrial fibrillation with rapid ventricular response. 3.  History of pacemaker. 4.  _____ patient being on Coumadin. 5.  Chronic renal failure. 6.  Hypercholesterolemia. 7.  Diabetes mellitus. 8.  History of hypertension. 9.  History of CVA. 10.  History of COPD. HISTORY:  This is an 79-year-old gentleman admitted to the hospital  through the emergency room, came to the emergency room because of  shortness of breath, pedal edema, atrial fib. The patient received IV  diuretics in the emergency room. Continue to have pedal edema,  shortness of breath; admitted for further evaluation. HOSPITAL COURSE:  The patient was admitted to the telemetry bed. His  BUN and creatinine were elevated. The patient did receive IV diuretics. He had good diuresis. He lost about 6 pounds. The patient's BUN and  creatinine improved too. Potassium was okay. The patient's Coumadin  was restarted. He was placed on sliding scale. He was ambulated. He  had significant improvement in his pedal edema and his shortness of  breath. The patient was discharged home in stable condition. The  patient is to follow up with family physician. Bumex was increased. The patient will be seen in the office in a few weeks with lab work. He  was advised about salt restriction and fluid restriction. He is to seek  medical attention if he has any change in clinical condition.    Discharged home in stable condition.         Hailey Landa M.D.    D: 05/10/2022 18:13:23       T: 05/10/2022 23:21:36     [email or web address]  Job#: [id number]     Doc#: [id number]    CC:

## 2022-05-11 NOTE — CARE COORDINATION
5/11/22, 7:09 AM EDT    Patient goals/plan/ treatment preferences discussed by  and . Patient goals/plan/ treatment preferences reviewed with patient/ family. Patient/ family verbalize understanding of discharge plan and are in agreement with goal/plan/treatment preferences. Understanding was demonstrated using the teach back method. AVS provided by RN at time of discharge, which includes all necessary medical information pertaining to the patients current course of illness, treatment, post-discharge goals of care, and treatment preferences. Services At/After Discharge: Home Health and Nursing service- Claudetta Downing will be discharged to home with his significant other, with current [facility] for RN and SW services. He has asked that his current PT and OT be cancelled. SW Let Wendy at [facility] know about this request and that he is discharged.

## 2022-05-16 ENCOUNTER — HOSPITAL ENCOUNTER (OUTPATIENT)
Dept: PHARMACY | Age: 82
Setting detail: THERAPIES SERIES
Discharge: HOME | End: 2022-05-16
Payer: MEDICARE

## 2022-05-16 DIAGNOSIS — I48.21 PERMANENT ATRIAL FIBRILLATION (HCC): Primary | ICD-10-CM

## 2022-05-16 DIAGNOSIS — Z51.81 ENCOUNTER FOR THERAPEUTIC DRUG MONITORING: ICD-10-CM

## 2022-05-16 DIAGNOSIS — Z79.01 ANTICOAGULATED ON COUMADIN: ICD-10-CM

## 2022-05-16 LAB — POC INR: 2.6 (ref 0.8–1.2)

## 2022-05-16 PROCEDURE — 85610 PROTHROMBIN TIME: CPT

## 2022-05-16 PROCEDURE — 99211 OFF/OP EST MAY X REQ PHY/QHP: CPT

## 2022-05-16 PROCEDURE — 36416 COLLJ CAPILLARY BLOOD SPEC: CPT

## 2022-05-16 NOTE — PROGRESS NOTES
Medication Management [address] [place] [address]  [phone] (phone)  [phone] (fax)    Mr. Kalani Ovalle is a 79 y.o.  male with history of Afib who presents today for anticoagulation monitoring and adjustment. Patient was hospitalized 5/6/22 - 5/10/22 for heart failure and diuresis. Patient verifies current dosing regimen and tablet strength. No missed or extra doses. Patient denies s/s bleeding/bruising/swelling/SOB/chest pain  No blood in urine or stool. No dietary changes. No changes in medication/OTC agents/Herbals. No change in alcohol use or tobacco use. No change in activity level. Patient denies headaches/dizziness/lightheadedness/falls. No vomiting/diarrhea or acute illness. No Procedures scheduled in the future at this time. Assessment:   Lab Results   Component Value Date    INR 2.60 (H) 05/16/2022    INR 2.25 (H) 05/10/2022    INR 2.54 (H) 05/09/2022     INR therapeutic   Recent Labs     05/16/22  1136   INR 2.60*     Patient's INR recently came back supra therapeutic prior to hospitalization; likely due to prednisone/Levaquin drug interaction and increased fluid from heart failure. Plan:  Continue Coumadin 1.25 mg W, 2.5 mg MTThFSS. Recheck INR in 3 week(s) recommended 2 weeks, but requested 3 weeks. Patient reminded to call the Anticoagulation Clinic with any signs or symptoms of bleeding or with any medication changes. Patient given instructions utilizing the teach back method. After visit summary printed and reviewed with patient. Discharged ambulatory in no apparent distress.         For Pharmacy Admin Tracking Only     Time Spent (min): 3754 Garland Chicas PharmD, BCPS  5/16/2022  11:44 AM

## 2022-05-17 ENCOUNTER — TELEPHONE (OUTPATIENT)
Dept: FAMILY MEDICINE CLINIC | Age: 82
End: 2022-05-17

## 2022-05-17 ENCOUNTER — OFFICE VISIT (OUTPATIENT)
Dept: NEPHROLOGY | Age: 82
End: 2022-05-17
Payer: MEDICARE

## 2022-05-17 VITALS
SYSTOLIC BLOOD PRESSURE: 110 MMHG | OXYGEN SATURATION: 97 % | WEIGHT: 196 LBS | HEART RATE: 77 BPM | BODY MASS INDEX: 31.64 KG/M2 | TEMPERATURE: 98.2 F | DIASTOLIC BLOOD PRESSURE: 75 MMHG

## 2022-05-17 DIAGNOSIS — N18.4 CKD (CHRONIC KIDNEY DISEASE), STAGE IV (HCC): ICD-10-CM

## 2022-05-17 DIAGNOSIS — N17.9 AKI (ACUTE KIDNEY INJURY) (HCC): Primary | ICD-10-CM

## 2022-05-17 PROCEDURE — G8427 DOCREV CUR MEDS BY ELIG CLIN: HCPCS | Performed by: INTERNAL MEDICINE

## 2022-05-17 PROCEDURE — 4040F PNEUMOC VAC/ADMIN/RCVD: CPT | Performed by: INTERNAL MEDICINE

## 2022-05-17 PROCEDURE — 1036F TOBACCO NON-USER: CPT | Performed by: INTERNAL MEDICINE

## 2022-05-17 PROCEDURE — G8417 CALC BMI ABV UP PARAM F/U: HCPCS | Performed by: INTERNAL MEDICINE

## 2022-05-17 PROCEDURE — 1111F DSCHRG MED/CURRENT MED MERGE: CPT | Performed by: INTERNAL MEDICINE

## 2022-05-17 PROCEDURE — 1123F ACP DISCUSS/DSCN MKR DOCD: CPT | Performed by: INTERNAL MEDICINE

## 2022-05-17 PROCEDURE — 99214 OFFICE O/P EST MOD 30 MIN: CPT | Performed by: INTERNAL MEDICINE

## 2022-05-17 RX ORDER — GUAIFENESIN 600 MG/1
1200 TABLET, EXTENDED RELEASE ORAL 2 TIMES DAILY
COMMUNITY

## 2022-05-17 NOTE — PROGRESS NOTES
[address] [facility]  [address]  Dept: [phone]  Loc: [phone]  Progress Note  5/17/2022 12:45 PM      Pt Name:    Chelsie Johnson:    1940  Primary Care Physician:  Iman Rowland, WILBERTO - CNP     Chief Complaint:   Chief Complaint   Patient presents with    Follow-Up from Hospital     CKD III        History of Present Illness: This is a hospital follow-up visit for MILLY/CKD. He is a former patient of Dr. Carlos Schroeder. He has hx of CAD, DM, HTN, hyperlipidemia, Afib, pacemaker, EF %. Hx TAVR, COPD. Past renal US showed hypoplastic left kidney. Patient recently hospitalized for SOB/volume overload. Was on IV diuretics and home diuretics were increased now on bumex 2 mg BID. Weight down about 6 pounds since hospital DC. Swelling is improving. He was discharged on oxygen, his breathing is improving as well. Pertinent items are noted in HPI.          Past History:  Past Medical History:   Diagnosis Date    MILLY (acute kidney injury) ([address]     Atrial fibrillation (HCC)     Cerebral artery occlusion with cerebral infarction ([address]     CHF (congestive heart failure), NYHA class III, acute on chronic, combined ([address]     COPD (chronic obstructive pulmonary disease) ([address] 8/3/2020    Coronary artery disease involving native coronary artery of native heart with angina pectoris ([address]     Diabetes mellitus, type 2 ([address] 12/30/2020    Hyperlipidemia 2/20/2012    Hypertension     Pneumonia      Past Surgical History:   Procedure Laterality Date    AORTIC VALVE REPLACEMENT      CARDIAC SURGERY      heart cath    CORONARY ANGIOPLASTY WITH STENT PLACEMENT      HERNIA REPAIR      OTHER SURGICAL HISTORY  05/10/2018    PACEMAKER INSERTION          VITALS:  /75 (Site: Left Upper Arm, Position: Sitting, Cuff Size: Large Adult)   Pulse 77   Temp 98.2 °F (36.8 °C) (Temporal)   Wt 196 lb (88.9 kg)   SpO2 97%   BMI 31.64 kg/m²   Wt Readings from Last 3 Encounters:   05/17/22 196 lb (88.9 kg)   05/10/22 195 lb 4.8 oz (88.6 kg)   05/02/22 201 lb (91.2 kg)     Body mass index is 31.64 kg/m².      General Appearance: alert and cooperative with exam, appears comfortable, no distress  HEENT: EOMI, moist oral mucus membranes  Neck: No jugular venous distention,   Lungs: diminished, scant wheezes  Heart: S1, S2 heard,   GI: soft, non-tender, no guarding,   Extremities:2+ LE edema  Skin: warm, dry  Neurologic: no tremor, no asterixis, no focal neurologic deficits     Medications:  Current Outpatient Medications   Medication Sig Dispense Refill    guaiFENesin (MUCINEX) 600 MG extended release tablet Take 1,200 mg by mouth 2 times daily      bumetanide (BUMEX) 2 MG tablet Take 1 tablet by mouth 2 times daily 30 tablet 3    fluticasone (FLONASE) 50 MCG/ACT nasal spray 2 sprays by Each Nostril route daily as needed for Rhinitis or Allergies      OXYGEN Inhale 4 L into the lungs continuous 4 L 0    albuterol (ACCUNEB) 1.25 MG/3ML nebulizer solution Inhale 3 mLs into the lungs every 6 hours as needed for Wheezing 360 mL 3    sodium chloride (OCEAN NASAL SPRAY) 0.65 % nasal spray 1 spray by Nasal route as needed for Congestion 1 each 3    metoprolol succinate (TOPROL XL) 100 MG extended release tablet Take 1 tablet by mouth daily 30 tablet 3    vitamin B-12 1000 MCG tablet Take 1 tablet by mouth daily 30 tablet 3    Budeson-Glycopyrrol-Formoterol (BREZTRI AEROSPHERE) 160-9-4.8 MCG/ACT AERO Inhale 2 puffs into the lungs 2 times daily 3 each 3    albuterol sulfate HFA (VENTOLIN HFA) 108 (90 Base) MCG/ACT inhaler Inhale 2 puffs into the lungs 4 times daily as needed for Wheezing 54 g 1    Ascorbic Acid (VITAMIN C ER PO) Take 1 tablet by mouth daily      warfarin (COUMADIN) 2.5 MG tablet Take 1 tablet by mouth daily 90 tablet 3    losartan (COZAAR) 25 MG tablet Take 1 tablet by mouth nightly 90 tablet 3    metFORMIN (GLUCOPHAGE) 500 MG tablet Take 1 tablet by mouth 2 times daily (with meals) 180 tablet 3    TRUEplus Lancets 28G MISC Use to test blood sugar as needed      TRUE METRIX BLOOD GLUCOSE TEST strip Use to test blood sugar as needed      Alcohol Swabs (B-D SINGLE USE SWABS REGULAR) PADS Use to test blood sugar as needed      vitamin D (CHOLECALCIFEROL) 1000 UNIT TABS tablet Take 1 tablet by mouth daily      atorvastatin (LIPITOR) 80 MG tablet Take 80 mg by mouth nightly       No current facility-administered medications for this visit.         Laboratory & Diagnostics:  CBC:   Lab Results   Component Value Date    WBC 9.4 05/10/2022    HGB 12.0 (L) 05/10/2022    HCT 35.9 (L) 05/10/2022    MCV 93.7 05/10/2022     05/10/2022     BMP:    Lab Results   Component Value Date     05/10/2022     05/09/2022     (L) 05/08/2022    K 4.0 05/10/2022    K 3.9 05/09/2022    K 4.7 05/08/2022    CL 99 05/10/2022    CL 98 05/09/2022    CL 97 (L) 05/08/2022    CO2 25 05/10/2022    CO2 22 (L) 05/09/2022    CO2 23 05/08/2022    BUN 77 (H) 05/10/2022    BUN 86 (H) 05/09/2022    BUN 87 (H) 05/08/2022    CREATININE 2.6 (H) 05/10/2022    CREATININE 3.2 (HH) 05/09/2022    CREATININE 3.3 (HH) 05/08/2022    GLUCOSE 106 05/10/2022    GLUCOSE 114 (H) 05/09/2022    GLUCOSE 149 (H) 05/08/2022      Hepatic:   Lab Results   Component Value Date    AST 21 05/08/2022    AST 40 04/22/2022    AST 18 04/13/2022    ALT 28 05/08/2022    ALT 70 (H) 04/22/2022    ALT 15 04/13/2022    BILITOT 0.4 05/08/2022    BILITOT 0.8 04/22/2022    BILITOT 1.1 04/13/2022    ALKPHOS 83 05/08/2022    ALKPHOS 98 04/22/2022    ALKPHOS 104 04/13/2022     BNP: No results found for: BNP  Lipids:   Lab Results   Component Value Date    CHOL 120 08/20/2021    HDL 33 08/20/2021     INR:   Lab Results   Component Value Date    INR 2.60 (H) 05/16/2022    INR 2.25 (H) 05/10/2022    INR 2.54 (H) 05/09/2022     URINE: Lab Results   Component Value Date    NAUR 59 05/09/2022     Lab Results   Component Value Date    NITRU NEGATIVE 04/22/2022    COLORU YELLOW 04/22/2022    PHUR 5.5 04/22/2022    LABCAST NONE SEEN 04/22/2022    LABCAST NONE SEEN 04/22/2022    WBCUA 0-2 04/22/2022    RBCUA 0-2 04/22/2022    YEAST NONE SEEN 04/22/2022    BACTERIA NONE SEEN 04/22/2022    SPECGRAV 1.012 04/22/2022    LEUKOCYTESUR NEGATIVE 04/22/2022    UROBILINOGEN 0.2 04/22/2022    BILIRUBINUR NEGATIVE 04/22/2022    BLOODU NEGATIVE 04/22/2022    GLUCOSEU NEGATIVE 07/03/2021    KETUA NEGATIVE 04/22/2022      Microalbumen/Creatinine ratio:  No components found for: RUCREAT        Impression/Plan:   1. 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    -need updated bmp since getting out of the hospital  -on higher dose of diuretics, discussed with pt renal fxn may be weaker than he was previously running  -need to stop Metformin as GFR < 30 ml/min    2. Mild microalbuminuria: continue ARB  3. Hypoplastic left kidney  4. HTN; stable  5. DM   6. CAD, hx TAVR  7. Chronic systolic CHF : on bumex 2 mg BID    Bloodwork and medications were reviewed and plan of care discussed with the patient. Return to clinic in 3 months  or sooner if the need arises.       Claudeen Lapidus, DO  [facility]

## 2022-05-17 NOTE — TELEPHONE ENCOUNTER
Palma Muñiz with [facility] called and said that the patient has his O2 on 2-4 and uses it as needed because he is trying to wean himself off the O2. He does use his pulse ox to be sure it stays above 90. He is doing well. He sees the kidney doctor today. She just wanted to update you on the patient.

## 2022-05-18 ENCOUNTER — TELEPHONE (OUTPATIENT)
Dept: FAMILY MEDICINE CLINIC | Age: 82
End: 2022-05-18

## 2022-05-18 ENCOUNTER — TELEPHONE (OUTPATIENT)
Dept: NEPHROLOGY | Age: 82
End: 2022-05-18

## 2022-05-18 ENCOUNTER — NURSE ONLY (OUTPATIENT)
Dept: LAB | Age: 82
End: 2022-05-18

## 2022-05-18 DIAGNOSIS — N18.4 CKD (CHRONIC KIDNEY DISEASE), STAGE IV (HCC): ICD-10-CM

## 2022-05-18 DIAGNOSIS — N17.9 AKI (ACUTE KIDNEY INJURY) (HCC): ICD-10-CM

## 2022-05-18 LAB
ANION GAP SERPL CALCULATED.3IONS-SCNC: 16 MEQ/L (ref 8–16)
BUN BLDV-MCNC: 48 MG/DL (ref 7–22)
CALCIUM SERPL-MCNC: 8.5 MG/DL (ref 8.5–10.5)
CHLORIDE BLD-SCNC: 99 MEQ/L (ref 98–111)
CO2: 26 MEQ/L (ref 23–33)
CREAT SERPL-MCNC: 2.2 MG/DL (ref 0.4–1.2)
GFR SERPL CREATININE-BSD FRML MDRD: 29 ML/MIN/1.73M2
GLUCOSE BLD-MCNC: 110 MG/DL (ref 70–108)
POTASSIUM SERPL-SCNC: 3.5 MEQ/L (ref 3.5–5.2)
SODIUM BLD-SCNC: 141 MEQ/L (ref 135–145)

## 2022-05-18 NOTE — TELEPHONE ENCOUNTER
----- Message from Author Hector DO sent at 5/18/2022  3:22 PM EDT -----  Kidney function improved, 29%  ----- Message -----  From: Soren Ramos Incoming Lab Results From Soft  Sent: 5/18/2022   3:09 PM EDT  To: Author Hector DO

## 2022-05-18 NOTE — TELEPHONE ENCOUNTER
Ashtyn with [address] called. She is with Telehealth and can do monitoring of his CHF and wanted to know if Lior Bundy would be interested in her monitoring the patient with blood pressure, pulse ox, etc.  She would received the information and she can see the patient with her devise and report back to Lior Bundy with an abnormalities. Call back number for her is [phone] - cell# and [phone] other number. Please advise.

## 2022-05-18 NOTE — TELEPHONE ENCOUNTER
I called and spoke to [address] with Angel[facility] Zimmerman. I let her know Bailee's response. She said that she will get a hold of Luis Maya if anything abnormal arises. I told her that I would let Luis Maya know.

## 2022-05-24 ENCOUNTER — CARE COORDINATION (OUTPATIENT)
Dept: CARE COORDINATION | Age: 82
End: 2022-05-24

## 2022-06-06 ENCOUNTER — HOSPITAL ENCOUNTER (OUTPATIENT)
Dept: PHARMACY | Age: 82
Setting detail: THERAPIES SERIES
Discharge: HOME | End: 2022-06-06
Payer: MEDICARE

## 2022-06-06 DIAGNOSIS — Z51.81 ENCOUNTER FOR THERAPEUTIC DRUG MONITORING: ICD-10-CM

## 2022-06-06 DIAGNOSIS — I48.21 PERMANENT ATRIAL FIBRILLATION (HCC): Primary | ICD-10-CM

## 2022-06-06 DIAGNOSIS — Z79.01 ANTICOAGULATED ON COUMADIN: ICD-10-CM

## 2022-06-06 LAB — POC INR: 3.4 (ref 0.8–1.2)

## 2022-06-06 PROCEDURE — 99212 OFFICE O/P EST SF 10 MIN: CPT

## 2022-06-06 PROCEDURE — 36416 COLLJ CAPILLARY BLOOD SPEC: CPT

## 2022-06-06 PROCEDURE — 85610 PROTHROMBIN TIME: CPT

## 2022-06-06 NOTE — PROGRESS NOTES
Medication Management [address]   [phone] (phone)  [phone] (fax)    Mr. Bethann Kawasaki is a 79 y.o.  male with history of atrial fib. , per Dr. Lou Villa referral, who presents today for Warfarin monitoring and adjustment (3 week visit). Patient verifies current dosing regimen and tablet strength. Uses pill box. No missed or extra doses. Patient denies bleeding/chest pain. Has usual SOB/swelling of feet/easy bruising. No blood in urine or stool. No dietary changes. Changes in medication/OTC agents/herbals: nephrologist stopped Metformin 5/17 for renal function. No change in alcohol use or tobacco use. Change in activity level: walking less - has to take oxygen tank along. Patient denies headaches/dizziness/lightheadedness/falls. No vomiting/diarrhea or acute illness. No procedures scheduled in the future at this time. Has CHF telehealth - weight up 1# overnight. Assessment:   Lab Results   Component Value Date    INR 3.40 (H) 06/06/2022    INR 2.60 (H) 05/16/2022    INR 2.25 (H) 05/10/2022     INR supratherapeutic - goal 2-3. Recent Labs     06/06/22  1011   INR 3.40*       Plan:  POCT INR ordered/performed/result reviewed. 1.25 mg today and tomorrow, M/T, then continue PO Coumadin 1.25 mg W, 2.5 mg MTThFSS. Recheck INR in 2 week(s). (Report given - orders entered by Parviz Ash, PharmD.)  Patient reminded to call the Anticoagulation Clinic with any signs or symptoms of bleeding or with any medication changes. Patient given instructions utilizing the teach back method. After visit summary printed and reviewed with patient. Advised extra caution. Discharged ambulatory in no apparent distress, wearing mask, with oxygen.     For Pharmacy Admin Tracking Only     Intervention Detail: Dose Adjustment: 1, reason: Therapy De-escalation   Total # of Interventions Recommended: 1   Total # of Interventions Accepted: 1   Time Spent (min):  0.75

## 2022-06-20 ENCOUNTER — HOSPITAL ENCOUNTER (OUTPATIENT)
Dept: PHARMACY | Age: 82
Setting detail: THERAPIES SERIES
Discharge: HOME | End: 2022-06-20
Payer: MEDICARE

## 2022-06-20 DIAGNOSIS — I48.21 PERMANENT ATRIAL FIBRILLATION (HCC): Primary | ICD-10-CM

## 2022-06-20 DIAGNOSIS — Z79.01 ANTICOAGULATED ON COUMADIN: ICD-10-CM

## 2022-06-20 DIAGNOSIS — Z51.81 ENCOUNTER FOR THERAPEUTIC DRUG MONITORING: ICD-10-CM

## 2022-06-20 LAB — POC INR: 2.6 (ref 0.8–1.2)

## 2022-06-20 PROCEDURE — 99211 OFF/OP EST MAY X REQ PHY/QHP: CPT

## 2022-06-20 PROCEDURE — 36416 COLLJ CAPILLARY BLOOD SPEC: CPT

## 2022-06-20 PROCEDURE — 85610 PROTHROMBIN TIME: CPT

## 2022-06-20 NOTE — PROGRESS NOTES
Medication Management [address] [place] [address]  [phone] (phone)  [phone] (fax)    Mr. Brittany Finley is a 79 y.o.  male with history of Afib who presents today for anticoagulation monitoring and adjustment. Patient verifies current dosing regimen and tablet strength. No missed or extra doses. Patient denies s/s bleeding/bruising/chest pain. Patient reports swelling in his feet that is unchanged. Patient states he rarely gets SOB on exertion. No blood in urine or stool. No dietary changes. No changes in OTC agents/Herbals. Patient recently had Bumex increased to 2 mg BID. No change in alcohol use or tobacco use. No change in activity level. Patient denies headaches/dizziness/lightheadedness/falls. No vomiting/diarrhea or acute illness. No Procedures scheduled in the future at this time. Assessment:   Lab Results   Component Value Date    INR 2.60 (H) 06/20/2022    INR 3.40 (H) 06/06/2022    INR 2.60 (H) 05/16/2022     INR therapeutic   Recent Labs     06/20/22  1012   INR 2.60*     Patient interview completed and discussed with pharmacist by Radha Duarte, [employer] PharmD Candidate. Patient has a recent history of labile INRs, but has been therapeutic at two of the past three INR checks while on current regimen. Plan:  Continue Coumadin 1.25 mg W and 2.5 mg [id number]. Recheck INR in 2 week(s). Patient reminded to call the Anticoagulation Clinic with any signs or symptoms of bleeding or with any medication changes. Patient given instructions utilizing the teach back method. After visit summary printed and reviewed with patient. Discharged ambulatory in no apparent distress.     For Pharmacy Admin Tracking Only     Total # of Interventions Recommended: 0   Total # of Interventions Accepted: 0   Time Spent (min): 5445  [facility], PharmD, BCPS  6/20/2022  10:44 AM

## 2022-06-27 DIAGNOSIS — J41.1 MUCOPURULENT CHRONIC BRONCHITIS (HCC): ICD-10-CM

## 2022-06-28 RX ORDER — WARFARIN SODIUM 2.5 MG/1
TABLET ORAL
Qty: 90 TABLET | Refills: 3 | Status: SHIPPED | OUTPATIENT
Start: 2022-06-28

## 2022-06-28 RX ORDER — BUMETANIDE 2 MG/1
2 TABLET ORAL 2 TIMES DAILY
Qty: 90 TABLET | Refills: 3 | Status: SHIPPED | OUTPATIENT
Start: 2022-06-28 | End: 2022-07-01 | Stop reason: SDUPTHER

## 2022-06-28 RX ORDER — ATORVASTATIN CALCIUM 80 MG/1
TABLET, FILM COATED ORAL
Qty: 90 TABLET | Refills: 0 | Status: SHIPPED | OUTPATIENT
Start: 2022-06-28

## 2022-06-28 RX ORDER — METOPROLOL SUCCINATE 50 MG/1
TABLET, EXTENDED RELEASE ORAL
Qty: 90 TABLET | Refills: 0 | OUTPATIENT
Start: 2022-06-28

## 2022-06-28 RX ORDER — LOSARTAN POTASSIUM 25 MG/1
TABLET ORAL
Qty: 90 TABLET | Refills: 3 | Status: SHIPPED | OUTPATIENT
Start: 2022-06-28

## 2022-06-28 RX ORDER — DEXTROMETHORPHAN HYDROBROMIDE AND PROMETHAZINE HYDROCHLORIDE 15; 6.25 MG/5ML; MG/5ML
SYRUP ORAL
Qty: 450 ML | Refills: 0 | Status: SHIPPED | OUTPATIENT
Start: 2022-06-28

## 2022-07-01 RX ORDER — BUMETANIDE 2 MG/1
2 TABLET ORAL 2 TIMES DAILY
Qty: 180 TABLET | Refills: 1 | Status: SHIPPED | OUTPATIENT
Start: 2022-07-01

## 2022-07-01 RX ORDER — METOPROLOL SUCCINATE 100 MG/1
100 TABLET, EXTENDED RELEASE ORAL DAILY
Qty: 90 TABLET | Refills: 1 | Status: SHIPPED | OUTPATIENT
Start: 2022-07-01

## 2022-07-05 ENCOUNTER — HOSPITAL ENCOUNTER (OUTPATIENT)
Dept: PHARMACY | Age: 82
Setting detail: THERAPIES SERIES
Discharge: HOME | End: 2022-07-05
Payer: MEDICARE

## 2022-07-05 DIAGNOSIS — Z51.81 ENCOUNTER FOR THERAPEUTIC DRUG MONITORING: ICD-10-CM

## 2022-07-05 DIAGNOSIS — Z79.01 ANTICOAGULATED ON COUMADIN: ICD-10-CM

## 2022-07-05 DIAGNOSIS — I48.21 PERMANENT ATRIAL FIBRILLATION (HCC): Primary | ICD-10-CM

## 2022-07-05 LAB — POC INR: 3.1 (ref 0.8–1.2)

## 2022-07-05 PROCEDURE — 36416 COLLJ CAPILLARY BLOOD SPEC: CPT

## 2022-07-05 PROCEDURE — 99211 OFF/OP EST MAY X REQ PHY/QHP: CPT

## 2022-07-05 PROCEDURE — 85610 PROTHROMBIN TIME: CPT

## 2022-07-05 NOTE — PROGRESS NOTES
Medication Management [address]  [phone] (phone)  [phone] (fax)    Mr. Lexii Madera is a 79 y.o.  male with history of Afib who presents today for anticoagulation monitoring and adjustment. Patient verifies current dosing regimen and tablet strength. No missed or extra doses. Patient denies s/s bleeding/bruising/swelling/SOB/chest pain  No blood in urine or stool. No dietary changes. No changes in medication/OTC agents/Herbals. No change in alcohol use or tobacco use. No change in activity level. Patient denies headaches/dizziness/lightheadedness/falls. No vomiting/diarrhea or acute illness. No Procedures scheduled in the future at this time. Assessment:   Lab Results   Component Value Date    INR 3.10 (H) 07/05/2022    INR 2.60 (H) 06/20/2022    INR 3.40 (H) 06/06/2022     INR supratherapeutic   Recent Labs     07/05/22  1051   INR 3.10*         Plan:  Coumadin 1.25 mg x 1 then continue Coumadin 1.25 mg W, 2.5 mg MTThFSS. Recheck INR in 3 week(s). Patient reminded to call the Anticoagulation Clinic with any signs or symptoms of bleeding or with any medication changes. Patient given instructions utilizing the teach back method. After visit summary printed and reviewed with patient. Discharged ambulatory in no apparent distress.         For Pharmacy Admin Tracking Only     Intervention Detail: Dose Adjustment: 1, reason: Therapy De-escalation   Total # of Interventions Recommended: 1   Total # of Interventions Accepted: 1   Time Spent (min): 7391 Garland Chicas PharmD, BCPS  7/5/2022  11:01 AM

## 2022-07-08 ENCOUNTER — OFFICE VISIT (OUTPATIENT)
Dept: CARDIOLOGY CLINIC | Age: 82
End: 2022-07-08
Payer: MEDICARE

## 2022-07-08 VITALS
OXYGEN SATURATION: 96 % | SYSTOLIC BLOOD PRESSURE: 128 MMHG | DIASTOLIC BLOOD PRESSURE: 74 MMHG | WEIGHT: 203.8 LBS | BODY MASS INDEX: 31.99 KG/M2 | RESPIRATION RATE: 16 BRPM | HEIGHT: 67 IN | HEART RATE: 76 BPM

## 2022-07-08 DIAGNOSIS — I50.9 ACUTE ON CHRONIC CONGESTIVE HEART FAILURE, UNSPECIFIED HEART FAILURE TYPE (HCC): Primary | ICD-10-CM

## 2022-07-08 PROCEDURE — 99213 OFFICE O/P EST LOW 20 MIN: CPT | Performed by: INTERNAL MEDICINE

## 2022-07-08 PROCEDURE — 93000 ELECTROCARDIOGRAM COMPLETE: CPT | Performed by: INTERNAL MEDICINE

## 2022-07-08 PROCEDURE — 1036F TOBACCO NON-USER: CPT | Performed by: INTERNAL MEDICINE

## 2022-07-08 PROCEDURE — 1123F ACP DISCUSS/DSCN MKR DOCD: CPT | Performed by: INTERNAL MEDICINE

## 2022-07-08 PROCEDURE — G8417 CALC BMI ABV UP PARAM F/U: HCPCS | Performed by: INTERNAL MEDICINE

## 2022-07-08 PROCEDURE — G8427 DOCREV CUR MEDS BY ELIG CLIN: HCPCS | Performed by: INTERNAL MEDICINE

## 2022-07-08 RX ORDER — AZITHROMYCIN 250 MG/1
250 TABLET, FILM COATED ORAL DAILY
COMMUNITY
Start: 2022-07-06 | End: 2022-08-08 | Stop reason: ALTCHOICE

## 2022-07-08 ASSESSMENT — ENCOUNTER SYMPTOMS
COUGH: 0
VOMITING: 0
NAUSEA: 0
SHORTNESS OF BREATH: 1
WHEEZING: 0
TROUBLE SWALLOWING: 0
VOICE CHANGE: 0
CHEST TIGHTNESS: 0
STRIDOR: 0
ABDOMINAL PAIN: 0
ANAL BLEEDING: 0
BLOOD IN STOOL: 0
COLOR CHANGE: 0
CHOKING: 0
ABDOMINAL DISTENTION: 0
APNEA: 0

## 2022-07-08 NOTE — PROGRESS NOTES
[address]  Dept: [address]  Loc: [phone]     7/8/2022       Highlandsrodger Acevedo is here today for   Chief Complaint   Patient presents with    Follow-Up from Hospital     patient is here for f/u from the hospital           Referring Physician:  No ref. provider found     Patient Active Problem List   Diagnosis    Atrial fibrillation with RVR ([address]    Acute respiratory failure with hypoxia ([facility])    CKD (chronic kidney disease) stage 3, GFR 30-59 ml/min ([facility])    Hyponatremia    Aortic stenosis, severe    Coronary artery disease involving native coronary artery of native heart with angina pectoris ([address]    CHF (congestive heart failure), NYHA class III, acute on chronic, combined ([address]    Cardiomyopathy ([address]    Sepsis without acute organ dysfunction ([address]    Essential hypertension    Stage 4 chronic kidney disease ([address]    Anticoagulated on Coumadin    Permanent atrial fibrillation ([address]    CVA (cerebral infarction)    History of transcatheter aortic valve replacement (TAVR)    S/P PTCA (percutaneous transluminal coronary angioplasty)    SSS (sick sinus syndrome) ([address]    Hyperlipidemia    Mitral valve disease    COPD exacerbation ([address]    Diabetes mellitus, type 2 ([address]    Encounter for therapeutic drug monitoring    Pneumonia due to infectious organism    Chronic HFrEF (heart failure with reduced ejection fraction) ([address]    Community acquired pneumonia of left lower lobe of lung    Pneumonia due to Pseudomonas species ([address]    COPD (chronic obstructive pulmonary disease) ([address]    Acute hypoxemic respiratory failure ([address]    Acute on chronic congestive heart failure ([address]       Review of Systems   Constitutional: Negative for activity change, appetite change, fatigue, fever and unexpected weight change.    HENT: Negative for congestion, trouble swallowing and voice change. Eyes: Negative for visual disturbance. Respiratory: Positive for shortness of breath. Negative for apnea, cough, choking, chest tightness, wheezing and stridor. Cardiovascular: Positive for palpitations and leg swelling. Negative for chest pain. Gastrointestinal: Negative for abdominal distention, abdominal pain, anal bleeding, blood in stool, nausea and vomiting. Endocrine: Negative for cold intolerance and heat intolerance. Genitourinary: Negative for hematuria. Musculoskeletal: Negative for arthralgias, gait problem, joint swelling and myalgias. Skin: Negative for color change and rash. Allergic/Immunologic: Negative for environmental allergies and food allergies. Neurological: Negative for dizziness, tremors, syncope, facial asymmetry, weakness, light-headedness, numbness and headaches. Hematological: Does not bruise/bleed easily. Psychiatric/Behavioral: Negative for agitation, behavioral problems and sleep disturbance. Past Medical History:   Diagnosis Date    [facility]) [facility]     Atrial fibrillation (HCC)     Cerebral artery occlusion with cerebral infarction ([facility]     CHF (congestive heart failure), NYHA class III, acute on chronic, combined ([facility]     COPD (chronic obstructive pulmonary disease) ([facility] 8/3/2020    Coronary artery disease involving native coronary artery of native heart with angina pectoris ([facility]     Diabetes mellitus, type 2 ([facility] 12/30/2020    Hyperlipidemia 2/20/2012    Hypertension     Pneumonia        Allergies   Allergen Reactions    Benzonatate      Other reaction(s): Other - comment required  Pt states it makes him dizzy    Xanax [Alprazolam] Anxiety     Other reaction(s):  Other - comment required  Causes confusion  Became combative and confused       Current Outpatient Medications   Medication Sig Dispense Refill    cyanocobalamin 1000 MCG tablet Take 1 tablet by mouth daily 90 tablet 3    bumetanide (BUMEX) 2 MG tablet Take 1 tablet by mouth 2 times daily 180 tablet 1    metoprolol succinate (TOPROL XL) 100 MG extended release tablet Take 1 tablet by mouth daily 90 tablet 1    atorvastatin (LIPITOR) 80 MG tablet TAKE 1 TABLET DAILY 90 tablet 0    warfarin (COUMADIN) 2.5 MG tablet TAKE 1 TABLET DAILY 90 tablet 3    losartan (COZAAR) 25 MG tablet TAKE 1 TABLET EVERY NIGHT 90 tablet 3    guaiFENesin (MUCINEX) 600 MG extended release tablet Take 1,200 mg by mouth 2 times daily      fluticasone (FLONASE) 50 MCG/ACT nasal spray 2 sprays by Each Nostril route daily as needed for Rhinitis or Allergies      OXYGEN Inhale 4 L into the lungs continuous 4 L 0    albuterol (ACCUNEB) 1.25 MG/3ML nebulizer solution Inhale 3 mLs into the lungs every 6 hours as needed for Wheezing 360 mL 3    sodium chloride (OCEAN NASAL SPRAY) 0.65 % nasal spray 1 spray by Nasal route as needed for Congestion 1 each 3    albuterol sulfate HFA (VENTOLIN HFA) 108 (90 Base) MCG/ACT inhaler Inhale 2 puffs into the lungs 4 times daily as needed for Wheezing 54 g 1    Ascorbic Acid (VITAMIN C ER PO) Take 1 tablet by mouth daily      TRUEplus Lancets 28G MISC Use to test blood sugar as needed      TRUE METRIX BLOOD GLUCOSE TEST strip Use to test blood sugar as needed      Alcohol Swabs (B-D SINGLE USE SWABS REGULAR) PADS Use to test blood sugar as needed      vitamin D (CHOLECALCIFEROL) 1000 UNIT TABS tablet Take 1 tablet by mouth daily      azithromycin (ZITHROMAX) 250 MG tablet  (Patient not taking: Reported on 7/8/2022)      promethazine-dextromethorphan (PROMETHAZINE-DM) 6.25-15 MG/5ML syrup TAKE 5 ML BY MOUTH NIGHTLY (Patient not taking: Reported on 7/8/2022) 450 mL 0     No current facility-administered medications for this visit. Social History     Socioeconomic History    Marital status:       Spouse name: None    Number of children: None    Years of education: None    Highest education level: None   Occupational History    None   Tobacco Use    Smoking status: Former Smoker     Packs/day: 1.00     Years: 50.00     Pack years: 50.00     Types: Cigarettes    Smokeless tobacco: Never Used    Tobacco comment: quit 20 years ago   Vaping Use    Vaping Use: Never used   Substance and Sexual Activity    Alcohol use: Not Currently     Comment: rarely    Drug use: No    Sexual activity: Yes     Partners: Female   Other Topics Concern    None   Social History Narrative    None     Social Determinants of Health     Financial Resource Strain:     Difficulty of Paying Living Expenses: Not on file   Food Insecurity:     Worried About Running Out of Food in the Last Year: Not on file    Abiel of Food in the Last Year: Not on file   Transportation Needs:     Lack of Transportation (Medical): Not on file    Lack of Transportation (Non-Medical): Not on file   Physical Activity: Sufficiently Active    Days of Exercise per Week: 6 days    Minutes of Exercise per Session: 30 min   Stress:     Feeling of Stress : Not on file   Social Connections:     Frequency of Communication with Friends and Family: Not on file    Frequency of Social Gatherings with Friends and Family: Not on file    Attends Confucianist Services: Not on file    Active Member of Clubs or Organizations: Not on file    Attends Club or Organization Meetings: Not on file    Marital Status: Not on file   Intimate Partner Violence:     Fear of Current or Ex-Partner: Not on file    Emotionally Abused: Not on file    Physically Abused: Not on file    Sexually Abused: Not on file   Housing Stability:     Unable to Pay for Housing in the Last Year: Not on file    Number of [place] in the Last Year: Not on file    Unstable Housing in the Last Year: Not on file       Family History   Family history unknown: Yes       Blood pressure 128/74, pulse 76, resp. rate 16, height 5' 7\" (1.702 m), weight 203 lb 12.8 oz (92.4 kg), SpO2 96 %.     Physical Exam:    General Appearance: alert and oriented to person, place and time, in no acute distress  Cardiovascular: normal rate, regular rhythm, normal S1 and S2, no murmurs, rubs, clicks, or gallops, distal pulses intact, no carotid bruits, no JVD  Pulmonary/Chest: clear to auscultation bilaterally- no wheezes, rales or rhonchi, normal air movement, no respiratory distress  Abdomen: soft, non-tender, non-distended, normal bowel sounds, no masses   Extremities: no cyanosis, clubbing or edema, pulse   Skin: warm and dry, no rash or erythema  Head: normocephalic and atraumatic  Eyes: pupils equal, round, and reactive to light  Neck: supple and non-tender without mass, no thyromegaly   Musculoskeletal: normal range of motion, no joint swelling, deformity or tenderness  Neurological: alert, oriented, normal speech, no focal findings or movement disorder noted    Lab Data:    Cardiac Enzymes:  No results for input(s): CKTOTAL, CKMB, CKMBINDEX, TROPONINI in the last 72 hours.     CBC:   Lab Results   Component Value Date/Time    WBC 9.4 05/10/2022 03:58 AM    RBC 3.83 05/10/2022 03:58 AM    RBC 4.33 10/21/2020 07:29 AM    HGB 12.0 05/10/2022 03:58 AM    HCT 35.9 05/10/2022 03:58 AM     05/10/2022 03:58 AM       CMP:    Lab Results   Component Value Date/Time     05/18/2022 09:27 AM    K 3.5 05/18/2022 09:27 AM    K 4.4 04/14/2022 06:12 AM    CL 99 05/18/2022 09:27 AM    CO2 26 05/18/2022 09:27 AM    BUN 48 05/18/2022 09:27 AM    CREATININE 2.2 05/18/2022 09:27 AM    GFRAA 50 05/11/2018 05:19 AM    LABGLOM 29 05/18/2022 09:27 AM    GLUCOSE 110 05/18/2022 09:27 AM    GLUCOSE 108 10/21/2020 07:29 AM    CALCIUM 8.5 05/18/2022 09:27 AM       Hepatic Function Panel:    Lab Results   Component Value Date/Time    ALKPHOS 83 05/08/2022 04:20 PM    ALT 28 05/08/2022 04:20 PM    AST 21 05/08/2022 04:20 PM    PROT 6.1 05/08/2022 04:20 PM    BILITOT 0.4 05/08/2022 04:20 PM    BILIDIR <0.2 05/08/2022 04:20 PM    LABALBU 3.3 05/08/2022 04:20 PM       Magnesium:    Lab Results   Component Value Date/Time    MG 1.9 04/13/2022 05:35 AM       PT/INR:    Lab Results   Component Value Date/Time    PROTIME 10.9 05/12/2018 04:15 AM    INR 3.10 07/05/2022 10:51 AM    INR 2.25 05/10/2022 03:58 AM       HgBA1c:    Lab Results   Component Value Date/Time    LABA1C 7.9 04/20/2022 06:00 AM       FLP:    Lab Results   Component Value Date/Time    TRIG 120 08/20/2021 08:43 AM    HDL 33 08/20/2021 08:43 AM    LDLCALC 63 08/20/2021 08:43 AM    LABVLDL 44 02/12/2019 08:40 AM       TSH:  No results found for: TSH     Diagnosis Orders   1. Acute on chronic congestive heart failure, unspecified heart failure type ([facility]  [address] - NE ELECTROCARDIOGRAM, COMPLETE    Basic Metabolic Panel    CBC        Assessment/Plan    Evi Hoffman is an 79years old gentleman who is known to have history of diffuse atherosclerotic coronary artery disease with a history of systolic Dysfunction of the disc ventricle he was in the hospital recently with an acute exacerbation of his congestive heart failure. The patient also had a history of pacemaker he had a history of atrial fibs and he has been on the Coumadin. Has a history of COPD and is on oxygen and dependent. The patient had a pacemaker and he is pacemaker dependent he has a history of diabetes he has been on the metformin the patient has history of hypercholesteremia on Lipitor. He is here for a follow-up he indicated he is feeling better with occasional some shortness of breath he was advised about salt restriction and the fluid restriction he takes Bumex 2mg twice a day.     In the patient advised to increase his activity and continue current medication we will see him in 3 months with the lab work and pending his clinical course further recommendation will be made he was advised to follow-up with family physician his EKG showed atrial for pacer rhythm the patient will continue with the Coumadin thank    Orders Placed This

## 2022-07-25 ENCOUNTER — HOSPITAL ENCOUNTER (OUTPATIENT)
Dept: PHARMACY | Age: 82
Setting detail: THERAPIES SERIES
Discharge: HOME | End: 2022-07-25
Payer: MEDICARE

## 2022-07-25 DIAGNOSIS — Z51.81 ENCOUNTER FOR THERAPEUTIC DRUG MONITORING: ICD-10-CM

## 2022-07-25 DIAGNOSIS — Z79.01 ANTICOAGULATED ON COUMADIN: ICD-10-CM

## 2022-07-25 DIAGNOSIS — I48.21 PERMANENT ATRIAL FIBRILLATION (HCC): Primary | ICD-10-CM

## 2022-07-25 LAB — POC INR: 2.3 (ref 0.8–1.2)

## 2022-07-25 PROCEDURE — 99211 OFF/OP EST MAY X REQ PHY/QHP: CPT

## 2022-07-25 PROCEDURE — 36416 COLLJ CAPILLARY BLOOD SPEC: CPT

## 2022-07-25 PROCEDURE — 85610 PROTHROMBIN TIME: CPT

## 2022-07-25 NOTE — PROGRESS NOTES
Medication [facility] [address]   [phone] (phone)  [phone] (fax)    Mr. Agustin Hollis is a 79 y.o.  male with history of atrial fib. , per Dr. Shirley Nance referral, who presents today for Warfarin monitoring and adjustment (3 week visit). Patient verifies current dosing regimen and tablet strength. No missed or extra doses. Patient denies bleeding/chest pain. Has usual swelling of left leg/easy bruising/SOB. No blood in urine or stool. No dietary changes. Changes in medication/OTC agents/herbals: started long-term Azithromycin 2 weeks ago. No change in alcohol use or tobacco use. No change in activity level. Patient denies headaches/dizziness/lightheadedness/falls. No vomiting/diarrhea or acute illness. No procedures scheduled in the future at this time. Assessment:   Lab Results   Component Value Date    INR 2.30 (H) 07/25/2022    INR 3.10 (H) 07/05/2022    INR 2.60 (H) 06/20/2022     INR therapeutic - goal 2-3. Recent Labs     07/25/22  1106   INR 2.30*        Plan:  POCT INR ordered/performed/result reviewed. Continue PO Coumadin 1.25 mg W, 2.5 mg MTThFSS. Recheck INR in 3 week(s) - same day as nephrology visit, per patient's request.  Patient reminded to call the Anticoagulation Clinic with any signs or symptoms of bleeding or with any medication changes. Patient given instructions utilizing the teach back method. Downtime instruction sheet reviewed with patient. Copy given to A to scan. Discharged ambulatory in no apparent distress, wearing mask/oxygen.

## 2022-07-26 ENCOUNTER — TELEPHONE (OUTPATIENT)
Dept: FAMILY MEDICINE CLINIC | Age: 82
End: 2022-07-26

## 2022-07-26 NOTE — TELEPHONE ENCOUNTER
Use the albuterol nebulizer as needed for wheezing and cough. Continue to push fluids. Monitor for fever or increasing symptoms. No change to current medications. Blood sugar is ok for now.

## 2022-07-26 NOTE — TELEPHONE ENCOUNTER
Patient is calling stating he has a cough x 2 days his sugar over the past two days has also gone up today it was 148 yesterday it was 160.  Please advise

## 2022-08-08 ENCOUNTER — NURSE ONLY (OUTPATIENT)
Dept: LAB | Age: 82
End: 2022-08-08

## 2022-08-08 ENCOUNTER — TELEPHONE (OUTPATIENT)
Dept: FAMILY MEDICINE CLINIC | Age: 82
End: 2022-08-08

## 2022-08-08 DIAGNOSIS — N17.9 AKI (ACUTE KIDNEY INJURY) (HCC): ICD-10-CM

## 2022-08-08 DIAGNOSIS — N18.4 CKD (CHRONIC KIDNEY DISEASE), STAGE IV (HCC): ICD-10-CM

## 2022-08-08 LAB
ANION GAP SERPL CALCULATED.3IONS-SCNC: 17 MEQ/L (ref 8–16)
BUN BLDV-MCNC: 31 MG/DL (ref 7–22)
C-REACTIVE PROTEIN: < 0.3 MG/DL (ref 0–1)
CALCIUM SERPL-MCNC: 8.8 MG/DL (ref 8.5–10.5)
CHLORIDE BLD-SCNC: 97 MEQ/L (ref 98–111)
CO2: 25 MEQ/L (ref 23–33)
CREAT SERPL-MCNC: 2.4 MG/DL (ref 0.4–1.2)
ERYTHROCYTE [DISTWIDTH] IN BLOOD BY AUTOMATED COUNT: 14.5 % (ref 11.5–14.5)
ERYTHROCYTE [DISTWIDTH] IN BLOOD BY AUTOMATED COUNT: 54.5 FL (ref 35–45)
GFR SERPL CREATININE-BSD FRML MDRD: 26 ML/MIN/1.73M2
GLUCOSE BLD-MCNC: 316 MG/DL (ref 70–108)
HCT VFR BLD CALC: 42 % (ref 42–52)
HEMOGLOBIN: 13.1 GM/DL (ref 14–18)
MCH RBC QN AUTO: 31.9 PG (ref 26–33)
MCHC RBC AUTO-ENTMCNC: 31.2 GM/DL (ref 32.2–35.5)
MCV RBC AUTO: 102.2 FL (ref 80–94)
PHOSPHORUS: 3.2 MG/DL (ref 2.4–4.7)
PLATELET # BLD: 190 THOU/MM3 (ref 130–400)
PMV BLD AUTO: 11.4 FL (ref 9.4–12.4)
POTASSIUM SERPL-SCNC: 3.7 MEQ/L (ref 3.5–5.2)
RBC # BLD: 4.11 MILL/MM3 (ref 4.7–6.1)
SEDIMENTATION RATE, ERYTHROCYTE: 10 MM/HR (ref 0–10)
SODIUM BLD-SCNC: 139 MEQ/L (ref 135–145)
URIC ACID: 9.5 MG/DL (ref 3.7–7)
VITAMIN D 25-HYDROXY: 28 NG/ML (ref 30–100)
WBC # BLD: 10.5 THOU/MM3 (ref 4.8–10.8)

## 2022-08-08 NOTE — TELEPHONE ENCOUNTER
Patient stopped stating he needs his Albuterol Sulfate 1.25MG/3ML NEB SOL to be [facility]. Stated he see's the pulmonologist August 23, 2022 but he is now using 2 boxes a month.

## 2022-08-08 NOTE — TELEPHONE ENCOUNTER
Called patient back and informed him that Ted Benton stated that if he is usint his Albuterol more than 4x a day he needs to speak with his pulmonologist! Patient stated he understands and will call them!

## 2022-08-09 LAB
BASOPHILS # BLD: 0.6 %
BASOPHILS ABSOLUTE: 0.1 THOU/MM3 (ref 0–0.1)
EOSINOPHIL # BLD: 1.8 %
EOSINOPHILS ABSOLUTE: 0.2 THOU/MM3 (ref 0–0.4)
IGE: 613 IU/ML
IMMATURE GRANS (ABS): 0.06 THOU/MM3 (ref 0–0.07)
IMMATURE GRANULOCYTES: 0.6 %
LYMPHOCYTES # BLD: 14.2 %
LYMPHOCYTES ABSOLUTE: 1.5 THOU/MM3 (ref 1–4.8)
MONOCYTES # BLD: 10.6 %
MONOCYTES ABSOLUTE: 1.1 THOU/MM3 (ref 0.4–1.3)
NUCLEATED RED BLOOD CELLS: 0 /100 WBC
PTH INTACT: 100.6 PG/ML (ref 15–65)
RHEUMATOID FACTOR: 10 IU/ML (ref 0–13)
SEG NEUTROPHILS: 72.2 %
SEGMENTED NEUTROPHILS ABSOLUTE COUNT: 7.8 THOU/MM3 (ref 1.8–7.7)

## 2022-08-10 LAB — ALDOLASE: 6.4 U/L (ref 1.2–7.6)

## 2022-08-11 LAB
ANA SCREEN: NORMAL
ANTI JO-1 IGG: 0 AU/ML (ref 0–40)
ANTI RNP AB: 3 UNITS (ref 0–19)
ANTI SSA: NORMAL
ANTI SSB: 0 AU/ML (ref 0–40)
CYCLIC CITRULLINATED PEPTIDE ANTIBODY IGG: 2.7 U/ML (ref 0–7)
SCLERODERMA (SCL-70) AB: 0.9 U/ML

## 2022-08-12 LAB
ALLERGEN BERMUDA GRASS IGE: < 0.1 KU/L
ALLERGEN BIRCH IGE: < 0.1 KU/L
ALLERGEN BOX ELDER: < 0.1 KU/L
ALLERGEN CAT DANDER IGE: < 0.1 KU/L
ALLERGEN COMMON SHORT RAGWEED IGE: < 0.1 KU/L
ALLERGEN COTTONWOOD: < 0.1 KU/L
ALLERGEN D. FARINAE (DUST MITE): 2.2 KU/L
ALLERGEN DOG DANDER IGE: 0.62 KU/L
ALLERGEN ELM IGE: < 0.1 KU/L
ALLERGEN FUNGI/MOLD A. ALTERNATA IGE: 0.2 KU/L
ALLERGEN FUNGI/MOLD A. FUMIGATUS IGE: 0.21 KU/L
ALLERGEN FUNGI/MOLD M.RACEMOSUS IGE: < 0.1 KU/L
ALLERGEN FUNGI/MOLD P. NOTATUM IGE: 2.4 KU/L
ALLERGEN GERMAN COCKROACH IGE: < 0.1 KU/L
ALLERGEN INTERPRETATION/SCORE: ABNORMAL
ALLERGEN MITE DUST PTERONYSSINUS IGE: 2.15 KU/L
ALLERGEN MOUNTAIN CEDAR: 0.17 KU/L
ALLERGEN MOUSE EPITHELIA IGE: < 0.1 KU/L
ALLERGEN PECAN TREE IGE: < 0.1 KU/L
ALLERGEN RUSSIAN THISTLE IGE: < 0.1 KU/L
ALLERGEN SHEEP SORREL (W18) IGE: < 0.1 KU/L
ALLERGEN TIMOTHY GRASS: < 0.1 KU/L
ALLERGEN TREE SYCAMORE: < 0.1 KU/L
ALLERGEN WALNUT TREE IGE: 0.1 KU/L
ALLERGEN WEED, PIGWEED IGE: < 0.1 KU/L
ALLERGEN WHITE ASH: < 0.1 KU/L
ALLERGEN WHITE MULBERRY TREE, IGE: < 0.1 KU/L
ALLERGEN, FUNGI/MOLD: < 0.1 KU/L
ALLERGEN, TREE, OAK: < 0.1 KU/L
DSDNA AB IGG IFA: NORMAL
IMMUNOGLOBULIN E: 670 KU/L

## 2022-08-17 ENCOUNTER — HOSPITAL ENCOUNTER (OUTPATIENT)
Dept: PHARMACY | Age: 82
Setting detail: THERAPIES SERIES
Discharge: HOME | End: 2022-08-17
Payer: MEDICARE

## 2022-08-17 ENCOUNTER — OFFICE VISIT (OUTPATIENT)
Dept: NEPHROLOGY | Age: 82
End: 2022-08-17
Payer: MEDICARE

## 2022-08-17 ENCOUNTER — TELEPHONE (OUTPATIENT)
Dept: FAMILY MEDICINE CLINIC | Age: 82
End: 2022-08-17

## 2022-08-17 VITALS
BODY MASS INDEX: 32.58 KG/M2 | HEART RATE: 68 BPM | WEIGHT: 208 LBS | OXYGEN SATURATION: 96 % | SYSTOLIC BLOOD PRESSURE: 106 MMHG | DIASTOLIC BLOOD PRESSURE: 67 MMHG

## 2022-08-17 DIAGNOSIS — N18.4 CKD (CHRONIC KIDNEY DISEASE), STAGE IV (HCC): Primary | ICD-10-CM

## 2022-08-17 DIAGNOSIS — I48.21 PERMANENT ATRIAL FIBRILLATION (HCC): Primary | ICD-10-CM

## 2022-08-17 DIAGNOSIS — Z79.01 ANTICOAGULATED ON COUMADIN: ICD-10-CM

## 2022-08-17 DIAGNOSIS — Z51.81 ENCOUNTER FOR THERAPEUTIC DRUG MONITORING: ICD-10-CM

## 2022-08-17 LAB — POC INR: 2.4 (ref 0.8–1.2)

## 2022-08-17 PROCEDURE — 1036F TOBACCO NON-USER: CPT | Performed by: INTERNAL MEDICINE

## 2022-08-17 PROCEDURE — 99213 OFFICE O/P EST LOW 20 MIN: CPT | Performed by: INTERNAL MEDICINE

## 2022-08-17 PROCEDURE — 36416 COLLJ CAPILLARY BLOOD SPEC: CPT

## 2022-08-17 PROCEDURE — G8417 CALC BMI ABV UP PARAM F/U: HCPCS | Performed by: INTERNAL MEDICINE

## 2022-08-17 PROCEDURE — G8427 DOCREV CUR MEDS BY ELIG CLIN: HCPCS | Performed by: INTERNAL MEDICINE

## 2022-08-17 PROCEDURE — 99211 OFF/OP EST MAY X REQ PHY/QHP: CPT

## 2022-08-17 PROCEDURE — 1123F ACP DISCUSS/DSCN MKR DOCD: CPT | Performed by: INTERNAL MEDICINE

## 2022-08-17 PROCEDURE — 85610 PROTHROMBIN TIME: CPT

## 2022-08-17 NOTE — PROGRESS NOTES
[address] [facility]  [address]  [facility] [address]  Dept: [phone]  Loc: [phone]  Progress Note  8/17/2022 3:40 PM      Pt Name:    Bal Salazar  YOB: 1940  Primary Care Physician:  Carlos Coello, WILBERTO - CNP     Chief Complaint:   Chief Complaint   Patient presents with    Follow-up     CKD III        History of Present Illness: This is a follow-up visit for CKD IV. He is a former patient of Dr. Roya Rodríguez. He has hx of CAD, DM, HTN, hyperlipidemia, Afib, pacemaker, EF %. Hx TAVR, COPD. Past renal US showed hypoplastic left kidney. Patient was hospitalized in may for volume overload. Bumex increased to 2 mg bid. Weight on our scale is up but he states on his scale has been stable. Swelling looks better. He is on home O2. Reports his breathing is ok. We had to stop metformin and now sugars are going up , running 140s-160s fasting at home and was 300 on our nonfasting lab. Pertinent items are noted in HPI.          Past History:  Past Medical History:   Diagnosis Date    MILLY (acute kidney injury) ([address]     Atrial fibrillation ([address]     Cerebral artery occlusion with cerebral infarction (HCC)     CHF (congestive heart failure), NYHA class III, acute on chronic, combined (HCC)     COPD (chronic obstructive pulmonary disease) ([address] 8/3/2020    Coronary artery disease involving native coronary artery of native heart with angina pectoris ([address]     Diabetes mellitus, type 2 ([address] 12/30/2020    Hyperlipidemia 2/20/2012    Hypertension     Pneumonia      Past Surgical History:   Procedure Laterality Date    AORTIC VALVE REPLACEMENT      CARDIAC SURGERY      heart cath    CORONARY ANGIOPLASTY WITH STENT PLACEMENT      HERNIA REPAIR      OTHER SURGICAL HISTORY  05/10/2018    PACEMAKER INSERTION          VITALS:  /67 (Site: Left Upper Arm, Position: Sitting, Cuff Size: Large Adult)   Pulse 68   Wt 208 lb (94.3 kg)   SpO2 96%   BMI 32.58 kg/m²   Wt Readings from Last 3 Encounters:   08/17/22 208 lb (94.3 kg)   07/08/22 203 lb 12.8 oz (92.4 kg)   05/17/22 196 lb (88.9 kg)     Body mass index is 32.58 kg/m².      General Appearance: alert and cooperative with exam, appears comfortable, no distress  HEENT: EOMI, moist oral mucus membranes  Neck: No jugular venous distention,   Lungs: diminished  Heart: S1, S2 heard,   GI: soft, non-tender, no guarding,   Extremities:1+ LE edema  Skin: warm, dry  Neurologic: no tremor, no asterixis,     Medications:  Current Outpatient Medications   Medication Sig Dispense Refill    AZITHROMYCIN PO Take 250 mg by mouth daily Long-term      Budeson-Glycopyrrol-Formoterol (BREZTRI AEROSPHERE IN) Inhale 2 puffs into the lungs in the morning and at bedtime      cyanocobalamin 1000 MCG tablet Take 1 tablet by mouth daily 90 tablet 3    bumetanide (BUMEX) 2 MG tablet Take 1 tablet by mouth 2 times daily 180 tablet 1    metoprolol succinate (TOPROL XL) 100 MG extended release tablet Take 1 tablet by mouth daily 90 tablet 1    promethazine-dextromethorphan (PROMETHAZINE-DM) 6.25-15 MG/5ML syrup TAKE 5 ML BY MOUTH NIGHTLY 450 mL 0    atorvastatin (LIPITOR) 80 MG tablet TAKE 1 TABLET DAILY 90 tablet 0    warfarin (COUMADIN) 2.5 MG tablet TAKE 1 TABLET DAILY 90 tablet 3    losartan (COZAAR) 25 MG tablet TAKE 1 TABLET EVERY NIGHT 90 tablet 3    guaiFENesin (MUCINEX) 600 MG extended release tablet Take 1,200 mg by mouth 2 times daily      fluticasone (FLONASE) 50 MCG/ACT nasal spray 2 sprays by Each Nostril route daily as needed for Rhinitis or Allergies      OXYGEN Inhale 4 L into the lungs continuous 4 L 0    albuterol (ACCUNEB) 1.25 MG/3ML nebulizer solution Inhale 3 mLs into the lungs every 6 hours as needed for Wheezing 360 mL 3    sodium chloride (OCEAN NASAL SPRAY) 0.65 % nasal spray 1 spray by Nasal route as needed for Congestion 1 each 3    Ascorbic Acid (VITAMIN C ER PO) Take 1 tablet by mouth daily      TRUEplus Lancets 28G MISC Use to test blood sugar as needed      TRUE METRIX BLOOD GLUCOSE TEST strip Use to test blood sugar as needed      Alcohol Swabs (B-D SINGLE USE SWABS REGULAR) PADS Use to test blood sugar as needed      vitamin D (CHOLECALCIFEROL) 1000 UNIT TABS tablet Take 1 tablet by mouth daily      metFORMIN (GLUCOPHAGE) 500 MG tablet TAKE 1 TABLET TWICE DAILY WITH MEALS (Patient not taking: Reported on 8/17/2022) 180 tablet 0    albuterol sulfate HFA (VENTOLIN HFA) 108 (90 Base) MCG/ACT inhaler Inhale 2 puffs into the lungs 4 times daily as needed for Wheezing (Patient not taking: No sig reported) 54 g 1     No current facility-administered medications for this visit.         Laboratory & Diagnostics:  CBC:   Lab Results   Component Value Date    WBC 10.5 08/08/2022    HGB 13.1 (L) 08/08/2022    HCT 42.0 08/08/2022    .2 (H) 08/08/2022     08/08/2022     BMP:    Lab Results   Component Value Date     08/08/2022     05/18/2022     05/10/2022    K 3.7 08/08/2022    K 3.5 05/18/2022    K 4.0 05/10/2022    CL 97 (L) 08/08/2022    CL 99 05/18/2022    CL 99 05/10/2022    CO2 25 08/08/2022    CO2 26 05/18/2022    CO2 25 05/10/2022    BUN 31 (H) 08/08/2022    BUN 48 (H) 05/18/2022    BUN 77 (H) 05/10/2022    CREATININE 2.4 (H) 08/08/2022    CREATININE 2.2 (H) 05/18/2022    CREATININE 2.6 (H) 05/10/2022    GLUCOSE 316 (H) 08/08/2022    GLUCOSE 110 (H) 05/18/2022    GLUCOSE 106 05/10/2022      Hepatic:   Lab Results   Component Value Date    AST 21 05/08/2022    AST 40 04/22/2022    AST 18 04/13/2022    ALT 28 05/08/2022    ALT 70 (H) 04/22/2022    ALT 15 04/13/2022    BILITOT 0.4 05/08/2022    BILITOT 0.8 04/22/2022    BILITOT 1.1 04/13/2022    ALKPHOS 83 05/08/2022    ALKPHOS 98 04/22/2022    ALKPHOS 104 04/13/2022     BNP: No results found for: BNP  Lipids:   Lab Results   Component Value Date    CHOL 120 08/20/2021    HDL 33 08/20/2021     INR:   Lab Results   Component Value Date    INR 2.40 (H) 08/17/2022    INR 2.30 (H) 07/25/2022    INR 3.10 (H) 07/05/2022     URINE:   Lab Results   Component Value Date/Time    NAUR 59 05/09/2022 11:34 AM     Lab Results   Component Value Date/Time    NITRU NEGATIVE 04/22/2022 05:50 PM    COLORU YELLOW 04/22/2022 05:50 PM    PHUR 5.5 04/22/2022 05:50 PM    LABCAST NONE SEEN 04/22/2022 05:50 PM    LABCAST NONE SEEN 04/22/2022 05:50 PM    WBCUA 0-2 04/22/2022 05:50 PM    RBCUA 0-2 04/22/2022 05:50 PM    YEAST NONE SEEN 04/22/2022 05:50 PM    BACTERIA NONE SEEN 04/22/2022 05:50 PM    SPECGRAV 1.012 04/22/2022 05:50 PM    LEUKOCYTESUR NEGATIVE 04/22/2022 05:50 PM    UROBILINOGEN 0.2 04/22/2022 05:50 PM    BILIRUBINUR NEGATIVE 04/22/2022 05:50 PM    BLOODU NEGATIVE 04/22/2022 05:50 PM    GLUCOSEU NEGATIVE 07/03/2021 02:55 PM    KETUA NEGATIVE 04/22/2022 05:50 PM      Microalbumen/Creatinine ratio:  No components found for: RUCREAT        Impression/Plan:   1. 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 --will need to keep him off Metformin since GFR < 30  -will notify PCP that his sugars are going up    2. Mild microalbuminuria: continue ARB  3. Hypoplastic left kidney  4. HTN; stable  5. DM   6. CAD, hx TAVR  7. Chronic systolic CHF : on bumex 2 mg BID    Bloodwork and medications were reviewed and plan of care discussed with the patient. Return to clinic in 3 months  or sooner if the need arises.       Gerson Abdalla,   [facility]

## 2022-08-17 NOTE — PROGRESS NOTES
Medication Management [address]   [phone] (phone)  [phone] (fax)    Mr. Nell Leblanc is a 79 y.o.  male with history of atrial fib. , per Dr. Rory Marquez referral, who presents today for Warfarin monitoring and adjustment (3 week visit - early for today's visit). Patient verifies current dosing regimen and tablet strength. Uses pill box. No missed or extra doses. Patient denies bleeding/chest pain. Has usual slight SOB/left leg swelling/easy bruising. No blood in urine or stool. No dietary changes. No changes in medication/OTC agents/herbals. Had reported last visit that pulmonologist added long-term Azithromycin, but noted it was removed from list 8/8. He called wife to be sure; added back to list.  Sees [facility] pulmonologist next week. No change in alcohol use or tobacco use. No change in activity level. Patient denies headaches/dizziness/lightheadedness/falls. No vomiting/diarrhea or acute illness. No procedures scheduled in the future at this time. Assessment:   Lab Results   Component Value Date    INR 2.40 (H) 08/17/2022    INR 2.30 (H) 07/25/2022    INR 3.10 (H) 07/05/2022     INR therapeutic - goal 2-3. Recent Labs     08/17/22  1418   INR 2.40*        Plan:  POCT INR ordered/performed/result reviewed. Continue PO Coumadin 1.25 mg W, 2.5 mg MTThFSS. Recheck INR in 4 week(s). Patient reminded to call the Anticoagulation Clinic with any signs or symptoms of bleeding or with any medication changes. Patient given instructions utilizing the teach back method. After visit summary printed and reviewed with patient. Discharged ambulatory in no apparent distress, wearing mask/oxygen. Sees nephrologist next.

## 2022-08-17 NOTE — TELEPHONE ENCOUNTER
----- Message from WILBERTO Byrd CNP sent at 8/17/2022  4:24 PM EDT -----    Pt should come in for visit to discuss his diabetes medications.     ----- Message -----  From: Shon Ch DO  Sent: 8/17/2022   4:10 PM EDT  To: WILBERTO Byrd CNP,   I had to stop Frank's metformin last visit b/c of his renal fxn and now sugars are going up , he states fasting are 140s-160s at home and on our nonfasting labs he was in the 300s. I told him I'd notify you to see what changes you wanted to make. Let me know if you have any questions. Thanks!

## 2022-08-23 ENCOUNTER — OFFICE VISIT (OUTPATIENT)
Dept: FAMILY MEDICINE CLINIC | Age: 82
End: 2022-08-23
Payer: MEDICARE

## 2022-08-23 VITALS
OXYGEN SATURATION: 98 % | HEART RATE: 69 BPM | DIASTOLIC BLOOD PRESSURE: 84 MMHG | WEIGHT: 208.4 LBS | SYSTOLIC BLOOD PRESSURE: 132 MMHG | BODY MASS INDEX: 32.64 KG/M2

## 2022-08-23 DIAGNOSIS — N18.4 STAGE 4 CHRONIC KIDNEY DISEASE (HCC): ICD-10-CM

## 2022-08-23 DIAGNOSIS — I50.43 ACUTE ON CHRONIC COMBINED SYSTOLIC AND DIASTOLIC CONGESTIVE HEART FAILURE (HCC): ICD-10-CM

## 2022-08-23 DIAGNOSIS — E11.65 TYPE 2 DIABETES MELLITUS WITH HYPERGLYCEMIA, WITHOUT LONG-TERM CURRENT USE OF INSULIN (HCC): Primary | ICD-10-CM

## 2022-08-23 DIAGNOSIS — J44.1 CHRONIC OBSTRUCTIVE PULMONARY DISEASE WITH ACUTE EXACERBATION (HCC): ICD-10-CM

## 2022-08-23 LAB — HBA1C MFR BLD: 6.4 % (ref 4.3–5.7)

## 2022-08-23 PROCEDURE — 99214 OFFICE O/P EST MOD 30 MIN: CPT | Performed by: NURSE PRACTITIONER

## 2022-08-23 PROCEDURE — 83036 HEMOGLOBIN GLYCOSYLATED A1C: CPT | Performed by: NURSE PRACTITIONER

## 2022-08-23 PROCEDURE — 3044F HG A1C LEVEL LT 7.0%: CPT | Performed by: NURSE PRACTITIONER

## 2022-08-23 PROCEDURE — 1123F ACP DISCUSS/DSCN MKR DOCD: CPT | Performed by: NURSE PRACTITIONER

## 2022-08-23 PROCEDURE — 1036F TOBACCO NON-USER: CPT | Performed by: NURSE PRACTITIONER

## 2022-08-23 PROCEDURE — 3023F SPIROM DOC REV: CPT | Performed by: NURSE PRACTITIONER

## 2022-08-23 PROCEDURE — G8417 CALC BMI ABV UP PARAM F/U: HCPCS | Performed by: NURSE PRACTITIONER

## 2022-08-23 PROCEDURE — G8427 DOCREV CUR MEDS BY ELIG CLIN: HCPCS | Performed by: NURSE PRACTITIONER

## 2022-08-23 RX ORDER — INSULIN GLARGINE 100 [IU]/ML
5 INJECTION, SOLUTION SUBCUTANEOUS NIGHTLY
Qty: 6 ML | Refills: 0 | Status: SHIPPED | OUTPATIENT
Start: 2022-08-23 | End: 2022-09-28 | Stop reason: DRUGHIGH

## 2022-08-23 SDOH — ECONOMIC STABILITY: FOOD INSECURITY: WITHIN THE PAST 12 MONTHS, YOU WORRIED THAT YOUR FOOD WOULD RUN OUT BEFORE YOU GOT MONEY TO BUY MORE.: NEVER TRUE

## 2022-08-23 SDOH — ECONOMIC STABILITY: FOOD INSECURITY: WITHIN THE PAST 12 MONTHS, THE FOOD YOU BOUGHT JUST DIDN'T LAST AND YOU DIDN'T HAVE MONEY TO GET MORE.: NEVER TRUE

## 2022-08-23 ASSESSMENT — ENCOUNTER SYMPTOMS
NAUSEA: 0
ABDOMINAL PAIN: 0
ABDOMINAL DISTENTION: 0
VOMITING: 0
EYES NEGATIVE: 1
COUGH: 1
CHEST TIGHTNESS: 0
DIARRHEA: 0
ALLERGIC/IMMUNOLOGIC NEGATIVE: 1
WHEEZING: 0
SHORTNESS OF BREATH: 0

## 2022-08-23 ASSESSMENT — SOCIAL DETERMINANTS OF HEALTH (SDOH): HOW HARD IS IT FOR YOU TO PAY FOR THE VERY BASICS LIKE FOOD, HOUSING, MEDICAL CARE, AND HEATING?: NOT HARD AT ALL

## 2022-08-23 NOTE — PROGRESS NOTES
[address]  Dept: Abel Chaves (:  1940) is a 79 y.o. male,Established patient, here for evaluation of the following chief complaint(s):  Blood Sugar Problem      ASSESSMENT/PLAN:  I have reviewed the patient's medical history in detail and updated the computerized patient record. HPI/ROS per the patient and caregiver. Overall non toxic in appearance. Answers questions appropriately. Conditions discussed and addressed this visit include:   Lab Results   Component Value Date    LABA1C 6.4 (H) 2022     No results found for: EAG  Estimated Creatinine Clearance: 26 mL/min (A) (based on SCr of 2.4 mg/dL (H)). Start lantus. 5 units qd   Goal BS >120  Continue low carb low salt diet. Continue daily bs monitoring  Bring pen and supplies in next week for education  Diabetes education provided today:    Diabetic nephropathy: Kidney function, microalbumin as a sign of diabetic nephropathy. Stages of kidney disease. Different diabetic medications. Managing high and low sugar readings. Rotation of sites for subcutaneous medication injection. 1. Type 2 diabetes mellitus with hyperglycemia, without long-term current use of insulin (HCC)  -     POCT glycosylated hemoglobin (Hb A1C)  -     insulin glargine (LANTUS SOLOSTAR) 100 UNIT/ML injection pen; Inject 5 Units into the skin nightly, Disp-6 mL, R-0Normal  -     Insulin Pen Needle 30G X 8 MM MISC; DAILY Starting 2022, Disp-100 each, R-3, Normal  2. Acute on chronic combined systolic and diastolic congestive heart failure ([address]  3. Chronic obstructive pulmonary disease with acute exacerbation (HCC)  4. Stage 4 chronic kidney disease (HCC)  -     insulin glargine (LANTUS SOLOSTAR) 100 UNIT/ML injection pen;  Inject 5 Units into the skin nightly, Disp-6 mL, R-0Normal  -     Insulin Pen Needle 30G X 8 MM MISC; DAILY Starting 2022, Disp-100 each, R-3, Normal    Return in General: Abdomen is flat. Bowel sounds are normal.   Musculoskeletal:         General: Normal range of motion. Cervical back: Normal range of motion and neck supple. No tenderness. Lymphadenopathy:      Cervical: No cervical adenopathy. Skin:     General: Skin is warm and dry. Capillary Refill: Capillary refill takes less than 2 seconds. Neurological:      General: No focal deficit present. Mental Status: He is alert. Mental status is at baseline. Psychiatric:         Mood and Affect: Mood normal.         Behavior: Behavior normal.               An electronic signature was used to authenticate this note.     --Berny Ramirez, WILBERTO - CNP

## 2022-09-02 ENCOUNTER — OFFICE VISIT (OUTPATIENT)
Dept: FAMILY MEDICINE CLINIC | Age: 82
End: 2022-09-02
Payer: MEDICARE

## 2022-09-02 VITALS
DIASTOLIC BLOOD PRESSURE: 88 MMHG | OXYGEN SATURATION: 93 % | WEIGHT: 205.4 LBS | SYSTOLIC BLOOD PRESSURE: 132 MMHG | HEART RATE: 68 BPM | BODY MASS INDEX: 32.17 KG/M2

## 2022-09-02 DIAGNOSIS — E11.21 TYPE 2 DIABETES MELLITUS WITH DIABETIC NEPHROPATHY, WITH LONG-TERM CURRENT USE OF INSULIN (HCC): Primary | ICD-10-CM

## 2022-09-02 DIAGNOSIS — Z79.4 TYPE 2 DIABETES MELLITUS WITH DIABETIC NEPHROPATHY, WITH LONG-TERM CURRENT USE OF INSULIN (HCC): Primary | ICD-10-CM

## 2022-09-02 PROBLEM — E11.29 TYPE 2 DIABETES MELLITUS WITH KIDNEY COMPLICATION, WITH LONG-TERM CURRENT USE OF INSULIN (HCC): Status: ACTIVE | Noted: 2020-12-30

## 2022-09-02 PROCEDURE — 1123F ACP DISCUSS/DSCN MKR DOCD: CPT | Performed by: NURSE PRACTITIONER

## 2022-09-02 PROCEDURE — 3044F HG A1C LEVEL LT 7.0%: CPT | Performed by: NURSE PRACTITIONER

## 2022-09-02 PROCEDURE — G8427 DOCREV CUR MEDS BY ELIG CLIN: HCPCS | Performed by: NURSE PRACTITIONER

## 2022-09-02 PROCEDURE — 1036F TOBACCO NON-USER: CPT | Performed by: NURSE PRACTITIONER

## 2022-09-02 PROCEDURE — 99213 OFFICE O/P EST LOW 20 MIN: CPT | Performed by: NURSE PRACTITIONER

## 2022-09-02 PROCEDURE — G8417 CALC BMI ABV UP PARAM F/U: HCPCS | Performed by: NURSE PRACTITIONER

## 2022-09-02 ASSESSMENT — ENCOUNTER SYMPTOMS
NAUSEA: 0
ALLERGIC/IMMUNOLOGIC NEGATIVE: 1
ABDOMINAL DISTENTION: 0
SHORTNESS OF BREATH: 0
CHEST TIGHTNESS: 0
ABDOMINAL PAIN: 0
VOMITING: 0
DIARRHEA: 0
EYES NEGATIVE: 1
WHEEZING: 0
COUGH: 1

## 2022-09-02 NOTE — PATIENT INSTRUCTIONS
New syringe, you need to prime the end of the syringe. Put the needle on and dial to 2 units. Then push the plunger of the syringe. Next dial your dose of insulin for administration. Check blood sugars daily and record. Give no more than 5 units once per day until we see how readings are. Change needle cap every day  Dispose of needle caps in either tide bottle or coffee can.

## 2022-09-08 ENCOUNTER — PROCEDURE VISIT (OUTPATIENT)
Dept: CARDIOLOGY CLINIC | Age: 82
End: 2022-09-08
Payer: MEDICARE

## 2022-09-08 DIAGNOSIS — Z95.0 PACEMAKER: Primary | ICD-10-CM

## 2022-09-08 PROCEDURE — 93279 PRGRMG DEV EVAL PM/LDLS PM: CPT | Performed by: INTERNAL MEDICINE

## 2022-09-15 ENCOUNTER — HOSPITAL ENCOUNTER (OUTPATIENT)
Dept: PHARMACY | Age: 82
Setting detail: THERAPIES SERIES
Discharge: HOME | End: 2022-09-15
Payer: MEDICARE

## 2022-09-15 DIAGNOSIS — Z79.01 ANTICOAGULATED ON COUMADIN: ICD-10-CM

## 2022-09-15 DIAGNOSIS — I48.21 PERMANENT ATRIAL FIBRILLATION (HCC): Primary | ICD-10-CM

## 2022-09-15 DIAGNOSIS — Z51.81 ENCOUNTER FOR THERAPEUTIC DRUG MONITORING: ICD-10-CM

## 2022-09-15 LAB — POC INR: 2.7 (ref 0.8–1.2)

## 2022-09-15 PROCEDURE — 85610 PROTHROMBIN TIME: CPT

## 2022-09-15 PROCEDURE — 36416 COLLJ CAPILLARY BLOOD SPEC: CPT

## 2022-09-15 PROCEDURE — 99211 OFF/OP EST MAY X REQ PHY/QHP: CPT

## 2022-09-15 NOTE — PROGRESS NOTES
Medication Management [address]   [phone] (phone)  [phone] (fax)    Mr. Boone Reynoso is a 79 y.o.  male with history of atrial fib. , per Dr. Michelle Bonner referral, who presents today for Warfarin monitoring and adjustment (4 week visit). Patient verifies current dosing regimen and tablet strength. Uses pill box. No missed or extra doses. Patient denies bleeding/chest pain. Left leg swelling improving - still slight. Has usual SOB, but only wears oxygen at night now. Has usual easy bruising. No blood in urine or stool. No dietary changes. No changes in medication/OTC agents/herbals. No change in alcohol use or tobacco use. Change in activity level: slightly increased past 2 weeks. Patient denies headaches/dizziness/lightheadedness/falls. No vomiting/diarrhea or acute illness. No procedures scheduled in the future at this time. Assessment:   Lab Results   Component Value Date    INR 2.70 (H) 09/15/2022    INR 2.40 (H) 08/17/2022    INR 2.30 (H) 07/25/2022     INR therapeutic - goal 2-3. Recent Labs     09/15/22  1050   INR 2.70*        Plan:  POCT INR ordered/performed/result reviewed. Continue PO Coumadin 1.25 mg W, 2.5 mg MTThFSS. Recheck INR in 4 week(s). Patient reminded to call the Anticoagulation Clinic with any signs or symptoms of bleeding or with any medication changes. Patient given instructions utilizing the teach back method. After visit summary printed and reviewed with patient. Discharged ambulatory in no apparent distress, wearing mask.

## 2022-09-28 ENCOUNTER — TELEPHONE (OUTPATIENT)
Dept: FAMILY MEDICINE CLINIC | Age: 82
End: 2022-09-28

## 2022-09-28 DIAGNOSIS — N18.4 STAGE 4 CHRONIC KIDNEY DISEASE (HCC): ICD-10-CM

## 2022-09-28 DIAGNOSIS — E11.65 TYPE 2 DIABETES MELLITUS WITH HYPERGLYCEMIA, WITHOUT LONG-TERM CURRENT USE OF INSULIN (HCC): ICD-10-CM

## 2022-09-28 RX ORDER — INSULIN GLARGINE 100 [IU]/ML
8 INJECTION, SOLUTION SUBCUTANEOUS NIGHTLY
Qty: 6 ML | Refills: 0 | Status: SHIPPED
Start: 2022-09-28 | End: 2022-12-27

## 2022-09-28 NOTE — TELEPHONE ENCOUNTER
Called patient to let patient to let him know that [facility] instructs the patient to increase Lantus to 8 units daily and to follow up with [facility] in the office in 3 weeks!  Patient stated he understands and made appointment for 10/19/2022

## 2022-09-28 NOTE — TELEPHONE ENCOUNTER
Patient called and is having issues getting his blood sugar numbers down, last read at 7:10 am reading 168. His numbers have been reading high since starting the Lantus. Patient states he doesn't think he is using the injection pen correctly or if he needs a higher dosage of the Lantus! Would like to know what Rajeev Su would like him to do?

## 2022-10-07 ENCOUNTER — TELEPHONE (OUTPATIENT)
Dept: FAMILY MEDICINE CLINIC | Age: 82
End: 2022-10-07

## 2022-10-07 NOTE — TELEPHONE ENCOUNTER
The patient called and said that he wanted to know if it was ok for him to increase his insulin (Lantus) from 8 units to 10 units. He has been on 8 units for a week and it is not doing anything. His numbers are in the 140's to 157. I let him know that Kortney Johns is out of the office until Tuesday, October 11 and that his message will be answered by another provider but it won't be until Monday because the office that will answer this message closes at noon on Friday. He voiced understanding and said that he is going to go ahead and increase it to 10 units and that he sees Kortney Morleyd in the office on the 19th.

## 2022-10-10 NOTE — TELEPHONE ENCOUNTER
Noted. Please verify patient is not experiencing any hypoglycemic symptoms/episodes. If he is not, and blood sugars are improved, may continue with 10 units daily. Follow through with previously scheduled PCP appt.

## 2022-10-13 ENCOUNTER — HOSPITAL ENCOUNTER (OUTPATIENT)
Dept: PHARMACY | Age: 82
Setting detail: THERAPIES SERIES
Discharge: HOME | End: 2022-10-13
Payer: MEDICARE

## 2022-10-13 DIAGNOSIS — Z51.81 ENCOUNTER FOR THERAPEUTIC DRUG MONITORING: ICD-10-CM

## 2022-10-13 DIAGNOSIS — Z79.01 ANTICOAGULATED ON COUMADIN: ICD-10-CM

## 2022-10-13 DIAGNOSIS — I48.21 PERMANENT ATRIAL FIBRILLATION (HCC): Primary | ICD-10-CM

## 2022-10-13 LAB — POC INR: 2.9 (ref 0.8–1.2)

## 2022-10-13 PROCEDURE — 99211 OFF/OP EST MAY X REQ PHY/QHP: CPT

## 2022-10-13 PROCEDURE — 36416 COLLJ CAPILLARY BLOOD SPEC: CPT

## 2022-10-13 PROCEDURE — 85610 PROTHROMBIN TIME: CPT

## 2022-10-13 NOTE — PROGRESS NOTES
Medication Management [address]   [phone] (phone)  [phone] (fax)    Mr. Nichole Queen is a 79 y.o.  male with history of atrial fib. , per Dr. Luis Patiño referral, who presents today for Warfarin monitoring and adjustment (4 week visit). Patient verifies current dosing regimen and tablet strength. No missed or extra doses. Patient denies bleeding/swelling/chest pain. Has slightly more SOB - using nebulizer. Uses incentive spirometer BID. Has usual easy bruising. No blood in urine or stool. Dietary changes: had some macaroni salad. Changes in medication/OTC agents/herbals: states Lantus is 10 units A.M. only. No change in alcohol use or tobacco use. No change in activity level. Patient denies headaches/dizziness/lightheadedness/falls. No vomiting/diarrhea or acute illness. No procedures scheduled in the future at this time. Assessment:   Lab Results   Component Value Date    INR 2.90 (H) 10/13/2022    INR 2.70 (H) 09/15/2022    INR 2.40 (H) 08/17/2022     INR therapeutic - goal 2-3. Recent Labs     10/13/22  1057   INR 2.90*        Plan:  POCT INR ordered/performed/result reviewed. Continue PO Coumadin 1.25 mg W, 2.5 mg MTThFSS. Recheck INR in 4 week(s). Patient reminded to call the Anticoagulation Clinic with any signs or symptoms of bleeding or with any medication changes. Patient given instructions utilizing the teach back method. After visit summary printed and reviewed with patient. Discharged ambulatory in no apparent distress, wearing mask.

## 2022-10-19 ENCOUNTER — OFFICE VISIT (OUTPATIENT)
Dept: FAMILY MEDICINE CLINIC | Age: 82
End: 2022-10-19
Payer: MEDICARE

## 2022-10-19 ENCOUNTER — NURSE ONLY (OUTPATIENT)
Dept: LAB | Age: 82
End: 2022-10-19

## 2022-10-19 VITALS
OXYGEN SATURATION: 97 % | DIASTOLIC BLOOD PRESSURE: 86 MMHG | WEIGHT: 207.4 LBS | HEART RATE: 64 BPM | SYSTOLIC BLOOD PRESSURE: 138 MMHG | BODY MASS INDEX: 32.48 KG/M2

## 2022-10-19 DIAGNOSIS — Z86.39 H/O INSULIN DEPENDENT DIABETES MELLITUS: ICD-10-CM

## 2022-10-19 DIAGNOSIS — Z23 ENCOUNTER FOR IMMUNIZATION: ICD-10-CM

## 2022-10-19 DIAGNOSIS — I50.9 ACUTE ON CHRONIC CONGESTIVE HEART FAILURE, UNSPECIFIED HEART FAILURE TYPE (HCC): ICD-10-CM

## 2022-10-19 DIAGNOSIS — E11.65 TYPE 2 DIABETES MELLITUS WITH HYPERGLYCEMIA, WITHOUT LONG-TERM CURRENT USE OF INSULIN (HCC): Primary | ICD-10-CM

## 2022-10-19 LAB
ANION GAP SERPL CALCULATED.3IONS-SCNC: 13 MEQ/L (ref 8–16)
BUN BLDV-MCNC: 31 MG/DL (ref 7–22)
CALCIUM SERPL-MCNC: 8.9 MG/DL (ref 8.5–10.5)
CHLORIDE BLD-SCNC: 99 MEQ/L (ref 98–111)
CO2: 27 MEQ/L (ref 23–33)
CREAT SERPL-MCNC: 2.2 MG/DL (ref 0.4–1.2)
ERYTHROCYTE [DISTWIDTH] IN BLOOD BY AUTOMATED COUNT: 14.5 % (ref 11.5–14.5)
ERYTHROCYTE [DISTWIDTH] IN BLOOD BY AUTOMATED COUNT: 51.8 FL (ref 35–45)
GFR SERPL CREATININE-BSD FRML MDRD: 29 ML/MIN/1.73M2
GLUCOSE BLD-MCNC: 236 MG/DL (ref 70–108)
HCT VFR BLD CALC: 45.6 % (ref 42–52)
HEMOGLOBIN: 14.9 GM/DL (ref 14–18)
MCH RBC QN AUTO: 31.8 PG (ref 26–33)
MCHC RBC AUTO-ENTMCNC: 32.7 GM/DL (ref 32.2–35.5)
MCV RBC AUTO: 97.4 FL (ref 80–94)
PLATELET # BLD: 187 THOU/MM3 (ref 130–400)
PMV BLD AUTO: 11.9 FL (ref 9.4–12.4)
POTASSIUM SERPL-SCNC: 3.8 MEQ/L (ref 3.5–5.2)
RBC # BLD: 4.68 MILL/MM3 (ref 4.7–6.1)
SODIUM BLD-SCNC: 139 MEQ/L (ref 135–145)
WBC # BLD: 10.9 THOU/MM3 (ref 4.8–10.8)

## 2022-10-19 PROCEDURE — G8417 CALC BMI ABV UP PARAM F/U: HCPCS | Performed by: NURSE PRACTITIONER

## 2022-10-19 PROCEDURE — 1036F TOBACCO NON-USER: CPT | Performed by: NURSE PRACTITIONER

## 2022-10-19 PROCEDURE — G8427 DOCREV CUR MEDS BY ELIG CLIN: HCPCS | Performed by: NURSE PRACTITIONER

## 2022-10-19 PROCEDURE — G0009 ADMIN PNEUMOCOCCAL VACCINE: HCPCS | Performed by: NURSE PRACTITIONER

## 2022-10-19 PROCEDURE — G8484 FLU IMMUNIZE NO ADMIN: HCPCS | Performed by: NURSE PRACTITIONER

## 2022-10-19 PROCEDURE — 99214 OFFICE O/P EST MOD 30 MIN: CPT | Performed by: NURSE PRACTITIONER

## 2022-10-19 PROCEDURE — 1123F ACP DISCUSS/DSCN MKR DOCD: CPT | Performed by: NURSE PRACTITIONER

## 2022-10-19 PROCEDURE — G0008 ADMIN INFLUENZA VIRUS VAC: HCPCS | Performed by: NURSE PRACTITIONER

## 2022-10-19 PROCEDURE — 90732 PPSV23 VACC 2 YRS+ SUBQ/IM: CPT | Performed by: NURSE PRACTITIONER

## 2022-10-19 PROCEDURE — 3044F HG A1C LEVEL LT 7.0%: CPT | Performed by: NURSE PRACTITIONER

## 2022-10-19 PROCEDURE — 90694 VACC AIIV4 NO PRSRV 0.5ML IM: CPT | Performed by: NURSE PRACTITIONER

## 2022-10-19 RX ORDER — CALCIUM CITRATE/VITAMIN D3 200MG-6.25
1 TABLET ORAL DAILY
Qty: 100 EACH | Refills: 1 | Status: SHIPPED | OUTPATIENT
Start: 2022-10-19

## 2022-10-19 RX ORDER — CALCIUM CITRATE/VITAMIN D3 200MG-6.25
1 TABLET ORAL DAILY
Qty: 100 EACH | Refills: 1 | Status: SHIPPED | OUTPATIENT
Start: 2022-10-19 | End: 2022-10-19

## 2022-10-19 ASSESSMENT — ENCOUNTER SYMPTOMS
CHEST TIGHTNESS: 0
COUGH: 1
WHEEZING: 0
ABDOMINAL DISTENTION: 0
VOMITING: 0
EYES NEGATIVE: 1
SHORTNESS OF BREATH: 1
DIARRHEA: 0
NAUSEA: 0
ABDOMINAL PAIN: 0
ALLERGIC/IMMUNOLOGIC NEGATIVE: 1

## 2022-10-19 NOTE — PROGRESS NOTES
[address]  Dept: Abel Chaves (:  1940) is a 79 y.o. male,Established patient, here for evaluation of the following chief complaint(s):  Diabetes and Medication Refill (Test Strips)      ASSESSMENT/PLAN:  I have reviewed the patient's medical history in detail and updated the computerized patient record. HPI/ROS per the patient and caregiver. Overall non toxic in appearance. Answers questions appropriately. Conditions discussed and addressed this visit include:   Overall doing well  Tolerating insulin without any hypo or hyperglycemia  BS fasting wnl at this time  Consider maybe dosing to 12 units on next a1c however he has changed meal types. Fluid status is stable. Weight stable. Would be concerned for episodes of hypoglycemia. A1c due in December. Flu and pneumonia immunizations today. 1. Type 2 diabetes mellitus with hyperglycemia, without long-term current use of insulin ([facility])  -     Comprehensive Metabolic Panel; Future  -     Hemoglobin A1C; Future  -     blood glucose test strips (TRUE METRIX BLOOD GLUCOSE TEST) strip; 1 each by In Vitro route daily Daily Testing. Insulin dependent., Disp-100 each, R-1Normal  2. H/O insulin dependent diabetes mellitus  -     Comprehensive Metabolic Panel; Future  -     Hemoglobin A1C; Future  -     blood glucose test strips (TRUE METRIX BLOOD GLUCOSE TEST) strip; 1 each by In Vitro route daily Daily Testing. Insulin dependent., Disp-100 each, R-1Normal  3. Encounter for immunization  -     Influenza, FLUAD, (age 72 y+), IM, Preservative Free, 0.5 mL  -     Pneumococcal, PPSV23, PNEUMOVAX 23, (age 2 yrs+), SC/IM    Return in about 6 months (around 2023), or if symptoms worsen or fail to improve, for Medication evaluation, Results review, Routine follow up. SUBJECTIVE/OBJECTIVE:  HPI  Here for blood sugar follow up  At 10 u nits of lantus   128- 173 in the am fasting.    INR stable  Cough and chest congestion stable  Overall feels good  Denies any hypo or hyperglycemic reactions    Review of Systems   Constitutional:  Positive for activity change and fatigue. Negative for appetite change and fever. HENT: Negative. Eyes: Negative. Respiratory:  Positive for cough and shortness of breath (with exertion). Negative for chest tightness and wheezing. Cardiovascular:  Negative for chest pain, palpitations and leg swelling. Gastrointestinal:  Negative for abdominal distention, abdominal pain, diarrhea, nausea and vomiting. Endocrine: Negative for cold intolerance and heat intolerance. Genitourinary: Negative. Musculoskeletal:  Positive for arthralgias and gait problem. Negative for myalgias. Skin: Negative. Allergic/Immunologic: Negative. Neurological:  Negative for dizziness, weakness, light-headedness and headaches. Hematological:  Negative for adenopathy. Does not bruise/bleed easily. Psychiatric/Behavioral: Negative. Physical Exam  Vitals and nursing note reviewed. Constitutional:       Appearance: Normal appearance. He is obese. He is not ill-appearing. HENT:      Head: Normocephalic. Right Ear: Tympanic membrane, ear canal and external ear normal.      Left Ear: Tympanic membrane, ear canal and external ear normal.      Nose: Nose normal. No rhinorrhea. Mouth/Throat:      Pharynx: Oropharynx is clear. Eyes:      Conjunctiva/sclera: Conjunctivae normal.   Neck:      Vascular: No carotid bruit. Cardiovascular:      Rate and Rhythm: Normal rate and regular rhythm. Pulses: Normal pulses. Pulmonary:      Effort: Pulmonary effort is normal.      Breath sounds: Rhonchi (diffuse throughout. most clear wtih coughing.) present. No wheezing or rales. Abdominal:      General: Abdomen is flat. Bowel sounds are normal.   Musculoskeletal:         General: No swelling or tenderness. Normal range of motion.       Cervical back: Normal range of motion and neck supple. No tenderness. Lymphadenopathy:      Cervical: No cervical adenopathy. Skin:     General: Skin is warm and dry. Capillary Refill: Capillary refill takes less than 2 seconds. Neurological:      General: No focal deficit present. Mental Status: He is alert. Mental status is at baseline. Psychiatric:         Mood and Affect: Mood normal.         Behavior: Behavior normal.         Thought Content: Thought content normal.               An electronic signature was used to authenticate this note.     --WILBERTO James - CNP

## 2022-10-19 NOTE — PROGRESS NOTES
After obtaining consent, and per orders of Brett Leslie [facility], injection of Flu Vaccine given in Right Deltoid by Carol Herrera ([facility]). Patient instructed to remain in clinic for 20 minutes afterwards, and to report any adverse reaction to me immediately. After obtaining consent, and per orders of Brett Leslie CNP, injection of [id number] given in Left Deltoid by Carol Herrera ([facility]). Patient instructed to remain in clinic for 20 minutes afterwards, and to report any adverse reaction to me immediately.

## 2022-10-26 ENCOUNTER — OFFICE VISIT (OUTPATIENT)
Dept: CARDIOLOGY CLINIC | Age: 82
End: 2022-10-26
Payer: MEDICARE

## 2022-10-26 VITALS
DIASTOLIC BLOOD PRESSURE: 62 MMHG | WEIGHT: 205 LBS | HEIGHT: 67 IN | HEART RATE: 65 BPM | BODY MASS INDEX: 32.18 KG/M2 | SYSTOLIC BLOOD PRESSURE: 111 MMHG | RESPIRATION RATE: 20 BRPM

## 2022-10-26 DIAGNOSIS — I10 ESSENTIAL HYPERTENSION: Primary | ICD-10-CM

## 2022-10-26 DIAGNOSIS — E78.5 DYSLIPIDEMIA (HIGH LDL; LOW HDL): ICD-10-CM

## 2022-10-26 PROCEDURE — G8417 CALC BMI ABV UP PARAM F/U: HCPCS | Performed by: INTERNAL MEDICINE

## 2022-10-26 PROCEDURE — G8484 FLU IMMUNIZE NO ADMIN: HCPCS | Performed by: INTERNAL MEDICINE

## 2022-10-26 PROCEDURE — 99214 OFFICE O/P EST MOD 30 MIN: CPT | Performed by: INTERNAL MEDICINE

## 2022-10-26 PROCEDURE — 3078F DIAST BP <80 MM HG: CPT | Performed by: INTERNAL MEDICINE

## 2022-10-26 PROCEDURE — 1036F TOBACCO NON-USER: CPT | Performed by: INTERNAL MEDICINE

## 2022-10-26 PROCEDURE — G8427 DOCREV CUR MEDS BY ELIG CLIN: HCPCS | Performed by: INTERNAL MEDICINE

## 2022-10-26 PROCEDURE — 1123F ACP DISCUSS/DSCN MKR DOCD: CPT | Performed by: INTERNAL MEDICINE

## 2022-10-26 PROCEDURE — 93000 ELECTROCARDIOGRAM COMPLETE: CPT | Performed by: INTERNAL MEDICINE

## 2022-10-26 PROCEDURE — 3074F SYST BP LT 130 MM HG: CPT | Performed by: INTERNAL MEDICINE

## 2022-10-26 ASSESSMENT — ENCOUNTER SYMPTOMS
COLOR CHANGE: 0
VOICE CHANGE: 0
VOMITING: 0
ABDOMINAL DISTENTION: 0
ANAL BLEEDING: 0
CHEST TIGHTNESS: 0
CHOKING: 0
WHEEZING: 0
STRIDOR: 0
NAUSEA: 0
TROUBLE SWALLOWING: 0
COUGH: 1
APNEA: 0
BLOOD IN STOOL: 0
SHORTNESS OF BREATH: 1
ABDOMINAL PAIN: 0

## 2022-10-27 ENCOUNTER — TELEPHONE (OUTPATIENT)
Dept: FAMILY MEDICINE CLINIC | Age: 82
End: 2022-10-27

## 2022-10-27 NOTE — TELEPHONE ENCOUNTER
----- Message from [address] [facility] sent at 10/26/2022  4:25 PM EDT -----  Subject: Message to Provider    QUESTIONS  Information for Provider? Pt went to cardiologist today and wants him to   start dapagliflozin (Eleanor Daily). Pt wants to know if Jessica Ramirez can find him a   program to get medication cheaper because it's very expensive through his   insurance. Pt states that Jessica Ramirez was able to get one of his other meds   cheaper,  ---------------------------------------------------------------------------  --------------  [facility]  [phone]; OK to leave message on voicemail  ---------------------------------------------------------------------------  --------------  SCRIPT ANSWERS  Relationship to Patient?  Self

## 2022-10-28 NOTE — TELEPHONE ENCOUNTER
Pt will have to talk to Dr Elier Cannon office to get this medication with the help of Darren Blandon.

## 2022-10-28 NOTE — TELEPHONE ENCOUNTER
Called pt to let him know that Blandford Mili wants him to discuss with Dr. Giovanni Sevilla office to get his medication cheaper with the help of Art Subramanian. Pt stated understanding and will call Dr. Giovanni Sevilla office!

## 2022-11-10 ENCOUNTER — HOSPITAL ENCOUNTER (OUTPATIENT)
Dept: PHARMACY | Age: 82
Setting detail: THERAPIES SERIES
Discharge: HOME | End: 2022-11-10
Payer: MEDICARE

## 2022-11-10 DIAGNOSIS — I48.21 PERMANENT ATRIAL FIBRILLATION (HCC): Primary | ICD-10-CM

## 2022-11-10 DIAGNOSIS — Z79.01 ANTICOAGULATED ON COUMADIN: ICD-10-CM

## 2022-11-10 DIAGNOSIS — Z51.81 ENCOUNTER FOR THERAPEUTIC DRUG MONITORING: ICD-10-CM

## 2022-11-10 LAB — POC INR: 3.4 (ref 0.8–1.2)

## 2022-11-10 PROCEDURE — 99212 OFFICE O/P EST SF 10 MIN: CPT

## 2022-11-10 PROCEDURE — 85610 PROTHROMBIN TIME: CPT

## 2022-11-10 PROCEDURE — 36416 COLLJ CAPILLARY BLOOD SPEC: CPT

## 2022-11-10 NOTE — PROGRESS NOTES
Medication Management [address]   [phone] (phone)  [phone] (fax)    Mr. Rony Mercado is a 79 y.o.  male with history of atrial fib. , per Dr. Mary Aguilera referral, who presents today for Warfarin monitoring and adjustment (4 week visit). Patient verifies current dosing regimen and tablet strength. No missed or extra doses. Patient denies bleeding/swelling/chest pain. Has usual SOB/easy bruising. Uses nebulizer/incentive spirometer. No blood in urine or stool. No dietary changes. Changes in medication/OTC agents/herbals: to start Marcelyn Patch if can get samples/patient assistance. No change in alcohol use or tobacco use. No change in activity level. Patient denies headaches/dizziness/lightheadedness/falls. No vomiting/diarrhea or acute illness. No procedures scheduled in the future at this time. Assessment:   Lab Results   Component Value Date    INR 3.40 (H) 11/10/2022    INR 2.90 (H) 10/13/2022    INR 2.70 (H) 09/15/2022     INR supratherapeutic - goal 2-3. Recent Labs     11/10/22  1031   INR 3.40*        Plan:  POCT INR ordered/performed/result reviewed. 1.25 mg today and tomorrow, Th/F (per policy), then continue PO Coumadin 1.25 mg W, 2.5 mg MTThFSS. Recheck INR in 2 week(s), per policy. Patient reminded to call the Anticoagulation Clinic with any signs or symptoms of bleeding or with any medication changes. Patient given instructions utilizing the teach back method. After visit summary printed and reviewed with patient. Advised extra caution. Discharged ambulatory in no apparent distress.

## 2022-11-15 ENCOUNTER — TELEPHONE (OUTPATIENT)
Dept: FAMILY MEDICINE CLINIC | Age: 82
End: 2022-11-15

## 2022-11-15 DIAGNOSIS — E11.65 TYPE 2 DIABETES MELLITUS WITH HYPERGLYCEMIA, WITHOUT LONG-TERM CURRENT USE OF INSULIN (HCC): ICD-10-CM

## 2022-11-15 DIAGNOSIS — N18.4 STAGE 4 CHRONIC KIDNEY DISEASE (HCC): ICD-10-CM

## 2022-11-15 NOTE — TELEPHONE ENCOUNTER
Patient called stating he thought his Lantus Solostar was supposed to be called in with one year refills but when he called the [facility], they told him him there is no refills and is giving him hassles regarding this medication and the cost. Would like this to be called into [facility] in [facility]!

## 2022-11-15 NOTE — TELEPHONE ENCOUNTER
Lantus has only been ordered twice, 8/23 and 9/28. Doses were still being adjusted on 9/28. Ordered for 90 days only until blood sugar is stable. This is noted in the 10/19/22 note. Can transfer order to [facility] if he wants.

## 2022-11-16 RX ORDER — INSULIN GLARGINE 100 [IU]/ML
10 INJECTION, SOLUTION SUBCUTANEOUS NIGHTLY
Qty: 9 ML | Refills: 1 | Status: SHIPPED | OUTPATIENT
Start: 2022-11-16 | End: 2022-11-29 | Stop reason: SDUPTHER

## 2022-11-16 NOTE — TELEPHONE ENCOUNTER
Called patient to let him know to increase the Lantus to 10 units per night, patient stated he is already doing the 10 units per night, said he increased to 10 units approximately one month ago. Patient stated he talked to Kristine Danielle during his last visit about this change and that Kristine Danielle said it was okay!

## 2022-11-16 NOTE — TELEPHONE ENCOUNTER
Called patient to let him know that Natali Garces wants him to continue with the 10 units of Lantus every night, no other changes at this time, patient stated understanding!

## 2022-11-16 NOTE — TELEPHONE ENCOUNTER
Called patient and explained to him what [facility] said regarding his Lantus prescription. Lantus has only been ordered twice, 8/23 and 9/28. Doses were still being adjusted on 9/28. Ordered for 90 days only until blood sugar is stable. This is noted in the 10/19/22 note. Can transfer order to [facility] if he wants. Patient states his blood sugar levels has been running real high the last week with numbers ranging from 150's to 170's with this morning's reading being the highest at 187.     Patient does want the prescription transferred to [facility] in [place]

## 2022-11-23 ENCOUNTER — HOSPITAL ENCOUNTER (OUTPATIENT)
Dept: PHARMACY | Age: 82
Setting detail: THERAPIES SERIES
Discharge: HOME | End: 2022-11-23
Payer: MEDICARE

## 2022-11-23 DIAGNOSIS — I48.21 PERMANENT ATRIAL FIBRILLATION (HCC): Primary | ICD-10-CM

## 2022-11-23 DIAGNOSIS — Z79.01 ANTICOAGULATED ON COUMADIN: ICD-10-CM

## 2022-11-23 DIAGNOSIS — Z51.81 ENCOUNTER FOR THERAPEUTIC DRUG MONITORING: ICD-10-CM

## 2022-11-23 LAB — POC INR: 2.2 (ref 0.8–1.2)

## 2022-11-23 PROCEDURE — 36416 COLLJ CAPILLARY BLOOD SPEC: CPT | Performed by: PHARMACIST

## 2022-11-23 PROCEDURE — 85610 PROTHROMBIN TIME: CPT | Performed by: PHARMACIST

## 2022-11-23 PROCEDURE — 99211 OFF/OP EST MAY X REQ PHY/QHP: CPT | Performed by: PHARMACIST

## 2022-11-23 NOTE — PROGRESS NOTES
Medication Management [address]  [phone] (phone)  [phone] (fax)    Mr. Brittany Gongora is a 79 y.o.  male with history of Afib who presents today for anticoagulation monitoring and adjustment. Patient verifies current dosing regimen and tablet strength. No missed or extra doses. Patient denies s/s bleeding/bruising/swelling/SOB/chest pain  No blood in urine or stool. Started eating vegetable salads, has since stopped  No changes in medication/OTC agents/Herbals. No change in alcohol use or tobacco use. No change in activity level. Patient denies headaches/dizziness/lightheadedness/falls. No vomiting/diarrhea or acute illness. No Procedures scheduled in the future at this time. Assessment:   Lab Results   Component Value Date    INR 2.20 (H) 11/23/2022    INR 3.40 (H) 11/10/2022    INR 2.90 (H) 10/13/2022     INR therapeutic   Recent Labs     11/23/22  1048   INR 2.20*      Patient interview conducted by student pharmacist, reviewed with Bert Kennedy 11/23/22 10:48 AM     Plan:  Continue Coumadin 1.25mg W 2.5mg SMTuThFS. Recheck INR in 4 weeks per patient request.  Patient reminded to call the Anticoagulation Clinic with any signs or symptoms of bleeding or with any medication changes. Patient given instructions utilizing the teach back method. After visit summary printed and reviewed with patient. Discharged ambulatory in no apparent distress.     For Pharmacy Admin Tracking Only    Intervention Detail:   Total # of Interventions Recommended: 0  Total # of Interventions Accepted: 0  Time Spent (min): 20

## 2022-11-28 ENCOUNTER — NURSE ONLY (OUTPATIENT)
Dept: LAB | Age: 82
End: 2022-11-28

## 2022-11-28 DIAGNOSIS — E11.65 TYPE 2 DIABETES MELLITUS WITH HYPERGLYCEMIA, WITHOUT LONG-TERM CURRENT USE OF INSULIN (HCC): ICD-10-CM

## 2022-11-28 DIAGNOSIS — Z86.39 H/O INSULIN DEPENDENT DIABETES MELLITUS: ICD-10-CM

## 2022-11-28 DIAGNOSIS — N18.4 CKD (CHRONIC KIDNEY DISEASE), STAGE IV (HCC): ICD-10-CM

## 2022-11-28 LAB
ALBUMIN SERPL-MCNC: 4.1 G/DL (ref 3.5–5.1)
ALP BLD-CCNC: 137 U/L (ref 38–126)
ALT SERPL-CCNC: 21 U/L (ref 11–66)
ANION GAP SERPL CALCULATED.3IONS-SCNC: 20 MEQ/L (ref 8–16)
AST SERPL-CCNC: 20 U/L (ref 5–40)
AVERAGE GLUCOSE: 201 MG/DL (ref 70–126)
BILIRUB SERPL-MCNC: 1.2 MG/DL (ref 0.3–1.2)
BUN BLDV-MCNC: 45 MG/DL (ref 7–22)
CALCIUM SERPL-MCNC: 9.3 MG/DL (ref 8.5–10.5)
CHLORIDE BLD-SCNC: 95 MEQ/L (ref 98–111)
CO2: 22 MEQ/L (ref 23–33)
CREAT SERPL-MCNC: 2.8 MG/DL (ref 0.4–1.2)
CREATININE, URINE: 94.9 MG/DL
GFR SERPL CREATININE-BSD FRML MDRD: 22 ML/MIN/1.73M2
GLUCOSE BLD-MCNC: 285 MG/DL (ref 70–108)
HBA1C MFR BLD: 8.7 % (ref 4.4–6.4)
MICROALBUMIN UR-MCNC: < 1.2 MG/DL
MICROALBUMIN/CREAT UR-RTO: 13 MG/G (ref 0–30)
POTASSIUM SERPL-SCNC: 3.7 MEQ/L (ref 3.5–5.2)
SODIUM BLD-SCNC: 137 MEQ/L (ref 135–145)
TOTAL PROTEIN: 7.4 G/DL (ref 6.1–8)

## 2022-11-29 ENCOUNTER — TELEPHONE (OUTPATIENT)
Dept: FAMILY MEDICINE CLINIC | Age: 82
End: 2022-11-29

## 2022-11-29 DIAGNOSIS — N18.4 STAGE 4 CHRONIC KIDNEY DISEASE (HCC): ICD-10-CM

## 2022-11-29 DIAGNOSIS — E11.65 TYPE 2 DIABETES MELLITUS WITH HYPERGLYCEMIA, WITHOUT LONG-TERM CURRENT USE OF INSULIN (HCC): ICD-10-CM

## 2022-11-29 RX ORDER — INSULIN GLARGINE 100 [IU]/ML
16 INJECTION, SOLUTION SUBCUTANEOUS NIGHTLY
Qty: 15 ML | Refills: 1 | Status: SHIPPED | OUTPATIENT
Start: 2022-11-29 | End: 2023-05-28

## 2022-11-29 NOTE — TELEPHONE ENCOUNTER
----- Message from WILBERTO Knapp CNP sent at 11/29/2022  9:40 AM EST -----  Sugar levels are elevated. Will have him increase his lantus to 16 units per night. Keep follow up with Dr Juan Alberto next week due to elevated creatinine. Needs to keep to low sugar low starch diet. Increase clear fluids.

## 2022-11-29 NOTE — TELEPHONE ENCOUNTER
I called and spoke to the patient. I let him know Bailee's response regarding his lab results. He voiced understanding and said that he takes his lantus in the morning and he said you know this.

## 2022-12-05 ENCOUNTER — OFFICE VISIT (OUTPATIENT)
Dept: NEPHROLOGY | Age: 82
End: 2022-12-05
Payer: MEDICARE

## 2022-12-05 VITALS
HEART RATE: 68 BPM | OXYGEN SATURATION: 98 % | BODY MASS INDEX: 32.42 KG/M2 | WEIGHT: 207 LBS | SYSTOLIC BLOOD PRESSURE: 116 MMHG | DIASTOLIC BLOOD PRESSURE: 74 MMHG

## 2022-12-05 DIAGNOSIS — N18.4 CKD (CHRONIC KIDNEY DISEASE), STAGE IV (HCC): Primary | ICD-10-CM

## 2022-12-05 PROCEDURE — G8484 FLU IMMUNIZE NO ADMIN: HCPCS | Performed by: INTERNAL MEDICINE

## 2022-12-05 PROCEDURE — G8417 CALC BMI ABV UP PARAM F/U: HCPCS | Performed by: INTERNAL MEDICINE

## 2022-12-05 PROCEDURE — 1123F ACP DISCUSS/DSCN MKR DOCD: CPT | Performed by: INTERNAL MEDICINE

## 2022-12-05 PROCEDURE — 99213 OFFICE O/P EST LOW 20 MIN: CPT | Performed by: INTERNAL MEDICINE

## 2022-12-05 PROCEDURE — G8427 DOCREV CUR MEDS BY ELIG CLIN: HCPCS | Performed by: INTERNAL MEDICINE

## 2022-12-05 PROCEDURE — 3074F SYST BP LT 130 MM HG: CPT | Performed by: INTERNAL MEDICINE

## 2022-12-05 PROCEDURE — 3078F DIAST BP <80 MM HG: CPT | Performed by: INTERNAL MEDICINE

## 2022-12-05 PROCEDURE — 1036F TOBACCO NON-USER: CPT | Performed by: INTERNAL MEDICINE

## 2022-12-05 RX ORDER — AZITHROMYCIN 250 MG/1
250 TABLET, FILM COATED ORAL DAILY
COMMUNITY

## 2022-12-05 RX ORDER — ECHINACEA PURPUREA EXTRACT 125 MG
1 TABLET ORAL PRN
COMMUNITY

## 2022-12-05 NOTE — PROGRESS NOTES
[address] [facility]  [address]  [facility] [address]  Dept: [phone]  Loc: [phone]  Progress Note  12/5/2022 3:02 PM      Pt Name:    Florencio Delgado:    1940  Primary Care Physician:  WILBERTO Guerra - YOLANDE     Chief Complaint:   Chief Complaint   Patient presents with    Follow-up     CKD IV         History of Present Illness: This is a follow-up visit for CKD IV. He is a former patient of Dr. Chary Crum. He has hx of CAD, DM, HTN, hyperlipidemia, Afib, pacemaker, EF %. Hx TAVR, COPD. Past renal US showed hypoplastic left kidney. Patient overall feels well. Swelling much better, on bumex 2 mg bid. Sugars running higher PCP increased his insulin. He was started on Farxiga 2 weeks ago by cardiology. Pertinent items are noted in HPI.          Past History:  Past Medical History:   Diagnosis Date    MILLY (acute kidney injury) ([address]     Atrial fibrillation ([address]     Cerebral artery occlusion with cerebral infarction (HCC)     CHF (congestive heart failure), NYHA class III, acute on chronic, combined (HCC)     COPD (chronic obstructive pulmonary disease) ([address] 8/3/2020    Coronary artery disease involving native coronary artery of native heart with angina pectoris (HCC)     Diabetes mellitus, type 2 ([address] 12/30/2020    Hyperlipidemia 2/20/2012    Hypertension     Pneumonia      Past Surgical History:   Procedure Laterality Date    AORTIC VALVE REPLACEMENT      CARDIAC SURGERY      heart cath    CORONARY ANGIOPLASTY WITH STENT PLACEMENT      HERNIA REPAIR      OTHER SURGICAL HISTORY  05/10/2018    PACEMAKER INSERTION          VITALS:  /74 (Site: Left Upper Arm, Position: Sitting, Cuff Size: Large Adult)   Pulse 68   Wt 207 lb (93.9 kg)   SpO2 98%   BMI 32.42 kg/m²   Wt Readings from Last 3 Encounters:   12/05/22 207 lb (93.9 kg)   10/26/22 205 lb (93 kg)   10/19/22 207 lb 6.4 oz (94.1 kg)     Body mass index is 32.42 kg/m². General Appearance: alert and cooperative with exam, appears comfortable, no distress  HEENT: EOMI, moist oral mucus membranes  Neck: No jugular venous distention,   Lungs: diminished  Heart: S1, S2 heard,   GI: soft, non-tender, no guarding,   Extremities: trace LE edema  Skin: warm, dry  Neurologic: no tremor, no asterixis,     Medications:  Current Outpatient Medications   Medication Sig Dispense Refill    azithromycin (ZITHROMAX) 250 MG tablet Take 250 mg by mouth daily      Promethazine-DM (PROMETHAZINE-DEXTROMETHORPHAN 6.25-15 MG/5 ML ORAL SYRP CMPD)       sodium chloride (OCEAN) 0.65 % nasal spray 1 spray by Nasal route as needed for Congestion      insulin glargine (LANTUS SOLOSTAR) 100 UNIT/ML injection pen Inject 16 Units into the skin nightly (Patient taking differently: Inject 16 Units into the skin nightly Patient does this in the morning.) 15 mL 1    blood glucose test strips (TRUE METRIX BLOOD GLUCOSE TEST) strip 1 each by In Vitro route daily Daily Testing. Insulin dependent.  100 each 1    Insulin Pen Needle 30G X 8 MM MISC 1 each by Does not apply route daily 100 each 3    Budeson-Glycopyrrol-Formoterol (BREZTRI AEROSPHERE IN) Inhale 2 puffs into the lungs in the morning and at bedtime      cyanocobalamin 1000 MCG tablet Take 1 tablet by mouth daily 90 tablet 3    bumetanide (BUMEX) 2 MG tablet Take 1 tablet by mouth 2 times daily 180 tablet 1    metoprolol succinate (TOPROL XL) 100 MG extended release tablet Take 1 tablet by mouth daily 90 tablet 1    atorvastatin (LIPITOR) 80 MG tablet TAKE 1 TABLET DAILY 90 tablet 0    warfarin (COUMADIN) 2.5 MG tablet TAKE 1 TABLET DAILY 90 tablet 3    losartan (COZAAR) 25 MG tablet TAKE 1 TABLET EVERY NIGHT 90 tablet 3    guaiFENesin (MUCINEX) 600 MG extended release tablet Take 1,200 mg by mouth 2 times daily      OXYGEN Inhale 4 L into the lungs continuous 4 L 0    albuterol (ACCUNEB) 1.25 MG/3ML nebulizer solution Inhale 3 mLs into the lungs every 6 hours as needed for Wheezing 360 mL 3    Ascorbic Acid (VITAMIN C ER PO) Take 1 tablet by mouth daily      TRUEplus Lancets 28G MISC Use to test blood sugar as needed      Alcohol Swabs (B-D SINGLE USE SWABS REGULAR) PADS Use to test blood sugar as needed      vitamin D (CHOLECALCIFEROL) 1000 UNIT TABS tablet Take 1 tablet by mouth daily      fluticasone (FLONASE) 50 MCG/ACT nasal spray 2 sprays by Each Nostril route daily as needed for Rhinitis or Allergies (Patient not taking: No sig reported)       No current facility-administered medications for this visit.         Laboratory & Diagnostics:  CBC:   Lab Results   Component Value Date    WBC 10.9 (H) 10/19/2022    HGB 14.9 10/19/2022    HCT 45.6 10/19/2022    MCV 97.4 (H) 10/19/2022     10/19/2022     BMP:    Lab Results   Component Value Date     11/28/2022     10/19/2022     08/08/2022    K 3.7 11/28/2022    K 3.8 10/19/2022    K 3.7 08/08/2022    CL 95 (L) 11/28/2022    CL 99 10/19/2022    CL 97 (L) 08/08/2022    CO2 22 (L) 11/28/2022    CO2 27 10/19/2022    CO2 25 08/08/2022    BUN 45 (H) 11/28/2022    BUN 31 (H) 10/19/2022    BUN 31 (H) 08/08/2022    CREATININE 2.8 (H) 11/28/2022    CREATININE 2.2 (H) 10/19/2022    CREATININE 2.4 (H) 08/08/2022    GLUCOSE 285 (H) 11/28/2022    GLUCOSE 236 (H) 10/19/2022    GLUCOSE 316 (H) 08/08/2022      Hepatic:   Lab Results   Component Value Date    AST 20 11/28/2022    AST 21 05/08/2022    AST 40 04/22/2022    ALT 21 11/28/2022    ALT 28 05/08/2022    ALT 70 (H) 04/22/2022    BILITOT 1.2 11/28/2022    BILITOT 0.4 05/08/2022    BILITOT 0.8 04/22/2022    ALKPHOS 137 (H) 11/28/2022    ALKPHOS 83 05/08/2022    ALKPHOS 98 04/22/2022     BNP: No results found for: BNP  Lipids:   Lab Results   Component Value Date    CHOL 120 08/20/2021    HDL 33 08/20/2021     INR:   Lab Results   Component Value Date    INR 2.20 (H) 11/23/2022    INR 3.40 (H) 11/10/2022    INR 2.90 (H) 10/13/2022     URINE:   Lab Results   Component Value Date/Time    NAUR 59 05/09/2022 11:34 AM     Lab Results   Component Value Date/Time    NITRU NEGATIVE 04/22/2022 05:50 PM    COLORU YELLOW 04/22/2022 05:50 PM    PHUR 5.5 04/22/2022 05:50 PM    LABCAST NONE SEEN 04/22/2022 05:50 PM    LABCAST NONE SEEN 04/22/2022 05:50 PM    WBCUA 0-2 04/22/2022 05:50 PM    RBCUA 0-2 04/22/2022 05:50 PM    YEAST NONE SEEN 04/22/2022 05:50 PM    BACTERIA NONE SEEN 04/22/2022 05:50 PM    SPECGRAV 1.012 04/22/2022 05:50 PM    LEUKOCYTESUR NEGATIVE 04/22/2022 05:50 PM    UROBILINOGEN 0.2 04/22/2022 05:50 PM    BILIRUBINUR NEGATIVE 04/22/2022 05:50 PM    BLOODU NEGATIVE 04/22/2022 05:50 PM    GLUCOSEU NEGATIVE 07/03/2021 02:55 PM    KETUA NEGATIVE 04/22/2022 05:50 PM      Microalbumen/Creatinine ratio:  No components found for: RUCREAT        Impression/Plan:   1. 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 Mild microalbuminuria: continue ARB  3. Hypoplastic left kidney  4. HTN; stable  5. DM , f/u with PCP for management  6. CAD, hx TAVR  7. Chronic systolic CHF : on bumex 2 mg BID, appears euvolemic    Bloodwork and medications were reviewed and plan of care discussed with the patient. Return to clinic in 4 months  or sooner if the need arises.       Shahram Gratn,   [facility]

## 2022-12-06 ENCOUNTER — TELEPHONE (OUTPATIENT)
Dept: NEPHROLOGY | Age: 82
End: 2022-12-06

## 2022-12-06 NOTE — TELEPHONE ENCOUNTER
Patient stopped by the office asking if he can drink more water? Fco told him 3 bottles a day.      Please Advise

## 2022-12-21 ENCOUNTER — HOSPITAL ENCOUNTER (OUTPATIENT)
Dept: PHARMACY | Age: 82
Setting detail: THERAPIES SERIES
Discharge: HOME | End: 2022-12-21
Payer: MEDICARE

## 2022-12-21 DIAGNOSIS — Z79.01 ANTICOAGULATED ON COUMADIN: ICD-10-CM

## 2022-12-21 DIAGNOSIS — I48.21 PERMANENT ATRIAL FIBRILLATION (HCC): Primary | ICD-10-CM

## 2022-12-21 DIAGNOSIS — Z51.81 ENCOUNTER FOR THERAPEUTIC DRUG MONITORING: ICD-10-CM

## 2022-12-21 LAB — POC INR: 3 (ref 0.8–1.2)

## 2022-12-21 PROCEDURE — 99211 OFF/OP EST MAY X REQ PHY/QHP: CPT

## 2022-12-21 PROCEDURE — 85610 PROTHROMBIN TIME: CPT

## 2022-12-21 PROCEDURE — 36416 COLLJ CAPILLARY BLOOD SPEC: CPT

## 2022-12-21 NOTE — PROGRESS NOTES
Medication Management [address]  [phone] (phone)  [phone] (fax)    Mr. Danielle Pino is a 79 y.o.  male with history of atrial fib. , per Dr. Sweta Barber referral, who presents today for Warfarin monitoring and adjustment (4 week visit). Patient verifies current dosing regimen and tablet strength. No missed or extra doses. Patient denies bleeding/swelling/chest pain. Has usual SOB/easy bruising. No blood in urine or stool. No dietary changes. Changes in medication/OTC agents/herbals: noted Lantus was increased to 16 units. No change in alcohol use or tobacco use. No change in activity level. Patient denies headaches/dizziness/lightheadedness/falls. No vomiting/diarrhea or acute illness. No procedures scheduled in the future at this time. Assessment:     Lab Results   Component Value Date    INR 3.00 (H) 12/21/2022    INR 2.20 (H) 11/23/2022    INR 3.40 (H) 11/10/2022     INR therapeutic - goal 2-3. Recent Labs     12/21/22  1054   INR 3.00*      Plan:  POCT INR ordered/performed/result reviewed. Continue PO Coumadin 1.25 mg W, 2.5 mg MTThFSS. Recheck INR in 4 week(s). Patient reminded to call the Anticoagulation Clinic with any signs or symptoms of bleeding or with any medication changes. Patient given instructions utilizing the teach back method. After visit summary printed and reviewed with patient. Discharged ambulatory in no apparent distress, wearing mask.

## 2023-01-03 ENCOUNTER — TELEPHONE (OUTPATIENT)
Dept: FAMILY MEDICINE CLINIC | Age: 83
End: 2023-01-03

## 2023-01-03 RX ORDER — BUMETANIDE 2 MG/1
2 TABLET ORAL 2 TIMES DAILY
Qty: 60 TABLET | Refills: 0 | Status: SHIPPED | OUTPATIENT
Start: 2023-01-03

## 2023-01-03 NOTE — TELEPHONE ENCOUNTER
The patient came into the office and needs a short supply of his Bumex 2 mg, takes one tablet 2 times daily sent to [address] in [facility]. He is waiting on his mail order to come in and it will take up to two weeks.

## 2023-01-05 ENCOUNTER — TELEPHONE (OUTPATIENT)
Dept: PHARMACY | Age: 83
End: 2023-01-05

## 2023-01-05 ENCOUNTER — TELEPHONE (OUTPATIENT)
Dept: CARDIOLOGY CLINIC | Age: 83
End: 2023-01-05

## 2023-01-05 DIAGNOSIS — I48.91 ATRIAL FIBRILLATION, UNSPECIFIED TYPE (HCC): Primary | ICD-10-CM

## 2023-01-09 ENCOUNTER — TELEPHONE (OUTPATIENT)
Dept: FAMILY MEDICINE CLINIC | Age: 83
End: 2023-01-09

## 2023-01-09 RX ORDER — WARFARIN SODIUM 2.5 MG/1
TABLET ORAL
Qty: 90 TABLET | Refills: 3 | Status: SHIPPED | OUTPATIENT
Start: 2023-01-09

## 2023-01-09 RX ORDER — LOSARTAN POTASSIUM 25 MG/1
TABLET ORAL
Qty: 90 TABLET | Refills: 3 | Status: SHIPPED | OUTPATIENT
Start: 2023-01-09

## 2023-01-09 NOTE — TELEPHONE ENCOUNTER
Patient stopped in stating he needs refills on his   Losartan 25 MG Tab   1 tablet by mouth once nightly      Warfarin 2.5 Mg Tab  1 tablet by mouth once daily    Patient stated since [address], [address] [facility] they messed up on the prescription and didn't send them out in time he only needs 10 to get him through until his prescription comes in.      Would like these called into [facility] here in [address] [facility]

## 2023-01-18 ENCOUNTER — HOSPITAL ENCOUNTER (OUTPATIENT)
Dept: PHARMACY | Age: 83
Setting detail: THERAPIES SERIES
Discharge: HOME | End: 2023-01-18
Payer: MEDICARE

## 2023-01-18 DIAGNOSIS — I48.21 PERMANENT ATRIAL FIBRILLATION (HCC): Primary | ICD-10-CM

## 2023-01-18 DIAGNOSIS — Z79.01 ANTICOAGULATED ON COUMADIN: ICD-10-CM

## 2023-01-18 DIAGNOSIS — Z51.81 ENCOUNTER FOR THERAPEUTIC DRUG MONITORING: ICD-10-CM

## 2023-01-18 LAB — POC INR: 3.3 (ref 0.8–1.2)

## 2023-01-18 PROCEDURE — 36416 COLLJ CAPILLARY BLOOD SPEC: CPT

## 2023-01-18 PROCEDURE — 85610 PROTHROMBIN TIME: CPT

## 2023-01-18 PROCEDURE — 99212 OFFICE O/P EST SF 10 MIN: CPT

## 2023-01-18 NOTE — PROGRESS NOTES
Medication Management [address]   [phone] (phone)  [phone] (fax)    Mr. James Isaacs is a 79 y.o.  male with history of atrial fib. , per Dr. Aliza Michael referral, who presents today for Warfarin monitoring and adjustment (4 week visit). Patient verifies current dosing regimen and tablet strength. Uses pill box. No missed or extra doses. Patient denies bleeding/bruising/swelling/chest pain. Has usual SOB. No blood in urine or stool. No dietary changes. No changes in medication/OTC agents/herbals. No change in alcohol use or tobacco use. No change in activity level. Patient denies headaches/dizziness/lightheadedness/falls. No vomiting/diarrhea or acute illness. No procedures scheduled in the future at this time. Assessment:   Lab Results   Component Value Date    INR 3.30 (H) 01/18/2023    INR 3.00 (H) 12/21/2022    INR 2.20 (H) 11/23/2022     INR supratherapeutic - goal 2-3. Recent Labs     01/18/23  1045   INR 3.30*        Plan:  POCT INR performed/result reviewed. 1.25 mg today, W, then decrease PO Coumadin to 1.25 mg MF, 2.5 mg TWThSS (from 1.25 mg W, 2.5 mg MTThFSS=7.7% decrease). Recheck INR in 2-3 week(s). (Report given - orders entered by Zonia Martinez [facility] PharmD.)  Patient reminded to call the Anticoagulation Clinic with any signs or symptoms of bleeding or with any medication changes. Patient given instructions utilizing the teach back method. After visit summary printed and reviewed with patient. Advised extra caution. Discharged ambulatory in no apparent distress, wearing mask.     For Pharmacy Admin Tracking Only    Intervention Detail: Dose Adjustment: 1, reason: Therapy De-escalation  Total # of Interventions Recommended: 1  Total # of Interventions Accepted: 1  Time Spent (min):  1.5

## 2023-01-24 ENCOUNTER — TELEPHONE (OUTPATIENT)
Dept: FAMILY MEDICINE CLINIC | Age: 83
End: 2023-01-24

## 2023-01-24 DIAGNOSIS — N18.4 STAGE 4 CHRONIC KIDNEY DISEASE (HCC): ICD-10-CM

## 2023-01-24 DIAGNOSIS — E11.65 TYPE 2 DIABETES MELLITUS WITH HYPERGLYCEMIA, WITHOUT LONG-TERM CURRENT USE OF INSULIN (HCC): ICD-10-CM

## 2023-01-24 RX ORDER — INSULIN GLARGINE 100 [IU]/ML
16 INJECTION, SOLUTION SUBCUTANEOUS NIGHTLY
Qty: 15 ML | Refills: 1 | Status: SHIPPED | OUTPATIENT
Start: 2023-01-24 | End: 2023-07-23

## 2023-01-24 NOTE — TELEPHONE ENCOUNTER
Patient stopped in stating he needs a refill on his   Insulin Glargine (Lantus Solostar) 1OO Unit/ML injection Pen    Would ;like that called into [address].

## 2023-02-09 ENCOUNTER — OFFICE VISIT (OUTPATIENT)
Dept: FAMILY MEDICINE CLINIC | Age: 83
End: 2023-02-09
Payer: MEDICARE

## 2023-02-09 ENCOUNTER — HOSPITAL ENCOUNTER (OUTPATIENT)
Dept: PHARMACY | Age: 83
Setting detail: THERAPIES SERIES
Discharge: HOME | End: 2023-02-09
Payer: MEDICARE

## 2023-02-09 VITALS
SYSTOLIC BLOOD PRESSURE: 122 MMHG | BODY MASS INDEX: 32.15 KG/M2 | HEIGHT: 67 IN | TEMPERATURE: 97.1 F | WEIGHT: 204.8 LBS | HEART RATE: 62 BPM | RESPIRATION RATE: 16 BRPM | OXYGEN SATURATION: 93 % | DIASTOLIC BLOOD PRESSURE: 74 MMHG

## 2023-02-09 DIAGNOSIS — Z79.01 ANTICOAGULATED ON COUMADIN: ICD-10-CM

## 2023-02-09 DIAGNOSIS — E11.65 TYPE 2 DIABETES MELLITUS WITH HYPERGLYCEMIA, WITHOUT LONG-TERM CURRENT USE OF INSULIN (HCC): Primary | ICD-10-CM

## 2023-02-09 DIAGNOSIS — I48.21 PERMANENT ATRIAL FIBRILLATION (HCC): Primary | ICD-10-CM

## 2023-02-09 DIAGNOSIS — N18.4 STAGE 4 CHRONIC KIDNEY DISEASE (HCC): ICD-10-CM

## 2023-02-09 DIAGNOSIS — Z86.39 H/O INSULIN DEPENDENT DIABETES MELLITUS: ICD-10-CM

## 2023-02-09 DIAGNOSIS — I50.43 ACUTE ON CHRONIC COMBINED SYSTOLIC AND DIASTOLIC CONGESTIVE HEART FAILURE (HCC): ICD-10-CM

## 2023-02-09 DIAGNOSIS — Z51.81 ENCOUNTER FOR THERAPEUTIC DRUG MONITORING: ICD-10-CM

## 2023-02-09 LAB
HBA1C MFR BLD: 7.8 % (ref 4.3–5.7)
POC INR: 2.2 (ref 0.8–1.2)

## 2023-02-09 PROCEDURE — G8427 DOCREV CUR MEDS BY ELIG CLIN: HCPCS | Performed by: NURSE PRACTITIONER

## 2023-02-09 PROCEDURE — G8417 CALC BMI ABV UP PARAM F/U: HCPCS | Performed by: NURSE PRACTITIONER

## 2023-02-09 PROCEDURE — 99214 OFFICE O/P EST MOD 30 MIN: CPT | Performed by: NURSE PRACTITIONER

## 2023-02-09 PROCEDURE — 3074F SYST BP LT 130 MM HG: CPT | Performed by: NURSE PRACTITIONER

## 2023-02-09 PROCEDURE — 36416 COLLJ CAPILLARY BLOOD SPEC: CPT

## 2023-02-09 PROCEDURE — 99211 OFF/OP EST MAY X REQ PHY/QHP: CPT

## 2023-02-09 PROCEDURE — 1036F TOBACCO NON-USER: CPT | Performed by: NURSE PRACTITIONER

## 2023-02-09 PROCEDURE — 3078F DIAST BP <80 MM HG: CPT | Performed by: NURSE PRACTITIONER

## 2023-02-09 PROCEDURE — 3051F HG A1C>EQUAL 7.0%<8.0%: CPT | Performed by: NURSE PRACTITIONER

## 2023-02-09 PROCEDURE — G8484 FLU IMMUNIZE NO ADMIN: HCPCS | Performed by: NURSE PRACTITIONER

## 2023-02-09 PROCEDURE — 85610 PROTHROMBIN TIME: CPT

## 2023-02-09 PROCEDURE — 1123F ACP DISCUSS/DSCN MKR DOCD: CPT | Performed by: NURSE PRACTITIONER

## 2023-02-09 SDOH — ECONOMIC STABILITY: FOOD INSECURITY: WITHIN THE PAST 12 MONTHS, YOU WORRIED THAT YOUR FOOD WOULD RUN OUT BEFORE YOU GOT MONEY TO BUY MORE.: NEVER TRUE

## 2023-02-09 SDOH — ECONOMIC STABILITY: HOUSING INSECURITY
IN THE LAST 12 MONTHS, WAS THERE A TIME WHEN YOU DID NOT HAVE A STEADY PLACE TO SLEEP OR SLEPT IN A SHELTER (INCLUDING NOW)?: NO

## 2023-02-09 SDOH — ECONOMIC STABILITY: FOOD INSECURITY: WITHIN THE PAST 12 MONTHS, THE FOOD YOU BOUGHT JUST DIDN'T LAST AND YOU DIDN'T HAVE MONEY TO GET MORE.: NEVER TRUE

## 2023-02-09 SDOH — ECONOMIC STABILITY: INCOME INSECURITY: HOW HARD IS IT FOR YOU TO PAY FOR THE VERY BASICS LIKE FOOD, HOUSING, MEDICAL CARE, AND HEATING?: NOT HARD AT ALL

## 2023-02-09 ASSESSMENT — PATIENT HEALTH QUESTIONNAIRE - PHQ9
2. FEELING DOWN, DEPRESSED OR HOPELESS: 0
1. LITTLE INTEREST OR PLEASURE IN DOING THINGS: 0
SUM OF ALL RESPONSES TO PHQ QUESTIONS 1-9: 0
SUM OF ALL RESPONSES TO PHQ9 QUESTIONS 1 & 2: 0

## 2023-02-09 NOTE — TELEPHONE ENCOUNTER
I called and spoke to the patient. I let him know Bailee's response. He voiced understanding and said that he needs a prescription sent into [place] [id number].
Sent in
The patient called and said that he is having a hard time breathing and wants to know if he can go back on the [place] inhaler. He said ever since he was taken off of that he has had a hard time breathing and he is not going to use the nebulizer every 6 hours. Please advise.
Yes pt can continue to use. It may take several days before he notices an improvement with restarting the bretzi.
37yr F hx of ASD s/p repair, HL, afib who has been off all her meds for months due to lack of access to a cardiologist p/w several weeks of malaise, decreased exercise tolerance, intermittent chest pains, headaches, hair loss, weight gain and feeling puffy. denies cough, sore throat, n/v, fever chills. denies urinary sx. concerned she may be pregnant (sexually active). used to be on metop for afib but no longer. exam notable for non toxic appearing but malaised, clear lungs , S1-2, soft non tender abd. no pitting edema of lowers.  plan for cxr, ekg, labs, and placement in the ED.       I have seen and provided a rapid assessment for this patient in the triage area, I agree with the above. Gabriel Simon MD
DISPLAY PLAN FREE TEXT

## 2023-02-09 NOTE — PROGRESS NOTES
Medication Management [address]  [phone] (phone)  [phone] (fax)    Mr. Candace Bazan is a 79 y.o.  male with history of atrial fib. , per Dr. Beal [facility] referral, who presents today for Warfarin monitoring and adjustment (3 week visit after decreasing dose by 7.7%). Patient verifies current dosing regimen and tablet strength. No missed or extra doses. Patient denies bleeding/bruising/swelling/chest pain. Has usual SOB. Noted cough today. No blood in urine or stool. No dietary changes. No changes in medication/OTC agents/herbals. No change in alcohol use or tobacco use. Change in activity level: slightly increased. Patient denies headaches/dizziness/lightheadedness/falls. No vomiting/diarrhea or acute illness. No procedures scheduled in the future at this time. Assessment:   Lab Results   Component Value Date    INR 2.20 (H) 02/09/2023    INR 3.30 (H) 01/18/2023    INR 3.00 (H) 12/21/2022     INR therapeutic - goal 2-3. Recent Labs     02/09/23  1053   INR 2.20*        Plan:  POCT INR performed/result reviewed. Continue PO Coumadin 1.25 mg MF, 2.5 mg TWThSS. Recheck INR in 3 week(s) - same day as device check (lives out of town). Patient reminded to call the Anticoagulation Clinic with any signs or symptoms of bleeding or with any medication changes. Patient given instructions utilizing the teach back method. After visit summary printed and reviewed with patient. Discharged ambulatory in no apparent distress. Sees new PCP next.     For Pharmacy Admin Tracking Only    Time Spent (min): 20

## 2023-02-09 NOTE — PROGRESS NOTES
Riccardo Lombardo is a 79 y.o. male thatpresents for New Patient      History obtained today from Patient and Wife. Here to get established as a new pt  Appetite is good  Not regular exercise  Sleep is ok  Voiding well, bowels are moving      Diabetes Type 2    Glucose control:   Does patient check blood glucoses at home? Yes  Report of hypoglycemia: no  Lab Results   Component Value Date    LABA1C 7.8 (H) 02/09/2023     No results found for: EAG    Symptoms  Polyuria, Polydipsia or Polyphagia? No  Chest Pain, SOB, or Palpitations? -  No  New Vision complaints? No  Paresthesias of the extremities? No    Medications  Current medication were reviewed. Compliant with medications? yes  Medication side effects? No  On ACE-I or ARB? Yes  On antiplatelet therapy? Yes  On Statin? Yes    Last Diabetic Eye Exam: utd    Exercise  Exercise? No  Wt Readings from Last 3 Encounters:   02/09/23 204 lb 12.8 oz (92.9 kg)   12/05/22 207 lb (93.9 kg)   10/26/22 205 lb (93 kg)       Diet discipline?:  Low salt, fat, sugar diet? Tries to follow this diet, at times eats out but smaller portions    Blood pressure control:  BP Readings from Last 3 Encounters:   02/09/23 122/74   12/05/22 116/74   10/26/22 111/62       Lab Results   Component Value Date    LABMICR < 1.20 11/28/2022       Lab Results   Component Value Date    LDLCALC 63 08/20/2021           I have reviewed the patient's past medical history, past surgical history, allergies,medications, social and family history and I have made updates where appropriate.     Past Medical History:   Diagnosis Date    MILLY (acute kidney injury) ([facility]     Atrial fibrillation ([facility]     Cerebral artery occlusion with cerebral infarction (HCC)     CHF (congestive heart failure), NYHA class III, acute on chronic, combined (HCC)     COPD (chronic obstructive pulmonary disease) ([facility] 8/3/2020    Coronary artery disease involving native coronary artery of native heart with angina pectoris ([facility]     Diabetes mellitus, type 2 ([facility] 12/30/2020    Hyperlipidemia 2/20/2012    Hypertension     Pneumonia        Social History     Tobacco Use    Smoking status: Former     Packs/day: 1.00     Years: 50.00     Pack years: 50.00     Types: Cigarettes    Smokeless tobacco: Never    Tobacco comments:     quit 20 years ago   Vaping Use    Vaping Use: Never used   Substance Use Topics    Alcohol use: Not Currently     Comment: rarely    Drug use: No       Family History   Family history unknown: Yes         Review of Systems        PHYSICAL EXAM:  /74   Pulse 62   Temp 97.1 °F (36.2 °C) (Infrared)   Resp 16   Ht 5' 7\" (1.702 m)   Wt 204 lb 12.8 oz (92.9 kg)   SpO2 93%   BMI 32.08 kg/m²     Physical Exam  Constitutional:       Appearance: Normal appearance. HENT:      Head: Normocephalic and atraumatic. Right Ear: External ear normal.      Left Ear: External ear normal.      Nose: Nose normal.      Mouth/Throat:      Mouth: Mucous membranes are moist.   Eyes:      Conjunctiva/sclera: Conjunctivae normal.   Cardiovascular:      Rate and Rhythm: Normal rate and regular rhythm. Pulses: Normal pulses. Heart sounds: Normal heart sounds. Pulmonary:      Effort: Pulmonary effort is normal.      Breath sounds: Normal breath sounds. Abdominal:      General: Bowel sounds are normal.      Palpations: Abdomen is soft. Musculoskeletal:         General: Normal range of motion. Cervical back: Normal range of motion. Skin:     General: Skin is warm and dry. Neurological:      General: No focal deficit present. Mental Status: He is alert and oriented to person, place, and time. Psychiatric:         Mood and Affect: Mood normal.         Behavior: Behavior normal.         ASSESSMENT & PLAN  Ra Riley was seen today for new patient.     Diagnoses and all orders for this visit:    Type 2 diabetes mellitus with hyperglycemia, without long-term current use of insulin (HCC)    Stage 4 chronic kidney disease (HCC)    H/O insulin dependent diabetes mellitus    Acute on chronic combined systolic and diastolic congestive heart failure (HCC)    Other orders  -     POCT glycosylated hemoglobin (Hb A1C)    A1c stable  Continue current meds  Discussed dietary changes  Take Farxiga in the am, Lantus at night    I spent 30+ minutes reviewing previous notes and testing, discussing symptoms, medications and side effects, treatment options with the patient, performing an exam, and developing a plan of care.  All questions answered.  Patient verbalized understanding.        No follow-ups on file.    ER precautions given.  Advised to seek care immediately if any new or worsening symptoms.    All copied or forwarded information in the progress note was verified by me to be accurate at the time of visit  Patient's past medical, surgical, social and family history were reviewed and updated     All patient questions answered.  Patient voiced understanding.

## 2023-03-02 ENCOUNTER — HOSPITAL ENCOUNTER (OUTPATIENT)
Dept: PHARMACY | Age: 83
Setting detail: THERAPIES SERIES
Discharge: HOME | End: 2023-03-02
Payer: MEDICARE

## 2023-03-02 ENCOUNTER — PROCEDURE VISIT (OUTPATIENT)
Dept: CARDIOLOGY CLINIC | Age: 83
End: 2023-03-02
Payer: MEDICARE

## 2023-03-02 DIAGNOSIS — Z51.81 ENCOUNTER FOR THERAPEUTIC DRUG MONITORING: ICD-10-CM

## 2023-03-02 DIAGNOSIS — Z79.01 ANTICOAGULATED ON COUMADIN: ICD-10-CM

## 2023-03-02 DIAGNOSIS — I48.21 PERMANENT ATRIAL FIBRILLATION (HCC): Primary | ICD-10-CM

## 2023-03-02 DIAGNOSIS — Z95.0 PACEMAKER: Primary | ICD-10-CM

## 2023-03-02 LAB — POC INR: 2 (ref 0.8–1.2)

## 2023-03-02 PROCEDURE — 93279 PRGRMG DEV EVAL PM/LDLS PM: CPT | Performed by: INTERNAL MEDICINE

## 2023-03-02 PROCEDURE — 85610 PROTHROMBIN TIME: CPT

## 2023-03-02 PROCEDURE — 99211 OFF/OP EST MAY X REQ PHY/QHP: CPT

## 2023-03-02 PROCEDURE — 36416 COLLJ CAPILLARY BLOOD SPEC: CPT

## 2023-03-02 NOTE — PROGRESS NOTES
Medication Management [address] [place] [address]  [phone] (phone)  [phone] (fax)    Mr. Scott Fraire is a 79 y.o.  male with history of atrial fib. , per Dr. Juluis Alpers referral, who presents today for Warfarin monitoring and adjustment (3 week visit). Patient verifies current dosing regimen and tablet strength. No missed or extra doses. Patient denies bleeding/swelling/chest pain. Has usual SOB/easy bruising. No blood in urine or stool. No dietary changes. No changes in medication/OTC agents/herbals. Changed oxygen to 2.5 liters on list - states has been that for a long time. New PCP changed times of Farxiga/Lantus. No change in alcohol use or tobacco use. Change in activity level: slightly increased. Patient denies headaches/dizziness/lightheadedness/falls. No vomiting/diarrhea or acute illness. No procedures scheduled in the future at this time. Just came from pacemaker check at Dr. Juluis Alpers office. Assessment:   Lab Results   Component Value Date    INR 2.00 (H) 03/02/2023    INR 2.20 (H) 02/09/2023    INR 3.30 (H) 01/18/2023     INR therapeutic - goal 2-3. Recent Labs     03/02/23  1105   INR 2.00*        Plan:  POCT INR performed/result reviewed. Continue PO Coumadin 1.25 mg MF, 2.5 mg TWThSS. Recheck INR 4/4, per patient's request; has nephrology visit that day (lives out of town). Patient reminded to call the Anticoagulation Clinic with any signs or symptoms of bleeding or with any medication changes. Patient given instructions utilizing the teach back method. After visit summary printed and reviewed with patient. Discharged ambulatory in no apparent distress.     For Pharmacy Admin Tracking Only    Time Spent (min): 20

## 2023-03-13 DIAGNOSIS — I25.119 CORONARY ARTERY DISEASE INVOLVING NATIVE CORONARY ARTERY OF NATIVE HEART WITH ANGINA PECTORIS (HCC): Primary | ICD-10-CM

## 2023-03-13 DIAGNOSIS — N18.4 STAGE 4 CHRONIC KIDNEY DISEASE (HCC): ICD-10-CM

## 2023-03-13 DIAGNOSIS — E11.65 TYPE 2 DIABETES MELLITUS WITH HYPERGLYCEMIA, WITHOUT LONG-TERM CURRENT USE OF INSULIN (HCC): ICD-10-CM

## 2023-03-13 RX ORDER — METOPROLOL SUCCINATE 100 MG/1
100 TABLET, EXTENDED RELEASE ORAL DAILY
Qty: 90 TABLET | Refills: 3 | Status: SHIPPED | OUTPATIENT
Start: 2023-03-13

## 2023-03-13 RX ORDER — ISOPROPYL ALCOHOL 0.75 G/1
1 SWAB TOPICAL DAILY
Qty: 400 EACH | Refills: 1 | Status: SHIPPED | OUTPATIENT
Start: 2023-03-13

## 2023-03-13 RX ORDER — BUMETANIDE 2 MG/1
2 TABLET ORAL 2 TIMES DAILY
Qty: 180 TABLET | Refills: 3 | Status: SHIPPED | OUTPATIENT
Start: 2023-03-13

## 2023-03-13 RX ORDER — ATORVASTATIN CALCIUM 80 MG/1
TABLET, FILM COATED ORAL
Qty: 90 TABLET | Refills: 3 | Status: SHIPPED | OUTPATIENT
Start: 2023-03-13 | End: 2023-03-13 | Stop reason: SDUPTHER

## 2023-03-13 RX ORDER — INSULIN GLARGINE 100 [IU]/ML
16 INJECTION, SOLUTION SUBCUTANEOUS NIGHTLY
Qty: 15 ML | Refills: 3 | Status: SHIPPED | OUTPATIENT
Start: 2023-03-13 | End: 2023-09-09

## 2023-03-13 RX ORDER — ATORVASTATIN CALCIUM 80 MG/1
TABLET, FILM COATED ORAL
Qty: 10 TABLET | Refills: 0 | Status: SHIPPED | OUTPATIENT
Start: 2023-03-13

## 2023-03-13 NOTE — TELEPHONE ENCOUNTER
Patient needs a 10 day supply of Atorvastatin 80 mg tablet sent to [facility] in [facility], patient is completely out.

## 2023-03-16 NOTE — TELEPHONE ENCOUNTER
----- Message from Adela Cox sent at 7/9/2021  1:49 PM EDT -----  Subject: Message to Provider    QUESTIONS  Information for Provider? pt being discharged from the hospital needs a   follow up in 7 to 10 days nothing was available  ---------------------------------------------------------------------------  --------------  [address]  What is the best way for the office to contact you? OK to leave message on   voicemail  Preferred Call Back Phone Number? [phone]  ---------------------------------------------------------------------------  --------------  SCRIPT ANSWERS  Relationship to Patient? Third Party  Representative Name?  Izabela MOM  CALLED  LIS HAS PINK EYE  CRUSTED THIS MORNING  WOULD LIKE DROPS SENT TO THE PHARMACY ON FILE

## 2023-03-23 NOTE — PROGRESS NOTES
DR Kash Bates PT   [facility] SINGLE PACER CHECK IN OFFICE PER [facility]IBIO REP ON 3/2/23    BATTERY 1.4 YRS REMAINING  KNOWN AFIB/ COUMADIN    VVIR 60    RV WAVES >12  VENT IMPEDENCE 460  VENT THRESHOLD 1.25 @ 0.5  VENT AMPLITUDE AUTO   V PACED 54%

## 2023-03-27 ENCOUNTER — NURSE ONLY (OUTPATIENT)
Dept: LAB | Age: 83
End: 2023-03-27

## 2023-03-27 DIAGNOSIS — N18.4 CKD (CHRONIC KIDNEY DISEASE), STAGE IV (HCC): ICD-10-CM

## 2023-03-27 DIAGNOSIS — N18.4 CKD (CHRONIC KIDNEY DISEASE), STAGE IV (HCC): Primary | ICD-10-CM

## 2023-03-27 LAB
25(OH)D3 SERPL-MCNC: 31 NG/ML (ref 30–100)
ANION GAP SERPL CALC-SCNC: 16 MEQ/L (ref 8–16)
BUN SERPL-MCNC: 25 MG/DL (ref 7–22)
CALCIUM SERPL-MCNC: 8.9 MG/DL (ref 8.5–10.5)
CHLORIDE SERPL-SCNC: 93 MEQ/L (ref 98–111)
CO2 SERPL-SCNC: 26 MEQ/L (ref 23–33)
CREAT SERPL-MCNC: 2.2 MG/DL (ref 0.4–1.2)
DEPRECATED RDW RBC AUTO: 54.3 FL (ref 35–45)
ERYTHROCYTE [DISTWIDTH] IN BLOOD BY AUTOMATED COUNT: 15 % (ref 11.5–14.5)
GFR SERPL CREATININE-BSD FRML MDRD: 29 ML/MIN/1.73M2
GLUCOSE SERPL-MCNC: 253 MG/DL (ref 70–108)
HCT VFR BLD AUTO: 47.3 % (ref 42–52)
HGB BLD-MCNC: 15.2 GM/DL (ref 14–18)
MCH RBC QN AUTO: 31.3 PG (ref 26–33)
MCHC RBC AUTO-ENTMCNC: 32.1 GM/DL (ref 32.2–35.5)
MCV RBC AUTO: 97.3 FL (ref 80–94)
PHOSPHATE SERPL-MCNC: 3.2 MG/DL (ref 2.4–4.7)
PLATELET # BLD AUTO: 215 THOU/MM3 (ref 130–400)
PMV BLD AUTO: 11.3 FL (ref 9.4–12.4)
POTASSIUM SERPL-SCNC: 3.4 MEQ/L (ref 3.5–5.2)
PTH-INTACT SERPL-MCNC: 120.5 PG/ML (ref 15–65)
RBC # BLD AUTO: 4.86 MILL/MM3 (ref 4.7–6.1)
SODIUM SERPL-SCNC: 135 MEQ/L (ref 135–145)
WBC # BLD AUTO: 13.1 THOU/MM3 (ref 4.8–10.8)

## 2023-03-27 NOTE — TELEPHONE ENCOUNTER
----- Message from Fabiana Rodney DO sent at 3/27/2023  4:38 PM EDT -----  Potassium is low. Increase K in diet and take kcl 20 meq daily x 3 days.    Repeat a potassium and magnesium level next week

## 2023-03-28 RX ORDER — POTASSIUM CHLORIDE 20 MEQ/1
20 TABLET, EXTENDED RELEASE ORAL DAILY
Qty: 3 TABLET | Refills: 0 | Status: SHIPPED | OUTPATIENT
Start: 2023-03-28 | End: 2023-03-31

## 2023-03-29 RX ORDER — GLUCOSAM/CHON-MSM1/C/MANG/BOSW 500-416.6
TABLET ORAL
Qty: 210 EACH | Refills: 2 | Status: SHIPPED | OUTPATIENT
Start: 2023-03-29

## 2023-03-29 NOTE — TELEPHONE ENCOUNTER
Recent Visits  Date Type Provider Dept   02/09/23 Office Visit Brandyn Cruz, APRN - CNP [facility]   10/19/22 Office Visit Deette Celia, APRN - CNP [facility]   09/02/22 Office Visit Deette Celia, APRN - CNP [facility]   08/23/22 Office Visit Deette Celia, APRN - CNP [facility]   05/02/22 Office Visit Deette Celia, APRN - CNP [facility]   03/21/22 Office Visit Deette Celia, APRN - CNP [facility]   03/11/22 Office Visit Deette Celia, APRN - CNP [facility]   03/07/22 Office Visit Deette Celia, APRN - CNP [facility]   02/07/22 Office Visit Deette Celia, APRN - CNP [facility]   12/28/21 Office Visit Deette Celia, APRN - CNP [facility]   Showing recent visits within past 540 days with a meds authorizing provider and meeting all other requirements  Future Appointments  Date Type Provider Dept   05/03/23 Appointment WILBERTO Olmstead - CNP [facility]   Showing future appointments within next 150 days with a meds authorizing provider and meeting all other requirements

## 2023-04-04 ENCOUNTER — OFFICE VISIT (OUTPATIENT)
Dept: NEPHROLOGY | Age: 83
End: 2023-04-04
Payer: MEDICARE

## 2023-04-04 ENCOUNTER — HOSPITAL ENCOUNTER (OUTPATIENT)
Dept: PHARMACY | Age: 83
Setting detail: THERAPIES SERIES
Discharge: HOME | End: 2023-04-04
Payer: MEDICARE

## 2023-04-04 ENCOUNTER — NURSE ONLY (OUTPATIENT)
Dept: LAB | Age: 83
End: 2023-04-04

## 2023-04-04 VITALS
OXYGEN SATURATION: 98 % | WEIGHT: 207.2 LBS | DIASTOLIC BLOOD PRESSURE: 56 MMHG | SYSTOLIC BLOOD PRESSURE: 106 MMHG | BODY MASS INDEX: 32.45 KG/M2 | HEART RATE: 62 BPM

## 2023-04-04 DIAGNOSIS — N18.4 CKD (CHRONIC KIDNEY DISEASE), STAGE IV (HCC): ICD-10-CM

## 2023-04-04 DIAGNOSIS — Z79.01 ANTICOAGULATED ON COUMADIN: ICD-10-CM

## 2023-04-04 DIAGNOSIS — N18.4 CKD (CHRONIC KIDNEY DISEASE), STAGE IV (HCC): Primary | ICD-10-CM

## 2023-04-04 DIAGNOSIS — Z51.81 ENCOUNTER FOR THERAPEUTIC DRUG MONITORING: ICD-10-CM

## 2023-04-04 DIAGNOSIS — I48.21 PERMANENT ATRIAL FIBRILLATION (HCC): Primary | ICD-10-CM

## 2023-04-04 LAB
MAGNESIUM SERPL-MCNC: 2.5 MG/DL (ref 1.6–2.4)
POC INR: 2.1 (ref 0.8–1.2)
POTASSIUM SERPL-SCNC: 4 MEQ/L (ref 3.5–5.2)

## 2023-04-04 PROCEDURE — 3074F SYST BP LT 130 MM HG: CPT | Performed by: INTERNAL MEDICINE

## 2023-04-04 PROCEDURE — 99212 OFFICE O/P EST SF 10 MIN: CPT

## 2023-04-04 PROCEDURE — 85610 PROTHROMBIN TIME: CPT

## 2023-04-04 PROCEDURE — 99214 OFFICE O/P EST MOD 30 MIN: CPT | Performed by: INTERNAL MEDICINE

## 2023-04-04 PROCEDURE — 1123F ACP DISCUSS/DSCN MKR DOCD: CPT | Performed by: INTERNAL MEDICINE

## 2023-04-04 PROCEDURE — G8427 DOCREV CUR MEDS BY ELIG CLIN: HCPCS | Performed by: INTERNAL MEDICINE

## 2023-04-04 PROCEDURE — G8417 CALC BMI ABV UP PARAM F/U: HCPCS | Performed by: INTERNAL MEDICINE

## 2023-04-04 PROCEDURE — 36416 COLLJ CAPILLARY BLOOD SPEC: CPT

## 2023-04-04 PROCEDURE — 1036F TOBACCO NON-USER: CPT | Performed by: INTERNAL MEDICINE

## 2023-04-04 PROCEDURE — 3078F DIAST BP <80 MM HG: CPT | Performed by: INTERNAL MEDICINE

## 2023-04-04 RX ORDER — WARFARIN SODIUM 2.5 MG/1
TABLET ORAL
Qty: 90 TABLET | Refills: 3 | Status: SHIPPED | OUTPATIENT
Start: 2023-04-04

## 2023-04-04 RX ORDER — WARFARIN SODIUM 2.5 MG/1
TABLET ORAL EVERY EVENING
COMMUNITY

## 2023-04-04 NOTE — PROGRESS NOTES
Medication Management [address]   [phone] (phone)  [phone] (fax)    Mr. Messi Robles is a [address] y.o.  male with history of atrial fib. , per Dr. Giovanni Sevilla referral, who presents today for Warfarin monitoring and adjustment (nearly 5 week visit). Patient verifies current dosing regimen and tablet strength. No missed or extra doses. Patient denies bleeding/swelling/chest pain. Has usual SOB/easy bruising. No blood in urine or stool. No dietary changes. No changes in medication/OTC agents/herbals, other than 3 days of K+ last week. No change in alcohol use or tobacco use. No change in activity level. Patient denies headaches/dizziness/lightheadedness/falls. No vomiting/diarrhea or acute illness. No procedures scheduled in the future at this time. Assessment:   Lab Results   Component Value Date    INR 2.10 (H) 04/04/2023    INR 2.00 (H) 03/02/2023    INR 2.20 (H) 02/09/2023     INR therapeutic - goal 2-3. Recent Labs     04/04/23  1048   INR 2.10*        Plan:  POCT INR performed/result reviewed. Continue PO Coumadin 1.25 mg MF, 2.5 mg TWThSS. Recheck INR in 4 week(s), on 5/2, per patient's request.  Patient reminded to call the Anticoagulation Clinic with any signs or symptoms of bleeding or with any medication changes. Patient given instructions utilizing the teach back method. After visit summary printed and reviewed with patient. Discharged ambulatory in no apparent distress. Prescription sent electronically by clinic pharmacist under new medical director - current one was under former PCP. Sees nephrologist today.     For Pharmacy Admin Tracking Only    Intervention Detail: Refill(s) Provided  Total # of Interventions Recommended: 1  Total # of Interventions Accepted: 1  Time Spent (min):  22

## 2023-04-04 NOTE — PROGRESS NOTES
[address] [facility]  [address]  [facility] [address]  Dept: [phone]  Loc: [phone]  Progress Note  4/4/2023 2:05 PM      Pt Name:    Andreina Romero:    1940  Primary Care Physician:  WILBEROT Granados - CNP     Chief Complaint:   Chief Complaint   Patient presents with    Follow-up     CKD IV         History of Present Illness: This is a follow-up visit for CKD IV. He is a former patient of Dr. Kimberlee Lombardo. He has hx of CAD, DM, HTN, hyperlipidemia, Afib, pacemaker, EF 45-50%. Hx TAVR, COPD. Past renal US showed hypoplastic left kidney. He is doing well. Volume status stable on bumex 2 mg bid. Bp ok, no dizziness. Sugars so-so. Potassium was low he has increased potassium in diet. Pertinent items are noted in HPI.          Past History:  Past Medical History:   Diagnosis Date    MILLY (acute kidney injury) ([address]     Atrial fibrillation ([address]     Cerebral artery occlusion with cerebral infarction (HCC)     CHF (congestive heart failure), NYHA class III, acute on chronic, combined (HCC)     COPD (chronic obstructive pulmonary disease) ([address] 8/3/2020    Coronary artery disease involving native coronary artery of native heart with angina pectoris (HCC)     Diabetes mellitus, type 2 ([address] 12/30/2020    Hyperlipidemia 2/20/2012    Hypertension     Pneumonia      Past Surgical History:   Procedure Laterality Date    AORTIC VALVE REPLACEMENT      CARDIAC SURGERY      heart cath    CORONARY ANGIOPLASTY WITH STENT PLACEMENT      HERNIA REPAIR      OTHER SURGICAL HISTORY  05/10/2018    PACEMAKER INSERTION          VITALS:  BP (!) 106/56 (Site: Left Upper Arm, Position: Sitting, Cuff Size: Large Adult)   Pulse 62   Wt 207 lb 3.2 oz (94 kg)   SpO2 98%   BMI 32.45 kg/m²   Wt Readings from Last 3 Encounters:   04/04/23 207 lb 3.2 oz (94 kg)   02/09/23 204 lb 12.8 oz (92.9 kg)   12/05/22 207 lb

## 2023-04-05 ENCOUNTER — TELEPHONE (OUTPATIENT)
Dept: NEPHROLOGY | Age: 83
End: 2023-04-05

## 2023-04-05 NOTE — TELEPHONE ENCOUNTER
----- Message from Funmilayo Aguilera DO sent at 4/5/2023 12:37 PM EDT -----  Repeat potassium is improved, continue with the dietary potassium as he is already doing

## 2023-04-27 ENCOUNTER — NURSE ONLY (OUTPATIENT)
Dept: LAB | Age: 83
End: 2023-04-27

## 2023-04-27 DIAGNOSIS — I10 ESSENTIAL HYPERTENSION: ICD-10-CM

## 2023-04-27 DIAGNOSIS — E78.5 DYSLIPIDEMIA (HIGH LDL; LOW HDL): ICD-10-CM

## 2023-04-27 LAB
ALBUMIN SERPL BCG-MCNC: 4.3 G/DL (ref 3.5–5.1)
ALP SERPL-CCNC: 140 U/L (ref 38–126)
ALT SERPL W/O P-5'-P-CCNC: 24 U/L (ref 11–66)
ANION GAP SERPL CALC-SCNC: 17 MEQ/L (ref 8–16)
AST SERPL-CCNC: 21 U/L (ref 5–40)
BILIRUB CONJ SERPL-MCNC: 0.3 MG/DL (ref 0–0.3)
BILIRUB SERPL-MCNC: 0.9 MG/DL (ref 0.3–1.2)
BUN SERPL-MCNC: 26 MG/DL (ref 7–22)
CALCIUM SERPL-MCNC: 8.9 MG/DL (ref 8.5–10.5)
CHLORIDE SERPL-SCNC: 100 MEQ/L (ref 98–111)
CHOLEST SERPL-MCNC: 125 MG/DL (ref 100–199)
CO2 SERPL-SCNC: 27 MEQ/L (ref 23–33)
CREAT SERPL-MCNC: 2.1 MG/DL (ref 0.4–1.2)
DEPRECATED RDW RBC AUTO: 55.1 FL (ref 35–45)
ERYTHROCYTE [DISTWIDTH] IN BLOOD BY AUTOMATED COUNT: 15.4 % (ref 11.5–14.5)
GFR SERPL CREATININE-BSD FRML MDRD: 30 ML/MIN/1.73M2
GLUCOSE SERPL-MCNC: 155 MG/DL (ref 70–108)
HCT VFR BLD AUTO: 48.8 % (ref 42–52)
HDLC SERPL-MCNC: 41 MG/DL
HGB BLD-MCNC: 15.5 GM/DL (ref 14–18)
LDLC SERPL CALC-MCNC: 60 MG/DL
MCH RBC QN AUTO: 31.3 PG (ref 26–33)
MCHC RBC AUTO-ENTMCNC: 31.8 GM/DL (ref 32.2–35.5)
MCV RBC AUTO: 98.4 FL (ref 80–94)
PLATELET # BLD AUTO: 214 THOU/MM3 (ref 130–400)
PMV BLD AUTO: 11.6 FL (ref 9.4–12.4)
POTASSIUM SERPL-SCNC: 3.9 MEQ/L (ref 3.5–5.2)
PROT SERPL-MCNC: 7 G/DL (ref 6.1–8)
RBC # BLD AUTO: 4.96 MILL/MM3 (ref 4.7–6.1)
SODIUM SERPL-SCNC: 144 MEQ/L (ref 135–145)
TRIGL SERPL-MCNC: 121 MG/DL (ref 0–199)
WBC # BLD AUTO: 11.6 THOU/MM3 (ref 4.8–10.8)

## 2023-05-01 ENCOUNTER — OFFICE VISIT (OUTPATIENT)
Dept: CARDIOLOGY CLINIC | Age: 83
End: 2023-05-01
Payer: MEDICARE

## 2023-05-01 VITALS
HEIGHT: 67 IN | SYSTOLIC BLOOD PRESSURE: 114 MMHG | WEIGHT: 205 LBS | BODY MASS INDEX: 32.18 KG/M2 | RESPIRATION RATE: 18 BRPM | HEART RATE: 61 BPM | DIASTOLIC BLOOD PRESSURE: 72 MMHG

## 2023-05-01 DIAGNOSIS — I25.5 ISCHEMIC CARDIOMYOPATHY: ICD-10-CM

## 2023-05-01 DIAGNOSIS — Z95.2 HISTORY OF TRANSCATHETER AORTIC VALVE REPLACEMENT (TAVR): ICD-10-CM

## 2023-05-01 DIAGNOSIS — I48.20 CHRONIC ATRIAL FIBRILLATION (HCC): ICD-10-CM

## 2023-05-01 DIAGNOSIS — Z95.0 PACEMAKER: ICD-10-CM

## 2023-05-01 DIAGNOSIS — I25.119 CORONARY ARTERY DISEASE INVOLVING NATIVE CORONARY ARTERY OF NATIVE HEART WITH ANGINA PECTORIS (HCC): Primary | ICD-10-CM

## 2023-05-01 DIAGNOSIS — I10 ESSENTIAL HYPERTENSION: ICD-10-CM

## 2023-05-01 DIAGNOSIS — E78.5 HYPERLIPIDEMIA LDL GOAL <70: ICD-10-CM

## 2023-05-01 PROCEDURE — G8427 DOCREV CUR MEDS BY ELIG CLIN: HCPCS | Performed by: NURSE PRACTITIONER

## 2023-05-01 PROCEDURE — 1123F ACP DISCUSS/DSCN MKR DOCD: CPT | Performed by: NURSE PRACTITIONER

## 2023-05-01 PROCEDURE — G8417 CALC BMI ABV UP PARAM F/U: HCPCS | Performed by: NURSE PRACTITIONER

## 2023-05-01 PROCEDURE — 93000 ELECTROCARDIOGRAM COMPLETE: CPT | Performed by: INTERNAL MEDICINE

## 2023-05-01 PROCEDURE — 3078F DIAST BP <80 MM HG: CPT | Performed by: NURSE PRACTITIONER

## 2023-05-01 PROCEDURE — 3074F SYST BP LT 130 MM HG: CPT | Performed by: NURSE PRACTITIONER

## 2023-05-01 PROCEDURE — 99214 OFFICE O/P EST MOD 30 MIN: CPT | Performed by: NURSE PRACTITIONER

## 2023-05-01 PROCEDURE — 1036F TOBACCO NON-USER: CPT | Performed by: NURSE PRACTITIONER

## 2023-05-01 ASSESSMENT — ENCOUNTER SYMPTOMS
RESPIRATORY NEGATIVE: 1
GASTROINTESTINAL NEGATIVE: 1

## 2023-05-02 ENCOUNTER — HOSPITAL ENCOUNTER (OUTPATIENT)
Dept: PHARMACY | Age: 83
Setting detail: THERAPIES SERIES
Discharge: HOME | End: 2023-05-02
Payer: MEDICARE

## 2023-05-02 DIAGNOSIS — Z79.01 ANTICOAGULATED ON COUMADIN: ICD-10-CM

## 2023-05-02 DIAGNOSIS — I48.21 PERMANENT ATRIAL FIBRILLATION (HCC): Primary | ICD-10-CM

## 2023-05-02 DIAGNOSIS — Z51.81 ENCOUNTER FOR THERAPEUTIC DRUG MONITORING: ICD-10-CM

## 2023-05-02 LAB — POC INR: 2.5 (ref 0.8–1.2)

## 2023-05-02 PROCEDURE — 85610 PROTHROMBIN TIME: CPT

## 2023-05-02 PROCEDURE — 36416 COLLJ CAPILLARY BLOOD SPEC: CPT

## 2023-05-02 PROCEDURE — 99211 OFF/OP EST MAY X REQ PHY/QHP: CPT

## 2023-05-02 NOTE — PROGRESS NOTES
Medication Management [address]  [phone] (phone)  [phone] (fax)    Mr. Destin Gonzalez is a 79 y.o.  male with history of atrial fib. , per Dr. River Moura referral, who presents today for Warfarin monitoring and adjustment (4 week visit). Patient verifies current dosing regimen and tablet strength. No missed or extra doses. Patient denies bleeding/swelling/chest pain. Has usual SOB if overdoes it. Has usual easy bruising. No blood in urine or stool. No dietary changes, except for eating more K+-rich foods. No changes in medication/OTC agents/herbals. No change in alcohol use or tobacco use. No change in activity level. Patient denies headaches/dizziness/lightheadedness/falls. No vomiting/diarrhea or acute illness. Plans to use his prescription cough medicine with Codeine for a cough - reminded to be sure it has no alcohol. No procedures scheduled in the future at this time. Assessment:   Lab Results   Component Value Date    INR 2.50 (H) 05/02/2023    INR 2.10 (H) 04/04/2023    INR 2.00 (H) 03/02/2023     INR therapeutic - goal 2-3. Recent Labs     05/02/23  1055   INR 2.50*        Plan:  POCT INR performed/result reviewed. Continue PO Coumadin 1.25 mg MF, 2.5 mg TWThSS. Recheck INR in 4 week(s). Patient reminded to call the Anticoagulation Clinic with any signs or symptoms of bleeding or with any medication changes. Patient given instructions utilizing the teach back method. After visit summary printed and reviewed with patient. Discharged ambulatory in no apparent distress.     For Pharmacy Admin Tracking Only    Time Spent (min): 20

## 2023-05-03 ENCOUNTER — OFFICE VISIT (OUTPATIENT)
Dept: FAMILY MEDICINE CLINIC | Age: 83
End: 2023-05-03
Payer: MEDICARE

## 2023-05-03 VITALS
SYSTOLIC BLOOD PRESSURE: 144 MMHG | RESPIRATION RATE: 18 BRPM | OXYGEN SATURATION: 93 % | WEIGHT: 209.4 LBS | TEMPERATURE: 97 F | DIASTOLIC BLOOD PRESSURE: 68 MMHG | HEIGHT: 67 IN | BODY MASS INDEX: 32.87 KG/M2 | HEART RATE: 76 BPM

## 2023-05-03 DIAGNOSIS — E11.65 TYPE 2 DIABETES MELLITUS WITH HYPERGLYCEMIA, WITHOUT LONG-TERM CURRENT USE OF INSULIN (HCC): ICD-10-CM

## 2023-05-03 DIAGNOSIS — Z86.39 H/O INSULIN DEPENDENT DIABETES MELLITUS: ICD-10-CM

## 2023-05-03 LAB — HBA1C MFR BLD: 9.1 % (ref 4.3–5.7)

## 2023-05-03 PROCEDURE — 3046F HEMOGLOBIN A1C LEVEL >9.0%: CPT | Performed by: NURSE PRACTITIONER

## 2023-05-03 PROCEDURE — G8427 DOCREV CUR MEDS BY ELIG CLIN: HCPCS | Performed by: NURSE PRACTITIONER

## 2023-05-03 PROCEDURE — 99215 OFFICE O/P EST HI 40 MIN: CPT | Performed by: NURSE PRACTITIONER

## 2023-05-03 PROCEDURE — 1123F ACP DISCUSS/DSCN MKR DOCD: CPT | Performed by: NURSE PRACTITIONER

## 2023-05-03 PROCEDURE — 3078F DIAST BP <80 MM HG: CPT | Performed by: NURSE PRACTITIONER

## 2023-05-03 PROCEDURE — 1036F TOBACCO NON-USER: CPT | Performed by: NURSE PRACTITIONER

## 2023-05-03 PROCEDURE — G8417 CALC BMI ABV UP PARAM F/U: HCPCS | Performed by: NURSE PRACTITIONER

## 2023-05-03 PROCEDURE — 3077F SYST BP >= 140 MM HG: CPT | Performed by: NURSE PRACTITIONER

## 2023-05-03 RX ORDER — ISOPROPYL ALCOHOL 0.75 G/1
1 SWAB TOPICAL DAILY
Qty: 400 EACH | Refills: 1 | Status: SHIPPED | OUTPATIENT
Start: 2023-05-03

## 2023-05-03 RX ORDER — CALCIUM CITRATE/VITAMIN D3 200MG-6.25
1 TABLET ORAL DAILY
Qty: 100 EACH | Refills: 1 | Status: SHIPPED | OUTPATIENT
Start: 2023-05-03

## 2023-05-03 RX ORDER — CALCIUM CITRATE/VITAMIN D3 200MG-6.25
TABLET ORAL
Qty: 100 STRIP | OUTPATIENT
Start: 2023-05-03

## 2023-05-03 NOTE — PROGRESS NOTES
Rufino Weathers is a 79 y.o. male for 3 Month Follow-Up and Diabetes      Diabetes Type 2    Glucose control:   Does patient check blood glucoses at home? Yes  Report of hypoglycemia: no  Lab Results   Component Value Date    LABA1C 9.1 (H) 05/03/2023     No results found for: EAG    Symptoms  Polyuria, Polydipsia or Polyphagia? No  Chest Pain, SOB, or Palpitations? -  No  New Vision complaints? No  Paresthesias of the extremities? No    Medications  Current medication were reviewed. Compliant with medications? yes  Medication side effects? No  On ACE-I or ARB? Yes  On antiplatelet therapy? Yes  On Statin? Yes    Last Diabetic Eye Exam: utd    Exercise  Exercise? Plans to walk more when it gets warm out  Wt Readings from Last 3 Encounters:   05/03/23 209 lb 6.4 oz (95 kg)   05/01/23 205 lb (93 kg)   04/04/23 207 lb 3.2 oz (94 kg)       Diet discipline?:  Low salt, fat, sugar diet?  no    Blood pressure control:  BP Readings from Last 3 Encounters:   05/03/23 (!) 144/68   05/01/23 114/72   04/04/23 (!) 106/56     Also concerned with left hand nodule  Appeared after he smacked his hand on a lab chair  Not improved much  A little sore when touched      Lab Results   Component Value Date    LABMICR < 1.20 11/28/2022       Lab Results   Component Value Date    LDLCALC 60 04/27/2023       Physical Exam    BP (!) 144/68   Pulse 76   Temp 97 °F (36.1 °C) (Infrared)   Resp 18   Ht 5' 7\" (1.702 m)   Wt 209 lb 6.4 oz (95 kg)   SpO2 93%   BMI 32.80 kg/m²   Appearance: alert, well appearing, and in no distress, normal appearing weight and well hydrated. Neck exam - supple, no significant adenopathy. CVS exam: normal rate, regular rhythm, normal S1, S2, no murmurs, rubs, clicks or gallops. Lungs: clear to auscultation, no wheezes, rales or rhonchi, symmetric air entry.   Abdomen:  BS normal, soft, non tender, non distended, no rebound or guarding  Mental Status: normal mood, affect behavior, speech, dress,

## 2023-05-03 NOTE — TELEPHONE ENCOUNTER
Gabbie Daly needs refill of   Requested Prescriptions     Pending Prescriptions Disp Refills    TRUE METRIX BLOOD GLUCOSE TEST strip [Pharmacy Med Name: TRUE METRIX SELF MONITORING BLOOD GLUCOSE STRIPS   Strip] 100 strip      Sig: TEST BLOOD SUGAR EVERY DAY       Last Filled on:      Last Visit Date:  10/19/2022    Next Visit Date:  Visit date not found

## 2023-05-13 ENCOUNTER — APPOINTMENT (OUTPATIENT)
Dept: CT IMAGING | Age: 83
End: 2023-05-13
Payer: MEDICARE

## 2023-05-13 ENCOUNTER — APPOINTMENT (OUTPATIENT)
Dept: GENERAL RADIOLOGY | Age: 83
End: 2023-05-13
Payer: MEDICARE

## 2023-05-13 ENCOUNTER — HOSPITAL ENCOUNTER (OUTPATIENT)
Age: 83
Setting detail: OBSERVATION
Discharge: HOME | End: 2023-05-15
Attending: EMERGENCY MEDICINE
Payer: MEDICARE

## 2023-05-13 DIAGNOSIS — J18.9 PNEUMONIA DUE TO INFECTIOUS ORGANISM, UNSPECIFIED LATERALITY, UNSPECIFIED PART OF LUNG: Primary | ICD-10-CM

## 2023-05-13 DIAGNOSIS — J96.21 ACUTE ON CHRONIC RESPIRATORY FAILURE WITH HYPOXIA (HCC): ICD-10-CM

## 2023-05-13 DIAGNOSIS — R42 DIZZINESS: ICD-10-CM

## 2023-05-13 DIAGNOSIS — R09.02 HYPOXIA: ICD-10-CM

## 2023-05-13 LAB
ALBUMIN SERPL BCG-MCNC: 4.4 G/DL (ref 3.5–5.1)
ALP SERPL-CCNC: 135 U/L (ref 38–126)
ALT SERPL W/O P-5'-P-CCNC: 24 U/L (ref 11–66)
ANION GAP SERPL CALC-SCNC: 15 MEQ/L (ref 8–16)
AST SERPL-CCNC: 19 U/L (ref 5–40)
BASOPHILS ABSOLUTE: 0 THOU/MM3 (ref 0–0.1)
BASOPHILS NFR BLD AUTO: 0.4 %
BILIRUB SERPL-MCNC: 0.9 MG/DL (ref 0.3–1.2)
BILIRUB UR QL STRIP.AUTO: NEGATIVE
BUN SERPL-MCNC: 31 MG/DL (ref 7–22)
CALCIUM SERPL-MCNC: 8.9 MG/DL (ref 8.5–10.5)
CHARACTER UR: CLEAR
CHLORIDE SERPL-SCNC: 98 MEQ/L (ref 98–111)
CO2 SERPL-SCNC: 24 MEQ/L (ref 23–33)
COLOR: YELLOW
CREAT SERPL-MCNC: 2.2 MG/DL (ref 0.4–1.2)
DEPRECATED RDW RBC AUTO: 54.2 FL (ref 35–45)
EKG ATRIAL RATE: 55 BPM
EKG Q-T INTERVAL: 538 MS
EKG QRS DURATION: 182 MS
EKG QTC CALCULATION (BAZETT): 550 MS
EKG R AXIS: -84 DEGREES
EKG T AXIS: 75 DEGREES
EKG VENTRICULAR RATE: 63 BPM
EOSINOPHIL NFR BLD AUTO: 1.8 %
EOSINOPHILS ABSOLUTE: 0.2 THOU/MM3 (ref 0–0.4)
ERYTHROCYTE [DISTWIDTH] IN BLOOD BY AUTOMATED COUNT: 15.3 % (ref 11.5–14.5)
FLUAV RNA RESP QL NAA+PROBE: NOT DETECTED
FLUBV RNA RESP QL NAA+PROBE: NOT DETECTED
GFR SERPL CREATININE-BSD FRML MDRD: 29 ML/MIN/1.73M2
GLUCOSE BLD STRIP.AUTO-MCNC: 211 MG/DL (ref 70–108)
GLUCOSE BLD STRIP.AUTO-MCNC: 278 MG/DL (ref 70–108)
GLUCOSE BLD STRIP.AUTO-MCNC: 311 MG/DL (ref 70–108)
GLUCOSE SERPL-MCNC: 232 MG/DL (ref 70–108)
GLUCOSE UR QL STRIP.AUTO: >= 1000 MG/DL
HCT VFR BLD AUTO: 44.3 % (ref 42–52)
HGB BLD-MCNC: 14.6 GM/DL (ref 14–18)
HGB UR QL STRIP.AUTO: NEGATIVE
IMM GRANULOCYTES # BLD AUTO: 0.05 THOU/MM3 (ref 0–0.07)
IMM GRANULOCYTES NFR BLD AUTO: 0.5 %
INR PPP: 2.7 (ref 0.85–1.13)
KETONES UR QL STRIP.AUTO: NEGATIVE
LYMPHOCYTES ABSOLUTE: 1.2 THOU/MM3 (ref 1–4.8)
LYMPHOCYTES NFR BLD AUTO: 11.5 %
MCH RBC QN AUTO: 31.7 PG (ref 26–33)
MCHC RBC AUTO-ENTMCNC: 33 GM/DL (ref 32.2–35.5)
MCV RBC AUTO: 96.3 FL (ref 80–94)
MONOCYTES ABSOLUTE: 0.9 THOU/MM3 (ref 0.4–1.3)
MONOCYTES NFR BLD AUTO: 8.6 %
NEUTROPHILS NFR BLD AUTO: 77.2 %
NITRITE UR QL STRIP: NEGATIVE
NRBC BLD AUTO-RTO: 0 /100 WBC
NT-PROBNP SERPL IA-MCNC: 2335 PG/ML (ref 0–449)
OSMOLALITY SERPL CALC.SUM OF ELEC: 287.8 MOSMOL/KG (ref 275–300)
PH UR STRIP.AUTO: 6 [PH] (ref 5–9)
PLATELET # BLD AUTO: 169 THOU/MM3 (ref 130–400)
PMV BLD AUTO: 11.3 FL (ref 9.4–12.4)
POTASSIUM SERPL-SCNC: 3.6 MEQ/L (ref 3.5–5.2)
PROCALCITONIN SERPL IA-MCNC: 0.11 NG/ML (ref 0.01–0.09)
PROT SERPL-MCNC: 7.2 G/DL (ref 6.1–8)
PROT UR STRIP.AUTO-MCNC: NEGATIVE MG/DL
RBC # BLD AUTO: 4.6 MILL/MM3 (ref 4.7–6.1)
SARS-COV-2 RNA RESP QL NAA+PROBE: NOT DETECTED
SEGMENTED NEUTROPHILS ABSOLUTE COUNT: 7.8 THOU/MM3 (ref 1.8–7.7)
SODIUM SERPL-SCNC: 137 MEQ/L (ref 135–145)
SP GR UR REFRACT.AUTO: 1.01 (ref 1–1.03)
TROPONIN T: 0.04 NG/ML
UROBILINOGEN, URINE: 0.2 EU/DL (ref 0–1)
WBC # BLD AUTO: 10.1 THOU/MM3 (ref 4.8–10.8)
WBC #/AREA URNS HPF: NEGATIVE /[HPF]

## 2023-05-13 PROCEDURE — 83880 ASSAY OF NATRIURETIC PEPTIDE: CPT

## 2023-05-13 PROCEDURE — 82948 REAGENT STRIP/BLOOD GLUCOSE: CPT

## 2023-05-13 PROCEDURE — 87040 BLOOD CULTURE FOR BACTERIA: CPT

## 2023-05-13 PROCEDURE — 71046 X-RAY EXAM CHEST 2 VIEWS: CPT

## 2023-05-13 PROCEDURE — G0378 HOSPITAL OBSERVATION PER HR: HCPCS

## 2023-05-13 PROCEDURE — 99223 1ST HOSP IP/OBS HIGH 75: CPT | Performed by: NURSE PRACTITIONER

## 2023-05-13 PROCEDURE — 85610 PROTHROMBIN TIME: CPT

## 2023-05-13 PROCEDURE — 93005 ELECTROCARDIOGRAM TRACING: CPT | Performed by: STUDENT IN AN ORGANIZED HEALTH CARE EDUCATION/TRAINING PROGRAM

## 2023-05-13 PROCEDURE — 87636 SARSCOV2 & INF A&B AMP PRB: CPT

## 2023-05-13 PROCEDURE — 85025 COMPLETE CBC W/AUTO DIFF WBC: CPT

## 2023-05-13 PROCEDURE — 94640 AIRWAY INHALATION TREATMENT: CPT

## 2023-05-13 PROCEDURE — 99285 EMERGENCY DEPT VISIT HI MDM: CPT

## 2023-05-13 PROCEDURE — 93010 ELECTROCARDIOGRAM REPORT: CPT | Performed by: INTERNAL MEDICINE

## 2023-05-13 PROCEDURE — 70450 CT HEAD/BRAIN W/O DYE: CPT

## 2023-05-13 PROCEDURE — 96374 THER/PROPH/DIAG INJ IV PUSH: CPT

## 2023-05-13 PROCEDURE — 84145 PROCALCITONIN (PCT): CPT

## 2023-05-13 PROCEDURE — 96375 TX/PRO/DX INJ NEW DRUG ADDON: CPT

## 2023-05-13 PROCEDURE — 2580000003 HC RX 258: Performed by: NURSE PRACTITIONER

## 2023-05-13 PROCEDURE — 96365 THER/PROPH/DIAG IV INF INIT: CPT

## 2023-05-13 PROCEDURE — 80053 COMPREHEN METABOLIC PANEL: CPT

## 2023-05-13 PROCEDURE — 81003 URINALYSIS AUTO W/O SCOPE: CPT

## 2023-05-13 PROCEDURE — 36415 COLL VENOUS BLD VENIPUNCTURE: CPT

## 2023-05-13 PROCEDURE — 84484 ASSAY OF TROPONIN QUANT: CPT

## 2023-05-13 PROCEDURE — 6370000000 HC RX 637 (ALT 250 FOR IP): Performed by: STUDENT IN AN ORGANIZED HEALTH CARE EDUCATION/TRAINING PROGRAM

## 2023-05-13 PROCEDURE — 6360000002 HC RX W HCPCS: Performed by: STUDENT IN AN ORGANIZED HEALTH CARE EDUCATION/TRAINING PROGRAM

## 2023-05-13 PROCEDURE — 2580000003 HC RX 258: Performed by: STUDENT IN AN ORGANIZED HEALTH CARE EDUCATION/TRAINING PROGRAM

## 2023-05-13 PROCEDURE — 6370000000 HC RX 637 (ALT 250 FOR IP): Performed by: NURSE PRACTITIONER

## 2023-05-13 RX ORDER — ONDANSETRON 4 MG/1
4 TABLET, ORALLY DISINTEGRATING ORAL EVERY 8 HOURS PRN
Status: DISCONTINUED | OUTPATIENT
Start: 2023-05-13 | End: 2023-05-15 | Stop reason: HOSPADM

## 2023-05-13 RX ORDER — BUMETANIDE 1 MG/1
2 TABLET ORAL 2 TIMES DAILY
Status: DISCONTINUED | OUTPATIENT
Start: 2023-05-13 | End: 2023-05-13 | Stop reason: SDUPTHER

## 2023-05-13 RX ORDER — METHYLPREDNISOLONE SODIUM SUCCINATE 40 MG/ML
120 INJECTION, POWDER, LYOPHILIZED, FOR SOLUTION INTRAMUSCULAR; INTRAVENOUS ONCE
Status: COMPLETED | OUTPATIENT
Start: 2023-05-13 | End: 2023-05-13

## 2023-05-13 RX ORDER — PREDNISONE 20 MG/1
40 TABLET ORAL DAILY
Status: DISCONTINUED | OUTPATIENT
Start: 2023-05-14 | End: 2023-05-15 | Stop reason: HOSPADM

## 2023-05-13 RX ORDER — IPRATROPIUM BROMIDE AND ALBUTEROL SULFATE 2.5; .5 MG/3ML; MG/3ML
1 SOLUTION RESPIRATORY (INHALATION) ONCE
Status: COMPLETED | OUTPATIENT
Start: 2023-05-13 | End: 2023-05-13

## 2023-05-13 RX ORDER — INSULIN LISPRO 100 [IU]/ML
0-4 INJECTION, SOLUTION INTRAVENOUS; SUBCUTANEOUS
Status: DISCONTINUED | OUTPATIENT
Start: 2023-05-13 | End: 2023-05-14

## 2023-05-13 RX ORDER — DEXTROSE MONOHYDRATE 100 MG/ML
INJECTION, SOLUTION INTRAVENOUS CONTINUOUS PRN
Status: DISCONTINUED | OUTPATIENT
Start: 2023-05-13 | End: 2023-05-15 | Stop reason: HOSPADM

## 2023-05-13 RX ORDER — ACETAMINOPHEN 650 MG/1
650 SUPPOSITORY RECTAL EVERY 6 HOURS PRN
Status: DISCONTINUED | OUTPATIENT
Start: 2023-05-13 | End: 2023-05-15 | Stop reason: HOSPADM

## 2023-05-13 RX ORDER — ALBUTEROL SULFATE 2.5 MG/3ML
1.25 SOLUTION RESPIRATORY (INHALATION) EVERY 6 HOURS PRN
Status: DISCONTINUED | OUTPATIENT
Start: 2023-05-13 | End: 2023-05-14

## 2023-05-13 RX ORDER — SODIUM CHLORIDE 0.9 % (FLUSH) 0.9 %
5-40 SYRINGE (ML) INJECTION PRN
Status: DISCONTINUED | OUTPATIENT
Start: 2023-05-13 | End: 2023-05-15 | Stop reason: HOSPADM

## 2023-05-13 RX ORDER — BUMETANIDE 2 MG/1
2 TABLET ORAL 2 TIMES DAILY
Status: DISCONTINUED | OUTPATIENT
Start: 2023-05-13 | End: 2023-05-15 | Stop reason: HOSPADM

## 2023-05-13 RX ORDER — ACETAMINOPHEN 325 MG/1
650 TABLET ORAL EVERY 6 HOURS PRN
Status: DISCONTINUED | OUTPATIENT
Start: 2023-05-13 | End: 2023-05-15 | Stop reason: HOSPADM

## 2023-05-13 RX ORDER — WARFARIN SODIUM 2 MG/1
2 TABLET ORAL
Status: COMPLETED | OUTPATIENT
Start: 2023-05-13 | End: 2023-05-13

## 2023-05-13 RX ORDER — AZITHROMYCIN 250 MG/1
500 TABLET, FILM COATED ORAL EVERY 24 HOURS
Status: DISCONTINUED | OUTPATIENT
Start: 2023-05-14 | End: 2023-05-15 | Stop reason: HOSPADM

## 2023-05-13 RX ORDER — SODIUM CHLORIDE 0.9 % (FLUSH) 0.9 %
5-40 SYRINGE (ML) INJECTION EVERY 12 HOURS SCHEDULED
Status: DISCONTINUED | OUTPATIENT
Start: 2023-05-13 | End: 2023-05-15 | Stop reason: HOSPADM

## 2023-05-13 RX ORDER — INSULIN LISPRO 100 [IU]/ML
0-4 INJECTION, SOLUTION INTRAVENOUS; SUBCUTANEOUS NIGHTLY
Status: DISCONTINUED | OUTPATIENT
Start: 2023-05-13 | End: 2023-05-14

## 2023-05-13 RX ORDER — METOPROLOL SUCCINATE 100 MG/1
100 TABLET, EXTENDED RELEASE ORAL DAILY
Status: DISCONTINUED | OUTPATIENT
Start: 2023-05-13 | End: 2023-05-15 | Stop reason: HOSPADM

## 2023-05-13 RX ORDER — FLUTICASONE PROPIONATE 50 MCG
2 SPRAY, SUSPENSION (ML) NASAL DAILY PRN
Status: DISCONTINUED | OUTPATIENT
Start: 2023-05-13 | End: 2023-05-15 | Stop reason: HOSPADM

## 2023-05-13 RX ORDER — ATORVASTATIN CALCIUM 80 MG/1
80 TABLET, FILM COATED ORAL NIGHTLY
Status: DISCONTINUED | OUTPATIENT
Start: 2023-05-14 | End: 2023-05-15 | Stop reason: HOSPADM

## 2023-05-13 RX ORDER — LOSARTAN POTASSIUM 25 MG/1
25 TABLET ORAL
Status: DISCONTINUED | OUTPATIENT
Start: 2023-05-13 | End: 2023-05-15 | Stop reason: HOSPADM

## 2023-05-13 RX ORDER — GUAIFENESIN 600 MG/1
1200 TABLET, EXTENDED RELEASE ORAL 2 TIMES DAILY
Status: DISCONTINUED | OUTPATIENT
Start: 2023-05-13 | End: 2023-05-15 | Stop reason: HOSPADM

## 2023-05-13 RX ORDER — SODIUM CHLORIDE 9 MG/ML
INJECTION, SOLUTION INTRAVENOUS PRN
Status: DISCONTINUED | OUTPATIENT
Start: 2023-05-13 | End: 2023-05-15 | Stop reason: HOSPADM

## 2023-05-13 RX ORDER — POLYETHYLENE GLYCOL 3350 17 G/17G
17 POWDER, FOR SOLUTION ORAL DAILY PRN
Status: DISCONTINUED | OUTPATIENT
Start: 2023-05-13 | End: 2023-05-15 | Stop reason: HOSPADM

## 2023-05-13 RX ORDER — ONDANSETRON 2 MG/ML
4 INJECTION INTRAMUSCULAR; INTRAVENOUS EVERY 6 HOURS PRN
Status: DISCONTINUED | OUTPATIENT
Start: 2023-05-13 | End: 2023-05-15 | Stop reason: HOSPADM

## 2023-05-13 RX ADMIN — SODIUM CHLORIDE, PRESERVATIVE FREE 10 ML: 5 INJECTION INTRAVENOUS at 21:40

## 2023-05-13 RX ADMIN — IPRATROPIUM BROMIDE AND ALBUTEROL SULFATE 1 AMPULE: .5; 3 SOLUTION RESPIRATORY (INHALATION) at 14:35

## 2023-05-13 RX ADMIN — WARFARIN SODIUM 2 MG: 2 TABLET ORAL at 18:43

## 2023-05-13 RX ADMIN — LOSARTAN POTASSIUM 25 MG: 25 TABLET, FILM COATED ORAL at 21:40

## 2023-05-13 RX ADMIN — BUMETANIDE 2 MG: 2 TABLET ORAL at 21:40

## 2023-05-13 RX ADMIN — INSULIN LISPRO 4 UNITS: 100 INJECTION, SOLUTION INTRAVENOUS; SUBCUTANEOUS at 21:48

## 2023-05-13 RX ADMIN — IPRATROPIUM BROMIDE AND ALBUTEROL SULFATE 1 AMPULE: .5; 3 SOLUTION RESPIRATORY (INHALATION) at 14:33

## 2023-05-13 RX ADMIN — METHYLPREDNISOLONE SODIUM SUCCINATE 120 MG: 40 INJECTION INTRAMUSCULAR; INTRAVENOUS at 15:05

## 2023-05-13 RX ADMIN — CEFTRIAXONE SODIUM 1000 MG: 1 INJECTION, POWDER, FOR SOLUTION INTRAMUSCULAR; INTRAVENOUS at 16:23

## 2023-05-13 RX ADMIN — GUAIFENESIN 1200 MG: 600 TABLET, EXTENDED RELEASE ORAL at 21:40

## 2023-05-13 SDOH — ECONOMIC STABILITY: TRANSPORTATION INSECURITY
IN THE PAST 12 MONTHS, HAS THE LACK OF TRANSPORTATION KEPT YOU FROM MEDICAL APPOINTMENTS OR FROM GETTING MEDICATIONS?: NO

## 2023-05-13 SDOH — ECONOMIC STABILITY: INCOME INSECURITY: IN THE LAST 12 MONTHS, WAS THERE A TIME WHEN YOU WERE NOT ABLE TO PAY THE MORTGAGE OR RENT ON TIME?: NO

## 2023-05-13 SDOH — ECONOMIC STABILITY: TRANSPORTATION INSECURITY
IN THE PAST 12 MONTHS, HAS LACK OF TRANSPORTATION KEPT YOU FROM MEETINGS, WORK, OR FROM GETTING THINGS NEEDED FOR DAILY LIVING?: NO

## 2023-05-13 SDOH — ECONOMIC STABILITY: FOOD INSECURITY: WITHIN THE PAST 12 MONTHS, THE FOOD YOU BOUGHT JUST DIDN'T LAST AND YOU DIDN'T HAVE MONEY TO GET MORE.: NEVER TRUE

## 2023-05-13 SDOH — ECONOMIC STABILITY: FOOD INSECURITY: WITHIN THE PAST 12 MONTHS, YOU WORRIED THAT YOUR FOOD WOULD RUN OUT BEFORE YOU GOT MONEY TO BUY MORE.: NEVER TRUE

## 2023-05-13 ASSESSMENT — SOCIAL DETERMINANTS OF HEALTH (SDOH)
IN A TYPICAL WEEK, HOW MANY TIMES DO YOU TALK ON THE PHONE WITH FAMILY, FRIENDS, OR NEIGHBORS?: ONCE A WEEK
WITHIN THE LAST YEAR, HAVE YOU BEEN AFRAID OF YOUR PARTNER OR EX-PARTNER?: NO
HOW HARD IS IT FOR YOU TO PAY FOR THE VERY BASICS LIKE FOOD, HOUSING, MEDICAL CARE, AND HEATING?: NOT HARD AT ALL
HOW OFTEN DO YOU ATTENT MEETINGS OF THE CLUB OR ORGANIZATION YOU BELONG TO?: 1 TO 4 TIMES PER YEAR
DO YOU BELONG TO ANY CLUBS OR ORGANIZATIONS SUCH AS CHURCH GROUPS UNIONS, FRATERNAL OR ATHLETIC GROUPS, OR SCHOOL GROUPS?: YES
WITHIN THE LAST YEAR, HAVE YOU BEEN KICKED, HIT, SLAPPED, OR OTHERWISE PHYSICALLY HURT BY YOUR PARTNER OR EX-PARTNER?: NO
HOW OFTEN DO YOU GET TOGETHER WITH FRIENDS OR RELATIVES?: ONCE A WEEK
HOW OFTEN DO YOU ATTEND CHURCH OR RELIGIOUS SERVICES?: 1 TO 4 TIMES PER YEAR
WITHIN THE LAST YEAR, HAVE TO BEEN RAPED OR FORCED TO HAVE ANY KIND OF SEXUAL ACTIVITY BY YOUR PARTNER OR EX-PARTNER?: NO
WITHIN THE LAST YEAR, HAVE YOU BEEN HUMILIATED OR EMOTIONALLY ABUSED IN OTHER WAYS BY YOUR PARTNER OR EX-PARTNER?: NO

## 2023-05-13 ASSESSMENT — PAIN - FUNCTIONAL ASSESSMENT
PAIN_FUNCTIONAL_ASSESSMENT: NONE - DENIES PAIN

## 2023-05-13 ASSESSMENT — LIFESTYLE VARIABLES
HOW OFTEN DO YOU HAVE A DRINK CONTAINING ALCOHOL: NEVER
HOW MANY STANDARD DRINKS CONTAINING ALCOHOL DO YOU HAVE ON A TYPICAL DAY: PATIENT DOES NOT DRINK

## 2023-05-13 ASSESSMENT — PATIENT HEALTH QUESTIONNAIRE - PHQ9
SUM OF ALL RESPONSES TO PHQ9 QUESTIONS 1 & 2: 0
1. LITTLE INTEREST OR PLEASURE IN DOING THINGS: NOT AT ALL
2. FEELING DOWN, DEPRESSED OR HOPELESS: NOT AT ALL

## 2023-05-14 LAB
ANION GAP SERPL CALC-SCNC: 18 MEQ/L (ref 8–16)
BASOPHILS ABSOLUTE: 0 THOU/MM3 (ref 0–0.1)
BASOPHILS NFR BLD AUTO: 0.1 %
BUN SERPL-MCNC: 36 MG/DL (ref 7–22)
CALCIUM SERPL-MCNC: 8.4 MG/DL (ref 8.5–10.5)
CHLORIDE SERPL-SCNC: 101 MEQ/L (ref 98–111)
CO2 SERPL-SCNC: 20 MEQ/L (ref 23–33)
CREAT SERPL-MCNC: 2.2 MG/DL (ref 0.4–1.2)
DEPRECATED RDW RBC AUTO: 54.7 FL (ref 35–45)
EOSINOPHIL NFR BLD AUTO: 0 %
EOSINOPHILS ABSOLUTE: 0 THOU/MM3 (ref 0–0.4)
ERYTHROCYTE [DISTWIDTH] IN BLOOD BY AUTOMATED COUNT: 15.1 % (ref 11.5–14.5)
GFR SERPL CREATININE-BSD FRML MDRD: 29 ML/MIN/1.73M2
GLUCOSE BLD STRIP.AUTO-MCNC: 244 MG/DL (ref 70–108)
GLUCOSE BLD STRIP.AUTO-MCNC: 268 MG/DL (ref 70–108)
GLUCOSE BLD STRIP.AUTO-MCNC: 303 MG/DL (ref 70–108)
GLUCOSE BLD STRIP.AUTO-MCNC: 327 MG/DL (ref 70–108)
GLUCOSE SERPL-MCNC: 261 MG/DL (ref 70–108)
HCT VFR BLD AUTO: 42.9 % (ref 42–52)
HGB BLD-MCNC: 13.7 GM/DL (ref 14–18)
IMM GRANULOCYTES # BLD AUTO: 0.08 THOU/MM3 (ref 0–0.07)
IMM GRANULOCYTES NFR BLD AUTO: 0.7 %
INR PPP: 2.55 (ref 0.85–1.13)
LYMPHOCYTES ABSOLUTE: 0.7 THOU/MM3 (ref 1–4.8)
LYMPHOCYTES NFR BLD AUTO: 6.3 %
MCH RBC QN AUTO: 31.4 PG (ref 26–33)
MCHC RBC AUTO-ENTMCNC: 31.9 GM/DL (ref 32.2–35.5)
MCV RBC AUTO: 98.4 FL (ref 80–94)
MONOCYTES ABSOLUTE: 0.1 THOU/MM3 (ref 0.4–1.3)
MONOCYTES NFR BLD AUTO: 1.2 %
NEUTROPHILS NFR BLD AUTO: 91.7 %
NRBC BLD AUTO-RTO: 0 /100 WBC
PLATELET # BLD AUTO: 155 THOU/MM3 (ref 130–400)
PMV BLD AUTO: 11.5 FL (ref 9.4–12.4)
POTASSIUM SERPL-SCNC: 3.9 MEQ/L (ref 3.5–5.2)
RBC # BLD AUTO: 4.36 MILL/MM3 (ref 4.7–6.1)
REASON FOR REJECTION: NORMAL
REJECTED TEST: NORMAL
SEGMENTED NEUTROPHILS ABSOLUTE COUNT: 10.2 THOU/MM3 (ref 1.8–7.7)
SODIUM SERPL-SCNC: 139 MEQ/L (ref 135–145)
WBC # BLD AUTO: 11.1 THOU/MM3 (ref 4.8–10.8)

## 2023-05-14 PROCEDURE — 96366 THER/PROPH/DIAG IV INF ADDON: CPT

## 2023-05-14 PROCEDURE — 2700000000 HC OXYGEN THERAPY PER DAY

## 2023-05-14 PROCEDURE — 6360000002 HC RX W HCPCS: Performed by: NURSE PRACTITIONER

## 2023-05-14 PROCEDURE — 87205 SMEAR GRAM STAIN: CPT

## 2023-05-14 PROCEDURE — G0378 HOSPITAL OBSERVATION PER HR: HCPCS

## 2023-05-14 PROCEDURE — 85025 COMPLETE CBC W/AUTO DIFF WBC: CPT

## 2023-05-14 PROCEDURE — 85610 PROTHROMBIN TIME: CPT

## 2023-05-14 PROCEDURE — 99232 SBSQ HOSP IP/OBS MODERATE 35: CPT | Performed by: PHYSICIAN ASSISTANT

## 2023-05-14 PROCEDURE — 82948 REAGENT STRIP/BLOOD GLUCOSE: CPT

## 2023-05-14 PROCEDURE — 36415 COLL VENOUS BLD VENIPUNCTURE: CPT

## 2023-05-14 PROCEDURE — 2580000003 HC RX 258: Performed by: STUDENT IN AN ORGANIZED HEALTH CARE EDUCATION/TRAINING PROGRAM

## 2023-05-14 PROCEDURE — 6370000000 HC RX 637 (ALT 250 FOR IP): Performed by: NURSE PRACTITIONER

## 2023-05-14 PROCEDURE — 6370000000 HC RX 637 (ALT 250 FOR IP): Performed by: PHYSICIAN ASSISTANT

## 2023-05-14 PROCEDURE — 6370000000 HC RX 637 (ALT 250 FOR IP): Performed by: PHARMACIST

## 2023-05-14 PROCEDURE — 96367 TX/PROPH/DG ADDL SEQ IV INF: CPT

## 2023-05-14 PROCEDURE — 94760 N-INVAS EAR/PLS OXIMETRY 1: CPT

## 2023-05-14 PROCEDURE — 6360000002 HC RX W HCPCS: Performed by: STUDENT IN AN ORGANIZED HEALTH CARE EDUCATION/TRAINING PROGRAM

## 2023-05-14 PROCEDURE — 80048 BASIC METABOLIC PNL TOTAL CA: CPT

## 2023-05-14 PROCEDURE — 2580000003 HC RX 258: Performed by: NURSE PRACTITIONER

## 2023-05-14 PROCEDURE — 94640 AIRWAY INHALATION TREATMENT: CPT

## 2023-05-14 PROCEDURE — 96361 HYDRATE IV INFUSION ADD-ON: CPT

## 2023-05-14 RX ORDER — ALBUTEROL SULFATE 90 UG/1
2 AEROSOL, METERED RESPIRATORY (INHALATION) EVERY 4 HOURS PRN
Status: DISCONTINUED | OUTPATIENT
Start: 2023-05-14 | End: 2023-05-15 | Stop reason: HOSPADM

## 2023-05-14 RX ORDER — ALBUTEROL SULFATE 2.5 MG/3ML
2.5 SOLUTION RESPIRATORY (INHALATION) 2 TIMES DAILY
Status: DISCONTINUED | OUTPATIENT
Start: 2023-05-14 | End: 2023-05-15

## 2023-05-14 RX ORDER — WARFARIN SODIUM 2.5 MG/1
2.5 TABLET ORAL ONCE
Status: COMPLETED | OUTPATIENT
Start: 2023-05-14 | End: 2023-05-14

## 2023-05-14 RX ORDER — INSULIN LISPRO 100 [IU]/ML
0-8 INJECTION, SOLUTION INTRAVENOUS; SUBCUTANEOUS
Status: DISCONTINUED | OUTPATIENT
Start: 2023-05-14 | End: 2023-05-15 | Stop reason: HOSPADM

## 2023-05-14 RX ORDER — INSULIN LISPRO 100 [IU]/ML
0-4 INJECTION, SOLUTION INTRAVENOUS; SUBCUTANEOUS NIGHTLY
Status: DISCONTINUED | OUTPATIENT
Start: 2023-05-14 | End: 2023-05-15 | Stop reason: HOSPADM

## 2023-05-14 RX ADMIN — METOPROLOL SUCCINATE 100 MG: 100 TABLET, EXTENDED RELEASE ORAL at 11:57

## 2023-05-14 RX ADMIN — CEFTRIAXONE SODIUM 1000 MG: 1 INJECTION, POWDER, FOR SOLUTION INTRAMUSCULAR; INTRAVENOUS at 16:17

## 2023-05-14 RX ADMIN — INSULIN LISPRO 6 UNITS: 100 INJECTION, SOLUTION INTRAVENOUS; SUBCUTANEOUS at 12:02

## 2023-05-14 RX ADMIN — ALBUTEROL SULFATE 1.25 MG: 2.5 SOLUTION RESPIRATORY (INHALATION) at 04:26

## 2023-05-14 RX ADMIN — BUMETANIDE 2 MG: 2 TABLET ORAL at 21:00

## 2023-05-14 RX ADMIN — INSULIN LISPRO 6 UNITS: 100 INJECTION, SOLUTION INTRAVENOUS; SUBCUTANEOUS at 16:56

## 2023-05-14 RX ADMIN — BUMETANIDE 2 MG: 2 TABLET ORAL at 11:57

## 2023-05-14 RX ADMIN — LOSARTAN POTASSIUM 25 MG: 25 TABLET, FILM COATED ORAL at 21:00

## 2023-05-14 RX ADMIN — SODIUM CHLORIDE, PRESERVATIVE FREE 10 ML: 5 INJECTION INTRAVENOUS at 08:10

## 2023-05-14 RX ADMIN — GUAIFENESIN 1200 MG: 600 TABLET, EXTENDED RELEASE ORAL at 08:08

## 2023-05-14 RX ADMIN — SODIUM CHLORIDE, PRESERVATIVE FREE 10 ML: 5 INJECTION INTRAVENOUS at 21:00

## 2023-05-14 RX ADMIN — ALBUTEROL SULFATE 2.5 MG: 2.5 SOLUTION RESPIRATORY (INHALATION) at 15:49

## 2023-05-14 RX ADMIN — ALBUTEROL SULFATE 2.5 MG: 2.5 SOLUTION RESPIRATORY (INHALATION) at 09:00

## 2023-05-14 RX ADMIN — INSULIN LISPRO 1 UNITS: 100 INJECTION, SOLUTION INTRAVENOUS; SUBCUTANEOUS at 08:17

## 2023-05-14 RX ADMIN — EMPAGLIFLOZIN 10 MG: 10 TABLET, FILM COATED ORAL at 08:08

## 2023-05-14 RX ADMIN — WARFARIN SODIUM 2.5 MG: 2.5 TABLET ORAL at 18:48

## 2023-05-14 RX ADMIN — AZITHROMYCIN 500 MG: 250 TABLET, FILM COATED ORAL at 16:09

## 2023-05-14 RX ADMIN — SODIUM CHLORIDE: 9 INJECTION, SOLUTION INTRAVENOUS at 16:14

## 2023-05-14 RX ADMIN — AZITHROMYCIN MONOHYDRATE 500 MG: 500 INJECTION, POWDER, LYOPHILIZED, FOR SOLUTION INTRAVENOUS at 01:44

## 2023-05-14 RX ADMIN — PREDNISONE 40 MG: 20 TABLET ORAL at 08:10

## 2023-05-14 RX ADMIN — ATORVASTATIN CALCIUM 80 MG: 80 TABLET, FILM COATED ORAL at 21:00

## 2023-05-14 RX ADMIN — GUAIFENESIN 1200 MG: 600 TABLET, EXTENDED RELEASE ORAL at 21:00

## 2023-05-15 VITALS
RESPIRATION RATE: 12 BRPM | SYSTOLIC BLOOD PRESSURE: 94 MMHG | OXYGEN SATURATION: 92 % | TEMPERATURE: 97.7 F | WEIGHT: 201 LBS | DIASTOLIC BLOOD PRESSURE: 57 MMHG | HEART RATE: 60 BPM | BODY MASS INDEX: 31.48 KG/M2

## 2023-05-15 LAB
GLUCOSE BLD STRIP.AUTO-MCNC: 190 MG/DL (ref 70–108)
GLUCOSE BLD STRIP.AUTO-MCNC: 250 MG/DL (ref 70–108)
GLUCOSE BLD STRIP.AUTO-MCNC: 282 MG/DL (ref 70–108)
INR PPP: 2.15 (ref 0.85–1.13)

## 2023-05-15 PROCEDURE — 85610 PROTHROMBIN TIME: CPT

## 2023-05-15 PROCEDURE — 97162 PT EVAL MOD COMPLEX 30 MIN: CPT

## 2023-05-15 PROCEDURE — G0378 HOSPITAL OBSERVATION PER HR: HCPCS

## 2023-05-15 PROCEDURE — 94640 AIRWAY INHALATION TREATMENT: CPT

## 2023-05-15 PROCEDURE — 97166 OT EVAL MOD COMPLEX 45 MIN: CPT

## 2023-05-15 PROCEDURE — 6370000000 HC RX 637 (ALT 250 FOR IP): Performed by: NURSE PRACTITIONER

## 2023-05-15 PROCEDURE — 97110 THERAPEUTIC EXERCISES: CPT

## 2023-05-15 PROCEDURE — 97530 THERAPEUTIC ACTIVITIES: CPT

## 2023-05-15 PROCEDURE — 6360000002 HC RX W HCPCS: Performed by: PHYSICIAN ASSISTANT

## 2023-05-15 PROCEDURE — 96361 HYDRATE IV INFUSION ADD-ON: CPT

## 2023-05-15 PROCEDURE — 2700000000 HC OXYGEN THERAPY PER DAY

## 2023-05-15 PROCEDURE — 94760 N-INVAS EAR/PLS OXIMETRY 1: CPT

## 2023-05-15 PROCEDURE — 36415 COLL VENOUS BLD VENIPUNCTURE: CPT

## 2023-05-15 PROCEDURE — 2580000003 HC RX 258: Performed by: NURSE PRACTITIONER

## 2023-05-15 PROCEDURE — 82948 REAGENT STRIP/BLOOD GLUCOSE: CPT

## 2023-05-15 PROCEDURE — 6360000002 HC RX W HCPCS: Performed by: NURSE PRACTITIONER

## 2023-05-15 PROCEDURE — 6370000000 HC RX 637 (ALT 250 FOR IP): Performed by: PHYSICIAN ASSISTANT

## 2023-05-15 RX ORDER — WARFARIN SODIUM 2.5 MG/1
2.5 TABLET ORAL
Status: DISCONTINUED | OUTPATIENT
Start: 2023-05-15 | End: 2023-05-15 | Stop reason: HOSPADM

## 2023-05-15 RX ORDER — PREDNISONE 20 MG/1
40 TABLET ORAL DAILY
Qty: 6 TABLET | Refills: 0 | Status: SHIPPED | OUTPATIENT
Start: 2023-05-16 | End: 2023-05-19

## 2023-05-15 RX ORDER — AZITHROMYCIN 500 MG/1
500 TABLET, FILM COATED ORAL DAILY
Qty: 1 PACKET | Refills: 0 | Status: SHIPPED | OUTPATIENT
Start: 2023-05-15 | End: 2023-05-18

## 2023-05-15 RX ORDER — ALBUTEROL SULFATE 2.5 MG/3ML
2.5 SOLUTION RESPIRATORY (INHALATION) 3 TIMES DAILY
Status: DISCONTINUED | OUTPATIENT
Start: 2023-05-15 | End: 2023-05-15 | Stop reason: HOSPADM

## 2023-05-15 RX ORDER — WARFARIN SODIUM 2.5 MG/1
2.5 TABLET ORAL ONCE
Status: DISCONTINUED | OUTPATIENT
Start: 2023-05-15 | End: 2023-05-15

## 2023-05-15 RX ADMIN — INSULIN LISPRO 4 UNITS: 100 INJECTION, SOLUTION INTRAVENOUS; SUBCUTANEOUS at 16:43

## 2023-05-15 RX ADMIN — BUMETANIDE 2 MG: 2 TABLET ORAL at 10:03

## 2023-05-15 RX ADMIN — INSULIN LISPRO 4 UNITS: 100 INJECTION, SOLUTION INTRAVENOUS; SUBCUTANEOUS at 13:32

## 2023-05-15 RX ADMIN — ALBUTEROL SULFATE 2.5 MG: 2.5 SOLUTION RESPIRATORY (INHALATION) at 08:16

## 2023-05-15 RX ADMIN — PREDNISONE 40 MG: 20 TABLET ORAL at 13:32

## 2023-05-15 RX ADMIN — ALBUTEROL SULFATE 2.5 MG: 2.5 SOLUTION RESPIRATORY (INHALATION) at 14:11

## 2023-05-15 RX ADMIN — EMPAGLIFLOZIN 10 MG: 10 TABLET, FILM COATED ORAL at 10:03

## 2023-05-15 RX ADMIN — GUAIFENESIN 1200 MG: 600 TABLET, EXTENDED RELEASE ORAL at 10:04

## 2023-05-15 RX ADMIN — SODIUM CHLORIDE, PRESERVATIVE FREE 10 ML: 5 INJECTION INTRAVENOUS at 10:07

## 2023-05-15 NOTE — PLAN OF CARE
Problem: Discharge Planning  Goal: Discharge to home or other facility with appropriate resources  5/14/2023 2313 by Modesta Manrique RN  Outcome: Progressing  5/14/2023 2028 by Dava Najjar, RN  Outcome: Progressing  Flowsheets (Taken 5/14/2023 0325 by Celestina Sauer RN)  Discharge to home or other facility with appropriate resources:   Identify barriers to discharge with patient and caregiver   Arrange for needed discharge resources and transportation as appropriate   Identify discharge learning needs (meds, wound care, etc)     Problem: Safety - Adult  Goal: Free from fall injury  5/14/2023 2313 by Modesta Manrique RN  Outcome: Progressing  5/14/2023 2028 by Dava Najjar, RN  Outcome: Progressing  Flowsheets (Taken 5/14/2023 0325 by Celestina Sauer RN)  Free From Fall Injury: Instruct family/caregiver on patient safety  Goal: Isolation precautions  Description: Isolation precautions  5/14/2023 2313 by Modesta Manrique RN  Outcome: Progressing  5/14/2023 2028 by Dava Najjar, RN  Outcome: Progressing  Note: Isolation precautions followed     Problem: Respiratory - Adult  Goal: Achieves optimal ventilation and oxygenation  5/14/2023 2313 by Modesta Manrique RN  Outcome: Progressing  5/14/2023 2028 by Dava Najjar, RN  Outcome: Progressing  Flowsheets (Taken 5/14/2023 0325 by Celestina Sauer RN)  Achieves optimal ventilation and oxygenation:   Assess for changes in respiratory status   Assess for changes in mentation and behavior   Position to facilitate oxygenation and minimize respiratory effort  Goal: Clear lung sounds  Description: Clear lung sounds  5/14/2023 2313 by Modesta Manrique RN  Outcome: Progressing  5/14/2023 2028 by Dava Najjar, RN  Outcome: Progressing  5/14/2023 0916 by Amy Rasuch RCP  Outcome: Progressing     Problem: Chronic Conditions and Co-morbidities  Goal: Patient's chronic conditions and co-morbidity symptoms are

## 2023-05-15 NOTE — CARE COORDINATION
Case Management Assessment  Initial Evaluation    Date/Time of Evaluation: 5/15/2023 2:57 PM  Assessment Completed by: Abiodun Gan RN    If patient is discharged prior to next notation, then this note serves as note for discharge by case management. Patient Name: Rosio Millan                   YOB: 1940  Diagnosis: Dizziness [R42]  Hypoxia [R09.02]  Acute on chronic respiratory failure with hypoxia ([address] [J96.21]  Pneumonia due to infectious organism, unspecified laterality, unspecified part of lung [J18.9]                   Date / Time: 5/13/2023  1:32 PM  Location: [facility]09/009     Patient Admission Status: Observation   Readmission Risk Low 0-14, Mod 15-19), High > 20: No data recorded  Current PCP: WILBERTO Can CNP  PCP verified by CM? Yes    Chart Reviewed: Yes      History Provided by: Patient  Patient Orientation: Alert and Oriented    Patient Cognition: Alert    Hospitalization in the last 30 days (Readmission):  No    If yes, Readmission Assessment in  Navigator will be completed. Advance Directives:      Code Status: DNR-CCA   Patient's Primary Decision Maker is: Legal Next of Kin    Primary Decision MakerAmanda Lai - Niece/Nephew - [phone]    Secondary Decision Maker: Dariusz August POA - Child - [phone]    Supplemental (Other) Decision Maker: Ryan Xiao - Domestic Partner - [phone]    Discharge Planning:    Patient lives with: Spouse/Significant Other   Type of Home: House  Primary Care Giver: Self  Patient Support Systems include: Spouse/Significant Other   Current Financial resources: Medicare  Current community resources: None  Current services prior to admission: Oxygen Therapy (2.5 L/min at HS  thru [facility])  Current DME:  cane  Type of Home Care services:  None    ADLS  Prior functional level: Independent in ADLs/IADLs  Current functional level: Independent in ADLs/IADLs    Family can provide assistance at DC:  Yes  Would you like

## 2023-05-15 NOTE — PROGRESS NOTES
[address] [facility] - [id number]    Time In: 0461  Time Out: 9297  Timed Code Treatment Minutes: 15 Minutes  Minutes: 29    Date: 5/15/2023  Patient Name: Can Clement,  Gender:  male        MRN: [id number]  : 1940  (79 y.o.)  Referral Date : 23   Referring Practitioner: Norvin Boas, PA-C  Diagnosis: Dizziness  Additional Pertinent Hx: Can Clement is a 79 y.o. male with PMHx of COPD, type 2 diabetes, chronic HFrEF, CAP, A-fib RVR, CAD  who presents to the emergency department for evaluation of lightheadedness. Patient states that today when he was at the [employer] sitting at the table about to eat he had a sudden onset of lightheadedness. Patient states that his forehead got really hot. Patient denies any dizziness but just states that it was lightheadedness. Patient states that he was able to ambulate to the bathroom and once he got back from the bathroom, his symptoms had resolved. Patient denies any chest pain or shortness of breath associated with this episode. Patient denies any recent sick contacts, denies any recent illnesses. Denies any fevers, chills. Patient normally not on any oxygen at baseline. States that he does use 2.5 L at night to help him with sleep. The patient has no other acute complaints at this time. Pt. being treated for pneumonia. Restrictions/Precautions:  Restrictions/Precautions: Fall Risk    Subjective:  Chart Reviewed: Yes  Patient assessed for rehabilitation services?: Yes  Family / Caregiver Present: No  Subjective: RN approved visit.  Pt seated at EOB on arrival and agreeable to therapy    General:  Overall Orientation Status: Within Normal Limits  Vision: Within Functional Limits  Hearing: Within functional limits     Pain: 0/10:     Vitals: Oxygen: 92% at rest with 1 L, desaturate to 88% with amb, titrate to 2 L  to maintain 90%    Social/Functional History:    Lives With:
A home oxygen evaluation has been completed. [x]Patient is an inpatient. It is expected that the patient will be discharged within the next 48 hours. Qualified provider to write order for home prescription if patient qualifies. Social service/care managers will arrange for home oxygen. If patient is active, arrange for Home Medical supplier to assess for Oxygen Conserving Device per pulse oximetry. []Patient is an outpatient. Results will be faxed to the ordering provider. Qualified provider to write order for home prescription if patient qualifies and arranges for home oxygen. Patient was placed on room air for 10 minutes. SpO2 was 92 % on room air at rest. Patients SpO2 was 89% or above and did not qualify for home oxygen. Patient was walked for 10 minutes. SpO2 was 87 % during walking. Patients SpO2 was below 89% and qualified for home oxygen. Oxygen was applied at 1 lpm via nasal cannula to maintain a SpO2 between 90-92% while walking. Actual SpO2 was 90 %.
Discharge instructions given with patient verbalizing the understanding of.  Patient discharged to home with spouse
Hospitalist Progress Note    Patient:  Felicity Grace      Unit/Bed:7K-09/009-A    YOB: 1940    MRN: [id number]       Acct: [de-identified]     PCP: WILBERTO Zhao CNP    Date of Admission: 5/13/2023    Assessment/Plan:    Lightheadedness, sudden onset with associated facial flushing   -Suspect 2/2 acute hypoxia   -PT/OT eval and treat     Probable pneumonia with acute COPD exacerbation  Acute on chronic hypoxic respiratory failure:   -Continve IV abx   -PNA panel ordered   -Wean supplemental O2 as able   -Pulmonary hygiene   -Duonebs PRN     Other significant medical history:  CAD s/p stent    Afib: On Warfarin   -Pharmacy to dose   -INR goal 2-3   -Continue Toprol XL    HFrEF  -Daily weights  -Strict I's & O's    Severe AS s/p AV replacement    SSS s/p PPM    IDDM type 2 with hyperglycemia   -Continue lantus and SSI  -Carb controlled diet  -Accu-checks  -Hypoglycemia protocol    CKD stage IV  -sCr at baseline 2.2    Chronically elevated troponin likely 2/2 CKD  -elevated but below baseline     DVT prophylaxis: SCDs/coumadin  Code status: DNR-CCA    Chief Complaint: lightheadedness      Subjective: 79 y.o. male admitted to the hospitalist service for lightheadedness. Patient denies chest pain. He denies SOB. He denies nausea or vomiting. Patient endorses BM today.       Medications:    Infusion Medications    dextrose      sodium chloride 20 mL/hr at 05/14/23 1614     Scheduled Medications    albuterol  2.5 mg Nebulization BID    insulin lispro  0-8 Units SubCUTAneous TID WC    insulin lispro  0-4 Units SubCUTAneous Nightly    atorvastatin  80 mg Oral Nightly    Budeson-Glycopyrrol-Formoterol  2 puff Inhalation BID    empagliflozin  10 mg Oral Daily    guaiFENesin  1,200 mg Oral BID    insulin glargine  16 Units SubCUTAneous Nightly    losartan  25 mg Oral QHS    metoprolol succinate  100 mg Oral Daily    sodium chloride flush  5-40 mL IntraVENous 2 times per day    cefTRIAXone (ROCEPHIN)
Patient is mobile in home and will require portable O2
Warfarin Pharmacy Consult Note    Warfarin Indication: A fib/flutter  Target INR: 2.0-3.0  Dose prior to admission: 1.25 mg MF, 2.5 mg TWThSS    Recent Labs     05/13/23  1400 05/14/23  0559 05/15/23  0605   INR 2.70* 2.55* 2.15*     Recent Labs     05/13/23  1400 05/14/23  0559   HGB 14.6 13.7*    155     Concurrent anticoagulants/antiplatelets: none  Significant warfarin drug-drug interactions: prednisone, azithromycin      Date INR Warfarin Dose    5/13/23  2.70  2 mg    5/14/23  2.55  2.5 mg    5/15/23  2.15  2.5 mg                                     Monitoring:                   INR will be monitored daily until therapeutic INR is achieved.     **Please contact pharmacy for discharge instructions when indicated**    Stevenson Hamlin PharmD, BCPS, BCCCP  5/15/2023 12:06 PM
awareness  Prognosis: Good  REQUIRES OT FOLLOW-UP: Yes    Treatment Initiated: Treatment and education initiated within context of evaluation. Evaluation time included review of current medical information, gathering information related to past medical, social and functional history, completion of standardized testing, formal and informal observation of tasks, assessment of data and development of plan of care and goals. Treatment time included skilled education and facilitation of tasks to increase safety and independence with ADL's for improved functional independence and quality of life. Discharge Recommendations:  Continue to assess pending progress, Home with assist PRN    Patient Education:     Patient Education  Education Given To: Patient  Education Provided: Role of Therapy, Plan of Care, Precautions, ADL Adaptive Strategies, Transfer Training  Education Method: Demonstration, Verbal  Barriers to Learning: None  Education Outcome: Demonstrated understanding, Verbalized understanding    Equipment Recommendations:  Equipment Needed: No    Plan:  Times Per Week: 3-5x  Times Per Day: Once a day  Current Treatment Recommendations: Strengthening, Balance training, Functional mobility training, Endurance training, Safety education & training, Patient/Caregiver education & training, Self-Care / ADL, Home management training, Equipment evaluation, education, & procurement. See long-term goal time frame for expected duration of plan of care. If no long-term goals established, a short length of stay is anticipated. Goals:  Patient goals : Go Home  Short Term Goals  Time Frame for Short Term Goals: Until discharge  Short Term Goal 1: Pt will complete BUE strengthening exercises with min vcs for technique to increase indep and endurance with all self cares.   Short Term Goal 2: Pt will complete standing tolerance x 5 minutes with S and min vcs for safety to increase indep and endurance with all sinkside

## 2023-05-15 NOTE — PLAN OF CARE
Problem: Discharge Planning  Goal: Discharge to home or other facility with appropriate resources  Outcome: Progressing  Flowsheets (Taken 5/14/2023 0325 by Kamar Baron RN)  Discharge to home or other facility with appropriate resources:   Identify barriers to discharge with patient and caregiver   Arrange for needed discharge resources and transportation as appropriate   Identify discharge learning needs (meds, wound care, etc)     Problem: Safety - Adult  Goal: Free from fall injury  Outcome: Progressing  Flowsheets (Taken 5/14/2023 0325 by Kamar Baron RN)  Free From Fall Injury: Instruct family/caregiver on patient safety     Problem: Safety - Adult  Goal: Isolation precautions  Description: Isolation precautions  Outcome: Progressing  Note: Isolation precautions followed     Problem: Respiratory - Adult  Goal: Achieves optimal ventilation and oxygenation  Outcome: Progressing  Flowsheets (Taken 5/14/2023 0325 by Kamar Baron RN)  Achieves optimal ventilation and oxygenation:   Assess for changes in respiratory status   Assess for changes in mentation and behavior   Position to facilitate oxygenation and minimize respiratory effort     Problem: Respiratory - Adult  Goal: Clear lung sounds  Description: Clear lung sounds  5/14/2023 2028 by Leana Crocker RN  Outcome: Progressing     Problem: Chronic Conditions and Co-morbidities  Goal: Patient's chronic conditions and co-morbidity symptoms are monitored and maintained or improved  Outcome: Progressing  Flowsheets (Taken 5/14/2023 2028)  Care Plan - Patient's Chronic Conditions and Co-Morbidity Symptoms are Monitored and Maintained or Improved: Monitor and assess patient's chronic conditions and comorbid symptoms for stability, deterioration, or improvement   Care plan reviewed with patient and wife. Patient and wife verbalize understanding of the plan of care and contribute to goal setting.

## 2023-05-15 NOTE — DISCHARGE INSTR - DIET
Good nutrition is important when healing from an illness, injury, or surgery. Follow any nutrition recommendations given to you during your hospital stay. If you were given an oral nutrition supplement while in the hospital, continue to take this supplement at home. You can take it with meals, in-between meals, and/or before bedtime. These supplements can be purchased at most local grocery stores, pharmacies, and chain [employer]-stores. If you have any questions about your diet or nutrition, call the hospital and ask for the dietitian.   Regular diet, carb control as tolerated

## 2023-05-15 NOTE — DISCHARGE INSTRUCTIONS
Pneumonia: Care Instructions  Overview     Pneumonia is an infection of the lungs. Most cases are caused by infections from bacteria or viruses. Pneumonia may be mild or very severe. If it is caused by bacteria, you will be treated with antibiotics. It may take a few weeks to a few months to recover fully from pneumonia, depending on how sick you were and whether your overall health is good. Follow-up care is a key part of your treatment and safety. Be sure to make and go to all appointments, and call your doctor if you are having problems. It's also a good idea to know your test results and keep a list of the medicines you take. How can you care for yourself at home? Take your antibiotics exactly as directed. Do not stop taking the medicine just because you are feeling better. You need to take the full course of antibiotics. Take your medicines exactly as prescribed. Call your doctor if you think you are having a problem with your medicine. Get plenty of rest and sleep. You may feel weak and tired for a while, but your energy level will improve with time. To prevent dehydration, drink plenty of fluids. Choose water and other clear liquids. If you have kidney, heart, or liver disease and have to limit fluids, talk with your doctor before you increase the amount of fluids you drink. Take care of your cough so you can rest. A cough that brings up mucus from your lungs is common with pneumonia. It is one way your body gets rid of the infection. But if coughing keeps you from resting or causes severe fatigue and chest-wall pain, talk to your doctor. Your doctor may suggest that you take a medicine to reduce the cough. Use a vaporizer or humidifier to add moisture to your bedroom. Follow the directions for cleaning the machine. Do not smoke or allow others to smoke around you. Smoke will make your cough last longer. If you need help quitting, talk to your doctor about stop-smoking programs and medicines.

## 2023-05-15 NOTE — RT PROTOCOL NOTE
RT Inhaler-Nebulizer Bronchodilator Protocol Note    There is a bronchodilator order in the chart from a provider indicating to follow the RT Bronchodilator Protocol and there is an Initiate RT Inhaler-Nebulizer Bronchodilator Protocol order as well (see protocol at bottom of note). CXR Findings:  No results found. The findings from the last RT Protocol Assessment were as follows:   History Pulmonary Disease: Chronic pulmonary disease  Respiratory Pattern: Dyspnea on exertion or RR 21-25 bpm  Breath Sounds: Inspiratory and expiratory or bilateral wheezing and/or rhonchi  Cough: Strong, spontaneous, non-productive  Indication for Bronchodilator Therapy: Decreased or absent breath sounds, On home bronchodilators  Bronchodilator Assessment Score: 10    Aerosolized bronchodilator medication orders have been revised according to the RT Inhaler-Nebulizer Bronchodilator Protocol below. Respiratory Therapist to perform RT Therapy Protocol Assessment initially then follow the protocol. Repeat RT Therapy Protocol Assessment PRN for score 0-3 or on second treatment, BID, and PRN for scores above 3. No Indications - adjust the frequency to every 6 hours PRN wheezing or bronchospasm, if no treatments needed after 48 hours then discontinue using Per Protocol order mode. If indication present, adjust the RT bronchodilator orders based on the Bronchodilator Assessment Score as indicated below. Use Inhaler orders unless patient has one or more of the following: on home nebulizer, not able to hold breath for 10 seconds, is not alert and oriented, cannot activate and use MDI correctly, or respiratory rate 25 breaths per minute or more, then use the equivalent nebulizer order(s) with same Frequency and PRN reasons based on the score. If a patient is on this medication at home then do not decrease Frequency below that used at home.     0-3 - enter or revise RT bronchodilator order(s) to equivalent RT Bronchodilator

## 2023-05-15 NOTE — DISCHARGE SUMMARY
Hospital Medicine Discharge Summary      Patient Identification:   Lina Givens   : 1940  MRN: [id number]   Account: [de-identified]      Patient's PCP: WILBERTO Chester CNP    Admit Date: 2023     Discharge Date: 5/15/23    Admitting Physician: No admitting provider for patient encounter. Discharge Physician: Kermit Ortega PA-C     Lina Givens is a 79 y.o. male admitted to [address] [place] [address] on 2023. HPI On Admission From H&P:  ***      Assessment/Plan With Discharge Diagnoses:  ***      Discharged on oxygen at 1 LPM with ambulation. Patient with     Hospital Course: The patient was seen *** on day of discharge and this discharge summary is in conjunction with any daily progress note from day of discharge. The patient is discharged in stable condition. Exam:     Vitals:  Vitals:    05/15/23 0816 05/15/23 1005 05/15/23 1315 05/15/23 1412   BP:  (!) 92/54 (!) 94/57    Pulse: 65 59 59 60   Resp: 18  20 12   Temp:   97.7 °F (36.5 °C)    TempSrc:   Oral    SpO2:   93% 92%   Weight:         Weight: Weight - Scale: 201 lb (91.2 kg)     24 hour intake/output:  Intake/Output Summary (Last 24 hours) at 5/15/2023 1631  Last data filed at 5/15/2023 1008  Gross per 24 hour   Intake 480 ml   Output --   Net 480 ml         Labs:  For convenience and continuity at follow-up the following most recent labs are provided:    CBC:    Lab Results   Component Value Date/Time    WBC 11.1 2023 05:59 AM    HGB 13.7 2023 05:59 AM    HCT 42.9 2023 05:59 AM     2023 05:59 AM       Renal:    Lab Results   Component Value Date/Time     2023 05:59 AM    K 3.9 2023 05:59 AM     2023 05:59 AM    CO2 20 2023 05:59 AM    BUN 36 2023 05:59 AM    CREATININE 2.2 2023 05:59 AM    CALCIUM 8.4 2023 05:59 AM    PHOS 3.2 2023 10:57 AM         Significant Diagnostic Studies    Radiology:   XR CHEST

## 2023-05-15 NOTE — PLAN OF CARE
Problem: Respiratory - Adult  Goal: Achieves optimal ventilation and oxygenation  5/15/2023 0825 by Nancy Gentile RCP  Outcome: Progressing  5/14/2023 2313 by Selam Gardiner RN  Outcome: Progressing  5/14/2023 2028 by Geronimo Mathis RN  Outcome: Progressing  Flowsheets (Taken 5/14/2023 0325 by Parisa Miller RN)  Achieves optimal ventilation and oxygenation:   Assess for changes in respiratory status   Assess for changes in mentation and behavior   Position to facilitate oxygenation and minimize respiratory effort  Goal: Clear lung sounds  Description: Clear lung sounds  5/15/2023 0825 by Nancy Gentile RCP  Outcome: Progressing  5/14/2023 2313 by Selam Gardiner RN  Outcome: Progressing  5/14/2023 2028 by Geronimo Mathis RN  Outcome: Progressing

## 2023-05-15 NOTE — RT PROTOCOL NOTE
RT Inhaler-Nebulizer Bronchodilator Protocol Note    There is a bronchodilator order in the chart from a provider indicating to follow the RT Bronchodilator Protocol and there is an Initiate RT Inhaler-Nebulizer Bronchodilator Protocol order as well (see protocol at bottom of note). CXR Findings:  No results found. The findings from the last RT Protocol Assessment were as follows:   History Pulmonary Disease: Chronic pulmonary disease  Respiratory Pattern: Dyspnea on exertion or RR 21-25 bpm  Breath Sounds: Intermittent or unilateral wheezes  Cough: Strong, spontaneous, non-productive  Indication for Bronchodilator Therapy: Decreased or absent breath sounds, On home bronchodilators  Bronchodilator Assessment Score: 8    Aerosolized bronchodilator medication orders have been revised according to the RT Inhaler-Nebulizer Bronchodilator Protocol below. Respiratory Therapist to perform RT Therapy Protocol Assessment initially then follow the protocol. Repeat RT Therapy Protocol Assessment PRN for score 0-3 or on second treatment, BID, and PRN for scores above 3. No Indications - adjust the frequency to every 6 hours PRN wheezing or bronchospasm, if no treatments needed after 48 hours then discontinue using Per Protocol order mode. If indication present, adjust the RT bronchodilator orders based on the Bronchodilator Assessment Score as indicated below. Use Inhaler orders unless patient has one or more of the following: on home nebulizer, not able to hold breath for 10 seconds, is not alert and oriented, cannot activate and use MDI correctly, or respiratory rate 25 breaths per minute or more, then use the equivalent nebulizer order(s) with same Frequency and PRN reasons based on the score. If a patient is on this medication at home then do not decrease Frequency below that used at home.     0-3 - enter or revise RT bronchodilator order(s) to equivalent RT Bronchodilator order with Frequency of every 4

## 2023-05-16 ENCOUNTER — TELEPHONE (OUTPATIENT)
Dept: PHARMACY | Age: 83
End: 2023-05-16

## 2023-05-16 ENCOUNTER — TELEPHONE (OUTPATIENT)
Dept: FAMILY MEDICINE CLINIC | Age: 83
End: 2023-05-16

## 2023-05-16 NOTE — TELEPHONE ENCOUNTER
Patient called to report he was discharged from hospital yesterday, Monday. States he had 2 mg Coumadin 5/13, then 2.5 mg 5/14 and 5/15 (same as in pharmacy notes). Usual dose is 1.25 mg MF, 2.5 mg TWThSS. Also noted Zithromax 500 mg ordered yesterday for 3 days, as well as Prednisone 20 mg, 2 tablets daily for 3 doses. Currently on clinic schedule 6/1.   Message forwarded to clinic pharmacist.

## 2023-05-16 NOTE — TELEPHONE ENCOUNTER
Care Transitions Initial Follow Up Call    Outreach made within 2 business days of discharge: Yes    Patient: Rony Mercado Patient : 1940   MRN: [id number]  Reason for Admission: There are no discharge diagnoses documented for the most recent discharge. Discharge Date: 5/15/23       Spoke with: Patient    Discharge department/facility: [facility] TCM Interactive Patient Contact:  Was patient able to fill all prescriptions: Yes  Was patient instructed to bring all medications to the follow-up visit: Yes  Is patient taking all medications as directed in the discharge summary?  Yes  Does patient understand their discharge instructions: Yes  Does patient have questions or concerns that need addressed prior to 7-14 day follow up office visit: no    Scheduled appointment with PCP within 7-14 days    Follow Up  Future Appointments   Date Time Provider Talha Almanza   2023  2:20 PM WILBERTO Akers - [id number] [address] [email or web address]   2023 10:20 AM [facility] [place]   2023 10:00 AM Ese Bullard APRN - [id number] [address] - [place]   2023  1:40 PM DO MARGARITO Martinez [facility], [facility] [place]   2023  9:20 AM SCHEDULE, [facility]ARDIOLOGY PACER [facility] NW CARD [facility] ANA ROSA [place]   2024 10:00 AM Blue Hooks MD [address] CARD [place] Hutton LPN

## 2023-05-16 NOTE — TELEPHONE ENCOUNTER
Called clinic pharmacist's orders to patient: continue usual Coumadin regimen of 1.25 mg MF, 2.5 mg TWThSS, but need to see next week, not 6/1. Given new appt. for 1020, 5/24. Understanding/agreement voiced.

## 2023-05-17 ENCOUNTER — TELEPHONE (OUTPATIENT)
Dept: FAMILY MEDICINE CLINIC | Age: 83
End: 2023-05-17

## 2023-05-17 DIAGNOSIS — R22.30 NODULE OF SKIN OF HAND: Primary | ICD-10-CM

## 2023-05-17 NOTE — TELEPHONE ENCOUNTER
Patient stated the nodule has not gotten any better and is agreeable to a dermatology referral to whoever you recommend.

## 2023-05-17 NOTE — TELEPHONE ENCOUNTER
----- Message from WILBERTO Alejo CNP sent at 5/3/2023 12:10 PM EDT -----  Please see if left hand nodule has improved or gone away. If not can get him in with Derm.

## 2023-05-18 LAB
BACTERIA BLD AEROBE CULT: NORMAL
BACTERIA BLD AEROBE CULT: NORMAL

## 2023-05-23 ENCOUNTER — OFFICE VISIT (OUTPATIENT)
Dept: FAMILY MEDICINE CLINIC | Age: 83
End: 2023-05-23

## 2023-05-23 VITALS
DIASTOLIC BLOOD PRESSURE: 78 MMHG | TEMPERATURE: 97 F | HEIGHT: 67 IN | HEART RATE: 61 BPM | BODY MASS INDEX: 32.46 KG/M2 | OXYGEN SATURATION: 97 % | SYSTOLIC BLOOD PRESSURE: 130 MMHG | WEIGHT: 206.8 LBS | RESPIRATION RATE: 16 BRPM

## 2023-05-23 DIAGNOSIS — J18.9 PNEUMONIA DUE TO INFECTIOUS ORGANISM, UNSPECIFIED LATERALITY, UNSPECIFIED PART OF LUNG: Primary | ICD-10-CM

## 2023-05-23 DIAGNOSIS — Z09 HOSPITAL DISCHARGE FOLLOW-UP: ICD-10-CM

## 2023-05-23 NOTE — PROGRESS NOTES
Post-Discharge Transitional Care Follow Up      Bridger Edwards   YOB: 1940    Date of Office Visit:  5/23/2023  Date of Hospital Admission: 5/13/23  Date of Hospital Discharge: 5/15/23  Readmission Risk Score (high >=14%. Medium >=10%):No data recorded    Care management risk score Rising risk (score 2-5) and Complex Care (Scores >=6): No Risk Score On File     Non face to face  following discharge, date last encounter closed (first attempt may have been earlier): 05/16/2023     Call initiated 2 business days of discharge: Yes     Pneumonia due to infectious organism, unspecified laterality, unspecified part of lung  -     XR CHEST (2 VW); Future  Hospital discharge follow-up  -     MA DISCHARGE MEDS RECONCILED W/ CURRENT OUTPATIENT MED LIST  Sounds good today  VS are stable. Will get cxr to ensure resolution of pneumonia    Medical Decision Making: low complexity  No follow-ups on file. On this date 5/23/2023 I have spent 30+ minutes reviewing previous notes, test results and face to face with the patient discussing the diagnosis and importance of compliance with the treatment plan as well as documenting on the day of the visit. Subjective:   HPI    Inpatient course: Discharge summary reviewed- see chart. Interval history/Current status: Doing well since discharge home. Denies chest pain, worsening sob, lightheadedness, edema. Tolerating diet, drinking appropriate amount of water. Voiding well, bowels are moving. Using O2 as ordered. Has follow up with Pulm at [facility]. Just finish atb and steroid.       Patient Active Problem List   Diagnosis    Atrial fibrillation with RVR ([facility])    Acute respiratory failure with hypoxia ([facility])    CKD (chronic kidney disease) stage 3, GFR 30-59 ml/min ([facility])    Hyponatremia    Aortic stenosis, severe    Coronary artery disease involving native coronary artery of native heart with angina pectoris ([facility])    CHF (congestive heart failure), NYHA class

## 2023-05-24 ENCOUNTER — HOSPITAL ENCOUNTER (OUTPATIENT)
Dept: PHARMACY | Age: 83
Setting detail: THERAPIES SERIES
Discharge: HOME | End: 2023-05-24
Payer: MEDICARE

## 2023-05-24 ENCOUNTER — HOSPITAL ENCOUNTER (OUTPATIENT)
Dept: GENERAL RADIOLOGY | Age: 83
Discharge: HOME | End: 2023-05-24
Payer: MEDICARE

## 2023-05-24 ENCOUNTER — TELEPHONE (OUTPATIENT)
Dept: FAMILY MEDICINE CLINIC | Age: 83
End: 2023-05-24

## 2023-05-24 ENCOUNTER — HOSPITAL ENCOUNTER (OUTPATIENT)
Age: 83
Discharge: HOME | End: 2023-05-24
Payer: MEDICARE

## 2023-05-24 DIAGNOSIS — Z79.01 ANTICOAGULATED ON COUMADIN: ICD-10-CM

## 2023-05-24 DIAGNOSIS — I48.21 PERMANENT ATRIAL FIBRILLATION (HCC): Primary | ICD-10-CM

## 2023-05-24 DIAGNOSIS — Z51.81 ENCOUNTER FOR THERAPEUTIC DRUG MONITORING: ICD-10-CM

## 2023-05-24 DIAGNOSIS — J18.9 PNEUMONIA DUE TO INFECTIOUS ORGANISM, UNSPECIFIED LATERALITY, UNSPECIFIED PART OF LUNG: ICD-10-CM

## 2023-05-24 LAB — POC INR: 1.9 (ref 0.8–1.2)

## 2023-05-24 PROCEDURE — 36416 COLLJ CAPILLARY BLOOD SPEC: CPT

## 2023-05-24 PROCEDURE — 85610 PROTHROMBIN TIME: CPT

## 2023-05-24 PROCEDURE — 71046 X-RAY EXAM CHEST 2 VIEWS: CPT

## 2023-05-24 PROCEDURE — 99211 OFF/OP EST MAY X REQ PHY/QHP: CPT

## 2023-05-24 NOTE — PROGRESS NOTES
Medication Management [address] [place] [address]  [phone] (phone)  [phone] (fax)    Mr. Adan Curtis is a 79 y.o.  male with history of atrial fib. , per Dr. Hortencia Watters referral, who presents today for Warfarin monitoring and adjustment (1 week visit after finishing Prednisone). Was admitted to [address] [place] [address] 5/13-5/15 for lightheadedness/pneumonia. On 5/14, was ordered 3 days of Zithromax and 3 days of Prednisone 40 mg.  Received 2 mg Coumadin 5/13 for INR of 2.7, 2.5 mg next day for INR of 2.55, then 2.5 mg Monday, 5/15, for INR of 2.15. Patient verifies current dosing regimen and tablet strength. Uses pill box. No missed doses. Patient denies bleeding/SOB/chest pain. Has usual slight left leg swelling. Has bruises \"from the hospital.\"  No blood in urine or stool. No dietary changes. No changes in medication/OTC agents/herbals. Has oxygen now for activity/sleeping (not wearing today). No change in alcohol use or tobacco use. No change in activity level. Had more stress. Patient denies headaches/dizziness/lightheadedness/falls. No vomiting/diarrhea or acute illness. No procedures scheduled in the future at this time. Assessment:   Lab Results   Component Value Date    INR 1.90 (H) 05/24/2023    INR 2.15 (H) 05/15/2023    INR 2.55 (H) 05/14/2023     INR subtherapeutic - goal 2-3. Recent Labs     05/24/23  1030   INR 1.90*        Plan:  POCT INR performed/result reviewed. 3.75 mg today, W, then continue PO Coumadin 1.25 mg MF, 2.5 mg TWThSS. Recheck INR in 2 week(s). (Report given - orders received from/verified with Uvaldo Collet., PharmD.)   Patient reminded to call the Anticoagulation Clinic with any signs or symptoms of bleeding or with any medication changes. Patient given instructions utilizing the teach back method. After visit summary printed and reviewed with patient. Discharged ambulatory in no apparent distress.     For Pharmacy Admin

## 2023-05-26 ENCOUNTER — TELEPHONE (OUTPATIENT)
Dept: FAMILY MEDICINE CLINIC | Age: 83
End: 2023-05-26

## 2023-06-07 ENCOUNTER — HOSPITAL ENCOUNTER (OUTPATIENT)
Dept: PHARMACY | Age: 83
Setting detail: THERAPIES SERIES
Discharge: HOME | End: 2023-06-07
Payer: MEDICARE

## 2023-06-07 DIAGNOSIS — Z51.81 ENCOUNTER FOR THERAPEUTIC DRUG MONITORING: ICD-10-CM

## 2023-06-07 DIAGNOSIS — I48.21 PERMANENT ATRIAL FIBRILLATION (HCC): Primary | ICD-10-CM

## 2023-06-07 DIAGNOSIS — Z79.01 ANTICOAGULATED ON COUMADIN: ICD-10-CM

## 2023-06-07 LAB — POC INR: 2.6 (ref 0.8–1.2)

## 2023-06-07 PROCEDURE — 99211 OFF/OP EST MAY X REQ PHY/QHP: CPT

## 2023-06-07 PROCEDURE — 85610 PROTHROMBIN TIME: CPT

## 2023-06-07 PROCEDURE — 36416 COLLJ CAPILLARY BLOOD SPEC: CPT

## 2023-06-07 NOTE — PROGRESS NOTES
Medication Management [address]  [phone] (phone)  [phone] (fax)    Mr. Lillie Morales is a 79 y.o.  male with history of atrial fib. , per Dr. Isai Christopher referral, who presents today for Warfarin monitoring and adjustment (2 week visit). Patient verifies current dosing regimen and tablet strength. No missed or extra doses, except for bolus ordered last visti. Patient denies bleeding/chest pain. Has usual SOB/easy bruising. Had some left leg swelling that resolved. No blood in urine or stool. No dietary changes. No changes in medication/OTC agents/herbals. No change in alcohol use or tobacco use. No change in activity level. Patient denies headaches/dizziness/lightheadedness/falls. No vomiting/diarrhea or acute illness. No procedures scheduled in the future at this time. Assessment:     Lab Results   Component Value Date    INR 2.60 (H) 06/07/2023    INR 1.90 (H) 05/24/2023    INR 2.15 (H) 05/15/2023     INR therapeutic - goal 2-3. Recent Labs     06/07/23  1035   INR 2.60*      Plan:  POCT INR performed/result reviewed. Continue PO Coumadin 1.25 mg MF, 2.5 mg TWThSS. Recheck INR in 4 week(s), per his request (offered visit 7/11 - same day as PCP's, but declined). Patient reminded to call the Anticoagulation Clinic with any signs or symptoms of bleeding or with any medication changes. Patient given instructions utilizing the teach back method. After visit summary printed and reviewed with patient. Discharged ambulatory in no apparent distress.     For Pharmacy Admin Tracking Only    Time Spent (min): 20

## 2023-07-05 ENCOUNTER — HOSPITAL ENCOUNTER (OUTPATIENT)
Dept: PHARMACY | Age: 83
Setting detail: THERAPIES SERIES
Discharge: HOME | End: 2023-07-05
Payer: MEDICARE

## 2023-07-05 DIAGNOSIS — I48.21 PERMANENT ATRIAL FIBRILLATION (HCC): Primary | ICD-10-CM

## 2023-07-05 DIAGNOSIS — Z79.01 ANTICOAGULATED ON COUMADIN: ICD-10-CM

## 2023-07-05 DIAGNOSIS — Z51.81 ENCOUNTER FOR THERAPEUTIC DRUG MONITORING: ICD-10-CM

## 2023-07-05 LAB — POC INR: 2.8 (ref 0.8–1.2)

## 2023-07-05 PROCEDURE — 36416 COLLJ CAPILLARY BLOOD SPEC: CPT

## 2023-07-05 PROCEDURE — 99211 OFF/OP EST MAY X REQ PHY/QHP: CPT

## 2023-07-05 PROCEDURE — 85610 PROTHROMBIN TIME: CPT

## 2023-07-05 NOTE — PROGRESS NOTES
Medication Management [address] [place] [address]  [phone] (phone)  [phone] (fax)    Mr. Winter Rossi is a 79 y.o.  male with history of atrial fib. , per Dr. Meme Purdy referral, who presents today for Warfarin monitoring and adjustment (4 week visit). Patient verifies current dosing regimen and tablet strength. No missed or extra doses. Patient denies bleeding/swelling/SOB. Has usual easy bruising. Has usual left leg swelling - states has upcoming appt. for it. No dietary changes. No changes in medication/OTC agents/herbals. No change in alcohol use or tobacco use. No change in activity level. Patient denies falls. No vomiting/diarrhea or acute illness. No procedures scheduled in the future at this time. States sugar was 117. Oxygen now just at night. Assessment:   Lab Results   Component Value Date    INR 2.80 (H) 07/05/2023    INR 2.60 (H) 06/07/2023    INR 1.90 (H) 05/24/2023     INR therapeutic - goal 2-3. Recent Labs     07/05/23  1040   INR 2.80*        Plan:  POCT INR performed/result reviewed. Continue PO Coumadin 1.25 mg MF, 2.5 mg TWThSS. Recheck INR in 5 week(s) - same day as nephrology visit (lives out of town). Patient reminded to call the Anticoagulation Clinic with any signs or symptoms of bleeding or with any medication changes. Patient given instructions utilizing the teach back method. After visit summary printed and reviewed with patient. Discharged ambulatory in no apparent distress.     For Pharmacy Admin Tracking Only    Time Spent (min): 20

## 2023-07-11 ENCOUNTER — OFFICE VISIT (OUTPATIENT)
Dept: FAMILY MEDICINE CLINIC | Age: 83
End: 2023-07-11
Payer: MEDICARE

## 2023-07-11 VITALS
OXYGEN SATURATION: 95 % | SYSTOLIC BLOOD PRESSURE: 124 MMHG | TEMPERATURE: 97.8 F | BODY MASS INDEX: 32.96 KG/M2 | RESPIRATION RATE: 16 BRPM | WEIGHT: 210 LBS | HEART RATE: 61 BPM | DIASTOLIC BLOOD PRESSURE: 76 MMHG | HEIGHT: 67 IN

## 2023-07-11 DIAGNOSIS — E11.22 TYPE 2 DIABETES MELLITUS WITH STAGE 4 CHRONIC KIDNEY DISEASE, WITH LONG-TERM CURRENT USE OF INSULIN (HCC): Primary | ICD-10-CM

## 2023-07-11 DIAGNOSIS — J44.1 CHRONIC OBSTRUCTIVE PULMONARY DISEASE WITH ACUTE EXACERBATION (HCC): ICD-10-CM

## 2023-07-11 DIAGNOSIS — Z79.4 TYPE 2 DIABETES MELLITUS WITH STAGE 4 CHRONIC KIDNEY DISEASE, WITH LONG-TERM CURRENT USE OF INSULIN (HCC): Primary | ICD-10-CM

## 2023-07-11 DIAGNOSIS — N18.4 TYPE 2 DIABETES MELLITUS WITH STAGE 4 CHRONIC KIDNEY DISEASE, WITH LONG-TERM CURRENT USE OF INSULIN (HCC): Primary | ICD-10-CM

## 2023-07-11 LAB — HBA1C MFR BLD: 9.4 % (ref 4.3–5.7)

## 2023-07-11 PROCEDURE — 99215 OFFICE O/P EST HI 40 MIN: CPT | Performed by: NURSE PRACTITIONER

## 2023-07-11 PROCEDURE — 3078F DIAST BP <80 MM HG: CPT | Performed by: NURSE PRACTITIONER

## 2023-07-11 PROCEDURE — 1036F TOBACCO NON-USER: CPT | Performed by: NURSE PRACTITIONER

## 2023-07-11 PROCEDURE — 3074F SYST BP LT 130 MM HG: CPT | Performed by: NURSE PRACTITIONER

## 2023-07-11 PROCEDURE — G8417 CALC BMI ABV UP PARAM F/U: HCPCS | Performed by: NURSE PRACTITIONER

## 2023-07-11 PROCEDURE — 3023F SPIROM DOC REV: CPT | Performed by: NURSE PRACTITIONER

## 2023-07-11 PROCEDURE — 1123F ACP DISCUSS/DSCN MKR DOCD: CPT | Performed by: NURSE PRACTITIONER

## 2023-07-11 PROCEDURE — G8427 DOCREV CUR MEDS BY ELIG CLIN: HCPCS | Performed by: NURSE PRACTITIONER

## 2023-07-11 PROCEDURE — 3046F HEMOGLOBIN A1C LEVEL >9.0%: CPT | Performed by: NURSE PRACTITIONER

## 2023-07-11 NOTE — PATIENT INSTRUCTIONS
Office backline number [phone]    If Trulicity is approved, decrease Lantus to 12 units nightly  Get labs 2 weeks after starting Trulicity

## 2023-07-20 RX ORDER — LOSARTAN POTASSIUM 25 MG/1
TABLET ORAL
Qty: 90 TABLET | Refills: 3 | Status: SHIPPED | OUTPATIENT
Start: 2023-07-20

## 2023-07-20 NOTE — TELEPHONE ENCOUNTER
RX RENEWAL REQUEST FROM [facility] RX FOR THE FOLLOWING   Please approve or refuse Rx request:  Requested Prescriptions     Pending Prescriptions Disp Refills    cyanocobalamin 1000 MCG tablet 90 tablet 3     Sig: Take 1 tablet by mouth daily    losartan (COZAAR) 25 MG tablet 90 tablet 3     Sig: TAKE 1 TABLET EVERY NIGHT       Next appointment:  8/22/2023

## 2023-07-25 ENCOUNTER — NURSE ONLY (OUTPATIENT)
Dept: LAB | Age: 83
End: 2023-07-25

## 2023-07-25 DIAGNOSIS — E11.22 TYPE 2 DIABETES MELLITUS WITH STAGE 4 CHRONIC KIDNEY DISEASE, WITH LONG-TERM CURRENT USE OF INSULIN (HCC): ICD-10-CM

## 2023-07-25 DIAGNOSIS — Z79.4 TYPE 2 DIABETES MELLITUS WITH STAGE 4 CHRONIC KIDNEY DISEASE, WITH LONG-TERM CURRENT USE OF INSULIN (HCC): ICD-10-CM

## 2023-07-25 DIAGNOSIS — N18.4 TYPE 2 DIABETES MELLITUS WITH STAGE 4 CHRONIC KIDNEY DISEASE, WITH LONG-TERM CURRENT USE OF INSULIN (HCC): ICD-10-CM

## 2023-07-25 LAB
ALBUMIN SERPL BCG-MCNC: 4.2 G/DL (ref 3.5–5.1)
ALP SERPL-CCNC: 127 U/L (ref 38–126)
ALT SERPL W/O P-5'-P-CCNC: 18 U/L (ref 11–66)
ANION GAP SERPL CALC-SCNC: 18 MEQ/L (ref 8–16)
AST SERPL-CCNC: 21 U/L (ref 5–40)
BILIRUB SERPL-MCNC: 0.8 MG/DL (ref 0.3–1.2)
BUN SERPL-MCNC: 25 MG/DL (ref 7–22)
CALCIUM SERPL-MCNC: 8.7 MG/DL (ref 8.5–10.5)
CHLORIDE SERPL-SCNC: 100 MEQ/L (ref 98–111)
CO2 SERPL-SCNC: 24 MEQ/L (ref 23–33)
CREAT SERPL-MCNC: 2.4 MG/DL (ref 0.4–1.2)
GFR SERPL CREATININE-BSD FRML MDRD: 26 ML/MIN/1.73M2
GLUCOSE SERPL-MCNC: 129 MG/DL (ref 70–108)
POTASSIUM SERPL-SCNC: 3.4 MEQ/L (ref 3.5–5.2)
PROT SERPL-MCNC: 6.9 G/DL (ref 6.1–8)
SODIUM SERPL-SCNC: 142 MEQ/L (ref 135–145)

## 2023-07-26 ENCOUNTER — TELEPHONE (OUTPATIENT)
Dept: FAMILY MEDICINE CLINIC | Age: 83
End: 2023-07-26

## 2023-07-26 DIAGNOSIS — E87.6 HYPOKALEMIA: Primary | ICD-10-CM

## 2023-07-26 RX ORDER — POTASSIUM CHLORIDE 20 MEQ/1
20 TABLET, EXTENDED RELEASE ORAL DAILY
Qty: 3 TABLET | Refills: 0 | Status: CANCELLED | OUTPATIENT
Start: 2023-07-26 | End: 2023-07-29

## 2023-07-26 RX ORDER — POTASSIUM CHLORIDE 20 MEQ/1
20 TABLET, EXTENDED RELEASE ORAL DAILY
Qty: 3 TABLET | Refills: 0 | Status: SHIPPED | OUTPATIENT
Start: 2023-07-26 | End: 2023-07-29

## 2023-07-26 NOTE — TELEPHONE ENCOUNTER
----- Message from WILBERTO Abrams CNP sent at 7/26/2023  3:33 PM EDT -----  Labs are back. Kidney function was slightly worse. Looks like he has an upcoming appt with Dr. Leodan Dudley. His potassium was a little low at 3.4.   recommend we start daily potassium supplement x 3 days then recheck his labs. Would he be okay with this?

## 2023-07-26 NOTE — TELEPHONE ENCOUNTER
3 day rx sent to [address] [place] [address],[address] labs checked the day after he finishes his 3 day course of potassium

## 2023-07-26 NOTE — TELEPHONE ENCOUNTER
Patient has been informed and voiced understanding and agrees to the potassium if the provider recommends.   Patient states that he will need a temporary rx sent to [address] [place] [address] in [facility] and a long term supply to go to [facility] rx mail order    Also will need to know when to have labs repeated and mail the order to the patients home address

## 2023-07-31 ENCOUNTER — NURSE ONLY (OUTPATIENT)
Dept: LAB | Age: 83
End: 2023-07-31

## 2023-07-31 DIAGNOSIS — E87.6 HYPOKALEMIA: ICD-10-CM

## 2023-07-31 DIAGNOSIS — N18.4 CKD (CHRONIC KIDNEY DISEASE), STAGE IV (HCC): ICD-10-CM

## 2023-07-31 LAB
ALBUMIN SERPL BCG-MCNC: 4 G/DL (ref 3.5–5.1)
ALP SERPL-CCNC: 129 U/L (ref 38–126)
ALT SERPL W/O P-5'-P-CCNC: 18 U/L (ref 11–66)
ANION GAP SERPL CALC-SCNC: 17 MEQ/L (ref 8–16)
AST SERPL-CCNC: 20 U/L (ref 5–40)
BILIRUB SERPL-MCNC: 0.9 MG/DL (ref 0.3–1.2)
BUN SERPL-MCNC: 26 MG/DL (ref 7–22)
CALCIUM SERPL-MCNC: 9.1 MG/DL (ref 8.5–10.5)
CHLORIDE SERPL-SCNC: 101 MEQ/L (ref 98–111)
CO2 SERPL-SCNC: 23 MEQ/L (ref 23–33)
CREAT SERPL-MCNC: 2.5 MG/DL (ref 0.4–1.2)
CREAT UR-MCNC: 41 MG/DL
GFR SERPL CREATININE-BSD FRML MDRD: 25 ML/MIN/1.73M2
GLUCOSE SERPL-MCNC: 131 MG/DL (ref 70–108)
MICROALBUMIN UR-MCNC: < 1.2 MG/DL
MICROALBUMIN/CREAT RATIO PNL UR: 29 MG/G (ref 0–30)
POTASSIUM SERPL-SCNC: 3.9 MEQ/L (ref 3.5–5.2)
PROT SERPL-MCNC: 6.7 G/DL (ref 6.1–8)
SODIUM SERPL-SCNC: 141 MEQ/L (ref 135–145)

## 2023-08-01 LAB — PTH-INTACT SERPL-MCNC: 153.7 PG/ML (ref 15–65)

## 2023-08-02 ENCOUNTER — TELEPHONE (OUTPATIENT)
Dept: FAMILY MEDICINE CLINIC | Age: 83
End: 2023-08-02

## 2023-08-02 NOTE — TELEPHONE ENCOUNTER
Patient stopped in to see if his provider is going to keep him on Dulaglutide 0.75 MG/0.5ML SOPN and states that he has enough to get him through until next week and if so he would like a renewal rx sent to his [address] [place] [address]

## 2023-08-07 ENCOUNTER — OFFICE VISIT (OUTPATIENT)
Dept: NEPHROLOGY | Age: 83
End: 2023-08-07
Payer: MEDICARE

## 2023-08-07 ENCOUNTER — HOSPITAL ENCOUNTER (OUTPATIENT)
Dept: PHARMACY | Age: 83
Setting detail: THERAPIES SERIES
Discharge: HOME | End: 2023-08-07
Payer: MEDICARE

## 2023-08-07 VITALS
DIASTOLIC BLOOD PRESSURE: 53 MMHG | BODY MASS INDEX: 32.58 KG/M2 | WEIGHT: 208 LBS | OXYGEN SATURATION: 95 % | HEART RATE: 60 BPM | SYSTOLIC BLOOD PRESSURE: 91 MMHG

## 2023-08-07 DIAGNOSIS — N18.4 CKD (CHRONIC KIDNEY DISEASE), STAGE IV (HCC): Primary | ICD-10-CM

## 2023-08-07 DIAGNOSIS — I48.21 PERMANENT ATRIAL FIBRILLATION (HCC): Primary | ICD-10-CM

## 2023-08-07 DIAGNOSIS — Z79.01 ANTICOAGULATED ON COUMADIN: ICD-10-CM

## 2023-08-07 DIAGNOSIS — Z51.81 ENCOUNTER FOR THERAPEUTIC DRUG MONITORING: ICD-10-CM

## 2023-08-07 LAB — POC INR: 3.3 (ref 0.8–1.2)

## 2023-08-07 PROCEDURE — 36416 COLLJ CAPILLARY BLOOD SPEC: CPT

## 2023-08-07 PROCEDURE — 99212 OFFICE O/P EST SF 10 MIN: CPT

## 2023-08-07 PROCEDURE — 85610 PROTHROMBIN TIME: CPT

## 2023-08-07 PROCEDURE — 3074F SYST BP LT 130 MM HG: CPT | Performed by: INTERNAL MEDICINE

## 2023-08-07 PROCEDURE — 99214 OFFICE O/P EST MOD 30 MIN: CPT | Performed by: INTERNAL MEDICINE

## 2023-08-07 PROCEDURE — G8427 DOCREV CUR MEDS BY ELIG CLIN: HCPCS | Performed by: INTERNAL MEDICINE

## 2023-08-07 PROCEDURE — 1123F ACP DISCUSS/DSCN MKR DOCD: CPT | Performed by: INTERNAL MEDICINE

## 2023-08-07 PROCEDURE — 3078F DIAST BP <80 MM HG: CPT | Performed by: INTERNAL MEDICINE

## 2023-08-07 PROCEDURE — G8417 CALC BMI ABV UP PARAM F/U: HCPCS | Performed by: INTERNAL MEDICINE

## 2023-08-07 PROCEDURE — 1036F TOBACCO NON-USER: CPT | Performed by: INTERNAL MEDICINE

## 2023-08-07 RX ORDER — AZITHROMYCIN 250 MG/1
250 TABLET, FILM COATED ORAL NIGHTLY
COMMUNITY

## 2023-08-07 RX ORDER — CALCITRIOL 0.25 UG/1
0.25 CAPSULE, LIQUID FILLED ORAL
Qty: 36 CAPSULE | Refills: 1 | Status: SHIPPED | OUTPATIENT
Start: 2023-08-07 | End: 2023-11-05

## 2023-08-07 RX ORDER — POTASSIUM CHLORIDE 750 MG/1
10 TABLET, EXTENDED RELEASE ORAL DAILY
Qty: 90 TABLET | Refills: 1 | Status: SHIPPED | OUTPATIENT
Start: 2023-08-07 | End: 2024-02-03

## 2023-08-07 NOTE — PROGRESS NOTES
Medication Management [facility]  [phone] (phone)  [phone] (fax)    Mr. Julisa Ortega is a 79 y.o.  male with history of atrial fib. , per Dr. Viky Marti referral, who presents today for Warfarin monitoring and adjustment (5 week visit). Patient verifies current dosing regimen and tablet strength. Uses pill box. No missed or extra doses. Patient denies bleeding. Has usual SOB/easy bruising. Being evaluated for left leg swelling. No blood in urine or stool. No dietary changes. Changes in medication/OTC agents/herbals: finished a 3 day course of K+. Also started Trulicity shortly after last visit. No change in alcohol use or tobacco use. No change in activity level. Patient denies falls. No vomiting/diarrhea or acute illness. No procedures scheduled in the future at this time. Assessment:   Lab Results   Component Value Date    INR 3.30 (H) 08/07/2023    INR 2.80 (H) 07/05/2023    INR 2.60 (H) 06/07/2023     INR supratherapeutic - goal 2-3. Recent Labs     08/07/23  1039   INR 3.30*        Plan:  POCT INR performed/result reviewed. 1.25 mg tomorrow, T (per policy), otherwise continue PO Coumadin 1.25 mg MF, 2.5 mg TWThSS . Recheck INR in 2 week(s); sooner than policy - has PCP visit that day (lives out of town). Patient reminded to call the Anticoagulation Clinic with any signs or symptoms of bleeding or with any medication changes. Patient given instructions utilizing the teach back method. After visit summary printed and reviewed with patient. Advised extra caution. Discharged ambulatory in no apparent distress. Sees nephrologist this afternoon.     For Pharmacy Admin Tracking Only    Intervention Detail: Dose Adjustment: 1, reason: Therapy De-escalation  Total # of Interventions Recommended: 1  Total # of Interventions Accepted: 1  Time Spent (min):  20

## 2023-08-07 NOTE — PATIENT INSTRUCTIONS
Start Calcitriol 0.25 mcg three times weekly. Start potassium 10 meq daily .      Repeat potassium level on 8/22 when you are doing your INR

## 2023-08-11 ENCOUNTER — HOSPITAL ENCOUNTER (OUTPATIENT)
Age: 83
Discharge: HOME | End: 2023-08-11
Payer: MEDICARE

## 2023-08-11 ENCOUNTER — HOSPITAL ENCOUNTER (OUTPATIENT)
Dept: GENERAL RADIOLOGY | Age: 83
Discharge: HOME | End: 2023-08-11
Payer: MEDICARE

## 2023-08-11 DIAGNOSIS — J44.9 CHRONIC OBSTRUCTIVE PULMONARY DISEASE, UNSPECIFIED COPD TYPE (HCC): ICD-10-CM

## 2023-08-11 PROCEDURE — 71046 X-RAY EXAM CHEST 2 VIEWS: CPT

## 2023-08-21 ENCOUNTER — TELEPHONE (OUTPATIENT)
Dept: NEPHROLOGY | Age: 83
End: 2023-08-21

## 2023-08-21 ENCOUNTER — NURSE ONLY (OUTPATIENT)
Dept: LAB | Age: 83
End: 2023-08-21

## 2023-08-21 DIAGNOSIS — N18.4 CKD (CHRONIC KIDNEY DISEASE), STAGE IV (HCC): ICD-10-CM

## 2023-08-21 LAB
ANION GAP SERPL CALC-SCNC: 17 MEQ/L (ref 8–16)
BUN SERPL-MCNC: 35 MG/DL (ref 7–22)
CALCIUM SERPL-MCNC: 8.5 MG/DL (ref 8.5–10.5)
CHLORIDE SERPL-SCNC: 101 MEQ/L (ref 98–111)
CO2 SERPL-SCNC: 22 MEQ/L (ref 23–33)
CREAT SERPL-MCNC: 2.6 MG/DL (ref 0.4–1.2)
GFR SERPL CREATININE-BSD FRML MDRD: 24 ML/MIN/1.73M2
GLUCOSE SERPL-MCNC: 138 MG/DL (ref 70–108)
POTASSIUM SERPL-SCNC: 3.8 MEQ/L (ref 3.5–5.2)
SODIUM SERPL-SCNC: 140 MEQ/L (ref 135–145)

## 2023-08-22 ENCOUNTER — APPOINTMENT (OUTPATIENT)
Dept: PHARMACY | Age: 83
End: 2023-08-22
Payer: MEDICARE

## 2023-08-22 ENCOUNTER — OFFICE VISIT (OUTPATIENT)
Dept: FAMILY MEDICINE CLINIC | Age: 83
End: 2023-08-22
Payer: MEDICARE

## 2023-08-22 VITALS
OXYGEN SATURATION: 95 % | TEMPERATURE: 97.9 F | WEIGHT: 203 LBS | DIASTOLIC BLOOD PRESSURE: 70 MMHG | BODY MASS INDEX: 31.86 KG/M2 | SYSTOLIC BLOOD PRESSURE: 124 MMHG | RESPIRATION RATE: 18 BRPM | HEIGHT: 67 IN | HEART RATE: 64 BPM

## 2023-08-22 DIAGNOSIS — E11.22 TYPE 2 DIABETES MELLITUS WITH STAGE 4 CHRONIC KIDNEY DISEASE, WITH LONG-TERM CURRENT USE OF INSULIN (HCC): ICD-10-CM

## 2023-08-22 DIAGNOSIS — I48.21 PERMANENT ATRIAL FIBRILLATION (HCC): Primary | ICD-10-CM

## 2023-08-22 DIAGNOSIS — Z79.01 ANTICOAGULATED ON COUMADIN: ICD-10-CM

## 2023-08-22 DIAGNOSIS — N18.4 TYPE 2 DIABETES MELLITUS WITH STAGE 4 CHRONIC KIDNEY DISEASE, WITH LONG-TERM CURRENT USE OF INSULIN (HCC): ICD-10-CM

## 2023-08-22 DIAGNOSIS — Z51.81 ENCOUNTER FOR THERAPEUTIC DRUG MONITORING: ICD-10-CM

## 2023-08-22 DIAGNOSIS — Z79.4 TYPE 2 DIABETES MELLITUS WITH STAGE 4 CHRONIC KIDNEY DISEASE, WITH LONG-TERM CURRENT USE OF INSULIN (HCC): ICD-10-CM

## 2023-08-22 LAB
HBA1C MFR BLD: 8.4 % (ref 4.3–5.7)
POC INR: 3 (ref 0.8–1.2)

## 2023-08-22 PROCEDURE — G8427 DOCREV CUR MEDS BY ELIG CLIN: HCPCS | Performed by: NURSE PRACTITIONER

## 2023-08-22 PROCEDURE — 3078F DIAST BP <80 MM HG: CPT | Performed by: NURSE PRACTITIONER

## 2023-08-22 PROCEDURE — 1036F TOBACCO NON-USER: CPT | Performed by: NURSE PRACTITIONER

## 2023-08-22 PROCEDURE — 85610 PROTHROMBIN TIME: CPT

## 2023-08-22 PROCEDURE — 1123F ACP DISCUSS/DSCN MKR DOCD: CPT | Performed by: NURSE PRACTITIONER

## 2023-08-22 PROCEDURE — 3052F HG A1C>EQUAL 8.0%<EQUAL 9.0%: CPT | Performed by: NURSE PRACTITIONER

## 2023-08-22 PROCEDURE — G8417 CALC BMI ABV UP PARAM F/U: HCPCS | Performed by: NURSE PRACTITIONER

## 2023-08-22 PROCEDURE — 36416 COLLJ CAPILLARY BLOOD SPEC: CPT

## 2023-08-22 PROCEDURE — 99211 OFF/OP EST MAY X REQ PHY/QHP: CPT

## 2023-08-22 PROCEDURE — 3074F SYST BP LT 130 MM HG: CPT | Performed by: NURSE PRACTITIONER

## 2023-08-22 PROCEDURE — 99214 OFFICE O/P EST MOD 30 MIN: CPT | Performed by: NURSE PRACTITIONER

## 2023-08-22 NOTE — PROGRESS NOTES
Medication Management [address] [place] [address]  [phone] (phone)  [phone] (fax)    Mr. Clarisse Lane is a 79 y.o.  male with history of atrial fib. , per Dr. Ellison Pullman referral, who presents today for Warfarin monitoring and adjustment (2 week visit). Patient verifies current dosing regimen and tablet strength. No missed or extra doses. Patient denies bleeding. Has usual SOB/left leg swelling/easy bruising. No blood in urine or stool. No dietary changes. Changes in medication/OTC agents/herbals: nephrologist ordered K+ (takes after a meal) and Calcitriol 8/7. No change in alcohol use or tobacco use. No change in activity level. Patient denies falls. No vomiting/diarrhea or acute illness. No procedures scheduled in the future at this time. Assessment:     Lab Results   Component Value Date    INR 3.00 (H) 08/22/2023    INR 3.30 (H) 08/07/2023    INR 2.80 (H) 07/05/2023     INR therapeutic - goal 2-3. Recent Labs     08/22/23  0926   INR 3.00*      Plan:  POCT INR performed/result reviewed. Continue PO Coumadin 1.25 mg MF, 2.5 mg TWThSS. Recheck INR in 3-4 week(s). (Report given - orders received from/verified with MAX Zepeda., PharmD.)  Patient opted to be seen 9/7 when in town for pacemaker check. Patient reminded to call the Anticoagulation Clinic with any signs or symptoms of bleeding or with any medication changes. Patient given instructions utilizing the teach back method. After visit summary printed and reviewed with patient. Discharged ambulatory in no apparent distress. Sees PCP next.     For Pharmacy Admin Tracking Only  Time Spent (min): 22

## 2023-09-04 ENCOUNTER — HOSPITAL ENCOUNTER (OUTPATIENT)
Age: 83
Setting detail: OBSERVATION
Discharge: HOME | End: 2023-09-05
Attending: EMERGENCY MEDICINE | Admitting: PHYSICIAN ASSISTANT
Payer: MEDICARE

## 2023-09-04 ENCOUNTER — APPOINTMENT (OUTPATIENT)
Dept: GENERAL RADIOLOGY | Age: 83
End: 2023-09-04
Payer: MEDICARE

## 2023-09-04 ENCOUNTER — APPOINTMENT (OUTPATIENT)
Dept: CT IMAGING | Age: 83
End: 2023-09-04
Payer: MEDICARE

## 2023-09-04 DIAGNOSIS — E11.65 TYPE 2 DIABETES MELLITUS WITH HYPERGLYCEMIA, WITHOUT LONG-TERM CURRENT USE OF INSULIN (HCC): ICD-10-CM

## 2023-09-04 DIAGNOSIS — R42 LIGHTHEADEDNESS: ICD-10-CM

## 2023-09-04 DIAGNOSIS — R42 DIZZINESS: Primary | ICD-10-CM

## 2023-09-04 DIAGNOSIS — R77.8 ELEVATED TROPONIN: ICD-10-CM

## 2023-09-04 DIAGNOSIS — N18.4 STAGE 4 CHRONIC KIDNEY DISEASE (HCC): ICD-10-CM

## 2023-09-04 LAB
ALBUMIN SERPL BCG-MCNC: 4.2 G/DL (ref 3.5–5.1)
ALP SERPL-CCNC: 123 U/L (ref 38–126)
ALT SERPL W/O P-5'-P-CCNC: 26 U/L (ref 11–66)
ANION GAP SERPL CALC-SCNC: 15 MEQ/L (ref 8–16)
AST SERPL-CCNC: 22 U/L (ref 5–40)
BACTERIA URNS QL MICRO: ABNORMAL /HPF
BASOPHILS ABSOLUTE: 0.1 THOU/MM3 (ref 0–0.1)
BASOPHILS NFR BLD AUTO: 0.5 %
BILIRUB SERPL-MCNC: 0.9 MG/DL (ref 0.3–1.2)
BILIRUB UR QL STRIP.AUTO: NEGATIVE
BUN SERPL-MCNC: 33 MG/DL (ref 7–22)
CALCIUM SERPL-MCNC: 9.3 MG/DL (ref 8.5–10.5)
CASTS #/AREA URNS LPF: ABNORMAL /LPF
CASTS 2: ABNORMAL /LPF
CHARACTER UR: CLEAR
CHLORIDE SERPL-SCNC: 99 MEQ/L (ref 98–111)
CO2 SERPL-SCNC: 27 MEQ/L (ref 23–33)
COLOR: YELLOW
CREAT SERPL-MCNC: 2.4 MG/DL (ref 0.4–1.2)
CRYSTALS URNS MICRO: ABNORMAL
DEPRECATED RDW RBC AUTO: 54.9 FL (ref 35–45)
EOSINOPHIL NFR BLD AUTO: 2.5 %
EOSINOPHILS ABSOLUTE: 0.3 THOU/MM3 (ref 0–0.4)
EPITHELIAL CELLS, UA: ABNORMAL /HPF
ERYTHROCYTE [DISTWIDTH] IN BLOOD BY AUTOMATED COUNT: 15.1 % (ref 11.5–14.5)
GFR SERPL CREATININE-BSD FRML MDRD: 26 ML/MIN/1.73M2
GLUCOSE SERPL-MCNC: 105 MG/DL (ref 70–108)
GLUCOSE UR QL STRIP.AUTO: 100 MG/DL
HCT VFR BLD AUTO: 45.8 % (ref 42–52)
HGB BLD-MCNC: 14.9 GM/DL (ref 14–18)
HGB UR QL STRIP.AUTO: NEGATIVE
IMM GRANULOCYTES # BLD AUTO: 0.04 THOU/MM3 (ref 0–0.07)
IMM GRANULOCYTES NFR BLD AUTO: 0.4 %
KETONES UR QL STRIP.AUTO: NEGATIVE
LYMPHOCYTES ABSOLUTE: 1.5 THOU/MM3 (ref 1–4.8)
LYMPHOCYTES NFR BLD AUTO: 14.6 %
MCH RBC QN AUTO: 32.1 PG (ref 26–33)
MCHC RBC AUTO-ENTMCNC: 32.5 GM/DL (ref 32.2–35.5)
MCV RBC AUTO: 98.7 FL (ref 80–94)
MISCELLANEOUS 2: ABNORMAL
MONOCYTES ABSOLUTE: 1.3 THOU/MM3 (ref 0.4–1.3)
MONOCYTES NFR BLD AUTO: 12.8 %
NEUTROPHILS NFR BLD AUTO: 69.2 %
NITRITE UR QL STRIP: NEGATIVE
NRBC BLD AUTO-RTO: 0 /100 WBC
NT-PROBNP SERPL IA-MCNC: 1977 PG/ML (ref 0–449)
OSMOLALITY SERPL CALC.SUM OF ELEC: 288.9 MOSMOL/KG (ref 275–300)
PH UR STRIP.AUTO: 5.5 [PH] (ref 5–9)
PLATELET # BLD AUTO: 176 THOU/MM3 (ref 130–400)
PMV BLD AUTO: 11.2 FL (ref 9.4–12.4)
POTASSIUM SERPL-SCNC: 3.7 MEQ/L (ref 3.5–5.2)
PROT SERPL-MCNC: 7.2 G/DL (ref 6.1–8)
PROT UR STRIP.AUTO-MCNC: ABNORMAL MG/DL
RBC # BLD AUTO: 4.64 MILL/MM3 (ref 4.7–6.1)
RBC URINE: ABNORMAL /HPF
RENAL EPI CELLS #/AREA URNS HPF: ABNORMAL /[HPF]
SEGMENTED NEUTROPHILS ABSOLUTE COUNT: 7.1 THOU/MM3 (ref 1.8–7.7)
SODIUM SERPL-SCNC: 141 MEQ/L (ref 135–145)
SP GR UR REFRACT.AUTO: 1.01 (ref 1–1.03)
TROPONIN, HIGH SENSITIVITY: 74 NG/L (ref 0–12)
TROPONIN, HIGH SENSITIVITY: 80 NG/L (ref 0–12)
UROBILINOGEN, URINE: 0.2 EU/DL (ref 0–1)
WBC # BLD AUTO: 10.2 THOU/MM3 (ref 4.8–10.8)
WBC #/AREA URNS HPF: ABNORMAL /HPF
WBC #/AREA URNS HPF: NEGATIVE /[HPF]
YEAST LIKE FUNGI URNS QL MICRO: ABNORMAL

## 2023-09-04 PROCEDURE — 80053 COMPREHEN METABOLIC PANEL: CPT

## 2023-09-04 PROCEDURE — 93010 ELECTROCARDIOGRAM REPORT: CPT | Performed by: INTERNAL MEDICINE

## 2023-09-04 PROCEDURE — 99223 1ST HOSP IP/OBS HIGH 75: CPT | Performed by: PHYSICIAN ASSISTANT

## 2023-09-04 PROCEDURE — G0378 HOSPITAL OBSERVATION PER HR: HCPCS

## 2023-09-04 PROCEDURE — 70450 CT HEAD/BRAIN W/O DYE: CPT

## 2023-09-04 PROCEDURE — 93005 ELECTROCARDIOGRAM TRACING: CPT | Performed by: EMERGENCY MEDICINE

## 2023-09-04 PROCEDURE — 83880 ASSAY OF NATRIURETIC PEPTIDE: CPT

## 2023-09-04 PROCEDURE — 84484 ASSAY OF TROPONIN QUANT: CPT

## 2023-09-04 PROCEDURE — 81001 URINALYSIS AUTO W/SCOPE: CPT

## 2023-09-04 PROCEDURE — 36415 COLL VENOUS BLD VENIPUNCTURE: CPT

## 2023-09-04 PROCEDURE — 93005 ELECTROCARDIOGRAM TRACING: CPT | Performed by: PHYSICIAN ASSISTANT

## 2023-09-04 PROCEDURE — 99285 EMERGENCY DEPT VISIT HI MDM: CPT

## 2023-09-04 PROCEDURE — 85025 COMPLETE CBC W/AUTO DIFF WBC: CPT

## 2023-09-04 PROCEDURE — 71046 X-RAY EXAM CHEST 2 VIEWS: CPT

## 2023-09-04 RX ORDER — ONDANSETRON 2 MG/ML
4 INJECTION INTRAMUSCULAR; INTRAVENOUS EVERY 6 HOURS PRN
Status: DISCONTINUED | OUTPATIENT
Start: 2023-09-04 | End: 2023-09-05 | Stop reason: HOSPADM

## 2023-09-04 RX ORDER — ASPIRIN 81 MG/1
81 TABLET, CHEWABLE ORAL DAILY
Status: DISCONTINUED | OUTPATIENT
Start: 2023-09-05 | End: 2023-09-05 | Stop reason: HOSPADM

## 2023-09-04 RX ORDER — METOPROLOL SUCCINATE 100 MG/1
100 TABLET, EXTENDED RELEASE ORAL DAILY
Status: DISCONTINUED | OUTPATIENT
Start: 2023-09-05 | End: 2023-09-05 | Stop reason: HOSPADM

## 2023-09-04 RX ORDER — LOSARTAN POTASSIUM 25 MG/1
25 TABLET ORAL DAILY
Status: DISCONTINUED | OUTPATIENT
Start: 2023-09-05 | End: 2023-09-05 | Stop reason: HOSPADM

## 2023-09-04 RX ORDER — LANOLIN ALCOHOL/MO/W.PET/CERES
1000 CREAM (GRAM) TOPICAL DAILY
Status: DISCONTINUED | OUTPATIENT
Start: 2023-09-05 | End: 2023-09-05 | Stop reason: HOSPADM

## 2023-09-04 RX ORDER — CALCITRIOL 0.25 UG/1
0.25 CAPSULE, LIQUID FILLED ORAL
Status: DISCONTINUED | OUTPATIENT
Start: 2023-09-06 | End: 2023-09-05 | Stop reason: HOSPADM

## 2023-09-04 RX ORDER — FLUTICASONE PROPIONATE 50 MCG
2 SPRAY, SUSPENSION (ML) NASAL DAILY PRN
Status: DISCONTINUED | OUTPATIENT
Start: 2023-09-05 | End: 2023-09-05 | Stop reason: HOSPADM

## 2023-09-04 RX ORDER — SODIUM CHLORIDE 0.9 % (FLUSH) 0.9 %
5-40 SYRINGE (ML) INJECTION EVERY 12 HOURS SCHEDULED
Status: DISCONTINUED | OUTPATIENT
Start: 2023-09-04 | End: 2023-09-05 | Stop reason: HOSPADM

## 2023-09-04 RX ORDER — BUMETANIDE 1 MG/1
2 TABLET ORAL 2 TIMES DAILY
Status: DISCONTINUED | OUTPATIENT
Start: 2023-09-04 | End: 2023-09-05 | Stop reason: HOSPADM

## 2023-09-04 RX ORDER — ATORVASTATIN CALCIUM 80 MG/1
80 TABLET, FILM COATED ORAL NIGHTLY
Status: DISCONTINUED | OUTPATIENT
Start: 2023-09-05 | End: 2023-09-05 | Stop reason: HOSPADM

## 2023-09-04 RX ORDER — ONDANSETRON 4 MG/1
4 TABLET, ORALLY DISINTEGRATING ORAL EVERY 8 HOURS PRN
Status: DISCONTINUED | OUTPATIENT
Start: 2023-09-04 | End: 2023-09-05 | Stop reason: HOSPADM

## 2023-09-04 RX ORDER — SODIUM CHLORIDE 9 MG/ML
INJECTION, SOLUTION INTRAVENOUS PRN
Status: DISCONTINUED | OUTPATIENT
Start: 2023-09-04 | End: 2023-09-05 | Stop reason: HOSPADM

## 2023-09-04 RX ORDER — ENOXAPARIN SODIUM 100 MG/ML
30 INJECTION SUBCUTANEOUS DAILY
Status: DISCONTINUED | OUTPATIENT
Start: 2023-09-05 | End: 2023-09-05 | Stop reason: ALTCHOICE

## 2023-09-04 RX ORDER — ALBUTEROL SULFATE 2.5 MG/3ML
2.5 SOLUTION RESPIRATORY (INHALATION) EVERY 6 HOURS PRN
Status: DISCONTINUED | OUTPATIENT
Start: 2023-09-04 | End: 2023-09-05 | Stop reason: HOSPADM

## 2023-09-04 RX ORDER — GUAIFENESIN 600 MG/1
1200 TABLET, EXTENDED RELEASE ORAL 2 TIMES DAILY
Status: DISCONTINUED | OUTPATIENT
Start: 2023-09-04 | End: 2023-09-05 | Stop reason: HOSPADM

## 2023-09-04 RX ORDER — INSULIN GLARGINE 100 [IU]/ML
18 INJECTION, SOLUTION SUBCUTANEOUS NIGHTLY
Status: DISCONTINUED | OUTPATIENT
Start: 2023-09-05 | End: 2023-09-05 | Stop reason: HOSPADM

## 2023-09-04 RX ORDER — ACETAMINOPHEN 325 MG/1
650 TABLET ORAL EVERY 6 HOURS PRN
Status: DISCONTINUED | OUTPATIENT
Start: 2023-09-04 | End: 2023-09-05 | Stop reason: HOSPADM

## 2023-09-04 RX ORDER — ACETAMINOPHEN 650 MG/1
650 SUPPOSITORY RECTAL EVERY 6 HOURS PRN
Status: DISCONTINUED | OUTPATIENT
Start: 2023-09-04 | End: 2023-09-05 | Stop reason: HOSPADM

## 2023-09-04 RX ORDER — POLYETHYLENE GLYCOL 3350 17 G/17G
17 POWDER, FOR SOLUTION ORAL DAILY PRN
Status: DISCONTINUED | OUTPATIENT
Start: 2023-09-04 | End: 2023-09-05 | Stop reason: HOSPADM

## 2023-09-04 RX ORDER — SODIUM CHLORIDE 0.9 % (FLUSH) 0.9 %
5-40 SYRINGE (ML) INJECTION PRN
Status: DISCONTINUED | OUTPATIENT
Start: 2023-09-04 | End: 2023-09-05 | Stop reason: HOSPADM

## 2023-09-04 ASSESSMENT — PAIN - FUNCTIONAL ASSESSMENT
PAIN_FUNCTIONAL_ASSESSMENT: NONE - DENIES PAIN

## 2023-09-04 NOTE — ED NOTES
Pt updated on POC. Pt resting in bed, respirations even and unlabored. No needs expressed at this time. Call light within reach.  [address]  09/04/23 9802

## 2023-09-04 NOTE — ED TRIAGE NOTES
Pt presents to the ER with c/o dizziness and bilateral hand tingling. Pt states this occurred around 3:15pm and lasted around 30 minutes, pt states his symptoms have now resolved. Pt denies pain. Pt is on coumadin. Pt is alert and oriented, respirations even and unlabored.  VSS

## 2023-09-04 NOTE — ED NOTES
Pt ambulated to bathroom for urine specimen, tolerated well. No needs expressed at this time.  [place], [place]  09/04/23 5198

## 2023-09-05 VITALS
SYSTOLIC BLOOD PRESSURE: 102 MMHG | HEIGHT: 67 IN | BODY MASS INDEX: 31.86 KG/M2 | OXYGEN SATURATION: 92 % | RESPIRATION RATE: 18 BRPM | HEART RATE: 60 BPM | TEMPERATURE: 98.3 F | WEIGHT: 203 LBS | DIASTOLIC BLOOD PRESSURE: 48 MMHG

## 2023-09-05 LAB
ANION GAP SERPL CALC-SCNC: 13 MEQ/L (ref 8–16)
BUN SERPL-MCNC: 33 MG/DL (ref 7–22)
CALCIUM SERPL-MCNC: 9 MG/DL (ref 8.5–10.5)
CHLORIDE SERPL-SCNC: 104 MEQ/L (ref 98–111)
CO2 SERPL-SCNC: 24 MEQ/L (ref 23–33)
CREAT SERPL-MCNC: 2.3 MG/DL (ref 0.4–1.2)
DEPRECATED RDW RBC AUTO: 55.1 FL (ref 35–45)
ERYTHROCYTE [DISTWIDTH] IN BLOOD BY AUTOMATED COUNT: 14.9 % (ref 11.5–14.5)
GFR SERPL CREATININE-BSD FRML MDRD: 27 ML/MIN/1.73M2
GLUCOSE SERPL-MCNC: 85 MG/DL (ref 70–108)
HCT VFR BLD AUTO: 45.4 % (ref 42–52)
HGB BLD-MCNC: 14.9 GM/DL (ref 14–18)
INR PPP: 3.18 (ref 0.85–1.13)
MCH RBC QN AUTO: 32.7 PG (ref 26–33)
MCHC RBC AUTO-ENTMCNC: 32.8 GM/DL (ref 32.2–35.5)
MCV RBC AUTO: 99.6 FL (ref 80–94)
PLATELET # BLD AUTO: 163 THOU/MM3 (ref 130–400)
PMV BLD AUTO: 11 FL (ref 9.4–12.4)
POTASSIUM SERPL-SCNC: 3.7 MEQ/L (ref 3.5–5.2)
RBC # BLD AUTO: 4.56 MILL/MM3 (ref 4.7–6.1)
SODIUM SERPL-SCNC: 141 MEQ/L (ref 135–145)
TROPONIN, HIGH SENSITIVITY: 68 NG/L (ref 0–12)
WBC # BLD AUTO: 9.1 THOU/MM3 (ref 4.8–10.8)

## 2023-09-05 PROCEDURE — 96372 THER/PROPH/DIAG INJ SC/IM: CPT

## 2023-09-05 PROCEDURE — 85610 PROTHROMBIN TIME: CPT

## 2023-09-05 PROCEDURE — 85027 COMPLETE CBC AUTOMATED: CPT

## 2023-09-05 PROCEDURE — 99239 HOSP IP/OBS DSCHRG MGMT >30: CPT | Performed by: NURSE PRACTITIONER

## 2023-09-05 PROCEDURE — 94640 AIRWAY INHALATION TREATMENT: CPT

## 2023-09-05 PROCEDURE — 94761 N-INVAS EAR/PLS OXIMETRY MLT: CPT

## 2023-09-05 PROCEDURE — G0378 HOSPITAL OBSERVATION PER HR: HCPCS

## 2023-09-05 PROCEDURE — 2580000003 HC RX 258: Performed by: PHYSICIAN ASSISTANT

## 2023-09-05 PROCEDURE — 6370000000 HC RX 637 (ALT 250 FOR IP): Performed by: PHYSICIAN ASSISTANT

## 2023-09-05 PROCEDURE — 93010 ELECTROCARDIOGRAM REPORT: CPT | Performed by: INTERNAL MEDICINE

## 2023-09-05 PROCEDURE — 6360000002 HC RX W HCPCS: Performed by: PHYSICIAN ASSISTANT

## 2023-09-05 PROCEDURE — 80048 BASIC METABOLIC PNL TOTAL CA: CPT

## 2023-09-05 PROCEDURE — 2700000000 HC OXYGEN THERAPY PER DAY

## 2023-09-05 PROCEDURE — 36415 COLL VENOUS BLD VENIPUNCTURE: CPT

## 2023-09-05 PROCEDURE — 93225 XTRNL ECG REC<48 HRS REC: CPT

## 2023-09-05 PROCEDURE — 93226 XTRNL ECG REC<48 HR SCAN A/R: CPT

## 2023-09-05 RX ORDER — DEXTROSE MONOHYDRATE 100 MG/ML
INJECTION, SOLUTION INTRAVENOUS CONTINUOUS PRN
Status: DISCONTINUED | OUTPATIENT
Start: 2023-09-05 | End: 2023-09-05 | Stop reason: HOSPADM

## 2023-09-05 RX ORDER — ALBUTEROL SULFATE 2.5 MG/3ML
2.5 SOLUTION RESPIRATORY (INHALATION)
Status: DISCONTINUED | OUTPATIENT
Start: 2023-09-05 | End: 2023-09-05 | Stop reason: HOSPADM

## 2023-09-05 RX ORDER — WARFARIN SODIUM 1 MG/1
1 TABLET ORAL ONCE
Status: DISCONTINUED | OUTPATIENT
Start: 2023-09-05 | End: 2023-09-05 | Stop reason: HOSPADM

## 2023-09-05 RX ADMIN — TIOTROPIUM BROMIDE INHALATION SPRAY 2 PUFF: 3.12 SPRAY, METERED RESPIRATORY (INHALATION) at 06:38

## 2023-09-05 RX ADMIN — MOMETASONE FUROATE AND FORMOTEROL FUMARATE DIHYDRATE 2 PUFF: 200; 5 AEROSOL RESPIRATORY (INHALATION) at 06:36

## 2023-09-05 RX ADMIN — BUMETANIDE 2 MG: 1 TABLET ORAL at 08:34

## 2023-09-05 RX ADMIN — Medication 1000 MCG: at 08:34

## 2023-09-05 RX ADMIN — METOPROLOL SUCCINATE 100 MG: 100 TABLET, EXTENDED RELEASE ORAL at 08:34

## 2023-09-05 RX ADMIN — SODIUM CHLORIDE, PRESERVATIVE FREE 10 ML: 5 INJECTION INTRAVENOUS at 08:35

## 2023-09-05 RX ADMIN — SODIUM CHLORIDE, PRESERVATIVE FREE 10 ML: 5 INJECTION INTRAVENOUS at 00:36

## 2023-09-05 RX ADMIN — EMPAGLIFLOZIN 10 MG: 10 TABLET, FILM COATED ORAL at 08:34

## 2023-09-05 RX ADMIN — ALBUTEROL SULFATE 2.5 MG: 2.5 SOLUTION RESPIRATORY (INHALATION) at 08:41

## 2023-09-05 RX ADMIN — GUAIFENESIN 1200 MG: 600 TABLET, EXTENDED RELEASE ORAL at 08:34

## 2023-09-05 RX ADMIN — ENOXAPARIN SODIUM 30 MG: 100 INJECTION SUBCUTANEOUS at 08:35

## 2023-09-05 ASSESSMENT — ENCOUNTER SYMPTOMS
ALLERGIC/IMMUNOLOGIC NEGATIVE: 1
GASTROINTESTINAL NEGATIVE: 1
EYES NEGATIVE: 1
RESPIRATORY NEGATIVE: 1

## 2023-09-05 NOTE — ED NOTES
Pt updated on admission status. Pt resting in bed, respirations even and unlabored. No needs expressed at this time. Call light within reach.  [place], [place]  09/04/23 7018

## 2023-09-05 NOTE — ED PROVIDER NOTES
[facility] EMERGENCY DEPT      EMERGENCY MEDICINE     Pt Name: Irving James  MRN: [id number]  [facility] 1940  Date of evaluation: 9/4/2023  Resident Physician: Nora Urbano MD  Attending Physician: Dr. Liam Steen, DO    [address]       Chief Complaint   Patient presents with    Dizziness    Tingling     Bilateral hands     HISTORY OF PRESENT ILLNESS   Irving James is a 79 y.o. male who presents to the emergency department from home, by private vehicle for evaluation of dizziness    Patient reportedly had episode of dizziness approximately 3 PM.  Patient says last about 30 minutes was accompanied with bilateral hand pins-and-needles. All symptoms resolved prior to arrival in the emergency department. Patient says he never had symptoms like this before but because he is asked by his Coumadin clinic all the time if he is having lightheadedness or dizziness he thought he should come in to be checked out. Patient without any chest pain shortness of breath nausea vomiting. Patient without any numbness or weakness. Patient does have history of congestive heart failure previous strokes, COPD, and intermittent atrial fibrillation for which she takes Coumadin. The patient has no other acute complaints at this time. ROS Negative Except as Documented Above.     PASTMEDICAL HISTORY     Past Medical History:   Diagnosis Date    MILLY (acute kidney injury) ([address])     Atrial fibrillation (HCC)     Cerebral artery occlusion with cerebral infarction (HCC)     CHF (congestive heart failure), NYHA class III, acute on chronic, combined (HCC)     COPD (chronic obstructive pulmonary disease) ([address]) 8/3/2020    Coronary artery disease involving native coronary artery of native heart with angina pectoris ([address])     Diabetes mellitus, type 2 ([address]) 12/30/2020    Hyperlipidemia 2/20/2012    Hypertension     Pneumonia        Patient Active Problem List   Diagnosis Code    Atrial fibrillation with RVR ([address]) I48.91

## 2023-09-05 NOTE — PROGRESS NOTES
Pharmacist Review and Automatic Dose Adjustment of Prophylactic Enoxaparin    Reviewed reason(s) for admission/hospital problem list    The reviewing pharmacist has made an adjustment to the ordered enoxaparin dose or converted to UFH per the approved [facility] protocol and table as identified below. Darcie Kaur is a 79 y.o. male. Recent Labs     09/04/23  1700   CREATININE 2.4*       Estimated Creatinine Clearance: 25 mL/min (A) (based on SCr of 2.4 mg/dL (H)). Recent Labs     09/04/23  1700   HGB 14.9   HCT 45.8        No results for input(s): INR in the last 72 hours.     Height:   Ht Readings from Last 1 Encounters:   09/04/23 5' 7\" (1.702 m)     Weight:  Wt Readings from Last 1 Encounters:   09/04/23 203 lb (92.1 kg)               Plan: Based upon the patient's weight and renal function    Ordered: Enoxaparin 40mg SUBQ Daily    Changed/converted to    New Order: Enoxaparin 30mg SUBQ Daily      Thank you,  Miles Mueller, [facility]  9/4/2023, 10:56 PM

## 2023-09-05 NOTE — RT PROTOCOL NOTE
orders with one order with TID Frequency and one order with Frequency of every 4 hours PRN wheezing or increased work of breathing using Per Protocol order mode. 11-13 - enter or revise RT Bronchodilator order(s) to one equivalent RT bronchodilator order with QID Frequency and an Albuterol order with Frequency of every 4 hours PRN wheezing or increased work of breathing using Per Protocol order mode. Greater than 13 - enter or revise RT Bronchodilator order(s) to one equivalent RT bronchodilator order with every 4 hours Frequency and an Albuterol order with Frequency of every 2 hours PRN wheezing or increased work of breathing using Per Protocol order mode. RT to enter RT Home Evaluation for COPD & MDI Assessment order using Per Protocol order mode.     Electronically signed by Brett Armenta RCP on 9/5/2023 at 8:46 AM

## 2023-09-05 NOTE — RT PROTOCOL NOTE
breathing using Per Protocol order mode. 4-6 - enter or revise RT Bronchodilator order(s) to two equivalent RT bronchodilator orders with one order with BID Frequency and one order with Frequency of every 4 hours PRN wheezing or increased work of breathing using Per Protocol order mode. 7-10 - enter or revise RT Bronchodilator order(s) to two equivalent RT bronchodilator orders with one order with TID Frequency and one order with Frequency of every 4 hours PRN wheezing or increased work of breathing using Per Protocol order mode. 11-13 - enter or revise RT Bronchodilator order(s) to one equivalent RT bronchodilator order with QID Frequency and an Albuterol order with Frequency of every 4 hours PRN wheezing or increased work of breathing using Per Protocol order mode. Greater than 13 - enter or revise RT Bronchodilator order(s) to one equivalent RT bronchodilator order with every 4 hours Frequency and an Albuterol order with Frequency of every 2 hours PRN wheezing or increased work of breathing using Per Protocol order mode. RT to enter RT Home Evaluation for COPD & MDI Assessment order using Per Protocol order mode.     Electronically signed by Bentley Gordon RCP on 9/5/2023 at 1:20 AM

## 2023-09-05 NOTE — PROCEDURES
48 hour Holter Monitor was applied to patient. Patient was instructed to remove monitor on 9/7 at 350 and return to  of hospital. The serial number on the Holter Monitor is (SN).

## 2023-09-05 NOTE — PROGRESS NOTES
Warfarin Pharmacy Consult Note    Temitope Andrade is a 79 y.o. male for whom pharmacy has been asked to manage warfarin therapy. Consulting Provider: Shantell Merlos  Warfarin Indication: Atrial fibrillation or Atrial flutter  Target INR range: 2.0-3.0   Warfarin dose prior to admission: 1.25mg MF, 2.5mg TWTHSS  Outpatient warfarin provider: [facility]    Recent Labs     09/05/23  0429   INR 3.18*     Recent Labs     09/04/23  1700 09/05/23  0429   HGB 14.9 14.9    163     Concurrent anticoagulants/antiplatelets: aspirin  Significant warfarin drug-drug interactions: none    Date INR Warfarin Dose   9/5/23 3.18 1 mg                                   INR will be monitored routinely until therapeutic INR is achieved. Pharmacy will continue to follow.  Thank you for the consult,   Reeda Schaumann R.Ph., BCPS., 9/5/2023,11:37 AM

## 2023-09-05 NOTE — H&P
MISCELLANEOUS 2 NONE SEEN    Troponin    Collection Time: 09/04/23  8:12 PM   Result Value Ref Range    Troponin, High Sensitivity 74 (H) 0 - 12 ng/L   EKG 12 Lead    Collection Time: 09/04/23 11:08 PM   Result Value Ref Range    Ventricular Rate 61 BPM    Atrial Rate 48 BPM    QRS Duration 178 ms    Q-T Interval 508 ms    QTc Calculation (Bazett) 511 ms    R Axis -83 degrees    T Axis 73 degrees   Troponin    Collection Time: 09/04/23 11:14 PM   Result Value Ref Range    Troponin, High Sensitivity 68 (H) 0 - 12 ng/L   CBC    Collection Time: 09/05/23  4:29 AM   Result Value Ref Range    WBC 9.1 4.8 - 10.8 thou/mm3    RBC 4.56 (L) 4.70 - 6.10 mill/mm3    Hemoglobin 14.9 14.0 - 18.0 gm/dl    Hematocrit 45.4 42.0 - 52.0 %    MCV 99.6 (H) 80.0 - 94.0 fL    MCH 32.7 26.0 - 33.0 pg    MCHC 32.8 32.2 - 35.5 gm/dl    RDW-CV 14.9 (H) 11.5 - 14.5 %    RDW-SD 55.1 (H) 35.0 - 45.0 fL    Platelets 118 735 - 938 thou/mm3    MPV 11.0 9.4 - 12.4 fL   Basic Metabolic Panel w/ Reflex to MG    Collection Time: 09/05/23  4:29 AM   Result Value Ref Range    Sodium 141 135 - 145 meq/L    Potassium reflex Magnesium 3.7 3.5 - 5.2 meq/L    Chloride 104 98 - 111 meq/L    CO2 24 23 - 33 meq/L    Glucose 85 70 - 108 mg/dL    BUN 33 (H) 7 - 22 mg/dL    Creatinine 2.3 (H) 0.4 - 1.2 mg/dL    Calcium 9.0 8.5 - 10.5 mg/dL   Protime-INR    Collection Time: 09/05/23  4:29 AM   Result Value Ref Range    INR 3.18 (H) 0.85 - 1.13   Anion Gap    Collection Time: 09/05/23  4:29 AM   Result Value Ref Range    Anion Gap 13.0 8.0 - 16.0 meq/L   Glomerular Filtration Rate, Estimated    Collection Time: 09/05/23  4:29 AM   Result Value Ref Range    Est, Glom Filt Rate 27 (A) >60 ml/min/1.73m2         Vital Signs: T: 97.9F P: 70 RR: 17 B/P: 133/87: FiO2: RA: O2 Sat:95%: I/O: No intake or output data in the 24 hours ending 09/05/23 0646      General:   no acute distress  HEENT:  normocephalic and atraumatic. No scleral icterus.  PEARLA, mucous membranes moist,

## 2023-09-05 NOTE — ED NOTES
Pt sitting on edge of bed eating a boxed lunch. No needs expressed at this time.  [address]  09/04/23 2038

## 2023-09-05 NOTE — ED NOTES
Spoke to [employer] to approve pt transport to [facility] in stable condition.      Burgess Dueyn LPN  61/85/19 9300

## 2023-09-05 NOTE — PROGRESS NOTES
Pt given discharge instructions, wife at bedside, both state understanding. Pt awake, alert, vss at time of discharge.

## 2023-09-06 ENCOUNTER — CARE COORDINATION (OUTPATIENT)
Dept: CASE MANAGEMENT | Age: 83
End: 2023-09-06

## 2023-09-06 ENCOUNTER — TELEPHONE (OUTPATIENT)
Dept: PHARMACY | Age: 83
End: 2023-09-06

## 2023-09-06 DIAGNOSIS — R42 DIZZINESS: Primary | ICD-10-CM

## 2023-09-06 LAB
EKG ATRIAL RATE: 48 BPM
EKG ATRIAL RATE: 60 BPM
EKG Q-T INTERVAL: 508 MS
EKG Q-T INTERVAL: 518 MS
EKG QRS DURATION: 172 MS
EKG QRS DURATION: 178 MS
EKG QTC CALCULATION (BAZETT): 511 MS
EKG QTC CALCULATION (BAZETT): 518 MS
EKG R AXIS: -83 DEGREES
EKG R AXIS: -83 DEGREES
EKG T AXIS: 73 DEGREES
EKG T AXIS: 77 DEGREES
EKG VENTRICULAR RATE: 60 BPM
EKG VENTRICULAR RATE: 61 BPM

## 2023-09-06 PROCEDURE — 1111F DSCHRG MED/CURRENT MED MERGE: CPT | Performed by: NURSE PRACTITIONER

## 2023-09-06 NOTE — TELEPHONE ENCOUNTER
Spoke with patient. Patient hospitalized 9/4-9/5/23 for lightheadedness. Discharged without warfarin instructions. INR 3.18 on 9/5. Per MAR, patient not given warfarin while inpatient. Per patient, states he took 1.25 mg from his home supply while inpatient on 9/4. States he took 2.5 mg yesterday, 9/5. Instructed patient to take 1.25 mg (1/2 tablet) today. Recheck INR tomorrow at scheduled appointment 9/7 @10:20.     Angus Gambino RPh  9/6/2023 9:48 AM     For Pharmacy Admin Tracking Only    Time Spent (min): 15

## 2023-09-06 NOTE — CARE COORDINATION
[facility] Care Transitions Initial Follow Up Call    Call within 2 business days of discharge: Yes    Patient Current Location:  Home: [address] [place] [address]    Care Transition Nurse contacted the patient by telephone to perform post hospital discharge assessment. Verified name and  with patient as identifiers. Provided introduction to self, and explanation of the Care Transition Nurse role. Patient: Vasile Bey Patient : 1940   MRN: [id number]  Reason for Admission: Dizziness, Tingling in both hands  Discharge Date: 23 RARS: No data recorded    Last Discharge [address] [facility] [facility]       Date Complaint Diagnosis Description Type Department Provider    23 Dizziness; Tingling Dizziness . .. ED to Hosp-Admission (Discharged) (ADMITTED) [facility]IMA[facility] WILBERTO Mcgraw. .. Was this an external facility discharge? No Discharge Facility: [address] [place] [address] to be reviewed by the provider   Additional needs identified to be addressed with provider: No  none               Method of communication with provider: none. Contacted pt for initial care transition follow up. Talia Neely states he is doing okay. No longer having any lightheadedness. States he was never dizzy and only had one episode of the lightheadedness. Denies having any c/o chest pain/discomfort, palpitations that he is aware of, increased shortness of breath. Cough and wheezing noted when speaking with pt. Reports he is using his breathing tx as prescribed. Denies acute distress. Continues to use oxygen at night. SpO2 94%. He is aware of importance of maintaining O2 sats above 90%. Pt currently wearing holter monitor for 48 hours. Reports he had to call the office to help reattach it to himself. Swelling in legs have decreased and denies having any significant swelling in extremities. No longer having any tingling in hands. Also denies any numbness. Appetite good.   Pt reports he is eating and

## 2023-09-07 ENCOUNTER — HOSPITAL ENCOUNTER (OUTPATIENT)
Dept: PHARMACY | Age: 83
Setting detail: THERAPIES SERIES
Discharge: HOME | End: 2023-09-07
Payer: MEDICARE

## 2023-09-07 DIAGNOSIS — Z51.81 ENCOUNTER FOR THERAPEUTIC DRUG MONITORING: ICD-10-CM

## 2023-09-07 DIAGNOSIS — I48.21 PERMANENT ATRIAL FIBRILLATION (HCC): Primary | ICD-10-CM

## 2023-09-07 DIAGNOSIS — Z79.01 ANTICOAGULATED ON COUMADIN: ICD-10-CM

## 2023-09-07 LAB — POC INR: 2.7 (ref 0.8–1.2)

## 2023-09-07 PROCEDURE — 99211 OFF/OP EST MAY X REQ PHY/QHP: CPT

## 2023-09-07 PROCEDURE — 85610 PROTHROMBIN TIME: CPT

## 2023-09-07 PROCEDURE — 36416 COLLJ CAPILLARY BLOOD SPEC: CPT

## 2023-09-07 NOTE — PROGRESS NOTES
Medication Management [address] [place] [address]  [phone] (phone)  [phone] (fax)    Mr. Zina Davila is a 79 y.o.  male with history of atrial fib. , per Dr. Nikki Orozco referral, who presents today for Warfarin monitoring and adjustment (2 week visit). Patient verifies current dosing regimen and tablet strength. Uses pill box. No missed or extra doses. Patient denies bleeding/bruising. Has usual SOB/left leg swelling. No blood in urine or stool. No dietary changes. Changes in medication/OTC agents/herbals: long-term Azithromycin was stopped in the hospital.  No change in alcohol use or tobacco use. No change in activity level. Patient denies falls. No vomiting/diarrhea or acute illness. No procedures scheduled in the future at this time. Admitted 9/4-9/5 to [facility] with lightheadedness. Wearing heart monitor; stated he takes it off tonight. INR 9/5 was 3.18. Per clinic pharmacist's call yesterday - ordered 1.25 mg, a Wednesday. Assessment:     Lab Results   Component Value Date    INR 2.70 (H) 09/07/2023    INR 3.18 (H) 09/05/2023    INR 3.00 (H) 08/22/2023     INR therapeutic - goal 2-3. Recent Labs     09/07/23  1039   INR 2.70*      Plan:  POCT INR performed/result reviewed. Decrease PO Coumadin to 1.25 mg MWF, 2.5 mg TThSS (from 1.25 mg MF, 2.5 mg TWThSS=8.3% decrease). Recheck INR in 2 week(s). (Report given - orders entered by MAX Daley, PharmD.)  Patient reminded to call the Anticoagulation Clinic with any signs or symptoms of bleeding or with any medication changes. Patient given instructions utilizing the teach back method. After visit summary printed and reviewed with patient. Discharged ambulatory in no apparent distress.     For Pharmacy Admin Tracking Only    Intervention Detail: Dose Adjustment: 1, reason: Therapy De-escalation  Total # of Interventions Recommended: 1  Total # of Interventions Accepted: 1  Time Spent (min):

## 2023-09-12 ENCOUNTER — CARE COORDINATION (OUTPATIENT)
Dept: CASE MANAGEMENT | Age: 83
End: 2023-09-12

## 2023-09-12 NOTE — CARE COORDINATION
[facility] Care Transitions Follow Up Call    Patient Current Location:  Home: [address] [id number]    Care Transition Nurse contacted the patient by telephone to follow up after admission on 23. Verified name and  with patient as identifiers. Patient: Gage Bahena  Patient : 1940   MRN: [id number]  Reason for Admission: lightheadedness, tingling both hands  Discharge Date: 23 RARS: No data recorded    Needs to be reviewed by the provider   Additional needs identified to be addressed with provider: Yes  HFU w/ cardiology? Method of communication with provider:  Pt. To ask PCP tomorrow . CTN call to Natasha Telly today and he says he is feeling better. Denies lightheadedness, dizziness, sob, chest pain/tightness, malaise, fever, chills, n/v/d, palpitations, tingling in hands. Productive cough says clear/white mucous is easy to expectorate d/t Mucinex. Wheezing noted when speaking at times. Using nebulizer as directed. O2 @ 3l/min @ hs only. pO2-92-93%  C/o slight swelling L leg-elevates; not interested in compression socks. BLOOD SUGAR=119  Wt.=203 does not check qd  Reviewed CHF management:   Daily weights in a.m. before breakfast, after voiding  Keep written log of weights for review  Notify MD immediately of weight gain/loss 3# or more in 1-7 days  Take all rx as prescribed, keep scheduled MD appts  Low sodium diet- read labels; avoid/limit high sodium foods such as fast food, canned/boxed/processed foods, frozen meals, soups, cheeses, breads, chips, soda. Do not add salt to food  Adhere to MD recommended fluid restriction  Notify MD immediately if experiencing increased sob, orthopnea, edema, weight gain, feeling of uneasiness, chest pain, increased cough, confusion, wheezing or chest tightness. Call 911 if noting acute distress. Wt. Log/ CHF zone tool to be mailed to pt. Says adheres to fluid restriction (64 oz./day) and low sodium diet.  Declines RD

## 2023-09-13 ENCOUNTER — OFFICE VISIT (OUTPATIENT)
Dept: FAMILY MEDICINE CLINIC | Age: 83
End: 2023-09-13

## 2023-09-13 VITALS
HEART RATE: 80 BPM | WEIGHT: 208 LBS | HEIGHT: 67 IN | BODY MASS INDEX: 32.65 KG/M2 | SYSTOLIC BLOOD PRESSURE: 120 MMHG | RESPIRATION RATE: 18 BRPM | OXYGEN SATURATION: 94 % | DIASTOLIC BLOOD PRESSURE: 60 MMHG | TEMPERATURE: 97.5 F

## 2023-09-13 DIAGNOSIS — H60.502 ACUTE OTITIS EXTERNA OF LEFT EAR, UNSPECIFIED TYPE: ICD-10-CM

## 2023-09-13 DIAGNOSIS — Z09 HOSPITAL DISCHARGE FOLLOW-UP: Primary | ICD-10-CM

## 2023-09-18 ENCOUNTER — CARE COORDINATION (OUTPATIENT)
Dept: CARE COORDINATION | Age: 83
End: 2023-09-18

## 2023-09-19 ENCOUNTER — CARE COORDINATION (OUTPATIENT)
Dept: CASE MANAGEMENT | Age: 83
End: 2023-09-19

## 2023-09-19 NOTE — CARE COORDINATION
[facility] Care Transitions Follow Up Call    Patient Current Location: [address] [place] [address] Transition Nurse contacted the patient by telephone to follow up after admission on 23. Verified name and  with patient as identifiers. Patient: Temitope Andrade  Patient : 1940   MRN: [id number]  Reason for Admission: Brain [place] in both hands  Discharge Date: 23 RARS: No data recorded    Needs to be reviewed by the provider   Additional needs identified to be addressed with provider: No  none             Method of communication with provider: none. Contacted pt for care transition follow up. Gabriel Sanchez states he is doing fine. Background noise loud. He reports he is currently out. Denies having any further episodes of lightheadedness or tingling in hands. Shortness of breath \"the same\". May become short of breath when overexerting himself but he states \"not doing too much\". Cough has been improving. Pt continues to take Mucinex which is helping. Left leg swelling has decreased. Reports swelling only in left foot but \"not very much\". Continues to keep legs elevated. He informed CTN that he returned the holter monitor. Reports he does not have to follow up with cardiology and waiting on holter monitor results. Pt cannot hear out of left ear since last week. Was prescribed Cortisporin ear drops for 10 days. He has an appt with the nurse tomorrow to remove the wax from his left ear. No questions or needs at this time. Addressed changes since last contact:   Cannot hear out of left ear.   Started on ear drops and will be having ear wax removed from left ear tomorrow    Follow Up  Future Appointments   Date Time Provider [address]   2023  2:00 PM SCHEDULE, NURSE Dylan Lamb [facility]   2023 10:40 AM Oswaldo Arango RN STR MED MGMT [facility]   10/24/2023 10:20 AM WILBERTO Dominguez - CNP [facility]   2023  9:40 AM SCHEDULE, SRPX [facility]

## 2023-09-20 ENCOUNTER — NURSE ONLY (OUTPATIENT)
Dept: FAMILY MEDICINE CLINIC | Age: 83
End: 2023-09-20

## 2023-09-20 DIAGNOSIS — H93.8X3 EAR FULLNESS, BILATERAL: Primary | ICD-10-CM

## 2023-09-21 ENCOUNTER — APPOINTMENT (OUTPATIENT)
Dept: PHARMACY | Age: 83
End: 2023-09-21
Payer: MEDICARE

## 2023-09-21 DIAGNOSIS — I48.21 PERMANENT ATRIAL FIBRILLATION (HCC): Primary | ICD-10-CM

## 2023-09-21 DIAGNOSIS — Z51.81 ENCOUNTER FOR THERAPEUTIC DRUG MONITORING: ICD-10-CM

## 2023-09-21 DIAGNOSIS — Z79.01 ANTICOAGULATED ON COUMADIN: ICD-10-CM

## 2023-09-21 LAB — POC INR: 2.9 (ref 0.8–1.2)

## 2023-09-21 PROCEDURE — 36416 COLLJ CAPILLARY BLOOD SPEC: CPT

## 2023-09-21 PROCEDURE — 99211 OFF/OP EST MAY X REQ PHY/QHP: CPT

## 2023-09-21 PROCEDURE — 85610 PROTHROMBIN TIME: CPT

## 2023-09-21 NOTE — PROGRESS NOTES
Medication Management [address] [place] [address]  [phone] (phone)  [phone] (fax)    Mr. Maggie Delgadillo is a 79 y.o.  male with history of atrial fib. , per Dr. Sage Cyr referral, who presents today for Warfarin monitoring and adjustment (2 week visit after decreasing dose by 8.3%; early for today's visit). Patient verifies current dosing regimen and tablet strength. No missed or extra doses. Patient denies bleeding. Has usual SOB/easy bruising. States left leg swelling is improving. No blood in urine or stool. No dietary changes. Changes in medication/OTC agents/herbals: recent ear drops for wax buildup. No change in alcohol use or tobacco use. No change in activity level. Patient denies falls. No vomiting/diarrhea or acute illness. No procedures scheduled in the future at this time. Assessment:     Lab Results   Component Value Date    INR 2.90 (H) 09/21/2023    INR 2.70 (H) 09/07/2023    INR 3.18 (H) 09/05/2023     INR therapeutic - goal 2-3. Recent Labs     09/21/23  1040   INR 2.90*      Plan:  POCT INR performed/result reviewed. Continue PO Coumadin 1.25 mg MWF, 2.5 mg TThSS. Recheck INR in 2-3 week(s). (Report given - orders received from/verified with Lanette Douglas, PharmD.)  Patient reminded to call the Anticoagulation Clinic with any signs or symptoms of bleeding or with any medication changes. Patient given instructions utilizing the teach back method. After visit summary printed and reviewed with patient. Discharged ambulatory in no apparent distress.     For Pharmacy Admin Tracking Only    Time Spent (min):  23

## 2023-09-26 ENCOUNTER — CARE COORDINATION (OUTPATIENT)
Dept: CASE MANAGEMENT | Age: 83
End: 2023-09-26

## 2023-09-26 NOTE — CARE COORDINATION
for questions related to their healthcare. Patients top risk factors for readmission: medical condition-Pacer, CKD, Afib, CHF, DM and polypharmacy  Interventions to address risk factors: Education of patient/family/caregiver/guardian to support self-management-when to contact  providers    Offered patient enrollment in the Remote Patient Monitoring (RPM) program for in-home monitoring: Patient declined. Care Transitions Subsequent and Final Call    Subsequent and Final Calls  Do you have any ongoing symptoms?: Yes  Patient-reported symptoms: Shortness of Breath  Have your medications changed?: No  Do you have any questions related to your medications?: No  Do you currently have any active services?: No  Do you have any needs or concerns that I can assist you with?: No  Care Transitions Interventions     Transportation Support: Declined     Registered Dietician: Declined DME Assistance: Declined               Disease Specific Clinic: Declined      Disease Association: Completed      Other Interventions:             Care Transition Nurse provided contact information for future needs. Plan for follow-up call in 7-10 days based on severity of symptoms and risk factors.   Plan for next call: symptom management-SOB, any new or worsening symptoms    Julian Huggins RN

## 2023-09-27 ENCOUNTER — TELEPHONE (OUTPATIENT)
Dept: FAMILY MEDICINE CLINIC | Age: 83
End: 2023-09-27

## 2023-09-27 NOTE — TELEPHONE ENCOUNTER
Received fax from [address] [facility] trulicity is on back order as a result nadia [facility] has temporarily stopped accepting new patients to receive trulicity through the pt assistance program

## 2023-10-03 ENCOUNTER — TELEPHONE (OUTPATIENT)
Dept: FAMILY MEDICINE CLINIC | Age: 83
End: 2023-10-03

## 2023-10-03 DIAGNOSIS — E11.22 TYPE 2 DIABETES MELLITUS WITH STAGE 4 CHRONIC KIDNEY DISEASE, WITH LONG-TERM CURRENT USE OF INSULIN (HCC): Primary | ICD-10-CM

## 2023-10-03 DIAGNOSIS — N18.4 TYPE 2 DIABETES MELLITUS WITH STAGE 4 CHRONIC KIDNEY DISEASE, WITH LONG-TERM CURRENT USE OF INSULIN (HCC): Primary | ICD-10-CM

## 2023-10-03 DIAGNOSIS — Z79.4 TYPE 2 DIABETES MELLITUS WITH STAGE 4 CHRONIC KIDNEY DISEASE, WITH LONG-TERM CURRENT USE OF INSULIN (HCC): Primary | ICD-10-CM

## 2023-10-03 NOTE — TELEPHONE ENCOUNTER
----- Message from Jodi Tirado sent at 10/3/2023  3:10 PM EDT -----  Subject: Message to Provider    QUESTIONS  Information for Provider? Please call pt regarding his visit today   ---------------------------------------------------------------------------  --------------  [address]  [phone]; OK to leave message on voicemail  ---------------------------------------------------------------------------  --------------  SCRIPT ANSWERS  Relationship to Patient?  Self

## 2023-10-03 NOTE — TELEPHONE ENCOUNTER
Pt states the [place] and [place] is $400 for 90 days and he cant afford the medication    Do you want me to send this to [facility] assistance

## 2023-10-04 ENCOUNTER — CARE COORDINATION (OUTPATIENT)
Dept: CARE COORDINATION | Age: 83
End: 2023-10-04

## 2023-10-04 ENCOUNTER — CARE COORDINATION (OUTPATIENT)
Dept: CASE MANAGEMENT | Age: 83
End: 2023-10-04

## 2023-10-04 NOTE — CARE COORDINATION
Jakob Munoz called me back and we are going to start the application for assistance on Januvia. I faxed the MD and mailed the patient his portion.

## 2023-10-04 NOTE — CARE COORDINATION
Spoke with Tish and they were driving, I will call them back later.           [address] [facility]   Medication Assistance  [id number] [address] [employer]    X) [phone]  (P) [phone]

## 2023-10-04 NOTE — TELEPHONE ENCOUNTER
I can reach out to the patient but please advise I am starting to work on  enrollments, it might be to late to get enrolled for  since all applications  .

## 2023-10-04 NOTE — CARE COORDINATION
Up  Future Appointments   Date Time Provider [address]   10/12/2023 10:40 AM Lance Pizarro RN [facility] [place]   10/24/2023 10:20 AM WILBERTO Jennings - CNP [facility] [place]   11/7/2023  9:40 AM SCHEDULE, SRPX NW ADV CARDIOLOGY PACER SRPX NW CARD [facility]   11/9/2023  2:20 PM Cleopatra Ya DO [place]   1/8/2024 10:00 AM Brielle Eagle MD [address]  [facility] Transition Nurse reviewed medical action plan and red flags with patient and discussed any barriers to care and/or understanding of plan of care after discharge. Discussed appropriate site of care based on symptoms and resources available to patient including: PCP  Specialist. The patient agrees to contact the PCP office for questions related to their healthcare. Patients top risk factors for readmission: medical condition-Pacer, CKD, Afib, CHF, DM and polypharmacy  Interventions to address risk factors: Education of patient/family/caregiver/guardian to support self-management-s/s and when to contact providers    Offered patient enrollment in the Remote Patient Monitoring (RPM) program for in-home monitoring: Patient declined. Care Transitions Subsequent and Final Call    Subsequent and Final Calls  Do you have any ongoing symptoms?: Yes  Patient-reported symptoms: Shortness of Breath  Have your medications changed?: Yes  Patient Reports: Waiting on medication assistance for [place]  Do you have any questions related to your medications?: No  Do you currently have any active services?: No  Do you have any needs or concerns that I can assist you with?: No  Care Transitions Interventions     Transportation Support: Declined     Registered Dietician: Declined DME Assistance: Declined               Disease Specific Clinic: Declined      Disease Association: Completed      Other Interventions:             Care Transition Nurse provided contact information for future needs.  No further

## 2023-10-05 DIAGNOSIS — E11.65 TYPE 2 DIABETES MELLITUS WITH HYPERGLYCEMIA, WITHOUT LONG-TERM CURRENT USE OF INSULIN (HCC): ICD-10-CM

## 2023-10-05 DIAGNOSIS — Z86.39 H/O INSULIN DEPENDENT DIABETES MELLITUS: ICD-10-CM

## 2023-10-05 RX ORDER — CALCIUM CITRATE/VITAMIN D3 200MG-6.25
TABLET ORAL
Qty: 100 STRIP | Refills: 2 | Status: SHIPPED | OUTPATIENT
Start: 2023-10-05

## 2023-10-05 NOTE — TELEPHONE ENCOUNTER
Recent Visits  Date Type Provider Dept   09/13/23 Office Visit Reinier Cruz, APRN - CNP [facility]   08/22/23 Office Visit Reinierdaylin Cruz, APRN - CNP [facility]   07/11/23 Office Visit Reinierdaylin Cruz, APRN - CNP [facility]   05/23/23 Office Visit Reinierdaylin Cruz, APRN - CNP [facility]   05/03/23 Office Visit Reinier Cruz, APRN - CNP [facility]   02/09/23 Office Visit Reinier Cruz, APRN - CNP [facility]   10/19/22 Office Visit Gabi Mariet, APRN - CNP [facility]   09/02/22 Office Visit Gabi Mariet, APRN - CNP [facility]   08/23/22 Office Visit Gabi Mariet, APRN - CNP [facility]   05/02/22 Office Visit Gabi Mariet, APRN - CNP [facility]   Showing recent visits within past 540 days with a meds authorizing provider and meeting all other requirements  Future Appointments  Date Type Provider Dept   10/24/23 Appointment Reinier Cruz, APRN - CNP [facility]   Showing future appointments within next 150 days with a meds authorizing provider and meeting all other requirements

## 2023-10-12 ENCOUNTER — HOSPITAL ENCOUNTER (OUTPATIENT)
Dept: PHARMACY | Age: 83
Setting detail: THERAPIES SERIES
Discharge: HOME | End: 2023-10-12
Payer: MEDICARE

## 2023-10-12 DIAGNOSIS — Z79.01 ANTICOAGULATED ON COUMADIN: ICD-10-CM

## 2023-10-12 DIAGNOSIS — Z51.81 ENCOUNTER FOR THERAPEUTIC DRUG MONITORING: ICD-10-CM

## 2023-10-12 DIAGNOSIS — I48.21 PERMANENT ATRIAL FIBRILLATION (HCC): Primary | ICD-10-CM

## 2023-10-12 LAB — POC INR: 2.5 (ref 0.8–1.2)

## 2023-10-12 PROCEDURE — 99211 OFF/OP EST MAY X REQ PHY/QHP: CPT | Performed by: PHARMACIST

## 2023-10-12 PROCEDURE — 85610 PROTHROMBIN TIME: CPT | Performed by: PHARMACIST

## 2023-10-12 PROCEDURE — 36416 COLLJ CAPILLARY BLOOD SPEC: CPT | Performed by: PHARMACIST

## 2023-10-12 NOTE — PROGRESS NOTES
Medication Management [address] [place] [address]  [phone] (phone)  [phone] (fax)    Mr. George Alvarez is a 79 y.o.  male with history of Afib who presents today for anticoagulation monitoring and adjustment. Patient verifies current dosing regimen and tablet strength. No missed or extra doses. Patient denies s/s bleeding/bruising/swelling/SOB  No blood in urine or stool. No dietary changes. No changes in medication/OTC agents/Herbals. No change in alcohol use or tobacco use. No change in activity level. Patient denies falls. No vomiting/diarrhea or acute illness. No Procedures scheduled in the future at this time. Assessment:   Lab Results   Component Value Date    INR 2.50 (H) 10/12/2023    INR 2.90 (H) 09/21/2023    INR 2.70 (H) 09/07/2023     INR therapeutic   Recent Labs     10/12/23  1055   INR 2.50*   2nd consecutive therapeutic INR following a dose adjustment 9/7/23    Plan:  Continue Coumadin 1.25mg MWF 2.5mg TuThSS. Recheck INR in 3-4 week(s). Patient reminded to call the Anticoagulation Clinic with any signs or symptoms of bleeding or with any medication changes. Patient given instructions utilizing the teach back method. After visit summary printed and reviewed with patient. Discharged ambulatory in no apparent distress.     Patient interview completed and discussed with pharmacist by Lulú Cornejo PharmD Candidate 2024    For Pharmacy Admin Tracking Only    Intervention Detail:   Total # of Interventions Recommended: 0  Total # of Interventions Accepted: 0  Time Spent (min): 20

## 2023-10-25 DIAGNOSIS — N18.4 STAGE 4 CHRONIC KIDNEY DISEASE (HCC): ICD-10-CM

## 2023-10-25 DIAGNOSIS — E11.65 TYPE 2 DIABETES MELLITUS WITH HYPERGLYCEMIA, WITHOUT LONG-TERM CURRENT USE OF INSULIN (HCC): ICD-10-CM

## 2023-10-27 ENCOUNTER — NURSE ONLY (OUTPATIENT)
Dept: LAB | Age: 83
End: 2023-10-27

## 2023-10-27 DIAGNOSIS — N18.4 CKD (CHRONIC KIDNEY DISEASE), STAGE IV (HCC): ICD-10-CM

## 2023-10-27 LAB
25(OH)D3 SERPL-MCNC: 33 NG/ML (ref 30–100)
ANION GAP SERPL CALC-SCNC: 18 MEQ/L (ref 8–16)
BUN SERPL-MCNC: 42 MG/DL (ref 7–22)
CALCIUM SERPL-MCNC: 8.5 MG/DL (ref 8.5–10.5)
CHLORIDE SERPL-SCNC: 103 MEQ/L (ref 98–111)
CO2 SERPL-SCNC: 23 MEQ/L (ref 23–33)
CREAT SERPL-MCNC: 2.9 MG/DL (ref 0.4–1.2)
DEPRECATED RDW RBC AUTO: 56.7 FL (ref 35–45)
ERYTHROCYTE [DISTWIDTH] IN BLOOD BY AUTOMATED COUNT: 15.1 % (ref 11.5–14.5)
GFR SERPL CREATININE-BSD FRML MDRD: 21 ML/MIN/1.73M2
GLUCOSE SERPL-MCNC: 158 MG/DL (ref 70–108)
HCT VFR BLD AUTO: 44.2 % (ref 42–52)
HGB BLD-MCNC: 14.2 GM/DL (ref 14–18)
MCH RBC QN AUTO: 32.5 PG (ref 26–33)
MCHC RBC AUTO-ENTMCNC: 32.1 GM/DL (ref 32.2–35.5)
MCV RBC AUTO: 101.1 FL (ref 80–94)
PHOSPHATE SERPL-MCNC: 4.7 MG/DL (ref 2.4–4.7)
PLATELET # BLD AUTO: 179 THOU/MM3 (ref 130–400)
PMV BLD AUTO: 11.4 FL (ref 9.4–12.4)
POTASSIUM SERPL-SCNC: 3.9 MEQ/L (ref 3.5–5.2)
PTH-INTACT SERPL-MCNC: 217.4 PG/ML (ref 15–65)
RBC # BLD AUTO: 4.37 MILL/MM3 (ref 4.7–6.1)
SODIUM SERPL-SCNC: 144 MEQ/L (ref 135–145)
WBC # BLD AUTO: 9.4 THOU/MM3 (ref 4.8–10.8)

## 2023-10-30 ENCOUNTER — TELEPHONE (OUTPATIENT)
Dept: NEPHROLOGY | Age: 83
End: 2023-10-30

## 2023-10-30 DIAGNOSIS — N18.4 CKD (CHRONIC KIDNEY DISEASE), STAGE IV (HCC): Primary | ICD-10-CM

## 2023-10-30 NOTE — TELEPHONE ENCOUNTER
----- Message from Nette Pollard DO sent at 10/27/2023  2:56 PM EDT -----  Kidney function is worse. If not having much swelling then reduce bumex to just once a day for the next 2 days. Then can go back to bid.   Repeat another bmp prior to next appointment with me

## 2023-11-01 ENCOUNTER — OFFICE VISIT (OUTPATIENT)
Dept: FAMILY MEDICINE CLINIC | Age: 83
End: 2023-11-01
Payer: MEDICARE

## 2023-11-01 ENCOUNTER — TELEPHONE (OUTPATIENT)
Dept: FAMILY MEDICINE CLINIC | Age: 83
End: 2023-11-01

## 2023-11-01 ENCOUNTER — CARE COORDINATION (OUTPATIENT)
Dept: CARE COORDINATION | Age: 83
End: 2023-11-01

## 2023-11-01 VITALS
OXYGEN SATURATION: 94 % | RESPIRATION RATE: 10 BRPM | SYSTOLIC BLOOD PRESSURE: 124 MMHG | WEIGHT: 210.6 LBS | DIASTOLIC BLOOD PRESSURE: 68 MMHG | TEMPERATURE: 97.8 F | BODY MASS INDEX: 33.06 KG/M2 | HEART RATE: 62 BPM | HEIGHT: 67 IN

## 2023-11-01 DIAGNOSIS — Z23 FLU VACCINE NEED: ICD-10-CM

## 2023-11-01 DIAGNOSIS — E11.65 TYPE 2 DIABETES MELLITUS WITH HYPERGLYCEMIA, WITHOUT LONG-TERM CURRENT USE OF INSULIN (HCC): Primary | ICD-10-CM

## 2023-11-01 DIAGNOSIS — N18.4 STAGE 4 CHRONIC KIDNEY DISEASE (HCC): ICD-10-CM

## 2023-11-01 LAB — HBA1C MFR BLD: 8.3 % (ref 4.3–5.7)

## 2023-11-01 PROCEDURE — G0008 ADMIN INFLUENZA VIRUS VAC: HCPCS | Performed by: NURSE PRACTITIONER

## 2023-11-01 PROCEDURE — 1123F ACP DISCUSS/DSCN MKR DOCD: CPT | Performed by: NURSE PRACTITIONER

## 2023-11-01 PROCEDURE — 3074F SYST BP LT 130 MM HG: CPT | Performed by: NURSE PRACTITIONER

## 2023-11-01 PROCEDURE — G8417 CALC BMI ABV UP PARAM F/U: HCPCS | Performed by: NURSE PRACTITIONER

## 2023-11-01 PROCEDURE — G8427 DOCREV CUR MEDS BY ELIG CLIN: HCPCS | Performed by: NURSE PRACTITIONER

## 2023-11-01 PROCEDURE — 90694 VACC AIIV4 NO PRSRV 0.5ML IM: CPT | Performed by: NURSE PRACTITIONER

## 2023-11-01 PROCEDURE — 99214 OFFICE O/P EST MOD 30 MIN: CPT | Performed by: NURSE PRACTITIONER

## 2023-11-01 PROCEDURE — 3052F HG A1C>EQUAL 8.0%<EQUAL 9.0%: CPT | Performed by: NURSE PRACTITIONER

## 2023-11-01 PROCEDURE — 1036F TOBACCO NON-USER: CPT | Performed by: NURSE PRACTITIONER

## 2023-11-01 PROCEDURE — G8484 FLU IMMUNIZE NO ADMIN: HCPCS | Performed by: NURSE PRACTITIONER

## 2023-11-01 PROCEDURE — 3078F DIAST BP <80 MM HG: CPT | Performed by: NURSE PRACTITIONER

## 2023-11-01 NOTE — CARE COORDINATION
Spoke with patient in regards to the Januvia application. Let him know I have the application ready to be mailed out to [employer] for assistance in 2024. I am going to be mailing that out then the patient will need to follow through with [employer]'s instructions.         [facility]   Medication Assistance  [id number] [place] [place]    (P) [phone]  (S) [phone]

## 2023-11-01 NOTE — PROGRESS NOTES
Vaccine Information Sheet, \"Influenza - Inactivated\"  given to Temitope Andrade, or parent/legal guardian of  Temitope Andrade and verbalized understanding. Patient responses:    Have you ever had a reaction to a flu vaccine? No  Do you have an allergy to eggs, neomycin or polymixin? No  Do you have an allergy to Thimerosal, contact lens solution, or Merthiolate? No  Have you ever had Guillian Hutchinson Syndrome? No  Do you have any current illness? No  Do you have a temperature above 100 degrees? No  Are you pregnant? No  If pregnant, permission obtained from physician? No  Do you have an active neurological disorder? No      Flu vaccine given per order. Please see immunization tab.   Immunization(s) given during visit:    Immunizations Administered       Name Date Dose Route    Influenza, FLUAD, (age 72 y+), Adjuvanted, 0.5mL 11/1/2023 0.5 mL Intramuscular    Site: Deltoid- Right    Lot: [id number]    NDC: 65409-347-29            Most recent Vaccine Information Sheet dated 08.06.2021 given to pt

## 2023-11-01 NOTE — PATIENT INSTRUCTIONS
Kidney function is worse. If not having much swelling then reduce bumex to just once a day for the next 2 days. Then can go back to bid.   Repeat another bmp prior to next appointment with me  Message from Dr. Angel Vela

## 2023-11-01 NOTE — TELEPHONE ENCOUNTER
Please follow up on this. He came in on 10/12 before noon and our office faxed a form to Kamini Baumann and he hasn't heard anything back. Has tried to call her several times and can't get ahold of her. Can we see if she got it or if we need to refax it? Do we know the status of this? He needs his meds.

## 2023-11-01 NOTE — TELEPHONE ENCOUNTER
Spoke with Katerin Mott  She will call the pt and let him know she has the application completed , it just needs mailed

## 2023-11-06 ENCOUNTER — NURSE ONLY (OUTPATIENT)
Dept: LAB | Age: 83
End: 2023-11-06

## 2023-11-06 DIAGNOSIS — N18.4 CKD (CHRONIC KIDNEY DISEASE), STAGE IV (HCC): ICD-10-CM

## 2023-11-06 LAB
ANION GAP SERPL CALC-SCNC: 13 MEQ/L (ref 8–16)
BUN SERPL-MCNC: 30 MG/DL (ref 7–22)
CALCIUM SERPL-MCNC: 8.7 MG/DL (ref 8.5–10.5)
CHLORIDE SERPL-SCNC: 102 MEQ/L (ref 98–111)
CO2 SERPL-SCNC: 25 MEQ/L (ref 23–33)
CREAT SERPL-MCNC: 2.5 MG/DL (ref 0.4–1.2)
GFR SERPL CREATININE-BSD FRML MDRD: 25 ML/MIN/1.73M2
GLUCOSE SERPL-MCNC: 182 MG/DL (ref 70–108)
POTASSIUM SERPL-SCNC: 4.3 MEQ/L (ref 3.5–5.2)
SODIUM SERPL-SCNC: 140 MEQ/L (ref 135–145)

## 2023-11-07 ENCOUNTER — NURSE ONLY (OUTPATIENT)
Dept: CARDIOLOGY CLINIC | Age: 83
End: 2023-11-07
Payer: MEDICARE

## 2023-11-07 DIAGNOSIS — Z95.0 PACEMAKER: Primary | ICD-10-CM

## 2023-11-07 PROCEDURE — 93279 PRGRMG DEV EVAL PM/LDLS PM: CPT | Performed by: INTERNAL MEDICINE

## 2023-11-09 ENCOUNTER — OFFICE VISIT (OUTPATIENT)
Dept: NEPHROLOGY | Age: 83
End: 2023-11-09
Payer: MEDICARE

## 2023-11-09 ENCOUNTER — HOSPITAL ENCOUNTER (OUTPATIENT)
Dept: PHARMACY | Age: 83
Setting detail: THERAPIES SERIES
Discharge: HOME | End: 2023-11-09
Payer: MEDICARE

## 2023-11-09 VITALS
WEIGHT: 211.8 LBS | DIASTOLIC BLOOD PRESSURE: 64 MMHG | SYSTOLIC BLOOD PRESSURE: 101 MMHG | OXYGEN SATURATION: 95 % | BODY MASS INDEX: 33.17 KG/M2 | HEART RATE: 60 BPM

## 2023-11-09 DIAGNOSIS — N18.4 CKD (CHRONIC KIDNEY DISEASE), STAGE IV (HCC): Primary | ICD-10-CM

## 2023-11-09 DIAGNOSIS — Z79.01 ANTICOAGULATED ON COUMADIN: ICD-10-CM

## 2023-11-09 DIAGNOSIS — I48.21 PERMANENT ATRIAL FIBRILLATION (HCC): Primary | ICD-10-CM

## 2023-11-09 DIAGNOSIS — Z51.81 ENCOUNTER FOR THERAPEUTIC DRUG MONITORING: ICD-10-CM

## 2023-11-09 LAB — POC INR: 1.9 (ref 0.8–1.2)

## 2023-11-09 PROCEDURE — G8427 DOCREV CUR MEDS BY ELIG CLIN: HCPCS | Performed by: INTERNAL MEDICINE

## 2023-11-09 PROCEDURE — G8484 FLU IMMUNIZE NO ADMIN: HCPCS | Performed by: INTERNAL MEDICINE

## 2023-11-09 PROCEDURE — 1036F TOBACCO NON-USER: CPT | Performed by: INTERNAL MEDICINE

## 2023-11-09 PROCEDURE — 99214 OFFICE O/P EST MOD 30 MIN: CPT | Performed by: INTERNAL MEDICINE

## 2023-11-09 PROCEDURE — 99211 OFF/OP EST MAY X REQ PHY/QHP: CPT

## 2023-11-09 PROCEDURE — 36416 COLLJ CAPILLARY BLOOD SPEC: CPT

## 2023-11-09 PROCEDURE — 3078F DIAST BP <80 MM HG: CPT | Performed by: INTERNAL MEDICINE

## 2023-11-09 PROCEDURE — 85610 PROTHROMBIN TIME: CPT

## 2023-11-09 PROCEDURE — G8417 CALC BMI ABV UP PARAM F/U: HCPCS | Performed by: INTERNAL MEDICINE

## 2023-11-09 PROCEDURE — 3074F SYST BP LT 130 MM HG: CPT | Performed by: INTERNAL MEDICINE

## 2023-11-09 PROCEDURE — 1123F ACP DISCUSS/DSCN MKR DOCD: CPT | Performed by: INTERNAL MEDICINE

## 2023-11-09 RX ORDER — POTASSIUM CHLORIDE 750 MG/1
10 TABLET, EXTENDED RELEASE ORAL DAILY
Qty: 30 TABLET | Refills: 0 | Status: SHIPPED | OUTPATIENT
Start: 2023-11-09 | End: 2023-11-10 | Stop reason: SDUPTHER

## 2023-11-09 RX ORDER — CALCITRIOL 0.25 UG/1
0.25 CAPSULE, LIQUID FILLED ORAL DAILY
Qty: 30 CAPSULE | Refills: 0 | Status: SHIPPED | OUTPATIENT
Start: 2023-11-09 | End: 2023-11-10 | Stop reason: SDUPTHER

## 2023-11-09 RX ORDER — CALCITRIOL 0.25 UG/1
0.25 CAPSULE, LIQUID FILLED ORAL DAILY
Qty: 30 CAPSULE | Refills: 0 | Status: SHIPPED | OUTPATIENT
Start: 2023-11-09 | End: 2023-11-09 | Stop reason: SDUPTHER

## 2023-11-09 RX ORDER — CALCITRIOL 0.25 UG/1
0.25 CAPSULE, LIQUID FILLED ORAL DAILY
Qty: 90 CAPSULE | Refills: 1 | Status: SHIPPED | OUTPATIENT
Start: 2023-11-09 | End: 2023-11-09 | Stop reason: SDUPTHER

## 2023-11-09 RX ORDER — POTASSIUM CHLORIDE 750 MG/1
10 TABLET, EXTENDED RELEASE ORAL DAILY
Qty: 90 TABLET | Refills: 1 | Status: SHIPPED | OUTPATIENT
Start: 2023-11-09 | End: 2023-11-09 | Stop reason: SDUPTHER

## 2023-11-09 RX ORDER — POTASSIUM CHLORIDE 750 MG/1
10 TABLET, EXTENDED RELEASE ORAL DAILY
Qty: 30 TABLET | Refills: 0 | Status: SHIPPED | OUTPATIENT
Start: 2023-11-09 | End: 2023-11-09 | Stop reason: SDUPTHER

## 2023-11-09 NOTE — PROGRESS NOTES
Medication Management [address] [place] [address]  [phone] (phone)  [phone] (fax)    Mr. Elijah Singh is a 79 y.o.  male with history of Afib who presents today for anticoagulation monitoring and adjustment. Patient verifies current dosing regimen and tablet strength. No missed or extra doses. Patient denies s/s bleeding/bruising/SOB  - Swelling in left foot that started several days ago - has appointment with kidney specialist this afternoon   No blood in urine or stool. No dietary changes. Changes in medication/OTC agents/Herbals. - Calcitriol therapy completed   - insulin glargine reduced to 12 units daily   - Started dapagiflozin 10mg daily   No change in alcohol use or tobacco use. No change in activity level. Patient denies falls. No vomiting/diarrhea or acute illness. No Procedures scheduled in the future at this time. Assessment:   Lab Results   Component Value Date    INR 1.90 (H) 11/09/2023    INR 2.50 (H) 10/12/2023    INR 2.90 (H) 09/21/2023     INR subtherapeutic   Recent Labs     11/09/23  1103   INR 1.90*      Patient interview completed and discussed with pharmacist by Thad Packer PharmD Candidate      Plan:  Take 3.75 mg today then continue Coumadin 1.25 mg MoWeFr and 2.5 mg SuTuThSa. Recheck INR in 4 week(s). Patient reminded to call the Anticoagulation Clinic with any signs or symptoms of bleeding or with any medication changes. Patient given instructions utilizing the teach back method. After visit summary printed and reviewed with patient. Discharged ambulatory in no apparent distress.     For Pharmacy Admin Tracking Only    Intervention Detail: Dose Adjustment: 1, reason: Therapy Optimization  Total # of Interventions Recommended: 1  Total # of Interventions Accepted: 1  Time Spent (min): 20

## 2023-11-10 RX ORDER — POTASSIUM CHLORIDE 750 MG/1
10 TABLET, EXTENDED RELEASE ORAL DAILY
Qty: 90 TABLET | Refills: 3 | Status: SHIPPED | OUTPATIENT
Start: 2023-11-10 | End: 2024-11-04

## 2023-11-10 RX ORDER — CALCITRIOL 0.25 UG/1
0.25 CAPSULE, LIQUID FILLED ORAL DAILY
Qty: 90 CAPSULE | Refills: 3 | Status: SHIPPED | OUTPATIENT
Start: 2023-11-10 | End: 2024-11-04

## 2023-11-20 NOTE — PROGRESS NOTES
Dr Alexa Hudson pt   Sapphire Noel single pacer check in office per st [facility] rep 11/7/23    Battery 1 yr remaining    Vvir 60    V paced 91%    Rv waves >12    Rv impedence 480    Vent threshold 1 @ 0.5  Vent amplitude 1.25 @ 0.5    No episodes and no changes made

## 2023-12-05 ENCOUNTER — OFFICE VISIT (OUTPATIENT)
Dept: FAMILY MEDICINE CLINIC | Age: 83
End: 2023-12-05
Payer: MEDICARE

## 2023-12-05 VITALS
WEIGHT: 211.4 LBS | RESPIRATION RATE: 16 BRPM | HEART RATE: 60 BPM | OXYGEN SATURATION: 92 % | SYSTOLIC BLOOD PRESSURE: 130 MMHG | HEIGHT: 67 IN | TEMPERATURE: 97.8 F | BODY MASS INDEX: 33.18 KG/M2 | DIASTOLIC BLOOD PRESSURE: 86 MMHG

## 2023-12-05 DIAGNOSIS — J44.1 CHRONIC OBSTRUCTIVE PULMONARY DISEASE WITH ACUTE EXACERBATION (HCC): ICD-10-CM

## 2023-12-05 DIAGNOSIS — Z00.00 MEDICARE ANNUAL WELLNESS VISIT, SUBSEQUENT: Primary | ICD-10-CM

## 2023-12-05 DIAGNOSIS — R05.3 CHRONIC COUGH: ICD-10-CM

## 2023-12-05 LAB — HBA1C MFR BLD: 8 % (ref 4.3–5.7)

## 2023-12-05 PROCEDURE — 3078F DIAST BP <80 MM HG: CPT | Performed by: NURSE PRACTITIONER

## 2023-12-05 PROCEDURE — 3074F SYST BP LT 130 MM HG: CPT | Performed by: NURSE PRACTITIONER

## 2023-12-05 PROCEDURE — G0439 PPPS, SUBSEQ VISIT: HCPCS | Performed by: NURSE PRACTITIONER

## 2023-12-05 PROCEDURE — G8484 FLU IMMUNIZE NO ADMIN: HCPCS | Performed by: NURSE PRACTITIONER

## 2023-12-05 PROCEDURE — 1123F ACP DISCUSS/DSCN MKR DOCD: CPT | Performed by: NURSE PRACTITIONER

## 2023-12-05 RX ORDER — CODEINE PHOSPHATE/GUAIFENESIN 10-100MG/5
5 LIQUID (ML) ORAL 4 TIMES DAILY PRN
Qty: 100 ML | Refills: 0 | Status: SHIPPED | OUTPATIENT
Start: 2023-12-05 | End: 2023-12-10

## 2023-12-05 ASSESSMENT — PATIENT HEALTH QUESTIONNAIRE - PHQ9
SUM OF ALL RESPONSES TO PHQ QUESTIONS 1-9: 0
SUM OF ALL RESPONSES TO PHQ9 QUESTIONS 1 & 2: 0
SUM OF ALL RESPONSES TO PHQ QUESTIONS 1-9: 0
2. FEELING DOWN, DEPRESSED OR HOPELESS: 0
SUM OF ALL RESPONSES TO PHQ QUESTIONS 1-9: 0
SUM OF ALL RESPONSES TO PHQ QUESTIONS 1-9: 0
1. LITTLE INTEREST OR PLEASURE IN DOING THINGS: 0

## 2023-12-05 ASSESSMENT — LIFESTYLE VARIABLES
HOW MANY STANDARD DRINKS CONTAINING ALCOHOL DO YOU HAVE ON A TYPICAL DAY: PATIENT DOES NOT DRINK
HOW OFTEN DO YOU HAVE A DRINK CONTAINING ALCOHOL: NEVER

## 2023-12-05 NOTE — PROGRESS NOTES
0.25 MCG capsule Take 1 capsule by mouth daily Yes Chastity Ya DO   potassium chloride (KLOR-CON M) 10 MEQ extended release tablet Take 1 tablet by mouth daily Yes Chastity Ya DO   Insulin Pen Needle 30G X 8 MM MISC 1 each by Does not apply route daily Yes WILBERTO Underwood CNP   metoprolol succinate (TOPROL XL) 100 MG extended release tablet TAKE 1 TABLET EVERY DAY Yes WILBERTO Underwood CNP   bumetanide (BUMEX) 2 MG tablet TAKE 1 TABLET TWICE DAILY Yes WILBERTO Underwood CNP   atorvastatin (LIPITOR) 80 MG tablet TAKE 1 TABLET EVERY DAY Yes WILBERTO Underwood CNP   TRUE METRIX BLOOD GLUCOSE TEST strip TEST BLOOD SUGAR EVERY DAY Yes WILBERTO Underwood CNP   SITagliptin (JANUVIA) 25 MG tablet Take 1 tablet by mouth daily Yes WILBERTO Underwood CNP   cyanocobalamin 1000 MCG tablet Take 1 tablet by mouth daily Yes WILBERTO Underwood CNP   losartan (COZAAR) 25 MG tablet TAKE 1 TABLET EVERY NIGHT Yes WILBERTO Underwood CNP   OXYGEN Inhale into the lungs nightly Yes ProviderChacha MD   Alcohol Swabs (B-D SINGLE USE SWABS REGULAR) PADS Apply 1 each topically daily Use to test blood sugar as needed Yes WILBERTO Underwood CNP   NONFORMULARY Take by mouth See Admin Instructions Cough medicine with Codeine.   Patient not taking: Reported on 12/6/2023 Yes Chacha Spain MD   warfarin (COUMADIN) 2.5 MG tablet Take as directed by [facility] 90 tabs = 90 days Yes Jessi Whelan MD   TRUEplus Lancets 28G MISC Use to test blood sugar daily Yes WILBERTO Underwood CNP   dapagliflozin (FARXIGA) 10 MG tablet Take 1 tablet by mouth every morning Yes ProviderChacha MD   Budeson-Glycopyrrol-Formoterol (BREZTRI AEROSPHERE IN) Inhale 2 puffs into the lungs in the morning and at bedtime Yes Chacha Spain MD   guaiFENesin (MUCINEX) 600 MG extended release tablet Take 2 tablets by mouth 2 times

## 2023-12-05 NOTE — PATIENT INSTRUCTIONS
Call [phone] or search [employer] A[employer] online. You need 3065-2497 mg of calcium and 5564-8237 IU of vitamin D per day. It is possible to meet your calcium requirement with diet alone, but a vitamin D supplement is usually necessary to meet this goal.  When exposed to the sun, use a sunscreen that protects against both UVA and UVB radiation with an SPF of 30 or greater. Reapply every 2 to 3 hours or after sweating, drying off with a towel, or swimming. Always wear a seat belt when traveling in a car. Always wear a helmet when riding a bicycle or motorcycle.

## 2023-12-06 ENCOUNTER — HOSPITAL ENCOUNTER (OUTPATIENT)
Dept: PHARMACY | Age: 83
Setting detail: THERAPIES SERIES
Discharge: HOME | End: 2023-12-06
Payer: MEDICARE

## 2023-12-06 DIAGNOSIS — I48.21 PERMANENT ATRIAL FIBRILLATION (HCC): Primary | ICD-10-CM

## 2023-12-06 DIAGNOSIS — Z51.81 ENCOUNTER FOR THERAPEUTIC DRUG MONITORING: ICD-10-CM

## 2023-12-06 DIAGNOSIS — Z79.01 ANTICOAGULATED ON COUMADIN: ICD-10-CM

## 2023-12-06 LAB — POC INR: 2 (ref 0.8–1.2)

## 2023-12-06 PROCEDURE — 36416 COLLJ CAPILLARY BLOOD SPEC: CPT

## 2023-12-06 PROCEDURE — 99211 OFF/OP EST MAY X REQ PHY/QHP: CPT

## 2023-12-06 PROCEDURE — 85610 PROTHROMBIN TIME: CPT

## 2023-12-06 NOTE — PROGRESS NOTES
Medication Management [address] [place] [address]  [phone] (phone)  [phone] (fax)    Mr. Sumeet Navarrete is a 79 y.o.  male with history of atrial fib. , per Dr. Dylan Pritchard referral, who presents today for Warfarin monitoring and adjustment (4 week visit). Patient verifies current dosing regimen and tablet strength. No missed or extra doses, except for bolus ordered last visit. Patient denies bleeding. Has usual SOB/swelling of left leg/easy bruising. No blood in urine or stool. No dietary changes. No changes in medication/OTC agents/herbals. No change in alcohol use or tobacco use. No change in activity level. Patient denies falls. No vomiting/diarrhea or acute illness. No procedures scheduled in the future at this time. States is switching [facility] after Dr. Kelli Burnett end of year. Will obtain new referral first of year. Assessment:   Lab Results   Component Value Date    INR 2.00 (H) 12/06/2023    INR 1.90 (H) 11/09/2023    INR 2.50 (H) 10/12/2023     INR therapeutic - goal 2-3. Recent Labs     12/06/23  1019   INR 2.00*        Plan:  POCT INR performed/result reviewed. Continue PO Coumadin 1.25 mg MWF, 2.5 mg TThSS. Recheck INR in 4 week(s) - requested 1/4 - but will see 1/8 (same day as cardiology visit nearby - lives out of town). (Report given to WILMER Kaminski, PharmD. - no new orders.)  Patient reminded to call the Anticoagulation Clinic with any signs or symptoms of bleeding or with any medication changes. Patient given instructions utilizing the teach back method. After visit summary printed and reviewed with patient. Discharged ambulatory in no apparent distress.     For Pharmacy Admin Tracking Only    Time Spent (min): 21

## 2024-01-02 ENCOUNTER — NURSE ONLY (OUTPATIENT)
Dept: LAB | Age: 84
End: 2024-01-02

## 2024-01-02 LAB
ALBUMIN SERPL BCG-MCNC: 3.9 G/DL (ref 3.5–5.1)
ALP SERPL-CCNC: 117 U/L (ref 38–126)
ALT SERPL W/O P-5'-P-CCNC: 16 U/L (ref 11–66)
ANION GAP SERPL CALC-SCNC: 20 MEQ/L (ref 8–16)
AST SERPL-CCNC: 16 U/L (ref 5–40)
BILIRUB CONJ SERPL-MCNC: 0.4 MG/DL (ref 0–0.3)
BILIRUB SERPL-MCNC: 1.3 MG/DL (ref 0.3–1.2)
BUN SERPL-MCNC: 33 MG/DL (ref 7–22)
CALCIUM SERPL-MCNC: 9.1 MG/DL (ref 8.5–10.5)
CHLORIDE SERPL-SCNC: 96 MEQ/L (ref 98–111)
CHOLEST SERPL-MCNC: 113 MG/DL (ref 100–199)
CO2 SERPL-SCNC: 23 MEQ/L (ref 23–33)
CREAT SERPL-MCNC: 2.7 MG/DL (ref 0.4–1.2)
DEPRECATED RDW RBC AUTO: 55 FL (ref 35–45)
ERYTHROCYTE [DISTWIDTH] IN BLOOD BY AUTOMATED COUNT: 14.6 % (ref 11.5–14.5)
GFR SERPL CREATININE-BSD FRML MDRD: 22 ML/MIN/1.73M2
GLUCOSE SERPL-MCNC: 144 MG/DL (ref 70–108)
HCT VFR BLD AUTO: 45.1 % (ref 42–52)
HDLC SERPL-MCNC: 38 MG/DL
HGB BLD-MCNC: 14.5 GM/DL (ref 14–18)
LDLC SERPL CALC-MCNC: 53 MG/DL
MCH RBC QN AUTO: 32.8 PG (ref 26–33)
MCHC RBC AUTO-ENTMCNC: 32.2 GM/DL (ref 32.2–35.5)
MCV RBC AUTO: 102 FL (ref 80–94)
PLATELET # BLD AUTO: 163 THOU/MM3 (ref 130–400)
PMV BLD AUTO: 11.1 FL (ref 9.4–12.4)
POTASSIUM SERPL-SCNC: 4.3 MEQ/L (ref 3.5–5.2)
PROT SERPL-MCNC: 6.7 G/DL (ref 6.1–8)
RBC # BLD AUTO: 4.42 MILL/MM3 (ref 4.7–6.1)
SODIUM SERPL-SCNC: 139 MEQ/L (ref 135–145)
TRIGL SERPL-MCNC: 111 MG/DL (ref 0–199)
WBC # BLD AUTO: 10.5 THOU/MM3 (ref 4.8–10.8)

## 2024-01-04 ENCOUNTER — TELEPHONE (OUTPATIENT)
Dept: CARDIOLOGY CLINIC | Age: 84
End: 2024-01-04

## 2024-01-04 DIAGNOSIS — I48.91 ATRIAL FIBRILLATION WITH RVR (HCC): Primary | ICD-10-CM

## 2024-01-04 DIAGNOSIS — I48.91 ATRIAL FIBRILLATION, UNSPECIFIED TYPE (HCC): ICD-10-CM

## 2024-01-08 ENCOUNTER — NURSE ONLY (OUTPATIENT)
Dept: CARDIOLOGY CLINIC | Age: 84
End: 2024-01-08
Payer: MEDICARE

## 2024-01-08 ENCOUNTER — HOSPITAL ENCOUNTER (OUTPATIENT)
Dept: PHARMACY | Age: 84
Setting detail: THERAPIES SERIES
Discharge: HOME | End: 2024-01-08
Payer: MEDICARE

## 2024-01-08 ENCOUNTER — OFFICE VISIT (OUTPATIENT)
Dept: CARDIOLOGY CLINIC | Age: 84
End: 2024-01-08
Payer: MEDICARE

## 2024-01-08 VITALS
SYSTOLIC BLOOD PRESSURE: 156 MMHG | HEIGHT: 67 IN | HEART RATE: 79 BPM | BODY MASS INDEX: 32.65 KG/M2 | WEIGHT: 208 LBS | DIASTOLIC BLOOD PRESSURE: 76 MMHG

## 2024-01-08 DIAGNOSIS — I25.10 CORONARY ARTERY DISEASE INVOLVING NATIVE CORONARY ARTERY OF NATIVE HEART WITHOUT ANGINA PECTORIS: Primary | ICD-10-CM

## 2024-01-08 DIAGNOSIS — I25.5 ISCHEMIC CARDIOMYOPATHY: ICD-10-CM

## 2024-01-08 DIAGNOSIS — Z95.0 PACEMAKER: ICD-10-CM

## 2024-01-08 DIAGNOSIS — Z79.01 ANTICOAGULATED ON COUMADIN: ICD-10-CM

## 2024-01-08 DIAGNOSIS — Z95.0 PACEMAKER: Primary | ICD-10-CM

## 2024-01-08 DIAGNOSIS — N18.30 STAGE 3 CHRONIC KIDNEY DISEASE, UNSPECIFIED WHETHER STAGE 3A OR 3B CKD (HCC): ICD-10-CM

## 2024-01-08 DIAGNOSIS — Z51.81 ENCOUNTER FOR THERAPEUTIC DRUG MONITORING: ICD-10-CM

## 2024-01-08 DIAGNOSIS — E78.5 HYPERLIPIDEMIA LDL GOAL <70: ICD-10-CM

## 2024-01-08 DIAGNOSIS — I48.21 PERMANENT ATRIAL FIBRILLATION (HCC): ICD-10-CM

## 2024-01-08 DIAGNOSIS — Z95.2 HISTORY OF TRANSCATHETER AORTIC VALVE REPLACEMENT (TAVR): ICD-10-CM

## 2024-01-08 DIAGNOSIS — I10 PRIMARY HYPERTENSION: ICD-10-CM

## 2024-01-08 DIAGNOSIS — I48.21 PERMANENT ATRIAL FIBRILLATION (HCC): Primary | ICD-10-CM

## 2024-01-08 DIAGNOSIS — I50.32 CHRONIC DIASTOLIC CHF (CONGESTIVE HEART FAILURE), NYHA CLASS 2 (HCC): ICD-10-CM

## 2024-01-08 LAB — POC INR: 1.9 (ref 0.8–1.2)

## 2024-01-08 PROCEDURE — 3078F DIAST BP <80 MM HG: CPT | Performed by: NURSE PRACTITIONER

## 2024-01-08 PROCEDURE — G8417 CALC BMI ABV UP PARAM F/U: HCPCS | Performed by: NURSE PRACTITIONER

## 2024-01-08 PROCEDURE — 1036F TOBACCO NON-USER: CPT | Performed by: NURSE PRACTITIONER

## 2024-01-08 PROCEDURE — 36416 COLLJ CAPILLARY BLOOD SPEC: CPT

## 2024-01-08 PROCEDURE — 85610 PROTHROMBIN TIME: CPT

## 2024-01-08 PROCEDURE — 99211 OFF/OP EST MAY X REQ PHY/QHP: CPT

## 2024-01-08 PROCEDURE — 1123F ACP DISCUSS/DSCN MKR DOCD: CPT | Performed by: NURSE PRACTITIONER

## 2024-01-08 PROCEDURE — G8484 FLU IMMUNIZE NO ADMIN: HCPCS | Performed by: NURSE PRACTITIONER

## 2024-01-08 PROCEDURE — 3077F SYST BP >= 140 MM HG: CPT | Performed by: NURSE PRACTITIONER

## 2024-01-08 PROCEDURE — G8427 DOCREV CUR MEDS BY ELIG CLIN: HCPCS | Performed by: NURSE PRACTITIONER

## 2024-01-08 PROCEDURE — 93279 PRGRMG DEV EVAL PM/LDLS PM: CPT | Performed by: INTERNAL MEDICINE

## 2024-01-08 PROCEDURE — 99214 OFFICE O/P EST MOD 30 MIN: CPT | Performed by: NURSE PRACTITIONER

## 2024-01-08 NOTE — PROGRESS NOTES
FORMER MICHELL PT    [facility] SINGLE PACER CHECK IN OFFICE/ INITIAL CHECK IN THIS OFFICE   PT IS SEEING RUT TODAY       PRESENTS IN VSVP  UNDERYLING VS 35    BATTERY 1 YR REMAINING/ THIS PT DOES NOT HAVE MERLIN. INFORMED HIM ONCE HIS BATTERY STARTS GOING DOWN FURTHER, WE WILL CHECK HIS DEVICE MORE OFTEN IN CLINIC    VVIR 60  V PACED >99%    KNOWN AFIB/ COUMADIN    RV IMPEDENCE 530  RV WAVES DEPENDENT AT 40 IN VVI  VENT THRESHOLD 1 @ 0.5  VENT AMPLITUDE AUTO   NO EPISODES

## 2024-01-08 NOTE — PROGRESS NOTES
Smoking status: Former     Current packs/day: 1.00     Average packs/day: 1 pack/day for 50.0 years (50.0 ttl pk-yrs)     Types: Cigarettes    Smokeless tobacco: Never    Tobacco comments:     quit 20 years ago   Vaping Use    Vaping Use: Never used   Substance and Sexual Activity    Alcohol use: Not Currently     Comment: rarely    Drug use: No    Sexual activity: Yes     Partners: Female     Social Determinants of Health     Financial Resource Strain: Low Risk  (5/13/2023)    Overall Financial Resource Strain (CARDIA)     Difficulty of Paying Living Expenses: Not hard at all   Transportation Needs: No Transportation Needs (5/13/2023)    PRAPARE - Transportation     Lack of Transportation (Medical): No     Lack of Transportation (Non-Medical): No   Physical Activity: Insufficiently Active (12/5/2023)    Exercise Vital Sign     Days of Exercise per Week: 2 days     Minutes of Exercise per Session: 20 min   Stress: No Stress Concern Present (5/13/2023)    [employer] - Occupational Stress Questionnaire     Feeling of Stress : Not at all   Social Connections: Moderately Isolated (5/13/2023)    Social Connection and Isolation Panel [NHANES]     Frequency of Communication with Friends and Family: Once a week     Frequency of Social Gatherings with Friends and Family: Once a week     Attends Sabianist Services: 1 to 4 times per year     Active Member of Clubs or Organizations: Yes     Attends Club or Organization Meetings: 1 to 4 times per year     Marital Status:    Intimate Partner Violence: Not At Risk (5/13/2023)    Humiliation, Afraid, Rape, and Kick questionnaire     Fear of Current or Ex-Partner: No     Emotionally Abused: No     Physically Abused: No     Sexually Abused: No   Housing Stability: Unknown (5/13/2023)    Housing Stability Vital Sign     Unable to Pay for Housing in the Last Year: No     Unstable Housing in the Last Year: No       Family History   Family history

## 2024-01-08 NOTE — PROGRESS NOTES
Medication Management Clinic  [facility]  Anticoagulation Clinic  [phone] (phone)  [phone] (fax)    Mr. Portillo Daly is a 83 y.o.  male with history of atrial fib., per referral from MARII Benjamin, who presents today for Warfarin monitoring and adjustment (5 week visit).    Patient verifies current dosing regimen and tablet strength.  No missed or extra doses.  Patient denies bleeding.  Has usual SOB/swelling of left leg/easy bruising.  No blood in urine or stool.  No dietary changes.  No changes in medication/OTC agents/herbals.  No change in alcohol use or tobacco use.  No change in activity level.  Patient denies falls.  No vomiting/diarrhea or acute illness.   No procedures scheduled in the future at this time.    Assessment:   Lab Results   Component Value Date    INR 1.90 (H) 01/08/2024    INR 2.00 (H) 12/06/2023    INR 1.90 (H) 11/09/2023     INR subtherapeutic - goal 2-3.  Recent Labs     01/08/24  0922   INR 1.90*        Plan:  POCT INR performed/result reviewed.  2.5 mg today, M, then increase Coumadin to 1.25 mg MF, 2.5 mg TWThSS (from 1.25 mg MWF, 2.5 mg TThSS=9.1% increase) .  Recheck INR 2/8 - same day as nephrology visit per patient's request (lives out of town).  (Report given - orders entered by WILMER Simmons RP., PharmD.)  Patient reminded to call the Anticoagulation Clinic with any signs or symptoms of bleeding or with any medication changes.  Patient given instructions utilizing the teach back method.       After visit summary printed and reviewed with patient.      Discharged ambulatory in no apparent distress.  Has cardiology visit next.    For Pharmacy Admin Tracking Only    Intervention Detail: Dose Adjustment: 1, reason: Therapy Optimization  Total # of Interventions Recommended: 1  Total # of Interventions Accepted: 1  Time Spent (min):  22

## 2024-01-12 ENCOUNTER — CARE COORDINATION (OUTPATIENT)
Dept: CARE COORDINATION | Age: 84
End: 2024-01-12

## 2024-01-12 NOTE — CARE COORDINATION
Patient approved into the [facility] PAP program until 12/31/24.      Gabby Yee [facility]  CC   Medication Assistance  [place] and [facility]    (P) [phone]  (F) [phone]

## 2024-01-20 ENCOUNTER — APPOINTMENT (OUTPATIENT)
Dept: GENERAL RADIOLOGY | Age: 84
DRG: 193 | End: 2024-01-20
Payer: MEDICARE

## 2024-01-20 ENCOUNTER — HOSPITAL ENCOUNTER (INPATIENT)
Age: 84
LOS: 3 days | Discharge: HOME | DRG: 193 | End: 2024-01-23
Attending: EMERGENCY MEDICINE | Admitting: FAMILY MEDICINE
Payer: MEDICARE

## 2024-01-20 DIAGNOSIS — J96.21 ACUTE ON CHRONIC HYPOXIC RESPIRATORY FAILURE (HCC): ICD-10-CM

## 2024-01-20 DIAGNOSIS — J96.21 ACUTE ON CHRONIC RESPIRATORY FAILURE WITH HYPOXIA (HCC): ICD-10-CM

## 2024-01-20 DIAGNOSIS — I50.43 CHF (CONGESTIVE HEART FAILURE), NYHA CLASS III, ACUTE ON CHRONIC, COMBINED (HCC): ICD-10-CM

## 2024-01-20 DIAGNOSIS — Z98.61 S/P PTCA (PERCUTANEOUS TRANSLUMINAL CORONARY ANGIOPLASTY): ICD-10-CM

## 2024-01-20 DIAGNOSIS — J10.00 PNEUMONIA DUE TO INFLUENZA A VIRUS: Primary | ICD-10-CM

## 2024-01-20 LAB
ALBUMIN SERPL BCG-MCNC: 4.1 G/DL (ref 3.5–5.1)
ALP SERPL-CCNC: 111 U/L (ref 38–126)
ALT SERPL W/O P-5'-P-CCNC: 17 U/L (ref 11–66)
ANION GAP SERPL CALC-SCNC: 17 MEQ/L (ref 8–16)
APTT PPP: 41.6 SECONDS (ref 22–38)
AST SERPL-CCNC: 23 U/L (ref 5–40)
BASOPHILS ABSOLUTE: 0 THOU/MM3 (ref 0–0.1)
BASOPHILS NFR BLD AUTO: 0.4 %
BILIRUB CONJ SERPL-MCNC: 0.4 MG/DL (ref 0–0.3)
BILIRUB SERPL-MCNC: 1.6 MG/DL (ref 0.3–1.2)
BILIRUB UR QL STRIP.AUTO: NEGATIVE
BUN SERPL-MCNC: 47 MG/DL (ref 7–22)
CALCIUM SERPL-MCNC: 9.3 MG/DL (ref 8.5–10.5)
CHARACTER UR: CLEAR
CHLORIDE SERPL-SCNC: 95 MEQ/L (ref 98–111)
CO2 SERPL-SCNC: 23 MEQ/L (ref 23–33)
COLOR: YELLOW
CREAT SERPL-MCNC: 3 MG/DL (ref 0.4–1.2)
DEPRECATED RDW RBC AUTO: 54.6 FL (ref 35–45)
EOSINOPHIL NFR BLD AUTO: 0.1 %
EOSINOPHILS ABSOLUTE: 0 THOU/MM3 (ref 0–0.4)
ERYTHROCYTE [DISTWIDTH] IN BLOOD BY AUTOMATED COUNT: 14.9 % (ref 11.5–14.5)
FLUAV RNA RESP QL NAA+PROBE: DETECTED
FLUBV RNA RESP QL NAA+PROBE: NOT DETECTED
GFR SERPL CREATININE-BSD FRML MDRD: 20 ML/MIN/1.73M2
GLUCOSE BLD STRIP.AUTO-MCNC: 193 MG/DL (ref 70–108)
GLUCOSE BLD STRIP.AUTO-MCNC: 372 MG/DL (ref 70–108)
GLUCOSE SERPL-MCNC: 160 MG/DL (ref 70–108)
GLUCOSE UR QL STRIP.AUTO: 500 MG/DL
HCT VFR BLD AUTO: 46.2 % (ref 42–52)
HGB BLD-MCNC: 15.2 GM/DL (ref 14–18)
HGB UR QL STRIP.AUTO: NEGATIVE
IMM GRANULOCYTES # BLD AUTO: 0.06 THOU/MM3 (ref 0–0.07)
IMM GRANULOCYTES NFR BLD AUTO: 0.5 %
INR PPP: 2.47 (ref 0.85–1.13)
KETONES UR QL STRIP.AUTO: NEGATIVE
LACTATE SERPL-SCNC: 2.7 MMOL/L (ref 0.5–2)
LACTIC ACID, SEPSIS: 2.5 MMOL/L (ref 0.5–1.9)
LYMPHOCYTES ABSOLUTE: 0.6 THOU/MM3 (ref 1–4.8)
LYMPHOCYTES NFR BLD AUTO: 5 %
MCH RBC QN AUTO: 32.6 PG (ref 26–33)
MCHC RBC AUTO-ENTMCNC: 32.9 GM/DL (ref 32.2–35.5)
MCV RBC AUTO: 99.1 FL (ref 80–94)
MONOCYTES ABSOLUTE: 1.9 THOU/MM3 (ref 0.4–1.3)
MONOCYTES NFR BLD AUTO: 16.2 %
NEUTROPHILS NFR BLD AUTO: 77.8 %
NITRITE UR QL STRIP: NEGATIVE
NRBC BLD AUTO-RTO: 0 /100 WBC
NT-PROBNP SERPL IA-MCNC: 7163 PG/ML (ref 0–449)
OSMOLALITY SERPL CALC.SUM OF ELEC: 285.8 MOSMOL/KG (ref 275–300)
PH UR STRIP.AUTO: 5 [PH] (ref 5–9)
PLATELET # BLD AUTO: 166 THOU/MM3 (ref 130–400)
PMV BLD AUTO: 11.2 FL (ref 9.4–12.4)
POTASSIUM SERPL-SCNC: 4.8 MEQ/L (ref 3.5–5.2)
PROCALCITONIN SERPL IA-MCNC: 0.23 NG/ML (ref 0.01–0.09)
PROT SERPL-MCNC: 7.5 G/DL (ref 6.1–8)
PROT UR STRIP.AUTO-MCNC: NEGATIVE MG/DL
RBC # BLD AUTO: 4.66 MILL/MM3 (ref 4.7–6.1)
REASON FOR REJECTION: NORMAL
REJECTED TEST: NORMAL
SARS-COV-2 RNA RESP QL NAA+PROBE: NOT DETECTED
SEGMENTED NEUTROPHILS ABSOLUTE COUNT: 9.2 THOU/MM3 (ref 1.8–7.7)
SODIUM SERPL-SCNC: 135 MEQ/L (ref 135–145)
SP GR UR REFRACT.AUTO: 1.01 (ref 1–1.03)
TROPONIN, HIGH SENSITIVITY: 100 NG/L (ref 0–12)
TROPONIN, HIGH SENSITIVITY: 75 NG/L (ref 0–12)
TROPONIN, HIGH SENSITIVITY: 81 NG/L (ref 0–12)
TROPONIN, HIGH SENSITIVITY: 90 NG/L (ref 0–12)
TROPONIN, HIGH SENSITIVITY: 99 NG/L (ref 0–12)
UROBILINOGEN, URINE: 0.2 EU/DL (ref 0–1)
WBC # BLD AUTO: 11.8 THOU/MM3 (ref 4.8–10.8)
WBC #/AREA URNS HPF: NEGATIVE /[HPF]

## 2024-01-20 PROCEDURE — 1200000003 HC TELEMETRY R&B

## 2024-01-20 PROCEDURE — 6370000000 HC RX 637 (ALT 250 FOR IP): Performed by: NURSE PRACTITIONER

## 2024-01-20 PROCEDURE — 83605 ASSAY OF LACTIC ACID: CPT

## 2024-01-20 PROCEDURE — 6370000000 HC RX 637 (ALT 250 FOR IP): Performed by: STUDENT IN AN ORGANIZED HEALTH CARE EDUCATION/TRAINING PROGRAM

## 2024-01-20 PROCEDURE — 84484 ASSAY OF TROPONIN QUANT: CPT

## 2024-01-20 PROCEDURE — 6360000002 HC RX W HCPCS

## 2024-01-20 PROCEDURE — 96375 TX/PRO/DX INJ NEW DRUG ADDON: CPT

## 2024-01-20 PROCEDURE — 82800 BLOOD PH: CPT

## 2024-01-20 PROCEDURE — 85730 THROMBOPLASTIN TIME PARTIAL: CPT

## 2024-01-20 PROCEDURE — 94640 AIRWAY INHALATION TREATMENT: CPT

## 2024-01-20 PROCEDURE — 6360000002 HC RX W HCPCS: Performed by: STUDENT IN AN ORGANIZED HEALTH CARE EDUCATION/TRAINING PROGRAM

## 2024-01-20 PROCEDURE — 82948 REAGENT STRIP/BLOOD GLUCOSE: CPT

## 2024-01-20 PROCEDURE — 99223 1ST HOSP IP/OBS HIGH 75: CPT | Performed by: INTERNAL MEDICINE

## 2024-01-20 PROCEDURE — 2700000000 HC OXYGEN THERAPY PER DAY

## 2024-01-20 PROCEDURE — 82248 BILIRUBIN DIRECT: CPT

## 2024-01-20 PROCEDURE — 81003 URINALYSIS AUTO W/O SCOPE: CPT

## 2024-01-20 PROCEDURE — 6370000000 HC RX 637 (ALT 250 FOR IP)

## 2024-01-20 PROCEDURE — 2580000003 HC RX 258: Performed by: STUDENT IN AN ORGANIZED HEALTH CARE EDUCATION/TRAINING PROGRAM

## 2024-01-20 PROCEDURE — 36415 COLL VENOUS BLD VENIPUNCTURE: CPT

## 2024-01-20 PROCEDURE — 93010 ELECTROCARDIOGRAM REPORT: CPT | Performed by: NUCLEAR MEDICINE

## 2024-01-20 PROCEDURE — 96365 THER/PROPH/DIAG IV INF INIT: CPT

## 2024-01-20 PROCEDURE — 99285 EMERGENCY DEPT VISIT HI MDM: CPT

## 2024-01-20 PROCEDURE — 85610 PROTHROMBIN TIME: CPT

## 2024-01-20 PROCEDURE — 94760 N-INVAS EAR/PLS OXIMETRY 1: CPT

## 2024-01-20 PROCEDURE — 87040 BLOOD CULTURE FOR BACTERIA: CPT

## 2024-01-20 PROCEDURE — 93005 ELECTROCARDIOGRAM TRACING: CPT | Performed by: STUDENT IN AN ORGANIZED HEALTH CARE EDUCATION/TRAINING PROGRAM

## 2024-01-20 PROCEDURE — 94669 MECHANICAL CHEST WALL OSCILL: CPT

## 2024-01-20 PROCEDURE — 87636 SARSCOV2 & INF A&B AMP PRB: CPT

## 2024-01-20 PROCEDURE — 84145 PROCALCITONIN (PCT): CPT

## 2024-01-20 PROCEDURE — 85025 COMPLETE CBC W/AUTO DIFF WBC: CPT

## 2024-01-20 PROCEDURE — 71046 X-RAY EXAM CHEST 2 VIEWS: CPT

## 2024-01-20 PROCEDURE — 80053 COMPREHEN METABOLIC PANEL: CPT

## 2024-01-20 PROCEDURE — 83880 ASSAY OF NATRIURETIC PEPTIDE: CPT

## 2024-01-20 PROCEDURE — 2580000003 HC RX 258

## 2024-01-20 RX ORDER — ACETAMINOPHEN 650 MG/1
650 SUPPOSITORY RECTAL EVERY 6 HOURS PRN
Status: DISCONTINUED | OUTPATIENT
Start: 2024-01-20 | End: 2024-01-23 | Stop reason: HOSPADM

## 2024-01-20 RX ORDER — CALCITRIOL 0.25 UG/1
0.25 CAPSULE, LIQUID FILLED ORAL DAILY
Status: DISCONTINUED | OUTPATIENT
Start: 2024-01-21 | End: 2024-01-23 | Stop reason: HOSPADM

## 2024-01-20 RX ORDER — WARFARIN SODIUM 2.5 MG/1
2.5 TABLET ORAL
Status: COMPLETED | OUTPATIENT
Start: 2024-01-20 | End: 2024-01-20

## 2024-01-20 RX ORDER — POTASSIUM CHLORIDE 750 MG/1
10 TABLET, FILM COATED, EXTENDED RELEASE ORAL DAILY
Status: DISCONTINUED | OUTPATIENT
Start: 2024-01-21 | End: 2024-01-23 | Stop reason: HOSPADM

## 2024-01-20 RX ORDER — LANOLIN ALCOHOL/MO/W.PET/CERES
1000 CREAM (GRAM) TOPICAL DAILY
Status: DISCONTINUED | OUTPATIENT
Start: 2024-01-21 | End: 2024-01-23 | Stop reason: HOSPADM

## 2024-01-20 RX ORDER — ACETAMINOPHEN 325 MG/1
650 TABLET ORAL EVERY 6 HOURS PRN
Status: DISCONTINUED | OUTPATIENT
Start: 2024-01-20 | End: 2024-01-23 | Stop reason: HOSPADM

## 2024-01-20 RX ORDER — OSELTAMIVIR PHOSPHATE 30 MG/1
30 CAPSULE ORAL EVERY 24 HOURS
Status: DISCONTINUED | OUTPATIENT
Start: 2024-01-21 | End: 2024-01-23 | Stop reason: HOSPADM

## 2024-01-20 RX ORDER — GUAIFENESIN 600 MG/1
1200 TABLET, EXTENDED RELEASE ORAL 2 TIMES DAILY
Status: DISCONTINUED | OUTPATIENT
Start: 2024-01-20 | End: 2024-01-23 | Stop reason: HOSPADM

## 2024-01-20 RX ORDER — IBUPROFEN 600 MG/1
1 TABLET ORAL PRN
Status: DISCONTINUED | OUTPATIENT
Start: 2024-01-20 | End: 2024-01-23 | Stop reason: HOSPADM

## 2024-01-20 RX ORDER — POLYETHYLENE GLYCOL 3350 17 G/17G
17 POWDER, FOR SOLUTION ORAL DAILY PRN
Status: DISCONTINUED | OUTPATIENT
Start: 2024-01-20 | End: 2024-01-23 | Stop reason: HOSPADM

## 2024-01-20 RX ORDER — INSULIN GLARGINE 100 [IU]/ML
12 INJECTION, SOLUTION SUBCUTANEOUS EVERY MORNING
Status: DISCONTINUED | OUTPATIENT
Start: 2024-01-21 | End: 2024-01-23 | Stop reason: HOSPADM

## 2024-01-20 RX ORDER — WARFARIN SODIUM 2.5 MG/1
2.5 TABLET ORAL DAILY
Status: DISCONTINUED | OUTPATIENT
Start: 2024-01-20 | End: 2024-01-20

## 2024-01-20 RX ORDER — SODIUM CHLORIDE 0.9 % (FLUSH) 0.9 %
5-40 SYRINGE (ML) INJECTION EVERY 12 HOURS SCHEDULED
Status: DISCONTINUED | OUTPATIENT
Start: 2024-01-20 | End: 2024-01-23 | Stop reason: HOSPADM

## 2024-01-20 RX ORDER — ONDANSETRON 4 MG/1
4 TABLET, ORALLY DISINTEGRATING ORAL EVERY 8 HOURS PRN
Status: DISCONTINUED | OUTPATIENT
Start: 2024-01-20 | End: 2024-01-20

## 2024-01-20 RX ORDER — ACETAMINOPHEN 500 MG
1000 TABLET ORAL ONCE
Status: COMPLETED | OUTPATIENT
Start: 2024-01-20 | End: 2024-01-20

## 2024-01-20 RX ORDER — OSELTAMIVIR PHOSPHATE 30 MG/1
30 CAPSULE ORAL ONCE
Status: COMPLETED | OUTPATIENT
Start: 2024-01-20 | End: 2024-01-20

## 2024-01-20 RX ORDER — VITAMIN B COMPLEX
1000 TABLET ORAL DAILY
Status: DISCONTINUED | OUTPATIENT
Start: 2024-01-21 | End: 2024-01-23 | Stop reason: HOSPADM

## 2024-01-20 RX ORDER — SODIUM CHLORIDE 9 MG/ML
INJECTION, SOLUTION INTRAVENOUS CONTINUOUS
Status: DISCONTINUED | OUTPATIENT
Start: 2024-01-20 | End: 2024-01-20

## 2024-01-20 RX ORDER — IPRATROPIUM BROMIDE AND ALBUTEROL SULFATE 2.5; .5 MG/3ML; MG/3ML
2 SOLUTION RESPIRATORY (INHALATION)
Status: DISCONTINUED | OUTPATIENT
Start: 2024-01-20 | End: 2024-01-20

## 2024-01-20 RX ORDER — INSULIN LISPRO 100 [IU]/ML
0-16 INJECTION, SOLUTION INTRAVENOUS; SUBCUTANEOUS EVERY 4 HOURS
Status: DISCONTINUED | OUTPATIENT
Start: 2024-01-20 | End: 2024-01-21

## 2024-01-20 RX ORDER — PROMETHAZINE HYDROCHLORIDE 25 MG/1
12.5 TABLET ORAL EVERY 6 HOURS PRN
Status: DISCONTINUED | OUTPATIENT
Start: 2024-01-20 | End: 2024-01-23 | Stop reason: HOSPADM

## 2024-01-20 RX ORDER — SODIUM CHLORIDE 9 MG/ML
INJECTION, SOLUTION INTRAVENOUS PRN
Status: DISCONTINUED | OUTPATIENT
Start: 2024-01-20 | End: 2024-01-23 | Stop reason: HOSPADM

## 2024-01-20 RX ORDER — METOPROLOL SUCCINATE 100 MG/1
100 TABLET, EXTENDED RELEASE ORAL DAILY
Status: DISCONTINUED | OUTPATIENT
Start: 2024-01-21 | End: 2024-01-20

## 2024-01-20 RX ORDER — METOPROLOL SUCCINATE 50 MG/1
50 TABLET, EXTENDED RELEASE ORAL DAILY
Status: DISCONTINUED | OUTPATIENT
Start: 2024-01-21 | End: 2024-01-23 | Stop reason: HOSPADM

## 2024-01-20 RX ORDER — ONDANSETRON 2 MG/ML
4 INJECTION INTRAMUSCULAR; INTRAVENOUS EVERY 6 HOURS PRN
Status: DISCONTINUED | OUTPATIENT
Start: 2024-01-20 | End: 2024-01-20

## 2024-01-20 RX ORDER — SODIUM CHLORIDE, SODIUM LACTATE, POTASSIUM CHLORIDE, CALCIUM CHLORIDE 600; 310; 30; 20 MG/100ML; MG/100ML; MG/100ML; MG/100ML
INJECTION, SOLUTION INTRAVENOUS CONTINUOUS
Status: DISCONTINUED | OUTPATIENT
Start: 2024-01-20 | End: 2024-01-21

## 2024-01-20 RX ORDER — DEXTROSE MONOHYDRATE 100 MG/ML
INJECTION, SOLUTION INTRAVENOUS CONTINUOUS PRN
Status: DISCONTINUED | OUTPATIENT
Start: 2024-01-20 | End: 2024-01-23 | Stop reason: HOSPADM

## 2024-01-20 RX ORDER — ALBUTEROL SULFATE 2.5 MG/3ML
2.5 SOLUTION RESPIRATORY (INHALATION)
Status: DISCONTINUED | OUTPATIENT
Start: 2024-01-21 | End: 2024-01-23 | Stop reason: HOSPADM

## 2024-01-20 RX ORDER — ASCORBIC ACID 500 MG
500 TABLET ORAL DAILY
Status: DISCONTINUED | OUTPATIENT
Start: 2024-01-21 | End: 2024-01-23 | Stop reason: HOSPADM

## 2024-01-20 RX ORDER — SODIUM CHLORIDE 0.9 % (FLUSH) 0.9 %
5-40 SYRINGE (ML) INJECTION PRN
Status: DISCONTINUED | OUTPATIENT
Start: 2024-01-20 | End: 2024-01-23 | Stop reason: HOSPADM

## 2024-01-20 RX ORDER — ALBUTEROL SULFATE 2.5 MG/3ML
2.5 SOLUTION RESPIRATORY (INHALATION) EVERY 6 HOURS PRN
Status: DISCONTINUED | OUTPATIENT
Start: 2024-01-20 | End: 2024-01-23 | Stop reason: HOSPADM

## 2024-01-20 RX ADMIN — WARFARIN SODIUM 2.5 MG: 2.5 TABLET ORAL at 17:12

## 2024-01-20 RX ADMIN — SODIUM CHLORIDE, POTASSIUM CHLORIDE, SODIUM LACTATE AND CALCIUM CHLORIDE: 600; 310; 30; 20 INJECTION, SOLUTION INTRAVENOUS at 15:06

## 2024-01-20 RX ADMIN — ACETAMINOPHEN 1000 MG: 500 TABLET ORAL at 11:57

## 2024-01-20 RX ADMIN — OSELTAMIVIR PHOSPHATE 30 MG: 30 CAPSULE ORAL at 13:48

## 2024-01-20 RX ADMIN — METHYLPREDNISOLONE SODIUM SUCCINATE 125 MG: 125 INJECTION, POWDER, FOR SOLUTION INTRAMUSCULAR; INTRAVENOUS at 11:57

## 2024-01-20 RX ADMIN — INSULIN LISPRO 16 UNITS: 100 INJECTION, SOLUTION INTRAVENOUS; SUBCUTANEOUS at 21:26

## 2024-01-20 RX ADMIN — CEFTRIAXONE SODIUM 1000 MG: 1 INJECTION, POWDER, FOR SOLUTION INTRAMUSCULAR; INTRAVENOUS at 12:02

## 2024-01-20 RX ADMIN — IPRATROPIUM BROMIDE AND ALBUTEROL SULFATE 2 DOSE: .5; 3 SOLUTION RESPIRATORY (INHALATION) at 11:28

## 2024-01-20 RX ADMIN — ALBUTEROL SULFATE 2.5 MG: 2.5 SOLUTION RESPIRATORY (INHALATION) at 22:18

## 2024-01-20 RX ADMIN — GUAIFENESIN 1200 MG: 600 TABLET, EXTENDED RELEASE ORAL at 19:38

## 2024-01-20 ASSESSMENT — PAIN - FUNCTIONAL ASSESSMENT: PAIN_FUNCTIONAL_ASSESSMENT: NONE - DENIES PAIN

## 2024-01-20 NOTE — ED PROVIDER NOTES
[facility] EMERGENCY DEPARTMENT    EMERGENCY MEDICINE     Patient Name: Portillo Daly  MRN: [id number]  YOB: 1940  Date of Evaluation: 1/20/2024  Treating Resident Physician: Albert García MD  Supervising Physician: Darrius Montano MD    CHIEF COMPLAINT       Chief Complaint   Patient presents with    Cough    Extremity Weakness       HISTORY OF PRESENT ILLNESS    HPI    History obtained from partner, chart review, and the patient.    Portillo Daly is a 83 y.o. male who presents to the emergency department from home to emergency room for evaluation of productive cough.  Reports productive cough been ongoing for about a week but has had worsening shortness of breath over the past few days.  At baseline he wears 3 L of oxygen at night but none during the day.  On arrival his SpO2 was between 88 and 91% without supplementation.  He denies any fevers, no chills, has had decreased appetite.  No known sick contacts.  No chest pain or shortness of breath at rest and    Pertinent previous and/or external records reviewed: Non-contributory    REVIEW OF SYSTEMS   Review of Systems  Negative unless documented in HPI    PAST MEDICAL AND SURGICAL HISTORY     Past Medical History:   Diagnosis Date    MILLY (acute kidney injury) ([facility])     Atrial fibrillation ([facility])     Cerebral artery occlusion with cerebral infarction ([facility])     CHF (congestive heart failure), NYHA class III, acute on chronic, combined ([facility])     COPD (chronic obstructive pulmonary disease) ([facility]) 8/3/2020    Coronary artery disease involving native coronary artery of native heart with angina pectoris ([facility])     Diabetes mellitus, type 2 ([facility]) 12/30/2020    Hyperlipidemia 2/20/2012    Hypertension     Pneumonia        Past Surgical History:   Procedure Laterality Date    AORTIC VALVE REPLACEMENT      CARDIAC SURGERY      heart cath    CORONARY ANGIOPLASTY WITH STENT PLACEMENT      HERNIA REPAIR      OTHER SURGICAL HISTORY  05/10/2018    PACEMAKER INSERTION

## 2024-01-20 NOTE — ED PROVIDER NOTES
PATIENT NAME: Portillo Daly  MRN: [id number]  : 1940  LESTER: 2024    I performed a history and physical examination of the patient and discussed management with the Resident. I reviewed the Resident's note and agree with the documented findings and plan of care. Any areas of disagreement are noted on the chart. I was personally present for the key portions of any procedures and have documented in the chart those procedures where I was not present during the key portions. I have reviewed the emergency nurses triage note and agree with the chief complaint, past medical history, past surgical history, allergies, medications, social and family history as documented unless otherwise noted below.    MEDICAL DEDISION MAKING (MDM)     Portillo Daly is a 83 y.o. male who presents to Emergency Department with Cough and Extremity Weakness     Cough and generalized weakness for a week. No fever or chills at home. Temperature 100.7, SaO2 90% room air.    ED workups positive for influenza A, leukocytosis, stable stage IV CKD, significant BNP elevation. Chest x-ray has no obvious consolidation.    Clinically patient has SIRS, influenza A infection, and suspected viral/bacterial pneumonia.    Empirically patient received IV Rocephin and Zithromax, pending respiratory panel.  Admission is warranted with moderate PSI score. D/w and admitted by hospital medicine service.    Vitals:    24 1417 24 1435 24 1624 24 1930   BP:    93/73   Pulse:    60   Resp:    18   Temp:    97.7 °F (36.5 °C)   TempSrc:    Oral   SpO2: 92%  95% 94%   Weight:  92.4 kg (203 lb 12.8 oz)     Height:  1.702 m (5' 7\")       Labs Reviewed   COVID-19 & INFLUENZA COMBO - Abnormal; Notable for the following components:       Result Value    INFLUENZA A DETECTED (*)     All other components within normal limits   CBC WITH AUTO DIFFERENTIAL - Abnormal; Notable for the following components:    WBC 11.8 (*)     RBC 4.66 (*)     MCV

## 2024-01-20 NOTE — H&P
present on prior films. There are no definite pleural effusions. The patient does have a replaced aortic valve. The pulmonary vascularity is normal.     Stable radiographic appearance of the chest. No evidence of an acute process. **This report has been created using voice recognition software. It may contain minor errors which are inherent in voice recognition technology.** Final report electronically signed by Dr. Mary Rodriguez on 1/20/2024 11:27 AM      EKG:   As above    Micro:  MRSA, Influenza    Signed:  Dr. Arturo Burgess MD   Resident, PGY-2  01/20/24  2:58 PM    Staff: Dr. Brannon Lua MD    This note was electronically signed. Parts of this note may have been dictated by use of voice recognition software and electronically transcribed. The note may contain errors not detected in proofreading. Please refer to my supervising physician's documentation if my documentation differs.

## 2024-01-20 NOTE — ED NOTES
Pt oxygen bouncing between 88-91% on room air. Pt states he wears 3L o2 at night but does not wear any oxygen during the day.  
Pt resting on cot. Repositioned for comfort. Call light in reach. Wife remains at bedside. Pt updated on poc.   
Pt to ER for complaints of cough that has been going on for approx. 1 week. Pt states for 3-4 days now he has been bringing up phlegm when he coughs and has began to feel weak. Denies any fevers at home, although wife at bedside reports he has felt \"warm\". Denies any pain at this time, states he feels pain when he has to cough hard. EKG completed upon arrival. Pt hooked up to monitor and telemetry.  
Pt to xray at this time.  
Pt transported to [facility] on cart in stable condition. Floor contacted before transport and spoke with Usama.  
History:   Procedure Laterality Date    AORTIC VALVE REPLACEMENT      CARDIAC SURGERY      heart cath    CORONARY ANGIOPLASTY WITH STENT PLACEMENT      HERNIA REPAIR      OTHER SURGICAL HISTORY  05/10/2018    PACEMAKER INSERTION         PAST MEDICAL HISTORY       Past Medical History:   Diagnosis Date    MILLY (acute kidney injury) ([facility])     Atrial fibrillation ([facility])     Cerebral artery occlusion with cerebral infarction ([facility])     CHF (congestive heart failure), NYHA class III, acute on chronic, combined ([facility])     COPD (chronic obstructive pulmonary disease) ([facility]) 8/3/2020    Coronary artery disease involving native coronary artery of native heart with angina pectoris ([facility])     Diabetes mellitus, type 2 ([facility]) 12/30/2020    Hyperlipidemia 2/20/2012    Hypertension     Pneumonia            Electronically signed by Ninoska Melendez RN on 1/20/2024 at 1:52 PM

## 2024-01-21 LAB
ALBUMIN SERPL BCG-MCNC: 3.9 G/DL (ref 3.5–5.1)
ALP SERPL-CCNC: 91 U/L (ref 38–126)
ALT SERPL W/O P-5'-P-CCNC: 14 U/L (ref 11–66)
ANION GAP SERPL CALC-SCNC: 17 MEQ/L (ref 8–16)
ANION GAP SERPL CALC-SCNC: 18 MEQ/L (ref 8–16)
AST SERPL-CCNC: 21 U/L (ref 5–40)
BASOPHILS ABSOLUTE: 0 THOU/MM3 (ref 0–0.1)
BASOPHILS NFR BLD AUTO: 0.2 %
BILIRUB SERPL-MCNC: 0.6 MG/DL (ref 0.3–1.2)
BUN SERPL-MCNC: 57 MG/DL (ref 7–22)
BUN SERPL-MCNC: 59 MG/DL (ref 7–22)
CALCIUM SERPL-MCNC: 8.4 MG/DL (ref 8.5–10.5)
CALCIUM SERPL-MCNC: 9 MG/DL (ref 8.5–10.5)
CHLORIDE SERPL-SCNC: 93 MEQ/L (ref 98–111)
CHLORIDE SERPL-SCNC: 97 MEQ/L (ref 98–111)
CO2 SERPL-SCNC: 20 MEQ/L (ref 23–33)
CO2 SERPL-SCNC: 20 MEQ/L (ref 23–33)
CREAT SERPL-MCNC: 3.1 MG/DL (ref 0.4–1.2)
CREAT SERPL-MCNC: 3.2 MG/DL (ref 0.4–1.2)
DEPRECATED RDW RBC AUTO: 52.4 FL (ref 35–45)
EKG ATRIAL RATE: 54 BPM
EKG ATRIAL RATE: 72 BPM
EKG Q-T INTERVAL: 512 MS
EKG Q-T INTERVAL: 542 MS
EKG QRS DURATION: 170 MS
EKG QRS DURATION: 172 MS
EKG QTC CALCULATION (BAZETT): 512 MS
EKG QTC CALCULATION (BAZETT): 542 MS
EKG R AXIS: -82 DEGREES
EKG R AXIS: -84 DEGREES
EKG T AXIS: 70 DEGREES
EKG T AXIS: 80 DEGREES
EKG VENTRICULAR RATE: 60 BPM
EKG VENTRICULAR RATE: 60 BPM
EOSINOPHIL NFR BLD AUTO: 0 %
EOSINOPHILS ABSOLUTE: 0 THOU/MM3 (ref 0–0.4)
ERYTHROCYTE [DISTWIDTH] IN BLOOD BY AUTOMATED COUNT: 14.6 % (ref 11.5–14.5)
GFR SERPL CREATININE-BSD FRML MDRD: 18 ML/MIN/1.73M2
GFR SERPL CREATININE-BSD FRML MDRD: 19 ML/MIN/1.73M2
GLUCOSE BLD STRIP.AUTO-MCNC: 168 MG/DL (ref 70–108)
GLUCOSE BLD STRIP.AUTO-MCNC: 170 MG/DL (ref 70–108)
GLUCOSE BLD STRIP.AUTO-MCNC: 193 MG/DL (ref 70–108)
GLUCOSE BLD STRIP.AUTO-MCNC: 221 MG/DL (ref 70–108)
GLUCOSE BLD STRIP.AUTO-MCNC: 321 MG/DL (ref 70–108)
GLUCOSE SERPL-MCNC: 131 MG/DL (ref 70–108)
GLUCOSE SERPL-MCNC: 386 MG/DL (ref 70–108)
HCT VFR BLD AUTO: 41.6 % (ref 42–52)
HGB BLD-MCNC: 14 GM/DL (ref 14–18)
IMM GRANULOCYTES # BLD AUTO: 0.06 THOU/MM3 (ref 0–0.07)
IMM GRANULOCYTES NFR BLD AUTO: 0.5 %
INR PPP: 2.46 (ref 0.85–1.13)
LYMPHOCYTES ABSOLUTE: 0.7 THOU/MM3 (ref 1–4.8)
LYMPHOCYTES NFR BLD AUTO: 6.6 %
MCH RBC QN AUTO: 32.5 PG (ref 26–33)
MCHC RBC AUTO-ENTMCNC: 33.7 GM/DL (ref 32.2–35.5)
MCV RBC AUTO: 96.5 FL (ref 80–94)
MONOCYTES ABSOLUTE: 1.2 THOU/MM3 (ref 0.4–1.3)
MONOCYTES NFR BLD AUTO: 10.8 %
NEUTROPHILS NFR BLD AUTO: 81.9 %
NRBC BLD AUTO-RTO: 0 /100 WBC
NT-PROBNP SERPL IA-MCNC: ABNORMAL PG/ML (ref 0–449)
PH BLDV: 7.45 [PH] (ref 7.31–7.41)
PLATELET # BLD AUTO: 150 THOU/MM3 (ref 130–400)
PMV BLD AUTO: 11.4 FL (ref 9.4–12.4)
POTASSIUM SERPL-SCNC: 4.1 MEQ/L (ref 3.5–5.2)
POTASSIUM SERPL-SCNC: 4.2 MEQ/L (ref 3.5–5.2)
PROT SERPL-MCNC: 6.7 G/DL (ref 6.1–8)
RBC # BLD AUTO: 4.31 MILL/MM3 (ref 4.7–6.1)
REASON FOR REJECTION: NORMAL
REJECTED TEST: NORMAL
SEGMENTED NEUTROPHILS ABSOLUTE COUNT: 9.1 THOU/MM3 (ref 1.8–7.7)
SODIUM SERPL-SCNC: 130 MEQ/L (ref 135–145)
SODIUM SERPL-SCNC: 135 MEQ/L (ref 135–145)
TROPONIN, HIGH SENSITIVITY: 63 NG/L (ref 0–12)
TROPONIN, HIGH SENSITIVITY: 64 NG/L (ref 0–12)
TROPONIN, HIGH SENSITIVITY: 64 NG/L (ref 0–12)
TROPONIN, HIGH SENSITIVITY: 70 NG/L (ref 0–12)
WBC # BLD AUTO: 11.1 THOU/MM3 (ref 4.8–10.8)

## 2024-01-21 PROCEDURE — 2700000000 HC OXYGEN THERAPY PER DAY

## 2024-01-21 PROCEDURE — 2580000003 HC RX 258

## 2024-01-21 PROCEDURE — 94640 AIRWAY INHALATION TREATMENT: CPT

## 2024-01-21 PROCEDURE — 93010 ELECTROCARDIOGRAM REPORT: CPT | Performed by: NUCLEAR MEDICINE

## 2024-01-21 PROCEDURE — 83880 ASSAY OF NATRIURETIC PEPTIDE: CPT

## 2024-01-21 PROCEDURE — 6370000000 HC RX 637 (ALT 250 FOR IP)

## 2024-01-21 PROCEDURE — 99233 SBSQ HOSP IP/OBS HIGH 50: CPT | Performed by: FAMILY MEDICINE

## 2024-01-21 PROCEDURE — 6360000002 HC RX W HCPCS

## 2024-01-21 PROCEDURE — 1200000003 HC TELEMETRY R&B

## 2024-01-21 PROCEDURE — 6370000000 HC RX 637 (ALT 250 FOR IP): Performed by: NURSE PRACTITIONER

## 2024-01-21 PROCEDURE — 94669 MECHANICAL CHEST WALL OSCILL: CPT

## 2024-01-21 PROCEDURE — 93005 ELECTROCARDIOGRAM TRACING: CPT

## 2024-01-21 PROCEDURE — 85610 PROTHROMBIN TIME: CPT

## 2024-01-21 PROCEDURE — 6370000000 HC RX 637 (ALT 250 FOR IP): Performed by: PHARMACIST

## 2024-01-21 PROCEDURE — 82948 REAGENT STRIP/BLOOD GLUCOSE: CPT

## 2024-01-21 PROCEDURE — 84484 ASSAY OF TROPONIN QUANT: CPT

## 2024-01-21 PROCEDURE — 85025 COMPLETE CBC W/AUTO DIFF WBC: CPT

## 2024-01-21 PROCEDURE — 80053 COMPREHEN METABOLIC PANEL: CPT

## 2024-01-21 PROCEDURE — 36415 COLL VENOUS BLD VENIPUNCTURE: CPT

## 2024-01-21 RX ORDER — ATORVASTATIN CALCIUM 80 MG/1
80 TABLET, FILM COATED ORAL DAILY
Status: DISCONTINUED | OUTPATIENT
Start: 2024-01-22 | End: 2024-01-23 | Stop reason: HOSPADM

## 2024-01-21 RX ORDER — INSULIN LISPRO 100 [IU]/ML
0-16 INJECTION, SOLUTION INTRAVENOUS; SUBCUTANEOUS
Status: DISCONTINUED | OUTPATIENT
Start: 2024-01-21 | End: 2024-01-23 | Stop reason: HOSPADM

## 2024-01-21 RX ORDER — WARFARIN SODIUM 2.5 MG/1
2.5 TABLET ORAL
Status: COMPLETED | OUTPATIENT
Start: 2024-01-21 | End: 2024-01-21

## 2024-01-21 RX ADMIN — INSULIN LISPRO 4 UNITS: 100 INJECTION, SOLUTION INTRAVENOUS; SUBCUTANEOUS at 10:09

## 2024-01-21 RX ADMIN — Medication 1000 MCG: at 08:19

## 2024-01-21 RX ADMIN — MOMETASONE FUROATE AND FORMOTEROL FUMARATE DIHYDRATE 2 PUFF: 200; 5 AEROSOL RESPIRATORY (INHALATION) at 07:42

## 2024-01-21 RX ADMIN — MOMETASONE FUROATE AND FORMOTEROL FUMARATE DIHYDRATE 2 PUFF: 200; 5 AEROSOL RESPIRATORY (INHALATION) at 17:48

## 2024-01-21 RX ADMIN — INSULIN LISPRO 12 UNITS: 100 INJECTION, SOLUTION INTRAVENOUS; SUBCUTANEOUS at 02:25

## 2024-01-21 RX ADMIN — GUAIFENESIN 1200 MG: 600 TABLET, EXTENDED RELEASE ORAL at 08:19

## 2024-01-21 RX ADMIN — WARFARIN SODIUM 2.5 MG: 2.5 TABLET ORAL at 17:30

## 2024-01-21 RX ADMIN — OSELTAMIVIR PHOSPHATE 30 MG: 30 CAPSULE ORAL at 13:26

## 2024-01-21 RX ADMIN — GUAIFENESIN 1200 MG: 600 TABLET, EXTENDED RELEASE ORAL at 19:56

## 2024-01-21 RX ADMIN — SODIUM CHLORIDE, POTASSIUM CHLORIDE, SODIUM LACTATE AND CALCIUM CHLORIDE: 600; 310; 30; 20 INJECTION, SOLUTION INTRAVENOUS at 10:35

## 2024-01-21 RX ADMIN — Medication 1000 UNITS: at 08:19

## 2024-01-21 RX ADMIN — ALBUTEROL SULFATE 2.5 MG: 2.5 SOLUTION RESPIRATORY (INHALATION) at 17:49

## 2024-01-21 RX ADMIN — TIOTROPIUM BROMIDE INHALATION SPRAY 2 PUFF: 3.12 SPRAY, METERED RESPIRATORY (INHALATION) at 07:42

## 2024-01-21 RX ADMIN — INSULIN GLARGINE 12 UNITS: 100 INJECTION, SOLUTION SUBCUTANEOUS at 08:19

## 2024-01-21 RX ADMIN — OXYCODONE HYDROCHLORIDE AND ACETAMINOPHEN 500 MG: 500 TABLET ORAL at 08:19

## 2024-01-21 RX ADMIN — POTASSIUM CHLORIDE 10 MEQ: 750 TABLET, FILM COATED, EXTENDED RELEASE ORAL at 08:19

## 2024-01-21 RX ADMIN — ALBUTEROL SULFATE 2.5 MG: 2.5 SOLUTION RESPIRATORY (INHALATION) at 07:42

## 2024-01-21 NOTE — PLAN OF CARE
Problem: Respiratory - Adult  Goal: Clear lung sounds  Outcome: Progressing    Patient lung sounds are considered normal for their current lung condition. No signs of distress noted. Current treatment regimen appropriate      Patient mutually agreed on goals.

## 2024-01-21 NOTE — CARE COORDINATION
01/21/24 1313   Service Assessment   Patient Orientation Other (see comment)  (dementia)   Cognition Dementia / Early Alzheimer's   History Provided By Significant Other   Primary Caregiver Friend   Accompanied By/Relationship SO   Support Systems Spouse/Significant Other   Patient's Healthcare Decision Maker is: Named in Scanned ACP Document   PCP Verified by CM Yes   Last Visit to PCP Within last 6 months   Prior Functional Level Independent in ADLs/IADLs   Current Functional Level Independent in ADLs/IADLs   Can patient return to prior living arrangement Yes   Ability to make needs known: Good   Family able to assist with home care needs: No   Would you like for me to discuss the discharge plan with any other family members/significant others, and if so, who? No   Financial Resources Medicare   Community Resources None   CM/SW Referral ADLs/IADLs   Social/Functional History   Lives With Significant other   Active  Yes   Discharge Planning   Type of Residence Trailer/Mobile Home   Living Arrangements Spouse/Significant Other   Current Services Prior To Admission Durable Medical Equipment;Oxygen Therapy   Current DME Prior to Arrival Home Aerosol;Oxygen Therapy (Comment)   Potential Assistance Needed N/A   DME Ordered? No   Potential Assistance Purchasing Medications No   Type of Home Care Services None   Patient expects to be discharged to: Trailer/mobile home   Follow Up Appointment: Best Day/Time    (either)   One/Two Story Residence One story   History of falls? 1   Services At/After Discharge   Transition of Care Consult (CM Consult) N/A   Services At/After Discharge None   [place] Resource Information Provided? No   Mode of Transport at Discharge Other (see comment)  (family)   Confirm Follow Up Transport Family   Condition of Participation: Discharge Planning   The Plan for Transition of Care is related to the following treatment goals: Respiratory Failure Treatment   Freedom of Choice list was

## 2024-01-21 NOTE — RT PROTOCOL NOTE
RT Inhaler-Nebulizer Bronchodilator Protocol Note    There is a bronchodilator order in the chart from a provider indicating to follow the RT Bronchodilator Protocol and there is an “Initiate RT Inhaler-Nebulizer Bronchodilator Protocol” order as well (see protocol at bottom of note).    CXR Findings:  No results found.    The findings from the last RT Protocol Assessment were as follows:   History Pulmonary Disease: Chronic pulmonary disease  Respiratory Pattern: Regular pattern and RR 12-20 bpm  Breath Sounds: Slightly diminished and/or crackles  Cough: Strong, productive  Indication for Bronchodilator Therapy: Decreased or absent breath sounds, On home bronchodilators  Bronchodilator Assessment Score: 5    Aerosolized bronchodilator medication orders have been revised according to the RT Inhaler-Nebulizer Bronchodilator Protocol below.    Respiratory Therapist to perform RT Therapy Protocol Assessment initially then follow the protocol.  Repeat RT Therapy Protocol Assessment PRN for score 0-3 or on second treatment, BID, and PRN for scores above 3.    No Indications - adjust the frequency to every 6 hours PRN wheezing or bronchospasm, if no treatments needed after 48 hours then discontinue using Per Protocol order mode.     If indication present, adjust the RT bronchodilator orders based on the Bronchodilator Assessment Score as indicated below.  Use Inhaler orders unless patient has one or more of the following: on home nebulizer, not able to hold breath for 10 seconds, is not alert and oriented, cannot activate and use MDI correctly, or respiratory rate 25 breaths per minute or more, then use the equivalent nebulizer order(s) with same Frequency and PRN reasons based on the score.  If a patient is on this medication at home then do not decrease Frequency below that used at home.    0-3 - enter or revise RT bronchodilator order(s) to equivalent RT Bronchodilator order with Frequency of every 4 hours PRN for

## 2024-01-21 NOTE — RT PROTOCOL NOTE
RT Inhaler-Nebulizer Bronchodilator Protocol Note    There is a bronchodilator order in the chart from a provider indicating to follow the RT Bronchodilator Protocol and there is an “Initiate RT Inhaler-Nebulizer Bronchodilator Protocol” order as well (see protocol at bottom of note).    CXR Findings:  No results found.    The findings from the last RT Protocol Assessment were as follows:   History Pulmonary Disease: Chronic pulmonary disease  Respiratory Pattern: Regular pattern and RR 12-20 bpm  Breath Sounds: Slightly diminished and/or crackles  Cough: Strong, productive  Indication for Bronchodilator Therapy: Decreased or absent breath sounds, On home bronchodilators (Takes BID)  Bronchodilator Assessment Score: 5    Aerosolized bronchodilator medication orders have been revised according to the RT Inhaler-Nebulizer Bronchodilator Protocol below.    Respiratory Therapist to perform RT Therapy Protocol Assessment initially then follow the protocol.  Repeat RT Therapy Protocol Assessment PRN for score 0-3 or on second treatment, BID, and PRN for scores above 3.    No Indications - adjust the frequency to every 6 hours PRN wheezing or bronchospasm, if no treatments needed after 48 hours then discontinue using Per Protocol order mode.     If indication present, adjust the RT bronchodilator orders based on the Bronchodilator Assessment Score as indicated below.  Use Inhaler orders unless patient has one or more of the following: on home nebulizer, not able to hold breath for 10 seconds, is not alert and oriented, cannot activate and use MDI correctly, or respiratory rate 25 breaths per minute or more, then use the equivalent nebulizer order(s) with same Frequency and PRN reasons based on the score.  If a patient is on this medication at home then do not decrease Frequency below that used at home.    0-3 - enter or revise RT bronchodilator order(s) to equivalent RT Bronchodilator order with Frequency of every 4 hours

## 2024-01-22 ENCOUNTER — APPOINTMENT (OUTPATIENT)
Age: 84
DRG: 193 | End: 2024-01-22
Attending: FAMILY MEDICINE
Payer: MEDICARE

## 2024-01-22 ENCOUNTER — APPOINTMENT (OUTPATIENT)
Dept: GENERAL RADIOLOGY | Age: 84
DRG: 193 | End: 2024-01-22
Payer: MEDICARE

## 2024-01-22 LAB
ALBUMIN SERPL BCG-MCNC: 3.5 G/DL (ref 3.5–5.1)
ALP SERPL-CCNC: 79 U/L (ref 38–126)
ALT SERPL W/O P-5'-P-CCNC: 17 U/L (ref 11–66)
ANION GAP SERPL CALC-SCNC: 13 MEQ/L (ref 8–16)
AST SERPL-CCNC: 26 U/L (ref 5–40)
BASOPHILS ABSOLUTE: 0 THOU/MM3 (ref 0–0.1)
BASOPHILS NFR BLD AUTO: 0.2 %
BILIRUB SERPL-MCNC: 0.4 MG/DL (ref 0.3–1.2)
BUN SERPL-MCNC: 59 MG/DL (ref 7–22)
CALCIUM SERPL-MCNC: 8.4 MG/DL (ref 8.5–10.5)
CHLORIDE SERPL-SCNC: 103 MEQ/L (ref 98–111)
CO2 SERPL-SCNC: 22 MEQ/L (ref 23–33)
CREAT SERPL-MCNC: 2.6 MG/DL (ref 0.4–1.2)
DEPRECATED RDW RBC AUTO: 53.8 FL (ref 35–45)
EOSINOPHIL NFR BLD AUTO: 0.6 %
EOSINOPHILS ABSOLUTE: 0.1 THOU/MM3 (ref 0–0.4)
ERYTHROCYTE [DISTWIDTH] IN BLOOD BY AUTOMATED COUNT: 14.7 % (ref 11.5–14.5)
GFR SERPL CREATININE-BSD FRML MDRD: 23 ML/MIN/1.73M2
GLUCOSE BLD STRIP.AUTO-MCNC: 123 MG/DL (ref 70–108)
GLUCOSE BLD STRIP.AUTO-MCNC: 140 MG/DL (ref 70–108)
GLUCOSE BLD STRIP.AUTO-MCNC: 143 MG/DL (ref 70–108)
GLUCOSE BLD STRIP.AUTO-MCNC: 204 MG/DL (ref 70–108)
GLUCOSE SERPL-MCNC: 127 MG/DL (ref 70–108)
HCT VFR BLD AUTO: 43.7 % (ref 42–52)
HGB BLD-MCNC: 14.4 GM/DL (ref 14–18)
IMM GRANULOCYTES # BLD AUTO: 0.06 THOU/MM3 (ref 0–0.07)
IMM GRANULOCYTES NFR BLD AUTO: 0.5 %
INR PPP: 2.66 (ref 0.85–1.13)
LYMPHOCYTES ABSOLUTE: 1 THOU/MM3 (ref 1–4.8)
LYMPHOCYTES NFR BLD AUTO: 8.4 %
MCH RBC QN AUTO: 32.6 PG (ref 26–33)
MCHC RBC AUTO-ENTMCNC: 33 GM/DL (ref 32.2–35.5)
MCV RBC AUTO: 98.9 FL (ref 80–94)
MONOCYTES ABSOLUTE: 1.3 THOU/MM3 (ref 0.4–1.3)
MONOCYTES NFR BLD AUTO: 10.8 %
NEUTROPHILS NFR BLD AUTO: 79.5 %
NRBC BLD AUTO-RTO: 0 /100 WBC
PLATELET # BLD AUTO: 149 THOU/MM3 (ref 130–400)
PMV BLD AUTO: 10.8 FL (ref 9.4–12.4)
POTASSIUM SERPL-SCNC: 4.3 MEQ/L (ref 3.5–5.2)
PROT SERPL-MCNC: 5.9 G/DL (ref 6.1–8)
RBC # BLD AUTO: 4.42 MILL/MM3 (ref 4.7–6.1)
SEGMENTED NEUTROPHILS ABSOLUTE COUNT: 9.9 THOU/MM3 (ref 1.8–7.7)
SODIUM SERPL-SCNC: 138 MEQ/L (ref 135–145)
WBC # BLD AUTO: 12.4 THOU/MM3 (ref 4.8–10.8)

## 2024-01-22 PROCEDURE — 6360000002 HC RX W HCPCS

## 2024-01-22 PROCEDURE — 99233 SBSQ HOSP IP/OBS HIGH 50: CPT | Performed by: FAMILY MEDICINE

## 2024-01-22 PROCEDURE — 2580000003 HC RX 258

## 2024-01-22 PROCEDURE — 94640 AIRWAY INHALATION TREATMENT: CPT

## 2024-01-22 PROCEDURE — 94669 MECHANICAL CHEST WALL OSCILL: CPT

## 2024-01-22 PROCEDURE — 6370000000 HC RX 637 (ALT 250 FOR IP)

## 2024-01-22 PROCEDURE — 85610 PROTHROMBIN TIME: CPT

## 2024-01-22 PROCEDURE — 93306 TTE W/DOPPLER COMPLETE: CPT | Performed by: INTERNAL MEDICINE

## 2024-01-22 PROCEDURE — 6370000000 HC RX 637 (ALT 250 FOR IP): Performed by: FAMILY MEDICINE

## 2024-01-22 PROCEDURE — 93306 TTE W/DOPPLER COMPLETE: CPT

## 2024-01-22 PROCEDURE — 82948 REAGENT STRIP/BLOOD GLUCOSE: CPT

## 2024-01-22 PROCEDURE — 2700000000 HC OXYGEN THERAPY PER DAY

## 2024-01-22 PROCEDURE — 80053 COMPREHEN METABOLIC PANEL: CPT

## 2024-01-22 PROCEDURE — 1200000003 HC TELEMETRY R&B

## 2024-01-22 PROCEDURE — 85025 COMPLETE CBC W/AUTO DIFF WBC: CPT

## 2024-01-22 PROCEDURE — 94760 N-INVAS EAR/PLS OXIMETRY 1: CPT

## 2024-01-22 PROCEDURE — 36415 COLL VENOUS BLD VENIPUNCTURE: CPT

## 2024-01-22 PROCEDURE — 71046 X-RAY EXAM CHEST 2 VIEWS: CPT

## 2024-01-22 RX ORDER — WARFARIN SODIUM 1 MG/1
1 TABLET ORAL
Status: COMPLETED | OUTPATIENT
Start: 2024-01-22 | End: 2024-01-22

## 2024-01-22 RX ORDER — BUMETANIDE 1 MG/1
1 TABLET ORAL ONCE
Status: COMPLETED | OUTPATIENT
Start: 2024-01-22 | End: 2024-01-22

## 2024-01-22 RX ORDER — WARFARIN SODIUM 3 MG/1
1.5 TABLET ORAL
Status: DISCONTINUED | OUTPATIENT
Start: 2024-01-22 | End: 2024-01-22 | Stop reason: SDUPTHER

## 2024-01-22 RX ADMIN — OSELTAMIVIR PHOSPHATE 30 MG: 30 CAPSULE ORAL at 12:09

## 2024-01-22 RX ADMIN — GUAIFENESIN 1200 MG: 600 TABLET, EXTENDED RELEASE ORAL at 08:42

## 2024-01-22 RX ADMIN — Medication 0.5 MG: at 17:52

## 2024-01-22 RX ADMIN — GUAIFENESIN 1200 MG: 600 TABLET, EXTENDED RELEASE ORAL at 21:50

## 2024-01-22 RX ADMIN — MOMETASONE FUROATE AND FORMOTEROL FUMARATE DIHYDRATE 2 PUFF: 200; 5 AEROSOL RESPIRATORY (INHALATION) at 17:42

## 2024-01-22 RX ADMIN — Medication 1000 UNITS: at 08:42

## 2024-01-22 RX ADMIN — WARFARIN SODIUM 1 MG: 1 TABLET ORAL at 17:53

## 2024-01-22 RX ADMIN — INSULIN GLARGINE 12 UNITS: 100 INJECTION, SOLUTION SUBCUTANEOUS at 08:41

## 2024-01-22 RX ADMIN — ATORVASTATIN CALCIUM 80 MG: 80 TABLET, FILM COATED ORAL at 08:45

## 2024-01-22 RX ADMIN — Medication 1000 MCG: at 08:42

## 2024-01-22 RX ADMIN — ALBUTEROL SULFATE 2.5 MG: 2.5 SOLUTION RESPIRATORY (INHALATION) at 07:57

## 2024-01-22 RX ADMIN — SODIUM CHLORIDE, PRESERVATIVE FREE 10 ML: 5 INJECTION INTRAVENOUS at 08:42

## 2024-01-22 RX ADMIN — TIOTROPIUM BROMIDE INHALATION SPRAY 2 PUFF: 3.12 SPRAY, METERED RESPIRATORY (INHALATION) at 07:57

## 2024-01-22 RX ADMIN — SODIUM CHLORIDE, PRESERVATIVE FREE 10 ML: 5 INJECTION INTRAVENOUS at 21:50

## 2024-01-22 RX ADMIN — POTASSIUM CHLORIDE 10 MEQ: 750 TABLET, FILM COATED, EXTENDED RELEASE ORAL at 08:41

## 2024-01-22 RX ADMIN — BUMETANIDE 1 MG: 1 TABLET ORAL at 12:09

## 2024-01-22 RX ADMIN — ALBUTEROL SULFATE 2.5 MG: 2.5 SOLUTION RESPIRATORY (INHALATION) at 17:40

## 2024-01-22 RX ADMIN — OXYCODONE HYDROCHLORIDE AND ACETAMINOPHEN 500 MG: 500 TABLET ORAL at 08:42

## 2024-01-22 NOTE — PLAN OF CARE
Problem: Discharge Planning  Goal: Discharge to home or other facility with appropriate resources  Outcome: Progressing  Flowsheets (Taken 1/22/2024 0639)  Discharge to home or other facility with appropriate resources:   Identify barriers to discharge with patient and caregiver   Arrange for needed discharge resources and transportation as appropriate     Problem: ABCDS Injury Assessment  Goal: Absence of physical injury  Outcome: Progressing  Flowsheets (Taken 1/22/2024 0639)  Absence of Physical Injury: Implement safety measures based on patient assessment     Problem: Safety - Adult  Goal: Free from fall injury  Outcome: Progressing  Flowsheets (Taken 1/22/2024 0639)  Free From Fall Injury: Instruct family/caregiver on patient safety     Problem: Chronic Conditions and Co-morbidities  Goal: Patient's chronic conditions and co-morbidity symptoms are monitored and maintained or improved  Outcome: Progressing  Flowsheets (Taken 1/22/2024 0639)  Care Plan - Patient's Chronic Conditions and Co-Morbidity Symptoms are Monitored and Maintained or Improved: Monitor and assess patient's chronic conditions and comorbid symptoms for stability, deterioration, or improvement

## 2024-01-22 NOTE — CARE COORDINATION
1/22/24, 8:58 AM EST    DISCHARGE ON GOING EVALUATION    Portillo [facility] day: 2  Location: [facility]05/005-A Reason for admit: Pneumonia due to influenza A virus [J10.00]  Acute on chronic respiratory failure with hypoxia (HCC) [J96.21]  Acute on chronic hypoxic respiratory failure (HCC) [J96.21]   Procedure: 1/21 cxray: Interstitial opacities in both lung bases. This can represent some mild pulmonary fibrosis versus mild pulmonary edema   Barriers to Discharge: creat 2.6, WBC 12.4, INR 2.66. 97% on 3L O2. Tamiflu, nebs. Acapella. Echo pending.   PCP: Elinor Ly APRN - CNP  Readmission Risk Score: 17.9%  Patient Goals/Plan/Treatment Preferences: Portillo is from home with his wife. He uses home oxygen at HS (3L [facility]) and nebulizer. Current Coumadin clinic. Denies needs.                  Hemigard Intro: Due to skin fragility and wound tension, it was decided to use HEMIGARD adhesive retention suture devices to permit a linear closure. The skin was cleaned and dried for a 6cm distance away from the wound. Excessive hair, if present, was removed to allow for adhesion.

## 2024-01-22 NOTE — PLAN OF CARE
Problem: Respiratory - Adult  Goal: Clear lung sounds  Outcome: Progressing   Pt continues on aerosols and MDI for maintenance of COPD and pt does txs and MDI at home. Patient mutually agreed on goals.

## 2024-01-22 NOTE — RT PROTOCOL NOTE
RT Inhaler-Nebulizer Bronchodilator Protocol Note    There is a bronchodilator order in the chart from a provider indicating to follow the RT Bronchodilator Protocol and there is an “Initiate RT Inhaler-Nebulizer Bronchodilator Protocol” order as well (see protocol at bottom of note).    CXR Findings:  No results found.    The findings from the last RT Protocol Assessment were as follows:   History Pulmonary Disease: Chronic pulmonary disease  Respiratory Pattern: Regular pattern and RR 12-20 bpm  Breath Sounds: Slightly diminished and/or crackles  Cough: Strong, spontaneous, non-productive  Indication for Bronchodilator Therapy: Decreased or absent breath sounds, On home bronchodilators  Bronchodilator Assessment Score: 4    Aerosolized bronchodilator medication orders have been revised according to the RT Inhaler-Nebulizer Bronchodilator Protocol below.    Respiratory Therapist to perform RT Therapy Protocol Assessment initially then follow the protocol.  Repeat RT Therapy Protocol Assessment PRN for score 0-3 or on second treatment, BID, and PRN for scores above 3.    No Indications - adjust the frequency to every 6 hours PRN wheezing or bronchospasm, if no treatments needed after 48 hours then discontinue using Per Protocol order mode.     If indication present, adjust the RT bronchodilator orders based on the Bronchodilator Assessment Score as indicated below.  Use Inhaler orders unless patient has one or more of the following: on home nebulizer, not able to hold breath for 10 seconds, is not alert and oriented, cannot activate and use MDI correctly, or respiratory rate 25 breaths per minute or more, then use the equivalent nebulizer order(s) with same Frequency and PRN reasons based on the score.  If a patient is on this medication at home then do not decrease Frequency below that used at home.    0-3 - enter or revise RT bronchodilator order(s) to equivalent RT Bronchodilator order with Frequency of every 4  Tremfya Counseling: I discussed with the patient the risks of guselkumab including but not limited to immunosuppression, serious infections, and drug reactions.  The patient understands that monitoring is required including a PPD at baseline and must alert us or the primary physician if symptoms of infection or other concerning signs are noted.

## 2024-01-22 NOTE — PLAN OF CARE
Problem: Discharge Planning  Goal: Discharge to home or other facility with appropriate resources  1/22/2024 1054 by Flores Lang RN  Outcome: Progressing  1/22/2024 0639 by Carmen Molina RN  Outcome: Progressing  Flowsheets (Taken 1/22/2024 0639)  Discharge to home or other facility with appropriate resources:   Identify barriers to discharge with patient and caregiver   Arrange for needed discharge resources and transportation as appropriate     Problem: ABCDS Injury Assessment  Goal: Absence of physical injury  1/22/2024 1054 by Flores Lang RN  Outcome: Progressing  1/22/2024 0639 by Carmen Molina RN  Outcome: Progressing  Flowsheets (Taken 1/22/2024 0639)  Absence of Physical Injury: Implement safety measures based on patient assessment     Problem: Safety - Adult  Goal: Free from fall injury  1/22/2024 1054 by Flores Lang RN  Outcome: Progressing  1/22/2024 0639 by Carmen Molina RN  Outcome: Progressing  Flowsheets (Taken 1/22/2024 0639)  Free From Fall Injury: Instruct family/caregiver on patient safety     Problem: Chronic Conditions and Co-morbidities  Goal: Patient's chronic conditions and co-morbidity symptoms are monitored and maintained or improved  1/22/2024 1054 by Flores Lang RN  Outcome: Progressing  1/22/2024 0639 by Carmen Molina RN  Outcome: Progressing  Flowsheets (Taken 1/22/2024 0639)  Care Plan - Patient's Chronic Conditions and Co-Morbidity Symptoms are Monitored and Maintained or Improved: Monitor and assess patient's chronic conditions and comorbid symptoms for stability, deterioration, or improvement

## 2024-01-23 VITALS
DIASTOLIC BLOOD PRESSURE: 75 MMHG | WEIGHT: 203.8 LBS | HEART RATE: 62 BPM | RESPIRATION RATE: 22 BRPM | SYSTOLIC BLOOD PRESSURE: 121 MMHG | HEIGHT: 67 IN | BODY MASS INDEX: 31.99 KG/M2 | TEMPERATURE: 97.8 F | OXYGEN SATURATION: 97 %

## 2024-01-23 LAB
ALBUMIN SERPL BCG-MCNC: 3.4 G/DL (ref 3.5–5.1)
ALP SERPL-CCNC: 84 U/L (ref 38–126)
ALT SERPL W/O P-5'-P-CCNC: 19 U/L (ref 11–66)
ANION GAP SERPL CALC-SCNC: 14 MEQ/L (ref 8–16)
AST SERPL-CCNC: 26 U/L (ref 5–40)
BASOPHILS ABSOLUTE: 0 THOU/MM3 (ref 0–0.1)
BASOPHILS NFR BLD AUTO: 0.4 %
BILIRUB SERPL-MCNC: 0.6 MG/DL (ref 0.3–1.2)
BUN SERPL-MCNC: 53 MG/DL (ref 7–22)
CALCIUM SERPL-MCNC: 8.4 MG/DL (ref 8.5–10.5)
CHLORIDE SERPL-SCNC: 101 MEQ/L (ref 98–111)
CO2 SERPL-SCNC: 21 MEQ/L (ref 23–33)
CREAT SERPL-MCNC: 2.4 MG/DL (ref 0.4–1.2)
DEPRECATED RDW RBC AUTO: 53.4 FL (ref 35–45)
ECHO AO ASC DIAM: 3.1 CM
ECHO AO ASCENDING AORTA INDEX: 1.52 CM/M2
ECHO AV AREA PEAK VELOCITY: 3.2 CM2
ECHO AV AREA VTI: 2.9 CM2
ECHO AV AREA/BSA PEAK VELOCITY: 1.6 CM2/M2
ECHO AV AREA/BSA VTI: 1.4 CM2/M2
ECHO AV MEAN GRADIENT: 3 MMHG
ECHO AV MEAN VELOCITY: 0.9 M/S
ECHO AV PEAK GRADIENT: 6 MMHG
ECHO AV PEAK VELOCITY: 1.2 M/S
ECHO AV VELOCITY RATIO: 0.75
ECHO AV VTI: 27 CM
ECHO BSA: 2.09 M2
ECHO LA AREA 2C: 14.4 CM2
ECHO LA AREA 4C: 18.5 CM2
ECHO LA DIAMETER INDEX: 1.96 CM/M2
ECHO LA DIAMETER: 4 CM
ECHO LA MAJOR AXIS: 5.9 CM
ECHO LA MINOR AXIS: 4.9 CM
ECHO LA VOL BP: 43 ML (ref 18–58)
ECHO LA VOL MOD A2C: 35 ML (ref 18–58)
ECHO LA VOL MOD A4C: 45 ML (ref 18–58)
ECHO LA VOL/BSA BIPLANE: 21 ML/M2 (ref 16–34)
ECHO LA VOLUME INDEX MOD A2C: 17 ML/M2 (ref 16–34)
ECHO LA VOLUME INDEX MOD A4C: 22 ML/M2 (ref 16–34)
ECHO LV FRACTIONAL SHORTENING: 23 % (ref 28–44)
ECHO LV INTERNAL DIMENSION DIASTOLE INDEX: 2.3 CM/M2
ECHO LV INTERNAL DIMENSION DIASTOLIC: 4.7 CM (ref 4.2–5.9)
ECHO LV INTERNAL DIMENSION SYSTOLIC INDEX: 1.76 CM/M2
ECHO LV INTERNAL DIMENSION SYSTOLIC: 3.6 CM
ECHO LV ISOVOLUMETRIC RELAXATION TIME (IVRT): 74 MS
ECHO LV IVSD: 0.9 CM (ref 0.6–1)
ECHO LV MASS 2D: 142.7 G (ref 88–224)
ECHO LV MASS INDEX 2D: 70 G/M2 (ref 49–115)
ECHO LV POSTERIOR WALL DIASTOLIC: 0.9 CM (ref 0.6–1)
ECHO LV RELATIVE WALL THICKNESS RATIO: 0.38
ECHO LVOT AREA: 4.2 CM2
ECHO LVOT AV VTI INDEX: 0.7
ECHO LVOT DIAM: 2.3 CM
ECHO LVOT MEAN GRADIENT: 2 MMHG
ECHO LVOT PEAK GRADIENT: 3 MMHG
ECHO LVOT PEAK VELOCITY: 0.9 M/S
ECHO LVOT STROKE VOLUME INDEX: 38.7 ML/M2
ECHO LVOT SV: 78.9 ML
ECHO LVOT VTI: 19 CM
ECHO MV A VELOCITY: 0.41 M/S
ECHO MV E DECELERATION TIME (DT): 158 MS
ECHO MV E VELOCITY: 1.45 M/S
ECHO MV E/A RATIO: 3.54
ECHO PV MAX VELOCITY: 0.5 M/S
ECHO PV PEAK GRADIENT: 1 MMHG
ECHO RV INTERNAL DIMENSION: 3.5 CM
ECHO RV TAPSE: 1.7 CM (ref 1.7–?)
ECHO TV E WAVE: 0.7 M/S
ECHO TV REGURGITANT MAX VELOCITY: 3.05 M/S
ECHO TV REGURGITANT PEAK GRADIENT: 37 MMHG
EOSINOPHIL NFR BLD AUTO: 4.6 %
EOSINOPHILS ABSOLUTE: 0.4 THOU/MM3 (ref 0–0.4)
ERYTHROCYTE [DISTWIDTH] IN BLOOD BY AUTOMATED COUNT: 14.9 % (ref 11.5–14.5)
GFR SERPL CREATININE-BSD FRML MDRD: 26 ML/MIN/1.73M2
GLUCOSE BLD STRIP.AUTO-MCNC: 131 MG/DL (ref 70–108)
GLUCOSE BLD STRIP.AUTO-MCNC: 145 MG/DL (ref 70–108)
GLUCOSE BLD STRIP.AUTO-MCNC: 195 MG/DL (ref 70–108)
GLUCOSE SERPL-MCNC: 135 MG/DL (ref 70–108)
HCT VFR BLD AUTO: 43.4 % (ref 42–52)
HGB BLD-MCNC: 14.5 GM/DL (ref 14–18)
IMM GRANULOCYTES # BLD AUTO: 0.05 THOU/MM3 (ref 0–0.07)
IMM GRANULOCYTES NFR BLD AUTO: 0.6 %
INR PPP: 2.99 (ref 0.85–1.13)
LYMPHOCYTES ABSOLUTE: 0.9 THOU/MM3 (ref 1–4.8)
LYMPHOCYTES NFR BLD AUTO: 10.4 %
MCH RBC QN AUTO: 32.3 PG (ref 26–33)
MCHC RBC AUTO-ENTMCNC: 33.4 GM/DL (ref 32.2–35.5)
MCV RBC AUTO: 96.7 FL (ref 80–94)
MONOCYTES ABSOLUTE: 1 THOU/MM3 (ref 0.4–1.3)
MONOCYTES NFR BLD AUTO: 12.1 %
NEUTROPHILS NFR BLD AUTO: 71.9 %
NRBC BLD AUTO-RTO: 0 /100 WBC
PLATELET # BLD AUTO: 161 THOU/MM3 (ref 130–400)
PMV BLD AUTO: 10.9 FL (ref 9.4–12.4)
POTASSIUM SERPL-SCNC: 4.2 MEQ/L (ref 3.5–5.2)
PROT SERPL-MCNC: 6.2 G/DL (ref 6.1–8)
RBC # BLD AUTO: 4.49 MILL/MM3 (ref 4.7–6.1)
SEGMENTED NEUTROPHILS ABSOLUTE COUNT: 6.2 THOU/MM3 (ref 1.8–7.7)
SODIUM SERPL-SCNC: 136 MEQ/L (ref 135–145)
WBC # BLD AUTO: 8.6 THOU/MM3 (ref 4.8–10.8)

## 2024-01-23 PROCEDURE — 85610 PROTHROMBIN TIME: CPT

## 2024-01-23 PROCEDURE — 99239 HOSP IP/OBS DSCHRG MGMT >30: CPT | Performed by: FAMILY MEDICINE

## 2024-01-23 PROCEDURE — 85025 COMPLETE CBC W/AUTO DIFF WBC: CPT

## 2024-01-23 PROCEDURE — 94640 AIRWAY INHALATION TREATMENT: CPT

## 2024-01-23 PROCEDURE — 6370000000 HC RX 637 (ALT 250 FOR IP): Performed by: FAMILY MEDICINE

## 2024-01-23 PROCEDURE — 6370000000 HC RX 637 (ALT 250 FOR IP)

## 2024-01-23 PROCEDURE — 80053 COMPREHEN METABOLIC PANEL: CPT

## 2024-01-23 PROCEDURE — 2580000003 HC RX 258

## 2024-01-23 PROCEDURE — 94669 MECHANICAL CHEST WALL OSCILL: CPT

## 2024-01-23 PROCEDURE — 2700000000 HC OXYGEN THERAPY PER DAY

## 2024-01-23 PROCEDURE — 82948 REAGENT STRIP/BLOOD GLUCOSE: CPT

## 2024-01-23 PROCEDURE — 6360000002 HC RX W HCPCS

## 2024-01-23 PROCEDURE — 36415 COLL VENOUS BLD VENIPUNCTURE: CPT

## 2024-01-23 RX ORDER — WARFARIN SODIUM 2.5 MG/1
2.5 TABLET ORAL
Status: DISCONTINUED | OUTPATIENT
Start: 2024-01-23 | End: 2024-01-23 | Stop reason: HOSPADM

## 2024-01-23 RX ORDER — OSELTAMIVIR PHOSPHATE 30 MG/1
30 CAPSULE ORAL EVERY 24 HOURS
Qty: 1 CAPSULE | Refills: 0 | Status: SHIPPED | OUTPATIENT
Start: 2024-01-24 | End: 2024-01-25

## 2024-01-23 RX ADMIN — Medication 1000 MCG: at 09:03

## 2024-01-23 RX ADMIN — GUAIFENESIN 1200 MG: 600 TABLET, EXTENDED RELEASE ORAL at 09:03

## 2024-01-23 RX ADMIN — MOMETASONE FUROATE AND FORMOTEROL FUMARATE DIHYDRATE 2 PUFF: 200; 5 AEROSOL RESPIRATORY (INHALATION) at 08:29

## 2024-01-23 RX ADMIN — SODIUM CHLORIDE, PRESERVATIVE FREE 10 ML: 5 INJECTION INTRAVENOUS at 09:04

## 2024-01-23 RX ADMIN — OXYCODONE HYDROCHLORIDE AND ACETAMINOPHEN 500 MG: 500 TABLET ORAL at 09:03

## 2024-01-23 RX ADMIN — ALBUTEROL SULFATE 2.5 MG: 2.5 SOLUTION RESPIRATORY (INHALATION) at 15:54

## 2024-01-23 RX ADMIN — ATORVASTATIN CALCIUM 80 MG: 80 TABLET, FILM COATED ORAL at 09:03

## 2024-01-23 RX ADMIN — OSELTAMIVIR PHOSPHATE 30 MG: 30 CAPSULE ORAL at 12:48

## 2024-01-23 RX ADMIN — ALBUTEROL SULFATE 2.5 MG: 2.5 SOLUTION RESPIRATORY (INHALATION) at 08:29

## 2024-01-23 RX ADMIN — POTASSIUM CHLORIDE 10 MEQ: 750 TABLET, FILM COATED, EXTENDED RELEASE ORAL at 09:03

## 2024-01-23 RX ADMIN — INSULIN GLARGINE 12 UNITS: 100 INJECTION, SOLUTION SUBCUTANEOUS at 09:03

## 2024-01-23 RX ADMIN — TIOTROPIUM BROMIDE INHALATION SPRAY 2 PUFF: 3.12 SPRAY, METERED RESPIRATORY (INHALATION) at 08:29

## 2024-01-23 RX ADMIN — Medication 1000 UNITS: at 09:03

## 2024-01-23 NOTE — PROCEDURES
A home oxygen evaluation has been completed.     [x]Patient is an inpatient. It is expected that the patient will be discharged within the next 48 hours. Qualified provider to write order for home prescription if patient qualifies. Social service/care managers will arrange for home oxygen.  If patient is active, arrange for Home Medical supplier to assess for Oxygen Conserving Device per pulse oximetry.  []Patient is an outpatient. Results will be faxed to the ordering provider. Qualified provider to write order for home prescription if patient qualifies and arranges for home oxygen.      Patient was placed on room air for 30 minutes. SpO2 was 88 % on room air at rest. Patients SpO2 was below 89% and qualified for home oxygen. Oxygen was applied at 1 lpm via nasal cannula with an SpO2 of 90%. Spo2 on 2 lpm via nasal cannula was 92%. Both measurements was when the patient was resting at the bedside. Patient can ambulate for exercise flow rate. Patients was ambulated, SpO2 was 91% on 3 lpm via nasal cannula while exercising. Patient was only able to walk for 5 minutes, due to patient being tired.

## 2024-01-23 NOTE — PLAN OF CARE
Problem: Discharge Planning  Goal: Discharge to home or other facility with appropriate resources  1/22/2024 2130 by Andria Shaw RN  Outcome: Progressing  Flowsheets (Taken 1/22/2024 2024)  Discharge to home or other facility with appropriate resources: Identify barriers to discharge with patient and caregiver  1/22/2024 1054 by Flores Lang RN  Outcome: Progressing     Problem: ABCDS Injury Assessment  Goal: Absence of physical injury  1/22/2024 2130 by Andria Shaw RN  Outcome: Progressing  1/22/2024 1054 by Flores Lang RN  Outcome: Progressing     Problem: Safety - Adult  Goal: Free from fall injury  1/22/2024 2130 by Andria Shaw RN  Outcome: Progressing  1/22/2024 1054 by Flores Lang RN  Outcome: Progressing     Problem: Chronic Conditions and Co-morbidities  Goal: Patient's chronic conditions and co-morbidity symptoms are monitored and maintained or improved  1/22/2024 2130 by Andria Shaw RN  Outcome: Progressing  Flowsheets (Taken 1/22/2024 2024)  Care Plan - Patient's Chronic Conditions and Co-Morbidity Symptoms are Monitored and Maintained or Improved: Monitor and assess patient's chronic conditions and comorbid symptoms for stability, deterioration, or improvement  1/22/2024 1054 by Flores Lang RN  Outcome: Progressing     Problem: Respiratory - Adult  Goal: Clear lung sounds  1/22/2024 1745 by Fany Hines RCP  Outcome: Progressing

## 2024-01-23 NOTE — PLAN OF CARE
Problem: Respiratory - Adult  Goal: Clear lung sounds  1/23/2024 0830 by Eli Souza RCP  Outcome: Progressing  Note: Patient agrees to goals     Problem: Discharge Planning  Goal: Discharge to home or other facility with appropriate resources  Outcome: Completed     Problem: ABCDS Injury Assessment  Goal: Absence of physical injury  Outcome: Completed     Problem: Safety - Adult  Goal: Free from fall injury  Outcome: Completed     Problem: Chronic Conditions and Co-morbidities  Goal: Patient's chronic conditions and co-morbidity symptoms are monitored and maintained or improved  Outcome: Completed

## 2024-01-23 NOTE — PLAN OF CARE
Problem: Respiratory - Adult  Goal: Clear lung sounds  Outcome: Progressing  Note: Patient agrees to goals

## 2024-01-23 NOTE — RT PROTOCOL NOTE
RT Inhaler-Nebulizer Bronchodilator Protocol Note    There is a bronchodilator order in the chart from a provider indicating to follow the RT Bronchodilator Protocol and there is an “Initiate RT Inhaler-Nebulizer Bronchodilator Protocol” order as well (see protocol at bottom of note).    CXR Findings:  No results found.    The findings from the last RT Protocol Assessment were as follows:   History Pulmonary Disease: Chronic pulmonary disease  Respiratory Pattern: Regular pattern and RR 12-20 bpm  Breath Sounds: Slightly diminished and/or crackles  Cough: Strong, productive  Indication for Bronchodilator Therapy: Decreased or absent breath sounds  Bronchodilator Assessment Score: 5    Aerosolized bronchodilator medication orders have been revised according to the RT Inhaler-Nebulizer Bronchodilator Protocol below.    Respiratory Therapist to perform RT Therapy Protocol Assessment initially then follow the protocol.  Repeat RT Therapy Protocol Assessment PRN for score 0-3 or on second treatment, BID, and PRN for scores above 3.    No Indications - adjust the frequency to every 6 hours PRN wheezing or bronchospasm, if no treatments needed after 48 hours then discontinue using Per Protocol order mode.     If indication present, adjust the RT bronchodilator orders based on the Bronchodilator Assessment Score as indicated below.  Use Inhaler orders unless patient has one or more of the following: on home nebulizer, not able to hold breath for 10 seconds, is not alert and oriented, cannot activate and use MDI correctly, or respiratory rate 25 breaths per minute or more, then use the equivalent nebulizer order(s) with same Frequency and PRN reasons based on the score.  If a patient is on this medication at home then do not decrease Frequency below that used at home.    0-3 - enter or revise RT bronchodilator order(s) to equivalent RT Bronchodilator order with Frequency of every 4 hours PRN for wheezing or increased work of

## 2024-01-23 NOTE — PLAN OF CARE
Patient was evaluated today for the diagnosis of COPD, Acute on chronic respiratory failure .  I entered a DME order for home oxygen at 2 at rest 3 on exertion lpm because the diagnosis and testing require the patient to have supplemental oxygen.  Condition will improve or be benefited by oxygen use.  The patient is  able to perform good mobility in a home setting and therefore does require the use of a portable oxygen system.  The need for this equipment was discussed with the patient and he understands and is in agreement.

## 2024-01-23 NOTE — CARE COORDINATION
1/23/24, 2:37 PM EST    Discharge planned. Portillo will return home with his wife, who is here to transport him home. Notified [facility] of Portillo's increased oxygen requirement and updated orders. They will deliver a portable tank to his room. Denies additional needs.   Patient goals/plan/ treatment preferences discussed by  and .  Patient goals/plan/ treatment preferences reviewed with patient/ family.  Patient/ family verbalize understanding of discharge plan and are in agreement with goal/plan/treatment preferences.  Understanding was demonstrated using the teach back method.  AVS provided by RN at time of discharge, which includes all necessary medical information pertaining to the patients current course of illness, treatment, post-discharge goals of care, and treatment preferences. Pt verbalized understanding and gave permission for possible discharge within 4 hours of receiving IMM.     Services At/After Discharge: DME       IMM Letter  IMM Letter given to Patient/Family/Significant other/Guardian/POA/by:: Darrell HAYES  IMM Letter date given:: 01/23/24  IMM Letter time given:: [id number]

## 2024-01-23 NOTE — PROGRESS NOTES
Physician Progress Note      PATIENT:               GABBI GRAVES  CSN #:                  [id number]  :                       1940  ADMIT DATE:       2024 10:38 AM  DISCH DATE:  RESPONDING  PROVIDER #:        Kellee Beverly MD          QUERY TEXT:    Patient admitted 24 with acute on chronic hypoxic respiratory failure  \"Pneumonia due to influenza A - date noted 24\" is listed in active   problem list and linked into IM provider progress note on 24.  Per 24 IM MD attestation: \"CXR this am with interstitial opacities in   both lung bases - ?fibrosis vs edema\"    If possible, please document in the progress notes and discharge summary to   clarify pneumonia:    The medical record reflects the following:  Risk Factors: COPD, chronic respiratory failure, Influenza A (+)  Clinical Indicators: acute hypoxic respiratory failure due to influenza;    24 CXR: Stable radiographic appearance of the chest. No evidence of an   acute process;  24 CXR: Interstitial opacities in both lung bases. This   can represent some mild pulmonary fibrosis versus mild pulmonary edema;    admission WBC 11.8  Treatment:  Tamiflu, supplemental O2, Mucinex, Acapella, incentive spirometer    Molly Mccollum, MSN, RN, CCDS, CRCR  Clinical   .  Options provided:  -- Pneumonia due to Influenza A, present on admission  -- Pneumonia due to Influenza A, present on admission, treated and resolved  -- Pneumonia ruled out after study  -- Other - I will add my own diagnosis  -- Disagree - Not applicable / Not valid  -- Disagree - Clinically unable to determine / Unknown  -- Refer to Clinical Documentation Reviewer    PROVIDER RESPONSE TEXT:    Pneumonia due to Influenza A, present on admission    Query created by: Molly Mccollum on 2024 2:35 PM      Electronically signed by:  Kellee Beverly MD 2024 3:09 PM          
Patient educated on how to use incentive spirometer. Patient verbalized understanding and demonstrated proper use. Emphasized importance and usage of device every 4 hours while awake.        
Patient educated on how to use incentive spirometer. Patient verbalized understanding and demonstrated proper use. Emphasized importance and usage of device, with coughing and deep breathing every 4 hours while awake.        
Pt admitted to  6K5 from ED.   Complaints: Shortness of breath.    IV none infusing into the antecubital left, condition patent and no redness. IV site free of s/s of infection or infiltration. Vital signs obtained. Assessment and data collection initiated. Two nurse skin assessment performed by Elvie ALMANZA and Rachael ALMANZA. Oriented to room. Policies and procedures for 6K explained. Elvie ALMANZA discussed hourly rounding with patient addressing 5 P's. Fall prevention and safety brochure discussed with patient.  Bed alarm on. Call light in reach.  The best day to schedule a follow up Dr appointment is:  Monday, Tuesday, and Wednesday a.m.  Explained patients right to have family, representative or physician notified of their admission.  Patient has Declined for physician to be notified.  Patient has Declined for family/representative to be notified. All questions answered with no further questions at this time.       
Pt was in bed as his wife was with him. He was dealing with acute chronic hypoxic respiratory failure. He was itching to go home but believed that he has to be better before that happens. He was encouraged and blessed.    01/23/24 1339   Encounter Summary   Encounter Overview/Reason  Initial Encounter   Service Provided For: Patient and family together   Referral/Consult From: [facility]   Support System Spouse   Last Encounter  01/23/24   Complexity of Encounter Low   Begin Time 1245   End Time  1252   Total Time Calculated 7 min   Spiritual/Emotional needs   Type Spiritual Support   Assessment/Intervention/Outcome   Assessment Hopeful   Intervention Nurtured Hope   Outcome Expressed Gratitude;Encouraged;Engaged in conversation       
Transport took pt via wheelchair for chest xray. Pt on room air. Not in respiratory distress.  
Warfarin Pharmacy Consult Note    Portillo Daly is a 83 y.o. male for whom pharmacy has been asked to manage warfarin therapy.     Consulting Provider: Dr. Arturo Burgess  Warfarin Indication: Atrial fibrillation or Atrial flutter  Target INR range: 2.0-3.0   Warfarin dose prior to admission: 1.25 mg every Mon, Fri; 2.5 mg all other days  Outpatient warfarin provider: [facility]    Recent Labs     01/20/24  1100   INR 2.47*     Recent Labs     01/20/24  1100   HGB 15.2        Concurrent anticoagulants/antiplatelets: none  Significant warfarin drug-drug interactions: none    Date INR Warfarin Dose   1/20/24 2.47 2.5 mg                                   INR will be monitored routinely until therapeutic INR is achieved.    Pharmacy will continue to follow. Thank you for the consult,   Brannon Maria [facility] 1/20/2024 4:35 PM      
Warfarin Pharmacy Consult Note    Warfarin Indication: Atrial fibrillation or Atrial flutter  Target INR: 2.0-3.0  Dose prior to admission: 1.25 mg every Mon, Fri; 2.5 mg all other days     Recent Labs     01/21/24  1100 01/22/24  0611 01/23/24  0521   INR 2.46* 2.66* 2.99*     Recent Labs     01/21/24  0809 01/22/24  0611 01/23/24  0521   HGB 14.0 14.4 14.5    149 161     Concurrent anticoagulants/antiplatelets: none  Significant warfarin drug-drug interactions: none     Date INR Warfarin Dose    1/20/24  2.47 2.5 mg    1/21/24  2.46 2.5 mg     1/22/24  2.66  1.5 mg    1/23/24  2.99  2.5 mg                               Monitoring:                   INR will be monitored daily until therapeutic INR is achieved.    **Apollo/ contact pharmacy for discharge instructions when indicated**    Mary Jo Rodriguez [facility] BCPS  1/23/2024 6:42 AM      
Warfarin Pharmacy Consult Note    Warfarin Indication: Atrial fibrillation or Atrial flutter  Target INR: 2.0-3.0  Dose prior to admission: 1.25 mg every Mon, Fri; 2.5 mg all other days    Recent Labs     01/20/24  1100 01/21/24  1100 01/22/24  0611   INR 2.47* 2.46* 2.66*     Recent Labs     01/20/24  1100 01/21/24  0809 01/22/24  0611   HGB 15.2 14.0 14.4    150 149     Concurrent anticoagulants/antiplatelets: none  Significant warfarin drug-drug interactions: none     Date INR Warfarin Dose    1/20/24  2.47 2.5 mg    1/21/24  2.46 2.5 mg     1/22/24  2.66  1.5 mg                                         Monitoring:                   INR will be monitored daily until therapeutic INR is achieved.    **Please contact pharmacy for discharge instructions when indicated**    Olivia Bartholomew [facility]  1/22/2024 11:38 AM       
Warfarin Pharmacy Consult Note    Warfarin Indication: Atrial fibrillation or Atrial flutter  Target INR: 2.0-3.0  Dose prior to admission: 1.25mg MF, 2.5mg [id number]    Recent Labs     01/20/24  1100 01/21/24  1100   INR 2.47* 2.46*     Recent Labs     01/20/24  1100 01/21/24  0809   HGB 15.2 14.0    150     Concurrent anticoagulants/antiplatelets: none  Significant warfarin drug-drug interactions: none     Date INR Warfarin Dose   1/20/24 2.47 2.5 mg    1/21/24  2.46  2.5mg                                                Monitoring:                   INR will be monitored daily until therapeutic INR is achieved.    **Please contact pharmacy for discharge instructions when indicated**    Mary Jo Rodriguez RP BCPS  1/21/2024 11:41 AM      
[Held by provider] calcitRIOL  0.25 mcg Oral Daily    guaiFENesin  1,200 mg Oral BID    insulin glargine  12 Units SubCUTAneous QAM    potassium chloride  10 mEq Oral Daily    Vitamin D  1,000 Units Oral Daily    cyanocobalamin  1,000 mcg Oral Daily    ascorbic acid  500 mg Oral Daily    mometasone-formoterol  2 puff Inhalation BID RT    tiotropium  2 puff Inhalation Daily RT    warfarin placeholder: dosing by pharmacy   Other RX Placeholder    oseltamivir  30 mg Oral Q24H    metoprolol succinate  50 mg Oral Daily    insulin lispro  0-16 Units SubCUTAneous Q4H    albuterol  2.5 mg Nebulization BID RT     PRN Meds: sodium chloride flush, sodium chloride, polyethylene glycol, acetaminophen **OR** acetaminophen, albuterol, glucose, dextrose bolus **OR** dextrose bolus, glucagon (rDNA), dextrose, promethazine      Intake/Output Summary (Last 24 hours) at 1/21/2024 1004  Last data filed at 1/21/2024 0946  Gross per 24 hour   Intake 620 ml   Output --   Net 620 ml       Diet:  ADULT DIET; Regular; 4 carb choices (60 gm/meal); Low Fat/Low Chol/High Fiber/JELANI; 2000 ml    Exam:  /61   Pulse 63   Temp 97.8 °F (36.6 °C) (Oral)   Resp 18   Ht 1.702 m (5' 7\")   Wt 92.4 kg (203 lb 12.8 oz)   SpO2 93%   BMI 31.92 kg/m²     General appearance: No apparent distress, appears stated age and cooperative.  Oriented x 3, up in bathroom.  HEENT: Pupils equal, round, and reactive to light. Conjunctivae/corneas clear.  MMM.  Neck: Supple, with full range of motion. Trachea midline.    Respiratory:  Normal respiratory effort. Diminished with Clear to auscultation, bilaterally without Rales/Wheezes/Rhonchi.  No respiratory distress or accessory muscle use.  Cardiovascular: irreg, irreg with 2/6 JP apex.  No JVD.  Abdomen: Soft, non-tender, non-distended with normal bowel sounds.  No rebound or guarding  Musculoskeletal: No clubbing, cyanosis or edema bilaterally.  Full range of motion..  No calf tenderness palpation  Skin: Skin 
evaluated      Electronically signed by Bharath Menon MD on 1/22/2024 at 7:20 AM    This note was electronically signed. Parts of this note may have been dictated by use of voice recognition software and electronically transcribed. The note may contain errors not detected in proofreading. Please refer to my supervising physician's documentation if my documentation differs.

## 2024-01-23 NOTE — DISCHARGE SUMMARY
DISCHARGE SUMMARY      Patient Identification:   Portillo Daly   : 1940  MRN: [id number]   Account: [id number]      Patient's PCP: Elinor Ly APRN - CNP    Admit Date: 2024     Discharge Date: 2024    Admitting Physician: Kellee Beverly MD     Discharge Physician: Bharath Menon MD     Discharge Diagnoses:    Acute on chronic hypoxic respiratory failure, PoA  Influenza A (+)  Metabolic acidosis / lactic acidosis  Leukocytosis  Elevated troponin  COPD without exacerbation  CKD stage IV: no MILLY as Cr is not 1.5x baseline  Chronic Atrial Fibrillation with secondary hypercoagulable state and PPM  Chronic HFmrEF  CAD s/p angioplasty/PCI   Hx of severe AS s/p TAVR   Borderline low BP with essential hypertension  T2DM  Hyperlipidemia  Hx of CVA   Mild MR  Obesity BMI 31.92    The patient was seen and examined on day of discharge and this discharge summary is in conjunction with any daily progress note from day of discharge.    Hospital Course: Per HPI:  \"Portillo Daly is a 83 y.o. male with a PMH of atrial fibrillation, aortic valve replacement CAD s/p PCI, hx of CVA, COPD, CKD, HFmrEF, HTN, HLD  who presented with productive cough. Patient stated that symptoms started about a week ago. Denies any recent travel or sick contacts. Patient stays at home with partner. Patient states the only symptoms he had was a productive cough with greenish sputum. He felt worse on 24, with some decreased appetite so he decided to come for evaluation. Denied any fever, chills, congestion, runny nose, nausea, vomiting, diarrhea.  HPI\"    Acute on chronic hypoxic respiratory failure, PoA  Drop to 88% on RA in ER, started on 2L O2, titrated up as sats 88% on 3.5L. Down to 2L  afternoon  Likely 2/2 influenza A (+), COPD, CHF  On 3L O2 at night at home, none during day.  CXR  - faint interstitial opacities superimposed over both lung bases, consistent with prior studies  CXR  has

## 2024-01-24 ENCOUNTER — CARE COORDINATION (OUTPATIENT)
Dept: CASE MANAGEMENT | Age: 84
End: 2024-01-24

## 2024-01-24 ENCOUNTER — TELEPHONE (OUTPATIENT)
Dept: PHARMACY | Age: 84
End: 2024-01-24

## 2024-01-24 NOTE — CARE COORDINATION
Care Transitions Initial Follow Up Call    Call within 2 business days of discharge: Yes    Patient Current Location:  Home: [address]  [place] [address]    Care Transition Nurse contacted the patient by telephone to perform post hospital discharge assessment. Verified name and  with patient as identifiers. Provided introduction to self, and explanation of the Care Transition Nurse role.     Patient: Portillo Daly Patient : 1940   MRN: [id number]  Reason for Admission: Cough, Extremity weakness, PNA d/t influenza A virus, Acute on chronic hypoxic respiratory failure  Discharge Date: 24 RARS: Readmission Risk Score: 18      Last Discharge Facility       Date Complaint Diagnosis Description Type Department Provider    24 Cough; Extremity Weakness Pneumonia due to influenza A virus ... ED to Hosp-Admission (Discharged) (ADMITTED) Kellee Malave MD; Darrius Montano MD; B...            Was this an external facility discharge? No Discharge Facility: [facility]    Challenges to be reviewed by the provider   Additional needs identified to be addressed with provider: No  none               Method of communication with provider: none.    Contacted pt for initial care transition follow up.  Portillo states he is doing okay so far.  Productive cough still present but not coughing as much as he had been.  Denies c/o chest pain/discomfort, increased shortness of breath.  States that he has been using 3 L of continuous oxygen for now.  SpO2 92% with oxygen.  Hoping to go back to only using 3 L at night but for now continues to use it ATC.  He has noticed abdominal discomfort when coughing.  Only occurs when coughing.  States it was mentioned while he was in the hospital but was never told what could be causing it.  Denies having abdominal swelling, nausea/vomiting.  Reports he is eating and drinking his fluids w/o issues.  FBS this morning 158.  Denies having any urinary or bowel issues currently.

## 2024-01-24 NOTE — TELEPHONE ENCOUNTER
Patient discharged from hospital yesterday with no instructions for Coumadin on AVS. Instructed patient to continue with 1.25 mg on Mondays and Fridays, and 2.5 mg all other days. Patient is to follow-up in clinic on 2/8/24. He voiced understanding.    Gabby Patterson, PharmD, BCPS  1/24/2024  2:18 PM

## 2024-01-25 ENCOUNTER — HOSPITAL ENCOUNTER (EMERGENCY)
Age: 84
Discharge: HOME | End: 2024-01-25
Attending: EMERGENCY MEDICINE
Payer: MEDICARE

## 2024-01-25 ENCOUNTER — APPOINTMENT (OUTPATIENT)
Dept: GENERAL RADIOLOGY | Age: 84
End: 2024-01-25
Payer: MEDICARE

## 2024-01-25 VITALS
OXYGEN SATURATION: 95 % | WEIGHT: 203.71 LBS | HEIGHT: 67 IN | TEMPERATURE: 97.8 F | BODY MASS INDEX: 31.97 KG/M2 | RESPIRATION RATE: 18 BRPM | HEART RATE: 59 BPM | SYSTOLIC BLOOD PRESSURE: 135 MMHG | DIASTOLIC BLOOD PRESSURE: 62 MMHG

## 2024-01-25 DIAGNOSIS — R06.02 SHORTNESS OF BREATH: ICD-10-CM

## 2024-01-25 DIAGNOSIS — J10.1 INFLUENZA A: Primary | ICD-10-CM

## 2024-01-25 LAB
ALBUMIN SERPL BCG-MCNC: 3.8 G/DL (ref 3.5–5.1)
ALP SERPL-CCNC: 96 U/L (ref 38–126)
ALT SERPL W/O P-5'-P-CCNC: 22 U/L (ref 11–66)
ANION GAP SERPL CALC-SCNC: 11 MEQ/L (ref 8–16)
APTT PPP: 41.2 SECONDS (ref 22–38)
AST SERPL-CCNC: 27 U/L (ref 5–40)
BACTERIA BLD AEROBE CULT: NORMAL
BACTERIA BLD AEROBE CULT: NORMAL
BASOPHILS ABSOLUTE: 0.1 THOU/MM3 (ref 0–0.1)
BASOPHILS NFR BLD AUTO: 0.4 %
BILIRUB SERPL-MCNC: 1 MG/DL (ref 0.3–1.2)
BUN SERPL-MCNC: 50 MG/DL (ref 7–22)
CALCIUM SERPL-MCNC: 8.5 MG/DL (ref 8.5–10.5)
CHLORIDE SERPL-SCNC: 100 MEQ/L (ref 98–111)
CO2 SERPL-SCNC: 24 MEQ/L (ref 23–33)
CREAT SERPL-MCNC: 2.5 MG/DL (ref 0.4–1.2)
DEPRECATED RDW RBC AUTO: 54.2 FL (ref 35–45)
EOSINOPHIL NFR BLD AUTO: 3 %
EOSINOPHILS ABSOLUTE: 0.4 THOU/MM3 (ref 0–0.4)
ERYTHROCYTE [DISTWIDTH] IN BLOOD BY AUTOMATED COUNT: 14.8 % (ref 11.5–14.5)
GFR SERPL CREATININE-BSD FRML MDRD: 25 ML/MIN/1.73M2
GLUCOSE SERPL-MCNC: 131 MG/DL (ref 70–108)
HCT VFR BLD AUTO: 43.9 % (ref 42–52)
HGB BLD-MCNC: 14.4 GM/DL (ref 14–18)
IMM GRANULOCYTES # BLD AUTO: 0.12 THOU/MM3 (ref 0–0.07)
IMM GRANULOCYTES NFR BLD AUTO: 0.9 %
INR PPP: 2.48 (ref 0.85–1.13)
LACTIC ACID, SEPSIS: 1.3 MMOL/L (ref 0.5–1.9)
LYMPHOCYTES ABSOLUTE: 1.7 THOU/MM3 (ref 1–4.8)
LYMPHOCYTES NFR BLD AUTO: 12.8 %
MAGNESIUM SERPL-MCNC: 2.7 MG/DL (ref 1.6–2.4)
MCH RBC QN AUTO: 32.5 PG (ref 26–33)
MCHC RBC AUTO-ENTMCNC: 32.8 GM/DL (ref 32.2–35.5)
MCV RBC AUTO: 99.1 FL (ref 80–94)
MONOCYTES ABSOLUTE: 1.3 THOU/MM3 (ref 0.4–1.3)
MONOCYTES NFR BLD AUTO: 10 %
NEUTROPHILS NFR BLD AUTO: 72.9 %
NRBC BLD AUTO-RTO: 0 /100 WBC
NT-PROBNP SERPL IA-MCNC: 3989 PG/ML (ref 0–449)
OSMOLALITY SERPL CALC.SUM OF ELEC: 285.2 MOSMOL/KG (ref 275–300)
PLATELET # BLD AUTO: 171 THOU/MM3 (ref 130–400)
PMV BLD AUTO: 10.5 FL (ref 9.4–12.4)
POTASSIUM SERPL-SCNC: 4.5 MEQ/L (ref 3.5–5.2)
PROCALCITONIN SERPL IA-MCNC: 0.17 NG/ML (ref 0.01–0.09)
PROT SERPL-MCNC: 6.9 G/DL (ref 6.1–8)
RBC # BLD AUTO: 4.43 MILL/MM3 (ref 4.7–6.1)
SEGMENTED NEUTROPHILS ABSOLUTE COUNT: 9.4 THOU/MM3 (ref 1.8–7.7)
SODIUM SERPL-SCNC: 135 MEQ/L (ref 135–145)
TROPONIN, HIGH SENSITIVITY: 74 NG/L (ref 0–12)
WBC # BLD AUTO: 12.9 THOU/MM3 (ref 4.8–10.8)

## 2024-01-25 PROCEDURE — 80053 COMPREHEN METABOLIC PANEL: CPT

## 2024-01-25 PROCEDURE — 84484 ASSAY OF TROPONIN QUANT: CPT

## 2024-01-25 PROCEDURE — 93005 ELECTROCARDIOGRAM TRACING: CPT | Performed by: STUDENT IN AN ORGANIZED HEALTH CARE EDUCATION/TRAINING PROGRAM

## 2024-01-25 PROCEDURE — 83880 ASSAY OF NATRIURETIC PEPTIDE: CPT

## 2024-01-25 PROCEDURE — 36415 COLL VENOUS BLD VENIPUNCTURE: CPT

## 2024-01-25 PROCEDURE — 71046 X-RAY EXAM CHEST 2 VIEWS: CPT

## 2024-01-25 PROCEDURE — 83605 ASSAY OF LACTIC ACID: CPT

## 2024-01-25 PROCEDURE — 83735 ASSAY OF MAGNESIUM: CPT

## 2024-01-25 PROCEDURE — 85730 THROMBOPLASTIN TIME PARTIAL: CPT

## 2024-01-25 PROCEDURE — 85610 PROTHROMBIN TIME: CPT

## 2024-01-25 PROCEDURE — 84145 PROCALCITONIN (PCT): CPT

## 2024-01-25 PROCEDURE — 99285 EMERGENCY DEPT VISIT HI MDM: CPT

## 2024-01-25 PROCEDURE — 85025 COMPLETE CBC W/AUTO DIFF WBC: CPT

## 2024-01-25 RX ORDER — BUMETANIDE 1 MG/1
1 TABLET ORAL DAILY
Qty: 14 TABLET | Refills: 0 | Status: SHIPPED | OUTPATIENT
Start: 2024-01-25

## 2024-01-25 ASSESSMENT — PAIN - FUNCTIONAL ASSESSMENT: PAIN_FUNCTIONAL_ASSESSMENT: NONE - DENIES PAIN

## 2024-01-25 NOTE — ED TRIAGE NOTES
Pt came to ER with wife with complaint of cough and right rib pain when coughing. Pt was discharged yesterday from the hospital. Pt baseline O2 before the admission was 3L while sleeping. After discharge patient has been 3L all the time. Pt is 95 % on 3L during triage. Pt states that he has had increased coughing throughout the day and that is what brings him back to the ER. Pt states when he coughs his right rib pain is a 7 out of 10.

## 2024-01-25 NOTE — ED PROVIDER NOTES
Procalcitonin 0.17 (*)     All other components within normal limits   PROTIME-INR - Abnormal; Notable for the following components:    INR 2.48 (*)     All other components within normal limits   APTT - Abnormal; Notable for the following components:    aPTT 41.2 (*)     All other components within normal limits   BRAIN NATRIURETIC PEPTIDE - Abnormal; Notable for the following components:    Pro-BNP 3989.0 (*)     All other components within normal limits   GLOMERULAR FILTRATION RATE, ESTIMATED - Abnormal; Notable for the following components:    Est, Glom Filt Rate 25 (*)     All other components within normal limits   LACTATE, SEPSIS   ANION GAP   OSMOLALITY     (Any cultures that may have been sent were not resulted at the time of this patient visit. A negative COVID-19 test should be interpreted as COVID no longer suspected unless otherwise noted in this encounter documentation note)    MEDICAL DECISION MAKING     Initial Differential: Includes but is not limited to both superimposed bacterial infection, acute on chronic CHF, electrolyte disturbance    Discrepancies:  None    Summary: This is a pleasant 83-year-old woman here after recent discharge for hypoxia secondary to influenza A pneumonia.  Now with worsening productive cough and chest wall pain.  His creatinine appears to be downtrending.  WBC slightly elevated 12.9.  His troponin is slightly elevated from his baseline around 74 but not having any chest pain concerning for acute coronary syndrome and no changes on his ECG.  Chest x-ray unremarkable no signs of consolidation.  He will be discharged home he already has follow-up set up with his primary care nurse practitioner tomorrow morning at 0 9:20 AM, I encouraged him to keep his appointment.  I am also restarting his bumetanide 1 mg daily.  He has 2 mg tabs at home and is going to cut these in half for the first few days until his pharmacy assessment is applied.  Also encouraged him to follow-up with

## 2024-01-26 PROBLEM — D72.829 LEUKOCYTOSIS: Status: ACTIVE | Noted: 2024-01-26

## 2024-01-26 PROBLEM — I48.20 CHRONIC ATRIAL FIBRILLATION (HCC): Status: ACTIVE | Noted: 2024-01-26

## 2024-01-26 PROBLEM — D68.69 SECONDARY HYPERCOAGULABLE STATE (HCC): Status: ACTIVE | Noted: 2024-01-26

## 2024-01-26 PROBLEM — Z86.73 HISTORY OF CVA (CEREBROVASCULAR ACCIDENT): Status: ACTIVE | Noted: 2024-01-26

## 2024-01-26 PROBLEM — E66.9 OBESITY (BMI 30-39.9): Status: ACTIVE | Noted: 2024-01-26

## 2024-01-26 PROBLEM — R79.89 ELEVATED TROPONIN: Status: ACTIVE | Noted: 2024-01-26

## 2024-01-26 LAB
EKG ATRIAL RATE: 75 BPM
EKG Q-T INTERVAL: 532 MS
EKG QRS DURATION: 172 MS
EKG QTC CALCULATION (BAZETT): 532 MS
EKG R AXIS: -85 DEGREES
EKG T AXIS: 80 DEGREES
EKG VENTRICULAR RATE: 60 BPM

## 2024-01-26 PROCEDURE — 93010 ELECTROCARDIOGRAM REPORT: CPT | Performed by: INTERNAL MEDICINE

## 2024-01-26 NOTE — ED NOTES
Bedside report completed at this time. Pt updated on plan of care. Voiced no needs. Per Dr. García, peripheral IV is not needed. Call light in reach.

## 2024-01-26 NOTE — DISCHARGE INSTRUCTIONS
You were seen today for cough and shortness of breath.    Restart taking Bumex 1 mg daily.    Follow-up with Dr. Pedersen - Cardiology and your primary care nurse practioner Elinor Ly as soon as possible.    If symptoms are worse or other new concerns please come back to the Emergency Department for re-evaluation.

## 2024-01-29 ENCOUNTER — CARE COORDINATION (OUTPATIENT)
Dept: CASE MANAGEMENT | Age: 84
End: 2024-01-29

## 2024-01-29 NOTE — CARE COORDINATION
Care Transitions Follow Up Call    Patient Current Location:  [place]    Care Transition Nurse contacted the patient by telephone to follow up after admission on 24.  Verified name and  with patient as identifiers.    Patient: Portillo Daly  Patient : 1940   MRN: [id number]  Reason for Admission: Cough, Extremity weakness, PNA d/t influenza A virus, Acute on chronic hypoxic respiratory failure; ED visit on 24 for Influenza A  Discharge Date: 24 RARS: Readmission Risk Score: 18      Needs to be reviewed by the provider   Additional needs identified to be addressed with provider: Yes  Pt has concerns regarding his medications that were stopped from his admission -.  Losartan, Metoprolol, Potassium and Bumex were stopped on discharge.  He ended up going back to ED on 24 for SOB, cough, rib pain.  Was restarted on Bumex 1 mg daily but states he was told to only take 1/2 tablet for now.  Should pt be restarted on Potassium?  He would also like to discuss his pacemaker.  He has a hospital follow up on 24.                Method of communication with provider: chart routing.    Contacted pt for care transition follow up and ED visit 24 follow up.  Jose M states he gets very short of breath with minimal exertion.  Reports O2 sat dropped into the 70's and that's when he decided to go to the ED.  Reports his O2 sats are remaining in the 90's since returning home from the ED.  Continues to use 3 L at rest but will increase to 4.5 L when up and moving.  He states it has helped and O2 sats has not dropped.  Continues to have a cough which he states is slowly subsiding.  May still have a productive cough with white to green sputum at times.  Denies having any c/o chest pain/discomfort, swelling or weight gain, fever, chills.  Continues to monitor BP which he states does not remember exact reading d/t not being at home right now.  States BP was around 147/74.  We discussed about being

## 2024-01-31 ENCOUNTER — NURSE ONLY (OUTPATIENT)
Dept: LAB | Age: 84
End: 2024-01-31

## 2024-01-31 ENCOUNTER — OFFICE VISIT (OUTPATIENT)
Dept: CARDIOLOGY CLINIC | Age: 84
End: 2024-01-31
Payer: MEDICARE

## 2024-01-31 VITALS
SYSTOLIC BLOOD PRESSURE: 114 MMHG | OXYGEN SATURATION: 95 % | HEART RATE: 61 BPM | DIASTOLIC BLOOD PRESSURE: 67 MMHG | HEIGHT: 67 IN | BODY MASS INDEX: 32.49 KG/M2 | WEIGHT: 207 LBS

## 2024-01-31 DIAGNOSIS — N18.4 CKD (CHRONIC KIDNEY DISEASE), STAGE IV (HCC): ICD-10-CM

## 2024-01-31 DIAGNOSIS — I25.5 ISCHEMIC CARDIOMYOPATHY: ICD-10-CM

## 2024-01-31 DIAGNOSIS — I25.10 CORONARY ARTERY DISEASE INVOLVING NATIVE CORONARY ARTERY OF NATIVE HEART WITHOUT ANGINA PECTORIS: ICD-10-CM

## 2024-01-31 DIAGNOSIS — I50.32 CHRONIC DIASTOLIC CHF (CONGESTIVE HEART FAILURE), NYHA CLASS 2 (HCC): Primary | ICD-10-CM

## 2024-01-31 DIAGNOSIS — I10 PRIMARY HYPERTENSION: ICD-10-CM

## 2024-01-31 DIAGNOSIS — I50.32 CHRONIC DIASTOLIC CHF (CONGESTIVE HEART FAILURE), NYHA CLASS 2 (HCC): ICD-10-CM

## 2024-01-31 DIAGNOSIS — N18.30 STAGE 3 CHRONIC KIDNEY DISEASE, UNSPECIFIED WHETHER STAGE 3A OR 3B CKD (HCC): ICD-10-CM

## 2024-01-31 DIAGNOSIS — Z95.0 PACEMAKER: ICD-10-CM

## 2024-01-31 DIAGNOSIS — I48.21 PERMANENT ATRIAL FIBRILLATION (HCC): ICD-10-CM

## 2024-01-31 LAB
ANION GAP SERPL CALC-SCNC: 14 MEQ/L (ref 8–16)
BUN SERPL-MCNC: 17 MG/DL (ref 7–22)
CALCIUM SERPL-MCNC: 8.6 MG/DL (ref 8.5–10.5)
CHLORIDE SERPL-SCNC: 103 MEQ/L (ref 98–111)
CO2 SERPL-SCNC: 25 MEQ/L (ref 23–33)
CREAT SERPL-MCNC: 2 MG/DL (ref 0.4–1.2)
DEPRECATED RDW RBC AUTO: 56.4 FL (ref 35–45)
ERYTHROCYTE [DISTWIDTH] IN BLOOD BY AUTOMATED COUNT: 15.2 % (ref 11.5–14.5)
GFR SERPL CREATININE-BSD FRML MDRD: 32 ML/MIN/1.73M2
GLUCOSE SERPL-MCNC: 150 MG/DL (ref 70–108)
HCT VFR BLD AUTO: 44.5 % (ref 42–52)
HGB BLD-MCNC: 14.2 GM/DL (ref 14–18)
MCH RBC QN AUTO: 32.3 PG (ref 26–33)
MCHC RBC AUTO-ENTMCNC: 31.9 GM/DL (ref 32.2–35.5)
MCV RBC AUTO: 101.4 FL (ref 80–94)
PHOSPHATE SERPL-MCNC: 1.9 MG/DL (ref 2.4–4.7)
PLATELET # BLD AUTO: 186 THOU/MM3 (ref 130–400)
PMV BLD AUTO: 11.5 FL (ref 9.4–12.4)
POTASSIUM SERPL-SCNC: 3.6 MEQ/L (ref 3.5–5.2)
RBC # BLD AUTO: 4.39 MILL/MM3 (ref 4.7–6.1)
SODIUM SERPL-SCNC: 142 MEQ/L (ref 135–145)
WBC # BLD AUTO: 12.4 THOU/MM3 (ref 4.8–10.8)

## 2024-01-31 PROCEDURE — 1036F TOBACCO NON-USER: CPT | Performed by: NURSE PRACTITIONER

## 2024-01-31 PROCEDURE — 3078F DIAST BP <80 MM HG: CPT | Performed by: NURSE PRACTITIONER

## 2024-01-31 PROCEDURE — 3074F SYST BP LT 130 MM HG: CPT | Performed by: NURSE PRACTITIONER

## 2024-01-31 PROCEDURE — G8484 FLU IMMUNIZE NO ADMIN: HCPCS | Performed by: NURSE PRACTITIONER

## 2024-01-31 PROCEDURE — G8417 CALC BMI ABV UP PARAM F/U: HCPCS | Performed by: NURSE PRACTITIONER

## 2024-01-31 PROCEDURE — G8427 DOCREV CUR MEDS BY ELIG CLIN: HCPCS | Performed by: NURSE PRACTITIONER

## 2024-01-31 PROCEDURE — 1123F ACP DISCUSS/DSCN MKR DOCD: CPT | Performed by: NURSE PRACTITIONER

## 2024-01-31 PROCEDURE — 1111F DSCHRG MED/CURRENT MED MERGE: CPT | Performed by: NURSE PRACTITIONER

## 2024-01-31 PROCEDURE — 99214 OFFICE O/P EST MOD 30 MIN: CPT | Performed by: NURSE PRACTITIONER

## 2024-01-31 RX ORDER — METOPROLOL SUCCINATE 25 MG/1
25 TABLET, EXTENDED RELEASE ORAL DAILY
Qty: 30 TABLET | Refills: 3 | Status: SHIPPED | OUTPATIENT
Start: 2024-01-31

## 2024-01-31 NOTE — PATIENT INSTRUCTIONS
Restart the Toprol XL at 25 mg daily.    Have the labs drawn today to recheck your renal function.     Continue to not take the Losartan for right now.     Continue the Bumex as just a half tablet once a day.    Continue all other current medications as prescribed.    Continue to use the oxygen as you need.     Continue to use the nebulizer treatments.     Follow-up with your PCP as scheduled.    Follow-up with Dr. Pedersen or Nicolette LANIER in 3 months as scheduled or sooner if need.     See Dr. Ya as scheduled.     See Dr. Amos as scheduled.

## 2024-01-31 NOTE — PROGRESS NOTES
[facility] PROFESSIONAL SERVICES  [facility] [place] [address]   [address]   [place] [address]   Dept: [phone]   Dept Fax: [phone]   Loc: [phone]      Chief Complaint   Patient presents with    Follow-Up from Hospital     Influenza A with SOB    Other     States he is now on oxygen full time and is having trouble walking around without.it.     Advice Only     ER stopped Bumex, Losartan, Toprol and potassium and is questioning this     Hospital  follow-up    Formerly Dr. Eagle  Structural Heart Cardiologist:  Dr. Pedersen    Recent hospitalization; returned to ED on 1/25/2023 with c/o cough and right rib pain. Had been discharged from hospital on 1/24/23 secondary to Influenza A with hypoxia. Discharged home on 3L O2. No bumex at discharge (previously 2 mg BID). Worsening cough and tan-colored sputum along with cough and right lateral chest wall pain. No fever or chills. WBC 12.9; BUN/creat: 50/2.5, HS Troponin 74.  Discharged to home with Bumex 1 mg daily restarted. To follow-up with his PCP.      Last seen in office on 1/8/2024 per REAL Dugan CNP. Per office note:  Diagnosis Orders    1. Coronary artery disease involving native coronary artery of native heart without angina pectoris          2. Stage 3 chronic kidney disease, unspecified whether stage 3a or 3b CKD (HCC)          3. Primary hypertension          4. Hyperlipidemia LDL goal <70          5. Ischemic cardiomyopathy          6. Chronic diastolic CHF (congestive heart failure), NYHA class 2 (HCC)          7. History of transcatheter aortic valve replacement (TAVR)          8. Permanent atrial fibrillation (HCC)          9. Pacemaker          Continue Dr Wilkerson's current treatment plan   Portillo is a pleasant 83-year-old gentleman who presents today for a yearly follow-up for coronary artery disease.  Portillo has no complaints of any chest pain shortness of breath dizziness lightheadedness.  He does have some lower

## 2024-02-01 ENCOUNTER — OFFICE VISIT (OUTPATIENT)
Dept: FAMILY MEDICINE CLINIC | Age: 84
End: 2024-02-01

## 2024-02-01 VITALS
BODY MASS INDEX: 32.87 KG/M2 | HEART RATE: 62 BPM | OXYGEN SATURATION: 96 % | HEIGHT: 67 IN | WEIGHT: 209.4 LBS | TEMPERATURE: 97.6 F | RESPIRATION RATE: 18 BRPM | DIASTOLIC BLOOD PRESSURE: 78 MMHG | SYSTOLIC BLOOD PRESSURE: 124 MMHG

## 2024-02-01 DIAGNOSIS — E11.22 TYPE 2 DIABETES MELLITUS WITH STAGE 4 CHRONIC KIDNEY DISEASE, WITH LONG-TERM CURRENT USE OF INSULIN (HCC): ICD-10-CM

## 2024-02-01 DIAGNOSIS — Z09 HOSPITAL DISCHARGE FOLLOW-UP: Primary | ICD-10-CM

## 2024-02-01 DIAGNOSIS — N18.4 TYPE 2 DIABETES MELLITUS WITH STAGE 4 CHRONIC KIDNEY DISEASE, WITH LONG-TERM CURRENT USE OF INSULIN (HCC): ICD-10-CM

## 2024-02-01 DIAGNOSIS — J44.1 CHRONIC OBSTRUCTIVE PULMONARY DISEASE WITH ACUTE EXACERBATION (HCC): ICD-10-CM

## 2024-02-01 DIAGNOSIS — T50.2X5A DIURETIC-INDUCED HYPOKALEMIA: Primary | ICD-10-CM

## 2024-02-01 DIAGNOSIS — I25.119 CORONARY ARTERY DISEASE INVOLVING NATIVE CORONARY ARTERY OF NATIVE HEART WITH ANGINA PECTORIS (HCC): ICD-10-CM

## 2024-02-01 DIAGNOSIS — Z79.4 TYPE 2 DIABETES MELLITUS WITH STAGE 4 CHRONIC KIDNEY DISEASE, WITH LONG-TERM CURRENT USE OF INSULIN (HCC): ICD-10-CM

## 2024-02-01 DIAGNOSIS — E11.65 TYPE 2 DIABETES MELLITUS WITH HYPERGLYCEMIA, WITHOUT LONG-TERM CURRENT USE OF INSULIN (HCC): ICD-10-CM

## 2024-02-01 DIAGNOSIS — E87.6 DIURETIC-INDUCED HYPOKALEMIA: Primary | ICD-10-CM

## 2024-02-01 LAB — PTH-INTACT SERPL-MCNC: 165.4 PG/ML (ref 15–65)

## 2024-02-01 RX ORDER — POTASSIUM CHLORIDE 750 MG/1
10 TABLET, EXTENDED RELEASE ORAL DAILY
Qty: 90 TABLET | Refills: 2
Start: 2024-02-01

## 2024-02-01 ASSESSMENT — PATIENT HEALTH QUESTIONNAIRE - PHQ9
2. FEELING DOWN, DEPRESSED OR HOPELESS: 0
1. LITTLE INTEREST OR PLEASURE IN DOING THINGS: 0
SUM OF ALL RESPONSES TO PHQ QUESTIONS 1-9: 0
SUM OF ALL RESPONSES TO PHQ QUESTIONS 1-9: 0
SUM OF ALL RESPONSES TO PHQ9 QUESTIONS 1 & 2: 0
SUM OF ALL RESPONSES TO PHQ QUESTIONS 1-9: 0
SUM OF ALL RESPONSES TO PHQ QUESTIONS 1-9: 0

## 2024-02-01 NOTE — PROGRESS NOTES
CHOLECALCIFEROL  Take 1 tablet by mouth daily     warfarin 2.5 MG tablet  Commonly known as: COUMADIN  Take as directed by the anticoagulation clinic. If you are unsure how to take this medication, talk to your nurse or doctor.  Original instructions: Take as directed by [facility] Coumadin [facility] 90 tabs = 90 days               Where to Get Your Medications        Information about where to get these medications is not yet available    Ask your nurse or doctor about these medications  potassium chloride 10 MEQ extended release tablet          Medications marked \"taking\" at this time  Outpatient Medications Marked as Taking for the 2/1/24 encounter (Office Visit) with Elinor Ly APRN - CNP   Medication Sig Dispense Refill    potassium chloride (KLOR-CON M) 10 MEQ extended release tablet Take 1 tablet by mouth daily 90 tablet 2    metoprolol succinate (TOPROL XL) 25 MG extended release tablet Take 1 tablet by mouth daily 30 tablet 3    bumetanide (BUMEX) 1 MG tablet Take 1 tablet by mouth daily (Patient taking differently: Take 0.5 tablets by mouth daily) 14 tablet 0    calcitRIOL (ROCALTROL) 0.25 MCG capsule Take 1 capsule by mouth daily 90 capsule 3    Insulin Pen Needle 30G X 8 MM MISC 1 each by Does not apply route daily 100 each 3    atorvastatin (LIPITOR) 80 MG tablet TAKE 1 TABLET EVERY DAY 90 tablet 3    TRUE METRIX BLOOD GLUCOSE TEST strip TEST BLOOD SUGAR EVERY  strip 2    SITagliptin (JANUVIA) 25 MG tablet Take 1 tablet by mouth daily 90 tablet 3    cyanocobalamin 1000 MCG tablet Take 1 tablet by mouth daily 90 tablet 3    OXYGEN Inhale into the lungs nightly      Alcohol Swabs (B-D SINGLE USE SWABS REGULAR) PADS Apply 1 each topically daily Use to test blood sugar as needed 400 each 1    NONFORMULARY Take by mouth as needed Cough medicine with Codeine.      warfarin (COUMADIN) 2.5 MG tablet Take as directed by [facility] Coumadin [facility] 90 tabs = 90 days 90 tablet 3    TRUEplus Lancets

## 2024-02-01 NOTE — PROGRESS NOTES
Called and spoke with patient at 9:00. Informed to begin to take the KCl 10 meq on daily basis. Discussed his labs - noted improvement in his renal function. No other med changes at this time. He is to follow-up with Nephrology and Pulmonology as well as his PCP as scheduled. Verbalized understanding. He has the KCl at home as was stopped while he was in the hospital  - will resume today.   Nicolette Mendiola, APRN - CNP

## 2024-02-02 ENCOUNTER — TELEPHONE (OUTPATIENT)
Dept: FAMILY MEDICINE CLINIC | Age: 84
End: 2024-02-02

## 2024-02-02 ENCOUNTER — CARE COORDINATION (OUTPATIENT)
Dept: CASE MANAGEMENT | Age: 84
End: 2024-02-02

## 2024-02-02 NOTE — TELEPHONE ENCOUNTER
----- Message from WILBERTO No CNP sent at 2/1/2024 12:58 PM EST -----  Please call pt and check on his bruising.  Is it getting any worse?  Any pain?  How is his LLE swelling?

## 2024-02-02 NOTE — TELEPHONE ENCOUNTER
Glad bruise is improving  Recommend he elevate them all night and whenever he can throughout the day.  Decrease intake of foods that tend to have a lot of sodium in them- soda, restaurant food, some frozen and canned food.

## 2024-02-02 NOTE — CARE COORDINATION
Remote Patient Monitoring Enrollment Note      Date/Time:  2/2/2024 2:08 PM    Offered patient enrollment in the [facility] Remote Patient Monitoring (RPM) program for in home monitoring for {rpm ordering conditions:87335}.  Patient {ACCEPTED/DECLINED:84371} RPM services.    Patient will be monitoring the following daily:  {RPMALERTMEASURE2:62326}      {Blank Single Select Template:03182::\"ACM\",\"LPN\",\"CTN\",\"CCSS\",\"EMTP\"} reviewed the information below with patient:    Emergency Contact (name and contact number): ***    [] A member from the care coordination team will reach out to notify the patient once the RPM kit is ordered.   [] Once the kit is delivered, the HRS team will contact the patient after UPS deliver to assist with set up.            [] Determined BP cuff size: {Blank Single Select Template:58476::\"small (6.3\"-9.4\")\",\"regular (9.05\"-15.74\")\",\"large (13.8\"-19.68\")\"}      [] Determined weight scale: {Blank Single Select Template:17522::\"regular (<330lbs)\",\"bariatric (330-550lbs)\"}                                                 [] Hours of ACM monitoring - Monday-Friday 6634-3230.   [] It is important to take your vitals every day, even on the weekends,to keep your care team aware of how you are doing every day of the week.                  All questions about RPM program answered at this time.

## 2024-02-02 NOTE — CARE COORDINATION
Care Transitions Follow Up Call    Patient Current Location:  Home: [address]  [facility] [place] [facility] [address]    Care Transition Nurse contacted the patient by telephone to follow up after admission on 24.  Verified name and  with patient as identifiers.    Patient: Portillo Daly  Patient : 1940   MRN: [id number]  Reason for Admission:  Cough, Extremity weakness, PNA d/t influenza A virus, Acute on chronic hypoxic respiratory failure; ED visit on 24 for Influenza A  Discharge Date: 24 RARS: Readmission Risk Score: 18      Needs to be reviewed by the provider   Additional needs identified to be addressed with provider: No  none             Method of communication with provider: none.    Contacted pt for care transition follow up.  Jose M states he is feeling better than he had been.  Reports shortness of breath is \"not too bad\".  Continues to use 3 L of oxygen.  Reports SpO2 remaining in the 90's most of the time.  Does admit to SpO2 dropping into the 80's with exertion but states he recovers within seconds.  He is aware of the importance of keeping O2 sat levels in the 90's and when to report to the ED for evaluation.  Reports cough is improving and he is not coughing as much as he had been.  No c/o chest pain/discomfort, pressure, tightness.  Reports lower left leg swelling and bruising.  Reports bruising has improved and swelling decreased currently.  Has been keeping legs elevated which has improved the swelling.  Continues to monitor his weight daily.  Home weight is 202 lbs.  He has not been able to check his BP.  Pt states his BP is working properly.  Also continues to monitor FBS.  Reports last FBS was 123.  Introduced RPM and benefits of the program.  Pt is interested in enrolling.  See RPM enrollment note.  We discussed s/s and when to contact providers.  He does not have any questions or concerns at this time.      Addressed changes since last contact:   had left leg swelling

## 2024-02-02 NOTE — TELEPHONE ENCOUNTER
Patient states that his bruise is almost completely gone. No pain at all.  Patient states that his lower left leg is swollen this morning.  I asked if he slept sitting up or laying with leg elevated.  Patient states that he slept in his recliner with his legs elevated mostly and there was no swelling, but he eventually went and got in the bed for the remainder of the night and legs were not elevated and when he woe  up this morning his left leg was swollen again

## 2024-02-05 ENCOUNTER — TELEPHONE (OUTPATIENT)
Dept: FAMILY MEDICINE CLINIC | Age: 84
End: 2024-02-05

## 2024-02-05 DIAGNOSIS — E11.65 TYPE 2 DIABETES MELLITUS WITH HYPERGLYCEMIA, WITHOUT LONG-TERM CURRENT USE OF INSULIN (HCC): ICD-10-CM

## 2024-02-05 DIAGNOSIS — R05.3 CHRONIC COUGH: ICD-10-CM

## 2024-02-05 DIAGNOSIS — J44.1 CHRONIC OBSTRUCTIVE PULMONARY DISEASE WITH ACUTE EXACERBATION (HCC): ICD-10-CM

## 2024-02-05 DIAGNOSIS — N18.4 STAGE 4 CHRONIC KIDNEY DISEASE (HCC): ICD-10-CM

## 2024-02-05 RX ORDER — CODEINE PHOSPHATE AND GUAIFENESIN 10; 100 MG/5ML; MG/5ML
5 SOLUTION ORAL 4 TIMES DAILY PRN
Qty: 100 ML | Refills: 0 | Status: SHIPPED | OUTPATIENT
Start: 2024-02-05 | End: 2024-02-10

## 2024-02-05 RX ORDER — INSULIN GLARGINE 100 [IU]/ML
INJECTION, SOLUTION SUBCUTANEOUS
Qty: 15 ML | Refills: 3 | Status: SHIPPED | OUTPATIENT
Start: 2024-02-05

## 2024-02-05 NOTE — TELEPHONE ENCOUNTER
Recent Visits  Date Type Provider Dept   02/01/24 Office Visit Elinor Ly, APRN - CNP [facility]   12/05/23 Office Visit Elinor Ly, APRN - CNP [facility]   11/01/23 Office Visit Elinor Ly, APRN - CNP [facility]   09/13/23 Office Visit Elinor Ly, APRN - CNP [facility]   08/22/23 Office Visit Elinor Ly, APRN - CNP [facility]   07/11/23 Office Visit Elinor Ly, APRN - CNP [facility]   05/23/23 Office Visit Elinor Ly, APRN - CNP [facility]   05/03/23 Office Visit Elinor Ly, APRN - CNP [facility]   02/09/23 Office Visit Elinor Ly, APRN - CNP [facility]   10/19/22 Office Visit Bailee Gallagher, APRN - CNP [facility]   Showing recent visits within past 540 days with a meds authorizing provider and meeting all other requirements  Future Appointments  Date Type Provider Dept   03/05/24 Appointment Elinor Ly, APRN - CNP [facility]   Showing future appointments within next 150 days with a meds authorizing provider and meeting all other requirements

## 2024-02-05 NOTE — TELEPHONE ENCOUNTER
----- Message from WILBERTO No CNP sent at 2/1/2024  1:00 PM EST -----  Please call and check on his bruising.  Any worse?  Any pain?

## 2024-02-07 ENCOUNTER — HOSPITAL ENCOUNTER (OUTPATIENT)
Dept: PHARMACY | Age: 84
Setting detail: THERAPIES SERIES
Discharge: HOME | End: 2024-02-07
Payer: MEDICARE

## 2024-02-07 DIAGNOSIS — Z51.81 ENCOUNTER FOR THERAPEUTIC DRUG MONITORING: ICD-10-CM

## 2024-02-07 DIAGNOSIS — Z79.01 ANTICOAGULATED ON COUMADIN: ICD-10-CM

## 2024-02-07 DIAGNOSIS — I48.21 PERMANENT ATRIAL FIBRILLATION (HCC): Primary | ICD-10-CM

## 2024-02-07 LAB — POC INR: 3.1 (ref 0.8–1.2)

## 2024-02-07 PROCEDURE — 36416 COLLJ CAPILLARY BLOOD SPEC: CPT

## 2024-02-07 PROCEDURE — 85610 PROTHROMBIN TIME: CPT

## 2024-02-07 PROCEDURE — 99211 OFF/OP EST MAY X REQ PHY/QHP: CPT

## 2024-02-07 RX ORDER — GUAIFENESIN AND DEXTROMETHORPHAN HYDROBROMIDE 1200; 60 MG/1; MG/1
TABLET, EXTENDED RELEASE ORAL 2 TIMES DAILY
COMMUNITY

## 2024-02-07 NOTE — PROGRESS NOTES
Medication Management Clinic  [facility]  Anticoagulation Clinic  [phone] (phone)  [phone] (fax)    Mr. Portillo Daly is a 83 y.o.  male with history of atrial fib., per referral from MARII Benjamin, who presents today for Warfarin monitoring and adjustment (4.5 week visit after increasing dose by 9.1%).    Patient verifies current dosing regimen and tablet strength.  No extra doses, except for bolus ordered last visit (plus higher dose 1 day in hospital).  Patient denies bleeding.  SOB better.  Has usual left leg swelling/easy bruising.  No blood in urine or stool.  No dietary changes.  Changes in medication/OTC agents/herbals: states Mucinex was just changed from plain to DM. See below for other changes.  No change in alcohol use or tobacco use.  No change in activity level.  Patient denies falls.  No vomiting/diarrhea or acute illness.   No procedures scheduled in the future at this time.  Sees nephrologist tomorrow.  Was admitted to [facility] 1/20-1/23 with pneumonia/influenza A/acute on chronic hypoxic respiratory failure.  Was given Rocephin, Solumedrol, Z-david in ER.  Started Tamiflu.  Stopped Toprol, Losartan, Bumex, K+ in hospital (MILLY).  All have been resumed since, except Losartan. Received usual dose of Coumadin in hospital, except for 1.5 mg 1/22.  All INRs were in 2-3 range.  Went to ER 1/25 for SOB - resumed Bumex.  INR there 2.48.  Patient checked O2 sat. with his pulse ox. - 92%.    Assessment:   Lab Results   Component Value Date    INR 3.10 (H) 02/07/2024    INR 2.48 (H) 01/25/2024    INR 2.99 (H) 01/23/2024     INR supratherapeutic - goal 2-3.  Recent Labs     02/07/24  1038   INR 3.10*        Plan:  POCT INR performed/result reviewed.  1.25 mg today, W, then continue PO Coumadin 1.25 mg MF, 2.5 mg TWThSS.  Recheck INR in 2 week(s).  (Report given - orders entered by GWENDOLYN Patterson RPh., PharmD.)  Patient reminded to call the Anticoagulation Clinic with any signs or

## 2024-02-08 ENCOUNTER — APPOINTMENT (OUTPATIENT)
Dept: PHARMACY | Age: 84
End: 2024-02-08
Payer: MEDICARE

## 2024-02-08 ENCOUNTER — OFFICE VISIT (OUTPATIENT)
Dept: NEPHROLOGY | Age: 84
End: 2024-02-08
Payer: MEDICARE

## 2024-02-08 VITALS
DIASTOLIC BLOOD PRESSURE: 64 MMHG | WEIGHT: 205 LBS | SYSTOLIC BLOOD PRESSURE: 140 MMHG | HEART RATE: 72 BPM | OXYGEN SATURATION: 95 % | BODY MASS INDEX: 32.11 KG/M2

## 2024-02-08 DIAGNOSIS — T50.2X5A DIURETIC-INDUCED HYPOKALEMIA: ICD-10-CM

## 2024-02-08 DIAGNOSIS — N18.4 CKD (CHRONIC KIDNEY DISEASE), STAGE IV (HCC): Primary | ICD-10-CM

## 2024-02-08 DIAGNOSIS — E87.6 DIURETIC-INDUCED HYPOKALEMIA: ICD-10-CM

## 2024-02-08 PROCEDURE — 99214 OFFICE O/P EST MOD 30 MIN: CPT | Performed by: INTERNAL MEDICINE

## 2024-02-08 PROCEDURE — 1036F TOBACCO NON-USER: CPT | Performed by: INTERNAL MEDICINE

## 2024-02-08 PROCEDURE — 1111F DSCHRG MED/CURRENT MED MERGE: CPT | Performed by: INTERNAL MEDICINE

## 2024-02-08 PROCEDURE — 3077F SYST BP >= 140 MM HG: CPT | Performed by: INTERNAL MEDICINE

## 2024-02-08 PROCEDURE — 1123F ACP DISCUSS/DSCN MKR DOCD: CPT | Performed by: INTERNAL MEDICINE

## 2024-02-08 PROCEDURE — G8428 CUR MEDS NOT DOCUMENT: HCPCS | Performed by: INTERNAL MEDICINE

## 2024-02-08 PROCEDURE — G8417 CALC BMI ABV UP PARAM F/U: HCPCS | Performed by: INTERNAL MEDICINE

## 2024-02-08 PROCEDURE — 3078F DIAST BP <80 MM HG: CPT | Performed by: INTERNAL MEDICINE

## 2024-02-08 PROCEDURE — G8484 FLU IMMUNIZE NO ADMIN: HCPCS | Performed by: INTERNAL MEDICINE

## 2024-02-08 RX ORDER — BUMETANIDE 2 MG/1
2 TABLET ORAL 2 TIMES DAILY
Qty: 180 TABLET | Refills: 1
Start: 2024-02-08 | End: 2024-08-06

## 2024-02-08 RX ORDER — LOSARTAN POTASSIUM 25 MG/1
25 TABLET ORAL DAILY
COMMUNITY

## 2024-02-08 RX ORDER — POTASSIUM CHLORIDE 750 MG/1
10 TABLET, EXTENDED RELEASE ORAL 2 TIMES DAILY
Qty: 180 TABLET | Refills: 1 | Status: SHIPPED | OUTPATIENT
Start: 2024-02-08 | End: 2024-08-06

## 2024-02-08 NOTE — PATIENT INSTRUCTIONS
Go back on bumex 2 mg twice a day.   Increase potassium to 10 meq twice daily.   Go back on losartan 25 mg daily.   Labs in 2 weeks.   Eat more dairy.

## 2024-02-08 NOTE — PROGRESS NOTES
[facility] [place] [facility]  [facility]  [address]  [address]  [facility] [address]  Dept: [phone]  Loc: [phone]  Progress Note  2/8/2024 1:27 PM      Pt Name:    Portillo Daly  YOB: 1940  Primary Care Physician:  Elinor Ly, APRN - CNP     Chief Complaint:   Chief Complaint   Patient presents with    Follow-up     CKD IV         History of Present Illness:   This is a follow-up visit for CKD IV. He is a former patient of Dr. Ponce.  He has hx of CAD, DM, HTN, hyperlipidemia, Afib, pacemaker, EF 45-50%. Hx TAVR, COPD.   Past renal US showed hypoplastic left kidney.    Patient was hospitalized in Jan for influenza.   Had MILLY mild creat was 3.2  Diuretics were completely stopped at discharge.   Went back into ED with cough. Bumex 1 mg daily restarted.   Today he is swollen in both legs left > right.  Is on chronic O2 now. + LESTER.       Pertinent items are noted in HPI.         Past History:  Past Medical History:   Diagnosis Date    MILLY (acute kidney injury) (HCC)     Atrial fibrillation (HCC)     Cerebral artery occlusion with cerebral infarction (HCC)     CHF (congestive heart failure), NYHA class III, acute on chronic, combined (HCC)     COPD (chronic obstructive pulmonary disease) ([facility]) 8/3/2020    Coronary artery disease involving native coronary artery of native heart with angina pectoris (HCC)     Diabetes mellitus, type 2 ([facility]) 12/30/2020    Hyperlipidemia 2/20/2012    Hypertension     Pneumonia      Past Surgical History:   Procedure Laterality Date    AORTIC VALVE REPLACEMENT      CARDIAC SURGERY      heart cath    CORONARY ANGIOPLASTY WITH STENT PLACEMENT      HERNIA REPAIR      OTHER SURGICAL HISTORY  05/10/2018    PACEMAKER INSERTION          VITALS:  BP (!) 140/64 (Site: Right Upper Arm, Position: Sitting, Cuff Size: Large Adult)   Pulse 72   Wt 93 kg (205 lb)   SpO2 95%   BMI 32.11 kg/m²   Wt Readings from Last 3

## 2024-02-09 ENCOUNTER — CARE COORDINATION (OUTPATIENT)
Dept: CASE MANAGEMENT | Age: 84
End: 2024-02-09

## 2024-02-09 ENCOUNTER — TELEPHONE (OUTPATIENT)
Dept: FAMILY MEDICINE CLINIC | Age: 84
End: 2024-02-09

## 2024-02-09 NOTE — CARE COORDINATION
Care Transitions Follow Up Call    Patient Current Location:  [place]    Care Transition Nurse contacted the patient by telephone to follow up after admission on 24.  Verified name and  with patient as identifiers.    Patient: Portillo Daly  Patient : 1940   MRN: [id number]  Reason for Admission: Cough, Extremity weakness, PNA d/t influenza A virus, Acute on chronic hypoxic respiratory failure; ED visit on 24 for Influenza A  Discharge Date: 24 RARS: Readmission Risk Score: 18      Needs to be reviewed by the provider   Additional needs identified to be addressed with provider: No  none             Method of communication with provider: none.    Contacted pt for care transition follow up.  Portillo states he is doing pretty good.  States he had an appt with [facility] yesterday and was placed back on his medications-Bumex 2 mg BID and Potassium.  Also reports that PCP switched his Mucinex to Mucinex DM which has been working better.  Productive cough still present but has been improving with the new Mucinex.  Pt denies having any c/o chest pain/discomfort, increased shortness of breath although may get short of breath with exertion.  SpO2 maintaining greater than 90%.   Continues to increase oxygen to 4 L when he is moving about but will turn in back down to 3 L once he is sitting.  Reports swelling has decreased.  Bruising and swelling to LLE has been improving as well.  He continues to monitor his weight.  States scale has been working fine.  He informed CTN that he spoke to someone yesterday regarding the RPM kit.  States he is too busy and it is too much for him therefore he changed his mind and would like it picked up.  Per pt, he was told that UPS will call him when they will pick it up.  CTN will let RPM know.  Advised pt continue to self monitor and we discussed when to contact providers.  He verbalized understanding.  No questions or concerns at this time.      Addressed

## 2024-02-09 NOTE — TELEPHONE ENCOUNTER
----- Message from Winifred Holt sent at 2/9/2024 10:06 AM EST -----  Subject: Message to Provider    QUESTIONS  Information for Provider? pt received a call from one of the nurses in the   office and would like a call back to discuss his appt with his kidney dr   and his medication. Please contact to discuss.   ---------------------------------------------------------------------------  --------------  CALL BACK INFO  [phone]; Do not leave any message, patient will call back for answer  ---------------------------------------------------------------------------  --------------  SCRIPT ANSWERS  undefined

## 2024-02-09 NOTE — TELEPHONE ENCOUNTER
Patient wanted provider to know that he saw [facility] Ya 02.08.2024 and update on the medications that were added bumex 2 mg and potassium 10 meq  Patient also states that the mucinex is working

## 2024-02-16 ENCOUNTER — CARE COORDINATION (OUTPATIENT)
Dept: CASE MANAGEMENT | Age: 84
End: 2024-02-16

## 2024-02-16 NOTE — CARE COORDINATION
Care Transitions Follow Up Call    Patient Current Location:  [place]    Care Transition Nurse contacted the patient by telephone to follow up after admission on 24.  Verified name and  with patient as identifiers.    Patient: Portillo Daly  Patient : 1940   MRN: [id number]  Reason for Admission:  Cough, Extremity weakness, PNA d/t influenza A virus, Acute on chronic hypoxic respiratory failure; ED visit on 24 for Influenza A  Discharge Date: 24 RARS: Readmission Risk Score: 18      Needs to be reviewed by the provider   Additional needs identified to be addressed with provider: No  none             Method of communication with provider: none.    Contacted pt for care transition follow up.  Pt currently in the car on the way home.  He is not driving.  Portillo states he is doing okay so far.  Reports cough is \"a lot better \" since being placed on Mucinex DM.  Breathing is at baseline.  Continues to use 3 L of oxygen at rest and 4 L with activity.  He is currently on 2 L while in the car.  SpO2 92% currently.  Does not feel short of breath.  No c/o chest pain/discomfort, pressure, tightness, fever, chills.  Reports swelling is much improved.  Bruising to LLE also doing much better.  He informed CTN that the RPM kit was picked up.  Recommended he continue to self monitor and when to contact provider with any new or worsening symptoms.  No questions or concerns at this time.      Addressed changes since last contact:  none  Discussed follow-up appointments. If no appointment was previously scheduled, appointment scheduling offered: Yes.   Is follow up appointment scheduled within 7 days of discharge? No.    Follow Up  Future Appointments   Date Time Provider Department Center   2024 10:40 AM Misty Casanova, [facility] [place]   3/5/2024 10:40 AM Elinor yL, APRN - CNP [facility] [place]   3/14/2024 11:15 AM SCHEDULE, SEJAL PACER NURSE [facility] POST PACER [facility] [place]   4/3/2024  Patient Education     Eating Heart-Healthy Foods  Eating has a big impact on your heart health. In fact, eating healthier can improve several of your heart risks at once. For instance, it helps you manage weight, cholesterol, and blood pressure. Here are ideas to help you make heart-healthy changes without giving up all the foods and flavors you love.  Getting started  Talk with your healthcare provider about eating plans, such as the DASH or Mediterranean diet. You may also be referred to a dietitian.  Change a few things at a time. Give yourself time to get used to a few eating changes before adding more.  Work to create a tasty, healthy eating plan that you can stick to for the rest of your life.    Goals for healthy eating  Below are some tips to improve your eating habits:  Limit saturated fats and trans fats. Saturated fats raise your levels of cholesterol, so keep these fats to a minimum. They are found in foods such as fatty meats, whole milk, cheese, and palm and coconut oils. Avoid trans fats because they lower good cholesterol as well as raise bad cholesterol. Trans fats are most often found in processed foods.  Reduce sodium (salt) intake. Eating too much salt may increase your blood pressure. Limit your sodium intake to 2,300 milligrams (mg) per day (the amount in 1 teaspoon of salt), or less if your healthcare provider recommends it. Dining out less often and eating fewer processed foods are two great ways to decrease the amount of salt you consume.  Managing calories. A calorie is a unit of energy. Your body burns calories for fuel, but if you eat more calories than your body burns, the extras are stored as fat. Your healthcare provider can help you create a diet plan to manage your calories. This will likely include eating healthier foods as well as exercising regularly. To help you track your progress, keep a diary to record what you eat and how often you exercise.  Choose the right foods  Aim to  make these foods staples of your diet. If you have diabetes, you may have different recommendations than what is listed here:  Fruits and vegetables provide plenty of nutrients without a lot of calories. At meals, fill half your plate with these foods. Split the other half of your plate between whole grains and lean protein.  Whole grains are high in fiber and rich in vitamins and nutrients. Good choices include whole-wheat bread, pasta, and brown rice.  Lean proteins give you nutrition with less fat. Good choices include fish, skinless chicken, and beans.  Low-fat or nonfat dairy provides nutrients without a lot of fat. Try low-fat or nonfat milk, cheese, or yogurt.  Healthy fats can be good for you in small amounts. These are unsaturated fats, such as olive oil, nuts, and fish. Try to have at least 2 servings per week of fatty fish, such as salmon, sardines, mackerel, rainbow trout, and albacore tuna. These contain omega-3 fatty acids, which are good for your heart. Flaxseed is another source of a heart-healthy fat.  More on heart-healthy eating  Read food labels  Healthy eating starts at the grocery store. Be sure to pay attention to food labels on packaged foods. Look for products that are high in fiber and protein, and low in saturated fat, cholesterol, and sodium. Avoid products that contain trans fat. And pay close attention to serving size. For instance, if you plan to eat two servings, double all the numbers on the label.  Prepare food right  A key part of healthy cooking is cutting down on added fat and salt. Look on the internet for lower-fat, lower-sodium recipes. Also, try these tips:  Remove fat from meat and skin from poultry before cooking.  Skim fat from the surface of soups and sauces.  Broil, boil, bake, steam, grill, and microwave food without added fats.  Choose ingredients that spice up your food without adding calories, fat, or sodium. Try these items: horseradish, hot sauce, lemon, mustard,  nonfat salad dressings, and vinegar. For salt-free herbs and spices, try basil, cilantro, cinnamon, pepper, and rosemary.  Date Last Reviewed: 10/1/2017  © 4851-0708 [employer]. [address] [place] [address]. All rights reserved. This information is not intended as a substitute for professional medical care. Always follow your healthcare professional's instructions.

## 2024-02-21 ENCOUNTER — CARE COORDINATION (OUTPATIENT)
Dept: CASE MANAGEMENT | Age: 84
End: 2024-02-21

## 2024-02-21 ENCOUNTER — TELEPHONE (OUTPATIENT)
Dept: NEPHROLOGY | Age: 84
End: 2024-02-21

## 2024-02-21 ENCOUNTER — NURSE ONLY (OUTPATIENT)
Dept: LAB | Age: 84
End: 2024-02-21

## 2024-02-21 DIAGNOSIS — N18.4 CKD (CHRONIC KIDNEY DISEASE), STAGE IV (HCC): ICD-10-CM

## 2024-02-21 LAB
ANION GAP SERPL CALC-SCNC: 11 MEQ/L (ref 8–16)
BUN SERPL-MCNC: 37 MG/DL (ref 7–22)
CALCIUM SERPL-MCNC: 9.4 MG/DL (ref 8.5–10.5)
CHLORIDE SERPL-SCNC: 95 MEQ/L (ref 98–111)
CO2 SERPL-SCNC: 31 MEQ/L (ref 23–33)
CREAT SERPL-MCNC: 2.6 MG/DL (ref 0.4–1.2)
GFR SERPL CREATININE-BSD FRML MDRD: 23 ML/MIN/1.73M2
GLUCOSE SERPL-MCNC: 127 MG/DL (ref 70–108)
POTASSIUM SERPL-SCNC: 4.3 MEQ/L (ref 3.5–5.2)
SODIUM SERPL-SCNC: 137 MEQ/L (ref 135–145)

## 2024-02-21 NOTE — CARE COORDINATION
[facility]     External follow up appointment(s):     Care Transition Nurse reviewed discharge instructions, medical action plan, and red flags with patient and discussed any barriers to care and/or understanding of plan of care after discharge. Discussed appropriate site of care based on symptoms and resources available to patient including: PCP  Specialist  Urgent care clinics  When to call 911. The patient agrees to contact the PCP office for questions related to their healthcare.     Advance Care Planning:   reviewed and current.     Patients top risk factors for readmission: Acute on chronic hypoxic respiratory failure, PNA influenza A pacer, CKD3, afib, CHF, DM, COPD, CAD, HTN   Interventions to address risk factors: Scheduled appointment with PCP-3/5/24    Offered patient enrollment in the Remote Patient Monitoring (RPM) program for in-home monitoring: Patient declined.     Care Transitions Subsequent and Final Call    Schedule Follow Up Appointment with PCP: Completed  Subsequent and Final Calls  Do you have any ongoing symptoms?: No  Have your medications changed?: No  Do you have any questions related to your medications?: No  Do you currently have any active services?: No  Do you have any needs or concerns that I can assist you with?: No  Identified Barriers: Lack of Education  Care Transitions Interventions     Transportation Support: Declined     Registered Dietician: Declined DME Assistance: Declined   Disease Specific Clinic: Declined      Disease Association: Completed      Other Interventions:             Care Transition Nurse provided contact information for future needs. No further follow-up call indicated based on severity of symptoms and risk factors.  Plan for next call:  none    Angélica Hernandez RN

## 2024-02-21 NOTE — TELEPHONE ENCOUNTER
----- Message from Chastity Ya DO sent at 2/21/2024  2:13 PM EST -----  Kidney function stable within baseline

## 2024-02-22 ENCOUNTER — APPOINTMENT (OUTPATIENT)
Dept: PHARMACY | Age: 84
End: 2024-02-22
Payer: MEDICARE

## 2024-02-22 DIAGNOSIS — Z79.01 ANTICOAGULATED ON COUMADIN: ICD-10-CM

## 2024-02-22 DIAGNOSIS — I48.21 PERMANENT ATRIAL FIBRILLATION (HCC): Primary | ICD-10-CM

## 2024-02-22 DIAGNOSIS — Z51.81 ENCOUNTER FOR THERAPEUTIC DRUG MONITORING: ICD-10-CM

## 2024-02-22 LAB — POC INR: 2.5 (ref 0.8–1.2)

## 2024-02-22 PROCEDURE — 85610 PROTHROMBIN TIME: CPT

## 2024-02-22 PROCEDURE — 36416 COLLJ CAPILLARY BLOOD SPEC: CPT

## 2024-02-22 PROCEDURE — 99211 OFF/OP EST MAY X REQ PHY/QHP: CPT

## 2024-02-22 NOTE — PROGRESS NOTES
Medication Management Clinic  [facility]  Anticoagulation Clinic  [phone] (phone)  [phone] (fax)    Mr. Portillo Daly is a 83 y.o.  male with history of atrial fib., per referral from MARII Benjamin, who presents today for Warfarin monitoring and adjustment (2 week visit).    Patient verifies current dosing regimen and tablet strength.  No missed or extra doses.  Patient denies bleeding/swelling/SOB.  Has usual easy bruising.  No blood in urine or stool.  No dietary changes.  Changes in medication/OTC agents/herbals: saw nephrologist 2/8 - resumed Losartan and increased Bumex/K+.  No change in alcohol use or tobacco use.  No change in activity level.  Patient denies falls.  No vomiting/diarrhea or acute illness.   No procedures scheduled in the future at this time.    Assessment:   Lab Results   Component Value Date    INR 2.50 (H) 02/22/2024    INR 3.10 (H) 02/07/2024    INR 2.48 (H) 01/25/2024     INR therapeutic - goal 2-3.  Recent Labs     02/22/24  1036   INR 2.50*        Plan:  POCT INR performed/result reviewed.  Continue PO Coumadin 1.25 mg MF, 2.5 mg TWThSS.  Recheck INR in 3 week(s).  Patient reminded to call the Anticoagulation Clinic with any signs or symptoms of bleeding or with any medication changes.  Patient given instructions utilizing the teach back method.       After visit summary printed and reviewed with patient.      Discharged ambulatory in no apparent distress, with oxygen.     For Pharmacy Admin Tracking Only    Time Spent (min): 20

## 2024-02-25 PROBLEM — R79.89 ELEVATED TROPONIN: Status: RESOLVED | Noted: 2024-01-26 | Resolved: 2024-02-25

## 2024-03-05 ENCOUNTER — OFFICE VISIT (OUTPATIENT)
Dept: FAMILY MEDICINE CLINIC | Age: 84
End: 2024-03-05
Payer: MEDICARE

## 2024-03-05 VITALS
RESPIRATION RATE: 14 BRPM | SYSTOLIC BLOOD PRESSURE: 126 MMHG | OXYGEN SATURATION: 95 % | BODY MASS INDEX: 32.21 KG/M2 | HEART RATE: 73 BPM | WEIGHT: 205.2 LBS | HEIGHT: 67 IN | DIASTOLIC BLOOD PRESSURE: 86 MMHG | TEMPERATURE: 97.8 F

## 2024-03-05 DIAGNOSIS — Z79.4 TYPE 2 DIABETES MELLITUS WITH INSULIN THERAPY (HCC): Primary | ICD-10-CM

## 2024-03-05 DIAGNOSIS — E11.9 TYPE 2 DIABETES MELLITUS WITH INSULIN THERAPY (HCC): Primary | ICD-10-CM

## 2024-03-05 DIAGNOSIS — J44.9 MODERATE COPD (CHRONIC OBSTRUCTIVE PULMONARY DISEASE) (HCC): ICD-10-CM

## 2024-03-05 DIAGNOSIS — E11.22 TYPE 2 DIABETES MELLITUS WITH CHRONIC KIDNEY DISEASE, WITH LONG-TERM CURRENT USE OF INSULIN, UNSPECIFIED CKD STAGE (HCC): ICD-10-CM

## 2024-03-05 DIAGNOSIS — Z79.4 TYPE 2 DIABETES MELLITUS WITH CHRONIC KIDNEY DISEASE, WITH LONG-TERM CURRENT USE OF INSULIN, UNSPECIFIED CKD STAGE (HCC): ICD-10-CM

## 2024-03-05 LAB — HBA1C MFR BLD: 7.9 % (ref 4.3–5.7)

## 2024-03-05 PROCEDURE — 3079F DIAST BP 80-89 MM HG: CPT | Performed by: NURSE PRACTITIONER

## 2024-03-05 PROCEDURE — 99214 OFFICE O/P EST MOD 30 MIN: CPT | Performed by: NURSE PRACTITIONER

## 2024-03-05 PROCEDURE — 3023F SPIROM DOC REV: CPT | Performed by: NURSE PRACTITIONER

## 2024-03-05 PROCEDURE — G8484 FLU IMMUNIZE NO ADMIN: HCPCS | Performed by: NURSE PRACTITIONER

## 2024-03-05 PROCEDURE — G8427 DOCREV CUR MEDS BY ELIG CLIN: HCPCS | Performed by: NURSE PRACTITIONER

## 2024-03-05 PROCEDURE — 3074F SYST BP LT 130 MM HG: CPT | Performed by: NURSE PRACTITIONER

## 2024-03-05 PROCEDURE — G8417 CALC BMI ABV UP PARAM F/U: HCPCS | Performed by: NURSE PRACTITIONER

## 2024-03-05 PROCEDURE — 1036F TOBACCO NON-USER: CPT | Performed by: NURSE PRACTITIONER

## 2024-03-05 PROCEDURE — 1123F ACP DISCUSS/DSCN MKR DOCD: CPT | Performed by: NURSE PRACTITIONER

## 2024-03-05 PROCEDURE — 3051F HG A1C>EQUAL 7.0%<8.0%: CPT | Performed by: NURSE PRACTITIONER

## 2024-03-05 NOTE — PROGRESS NOTES
Portillo Daly is a 83 y.o. male for Diabetes (F/u)      Diabetes Type 2    Glucose control:   Does patient check blood glucoses at home?  Yes  Report of hypoglycemia: no  Lab Results   Component Value Date    LABA1C 7.9 (H) 03/05/2024     Lab Results   Component Value Date     (H) 11/28/2022       Symptoms  Polyuria, Polydipsia or Polyphagia?   No  Chest Pain, SOB, or Palpitations? -  No  New Vision complaints? No  Paresthesias of the extremities?  No    Medications  Current medication were reviewed.  Compliant with medications?  yes  Medication side effects?  No  On ACE-I or ARB?  Yes  On antiplatelet therapy?  Yes  On Statin?  Yes    Last Diabetic Eye Exam: utd    Exercise  Exercise? No  Wt Readings from Last 3 Encounters:   03/05/24 93.1 kg (205 lb 3.2 oz)   02/08/24 93 kg (205 lb)   02/01/24 95 kg (209 lb 6.4 oz)       Diet discipline?:  Low salt, fat, sugar diet?  yes    Blood pressure control:  BP Readings from Last 3 Encounters:   03/05/24 126/86   02/08/24 (!) 140/64   02/01/24 124/78       No results found for: \"[id number]\"    Lab Results   Component Value Date    LDLCALC 53 01/02/2024       Physical Exam    /86   Pulse 73   Temp 97.8 °F (36.6 °C) (Oral)   Resp 14   Ht 1.702 m (5' 7\")   Wt 93.1 kg (205 lb 3.2 oz)   SpO2 95%   BMI 32.14 kg/m²   Appearance: alert, well appearing, and in no distress, normal appearing weight and well hydrated.  Neck exam - supple, no significant adenopathy.  CVS exam: normal rate, regular rhythm, normal S1, S2, no murmurs, rubs, clicks or gallops.  Lungs: clear to auscultation, no wheezes, rales or rhonchi, symmetric air entry.  Abdomen:  BS normal, soft, non tender, non distended, no rebound or guarding  Mental Status: normal mood, affect behavior, speech, dress,  and thought processes.  Neuro:  Alert, muscle tone grossly normal  Skin exam: normal coloration and turgor, no rashes, no suspicious skin lesions noted.  Foot exam:  No pedal edema, no

## 2024-03-14 ENCOUNTER — HOSPITAL ENCOUNTER (OUTPATIENT)
Dept: PHARMACY | Age: 84
Setting detail: THERAPIES SERIES
Discharge: HOME | End: 2024-03-14
Payer: MEDICARE

## 2024-03-14 ENCOUNTER — NURSE ONLY (OUTPATIENT)
Dept: CARDIOLOGY CLINIC | Age: 84
End: 2024-03-14

## 2024-03-14 DIAGNOSIS — Z79.01 ANTICOAGULATED ON COUMADIN: ICD-10-CM

## 2024-03-14 DIAGNOSIS — Z95.0 PACEMAKER: Primary | ICD-10-CM

## 2024-03-14 DIAGNOSIS — I48.21 PERMANENT ATRIAL FIBRILLATION (HCC): Primary | ICD-10-CM

## 2024-03-14 DIAGNOSIS — Z51.81 ENCOUNTER FOR THERAPEUTIC DRUG MONITORING: ICD-10-CM

## 2024-03-14 LAB — POC INR: 2.4 (ref 0.8–1.2)

## 2024-03-14 PROCEDURE — 36416 COLLJ CAPILLARY BLOOD SPEC: CPT

## 2024-03-14 PROCEDURE — 85610 PROTHROMBIN TIME: CPT

## 2024-03-14 PROCEDURE — 99212 OFFICE O/P EST SF 10 MIN: CPT

## 2024-03-14 RX ORDER — WARFARIN SODIUM 2.5 MG/1
TABLET ORAL
Qty: 90 TABLET | Refills: 3 | OUTPATIENT
Start: 2024-03-14

## 2024-03-14 RX ORDER — WARFARIN SODIUM 2.5 MG/1
TABLET ORAL
Qty: 90 TABLET | Refills: 3 | Status: SHIPPED | OUTPATIENT
Start: 2024-03-14

## 2024-03-14 NOTE — PROGRESS NOTES
In Office [place] Dual Pacemaker -- no Merlin at home  Patient of Slava    Battery 10.9 months   Battery watch - will come in 3 months.     Presenting rhythm AF   Underlying dependent at 30    RV impedance 560    RV sense -    RV paced 99%    V Thresholds 1.125@0.50    Episodes   none

## 2024-03-14 NOTE — PROGRESS NOTES
Medication Management Clinic  [facility]  Anticoagulation Clinic  [phone] (phone)  [phone] (fax)    Mr. Portillo Daly is a 83 y.o.  male with history of atrial fib., per referral from MARII Benjamin, who presents today for Warfarin monitoring and adjustment (3 week visit).    Patient verifies current dosing regimen and tablet strength.  No missed or extra doses.  Patient denies bleeding/swelling/SOB.  Has usual easy bruising.  No blood in urine or stool.  No dietary changes.  No changes in medication/OTC agents/herbals.  No change in alcohol use or tobacco use.  No change in activity level.  Patient denies falls.  No vomiting/diarrhea or acute illness.   No procedures scheduled in the future at this time.    Assessment:   Lab Results   Component Value Date    INR 2.40 (H) 03/14/2024    INR 2.50 (H) 02/22/2024    INR 3.10 (H) 02/07/2024     INR therapeutic - goal 2-3.  Recent Labs     03/14/24  1021   INR 2.40*        Plan:  POCT INR performed/result reviewed.  Continue PO Coumadin 1.25 mg MF, 2.5 mg TWThSS.  Recheck INR in 4 week(s).  Patient reminded to call the Anticoagulation Clinic with any signs or symptoms of bleeding or with any medication changes.  Patient given instructions utilizing the teach back method.       After visit summary printed and reviewed with patient.      Discharged via transport chair in no apparent distress, wearing oxygen/holding tank, with female significant other.  Has pacemaker check next.  Prescription renewed electronically by clinic pharmacist (with refills for a year, per patient's request.)    For Pharmacy Admin Tracking Only    Intervention Detail: Refill(s) Provided  Total # of Interventions Recommended: 1  Total # of Interventions Accepted: 1  Time Spent (min):  21

## 2024-03-28 ENCOUNTER — CARE COORDINATION (OUTPATIENT)
Dept: CARE COORDINATION | Age: 84
End: 2024-03-28

## 2024-03-28 NOTE — CARE COORDINATION
I spoke with "[employer] and they are placing an order for his Januvia. I called to let the patient know it will be on its way soon.          Gabby Yee [facility]  CC   Medication Assistance  [employer] [place] [employer]    (P) [phone]  (F) [phone]

## 2024-04-03 ENCOUNTER — OFFICE VISIT (OUTPATIENT)
Dept: CARDIOLOGY CLINIC | Age: 84
End: 2024-04-03
Payer: MEDICARE

## 2024-04-03 VITALS
WEIGHT: 207.4 LBS | BODY MASS INDEX: 32.55 KG/M2 | DIASTOLIC BLOOD PRESSURE: 70 MMHG | HEART RATE: 84 BPM | SYSTOLIC BLOOD PRESSURE: 136 MMHG | HEIGHT: 67 IN

## 2024-04-03 DIAGNOSIS — Z95.0 PACEMAKER: ICD-10-CM

## 2024-04-03 DIAGNOSIS — I48.21 PERMANENT ATRIAL FIBRILLATION (HCC): Primary | ICD-10-CM

## 2024-04-03 PROCEDURE — 1123F ACP DISCUSS/DSCN MKR DOCD: CPT | Performed by: INTERNAL MEDICINE

## 2024-04-03 PROCEDURE — 1036F TOBACCO NON-USER: CPT | Performed by: INTERNAL MEDICINE

## 2024-04-03 PROCEDURE — G8417 CALC BMI ABV UP PARAM F/U: HCPCS | Performed by: INTERNAL MEDICINE

## 2024-04-03 PROCEDURE — G8427 DOCREV CUR MEDS BY ELIG CLIN: HCPCS | Performed by: INTERNAL MEDICINE

## 2024-04-03 PROCEDURE — 99213 OFFICE O/P EST LOW 20 MIN: CPT | Performed by: INTERNAL MEDICINE

## 2024-04-03 PROCEDURE — 3078F DIAST BP <80 MM HG: CPT | Performed by: INTERNAL MEDICINE

## 2024-04-03 PROCEDURE — 3075F SYST BP GE 130 - 139MM HG: CPT | Performed by: INTERNAL MEDICINE

## 2024-04-03 NOTE — PROGRESS NOTES
[facility] [place] [facility]  [facility] CARDIOLOGY  [address]  [address]  [facility] [address]  Dept: [phone]  Dept Fax: [phone]  Loc: [phone]    Visit Date: 4/3/2024    Mr. Daly is a 83 y.o. male  who presented for:  Chief Complaint   Patient presents with    3 Month Follow-Up    Congestive Heart Failure       HPI:   HPI   84 yo M s/p TAVR 5/11/18 presents for follow-up. He has Afib and is on Coumadin. SOB chronic.  No symptoms of chest pain or discomfort.  No bleeding.  No issues with Coumadin.  Taking oxygen therapy, unchanged.  No chest pain, angina, LESTER, orthopnea, PND, sob at rest, palpitations, LE edema, or syncope.        Current Outpatient Medications:     warfarin (COUMADIN) 2.5 MG tablet, Take as directed by [facility] 90 tabs = 90 days, Disp: 90 tablet, Rfl: 3    losartan (COZAAR) 25 MG tablet, Take 1 tablet by mouth daily, Disp: , Rfl:     bumetanide (BUMEX) 2 MG tablet, Take 1 tablet by mouth 2 times daily, Disp: 180 tablet, Rfl: 1    potassium chloride (KLOR-CON M) 10 MEQ extended release tablet, Take 1 tablet by mouth 2 times daily, Disp: 180 tablet, Rfl: 1    Dextromethorphan-guaiFENesin (MUCINEX DM MAXIMUM STRENGTH)  MG TB12, Take by mouth in the morning and at bedtime, Disp: , Rfl:     LANTUS SOLOSTAR 100 UNIT/ML injection pen, INJECT 16 UNITS UNDER THE SKIN EVERY MORNING, Disp: 15 mL, Rfl: 3    metoprolol succinate (TOPROL XL) 25 MG extended release tablet, Take 1 tablet by mouth daily, Disp: 30 tablet, Rfl: 3    calcitRIOL (ROCALTROL) 0.25 MCG capsule, Take 1 capsule by mouth daily, Disp: 90 capsule, Rfl: 3    Insulin Pen Needle 30G X 8 MM MISC, 1 each by Does not apply route daily, Disp: 100 each, Rfl: 3    atorvastatin (LIPITOR) 80 MG tablet, TAKE 1 TABLET EVERY DAY, Disp: 90 tablet, Rfl: 3    TRUE METRIX BLOOD GLUCOSE TEST strip, TEST BLOOD SUGAR EVERY DAY, Disp: 100 strip, Rfl: 2    SITagliptin (JANUVIA) 25 MG tablet, Take

## 2024-04-03 NOTE — PATIENT INSTRUCTIONS
Your nurses today were ARCHIE Peck and AUSTIN Bo  Your provider today was Dr. Pedersen  Phone number: [phone]      You may receive a survey regarding the care you received during your visit.  Your input is valuable to us.  We encourage you to complete and return your survey.  We hope you will choose us in the future for your healthcare needs.

## 2024-04-10 ENCOUNTER — HOSPITAL ENCOUNTER (OUTPATIENT)
Dept: PHARMACY | Age: 84
Setting detail: THERAPIES SERIES
Discharge: HOME | End: 2024-04-10
Payer: MEDICARE

## 2024-04-10 DIAGNOSIS — I48.21 PERMANENT ATRIAL FIBRILLATION (HCC): Primary | ICD-10-CM

## 2024-04-10 DIAGNOSIS — Z51.81 ENCOUNTER FOR THERAPEUTIC DRUG MONITORING: ICD-10-CM

## 2024-04-10 DIAGNOSIS — Z79.01 ANTICOAGULATED ON COUMADIN: ICD-10-CM

## 2024-04-10 LAB — POC INR: 2.8 (ref 0.8–1.2)

## 2024-04-10 PROCEDURE — 36416 COLLJ CAPILLARY BLOOD SPEC: CPT

## 2024-04-10 PROCEDURE — 85610 PROTHROMBIN TIME: CPT

## 2024-04-10 PROCEDURE — 99211 OFF/OP EST MAY X REQ PHY/QHP: CPT

## 2024-04-10 NOTE — PROGRESS NOTES
Medication Management Clinic  [facility]  Anticoagulation Clinic  [phone] (phone)  [phone] (fax)    Mr. Portillo Daly is a 83 y.o.  male with history of atrial fib., per referral from MARII Benjamin, who presents today for Warfarin monitoring and adjustment (4 week visit).    Patient verifies current dosing regimen and tablet strength.  No missed or extra doses.  Patient denies bleeding.  Has usual SOB/easy bruising.  Has usual left leg swelling if doesn't prop it up enough.  No blood in urine or stool.  No dietary changes.  No changes in medication/OTC agents/herbals.  No change in alcohol use or tobacco use.  No change in activity level.  Patient denies falls.  No vomiting/diarrhea or acute illness.   No procedures scheduled in the future at this time.    Assessment:     Lab Results   Component Value Date    INR 2.80 (H) 04/10/2024    INR 2.40 (H) 03/14/2024    INR 2.50 (H) 02/22/2024     INR therapeutic - goal 2-3.  Recent Labs     04/10/24  1041   INR 2.80*      Plan:  POCT INR performed/result reviewed.  Continue PO Coumadin 1.25 mg MF, 2.5 mg TWThSS.  Recheck INR in 4 week(s).  Patient reminded to call the Anticoagulation Clinic with any signs or symptoms of bleeding or with any medication changes.  Patient given instructions utilizing the teach back method.       After visit summary printed and reviewed with patient.      Discharged ambulatory in no apparent distress, with oxygen tank and female significant other.    For Pharmacy Admin Tracking Only    Time Spent (min): 20

## 2024-04-18 ENCOUNTER — OFFICE VISIT (OUTPATIENT)
Dept: FAMILY MEDICINE CLINIC | Age: 84
End: 2024-04-18
Payer: MEDICARE

## 2024-04-18 VITALS
WEIGHT: 209.8 LBS | RESPIRATION RATE: 14 BRPM | HEART RATE: 60 BPM | HEIGHT: 67 IN | BODY MASS INDEX: 32.93 KG/M2 | SYSTOLIC BLOOD PRESSURE: 122 MMHG | TEMPERATURE: 98 F | DIASTOLIC BLOOD PRESSURE: 82 MMHG | OXYGEN SATURATION: 94 %

## 2024-04-18 DIAGNOSIS — N18.4 STAGE 4 CHRONIC KIDNEY DISEASE (HCC): ICD-10-CM

## 2024-04-18 DIAGNOSIS — R06.02 SOB (SHORTNESS OF BREATH): ICD-10-CM

## 2024-04-18 DIAGNOSIS — R05.3 CHRONIC COUGH: ICD-10-CM

## 2024-04-18 DIAGNOSIS — M79.89 LEG SWELLING: ICD-10-CM

## 2024-04-18 DIAGNOSIS — J44.9 MODERATE COPD (CHRONIC OBSTRUCTIVE PULMONARY DISEASE) (HCC): Primary | ICD-10-CM

## 2024-04-18 DIAGNOSIS — E11.65 TYPE 2 DIABETES MELLITUS WITH HYPERGLYCEMIA, WITHOUT LONG-TERM CURRENT USE OF INSULIN (HCC): ICD-10-CM

## 2024-04-18 PROCEDURE — 3023F SPIROM DOC REV: CPT | Performed by: NURSE PRACTITIONER

## 2024-04-18 PROCEDURE — G8427 DOCREV CUR MEDS BY ELIG CLIN: HCPCS | Performed by: NURSE PRACTITIONER

## 2024-04-18 PROCEDURE — 3074F SYST BP LT 130 MM HG: CPT | Performed by: NURSE PRACTITIONER

## 2024-04-18 PROCEDURE — G8417 CALC BMI ABV UP PARAM F/U: HCPCS | Performed by: NURSE PRACTITIONER

## 2024-04-18 PROCEDURE — 1123F ACP DISCUSS/DSCN MKR DOCD: CPT | Performed by: NURSE PRACTITIONER

## 2024-04-18 PROCEDURE — 1036F TOBACCO NON-USER: CPT | Performed by: NURSE PRACTITIONER

## 2024-04-18 PROCEDURE — 3051F HG A1C>EQUAL 7.0%<8.0%: CPT | Performed by: NURSE PRACTITIONER

## 2024-04-18 PROCEDURE — 99214 OFFICE O/P EST MOD 30 MIN: CPT | Performed by: NURSE PRACTITIONER

## 2024-04-18 PROCEDURE — 3079F DIAST BP 80-89 MM HG: CPT | Performed by: NURSE PRACTITIONER

## 2024-04-18 NOTE — PROGRESS NOTES
long-term current use of insulin (HCC)  -     Insulin Pen Needle 30G X 8 MM MISC; 1 each by Does not apply route daily  -     CBC with Auto Differential; Future  -     Comprehensive Metabolic Panel; Future  -     Iron; Future  -     Ferritin; Future  -     Iron Binding Capacity; Future  -     Brain Natriuretic Peptide; Future  -     XR CHEST (2 VW); Future  -     D-Dimer, Quantitative; Future  -     TSH; Future  -     T4, Free; Future    Stage 4 chronic kidney disease (HCC)  -     Insulin Pen Needle 30G X 8 MM MISC; 1 each by Does not apply route daily  -     CBC with Auto Differential; Future  -     Comprehensive Metabolic Panel; Future  -     Iron; Future  -     Ferritin; Future  -     Iron Binding Capacity; Future  -     Brain Natriuretic Peptide; Future  -     XR CHEST (2 VW); Future  -     D-Dimer, Quantitative; Future  -     TSH; Future  -     T4, Free; Future    SOB (shortness of breath)  -     CBC with Auto Differential; Future  -     Comprehensive Metabolic Panel; Future  -     Iron; Future  -     Ferritin; Future  -     Iron Binding Capacity; Future  -     Brain Natriuretic Peptide; Future  -     XR CHEST (2 VW); Future  -     D-Dimer, Quantitative; Future  -     TSH; Future  -     T4, Free; Future    Chronic cough  -     CBC with Auto Differential; Future  -     Comprehensive Metabolic Panel; Future  -     Iron; Future  -     Ferritin; Future  -     Iron Binding Capacity; Future  -     Brain Natriuretic Peptide; Future  -     XR CHEST (2 VW); Future  -     D-Dimer, Quantitative; Future  -     TSH; Future  -     T4, Free; Future    Leg swelling  -     CBC with Auto Differential; Future  -     Comprehensive Metabolic Panel; Future  -     Iron; Future  -     Ferritin; Future  -     Iron Binding Capacity; Future  -     Brain Natriuretic Peptide; Future  -     XR CHEST (2 VW); Future  -     D-Dimer, Quantitative; Future  -     TSH; Future  -     T4, Free; Future    Recommend he also update CHF Clinic so they

## 2024-04-19 ENCOUNTER — TELEPHONE (OUTPATIENT)
Dept: FAMILY MEDICINE CLINIC | Age: 84
End: 2024-04-19

## 2024-04-19 ENCOUNTER — HOSPITAL ENCOUNTER (OUTPATIENT)
Age: 84
Discharge: HOME | End: 2024-04-19
Payer: MEDICARE

## 2024-04-19 ENCOUNTER — APPOINTMENT (OUTPATIENT)
Dept: INTERVENTIONAL RADIOLOGY/VASCULAR | Age: 84
End: 2024-04-19
Payer: MEDICARE

## 2024-04-19 ENCOUNTER — HOSPITAL ENCOUNTER (EMERGENCY)
Age: 84
Discharge: HOME | End: 2024-04-19
Attending: FAMILY MEDICINE
Payer: MEDICARE

## 2024-04-19 ENCOUNTER — HOSPITAL ENCOUNTER (OUTPATIENT)
Dept: GENERAL RADIOLOGY | Age: 84
Discharge: HOME | End: 2024-04-19
Payer: MEDICARE

## 2024-04-19 ENCOUNTER — NURSE ONLY (OUTPATIENT)
Dept: LAB | Age: 84
End: 2024-04-19

## 2024-04-19 VITALS
TEMPERATURE: 97.6 F | RESPIRATION RATE: 19 BRPM | HEIGHT: 67 IN | DIASTOLIC BLOOD PRESSURE: 60 MMHG | WEIGHT: 209 LBS | OXYGEN SATURATION: 96 % | HEART RATE: 61 BPM | SYSTOLIC BLOOD PRESSURE: 123 MMHG | BODY MASS INDEX: 32.8 KG/M2

## 2024-04-19 DIAGNOSIS — N18.4 STAGE 4 CHRONIC KIDNEY DISEASE (HCC): ICD-10-CM

## 2024-04-19 DIAGNOSIS — E11.65 TYPE 2 DIABETES MELLITUS WITH HYPERGLYCEMIA, WITHOUT LONG-TERM CURRENT USE OF INSULIN (HCC): ICD-10-CM

## 2024-04-19 DIAGNOSIS — I50.9 CONGESTIVE HEART FAILURE, UNSPECIFIED HF CHRONICITY, UNSPECIFIED HEART FAILURE TYPE (HCC): Primary | ICD-10-CM

## 2024-04-19 DIAGNOSIS — J44.9 MODERATE COPD (CHRONIC OBSTRUCTIVE PULMONARY DISEASE) (HCC): ICD-10-CM

## 2024-04-19 DIAGNOSIS — I87.2 VENOUS STASIS DERMATITIS: ICD-10-CM

## 2024-04-19 DIAGNOSIS — R06.02 SOB (SHORTNESS OF BREATH): ICD-10-CM

## 2024-04-19 DIAGNOSIS — M79.89 LEG SWELLING: ICD-10-CM

## 2024-04-19 DIAGNOSIS — R05.3 CHRONIC COUGH: ICD-10-CM

## 2024-04-19 LAB
ALBUMIN SERPL BCG-MCNC: 3.8 G/DL (ref 3.5–5.1)
ALP SERPL-CCNC: 129 U/L (ref 38–126)
ALT SERPL W/O P-5'-P-CCNC: 31 U/L (ref 11–66)
ANION GAP SERPL CALC-SCNC: 20 MEQ/L (ref 8–16)
AST SERPL-CCNC: 25 U/L (ref 5–40)
BASOPHILS ABSOLUTE: 0.1 THOU/MM3 (ref 0–0.1)
BASOPHILS NFR BLD AUTO: 0.5 %
BILIRUB SERPL-MCNC: 0.9 MG/DL (ref 0.3–1.2)
BUN SERPL-MCNC: 46 MG/DL (ref 7–22)
CALCIUM SERPL-MCNC: 8.2 MG/DL (ref 8.5–10.5)
CHLORIDE SERPL-SCNC: 98 MEQ/L (ref 98–111)
CO2 SERPL-SCNC: 21 MEQ/L (ref 23–33)
CREAT SERPL-MCNC: 3.1 MG/DL (ref 0.4–1.2)
D DIMER PPP IA.FEU-MCNC: 604 NG/ML FEU (ref 0–500)
DEPRECATED RDW RBC AUTO: 60.8 FL (ref 35–45)
EOSINOPHIL NFR BLD AUTO: 4.9 %
EOSINOPHILS ABSOLUTE: 0.6 THOU/MM3 (ref 0–0.4)
ERYTHROCYTE [DISTWIDTH] IN BLOOD BY AUTOMATED COUNT: 16.1 % (ref 11.5–14.5)
FERRITIN SERPL IA-MCNC: 380 NG/ML (ref 22–322)
GFR SERPL CREATININE-BSD FRML MDRD: 19 ML/MIN/1.73M2
GLUCOSE SERPL-MCNC: 137 MG/DL (ref 70–108)
HCT VFR BLD AUTO: 43.5 % (ref 42–52)
HGB BLD-MCNC: 14.2 GM/DL (ref 14–18)
IMM GRANULOCYTES # BLD AUTO: 0.06 THOU/MM3 (ref 0–0.07)
IMM GRANULOCYTES NFR BLD AUTO: 0.5 %
IRON SERPL-MCNC: 75 UG/DL (ref 65–195)
LYMPHOCYTES ABSOLUTE: 1.4 THOU/MM3 (ref 1–4.8)
LYMPHOCYTES NFR BLD AUTO: 11.9 %
MCH RBC QN AUTO: 33.3 PG (ref 26–33)
MCHC RBC AUTO-ENTMCNC: 32.6 GM/DL (ref 32.2–35.5)
MCV RBC AUTO: 102.1 FL (ref 80–94)
MONOCYTES ABSOLUTE: 1.4 THOU/MM3 (ref 0.4–1.3)
MONOCYTES NFR BLD AUTO: 11.9 %
NEUTROPHILS NFR BLD AUTO: 70.3 %
NRBC BLD AUTO-RTO: 0 /100 WBC
NT-PROBNP SERPL IA-MCNC: 3418 PG/ML (ref 0–449)
PLATELET # BLD AUTO: 190 THOU/MM3 (ref 130–400)
PMV BLD AUTO: 11.6 FL (ref 9.4–12.4)
POTASSIUM SERPL-SCNC: 4.2 MEQ/L (ref 3.5–5.2)
PROT SERPL-MCNC: 7.2 G/DL (ref 6.1–8)
RBC # BLD AUTO: 4.26 MILL/MM3 (ref 4.7–6.1)
SEGMENTED NEUTROPHILS ABSOLUTE COUNT: 8 THOU/MM3 (ref 1.8–7.7)
SODIUM SERPL-SCNC: 139 MEQ/L (ref 135–145)
T4 FREE SERPL-MCNC: 1.16 NG/DL (ref 0.93–1.68)
TIBC SERPL-MCNC: 249 UG/DL (ref 171–450)
TSH SERPL DL<=0.005 MIU/L-ACNC: 4.07 UIU/ML (ref 0.4–4.2)
WBC # BLD AUTO: 11.4 THOU/MM3 (ref 4.8–10.8)

## 2024-04-19 PROCEDURE — 99284 EMERGENCY DEPT VISIT MOD MDM: CPT

## 2024-04-19 PROCEDURE — 93005 ELECTROCARDIOGRAM TRACING: CPT | Performed by: FAMILY MEDICINE

## 2024-04-19 PROCEDURE — 71046 X-RAY EXAM CHEST 2 VIEWS: CPT

## 2024-04-19 PROCEDURE — 93970 EXTREMITY STUDY: CPT

## 2024-04-19 ASSESSMENT — PAIN - FUNCTIONAL ASSESSMENT
PAIN_FUNCTIONAL_ASSESSMENT: NONE - DENIES PAIN

## 2024-04-19 NOTE — ED PROVIDER NOTES
[facility] EMERGENCY DEPT      EMERGENCY MEDICINE     Pt Name: Portillo Daly  MRN: [id number]  Birthdate 1940  Date of evaluation: 4/19/2024  Provider: Andrew Verdugo MD    CHIEF COMPLAINT       Chief Complaint   Patient presents with    Abnormal Lab     HISTORY OF PRESENT ILLNESS   Portillo Daly is a pleasant 83 y.o. male who presents to the emergency department from from home, by private vehicle for evaluation of abnormal labs collected to evaluate bilateral leg swelling.  Patient has no new complaints aside from that.  He does have multiple chronic medical conditions including COPD, congestive heart failure, diabetes mellitus, permanent A-fib, aortic stenosis status post TAVR, hypertension, CKD, and acute hypoxic respiratory failure requiring supplemental oxygen at home after influenza pneumonia.  His only complaint is that his legs have been swollen for the past few days though they are better than they were yesterday.  He takes Bumex at home for CHF symptoms, and he is also prescribed ISI hose which she usually chooses not to wear.    PASTMEDICAL HISTORY     Past Medical History:   Diagnosis Date    MILLY (acute kidney injury) ([facility])     Atrial fibrillation ([facility])     Cerebral artery occlusion with cerebral infarction ([facility])     CHF (congestive heart failure), NYHA class III, acute on chronic, combined ([facility])     COPD (chronic obstructive pulmonary disease) ([facility]) 8/3/2020    Coronary artery disease involving native coronary artery of native heart with angina pectoris ([facility])     Diabetes mellitus, type 2 ([facility]) 12/30/2020    Hyperlipidemia 2/20/2012    Hypertension     Pneumonia        Patient Active Problem List   Diagnosis Code    Atrial fibrillation with RVR ([facility]) I48.91    Acute respiratory failure with hypoxia ([facility]) J96.01    CKD (chronic kidney disease) stage 3, GFR 30-59 ml/min ([facility]) N18.30    Hyponatremia E87.1    Aortic stenosis, severe I35.0    Coronary artery disease involving native coronary

## 2024-04-19 NOTE — ED TRIAGE NOTES
Pt presents to the ER with c/o an abnormal lab. Pt states he went to his PCP for leg swelling and had blood work done this morning. Pt had multiple labs that were abnormal include Cre and BNP. Pt states he is on Bumex which he has been taking as prescribes. Pt denies CP or SOB. Pt is on 3L NC. Pt is alert and oriented, respirations even and unlabroed.

## 2024-04-19 NOTE — TELEPHONE ENCOUNTER
----- Message from WILBERTO No CNP sent at 4/19/2024  2:23 PM EDT -----  Please call pt and let him know that his cxr showed some inflammation of the lungs.  This could be contributing to his worsening sob and cough.  I am going to start him on an antibiotic and steroid.  If his symptoms would worsen over the weekend, he needs to get checked out.  His labs still haven't resulted.  How is his swelling in his legs today?  Is he still elevating his legs and wearing compression stockings?  Keeping his daily sodium intake under 2000 mg?  Taking his Bumex BID?

## 2024-04-19 NOTE — DISCHARGE INSTRUCTIONS
Your ultrasound of your lower legs was negative for deep vein thrombosis.  Please follow-up with your PCP to discuss the results.  Is also mention to your PCP at your blood pressure was lower on the left arm, and your left radial pulse was significantly weaker than your right radial pulse discuss with her other night she thinks you should be evaluated.

## 2024-04-19 NOTE — ED NOTES
Pt back from US. Pt resting in bed, respirations even and unlabored. No needs expressed at this time. Call light within reach. VSS

## 2024-04-19 NOTE — TELEPHONE ENCOUNTER
----- Message from WILBERTO No - CNP sent at 4/19/2024  2:41 PM EDT -----  Some of his labs are elevated.  With his worsening sob would recommend he be evaluated.

## 2024-04-20 LAB
EKG ATRIAL RATE: 65 BPM
EKG Q-T INTERVAL: 532 MS
EKG QRS DURATION: 176 MS
EKG QTC CALCULATION (BAZETT): 548 MS
EKG R AXIS: -80 DEGREES
EKG T AXIS: 70 DEGREES
EKG VENTRICULAR RATE: 64 BPM

## 2024-04-20 PROCEDURE — 93010 ELECTROCARDIOGRAM REPORT: CPT | Performed by: INTERNAL MEDICINE

## 2024-04-25 ENCOUNTER — TELEPHONE (OUTPATIENT)
Dept: FAMILY MEDICINE CLINIC | Age: 84
End: 2024-04-25

## 2024-04-25 DIAGNOSIS — N18.4 STAGE 4 CHRONIC KIDNEY DISEASE (HCC): ICD-10-CM

## 2024-04-25 DIAGNOSIS — I50.43 CHF (CONGESTIVE HEART FAILURE), NYHA CLASS III, ACUTE ON CHRONIC, COMBINED (HCC): ICD-10-CM

## 2024-04-25 DIAGNOSIS — R06.02 SOB (SHORTNESS OF BREATH): ICD-10-CM

## 2024-04-25 DIAGNOSIS — E11.65 TYPE 2 DIABETES MELLITUS WITH HYPERGLYCEMIA, WITHOUT LONG-TERM CURRENT USE OF INSULIN (HCC): Primary | ICD-10-CM

## 2024-04-25 DIAGNOSIS — J44.9 MODERATE COPD (CHRONIC OBSTRUCTIVE PULMONARY DISEASE) (HCC): ICD-10-CM

## 2024-04-25 NOTE — TELEPHONE ENCOUNTER
Needs a new refill Rx for [employer], [facility] stated they now have lifetime. Could you please request that?

## 2024-04-29 RX ORDER — LOSARTAN POTASSIUM 25 MG/1
25 TABLET ORAL DAILY
Qty: 90 TABLET | Refills: 3 | Status: SHIPPED | OUTPATIENT
Start: 2024-04-29

## 2024-04-29 NOTE — TELEPHONE ENCOUNTER
Future Appointments   Date Time Provider Department Center   5/7/2024 10:00 AM Chastity Ya,  N [place] [facility] - [place]   5/7/2024  1:20 PM Misty Casanova RN [facility] MED [facility] P - [place]   5/8/2024 11:00 AM Elinor Ly APRN - CNP [facility] - [place]   6/19/2024 11:30 AM SCHEDULE, [facility] PACER NURSE N [facility] PACER P - [place]   9/5/2024 10:40 AM Elinor Ly APRN - CNP [facility] Med [facility] - [place]

## 2024-05-01 ENCOUNTER — NURSE ONLY (OUTPATIENT)
Dept: LAB | Age: 84
End: 2024-05-01

## 2024-05-01 DIAGNOSIS — N18.4 CKD (CHRONIC KIDNEY DISEASE), STAGE IV (HCC): ICD-10-CM

## 2024-05-01 LAB
ANION GAP SERPL CALC-SCNC: 16 MEQ/L (ref 8–16)
BUN SERPL-MCNC: 43 MG/DL (ref 7–22)
CALCIUM SERPL-MCNC: 8.7 MG/DL (ref 8.5–10.5)
CHLORIDE SERPL-SCNC: 98 MEQ/L (ref 98–111)
CO2 SERPL-SCNC: 23 MEQ/L (ref 23–33)
CREAT SERPL-MCNC: 3.1 MG/DL (ref 0.4–1.2)
DEPRECATED RDW RBC AUTO: 62.6 FL (ref 35–45)
ERYTHROCYTE [DISTWIDTH] IN BLOOD BY AUTOMATED COUNT: 16.4 % (ref 11.5–14.5)
GFR SERPL CREATININE-BSD FRML MDRD: 19 ML/MIN/1.73M2
GLUCOSE SERPL-MCNC: 130 MG/DL (ref 70–108)
HCT VFR BLD AUTO: 41.1 % (ref 42–52)
HGB BLD-MCNC: 13.4 GM/DL (ref 14–18)
MCH RBC QN AUTO: 33.3 PG (ref 26–33)
MCHC RBC AUTO-ENTMCNC: 32.6 GM/DL (ref 32.2–35.5)
MCV RBC AUTO: 102.2 FL (ref 80–94)
PHOSPHATE SERPL-MCNC: 4.3 MG/DL (ref 2.4–4.7)
PLATELET # BLD AUTO: 178 THOU/MM3 (ref 130–400)
PMV BLD AUTO: 11.4 FL (ref 9.4–12.4)
POTASSIUM SERPL-SCNC: 4.4 MEQ/L (ref 3.5–5.2)
PTH-INTACT SERPL-MCNC: 183.9 PG/ML (ref 15–65)
RBC # BLD AUTO: 4.02 MILL/MM3 (ref 4.7–6.1)
SODIUM SERPL-SCNC: 137 MEQ/L (ref 135–145)
WBC # BLD AUTO: 10.9 THOU/MM3 (ref 4.8–10.8)

## 2024-05-07 ENCOUNTER — HOSPITAL ENCOUNTER (OUTPATIENT)
Dept: PHARMACY | Age: 84
Setting detail: THERAPIES SERIES
Discharge: HOME | End: 2024-05-07
Payer: MEDICARE

## 2024-05-07 ENCOUNTER — OFFICE VISIT (OUTPATIENT)
Dept: NEPHROLOGY | Age: 84
End: 2024-05-07
Payer: MEDICARE

## 2024-05-07 VITALS
HEART RATE: 60 BPM | WEIGHT: 205 LBS | BODY MASS INDEX: 32.11 KG/M2 | DIASTOLIC BLOOD PRESSURE: 68 MMHG | SYSTOLIC BLOOD PRESSURE: 114 MMHG | OXYGEN SATURATION: 94 %

## 2024-05-07 DIAGNOSIS — N18.4 CKD (CHRONIC KIDNEY DISEASE), STAGE IV (HCC): Primary | ICD-10-CM

## 2024-05-07 DIAGNOSIS — Z51.81 ENCOUNTER FOR THERAPEUTIC DRUG MONITORING: ICD-10-CM

## 2024-05-07 DIAGNOSIS — Z79.01 ANTICOAGULATED ON COUMADIN: ICD-10-CM

## 2024-05-07 DIAGNOSIS — I48.21 PERMANENT ATRIAL FIBRILLATION (HCC): Primary | ICD-10-CM

## 2024-05-07 LAB — POC INR: 3.3 (ref 0.8–1.2)

## 2024-05-07 PROCEDURE — 3078F DIAST BP <80 MM HG: CPT | Performed by: INTERNAL MEDICINE

## 2024-05-07 PROCEDURE — 36416 COLLJ CAPILLARY BLOOD SPEC: CPT

## 2024-05-07 PROCEDURE — 99212 OFFICE O/P EST SF 10 MIN: CPT

## 2024-05-07 PROCEDURE — 85610 PROTHROMBIN TIME: CPT

## 2024-05-07 PROCEDURE — 99214 OFFICE O/P EST MOD 30 MIN: CPT | Performed by: INTERNAL MEDICINE

## 2024-05-07 PROCEDURE — G8427 DOCREV CUR MEDS BY ELIG CLIN: HCPCS | Performed by: INTERNAL MEDICINE

## 2024-05-07 PROCEDURE — 3074F SYST BP LT 130 MM HG: CPT | Performed by: INTERNAL MEDICINE

## 2024-05-07 PROCEDURE — 1123F ACP DISCUSS/DSCN MKR DOCD: CPT | Performed by: INTERNAL MEDICINE

## 2024-05-07 PROCEDURE — 1036F TOBACCO NON-USER: CPT | Performed by: INTERNAL MEDICINE

## 2024-05-07 PROCEDURE — G8417 CALC BMI ABV UP PARAM F/U: HCPCS | Performed by: INTERNAL MEDICINE

## 2024-05-07 NOTE — PROGRESS NOTES
Medication Management Clinic  [facility]  Anticoagulation Clinic  [phone] (phone)  [phone] (fax)    Mr. Portillo Daly is a 84 y.o.  male with history of atrial fib., per referral from MARII Benjamin, who presents today for Warfarin monitoring and adjustment (4 week visit).    Patient verifies current dosing regimen and tablet strength.  No missed or extra doses.  Patient denies bleeding/SOB. Has usual varying amounts of swelling in legs (went to ER 4/19 for CHF/venous stasis dermatitis - vascular duplex negative).  Has usual easy bruising.  No blood in urine or stool.  No dietary changes.  No changes in medication/OTC agents/herbals.  No change in alcohol use or tobacco use.  No change in activity level.  Patient denies falls.  No vomiting/diarrhea or acute illness.   No procedures scheduled in the future at this time.  Saw nephrologist before this visit.    Assessment:   Lab Results   Component Value Date    INR 3.30 (H) 05/07/2024    INR 2.80 (H) 04/10/2024    INR 2.40 (H) 03/14/2024     INR supratherapeutic - goal 2-3.  Recent Labs     05/07/24  1325   INR 3.30*      Plan:  POCT INR performed/result reviewed.  1.25 mg today, T (per policy), then continue PO Coumadin 1.25 mg MF, 2.5 mg TWThSS.  Recheck INR in 3 week(s), per policy.  Patient reminded to call the Anticoagulation Clinic with any signs or symptoms of bleeding or with any medication changes.  Patient given instructions utilizing the teach back method.       After visit summary printed and reviewed with patient.    Advised extra caution  Discharged ambulatory in no apparent distress, with oxygen per tank, and female significant other.     For Pharmacy Admin Tracking Only    Intervention Detail: Dose Adjustment: 1, reason: Therapy De-escalation  Total # of Interventions Recommended: 1  Total # of Interventions Accepted: 1  Time Spent (min): 20

## 2024-05-07 NOTE — PROGRESS NOTES
[facility] [place] [facility]  [facility] KIDNEY AND HYPERTENSION  [address]  [address]  [facility] [address]  Dept: [phone]  Loc: [phone]  Progress Note  5/7/2024 9:55 AM      Pt Name:    Portillo Daly  YOB: 1940  Primary Care Physician:  Elinor Ly, APRN - CNP     Chief Complaint:   Chief Complaint   Patient presents with    Follow-up     CKD IV         History of Present Illness:   This is a follow-up visit for CKD IV. He is a former patient of Dr. Ponce.  He has hx of CAD, DM, HTN, hyperlipidemia, Afib, pacemaker, EF 45-50%. Hx TAVR, COPD on home O2.   Past renal US showed hypoplastic left kidney.    Was in ED with leg swelling elevated D dimer. Doppler was negative. CXR reviewed.   Is on bumex 2 mg bid. States swelling is much better but definitely still has some. Eats lunch every day at [employer].   Chronic SOB on home O2.       Pertinent items are noted in HPI.         Past History:  Past Medical History:   Diagnosis Date    MILLY (acute kidney injury) (HCC)     Atrial fibrillation (HCC)     Cerebral artery occlusion with cerebral infarction (HCC)     CHF (congestive heart failure), NYHA class III, acute on chronic, combined (HCC)     COPD (chronic obstructive pulmonary disease) ([facility]) 8/3/2020    Coronary artery disease involving native coronary artery of native heart with angina pectoris (HCC)     Diabetes mellitus, type 2 ([facility]) 12/30/2020    Hyperlipidemia 2/20/2012    Hypertension     Pneumonia      Past Surgical History:   Procedure Laterality Date    AORTIC VALVE REPLACEMENT      CARDIAC SURGERY      heart cath    CORONARY ANGIOPLASTY WITH STENT PLACEMENT      HERNIA REPAIR      OTHER SURGICAL HISTORY  05/10/2018    PACEMAKER INSERTION          VITALS:  There were no vitals taken for this visit.  Wt Readings from Last 3 Encounters:   04/19/24 94.8 kg (209 lb)   04/18/24 95.2 kg (209 lb 12.8 oz)   04/03/24 94.1 kg (207 lb 6.4 oz)     There

## 2024-05-08 ENCOUNTER — TELEPHONE (OUTPATIENT)
Dept: FAMILY MEDICINE CLINIC | Age: 84
End: 2024-05-08

## 2024-05-08 ENCOUNTER — OFFICE VISIT (OUTPATIENT)
Dept: FAMILY MEDICINE CLINIC | Age: 84
End: 2024-05-08
Payer: MEDICARE

## 2024-05-08 VITALS
DIASTOLIC BLOOD PRESSURE: 82 MMHG | SYSTOLIC BLOOD PRESSURE: 124 MMHG | OXYGEN SATURATION: 90 % | HEART RATE: 60 BPM | BODY MASS INDEX: 31.96 KG/M2 | RESPIRATION RATE: 14 BRPM | TEMPERATURE: 97.9 F | HEIGHT: 67 IN | WEIGHT: 203.6 LBS

## 2024-05-08 DIAGNOSIS — N18.4 STAGE 4 CHRONIC KIDNEY DISEASE (HCC): ICD-10-CM

## 2024-05-08 DIAGNOSIS — E11.65 TYPE 2 DIABETES MELLITUS WITH HYPERGLYCEMIA, WITHOUT LONG-TERM CURRENT USE OF INSULIN (HCC): ICD-10-CM

## 2024-05-08 PROCEDURE — 3074F SYST BP LT 130 MM HG: CPT | Performed by: NURSE PRACTITIONER

## 2024-05-08 PROCEDURE — 1123F ACP DISCUSS/DSCN MKR DOCD: CPT | Performed by: NURSE PRACTITIONER

## 2024-05-08 PROCEDURE — G8427 DOCREV CUR MEDS BY ELIG CLIN: HCPCS | Performed by: NURSE PRACTITIONER

## 2024-05-08 PROCEDURE — 3051F HG A1C>EQUAL 7.0%<8.0%: CPT | Performed by: NURSE PRACTITIONER

## 2024-05-08 PROCEDURE — 3079F DIAST BP 80-89 MM HG: CPT | Performed by: NURSE PRACTITIONER

## 2024-05-08 PROCEDURE — 1036F TOBACCO NON-USER: CPT | Performed by: NURSE PRACTITIONER

## 2024-05-08 PROCEDURE — 99214 OFFICE O/P EST MOD 30 MIN: CPT | Performed by: NURSE PRACTITIONER

## 2024-05-08 PROCEDURE — G8417 CALC BMI ABV UP PARAM F/U: HCPCS | Performed by: NURSE PRACTITIONER

## 2024-05-08 RX ORDER — INSULIN GLARGINE 100 [IU]/ML
INJECTION, SOLUTION SUBCUTANEOUS
Qty: 15 ML | Refills: 3 | Status: SHIPPED | OUTPATIENT
Start: 2024-05-08

## 2024-05-08 NOTE — TELEPHONE ENCOUNTER
Spoke to patient, he has not got the Losartan yet. He does not want a weeks worth sent to local pharmacy just yet, states he will call us tomorrow if he still hasn't gotten it and let us know if he wants a weeks worth by tomorrow.

## 2024-05-08 NOTE — TELEPHONE ENCOUNTER
----- Message from WILBERTO No CNP sent at 5/8/2024 11:40 AM EDT -----  Please call pt later this afternoon (after 3) and see if he got his losartan in the mail. If not, does he want me to send a weeks worth to the local pharmacy?  If so, which pharmacy?

## 2024-05-08 NOTE — PROGRESS NOTES
Portillo Daly is a 84 y.o. male thatpresents for ER follow up      History obtained today from Patient and Wife.    ER follow up   Sent to the ER for leg swelling, sob, after abnormal labs (elevated D dimer)  Pt had negative dopplers  Has since followed up with Neph  Reports swelling has improved  Asking about low sodium foods  Denies any cp  Sob is at baseline, has not followed up with Dr. Amos      I have reviewed the patient's past medical history, past surgical history, allergies,medications, social and family history and I have made updates where appropriate.    Past Medical History:   Diagnosis Date    MILLY (acute kidney injury) ([facility])     Atrial fibrillation (HCC)     Cerebral artery occlusion with cerebral infarction (HCC)     CHF (congestive heart failure), NYHA class III, acute on chronic, combined ([facility])     COPD (chronic obstructive pulmonary disease) ([facility]) 8/3/2020    Coronary artery disease involving native coronary artery of native heart with angina pectoris ([facility])     Diabetes mellitus, type 2 ([facility]) 12/30/2020    Hyperlipidemia 2/20/2012    Hypertension     Pneumonia        Social History     Tobacco Use    Smoking status: Former     Current packs/day: 1.00     Average packs/day: 1 pack/day for 50.0 years (50.0 ttl pk-yrs)     Types: Cigarettes    Smokeless tobacco: Never    Tobacco comments:     quit 20 years ago   Vaping Use    Vaping Use: Never used   Substance Use Topics    Alcohol use: Not Currently     Comment: rarely    Drug use: No       Family History   Family history unknown: Yes         Review of Systems        PHYSICAL EXAM:  /82   Pulse 60   Temp 97.9 °F (36.6 °C) (Oral)   Resp 14   Ht 1.702 m (5' 7\")   Wt 92.4 kg (203 lb 9.6 oz)   SpO2 90%   BMI 31.89 kg/m²     Physical Exam  Constitutional:       Appearance: Normal appearance.   HENT:      Head: Normocephalic and atraumatic.      Right Ear: External ear normal.      Left Ear: External ear normal.      Nose: Nose

## 2024-05-10 RX ORDER — LOSARTAN POTASSIUM 25 MG/1
25 TABLET ORAL DAILY
Qty: 7 TABLET | Refills: 0 | Status: SHIPPED | OUTPATIENT
Start: 2024-05-10

## 2024-05-10 NOTE — TELEPHONE ENCOUNTER
Ele from [facility] pharmacy called asking to send a 5-10 day supply of Losartan to pt's local pharmacy [facility] in [facility].     He has not received it from them yet

## 2024-05-28 ENCOUNTER — APPOINTMENT (OUTPATIENT)
Dept: PHARMACY | Age: 84
End: 2024-05-28
Payer: MEDICARE

## 2024-05-28 DIAGNOSIS — I48.21 PERMANENT ATRIAL FIBRILLATION (HCC): Primary | ICD-10-CM

## 2024-05-28 DIAGNOSIS — Z79.01 ANTICOAGULATED ON COUMADIN: ICD-10-CM

## 2024-05-28 DIAGNOSIS — Z51.81 ENCOUNTER FOR THERAPEUTIC DRUG MONITORING: ICD-10-CM

## 2024-05-28 LAB — POC INR: 3.6 (ref 0.8–1.2)

## 2024-05-28 PROCEDURE — 99212 OFFICE O/P EST SF 10 MIN: CPT

## 2024-05-28 PROCEDURE — 36416 COLLJ CAPILLARY BLOOD SPEC: CPT

## 2024-05-28 PROCEDURE — 85610 PROTHROMBIN TIME: CPT

## 2024-05-28 NOTE — PROGRESS NOTES
Medication Management Clinic  [facility]  Anticoagulation Clinic  [phone] (phone)  [phone] (fax)    Mr. Portillo Daly is a 84 y.o.  male with history of atrial fib., per referral from MARII Benjamin, who presents today for Warfarin monitoring and adjustment (3 week visit).    Patient verifies current dosing regimen and tablet strength. Uses pill box.  No missed or extra doses.  Patient denies bleeding/swelling.  Has usual easy bruising.  Has usual SOB if walks too far.  No blood in urine or stool.  Dietary changes: trying to follow low sodium diet.  Denies having Squirt/Fresca (grapefruit juice).  No changes in medication/OTC agents/herbals.  No change in alcohol use or tobacco use.  No change in activity level.  Patient denies falls.  No vomiting/diarrhea or acute illness.   No procedures scheduled in the future at this time.  Will be switching from [facility] pulmonary group to [facility] - insurance issue.    Assessment:   Lab Results   Component Value Date    INR 3.60 (H) 05/28/2024    INR 3.30 (H) 05/07/2024    INR 2.80 (H) 04/10/2024     INR supratherapeutic - goal 2-3.  Recent Labs     05/28/24  1013   INR 3.60*        Plan:  POCT INR performed/result reviewed.  Hold today, T, then decrease PO Coumadin to 1.25 mg MWF, 2.5 mg TThSS (from 1.25 mg MF, 2.5 mg TWThSS=8.3% decrease).  Recheck INR in 2 week(s) - 3 if necessary.  (Report given - orders entered by WILMER Roblero [facility]., PharmD.)  Patient reminded to call the Anticoagulation Clinic with any signs or symptoms of bleeding or with any medication changes.  Patient given instructions utilizing the teach back method.       After visit summary printed and reviewed with patient.    Advised extra caution.  Discharged ambulatory in no apparent distress, with oxygen and female significant other.     For Pharmacy Admin Tracking Only    Intervention Detail: Dose Adjustment: 1, reason: Therapy De-escalation  Total # of Interventions

## 2024-06-05 DIAGNOSIS — T50.2X5A DIURETIC-INDUCED HYPOKALEMIA: ICD-10-CM

## 2024-06-05 DIAGNOSIS — E11.65 TYPE 2 DIABETES MELLITUS WITH HYPERGLYCEMIA, WITHOUT LONG-TERM CURRENT USE OF INSULIN (HCC): ICD-10-CM

## 2024-06-05 DIAGNOSIS — E87.6 DIURETIC-INDUCED HYPOKALEMIA: ICD-10-CM

## 2024-06-05 RX ORDER — GLUCOSAM/CHON-MSM1/C/MANG/BOSW 500-416.6
TABLET ORAL
Qty: 100 EACH | Refills: 3 | Status: SHIPPED | OUTPATIENT
Start: 2024-06-05

## 2024-06-05 RX ORDER — POTASSIUM CHLORIDE 750 MG/1
10 TABLET, EXTENDED RELEASE ORAL 2 TIMES DAILY
Qty: 180 TABLET | Refills: 2 | Status: SHIPPED | OUTPATIENT
Start: 2024-06-05 | End: 2024-12-02

## 2024-06-05 RX ORDER — CALCITRIOL 0.25 UG/1
0.25 CAPSULE, LIQUID FILLED ORAL DAILY
Qty: 90 CAPSULE | Refills: 3 | Status: SHIPPED | OUTPATIENT
Start: 2024-06-05 | End: 2025-05-31

## 2024-06-05 RX ORDER — ISOPROPYL ALCOHOL 70 ML/100ML
SWAB TOPICAL
Qty: 100 EACH | Refills: 4 | Status: SHIPPED | OUTPATIENT
Start: 2024-06-05

## 2024-06-05 NOTE — TELEPHONE ENCOUNTER
Future Appointments   Date Time Provider Department Center   6/19/2024 10:40 AM Misty Casanova RN [facility] MED MGMT [facility] [place]   6/19/2024 11:30 AM SCHEDULE, SRPX PACER NURSE N [facility] PACER [facility] [place]   8/14/2024 10:40 AM Chastity Ya DO N [place] [facility] [place]   9/5/2024 10:40 AM Elinor Ly APRN - CNP [facility] Med [facility] [place]

## 2024-06-19 ENCOUNTER — NURSE ONLY (OUTPATIENT)
Dept: CARDIOLOGY CLINIC | Age: 84
End: 2024-06-19
Payer: MEDICARE

## 2024-06-19 ENCOUNTER — HOSPITAL ENCOUNTER (OUTPATIENT)
Dept: PHARMACY | Age: 84
Setting detail: THERAPIES SERIES
Discharge: HOME | End: 2024-06-19
Payer: MEDICARE

## 2024-06-19 DIAGNOSIS — Z79.01 ANTICOAGULATED ON COUMADIN: ICD-10-CM

## 2024-06-19 DIAGNOSIS — Z95.0 PACEMAKER: Primary | ICD-10-CM

## 2024-06-19 DIAGNOSIS — I48.21 PERMANENT ATRIAL FIBRILLATION (HCC): Primary | ICD-10-CM

## 2024-06-19 DIAGNOSIS — Z51.81 ENCOUNTER FOR THERAPEUTIC DRUG MONITORING: ICD-10-CM

## 2024-06-19 LAB — POC INR: 2.9 (ref 0.8–1.2)

## 2024-06-19 PROCEDURE — 85610 PROTHROMBIN TIME: CPT

## 2024-06-19 PROCEDURE — 93288 INTERROG EVL PM/LDLS PM IP: CPT | Performed by: INTERNAL MEDICINE

## 2024-06-19 PROCEDURE — 36416 COLLJ CAPILLARY BLOOD SPEC: CPT

## 2024-06-19 PROCEDURE — 99211 OFF/OP EST MAY X REQ PHY/QHP: CPT

## 2024-06-19 NOTE — PROGRESS NOTES
St ibrahima pacer in office, was programmed VVIR, isn't in AF, programmed to DDDR   Battery watch  On coumadin  Former  pt     PAV and BALDOMERO 250/225    ..Battery longevity:  4.7-8 months on device, will recheck in 3 months    AP  with bigeminal PVC's after programmed to DDDR     Atrial impedance 430  RV impedance 580    P wave sensing 2.1  R wave sensing 12      85 % RV paced     Atrial threshold 1.75 V  at 0.4ms atrial autocapture programmed on  RV threshold 1.125 V at 0.4ms

## 2024-06-19 NOTE — PROGRESS NOTES
Medication Management Clinic  [facility]  Anticoagulation Clinic  [phone] (phone)  [phone] (fax)    Mr. Portillo Daly is a 84 y.o.  male with history of atrial fib., per referral from MARII Benjamin, who presents today for Warfarin monitoring and adjustment (3 week visit after decreasing dose by 8.3%).    Patient verifies current dosing regimen and tablet strength.  No missed (except as ordered last visit) or extra doses.  Patient denies bleeding/swelling/SOB.  Has usual easy bruising.  No blood in urine or stool.  No dietary changes.  No changes in medication/OTC agents/herbals.  No change in alcohol use or tobacco use.  No change in activity level.  Patient denies falls.  No vomiting/diarrhea or acute illness.   No procedures scheduled in the future at this time.    Assessment:   Lab Results   Component Value Date    INR 2.90 (H) 06/19/2024    INR 3.60 (H) 05/28/2024    INR 3.30 (H) 05/07/2024     INR therapeutic - goal 2-3.  Recent Labs     06/19/24  1030   INR 2.90*        Plan:  POCT INR performed/result reviewed.  Continue PO Coumadin 1.25 mg MWF, 2.5 mg TThSS.  Recheck INR in 4 week(s). (Report given - orders received from/verified with BRO Russo [facility] PharmD.)  Patient reminded to call the Anticoagulation Clinic with any signs or symptoms of bleeding or with any medication changes.  Patient given instructions utilizing the teach back method.       After visit summary printed and reviewed with patient.      Discharged ambulatory in no apparent distress, with oxygen and female significant other.  Has pacemaker check next.    For Pharmacy Admin Tracking Only    Time Spent (min):  22

## 2024-06-20 DIAGNOSIS — E11.22 TYPE 2 DIABETES MELLITUS WITH STAGE 4 CHRONIC KIDNEY DISEASE, WITH LONG-TERM CURRENT USE OF INSULIN (HCC): ICD-10-CM

## 2024-06-20 DIAGNOSIS — Z79.4 TYPE 2 DIABETES MELLITUS WITH STAGE 4 CHRONIC KIDNEY DISEASE, WITH LONG-TERM CURRENT USE OF INSULIN (HCC): ICD-10-CM

## 2024-06-20 DIAGNOSIS — N18.4 TYPE 2 DIABETES MELLITUS WITH STAGE 4 CHRONIC KIDNEY DISEASE, WITH LONG-TERM CURRENT USE OF INSULIN (HCC): ICD-10-CM

## 2024-06-20 NOTE — TELEPHONE ENCOUNTER
Patient was in for a med refill of Januvia and Breztri. 2 sample of Breztri was giving but Januvia needs ordered.

## 2024-06-25 ENCOUNTER — TELEPHONE (OUTPATIENT)
Dept: FAMILY MEDICINE CLINIC | Age: 84
End: 2024-06-25

## 2024-06-25 NOTE — TELEPHONE ENCOUNTER
Pt informed and understanding. Pt is going to call his insurance and see what they tell him.   Called [facility] and spoke to Rl and he stated that an alternative did not come up.   Will wait to see if a PA is needed and what if the patient is able to get a different answer .

## 2024-06-25 NOTE — TELEPHONE ENCOUNTER
Would recommend they check with the pharmacy about the breztri.  Rx was sent to [facility]    Regarding the Januvia, did the insurance say what they will cover so we know what to send in?

## 2024-06-25 NOTE — TELEPHONE ENCOUNTER
Patients spouse came to the office and states that Januvia is too expensive and would like to know if he could have a sample or 2 of Breztri? She stated that for whatever reason he has not been getting the Breztri sent to him.

## 2024-07-17 ENCOUNTER — HOSPITAL ENCOUNTER (OUTPATIENT)
Dept: PHARMACY | Age: 84
Setting detail: THERAPIES SERIES
Discharge: HOME | End: 2024-07-17
Payer: MEDICARE

## 2024-07-17 ENCOUNTER — TELEPHONE (OUTPATIENT)
Dept: PULMONOLOGY | Age: 84
End: 2024-07-17

## 2024-07-17 DIAGNOSIS — I48.21 PERMANENT ATRIAL FIBRILLATION (HCC): Primary | ICD-10-CM

## 2024-07-17 DIAGNOSIS — Z51.81 ENCOUNTER FOR THERAPEUTIC DRUG MONITORING: ICD-10-CM

## 2024-07-17 DIAGNOSIS — Z79.01 ANTICOAGULATED ON COUMADIN: ICD-10-CM

## 2024-07-17 LAB — POC INR: 4.3 (ref 0.8–1.2)

## 2024-07-17 PROCEDURE — 85610 PROTHROMBIN TIME: CPT

## 2024-07-17 PROCEDURE — 36416 COLLJ CAPILLARY BLOOD SPEC: CPT

## 2024-07-17 PROCEDURE — 99212 OFFICE O/P EST SF 10 MIN: CPT

## 2024-07-17 NOTE — PROGRESS NOTES
Medication Management Clinic  [facility]  Anticoagulation Clinic  [phone] (phone)  [phone] (fax)    Mr. Portillo Daly is a 84 y.o.  male with history of atrial fib., per referral from MARII Benjamin, who presents today for Warfarin monitoring and adjustment (4 week visit).    Patient verifies current dosing regimen and tablet strength.  No missed or extra doses.  Patient denies bleeding/swelling/SOB.  Has usual easy bruising.  No blood in urine or stool.  No dietary changes.  Changes in medication/OTC agents/herbals: off Januvia - too expensive.  No change in alcohol use or tobacco use.  No change in activity level.  Patient denies falls.  No vomiting/diarrhea or acute illness.   No procedures scheduled in the future at this time.  States is switching lung doctors from [facility] to [facility].    Assessment:   Lab Results   Component Value Date    INR 4.30 (H) 07/17/2024    INR 2.90 (H) 06/19/2024    INR 3.60 (H) 05/28/2024     INR supratherapeutic - goal 2-3.  Recent Labs     07/17/24  1033   INR 4.30*        Plan:  POCT INR performed/result reviewed.  Hold today, W, 1.25 mg tomorrow, then continue PO Coumadin 1.25 mg MWF, 2.5 mg TThSS.  Recheck INR late next week.  (Report given - orders entered by BRO Russo [facility] PharmD.)  Patient reminded to call the Anticoagulation Clinic with any signs or symptoms of bleeding or with any medication changes.  Patient given instructions utilizing the teach back method.       After visit summary printed and reviewed with patient.    Advised extra caution.  Discharged ambulatory in no apparent distress, with oxygen and female significant other.     For Pharmacy Admin Tracking Only    Intervention Detail: Dose Adjustment: 1, reason: Therapy De-escalation  Total # of Interventions Recommended: 1  Total # of Interventions Accepted: 1  Time Spent (min):  22

## 2024-07-17 NOTE — TELEPHONE ENCOUNTER
PATIENT AND HIS WIFE CAME INTO THE OFFICE TODAY ASKING IF THEY COULD COME HERE INSTEAD OF [facility] DUE TO [facility] CHARGING HIM A ROOM CHARGE ON TOP OF THE COPAY. THEY ARE NOT HAPPY WITH [facility]. I ADVISED TO PATIENT AND HIS WIFE THEY NEED TO GET A REFERRAL TO BE SENT HERE. PATIENT VOICED THEIR UNDERSTANDING. WILL CONTACT THEIR PCP EDYTA RUIZ. TO GET REFERRAL.

## 2024-07-23 ENCOUNTER — TELEPHONE (OUTPATIENT)
Dept: FAMILY MEDICINE CLINIC | Age: 84
End: 2024-07-23

## 2024-07-23 DIAGNOSIS — J44.9 MODERATE COPD (CHRONIC OBSTRUCTIVE PULMONARY DISEASE) (HCC): Primary | ICD-10-CM

## 2024-07-23 NOTE — TELEPHONE ENCOUNTER
Patient came to the office and requested a referral to Genia Paez CNP at [facility] due to changes in billing at [facility].   Please call patient once the referral is sent.

## 2024-07-25 ENCOUNTER — HOSPITAL ENCOUNTER (OUTPATIENT)
Dept: PHARMACY | Age: 84
Setting detail: THERAPIES SERIES
Discharge: HOME | End: 2024-07-25
Payer: MEDICARE

## 2024-07-25 DIAGNOSIS — I48.21 PERMANENT ATRIAL FIBRILLATION (HCC): Primary | ICD-10-CM

## 2024-07-25 DIAGNOSIS — Z51.81 ENCOUNTER FOR THERAPEUTIC DRUG MONITORING: ICD-10-CM

## 2024-07-25 DIAGNOSIS — Z79.01 ANTICOAGULATED ON COUMADIN: ICD-10-CM

## 2024-07-25 LAB — POC INR: 3.5 (ref 0.8–1.2)

## 2024-07-25 PROCEDURE — 36416 COLLJ CAPILLARY BLOOD SPEC: CPT | Performed by: PHARMACIST

## 2024-07-25 PROCEDURE — 99212 OFFICE O/P EST SF 10 MIN: CPT | Performed by: PHARMACIST

## 2024-07-25 PROCEDURE — 85610 PROTHROMBIN TIME: CPT | Performed by: PHARMACIST

## 2024-07-25 NOTE — PROGRESS NOTES
Medication Management Clinic  [facility]  Anticoagulation Clinic  [phone] (phone)  [phone] (fax)    Mr. Portillo Daly is a 84 y.o.  male with history of Afib who presents today for anticoagulation monitoring and adjustment.    Patient verifies current dosing regimen and tablet strength.  No missed or extra doses.  Patient denies s/s bleeding/bruising/swelling/SOB  No blood in urine or stool.  No dietary changes.  No changes in medication/OTC agents/Herbals.  No change in alcohol use or tobacco use.  No change in activity level.  Patient denies falls.  No vomiting/diarrhea or acute illness.   No Procedures scheduled in the future at this time.    Assessment:   Lab Results   Component Value Date    INR 3.50 (H) 2024    INR 4.30 (H) 2024    INR 2.90 (H) 2024     INR supratherapeutic   Recent Labs     24  1023   INR 3.50*     Plan:  Coumadin 1.25 mg today and tomorrow, then decrease Coumadin 2.5 mg MWF, 1.25 mg all other days (~ 9.1% decrease in dose).  Recheck INR in 2 week(s).  Patient reminded to call the Anticoagulation Clinic with signs or symptoms of bleeding or with any medication changes.  Patient given instructions utilizing the teach back method.    After visit summary printed and reviewed with patient.      Discharged ambulatory in no apparent distress.    For Pharmacy Admin Tracking Only    Intervention Detail: Adherence Monitorin and Dose Adjustment: 1, reason: Therapy Optimization  Total # of Interventions Recommended: 2  Total # of Interventions Accepted: 2  Time Spent (min): 20    Gabby Patterson, PrasannaD, BCPS  2024  12:24 PM

## 2024-07-30 RX ORDER — LOSARTAN POTASSIUM 25 MG/1
25 TABLET ORAL DAILY
Qty: 90 TABLET | Refills: 2 | Status: SHIPPED | OUTPATIENT
Start: 2024-07-30

## 2024-07-30 RX ORDER — BUMETANIDE 2 MG/1
2 TABLET ORAL 2 TIMES DAILY
Qty: 180 TABLET | Refills: 1 | Status: SHIPPED | OUTPATIENT
Start: 2024-07-30 | End: 2025-01-26

## 2024-08-06 DIAGNOSIS — I50.32 CHRONIC DIASTOLIC CHF (CONGESTIVE HEART FAILURE), NYHA CLASS 2 (HCC): ICD-10-CM

## 2024-08-06 NOTE — TELEPHONE ENCOUNTER
Patients wife came in and requested refills of this medication as he is no longer seeing a heart doctor. Please send to [facility].  Recent Visits  Date Type Provider Dept   05/08/24 Office Visit Elinor Ly, APRN - CNP [facility]   04/18/24 Office Visit Elinor Ly, APRN - CNP [facility]   03/05/24 Office Visit Elinor Ly, APRN - CNP [facility]   02/01/24 Office Visit Elinor Ly, APRN - CNP [facility]   12/05/23 Office Visit Elinor Ly, APRN - CNP [facility]   11/01/23 Office Visit Elinor Ly, APRN - CNP [facility]   09/13/23 Office Visit Elinor Ly, APRN - CNP [facility]   08/22/23 Office Visit Elinor Ly, APRN - CNP [facility]   07/11/23 Office Visit Elinor Ly, APRN - CNP [facility]   05/23/23 Office Visit Elinor Ly, APRN - CNP [facility]   Showing recent visits within past 540 days with a meds authorizing provider and meeting all other requirements  Future Appointments  Date Type Provider Dept   09/05/24 Appointment Elinor Ly APRN - CNP [facility]   Showing future appointments within next 150 days with a meds authorizing provider and meeting all other requirements

## 2024-08-07 ENCOUNTER — LAB (OUTPATIENT)
Dept: LAB | Age: 84
End: 2024-08-07

## 2024-08-07 DIAGNOSIS — N18.4 CKD (CHRONIC KIDNEY DISEASE), STAGE IV (HCC): ICD-10-CM

## 2024-08-07 LAB
ANION GAP SERPL CALC-SCNC: 21 MEQ/L (ref 8–16)
BUN SERPL-MCNC: 68 MG/DL (ref 7–22)
CALCIUM SERPL-MCNC: 8.8 MG/DL (ref 8.5–10.5)
CHLORIDE SERPL-SCNC: 96 MEQ/L (ref 98–111)
CO2 SERPL-SCNC: 21 MEQ/L (ref 23–33)
CREAT SERPL-MCNC: 3.3 MG/DL (ref 0.4–1.2)
CREAT UR-MCNC: 36.8 MG/DL
DEPRECATED RDW RBC AUTO: 60 FL (ref 35–45)
ERYTHROCYTE [DISTWIDTH] IN BLOOD BY AUTOMATED COUNT: 15.5 % (ref 11.5–14.5)
GFR SERPL CREATININE-BSD FRML MDRD: 18 ML/MIN/1.73M2
GLUCOSE SERPL-MCNC: 123 MG/DL (ref 70–108)
HCT VFR BLD AUTO: 41.1 % (ref 42–52)
HGB BLD-MCNC: 13.1 GM/DL (ref 14–18)
MCH RBC QN AUTO: 32.9 PG (ref 26–33)
MCHC RBC AUTO-ENTMCNC: 31.9 GM/DL (ref 32.2–35.5)
MCV RBC AUTO: 103.3 FL (ref 80–94)
MICROALBUMIN UR-MCNC: < 1.2 MG/DL
MICROALBUMIN/CREAT RATIO PNL UR: 33 MG/G (ref 0–30)
PHOSPHATE SERPL-MCNC: 5.4 MG/DL (ref 2.4–4.7)
PLATELET # BLD AUTO: 181 THOU/MM3 (ref 130–400)
PMV BLD AUTO: 11.1 FL (ref 9.4–12.4)
POTASSIUM SERPL-SCNC: 4.1 MEQ/L (ref 3.5–5.2)
PTH-INTACT SERPL-MCNC: 135.8 PG/ML (ref 15–65)
RBC # BLD AUTO: 3.98 MILL/MM3 (ref 4.7–6.1)
SODIUM SERPL-SCNC: 138 MEQ/L (ref 135–145)
WBC # BLD AUTO: 12.1 THOU/MM3 (ref 4.8–10.8)

## 2024-08-07 RX ORDER — METOPROLOL SUCCINATE 25 MG/1
25 TABLET, EXTENDED RELEASE ORAL DAILY
Qty: 30 TABLET | Refills: 3 | Status: SHIPPED | OUTPATIENT
Start: 2024-08-07

## 2024-08-08 ENCOUNTER — ANTI-COAG VISIT (OUTPATIENT)
Age: 84
End: 2024-08-08
Payer: MEDICARE

## 2024-08-08 DIAGNOSIS — I48.21 PERMANENT ATRIAL FIBRILLATION (HCC): Primary | ICD-10-CM

## 2024-08-08 DIAGNOSIS — Z79.01 ANTICOAGULATED ON COUMADIN: ICD-10-CM

## 2024-08-08 DIAGNOSIS — Z51.81 ENCOUNTER FOR THERAPEUTIC DRUG MONITORING: ICD-10-CM

## 2024-08-08 LAB — POC INR: 2.5 (ref 0.8–1.2)

## 2024-08-08 PROCEDURE — 99211 OFF/OP EST MAY X REQ PHY/QHP: CPT

## 2024-08-08 PROCEDURE — 85610 PROTHROMBIN TIME: CPT

## 2024-08-08 NOTE — PROGRESS NOTES
Medication Management Clinic  [facility]  Anticoagulation Clinic  [phone] (phone)  [phone] (fax)    Mr. Portillo Daly is a 84 y.o.  male with history of atrial fib., per referral from MARII Benjamin, who presents today for Warfarin monitoring and adjustment (2 week visit after decreasing dose ~9.1%).    Patient verifies current tablet strength. Followed printed instructions for dose.  Uses pill box.  No missed or extra doses.  Patient denies bleeding/swelling/SOB.  Has usual easy bruising.  No blood in urine or stool.  No dietary changes.  No changes in medication/OTC agents/herbals. Ran out of [employer] 2 days ago - advised to contact provider's office.  No change in alcohol use or tobacco use.  No change in activity level.  Patient denies falls.  No vomiting/diarrhea or acute illness.   No procedures scheduled in the future at this time.    Assessment:   Lab Results   Component Value Date    INR 2.50 (H) 08/08/2024    INR 3.50 (H) 07/25/2024    INR 4.30 (H) 07/17/2024     INR therapeutic - goal 2-3.  Recent Labs     08/08/24  1050   INR 2.50*        Plan:  POCT INR performed/result reviewed.  Continue PO Coumadin 2.5 mg MWF, 1.25 mg TThSS.  Recheck INR in 2 week(s). (Report given - orders received from/verified with MAX Diamond [facility] PharmD.)  Patient reminded to call the Anticoagulation Clinic with any signs or symptoms of bleeding or with any medication changes.  Patient given instructions utilizing the teach back method.       After visit summary printed and reviewed with patient.      Discharged ambulatory in no apparent distress, wearing oxygen, with female significant other.    For Pharmacy Admin Tracking Only    Time Spent (min): 22

## 2024-08-13 ENCOUNTER — TELEPHONE (OUTPATIENT)
Dept: FAMILY MEDICINE CLINIC | Age: 84
End: 2024-08-13

## 2024-08-13 NOTE — TELEPHONE ENCOUNTER
Patient and his wife came to the office confused about the dosage on his Metoprolol he just got a new Rx in the mail for 25mg and was taking 100mg. Glo GUILLEN Came up to help explain to the patient that the new Rx was sent in by Nicolette COLVIN and the other bottle was from our office in 2023. He and his wife were advised to contact Dr. Pedersen's office to confirm what dosage he should be taking. Patient was still very confused but his wife voiced understanding and we provided the number to the cardiologist to the patient.

## 2024-08-13 NOTE — TELEPHONE ENCOUNTER
Continue the Rx he has been taking as always - can take 4 tabs of the 25 mg till he finishes it, and then PCP can switch him back to his regular Rx

## 2024-08-13 NOTE — TELEPHONE ENCOUNTER
Called and spoke to Gina (On HIPAA)  Advised of Dr. Pedersen's recommendations.   Med list updated to reflect patient taking 100mg daily.   Messaged routed back to PCP as MARLENA.

## 2024-08-13 NOTE — TELEPHONE ENCOUNTER
Gina stopped at the  to ask about Metoprolol from cardiology stand point. Last office visit with Slava it was reported as 25mg once a day. Gina reports that his Metoprolol he has been taking is 100mg daily and is not having any issues on the 100mg tablets. Patient was placed PRN as last visit in April

## 2024-08-14 ENCOUNTER — OFFICE VISIT (OUTPATIENT)
Dept: NEPHROLOGY | Age: 84
End: 2024-08-14
Payer: MEDICARE

## 2024-08-14 VITALS
OXYGEN SATURATION: 95 % | BODY MASS INDEX: 32.42 KG/M2 | SYSTOLIC BLOOD PRESSURE: 118 MMHG | HEART RATE: 64 BPM | DIASTOLIC BLOOD PRESSURE: 64 MMHG | WEIGHT: 207 LBS

## 2024-08-14 DIAGNOSIS — N25.81 SECONDARY HYPERPARATHYROIDISM OF RENAL ORIGIN (HCC): ICD-10-CM

## 2024-08-14 DIAGNOSIS — N18.4 CKD (CHRONIC KIDNEY DISEASE), STAGE IV (HCC): Primary | ICD-10-CM

## 2024-08-14 PROCEDURE — 1036F TOBACCO NON-USER: CPT | Performed by: INTERNAL MEDICINE

## 2024-08-14 PROCEDURE — G2211 COMPLEX E/M VISIT ADD ON: HCPCS | Performed by: INTERNAL MEDICINE

## 2024-08-14 PROCEDURE — 3078F DIAST BP <80 MM HG: CPT | Performed by: INTERNAL MEDICINE

## 2024-08-14 PROCEDURE — G8427 DOCREV CUR MEDS BY ELIG CLIN: HCPCS | Performed by: INTERNAL MEDICINE

## 2024-08-14 PROCEDURE — 99214 OFFICE O/P EST MOD 30 MIN: CPT | Performed by: INTERNAL MEDICINE

## 2024-08-14 PROCEDURE — 1123F ACP DISCUSS/DSCN MKR DOCD: CPT | Performed by: INTERNAL MEDICINE

## 2024-08-14 PROCEDURE — 3074F SYST BP LT 130 MM HG: CPT | Performed by: INTERNAL MEDICINE

## 2024-08-14 PROCEDURE — G8417 CALC BMI ABV UP PARAM F/U: HCPCS | Performed by: INTERNAL MEDICINE

## 2024-08-14 NOTE — PROGRESS NOTES
[facility] [place] [facility]  [facility] KIDNEY AND HYPERTENSION  [address]  [address]  [place] [address]  Dept: [phone]  Loc: [phone]  Progress Note  8/14/2024 10:33 AM      Pt Name:    Portillo Daly  YOB: 1940  Primary Care Physician:  Elinor Ly, APRN - CNP     Chief Complaint:   Chief Complaint   Patient presents with    Follow-up     CKD IV         History of Present Illness:   This is a follow-up visit for CKD IV. He is a former patient of Dr. Ponce.  He has hx of CAD, DM, HTN, hyperlipidemia, Afib, pacemaker, EF 45-50%. Hx TAVR, COPD on home O2.   Past renal US showed hypoplastic left kidney.    Feels ok. Breathing is stable. Feels his edema is improved. Has been watching the salt in his diet closer.       Pertinent items are noted in HPI.         Past History:  Past Medical History:   Diagnosis Date    MILLY (acute kidney injury) ([facility])     Atrial fibrillation (HCC)     Cerebral artery occlusion with cerebral infarction ([facility])     CHF (congestive heart failure), NYHA class III, acute on chronic, combined ([facility])     COPD (chronic obstructive pulmonary disease) ([facility]) 8/3/2020    Coronary artery disease involving native coronary artery of native heart with angina pectoris ([facility])     Diabetes mellitus, type 2 ([facility]) 12/30/2020    Hyperlipidemia 2/20/2012    Hypertension     Pneumonia      Past Surgical History:   Procedure Laterality Date    AORTIC VALVE REPLACEMENT      CARDIAC SURGERY      heart cath    CORONARY ANGIOPLASTY WITH STENT PLACEMENT      HERNIA REPAIR      OTHER SURGICAL HISTORY  05/10/2018    PACEMAKER INSERTION          VITALS:  /64 (Site: Left Upper Arm, Position: Sitting, Cuff Size: Large Adult)   Pulse 64   Wt 93.9 kg (207 lb)   SpO2 95%   BMI 32.42 kg/m²   Wt Readings from Last 3 Encounters:   08/14/24 93.9 kg (207 lb)   05/08/24 92.4 kg (203 lb 9.6 oz)   05/07/24 93 kg (205 lb)     Body mass index is 32.42 kg/m².

## 2024-08-19 NOTE — TELEPHONE ENCOUNTER
Recent Visits  Date Type Provider Dept   05/08/24 Office Visit Elinor Ly, APRN - CNP [facility]   04/18/24 Office Visit Elinor Ly, APRN - CNP [facility]   03/05/24 Office Visit Elinor Ly, APRN - CNP [facility]   02/01/24 Office Visit Elinor Ly, APRN - CNP [facility]   12/05/23 Office Visit Elinor Ly, APRN - CNP [facility]   11/01/23 Office Visit Elinor Ly, APRN - CNP [facility]   09/13/23 Office Visit Elinor Ly, APRN - CNP [facility]   08/22/23 Office Visit Elinor Ly, APRN - CNP [facility]   07/11/23 Office Visit Elinor Ly, APRN - CNP [facility]   05/23/23 Office Visit Elinor Ly, APRN - CNP [facility]   Showing recent visits within past 540 days with a meds authorizing provider and meeting all other requirements  Future Appointments  Date Type Provider Dept   09/05/24 Appointment Elinor Ly, APRN - CNP [facility]   Showing future appointments within next 150 days with a meds authorizing provider and meeting all other requirements

## 2024-08-20 RX ORDER — METOPROLOL SUCCINATE 100 MG/1
TABLET, EXTENDED RELEASE ORAL
Qty: 90 TABLET | Refills: 0 | OUTPATIENT
Start: 2024-08-20

## 2024-08-21 ENCOUNTER — ANTI-COAG VISIT (OUTPATIENT)
Age: 84
End: 2024-08-21
Payer: MEDICARE

## 2024-08-21 DIAGNOSIS — I50.32 CHRONIC DIASTOLIC CHF (CONGESTIVE HEART FAILURE), NYHA CLASS 2 (HCC): ICD-10-CM

## 2024-08-21 DIAGNOSIS — I48.21 PERMANENT ATRIAL FIBRILLATION (HCC): Primary | ICD-10-CM

## 2024-08-21 DIAGNOSIS — Z51.81 ENCOUNTER FOR THERAPEUTIC DRUG MONITORING: ICD-10-CM

## 2024-08-21 DIAGNOSIS — Z79.01 ANTICOAGULATED ON COUMADIN: ICD-10-CM

## 2024-08-21 LAB — POC INR: 3.1 (ref 0.8–1.2)

## 2024-08-21 PROCEDURE — 85610 PROTHROMBIN TIME: CPT

## 2024-08-21 PROCEDURE — 99211 OFF/OP EST MAY X REQ PHY/QHP: CPT

## 2024-08-21 NOTE — PROGRESS NOTES
Medication Management Clinic  [facility]  Anticoagulation Clinic  [phone] (phone)  [phone] (fax)    Mr. Portillo Daly is a 84 y.o.  male with history of atrial fib., per referral from MARII Benjamin, who presents today for Warfarin monitoring and adjustment (2 week visit).    Patient verifies current tablet strength. Followed printed instructions to fill pill box.  No missed or extra doses.  Patient denies bleeding/swelling/SOB.  Has usual easy bruising. Ran out of [employer]iku before last visit - can't tell a difference without it.  No blood in urine or stool.  No dietary changes, except for 's salad last night (more salad than usual for him).  No changes in medication/OTC agents/herbals.  No change in alcohol use or tobacco use.  No change in activity level.  Patient denies falls.  No vomiting/diarrhea or acute illness.   No procedures scheduled in the future at this time.    Assessment:   Lab Results   Component Value Date    INR 3.10 (H) 08/21/2024    INR 2.50 (H) 08/08/2024    INR 3.50 (H) 07/25/2024     INR supratherapeutic - goal 2-3.  Recent Labs     08/21/24  1052   INR 3.10*        Plan:  POCT INR performed/result reviewed.  Decrease PO Coumadin to 2.5 mg MF, 1.25 mg TWThSS (from 2.5 mg MWF, 1.25 mg TThSS=10% decrease); today is W.  Recheck INR in 2-3 week(s).  (Report given - orders entered by BRO Russo RP., PharmD.)  Patient reminded to call the Anticoagulation Clinic with any signs or symptoms of bleeding or with any medication changes.  Patient given instructions utilizing the teach back method.       After visit summary printed and reviewed with patient.    Advised extra caution.  Discharged ambulatory in no apparent distress, with oxygen and female significant other.     For Pharmacy Admin Tracking Only    Intervention Detail: Dose Adjustment: 1, reason: Therapy De-escalation  Total # of Interventions Recommended: 1  Total # of Interventions Accepted: 1  Time Spent

## 2024-08-23 RX ORDER — METOPROLOL SUCCINATE 100 MG/1
100 TABLET, EXTENDED RELEASE ORAL DAILY
Qty: 90 TABLET | Refills: 3 | Status: SHIPPED | OUTPATIENT
Start: 2024-08-23

## 2024-08-28 NOTE — PROGRESS NOTES
[facility]    Patient: GABBI GRAVES, 84 y.o.   : 1940  9/3/2024    Patient of Dr. Odonnell    Assessment/Plan   1. Bronchiectasis without complication (HCC)  -Patient may continue azithromycin 250 mg daily  -Advised better compliance with acapella  -Start budesonide (PULMICORT) 0.5 MG/2ML nebulizer suspension; Take 2 mLs by nebulization 2 times daily Rinse mouth with water, gargle, and spit after use. This is for pulmonary hygiene   -Continue albuterol nebulizer every 6 hours as needed for shortness of breath or wheezing     2. Mixed obstructive and restrictive ventilatory defect  -Follow with Full PFT Study Without Bronchdilator prior to next visit   -Reviewed preventative vaccinations    3. Chronic respiratory failure with hypoxia (HCC)  -6 MWT - 3 lpm with exertion. Orders updated      Will request staff to obtain records from [facility], Dr. Arreola     Advised patient to call office with any changes, questions, or concerns regarding respiratory status or issues with prescribed medications    Return in about 3 months (around 12/3/2024) for Bronchiectasis, mixed obstructive restrictive disorder with PFT prior.        Subjective     Chief Complaint   Patient presents with    Follow-up     COPD follow up, last seen by Belle in         HPI  Complaints: Shortness of Breath  Onset Duration: Over a year  Exacerbating factors: Exertion/walking further distances/humidity   Alleviating factors: Rest  Associated symptoms: Cough and Sputum Production (clear to light yellow)  Pertinent negatives: Hemoptysis, Wheezing, and Chest Tightness  Risk factors for lung disease: no known risk factors    Gabbi is here for follow up for bronchiectasis. Patient was last evaluated by Dr. Odonnell in . Patient was following with [facility] previously, Dr. Arreola. Patient reports he was last seen in 2024 by Dr. Arreola. Patient reports he was started on azithromycin 250 mg daily by

## 2024-09-03 ENCOUNTER — OFFICE VISIT (OUTPATIENT)
Dept: PULMONOLOGY | Age: 84
End: 2024-09-03
Payer: MEDICARE

## 2024-09-03 VITALS
HEIGHT: 67 IN | SYSTOLIC BLOOD PRESSURE: 120 MMHG | BODY MASS INDEX: 32.55 KG/M2 | OXYGEN SATURATION: 99 % | TEMPERATURE: 97.8 F | WEIGHT: 207.4 LBS | HEART RATE: 59 BPM | DIASTOLIC BLOOD PRESSURE: 72 MMHG

## 2024-09-03 DIAGNOSIS — R94.2 MIXED OBSTRUCTIVE AND RESTRICTIVE VENTILATORY DEFECT: ICD-10-CM

## 2024-09-03 DIAGNOSIS — J47.9 BRONCHIECTASIS WITHOUT COMPLICATION (HCC): Primary | ICD-10-CM

## 2024-09-03 DIAGNOSIS — J96.11 CHRONIC RESPIRATORY FAILURE WITH HYPOXIA (HCC): ICD-10-CM

## 2024-09-03 PROCEDURE — 94618 PULMONARY STRESS TESTING: CPT

## 2024-09-03 PROCEDURE — 3074F SYST BP LT 130 MM HG: CPT

## 2024-09-03 PROCEDURE — 3078F DIAST BP <80 MM HG: CPT

## 2024-09-03 PROCEDURE — 99214 OFFICE O/P EST MOD 30 MIN: CPT

## 2024-09-03 PROCEDURE — 1123F ACP DISCUSS/DSCN MKR DOCD: CPT

## 2024-09-03 PROCEDURE — G8427 DOCREV CUR MEDS BY ELIG CLIN: HCPCS

## 2024-09-03 PROCEDURE — G8417 CALC BMI ABV UP PARAM F/U: HCPCS

## 2024-09-03 PROCEDURE — 1036F TOBACCO NON-USER: CPT

## 2024-09-03 RX ORDER — AZITHROMYCIN 250 MG/1
250 TABLET, FILM COATED ORAL DAILY
COMMUNITY
End: 2024-09-03 | Stop reason: SDUPTHER

## 2024-09-03 RX ORDER — AZITHROMYCIN 250 MG/1
250 TABLET, FILM COATED ORAL DAILY
Qty: 90 TABLET | Refills: 3 | Status: SHIPPED | OUTPATIENT
Start: 2024-09-03

## 2024-09-03 RX ORDER — BUDESONIDE 0.5 MG/2ML
1 INHALANT ORAL 2 TIMES DAILY
Qty: 360 ML | Refills: 3 | Status: SHIPPED | OUTPATIENT
Start: 2024-09-03 | End: 2025-08-29

## 2024-09-03 ASSESSMENT — ENCOUNTER SYMPTOMS
COUGH: 1
SHORTNESS OF BREATH: 1
WHEEZING: 0
RHINORRHEA: 0
SINUS PRESSURE: 0
SINUS PAIN: 0
CHEST TIGHTNESS: 0

## 2024-09-03 NOTE — PATIENT INSTRUCTIONS
I recommend the RSV vaccine (Arexvy or Abrysvo). Please discuss with your pharmacist     Start budesonide nebulizer twice daily. Rinse mouth with water, gargle, and spit after use.     Continue albuterol nebulizer. You will use every 6 hours as needed for shortness of breath or wheezing     Continue azithromycin daily     Start using acapella at least 3 times per day    Based on your walking test. You no longer need to wear oxygen at rest. You will wear your oxygen at 3 lpm when walking.     We will repeat your breathing test prior to your next visit.

## 2024-09-05 ENCOUNTER — ANTI-COAG VISIT (OUTPATIENT)
Age: 84
End: 2024-09-05
Payer: MEDICARE

## 2024-09-05 DIAGNOSIS — I48.21 PERMANENT ATRIAL FIBRILLATION (HCC): Primary | ICD-10-CM

## 2024-09-05 DIAGNOSIS — Z79.01 ANTICOAGULATED ON COUMADIN: ICD-10-CM

## 2024-09-05 DIAGNOSIS — Z51.81 ENCOUNTER FOR THERAPEUTIC DRUG MONITORING: ICD-10-CM

## 2024-09-05 LAB — POC INR: 1.7 (ref 0.8–1.2)

## 2024-09-05 PROCEDURE — 85610 PROTHROMBIN TIME: CPT

## 2024-09-05 PROCEDURE — 99212 OFFICE O/P EST SF 10 MIN: CPT

## 2024-09-05 NOTE — PROGRESS NOTES
Medication Management Clinic  [facility]  Anticoagulation Clinic  [phone] (phone)  [phone] (fax)    Mr. Portillo Daly is a 84 y.o.  male with history of atrial fib., per referral from MARII Benjamin, who presents today for Warfarin monitoring and adjustment (2 week visit after decreasing dose by 10%).    Patient verifies current  tablet strength.  Followed printed instructions to fill pill box.  No missed or extra doses.  Patient denies bleeding/swelling/SOB.  Has usual easy bruising.  No blood in urine or stool.  No dietary changes.  To start Pulmicort nebulizer.  No change in alcohol use or tobacco use.  No change in activity level.  Patient denies falls.  No vomiting/diarrhea or acute illness.   No procedures scheduled in the future at this time.  Had RSV vaccine 9/3.    Assessment:   Lab Results   Component Value Date    INR 1.70 (H) 09/05/2024    INR 3.10 (H) 08/21/2024    INR 2.50 (H) 08/08/2024     INR subtherapeutic - goal 2-3.  Recent Labs     09/05/24  1338   INR 1.70*        Plan:  POCT INR performed/result reviewed.  2.5 mg today, Th, then continue PO Coumadin 2.5 mg MF, 1.25 mg TWThSS.  Recheck INR in 2 week(s). (Report given - orders entered by GUSTAVO Borges [facility]., PharmD.)  Patient reminded to call the Anticoagulation Clinic with any signs or symptoms of bleeding or with any medication changes.  Patient given instructions utilizing the teach back method.       After visit summary printed and reviewed with patient.      Discharged ambulatory in no apparent distress, with oxygen/tank, and female significant other.     For Pharmacy Admin Tracking Only    Intervention Detail: Dose Adjustment: 1, reason: Therapy Optimization  Total # of Interventions Recommended: 1  Total # of Interventions Accepted: 1  Time Spent (min):  22

## 2024-09-19 ENCOUNTER — ANTI-COAG VISIT (OUTPATIENT)
Age: 84
End: 2024-09-19
Payer: MEDICARE

## 2024-09-19 DIAGNOSIS — I48.21 PERMANENT ATRIAL FIBRILLATION (HCC): Primary | ICD-10-CM

## 2024-09-19 DIAGNOSIS — Z79.01 ANTICOAGULATED ON COUMADIN: ICD-10-CM

## 2024-09-19 DIAGNOSIS — Z51.81 ENCOUNTER FOR THERAPEUTIC DRUG MONITORING: ICD-10-CM

## 2024-09-19 LAB — POC INR: 2.4 (ref 0.8–1.2)

## 2024-09-19 PROCEDURE — 99211 OFF/OP EST MAY X REQ PHY/QHP: CPT

## 2024-09-19 PROCEDURE — 85610 PROTHROMBIN TIME: CPT

## 2024-09-25 ENCOUNTER — NURSE ONLY (OUTPATIENT)
Dept: CARDIOLOGY CLINIC | Age: 84
End: 2024-09-25

## 2024-09-25 DIAGNOSIS — Z95.0 PACEMAKER: Primary | ICD-10-CM

## 2024-09-30 DIAGNOSIS — Z86.39 H/O INSULIN DEPENDENT DIABETES MELLITUS: ICD-10-CM

## 2024-09-30 DIAGNOSIS — E11.65 TYPE 2 DIABETES MELLITUS WITH HYPERGLYCEMIA, WITHOUT LONG-TERM CURRENT USE OF INSULIN (HCC): ICD-10-CM

## 2024-09-30 RX ORDER — CALCIUM CITRATE/VITAMIN D3 200MG-6.25
TABLET ORAL
Qty: 100 STRIP | Refills: 3 | Status: SHIPPED | OUTPATIENT
Start: 2024-09-30

## 2024-09-30 NOTE — TELEPHONE ENCOUNTER
Recent Visits  Date Type Provider Dept   05/08/24 Office Visit Elinor Ly, APRN - CNP [facility]   04/18/24 Office Visit Elinor Ly, APRN - CNP [facility]   03/05/24 Office Visit Elinor Ly, APRN - CNP [facility]   02/01/24 Office Visit Elinor Ly, APRN - CNP [facility]   12/05/23 Office Visit Elinor Ly, APRN - CNP [facility]   11/01/23 Office Visit Elinor Ly, APRN - CNP [facility]   09/13/23 Office Visit Elinor Ly, APRN - CNP [facility]   08/22/23 Office Visit Elinor Ly, APRN - CNP [facility]   07/11/23 Office Visit Elinor Ly, APRN - CNP [facility]   05/23/23 Office Visit Elinor Ly, APRN - CNP [facility]   Showing recent visits within past 540 days with a meds authorizing provider and meeting all other requirements  Future Appointments  Date Type Provider Dept   11/05/24 Appointment Elinor Ly, APRN - CNP [facility]   Showing future appointments within next 150 days with a meds authorizing provider and meeting all other requirements

## 2024-10-10 ENCOUNTER — ANTI-COAG VISIT (OUTPATIENT)
Age: 84
End: 2024-10-10
Payer: MEDICARE

## 2024-10-10 DIAGNOSIS — Z51.81 ENCOUNTER FOR THERAPEUTIC DRUG MONITORING: ICD-10-CM

## 2024-10-10 DIAGNOSIS — Z79.01 ANTICOAGULATED ON COUMADIN: ICD-10-CM

## 2024-10-10 DIAGNOSIS — I48.21 PERMANENT ATRIAL FIBRILLATION (HCC): Primary | ICD-10-CM

## 2024-10-10 LAB — POC INR: 2.3 (ref 0.8–1.2)

## 2024-10-10 PROCEDURE — 99211 OFF/OP EST MAY X REQ PHY/QHP: CPT

## 2024-10-10 PROCEDURE — 85610 PROTHROMBIN TIME: CPT

## 2024-10-10 NOTE — PROGRESS NOTES
Medication Management Clinic  [facility]  Anticoagulation Clinic  [phone] (phone)  [phone] (fax)    Mr. Portillo Daly is a 84 y.o.  male with history of atrial fib., per referral from MARII Benjamin, who presents today for Warfarin monitoring and adjustment (3 week visit - at least 20 minutes early for visit).    Patient verifies tablet strength.  Followed printed instructions to fill pill box.  No missed or extra doses.  Patient denies bleeding/swelling/SOB. Has usual easy bruising.  No blood in urine or stool.  No dietary changes.  No changes in medication/OTC agents/herbals.  No change in alcohol use or tobacco use.  No change in activity level.  Patient denies falls.  No vomiting/diarrhea or acute illness.   No procedures scheduled in the future at this time.  States sugar was 121 this morning.    Assessment:   Lab Results   Component Value Date    INR 2.30 (H) 10/10/2024    INR 2.40 (H) 09/19/2024    INR 1.70 (H) 09/05/2024     INR therapeutic - goal 2-3.  Recent Labs     10/10/24  1043   INR 2.30*        Plan:  POCT INR performed/result reviewed.  Continue PO Coumadin 2.5 mg MF, 1.25 mg TWThSS.  Recheck INR in 4 week(s).  Patient reminded to call the Anticoagulation Clinic with any signs or symptoms of bleeding or with any medication changes.  Patient given instructions utilizing the teach back method.       After visit summary printed and reviewed with patient.    They had questions about bill they just paid for 2 visits in this clinic last month.  Stated they never had bill before ([employer]).  Advised them will forward name to Linda Green as there have been billing problems with that company.  Discharged ambulatory in no apparent distress, with oxygen per tank, and female significant other.     For Pharmacy Admin Tracking Only    Time Spent (min): 23

## 2024-10-23 ENCOUNTER — NURSE ONLY (OUTPATIENT)
Dept: CARDIOLOGY CLINIC | Age: 84
End: 2024-10-23

## 2024-10-23 DIAGNOSIS — Z95.0 PACEMAKER: Primary | ICD-10-CM

## 2024-11-05 ENCOUNTER — ANTI-COAG VISIT (OUTPATIENT)
Age: 84
End: 2024-11-05
Payer: MEDICARE

## 2024-11-05 ENCOUNTER — OFFICE VISIT (OUTPATIENT)
Dept: FAMILY MEDICINE CLINIC | Age: 84
End: 2024-11-05

## 2024-11-05 VITALS
SYSTOLIC BLOOD PRESSURE: 122 MMHG | HEIGHT: 67 IN | OXYGEN SATURATION: 93 % | WEIGHT: 208 LBS | HEART RATE: 72 BPM | RESPIRATION RATE: 16 BRPM | DIASTOLIC BLOOD PRESSURE: 72 MMHG | BODY MASS INDEX: 32.65 KG/M2 | TEMPERATURE: 97.8 F

## 2024-11-05 DIAGNOSIS — Z00.00 MEDICARE ANNUAL WELLNESS VISIT, SUBSEQUENT: ICD-10-CM

## 2024-11-05 DIAGNOSIS — I25.119 CORONARY ARTERY DISEASE INVOLVING NATIVE CORONARY ARTERY OF NATIVE HEART WITH ANGINA PECTORIS (HCC): ICD-10-CM

## 2024-11-05 DIAGNOSIS — Z79.01 ANTICOAGULATED ON COUMADIN: ICD-10-CM

## 2024-11-05 DIAGNOSIS — Z51.81 ENCOUNTER FOR THERAPEUTIC DRUG MONITORING: ICD-10-CM

## 2024-11-05 DIAGNOSIS — I48.21 PERMANENT ATRIAL FIBRILLATION (HCC): Primary | ICD-10-CM

## 2024-11-05 DIAGNOSIS — Z23 NEEDS FLU SHOT: Primary | ICD-10-CM

## 2024-11-05 DIAGNOSIS — E11.65 TYPE 2 DIABETES MELLITUS WITH HYPERGLYCEMIA, WITHOUT LONG-TERM CURRENT USE OF INSULIN (HCC): ICD-10-CM

## 2024-11-05 LAB
HBA1C MFR BLD: 7 % (ref 4.3–5.7)
POC INR: 2 (ref 0.8–1.2)

## 2024-11-05 PROCEDURE — 99211 OFF/OP EST MAY X REQ PHY/QHP: CPT

## 2024-11-05 PROCEDURE — 85610 PROTHROMBIN TIME: CPT

## 2024-11-05 RX ORDER — DAPAGLIFLOZIN 10 MG/1
10 TABLET, FILM COATED ORAL EVERY MORNING
Qty: 90 TABLET | Refills: 3 | Status: SHIPPED | OUTPATIENT
Start: 2024-11-05

## 2024-11-05 RX ORDER — ATORVASTATIN CALCIUM 80 MG/1
TABLET, FILM COATED ORAL
Qty: 90 TABLET | Refills: 3 | Status: SHIPPED | OUTPATIENT
Start: 2024-11-05

## 2024-11-05 SDOH — ECONOMIC STABILITY: INCOME INSECURITY: HOW HARD IS IT FOR YOU TO PAY FOR THE VERY BASICS LIKE FOOD, HOUSING, MEDICAL CARE, AND HEATING?: NOT HARD AT ALL

## 2024-11-05 SDOH — ECONOMIC STABILITY: FOOD INSECURITY: WITHIN THE PAST 12 MONTHS, YOU WORRIED THAT YOUR FOOD WOULD RUN OUT BEFORE YOU GOT MONEY TO BUY MORE.: NEVER TRUE

## 2024-11-05 SDOH — ECONOMIC STABILITY: FOOD INSECURITY: WITHIN THE PAST 12 MONTHS, THE FOOD YOU BOUGHT JUST DIDN'T LAST AND YOU DIDN'T HAVE MONEY TO GET MORE.: NEVER TRUE

## 2024-11-05 ASSESSMENT — PATIENT HEALTH QUESTIONNAIRE - PHQ9
SUM OF ALL RESPONSES TO PHQ QUESTIONS 1-9: 0
SUM OF ALL RESPONSES TO PHQ QUESTIONS 1-9: 0
SUM OF ALL RESPONSES TO PHQ9 QUESTIONS 1 & 2: 0
1. LITTLE INTEREST OR PLEASURE IN DOING THINGS: NOT AT ALL
SUM OF ALL RESPONSES TO PHQ QUESTIONS 1-9: 0
2. FEELING DOWN, DEPRESSED OR HOPELESS: NOT AT ALL
SUM OF ALL RESPONSES TO PHQ QUESTIONS 1-9: 0

## 2024-11-05 NOTE — PROGRESS NOTES
Medication Management Clinic  [facility]  Anticoagulation Clinic  [phone] (phone)  [phone] (fax)    Mr. Portillo Daly is a 84 y.o.  male with history of atrial fib., per referral from MARII Benjamin, who presents today for Warfarin monitoring and adjustment (4 week visit - at least 20 minutes early for visit).    Patient verifies current dosing regimen and tablet strength.  No extra doses. Missed all evening pills 10/30, including 1.25 mg Coumadin.  Reminded to call this clinic next business day for instructions.  Patient denies bleeding/swelling. Thinks his SOB is slightly worse with pacemaker battery running down.  Has usual easy bruising.  No blood in urine or stool.  No dietary changes.  No changes in medication/OTC agents/herbals.  No change in alcohol use or tobacco use.  No change in activity level.  Patient denies falls.  No vomiting/diarrhea or acute illness.   No procedures scheduled in the future at this time.  States had good report from PCP at this morning's visit.    Assessment:     Lab Results   Component Value Date    INR 2.00 (H) 11/05/2024    INR 2.30 (H) 10/10/2024    INR 2.40 (H) 09/19/2024     INR therapeutic - goal 2-3.  Recent Labs     11/05/24  1124   INR 2.00*      Plan:  POCT INR performed/result reviewed.  Continue PO Coumadin 2.5 mg MF, 1.25 mg TWThSS.  Recheck INR in 3 week(s), per patient's request (instead of 4).  Patient reminded to call the Anticoagulation Clinic with any signs or symptoms of bleeding or with any medication changes.  Patient given instructions utilizing the teach back method.       After visit summary printed and reviewed with patient.      Discharged ambulatory in no apparent distress, with oxygen/tank and female significant other.  Report given to MAX Diamond RPh., PharmD. - no new orders.    For Pharmacy Admin Tracking Only    Time Spent (min): 21

## 2024-11-05 NOTE — PROGRESS NOTES
Portillo Daly is a 84 y.o. male for Medicare AWV      Diabetes Type 2    Glucose control:   Does patient check blood glucoses at home?  Yes  Report of hypoglycemia: no  Lab Results   Component Value Date    LABA1C 7.0 (H) 11/05/2024     Lab Results   Component Value Date     (H) 11/28/2022       Symptoms  Polyuria, Polydipsia or Polyphagia?   No  Chest Pain, SOB, or Palpitations? -  sob at baseline  New Vision complaints? No  Paresthesias of the extremities?  No    Medications  Current medication were reviewed.  Compliant with medications?  yes  Medication side effects?  No  On ACE-I or ARB?  Yes  On antiplatelet therapy?  Yes  On Statin?  Yes    Last Diabetic Eye Exam: utd    Exercise  Exercise? Tries to walk some 1 day a week  Wt Readings from Last 3 Encounters:   11/05/24 94.3 kg (208 lb)   09/03/24 94.1 kg (207 lb 6.4 oz)   08/14/24 93.9 kg (207 lb)       Diet discipline?:  Low salt, fat, sugar diet?  yes    Blood pressure control:  BP Readings from Last 3 Encounters:   11/05/24 122/72   09/03/24 120/72   08/14/24 118/64       No results found for: \"ZABV77ADY\"    No results found for: \"LDLDIRECT\"    Physical Exam    /72   Pulse 72   Temp 97.8 °F (36.6 °C) (Oral)   Resp 16   Ht 1.702 m (5' 7\")   Wt 94.3 kg (208 lb)   SpO2 93%   BMI 32.58 kg/m²   Appearance: alert, well appearing, and in no distress, normal appearing weight and well hydrated.  Neck exam - supple, no significant adenopathy.  CVS exam: normal rate, regular rhythm, normal S1, S2, no murmurs, rubs, clicks or gallops.  Lungs: some wheezing noted  Abdomen:  BS normal, soft, non tender, non distended, no rebound or guarding  Mental Status: normal mood, affect behavior, speech, dress,  and thought processes.  Neuro:  Alert, muscle tone grossly normal  Skin exam: normal coloration and turgor, no rashes, no suspicious skin lesions noted.  Foot exam:  No pedal edema, no erythematous lesions noted b/l, sensation wnl b/l, PT and TP

## 2024-11-05 NOTE — PROGRESS NOTES
Medicare Annual Wellness Visit    Portillo Daly is here for Medicare AWV    Assessment & Plan   Needs flu shot  -     Influenza, FLUAD Trivalent, (age 65 y+), IM, Preservative Free, 0.5mL  Type 2 diabetes mellitus with hyperglycemia, without long-term current use of insulin (HCC)  -     POCT glycosylated hemoglobin (Hb A1C)  -     dapagliflozin (FARXIGA) 10 MG tablet; Take 1 tablet by mouth every morning, Disp-90 tablet, R-3Print  Medicare annual wellness visit, subsequent  Coronary artery disease involving native coronary artery of native heart with angina pectoris (HCC)  -     atorvastatin (LIPITOR) 80 MG tablet; TAKE 1 TABLET EVERY DAY, Disp-90 tablet, R-3Normal    Recommendations for Preventive Services Due: see orders and patient instructions/AVS.  Recommended screening schedule for the next 5-10 years is provided to the patient in written form: see Patient Instructions/AVS.     No follow-ups on file.     Subjective   The following acute and/or chronic problems were also addressed today:  DM, CHF, HTN    Patient's complete Health Risk Assessment and screening values have been reviewed and are found in Flowsheets. The following problems were reviewed today and where indicated follow up appointments were made and/or referrals ordered.    Positive Risk Factor Screenings with Interventions:                Inactivity:  On average, how many days per week do you engage in moderate to strenuous exercise (like a brisk walk)?: 1 day (!) Abnormal  On average, how many minutes do you engage in exercise at this level?: 10 min  Interventions:  See AVS for additional education material     Abnormal BMI (obese):  Body mass index is 32.58 kg/m². (!) Abnormal  Interventions:  See AVS for additional education material                           Objective   Vitals:    11/05/24 0957   BP: 122/72   Pulse: 72   Resp: 16   Temp: 97.8 °F (36.6 °C)   TempSrc: Oral   SpO2: 93%   Weight: 94.3 kg (208 lb)   Height: 1.702 m (5' 7\")

## 2024-11-06 ENCOUNTER — NURSE ONLY (OUTPATIENT)
Dept: CARDIOLOGY CLINIC | Age: 84
End: 2024-11-06

## 2024-11-06 ENCOUNTER — TELEPHONE (OUTPATIENT)
Dept: CARDIOLOGY CLINIC | Age: 84
End: 2024-11-06

## 2024-11-06 DIAGNOSIS — Z95.0 PACEMAKER: Primary | ICD-10-CM

## 2024-11-06 DIAGNOSIS — Z45.010 PACEMAKER BATTERY DEPLETION: Primary | ICD-10-CM

## 2024-11-06 NOTE — PROGRESS NOTES
Vaccine Information Sheet, \"Influenza - Inactivated\"  given to Portillo Daly, or parent/legal guardian of  Portillo Daly and verbalized understanding.    Patient responses:    Have you ever had a reaction to a flu vaccine? No  Do you have an allergy to eggs, neomycin or polymixin?  No  Do you have an allergy to Thimerosal, contact lens solution, or Merthiolate? No  Have you ever had [place] Syndrome?  No  Do you have any current illness?  No  Do you have a temperature above 100 degrees? No  Are you pregnant? No  If pregnant, permission obtained from physician? No  Do you have an active neurological disorder? No

## 2024-11-06 NOTE — TELEPHONE ENCOUNTER
Dual pacemaker gen change scheduled 12-02-24  Charles from cath lab will call about 1 week prior with time to arrive and instructions    Call to pt, left msg to call office for date

## 2024-11-06 NOTE — TELEPHONE ENCOUNTER
DR LARSEN PT   IN OFFICE TODAY FOR [facility] DUAL PACER CHECK     PT IS ON BATTERY WATCH     DEVICE SAYS <0-3 MTHS LEFT    I CALLED TECH SERVICES AT [facility] AND HE CALCULATED THE BATTERY AND SAID YES, THIS DEVICE HAS HIT CHECO AND TIME FOR A GEN CHANGE     I CALLED THIS PT TO LET HIM KNOW HE DID HIT CHECO AND ORDERS WILL BE TAKEN TO SCHEDULING FOR A GEN CHANGE AND THAT THEY WILL BE CALLING HIM    ORDERS PLACED AND TAKEN TO SCHEDULING

## 2024-11-07 ENCOUNTER — LAB (OUTPATIENT)
Dept: LAB | Age: 84
End: 2024-11-07

## 2024-11-07 ENCOUNTER — TELEPHONE (OUTPATIENT)
Dept: CARDIOLOGY CLINIC | Age: 84
End: 2024-11-07

## 2024-11-07 DIAGNOSIS — N18.4 CKD (CHRONIC KIDNEY DISEASE), STAGE IV (HCC): ICD-10-CM

## 2024-11-07 LAB
ANION GAP SERPL CALC-SCNC: 17 MEQ/L (ref 8–16)
BUN SERPL-MCNC: 54 MG/DL (ref 7–22)
CALCIUM SERPL-MCNC: 8.9 MG/DL (ref 8.5–10.5)
CHLORIDE SERPL-SCNC: 93 MEQ/L (ref 98–111)
CO2 SERPL-SCNC: 26 MEQ/L (ref 23–33)
CREAT SERPL-MCNC: 3.5 MG/DL (ref 0.4–1.2)
DEPRECATED RDW RBC AUTO: 54 FL (ref 35–45)
ERYTHROCYTE [DISTWIDTH] IN BLOOD BY AUTOMATED COUNT: 14.3 % (ref 11.5–14.5)
GFR SERPL CREATININE-BSD FRML MDRD: 16 ML/MIN/1.73M2
GLUCOSE SERPL-MCNC: 142 MG/DL (ref 70–108)
HCT VFR BLD AUTO: 41.3 % (ref 42–52)
HGB BLD-MCNC: 13.2 GM/DL (ref 14–18)
MCH RBC QN AUTO: 32.6 PG (ref 26–33)
MCHC RBC AUTO-ENTMCNC: 32 GM/DL (ref 32.2–35.5)
MCV RBC AUTO: 102 FL (ref 80–94)
PHOSPHATE SERPL-MCNC: 3.8 MG/DL (ref 2.4–4.7)
PLATELET # BLD AUTO: 254 THOU/MM3 (ref 130–400)
PMV BLD AUTO: 11.7 FL (ref 9.4–12.4)
POTASSIUM SERPL-SCNC: 4 MEQ/L (ref 3.5–5.2)
PTH-INTACT SERPL-MCNC: 111.3 PG/ML (ref 15–65)
RBC # BLD AUTO: 4.05 MILL/MM3 (ref 4.7–6.1)
SODIUM SERPL-SCNC: 136 MEQ/L (ref 135–145)
WBC # BLD AUTO: 13.6 THOU/MM3 (ref 4.8–10.8)

## 2024-11-07 NOTE — TELEPHONE ENCOUNTER
PROCEDURE: dual icd gen change    DATE OF SERVICE: 12/02/2024    SERVICE LOCATION: [facility]    CPT CODE: 99843    PHYSICIAN: DR. Garcia    DATE PRIOR AUTH SUBMITTED: 11/07/2024    STATUS: APPROVED.    CASE NUMBER: [id number]     AUTH NUMBER: [id number]     VALID: 12/022024-01/01/2025

## 2024-11-11 PROBLEM — Z45.010 ENCOUNTER FOR PACEMAKER AT END OF BATTERY LIFE: Status: ACTIVE | Noted: 2024-11-06

## 2024-11-13 ENCOUNTER — OFFICE VISIT (OUTPATIENT)
Dept: NEPHROLOGY | Age: 84
End: 2024-11-13

## 2024-11-13 VITALS
SYSTOLIC BLOOD PRESSURE: 124 MMHG | HEART RATE: 72 BPM | WEIGHT: 208 LBS | DIASTOLIC BLOOD PRESSURE: 74 MMHG | BODY MASS INDEX: 32.58 KG/M2 | OXYGEN SATURATION: 98 %

## 2024-11-13 DIAGNOSIS — M79.661 BILATERAL CALF PAIN: ICD-10-CM

## 2024-11-13 DIAGNOSIS — N18.4 CKD (CHRONIC KIDNEY DISEASE), STAGE IV (HCC): Primary | ICD-10-CM

## 2024-11-13 DIAGNOSIS — M79.662 BILATERAL CALF PAIN: ICD-10-CM

## 2024-11-13 NOTE — PROGRESS NOTES
[facility] [place] [facility]  [facility] K[facility]NEY AND HYPERTENSION  [address]  [address]  [place] [address]  Dept: [phone]  Loc: [phone]  Progress Note  11/13/2024 10:22 AM      Pt Name:    Portillo Daly  YOB: 1940  Primary Care Physician:  Elinor Ly, APRN - CNP     Chief Complaint:   Chief Complaint   Patient presents with    Follow-up     CKD IV         History of Present Illness:   This is a follow-up visit for CKD IV.  He has hx of CAD, DM, HTN, hyperlipidemia, Afib, pacemaker, EF 45-50%. Hx TAVR, COPD on home O2.   Past renal US showed hypoplastic left kidney.    He is getting PPM removed/replaced in December. He feels tired.   Swelling ok.   Breathing stable.   Had dialysis education - prefers in center HD.       Pertinent items are noted in HPI.         Past History:  Past Medical History:   Diagnosis Date    MILLY (acute kidney injury) ([facility]ly [facility])     Atrial fibrillation (Formerly [facility])     Cerebral artery occlusion with cerebral infarction (Formerly [facility])     CHF (congestive heart failure), NYHA class III, acute on chronic, combined ([facility]ly [facility])     COPD (chronic obstructive pulmonary disease) ([facility]ly [facility]) 8/3/2020    Coronary artery disease involving native coronary artery of native heart with angina pectoris ([facility]ly [facility])     Diabetes mellitus, type 2 ([facility]ly [facility]) 12/30/2020    Hyperlipidemia 2/20/2012    Hypertension     Pneumonia      Past Surgical History:   Procedure Laterality Date    AORTIC VALVE REPLACEMENT      CARDIAC SURGERY      heart cath    CORONARY ANGIOPLASTY WITH STENT PLACEMENT      HERNIA REPAIR      OTHER SURGICAL HISTORY  05/10/2018    PACEMAKER INSERTION          VITALS:  /74 (Site: Left Upper Arm, Position: Sitting, Cuff Size: Large Adult)   Pulse 72   Wt 94.3 kg (208 lb)   SpO2 98%   BMI 32.58 kg/m²   Wt Readings from Last 3 Encounters:   11/13/24 94.3 kg (208 lb)   11/05/24 94.3 kg (208 lb)   09/03/24 94.1 kg (207 lb 6.4 oz)     Body mass index is 32.58 kg/m².

## 2024-11-18 ENCOUNTER — TELEPHONE (OUTPATIENT)
Age: 84
End: 2024-11-18

## 2024-11-18 ENCOUNTER — TELEPHONE (OUTPATIENT)
Dept: CARDIOLOGY CLINIC | Age: 84
End: 2024-11-18

## 2024-11-18 DIAGNOSIS — R06.02 SHORTNESS OF BREATH: Primary | ICD-10-CM

## 2024-11-18 NOTE — TELEPHONE ENCOUNTER
I called patients significant other, Gina, and left a message to schedule an appointment with Dr. Robert for consult for the fistula.  The patient is scheduled on 11/21/2024 for the fistulagram.  Gina arrived at our office window and was very confused about the testing and where it was to be done. We gave her a print out of Portillo's appointments and clarified with her the location of the fistulagram.  An appointment was offered to her for follow up to this procedure and she declined.  She stated she wanted to wait until she followed up with Chastity.  She also stated a couple of times that at the last appointment Chastity had a mask on and she could not understand her.  Is there anyway the patient or his significant other can be called and clarify the reason for the consult with Dr. Robert?  Thank you.

## 2024-11-19 ENCOUNTER — TELEPHONE (OUTPATIENT)
Dept: FAMILY MEDICINE CLINIC | Age: 84
End: 2024-11-19

## 2024-11-19 NOTE — TELEPHONE ENCOUNTER
Patient informed that I did appeal, he is okay if insurance denies that Gabby helps him. Just waiting on insurance review and then can let Gabby know

## 2024-11-19 NOTE — TELEPHONE ENCOUNTER
Please update him that there is an appeal for this.  I think he may get this through our med assistance program.  Would reach out Gabby Yee

## 2024-11-19 NOTE — TELEPHONE ENCOUNTER
Charles from Cath Lab called stating Dr. Garcia would like patient to have an echo prior to generator change out on 12/2.   OK to order?  
Patient is scheduled for echo on 11/21/24 at 11:45.  Patient notified and voiced understanding.  
Spoke with pt.  Order placed.  Pt voiced understanding.  
Yes that is fine  
Andreea

## 2024-11-19 NOTE — TELEPHONE ENCOUNTER
Elinor Pierre and I can't remember if you help patient get his Farxiga or not?   If not are you able to help patient, I do have an appeal out for medication

## 2024-11-19 NOTE — TELEPHONE ENCOUNTER
I show I have only helped him with Januvia in the past. I could get him a tuyet that would  the copays of the Farxiga for the next year, it has to be approved through his insurance first. There is the manufacturers assistance we could try to go through but it is currently down. Let me know if you can get this approved through his insurance and I can then sign him up for a tuyet.

## 2024-11-19 NOTE — TELEPHONE ENCOUNTER
Pt received coverage denial in the mail and is concerned. Pt says he can not afford to pay for the med so if it can not be approved, he is open to trying a different medication. A copy of the coverage denial letter was placed in the nurse box.   Recent Visits  Date Type Provider Dept   11/05/24 Office Visit Elinor Ly, APRN - CNP [facility]   05/08/24 Office Visit Elinor Ly, APRN - CNP [facility]   04/18/24 Office Visit Elinor Ly, APRN - CNP [facility]   03/05/24 Office Visit Elinor Ly, APRN - CNP [facility]   02/01/24 Office Visit Elinor Ly, APRN - CNP [facility]   12/05/23 Office Visit Elinor Ly, APRN - CNP [facility]   11/01/23 Office Visit Elinor Ly, APRN - CNP [facility]   09/13/23 Office Visit Elinor Ly, APRN - CNP [facility]   08/22/23 Office Visit Elinor Ly, APRN - CNP [facility]   07/11/23 Office Visit Elinor Ly, APRN - CNP [facility]   Showing recent visits within past 540 days with a meds authorizing provider and meeting all other requirements  Future Appointments  No visits were found meeting these conditions.  Showing future appointments within next 150 days with a meds authorizing provider and meeting all other requirements

## 2024-11-25 NOTE — PROGRESS NOTES
Called with pre-op instructions for 12/2 pacemaker gen change, NPO after midnight, to have INR drawn on Friday, we will call back to see what INR is for better idea of when to stop coumadin.  To have echo done on 11/29. Need , bring belongings in case of staying the night.  Arrive at 10am

## 2024-11-26 ENCOUNTER — ANTI-COAG VISIT (OUTPATIENT)
Age: 84
End: 2024-11-26
Payer: MEDICARE

## 2024-11-26 DIAGNOSIS — Z79.01 ANTICOAGULATED ON COUMADIN: ICD-10-CM

## 2024-11-26 DIAGNOSIS — Z79.01 ANTICOAGULATED ON COUMADIN: Primary | ICD-10-CM

## 2024-11-26 DIAGNOSIS — I48.21 PERMANENT ATRIAL FIBRILLATION (HCC): Primary | ICD-10-CM

## 2024-11-26 DIAGNOSIS — Z51.81 ENCOUNTER FOR THERAPEUTIC DRUG MONITORING: ICD-10-CM

## 2024-11-26 DIAGNOSIS — I48.21 PERMANENT ATRIAL FIBRILLATION (HCC): ICD-10-CM

## 2024-11-26 LAB — POC INR: 2 (ref 0.8–1.2)

## 2024-11-26 PROCEDURE — 85610 PROTHROMBIN TIME: CPT

## 2024-11-26 PROCEDURE — 99211 OFF/OP EST MAY X REQ PHY/QHP: CPT

## 2024-11-26 NOTE — PROGRESS NOTES
Medication Management Clinic  [facility]  Anticoagulation Clinic  [phone] (phone)  [phone] (fax)    Mr. Portillo Daly is a 84 y.o.  male with history of atrial fib., per referral from MARII Benjamin, who presents today for Warfarin monitoring and adjustment (3 week visit).    Patient verifies current tablet strength. Followed printed instructions for dose. Uses pill box.  No missed or extra doses.  Patient denies bleeding/bruising/swelling.  Has increasing SOB (cardiolgy aware).  No blood in urine or stool.  No dietary changes.  No changes in medication/OTC agents/herbals.  No change in alcohol use or tobacco use.  Change in activity level: decreased.  Patient denies falls.  No vomiting/diarrhea or acute illness.   Procedures scheduled in the future at this time: 12/2 generator change with Dr. Garcia. States needs INR checked 11/29 (this clinic closed); then doctor's office will call and tell him if he needs to stop Coumadin 1-2 days before.    Assessment:   Lab Results   Component Value Date    INR 2.00 (H) 11/26/2024    INR 2.00 (H) 11/05/2024    INR 2.30 (H) 10/10/2024     INR therapeutic - goal 2-3.  Recent Labs     11/26/24  1038   INR 2.00*        Plan:  POCT INR performed/result reviewed.  Continue PO Coumadin 2.5 mg MF, 1.25 mg TWThSS.  Recheck INR in 2 week(s) - has another appointment nearby (lives out of town).  Patient reminded to call the Anticoagulation Clinic with any signs or symptoms of bleeding or with any medication changes.  Patient given instructions utilizing the teach back method.       After visit summary printed and reviewed with patient.    Given order for stat INR in lab for 11/29 since this clinic closed - for cardiology. Will do before echo that day.  Discharged via transport chair in no apparent distress, wearing oxygen/tank, with female significant other  Placed on clinic calendar for 12/3 to see if Coumadin held.     For Pharmacy Admin Tracking

## 2024-11-29 ENCOUNTER — HOSPITAL ENCOUNTER (OUTPATIENT)
Age: 84
Discharge: HOME | End: 2024-12-01
Attending: INTERNAL MEDICINE
Payer: MEDICARE

## 2024-11-29 ENCOUNTER — HOSPITAL ENCOUNTER (OUTPATIENT)
Age: 84
Discharge: HOME | End: 2024-11-29
Payer: MEDICARE

## 2024-11-29 DIAGNOSIS — I48.21 PERMANENT ATRIAL FIBRILLATION (HCC): ICD-10-CM

## 2024-11-29 DIAGNOSIS — R06.02 SHORTNESS OF BREATH: ICD-10-CM

## 2024-11-29 DIAGNOSIS — Z79.01 ANTICOAGULATED ON COUMADIN: ICD-10-CM

## 2024-11-29 LAB
ECHO AV AREA PEAK VELOCITY: 2 CM2
ECHO AV AREA VTI: 2 CM2
ECHO AV MEAN GRADIENT: 7 MMHG
ECHO AV MEAN VELOCITY: 1.2 M/S
ECHO AV PEAK GRADIENT: 9 MMHG
ECHO AV PEAK VELOCITY: 1.5 M/S
ECHO AV VELOCITY RATIO: 0.53
ECHO AV VTI: 40.3 CM
ECHO LA AREA 2C: 15.2 CM2
ECHO LA AREA 4C: 22.7 CM2
ECHO LA DIAMETER: 4.3 CM
ECHO LA MAJOR AXIS: 6.7 CM
ECHO LA MINOR AXIS: 4.3 CM
ECHO LA VOL MOD A2C: 41 ML (ref 18–58)
ECHO LA VOL MOD A4C: 57 ML (ref 18–58)
ECHO LV E' LATERAL VELOCITY: 5.2 CM/S
ECHO LV E' SEPTAL VELOCITY: 4.4 CM/S
ECHO LV EDV A2C: 51 ML
ECHO LV EDV A4C: 78 ML
ECHO LV EJECTION FRACTION A2C: 40 %
ECHO LV EJECTION FRACTION A4C: 49 %
ECHO LV EJECTION FRACTION BIPLANE: 44 % (ref 55–100)
ECHO LV ESV A2C: 31 ML
ECHO LV ESV A4C: 40 ML
ECHO LV FRACTIONAL SHORTENING: 22 % (ref 28–44)
ECHO LV INTERNAL DIMENSION DIASTOLIC: 5.4 CM (ref 4.2–5.9)
ECHO LV INTERNAL DIMENSION SYSTOLIC: 4.2 CM
ECHO LV IVSD: 0.9 CM (ref 0.6–1)
ECHO LV MASS 2D: 180.1 G (ref 88–224)
ECHO LV POSTERIOR WALL DIASTOLIC: 0.9 CM (ref 0.6–1)
ECHO LV RELATIVE WALL THICKNESS RATIO: 0.33
ECHO LVOT AREA: 3.5 CM2
ECHO LVOT AV VTI INDEX: 0.57
ECHO LVOT DIAM: 2.1 CM
ECHO LVOT MEAN GRADIENT: 2 MMHG
ECHO LVOT PEAK GRADIENT: 3 MMHG
ECHO LVOT PEAK VELOCITY: 0.8 M/S
ECHO LVOT SV: 79.6 ML
ECHO LVOT VTI: 23 CM
ECHO MV A VELOCITY: 1.21 M/S
ECHO MV E DECELERATION TIME (DT): 279 MS
ECHO MV E VELOCITY: 1.29 M/S
ECHO MV E/A RATIO: 1.07
ECHO MV E/E' LATERAL: 24.81
ECHO MV E/E' RATIO (AVERAGED): 27.06
ECHO MV E/E' SEPTAL: 29.32
ECHO PV MAX VELOCITY: 0.6 M/S
ECHO PV PEAK GRADIENT: 2 MMHG
ECHO RV INTERNAL DIMENSION: 2.5 CM
ECHO RV TAPSE: 1.9 CM (ref 1.7–?)
ECHO TV E WAVE: 0.5 M/S
ECHO TV REGURGITANT MAX VELOCITY: 3.22 M/S
ECHO TV REGURGITANT PEAK GRADIENT: 41 MMHG
INR PPP: 2.75 (ref 0.85–1.13)

## 2024-11-29 PROCEDURE — 93306 TTE W/DOPPLER COMPLETE: CPT

## 2024-11-29 PROCEDURE — 36415 COLL VENOUS BLD VENIPUNCTURE: CPT

## 2024-11-29 PROCEDURE — 85610 PROTHROMBIN TIME: CPT

## 2024-11-29 PROCEDURE — 93306 TTE W/DOPPLER COMPLETE: CPT | Performed by: INTERNAL MEDICINE

## 2024-11-29 NOTE — PROGRESS NOTES
This RN spoke with patient regarding upcoming procedure. INR 2.75 today 11/29, patient instructed to continue to take coumadin. Patient instructed to not take coumadin on Monday 12/2.

## 2024-12-02 ENCOUNTER — HOSPITAL ENCOUNTER (OUTPATIENT)
Age: 84
Setting detail: OUTPATIENT SURGERY
Discharge: HOME | End: 2024-12-02
Attending: INTERNAL MEDICINE | Admitting: INTERNAL MEDICINE
Payer: MEDICARE

## 2024-12-02 VITALS
SYSTOLIC BLOOD PRESSURE: 126 MMHG | HEIGHT: 67 IN | RESPIRATION RATE: 16 BRPM | OXYGEN SATURATION: 100 % | BODY MASS INDEX: 32.32 KG/M2 | HEART RATE: 60 BPM | TEMPERATURE: 97.5 F | DIASTOLIC BLOOD PRESSURE: 54 MMHG | WEIGHT: 205.91 LBS

## 2024-12-02 DIAGNOSIS — Z45.010 ENCOUNTER FOR PACEMAKER AT END OF BATTERY LIFE: ICD-10-CM

## 2024-12-02 PROBLEM — I44.2 COMPLETE HEART BLOCK (HCC): Status: ACTIVE | Noted: 2024-12-02

## 2024-12-02 LAB
ABO: NORMAL
ANION GAP SERPL CALC-SCNC: 14 MEQ/L (ref 8–16)
ANTIBODY SCREEN: NORMAL
APTT PPP: 44.4 SECONDS (ref 22–38)
BUN SERPL-MCNC: 47 MG/DL (ref 7–22)
CALCIUM SERPL-MCNC: 8.9 MG/DL (ref 8.5–10.5)
CHLORIDE SERPL-SCNC: 98 MEQ/L (ref 98–111)
CO2 SERPL-SCNC: 25 MEQ/L (ref 23–33)
CREAT SERPL-MCNC: 2.9 MG/DL (ref 0.4–1.2)
DEPRECATED RDW RBC AUTO: 56.3 FL (ref 35–45)
ECHO BSA: 2.1 M2
EKG ATRIAL RATE: 300 BPM
EKG Q-T INTERVAL: 482 MS
EKG QRS DURATION: 156 MS
EKG QTC CALCULATION (BAZETT): 520 MS
EKG R AXIS: -88 DEGREES
EKG T AXIS: 38 DEGREES
EKG VENTRICULAR RATE: 70 BPM
ERYTHROCYTE [DISTWIDTH] IN BLOOD BY AUTOMATED COUNT: 15.2 % (ref 11.5–14.5)
GFR SERPL CREATININE-BSD FRML MDRD: 20 ML/MIN/1.73M2
GLUCOSE SERPL-MCNC: 144 MG/DL (ref 70–108)
HCT VFR BLD AUTO: 43 % (ref 42–52)
HGB BLD-MCNC: 14.1 GM/DL (ref 14–18)
INR PPP: 3.11 (ref 0.85–1.13)
MCH RBC QN AUTO: 32.7 PG (ref 26–33)
MCHC RBC AUTO-ENTMCNC: 32.8 GM/DL (ref 32.2–35.5)
MCV RBC AUTO: 99.8 FL (ref 80–94)
MRSA DNA SPEC QL NAA+PROBE: NEGATIVE
PLATELET # BLD AUTO: 223 THOU/MM3 (ref 130–400)
PMV BLD AUTO: 10.6 FL (ref 9.4–12.4)
POTASSIUM SERPL-SCNC: 4.8 MEQ/L (ref 3.5–5.2)
RBC # BLD AUTO: 4.31 MILL/MM3 (ref 4.7–6.1)
RH FACTOR: NORMAL
SODIUM SERPL-SCNC: 137 MEQ/L (ref 135–145)
WBC # BLD AUTO: 11.6 THOU/MM3 (ref 4.8–10.8)

## 2024-12-02 PROCEDURE — 85027 COMPLETE CBC AUTOMATED: CPT

## 2024-12-02 PROCEDURE — 86901 BLOOD TYPING SEROLOGIC RH(D): CPT

## 2024-12-02 PROCEDURE — 86850 RBC ANTIBODY SCREEN: CPT

## 2024-12-02 PROCEDURE — 99152 MOD SED SAME PHYS/QHP 5/>YRS: CPT | Performed by: INTERNAL MEDICINE

## 2024-12-02 PROCEDURE — 85730 THROMBOPLASTIN TIME PARTIAL: CPT

## 2024-12-02 PROCEDURE — C1785 PMKR, DUAL, RATE-RESP: HCPCS | Performed by: INTERNAL MEDICINE

## 2024-12-02 PROCEDURE — 7100000011 HC PHASE II RECOVERY - ADDTL 15 MIN: Performed by: INTERNAL MEDICINE

## 2024-12-02 PROCEDURE — 6360000002 HC RX W HCPCS: Performed by: INTERNAL MEDICINE

## 2024-12-02 PROCEDURE — 93005 ELECTROCARDIOGRAM TRACING: CPT | Performed by: INTERNAL MEDICINE

## 2024-12-02 PROCEDURE — 86900 BLOOD TYPING SEROLOGIC ABO: CPT

## 2024-12-02 PROCEDURE — 33228 REMV&REPLC PM GEN DUAL LEAD: CPT | Performed by: INTERNAL MEDICINE

## 2024-12-02 PROCEDURE — 80048 BASIC METABOLIC PNL TOTAL CA: CPT

## 2024-12-02 PROCEDURE — 2580000003 HC RX 258: Performed by: INTERNAL MEDICINE

## 2024-12-02 PROCEDURE — 93010 ELECTROCARDIOGRAM REPORT: CPT | Performed by: INTERNAL MEDICINE

## 2024-12-02 PROCEDURE — 87641 MR-STAPH DNA AMP PROBE: CPT

## 2024-12-02 PROCEDURE — 7100000010 HC PHASE II RECOVERY - FIRST 15 MIN: Performed by: INTERNAL MEDICINE

## 2024-12-02 PROCEDURE — 6360000004 HC RX CONTRAST MEDICATION: Performed by: INTERNAL MEDICINE

## 2024-12-02 PROCEDURE — 36415 COLL VENOUS BLD VENIPUNCTURE: CPT

## 2024-12-02 PROCEDURE — 85610 PROTHROMBIN TIME: CPT

## 2024-12-02 DEVICE — DR PACEMAKER DDDR
Type: IMPLANTABLE DEVICE | Status: FUNCTIONAL
Brand: ASSURITY MRI™

## 2024-12-02 RX ORDER — SODIUM CHLORIDE 9 MG/ML
INJECTION, SOLUTION INTRAVENOUS CONTINUOUS
Status: DISCONTINUED | OUTPATIENT
Start: 2024-12-02 | End: 2024-12-02

## 2024-12-02 RX ORDER — SODIUM CHLORIDE 0.9 % (FLUSH) 0.9 %
5-40 SYRINGE (ML) INJECTION EVERY 12 HOURS SCHEDULED
Status: DISCONTINUED | OUTPATIENT
Start: 2024-12-02 | End: 2024-12-02 | Stop reason: HOSPADM

## 2024-12-02 RX ORDER — ONDANSETRON 2 MG/ML
4 INJECTION INTRAMUSCULAR; INTRAVENOUS EVERY 6 HOURS PRN
Status: DISCONTINUED | OUTPATIENT
Start: 2024-12-02 | End: 2024-12-02 | Stop reason: HOSPADM

## 2024-12-02 RX ORDER — SODIUM CHLORIDE 0.9 % (FLUSH) 0.9 %
5-40 SYRINGE (ML) INJECTION PRN
Status: DISCONTINUED | OUTPATIENT
Start: 2024-12-02 | End: 2024-12-02 | Stop reason: HOSPADM

## 2024-12-02 RX ORDER — OXYCODONE HYDROCHLORIDE 5 MG/1
10 TABLET ORAL EVERY 4 HOURS PRN
Status: DISCONTINUED | OUTPATIENT
Start: 2024-12-02 | End: 2024-12-02 | Stop reason: HOSPADM

## 2024-12-02 RX ORDER — MIDAZOLAM HYDROCHLORIDE 1 MG/ML
INJECTION, SOLUTION INTRAMUSCULAR; INTRAVENOUS PRN
Status: DISCONTINUED | OUTPATIENT
Start: 2024-12-02 | End: 2024-12-02 | Stop reason: HOSPADM

## 2024-12-02 RX ORDER — ACETAMINOPHEN 325 MG/1
650 TABLET ORAL EVERY 4 HOURS PRN
Status: DISCONTINUED | OUTPATIENT
Start: 2024-12-02 | End: 2024-12-02 | Stop reason: HOSPADM

## 2024-12-02 RX ORDER — IOPAMIDOL 755 MG/ML
INJECTION, SOLUTION INTRAVASCULAR PRN
Status: DISCONTINUED | OUTPATIENT
Start: 2024-12-02 | End: 2024-12-02 | Stop reason: HOSPADM

## 2024-12-02 RX ORDER — SODIUM CHLORIDE 9 MG/ML
INJECTION, SOLUTION INTRAVENOUS PRN
Status: DISCONTINUED | OUTPATIENT
Start: 2024-12-02 | End: 2024-12-02 | Stop reason: HOSPADM

## 2024-12-02 RX ORDER — FENTANYL CITRATE 50 UG/ML
INJECTION, SOLUTION INTRAMUSCULAR; INTRAVENOUS PRN
Status: DISCONTINUED | OUTPATIENT
Start: 2024-12-02 | End: 2024-12-02 | Stop reason: HOSPADM

## 2024-12-02 RX ORDER — OXYCODONE HYDROCHLORIDE 5 MG/1
5 TABLET ORAL EVERY 4 HOURS PRN
Status: DISCONTINUED | OUTPATIENT
Start: 2024-12-02 | End: 2024-12-02 | Stop reason: HOSPADM

## 2024-12-02 RX ADMIN — SODIUM CHLORIDE: 9 INJECTION, SOLUTION INTRAVENOUS at 11:05

## 2024-12-02 RX ADMIN — WATER 2000 MG: 1 INJECTION INTRAMUSCULAR; INTRAVENOUS; SUBCUTANEOUS at 12:28

## 2024-12-02 NOTE — POST SEDATION
Sedation Post Procedure Note    Patient Name: Portillo Daly   YOB: 1940  Room/Bed: [facility] Room/  Medical Record Number: [id number]  Date: 12/2/2024   Time: 1:46 PM         Physicians/Assistants: Dane Garcia MD, MD    Procedure Performed:  PPM generator changeout    Post-Sedation Vital Signs:  Vitals:    12/02/24 1043   BP: 119/62   Pulse: 70   Resp: 15   Temp:    SpO2:       Vital signs were reviewed and were stable after the procedure (see flow sheet for vitals)            Post-Sedation Exam: Lungs: clear and Cardiovascular: regular rate and rhythm           Complications: none    Electronically signed by Dane Garcia MD on 12/2/2024 at 1:46 PM

## 2024-12-02 NOTE — DISCHARGE INSTRUCTIONS
You should gradually return to your normal activities.   For the first 4 weeks:     Do not lift more than 10 pounds     Do not swim, golf, bowl, or vacuum.      Do not raise your device side arm above your shoulder for 30 days.      At your 2 week follow up visit, ask your doctor which of the above activities you can resume.      ALWAYS avoid any contact sports or hard blows to chest or abdomin.      Avoid sleeping on the left side of face and face down.       WOUND CARE   Remove dressing on day 8, may shower with dressing on as long as dressing is intact, (avoid getting dressing wet) Keep steri strips on they will fall off on their own.      Do not apply any ointment, talc or lotion to the incision.      Do not scratch or rub incision with your hands.      The Steri-strips (tape strips) will be removed at your follow up visit, if they have not yet peeled off  on their own.      Call your doctor immediately if your incision looks red, becomes swollen or tender, or begins to ooze fluid.  Also, notify your doctor at once if you develop a temperature greater than 100 degrees Fahrenheit.       MAGNETS AND ELECTROMAGNETIC INTERFERENCE     Some devices or equipment that we encounter send out electrical signals that can:            You must avoid              Airport magnet security wands              Diathermy or other heart treatments               ARC or resistance welding              Electrical power generator plants              Electric cautery that is used for surgical procedures              MRI scans              Radio or television transmitting towers        Special procedures:        You must stay at arm's length from magnets of any kind        Cellular phones should be used on the ear opposite to your device.         Do not keep cellular phones in a pocket over your pacemaker.        To avoid any interference with your device, you must pass through any security devices or [place] (airports, department

## 2024-12-02 NOTE — PROCEDURES
[address] [address]                         CARDIAC CATHETERIZATION      PATIENT NAME: GABBI GRAVES             : 1940  MED REC NO: [id number]                       ROOM: Cath Pool Room  ACCOUNT NO: [id number]                       ADMIT DATE: 2024  PROVIDER: Dane Garcia MD      DATE OF PROCEDURE: 2024    SURGEON:  Dane Garcia MD    REFERRING PHYSICIAN:  Jean Pedersen MD    CLINICAL HISTORY:  84-year-old man with a history of atrial fibrillation, complete heart block with dual-chamber permanent pacemaker implant, valvular heart disease, diabetes, and hypertension, who presents with pacemaker at elective replacement indicator due to battery depletion.  He had a pre-procedure echo showing that the ejection fraction was mildly reduced at 45% to 50%.  We discussed adding a coronary sinus/left ventricular pacing lead as he is chronically RV pacing most of the time.    DESCRIPTION OF PROCEDURE:  After informed consent was obtained, the patient was brought to the electrophysiology laboratory at [facility] in the fasting state.  He was given conscious sedation with intravenous Versed and fentanyl.  A contrast venogram was performed in the left arm in anticipation of adding a coronary sinus pacing lead for resynchronization.  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  Therefore, we deferred placement of coronary sinus lead due to lack of vascular access.    20 mL of 2% lidocaine was infiltrated locally at the anticipated left pectoral pocket site.  A 2-inch incision was created with subsequent electrocautery and blunt dissection to expose his existing pulse generator.  The generator was removed from the pocket and exchanged for a new generator.  The explanted

## 2024-12-02 NOTE — H&P
[facility]  Sedation/Analgesia History & Physical    Pt Name: Portillo Daly  Account number: [id number]  MRN: [id number]  YOB: 1940  Provider Performing Procedure: Dane Garcia MD MD  Referring Provider: Dane Garcia MD   Primary Care Physician: Elinor Ly, APRN - CNP  Date: 12/2/2024    PRE-PROCEDURE    Code Status: FULL CODE  Brief History/Pre-Procedure Diagnosis: 83 yo with history of AFib, CVA, CHF/NYHA class 3, COPD, DM2, CAD/stent, AVR who presents with PPM at [facility] for replacement.  He has chronic RV pacing 80% and we discussed upgrade to CRT-P and mutually agreed to proceed.       Consent: : I have discussed with the patient risks, benefits, and alternatives (and relevant risks, benefits, and side effects related to alternatives or not receiving care), and likelihood of the success.   The patient and/or representative appear to understand and agree to proceed.  The discussion encompasses risks, benefits, and side effects related to the alternatives and the risks related to not receiving the proposed care, treatment, and services.     The indication, risks and benefits of the procedure and possible therapeutic consequences and alternatives were discussed with the patient. The patient was given the opportunity to ask questions and to have them answered to his/her satisfaction. Risks of the procedure include but are not limited to the following: Bleeding, hematoma including retroperitoneal hemmorhage, infection, pain and discomfort, injury to the aorta and other blood vessels, rhythm disturbance, low blood pressure, myocardial infarction, stroke, kidney damage/failure, myocardial perforation, allergic reactions to sedatives/contrast material, loss of pulse/vascular compromise requiring surgery, aneurysm/pseudoaneurysm formation, possible loss of a limb/hand/leg, needing blood transfusion, requiring emergent open heart surgery or emergent coronary

## 2024-12-02 NOTE — PROGRESS NOTES
1042 Patient admitted to 2E03  Ambulatory for ICD generator change.  Patient NPO. Patient accompanied by family.  Vital signs obtained.   Assessment and data collection intiated.   Oriented to room.  Policies and procedures for 2E explained.   All questions answered with no further questions at this time.   Fall prevention and safety precautions discussed with patient.     Patient states he took his insulin, instructed patient to call nurse if any signs of low sugar.     Patient has home oxygen at 3 liters per nasal cannula.   Patient refused to bring in meds, states last time they lost them.     1043 Care plan reviewed with patient and family.  Patient and family verbalize understanding of the plan of care and contribute to goal setting.     1120 Nasal swab for MRSA,MRSA by PCR sent to lab.   1228 ANcef IVPB given as ordered.   1248 Dr Garcia notified by phone of wbc, inr, BUN, CREAT.  1250 Dr Garcia on floor aware of blood work .    1255 To cath lab per bed, stable condition.     1355 Care taken over from cath lab, aquacel dressing dry and intact. Temp ID and pacer booklet given to patient and significant other. Ice bag applied to site.     1435 Discharge instructions given, voices understanding.     1600 Up in room, activity tolerated well.     1605 Discharged per wheelchair, stable condition.

## 2024-12-03 ENCOUNTER — TELEPHONE (OUTPATIENT)
Dept: CARDIOLOGY CLINIC | Age: 84
End: 2024-12-03

## 2024-12-03 ENCOUNTER — HOSPITAL ENCOUNTER (OUTPATIENT)
Dept: PULMONOLOGY | Age: 84
Discharge: HOME | End: 2024-12-03
Payer: MEDICARE

## 2024-12-03 DIAGNOSIS — J96.11 CHRONIC RESPIRATORY FAILURE WITH HYPOXIA: ICD-10-CM

## 2024-12-03 DIAGNOSIS — J47.9 BRONCHIECTASIS WITHOUT COMPLICATION (HCC): ICD-10-CM

## 2024-12-03 DIAGNOSIS — R94.2 MIXED OBSTRUCTIVE AND RESTRICTIVE VENTILATORY DEFECT: ICD-10-CM

## 2024-12-03 PROCEDURE — 94726 PLETHYSMOGRAPHY LUNG VOLUMES: CPT

## 2024-12-03 PROCEDURE — 94729 DIFFUSING CAPACITY: CPT

## 2024-12-03 PROCEDURE — 94010 BREATHING CAPACITY TEST: CPT

## 2024-12-03 NOTE — TELEPHONE ENCOUNTER
I called this pt today    He had a gen change yesterday. I did call [facility] and got this pt entered into our clinic. He used to belong to [facility]     They sent him his new monitor yesterday . I told him when he gets it in the mail, he needs to call [facility] and get paired up to his new monitor.       # his old account is in murj. Once he gets the new monitor, we will have to cancel the old account in murj     I told pt to call us when he gets paired up so we know he got his monitor and it is working

## 2024-12-04 ENCOUNTER — TELEPHONE (OUTPATIENT)
Age: 84
End: 2024-12-04

## 2024-12-04 NOTE — TELEPHONE ENCOUNTER
Called patient to see if he had to miss any Coumadin for 12/2 generator change - said he did not.  Will see 12/10 as planned.

## 2024-12-05 ENCOUNTER — TELEPHONE (OUTPATIENT)
Dept: EMERGENCY DEPT | Age: 84
End: 2024-12-05

## 2024-12-05 ENCOUNTER — HOSPITAL ENCOUNTER (EMERGENCY)
Age: 84
Discharge: HOME | End: 2024-12-05
Attending: STUDENT IN AN ORGANIZED HEALTH CARE EDUCATION/TRAINING PROGRAM
Payer: MEDICARE

## 2024-12-05 ENCOUNTER — APPOINTMENT (OUTPATIENT)
Dept: GENERAL RADIOLOGY | Age: 84
End: 2024-12-05
Payer: MEDICARE

## 2024-12-05 VITALS
TEMPERATURE: 98.8 F | DIASTOLIC BLOOD PRESSURE: 70 MMHG | RESPIRATION RATE: 17 BRPM | OXYGEN SATURATION: 96 % | HEART RATE: 70 BPM | SYSTOLIC BLOOD PRESSURE: 118 MMHG

## 2024-12-05 DIAGNOSIS — L76.82 BLEEDING AT INSERTION SITE: Primary | ICD-10-CM

## 2024-12-05 LAB
ALBUMIN SERPL BCG-MCNC: 3.5 G/DL (ref 3.5–5.1)
ALP SERPL-CCNC: 118 U/L (ref 38–126)
ALT SERPL W/O P-5'-P-CCNC: 10 U/L (ref 11–66)
ANION GAP SERPL CALC-SCNC: 16 MEQ/L (ref 8–16)
APTT PPP: 38.6 SECONDS (ref 22–38)
AST SERPL-CCNC: 22 U/L (ref 5–40)
BASOPHILS ABSOLUTE: 0 THOU/MM3 (ref 0–0.1)
BASOPHILS NFR BLD AUTO: 0.4 %
BILIRUB SERPL-MCNC: 0.7 MG/DL (ref 0.3–1.2)
BUN SERPL-MCNC: 47 MG/DL (ref 7–22)
CALCIUM SERPL-MCNC: 9.2 MG/DL (ref 8.5–10.5)
CHLORIDE SERPL-SCNC: 94 MEQ/L (ref 98–111)
CO2 SERPL-SCNC: 25 MEQ/L (ref 23–33)
CREAT SERPL-MCNC: 3 MG/DL (ref 0.4–1.2)
DEPRECATED RDW RBC AUTO: 56.7 FL (ref 35–45)
EOSINOPHIL NFR BLD AUTO: 7.4 %
EOSINOPHILS ABSOLUTE: 0.8 THOU/MM3 (ref 0–0.4)
ERYTHROCYTE [DISTWIDTH] IN BLOOD BY AUTOMATED COUNT: 15.2 % (ref 11.5–14.5)
GFR SERPL CREATININE-BSD FRML MDRD: 20 ML/MIN/1.73M2
GLUCOSE SERPL-MCNC: 178 MG/DL (ref 70–108)
HCT VFR BLD AUTO: 38.3 % (ref 42–52)
HGB BLD-MCNC: 12.5 GM/DL (ref 14–18)
IMM GRANULOCYTES # BLD AUTO: 0.05 THOU/MM3 (ref 0–0.07)
IMM GRANULOCYTES NFR BLD AUTO: 0.5 %
INR PPP: 2.68 (ref 0.85–1.13)
LYMPHOCYTES ABSOLUTE: 1.2 THOU/MM3 (ref 1–4.8)
LYMPHOCYTES NFR BLD AUTO: 11.1 %
MCH RBC QN AUTO: 32.7 PG (ref 26–33)
MCHC RBC AUTO-ENTMCNC: 32.6 GM/DL (ref 32.2–35.5)
MCV RBC AUTO: 100.3 FL (ref 80–94)
MONOCYTES ABSOLUTE: 1.1 THOU/MM3 (ref 0.4–1.3)
MONOCYTES NFR BLD AUTO: 10.3 %
NEUTROPHILS ABSOLUTE: 7.5 THOU/MM3 (ref 1.8–7.7)
NEUTROPHILS NFR BLD AUTO: 70.3 %
NRBC BLD AUTO-RTO: 0 /100 WBC
OSMOLALITY SERPL CALC.SUM OF ELEC: 286.8 MOSMOL/KG (ref 275–300)
PLATELET # BLD AUTO: 191 THOU/MM3 (ref 130–400)
PMV BLD AUTO: 11.2 FL (ref 9.4–12.4)
POTASSIUM SERPL-SCNC: 4.5 MEQ/L (ref 3.5–5.2)
PROT SERPL-MCNC: 6.8 G/DL (ref 6.1–8)
RBC # BLD AUTO: 3.82 MILL/MM3 (ref 4.7–6.1)
SODIUM SERPL-SCNC: 135 MEQ/L (ref 135–145)
WBC # BLD AUTO: 10.7 THOU/MM3 (ref 4.8–10.8)

## 2024-12-05 PROCEDURE — 71046 X-RAY EXAM CHEST 2 VIEWS: CPT

## 2024-12-05 PROCEDURE — 85610 PROTHROMBIN TIME: CPT

## 2024-12-05 PROCEDURE — 85730 THROMBOPLASTIN TIME PARTIAL: CPT

## 2024-12-05 PROCEDURE — 36415 COLL VENOUS BLD VENIPUNCTURE: CPT

## 2024-12-05 PROCEDURE — 99285 EMERGENCY DEPT VISIT HI MDM: CPT

## 2024-12-05 PROCEDURE — 85025 COMPLETE CBC W/AUTO DIFF WBC: CPT

## 2024-12-05 PROCEDURE — 93005 ELECTROCARDIOGRAM TRACING: CPT | Performed by: STUDENT IN AN ORGANIZED HEALTH CARE EDUCATION/TRAINING PROGRAM

## 2024-12-05 PROCEDURE — 80053 COMPREHEN METABOLIC PANEL: CPT

## 2024-12-05 ASSESSMENT — PAIN - FUNCTIONAL ASSESSMENT: PAIN_FUNCTIONAL_ASSESSMENT: NONE - DENIES PAIN

## 2024-12-05 NOTE — ED PROVIDER NOTES
[facility] EMERGENCY DEPT  EMERGENCY DEPARTMENT ENCOUNTER          Pt Name: Portillo Daly  MRN: [id number]  Birthdate 1940  Date of evaluation: 12/5/2024  Physician: Ludmila Murrieta MD  Supervising Attending Physician: Albert García MD       CHIEF COMPLAINT       Chief Complaint   Patient presents with    Bleeding/Bruising         HISTORY OF PRESENT ILLNESS    HPI  Portillo Daly is a 84 y.o. male who presents to the emergency department from home for evaluation of bleeding from his pacemaker site that was placed on December 2 by Dr. Garcia.  Patient has a medical history of CHF, A-fib with RVR, CKD, COPD, diabetes and history of CVA and this was his third pacemaker replacement.  Patient is on 3 L oxygen at home at baseline. Patient is on anticoagulation for permanent atrial fibrillation diagnosis and is currently on warfarin and follows up at the anticoagulation clinic.  Patient states that he was all right when he noticed his pacemaker site bleeding and his bandage removed collection of blood under it.  He called EMS and came to the hospital, has staining of dried blood on his shirt and down towards his armpit.     Denies fever, chills, headache, lightheadedness, dizziness, vision changes, tinnitus, cough, congestion, sore throat, neck pain/stiffness, chest pain, shortness of breath, abdominal pain, nausea, vomiting, constipation, diarrhea, dysuria, blood in the urine or stool, numbness/tingling/weakness in extremities.    The patient has no other acute complaints at this time.      PAST MEDICAL AND SURGICAL HISTORY     Past Medical History:   Diagnosis Date    MILLY (acute kidney injury) (HCC)     Atrial fibrillation (HCC)     Cerebral artery occlusion with cerebral infarction (HCC)     CHF (congestive heart failure), NYHA class III, acute on chronic, combined (HCC)     COPD (chronic obstructive pulmonary disease) (HCC) 8/3/2020    Coronary artery disease involving native coronary  96%       Physical Exam  Vitals and nursing note reviewed.   Constitutional:       General: He is not in acute distress.     Appearance: He is normal weight. He is not ill-appearing, toxic-appearing or diaphoretic.   HENT:      Head: Normocephalic and atraumatic.      Right Ear: External ear normal.      Left Ear: External ear normal.      Nose: Nose normal. No congestion or rhinorrhea.      Mouth/Throat:      Mouth: Mucous membranes are moist.      Pharynx: Oropharynx is clear.   Eyes:      General: No scleral icterus.     Conjunctiva/sclera: Conjunctivae normal.   Cardiovascular:      Rate and Rhythm: Normal rate and regular rhythm.      Pulses: Normal pulses.      Heart sounds: Normal heart sounds. No murmur heard.  Pulmonary:      Effort: Pulmonary effort is normal. No respiratory distress.      Breath sounds: Normal breath sounds. No wheezing.   Chest:       Abdominal:      General: Abdomen is flat. There is no distension.      Palpations: Abdomen is soft.      Tenderness: There is no abdominal tenderness.   Musculoskeletal:         General: Normal range of motion.      Cervical back: Normal range of motion and neck supple. No rigidity.      Right lower leg: No edema.      Left lower leg: No edema.   Lymphadenopathy:      Cervical: No cervical adenopathy.   Skin:     General: Skin is warm and dry.      Capillary Refill: Capillary refill takes less than 2 seconds.      Coloration: Skin is not jaundiced.   Neurological:      General: No focal deficit present.      Mental Status: He is alert and oriented to person, place, and time.   Psychiatric:         Mood and Affect: Mood normal.         ED RESULTS   Laboratory results (none if blank):  Labs Reviewed   CBC WITH AUTO DIFFERENTIAL - Abnormal; Notable for the following components:       Result Value    RBC 3.82 (*)     Hemoglobin 12.5 (*)     Hematocrit 38.3 (*)     .3 (*)     RDW-CV 15.2 (*)     RDW-SD 56.7 (*)     Eosinophils Absolute 0.8 (*)     All

## 2024-12-05 NOTE — DISCHARGE INSTRUCTIONS
Hold warfarin till Saturday, 12/7/2024.  Please follow-up with Dr. Pedersen's office.  Please return to the ED if site starts bleeding again or any other concerns.

## 2024-12-05 NOTE — ED TRIAGE NOTES
Presents to ED with c/o bleeding at pacemaker site. Patient had a pacemaker placed on December 2nd and noticed the incision site was bleeding today. Patient alert and oriented. Respirations easy and unlabored.

## 2024-12-06 LAB
EKG ATRIAL RATE: 394 BPM
EKG Q-T INTERVAL: 496 MS
EKG QRS DURATION: 156 MS
EKG QTC CALCULATION (BAZETT): 535 MS
EKG R AXIS: -72 DEGREES
EKG T AXIS: 72 DEGREES
EKG VENTRICULAR RATE: 70 BPM

## 2024-12-06 PROCEDURE — 93010 ELECTROCARDIOGRAM REPORT: CPT | Performed by: INTERNAL MEDICINE

## 2024-12-09 ENCOUNTER — TELEPHONE (OUTPATIENT)
Dept: NEPHROLOGY | Age: 84
End: 2024-12-09

## 2024-12-09 NOTE — PROGRESS NOTES
[facility]    Patient: GABBI GRAVES, 84 y.o.   : 1940  12/10/2024      Patient of Dr. Odonnell     Assessment/Plan   1. Decreased diffusion capacity of lung  -Evaluate further with HRCT to see if any underlying ILD or pulmonary fibrosis contributing to decreased diffusion capacity of lung.  - CT CHEST HIGH RESOLUTION; Future    2. Mixed obstructive and restrictive ventilatory defect  -Continue to monitor with regular pulmonary function testing  -Continue budesonide (PULMICORT) 0.5 MG/2ML nebulizer suspension; Take 2 mLs by nebulization 2 times daily Rinse mouth with water, gargle, and spit after use.  Dispense: 360 mL; Refill: 3    3. Bronchiectasis without complication (HCC)  -Continue budesonide (PULMICORT) 0.5 MG/2ML nebulizer suspension; Take 2 mLs by nebulization 2 times daily Rinse mouth with water, gargle, and spit after use.  Dispense: 360 mL; Refill: 3  - CT CHEST HIGH RESOLUTION; Future    4. Chronic respiratory failure with hypoxia  -Continue oxygen 3 L/min with exertion  -6-minute walk was not repeated today in office       Advised patient to call office with any changes, questions, or concerns regarding respiratory status or issues with prescribed medications    Return in about 1 month (around 1/10/2025) for COPD, decreased diffusion capacity after HRCT.     Subjective     Chief Complaint   Patient presents with    Follow-up     3 month bronchiectasis follow up with PFT 12.3.24        HPI  Gabbi is here for follow up for mixed obstructive and restrictive ventilatory defect, bronchiectasis, chronic respiratory failure with hypoxia requiring 3 L/min with exertion.    Patient completed a pulmonary function test prior to this visit showing a severely decreased DLCO at 25 which was decreased from a value of 45 when measured on 2022.    Overall patient reports respiratory symptoms have been stable since last appointment.  His only complaint today is

## 2024-12-09 NOTE — TELEPHONE ENCOUNTER
I was following up on this. Patient cannot afford vein mapping. He is not scheduled with a vascular surgeon. There is a note that Dr. Ya is aware of this.

## 2024-12-09 NOTE — TELEPHONE ENCOUNTER
----- Message from BELLA STANTON CMA sent at 11/13/2024 10:52 AM EST -----  See if he is scheduled with vascular surgery

## 2024-12-10 ENCOUNTER — ANTI-COAG VISIT (OUTPATIENT)
Age: 84
End: 2024-12-10
Payer: MEDICARE

## 2024-12-10 ENCOUNTER — OFFICE VISIT (OUTPATIENT)
Dept: PULMONOLOGY | Age: 84
End: 2024-12-10
Payer: MEDICARE

## 2024-12-10 VITALS
HEART RATE: 67 BPM | HEIGHT: 67 IN | SYSTOLIC BLOOD PRESSURE: 122 MMHG | BODY MASS INDEX: 33.59 KG/M2 | OXYGEN SATURATION: 92 % | DIASTOLIC BLOOD PRESSURE: 74 MMHG | WEIGHT: 214 LBS | TEMPERATURE: 97.4 F

## 2024-12-10 DIAGNOSIS — I48.21 PERMANENT ATRIAL FIBRILLATION (HCC): Primary | ICD-10-CM

## 2024-12-10 DIAGNOSIS — Z51.81 ENCOUNTER FOR THERAPEUTIC DRUG MONITORING: ICD-10-CM

## 2024-12-10 DIAGNOSIS — J47.9 BRONCHIECTASIS WITHOUT COMPLICATION (HCC): ICD-10-CM

## 2024-12-10 DIAGNOSIS — J96.11 CHRONIC RESPIRATORY FAILURE WITH HYPOXIA: ICD-10-CM

## 2024-12-10 DIAGNOSIS — Z79.01 ANTICOAGULATED ON COUMADIN: ICD-10-CM

## 2024-12-10 DIAGNOSIS — R94.2 DECREASED DIFFUSION CAPACITY OF LUNG: Primary | ICD-10-CM

## 2024-12-10 DIAGNOSIS — R94.2 MIXED OBSTRUCTIVE AND RESTRICTIVE VENTILATORY DEFECT: ICD-10-CM

## 2024-12-10 LAB — POC INR: 1.5 (ref 0.8–1.2)

## 2024-12-10 PROCEDURE — 3078F DIAST BP <80 MM HG: CPT

## 2024-12-10 PROCEDURE — G8417 CALC BMI ABV UP PARAM F/U: HCPCS

## 2024-12-10 PROCEDURE — 1123F ACP DISCUSS/DSCN MKR DOCD: CPT

## 2024-12-10 PROCEDURE — 3074F SYST BP LT 130 MM HG: CPT

## 2024-12-10 PROCEDURE — 1036F TOBACCO NON-USER: CPT

## 2024-12-10 PROCEDURE — G8482 FLU IMMUNIZE ORDER/ADMIN: HCPCS

## 2024-12-10 PROCEDURE — 1159F MED LIST DOCD IN RCRD: CPT

## 2024-12-10 PROCEDURE — 85610 PROTHROMBIN TIME: CPT

## 2024-12-10 PROCEDURE — 99212 OFFICE O/P EST SF 10 MIN: CPT

## 2024-12-10 PROCEDURE — G8427 DOCREV CUR MEDS BY ELIG CLIN: HCPCS

## 2024-12-10 PROCEDURE — 99214 OFFICE O/P EST MOD 30 MIN: CPT

## 2024-12-10 RX ORDER — BUDESONIDE 0.5 MG/2ML
1 INHALANT ORAL 2 TIMES DAILY
Qty: 360 ML | Refills: 3 | Status: SHIPPED | OUTPATIENT
Start: 2024-12-10 | End: 2025-12-05

## 2024-12-10 ASSESSMENT — ENCOUNTER SYMPTOMS
COUGH: 0
RHINORRHEA: 0
SINUS PRESSURE: 0
SHORTNESS OF BREATH: 1
WHEEZING: 0
SINUS PAIN: 0
CHEST TIGHTNESS: 0

## 2024-12-10 NOTE — PROGRESS NOTES
Medication Management Clinic  [facility]  Anticoagulation Clinic  [phone] (phone)  [phone] (fax)    Mr. Portillo Daly is a 84 y.o.  male with history of atrial fib., per referral from MARII Benjamin, who presents today for Warfarin monitoring and adjustment (2 week visit).    Patient verifies current dosing regimen and tablet strength. Female significant other has been setting up pill box according to printed instructions.  No extra doses. Went to ER 12/5 for bleeding at pacemaker insertion site (generator replaced 12/2). They re-dressed site.  INR there was 2.68 (day of implant it was 3.11, but 2.75 on 11/29). Was told to skip Coumadin that day and next (total of 3.75 mg missed).  Patient denies bleeding/swelling.  Has usual SOB/easy bruising.  No blood in urine or stool.  No dietary changes.  No changes in medication/OTC agents/herbals.  No change in alcohol use or tobacco use.  No change in activity level.  Patient denies falls.  No vomiting/diarrhea or acute illness.   No procedures scheduled in the future at this time.  Had pulmonary appointment this morning.    Assessment:   Lab Results   Component Value Date    INR 1.50 (H) 12/10/2024    INR 2.68 (H) 12/05/2024    INR 3.11 (H) 12/02/2024     INR subtherapeutic - goal 2-3.  Recent Labs     12/10/24  1315   INR 1.50*        Plan:  POCT INR performed/result reviewed.  2.5 mg today, T, then continue PO Coumadin 2.5 mg MF, 1.25 mg TWThSS.  Recheck INR in 1.5-2 week(s). (Report given - orders entered by [facility] PharmD.)  Patient reminded to call the Anticoagulation Clinic with any signs or symptoms of bleeding or with any medication changes.  Patient given instructions utilizing the teach back method.       After visit summary printed and reviewed with patient.      Discharged ambulatory in no apparent distress, with oxygen/tank and female significant other.     For Pharmacy Admin Tracking Only    Intervention Detail: Dose

## 2024-12-11 ENCOUNTER — NURSE ONLY (OUTPATIENT)
Dept: CARDIOLOGY CLINIC | Age: 84
End: 2024-12-11

## 2024-12-11 DIAGNOSIS — Z95.0 PACEMAKER: Primary | ICD-10-CM

## 2024-12-11 NOTE — PROGRESS NOTES
..Aquacel dressing removed, Steri strips placed, minor puffiness, shadow of bruising, no drainage, tender to touch.    If, at any point, there is any increased redness, swelling, increased discomfort, fever, or the incision opens up, the patient is aware to go to the emergency room

## 2024-12-13 DIAGNOSIS — R05.3 CHRONIC COUGH: ICD-10-CM

## 2024-12-13 DIAGNOSIS — J44.1 CHRONIC OBSTRUCTIVE PULMONARY DISEASE WITH ACUTE EXACERBATION (HCC): ICD-10-CM

## 2024-12-13 RX ORDER — CODEINE PHOSPHATE AND GUAIFENESIN 10; 100 MG/5ML; MG/5ML
5 SOLUTION ORAL 4 TIMES DAILY PRN
Qty: 100 ML | Refills: 0 | Status: SHIPPED | OUTPATIENT
Start: 2024-12-13 | End: 2024-12-18

## 2024-12-13 NOTE — TELEPHONE ENCOUNTER
Patient called in stating he needs a refill on Guaifenesin-Codeine cough medicine he is out.    Recent Visits  Date Type Provider Dept   11/05/24 Office Visit Elinor Ly, APRN - CNP [facility]   05/08/24 Office Visit Elinor Ly, APRN - CNP [facility]   04/18/24 Office Visit Elinor Ly, APRN - CNP [facility]   03/05/24 Office Visit Elinor Ly, APRN - CNP [facility]   02/01/24 Office Visit Elinor Ly, APRN - CNP [facility]   12/05/23 Office Visit Elinor Ly, APRN - CNP [facility]   11/01/23 Office Visit Elinor Ly, APRN - CNP [facility]   09/13/23 Office Visit Elinor Ly, APRN - CNP [facility]   08/22/23 Office Visit Elinor Ly, APRN - CNP [facility]   07/11/23 Office Visit Elinor Ly, APRN - CNP [facility]   Showing recent visits within past 540 days with a meds authorizing provider and meeting all other requirements  Future Appointments  Date Type Provider Dept   05/06/25 Appointment Elinor Ly, APRN - CNP [facility]   Showing future appointments within next 150 days with a meds authorizing provider and meeting all other requirements

## 2024-12-16 ENCOUNTER — APPOINTMENT (OUTPATIENT)
Dept: GENERAL RADIOLOGY | Age: 84
DRG: 177 | End: 2024-12-16
Payer: MEDICARE

## 2024-12-16 ENCOUNTER — HOSPITAL ENCOUNTER (INPATIENT)
Age: 84
LOS: 10 days | Discharge: HOME HEALTH | DRG: 177 | End: 2024-12-26
Attending: EMERGENCY MEDICINE | Admitting: HOSPITALIST
Payer: MEDICARE

## 2024-12-16 DIAGNOSIS — E11.65 TYPE 2 DIABETES MELLITUS WITH HYPERGLYCEMIA, WITHOUT LONG-TERM CURRENT USE OF INSULIN (HCC): ICD-10-CM

## 2024-12-16 DIAGNOSIS — N18.4 STAGE 4 CHRONIC KIDNEY DISEASE (HCC): ICD-10-CM

## 2024-12-16 DIAGNOSIS — U07.1 COVID-19: Primary | ICD-10-CM

## 2024-12-16 DIAGNOSIS — N18.4 CKD (CHRONIC KIDNEY DISEASE) STAGE 4, GFR 15-29 ML/MIN (HCC): ICD-10-CM

## 2024-12-16 LAB
ALBUMIN SERPL BCG-MCNC: 3.9 G/DL (ref 3.5–5.1)
ALP SERPL-CCNC: 136 U/L (ref 38–126)
ALT SERPL W/O P-5'-P-CCNC: 25 U/L (ref 11–66)
ANION GAP SERPL CALC-SCNC: 20 MEQ/L (ref 8–16)
AST SERPL-CCNC: 29 U/L (ref 5–40)
BASOPHILS ABSOLUTE: 0 THOU/MM3 (ref 0–0.1)
BASOPHILS NFR BLD AUTO: 0.4 %
BILIRUB SERPL-MCNC: 0.9 MG/DL (ref 0.3–1.2)
BUN SERPL-MCNC: 42 MG/DL (ref 7–22)
CALCIUM SERPL-MCNC: 8.8 MG/DL (ref 8.5–10.5)
CHLORIDE SERPL-SCNC: 94 MEQ/L (ref 98–111)
CO2 SERPL-SCNC: 23 MEQ/L (ref 23–33)
CREAT SERPL-MCNC: 2.9 MG/DL (ref 0.4–1.2)
CRP SERPL-MCNC: 0.64 MG/DL (ref 0–1)
DEPRECATED RDW RBC AUTO: 59.3 FL (ref 35–45)
EKG ATRIAL RATE: 71 BPM
EKG Q-T INTERVAL: 488 MS
EKG QRS DURATION: 162 MS
EKG QTC CALCULATION (BAZETT): 527 MS
EKG R AXIS: -79 DEGREES
EKG T AXIS: 80 DEGREES
EKG VENTRICULAR RATE: 70 BPM
EOSINOPHIL NFR BLD AUTO: 4.3 %
EOSINOPHILS ABSOLUTE: 0.5 THOU/MM3 (ref 0–0.4)
ERYTHROCYTE [DISTWIDTH] IN BLOOD BY AUTOMATED COUNT: 16 % (ref 11.5–14.5)
FLUAV RNA RESP QL NAA+PROBE: NOT DETECTED
FLUBV RNA RESP QL NAA+PROBE: NOT DETECTED
GFR SERPL CREATININE-BSD FRML MDRD: 20 ML/MIN/1.73M2
GLUCOSE BLD STRIP.AUTO-MCNC: 315 MG/DL (ref 70–108)
GLUCOSE SERPL-MCNC: 119 MG/DL (ref 70–108)
HCT VFR BLD AUTO: 40.6 % (ref 42–52)
HGB BLD-MCNC: 13.1 GM/DL (ref 14–18)
IMM GRANULOCYTES # BLD AUTO: 0.07 THOU/MM3 (ref 0–0.07)
IMM GRANULOCYTES NFR BLD AUTO: 0.6 %
INR PPP: 1.82 (ref 0.85–1.13)
LYMPHOCYTES ABSOLUTE: 1.5 THOU/MM3 (ref 1–4.8)
LYMPHOCYTES NFR BLD AUTO: 13.6 %
MAGNESIUM SERPL-MCNC: 2.5 MG/DL (ref 1.6–2.4)
MCH RBC QN AUTO: 32.5 PG (ref 26–33)
MCHC RBC AUTO-ENTMCNC: 32.3 GM/DL (ref 32.2–35.5)
MCV RBC AUTO: 100.7 FL (ref 80–94)
MONOCYTES ABSOLUTE: 1.2 THOU/MM3 (ref 0.4–1.3)
MONOCYTES NFR BLD AUTO: 11.3 %
NEUTROPHILS ABSOLUTE: 7.7 THOU/MM3 (ref 1.8–7.7)
NEUTROPHILS NFR BLD AUTO: 69.8 %
NRBC BLD AUTO-RTO: 0 /100 WBC
NT-PROBNP SERPL IA-MCNC: 1885 PG/ML (ref 0–449)
OSMOLALITY SERPL CALC.SUM OF ELEC: 285.4 MOSMOL/KG (ref 275–300)
PLATELET # BLD AUTO: 207 THOU/MM3 (ref 130–400)
PMV BLD AUTO: 10.9 FL (ref 9.4–12.4)
POTASSIUM SERPL-SCNC: 4.1 MEQ/L (ref 3.5–5.2)
PROT SERPL-MCNC: 7.3 G/DL (ref 6.1–8)
RBC # BLD AUTO: 4.03 MILL/MM3 (ref 4.7–6.1)
SARS-COV-2 RNA RESP QL NAA+PROBE: DETECTED
SODIUM SERPL-SCNC: 137 MEQ/L (ref 135–145)
TROPONIN, HIGH SENSITIVITY: 90 NG/L (ref 0–12)
WBC # BLD AUTO: 11 THOU/MM3 (ref 4.8–10.8)

## 2024-12-16 PROCEDURE — 36415 COLL VENOUS BLD VENIPUNCTURE: CPT

## 2024-12-16 PROCEDURE — 85025 COMPLETE CBC W/AUTO DIFF WBC: CPT

## 2024-12-16 PROCEDURE — 93005 ELECTROCARDIOGRAM TRACING: CPT | Performed by: EMERGENCY MEDICINE

## 2024-12-16 PROCEDURE — 94760 N-INVAS EAR/PLS OXIMETRY 1: CPT

## 2024-12-16 PROCEDURE — 87636 SARSCOV2 & INF A&B AMP PRB: CPT

## 2024-12-16 PROCEDURE — 84484 ASSAY OF TROPONIN QUANT: CPT

## 2024-12-16 PROCEDURE — 71045 X-RAY EXAM CHEST 1 VIEW: CPT

## 2024-12-16 PROCEDURE — 83880 ASSAY OF NATRIURETIC PEPTIDE: CPT

## 2024-12-16 PROCEDURE — 93010 ELECTROCARDIOGRAM REPORT: CPT | Performed by: NUCLEAR MEDICINE

## 2024-12-16 PROCEDURE — 99285 EMERGENCY DEPT VISIT HI MDM: CPT

## 2024-12-16 PROCEDURE — 6370000000 HC RX 637 (ALT 250 FOR IP): Performed by: HOSPITALIST

## 2024-12-16 PROCEDURE — 2700000000 HC OXYGEN THERAPY PER DAY

## 2024-12-16 PROCEDURE — 86140 C-REACTIVE PROTEIN: CPT

## 2024-12-16 PROCEDURE — 85610 PROTHROMBIN TIME: CPT

## 2024-12-16 PROCEDURE — 99222 1ST HOSP IP/OBS MODERATE 55: CPT | Performed by: HOSPITALIST

## 2024-12-16 PROCEDURE — 94640 AIRWAY INHALATION TREATMENT: CPT

## 2024-12-16 PROCEDURE — 83735 ASSAY OF MAGNESIUM: CPT

## 2024-12-16 PROCEDURE — 82948 REAGENT STRIP/BLOOD GLUCOSE: CPT

## 2024-12-16 PROCEDURE — 2580000003 HC RX 258: Performed by: HOSPITALIST

## 2024-12-16 PROCEDURE — 80053 COMPREHEN METABOLIC PANEL: CPT

## 2024-12-16 PROCEDURE — 1200000003 HC TELEMETRY R&B

## 2024-12-16 PROCEDURE — 6370000000 HC RX 637 (ALT 250 FOR IP)

## 2024-12-16 PROCEDURE — 6360000002 HC RX W HCPCS: Performed by: HOSPITALIST

## 2024-12-16 RX ORDER — SODIUM CHLORIDE 0.9 % (FLUSH) 0.9 %
5-40 SYRINGE (ML) INJECTION PRN
Status: DISCONTINUED | OUTPATIENT
Start: 2024-12-16 | End: 2024-12-26 | Stop reason: HOSPADM

## 2024-12-16 RX ORDER — ATORVASTATIN CALCIUM 80 MG/1
80 TABLET, FILM COATED ORAL NIGHTLY
Status: DISCONTINUED | OUTPATIENT
Start: 2024-12-16 | End: 2024-12-26 | Stop reason: HOSPADM

## 2024-12-16 RX ORDER — ACETAMINOPHEN 325 MG/1
650 TABLET ORAL EVERY 6 HOURS PRN
Status: DISCONTINUED | OUTPATIENT
Start: 2024-12-16 | End: 2024-12-26 | Stop reason: HOSPADM

## 2024-12-16 RX ORDER — GLUCAGON 1 MG/ML
1 KIT INJECTION PRN
Status: DISCONTINUED | OUTPATIENT
Start: 2024-12-16 | End: 2024-12-26 | Stop reason: HOSPADM

## 2024-12-16 RX ORDER — DEXTROSE MONOHYDRATE 100 MG/ML
INJECTION, SOLUTION INTRAVENOUS CONTINUOUS PRN
Status: DISCONTINUED | OUTPATIENT
Start: 2024-12-16 | End: 2024-12-26 | Stop reason: HOSPADM

## 2024-12-16 RX ORDER — SODIUM CHLORIDE 0.9 % (FLUSH) 0.9 %
5-40 SYRINGE (ML) INJECTION EVERY 12 HOURS SCHEDULED
Status: DISCONTINUED | OUTPATIENT
Start: 2024-12-16 | End: 2024-12-26 | Stop reason: HOSPADM

## 2024-12-16 RX ORDER — INSULIN GLARGINE 100 [IU]/ML
16 INJECTION, SOLUTION SUBCUTANEOUS DAILY
Status: DISCONTINUED | OUTPATIENT
Start: 2024-12-17 | End: 2024-12-24

## 2024-12-16 RX ORDER — PREDNISONE 20 MG/1
40 TABLET ORAL ONCE
Status: COMPLETED | OUTPATIENT
Start: 2024-12-16 | End: 2024-12-16

## 2024-12-16 RX ORDER — CODEINE PHOSPHATE AND GUAIFENESIN 10; 100 MG/5ML; MG/5ML
5 SOLUTION ORAL 4 TIMES DAILY PRN
Status: DISCONTINUED | OUTPATIENT
Start: 2024-12-16 | End: 2024-12-26 | Stop reason: HOSPADM

## 2024-12-16 RX ORDER — CALCITRIOL 0.25 UG/1
0.25 CAPSULE, LIQUID FILLED ORAL DAILY
Status: DISCONTINUED | OUTPATIENT
Start: 2024-12-16 | End: 2024-12-26 | Stop reason: HOSPADM

## 2024-12-16 RX ORDER — INSULIN LISPRO 100 [IU]/ML
0-8 INJECTION, SOLUTION INTRAVENOUS; SUBCUTANEOUS
Status: DISCONTINUED | OUTPATIENT
Start: 2024-12-16 | End: 2024-12-26 | Stop reason: HOSPADM

## 2024-12-16 RX ORDER — BUDESONIDE 0.5 MG/2ML
500 INHALANT ORAL 2 TIMES DAILY
Status: DISCONTINUED | OUTPATIENT
Start: 2024-12-16 | End: 2024-12-26 | Stop reason: HOSPADM

## 2024-12-16 RX ORDER — LOSARTAN POTASSIUM 25 MG/1
25 TABLET ORAL DAILY
Status: DISCONTINUED | OUTPATIENT
Start: 2024-12-16 | End: 2024-12-26 | Stop reason: HOSPADM

## 2024-12-16 RX ORDER — ONDANSETRON 4 MG/1
4 TABLET, ORALLY DISINTEGRATING ORAL EVERY 8 HOURS PRN
Status: DISCONTINUED | OUTPATIENT
Start: 2024-12-16 | End: 2024-12-26 | Stop reason: HOSPADM

## 2024-12-16 RX ORDER — LANOLIN ALCOHOL/MO/W.PET/CERES
1000 CREAM (GRAM) TOPICAL DAILY
Status: DISCONTINUED | OUTPATIENT
Start: 2024-12-16 | End: 2024-12-26 | Stop reason: HOSPADM

## 2024-12-16 RX ORDER — SODIUM CHLORIDE 9 MG/ML
INJECTION, SOLUTION INTRAVENOUS PRN
Status: DISCONTINUED | OUTPATIENT
Start: 2024-12-16 | End: 2024-12-26 | Stop reason: HOSPADM

## 2024-12-16 RX ORDER — METOPROLOL SUCCINATE 100 MG/1
100 TABLET, EXTENDED RELEASE ORAL DAILY
Status: DISCONTINUED | OUTPATIENT
Start: 2024-12-17 | End: 2024-12-26 | Stop reason: HOSPADM

## 2024-12-16 RX ORDER — POLYETHYLENE GLYCOL 3350 17 G/17G
17 POWDER, FOR SOLUTION ORAL DAILY PRN
Status: DISCONTINUED | OUTPATIENT
Start: 2024-12-16 | End: 2024-12-26 | Stop reason: HOSPADM

## 2024-12-16 RX ORDER — ONDANSETRON 2 MG/ML
4 INJECTION INTRAMUSCULAR; INTRAVENOUS EVERY 6 HOURS PRN
Status: DISCONTINUED | OUTPATIENT
Start: 2024-12-16 | End: 2024-12-26 | Stop reason: HOSPADM

## 2024-12-16 RX ORDER — ACETAMINOPHEN 650 MG/1
650 SUPPOSITORY RECTAL EVERY 6 HOURS PRN
Status: DISCONTINUED | OUTPATIENT
Start: 2024-12-16 | End: 2024-12-26 | Stop reason: HOSPADM

## 2024-12-16 RX ORDER — BUMETANIDE 1 MG/1
2 TABLET ORAL 2 TIMES DAILY
Status: DISCONTINUED | OUTPATIENT
Start: 2024-12-16 | End: 2024-12-26

## 2024-12-16 RX ORDER — PREDNISONE 20 MG/1
40 TABLET ORAL DAILY
Status: DISCONTINUED | OUTPATIENT
Start: 2024-12-17 | End: 2024-12-19

## 2024-12-16 RX ADMIN — INSULIN LISPRO 6 UNITS: 100 INJECTION, SOLUTION INTRAVENOUS; SUBCUTANEOUS at 20:37

## 2024-12-16 RX ADMIN — WARFARIN SODIUM 3 MG: 2 TABLET ORAL at 20:38

## 2024-12-16 RX ADMIN — CALCITRIOL CAPSULES 0.25 MCG 0.25 MCG: 0.25 CAPSULE ORAL at 20:38

## 2024-12-16 RX ADMIN — BUDESONIDE 500 MCG: 0.5 INHALANT RESPIRATORY (INHALATION) at 21:44

## 2024-12-16 RX ADMIN — SODIUM CHLORIDE, PRESERVATIVE FREE 10 ML: 5 INJECTION INTRAVENOUS at 20:38

## 2024-12-16 RX ADMIN — LOSARTAN POTASSIUM 25 MG: 25 TABLET, FILM COATED ORAL at 20:38

## 2024-12-16 RX ADMIN — ATORVASTATIN CALCIUM 80 MG: 80 TABLET, FILM COATED ORAL at 20:38

## 2024-12-16 RX ADMIN — Medication 1000 MCG: at 20:38

## 2024-12-16 RX ADMIN — PREDNISONE 40 MG: 20 TABLET ORAL at 14:53

## 2024-12-16 RX ADMIN — BUMETANIDE 2 MG: 1 TABLET ORAL at 20:38

## 2024-12-16 ASSESSMENT — PAIN - FUNCTIONAL ASSESSMENT
PAIN_FUNCTIONAL_ASSESSMENT: NONE - DENIES PAIN

## 2024-12-16 NOTE — ED NOTES
Spoke with ELIECER York to approve pt transport to [facility]. Pt in stable condition at this time.

## 2024-12-16 NOTE — ED PROVIDER NOTES
[facility] EMERGENCY DEPT  EMERGENCY DEPARTMENT ENCOUNTER          Pt Name: Portillo Daly  MRN: [id number]  Birthdate 1940  Date of evaluation: 12/16/2024  Physician: Rian Ambrocio MD  Supervising Attending Physician: Rl Thompson DO       CHIEF COMPLAINT       Chief Complaint   Patient presents with    Shortness of Breath    Cough         HISTORY OF PRESENT ILLNESS    HPI  Portillo Daly is a 84 y.o. male who presents to the emergency department from home, as a walk in to the ED lobby for evaluation of productive cough and worsening shortness of breath.  Patient ports over past couple days he has had worsening of his cough.  Stated that initially was coughing up clear sputum however is since turned green.  Denies any fever or chills.  Does report some shortness of breath with exertion.  Patient is on 3 L nasal cannula at baseline.  Denies any chest pain.  Denies any abdominal pain.  Denies any pain or burning with urination.  He does report a history of pneumonia.  The patient has no other acute complaints at this time.            PAST MEDICAL AND SURGICAL HISTORY     Past Medical History:   Diagnosis Date    MILLY (acute kidney injury) ([facility])     Atrial fibrillation ([facility])     Cerebral artery occlusion with cerebral infarction ([facility])     CHF (congestive heart failure), NYHA class III, acute on chronic, combined ([facility])     COPD (chronic obstructive pulmonary disease) ([facility]) 8/3/2020    Coronary artery disease involving native coronary artery of native heart with angina pectoris ([facility])     Diabetes mellitus, type 2 ([facility]) 12/30/2020    Hyperlipidemia 2/20/2012    Hypertension     Pneumonia      Past Surgical History:   Procedure Laterality Date    AORTIC VALVE REPLACEMENT      CARDIAC SURGERY      heart cath    CORONARY ANGIOPLASTY WITH STENT PLACEMENT      EP DEVICE PROCEDURE N/A 12/2/2024    Remove & replace PPM gen dual lead performed by Dane Garcia MD at [facility] CARDIAC CATH LAB

## 2024-12-16 NOTE — ED NOTES
Pt. Resting in bed with even and unlabored respirations. Pt. Denies any pain,  Pt. Updated about plan of care and treatment. Family at bedside. Pt. Has no further concerns, questions or needs at this time. Call light within reach.

## 2024-12-16 NOTE — H&P
History & Physical    Patient:  Portillo Daly  YOB: 1940  Date of Service: 12/16/2024  MRN: [id number]   Acct:  [id number]   Primary Care Physician: Elinor Ly APRN - CNP    Chief Complaint: SOB    History of Present Illness:   History obtained from the patient.    The patient is a 84 y.o. male who presents with complaints of SOB and cough productive of white yellow phlegm for 2 days.  Patient is accompanied by significant other needs.  He has a history of chronic respiratory failure secondary to COPD and is on 3 L chronically.  Family at bedside reports that it has become difficult for him to push his own oxygen has become weak.  He denies any fevers or chills, nausea or vomiting.  He reports he has a good appetite.  Patient was in the ER about a week ago due to wound check and bleeding at the site of his pacemaker following a battery replacement.  Patient is a history of A-fib and is on Coumadin.  He reports since then everything has been fine and his dressing has been taken off.  Patient denies any sick contacts but was in the ED a week ago.    In the ED patient was found to be COVID-positive.  He remains on his 3 L of O2.      Past Medical History:        Diagnosis Date    MILLY (acute kidney injury) ([facility])     Atrial fibrillation ([facility])     Cerebral artery occlusion with cerebral infarction ([facility])     CHF (congestive heart failure), NYHA class III, acute on chronic, combined ([facility])     COPD (chronic obstructive pulmonary disease) ([facility]) 8/3/2020    Coronary artery disease involving native coronary artery of native heart with angina pectoris ([facility])     Diabetes mellitus, type 2 ([facility]) 12/30/2020    Hyperlipidemia 2/20/2012    Hypertension     Pneumonia        Past Surgical History:        Procedure Laterality Date    AORTIC VALVE REPLACEMENT      CARDIAC SURGERY      heart cath    CORONARY ANGIOPLASTY WITH STENT PLACEMENT      EP DEVICE PROCEDURE N/A 12/2/2024    Remove & replace PPM gen

## 2024-12-16 NOTE — ED PROVIDER NOTES
I performed a history and physical examination of the patient and discussed management with the resident. I reviewed the resident’s note and agree with the documented findings and plan of care. Any areas of disagreement are noted on the chart. I was personally present for the key portions of any procedures. I have documented in the chart those procedures where I was not present during the key portions. I have reviewed the emergency nurses triage note. I agree with the chief complaint, past medical history, past surgical history, allergies, medications, social and family history as documented unless otherwise noted below. Documentation of the HPI, Physical Exam and Medical Decision Making performed by medical students or scribes is based on my personal performance of the HPI, PE and MDM. For Phys Assistant/ Nurse Practitioner cases/documentation I have personally evaluated this patient and have completed at least one if not all key elements of the E/M (history, physical exam, and MDM). My findings are as noted below.    In other words, I personally saw and examined the patient I have reviewed and agreed with the resident findings including all diagnostic interpretations and treatment plans as written.  I was present for the key portion of any procedures performed and the inclusive time noted in any critical care statement.    Patient is here today for increasing shortness of breath.  Patient also states that he has been having sputum production which is green-yellow in nature.  Patient states he has not had any fevers.  This is a patient who is an 84-year-old male who has COPD he is on 3 L of oxygen normally.  Patient states that he has not had any fevers.  Patient states that he has had some mild swelling of the lower extremities.  Patient states that he is on a water pill at this time.  Here today the patient is resting comfortably on the cot no apparent distress no other physical complaints at this time.      XR

## 2024-12-16 NOTE — ED NOTES
Pt arrives to the ED for sob and cough that has been occurring since yesterday. Pt states he feels as though he is unable to breath through his nose to take a deep breath. Pt states normally wears 3 L nasal canula at home. Denies any chest pain. Had a pacemaker placed on dec 2nd. Pt respirations are unlabored. VSS

## 2024-12-16 NOTE — ED NOTES
Pt. Resting in bed with even and unlabored respirations. Pt. Denies any pain. Pt. Updated about plan of care and treatment. Family at bedside. Pt. Has no further concerns, questions or needs at this time. Call light within reach.

## 2024-12-17 LAB
ANION GAP SERPL CALC-SCNC: 14 MEQ/L (ref 8–16)
BASOPHILS ABSOLUTE: 0 THOU/MM3 (ref 0–0.1)
BASOPHILS NFR BLD AUTO: 0.1 %
BUN SERPL-MCNC: 44 MG/DL (ref 7–22)
CALCIUM SERPL-MCNC: 8.6 MG/DL (ref 8.5–10.5)
CHLORIDE SERPL-SCNC: 99 MEQ/L (ref 98–111)
CO2 SERPL-SCNC: 24 MEQ/L (ref 23–33)
CREAT SERPL-MCNC: 2.9 MG/DL (ref 0.4–1.2)
DEPRECATED MEAN GLUCOSE BLD GHB EST-ACNC: 150 MG/DL (ref 70–126)
DEPRECATED RDW RBC AUTO: 57 FL (ref 35–45)
EOSINOPHIL NFR BLD AUTO: 0 %
EOSINOPHILS ABSOLUTE: 0 THOU/MM3 (ref 0–0.4)
ERYTHROCYTE [DISTWIDTH] IN BLOOD BY AUTOMATED COUNT: 15.7 % (ref 11.5–14.5)
GFR SERPL CREATININE-BSD FRML MDRD: 20 ML/MIN/1.73M2
GLUCOSE BLD STRIP.AUTO-MCNC: 163 MG/DL (ref 70–108)
GLUCOSE BLD STRIP.AUTO-MCNC: 180 MG/DL (ref 70–108)
GLUCOSE BLD STRIP.AUTO-MCNC: 208 MG/DL (ref 70–108)
GLUCOSE BLD STRIP.AUTO-MCNC: 304 MG/DL (ref 70–108)
GLUCOSE SERPL-MCNC: 175 MG/DL (ref 70–108)
HBA1C MFR BLD HPLC: 7 % (ref 4.4–6.4)
HCT VFR BLD AUTO: 35.9 % (ref 42–52)
HCT VFR BLD AUTO: 36.7 % (ref 42–52)
HGB BLD-MCNC: 11.8 GM/DL (ref 14–18)
HGB BLD-MCNC: 12.2 GM/DL (ref 14–18)
IMM GRANULOCYTES # BLD AUTO: 0.04 THOU/MM3 (ref 0–0.07)
IMM GRANULOCYTES NFR BLD AUTO: 0.5 %
INR PPP: 1.72 (ref 0.85–1.13)
LYMPHOCYTES ABSOLUTE: 1 THOU/MM3 (ref 1–4.8)
LYMPHOCYTES NFR BLD AUTO: 12.9 %
MCH RBC QN AUTO: 32.2 PG (ref 26–33)
MCHC RBC AUTO-ENTMCNC: 32.9 GM/DL (ref 32.2–35.5)
MCV RBC AUTO: 98.1 FL (ref 80–94)
MONOCYTES ABSOLUTE: 0.6 THOU/MM3 (ref 0.4–1.3)
MONOCYTES NFR BLD AUTO: 7.9 %
NEUTROPHILS ABSOLUTE: 6.3 THOU/MM3 (ref 1.8–7.7)
NEUTROPHILS NFR BLD AUTO: 78.6 %
NRBC BLD AUTO-RTO: 0 /100 WBC
PLATELET # BLD AUTO: 190 THOU/MM3 (ref 130–400)
PMV BLD AUTO: 10.7 FL (ref 9.4–12.4)
POTASSIUM SERPL-SCNC: 4.3 MEQ/L (ref 3.5–5.2)
RBC # BLD AUTO: 3.66 MILL/MM3 (ref 4.7–6.1)
SODIUM SERPL-SCNC: 137 MEQ/L (ref 135–145)
WBC # BLD AUTO: 8 THOU/MM3 (ref 4.8–10.8)

## 2024-12-17 PROCEDURE — 85014 HEMATOCRIT: CPT

## 2024-12-17 PROCEDURE — 1200000003 HC TELEMETRY R&B

## 2024-12-17 PROCEDURE — 82948 REAGENT STRIP/BLOOD GLUCOSE: CPT

## 2024-12-17 PROCEDURE — 6360000002 HC RX W HCPCS: Performed by: HOSPITALIST

## 2024-12-17 PROCEDURE — 85018 HEMOGLOBIN: CPT

## 2024-12-17 PROCEDURE — 6370000000 HC RX 637 (ALT 250 FOR IP): Performed by: HOSPITALIST

## 2024-12-17 PROCEDURE — 85610 PROTHROMBIN TIME: CPT

## 2024-12-17 PROCEDURE — 36415 COLL VENOUS BLD VENIPUNCTURE: CPT

## 2024-12-17 PROCEDURE — 94640 AIRWAY INHALATION TREATMENT: CPT

## 2024-12-17 PROCEDURE — 99232 SBSQ HOSP IP/OBS MODERATE 35: CPT

## 2024-12-17 PROCEDURE — 2580000003 HC RX 258: Performed by: HOSPITALIST

## 2024-12-17 PROCEDURE — 2500000003 HC RX 250 WO HCPCS: Performed by: HOSPITALIST

## 2024-12-17 PROCEDURE — 83036 HEMOGLOBIN GLYCOSYLATED A1C: CPT

## 2024-12-17 PROCEDURE — 97535 SELF CARE MNGMENT TRAINING: CPT

## 2024-12-17 PROCEDURE — 85025 COMPLETE CBC W/AUTO DIFF WBC: CPT

## 2024-12-17 PROCEDURE — 94761 N-INVAS EAR/PLS OXIMETRY MLT: CPT

## 2024-12-17 PROCEDURE — 80048 BASIC METABOLIC PNL TOTAL CA: CPT

## 2024-12-17 PROCEDURE — 97166 OT EVAL MOD COMPLEX 45 MIN: CPT

## 2024-12-17 PROCEDURE — 6370000000 HC RX 637 (ALT 250 FOR IP)

## 2024-12-17 RX ORDER — ASCORBIC ACID 500 MG
500 TABLET ORAL DAILY
Status: DISCONTINUED | OUTPATIENT
Start: 2024-12-17 | End: 2024-12-26 | Stop reason: HOSPADM

## 2024-12-17 RX ORDER — ZINC SULFATE 50(220)MG
50 CAPSULE ORAL DAILY
Status: DISCONTINUED | OUTPATIENT
Start: 2024-12-17 | End: 2024-12-26 | Stop reason: HOSPADM

## 2024-12-17 RX ORDER — WARFARIN SODIUM 2.5 MG/1
2.5 TABLET ORAL
Status: COMPLETED | OUTPATIENT
Start: 2024-12-17 | End: 2024-12-17

## 2024-12-17 RX ORDER — PREDNISONE 20 MG/1
40 TABLET ORAL DAILY
Qty: 6 TABLET | Refills: 0 | Status: SHIPPED | OUTPATIENT
Start: 2024-12-18 | End: 2024-12-26 | Stop reason: HOSPADM

## 2024-12-17 RX ADMIN — ATORVASTATIN CALCIUM 80 MG: 80 TABLET, FILM COATED ORAL at 20:52

## 2024-12-17 RX ADMIN — PREDNISONE 40 MG: 20 TABLET ORAL at 09:06

## 2024-12-17 RX ADMIN — Medication 50 MG: at 20:51

## 2024-12-17 RX ADMIN — Medication 1000 MCG: at 09:06

## 2024-12-17 RX ADMIN — CALCITRIOL CAPSULES 0.25 MCG 0.25 MCG: 0.25 CAPSULE ORAL at 09:06

## 2024-12-17 RX ADMIN — INSULIN LISPRO 2 UNITS: 100 INJECTION, SOLUTION INTRAVENOUS; SUBCUTANEOUS at 18:10

## 2024-12-17 RX ADMIN — BUMETANIDE 2 MG: 1 TABLET ORAL at 18:10

## 2024-12-17 RX ADMIN — METOPROLOL SUCCINATE 100 MG: 100 TABLET, EXTENDED RELEASE ORAL at 09:06

## 2024-12-17 RX ADMIN — INSULIN GLARGINE 16 UNITS: 100 INJECTION, SOLUTION SUBCUTANEOUS at 09:49

## 2024-12-17 RX ADMIN — SODIUM CHLORIDE, PRESERVATIVE FREE 10 ML: 5 INJECTION INTRAVENOUS at 09:05

## 2024-12-17 RX ADMIN — WARFARIN SODIUM 2.5 MG: 2.5 TABLET ORAL at 18:53

## 2024-12-17 RX ADMIN — EMPAGLIFLOZIN 10 MG: 10 TABLET, FILM COATED ORAL at 09:06

## 2024-12-17 RX ADMIN — SODIUM CHLORIDE, PRESERVATIVE FREE 10 ML: 5 INJECTION INTRAVENOUS at 20:52

## 2024-12-17 RX ADMIN — BUMETANIDE 2 MG: 1 TABLET ORAL at 09:06

## 2024-12-17 RX ADMIN — BUDESONIDE 500 MCG: 0.5 INHALANT RESPIRATORY (INHALATION) at 20:19

## 2024-12-17 RX ADMIN — INSULIN LISPRO 2 UNITS: 100 INJECTION, SOLUTION INTRAVENOUS; SUBCUTANEOUS at 09:06

## 2024-12-17 RX ADMIN — OXYCODONE HYDROCHLORIDE AND ACETAMINOPHEN 500 MG: 500 TABLET ORAL at 20:52

## 2024-12-17 RX ADMIN — INSULIN LISPRO 6 UNITS: 100 INJECTION, SOLUTION INTRAVENOUS; SUBCUTANEOUS at 20:51

## 2024-12-17 RX ADMIN — LOSARTAN POTASSIUM 25 MG: 25 TABLET, FILM COATED ORAL at 09:06

## 2024-12-17 NOTE — CARE COORDINATION
12/17/24 1122   Service Assessment   Patient Orientation Alert and Oriented;Person;Place;Situation;Self   Cognition Alert   History Provided By Patient;Medical Record   Primary Caregiver Self   Accompanied By/Relationship S/O Phylliss   Support Systems Spouse/Significant Other;Children;Family Members   Patient's Healthcare Decision Maker is: Named in Scanned ACP Document   PCP Verified by CM Yes   Last Visit to PCP Within last 3 months   Prior Functional Level Independent in ADLs/IADLs;Bathing;Shopping;Housework;Cooking;Toileting;Dressing;Mobility;Feeding   Current Functional Level Independent in ADLs/IADLs;Bathing;Shopping;Housework;Cooking;Feeding;Toileting;Dressing;Mobility   Can patient return to prior living arrangement Yes   Ability to make needs known: Good   Family able to assist with home care needs: Yes   Would you like for me to discuss the discharge plan with any other family members/significant others, and if so, who? No   Financial Resources Medicare   Community Resources None   CM/SW Referral Other (see comment)  (N/A)   Social/Functional History   Homemaking Responsibilities Yes   Prior Level of Assist for Transfers Independent   Active  Yes   Occupation Retired   Discharge Planning   Type of Residence House   Living Arrangements Spouse/Significant Other   Current Services Prior To Admission Durable Medical Equipment;Oxygen Therapy  ([employer])   Current DME Prior to Arrival Oxygen Therapy (Comment)   Potential Assistance Needed N/A   DME Ordered? No   Potential Assistance Purchasing Medications No   Type of Home Care Services None   Patient expects to be discharged to: House   History of falls? 0   Services At/After Discharge   Transition of Care Consult (CM Consult) Discharge Planning   Services At/After Discharge None   Confirm Follow Up Transport Family   Condition of Participation: Discharge Planning   The Plan for Transition of Care is related to the following treatment goals:

## 2024-12-18 LAB
ANION GAP SERPL CALC-SCNC: 15 MEQ/L (ref 8–16)
BASOPHILS ABSOLUTE: 0 THOU/MM3 (ref 0–0.1)
BASOPHILS NFR BLD AUTO: 0.3 %
BUN SERPL-MCNC: 53 MG/DL (ref 7–22)
CALCIUM SERPL-MCNC: 8.8 MG/DL (ref 8.5–10.5)
CHLORIDE SERPL-SCNC: 94 MEQ/L (ref 98–111)
CO2 SERPL-SCNC: 27 MEQ/L (ref 23–33)
CREAT SERPL-MCNC: 3.3 MG/DL (ref 0.4–1.2)
DEPRECATED RDW RBC AUTO: 57 FL (ref 35–45)
EOSINOPHIL NFR BLD AUTO: 0 %
EOSINOPHILS ABSOLUTE: 0 THOU/MM3 (ref 0–0.4)
ERYTHROCYTE [DISTWIDTH] IN BLOOD BY AUTOMATED COUNT: 15.7 % (ref 11.5–14.5)
GFR SERPL CREATININE-BSD FRML MDRD: 18 ML/MIN/1.73M2
GLUCOSE BLD STRIP.AUTO-MCNC: 122 MG/DL (ref 70–108)
GLUCOSE BLD STRIP.AUTO-MCNC: 176 MG/DL (ref 70–108)
GLUCOSE BLD STRIP.AUTO-MCNC: 184 MG/DL (ref 70–108)
GLUCOSE BLD STRIP.AUTO-MCNC: 364 MG/DL (ref 70–108)
GLUCOSE SERPL-MCNC: 143 MG/DL (ref 70–108)
HCT VFR BLD AUTO: 35.1 % (ref 42–52)
HGB BLD-MCNC: 11.6 GM/DL (ref 14–18)
IMM GRANULOCYTES # BLD AUTO: 0.16 THOU/MM3 (ref 0–0.07)
IMM GRANULOCYTES NFR BLD AUTO: 1.3 %
INR PPP: 1.75 (ref 0.85–1.13)
LYMPHOCYTES ABSOLUTE: 1.6 THOU/MM3 (ref 1–4.8)
LYMPHOCYTES NFR BLD AUTO: 12.5 %
MCH RBC QN AUTO: 32.7 PG (ref 26–33)
MCHC RBC AUTO-ENTMCNC: 33 GM/DL (ref 32.2–35.5)
MCV RBC AUTO: 98.9 FL (ref 80–94)
MONOCYTES ABSOLUTE: 1.1 THOU/MM3 (ref 0.4–1.3)
MONOCYTES NFR BLD AUTO: 8.8 %
NEUTROPHILS ABSOLUTE: 9.9 THOU/MM3 (ref 1.8–7.7)
NEUTROPHILS NFR BLD AUTO: 77.1 %
NRBC BLD AUTO-RTO: 0 /100 WBC
PLATELET # BLD AUTO: 223 THOU/MM3 (ref 130–400)
PMV BLD AUTO: 11.4 FL (ref 9.4–12.4)
POTASSIUM SERPL-SCNC: 4.7 MEQ/L (ref 3.5–5.2)
RBC # BLD AUTO: 3.55 MILL/MM3 (ref 4.7–6.1)
SODIUM SERPL-SCNC: 136 MEQ/L (ref 135–145)
WBC # BLD AUTO: 12.8 THOU/MM3 (ref 4.8–10.8)

## 2024-12-18 PROCEDURE — 94640 AIRWAY INHALATION TREATMENT: CPT

## 2024-12-18 PROCEDURE — 6370000000 HC RX 637 (ALT 250 FOR IP): Performed by: HOSPITALIST

## 2024-12-18 PROCEDURE — 36415 COLL VENOUS BLD VENIPUNCTURE: CPT

## 2024-12-18 PROCEDURE — 6370000000 HC RX 637 (ALT 250 FOR IP): Performed by: STUDENT IN AN ORGANIZED HEALTH CARE EDUCATION/TRAINING PROGRAM

## 2024-12-18 PROCEDURE — 6370000000 HC RX 637 (ALT 250 FOR IP)

## 2024-12-18 PROCEDURE — 85025 COMPLETE CBC W/AUTO DIFF WBC: CPT

## 2024-12-18 PROCEDURE — 80048 BASIC METABOLIC PNL TOTAL CA: CPT

## 2024-12-18 PROCEDURE — 85610 PROTHROMBIN TIME: CPT

## 2024-12-18 PROCEDURE — 82948 REAGENT STRIP/BLOOD GLUCOSE: CPT

## 2024-12-18 PROCEDURE — 94669 MECHANICAL CHEST WALL OSCILL: CPT

## 2024-12-18 PROCEDURE — 2700000000 HC OXYGEN THERAPY PER DAY

## 2024-12-18 PROCEDURE — 2500000003 HC RX 250 WO HCPCS: Performed by: HOSPITALIST

## 2024-12-18 PROCEDURE — 94761 N-INVAS EAR/PLS OXIMETRY MLT: CPT

## 2024-12-18 PROCEDURE — 6360000002 HC RX W HCPCS: Performed by: HOSPITALIST

## 2024-12-18 PROCEDURE — 99232 SBSQ HOSP IP/OBS MODERATE 35: CPT | Performed by: INTERNAL MEDICINE

## 2024-12-18 PROCEDURE — 1200000003 HC TELEMETRY R&B

## 2024-12-18 RX ORDER — WARFARIN SODIUM 2 MG/1
2 TABLET ORAL ONCE
Status: COMPLETED | OUTPATIENT
Start: 2024-12-18 | End: 2024-12-18

## 2024-12-18 RX ORDER — ALBUTEROL SULFATE 0.83 MG/ML
2.5 SOLUTION RESPIRATORY (INHALATION) EVERY 4 HOURS PRN
Status: DISCONTINUED | OUTPATIENT
Start: 2024-12-18 | End: 2024-12-26 | Stop reason: HOSPADM

## 2024-12-18 RX ORDER — IPRATROPIUM BROMIDE AND ALBUTEROL SULFATE 2.5; .5 MG/3ML; MG/3ML
1 SOLUTION RESPIRATORY (INHALATION)
Status: DISCONTINUED | OUTPATIENT
Start: 2024-12-18 | End: 2024-12-18

## 2024-12-18 RX ORDER — IPRATROPIUM BROMIDE AND ALBUTEROL SULFATE 2.5; .5 MG/3ML; MG/3ML
1 SOLUTION RESPIRATORY (INHALATION) 3 TIMES DAILY
Status: DISCONTINUED | OUTPATIENT
Start: 2024-12-18 | End: 2024-12-26 | Stop reason: HOSPADM

## 2024-12-18 RX ADMIN — PREDNISONE 40 MG: 20 TABLET ORAL at 10:26

## 2024-12-18 RX ADMIN — SODIUM CHLORIDE, PRESERVATIVE FREE 10 ML: 5 INJECTION INTRAVENOUS at 10:25

## 2024-12-18 RX ADMIN — BUDESONIDE 500 MCG: 0.5 INHALANT RESPIRATORY (INHALATION) at 21:48

## 2024-12-18 RX ADMIN — LOSARTAN POTASSIUM 25 MG: 25 TABLET, FILM COATED ORAL at 10:26

## 2024-12-18 RX ADMIN — ATORVASTATIN CALCIUM 80 MG: 80 TABLET, FILM COATED ORAL at 20:56

## 2024-12-18 RX ADMIN — Medication 1000 MCG: at 10:26

## 2024-12-18 RX ADMIN — WARFARIN SODIUM 2 MG: 2 TABLET ORAL at 18:18

## 2024-12-18 RX ADMIN — EMPAGLIFLOZIN 10 MG: 10 TABLET, FILM COATED ORAL at 10:26

## 2024-12-18 RX ADMIN — CALCITRIOL CAPSULES 0.25 MCG 0.25 MCG: 0.25 CAPSULE ORAL at 10:25

## 2024-12-18 RX ADMIN — METOPROLOL SUCCINATE 100 MG: 100 TABLET, EXTENDED RELEASE ORAL at 10:26

## 2024-12-18 RX ADMIN — INSULIN LISPRO 2 UNITS: 100 INJECTION, SOLUTION INTRAVENOUS; SUBCUTANEOUS at 13:00

## 2024-12-18 RX ADMIN — INSULIN GLARGINE 16 UNITS: 100 INJECTION, SOLUTION SUBCUTANEOUS at 10:25

## 2024-12-18 RX ADMIN — BUMETANIDE 2 MG: 1 TABLET ORAL at 10:25

## 2024-12-18 RX ADMIN — IPRATROPIUM BROMIDE AND ALBUTEROL SULFATE 1 DOSE: .5; 3 SOLUTION RESPIRATORY (INHALATION) at 16:20

## 2024-12-18 RX ADMIN — Medication 50 MG: at 10:26

## 2024-12-18 RX ADMIN — BUMETANIDE 2 MG: 1 TABLET ORAL at 18:18

## 2024-12-18 RX ADMIN — SODIUM CHLORIDE, PRESERVATIVE FREE 10 ML: 5 INJECTION INTRAVENOUS at 20:56

## 2024-12-18 RX ADMIN — OXYCODONE HYDROCHLORIDE AND ACETAMINOPHEN 500 MG: 500 TABLET ORAL at 10:26

## 2024-12-18 RX ADMIN — IPRATROPIUM BROMIDE AND ALBUTEROL SULFATE 1 DOSE: .5; 3 SOLUTION RESPIRATORY (INHALATION) at 21:48

## 2024-12-18 RX ADMIN — INSULIN LISPRO 8 UNITS: 100 INJECTION, SOLUTION INTRAVENOUS; SUBCUTANEOUS at 20:56

## 2024-12-18 RX ADMIN — BUDESONIDE 500 MCG: 0.5 INHALANT RESPIRATORY (INHALATION) at 08:59

## 2024-12-19 ENCOUNTER — APPOINTMENT (OUTPATIENT)
Dept: CT IMAGING | Age: 84
DRG: 177 | End: 2024-12-19
Payer: MEDICARE

## 2024-12-19 LAB
ANION GAP SERPL CALC-SCNC: 18 MEQ/L (ref 8–16)
BASOPHILS ABSOLUTE: 0 THOU/MM3 (ref 0–0.1)
BASOPHILS NFR BLD AUTO: 0.3 %
BUN SERPL-MCNC: 60 MG/DL (ref 7–22)
CALCIUM SERPL-MCNC: 8.9 MG/DL (ref 8.5–10.5)
CHLORIDE SERPL-SCNC: 96 MEQ/L (ref 98–111)
CO2 SERPL-SCNC: 26 MEQ/L (ref 23–33)
CREAT SERPL-MCNC: 3.2 MG/DL (ref 0.4–1.2)
DEPRECATED RDW RBC AUTO: 58.8 FL (ref 35–45)
EOSINOPHIL NFR BLD AUTO: 0 %
EOSINOPHILS ABSOLUTE: 0 THOU/MM3 (ref 0–0.4)
ERYTHROCYTE [DISTWIDTH] IN BLOOD BY AUTOMATED COUNT: 15.9 % (ref 11.5–14.5)
GFR SERPL CREATININE-BSD FRML MDRD: 18 ML/MIN/1.73M2
GLUCOSE BLD STRIP.AUTO-MCNC: 118 MG/DL (ref 70–108)
GLUCOSE BLD STRIP.AUTO-MCNC: 177 MG/DL (ref 70–108)
GLUCOSE BLD STRIP.AUTO-MCNC: 300 MG/DL (ref 70–108)
GLUCOSE BLD STRIP.AUTO-MCNC: 321 MG/DL (ref 70–108)
GLUCOSE SERPL-MCNC: 140 MG/DL (ref 70–108)
HCT VFR BLD AUTO: 35.3 % (ref 42–52)
HGB BLD-MCNC: 11.5 GM/DL (ref 14–18)
IMM GRANULOCYTES # BLD AUTO: 0.26 THOU/MM3 (ref 0–0.07)
IMM GRANULOCYTES NFR BLD AUTO: 1.9 %
INR PPP: 1.99 (ref 0.85–1.13)
LACTATE SERPL-SCNC: 3.3 MMOL/L (ref 0.5–2)
LYMPHOCYTES ABSOLUTE: 1.5 THOU/MM3 (ref 1–4.8)
LYMPHOCYTES NFR BLD AUTO: 10.8 %
MCH RBC QN AUTO: 32.6 PG (ref 26–33)
MCHC RBC AUTO-ENTMCNC: 32.6 GM/DL (ref 32.2–35.5)
MCV RBC AUTO: 100 FL (ref 80–94)
MONOCYTES ABSOLUTE: 1.3 THOU/MM3 (ref 0.4–1.3)
MONOCYTES NFR BLD AUTO: 9.4 %
NEUTROPHILS ABSOLUTE: 10.9 THOU/MM3 (ref 1.8–7.7)
NEUTROPHILS NFR BLD AUTO: 77.6 %
NRBC BLD AUTO-RTO: 0 /100 WBC
PLATELET # BLD AUTO: 200 THOU/MM3 (ref 130–400)
PMV BLD AUTO: 10.5 FL (ref 9.4–12.4)
POTASSIUM SERPL-SCNC: 3.9 MEQ/L (ref 3.5–5.2)
RBC # BLD AUTO: 3.53 MILL/MM3 (ref 4.7–6.1)
SODIUM SERPL-SCNC: 140 MEQ/L (ref 135–145)
WBC # BLD AUTO: 14 THOU/MM3 (ref 4.8–10.8)

## 2024-12-19 PROCEDURE — 82948 REAGENT STRIP/BLOOD GLUCOSE: CPT

## 2024-12-19 PROCEDURE — 2700000000 HC OXYGEN THERAPY PER DAY

## 2024-12-19 PROCEDURE — 6370000000 HC RX 637 (ALT 250 FOR IP): Performed by: INTERNAL MEDICINE

## 2024-12-19 PROCEDURE — 6360000002 HC RX W HCPCS: Performed by: STUDENT IN AN ORGANIZED HEALTH CARE EDUCATION/TRAINING PROGRAM

## 2024-12-19 PROCEDURE — 85025 COMPLETE CBC W/AUTO DIFF WBC: CPT

## 2024-12-19 PROCEDURE — 36415 COLL VENOUS BLD VENIPUNCTURE: CPT

## 2024-12-19 PROCEDURE — 99233 SBSQ HOSP IP/OBS HIGH 50: CPT | Performed by: INTERNAL MEDICINE

## 2024-12-19 PROCEDURE — 6370000000 HC RX 637 (ALT 250 FOR IP): Performed by: PHARMACIST

## 2024-12-19 PROCEDURE — 6360000002 HC RX W HCPCS: Performed by: HOSPITALIST

## 2024-12-19 PROCEDURE — 6370000000 HC RX 637 (ALT 250 FOR IP): Performed by: STUDENT IN AN ORGANIZED HEALTH CARE EDUCATION/TRAINING PROGRAM

## 2024-12-19 PROCEDURE — 1200000003 HC TELEMETRY R&B

## 2024-12-19 PROCEDURE — 2500000003 HC RX 250 WO HCPCS: Performed by: HOSPITALIST

## 2024-12-19 PROCEDURE — 6370000000 HC RX 637 (ALT 250 FOR IP)

## 2024-12-19 PROCEDURE — 94760 N-INVAS EAR/PLS OXIMETRY 1: CPT

## 2024-12-19 PROCEDURE — 71250 CT THORAX DX C-: CPT

## 2024-12-19 PROCEDURE — 83605 ASSAY OF LACTIC ACID: CPT

## 2024-12-19 PROCEDURE — 6370000000 HC RX 637 (ALT 250 FOR IP): Performed by: HOSPITALIST

## 2024-12-19 PROCEDURE — 94669 MECHANICAL CHEST WALL OSCILL: CPT

## 2024-12-19 PROCEDURE — 85610 PROTHROMBIN TIME: CPT

## 2024-12-19 PROCEDURE — 80048 BASIC METABOLIC PNL TOTAL CA: CPT

## 2024-12-19 PROCEDURE — 94640 AIRWAY INHALATION TREATMENT: CPT

## 2024-12-19 RX ORDER — DEXAMETHASONE SODIUM PHOSPHATE 4 MG/ML
6 INJECTION, SOLUTION INTRA-ARTICULAR; INTRALESIONAL; INTRAMUSCULAR; INTRAVENOUS; SOFT TISSUE DAILY
Status: DISCONTINUED | OUTPATIENT
Start: 2024-12-20 | End: 2024-12-26 | Stop reason: HOSPADM

## 2024-12-19 RX ORDER — DEXAMETHASONE SODIUM PHOSPHATE 4 MG/ML
6 INJECTION, SOLUTION INTRA-ARTICULAR; INTRALESIONAL; INTRAMUSCULAR; INTRAVENOUS; SOFT TISSUE DAILY
Status: DISCONTINUED | OUTPATIENT
Start: 2024-12-19 | End: 2024-12-19

## 2024-12-19 RX ORDER — WARFARIN SODIUM 1 MG/1
1.5 TABLET ORAL
Status: COMPLETED | OUTPATIENT
Start: 2024-12-19 | End: 2024-12-19

## 2024-12-19 RX ORDER — PANTOPRAZOLE SODIUM 40 MG/1
40 TABLET, DELAYED RELEASE ORAL
Status: DISCONTINUED | OUTPATIENT
Start: 2024-12-19 | End: 2024-12-26 | Stop reason: HOSPADM

## 2024-12-19 RX ADMIN — Medication 1000 MCG: at 08:31

## 2024-12-19 RX ADMIN — WARFARIN SODIUM 1.5 MG: 1 TABLET ORAL at 17:37

## 2024-12-19 RX ADMIN — DEXAMETHASONE SODIUM PHOSPHATE 6 MG: 4 INJECTION, SOLUTION INTRA-ARTICULAR; INTRALESIONAL; INTRAMUSCULAR; INTRAVENOUS; SOFT TISSUE at 16:50

## 2024-12-19 RX ADMIN — SODIUM CHLORIDE, PRESERVATIVE FREE 10 ML: 5 INJECTION INTRAVENOUS at 20:49

## 2024-12-19 RX ADMIN — BUMETANIDE 2 MG: 1 TABLET ORAL at 17:37

## 2024-12-19 RX ADMIN — INSULIN GLARGINE 16 UNITS: 100 INJECTION, SOLUTION SUBCUTANEOUS at 08:33

## 2024-12-19 RX ADMIN — EMPAGLIFLOZIN 10 MG: 10 TABLET, FILM COATED ORAL at 08:32

## 2024-12-19 RX ADMIN — GUAIFENESIN AND CODEINE PHOSPHATE 5 ML: 100; 10 SOLUTION ORAL at 02:35

## 2024-12-19 RX ADMIN — LOSARTAN POTASSIUM 25 MG: 25 TABLET, FILM COATED ORAL at 08:32

## 2024-12-19 RX ADMIN — IPRATROPIUM BROMIDE AND ALBUTEROL SULFATE 1 DOSE: .5; 3 SOLUTION RESPIRATORY (INHALATION) at 11:24

## 2024-12-19 RX ADMIN — IPRATROPIUM BROMIDE AND ALBUTEROL SULFATE 1 DOSE: .5; 3 SOLUTION RESPIRATORY (INHALATION) at 21:53

## 2024-12-19 RX ADMIN — BUDESONIDE 500 MCG: 0.5 INHALANT RESPIRATORY (INHALATION) at 21:53

## 2024-12-19 RX ADMIN — SODIUM CHLORIDE, PRESERVATIVE FREE 10 ML: 5 INJECTION INTRAVENOUS at 16:53

## 2024-12-19 RX ADMIN — IPRATROPIUM BROMIDE AND ALBUTEROL SULFATE 1 DOSE: .5; 3 SOLUTION RESPIRATORY (INHALATION) at 13:37

## 2024-12-19 RX ADMIN — SODIUM CHLORIDE, PRESERVATIVE FREE 10 ML: 5 INJECTION INTRAVENOUS at 08:37

## 2024-12-19 RX ADMIN — ALBUTEROL SULFATE 2.5 MG: 2.5 SOLUTION RESPIRATORY (INHALATION) at 02:10

## 2024-12-19 RX ADMIN — BUMETANIDE 2 MG: 1 TABLET ORAL at 08:32

## 2024-12-19 RX ADMIN — BUDESONIDE 500 MCG: 0.5 INHALANT RESPIRATORY (INHALATION) at 11:25

## 2024-12-19 RX ADMIN — OXYCODONE HYDROCHLORIDE AND ACETAMINOPHEN 500 MG: 500 TABLET ORAL at 08:32

## 2024-12-19 RX ADMIN — PREDNISONE 40 MG: 20 TABLET ORAL at 08:31

## 2024-12-19 RX ADMIN — CALCITRIOL CAPSULES 0.25 MCG 0.25 MCG: 0.25 CAPSULE ORAL at 08:32

## 2024-12-19 RX ADMIN — INSULIN LISPRO 6 UNITS: 100 INJECTION, SOLUTION INTRAVENOUS; SUBCUTANEOUS at 20:48

## 2024-12-19 RX ADMIN — Medication 50 MG: at 08:31

## 2024-12-19 RX ADMIN — ATORVASTATIN CALCIUM 80 MG: 80 TABLET, FILM COATED ORAL at 20:49

## 2024-12-19 RX ADMIN — PANTOPRAZOLE SODIUM 40 MG: 40 TABLET, DELAYED RELEASE ORAL at 16:48

## 2024-12-19 RX ADMIN — METOPROLOL SUCCINATE 100 MG: 100 TABLET, EXTENDED RELEASE ORAL at 08:32

## 2024-12-19 RX ADMIN — INSULIN LISPRO 6 UNITS: 100 INJECTION, SOLUTION INTRAVENOUS; SUBCUTANEOUS at 17:37

## 2024-12-20 LAB
ANION GAP SERPL CALC-SCNC: 18 MEQ/L (ref 8–16)
BILIRUB UR QL STRIP: NEGATIVE
BUN SERPL-MCNC: 69 MG/DL (ref 7–22)
CALCIUM SERPL-MCNC: 8.5 MG/DL (ref 8.5–10.5)
CHARACTER UR: CLEAR
CHLORIDE SERPL-SCNC: 97 MEQ/L (ref 98–111)
CO2 SERPL-SCNC: 23 MEQ/L (ref 23–33)
COLOR UR: YELLOW
CREAT SERPL-MCNC: 3.1 MG/DL (ref 0.4–1.2)
DEPRECATED RDW RBC AUTO: 58.1 FL (ref 35–45)
ERYTHROCYTE [DISTWIDTH] IN BLOOD BY AUTOMATED COUNT: 15.8 % (ref 11.5–14.5)
FERRITIN SERPL IA-MCNC: 577 NG/ML (ref 22–322)
FOLATE SERPL-MCNC: 9 NG/ML (ref 4.8–24.2)
GFR SERPL CREATININE-BSD FRML MDRD: 19 ML/MIN/1.73M2
GLUCOSE BLD STRIP.AUTO-MCNC: 184 MG/DL (ref 70–108)
GLUCOSE BLD STRIP.AUTO-MCNC: 205 MG/DL (ref 70–108)
GLUCOSE BLD STRIP.AUTO-MCNC: 212 MG/DL (ref 70–108)
GLUCOSE BLD STRIP.AUTO-MCNC: 212 MG/DL (ref 70–108)
GLUCOSE SERPL-MCNC: 182 MG/DL (ref 70–108)
GLUCOSE UR QL STRIP.AUTO: 500 MG/DL
HCT VFR BLD AUTO: 34.7 % (ref 42–52)
HGB BLD-MCNC: 11.6 GM/DL (ref 14–18)
HGB RETIC QN AUTO: 37.3 PG (ref 28.2–35.7)
HGB UR QL STRIP.AUTO: NEGATIVE
IMM RETICS NFR: 20.6 % (ref 2.3–13.4)
INR PPP: 2.35 (ref 0.85–1.13)
IRON SATN MFR SERPL: 30 % (ref 20–50)
IRON SERPL-MCNC: 62 UG/DL (ref 65–195)
KETONES UR QL STRIP.AUTO: NEGATIVE
LEUKOCYTE ESTERASE UR QL STRIP.AUTO: NEGATIVE
MCH RBC QN AUTO: 33.5 PG (ref 26–33)
MCHC RBC AUTO-ENTMCNC: 33.4 GM/DL (ref 32.2–35.5)
MCV RBC AUTO: 100.3 FL (ref 80–94)
MRSA DNA SPEC QL NAA+PROBE: NEGATIVE
NITRITE UR QL STRIP.AUTO: NEGATIVE
PH UR STRIP.AUTO: 5.5 [PH] (ref 5–9)
PLATELET # BLD AUTO: 203 THOU/MM3 (ref 130–400)
PMV BLD AUTO: 11.1 FL (ref 9.4–12.4)
POTASSIUM SERPL-SCNC: 3.8 MEQ/L (ref 3.5–5.2)
PROCALCITONIN SERPL IA-MCNC: 0.25 NG/ML (ref 0.01–0.09)
PROT UR STRIP.AUTO-MCNC: NEGATIVE MG/DL
RBC # BLD AUTO: 3.46 MILL/MM3 (ref 4.7–6.1)
RETICS # AUTO: 70 THOU/MM3 (ref 20–115)
RETICS/RBC NFR AUTO: 2 % (ref 0.5–2)
SODIUM SERPL-SCNC: 138 MEQ/L (ref 135–145)
SP GR UR REFRACT.AUTO: 1.01 (ref 1–1.03)
TIBC SERPL-MCNC: 206 UG/DL (ref 171–450)
UROBILINOGEN UR QL STRIP.AUTO: 0.2 EU/DL (ref 0–1)
VIT B12 SERPL-MCNC: > 2000 PG/ML (ref 211–911)
WBC # BLD AUTO: 15.1 THOU/MM3 (ref 4.8–10.8)

## 2024-12-20 PROCEDURE — 6360000002 HC RX W HCPCS: Performed by: STUDENT IN AN ORGANIZED HEALTH CARE EDUCATION/TRAINING PROGRAM

## 2024-12-20 PROCEDURE — 85027 COMPLETE CBC AUTOMATED: CPT

## 2024-12-20 PROCEDURE — 82746 ASSAY OF FOLIC ACID SERUM: CPT

## 2024-12-20 PROCEDURE — 94640 AIRWAY INHALATION TREATMENT: CPT

## 2024-12-20 PROCEDURE — 6370000000 HC RX 637 (ALT 250 FOR IP)

## 2024-12-20 PROCEDURE — 1200000003 HC TELEMETRY R&B

## 2024-12-20 PROCEDURE — 83540 ASSAY OF IRON: CPT

## 2024-12-20 PROCEDURE — 97530 THERAPEUTIC ACTIVITIES: CPT

## 2024-12-20 PROCEDURE — 6370000000 HC RX 637 (ALT 250 FOR IP): Performed by: STUDENT IN AN ORGANIZED HEALTH CARE EDUCATION/TRAINING PROGRAM

## 2024-12-20 PROCEDURE — 97110 THERAPEUTIC EXERCISES: CPT

## 2024-12-20 PROCEDURE — 87086 URINE CULTURE/COLONY COUNT: CPT

## 2024-12-20 PROCEDURE — 6370000000 HC RX 637 (ALT 250 FOR IP): Performed by: INTERNAL MEDICINE

## 2024-12-20 PROCEDURE — 36415 COLL VENOUS BLD VENIPUNCTURE: CPT

## 2024-12-20 PROCEDURE — 2580000003 HC RX 258: Performed by: STUDENT IN AN ORGANIZED HEALTH CARE EDUCATION/TRAINING PROGRAM

## 2024-12-20 PROCEDURE — 85610 PROTHROMBIN TIME: CPT

## 2024-12-20 PROCEDURE — 87641 MR-STAPH DNA AMP PROBE: CPT

## 2024-12-20 PROCEDURE — 97116 GAIT TRAINING THERAPY: CPT

## 2024-12-20 PROCEDURE — 6370000000 HC RX 637 (ALT 250 FOR IP): Performed by: HOSPITALIST

## 2024-12-20 PROCEDURE — 87040 BLOOD CULTURE FOR BACTERIA: CPT

## 2024-12-20 PROCEDURE — 6360000002 HC RX W HCPCS: Performed by: HOSPITALIST

## 2024-12-20 PROCEDURE — 84145 PROCALCITONIN (PCT): CPT

## 2024-12-20 PROCEDURE — 83550 IRON BINDING TEST: CPT

## 2024-12-20 PROCEDURE — 85046 RETICYTE/HGB CONCENTRATE: CPT

## 2024-12-20 PROCEDURE — 2500000003 HC RX 250 WO HCPCS: Performed by: HOSPITALIST

## 2024-12-20 PROCEDURE — 6360000002 HC RX W HCPCS

## 2024-12-20 PROCEDURE — 82728 ASSAY OF FERRITIN: CPT

## 2024-12-20 PROCEDURE — 94760 N-INVAS EAR/PLS OXIMETRY 1: CPT

## 2024-12-20 PROCEDURE — 97162 PT EVAL MOD COMPLEX 30 MIN: CPT

## 2024-12-20 PROCEDURE — 82948 REAGENT STRIP/BLOOD GLUCOSE: CPT

## 2024-12-20 PROCEDURE — 99233 SBSQ HOSP IP/OBS HIGH 50: CPT | Performed by: INTERNAL MEDICINE

## 2024-12-20 PROCEDURE — 81003 URINALYSIS AUTO W/O SCOPE: CPT

## 2024-12-20 PROCEDURE — 80048 BASIC METABOLIC PNL TOTAL CA: CPT

## 2024-12-20 PROCEDURE — 82607 VITAMIN B-12: CPT

## 2024-12-20 RX ORDER — DOXYCYCLINE HYCLATE 100 MG
100 TABLET ORAL EVERY 12 HOURS SCHEDULED
Status: COMPLETED | OUTPATIENT
Start: 2024-12-20 | End: 2024-12-25

## 2024-12-20 RX ORDER — WARFARIN SODIUM 1 MG/1
0.5 TABLET ORAL
Status: COMPLETED | OUTPATIENT
Start: 2024-12-20 | End: 2024-12-20

## 2024-12-20 RX ADMIN — WARFARIN SODIUM 0.5 MG: 1 TABLET ORAL at 18:31

## 2024-12-20 RX ADMIN — INSULIN LISPRO 2 UNITS: 100 INJECTION, SOLUTION INTRAVENOUS; SUBCUTANEOUS at 18:30

## 2024-12-20 RX ADMIN — LOSARTAN POTASSIUM 25 MG: 25 TABLET, FILM COATED ORAL at 09:58

## 2024-12-20 RX ADMIN — IPRATROPIUM BROMIDE AND ALBUTEROL SULFATE 1 DOSE: .5; 3 SOLUTION RESPIRATORY (INHALATION) at 22:14

## 2024-12-20 RX ADMIN — ATORVASTATIN CALCIUM 80 MG: 80 TABLET, FILM COATED ORAL at 20:55

## 2024-12-20 RX ADMIN — DOXYCYCLINE HYCLATE 100 MG: 100 TABLET, COATED ORAL at 18:31

## 2024-12-20 RX ADMIN — Medication 1000 MCG: at 09:58

## 2024-12-20 RX ADMIN — BUMETANIDE 2 MG: 1 TABLET ORAL at 18:31

## 2024-12-20 RX ADMIN — INSULIN LISPRO 2 UNITS: 100 INJECTION, SOLUTION INTRAVENOUS; SUBCUTANEOUS at 10:03

## 2024-12-20 RX ADMIN — EMPAGLIFLOZIN 10 MG: 10 TABLET, FILM COATED ORAL at 09:58

## 2024-12-20 RX ADMIN — BUDESONIDE 500 MCG: 0.5 INHALANT RESPIRATORY (INHALATION) at 08:40

## 2024-12-20 RX ADMIN — BUDESONIDE 500 MCG: 0.5 INHALANT RESPIRATORY (INHALATION) at 22:14

## 2024-12-20 RX ADMIN — BUMETANIDE 2 MG: 1 TABLET ORAL at 09:58

## 2024-12-20 RX ADMIN — PANTOPRAZOLE SODIUM 40 MG: 40 TABLET, DELAYED RELEASE ORAL at 05:05

## 2024-12-20 RX ADMIN — IPRATROPIUM BROMIDE AND ALBUTEROL SULFATE 1 DOSE: .5; 3 SOLUTION RESPIRATORY (INHALATION) at 08:40

## 2024-12-20 RX ADMIN — PIPERACILLIN AND TAZOBACTAM 4500 MG: 4; .5 INJECTION, POWDER, FOR SOLUTION INTRAVENOUS at 10:12

## 2024-12-20 RX ADMIN — OXYCODONE HYDROCHLORIDE AND ACETAMINOPHEN 500 MG: 500 TABLET ORAL at 09:58

## 2024-12-20 RX ADMIN — INSULIN GLARGINE 16 UNITS: 100 INJECTION, SOLUTION SUBCUTANEOUS at 10:03

## 2024-12-20 RX ADMIN — VANCOMYCIN HYDROCHLORIDE 2000 MG: 1 INJECTION, POWDER, LYOPHILIZED, FOR SOLUTION INTRAVENOUS at 10:58

## 2024-12-20 RX ADMIN — METOPROLOL SUCCINATE 100 MG: 100 TABLET, EXTENDED RELEASE ORAL at 09:58

## 2024-12-20 RX ADMIN — Medication 50 MG: at 10:04

## 2024-12-20 RX ADMIN — IPRATROPIUM BROMIDE AND ALBUTEROL SULFATE 1 DOSE: .5; 3 SOLUTION RESPIRATORY (INHALATION) at 15:02

## 2024-12-20 RX ADMIN — INSULIN LISPRO 2 UNITS: 100 INJECTION, SOLUTION INTRAVENOUS; SUBCUTANEOUS at 20:55

## 2024-12-20 RX ADMIN — CALCITRIOL CAPSULES 0.25 MCG 0.25 MCG: 0.25 CAPSULE ORAL at 09:58

## 2024-12-20 RX ADMIN — SODIUM CHLORIDE, PRESERVATIVE FREE 10 ML: 5 INJECTION INTRAVENOUS at 09:58

## 2024-12-20 RX ADMIN — DEXAMETHASONE SODIUM PHOSPHATE 6 MG: 4 INJECTION, SOLUTION INTRA-ARTICULAR; INTRALESIONAL; INTRAMUSCULAR; INTRAVENOUS; SOFT TISSUE at 09:58

## 2024-12-20 RX ADMIN — INSULIN LISPRO 2 UNITS: 100 INJECTION, SOLUTION INTRAVENOUS; SUBCUTANEOUS at 13:10

## 2024-12-20 RX ADMIN — PIPERACILLIN AND TAZOBACTAM 3375 MG: 3; .375 INJECTION, POWDER, FOR SOLUTION INTRAVENOUS at 18:27

## 2024-12-21 LAB
ANION GAP SERPL CALC-SCNC: 16 MEQ/L (ref 8–16)
BUN SERPL-MCNC: 78 MG/DL (ref 7–22)
CALCIUM SERPL-MCNC: 8.8 MG/DL (ref 8.5–10.5)
CHLORIDE SERPL-SCNC: 97 MEQ/L (ref 98–111)
CO2 SERPL-SCNC: 24 MEQ/L (ref 23–33)
CREAT SERPL-MCNC: 3.4 MG/DL (ref 0.4–1.2)
DEPRECATED RDW RBC AUTO: 56.6 FL (ref 35–45)
ERYTHROCYTE [DISTWIDTH] IN BLOOD BY AUTOMATED COUNT: 15.7 % (ref 11.5–14.5)
GFR SERPL CREATININE-BSD FRML MDRD: 17 ML/MIN/1.73M2
GLUCOSE BLD STRIP.AUTO-MCNC: 122 MG/DL (ref 70–108)
GLUCOSE BLD STRIP.AUTO-MCNC: 204 MG/DL (ref 70–108)
GLUCOSE BLD STRIP.AUTO-MCNC: 281 MG/DL (ref 70–108)
GLUCOSE BLD STRIP.AUTO-MCNC: 336 MG/DL (ref 70–108)
GLUCOSE SERPL-MCNC: 120 MG/DL (ref 70–108)
HCT VFR BLD AUTO: 35.2 % (ref 42–52)
HGB BLD-MCNC: 11.6 GM/DL (ref 14–18)
INR PPP: 2.8 (ref 0.85–1.13)
MCH RBC QN AUTO: 32.5 PG (ref 26–33)
MCHC RBC AUTO-ENTMCNC: 33 GM/DL (ref 32.2–35.5)
MCV RBC AUTO: 98.6 FL (ref 80–94)
PLATELET # BLD AUTO: 210 THOU/MM3 (ref 130–400)
PMV BLD AUTO: 10.9 FL (ref 9.4–12.4)
POTASSIUM SERPL-SCNC: 3.9 MEQ/L (ref 3.5–5.2)
RBC # BLD AUTO: 3.57 MILL/MM3 (ref 4.7–6.1)
SODIUM SERPL-SCNC: 137 MEQ/L (ref 135–145)
WBC # BLD AUTO: 19.8 THOU/MM3 (ref 4.8–10.8)

## 2024-12-21 PROCEDURE — 82948 REAGENT STRIP/BLOOD GLUCOSE: CPT

## 2024-12-21 PROCEDURE — 6360000002 HC RX W HCPCS: Performed by: STUDENT IN AN ORGANIZED HEALTH CARE EDUCATION/TRAINING PROGRAM

## 2024-12-21 PROCEDURE — 6370000000 HC RX 637 (ALT 250 FOR IP)

## 2024-12-21 PROCEDURE — 2700000000 HC OXYGEN THERAPY PER DAY

## 2024-12-21 PROCEDURE — 6370000000 HC RX 637 (ALT 250 FOR IP): Performed by: HOSPITALIST

## 2024-12-21 PROCEDURE — 94640 AIRWAY INHALATION TREATMENT: CPT

## 2024-12-21 PROCEDURE — 85610 PROTHROMBIN TIME: CPT

## 2024-12-21 PROCEDURE — 6360000002 HC RX W HCPCS

## 2024-12-21 PROCEDURE — 36415 COLL VENOUS BLD VENIPUNCTURE: CPT

## 2024-12-21 PROCEDURE — 85027 COMPLETE CBC AUTOMATED: CPT

## 2024-12-21 PROCEDURE — 6370000000 HC RX 637 (ALT 250 FOR IP): Performed by: STUDENT IN AN ORGANIZED HEALTH CARE EDUCATION/TRAINING PROGRAM

## 2024-12-21 PROCEDURE — 99232 SBSQ HOSP IP/OBS MODERATE 35: CPT | Performed by: INTERNAL MEDICINE

## 2024-12-21 PROCEDURE — 94761 N-INVAS EAR/PLS OXIMETRY MLT: CPT

## 2024-12-21 PROCEDURE — 2580000003 HC RX 258: Performed by: STUDENT IN AN ORGANIZED HEALTH CARE EDUCATION/TRAINING PROGRAM

## 2024-12-21 PROCEDURE — 6360000002 HC RX W HCPCS: Performed by: HOSPITALIST

## 2024-12-21 PROCEDURE — 1200000003 HC TELEMETRY R&B

## 2024-12-21 PROCEDURE — 6370000000 HC RX 637 (ALT 250 FOR IP): Performed by: PHARMACIST

## 2024-12-21 PROCEDURE — 2500000003 HC RX 250 WO HCPCS: Performed by: HOSPITALIST

## 2024-12-21 PROCEDURE — 80048 BASIC METABOLIC PNL TOTAL CA: CPT

## 2024-12-21 PROCEDURE — 6370000000 HC RX 637 (ALT 250 FOR IP): Performed by: INTERNAL MEDICINE

## 2024-12-21 RX ORDER — WARFARIN SODIUM 1 MG/1
0.5 TABLET ORAL
Status: COMPLETED | OUTPATIENT
Start: 2024-12-21 | End: 2024-12-21

## 2024-12-21 RX ADMIN — INSULIN GLARGINE 16 UNITS: 100 INJECTION, SOLUTION SUBCUTANEOUS at 09:57

## 2024-12-21 RX ADMIN — ATORVASTATIN CALCIUM 80 MG: 80 TABLET, FILM COATED ORAL at 20:17

## 2024-12-21 RX ADMIN — EMPAGLIFLOZIN 10 MG: 10 TABLET, FILM COATED ORAL at 09:57

## 2024-12-21 RX ADMIN — BUMETANIDE 2 MG: 1 TABLET ORAL at 17:31

## 2024-12-21 RX ADMIN — METOPROLOL SUCCINATE 100 MG: 100 TABLET, EXTENDED RELEASE ORAL at 10:02

## 2024-12-21 RX ADMIN — BUDESONIDE 500 MCG: 0.5 INHALANT RESPIRATORY (INHALATION) at 08:03

## 2024-12-21 RX ADMIN — INSULIN LISPRO 2 UNITS: 100 INJECTION, SOLUTION INTRAVENOUS; SUBCUTANEOUS at 12:37

## 2024-12-21 RX ADMIN — CALCITRIOL CAPSULES 0.25 MCG 0.25 MCG: 0.25 CAPSULE ORAL at 10:02

## 2024-12-21 RX ADMIN — IPRATROPIUM BROMIDE AND ALBUTEROL SULFATE 1 DOSE: .5; 3 SOLUTION RESPIRATORY (INHALATION) at 07:56

## 2024-12-21 RX ADMIN — IPRATROPIUM BROMIDE AND ALBUTEROL SULFATE 1 DOSE: .5; 3 SOLUTION RESPIRATORY (INHALATION) at 20:49

## 2024-12-21 RX ADMIN — DOXYCYCLINE HYCLATE 100 MG: 100 TABLET, COATED ORAL at 20:17

## 2024-12-21 RX ADMIN — INSULIN LISPRO 4 UNITS: 100 INJECTION, SOLUTION INTRAVENOUS; SUBCUTANEOUS at 17:33

## 2024-12-21 RX ADMIN — BUDESONIDE 500 MCG: 0.5 INHALANT RESPIRATORY (INHALATION) at 20:54

## 2024-12-21 RX ADMIN — BUMETANIDE 2 MG: 1 TABLET ORAL at 09:57

## 2024-12-21 RX ADMIN — OXYCODONE HYDROCHLORIDE AND ACETAMINOPHEN 500 MG: 500 TABLET ORAL at 09:57

## 2024-12-21 RX ADMIN — PANTOPRAZOLE SODIUM 40 MG: 40 TABLET, DELAYED RELEASE ORAL at 05:02

## 2024-12-21 RX ADMIN — DEXAMETHASONE SODIUM PHOSPHATE 6 MG: 4 INJECTION, SOLUTION INTRA-ARTICULAR; INTRALESIONAL; INTRAMUSCULAR; INTRAVENOUS; SOFT TISSUE at 09:56

## 2024-12-21 RX ADMIN — PIPERACILLIN AND TAZOBACTAM 3375 MG: 3; .375 INJECTION, POWDER, FOR SOLUTION INTRAVENOUS at 17:30

## 2024-12-21 RX ADMIN — Medication 50 MG: at 09:56

## 2024-12-21 RX ADMIN — INSULIN LISPRO 6 UNITS: 100 INJECTION, SOLUTION INTRAVENOUS; SUBCUTANEOUS at 20:17

## 2024-12-21 RX ADMIN — WARFARIN SODIUM 0.5 MG: 1 TABLET ORAL at 17:31

## 2024-12-21 RX ADMIN — PIPERACILLIN AND TAZOBACTAM 3375 MG: 3; .375 INJECTION, POWDER, FOR SOLUTION INTRAVENOUS at 04:40

## 2024-12-21 RX ADMIN — IPRATROPIUM BROMIDE AND ALBUTEROL SULFATE 1 DOSE: .5; 3 SOLUTION RESPIRATORY (INHALATION) at 14:53

## 2024-12-21 RX ADMIN — DOXYCYCLINE HYCLATE 100 MG: 100 TABLET, COATED ORAL at 09:57

## 2024-12-21 RX ADMIN — SODIUM CHLORIDE, PRESERVATIVE FREE 10 ML: 5 INJECTION INTRAVENOUS at 09:55

## 2024-12-21 RX ADMIN — LOSARTAN POTASSIUM 25 MG: 25 TABLET, FILM COATED ORAL at 09:57

## 2024-12-21 RX ADMIN — Medication 1000 MCG: at 09:57

## 2024-12-22 LAB
ANION GAP SERPL CALC-SCNC: 18 MEQ/L (ref 8–16)
BACTERIA UR CULT: ABNORMAL
BUN SERPL-MCNC: 77 MG/DL (ref 7–22)
CALCIUM SERPL-MCNC: 8.3 MG/DL (ref 8.5–10.5)
CHLORIDE SERPL-SCNC: 100 MEQ/L (ref 98–111)
CO2 SERPL-SCNC: 24 MEQ/L (ref 23–33)
CREAT SERPL-MCNC: 3.2 MG/DL (ref 0.4–1.2)
DEPRECATED RDW RBC AUTO: 57.1 FL (ref 35–45)
ERYTHROCYTE [DISTWIDTH] IN BLOOD BY AUTOMATED COUNT: 15.9 % (ref 11.5–14.5)
GFR SERPL CREATININE-BSD FRML MDRD: 18 ML/MIN/1.73M2
GLUCOSE BLD STRIP.AUTO-MCNC: 127 MG/DL (ref 70–108)
GLUCOSE BLD STRIP.AUTO-MCNC: 290 MG/DL (ref 70–108)
GLUCOSE BLD STRIP.AUTO-MCNC: 293 MG/DL (ref 70–108)
GLUCOSE BLD STRIP.AUTO-MCNC: 297 MG/DL (ref 70–108)
GLUCOSE SERPL-MCNC: 129 MG/DL (ref 70–108)
HCT VFR BLD AUTO: 33.7 % (ref 42–52)
HGB BLD-MCNC: 11.4 GM/DL (ref 14–18)
INR PPP: 2.93 (ref 0.85–1.13)
MCH RBC QN AUTO: 33.6 PG (ref 26–33)
MCHC RBC AUTO-ENTMCNC: 33.8 GM/DL (ref 32.2–35.5)
MCV RBC AUTO: 99.4 FL (ref 80–94)
ORGANISM: ABNORMAL
PLATELET # BLD AUTO: 183 THOU/MM3 (ref 130–400)
PMV BLD AUTO: 11.4 FL (ref 9.4–12.4)
POTASSIUM SERPL-SCNC: 4 MEQ/L (ref 3.5–5.2)
RBC # BLD AUTO: 3.39 MILL/MM3 (ref 4.7–6.1)
SODIUM SERPL-SCNC: 142 MEQ/L (ref 135–145)
WBC # BLD AUTO: 20.3 THOU/MM3 (ref 4.8–10.8)

## 2024-12-22 PROCEDURE — 6360000002 HC RX W HCPCS: Performed by: HOSPITALIST

## 2024-12-22 PROCEDURE — 6370000000 HC RX 637 (ALT 250 FOR IP): Performed by: INTERNAL MEDICINE

## 2024-12-22 PROCEDURE — 1200000003 HC TELEMETRY R&B

## 2024-12-22 PROCEDURE — 6370000000 HC RX 637 (ALT 250 FOR IP): Performed by: STUDENT IN AN ORGANIZED HEALTH CARE EDUCATION/TRAINING PROGRAM

## 2024-12-22 PROCEDURE — 94761 N-INVAS EAR/PLS OXIMETRY MLT: CPT

## 2024-12-22 PROCEDURE — 6360000002 HC RX W HCPCS

## 2024-12-22 PROCEDURE — 80048 BASIC METABOLIC PNL TOTAL CA: CPT

## 2024-12-22 PROCEDURE — 2700000000 HC OXYGEN THERAPY PER DAY

## 2024-12-22 PROCEDURE — 85027 COMPLETE CBC AUTOMATED: CPT

## 2024-12-22 PROCEDURE — 2500000003 HC RX 250 WO HCPCS: Performed by: HOSPITALIST

## 2024-12-22 PROCEDURE — 94640 AIRWAY INHALATION TREATMENT: CPT

## 2024-12-22 PROCEDURE — 85610 PROTHROMBIN TIME: CPT

## 2024-12-22 PROCEDURE — 99232 SBSQ HOSP IP/OBS MODERATE 35: CPT | Performed by: INTERNAL MEDICINE

## 2024-12-22 PROCEDURE — 36415 COLL VENOUS BLD VENIPUNCTURE: CPT

## 2024-12-22 PROCEDURE — 2580000003 HC RX 258: Performed by: STUDENT IN AN ORGANIZED HEALTH CARE EDUCATION/TRAINING PROGRAM

## 2024-12-22 PROCEDURE — 6370000000 HC RX 637 (ALT 250 FOR IP): Performed by: PHARMACIST

## 2024-12-22 PROCEDURE — 6360000002 HC RX W HCPCS: Performed by: STUDENT IN AN ORGANIZED HEALTH CARE EDUCATION/TRAINING PROGRAM

## 2024-12-22 PROCEDURE — 6370000000 HC RX 637 (ALT 250 FOR IP): Performed by: HOSPITALIST

## 2024-12-22 PROCEDURE — 82948 REAGENT STRIP/BLOOD GLUCOSE: CPT

## 2024-12-22 PROCEDURE — 6370000000 HC RX 637 (ALT 250 FOR IP)

## 2024-12-22 RX ORDER — WARFARIN SODIUM 1 MG/1
0.5 TABLET ORAL
Status: COMPLETED | OUTPATIENT
Start: 2024-12-22 | End: 2024-12-22

## 2024-12-22 RX ADMIN — IPRATROPIUM BROMIDE AND ALBUTEROL SULFATE 1 DOSE: .5; 3 SOLUTION RESPIRATORY (INHALATION) at 22:28

## 2024-12-22 RX ADMIN — BUDESONIDE 500 MCG: 0.5 INHALANT RESPIRATORY (INHALATION) at 22:28

## 2024-12-22 RX ADMIN — GUAIFENESIN AND CODEINE PHOSPHATE 5 ML: 100; 10 SOLUTION ORAL at 02:01

## 2024-12-22 RX ADMIN — BUMETANIDE 2 MG: 1 TABLET ORAL at 09:21

## 2024-12-22 RX ADMIN — IPRATROPIUM BROMIDE AND ALBUTEROL SULFATE 1 DOSE: .5; 3 SOLUTION RESPIRATORY (INHALATION) at 07:49

## 2024-12-22 RX ADMIN — INSULIN LISPRO 4 UNITS: 100 INJECTION, SOLUTION INTRAVENOUS; SUBCUTANEOUS at 20:14

## 2024-12-22 RX ADMIN — INSULIN LISPRO 4 UNITS: 100 INJECTION, SOLUTION INTRAVENOUS; SUBCUTANEOUS at 16:35

## 2024-12-22 RX ADMIN — ATORVASTATIN CALCIUM 80 MG: 80 TABLET, FILM COATED ORAL at 20:14

## 2024-12-22 RX ADMIN — INSULIN LISPRO 4 UNITS: 100 INJECTION, SOLUTION INTRAVENOUS; SUBCUTANEOUS at 12:16

## 2024-12-22 RX ADMIN — EMPAGLIFLOZIN 10 MG: 10 TABLET, FILM COATED ORAL at 09:21

## 2024-12-22 RX ADMIN — DOXYCYCLINE HYCLATE 100 MG: 100 TABLET, COATED ORAL at 09:20

## 2024-12-22 RX ADMIN — BUDESONIDE 500 MCG: 0.5 INHALANT RESPIRATORY (INHALATION) at 07:55

## 2024-12-22 RX ADMIN — LOSARTAN POTASSIUM 25 MG: 25 TABLET, FILM COATED ORAL at 09:20

## 2024-12-22 RX ADMIN — INSULIN GLARGINE 16 UNITS: 100 INJECTION, SOLUTION SUBCUTANEOUS at 09:21

## 2024-12-22 RX ADMIN — PANTOPRAZOLE SODIUM 40 MG: 40 TABLET, DELAYED RELEASE ORAL at 05:00

## 2024-12-22 RX ADMIN — Medication 50 MG: at 09:20

## 2024-12-22 RX ADMIN — DEXAMETHASONE SODIUM PHOSPHATE 6 MG: 4 INJECTION, SOLUTION INTRA-ARTICULAR; INTRALESIONAL; INTRAMUSCULAR; INTRAVENOUS; SOFT TISSUE at 09:21

## 2024-12-22 RX ADMIN — PIPERACILLIN AND TAZOBACTAM 3375 MG: 3; .375 INJECTION, POWDER, FOR SOLUTION INTRAVENOUS at 16:39

## 2024-12-22 RX ADMIN — PIPERACILLIN AND TAZOBACTAM 3375 MG: 3; .375 INJECTION, POWDER, FOR SOLUTION INTRAVENOUS at 04:58

## 2024-12-22 RX ADMIN — IPRATROPIUM BROMIDE AND ALBUTEROL SULFATE 1 DOSE: .5; 3 SOLUTION RESPIRATORY (INHALATION) at 13:34

## 2024-12-22 RX ADMIN — SODIUM CHLORIDE, PRESERVATIVE FREE 10 ML: 5 INJECTION INTRAVENOUS at 09:21

## 2024-12-22 RX ADMIN — WARFARIN SODIUM 0.5 MG: 1 TABLET ORAL at 18:03

## 2024-12-22 RX ADMIN — METOPROLOL SUCCINATE 100 MG: 100 TABLET, EXTENDED RELEASE ORAL at 09:21

## 2024-12-22 RX ADMIN — CALCITRIOL CAPSULES 0.25 MCG 0.25 MCG: 0.25 CAPSULE ORAL at 09:21

## 2024-12-22 RX ADMIN — OXYCODONE HYDROCHLORIDE AND ACETAMINOPHEN 500 MG: 500 TABLET ORAL at 09:20

## 2024-12-22 RX ADMIN — Medication 1000 MCG: at 09:20

## 2024-12-22 RX ADMIN — DOXYCYCLINE HYCLATE 100 MG: 100 TABLET, COATED ORAL at 20:14

## 2024-12-22 RX ADMIN — BUMETANIDE 2 MG: 1 TABLET ORAL at 18:02

## 2024-12-23 ENCOUNTER — APPOINTMENT (OUTPATIENT)
Dept: GENERAL RADIOLOGY | Age: 84
DRG: 177 | End: 2024-12-23
Payer: MEDICARE

## 2024-12-23 LAB
ANION GAP SERPL CALC-SCNC: 16 MEQ/L (ref 8–16)
BUN SERPL-MCNC: 82 MG/DL (ref 7–22)
CALCIUM SERPL-MCNC: 8.4 MG/DL (ref 8.5–10.5)
CHLORIDE SERPL-SCNC: 96 MEQ/L (ref 98–111)
CO2 SERPL-SCNC: 25 MEQ/L (ref 23–33)
CREAT SERPL-MCNC: 2.7 MG/DL (ref 0.4–1.2)
DEPRECATED RDW RBC AUTO: 57.7 FL (ref 35–45)
ERYTHROCYTE [DISTWIDTH] IN BLOOD BY AUTOMATED COUNT: 15.8 % (ref 11.5–14.5)
GFR SERPL CREATININE-BSD FRML MDRD: 22 ML/MIN/1.73M2
GLUCOSE BLD STRIP.AUTO-MCNC: 156 MG/DL (ref 70–108)
GLUCOSE BLD STRIP.AUTO-MCNC: 202 MG/DL (ref 70–108)
GLUCOSE BLD STRIP.AUTO-MCNC: 268 MG/DL (ref 70–108)
GLUCOSE BLD STRIP.AUTO-MCNC: 270 MG/DL (ref 70–108)
GLUCOSE SERPL-MCNC: 147 MG/DL (ref 70–108)
HCT VFR BLD AUTO: 37.8 % (ref 42–52)
HGB BLD-MCNC: 12.5 GM/DL (ref 14–18)
INR PPP: 2.43 (ref 0.85–1.13)
MCH RBC QN AUTO: 33.2 PG (ref 26–33)
MCHC RBC AUTO-ENTMCNC: 33.1 GM/DL (ref 32.2–35.5)
MCV RBC AUTO: 100.3 FL (ref 80–94)
PLATELET # BLD AUTO: 209 THOU/MM3 (ref 130–400)
PMV BLD AUTO: 12.7 FL (ref 9.4–12.4)
POTASSIUM SERPL-SCNC: 4.1 MEQ/L (ref 3.5–5.2)
RBC # BLD AUTO: 3.77 MILL/MM3 (ref 4.7–6.1)
SODIUM SERPL-SCNC: 137 MEQ/L (ref 135–145)
WBC # BLD AUTO: 17.3 THOU/MM3 (ref 4.8–10.8)

## 2024-12-23 PROCEDURE — 97530 THERAPEUTIC ACTIVITIES: CPT

## 2024-12-23 PROCEDURE — 6370000000 HC RX 637 (ALT 250 FOR IP): Performed by: STUDENT IN AN ORGANIZED HEALTH CARE EDUCATION/TRAINING PROGRAM

## 2024-12-23 PROCEDURE — 80048 BASIC METABOLIC PNL TOTAL CA: CPT

## 2024-12-23 PROCEDURE — 36415 COLL VENOUS BLD VENIPUNCTURE: CPT

## 2024-12-23 PROCEDURE — 6360000002 HC RX W HCPCS: Performed by: HOSPITALIST

## 2024-12-23 PROCEDURE — 6370000000 HC RX 637 (ALT 250 FOR IP): Performed by: INTERNAL MEDICINE

## 2024-12-23 PROCEDURE — 82948 REAGENT STRIP/BLOOD GLUCOSE: CPT

## 2024-12-23 PROCEDURE — 85610 PROTHROMBIN TIME: CPT

## 2024-12-23 PROCEDURE — 94761 N-INVAS EAR/PLS OXIMETRY MLT: CPT

## 2024-12-23 PROCEDURE — 85027 COMPLETE CBC AUTOMATED: CPT

## 2024-12-23 PROCEDURE — 6370000000 HC RX 637 (ALT 250 FOR IP)

## 2024-12-23 PROCEDURE — 1200000003 HC TELEMETRY R&B

## 2024-12-23 PROCEDURE — 97110 THERAPEUTIC EXERCISES: CPT

## 2024-12-23 PROCEDURE — 94640 AIRWAY INHALATION TREATMENT: CPT

## 2024-12-23 PROCEDURE — 6370000000 HC RX 637 (ALT 250 FOR IP): Performed by: HOSPITALIST

## 2024-12-23 PROCEDURE — 71046 X-RAY EXAM CHEST 2 VIEWS: CPT

## 2024-12-23 PROCEDURE — 6360000002 HC RX W HCPCS: Performed by: STUDENT IN AN ORGANIZED HEALTH CARE EDUCATION/TRAINING PROGRAM

## 2024-12-23 PROCEDURE — 2580000003 HC RX 258: Performed by: STUDENT IN AN ORGANIZED HEALTH CARE EDUCATION/TRAINING PROGRAM

## 2024-12-23 PROCEDURE — 6360000002 HC RX W HCPCS

## 2024-12-23 PROCEDURE — 2500000003 HC RX 250 WO HCPCS: Performed by: HOSPITALIST

## 2024-12-23 PROCEDURE — 2700000000 HC OXYGEN THERAPY PER DAY

## 2024-12-23 PROCEDURE — 99232 SBSQ HOSP IP/OBS MODERATE 35: CPT | Performed by: INTERNAL MEDICINE

## 2024-12-23 RX ORDER — WARFARIN SODIUM 1 MG/1
1 TABLET ORAL ONCE
Status: COMPLETED | OUTPATIENT
Start: 2024-12-23 | End: 2024-12-23

## 2024-12-23 RX ADMIN — DOXYCYCLINE HYCLATE 100 MG: 100 TABLET, COATED ORAL at 20:36

## 2024-12-23 RX ADMIN — INSULIN LISPRO 2 UNITS: 100 INJECTION, SOLUTION INTRAVENOUS; SUBCUTANEOUS at 13:05

## 2024-12-23 RX ADMIN — OXYCODONE HYDROCHLORIDE AND ACETAMINOPHEN 500 MG: 500 TABLET ORAL at 09:28

## 2024-12-23 RX ADMIN — BUMETANIDE 2 MG: 1 TABLET ORAL at 17:40

## 2024-12-23 RX ADMIN — Medication 50 MG: at 08:54

## 2024-12-23 RX ADMIN — EMPAGLIFLOZIN 10 MG: 10 TABLET, FILM COATED ORAL at 08:55

## 2024-12-23 RX ADMIN — SODIUM CHLORIDE, PRESERVATIVE FREE 10 ML: 5 INJECTION INTRAVENOUS at 20:35

## 2024-12-23 RX ADMIN — INSULIN LISPRO 4 UNITS: 100 INJECTION, SOLUTION INTRAVENOUS; SUBCUTANEOUS at 21:37

## 2024-12-23 RX ADMIN — Medication 1000 MCG: at 08:55

## 2024-12-23 RX ADMIN — LOSARTAN POTASSIUM 25 MG: 25 TABLET, FILM COATED ORAL at 08:55

## 2024-12-23 RX ADMIN — METOPROLOL SUCCINATE 100 MG: 100 TABLET, EXTENDED RELEASE ORAL at 08:55

## 2024-12-23 RX ADMIN — IPRATROPIUM BROMIDE AND ALBUTEROL SULFATE 1 DOSE: .5; 3 SOLUTION RESPIRATORY (INHALATION) at 08:53

## 2024-12-23 RX ADMIN — BUMETANIDE 2 MG: 1 TABLET ORAL at 08:55

## 2024-12-23 RX ADMIN — IPRATROPIUM BROMIDE AND ALBUTEROL SULFATE 1 DOSE: .5; 3 SOLUTION RESPIRATORY (INHALATION) at 14:18

## 2024-12-23 RX ADMIN — DOXYCYCLINE HYCLATE 100 MG: 100 TABLET, COATED ORAL at 08:55

## 2024-12-23 RX ADMIN — PIPERACILLIN AND TAZOBACTAM 3375 MG: 3; .375 INJECTION, POWDER, FOR SOLUTION INTRAVENOUS at 21:38

## 2024-12-23 RX ADMIN — WARFARIN SODIUM 1 MG: 1 TABLET ORAL at 17:40

## 2024-12-23 RX ADMIN — SODIUM CHLORIDE, PRESERVATIVE FREE 10 ML: 5 INJECTION INTRAVENOUS at 08:57

## 2024-12-23 RX ADMIN — INSULIN LISPRO 4 UNITS: 100 INJECTION, SOLUTION INTRAVENOUS; SUBCUTANEOUS at 17:39

## 2024-12-23 RX ADMIN — PIPERACILLIN AND TAZOBACTAM 3375 MG: 3; .375 INJECTION, POWDER, FOR SOLUTION INTRAVENOUS at 05:10

## 2024-12-23 RX ADMIN — BUDESONIDE 500 MCG: 0.5 INHALANT RESPIRATORY (INHALATION) at 08:53

## 2024-12-23 RX ADMIN — IPRATROPIUM BROMIDE AND ALBUTEROL SULFATE 1 DOSE: .5; 3 SOLUTION RESPIRATORY (INHALATION) at 20:58

## 2024-12-23 RX ADMIN — DEXAMETHASONE SODIUM PHOSPHATE 6 MG: 4 INJECTION, SOLUTION INTRA-ARTICULAR; INTRALESIONAL; INTRAMUSCULAR; INTRAVENOUS; SOFT TISSUE at 08:55

## 2024-12-23 RX ADMIN — ATORVASTATIN CALCIUM 80 MG: 80 TABLET, FILM COATED ORAL at 20:35

## 2024-12-23 RX ADMIN — INSULIN GLARGINE 16 UNITS: 100 INJECTION, SOLUTION SUBCUTANEOUS at 08:55

## 2024-12-23 RX ADMIN — BUDESONIDE 500 MCG: 0.5 INHALANT RESPIRATORY (INHALATION) at 21:01

## 2024-12-23 RX ADMIN — CALCITRIOL CAPSULES 0.25 MCG 0.25 MCG: 0.25 CAPSULE ORAL at 08:54

## 2024-12-23 RX ADMIN — PIPERACILLIN AND TAZOBACTAM 3375 MG: 3; .375 INJECTION, POWDER, FOR SOLUTION INTRAVENOUS at 14:09

## 2024-12-23 RX ADMIN — GUAIFENESIN AND CODEINE PHOSPHATE 5 ML: 100; 10 SOLUTION ORAL at 00:45

## 2024-12-23 RX ADMIN — PANTOPRAZOLE SODIUM 40 MG: 40 TABLET, DELAYED RELEASE ORAL at 05:08

## 2024-12-24 LAB
ANION GAP SERPL CALC-SCNC: 17 MEQ/L (ref 8–16)
ANION GAP SERPL CALC-SCNC: 19 MEQ/L (ref 8–16)
BUN SERPL-MCNC: 91 MG/DL (ref 7–22)
BUN SERPL-MCNC: 91 MG/DL (ref 7–22)
CALCIUM SERPL-MCNC: 8 MG/DL (ref 8.5–10.5)
CALCIUM SERPL-MCNC: 8.1 MG/DL (ref 8.5–10.5)
CHLORIDE SERPL-SCNC: 93 MEQ/L (ref 98–111)
CHLORIDE SERPL-SCNC: 95 MEQ/L (ref 98–111)
CO2 SERPL-SCNC: 22 MEQ/L (ref 23–33)
CO2 SERPL-SCNC: 23 MEQ/L (ref 23–33)
CREAT SERPL-MCNC: 3.8 MG/DL (ref 0.4–1.2)
CREAT SERPL-MCNC: 4.1 MG/DL (ref 0.4–1.2)
DEPRECATED RDW RBC AUTO: 55 FL (ref 35–45)
ERYTHROCYTE [DISTWIDTH] IN BLOOD BY AUTOMATED COUNT: 15.6 % (ref 11.5–14.5)
GFR SERPL CREATININE-BSD FRML MDRD: 14 ML/MIN/1.73M2
GFR SERPL CREATININE-BSD FRML MDRD: 15 ML/MIN/1.73M2
GLUCOSE BLD STRIP.AUTO-MCNC: 257 MG/DL (ref 70–108)
GLUCOSE BLD STRIP.AUTO-MCNC: 270 MG/DL (ref 70–108)
GLUCOSE BLD STRIP.AUTO-MCNC: 271 MG/DL (ref 70–108)
GLUCOSE SERPL-MCNC: 211 MG/DL (ref 70–108)
GLUCOSE SERPL-MCNC: 310 MG/DL (ref 70–108)
HCT VFR BLD AUTO: 35.2 % (ref 42–52)
HGB BLD-MCNC: 11.9 GM/DL (ref 14–18)
INR PPP: 1.97 (ref 0.85–1.13)
LACTATE SERPL-SCNC: 1.7 MMOL/L (ref 0.5–2)
MCH RBC QN AUTO: 32.9 PG (ref 26–33)
MCHC RBC AUTO-ENTMCNC: 33.8 GM/DL (ref 32.2–35.5)
MCV RBC AUTO: 97.2 FL (ref 80–94)
NT-PROBNP SERPL IA-MCNC: ABNORMAL PG/ML (ref 0–449)
PLATELET # BLD AUTO: 193 THOU/MM3 (ref 130–400)
PMV BLD AUTO: 11.5 FL (ref 9.4–12.4)
POTASSIUM SERPL-SCNC: 3.4 MEQ/L (ref 3.5–5.2)
POTASSIUM SERPL-SCNC: 4.3 MEQ/L (ref 3.5–5.2)
RBC # BLD AUTO: 3.62 MILL/MM3 (ref 4.7–6.1)
SODIUM SERPL-SCNC: 134 MEQ/L (ref 135–145)
SODIUM SERPL-SCNC: 135 MEQ/L (ref 135–145)
WBC # BLD AUTO: 16.8 THOU/MM3 (ref 4.8–10.8)

## 2024-12-24 PROCEDURE — 97530 THERAPEUTIC ACTIVITIES: CPT

## 2024-12-24 PROCEDURE — 6370000000 HC RX 637 (ALT 250 FOR IP): Performed by: HOSPITALIST

## 2024-12-24 PROCEDURE — 51798 US URINE CAPACITY MEASURE: CPT

## 2024-12-24 PROCEDURE — 36415 COLL VENOUS BLD VENIPUNCTURE: CPT

## 2024-12-24 PROCEDURE — 6360000002 HC RX W HCPCS: Performed by: HOSPITALIST

## 2024-12-24 PROCEDURE — 1200000003 HC TELEMETRY R&B

## 2024-12-24 PROCEDURE — 83880 ASSAY OF NATRIURETIC PEPTIDE: CPT

## 2024-12-24 PROCEDURE — 82948 REAGENT STRIP/BLOOD GLUCOSE: CPT

## 2024-12-24 PROCEDURE — 6370000000 HC RX 637 (ALT 250 FOR IP): Performed by: INTERNAL MEDICINE

## 2024-12-24 PROCEDURE — 85610 PROTHROMBIN TIME: CPT

## 2024-12-24 PROCEDURE — 6360000002 HC RX W HCPCS: Performed by: STUDENT IN AN ORGANIZED HEALTH CARE EDUCATION/TRAINING PROGRAM

## 2024-12-24 PROCEDURE — 6370000000 HC RX 637 (ALT 250 FOR IP)

## 2024-12-24 PROCEDURE — 80048 BASIC METABOLIC PNL TOTAL CA: CPT

## 2024-12-24 PROCEDURE — 2580000003 HC RX 258: Performed by: STUDENT IN AN ORGANIZED HEALTH CARE EDUCATION/TRAINING PROGRAM

## 2024-12-24 PROCEDURE — 97116 GAIT TRAINING THERAPY: CPT

## 2024-12-24 PROCEDURE — 99233 SBSQ HOSP IP/OBS HIGH 50: CPT | Performed by: INTERNAL MEDICINE

## 2024-12-24 PROCEDURE — 6360000002 HC RX W HCPCS

## 2024-12-24 PROCEDURE — 2700000000 HC OXYGEN THERAPY PER DAY

## 2024-12-24 PROCEDURE — 6370000000 HC RX 637 (ALT 250 FOR IP): Performed by: STUDENT IN AN ORGANIZED HEALTH CARE EDUCATION/TRAINING PROGRAM

## 2024-12-24 PROCEDURE — 2580000003 HC RX 258

## 2024-12-24 PROCEDURE — 83605 ASSAY OF LACTIC ACID: CPT

## 2024-12-24 PROCEDURE — 94640 AIRWAY INHALATION TREATMENT: CPT

## 2024-12-24 PROCEDURE — 97110 THERAPEUTIC EXERCISES: CPT

## 2024-12-24 PROCEDURE — 94761 N-INVAS EAR/PLS OXIMETRY MLT: CPT

## 2024-12-24 PROCEDURE — 85027 COMPLETE CBC AUTOMATED: CPT

## 2024-12-24 RX ORDER — WARFARIN SODIUM 2.5 MG/1
1.25 TABLET ORAL
Status: COMPLETED | OUTPATIENT
Start: 2024-12-24 | End: 2024-12-24

## 2024-12-24 RX ORDER — SODIUM CHLORIDE 450 MG/100ML
INJECTION, SOLUTION INTRAVENOUS CONTINUOUS
Status: ACTIVE | OUTPATIENT
Start: 2024-12-24 | End: 2024-12-25

## 2024-12-24 RX ORDER — INSULIN GLARGINE 100 [IU]/ML
20 INJECTION, SOLUTION SUBCUTANEOUS DAILY
Status: DISCONTINUED | OUTPATIENT
Start: 2024-12-25 | End: 2024-12-26 | Stop reason: HOSPADM

## 2024-12-24 RX ADMIN — DOXYCYCLINE HYCLATE 100 MG: 100 TABLET, COATED ORAL at 21:19

## 2024-12-24 RX ADMIN — INSULIN LISPRO 4 UNITS: 100 INJECTION, SOLUTION INTRAVENOUS; SUBCUTANEOUS at 12:58

## 2024-12-24 RX ADMIN — WARFARIN SODIUM 1.25 MG: 2.5 TABLET ORAL at 16:59

## 2024-12-24 RX ADMIN — INSULIN LISPRO 4 UNITS: 100 INJECTION, SOLUTION INTRAVENOUS; SUBCUTANEOUS at 16:59

## 2024-12-24 RX ADMIN — DEXAMETHASONE SODIUM PHOSPHATE 6 MG: 4 INJECTION, SOLUTION INTRA-ARTICULAR; INTRALESIONAL; INTRAMUSCULAR; INTRAVENOUS; SOFT TISSUE at 08:57

## 2024-12-24 RX ADMIN — IPRATROPIUM BROMIDE AND ALBUTEROL SULFATE 1 DOSE: .5; 3 SOLUTION RESPIRATORY (INHALATION) at 14:06

## 2024-12-24 RX ADMIN — PIPERACILLIN AND TAZOBACTAM 3375 MG: 3; .375 INJECTION, POWDER, FOR SOLUTION INTRAVENOUS at 14:05

## 2024-12-24 RX ADMIN — BUDESONIDE 500 MCG: 0.5 INHALANT RESPIRATORY (INHALATION) at 10:08

## 2024-12-24 RX ADMIN — POTASSIUM BICARBONATE 40 MEQ: 782 TABLET, EFFERVESCENT ORAL at 11:02

## 2024-12-24 RX ADMIN — IPRATROPIUM BROMIDE AND ALBUTEROL SULFATE 1 DOSE: .5; 3 SOLUTION RESPIRATORY (INHALATION) at 10:08

## 2024-12-24 RX ADMIN — INSULIN LISPRO 4 UNITS: 100 INJECTION, SOLUTION INTRAVENOUS; SUBCUTANEOUS at 23:42

## 2024-12-24 RX ADMIN — DOXYCYCLINE HYCLATE 100 MG: 100 TABLET, COATED ORAL at 08:56

## 2024-12-24 RX ADMIN — Medication 1000 MCG: at 08:54

## 2024-12-24 RX ADMIN — SODIUM CHLORIDE: 4.5 INJECTION, SOLUTION INTRAVENOUS at 16:59

## 2024-12-24 RX ADMIN — PANTOPRAZOLE SODIUM 40 MG: 40 TABLET, DELAYED RELEASE ORAL at 05:41

## 2024-12-24 RX ADMIN — PIPERACILLIN AND TAZOBACTAM 3375 MG: 3; .375 INJECTION, POWDER, FOR SOLUTION INTRAVENOUS at 23:45

## 2024-12-24 RX ADMIN — GUAIFENESIN AND CODEINE PHOSPHATE 5 ML: 100; 10 SOLUTION ORAL at 00:21

## 2024-12-24 RX ADMIN — PIPERACILLIN AND TAZOBACTAM 3375 MG: 3; .375 INJECTION, POWDER, FOR SOLUTION INTRAVENOUS at 05:43

## 2024-12-24 RX ADMIN — METOPROLOL SUCCINATE 100 MG: 100 TABLET, EXTENDED RELEASE ORAL at 08:56

## 2024-12-24 RX ADMIN — Medication 50 MG: at 08:55

## 2024-12-24 RX ADMIN — EMPAGLIFLOZIN 10 MG: 10 TABLET, FILM COATED ORAL at 08:56

## 2024-12-24 RX ADMIN — ATORVASTATIN CALCIUM 80 MG: 80 TABLET, FILM COATED ORAL at 21:19

## 2024-12-24 RX ADMIN — CALCITRIOL CAPSULES 0.25 MCG 0.25 MCG: 0.25 CAPSULE ORAL at 08:56

## 2024-12-24 RX ADMIN — OXYCODONE HYDROCHLORIDE AND ACETAMINOPHEN 500 MG: 500 TABLET ORAL at 08:56

## 2024-12-24 RX ADMIN — INSULIN GLARGINE 16 UNITS: 100 INJECTION, SOLUTION SUBCUTANEOUS at 09:43

## 2024-12-24 RX ADMIN — INSULIN LISPRO 2 UNITS: 100 INJECTION, SOLUTION INTRAVENOUS; SUBCUTANEOUS at 09:44

## 2024-12-25 LAB
ANION GAP SERPL CALC-SCNC: 15 MEQ/L (ref 8–16)
BACTERIA BLD AEROBE CULT: NORMAL
BACTERIA BLD AEROBE CULT: NORMAL
BUN SERPL-MCNC: 87 MG/DL (ref 7–22)
CALCIUM SERPL-MCNC: 7.8 MG/DL (ref 8.5–10.5)
CHLORIDE SERPL-SCNC: 101 MEQ/L (ref 98–111)
CO2 SERPL-SCNC: 23 MEQ/L (ref 23–33)
CREAT SERPL-MCNC: 3.4 MG/DL (ref 0.4–1.2)
DEPRECATED RDW RBC AUTO: 55.9 FL (ref 35–45)
ERYTHROCYTE [DISTWIDTH] IN BLOOD BY AUTOMATED COUNT: 15.9 % (ref 11.5–14.5)
GFR SERPL CREATININE-BSD FRML MDRD: 17 ML/MIN/1.73M2
GLUCOSE BLD STRIP.AUTO-MCNC: 108 MG/DL (ref 70–108)
GLUCOSE BLD STRIP.AUTO-MCNC: 186 MG/DL (ref 70–108)
GLUCOSE BLD STRIP.AUTO-MCNC: 268 MG/DL (ref 70–108)
GLUCOSE BLD STRIP.AUTO-MCNC: 301 MG/DL (ref 70–108)
GLUCOSE SERPL-MCNC: 141 MG/DL (ref 70–108)
HCT VFR BLD AUTO: 38.3 % (ref 42–52)
HGB BLD-MCNC: 12.7 GM/DL (ref 14–18)
INR PPP: 2.09 (ref 0.85–1.13)
MCH RBC QN AUTO: 32.5 PG (ref 26–33)
MCHC RBC AUTO-ENTMCNC: 33.2 GM/DL (ref 32.2–35.5)
MCV RBC AUTO: 98 FL (ref 80–94)
PLATELET # BLD AUTO: 194 THOU/MM3 (ref 130–400)
PMV BLD AUTO: 11.7 FL (ref 9.4–12.4)
POTASSIUM SERPL-SCNC: 3.3 MEQ/L (ref 3.5–5.2)
RBC # BLD AUTO: 3.91 MILL/MM3 (ref 4.7–6.1)
SODIUM SERPL-SCNC: 139 MEQ/L (ref 135–145)
WBC # BLD AUTO: 17.7 THOU/MM3 (ref 4.8–10.8)

## 2024-12-25 PROCEDURE — 2700000000 HC OXYGEN THERAPY PER DAY

## 2024-12-25 PROCEDURE — 6370000000 HC RX 637 (ALT 250 FOR IP): Performed by: HOSPITALIST

## 2024-12-25 PROCEDURE — 2580000003 HC RX 258: Performed by: STUDENT IN AN ORGANIZED HEALTH CARE EDUCATION/TRAINING PROGRAM

## 2024-12-25 PROCEDURE — 94669 MECHANICAL CHEST WALL OSCILL: CPT

## 2024-12-25 PROCEDURE — 80048 BASIC METABOLIC PNL TOTAL CA: CPT

## 2024-12-25 PROCEDURE — 6360000002 HC RX W HCPCS

## 2024-12-25 PROCEDURE — 6360000002 HC RX W HCPCS: Performed by: STUDENT IN AN ORGANIZED HEALTH CARE EDUCATION/TRAINING PROGRAM

## 2024-12-25 PROCEDURE — 1200000003 HC TELEMETRY R&B

## 2024-12-25 PROCEDURE — 6370000000 HC RX 637 (ALT 250 FOR IP): Performed by: INTERNAL MEDICINE

## 2024-12-25 PROCEDURE — 6370000000 HC RX 637 (ALT 250 FOR IP): Performed by: STUDENT IN AN ORGANIZED HEALTH CARE EDUCATION/TRAINING PROGRAM

## 2024-12-25 PROCEDURE — 6370000000 HC RX 637 (ALT 250 FOR IP): Performed by: PHARMACIST

## 2024-12-25 PROCEDURE — 85610 PROTHROMBIN TIME: CPT

## 2024-12-25 PROCEDURE — 85027 COMPLETE CBC AUTOMATED: CPT

## 2024-12-25 PROCEDURE — 82948 REAGENT STRIP/BLOOD GLUCOSE: CPT

## 2024-12-25 PROCEDURE — 6370000000 HC RX 637 (ALT 250 FOR IP)

## 2024-12-25 PROCEDURE — 99232 SBSQ HOSP IP/OBS MODERATE 35: CPT

## 2024-12-25 PROCEDURE — 6360000002 HC RX W HCPCS: Performed by: HOSPITALIST

## 2024-12-25 PROCEDURE — 94761 N-INVAS EAR/PLS OXIMETRY MLT: CPT

## 2024-12-25 PROCEDURE — 36415 COLL VENOUS BLD VENIPUNCTURE: CPT

## 2024-12-25 PROCEDURE — 94640 AIRWAY INHALATION TREATMENT: CPT

## 2024-12-25 RX ORDER — WARFARIN SODIUM 1 MG/1
1 TABLET ORAL ONCE
Status: COMPLETED | OUTPATIENT
Start: 2024-12-25 | End: 2024-12-25

## 2024-12-25 RX ORDER — POTASSIUM CHLORIDE 1500 MG/1
20 TABLET, EXTENDED RELEASE ORAL ONCE
Status: COMPLETED | OUTPATIENT
Start: 2024-12-25 | End: 2024-12-25

## 2024-12-25 RX ADMIN — METOPROLOL SUCCINATE 100 MG: 100 TABLET, EXTENDED RELEASE ORAL at 08:49

## 2024-12-25 RX ADMIN — PIPERACILLIN AND TAZOBACTAM 3375 MG: 3; .375 INJECTION, POWDER, FOR SOLUTION INTRAVENOUS at 22:36

## 2024-12-25 RX ADMIN — Medication 50 MG: at 08:49

## 2024-12-25 RX ADMIN — INSULIN LISPRO 6 UNITS: 100 INJECTION, SOLUTION INTRAVENOUS; SUBCUTANEOUS at 17:46

## 2024-12-25 RX ADMIN — CALCITRIOL CAPSULES 0.25 MCG 0.25 MCG: 0.25 CAPSULE ORAL at 08:49

## 2024-12-25 RX ADMIN — INSULIN LISPRO 2 UNITS: 100 INJECTION, SOLUTION INTRAVENOUS; SUBCUTANEOUS at 12:05

## 2024-12-25 RX ADMIN — INSULIN LISPRO 4 UNITS: 100 INJECTION, SOLUTION INTRAVENOUS; SUBCUTANEOUS at 21:46

## 2024-12-25 RX ADMIN — EMPAGLIFLOZIN 10 MG: 10 TABLET, FILM COATED ORAL at 08:49

## 2024-12-25 RX ADMIN — POTASSIUM CHLORIDE 20 MEQ: 1500 TABLET, EXTENDED RELEASE ORAL at 15:42

## 2024-12-25 RX ADMIN — IPRATROPIUM BROMIDE AND ALBUTEROL SULFATE 1 DOSE: .5; 3 SOLUTION RESPIRATORY (INHALATION) at 14:23

## 2024-12-25 RX ADMIN — Medication 1000 MCG: at 08:49

## 2024-12-25 RX ADMIN — PIPERACILLIN AND TAZOBACTAM 3375 MG: 3; .375 INJECTION, POWDER, FOR SOLUTION INTRAVENOUS at 15:44

## 2024-12-25 RX ADMIN — OXYCODONE HYDROCHLORIDE AND ACETAMINOPHEN 500 MG: 500 TABLET ORAL at 08:49

## 2024-12-25 RX ADMIN — DOXYCYCLINE HYCLATE 100 MG: 100 TABLET, COATED ORAL at 08:49

## 2024-12-25 RX ADMIN — PIPERACILLIN AND TAZOBACTAM 3375 MG: 3; .375 INJECTION, POWDER, FOR SOLUTION INTRAVENOUS at 06:39

## 2024-12-25 RX ADMIN — PANTOPRAZOLE SODIUM 40 MG: 40 TABLET, DELAYED RELEASE ORAL at 08:49

## 2024-12-25 RX ADMIN — IPRATROPIUM BROMIDE AND ALBUTEROL SULFATE 1 DOSE: .5; 3 SOLUTION RESPIRATORY (INHALATION) at 09:45

## 2024-12-25 RX ADMIN — BUDESONIDE 500 MCG: 0.5 INHALANT RESPIRATORY (INHALATION) at 20:52

## 2024-12-25 RX ADMIN — BUDESONIDE 500 MCG: 0.5 INHALANT RESPIRATORY (INHALATION) at 09:45

## 2024-12-25 RX ADMIN — IPRATROPIUM BROMIDE AND ALBUTEROL SULFATE 1 DOSE: .5; 3 SOLUTION RESPIRATORY (INHALATION) at 20:52

## 2024-12-25 RX ADMIN — WARFARIN SODIUM 1 MG: 1 TABLET ORAL at 17:46

## 2024-12-25 RX ADMIN — DEXAMETHASONE SODIUM PHOSPHATE 6 MG: 4 INJECTION, SOLUTION INTRA-ARTICULAR; INTRALESIONAL; INTRAMUSCULAR; INTRAVENOUS; SOFT TISSUE at 08:49

## 2024-12-25 RX ADMIN — INSULIN GLARGINE 20 UNITS: 100 INJECTION, SOLUTION SUBCUTANEOUS at 08:50

## 2024-12-25 RX ADMIN — ATORVASTATIN CALCIUM 80 MG: 80 TABLET, FILM COATED ORAL at 21:45

## 2024-12-26 ENCOUNTER — TELEPHONE (OUTPATIENT)
Dept: PULMONOLOGY | Age: 84
End: 2024-12-26

## 2024-12-26 VITALS
SYSTOLIC BLOOD PRESSURE: 141 MMHG | RESPIRATION RATE: 20 BRPM | DIASTOLIC BLOOD PRESSURE: 74 MMHG | BODY MASS INDEX: 32.18 KG/M2 | TEMPERATURE: 97.7 F | WEIGHT: 205 LBS | HEART RATE: 74 BPM | OXYGEN SATURATION: 96 % | HEIGHT: 67 IN

## 2024-12-26 LAB
ANION GAP SERPL CALC-SCNC: 14 MEQ/L (ref 8–16)
BASOPHILS ABSOLUTE: 0.1 THOU/MM3 (ref 0–0.1)
BASOPHILS NFR BLD AUTO: 0.3 %
BUN SERPL-MCNC: 82 MG/DL (ref 7–22)
BURR CELLS BLD QL SMEAR: ABNORMAL
CALCIUM SERPL-MCNC: 8.1 MG/DL (ref 8.5–10.5)
CHLORIDE SERPL-SCNC: 102 MEQ/L (ref 98–111)
CO2 SERPL-SCNC: 24 MEQ/L (ref 23–33)
CREAT SERPL-MCNC: 3.2 MG/DL (ref 0.4–1.2)
DEPRECATED RDW RBC AUTO: 58.4 FL (ref 35–45)
EOSINOPHIL NFR BLD AUTO: 0.1 %
EOSINOPHILS ABSOLUTE: 0 THOU/MM3 (ref 0–0.4)
ERYTHROCYTE [DISTWIDTH] IN BLOOD BY AUTOMATED COUNT: 15.9 % (ref 11.5–14.5)
GFR SERPL CREATININE-BSD FRML MDRD: 18 ML/MIN/1.73M2
GLUCOSE BLD STRIP.AUTO-MCNC: 114 MG/DL (ref 70–108)
GLUCOSE SERPL-MCNC: 124 MG/DL (ref 70–108)
HCT VFR BLD AUTO: 37.9 % (ref 42–52)
HGB BLD-MCNC: 12.1 GM/DL (ref 14–18)
IMM GRANULOCYTES # BLD AUTO: 0.53 THOU/MM3 (ref 0–0.07)
IMM GRANULOCYTES NFR BLD AUTO: 3.2 %
LYMPHOCYTES ABSOLUTE: 1.5 THOU/MM3 (ref 1–4.8)
LYMPHOCYTES NFR BLD AUTO: 8.7 %
MCH RBC QN AUTO: 32.1 PG (ref 26–33)
MCHC RBC AUTO-ENTMCNC: 31.9 GM/DL (ref 32.2–35.5)
MCV RBC AUTO: 100.5 FL (ref 80–94)
MONOCYTES ABSOLUTE: 1.6 THOU/MM3 (ref 0.4–1.3)
MONOCYTES NFR BLD AUTO: 9.6 %
NEUTROPHILS ABSOLUTE: 13.1 THOU/MM3 (ref 1.8–7.7)
NEUTROPHILS NFR BLD AUTO: 78.1 %
NRBC BLD AUTO-RTO: 0 /100 WBC
PLATELET # BLD AUTO: 188 THOU/MM3 (ref 130–400)
PLATELET BLD QL SMEAR: ADEQUATE
PMV BLD AUTO: 11.3 FL (ref 9.4–12.4)
POIKILOCYTES: ABNORMAL
POTASSIUM SERPL-SCNC: 4.7 MEQ/L (ref 3.5–5.2)
RBC # BLD AUTO: 3.77 MILL/MM3 (ref 4.7–6.1)
SCAN OF BLOOD SMEAR: NORMAL
SODIUM SERPL-SCNC: 140 MEQ/L (ref 135–145)
TOXIC GRANULES BLD QL SMEAR: PRESENT
VARIANT LYMPHS BLD QL SMEAR: ABNORMAL %
WBC # BLD AUTO: 16.8 THOU/MM3 (ref 4.8–10.8)

## 2024-12-26 PROCEDURE — 6360000002 HC RX W HCPCS: Performed by: STUDENT IN AN ORGANIZED HEALTH CARE EDUCATION/TRAINING PROGRAM

## 2024-12-26 PROCEDURE — 97535 SELF CARE MNGMENT TRAINING: CPT

## 2024-12-26 PROCEDURE — 6370000000 HC RX 637 (ALT 250 FOR IP)

## 2024-12-26 PROCEDURE — 85025 COMPLETE CBC W/AUTO DIFF WBC: CPT

## 2024-12-26 PROCEDURE — 6360000002 HC RX W HCPCS: Performed by: HOSPITALIST

## 2024-12-26 PROCEDURE — 36415 COLL VENOUS BLD VENIPUNCTURE: CPT

## 2024-12-26 PROCEDURE — 94761 N-INVAS EAR/PLS OXIMETRY MLT: CPT

## 2024-12-26 PROCEDURE — 82948 REAGENT STRIP/BLOOD GLUCOSE: CPT

## 2024-12-26 PROCEDURE — 6370000000 HC RX 637 (ALT 250 FOR IP): Performed by: STUDENT IN AN ORGANIZED HEALTH CARE EDUCATION/TRAINING PROGRAM

## 2024-12-26 PROCEDURE — 2580000003 HC RX 258: Performed by: STUDENT IN AN ORGANIZED HEALTH CARE EDUCATION/TRAINING PROGRAM

## 2024-12-26 PROCEDURE — 80048 BASIC METABOLIC PNL TOTAL CA: CPT

## 2024-12-26 PROCEDURE — 2700000000 HC OXYGEN THERAPY PER DAY

## 2024-12-26 PROCEDURE — 6370000000 HC RX 637 (ALT 250 FOR IP): Performed by: HOSPITALIST

## 2024-12-26 PROCEDURE — 94640 AIRWAY INHALATION TREATMENT: CPT

## 2024-12-26 PROCEDURE — 6370000000 HC RX 637 (ALT 250 FOR IP): Performed by: INTERNAL MEDICINE

## 2024-12-26 PROCEDURE — 6360000002 HC RX W HCPCS

## 2024-12-26 PROCEDURE — 6370000000 HC RX 637 (ALT 250 FOR IP): Performed by: PHARMACIST

## 2024-12-26 PROCEDURE — 97530 THERAPEUTIC ACTIVITIES: CPT

## 2024-12-26 PROCEDURE — 2500000003 HC RX 250 WO HCPCS: Performed by: HOSPITALIST

## 2024-12-26 RX ORDER — DEXAMETHASONE 6 MG/1
6 TABLET ORAL DAILY
Qty: 4 TABLET | Refills: 0 | Status: SHIPPED | OUTPATIENT
Start: 2024-12-26 | End: 2024-12-30

## 2024-12-26 RX ORDER — ZINC SULFATE 50(220)MG
50 CAPSULE ORAL DAILY
COMMUNITY
Start: 2024-12-27

## 2024-12-26 RX ORDER — INSULIN GLARGINE 100 [IU]/ML
INJECTION, SOLUTION SUBCUTANEOUS
Qty: 15 ML | Refills: 3 | Status: SHIPPED | OUTPATIENT
Start: 2024-12-26

## 2024-12-26 RX ORDER — LOSARTAN POTASSIUM 25 MG/1
25 TABLET ORAL DAILY
Qty: 90 TABLET | Refills: 2 | Status: SHIPPED | OUTPATIENT
Start: 2024-12-27

## 2024-12-26 RX ORDER — BUMETANIDE 1 MG/1
2 TABLET ORAL DAILY
Status: DISCONTINUED | OUTPATIENT
Start: 2024-12-27 | End: 2024-12-26 | Stop reason: HOSPADM

## 2024-12-26 RX ORDER — PANTOPRAZOLE SODIUM 40 MG/1
40 TABLET, DELAYED RELEASE ORAL
Qty: 30 TABLET | Refills: 3 | Status: SHIPPED | OUTPATIENT
Start: 2024-12-27

## 2024-12-26 RX ORDER — BUMETANIDE 2 MG/1
2 TABLET ORAL DAILY
Qty: 180 TABLET | Refills: 0 | Status: SHIPPED | OUTPATIENT
Start: 2024-12-26 | End: 2025-06-24

## 2024-12-26 RX ORDER — WARFARIN SODIUM 1 MG/1
1 TABLET ORAL ONCE
Status: COMPLETED | OUTPATIENT
Start: 2024-12-26 | End: 2024-12-26

## 2024-12-26 RX ADMIN — IPRATROPIUM BROMIDE AND ALBUTEROL SULFATE 1 DOSE: .5; 3 SOLUTION RESPIRATORY (INHALATION) at 13:31

## 2024-12-26 RX ADMIN — IPRATROPIUM BROMIDE AND ALBUTEROL SULFATE 1 DOSE: .5; 3 SOLUTION RESPIRATORY (INHALATION) at 09:55

## 2024-12-26 RX ADMIN — EMPAGLIFLOZIN 10 MG: 10 TABLET, FILM COATED ORAL at 09:14

## 2024-12-26 RX ADMIN — SODIUM CHLORIDE, PRESERVATIVE FREE 10 ML: 5 INJECTION INTRAVENOUS at 09:15

## 2024-12-26 RX ADMIN — Medication 50 MG: at 09:14

## 2024-12-26 RX ADMIN — DEXAMETHASONE SODIUM PHOSPHATE 6 MG: 4 INJECTION, SOLUTION INTRA-ARTICULAR; INTRALESIONAL; INTRAMUSCULAR; INTRAVENOUS; SOFT TISSUE at 09:14

## 2024-12-26 RX ADMIN — BUDESONIDE 500 MCG: 0.5 INHALANT RESPIRATORY (INHALATION) at 09:57

## 2024-12-26 RX ADMIN — PIPERACILLIN AND TAZOBACTAM 3375 MG: 3; .375 INJECTION, POWDER, FOR SOLUTION INTRAVENOUS at 06:28

## 2024-12-26 RX ADMIN — Medication 1000 MCG: at 09:14

## 2024-12-26 RX ADMIN — PANTOPRAZOLE SODIUM 40 MG: 40 TABLET, DELAYED RELEASE ORAL at 06:25

## 2024-12-26 RX ADMIN — CALCITRIOL CAPSULES 0.25 MCG 0.25 MCG: 0.25 CAPSULE ORAL at 09:14

## 2024-12-26 RX ADMIN — WARFARIN SODIUM 1 MG: 1 TABLET ORAL at 13:14

## 2024-12-26 RX ADMIN — METOPROLOL SUCCINATE 100 MG: 100 TABLET, EXTENDED RELEASE ORAL at 09:14

## 2024-12-26 RX ADMIN — OXYCODONE HYDROCHLORIDE AND ACETAMINOPHEN 500 MG: 500 TABLET ORAL at 09:14

## 2024-12-26 RX ADMIN — INSULIN GLARGINE 20 UNITS: 100 INJECTION, SOLUTION SUBCUTANEOUS at 09:14

## 2024-12-26 NOTE — TELEPHONE ENCOUNTER
[facility] called - patient was recently admitted for COVID. They are discharging. Patient has a currently planned ct on 1.7.25 & f/u with you on 1.27.25.  would you like to see patient any sooner after recent admission? If so , any other testing? Please advise, thank you !

## 2024-12-26 NOTE — CARE COORDINATION
12/26/24, 12:04 PM EST    Patient goals/plan/ treatment preferences discussed by  and .  Patient goals/plan/ treatment preferences reviewed with patient/ family.  Patient/ family verbalize understanding of discharge plan and are in agreement with goal/plan/treatment preferences.  Understanding was demonstrated using the teach back method.  AVS provided by RN at time of discharge, which includes all necessary medical information pertaining to the patients current course of illness, treatment, post-discharge goals of care, and treatment preferences.     Services At/After Discharge: DME    Patient is current with [facility] for home oxygen. Notified of new order for increased oxygen requirements.

## 2024-12-26 NOTE — RT PROTOCOL NOTE
RT Inhaler-Nebulizer Bronchodilator Protocol Note    There is a bronchodilator order in the chart from a provider indicating to follow the RT Bronchodilator Protocol and there is an “Initiate RT Inhaler-Nebulizer Bronchodilator Protocol” order as well (see protocol at bottom of note).    CXR Findings:  No results found.    The findings from the last RT Protocol Assessment were as follows:   History Pulmonary Disease: Chronic pulmonary disease  Respiratory Pattern: Dyspnea on exertion or RR 21-25 bpm  Breath Sounds: Inspiratory and expiratory or bilateral wheezing and/or rhonchi  Cough: Strong, spontaneous, non-productive  Indication for Bronchodilator Therapy: Wheezing associated with pulm disorder  Bronchodilator Assessment Score: 10    Aerosolized bronchodilator medication orders have been revised according to the RT Inhaler-Nebulizer Bronchodilator Protocol below.    Respiratory Therapist to perform RT Therapy Protocol Assessment initially then follow the protocol.  Repeat RT Therapy Protocol Assessment PRN for score 0-3 or on second treatment, BID, and PRN for scores above 3.    No Indications - adjust the frequency to every 6 hours PRN wheezing or bronchospasm, if no treatments needed after 48 hours then discontinue using Per Protocol order mode.     If indication present, adjust the RT bronchodilator orders based on the Bronchodilator Assessment Score as indicated below.  Use Inhaler orders unless patient has one or more of the following: on home nebulizer, not able to hold breath for 10 seconds, is not alert and oriented, cannot activate and use MDI correctly, or respiratory rate 25 breaths per minute or more, then use the equivalent nebulizer order(s) with same Frequency and PRN reasons based on the score.  If a patient is on this medication at home then do not decrease Frequency below that used at home.    0-3 - enter or revise RT bronchodilator order(s) to equivalent RT Bronchodilator order with Frequency 
RT Inhaler-Nebulizer Bronchodilator Protocol Note    There is a bronchodilator order in the chart from a provider indicating to follow the RT Bronchodilator Protocol and there is an “Initiate RT Inhaler-Nebulizer Bronchodilator Protocol” order as well (see protocol at bottom of note).    CXR Findings:  No results found.    The findings from the last RT Protocol Assessment were as follows:   History Pulmonary Disease: Chronic pulmonary disease  Respiratory Pattern: Dyspnea on exertion or RR 21-25 bpm  Breath Sounds: Intermittent or unilateral wheezes  Cough: Strong, productive  Indication for Bronchodilator Therapy: Decreased or absent breath sounds  Bronchodilator Assessment Score: 9    Aerosolized bronchodilator medication orders have been revised according to the RT Inhaler-Nebulizer Bronchodilator Protocol below.    Respiratory Therapist to perform RT Therapy Protocol Assessment initially then follow the protocol.  Repeat RT Therapy Protocol Assessment PRN for score 0-3 or on second treatment, BID, and PRN for scores above 3.    No Indications - adjust the frequency to every 6 hours PRN wheezing or bronchospasm, if no treatments needed after 48 hours then discontinue using Per Protocol order mode.     If indication present, adjust the RT bronchodilator orders based on the Bronchodilator Assessment Score as indicated below.  Use Inhaler orders unless patient has one or more of the following: on home nebulizer, not able to hold breath for 10 seconds, is not alert and oriented, cannot activate and use MDI correctly, or respiratory rate 25 breaths per minute or more, then use the equivalent nebulizer order(s) with same Frequency and PRN reasons based on the score.  If a patient is on this medication at home then do not decrease Frequency below that used at home.    0-3 - enter or revise RT bronchodilator order(s) to equivalent RT Bronchodilator order with Frequency of every 4 hours PRN for wheezing or increased work 
RT Inhaler-Nebulizer Bronchodilator Protocol Note    There is a bronchodilator order in the chart from a provider indicating to follow the RT Bronchodilator Protocol and there is an “Initiate RT Inhaler-Nebulizer Bronchodilator Protocol” order as well (see protocol at bottom of note).    CXR Findings:  No results found.    The findings from the last RT Protocol Assessment were as follows:   History Pulmonary Disease: Chronic pulmonary disease  Respiratory Pattern: Dyspnea on exertion or RR 21-25 bpm  Breath Sounds: Slightly diminished and/or crackles  Cough: Strong, productive  Indication for Bronchodilator Therapy: Decreased or absent breath sounds, On home bronchodilators  Bronchodilator Assessment Score: 7    Aerosolized bronchodilator medication orders have been revised according to the RT Inhaler-Nebulizer Bronchodilator Protocol below.    Respiratory Therapist to perform RT Therapy Protocol Assessment initially then follow the protocol.  Repeat RT Therapy Protocol Assessment PRN for score 0-3 or on second treatment, BID, and PRN for scores above 3.    No Indications - adjust the frequency to every 6 hours PRN wheezing or bronchospasm, if no treatments needed after 48 hours then discontinue using Per Protocol order mode.     If indication present, adjust the RT bronchodilator orders based on the Bronchodilator Assessment Score as indicated below.  Use Inhaler orders unless patient has one or more of the following: on home nebulizer, not able to hold breath for 10 seconds, is not alert and oriented, cannot activate and use MDI correctly, or respiratory rate 25 breaths per minute or more, then use the equivalent nebulizer order(s) with same Frequency and PRN reasons based on the score.  If a patient is on this medication at home then do not decrease Frequency below that used at home.    0-3 - enter or revise RT bronchodilator order(s) to equivalent RT Bronchodilator order with Frequency of every 4 hours PRN for 
RT Inhaler-Nebulizer Bronchodilator Protocol Note    There is a bronchodilator order in the chart from a provider indicating to follow the RT Bronchodilator Protocol and there is an “Initiate RT Inhaler-Nebulizer Bronchodilator Protocol” order as well (see protocol at bottom of note).    CXR Findings:  No results found.    The findings from the last RT Protocol Assessment were as follows:   History Pulmonary Disease: Chronic pulmonary disease  Respiratory Pattern: Regular pattern and RR 12-20 bpm  Breath Sounds: Inspiratory and expiratory or bilateral wheezing and/or rhonchi  Cough: Strong, productive  Indication for Bronchodilator Therapy: Decreased or absent breath sounds, On home bronchodilators (TID at home)  Bronchodilator Assessment Score: 9    Aerosolized bronchodilator medication orders have been revised according to the RT Inhaler-Nebulizer Bronchodilator Protocol below.    Respiratory Therapist to perform RT Therapy Protocol Assessment initially then follow the protocol.  Repeat RT Therapy Protocol Assessment PRN for score 0-3 or on second treatment, BID, and PRN for scores above 3.    No Indications - adjust the frequency to every 6 hours PRN wheezing or bronchospasm, if no treatments needed after 48 hours then discontinue using Per Protocol order mode.     If indication present, adjust the RT bronchodilator orders based on the Bronchodilator Assessment Score as indicated below.  Use Inhaler orders unless patient has one or more of the following: on home nebulizer, not able to hold breath for 10 seconds, is not alert and oriented, cannot activate and use MDI correctly, or respiratory rate 25 breaths per minute or more, then use the equivalent nebulizer order(s) with same Frequency and PRN reasons based on the score.  If a patient is on this medication at home then do not decrease Frequency below that used at home.    0-3 - enter or revise RT bronchodilator order(s) to equivalent RT Bronchodilator order 
RT Inhaler-Nebulizer Bronchodilator Protocol Note    There is a bronchodilator order in the chart from a provider indicating to follow the RT Bronchodilator Protocol and there is an “Initiate RT Inhaler-Nebulizer Bronchodilator Protocol” order as well (see protocol at bottom of note).    CXR Findings:  No results found.    The findings from the last RT Protocol Assessment were as follows:   History Pulmonary Disease: Chronic pulmonary disease  Respiratory Pattern: Regular pattern and RR 12-20 bpm  Breath Sounds: Slightly diminished and/or crackles  Cough: Strong, spontaneous, non-productive  Indication for Bronchodilator Therapy: On home bronchodilators (takes tid at home)  Bronchodilator Assessment Score: 4    Aerosolized bronchodilator medication orders have been revised according to the RT Inhaler-Nebulizer Bronchodilator Protocol below.    Respiratory Therapist to perform RT Therapy Protocol Assessment initially then follow the protocol.  Repeat RT Therapy Protocol Assessment PRN for score 0-3 or on second treatment, BID, and PRN for scores above 3.    No Indications - adjust the frequency to every 6 hours PRN wheezing or bronchospasm, if no treatments needed after 48 hours then discontinue using Per Protocol order mode.     If indication present, adjust the RT bronchodilator orders based on the Bronchodilator Assessment Score as indicated below.  Use Inhaler orders unless patient has one or more of the following: on home nebulizer, not able to hold breath for 10 seconds, is not alert and oriented, cannot activate and use MDI correctly, or respiratory rate 25 breaths per minute or more, then use the equivalent nebulizer order(s) with same Frequency and PRN reasons based on the score.  If a patient is on this medication at home then do not decrease Frequency below that used at home.    0-3 - enter or revise RT bronchodilator order(s) to equivalent RT Bronchodilator order with Frequency of every 4 hours PRN for 
RT Nebulizer Bronchodilator Protocol Note    There is a bronchodilator order in the chart from a provider indicating to follow the RT Bronchodilator Protocol and there is an “Initiate RT Bronchodilator Protocol” order as well (see protocol at bottom of note).    CXR Findings:  No results found.    The findings from the last RT Protocol Assessment were as follows:  Smoking: Chronic pulmonary disease  Respiratory Pattern: Dyspnea on exertion or RR 21-25 bpm  Breath Sounds: Inspiratory and expiratory or bilateral wheezing and/or rhonchi  Cough: Strong, spontaneous, non-productive  Indication for Bronchodilator Therapy: Decreased or absent breath sounds  Bronchodilator Assessment Score: 10    Aerosolized bronchodilator medication orders have been revised according to the RT Nebulizer Bronchodilator Protocol below.    Respiratory Therapist to perform RT Therapy Protocol Assessment initially then follow the protocol.  Repeat RT Therapy Protocol Assessment PRN for score 0-3 or on second treatment, BID, and PRN for scores above 3.    No Indications - adjust the frequency to every 6 hours PRN wheezing or bronchospasm, if no treatments needed after 48 hours then discontinue using Per Protocol order mode.     If indication present, adjust the RT bronchodilator orders based on the Bronchodilator Assessment Score as indicated below.  If a patient is on this medication at home then do not decrease Frequency below that used at home.    0-3 - enter or revise RT bronchodilator order(s) to equivalent RT Bronchodilator order with Frequency of every 4 hours PRN for wheezing or increased work of breathing using Per Protocol order mode.       4-6 - enter or revise RT Bronchodilator order(s) to two equivalent RT bronchodilator orders with one order with BID Frequency and one order with Frequency of every 4 hours PRN wheezing or increased work of breathing using Per Protocol order mode.         7-10 - enter or revise RT Bronchodilator 
bronchodilator orders with one order with TID Frequency and one order with Frequency of every 4 hours PRN wheezing or increased work of breathing using Per Protocol order mode.       11-13 - enter or revise RT Bronchodilator order(s) to one equivalent RT bronchodilator order with QID Frequency and an Albuterol order with Frequency of every 4 hours PRN wheezing or increased work of breathing using Per Protocol order mode.      Greater than 13 - enter or revise RT Bronchodilator order(s) to one equivalent RT bronchodilator order with every 4 hours Frequency and an Albuterol order with Frequency of every 2 hours PRN wheezing or increased work of breathing using Per Protocol order mode.     RT to enter RT Home Evaluation for COPD & MDI Assessment order using Per Protocol order mode.    Electronically signed by Nicole Taylor RCP on 12/26/2024 at 10:02 AM  
of breathing using Per Protocol order mode.        4-6 - enter or revise RT Bronchodilator order(s) to two equivalent RT bronchodilator orders with one order with BID Frequency and one order with Frequency of every 4 hours PRN wheezing or increased work of breathing using Per Protocol order mode.        7-10 - enter or revise RT Bronchodilator order(s) to two equivalent RT bronchodilator orders with one order with TID Frequency and one order with Frequency of every 4 hours PRN wheezing or increased work of breathing using Per Protocol order mode.       11-13 - enter or revise RT Bronchodilator order(s) to one equivalent RT bronchodilator order with QID Frequency and an Albuterol order with Frequency of every 4 hours PRN wheezing or increased work of breathing using Per Protocol order mode.      Greater than 13 - enter or revise RT Bronchodilator order(s) to one equivalent RT bronchodilator order with every 4 hours Frequency and an Albuterol order with Frequency of every 2 hours PRN wheezing or increased work of breathing using Per Protocol order mode.     RT to enter RT Home Evaluation for COPD & MDI Assessment order using Per Protocol order mode.    Electronically signed by Mary Michaels RCP on 12/18/2024 at 4:20 PM  
wheezing or increased work of breathing using Per Protocol order mode.        4-6 - enter or revise RT Bronchodilator order(s) to two equivalent RT bronchodilator orders with one order with BID Frequency and one order with Frequency of every 4 hours PRN wheezing or increased work of breathing using Per Protocol order mode.        7-10 - enter or revise RT Bronchodilator order(s) to two equivalent RT bronchodilator orders with one order with TID Frequency and one order with Frequency of every 4 hours PRN wheezing or increased work of breathing using Per Protocol order mode.       11-13 - enter or revise RT Bronchodilator order(s) to one equivalent RT bronchodilator order with QID Frequency and an Albuterol order with Frequency of every 4 hours PRN wheezing or increased work of breathing using Per Protocol order mode.      Greater than 13 - enter or revise RT Bronchodilator order(s) to one equivalent RT bronchodilator order with every 4 hours Frequency and an Albuterol order with Frequency of every 2 hours PRN wheezing or increased work of breathing using Per Protocol order mode.     RT to enter RT Home Evaluation for COPD & MDI Assessment order using Per Protocol order mode.    Electronically signed by Mary Michaels RCP on 12/21/2024 at 8:00 AM

## 2024-12-26 NOTE — PLAN OF CARE
Problem: Chronic Conditions and Co-morbidities  Goal: Patient's chronic conditions and co-morbidity symptoms are monitored and maintained or improved  12/17/2024 1638 by Owen Harvey RN  Outcome: Progressing  Flowsheets (Taken 12/17/2024 1638)  Care Plan - Patient's Chronic Conditions and Co-Morbidity Symptoms are Monitored and Maintained or Improved:   Monitor and assess patient's chronic conditions and comorbid symptoms for stability, deterioration, or improvement   Collaborate with multidisciplinary team to address chronic and comorbid conditions and prevent exacerbation or deterioration   Update acute care plan with appropriate goals if chronic or comorbid symptoms are exacerbated and prevent overall improvement and discharge     Problem: Respiratory - Adult  Goal: Achieves optimal ventilation and oxygenation  12/17/2024 1638 by Owen Harvey RN  Outcome: Progressing  Flowsheets (Taken 12/17/2024 1638)  Achieves optimal ventilation and oxygenation:   Assess for changes in respiratory status   Assess for changes in mentation and behavior   Assess and instruct to report shortness of breath or any respiratory difficulty     Problem: Respiratory - Adult  Goal: Clear lung sounds  12/17/2024 1638 by Owen Harvey, RN  Outcome: Progressing     Problem: Safety - Adult  Goal: Free from fall injury  12/17/2024 1638 by Owen Harvey, RN  Outcome: Progressing  Flowsheets (Taken 12/17/2024 1638)  Free From Fall Injury: Instruct family/caregiver on patient safety     Problem: Discharge Planning  Goal: Discharge to home or other facility with appropriate resources  Outcome: Progressing  Flowsheets (Taken 12/17/2024 1638)  Discharge to home or other facility with appropriate resources:   Identify barriers to discharge with patient and caregiver   Identify discharge learning needs (meds, wound care, etc)   Refer to discharge planning if patient needs post-hospital services based on physician order or complex needs related to 
  Problem: Chronic Conditions and Co-morbidities  Goal: Patient's chronic conditions and co-morbidity symptoms are monitored and maintained or improved  12/19/2024 0153 by Katie Porter RN  Outcome: Progressing  Flowsheets (Taken 12/19/2024 0153)  Care Plan - Patient's Chronic Conditions and Co-Morbidity Symptoms are Monitored and Maintained or Improved: Monitor and assess patient's chronic conditions and comorbid symptoms for stability, deterioration, or improvement     Problem: Respiratory - Adult  Goal: Achieves optimal ventilation and oxygenation  12/19/2024 0153 by Katie Porter RN  Outcome: Progressing  Flowsheets (Taken 12/19/2024 0153)  Achieves optimal ventilation and oxygenation:   Assess for changes in respiratory status   Assess for changes in mentation and behavior     Problem: Respiratory - Adult  Goal: Clear lung sounds  12/19/2024 0153 by Katie Porter RN  Outcome: Progressing     Problem: Safety - Adult  Goal: Free from fall injury  12/19/2024 0153 by Katie Porter RN  Outcome: Progressing  Flowsheets (Taken 12/19/2024 0153)  Free From Fall Injury: Instruct family/caregiver on patient safety  Note: Patient bed alarm is on at this time.     Problem: Discharge Planning  Goal: Discharge to home or other facility with appropriate resources  12/19/2024 0153 by Katie Porter RN  Outcome: Progressing  Flowsheets (Taken 12/19/2024 0153)  Discharge to home or other facility with appropriate resources:   Arrange for needed discharge resources and transportation as appropriate   Identify barriers to discharge with patient and caregiver   Identify discharge learning needs (meds, wound care, etc)     Problem: Skin/Tissue Integrity  Goal: Absence of new skin breakdown  Description: 1.  Monitor for areas of redness and/or skin breakdown  2.  Assess vascular access sites hourly  3.  Every 4-6 hours minimum:  Change oxygen saturation probe site  4.  Every 4-6 hours:  If on nasal continuous 
  Problem: Chronic Conditions and Co-morbidities  Goal: Patient's chronic conditions and co-morbidity symptoms are monitored and maintained or improved  12/21/2024 1704 by Owen Harvey RN  Outcome: Progressing  Flowsheets (Taken 12/21/2024 1704)  Care Plan - Patient's Chronic Conditions and Co-Morbidity Symptoms are Monitored and Maintained or Improved:   Monitor and assess patient's chronic conditions and comorbid symptoms for stability, deterioration, or improvement   Collaborate with multidisciplinary team to address chronic and comorbid conditions and prevent exacerbation or deterioration   Update acute care plan with appropriate goals if chronic or comorbid symptoms are exacerbated and prevent overall improvement and discharge     Problem: Respiratory - Adult  Goal: Achieves optimal ventilation and oxygenation  12/21/2024 2136 by Simran Simmons RN  Outcome: Progressing  Flowsheets (Taken 12/21/2024 1704 by Owen Harvey RN)  Achieves optimal ventilation and oxygenation:   Assess for changes in respiratory status   Assess for changes in mentation and behavior  12/21/2024 1704 by Owen Harvey RN  Outcome: Progressing  Flowsheets (Taken 12/21/2024 1704)  Achieves optimal ventilation and oxygenation:   Assess for changes in respiratory status   Assess for changes in mentation and behavior  Goal: Clear lung sounds  12/21/2024 2136 by Simran Simmons RN  Outcome: Progressing  12/21/2024 1704 by Owen Harvey RN  Outcome: Progressing  12/21/2024 0801 by Mary Mcihaels RCP  Outcome: Progressing  Note: Txs to help improve lung aeration. Patient mutually agreed on goals.       Problem: Safety - Adult  Goal: Free from fall injury  12/21/2024 2136 by Simran Simmons RN  Outcome: Progressing  Flowsheets (Taken 12/21/2024 1704 by Owen Harvey RN)  Free From Fall Injury: Instruct family/caregiver on patient safety  12/21/2024 1704 by Owen Harvey RN  Outcome: Progressing  Flowsheets (Taken 12/21/2024 1704)  Free From 
  Problem: Chronic Conditions and Co-morbidities  Goal: Patient's chronic conditions and co-morbidity symptoms are monitored and maintained or improved  12/22/2024 1509 by Owen Harvey RN  Outcome: Progressing  Flowsheets (Taken 12/22/2024 1509)  Care Plan - Patient's Chronic Conditions and Co-Morbidity Symptoms are Monitored and Maintained or Improved:   Monitor and assess patient's chronic conditions and comorbid symptoms for stability, deterioration, or improvement   Collaborate with multidisciplinary team to address chronic and comorbid conditions and prevent exacerbation or deterioration   Update acute care plan with appropriate goals if chronic or comorbid symptoms are exacerbated and prevent overall improvement and discharge     Problem: Respiratory - Adult  Goal: Achieves optimal ventilation and oxygenation  12/23/2024 0320 by Simran Simmons RN  Outcome: Progressing  Flowsheets (Taken 12/22/2024 2230 by Julio Mullen RCP)  Achieves optimal ventilation and oxygenation:   Position to facilitate oxygenation and minimize respiratory effort   Encourage broncho-pulmonary hygiene including cough, deep breathe, incentive spirometry   Respiratory therapy support as indicated  12/22/2024 2230 by Julio Mullen RCP  Flowsheets (Taken 12/22/2024 2230)  Achieves optimal ventilation and oxygenation:   Position to facilitate oxygenation and minimize respiratory effort   Encourage broncho-pulmonary hygiene including cough, deep breathe, incentive spirometry   Respiratory therapy support as indicated  12/22/2024 1509 by Owen Harvey RN  Outcome: Progressing  Flowsheets (Taken 12/22/2024 1509)  Achieves optimal ventilation and oxygenation:   Assess for changes in respiratory status   Assess for changes in mentation and behavior  Goal: Clear lung sounds  12/23/2024 0320 by Simran Simmons RN  Outcome: Progressing  12/22/2024 2230 by Julio Mullen RCP  Outcome: Progressing  12/22/2024 1509 by Owen Harvey 
  Problem: Chronic Conditions and Co-morbidities  Goal: Patient's chronic conditions and co-morbidity symptoms are monitored and maintained or improved  Outcome: Progressing     Problem: Respiratory - Adult  Goal: Achieves optimal ventilation and oxygenation  Outcome: Progressing  Goal: Clear lung sounds  12/24/2024 1733 by Oliva Pennington RN  Outcome: Progressing  12/24/2024 1011 by Salvador Flores, P  Outcome: Progressing     Problem: Safety - Adult  Goal: Free from fall injury  Outcome: Progressing     Problem: Discharge Planning  Goal: Discharge to home or other facility with appropriate resources  Outcome: Progressing     Problem: Skin/Tissue Integrity  Goal: Absence of new skin breakdown  Description: 1.  Monitor for areas of redness and/or skin breakdown  2.  Assess vascular access sites hourly  3.  Every 4-6 hours minimum:  Change oxygen saturation probe site  4.  Every 4-6 hours:  If on nasal continuous positive airway pressure, respiratory therapy assess nares and determine need for appliance change or resting period.  Outcome: Progressing     Problem: Pain  Goal: Verbalizes/displays adequate comfort level or baseline comfort level  Outcome: Progressing     
  Problem: Chronic Conditions and Co-morbidities  Goal: Patient's chronic conditions and co-morbidity symptoms are monitored and maintained or improved  Outcome: Progressing  Flowsheets (Taken 12/18/2024 1516)  Care Plan - Patient's Chronic Conditions and Co-Morbidity Symptoms are Monitored and Maintained or Improved:   Monitor and assess patient's chronic conditions and comorbid symptoms for stability, deterioration, or improvement   Collaborate with multidisciplinary team to address chronic and comorbid conditions and prevent exacerbation or deterioration   Update acute care plan with appropriate goals if chronic or comorbid symptoms are exacerbated and prevent overall improvement and discharge     Problem: Respiratory - Adult  Goal: Achieves optimal ventilation and oxygenation  Outcome: Progressing  Flowsheets (Taken 12/18/2024 1516)  Achieves optimal ventilation and oxygenation:   Assess for changes in respiratory status   Assess for changes in mentation and behavior   Assess and instruct to report shortness of breath or any respiratory difficulty     Problem: Respiratory - Adult  Goal: Clear lung sounds  12/18/2024 1516 by Owen Harvey, RN  Outcome: Progressing     Problem: Safety - Adult  Goal: Free from fall injury  Outcome: Progressing  Flowsheets (Taken 12/18/2024 1516)  Free From Fall Injury: Instruct family/caregiver on patient safety     Problem: Discharge Planning  Goal: Discharge to home or other facility with appropriate resources  Outcome: Progressing  Flowsheets (Taken 12/18/2024 1516)  Discharge to home or other facility with appropriate resources:   Identify barriers to discharge with patient and caregiver   Identify discharge learning needs (meds, wound care, etc)   Refer to discharge planning if patient needs post-hospital services based on physician order or complex needs related to functional status, cognitive ability or social support system   Arrange for needed discharge resources and 
  Problem: Chronic Conditions and Co-morbidities  Goal: Patient's chronic conditions and co-morbidity symptoms are monitored and maintained or improved  Outcome: Progressing  Flowsheets (Taken 12/21/2024 1704)  Care Plan - Patient's Chronic Conditions and Co-Morbidity Symptoms are Monitored and Maintained or Improved:   Monitor and assess patient's chronic conditions and comorbid symptoms for stability, deterioration, or improvement   Collaborate with multidisciplinary team to address chronic and comorbid conditions and prevent exacerbation or deterioration   Update acute care plan with appropriate goals if chronic or comorbid symptoms are exacerbated and prevent overall improvement and discharge     Problem: Respiratory - Adult  Goal: Achieves optimal ventilation and oxygenation  Outcome: Progressing  Flowsheets (Taken 12/21/2024 1704)  Achieves optimal ventilation and oxygenation:   Assess for changes in respiratory status   Assess for changes in mentation and behavior     Problem: Respiratory - Adult  Goal: Clear lung sounds  12/21/2024 1704 by Owen Harvey RN  Outcome: Progressing     Problem: Safety - Adult  Goal: Free from fall injury  Outcome: Progressing  Flowsheets (Taken 12/21/2024 1704)  Free From Fall Injury: Instruct family/caregiver on patient safety     Problem: Discharge Planning  Goal: Discharge to home or other facility with appropriate resources  Outcome: Progressing  Flowsheets (Taken 12/21/2024 1704)  Discharge to home or other facility with appropriate resources:   Identify barriers to discharge with patient and caregiver   Identify discharge learning needs (meds, wound care, etc)   Refer to discharge planning if patient needs post-hospital services based on physician order or complex needs related to functional status, cognitive ability or social support system   Arrange for needed discharge resources and transportation as appropriate     Problem: Skin/Tissue Integrity  Goal: Absence of new 
  Problem: Chronic Conditions and Co-morbidities  Goal: Patient's chronic conditions and co-morbidity symptoms are monitored and maintained or improved  Outcome: Progressing  Flowsheets (Taken 12/22/2024 1509)  Care Plan - Patient's Chronic Conditions and Co-Morbidity Symptoms are Monitored and Maintained or Improved:   Monitor and assess patient's chronic conditions and comorbid symptoms for stability, deterioration, or improvement   Collaborate with multidisciplinary team to address chronic and comorbid conditions and prevent exacerbation or deterioration   Update acute care plan with appropriate goals if chronic or comorbid symptoms are exacerbated and prevent overall improvement and discharge     Problem: Respiratory - Adult  Goal: Achieves optimal ventilation and oxygenation  Outcome: Progressing  Flowsheets (Taken 12/22/2024 1509)  Achieves optimal ventilation and oxygenation:   Assess for changes in respiratory status   Assess for changes in mentation and behavior     Problem: Respiratory - Adult  Goal: Clear lung sounds  12/22/2024 1509 by Owen Harvey RN  Outcome: Progressing     Problem: Safety - Adult  Goal: Free from fall injury  Outcome: Progressing  Flowsheets (Taken 12/22/2024 1509)  Free From Fall Injury: Instruct family/caregiver on patient safety     Problem: Discharge Planning  Goal: Discharge to home or other facility with appropriate resources  Outcome: Progressing  Flowsheets (Taken 12/22/2024 1509)  Discharge to home or other facility with appropriate resources:   Identify barriers to discharge with patient and caregiver   Identify discharge learning needs (meds, wound care, etc)   Refer to discharge planning if patient needs post-hospital services based on physician order or complex needs related to functional status, cognitive ability or social support system   Arrange for needed discharge resources and transportation as appropriate     Problem: Skin/Tissue Integrity  Goal: Absence of new 
  Problem: Respiratory - Adult  Goal: Achieves optimal ventilation and oxygenation  12/18/2024 2150 by Fernanda Dawson RCP  Outcome: Progressing     Problem: Respiratory - Adult  Goal: Clear lung sounds  12/18/2024 2150 by Fernanda Dawson RCP  Outcome: Progressing     Patient mutually agreed on goals.  
  Problem: Respiratory - Adult  Goal: Achieves optimal ventilation and oxygenation  12/19/2024 1357 by Maria Ines Ibarra RCP  Outcome: Progressing     Problem: Respiratory - Adult  Goal: Clear lung sounds  12/19/2024 1357 by Maria Ines Ibarra RCP  Outcome: Progressing   Continue therapy as ordered to achieve and maintain clear breath sounds and improve aeration.  
  Problem: Respiratory - Adult  Goal: Achieves optimal ventilation and oxygenation  12/19/2024 2157 by Mackenzie Carney RCP  Outcome: Progressing    Goal: Clear lung sounds  12/19/2024 2157 by Mackenzie Carney RCP  Outcome: Progressing    
  Problem: Respiratory - Adult  Goal: Achieves optimal ventilation and oxygenation  12/20/2024 0843 by Maryjane Yeh RCP  Outcome: Progressing  12/20/2024 0514 by Katie Porter RN  Outcome: Progressing  Flowsheets (Taken 12/20/2024 0514)  Achieves optimal ventilation and oxygenation:   Assess for changes in respiratory status   Assess for changes in mentation and behavior  12/19/2024 2157 by Mackenzie Carney RCP  Outcome: Progressing  Goal: Clear lung sounds  12/20/2024 0843 by Maryjane Yeh RCP  Note: Maintenance  Patient mutually agreed on goals.    12/20/2024 0514 by Katie Porter, RN  Outcome: Progressing  12/19/2024 2157 by Mackenzie Carney RCP  Outcome: Progressing     
  Problem: Respiratory - Adult  Goal: Achieves optimal ventilation and oxygenation  12/20/2024 2221 by Julio Mullen RCP  Outcome: Progressing     Problem: Respiratory - Adult  Goal: Clear lung sounds  12/20/2024 2221 by Julio Mullen RCP  Outcome: Progressing     
  Problem: Respiratory - Adult  Goal: Achieves optimal ventilation and oxygenation  Outcome: Progressing     Problem: Respiratory - Adult  Goal: Clear lung sounds  Outcome: Progressing     Patient mutually agreed on goals.  
  Problem: Respiratory - Adult  Goal: Achieves optimal ventilation and oxygenation  Outcome: Progressing  Flowsheets (Taken 12/25/2024 0943 by Soumya Arreguin, RCP)  Achieves optimal ventilation and oxygenation:   Assess for changes in respiratory status   Oxygen supplementation based on oxygen saturation or arterial blood gases   Assess and instruct to report shortness of breath or any respiratory difficulty   Respiratory therapy support as indicated     Problem: Respiratory - Adult  Goal: Clear lung sounds  Outcome: Progressing     Patient mutually agreed on goals.  
  Problem: Respiratory - Adult  Goal: Clear lung sounds  12/17/2024 2024 by Dalila Rivas, AROLDO  Outcome: Progressing     
  Problem: Respiratory - Adult  Goal: Clear lung sounds  12/18/2024 0909 by Mary Michaels, RCP  Outcome: Progressing  Note: Txs to maintain open airways. Patient mutually agreed on goals.       
  Problem: Respiratory - Adult  Goal: Clear lung sounds  12/21/2024 0801 by Mary Michaels, RCP  Outcome: Progressing  Note: Txs to help improve lung aeration. Patient mutually agreed on goals.       
  Problem: Respiratory - Adult  Goal: Clear lung sounds  12/22/2024 0756 by Mary Michaels, RCP  Outcome: Progressing  Note: Txs to help improve lung aeration. Patient mutually agreed on goals.       
  Problem: Respiratory - Adult  Goal: Clear lung sounds  12/22/2024 2230 by Julio Mullen RCP  Outcome: Progressing     Problem: Respiratory - Adult  Goal: Achieves optimal ventilation and oxygenation  12/22/2024 2230 by Julio Mullen RCP  Flowsheets (Taken 12/22/2024 2230)  Achieves optimal ventilation and oxygenation:   Position to facilitate oxygenation and minimize respiratory effort   Encourage broncho-pulmonary hygiene including cough, deep breathe, incentive spirometry   Respiratory therapy support as indicated    Patient lung sounds are considered normal for their current lung condition. No signs of distress noted. Current treatment regimen appropriate    
  Problem: Respiratory - Adult  Goal: Clear lung sounds  12/24/2024 1011 by Salvador Flores, RCP  Outcome: Progressing   Patient mutually agreed on goals.  
  Problem: Respiratory - Adult  Goal: Clear lung sounds  12/26/2024 1001 by Nicole Taylor, RCP  Outcome: Progressing   Continue aerosols to improve breath sounds.  Pt agrees with plan of care  
  Problem: Respiratory - Adult  Goal: Clear lung sounds  Outcome: Progressing     
resting period.  Outcome: Progressing  Note: Patient is able to reposition self     Problem: Pain  Goal: Verbalizes/displays adequate comfort level or baseline comfort level  Outcome: Progressing  Flowsheets (Taken 12/20/2024 9214)  Verbalizes/displays adequate comfort level or baseline comfort level:   Encourage patient to monitor pain and request assistance   Assess pain using appropriate pain scale   Administer analgesics based on type and severity of pain and evaluate response     Care plan reviewed with patient.  Patient verbalizes understanding of the plan of care and contributes to goal setting.   
with appropriate resources:   Identify barriers to discharge with patient and caregiver   Arrange for needed discharge resources and transportation as appropriate   Identify discharge learning needs (meds, wound care, etc)     Problem: Skin/Tissue Integrity  Goal: Absence of new skin breakdown  Description: 1.  Monitor for areas of redness and/or skin breakdown  2.  Assess vascular access sites hourly  3.  Every 4-6 hours minimum:  Change oxygen saturation probe site  4.  Every 4-6 hours:  If on nasal continuous positive airway pressure, respiratory therapy assess nares and determine need for appliance change or resting period.  Outcome: Progressing     Problem: Pain  Goal: Verbalizes/displays adequate comfort level or baseline comfort level  Outcome: Progressing  Flowsheets (Taken 12/22/2024 1509 by Owen Harvey RN)  Verbalizes/displays adequate comfort level or baseline comfort level:   Encourage patient to monitor pain and request assistance   Administer analgesics based on type and severity of pain and evaluate response   Consider cultural and social influences on pain and pain management   Assess pain using appropriate pain scale   Implement non-pharmacological measures as appropriate and evaluate response   Notify Licensed Independent Practitioner if interventions unsuccessful or patient reports new pain    Care plan reviewed with patient.  Patient verbalize understanding of the plan of care and contribute to goal setting.

## 2024-12-26 NOTE — PROGRESS NOTES
Hospitalist Progress Note    Patient:  Portillo Hickmanfer    Unit/Bed:5K-27/027-A  YOB: 1940  MRN: [id number]   Acct: [id number]   PCP: Elinor Ly APRN - CNP  Date of Admission: 12/16/2024    ASSESSMENT AND PLAN  Active Problems  Covid 19 infection, with PNA: improving. Sx onset 1-2 days prior to admission with congestion, cough, SOB. Remains afebrile. Initial CXR without acute infiltrates, possible mild pulm edema, however noted worsening on 12/20 on CT chest with noted pathcy ground-glass consolidations in lower lobes. Provide supportive measures- IS/AC, Vit C, Zinc.  Continue decadron.   Has received Zosyn, doxycyline (started 12/20).   Acute on Chronic hypoxic respiratory failure: Baseline O2 reportedly 3L with exertion, per chart review. Initial Home O2 eval noted increased O2 demands up to 6L O2 NC with ambulation, will plan to repeat Home O2 eval in AM.   Pt reports home concentrator does not go up to 6L O2 NC, depending on repeat Home O2 eval, may need to obtain larger concentrator. Plan to further discuss with CM/SW tomorrow.   Continues to required 3L o2 NC at rest.      Resolved Problems  MILLY on CKD: Improved. Cr 3.2 today. Appears closer to baseline of 3.1. UA obtained 12/20 unremarkable. Repeat BMP in AM. Continue to hold Bumex, Losartan.    Chronic Conditions (reviewed and stable unless otherwise stated):   Atrial fibrillation: Continue with metoprolol. Coumadin with pharmacy to dose.   T2DM: Continue Lantus, SSI.   Code status: as discussed by admitting provider- Limited No x 4.     Planning home 12/26.       DVT Prophylaxis: [] Lovenox / [] Heparin / [] SCDs / [x] Already on Systemic Anticoagulation / [] None  LDA: []CVC / []PICC / []Midline / []Kitchen / []Drains / []Mediport / [x]None  Antibiotics: none   Steroids: none  Labs (still needed?): [x]Yes / []No  IVF (still needed?): []Yes / [x]No    Level of 
                                                                       Hospitalist Progress Note    Patient:  Portillo Hickmanfer    Unit/Bed:5K-27/027-A  YOB: 1940  MRN: [id number]   Acct: [id number]   PCP: Elinor Ly APRN - CNP  Date of Admission: 12/16/2024    ASSESSMENT AND PLAN  Active Problems  Covid 19 infection: Sx onset 1-2 days prior to admission with congestion, cough, SOB. Remains afebrile. Initial mild leukocytosis on admission has resolved.  CXR without acute infiltrates, possible mild pulm edema. Provide supportive measures- IS/AC, Vit C, Zinc.  Await PT/OT eval   Home O2 eval ordered  Chronic hypoxic respiratory failure, secondary to COPD with exacerbation: Pt baseline 3L O2 NC, certainly more dypsneic considering #1. Continue prednisone. Await Home O2 eval.      Resolved Problems    Chronic Conditions (reviewed and stable unless otherwise stated):   CKD: cr at baseline. Monitor BMP in AM.   Atrial fibrillation: Continue with metoprolol. Coumadin with pharmacy to dose.   T2DM: Continue Lantus, SSI.   Code status: as discussed by admitting provider- Limited No x 4.     Planning home 12/18 pending therapy eval      DVT Prophylaxis: [] Lovenox / [] Heparin / [] SCDs / [x] Already on Systemic Anticoagulation / [] None  LDA: []CVC / []PICC / []Midline / []Kitchen / []Drains / []Mediport / [x]None  Antibiotics: none   Steroids: none  Labs (still needed?): []Yes / []No  IVF (still needed?): []Yes / []No    Level of care: []Step Down / [x]Med-Surg  Bed Status: [x]Inpatient / []Observation  Telemetry: [x]Yes / []No  PT/OT: [x]Yes / []No    Code status: Limited     ===================================================================  Chief Complaint: \"SOB\"  Initial HPI: History obtained per patient and chart review.   \"History obtained from the patient.     The patient is a 84 y.o. male who presents with complaints of SOB and cough productive of white yellow phlegm for 2 days.  Patient is 
                                                          Clinical Pharmacy Note                                               Warfarin Discharge Recommendations    Patient discharged from [facility] today on warfarin.    Warfarin indication: Atrial fibrillation/Atrial flutter  INR goal during admission: 2.0-3.0  Previous home warfarin regimen: 2.5 mg MF, 1.25mg other day   Interacting medications at discharge: dexamethasone  Coumadin 2.5mg tabs    Hospital Warfarin Doses & INR Results  Date INR Warfarin Dose   12/16/24 1.82 3 mg    12/17/24  1.72  2.5 mg     12/18/24  1.75   2 mg    12/19/24 1.99  1.5 mg     12/20/24  2.35 0.5 mg     12/21/24   2.80   0.5 mg    12/22/24 2.93  0.5 mg    12/23/24 2.43 1 mg   12/24/24 1.97 1.25 mg    12/25/24 2.09  1 mg    12/26/24 ---- 1 mg       Recent INRs:  Recent Labs     12/25/24  0538   INR 2.09*     Warfarin Discharge Instructions:   Date Warfarin Dose   12/27/24 (tomorrow) 1.25 mg (1/2 tablet)   12/28/24 1.25 mg (1/2 tablet)   12/29/24 1.25 mg (1/2 tablet)   12/30/24 Check INR)     Provider dosing warfarin: [facility]  Next INR date: Monday 12/30/24 at 10:20 AM    Kerri Ledbetter PharmD, BCPS 12/26/2024 11:42 AM       
    Hospitalist Progress Note  Internal Medicine Resident      Patient: Portillo Daly 84 y.o. male      Unit/Bed: 5K-27/027-A    Admit Date: 12/16/2024      ASSESSMENT AND PLAN  Active Problems  COVID-19 infection: Symptoms onset 1 to 2 days prior to admission with congestion, cough, shortness of breath.  CXR showed no acute infiltrate, mild pulmonary edema. mildly leukocytosis 12.8 from 8.0, afebrile.  Provide supportive measures-IS/AC, vitamin C, zinc.  Patient desaturated to 80s with ambulation, climbed back up to 92 after sitting back down on couch and oxygen increased to 6 L.   CT chest reported atelectasis or diffuse bronchial wall thickening and ill-defined patchy groundglass/nodular consolidation in the lower lobes.  Developing pneumonia/atypical infection.  Leukocyte elevated 17.3 from 20.3, improving.   OT recommended home with assistance if needed.  Continuing 3 L oxygen, and 6 L of oxygen with ambulation.   Continue on Decadron 6mg/daily for 10 days. 12/19-12/29  Continue with pneumonia coverage as noted below.    Acute on chronic hypoxic respiratory failure secondary to COVID infection vs interstitial lung disease vs Progressive PNA: Patient is at 3 L O2 at nasal cannula at baseline. Patient requiring 6 L with ambulation. Continue on DuoNebs.  Continuing home budesonide 500 mcg/twice daily.  Albuterol as needed.    Continue doxycycline and Zosyn.  For 5 days, 12/20 - 12/25.    Progressive pneumonia: Patient has worsening respiratory symptoms, requiring more oxygen since admission.  CT chest reported atelectasis or diffuse bronchial wall thickening and ill-defined patchy groundglass/nodular consolidation in the lower lobes.  Developing pneumonia/atypical infection.  MRSA negative.Bclx NGTD. Rclx culture pending, as patient unable to expropriate sputum. Leukocyte elevated 17.3 from 20.3, improving  Continuing with Doxy and Zosyn for 5 days.    Acute macrocytic anemia: Ferritin 577, iron 62, TIBC 206, 
    Hospitalist Progress Note  Internal Medicine Resident      Patient: Portillo Daly 84 y.o. male      Unit/Bed: 5K-27/027-A    Admit Date: 12/16/2024      ASSESSMENT AND PLAN  Active Problems  COVID-19 infection: Symptoms onset 1 to 2 days prior to admission with congestion, cough, shortness of breath.  CXR showed no acute infiltrate, mild pulmonary edema. mildly leukocytosis 12.8 from 8.0, afebrile.  Provide supportive measures-IS/AC, vitamin C, zinc.  Patient desaturated to 80s with ambulation, climbed back up to 92 after sitting back down on couch and oxygen increased to 6 L.   CT chest reported atelectasis or diffuse bronchial wall thickening and ill-defined patchy groundglass/nodular consolidation in the lower lobes.  Developing pneumonia/atypical infection.  Leukocyte elevated 19.8 from 15.1, likely secondary to steroids versus pneumonia.   PT/OT evaluation pending. Follow up with recs, pt might benefit from SNF placement  Continuing 3 L oxygen, and 6 L of oxygen with ambulation.   Continue on Decadron 6mg/daily for 10 days. 12/19-12/29  Continue with pneumonia coverage as noted below.    Acute on chronic hypoxic respiratory failure secondary to COVID infection vs interstitial lung disease vs Progressive PNA: Patient is at 3 L O2 at nasal cannula at baseline. Patient requiring 6 L with ambulation. Continue on DuoNebs.  Continuing home budesonide 500 mcg/twice daily.  Albuterol as needed.  Continue doxycycline and Zosyn. Wean as tolerated.     Progressive pneumonia: Patient has worsening respiratory symptoms, requiring more oxygen since admission.  CT chest reported atelectasis or diffuse bronchial wall thickening and ill-defined patchy groundglass/nodular consolidation in the lower lobes.  Developing pneumonia/atypical infection.  Continue on Zosyn and doxycycline for 7days, for empirical pneumonia coverage.  MRSA negative.Bclx negative for past 24hr. Rclx culture pending, as patient unable to expropriate 
    Hospitalist Progress Note  Internal Medicine Resident      Patient: Portillo Daly 84 y.o. male      Unit/Bed: 5K-27/027-A    Admit Date: 12/16/2024      ASSESSMENT AND PLAN  Active Problems  COVID-19 infection: Symptoms onset 1 to 2 days prior to admission with congestion, cough, shortness of breath.  CXR showed no acute infiltrate, mild pulmonary edema. mildly leukocytosis 12.8 from 8.0, afebrile.  Provide supportive measures-IS/AC, vitamin C, zinc.  Patient desaturated to 80s with ambulation, climbed back up to 92 after sitting back down on couch and oxygen increased to 6 L.  CT chest ordered.   PT/OT evaluation pending.  Continuing 3 L oxygen, and 6 L of oxygen with ambulation.   Started on Decadron 6mg/daily for 10 days. 12/19-12/29  Acute on chronic hypoxic respiratory failure secondary to COVID infection vs interstitial lung disease: Patient is at 3 L O2 at nasal cannula at baseline.  Placed on 3.5 L pof oxygen.  Patient requiring 6 L with ambulation. Continue on DuoNebs.  Continuing home budesonide 500 mcg/twice daily.  Albuterol as needed.   Resolved Problems  N/A  Chronic Conditions (reviewed and stable unless otherwise stated)  CKD: Creatinine at baseline.  Monitor with daily BMP.   Afebrile: Continue with metoprolol.  Coumadin pharmacy dose.   T2DM: Continue Lantus SSL.   Suspect interstitial lung disease: FEV1 2.14, FEV1/FVC 71%.  Noted to have reduced lung volumes.  No response to bronchodilators.  DLCO 12.27.  Continuing with oxygen 3 L at rest and 6 L on ambulation.  Continuing DuoNebs/steroids.  Outpatient follow-up with pulmonary.  CT chest ordered.      LDA: []CVC / []PICC / []Midline / []Kitchen / []Drains / []Mediport / [x]None  Antibiotics: None  Steroids: Nebulized budesonide 500 mcg/daily.   Labs (still needed?): [x]Yes / []No  IVF (still needed?): []Yes / [x]No    Level of care: [x]Step Down / []Med-Surg  Bed Status: [x]Inpatient / []Observation  Telemetry: []Yes / [x]No  PT/OT: [x]Yes / 
    Hospitalist Progress Note  Internal Medicine Resident      Patient: Portillo Daly 84 y.o. male      Unit/Bed: K-27/027-A    Admit Date: 12/16/2024      ASSESSMENT AND PLAN  Active Problems  COVID-19 infection: Symptoms onset 1 to 2 days prior to admission with congestion, cough, shortness of breath.  CXR showed no acute infiltrate, mild pulmonary edema. mildly leukocytosis 12.8 from 8.0, afebrile.  Provide supportive measures-IS/AC, vitamin C, zinc.   PT/OT evaluation pending.  Acute on chronic hypoxic respiratory failure secondary to COVID infection: Patient is at 3 L O2 at nasal cannula at baseline.  Placed on 3.5 L pof oxygen.  Patient requiring 6 L with ambulation. Started on DuoNebs.  Continuing home budesonide 500 mcg/twice daily.  Albuterol as needed.   Resolved Problems  N/A  Chronic Conditions (reviewed and stable unless otherwise stated)  CKD: Creatinine at baseline.  Monitor with daily BMP.   Afebrile: Continue with metoprolol.  Coumadin pharmacy dose.   T2DM: Continue Lantus SSL.       LDA: []CVC / []PICC / []Midline / []Kitchen / []Drains / []Mediport / [x]None  Antibiotics: None  Steroids: Nebulized budesonide 500 mcg/daily.   Labs (still needed?): [x]Yes / []No  IVF (still needed?): []Yes / [x]No    Level of care: [x]Step Down / []Med-Surg  Bed Status: [x]Inpatient / []Observation  Telemetry: []Yes / [x]No  PT/OT: [x]Yes / []No    DVT Prophylaxis: [] Lovenox / [] Heparin / [] SCDs / [x] Already on Systemic Anticoagulation / [] None     Expected discharge date: TBD  Disposition: TBD     Code status: Limited     ===================================================================    Chief Complaint: Shortness of breath  Subjective (past 24 hours): Patient seen at bedside.  Patient is still on 3.5 L of oxygen.  PT evaluation pending.  Respiratory therapy dyspnea evaluated for home oxygen, recommended 6 L of home oxygen.  Patient is afebrile.  Mild leukocytosis noted, likely secondary to steroids. 
 [facility]  INPATIENT PHYSICAL THERAPY  DAILY NOTE  [facility] ONC MED 5K - 5K-27/027-A      Discharge Recommendations: Continue to assess pending progress, Home with Assist as Needed, Home with Home Health PT, and Patient would benefit from continued PT at discharge  Equipment Recommendations:    monitor for needs             Time In: 1522  Time Out: 1600  Timed Code Treatment Minutes: 38 Minutes  Minutes: 38          Date: 2024  Patient Name: Portillo Daly,  Gender:  male        MRN: [id number]  : 1940  (84 y.o.)     Referring Practitioner: Modesto Nieto MD  Diagnosis: COVID-19  Additional Pertinent Hx: Per H&P, \"The patient is a 84 y.o. male who presents with complaints of SOB and cough productive of white yellow phlegm for 2 days.  Patient is accompanied by significant other needs.  He has a history of chronic respiratory failure secondary to COPD and is on 3 L chronically.  Family at bedside reports that it has become difficult for him to push his own oxygen has become weak.  He denies any fevers or chills, nausea or vomiting.  He reports he has a good appetite.  Patient was in the ER about a week ago due to wound check and bleeding at the site of his pacemaker following a battery replacement.  Patient is a history of A-fib and is on Coumadin.  He reports since then everything has been fine and his dressing has been taken off.  Patient denies any sick contacts but was in the ED a week ago.     In the ED patient was found to be COVID-positive.  He remains on his 3 L of O2.\"     Prior Level of Function:  Lives With: Significant other  Type of Home: Trailer  Home Layout: One level  Home Access: Stairs to enter with rails  Entrance Stairs - Number of Steps: 4-5 JOHANNE  Home Equipment: Cane, Walker - Standard   Bathroom Shower/Tub: Tub/Shower unit  Bathroom Toilet: Standard  Bathroom Accessibility: Accessible    Prior Level of Assist for ADLs: Independent  Prior Level of Assist for 
A home oxygen evaluation has been completed.     [x]Patient is an inpatient. It is expected that the patient will be discharged within the next 48 hours. Qualified provider to write order for home prescription if patient qualifies. Social service/care managers will arrange for home oxygen.  If patient is active, arrange for Home Medical supplier to assess for Oxygen Conserving Device per pulse oximetry.  []Patient is an outpatient. Results will be faxed to the ordering provider. Qualified provider to write order for home prescription if patient qualifies and arranges for home oxygen.      Patient was placed on room air for 10 minutes. SpO2 was 88 % on room air at rest. Patients SpO2 was below 89% and qualified for home oxygen. Oxygen was applied at 1 lpm via nasal cannula to maintain a SpO2 between 90-92% while at rest. Actual SpO2 was 92 %. Patient can ambulate for exercise flow rate. Patients was ambulated, SpO2 was 90% on 6 lpm to maintain SpO2 between 90-92% while exercising.    If oxygen need is greater than 4 lpm the SpO2 on 4 lpm was 86.     Note: For any SpO2 at 89% see policy and procedure for possible qualifications.  
A home oxygen evaluation has been completed.     [x]Patient is an inpatient. It is expected that the patient will be discharged within the next 48 hours. Qualified provider to write order for home prescription if patient qualifies. Social service/care managers will arrange for home oxygen.  If patient is active, arrange for Home Medical supplier to assess for Oxygen Conserving Device per pulse oximetry.  []Patient is an outpatient. Results will be faxed to the ordering provider. Qualified provider to write order for home prescription if patient qualifies and arranges for home oxygen.      Patient was placed on room air for 5 minutes. SpO2 was 81 % on room air at rest. Patients SpO2 was below 89% and qualified for home oxygen. Oxygen was applied at 3 lpm via nasal cannula to maintain a SpO2 between 90-92% while at rest. Actual SpO2 was 90 %. Patient can ambulate for exercise flow rate. Patients was ambulated, SpO2 was 90% on 6 lpm to maintain SpO2 between 90-92% while exercising.    If oxygen need is greater than 4 lpm the SpO2 on 4 lpm was 85%.     Note: For any SpO2 at 89% see policy and procedure for possible qualifications.  
[facility]   Pharmacy Pharmacokinetic Monitoring Service - Vancomycin     Portillo Daly is a 84 y.o. male starting on vancomycin therapy for bloodstream infection. Pharmacy consulted by Dr. Davis for monitoring and adjustment.    Target Concentration: Dosing based on anticipated concentration <15 mg/L due to renal impairment/insufficiency    Additional Antimicrobials: Zosyn    Pertinent Laboratory Values:   Wt Readings from Last 1 Encounters:   12/16/24 93 kg (205 lb)     Temp Readings from Last 1 Encounters:   12/19/24 97.5 °F (36.4 °C) (Oral)     Estimated Creatinine Clearance: 19 mL/min (A) (based on SCr of 3.1 mg/dL (HH)).  Recent Labs     12/19/24  0502 12/20/24  0437   CREATININE 3.2* 3.1*   BUN 60* 69*   WBC 14.0* 15.1*     Procalcitonin: not available    Pertinent Cultures:  Culture Date Source Results   12/20/24 Urine pending   12/20/24 BCx2 pending   MRSA Nasal Swab: was ordered by provider, awaiting results.    Plan:  Concentration-guided dosing due to renal impairment/insufficiency  Start vancomycin 2000 mgx1  Renal labs as indicated   Vancomycin concentration ordered for 12/21 with AM labs  Pharmacy will continue to monitor patient and adjust therapy as indicated    Thank you for the consult,  Brannon Maria [facility]  12/20/2024 8:51 AM    
[facility]  STRZ ONC MED 5K  Occupational Therapy  Daily Note    Discharge Recommendations: Home with assist as needed  Equipment Recommendations: Yes Shower Chair,      Time In: 1435  Time Out: 1502  Timed Code Treatment Minutes: 27 Minutes  Minutes: 27          Date: 2024  Patient Name: Portillo Daly,   Gender: male      Room: [facility]/027  MRN: [id number]  : 1940  (84 y.o.)  Referring Practitioner: Modesto Nieto MD  Diagnosis: COVID-19  Additional Pertinent Hx: Per H&P, \"The patient is a 84 y.o. male who presents with complaints of SOB and cough productive of white yellow phlegm for 2 days.  Patient is accompanied by significant other needs.  He has a history of chronic respiratory failure secondary to COPD and is on 3 L chronically.  Family at bedside reports that it has become difficult for him to push his own oxygen has become weak.  He denies any fevers or chills, nausea or vomiting.  He reports he has a good appetite.  Patient was in the ER about a week ago due to wound check and bleeding at the site of his pacemaker following a battery replacement.  Patient is a history of A-fib and is on Coumadin.  He reports since then everything has been fine and his dressing has been taken off.  Patient denies any sick contacts but was in the ED a week ago.     In the ED patient was found to be COVID-positive.  He remains on his 3 L of O2.\"    Restrictions/Precautions:  Restrictions/Precautions: General Precautions, Fall Risk  Implants Present? : Pacemaker  Position Activity Restriction  Other Position/Activity Restrictions: Monitor O2, New pacemaker placed 12/ per pt. Uses O2 at home prior to admission     Social/Functional History:  Lives With: Significant other  Type of Home: Trailer  Home Layout: One level  Home Access: Stairs to enter with rails  Entrance Stairs - Number of Steps: 4-5 JOHANNE  Home Equipment: Cane, Walker - Standard   Bathroom Shower/Tub: Tub/Shower unit  Bathroom 
Spiritual Health History and Assessment/Progress Note  [facility]    (P) Initial Encounter,  ,  ,      Name: Portillo Daly MRN: [id number]    Age: 84 y.o.     Sex: male   Language: English   Religious: Other   COVID-19     Date: 12/19/2024            Total Time Calculated: (P) 12 min              Spiritual Assessment began in [facility] ONC MED 5K        Referral/Consult From: (P) Rounding   Encounter Overview/Reason: (P) Initial Encounter  Service Provided For: (P) Patient    Rosalie, Belief, Meaning:   Patient Other: Patient has no rosalie community  Family/Friends No family/friends present      Importance and Influence:  Patient Other: Patent did not share or indicate that he has spiritual personal belief  Family/Friends No family/friends present    Community:  Patient Other: None. Patient is not connected to spiritual/rosalie community. Patient has significant others who is providing him support at home.  Family/Friends No family/friends present    Assessment and Plan of Care:     Patient Interventions include: Facilitated expression of thoughts and feelings  Family/Friends Interventions include: No family/friends present    Patient Plan of Care: Spiritual Care available upon further referral  Family/Friends Plan of Care: No family/friends present    Electronically signed by SHARIF Mao on 12/19/2024 at 3:09 PM   
[facility]  INPATIENT OCCUPATIONAL THERAPY  STRZ ONC MED 5K  EVALUATION      Discharge Recommendations: Continue to assess pending progress, Home with Home health OT, Home with assist PRN  Equipment Recommendations: Yes Shower Chair,      Time In: 815  Time Out: 08  Timed Code Treatment Minutes: 16 Minutes  Minutes: 24          Date: 2024  Patient Name: Portillo Daly,   Gender: male      MRN: [id number]  : 1940  (84 y.o.)  Referring Practitioner: Modesto Nieto MD  Diagnosis: COVID-19  Additional Pertinent Hx: Per H&P, \"The patient is a 84 y.o. male who presents with complaints of SOB and cough productive of white yellow phlegm for 2 days.  Patient is accompanied by significant other needs.  He has a history of chronic respiratory failure secondary to COPD and is on 3 L chronically.  Family at bedside reports that it has become difficult for him to push his own oxygen has become weak.  He denies any fevers or chills, nausea or vomiting.  He reports he has a good appetite.  Patient was in the ER about a week ago due to wound check and bleeding at the site of his pacemaker following a battery replacement.  Patient is a history of A-fib and is on Coumadin.  He reports since then everything has been fine and his dressing has been taken off.  Patient denies any sick contacts but was in the ED a week ago.     In the ED patient was found to be COVID-positive.  He remains on his 3 L of O2.\"    Restrictions/Precautions:  Restrictions/Precautions: General Precautions  Implants Present? : Pacemaker  Position Activity Restriction  Other Position/Activity Restrictions: Monitor O2, New pacemaker placed 12/ per pt.    Subjective  Chart Reviewed: Yes, Orders, Progress Notes, History and Physical  Patient assessed for rehabilitation services?: Yes    Subjective: RN okayed OT session. Upon arrival patient was resting in bed. Pt was agreeable to OT session.    Pain: 0/10: Pt denies     Vitals: 
[facility]  PHYSICAL THERAPY MISSED TREATMENT NOTE  STRZ ONC MED 5K    Date: 2024  Patient Name: Portillo Daly        MRN: [id number]   : 1940  (84 y.o.)  Gender: male   Referring Practitioner: Modesto Nieto MD            REASON FOR MISSED TREATMENT:  Missed Treat.  RN ok'ed session. On first attempt pt was eating breakfast on second attempt pt was down to get an xay. On third attempt pt was with respiratory.  PT to attempt to see at next available date as time allows and as willing      
Warfarin Pharmacy Consult Note    Portillo Daly is a 84 y.o. male for whom pharmacy has been asked to manage warfarin therapy.     Consulting Provider: Dr. Nieto  Indication: Atrial fibrillation  Target INR: 2.0-3.0   Warfarin dose prior to admission: 2.5 mg every Mon, Fri; 1.25 mg all other days of the week  Outpatient warfarin provider: [facility]    Recent Labs     12/16/24  1140   HGB 13.1*        Recent Labs     12/16/24  1743   INR 1.82*     Concurrent anticoagulants/antiplatelets: None  Significant warfarin drug-drug interactions: None    Date INR Warfarin Dose   12/16/24 1.82 3 mg                                   INR will be monitored routinely until therapeutic INR is achieved.    Pharmacy will continue to follow. Thank you for the consult,     Lola Taylor, PharmD  12/16/2024 5:27 PM   
Warfarin Pharmacy Consult Note    Warfarin Indication: Atrial fibrillation  Target INR: 2.0-3.0  Dose prior to admission: 2.5 mg MF, 1.25 mg TWThSS     Recent Labs     12/18/24  0426 12/19/24  0502 12/20/24  0437   HGB 11.6* 11.5* 11.6*    200 203     Recent Labs     12/18/24  0426 12/19/24  0502 12/20/24  0437   INR 1.75* 1.99* 2.35*     Concurrent anticoagulants/antiplatelets: None  Significant warfarin drug-drug interactions: decadron.     Date INR Warfarin Dose   12/16/24 1.82 3 mg    12/17/24  1.72  2.5 mg     12/18/24  1.75   2 mg    12/19/24 1.99  1.5 mg     12/20/24  2.35 0.5 mg                        Monitoring:                   INR will be monitored daily.    **Please contact pharmacy for discharge instructions when indicated**    Brannon Maria RP 12/20/2024 9:00 AM      
Warfarin Pharmacy Consult Note    Warfarin Indication: Atrial fibrillation  Target INR: 2.0-3.0  Dose prior to admission: 2.5 mg MF, 1.25 mg TWThSS    Recent Labs     12/16/24  1140 12/17/24  0543   HGB 13.1* 11.8*    190     Recent Labs     12/16/24  1743 12/17/24  0543   INR 1.82* 1.72*     Concurrent anticoagulants/antiplatelets: None  Significant warfarin drug-drug interactions: None     Date INR Warfarin Dose   12/16/24 1.82 3 mg    12/17/2024  1.72  2.5 mg                                              Monitoring:                   INR will be monitored daily.    **Please contact pharmacy for discharge instructions when indicated**    Sienna Roblero, PharmD, BCPS  12/17/2024  10:45 AM      
Warfarin Pharmacy Consult Note    Warfarin Indication: Atrial fibrillation  Target INR: 2.0-3.0  Dose prior to admission: 2.5 mg MF, 1.25 mg TWThSS    Recent Labs     12/16/24  1140 12/17/24  0543 12/17/24  1354 12/18/24  0426   HGB 13.1* 11.8* 12.2* 11.6*    190  --  223     Recent Labs     12/16/24  1743 12/17/24  0543 12/18/24  0426   INR 1.82* 1.72* 1.75*     Concurrent anticoagulants/antiplatelets: None  Significant warfarin drug-drug interactions: Prednisone 40mg daily 12/17 - planned for 4 doses.     Date INR Warfarin Dose   12/16/24 1.82 3 mg    12/17/24  1.72  2.5 mg     12/18/24  1.75   2 mg                                     Monitoring:                   INR will be monitored daily.    **Please contact pharmacy for discharge instructions when indicated**  Symone Malagon, PharmD 12/18/2024 12:03 PM      
Warfarin Pharmacy Consult Note    Warfarin Indication: Atrial fibrillation  Target INR: 2.0-3.0  Dose prior to admission: 2.5 mg MF, 1.25 mg TWThSS    Recent Labs     12/17/24  0543 12/17/24  1354 12/18/24  0426 12/19/24  0502   HGB 11.8* 12.2* 11.6* 11.5*     --  223 200     Recent Labs     12/17/24  0543 12/18/24  0426 12/19/24  0502   INR 1.72* 1.75* 1.99*     Concurrent anticoagulants/antiplatelets: None  Significant warfarin drug-drug interactions: Prednisone 40mg daily 12/17 - planned for 4 doses.     Date INR Warfarin Dose   12/16/24 1.82 3 mg    12/17/24  1.72  2.5 mg     12/18/24  1.75   2 mg    12/19/24 1.99  1.5 mg                                 Monitoring:                   INR will be monitored daily.    **Please contact pharmacy for discharge instructions when indicated**    Eli Canchola PharmD, [facility] 12/19/2024 2:17 PM      
Warfarin Pharmacy Consult Note    Warfarin Indication: Atrial fibrillation  Target INR: 2.0-3.0  Dose prior to admission: 2.5 mg MF, 1.25 mg all other days    Recent Labs     12/19/24  0502 12/20/24  0437 12/21/24  0516   HGB 11.5* 11.6* 11.6*    203 210     Recent Labs     12/19/24  0502 12/20/24  0437 12/21/24  0516   INR 1.99* 2.35* 2.80*     Concurrent anticoagulants/antiplatelets: None  Significant warfarin drug-drug interactions: decadron, doxycycline      Date INR Warfarin Dose   12/16/24 1.82 3 mg    12/17/24  1.72  2.5 mg     12/18/24  1.75   2 mg    12/19/24 1.99  1.5 mg     12/20/24  2.35 0.5 mg     12/21/24   2.80   0.5 mg                Monitoring:                   INR will be monitored daily.    **Please contact pharmacy for discharge instructions when indicated**    Eli Canchola PharmD, [facility] 12/21/2024 8:59 AM      
Warfarin Pharmacy Consult Note    Warfarin Indication: Atrial fibrillation/Atrial flutter  Target INR: 2.0-3.0  Dose prior to admission: 2.5 mg MF, 1.25 mg all other days    Recent Labs     12/20/24  0437 12/21/24  0516 12/22/24  0648   HGB 11.6* 11.6* 11.4*    210 183     Recent Labs     12/20/24  0437 12/21/24  0516 12/22/24  0648   INR 2.35* 2.80* 2.93*     Concurrent anticoagulants/antiplatelets: None  Significant warfarin drug-drug interactions: decadron, doxycycline      Date INR Warfarin Dose   12/16/24 1.82 3 mg    12/17/24  1.72  2.5 mg     12/18/24  1.75   2 mg    12/19/24 1.99  1.5 mg     12/20/24  2.35 0.5 mg     12/21/24   2.80   0.5 mg    12/22/24 2.93  0.5 mg         Monitoring:                   INR will be monitored daily.    **Please contact pharmacy for discharge instructions when indicated**    Eli Canchola PharmD, [facility] 12/22/2024 9:00 AM      
Warfarin Pharmacy Consult Note    Warfarin Indication: Atrial fibrillation/Atrial flutter  Target INR: 2.0-3.0  Dose prior to admission: 2.5 mg MF, 1.25 mg all other days    Recent Labs     12/20/24  0437 12/21/24  0516 12/22/24  0648   HGB 11.6* 11.6* 11.4*    210 183     Recent Labs     12/20/24  0437 12/21/24  0516 12/22/24  0648   INR 2.35* 2.80* 2.93*     Concurrent anticoagulants/antiplatelets: None  Significant warfarin drug-drug interactions: dexamethasone (12/20-, doxycycline (12/20-     Date INR Warfarin Dose   12/16/24 1.82 3 mg    12/17/24  1.72  2.5 mg     12/18/24  1.75   2 mg    12/19/24 1.99  1.5 mg     12/20/24  2.35 0.5 mg     12/21/24   2.80   0.5 mg    12/22/24 2.93  0.5 mg    12/23/24 2.43 1 mg      Monitoring:                   INR will be monitored daily.    **Please contact pharmacy for discharge instructions when indicated**  Symone Malagon, PharmD 12/23/2024 12:45 PM    
Warfarin Pharmacy Consult Note    Warfarin Indication: Atrial fibrillation/Atrial flutter  Target INR: 2.0-3.0  Dose prior to admission: 2.5 mg MF, 1.25mg other days    Recent Labs     12/22/24  0648 12/23/24  0632 12/24/24  0528   HGB 11.4* 12.5* 11.9*    209 193     Recent Labs     12/22/24  0648 12/23/24  0632 12/24/24  0528   INR 2.93* 2.43* 1.97*     Concurrent anticoagulants/antiplatelets: None  Significant warfarin drug-drug interactions: dexamethasone (12/20-, doxycycline (12/20-     Date INR Warfarin Dose   12/16/24 1.82 3 mg    12/17/24  1.72  2.5 mg     12/18/24  1.75   2 mg    12/19/24 1.99  1.5 mg     12/20/24  2.35 0.5 mg     12/21/24   2.80   0.5 mg    12/22/24 2.93  0.5 mg    12/23/24 2.43 1 mg   12/24/24 1.97 1.25 mg              Monitoring:                   INR will be monitored daily. Will need to re-order INR 12/25 (current lab order ends 12/25 0600)    **Please contact pharmacy for discharge instructions when indicated**    Jennifer Russo, PharmD  12/24/2024, 1:08 PM    
Warfarin Pharmacy Consult Note    Warfarin Indication: Atrial fibrillation/Atrial flutter  Target INR: 2.0-3.0  Dose prior to admission: 2.5 mg MF, 1.25mg other days    Recent Labs     12/23/24  0632 12/24/24  0528 12/25/24  0538   HGB 12.5* 11.9* 12.7*    193 194     Recent Labs     12/23/24  0632 12/24/24  0528 12/25/24  0538   INR 2.43* 1.97* 2.09*     Concurrent anticoagulants/antiplatelets: None  Significant warfarin drug-drug interactions: dexamethasone (12/20-, doxycycline (12/20-12/25)     Date INR Warfarin Dose   12/16/24 1.82 3 mg    12/17/24  1.72  2.5 mg     12/18/24  1.75   2 mg    12/19/24 1.99  1.5 mg     12/20/24  2.35 0.5 mg     12/21/24   2.80   0.5 mg    12/22/24 2.93  0.5 mg    12/23/24 2.43 1 mg   12/24/24 1.97 1.25 mg    12/25/24 2.09  1 mg        Monitoring:                   INR will be monitored daily.    **Please contact pharmacy for discharge instructions when indicated**    Prasanna MohanD, BCPS   12/25/2024  1:22 PM      
[facility]  OCCUPATIONAL THERAPY MISSED TREATMENT NOTE  [facility] ONC MED 5K  [id number]      Date: 2024  Patient Name: Portillo Daly        CSN: [id number]   : 1940  (84 y.o.)  Gender: male   Referring Practitioner: Modesto Nieto MD            REASON FOR MISSED TREATMENT:  Attempt x 1, Pt on bedpan, RT waiting to go in. Attempt x 2, lab getting ready to go in room. Will check back as time allows.                 
as patient unable to expropriate sputum.   Acute macrocytic anemia: Ferritin 577, iron 62, TIBC 206, iron saturation 30, folate 9.0, B12 >2000.  Hemoglobin 11.6, baseline 14.  Monitoring daily with CBC  Resolved Problems  N/A  Chronic Conditions (reviewed and stable unless otherwise stated)  CKD: Creatinine at baseline.  Monitor with daily BMP.   Afebrile: Continue with metoprolol.  Coumadin pharmacy dose.   T2DM: Continue Lantus SSL.   Interstitial lung disease: FEV1 2.14, FEV1/FVC 71%.  Noted to have reduced lung volumes.  No response to bronchodilators.  DLCO 12.27.  Continuing with oxygen 3 L at rest and 6 L on ambulation.  Continuing DuoNebs/steroids.  Outpatient follow-up with pulmonary.  CT chest ordered.      LDA: []CVC / []PICC / []Midline / []Kitchen / []Drains / []Mediport / [x]None  Antibiotics: None  Steroids: Nebulized budesonide 500 mcg/daily.   Labs (still needed?): [x]Yes / []No  IVF (still needed?): []Yes / [x]No    Level of care: [x]Step Down / []Med-Surg  Bed Status: [x]Inpatient / []Observation  Telemetry: []Yes / [x]No  PT/OT: [x]Yes / []No    DVT Prophylaxis: [] Lovenox / [] Heparin / [] SCDs / [x] Already on Systemic Anticoagulation / [] None     Expected discharge date: TBD  Disposition: TBD     Code status: Limited     ===================================================================    Chief Complaint: Shortness of breath  Subjective (past 24 hours):    Stable overnight.  Patient seen at bedside.  Patient denies any nausea/vomiting, creatinine/dizziness, chest pain, chest palpitation.  Patient is currently on 4 L saturating at 94%.  Overnight patient remained at 4L saturating at 90 to 96%.  Continue with Decadron, Zosyn and doxycycline.     HPI / Hospital Course:  Patient is a 84-year-old male with past medical history of CKD, atrial fibrillation, NIDDM 2, presented with shortness of breath productive cough with yellow sputum for the past 2 days.  Patient is on 3 L of oxygen at home.  
Applicable    ASSESSMENT:   Pt participates in education on using 2WW for EC/WS. Pt seems hesitant to idea but verbalizes understanding.    Activity Tolerance:  Patient tolerance of  treatment: Fair treatment tolerance, Reduced activity pace, and Need for increased rest breaks      Plan: Times Per Week: 5x  Times Per Day: Once a day  Current Treatment Recommendations: Strengthening, Balance training, Endurance training, Self-Care / ADL, Safety education & training, Functional mobility training, Patient/Caregiver education & training, Equipment evaluation, education, & procurement    Education:  Learners: Patient  ADL's, Energy Conservation, Precautions, Importance of Increasing Activity, and Fall Prevention    Goals  Short Term Goals  Time Frame for Short Term Goals: Until discharge  Short Term Goal 1: Pt will complete dynamic standing task x 5 minutes with 0 vcs for safety and Indep to increase indep and endurance with all sinkside grooming.  Short Term Goal 2: Pt will complete functional mobility to/from BR and HH distances with Indep to increase indep with all toileting.  Short Term Goal 3: Pt will complete LB dressing with Indep to increase indep within home environment.  Short Term Goal 4: Pt will complete showering/bathing task with Mod Indep with EC techniques to increase indep within home environment.  Additional Goals?: No  Long Term Goals  Time Frame for Long Term Goals : No LTGs d/t short estimated length of stay.    Following session, patient left in safe position with all fall risk precautions in place.      
Patient denies any sick contact.  In ED patient was noted to be COVID-positive and was on 3 L of oxygen.   Patient was admitted to floor for further management. Respiratory therapy evaluated for home oxygen, recommended 6 L of home oxygen.  Patient is afebrile.  Mild leukocytosis noted, likely secondary to steroids. Patient desaturated to 80% with ambulation on 3 L of oxygen, while using the restroom.  Patient sat down in the chair and the oxygen was increased to 6 L, saturating at 92 to 95%.  CT chest was ordered.  PFT noted to have interstitial lung disease.  Started on Decadron 6 Mg for 10 days.       Medications:    Infusion Medications    sodium chloride      dextrose      dextrose      Scheduled Medications    piperacillin-tazobactam  3,375 mg IntraVENous Q12H    vancomycin (VANCOCIN) intermittent dosing (placeholder)   Other RX Placeholder    doxycycline hyclate  100 mg Oral 2 times per day    pantoprazole  40 mg Oral QAM AC    dexAMETHasone  6 mg IntraVENous Daily    ipratropium 0.5 mg-albuterol 2.5 mg  1 Dose Inhalation TID    ascorbic acid  500 mg Oral Daily    zinc sulfate  50 mg Oral Daily    atorvastatin  80 mg Oral Nightly    budesonide  500 mcg Nebulization BID    bumetanide  2 mg Oral BID    calcitRIOL  0.25 mcg Oral Daily    cyanocobalamin  1,000 mcg Oral Daily    empagliflozin  10 mg Oral Daily    insulin glargine  16 Units SubCUTAneous Daily    losartan  25 mg Oral Daily    metoprolol succinate  100 mg Oral Daily    sodium chloride flush  5-40 mL IntraVENous 2 times per day    warfarin placeholder: dosing by pharmacy   Other RX Placeholder    insulin lispro  0-8 Units SubCUTAneous 4x Daily AC & HS    PRN Meds: albuterol, guaiFENesin-codeine, sodium chloride flush, sodium chloride, ondansetron **OR** ondansetron, polyethylene glycol, acetaminophen **OR** acetaminophen, glucose, dextrose bolus **OR** dextrose bolus, glucagon (rDNA), dextrose, glucose, dextrose bolus **OR** dextrose bolus, glucagon 
generally steady     Stairs:  Not Tested    Exercise:  Patient was guided in 1 set(s) 10 reps of exercises to both lower extremities: Ankle pumps, Glut sets, Quad sets, Heelslides, Hip abduction/adduction, Seated marches, Seated heel/toe raises, Long arc quads, and required frequent rest breaks to prevent shortness of breath and drops in O2 sats .  Exercises were completed for increased independence with functional mobility.    Functional Outcome Measures:  [facility] (6 CLICK) BASIC MOBILITY  [facility] Inpatient Mobility Raw Score : 18  AM-[facility] Inpatient T-Scale Score : 43.63          Modified Lipscomb:  Premorbid Functional Status: Not Applicable  Current Functional Status:  Not Applicable    ASSESSMENT:  Activity Tolerance:  Patient tolerance of treatment:Good.    Treatment Initiated: Treatment and education initiated within context of evaluation.  Evaluation time included review of current medical information, gathering information related to past medical, social and functional history, completion of standardized testing, formal and informal observation of tasks, assessment of data and development of plan of care and goals.  Treatment time included skilled education and facilitation of tasks to increase safety and independence with functional mobility for improved independence and quality of life.    Assessment:  Body Structures, Functions, Activity Limitations Requiring Skilled Therapeutic Intervention: Decreased functional mobility , Decreased strength, Decreased endurance, Decreased balance  Assessment: Pt is 83 yo male that is deconditioned and participated well. Pt was generally Mod Ind for mobility in PLOF and is at SBA today. Pt is safe for return to home at this time and would benefit from continued skilled PT to address strengthening, balance, bed mobility, endurance building, and functional mobility training.    Therapy Prognosis: Good    Requires PT Follow-Up: Yes    Patient Education:      .    Patient 
Daily    cyanocobalamin  1,000 mcg Oral Daily    empagliflozin  10 mg Oral Daily    [Held by provider] losartan  25 mg Oral Daily    metoprolol succinate  100 mg Oral Daily    sodium chloride flush  5-40 mL IntraVENous 2 times per day    warfarin placeholder: dosing by pharmacy   Other RX Placeholder    insulin lispro  0-8 Units SubCUTAneous 4x Daily AC & HS    PRN Meds: albuterol, guaiFENesin-codeine, sodium chloride flush, sodium chloride, ondansetron **OR** ondansetron, polyethylene glycol, acetaminophen **OR** acetaminophen, glucose, dextrose bolus **OR** dextrose bolus, glucagon (rDNA), dextrose, glucose, dextrose bolus **OR** dextrose bolus, glucagon (rDNA), dextrose    Exam:  BP (!) 119/93   Pulse 70   Temp 97.5 °F (36.4 °C) (Axillary)   Resp 20   Ht 1.702 m (5' 7\")   Wt 93 kg (205 lb)   SpO2 97%   BMI 32.11 kg/m²   General: Ill-appearing male.  Eyes:  PERRL. Conjunctivae/corneas clear.  HENT: Head normal appearing. Nares normal. Oral mucosa moist.  Hearing intact.   Neck: Supple, with full range of motion. Trachea midline.  No gross JVD appreciated.  Respiratory:  Normal effort. Clear to auscultation, without rales or wheezes or rhonchi.  Cardiovascular: Normal rate, regular rhythm with normal S1/S2 without murmurs.    No lower extremity edema.   Abdomen: Soft, non-tender, non-distended with normal bowel sounds.  Musculoskeletal: No joint swelling or tenderness. Normal tone. No abnormal movements.   Skin: Warm and dry. No rashes or lesions.  Neurologic:  No focal sensory/motor deficits in the upper or lower extremities. Cranial nerves:  grossly non-focal 2-12.     Psychiatric: Alert and oriented, normal insight and thought content.   Capillary Refill: Brisk,< 3 seconds.  Peripheral Pulses: +2 palpable, equal bilaterally.       Labs/Radiology: See chart or assessment above.     Electronically signed by Landon Godfrey DO on 12/24/2024 at 7:17 PM  Case was discussed with Attending, Dr. George Jurado DO. 
CHEST (2 VW)   Final Result   1. Normal heart size. Permanent dual-chamber pacemaker. An aortic stent/valve is   present.   2. Mild retrocardiac atelectasis/pneumonia. Tiny bilateral pleural effusions.   Minimal interstitial pneumonitis right lung base.   3. Overall appearance of chest has improved since prior.            **This report has been created using voice recognition software.  It may contain   minor errors which are inherent in voice recognition technology.**            Electronically signed by Dr. Jhony Zavala      CT CHEST WO CONTRAST   Final Result   1. Atelectasis with diffuse bronchial wall thickening and ill-defined    patchy ground-glass/nodular consolidations in the dependent lower lobes.    Developing pneumonia/atypical infection considered.   2. Emphysema.   3. Additional findings as described.      This document has been electronically signed by: Everton Jackson MD on    12/20/2024 05:59 AM      All CTs at this facility use dose modulation techniques and iterative    reconstructions, and/or weight-based dosing   when appropriate to reduce radiation to a low as reasonably achievable.      XR CHEST PORTABLE   Final Result   Cardiomegaly with mild pulmonary vascular congestion.            **This report has been created using voice recognition software. It may contain   minor errors which are inherent in voice recognition technology.**      Electronically signed by Dr Humble Mars        XR CHEST PORTABLE    Result Date: 12/16/2024  PROCEDURE: XR CHEST PORTABLE CLINICAL INFORMATION: 84-year-old male with shortness of breath and a productive cough. COMPARISON: Radiographs 12/05/2024 TECHNIQUE: AP upright view of the chest was obtained. FINDINGS: A cardiac conduction device projects over the left side of the chest. There is a prosthetic aortic valve. There is cardiomegaly. There is mild pulmonary vascular congestion. There is no significant pleural effusion or pneumothorax. Visualized portions

## 2024-12-26 NOTE — CARE COORDINATION
12/26/24, 11:51 AM EST    Patient goals/plan/ treatment preferences discussed by  and .  Patient goals/plan/ treatment preferences reviewed with patient/ family.  Patient/ family verbalize understanding of discharge plan and are in agreement with goal/plan/treatment preferences.  Understanding was demonstrated using the teach back method.  AVS provided by RN at time of discharge, which includes all necessary medical information pertaining to the patients current course of illness, treatment, post-discharge goals of care, and treatment preferences.     Services At/After Discharge: DME    [facility] DME notified of increased oxygen needs.

## 2024-12-26 NOTE — DISCHARGE INSTR - MEDS
Warfarin Discharge Instructions:   Date Warfarin Dose   12/27/24 (tomorrow) 1.25 mg (1/2 tablet)   12/28/24 1.25 mg (1/2 tablet)   12/29/24 1.25 mg (1/2 tablet)   12/30/24 Check INR     Provider dosing warfarin: [facility]  Next INR date: Monday 12/30/24 at 10:20 AM

## 2024-12-27 ENCOUNTER — TELEPHONE (OUTPATIENT)
Dept: FAMILY MEDICINE CLINIC | Age: 84
End: 2024-12-27

## 2024-12-27 ENCOUNTER — CARE COORDINATION (OUTPATIENT)
Dept: CASE MANAGEMENT | Age: 84
End: 2024-12-27

## 2024-12-27 DIAGNOSIS — U07.1 COVID-19: Primary | ICD-10-CM

## 2024-12-27 PROCEDURE — 1111F DSCHRG MED/CURRENT MED MERGE: CPT | Performed by: NURSE PRACTITIONER

## 2024-12-27 NOTE — TELEPHONE ENCOUNTER
Care Transitions Initial Follow Up Call    Outreach made within 2 business days of discharge: Yes    Patient: Portillo Daly Patient : 1940   MRN: [id number]  Reason for Admission: Covid-19  Discharge Date: 24       Spoke with: BRUCE    Discharge department/facility: [facility] Interactive Patient Contact:  Was patient able to fill all prescriptions:   Was patient instructed to bring all medications to the follow-up visit:   Is patient taking all medications as directed in the discharge summary?   Does patient understand their discharge instructions:   Does patient have questions or concerns that need addressed prior to 7-14 day follow up office visit:     Additional needs identified to be addressed with provider               Scheduled appointment with PCP within 7-14 days    Follow Up  Future Appointments   Date Time Provider Department Center   2024 10:20 AM Adry Patterson RPH STR MED MGMT P - [place]   2025 12:40 PM STR CT IMAGING RM1  [facility] OUT EXP STR Rad/Card   2025  1:20 PM Elinor Ly APRN - CNP Fam Med [facility] E[facility] DEP   1/15/2025 10:20 AM Chastity Ya DO N [facility]A KIDNE P - [place]   1/15/2025  2:15 PM Mele Burnham, LEAH N SRPX Heart P - [place]   2025 10:00 AM Huang Lala APRN - CNP N Pulm Med P - [place]   2025 10:40 AM Elinor Ly APRN - CNP Fam Med [facility] ECC DEP   2025 11:00 AM SCHEDULE, SRPX PACER NURSE N SRPX PACER P - [place]       Elida Gonzalez MA

## 2024-12-27 NOTE — CARE COORDINATION
available to patient including: PCP  Specialist  Urgent care clinics  When to call 911. The patient agrees to contact the primary care provider and/or specialist office for questions related to their healthcare.      Advance Care Planning:   Does patient have an Advance Directive: reviewed and current.    Medication Reconciliation:  Medication reconciliation was performed with spouse/partner,[id number] entered: yes.     Remote Patient Monitoring:  Offered patient enrollment in the Remote Patient Monitoring (RPM) program for in-home monitoring: did not discuss during initial call.    Assessments:  Care Transitions 24 Hour Call    Do you have a copy of your discharge instructions?: Yes  Do you have all of your prescriptions and are they filled?: Yes  Have you been contacted by a [employer] Pharmacist?: No  Have you scheduled your follow up appointment?: Yes  How are you going to get to your appointment?: Car - family or friend to transport  Do you feel like you have everything you need to keep you well at home?: Yes  Care Transitions Interventions     Transportation Support: Declined     Registered Dietician: Declined DME Assistance: Declined   Disease Specific Clinic: Declined      Disease Association: Completed                Follow Up Appointment:   Discussed follow up appointments. Patient has hospital follow up appointment scheduled greater than 14 days after discharge; will keep appts as is d/t holiday.   Future Appointments         Provider Specialty Dept Phone    12/30/2024 10:20 AM Adry Patterson, [facility] [phone]    1/7/2025 12:40 PM (Arrive by 12:10 PM) STR CT IMAGING RM1  [facility] Radiology [phone]    1/9/2025 1:20 PM Elinor Ly, APRN - CNP Family Medicine [phone]    1/15/2025 10:20 AM Chastity Ya DO Nephrology [phone]    1/15/2025 2:15 PM Mele Burnham PA-C Cardiology [phone]    1/27/2025 10:00 AM Huang Lala, APRN - CNP Pulmonology [phone]

## 2024-12-30 ENCOUNTER — ANTI-COAG VISIT (OUTPATIENT)
Age: 84
End: 2024-12-30
Payer: MEDICARE

## 2024-12-30 ENCOUNTER — HOSPITAL ENCOUNTER (OUTPATIENT)
Age: 84
Discharge: HOME | End: 2024-12-30
Payer: MEDICARE

## 2024-12-30 DIAGNOSIS — Z51.81 ENCOUNTER FOR THERAPEUTIC DRUG MONITORING: ICD-10-CM

## 2024-12-30 DIAGNOSIS — N18.4 CKD (CHRONIC KIDNEY DISEASE) STAGE 4, GFR 15-29 ML/MIN (HCC): ICD-10-CM

## 2024-12-30 DIAGNOSIS — U07.1 COVID-19: ICD-10-CM

## 2024-12-30 DIAGNOSIS — I48.21 PERMANENT ATRIAL FIBRILLATION (HCC): Primary | ICD-10-CM

## 2024-12-30 DIAGNOSIS — Z79.01 ANTICOAGULATED ON COUMADIN: ICD-10-CM

## 2024-12-30 LAB
ANION GAP SERPL CALC-SCNC: 12 MEQ/L (ref 8–16)
BASOPHILS ABSOLUTE: 0.1 THOU/MM3 (ref 0–0.1)
BASOPHILS NFR BLD AUTO: 0.3 %
BUN SERPL-MCNC: 60 MG/DL (ref 7–22)
CALCIUM SERPL-MCNC: 8.7 MG/DL (ref 8.5–10.5)
CHLORIDE SERPL-SCNC: 98 MEQ/L (ref 98–111)
CO2 SERPL-SCNC: 25 MEQ/L (ref 23–33)
CREAT SERPL-MCNC: 2.5 MG/DL (ref 0.4–1.2)
DEPRECATED RDW RBC AUTO: 57.9 FL (ref 35–45)
EOSINOPHIL NFR BLD AUTO: 0 %
EOSINOPHILS ABSOLUTE: 0 THOU/MM3 (ref 0–0.4)
ERYTHROCYTE [DISTWIDTH] IN BLOOD BY AUTOMATED COUNT: 16 % (ref 11.5–14.5)
GFR SERPL CREATININE-BSD FRML MDRD: 24 ML/MIN/1.73M2
GLUCOSE SERPL-MCNC: 151 MG/DL (ref 70–108)
HCT VFR BLD AUTO: 43.3 % (ref 42–52)
HGB BLD-MCNC: 14.4 GM/DL (ref 14–18)
IMM GRANULOCYTES # BLD AUTO: 0.48 THOU/MM3 (ref 0–0.07)
IMM GRANULOCYTES NFR BLD AUTO: 2.1 %
LYMPHOCYTES ABSOLUTE: 0.5 THOU/MM3 (ref 1–4.8)
LYMPHOCYTES NFR BLD AUTO: 2.4 %
MCH RBC QN AUTO: 33.1 PG (ref 26–33)
MCHC RBC AUTO-ENTMCNC: 33.3 GM/DL (ref 32.2–35.5)
MCV RBC AUTO: 99.5 FL (ref 80–94)
MONOCYTES ABSOLUTE: 0.8 THOU/MM3 (ref 0.4–1.3)
MONOCYTES NFR BLD AUTO: 3.4 %
NEUTROPHILS ABSOLUTE: 21 THOU/MM3 (ref 1.8–7.7)
NEUTROPHILS NFR BLD AUTO: 91.8 %
NRBC BLD AUTO-RTO: 0 /100 WBC
PLATELET # BLD AUTO: 196 THOU/MM3 (ref 130–400)
PMV BLD AUTO: 11.9 FL (ref 9.4–12.4)
POC INR: 2.1 (ref 0.8–1.2)
POTASSIUM SERPL-SCNC: 4.8 MEQ/L (ref 3.5–5.2)
RBC # BLD AUTO: 4.35 MILL/MM3 (ref 4.7–6.1)
SCAN OF BLOOD SMEAR: NORMAL
SODIUM SERPL-SCNC: 135 MEQ/L (ref 135–145)
VARIANT LYMPHS BLD QL SMEAR: ABNORMAL %
WBC # BLD AUTO: 22.9 THOU/MM3 (ref 4.8–10.8)

## 2024-12-30 PROCEDURE — 36415 COLL VENOUS BLD VENIPUNCTURE: CPT

## 2024-12-30 PROCEDURE — 85610 PROTHROMBIN TIME: CPT | Performed by: PHARMACIST

## 2024-12-30 PROCEDURE — 85025 COMPLETE CBC W/AUTO DIFF WBC: CPT

## 2024-12-30 PROCEDURE — 99211 OFF/OP EST MAY X REQ PHY/QHP: CPT | Performed by: PHARMACIST

## 2024-12-30 PROCEDURE — 80048 BASIC METABOLIC PNL TOTAL CA: CPT

## 2024-12-30 NOTE — TELEPHONE ENCOUNTER
Care Transitions Initial Follow Up Call    Outreach made within 2 business days of discharge: Yes    Patient: Florian Daly Patient : 1940   MRN: [id number]  Reason for Admission:   Discharge Date: 24       Spoke with: florian    Discharge department/facility: [facility] Interactive Patient Contact:  Was patient able to fill all prescriptions: Yes  Was patient instructed to bring all medications to the follow-up visit: Yes  Is patient taking all medications as directed in the discharge summary? Yes  Does patient understand their discharge instructions: Yes  Does patient have questions or concerns that need addressed prior to 7-14 day follow up office visit: no    Additional needs identified to be addressed with provider  No needs identified             Scheduled appointment with PCP within 7-14 days    Follow Up  Future Appointments   Date Time Provider Department Center   2024 10:20 AM Adry Patterson [facility] STR MED MGMT P - [place]   2025 12:40 PM STR CT IMAGING RM1  [facility] OUT EXP STR Rad/Card   2025  1:20 PM Elinor Ly APRN - CNP Fam Med [facility] DEP   1/15/2025 10:20 AM Chastity Ya DO N [facility] P - [place]   1/15/2025  2:15 PM Mele Burnham PA-C N [facility] Heart P - [place]   2025 10:00 AM Huang Lala APRN - CNP N Pulm Med P - [place]   2025 10:40 AM Elinor Ly APRN - CNP Fam Med [facility] DEP   2025 11:00 AM SCHEDULE, SRP[facility] PACER NURSE N [facility] PACER P - [place]       June Archer MA

## 2024-12-30 NOTE — PROGRESS NOTES
Medication Management Clinic  [facility]  Anticoagulation Clinic  [phone] (phone)  [phone] (fax)    Mr. Portillo Daly is a 84 y.o.  male with history of Afib who presents today for anticoagulation monitoring and adjustment.    Patient verifies current dosing regimen and tablet strength.  No missed or extra doses.  Patient denies s/s bleeding/bruising/swelling/SOB  No blood in urine or stool.  No dietary changes.  Started dexamethasone 6 mg daily after recent hospital stay (finished )  No change in alcohol use or tobacco use.  Hospitalized -- medications changes include decreasing bumex from BID to once daily  Patient denies falls.  No vomiting/diarrhea or acute illness.   No Procedures scheduled in the future at this time.    Assessment:   INR goal 2-3  Lab Results   Component Value Date    INR 2.10 (H) 2024    INR 2.09 (H) 2024    INR 1.97 (H) 2024     INR therapeutic   Recent Labs     24  1027   INR 2.10*   Patient interview completed and discussed with pharmacist by XIN Gastelum PharmD Candidate .    Plan:  Continue Coumadin 2.5 mg MF, 1.25 mg all other days.  Recheck INR in 2 week(s).  Patient reminded to call the Anticoagulation Clinic with any signs or symptoms of bleeding or with any medication changes.  Patient given instructions utilizing the teach back method.    After visit summary printed and reviewed with patient.      Discharged ambulatory in no apparent distress.    Viky Patterson PharmD 2024 10:56 AM    For Pharmacy Admin Tracking Only    Intervention Detail: Adherence Monitorin  Total # of Interventions Recommended: 1  Total # of Interventions Accepted: 1  Time Spent (min): 20

## 2025-01-02 ENCOUNTER — CARE COORDINATION (OUTPATIENT)
Dept: CASE MANAGEMENT | Age: 85
End: 2025-01-02

## 2025-01-02 NOTE — CARE COORDINATION
Care Transitions Note    Follow Up Call     Patient Current Location:  Home: [address]  [place] [address]    Care Transition Nurse contacted the patient by telephone. Verified name and  as identifiers.    Additional needs identified to be addressed with provider   No needs identified                 Method of communication with provider: none.    Care Summary Note: Contacted pt for care transition follow up.  Portillo states he is feeling quite a bit better.  Reports he may still get short of breath but doing better with the oxygen.  Continues to use 1 L of oxygen at rest and 6 L with activity.  Reports SpO2 remaining in the 90's as long as the oxygen is on.  Pt not using the IS or acapella as much.  Informed him of what the devices do and importance of continuing to use them.  Pt reports he does not have much of a cough anymore.  Wheezing noted and states he is using his breathing tx.  Pt on Pulmicort nebulizer BID.  Denies having any c/o chest pain/discomfort, pressure, tightness, fever, chills, nausea/vomiting, dizziness/lightheadedness, headaches.  Reports he continues to monitor his weight daily.  Weight this morning was 201.8 lbs.  Pt admits to not checking BP.  Confirmed he has a BP monitor at home.  Introduced RPM and benefits.  Pt declines and prefers to self monitor.  States she will start monitoring his BP.  Discussed s/s and when to contact providers.  No questions or needs at this time.      Plan of care updates since last contact:  Education: continue to monitor weight, SpO2, BP/HR, continue using acapella and IS  Review of patient management of conditions/medications:         Advance Care Planning:   Does patient have an Advance Directive: reviewed during previous call, see note. .    Medication Review:  No changes since last call.     Remote Patient Monitoring:  Offered patient enrollment in the Remote Patient Monitoring (RPM) program for in-home monitoring: Yes, but did not enroll at

## 2025-01-03 ENCOUNTER — TELEPHONE (OUTPATIENT)
Dept: CARDIOLOGY CLINIC | Age: 85
End: 2025-01-03

## 2025-01-03 DIAGNOSIS — I48.20 CHRONIC ATRIAL FIBRILLATION (HCC): Primary | ICD-10-CM

## 2025-01-03 NOTE — TELEPHONE ENCOUNTER
----- Message from Misty Casanova sent at 1/3/2025  8:58 AM EST -----  Please renew annual referral for Anticoagulation Monitoring, #[phone].  Thanks, MAX Casanova RN BSN  [facility]

## 2025-01-06 ENCOUNTER — TELEPHONE (OUTPATIENT)
Dept: FAMILY MEDICINE CLINIC | Age: 85
End: 2025-01-06

## 2025-01-06 NOTE — TELEPHONE ENCOUNTER
Pt called and stated he was told by the hospital he needed to call his PCP office and get an order for Home Health to have a nurse come in to check on him. He stated he wsant suppose to do anything without someone telling him what to do. He said he didn't quit understand what they were wanting.

## 2025-01-06 NOTE — TELEPHONE ENCOUNTER
Medicare requires in person eval before we order.  He is scheduled for 1/9.  Do we have anything sooner?

## 2025-01-06 NOTE — TELEPHONE ENCOUNTER
I called this pt to see if he got his [facility] monitor and he said he did not get it . I told him  I will call him back

## 2025-01-06 NOTE — TELEPHONE ENCOUNTER
I called this pt and told him I did order him a new monitor and I gave him the phone number to call [facility] so he can get it paired up when he gets it in the mail

## 2025-01-07 ENCOUNTER — HOSPITAL ENCOUNTER (OUTPATIENT)
Dept: CT IMAGING | Age: 85
Discharge: HOME | End: 2025-01-07
Payer: MEDICARE

## 2025-01-07 DIAGNOSIS — R94.2 DECREASED DIFFUSION CAPACITY OF LUNG: ICD-10-CM

## 2025-01-07 DIAGNOSIS — J47.9 BRONCHIECTASIS WITHOUT COMPLICATION (HCC): ICD-10-CM

## 2025-01-07 PROCEDURE — 71250 CT THORAX DX C-: CPT

## 2025-01-07 NOTE — TELEPHONE ENCOUNTER
Pt informed of home health referral . Pt voiced understanding with no further questions at this time.

## 2025-01-08 ENCOUNTER — CARE COORDINATION (OUTPATIENT)
Dept: CASE MANAGEMENT | Age: 85
End: 2025-01-08

## 2025-01-08 RX ORDER — WARFARIN SODIUM 2.5 MG/1
TABLET ORAL
Qty: 90 TABLET | Refills: 3 | Status: SHIPPED | OUTPATIENT
Start: 2025-01-08

## 2025-01-08 NOTE — CARE COORDINATION
.    Assessments:  Care Transitions Subsequent and Final Call    Subsequent and Final Calls  Do you have any ongoing symptoms?: Yes  Patient-reported symptoms: Shortness of Breath, Cough, Other  Have your medications changed?: No  Do you have any questions related to your medications?: No  Do you currently have any active services?: No  Do you have any needs or concerns that I can assist you with?: No  Care Transitions Interventions     Transportation Support: Declined     Registered Dietician: Declined DME Assistance: Declined   Disease Specific Clinic: Declined      Disease Association: Completed      Other Interventions:              Follow Up Appointment:   Reviewed upcoming appointment(s).  Future Appointments         Provider Specialty Dept Phone    1/9/2025 1:20 PM Elinor Ly APRN - [facility] Family Medicine [phone]    1/13/2025 10:20 AM Misty Casanova RN Pharmacy [phone]    1/15/2025 10:20 AM Chastity Ya DO Nephrology [phone]    1/15/2025 2:15 PM Mele Burnham, PA-C Cardiology [phone]    1/27/2025 10:00 AM Huang Lala APRN - [facility] Pulmonology [phone]    5/6/2025 10:40 AM Elinor Ly APRN - [facility] Family Medicine [phone]    12/17/2025 11:00 AM SCHEDULE, SEJAL PACEHELLEN NURSE Cardiology [phone]            Care Transition Nurse provided contact information.  Plan for follow-up call in 6-10 days based on severity of symptoms and risk factors.  Plan for next call: symptom management-SOB, SpO2, swelling in ankles, cough, wheezing, any new or worsening symptoms, review PCP appt.  Did PCP order HHC?      Felipa Powell RN

## 2025-01-09 ENCOUNTER — OFFICE VISIT (OUTPATIENT)
Dept: FAMILY MEDICINE CLINIC | Age: 85
End: 2025-01-09

## 2025-01-09 VITALS
HEART RATE: 70 BPM | RESPIRATION RATE: 16 BRPM | HEIGHT: 67 IN | WEIGHT: 207 LBS | DIASTOLIC BLOOD PRESSURE: 84 MMHG | BODY MASS INDEX: 32.49 KG/M2 | OXYGEN SATURATION: 94 % | TEMPERATURE: 97.9 F | SYSTOLIC BLOOD PRESSURE: 128 MMHG

## 2025-01-09 DIAGNOSIS — Z09 HOSPITAL DISCHARGE FOLLOW-UP: Primary | ICD-10-CM

## 2025-01-09 DIAGNOSIS — I50.32 CHRONIC DIASTOLIC CHF (CONGESTIVE HEART FAILURE), NYHA CLASS 2 (HCC): ICD-10-CM

## 2025-01-09 DIAGNOSIS — J44.1 CHRONIC OBSTRUCTIVE PULMONARY DISEASE WITH ACUTE EXACERBATION (HCC): ICD-10-CM

## 2025-01-09 DIAGNOSIS — N18.4 STAGE 4 CHRONIC KIDNEY DISEASE (HCC): ICD-10-CM

## 2025-01-09 SDOH — ECONOMIC STABILITY: FOOD INSECURITY: WITHIN THE PAST 12 MONTHS, THE FOOD YOU BOUGHT JUST DIDN'T LAST AND YOU DIDN'T HAVE MONEY TO GET MORE.: NEVER TRUE

## 2025-01-09 SDOH — ECONOMIC STABILITY: FOOD INSECURITY: WITHIN THE PAST 12 MONTHS, YOU WORRIED THAT YOUR FOOD WOULD RUN OUT BEFORE YOU GOT MONEY TO BUY MORE.: NEVER TRUE

## 2025-01-09 ASSESSMENT — PATIENT HEALTH QUESTIONNAIRE - PHQ9
SUM OF ALL RESPONSES TO PHQ QUESTIONS 1-9: 0
SUM OF ALL RESPONSES TO PHQ QUESTIONS 1-9: 0
1. LITTLE INTEREST OR PLEASURE IN DOING THINGS: NOT AT ALL
SUM OF ALL RESPONSES TO PHQ QUESTIONS 1-9: 0
SUM OF ALL RESPONSES TO PHQ QUESTIONS 1-9: 0
SUM OF ALL RESPONSES TO PHQ9 QUESTIONS 1 & 2: 0
2. FEELING DOWN, DEPRESSED OR HOPELESS: NOT AT ALL

## 2025-01-09 NOTE — PROGRESS NOTES
Post-Discharge Transitional Care Follow Up      Portillo Daly   YOB: 1940    Date of Office Visit:  1/9/2025  Date of Hospital Admission: 12/16/24  Date of Hospital Discharge: 12/26/24  Readmission Risk Score (high >=14%. Medium >=10%):Readmission Risk Score: 23.1      Care management risk score Rising risk (score 2-5) and Complex Care (Scores >=6): No Risk Score On File     Non face to face  following discharge, date last encounter closed (first attempt may have been earlier): *No documented post hospital discharge outreach found in the last 14 days     Call initiated 2 business days of discharge: *No response recorded in the last 14 days     Hospital discharge follow-up  -     OR DISCHARGE MEDS RECONCILED W/ CURRENT OUTPATIENT MED LIST  Chronic obstructive pulmonary disease with acute exacerbation (HCC)  -     [facility] Care by Ai Flanagan  Chronic diastolic CHF (congestive heart failure), NYHA class 2 ([facility])  -     [facility] Care by Ai Flanagan  Stage 4 chronic kidney disease (HCC)  -     [facility] Care by Ai Flanagan    Medical Decision Making: low complexity  No follow-ups on file.    On this date 1/9/2025 I have spent 30+ minutes reviewing previous notes, test results and face to face with the patient discussing the diagnosis and importance of compliance with the treatment plan as well as documenting on the day of the visit.       Subjective:   HPI    Inpatient course: Discharge summary reviewed- see chart.    Patient: Portillo Daly  YOB: 1940  MRN: [id number]         Acct: [id number]                Primary Care Physician: Elinor Ly APRN - CNP     Admit date  12/16/2024                       Discharge date: 12/26/2024      Chief Complaint: \"SOB\"  Initial HPI: History obtained per patient and chart review.   \"History obtained from the patient.     The patient is a 84 y.o. male who presents with complaints of SOB and cough

## 2025-01-13 ENCOUNTER — ANTI-COAG VISIT (OUTPATIENT)
Age: 85
End: 2025-01-13
Payer: MEDICARE

## 2025-01-13 ENCOUNTER — HOSPITAL ENCOUNTER (OUTPATIENT)
Age: 85
Discharge: HOME | End: 2025-01-13
Payer: MEDICARE

## 2025-01-13 DIAGNOSIS — I48.21 PERMANENT ATRIAL FIBRILLATION (HCC): Primary | ICD-10-CM

## 2025-01-13 DIAGNOSIS — M79.662 BILATERAL CALF PAIN: ICD-10-CM

## 2025-01-13 DIAGNOSIS — M79.661 BILATERAL CALF PAIN: ICD-10-CM

## 2025-01-13 DIAGNOSIS — N18.4 CKD (CHRONIC KIDNEY DISEASE), STAGE IV (HCC): ICD-10-CM

## 2025-01-13 DIAGNOSIS — Z51.81 ENCOUNTER FOR THERAPEUTIC DRUG MONITORING: ICD-10-CM

## 2025-01-13 DIAGNOSIS — Z79.01 ANTICOAGULATED ON COUMADIN: ICD-10-CM

## 2025-01-13 LAB
ANION GAP SERPL CALC-SCNC: 16 MEQ/L (ref 8–16)
BUN SERPL-MCNC: 28 MG/DL (ref 7–22)
CALCIUM SERPL-MCNC: 8.7 MG/DL (ref 8.5–10.5)
CHLORIDE SERPL-SCNC: 103 MEQ/L (ref 98–111)
CO2 SERPL-SCNC: 22 MEQ/L (ref 23–33)
CREAT SERPL-MCNC: 2.5 MG/DL (ref 0.4–1.2)
DEPRECATED RDW RBC AUTO: 62.2 FL (ref 35–45)
ERYTHROCYTE [DISTWIDTH] IN BLOOD BY AUTOMATED COUNT: 16.6 % (ref 11.5–14.5)
GFR SERPL CREATININE-BSD FRML MDRD: 24 ML/MIN/1.73M2
GLUCOSE SERPL-MCNC: 157 MG/DL (ref 70–108)
HCT VFR BLD AUTO: 39.9 % (ref 42–52)
HGB BLD-MCNC: 12.8 GM/DL (ref 14–18)
MCH RBC QN AUTO: 32.8 PG (ref 26–33)
MCHC RBC AUTO-ENTMCNC: 32.1 GM/DL (ref 32.2–35.5)
MCV RBC AUTO: 102.3 FL (ref 80–94)
PHOSPHATE SERPL-MCNC: 2.5 MG/DL (ref 2.4–4.7)
PLATELET # BLD AUTO: 160 THOU/MM3 (ref 130–400)
PMV BLD AUTO: 11.3 FL (ref 9.4–12.4)
POC INR: 3 (ref 0.8–1.2)
POTASSIUM SERPL-SCNC: 4.2 MEQ/L (ref 3.5–5.2)
RBC # BLD AUTO: 3.9 MILL/MM3 (ref 4.7–6.1)
SODIUM SERPL-SCNC: 141 MEQ/L (ref 135–145)
WBC # BLD AUTO: 9.9 THOU/MM3 (ref 4.8–10.8)

## 2025-01-13 PROCEDURE — 85027 COMPLETE CBC AUTOMATED: CPT

## 2025-01-13 PROCEDURE — 99211 OFF/OP EST MAY X REQ PHY/QHP: CPT

## 2025-01-13 PROCEDURE — 36415 COLL VENOUS BLD VENIPUNCTURE: CPT

## 2025-01-13 PROCEDURE — 85610 PROTHROMBIN TIME: CPT

## 2025-01-13 PROCEDURE — 84100 ASSAY OF PHOSPHORUS: CPT

## 2025-01-13 PROCEDURE — 80048 BASIC METABOLIC PNL TOTAL CA: CPT

## 2025-01-13 NOTE — PROGRESS NOTES
Medication Management Clinic  [facility]  Anticoagulation Clinic  [phone] (phone)  [phone] (fax)    Mr. Portillo Daly is a 84 y.o.  male with history of chronic atrial fib., per Dr. Jean Pedersen's referral, who presents today for Warfarin monitoring and adjustment (2 week visit).    Patient verifies current dosing regimen and tablet strength.  No missed or extra doses.  Patient denies bleeding/swelling.  Has usual easy bruising.  Has usual SOB with walking (even with oxygen on). Had brief left chest pain this morning.  No blood in urine or stool.  No dietary changes.  No changes in medication/OTC agents/herbals, except stated PCP stopped Protonix at 1/9 visit.  No change in alcohol use or tobacco use.  No change in activity level yet, but is to start home PT.  Patient denies falls.  No vomiting/diarrhea or acute illness.   No procedures scheduled in the future at this time.    Assessment:     Lab Results   Component Value Date    INR 3.00 (H) 01/13/2025    INR 2.10 (H) 12/30/2024    INR 2.09 (H) 12/25/2024     INR therapeutic - goal 2-3.  Recent Labs     01/13/25  1034   INR 3.00*      Plan:  POCT INR performed/result reviewed.  Continue PO Coumadin 2.5 mg MF, 1.25 mg TWThSS.  Recheck INR in 2-3 week(s).  Patient reminded to call the Anticoagulation Clinic with any signs or symptoms of bleeding or with any medication changes.  Patient given instructions utilizing the teach back method.       After visit summary printed and reviewed with patient.      Discharged via transport chair (wearing oxygen/tank) in no apparent distress, with female significant other.    For Pharmacy Admin Tracking Only    Time Spent (min): 20

## 2025-01-14 ENCOUNTER — CARE COORDINATION (OUTPATIENT)
Dept: CASE MANAGEMENT | Age: 85
End: 2025-01-14

## 2025-01-14 NOTE — CARE COORDINATION
Referral placed  
this.  No other questions or needs at this time.      Plan of care updates since last contact:  Education: maintaining SpO2 above 90%, when to seek medical attention   Review of patient management of conditions/medications:         Advance Care Planning:   Does patient have an Advance Directive: reviewed during previous call, see note. .    Medication Review:  No changes since last call.     Remote Patient Monitoring:  Offered patient enrollment in the Remote Patient Monitoring (RPM) program for in-home monitoring: Yes, but did not enroll at this time: declined to enroll in the program because-prefers to self monitor .    Assessments:  Care Transitions Subsequent and Final Call    Subsequent and Final Calls  Do you have any ongoing symptoms?: Yes  Patient-reported symptoms: Cough, Other, Shortness of Breath  Have your medications changed?: No  Do you have any questions related to your medications?: No  Do you currently have any active services?: No  Do you have any needs or concerns that I can assist you with?: No  Care Transitions Interventions     Transportation Support: Declined     Registered Dietician: Declined DME Assistance: Declined   Disease Specific Clinic: Declined      Disease Association: Completed      Other Interventions:              Follow Up Appointment:   Reviewed upcoming appointment(s). and TARI appointment attended as scheduled   Future Appointments         Provider Specialty Dept Phone    1/15/2025 10:20 AM Chastity Ya DO Nephrology [phone]    1/22/2025 11:00 AM Mele Burnham PA-C Cardiology [phone]    1/27/2025 10:00 AM Huang Lala APRN - [facility] Pulmonology [phone]    2/3/2025 10:40 AM Misty Casanova RN Pharmacy [phone]    3/11/2025 1:40 PM Elinor Ly APRN [facility] Family Medicine [phone]    5/6/2025 10:40 AM Elinor Ly APRN - [facility] Family Medicine [phone]    12/17/2025 11:00 AM SEJAL PRAKASH NURSE Cardiology [phone]

## 2025-01-15 ENCOUNTER — OFFICE VISIT (OUTPATIENT)
Dept: NEPHROLOGY | Age: 85
End: 2025-01-15
Payer: MEDICARE

## 2025-01-15 VITALS
DIASTOLIC BLOOD PRESSURE: 76 MMHG | SYSTOLIC BLOOD PRESSURE: 126 MMHG | HEART RATE: 70 BPM | OXYGEN SATURATION: 92 % | WEIGHT: 208 LBS | BODY MASS INDEX: 32.58 KG/M2

## 2025-01-15 DIAGNOSIS — N18.4 CKD (CHRONIC KIDNEY DISEASE), STAGE IV (HCC): Primary | ICD-10-CM

## 2025-01-15 PROCEDURE — 1111F DSCHRG MED/CURRENT MED MERGE: CPT | Performed by: INTERNAL MEDICINE

## 2025-01-15 PROCEDURE — 1159F MED LIST DOCD IN RCRD: CPT | Performed by: INTERNAL MEDICINE

## 2025-01-15 PROCEDURE — 99214 OFFICE O/P EST MOD 30 MIN: CPT | Performed by: INTERNAL MEDICINE

## 2025-01-15 PROCEDURE — 1036F TOBACCO NON-USER: CPT | Performed by: INTERNAL MEDICINE

## 2025-01-15 PROCEDURE — 1123F ACP DISCUSS/DSCN MKR DOCD: CPT | Performed by: INTERNAL MEDICINE

## 2025-01-15 PROCEDURE — 3078F DIAST BP <80 MM HG: CPT | Performed by: INTERNAL MEDICINE

## 2025-01-15 PROCEDURE — 3074F SYST BP LT 130 MM HG: CPT | Performed by: INTERNAL MEDICINE

## 2025-01-15 PROCEDURE — G8427 DOCREV CUR MEDS BY ELIG CLIN: HCPCS | Performed by: INTERNAL MEDICINE

## 2025-01-15 PROCEDURE — G8417 CALC BMI ABV UP PARAM F/U: HCPCS | Performed by: INTERNAL MEDICINE

## 2025-01-15 RX ORDER — BUMETANIDE 2 MG/1
2 TABLET ORAL 2 TIMES DAILY
Qty: 360 TABLET | Refills: 1 | Status: SHIPPED | OUTPATIENT
Start: 2025-01-15 | End: 2025-07-14

## 2025-01-15 NOTE — PROGRESS NOTES
Component Value Date    INR 3.00 (H) 01/13/2025    INR 2.10 (H) 12/30/2024    INR 2.09 (H) 12/25/2024     URINE:   Lab Results   Component Value Date/Time    NAUR 59 05/09/2022 11:34 AM     Lab Results   Component Value Date/Time    NITRU NEGATIVE 12/20/2024 10:35 AM    COLORU YELLOW 12/20/2024 10:35 AM    PHUR 5.5 12/20/2024 10:35 AM    PHUR 5.0 01/20/2024 06:37 PM    LABCAST NONE SEEN 04/22/2022 05:50 PM    LABCAST NONE SEEN 04/22/2022 05:50 PM    WBCUA 5-9 09/04/2023 07:10 PM    RBCUA 0-2 09/04/2023 07:10 PM    YEAST NONE SEEN 09/04/2023 07:10 PM    BACTERIA NONE SEEN 09/04/2023 07:10 PM    LEUKOCYTESUR NEGATIVE 12/20/2024 10:35 AM    LEUKOCYTESUR NEGATIVE 01/20/2024 06:37 PM    UROBILINOGEN 0.2 12/20/2024 10:35 AM    BILIRUBINUR NEGATIVE 12/20/2024 10:35 AM    BLOODU NEGATIVE 12/20/2024 10:35 AM    GLUCOSEU 500 12/20/2024 10:35 AM    KETUA NEGATIVE 12/20/2024 10:35 AM      Microalbumen/Creatinine ratio:  No components found for: \"RUCREAT\"        Impression/Plan:   1. 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 Bumex was reduced will increase back to BID - has crackles and 1+ leg edema. Bmp 2 weeks  -holding off AV fistula per patient preference.  He would be ok with HD if it gets to that though.    2. Mild microalbuminuria: on ARB , SGLT-2 inhibitor  3. Hypoplastic left kidney  4. HTN:stable  5. DM , f/u with PCP for management  6. CAD, hx TAVR  7. Chronic systolic CHF : see above.    8. Secondary hyperparathyroidism: continue calcitriol:      Bloodwork and medications were reviewed and plan of care discussed with the patient.  Return to clinic in 3 months  or sooner if the need arises.      Chastity Ya,   [facility]

## 2025-01-16 ENCOUNTER — CARE COORDINATION (OUTPATIENT)
Dept: CASE MANAGEMENT | Age: 85
End: 2025-01-16

## 2025-01-16 NOTE — CARE COORDINATION
Contacted [facility] to verify referral received.  Spoke with Elinor.  She informed CTN that she did not see the referral but it says 92% complete therefore they should've received the referral.  Will check back in a few days to ensure they received all the information and contacted pt to schedule initial visit.     Contacted pt.  Portillo states he is not too bad today.  No new or worsening symptoms since we last spoke 2 days ago.  Informed her that his PCP sent referral to [facility].  He verbalized understanding.  He states that he received a text from someone but not sure who that will be making a visit tomorrow between 9-12.  CTN will call him tomorrow afternoon.  No questions or concerns at this time.      Felipa Powell RN

## 2025-01-17 ENCOUNTER — CARE COORDINATION (OUTPATIENT)
Dept: CASE MANAGEMENT | Age: 85
End: 2025-01-17

## 2025-01-17 NOTE — CARE COORDINATION
Care Transitions Note    Follow Up Call     Patient Current Location:  Home: [address]  [place] [address]    Care Transition Nurse contacted the patient by telephone. Verified name and  as identifiers.    Additional needs identified to be addressed with provider   No needs identified                 Method of communication with provider: none.    Care Summary Note: Contacted pt for care transition follow up.  Portillo states he is doing okay.  Reports shortness of breath has been alright, not too bad today.  Continues to use the 1 L of oxygen at rest and 6 L with exertion.  SpO2 remaining in the 90's as long as the oxygen is on.  Reports cough and wheezing are improving.  Denies having any further chest pain/discomfort.  Reports having some swelling in his feet.  He is keeping legs elevated as much as possible.  He informed [facility] that person who made a visit to see him today was from [place], [place].  He could not remember the name of the company she was with but states she was there to get information.  He states he has the name and phone number on his phone.  Encouraged to make sure he is aware of who he is talking to before giving any information.  He verbalized understanding.  He informed [facility] that [facility] called him and a nurse will be making a visit on , 25 to see him.  No questions or concerns at this time.      Plan of care updates since last contact:  Education: s/s of distress, when to seek medical attention  Review of patient management of conditions/medications:    Home Health: [facility] will be starting on 25        Advance Care Planning:   Does patient have an Advance Directive: reviewed during previous call, see note. .    Medication Review:  No changes since last call.     Remote Patient Monitoring:  Offered patient enrollment in the Remote Patient Monitoring (RPM) program for in-home monitoring: Yes, but did not enroll at this time: declined to enroll in the program

## 2025-01-20 ENCOUNTER — TELEPHONE (OUTPATIENT)
Dept: FAMILY MEDICINE CLINIC | Age: 85
End: 2025-01-20

## 2025-01-20 NOTE — TELEPHONE ENCOUNTER
Diana with [facility] with physical therapy twice a week for 3 weeks and once a week for three weeks. Just FYI

## 2025-01-21 ENCOUNTER — TELEPHONE (OUTPATIENT)
Dept: FAMILY MEDICINE CLINIC | Age: 85
End: 2025-01-21

## 2025-01-24 ENCOUNTER — CARE COORDINATION (OUTPATIENT)
Dept: CASE MANAGEMENT | Age: 85
End: 2025-01-24

## 2025-01-24 NOTE — CARE COORDINATION
Care Transitions Note    Follow Up Call     Patient Current Location:  Home: [address]  [place] [address]    Care Transition Nurse contacted the patient by telephone. Verified name and  as identifiers.    Additional needs identified to be addressed with provider   Pt reports he has been so tired and falls asleep every time he sits in his chair.  States his girlfriend said he has been tired and sleeping a lot since returning home since returning home from the hospital.  He is wondering if it could be one of his medications that could be causing it.  Also continues to have a cough with grayish color phlegm.  Reports shortness of breath feels a little better but had to increase oxygen to 2 L at rest to keep SpO2 at 90% or greater.  He continues to use the 6 L of oxygen when exerting himself.  Pt has an appt with pulmonology on Monday, 25.                    Method of communication with provider: chart routing.    Care Summary Note: Contacted pt for care transition follow up.  Portillo states he is doing okay.  Feels tired and fatigue.  States he always falls asleep when sitting in his chair.  States his girlfriend said he has been tired and sleeping a lot since returning home back in December.  He wonders if it could be his medication making him sleepy.  CTN will route message to PCP.  He reports shortness of breath is a little better but O2 sat had to be increased to 2 L at rest to maintain levels at 90% of greater.  He continues to use 6 L with exertion.  Cough and wheezing still present.  Reports cough is productive with grayish color phlegm.  Denies having any chest pain/discomfort, pressure, tightness.  Reports swelling in ankles.  Continues to keep legs elevated as much as he can.  Weight has decreased and currently 201.8 lbs.  He reports that several people from [facility] made visit.  He will be working with therapy and was given exercises to do.  We discussed s/s and when to contact

## 2025-01-24 NOTE — PROGRESS NOTES
[facility]    Patient: GABBI GRAVES, 84 y.o.   : 1940      Patient of Genia Bonilla, CNP     Assessment/Plan   1. Mucus plugging of bronchi    2. Mixed obstructive and restrictive ventilatory defect    3. Chronic respiratory failure with hypoxia    4. Decreased diffusion capacity of lung    5. Bronchiectasis without complication (HCC)    6. H/O pneumonia due to Pseudomonas       -I reviewed patient's recent HRCT personally and discussed results with patient and wife during visit.  Unfortunately patient was just treated for pneumonia secondary to COVID-19 approximately 1 month ago, changes from recent infection are likely noted in this imaging which was initially intended to evaluate for underlying cause of decreased diffusion capacity when the patient was feeling well.  Will repeat CT in 3 months to evaluate for continued left lower lobe consolidation with mucous plugging.  -Discussed the possibility of bronchoscopy with patient and he wishes to pursue conservative treatments prior to undergoing bronchoscopy including addition of DuoNebs and attempt to open the clear airways.  Patient declined CPT vest therapy stating that he did not have room in his home for the machine and vest.  Patient is already using Mucinex, encourage continuation of mucolytic therapy.  -Keep current supplemental oxygen orders in place until next follow-up visit  - CT CHEST WO CONTRAST; Future  -Consider bronchoscopy for mucous plugging if dense consolidation and mucous plugging is noted on repeat imaging.  -Patient will need 6-minute walk test at upcoming visit in 3 mo  -Reviewed preventative vaccinations      Advised patient to call office with any changes, questions, or concerns regarding respiratory status or issues with prescribed medications    Return in about 3 months (around 2025) for mucous plugging with repeat CT.     Subjective     Chief Complaint

## 2025-01-27 ENCOUNTER — OFFICE VISIT (OUTPATIENT)
Dept: PULMONOLOGY | Age: 85
End: 2025-01-27
Payer: MEDICARE

## 2025-01-27 ENCOUNTER — HOSPITAL ENCOUNTER (OUTPATIENT)
Age: 85
Discharge: HOME | End: 2025-01-27

## 2025-01-27 VITALS
SYSTOLIC BLOOD PRESSURE: 122 MMHG | BODY MASS INDEX: 32.96 KG/M2 | DIASTOLIC BLOOD PRESSURE: 64 MMHG | HEIGHT: 67 IN | WEIGHT: 210 LBS | TEMPERATURE: 98 F | OXYGEN SATURATION: 91 % | HEART RATE: 70 BPM

## 2025-01-27 DIAGNOSIS — R94.2 DECREASED DIFFUSION CAPACITY OF LUNG: ICD-10-CM

## 2025-01-27 DIAGNOSIS — J96.11 CHRONIC RESPIRATORY FAILURE WITH HYPOXIA: ICD-10-CM

## 2025-01-27 DIAGNOSIS — Z87.01 H/O PNEUMONIA DUE TO PSEUDOMONAS: ICD-10-CM

## 2025-01-27 DIAGNOSIS — T17.500A MUCUS PLUGGING OF BRONCHI: Primary | ICD-10-CM

## 2025-01-27 DIAGNOSIS — J47.9 BRONCHIECTASIS WITHOUT COMPLICATION (HCC): ICD-10-CM

## 2025-01-27 DIAGNOSIS — R94.2 MIXED OBSTRUCTIVE AND RESTRICTIVE VENTILATORY DEFECT: ICD-10-CM

## 2025-01-27 DIAGNOSIS — N18.4 CKD (CHRONIC KIDNEY DISEASE), STAGE IV (HCC): ICD-10-CM

## 2025-01-27 LAB
REASON FOR REJECTION: NORMAL
REJECTED TEST: NORMAL

## 2025-01-27 PROCEDURE — 99215 OFFICE O/P EST HI 40 MIN: CPT

## 2025-01-27 PROCEDURE — G8427 DOCREV CUR MEDS BY ELIG CLIN: HCPCS

## 2025-01-27 PROCEDURE — 3074F SYST BP LT 130 MM HG: CPT

## 2025-01-27 PROCEDURE — 1123F ACP DISCUSS/DSCN MKR DOCD: CPT

## 2025-01-27 PROCEDURE — 3078F DIAST BP <80 MM HG: CPT

## 2025-01-27 PROCEDURE — 1159F MED LIST DOCD IN RCRD: CPT

## 2025-01-27 PROCEDURE — 1036F TOBACCO NON-USER: CPT

## 2025-01-27 PROCEDURE — G8417 CALC BMI ABV UP PARAM F/U: HCPCS

## 2025-01-27 RX ORDER — IPRATROPIUM BROMIDE AND ALBUTEROL SULFATE 2.5; .5 MG/3ML; MG/3ML
1 SOLUTION RESPIRATORY (INHALATION) EVERY 6 HOURS PRN
Qty: 360 ML | Refills: 2 | Status: ON HOLD | OUTPATIENT
Start: 2025-01-27

## 2025-01-27 ASSESSMENT — ENCOUNTER SYMPTOMS
SHORTNESS OF BREATH: 1
SINUS PRESSURE: 0
CHEST TIGHTNESS: 0
SINUS PAIN: 0
COUGH: 1
WHEEZING: 0
RHINORRHEA: 0

## 2025-01-28 ENCOUNTER — CARE COORDINATION (OUTPATIENT)
Dept: CASE MANAGEMENT | Age: 85
End: 2025-01-28

## 2025-01-28 NOTE — CARE COORDINATION
Care Transitions Note    Final Call     Patient Current Location:  Home: [address]  [place] [address]    Care Transition Nurse contacted the patient by telephone. Verified name and  as identifiers.    Patient graduated from the Care Transitions program on 25.  Patient/family progressing towards self management. .      Advance Care Planning:   Does patient have an Advance Directive: reviewed during previous call, see note. .    Handoff:   Patient/family agreeable to ACM outreach.      Care Summary Note: Contacted pt for care transition follow up.  Portillo states he was doing a little better but then had a bad night last night because of his cough.  Reports he was up coughing a lot which made him so exhausted.  Cough has been productive with whitish color sputum.  Still having wheezing at times.  Reports he continues to use his oxygen at 2 L at rest and 6 L with exertion.  SpO2 running 90-92% with 2 L of oxygen.  Denies having any c/o chest pain/discomfort, dizziness/lightheadedness, fever, chills.  Reports he continues to have swelling in his ankles.  He is keeping his legs elevated as much as possible.  He continues to monitor his weight daily.  We discussed importance of monitoring weight and when to contact provider with a weight gain of 2-3 lbs overnight or 5 lbs within a week.  He verbalized understanding.  He confirmed he is taking the Bumex 2 mg-take 1 tablet by mouth 2 times daily.  Pt had an appt with pulmonology yesterday.  States his weight was not correct because he had all of his clothes on and a water bottle in his pocket.  Reports his weight this morning was 202.8 lbs.  Pt states home health nurse is d/t make a visit today.  Discussed s/s and when to contact providers.  He verbalized understanding.  Pt is agreeable to ACM referral.  No questions or needs at this time.  Final call from CTN.      Sent message to OLINDA Ortega Manager with ACM referral.      Assessments:  Care

## 2025-01-29 ENCOUNTER — APPOINTMENT (OUTPATIENT)
Dept: GENERAL RADIOLOGY | Age: 85
DRG: 871 | End: 2025-01-29
Payer: MEDICARE

## 2025-01-29 ENCOUNTER — HOSPITAL ENCOUNTER (INPATIENT)
Age: 85
DRG: 871 | End: 2025-01-29
Attending: EMERGENCY MEDICINE
Payer: MEDICARE

## 2025-01-29 DIAGNOSIS — R09.02 HYPOXEMIA: Primary | ICD-10-CM

## 2025-01-29 DIAGNOSIS — I50.32 CHRONIC DIASTOLIC CHF (CONGESTIVE HEART FAILURE), NYHA CLASS 2 (HCC): ICD-10-CM

## 2025-01-29 PROBLEM — A41.9 SEPSIS (HCC): Status: ACTIVE | Noted: 2025-01-29

## 2025-01-29 LAB
ALBUMIN SERPL BCG-MCNC: 3.6 G/DL (ref 3.5–5.1)
ALP SERPL-CCNC: 179 U/L (ref 38–126)
ALT SERPL W/O P-5'-P-CCNC: 33 U/L (ref 11–66)
ANION GAP SERPL CALC-SCNC: 22 MEQ/L (ref 8–16)
ANION GAP SERPL CALC-SCNC: 24 MEQ/L (ref 8–16)
ARTERIAL PATENCY WRIST A: POSITIVE
AST SERPL-CCNC: 35 U/L (ref 5–40)
BASE EXCESS BLDA CALC-SCNC: -5 MMOL/L (ref -2.5–2.5)
BASOPHILS ABSOLUTE: 0.1 THOU/MM3 (ref 0–0.1)
BASOPHILS NFR BLD AUTO: 0.4 %
BDY SITE: ABNORMAL
BILIRUB SERPL-MCNC: 0.8 MG/DL (ref 0.3–1.2)
BUN SERPL-MCNC: 34 MG/DL (ref 7–22)
BUN SERPL-MCNC: 35 MG/DL (ref 7–22)
CALCIUM SERPL-MCNC: 8.8 MG/DL (ref 8.5–10.5)
CALCIUM SERPL-MCNC: 8.9 MG/DL (ref 8.5–10.5)
CHLORIDE SERPL-SCNC: 97 MEQ/L (ref 98–111)
CHLORIDE SERPL-SCNC: 99 MEQ/L (ref 98–111)
CO2 SERPL-SCNC: 16 MEQ/L (ref 23–33)
CO2 SERPL-SCNC: 19 MEQ/L (ref 23–33)
COLLECTED BY:: ABNORMAL
CREAT SERPL-MCNC: 2.7 MG/DL (ref 0.4–1.2)
CREAT SERPL-MCNC: 2.8 MG/DL (ref 0.4–1.2)
DEPRECATED RDW RBC AUTO: 64.5 FL (ref 35–45)
DEVICE: ABNORMAL
EKG ATRIAL RATE: 357 BPM
EKG Q-T INTERVAL: 456 MS
EKG QRS DURATION: 142 MS
EKG QTC CALCULATION (BAZETT): 492 MS
EKG R AXIS: -123 DEGREES
EKG T AXIS: 52 DEGREES
EKG VENTRICULAR RATE: 70 BPM
EOSINOPHIL NFR BLD AUTO: 0.5 %
EOSINOPHILS ABSOLUTE: 0.1 THOU/MM3 (ref 0–0.4)
ERYTHROCYTE [DISTWIDTH] IN BLOOD BY AUTOMATED COUNT: 16.8 % (ref 11.5–14.5)
FIO2 ON VENT O2 ANALYZER: 50 %
FLUAV RNA RESP QL NAA+PROBE: NOT DETECTED
FLUBV RNA RESP QL NAA+PROBE: NOT DETECTED
GFR SERPL CREATININE-BSD FRML MDRD: 21 ML/MIN/1.73M2
GFR SERPL CREATININE-BSD FRML MDRD: 22 ML/MIN/1.73M2
GLUCOSE SERPL-MCNC: 139 MG/DL (ref 70–108)
GLUCOSE SERPL-MCNC: 148 MG/DL (ref 70–108)
HCO3 BLDA-SCNC: 18 MMOL/L (ref 23–28)
HCT VFR BLD AUTO: 45.8 % (ref 42–52)
HGB BLD-MCNC: 14.4 GM/DL (ref 14–18)
IMM GRANULOCYTES # BLD AUTO: 0.17 THOU/MM3 (ref 0–0.07)
IMM GRANULOCYTES NFR BLD AUTO: 1 %
LACTATE SERPL-SCNC: 11.2 MMOL/L (ref 0.5–2)
LYMPHOCYTES ABSOLUTE: 0.8 THOU/MM3 (ref 1–4.8)
LYMPHOCYTES NFR BLD AUTO: 5.1 %
MCH RBC QN AUTO: 32.8 PG (ref 26–33)
MCHC RBC AUTO-ENTMCNC: 31.4 GM/DL (ref 32.2–35.5)
MCV RBC AUTO: 104.3 FL (ref 80–94)
MONOCYTES ABSOLUTE: 0.2 THOU/MM3 (ref 0.4–1.3)
MONOCYTES NFR BLD AUTO: 1.1 %
NEUTROPHILS ABSOLUTE: 15.3 THOU/MM3 (ref 1.8–7.7)
NEUTROPHILS NFR BLD AUTO: 91.9 %
NRBC BLD AUTO-RTO: 0 /100 WBC
OSMOLALITY SERPL CALC.SUM OF ELEC: 283.7 MOSMOL/KG (ref 275–300)
OSMOLALITY SERPL CALC.SUM OF ELEC: 290.1 MOSMOL/KG (ref 275–300)
PCO2 BLDA: 29 MMHG (ref 35–45)
PEEP SETTING VENT: 6 MMHG
PH BLDA: 7.41 [PH] (ref 7.35–7.45)
PLATELET # BLD AUTO: 243 THOU/MM3 (ref 130–400)
PMV BLD AUTO: 10.8 FL (ref 9.4–12.4)
PO2 BLDA: 76 MMHG (ref 71–104)
POTASSIUM SERPL-SCNC: 4.7 MEQ/L (ref 3.5–5.2)
POTASSIUM SERPL-SCNC: 5.3 MEQ/L (ref 3.5–5.2)
PRESSURE SUPPORT SETTING VENT: 16 CMH2O
PROT SERPL-MCNC: 7.4 G/DL (ref 6.1–8)
RBC # BLD AUTO: 4.39 MILL/MM3 (ref 4.7–6.1)
SAO2 % BLDA: 96 %
SARS-COV-2 RNA RESP QL NAA+PROBE: NOT DETECTED
SODIUM SERPL-SCNC: 137 MEQ/L (ref 135–145)
SODIUM SERPL-SCNC: 140 MEQ/L (ref 135–145)
TROPONIN, HIGH SENSITIVITY: 124 NG/L (ref 0–12)
VENTILATION MODE VENT: ABNORMAL
WBC # BLD AUTO: 16.6 THOU/MM3 (ref 4.8–10.8)

## 2025-01-29 PROCEDURE — 80053 COMPREHEN METABOLIC PANEL: CPT

## 2025-01-29 PROCEDURE — 94761 N-INVAS EAR/PLS OXIMETRY MLT: CPT

## 2025-01-29 PROCEDURE — 5A0955A ASSISTANCE WITH RESPIRATORY VENTILATION, GREATER THAN 96 CONSECUTIVE HOURS, HIGH NASAL FLOW/VELOCITY: ICD-10-PCS | Performed by: EMERGENCY MEDICINE

## 2025-01-29 PROCEDURE — 87077 CULTURE AEROBIC IDENTIFY: CPT

## 2025-01-29 PROCEDURE — 93005 ELECTROCARDIOGRAM TRACING: CPT | Performed by: EMERGENCY MEDICINE

## 2025-01-29 PROCEDURE — 36415 COLL VENOUS BLD VENIPUNCTURE: CPT

## 2025-01-29 PROCEDURE — 84484 ASSAY OF TROPONIN QUANT: CPT

## 2025-01-29 PROCEDURE — 84132 ASSAY OF SERUM POTASSIUM: CPT

## 2025-01-29 PROCEDURE — 6360000002 HC RX W HCPCS: Performed by: EMERGENCY MEDICINE

## 2025-01-29 PROCEDURE — 5A09357 ASSISTANCE WITH RESPIRATORY VENTILATION, LESS THAN 24 CONSECUTIVE HOURS, CONTINUOUS POSITIVE AIRWAY PRESSURE: ICD-10-PCS | Performed by: EMERGENCY MEDICINE

## 2025-01-29 PROCEDURE — 99223 1ST HOSP IP/OBS HIGH 75: CPT

## 2025-01-29 PROCEDURE — 86140 C-REACTIVE PROTEIN: CPT

## 2025-01-29 PROCEDURE — 94660 CPAP INITIATION&MGMT: CPT

## 2025-01-29 PROCEDURE — 84145 PROCALCITONIN (PCT): CPT

## 2025-01-29 PROCEDURE — 87040 BLOOD CULTURE FOR BACTERIA: CPT

## 2025-01-29 PROCEDURE — 83605 ASSAY OF LACTIC ACID: CPT

## 2025-01-29 PROCEDURE — 96375 TX/PRO/DX INJ NEW DRUG ADDON: CPT

## 2025-01-29 PROCEDURE — 87636 SARSCOV2 & INF A&B AMP PRB: CPT

## 2025-01-29 PROCEDURE — 6370000000 HC RX 637 (ALT 250 FOR IP): Performed by: EMERGENCY MEDICINE

## 2025-01-29 PROCEDURE — 87186 SC STD MICRODIL/AGAR DIL: CPT

## 2025-01-29 PROCEDURE — 96365 THER/PROPH/DIAG IV INF INIT: CPT

## 2025-01-29 PROCEDURE — 83880 ASSAY OF NATRIURETIC PEPTIDE: CPT

## 2025-01-29 PROCEDURE — 82010 KETONE BODYS QUAN: CPT

## 2025-01-29 PROCEDURE — 94640 AIRWAY INHALATION TREATMENT: CPT

## 2025-01-29 PROCEDURE — 36600 WITHDRAWAL OF ARTERIAL BLOOD: CPT

## 2025-01-29 PROCEDURE — 85025 COMPLETE CBC W/AUTO DIFF WBC: CPT

## 2025-01-29 PROCEDURE — 87801 DETECT AGNT MULT DNA AMPLI: CPT

## 2025-01-29 PROCEDURE — 71045 X-RAY EXAM CHEST 1 VIEW: CPT

## 2025-01-29 PROCEDURE — 99285 EMERGENCY DEPT VISIT HI MDM: CPT

## 2025-01-29 PROCEDURE — 82803 BLOOD GASES ANY COMBINATION: CPT

## 2025-01-29 PROCEDURE — 2700000000 HC OXYGEN THERAPY PER DAY

## 2025-01-29 PROCEDURE — 2500000003 HC RX 250 WO HCPCS: Performed by: EMERGENCY MEDICINE

## 2025-01-29 PROCEDURE — 2140000000 HC CCU INTERMEDIATE R&B

## 2025-01-29 RX ORDER — LEVOFLOXACIN 5 MG/ML
500 INJECTION, SOLUTION INTRAVENOUS ONCE
Status: COMPLETED | OUTPATIENT
Start: 2025-01-29 | End: 2025-01-29

## 2025-01-29 RX ORDER — IPRATROPIUM BROMIDE AND ALBUTEROL SULFATE 2.5; .5 MG/3ML; MG/3ML
1 SOLUTION RESPIRATORY (INHALATION) ONCE
Status: COMPLETED | OUTPATIENT
Start: 2025-01-29 | End: 2025-01-29

## 2025-01-29 RX ADMIN — IPRATROPIUM BROMIDE AND ALBUTEROL SULFATE 1 DOSE: .5; 3 SOLUTION RESPIRATORY (INHALATION) at 20:55

## 2025-01-29 RX ADMIN — WATER 125 MG: 1 INJECTION INTRAMUSCULAR; INTRAVENOUS; SUBCUTANEOUS at 20:47

## 2025-01-29 RX ADMIN — LEVOFLOXACIN 500 MG: 500 INJECTION, SOLUTION INTRAVENOUS at 22:03

## 2025-01-30 ENCOUNTER — APPOINTMENT (OUTPATIENT)
Dept: CT IMAGING | Age: 85
DRG: 871 | End: 2025-01-30
Payer: MEDICARE

## 2025-01-30 ENCOUNTER — APPOINTMENT (OUTPATIENT)
Dept: ULTRASOUND IMAGING | Age: 85
DRG: 871 | End: 2025-01-30
Payer: MEDICARE

## 2025-01-30 PROBLEM — R09.02 HYPOXEMIA: Status: ACTIVE | Noted: 2025-01-30

## 2025-01-30 LAB
ANION GAP SERPL CALC-SCNC: 19 MEQ/L (ref 8–16)
B-OH-BUTYR SERPL-MSCNC: 5.02 MG/DL (ref 0.2–2.81)
BASOPHILS ABSOLUTE: 0.1 THOU/MM3 (ref 0–0.1)
BASOPHILS NFR BLD AUTO: 0.2 %
BUN SERPL-MCNC: 41 MG/DL (ref 7–22)
CA-I BLD ISE-SCNC: 1.04 MMOL/L (ref 1.12–1.32)
CALCIUM SERPL-MCNC: 7.8 MG/DL (ref 8.5–10.5)
CHLORIDE SERPL-SCNC: 96 MEQ/L (ref 98–111)
CO2 SERPL-SCNC: 20 MEQ/L (ref 23–33)
CREAT SERPL-MCNC: 3.4 MG/DL (ref 0.4–1.2)
CREAT UR-MCNC: 96.4 MG/DL
CRP SERPL-MCNC: 0.51 MG/DL (ref 0–1)
DEPRECATED RDW RBC AUTO: 64.3 FL (ref 35–45)
EKG ATRIAL RATE: 0 BPM
EKG Q-T INTERVAL: 482 MS
EKG QRS DURATION: 142 MS
EKG QTC CALCULATION (BAZETT): 520 MS
EKG R AXIS: -69 DEGREES
EKG T AXIS: 83 DEGREES
EKG VENTRICULAR RATE: 70 BPM
EOSINOPHIL NFR BLD AUTO: 0 %
EOSINOPHILS ABSOLUTE: 0 THOU/MM3 (ref 0–0.4)
ERYTHROCYTE [DISTWIDTH] IN BLOOD BY AUTOMATED COUNT: 16.8 % (ref 11.5–14.5)
GFR SERPL CREATININE-BSD FRML MDRD: 17 ML/MIN/1.73M2
GLUCOSE BLD STRIP.AUTO-MCNC: 236 MG/DL (ref 70–108)
GLUCOSE BLD STRIP.AUTO-MCNC: 258 MG/DL (ref 70–108)
GLUCOSE BLD STRIP.AUTO-MCNC: 347 MG/DL (ref 70–108)
GLUCOSE BLD STRIP.AUTO-MCNC: 352 MG/DL (ref 70–108)
GLUCOSE SERPL-MCNC: 374 MG/DL (ref 70–108)
HCT VFR BLD AUTO: 34.2 % (ref 42–52)
HGB BLD-MCNC: 10.8 GM/DL (ref 14–18)
IMM GRANULOCYTES # BLD AUTO: 0.54 THOU/MM3 (ref 0–0.07)
IMM GRANULOCYTES NFR BLD AUTO: 1.8 %
INR PPP: 3.49 (ref 0.85–1.13)
LACTATE SERPL-SCNC: 3.9 MMOL/L (ref 0.5–2)
LYMPHOCYTES ABSOLUTE: 0.6 THOU/MM3 (ref 1–4.8)
LYMPHOCYTES NFR BLD AUTO: 2 %
MAGNESIUM SERPL-MCNC: 2 MG/DL (ref 1.6–2.4)
MCH RBC QN AUTO: 33 PG (ref 26–33)
MCHC RBC AUTO-ENTMCNC: 31.6 GM/DL (ref 32.2–35.5)
MCV RBC AUTO: 104.6 FL (ref 80–94)
MONOCYTES ABSOLUTE: 0.7 THOU/MM3 (ref 0.4–1.3)
MONOCYTES NFR BLD AUTO: 2.2 %
NEUTROPHILS ABSOLUTE: 27.9 THOU/MM3 (ref 1.8–7.7)
NEUTROPHILS NFR BLD AUTO: 93.8 %
NRBC BLD AUTO-RTO: 0 /100 WBC
NT-PROBNP SERPL IA-MCNC: 9438 PG/ML (ref 0–449)
OSMOLALITY SERPL CALC.SUM OF ELEC: 295.5 MOSMOL/KG (ref 275–300)
PHOSPHATE SERPL-MCNC: 3.3 MG/DL (ref 2.4–4.7)
PLATELET # BLD AUTO: 159 THOU/MM3 (ref 130–400)
PMV BLD AUTO: 11.7 FL (ref 9.4–12.4)
POTASSIUM SERPL-SCNC: 4.3 MEQ/L (ref 3.5–5.2)
POTASSIUM SERPL-SCNC: 4.8 MEQ/L (ref 3.5–5.2)
POTASSIUM SERPL-SCNC: 5.7 MEQ/L (ref 3.5–5.2)
POTASSIUM UR-SCNC: 58.6 MEQ/L
PROCALCITONIN SERPL IA-MCNC: 8.27 NG/ML (ref 0.01–0.09)
RBC # BLD AUTO: 3.27 MILL/MM3 (ref 4.7–6.1)
REASON FOR REJECTION: NORMAL
REJECTED TEST: NORMAL
SCAN OF BLOOD SMEAR: NORMAL
SODIUM SERPL-SCNC: 135 MEQ/L (ref 135–145)
SODIUM UR-SCNC: 23 MEQ/L
TROPONIN, HIGH SENSITIVITY: 143 NG/L (ref 0–12)
WBC # BLD AUTO: 29.7 THOU/MM3 (ref 4.8–10.8)

## 2025-01-30 PROCEDURE — 87150 DNA/RNA AMPLIFIED PROBE: CPT

## 2025-01-30 PROCEDURE — 2580000003 HC RX 258

## 2025-01-30 PROCEDURE — 99232 SBSQ HOSP IP/OBS MODERATE 35: CPT | Performed by: HOSPITALIST

## 2025-01-30 PROCEDURE — 85610 PROTHROMBIN TIME: CPT

## 2025-01-30 PROCEDURE — 6360000002 HC RX W HCPCS

## 2025-01-30 PROCEDURE — 36415 COLL VENOUS BLD VENIPUNCTURE: CPT

## 2025-01-30 PROCEDURE — 93005 ELECTROCARDIOGRAM TRACING: CPT

## 2025-01-30 PROCEDURE — 84300 ASSAY OF URINE SODIUM: CPT

## 2025-01-30 PROCEDURE — 82570 ASSAY OF URINE CREATININE: CPT

## 2025-01-30 PROCEDURE — 87581 M.PNEUMON DNA AMP PROBE: CPT

## 2025-01-30 PROCEDURE — 87070 CULTURE OTHR SPECIMN AEROBIC: CPT

## 2025-01-30 PROCEDURE — 94761 N-INVAS EAR/PLS OXIMETRY MLT: CPT

## 2025-01-30 PROCEDURE — 2500000003 HC RX 250 WO HCPCS

## 2025-01-30 PROCEDURE — 87631 RESP VIRUS 3-5 TARGETS: CPT

## 2025-01-30 PROCEDURE — 82948 REAGENT STRIP/BLOOD GLUCOSE: CPT

## 2025-01-30 PROCEDURE — 6370000000 HC RX 637 (ALT 250 FOR IP)

## 2025-01-30 PROCEDURE — 84133 ASSAY OF URINE POTASSIUM: CPT

## 2025-01-30 PROCEDURE — 94669 MECHANICAL CHEST WALL OSCILL: CPT

## 2025-01-30 PROCEDURE — 87541 LEGION PNEUMO DNA AMP PROB: CPT

## 2025-01-30 PROCEDURE — 87798 DETECT AGENT NOS DNA AMP: CPT

## 2025-01-30 PROCEDURE — 87205 SMEAR GRAM STAIN: CPT

## 2025-01-30 PROCEDURE — 87899 AGENT NOS ASSAY W/OPTIC: CPT

## 2025-01-30 PROCEDURE — 94640 AIRWAY INHALATION TREATMENT: CPT

## 2025-01-30 PROCEDURE — 80048 BASIC METABOLIC PNL TOTAL CA: CPT

## 2025-01-30 PROCEDURE — 87486 CHLMYD PNEUM DNA AMP PROBE: CPT

## 2025-01-30 PROCEDURE — 71250 CT THORAX DX C-: CPT

## 2025-01-30 PROCEDURE — 85025 COMPLETE CBC W/AUTO DIFF WBC: CPT

## 2025-01-30 PROCEDURE — 51798 US URINE CAPACITY MEASURE: CPT

## 2025-01-30 PROCEDURE — 84132 ASSAY OF SERUM POTASSIUM: CPT

## 2025-01-30 PROCEDURE — P9047 ALBUMIN (HUMAN), 25%, 50ML: HCPCS | Performed by: INTERNAL MEDICINE

## 2025-01-30 PROCEDURE — 2580000003 HC RX 258: Performed by: HOSPITALIST

## 2025-01-30 PROCEDURE — 2140000000 HC CCU INTERMEDIATE R&B

## 2025-01-30 PROCEDURE — 87449 NOS EACH ORGANISM AG IA: CPT

## 2025-01-30 PROCEDURE — 6360000002 HC RX W HCPCS: Performed by: INTERNAL MEDICINE

## 2025-01-30 PROCEDURE — 83735 ASSAY OF MAGNESIUM: CPT

## 2025-01-30 PROCEDURE — 82330 ASSAY OF CALCIUM: CPT

## 2025-01-30 PROCEDURE — 99222 1ST HOSP IP/OBS MODERATE 55: CPT | Performed by: INTERNAL MEDICINE

## 2025-01-30 PROCEDURE — 2700000000 HC OXYGEN THERAPY PER DAY

## 2025-01-30 PROCEDURE — 6360000002 HC RX W HCPCS: Performed by: HOSPITALIST

## 2025-01-30 PROCEDURE — 76770 US EXAM ABDO BACK WALL COMP: CPT

## 2025-01-30 PROCEDURE — 84100 ASSAY OF PHOSPHORUS: CPT

## 2025-01-30 PROCEDURE — 6370000000 HC RX 637 (ALT 250 FOR IP): Performed by: HOSPITALIST

## 2025-01-30 RX ORDER — CALCIUM GLUCONATE 20 MG/ML
1000 INJECTION, SOLUTION INTRAVENOUS ONCE
Status: COMPLETED | OUTPATIENT
Start: 2025-01-30 | End: 2025-01-30

## 2025-01-30 RX ORDER — GLUCAGON 1 MG/ML
1 KIT INJECTION PRN
Status: ACTIVE | OUTPATIENT
Start: 2025-01-30

## 2025-01-30 RX ORDER — METOPROLOL SUCCINATE 50 MG/1
100 TABLET, EXTENDED RELEASE ORAL DAILY
Status: DISPENSED | OUTPATIENT
Start: 2025-01-30

## 2025-01-30 RX ORDER — ONDANSETRON 4 MG/1
4 TABLET, ORALLY DISINTEGRATING ORAL EVERY 8 HOURS PRN
Status: DISCONTINUED | OUTPATIENT
Start: 2025-01-30 | End: 2025-01-30

## 2025-01-30 RX ORDER — SODIUM CHLORIDE 0.9 % (FLUSH) 0.9 %
5-40 SYRINGE (ML) INJECTION PRN
Status: ACTIVE | OUTPATIENT
Start: 2025-01-30

## 2025-01-30 RX ORDER — ACETAMINOPHEN 325 MG/1
650 TABLET ORAL EVERY 6 HOURS PRN
Status: ACTIVE | OUTPATIENT
Start: 2025-01-30

## 2025-01-30 RX ORDER — INSULIN GLARGINE 100 [IU]/ML
12 INJECTION, SOLUTION SUBCUTANEOUS EVERY MORNING
Status: DISPENSED | OUTPATIENT
Start: 2025-01-30

## 2025-01-30 RX ORDER — POLYETHYLENE GLYCOL 3350 17 G/17G
17 POWDER, FOR SOLUTION ORAL DAILY PRN
Status: DISPENSED | OUTPATIENT
Start: 2025-01-30

## 2025-01-30 RX ORDER — LOSARTAN POTASSIUM 25 MG/1
25 TABLET ORAL DAILY
Status: ACTIVE | OUTPATIENT
Start: 2025-01-30

## 2025-01-30 RX ORDER — BUDESONIDE 0.5 MG/2ML
500 INHALANT ORAL 2 TIMES DAILY
Status: ACTIVE | OUTPATIENT
Start: 2025-01-30

## 2025-01-30 RX ORDER — BUMETANIDE 1 MG/1
2 TABLET ORAL 2 TIMES DAILY
Status: DISCONTINUED | OUTPATIENT
Start: 2025-01-30 | End: 2025-02-02

## 2025-01-30 RX ORDER — ONDANSETRON 2 MG/ML
4 INJECTION INTRAMUSCULAR; INTRAVENOUS EVERY 6 HOURS PRN
Status: DISCONTINUED | OUTPATIENT
Start: 2025-01-30 | End: 2025-01-30

## 2025-01-30 RX ORDER — SODIUM CHLORIDE 0.9 % (FLUSH) 0.9 %
5-40 SYRINGE (ML) INJECTION EVERY 12 HOURS SCHEDULED
Status: ACTIVE | OUTPATIENT
Start: 2025-01-30

## 2025-01-30 RX ORDER — DEXTROSE MONOHYDRATE 100 MG/ML
INJECTION, SOLUTION INTRAVENOUS CONTINUOUS PRN
Status: ACTIVE | OUTPATIENT
Start: 2025-01-30

## 2025-01-30 RX ORDER — CALCITRIOL 0.25 UG/1
0.25 CAPSULE, LIQUID FILLED ORAL DAILY
Status: DISPENSED | OUTPATIENT
Start: 2025-01-30

## 2025-01-30 RX ORDER — GUAIFENESIN 600 MG/1
600 TABLET, EXTENDED RELEASE ORAL 2 TIMES DAILY
Status: DISPENSED | OUTPATIENT
Start: 2025-01-30

## 2025-01-30 RX ORDER — INSULIN LISPRO 100 [IU]/ML
0-8 INJECTION, SOLUTION INTRAVENOUS; SUBCUTANEOUS
Status: DISPENSED | OUTPATIENT
Start: 2025-01-30

## 2025-01-30 RX ORDER — ALBUMIN (HUMAN) 12.5 G/50ML
25 SOLUTION INTRAVENOUS ONCE
Status: COMPLETED | OUTPATIENT
Start: 2025-01-30 | End: 2025-01-30

## 2025-01-30 RX ORDER — ACETAMINOPHEN 650 MG/1
650 SUPPOSITORY RECTAL EVERY 6 HOURS PRN
Status: ACTIVE | OUTPATIENT
Start: 2025-01-30

## 2025-01-30 RX ORDER — ATORVASTATIN CALCIUM 80 MG/1
80 TABLET, FILM COATED ORAL NIGHTLY
Status: DISPENSED | OUTPATIENT
Start: 2025-01-30

## 2025-01-30 RX ORDER — SODIUM CHLORIDE 9 MG/ML
INJECTION, SOLUTION INTRAVENOUS PRN
Status: ACTIVE | OUTPATIENT
Start: 2025-01-30

## 2025-01-30 RX ORDER — BUMETANIDE 0.25 MG/ML
2 INJECTION, SOLUTION INTRAMUSCULAR; INTRAVENOUS ONCE
Status: DISPENSED | OUTPATIENT
Start: 2025-01-30

## 2025-01-30 RX ORDER — PROCHLORPERAZINE EDISYLATE 5 MG/ML
10 INJECTION INTRAMUSCULAR; INTRAVENOUS EVERY 6 HOURS PRN
Status: ACTIVE | OUTPATIENT
Start: 2025-01-30

## 2025-01-30 RX ADMIN — SODIUM CHLORIDE, PRESERVATIVE FREE 10 ML: 5 INJECTION INTRAVENOUS at 20:09

## 2025-01-30 RX ADMIN — DEXTROSE MONOHYDRATE 250 ML: 100 INJECTION, SOLUTION INTRAVENOUS at 11:52

## 2025-01-30 RX ADMIN — ATORVASTATIN CALCIUM 80 MG: 80 TABLET, FILM COATED ORAL at 20:09

## 2025-01-30 RX ADMIN — SODIUM CHLORIDE, PRESERVATIVE FREE 10 ML: 5 INJECTION INTRAVENOUS at 09:14

## 2025-01-30 RX ADMIN — ALBUMIN (HUMAN) 25 G: 0.25 INJECTION, SOLUTION INTRAVENOUS at 13:36

## 2025-01-30 RX ADMIN — CALCIUM GLUCONATE 1000 MG: 20 INJECTION, SOLUTION INTRAVENOUS at 12:16

## 2025-01-30 RX ADMIN — GUAIFENESIN 600 MG: 600 TABLET ORAL at 09:12

## 2025-01-30 RX ADMIN — WATER 2000 MG: 1 INJECTION INTRAMUSCULAR; INTRAVENOUS; SUBCUTANEOUS at 01:53

## 2025-01-30 RX ADMIN — INSULIN GLARGINE 12 UNITS: 100 INJECTION, SOLUTION SUBCUTANEOUS at 11:00

## 2025-01-30 RX ADMIN — DOXYCYCLINE 100 MG: 100 INJECTION, POWDER, LYOPHILIZED, FOR SOLUTION INTRAVENOUS at 02:13

## 2025-01-30 RX ADMIN — SODIUM ZIRCONIUM CYCLOSILICATE 5 G: 5 POWDER, FOR SUSPENSION ORAL at 11:04

## 2025-01-30 RX ADMIN — SODIUM ZIRCONIUM CYCLOSILICATE 5 G: 5 POWDER, FOR SUSPENSION ORAL at 20:09

## 2025-01-30 RX ADMIN — INSULIN HUMAN 10 UNITS: 100 INJECTION, SOLUTION PARENTERAL at 11:47

## 2025-01-30 RX ADMIN — CEFEPIME 1000 MG: 1 INJECTION, POWDER, FOR SOLUTION INTRAMUSCULAR; INTRAVENOUS at 16:06

## 2025-01-30 RX ADMIN — CALCITRIOL CAPSULES 0.25 MCG 0.25 MCG: 0.25 CAPSULE ORAL at 09:12

## 2025-01-30 RX ADMIN — GUAIFENESIN 600 MG: 600 TABLET ORAL at 20:09

## 2025-01-30 RX ADMIN — BUDESONIDE 500 MCG: 0.5 INHALANT RESPIRATORY (INHALATION) at 20:34

## 2025-01-30 RX ADMIN — SODIUM ZIRCONIUM CYCLOSILICATE 5 G: 5 POWDER, FOR SUSPENSION ORAL at 15:51

## 2025-01-30 RX ADMIN — DOXYCYCLINE 100 MG: 100 INJECTION, POWDER, LYOPHILIZED, FOR SOLUTION INTRAVENOUS at 14:26

## 2025-01-30 RX ADMIN — INSULIN LISPRO 4 UNITS: 100 INJECTION, SOLUTION INTRAVENOUS; SUBCUTANEOUS at 20:22

## 2025-01-30 RX ADMIN — BUDESONIDE 500 MCG: 0.5 INHALANT RESPIRATORY (INHALATION) at 08:40

## 2025-01-30 ASSESSMENT — PAIN - FUNCTIONAL ASSESSMENT
PAIN_FUNCTIONAL_ASSESSMENT: NONE - DENIES PAIN
PAIN_FUNCTIONAL_ASSESSMENT: NONE - DENIES PAIN

## 2025-01-30 NOTE — H&P
Hospitalist History & Physical         Patient: Portillo Daly 84 y.o. male      : 1940  Date of Admission: 2025  Date of Service: Pt seen/examined on 25 and Admitted to Inpatient with expected LOS greater than two midnights due to medical therapy.         ASSESSMENT AND PLAN    Sepsis likely 2/2 CAP with COPD exacerbation, POA: WBC 16.6, T 100.2, Procal 8.27, 2/4 SIRS criteria met. Already received 1 L NS in ED. Will give an extra 1 L NS bolus.  Start on Cefepime and Doxy due to sepsis and stronger Pseudomonas coverage due to previous cultures positive for pseudomonas in the past.  Pneumonia panel pending and MRSA pending.  Consider further IVF pending clinical course.  Good pulmonary hygiene   Inflammatory markers pending    Lactic acidosis: Lactic acid 11.2? Repeat pending. Will consider stat CT abdomen if remains elevated. Will need to consider cardiac cause as well and acute hypoxia as cause.   Repeat lactic acid only 3.2, no need for further testing at this time. Will continue with current treatment and assess as needed.    Acute on chronic respiratory failure with hypoxia: Continue HHF at this time, titrate oxygen as able to 90-92%.  Continuous pulse ox  Consider ABG pending clinical course    HAGMA: AG 22, HCO3 16 on BMP and 18 on ABG. Will consider an amp of HCO3. Consider HCO3 drip pending clinical course.    Hyperkalemia: K 5.3 on admission. Trend for now. Consider Lokelma if continues to increase.     CODE STATUS: Patient was alert and oriented x4 and accompanied by POA at bedside. Patient wishes to be a FULL CODE at this time. He is ok with being intubated if needed.     Query hypotension: noted several readings of hypotension on monitor in ED. Upon changing arms with the automatic BP machine it was noted bp was sporatic and not reliable. Nursing obtained a manual bp and was WNL. Continue to monitor for now. Patient states that since his new pacer placement in  that \"they    Procedure Laterality Date    AORTIC VALVE REPLACEMENT      CARDIAC SURGERY      heart cath    CORONARY ANGIOPLASTY WITH STENT PLACEMENT      EP DEVICE PROCEDURE N/A 12/2/2024    Remove & replace PPM gen dual lead performed by Dane Garcia MD at [facility] CARDIAC CATH LAB    HERNIA REPAIR      OTHER SURGICAL HISTORY  05/10/2018    PACEMAKER INSERTION           Family history unknown: Yes     Social History     Socioeconomic History    Marital status:      Spouse name: None    Number of children: None    Years of education: None    Highest education level: None   Tobacco Use    Smoking status: Former     Current packs/day: 1.00     Average packs/day: 1 pack/day for 50.0 years (50.0 ttl pk-yrs)     Types: Cigarettes    Smokeless tobacco: Never    Tobacco comments:     quit 20 years ago   Vaping Use    Vaping status: Never Used   Substance and Sexual Activity    Alcohol use: Not Currently     Comment: rarely    Drug use: No    Sexual activity: Yes     Partners: Female     Social Determinants of Health     Financial Resource Strain: Low Risk  (11/5/2024)    Overall Financial Resource Strain (CARDIA)     Difficulty of Paying Living Expenses: Not hard at all   Food Insecurity: No Food Insecurity (1/9/2025)    Hunger Vital Sign     Worried About Running Out of Food in the Last Year: Never true     Ran Out of Food in the Last Year: Never true   Transportation Needs: No Transportation Needs (1/9/2025)    PRAPARE - Transportation     Lack of Transportation (Medical): No     Lack of Transportation (Non-Medical): No   Physical Activity: Insufficiently Active (11/5/2024)    Exercise Vital Sign     Days of Exercise per Week: 1 day     Minutes of Exercise per Session: 10 min   Stress: No Stress Concern Present (5/13/2023)    [employer] - Occupational Stress Questionnaire     Feeling of Stress : Not at all   Social Connections: Moderately Isolated (5/13/2023)    Social Connection and

## 2025-01-30 NOTE — ED NOTES
ED to inpatient nurses report      Chief Complaint:  Chief Complaint   Patient presents with    Shortness of Breath     Present to ED from: home    MOA:     LOC: alert and orientated to name, place, date  Mobility: Requires assistance * 1  Oxygen Baseline:     Current needs required: high flow      Code Status:   Prior    What abnormal results were found and what did you give/do to treat them? See chart   Any procedures or intervention occur? See chart     Mental Status:       Psych Assessment:        Vitals:  Patient Vitals for the past 24 hrs:   BP Temp Pulse Resp SpO2   01/29/25 2204 -- -- -- 17 100 %   01/29/25 2203 -- -- 70 17 100 %   01/29/25 2202 (!) 96/45 -- 93 15 100 %   01/29/25 2151 -- -- 70 17 100 %   01/29/25 2033 (!) 146/94 -- -- -- --   01/29/25 2031 -- 100.2 °F (37.9 °C) 69 (!) 32 100 %   01/29/25 2029 -- -- -- -- (!) 78 %   01/29/25 2028 -- -- 70 -- --        LDAs:   Peripheral IV 01/29/25 Right Antecubital (Active)   Site Assessment Clean, dry & intact 01/29/25 2030       Ambulatory Status:  Presents to emergency department  because of falls (Syncope, seizure, or loss of consciousness): No, Age > 70: Yes, Altered Mental Status, Intoxication with alcohol or substance confusion (Disorientation, impaired judgment, poor safety awaremess, or inability to follow instructions): Yes, Impaired Mobility: Ambulates or transfers with assistive devices or assistance; Unable to ambulate or transer.: Yes    Diagnosis:  DISPOSITION Decision To Admit 01/29/2025 10:04:08 PM   Final diagnoses:   Hypoxemia        Consults:  None     Pain Score:       C-SSRS:        Sepsis Screening:       [place]ressa Fall Risk:  [place] 1 Fall Risk  Presents to emergency department  because of falls (Syncope, seizure, or loss of consciousness): No  Age > 70: Yes  Altered Mental Status, Intoxication with alcohol or substance confusion (Disorientation, impaired judgment, poor safety awaremess, or inability to follow instructions):

## 2025-01-30 NOTE — PROGRESS NOTES
Hospitalist Progress Note    Patient:  Portillo BONNER Pulfer      Unit/Bed:3B-38/038-A    YOB: 1940    MRN: [id number]       Acct: [id number]     PCP: Elinor Ly APRN - CNP    Date of Admission: 1/29/2025    Assessment/Plan:    Acute hypoxemic respiratory failure: Secondary to pneumonia and COPD exacerbation, currently on heated high flow, continue to wean now down to 40% 50 L.  Encourage pulmonary toiletry.  Sepsis secondary to pneumonia  Right lower lobe consolidation: Continue with IV cefepime and doxycycline.  Cultures pending.  Procal and lactate elevated, will obtain non contrast CT chest   Acute renal failure: Appreciate nephrology input, creatinine and potassium up today.  Renal ultrasound ordered and reviewed,  Hyperkalemia: Repeat potassium 5.7.  Insulin/dextrose/Lokelma ordered will repeat potassium this afternoon.  COPD exacerbation: Patient received Solu-Medrol in the ED will start patient on prednisone 40 mg daily.  Acute on chronic systolic heart failure: Last known EF 45 to 50%.  IV Bumex given by nephrology, hold daily oral diuretics monitor renal function.  Intake/output.  Fluid restriction.  Insulin-dependent diabetes: Resume Lantus and the high intensity sliding scale anticipate hyperglycemia with the use of steroids.  Paroxysmal A-fib: Status post pacemaker, continue with metoprolol and Coumadin.  Hypotension: Blood pressure on the low end of normal, albumin is being given.  May need to hold metoprolol.  Advance care planning: Full code        Subjective (past 24 hours):   Patient seen and examined at bedside, states he is feeling slightly better remains on high flow.  Wife is at bedside.  Patient reports cough just started today but had been feeling short of breath for the last few days.      Medications:  Reviewed    Infusion Medications    sodium chloride      dextrose       Scheduled Medications    sodium chloride flush  5-40 mL IntraVENous 2 times per day

## 2025-01-30 NOTE — ED NOTES
RN to room to round on patient continues to tolerate high flow  Brenden NP at bedside, this RN updated Brenden, NP about BP issues with left, NP at bedside to discuss with family and pt about findings, results and poc at this point  Questions answered, pt resting in bed  Patient resting in bed. Respirations easy and unlabored. No distress noted. Call light within reach.

## 2025-01-30 NOTE — ED NOTES
RN to room to round on pt  BP 95/68   Patient resting in bed. Respirations easy and unlabored. No distress noted. Call light within reach.  Pt continues to tolerate high flow

## 2025-01-30 NOTE — ED NOTES
RN to room to round on pt, marissa BP taken at this time right arm 118/85   Unable to get a good BP in left arm, pt states he has had trouble getting BP in that arm ever since his pacemaker was placed

## 2025-01-30 NOTE — CONSULTS
[facility]    [address], [place] [address]  [phone]     Hospital Consult  1/30/2025 11:24 AM    Pt Name:    Portillo Daly  MRN:     [id number]   [id number]  YOB: 1940  Admit Date:    1/29/2025  8:08 PM  Primary Care Physician:  Elinor Ly APRN - CNP        Reason for Consult:  Ckd IV, hyperkalemia    History:   The patient is a 84 y.o. male seen in renal consult for CKD IV, hyperkalemia. He has a hx of COPD on Home O2, HFmrEF (45-50%), DM, Afib on Coumadin, hx DVA, CADCABG, hx TAVR, and as below. He presented to the hospital with shortness of breath and chills. He had low grade fever 100.2.  was hypoxic at 78% and placed on NRB in the ED then switched to HFNC.  Chest Xray showed increased left retrocardiac opacity possible atelectasis or consolidation.  He was given a dose of solumedrol and started on antibiotics for pneumonia.  Creatinine yesterday was 2.8, today brent to 3.4 with K of 5.7.  he does not feel like he is urinating much. Had some soft bps.  Is swollen. WBC also increased to 29.7 from 16.6 last night. COVID and flu were negative.    Past Medical History:  Past Medical History:   Diagnosis Date    MILLY (acute kidney injury) ([facility])     Atrial fibrillation ([facility])     Cerebral artery occlusion with cerebral infarction ([facility])     CHF (congestive heart failure), NYHA class III, acute on chronic, combined ([facility])     COPD (chronic obstructive pulmonary disease) ([facility]) 8/3/2020    Coronary artery disease involving native coronary artery of native heart with angina pectoris ([facility])     Diabetes mellitus, type 2 ([facility]) 12/30/2020    Hyperlipidemia 2/20/2012    Hypertension     Pneumonia        Past Surgical History:  Past Surgical History:   Procedure Laterality Date    AORTIC VALVE REPLACEMENT      CARDIAC SURGERY      heart cath    CORONARY ANGIOPLASTY WITH STENT PLACEMENT      EP DEVICE PROCEDURE N/A 12/2/2024    Remove & replace PPM gen dual lead performed  by Dane Garcia MD at [facility] CARDIAC CATH LAB    HERNIA REPAIR      OTHER SURGICAL HISTORY  05/10/2018    PACEMAKER INSERTION         Family History:  Family History   Family history unknown: Yes       Social History:  Social History     Socioeconomic History    Marital status:      Spouse name: Not on file    Number of children: Not on file    Years of education: Not on file    Highest education level: Not on file   Occupational History    Not on file   Tobacco Use    Smoking status: Former     Current packs/day: 1.00     Average packs/day: 1 pack/day for 50.0 years (50.0 ttl pk-yrs)     Types: Cigarettes    Smokeless tobacco: Never    Tobacco comments:     quit 20 years ago   Vaping Use    Vaping status: Never Used   Substance and Sexual Activity    Alcohol use: Not Currently     Comment: rarely    Drug use: No    Sexual activity: Yes     Partners: Female   Other Topics Concern    Not on file   Social History Narrative    Not on file     Social Determinants of Health     Financial Resource Strain: Low Risk  (11/5/2024)    Overall Financial Resource Strain (CARDIA)     Difficulty of Paying Living Expenses: Not hard at all   Food Insecurity: No Food Insecurity (1/9/2025)    Hunger Vital Sign     Worried About Running Out of Food in the Last Year: Never true     Ran Out of Food in the Last Year: Never true   Transportation Needs: No Transportation Needs (1/9/2025)    PRAPARE - Transportation     Lack of Transportation (Medical): No     Lack of Transportation (Non-Medical): No   Physical Activity: Insufficiently Active (11/5/2024)    Exercise Vital Sign     Days of Exercise per Week: 1 day     Minutes of Exercise per Session: 10 min   Stress: No Stress Concern Present (5/13/2023)    [employer] - Occupational Stress Questionnaire     Feeling of Stress : Not at all   Social Connections: Moderately Isolated (5/13/2023)    Social Connection and Isolation Panel [NHANES]

## 2025-01-30 NOTE — ED NOTES
Pt arrives to ED from home with c/o sob  Pt states he was seen recently and diagnosed with pneumonia and covid  States he was at home and got very shaky so they called 911  Pt arrives 78% on 6 LNC, pt wears 4-6 LNC at home all the time   Pt placed on 15  NRB spo2 94%^

## 2025-01-30 NOTE — ED NOTES
Patient resting in bed. Respirations easy and unlabored. No distress noted. Call light within reach. Jello provided at this time.

## 2025-01-30 NOTE — ED NOTES
RN to room to round on pt  Pt medicated per mar  BP noted, will update provider  Patient resting in bed. Respirations easy and unlabored. No distress noted. Call light within reach.  No distress noted, pt is tolerating high flow well  No new concerns at this time

## 2025-01-30 NOTE — PROGRESS NOTES
Warfarin Pharmacy Consult Note    Portillo Daly is a 84 y.o. male for whom pharmacy has been asked to manage warfarin therapy.     Consulting Provider: Richard Mo CNP  Indication: Atrial fibrillation/Atrial flutter  Target INR: 2.0-3.0   Warfarin dose prior to admission: Coumadin 2.5 mg MF, 1.25 mg TWThSS   Outpatient warfarin provider: [facility] Medication Management     Recent Labs     01/29/25 2031 01/30/25  0747   HGB 14.4 10.8*    159     Recent Labs     01/30/25  0746   INR 3.49*     Concurrent anticoagulants/antiplatelets: none  Significant warfarin drug-drug interactions: doxycycline IV, melatonin (HM)    Date INR Warfarin Dose   1/30/2025 3.49 No Coumadin                                   INR will be monitored routinely until therapeutic INR is achieved.    Pharmacy will continue to follow. Thank you for the consult,   Symone Malagon, PharmD 1/30/2025 8:22 AM

## 2025-01-30 NOTE — ED NOTES
Lab called this RN at this time with critical lab results, creat 3.4. Dr. Nieto notified. No new orders at this time

## 2025-01-31 LAB
ACB COMPLEX DNA BLD POS QL NAA+NON-PROBE: NOT DETECTED
ACINETOBACTER CALCOACETICUS-BAUMANNII BY PCR: NOT DETECTED
ANION GAP SERPL CALC-SCNC: 16 MEQ/L (ref 8–16)
B FRAGILIS DNA BLD POS QL NAA+NON-PROBE: NOT DETECTED
BASOPHILS ABSOLUTE: 0.1 THOU/MM3 (ref 0–0.1)
BASOPHILS NFR BLD AUTO: 0.2 %
BLACTX-M ISLT/SPM QL: NOT DETECTED
BLACTX-M ISLT/SPM QL: NOT DETECTED
BLAIMP ISLT/SPM QL: NOT DETECTED
BLAIMP ISLT/SPM QL: NOT DETECTED
BLAKPC ISLT/SPM QL: NOT DETECTED
BLAKPC ISLT/SPM QL: NOT DETECTED
BLAOXA-48-LIKE ISLT/SPM QL: ABNORMAL
BLAOXA-48-LIKE ISLT/SPM QL: ABNORMAL
BLAVIM ISLT/SPM QL: NOT DETECTED
BLAVIM ISLT/SPM QL: NOT DETECTED
BOTTLE TYPE: ABNORMAL
BUN SERPL-MCNC: 51 MG/DL (ref 7–22)
BURR CELLS BLD QL SMEAR: ABNORMAL
C ALBICANS DNA BLD POS QL NAA+NON-PROBE: NOT DETECTED
C AURIS DNA BLD POS QL NAA+NON-PROBE: NOT DETECTED
C GATTII+NEOFOR DNA BLD POS QL NAA+N-PRB: NOT DETECTED
C GLABRATA DNA BLD POS QL NAA+NON-PROBE: NOT DETECTED
C KRUSEI DNA BLD POS QL NAA+NON-PROBE: NOT DETECTED
C PARAP DNA BLD POS QL NAA+NON-PROBE: NOT DETECTED
C PNEUM DNA LOWER RESP QL NAA+NON-PROBE: NOT DETECTED
C TROPICLS DNA BLD POS QL NAA+NON-PROBE: NOT DETECTED
CALCIUM SERPL-MCNC: 8.1 MG/DL (ref 8.5–10.5)
CHLORIDE SERPL-SCNC: 95 MEQ/L (ref 98–111)
CO2 SERPL-SCNC: 21 MEQ/L (ref 23–33)
COAG NEG STAPH DNA BLD QL NAA+PROBE: NOT DETECTED
COLISTIN RES MCR-1 ISLT/SPM QL: ABNORMAL
CREAT SERPL-MCNC: 3.8 MG/DL (ref 0.4–1.2)
DEPRECATED RDW RBC AUTO: 61.3 FL (ref 35–45)
E CLOAC COMP DNA BLD POS NAA+NON-PROBE: NOT DETECTED
E COLI DNA BLD POS QL NAA+NON-PROBE: NOT DETECTED
E FAECALIS DNA BLD POS QL NAA+NON-PROBE: NOT DETECTED
E FAECIUM DNA BLD POS QL NAA+NON-PROBE: NOT DETECTED
ENTEROBACTER CLOACAE COMPLEX BY PCR: NOT DETECTED
ENTEROBACTERALES DNA BLD POS NAA+N-PRB: NOT DETECTED
EOSINOPHIL NFR BLD AUTO: 0 %
EOSINOPHILS ABSOLUTE: 0 THOU/MM3 (ref 0–0.4)
ERYTHROCYTE [DISTWIDTH] IN BLOOD BY AUTOMATED COUNT: 16.7 % (ref 11.5–14.5)
ESCHERICHIA COLI BY PCR: NOT DETECTED
FLUAV RNA LOWER RESP QL NAA+NON-PROBE: NOT DETECTED
FLUBV RNA LOWER RESP QL NAA+NON-PROBE: NOT DETECTED
GFR SERPL CREATININE-BSD FRML MDRD: 15 ML/MIN/1.73M2
GLUCOSE BLD STRIP.AUTO-MCNC: 145 MG/DL (ref 70–108)
GLUCOSE BLD STRIP.AUTO-MCNC: 165 MG/DL (ref 70–108)
GLUCOSE BLD STRIP.AUTO-MCNC: 167 MG/DL (ref 70–108)
GLUCOSE BLD STRIP.AUTO-MCNC: 268 MG/DL (ref 70–108)
GLUCOSE SERPL-MCNC: 142 MG/DL (ref 70–108)
GP B STREP DNA SPEC QL NAA+PROBE: NOT DETECTED
GP B STREP DNA SPEC QL NAA+PROBE: NOT DETECTED
HADV DNA LOWER RESP QL NAA+NON-PROBE: NOT DETECTED
HAEM INFLU DNA BLD POS QL NAA+NON-PROBE: NOT DETECTED
HAEMOPHILUS INFLUENZAE BY PCR: NOT DETECTED
HCOV RNA LOWER RESP QL NAA+NON-PROBE: NOT DETECTED
HCT VFR BLD AUTO: 31.6 % (ref 42–52)
HGB BLD-MCNC: 10.2 GM/DL (ref 14–18)
HMPV RNA LOWER RESP QL NAA+NON-PROBE: NOT DETECTED
HPIV RNA LOWER RESP QL NAA+NON-PROBE: NOT DETECTED
IMM GRANULOCYTES # BLD AUTO: 0.5 THOU/MM3 (ref 0–0.07)
IMM GRANULOCYTES NFR BLD AUTO: 2 %
INR PPP: 2.34 (ref 0.85–1.13)
K OXYTOCA DNA BLD POS QL NAA+NON-PROBE: NOT DETECTED
K OXYTOCA DNA BLD POS QL NAA+NON-PROBE: NOT DETECTED
KLEBSIELLA AEROGENES BY PCR: NOT DETECTED
KLEBSIELLA OXYTOCA BY PCR: NOT DETECTED
KLEBSIELLA PNEUMONIAE GROUP BY PCR: NOT DETECTED
KLEBSIELLA SP DNA BLD POS QL NAA+NON-PRB: NOT DETECTED
L MONOCYTOG DNA BLD POS QL NAA+NON-PROBE: NOT DETECTED
L PNEUMO DNA LOWER RESP QL NAA+NON-PROBE: NOT DETECTED
L PNEUMO1 AG UR QL IA.RAPID: NEGATIVE
LYMPHOCYTES ABSOLUTE: 0.8 THOU/MM3 (ref 1–4.8)
LYMPHOCYTES NFR BLD AUTO: 3 %
M PNEUMO DNA LOWER RESP QL NAA+NON-PROBE: NOT DETECTED
MAGNESIUM SERPL-MCNC: 2.2 MG/DL (ref 1.6–2.4)
MCH RBC QN AUTO: 32.9 PG (ref 26–33)
MCHC RBC AUTO-ENTMCNC: 32.3 GM/DL (ref 32.2–35.5)
MCV RBC AUTO: 101.9 FL (ref 80–94)
MECA ISLT/SPM QL: ABNORMAL
MECA+MECC+MREJ ISLT/SPM QL: ABNORMAL
MONOCYTES ABSOLUTE: 2.1 THOU/MM3 (ref 0.4–1.3)
MONOCYTES NFR BLD AUTO: 8.4 %
MORAXELLA CATARRHALIS BY PCR: NOT DETECTED
N MEN DNA BLD POS QL NAA+NON-PROBE: NOT DETECTED
NDM: NOT DETECTED
NEUTROPHILS ABSOLUTE: 21.9 THOU/MM3 (ref 1.8–7.7)
NEUTROPHILS NFR BLD AUTO: 86.4 %
NRBC BLD AUTO-RTO: 0 /100 WBC
P AERUGINOSA DNA BLD POS NAA+NON-PROBE: DETECTED
PLATELET # BLD AUTO: 156 THOU/MM3 (ref 130–400)
PLATELET BLD QL SMEAR: ADEQUATE
PMV BLD AUTO: 11.8 FL (ref 9.4–12.4)
POIKILOCYTES: ABNORMAL
POLYCHROMASIA BLD QL SMEAR: ABNORMAL
POTASSIUM SERPL-SCNC: 4.4 MEQ/L (ref 3.5–5.2)
PROTEUS SPECIES BY PCR: NOT DETECTED
PROTEUS SPP: NOT DETECTED
PSEUDOMONAS AERUGINOSA BY PCR: DETECTED
RBC # BLD AUTO: 3.1 MILL/MM3 (ref 4.7–6.1)
RESISTANT GENE MECA/C & MREJ BY PCR: ABNORMAL
RESISTANT GENE NDM BY PCR: NOT DETECTED
RSV RNA LOWER RESP QL NAA+NON-PROBE: NOT DETECTED
RV+EV RNA LOWER RESP QL NAA+NON-PROBE: NOT DETECTED
S AUREUS DNA BLD POS QL NAA+NON-PROBE: NOT DETECTED
S EPIDERMIDIS DNA BLD POS QL NAA+NON-PRB: NOT DETECTED
S LUGDUNENSIS DNA BLD POS QL NAA+NON-PRB: NOT DETECTED
S MALTOPHILIA DNA BLD POS QL NAA+NON-PRB: NOT DETECTED
S MARCESCENS DNA BLD POS NAA+NON-PROBE: NOT DETECTED
S PYO DNA THROAT QL NAA+PROBE: NOT DETECTED
SALMONELLA DNA BLD POS QL NAA+NON-PROBE: NOT DETECTED
SCAN OF BLOOD SMEAR: NORMAL
SERRATIA MARCESCENS BY PCR: NOT DETECTED
SODIUM SERPL-SCNC: 132 MEQ/L (ref 135–145)
SOURCE OF BLOOD CULTURE: ABNORMAL
SOURCE: ABNORMAL
SPECIMEN ACCEPTABILITY: ABNORMAL
STAPH AUREUS BY PCR: NOT DETECTED
STREP AGALACTIAE BY PCR: NOT DETECTED
STREP PNEUMO AG, UR: NEGATIVE
STREP PNEUMONIAE BY PCR: NOT DETECTED
STREP PYOGENES BY PCR: NOT DETECTED
STREPTOCOCCUS DNA BLD QL NAA+PROBE: NOT DETECTED
TARGETS BLD QL SMEAR: ABNORMAL
VANA+VANB ISLT/SPM QL: ABNORMAL
WBC # BLD AUTO: 25.3 THOU/MM3 (ref 4.8–10.8)

## 2025-01-31 PROCEDURE — 2140000000 HC CCU INTERMEDIATE R&B

## 2025-01-31 PROCEDURE — 87641 MR-STAPH DNA AMP PROBE: CPT

## 2025-01-31 PROCEDURE — 6370000000 HC RX 637 (ALT 250 FOR IP): Performed by: HOSPITALIST

## 2025-01-31 PROCEDURE — 94669 MECHANICAL CHEST WALL OSCILL: CPT

## 2025-01-31 PROCEDURE — 94640 AIRWAY INHALATION TREATMENT: CPT

## 2025-01-31 PROCEDURE — 99233 SBSQ HOSP IP/OBS HIGH 50: CPT | Performed by: PHYSICIAN ASSISTANT

## 2025-01-31 PROCEDURE — 2580000003 HC RX 258

## 2025-01-31 PROCEDURE — 2500000003 HC RX 250 WO HCPCS

## 2025-01-31 PROCEDURE — 83735 ASSAY OF MAGNESIUM: CPT

## 2025-01-31 PROCEDURE — 85025 COMPLETE CBC W/AUTO DIFF WBC: CPT

## 2025-01-31 PROCEDURE — 36415 COLL VENOUS BLD VENIPUNCTURE: CPT

## 2025-01-31 PROCEDURE — 6370000000 HC RX 637 (ALT 250 FOR IP)

## 2025-01-31 PROCEDURE — 6370000000 HC RX 637 (ALT 250 FOR IP): Performed by: PHYSICIAN ASSISTANT

## 2025-01-31 PROCEDURE — 6360000002 HC RX W HCPCS

## 2025-01-31 PROCEDURE — 94761 N-INVAS EAR/PLS OXIMETRY MLT: CPT

## 2025-01-31 PROCEDURE — 82948 REAGENT STRIP/BLOOD GLUCOSE: CPT

## 2025-01-31 PROCEDURE — 99232 SBSQ HOSP IP/OBS MODERATE 35: CPT | Performed by: INTERNAL MEDICINE

## 2025-01-31 PROCEDURE — 2700000000 HC OXYGEN THERAPY PER DAY

## 2025-01-31 PROCEDURE — 80048 BASIC METABOLIC PNL TOTAL CA: CPT

## 2025-01-31 PROCEDURE — 85610 PROTHROMBIN TIME: CPT

## 2025-01-31 RX ORDER — WARFARIN SODIUM 2.5 MG/1
1.25 TABLET ORAL
Status: COMPLETED | OUTPATIENT
Start: 2025-01-31 | End: 2025-01-31

## 2025-01-31 RX ORDER — DIPHENHYDRAMINE HCL 25 MG
25 TABLET ORAL NIGHTLY PRN
Status: DISCONTINUED | OUTPATIENT
Start: 2025-01-31 | End: 2025-02-01

## 2025-01-31 RX ORDER — HYDROXYZINE HYDROCHLORIDE 10 MG/1
10 TABLET, FILM COATED ORAL 3 TIMES DAILY PRN
Status: DISCONTINUED | OUTPATIENT
Start: 2025-01-31 | End: 2025-02-01

## 2025-01-31 RX ADMIN — GUAIFENESIN 600 MG: 600 TABLET ORAL at 22:08

## 2025-01-31 RX ADMIN — WARFARIN SODIUM 1.25 MG: 2.5 TABLET ORAL at 18:51

## 2025-01-31 RX ADMIN — CEFEPIME 1000 MG: 1 INJECTION, POWDER, FOR SOLUTION INTRAMUSCULAR; INTRAVENOUS at 01:53

## 2025-01-31 RX ADMIN — Medication 3 MG: at 00:59

## 2025-01-31 RX ADMIN — ATORVASTATIN CALCIUM 80 MG: 80 TABLET, FILM COATED ORAL at 22:07

## 2025-01-31 RX ADMIN — DOXYCYCLINE 100 MG: 100 INJECTION, POWDER, LYOPHILIZED, FOR SOLUTION INTRAVENOUS at 13:01

## 2025-01-31 RX ADMIN — CEFEPIME 1000 MG: 1 INJECTION, POWDER, FOR SOLUTION INTRAMUSCULAR; INTRAVENOUS at 14:31

## 2025-01-31 RX ADMIN — SODIUM CHLORIDE, PRESERVATIVE FREE 10 ML: 5 INJECTION INTRAVENOUS at 09:32

## 2025-01-31 RX ADMIN — BUDESONIDE 500 MCG: 0.5 INHALANT RESPIRATORY (INHALATION) at 10:07

## 2025-01-31 RX ADMIN — SODIUM CHLORIDE, PRESERVATIVE FREE 10 ML: 5 INJECTION INTRAVENOUS at 22:08

## 2025-01-31 RX ADMIN — DOXYCYCLINE 100 MG: 100 INJECTION, POWDER, LYOPHILIZED, FOR SOLUTION INTRAVENOUS at 00:59

## 2025-01-31 RX ADMIN — INSULIN GLARGINE 12 UNITS: 100 INJECTION, SOLUTION SUBCUTANEOUS at 09:28

## 2025-01-31 RX ADMIN — INSULIN LISPRO 4 UNITS: 100 INJECTION, SOLUTION INTRAVENOUS; SUBCUTANEOUS at 21:00

## 2025-01-31 RX ADMIN — CALCITRIOL CAPSULES 0.25 MCG 0.25 MCG: 0.25 CAPSULE ORAL at 09:30

## 2025-01-31 RX ADMIN — GUAIFENESIN 600 MG: 600 TABLET ORAL at 09:30

## 2025-01-31 NOTE — PLAN OF CARE
Problem: Respiratory - Adult  Goal: Achieves optimal ventilation and oxygenation  1/31/2025 0155 by Nisha Schuster, RN  Outcome: Progressing  Flowsheets (Taken 1/31/2025 0155)  Achieves optimal ventilation and oxygenation:   Assess for changes in respiratory status   Assess for changes in mentation and behavior   Position to facilitate oxygenation and minimize respiratory effort   Oxygen supplementation based on oxygen saturation or arterial blood gases   Encourage broncho-pulmonary hygiene including cough, deep breathe, incentive spirometry   Assess the need for suctioning and aspirate as needed   Assess and instruct to report shortness of breath or any respiratory difficulty   Respiratory therapy support as indicated  1/30/2025 2001 by Georgia Draper, RN  Outcome: Progressing     Problem: Chronic Conditions and Co-morbidities  Goal: Patient's chronic conditions and co-morbidity symptoms are monitored and maintained or improved  1/31/2025 0155 by Nisha Schuster RN  Outcome: Progressing  Flowsheets (Taken 1/30/2025 1030 by Georgia Draper, RN)  Care Plan - Patient's Chronic Conditions and Co-Morbidity Symptoms are Monitored and Maintained or Improved: Monitor and assess patient's chronic conditions and comorbid symptoms for stability, deterioration, or improvement  1/30/2025 2001 by Georgia Draper, RN  Outcome: Progressing  Flowsheets (Taken 1/30/2025 1030)  Care Plan - Patient's Chronic Conditions and Co-Morbidity Symptoms are Monitored and Maintained or Improved: Monitor and assess patient's chronic conditions and comorbid symptoms for stability, deterioration, or improvement     Problem: Discharge Planning  Goal: Discharge to home or other facility with appropriate resources  1/31/2025 0155 by Nisha Schuster RN  Outcome: Progressing  Flowsheets (Taken 1/30/2025 1211 by Georgia Draper, RN)  Discharge to home or other facility with appropriate resources: Identify barriers to  Georgia DEVI RN  Outcome: Progressing     Problem: Genitourinary - Adult  Goal: Absence of urinary retention  1/31/2025 0155 by Nisha Schuster RN  Outcome: Progressing  1/30/2025 2001 by Georgia Draper RN  Outcome: Progressing     Problem: Infection - Adult  Goal: Absence of infection at discharge  1/31/2025 0155 by Nisha Schuster RN  Outcome: Progressing  Flowsheets (Taken 1/31/2025 0155)  Absence of infection at discharge:   Assess and monitor for signs and symptoms of infection   Monitor lab/diagnostic results   Monitor all insertion sites i.e., indwelling lines, tubes and drains   Monitor endotracheal (as able) and nasal secretions for changes in amount and color   [place] appropriate cooling/warming therapies per order   Administer medications as ordered   Instruct and encourage patient and family to use good hand hygiene technique   Identify and instruct in appropriate isolation precautions for identified infection/condition  1/30/2025 2001 by Georgia Draper RN  Outcome: Progressing  Goal: Absence of infection during hospitalization  1/31/2025 0155 by Nisha Schuster RN  Outcome: Progressing  Flowsheets (Taken 1/31/2025 0155)  Absence of infection during hospitalization:   Assess and monitor for signs and symptoms of infection   Monitor lab/diagnostic results   Monitor all insertion sites i.e., indwelling lines, tubes and drains   [place] appropriate cooling/warming therapies per order   Administer medications as ordered   Instruct and encourage patient and family to use good hand hygiene technique   Identify and instruct in appropriate isolation precautions for identified infection/condition  1/30/2025 2001 by Georgia Draper RN  Outcome: Progressing     Problem: Metabolic/Fluid and Electrolytes - Adult  Goal: Electrolytes maintained within normal limits  1/31/2025 0155 by Nisha Schuster RN  Outcome: Progressing  Flowsheets (Taken 1/31/2025 0155)  Electrolytes

## 2025-01-31 NOTE — PLAN OF CARE
Problem: Respiratory - Adult  Goal: Achieves optimal ventilation and oxygenation  Outcome: Progressing     Problem: Chronic Conditions and Co-morbidities  Goal: Patient's chronic conditions and co-morbidity symptoms are monitored and maintained or improved  Outcome: Progressing  Flowsheets (Taken 1/30/2025 1030)  Care Plan - Patient's Chronic Conditions and Co-Morbidity Symptoms are Monitored and Maintained or Improved: Monitor and assess patient's chronic conditions and comorbid symptoms for stability, deterioration, or improvement     Problem: Discharge Planning  Goal: Discharge to home or other facility with appropriate resources  Outcome: Progressing  Flowsheets  Taken 1/30/2025 1211  Discharge to home or other facility with appropriate resources: Identify barriers to discharge with patient and caregiver  Taken 1/30/2025 1030  Discharge to home or other facility with appropriate resources: Identify barriers to discharge with patient and caregiver     Problem: Safety - Adult  Goal: Free from fall injury  Outcome: Progressing     Problem: ABCDS Injury Assessment  Goal: Absence of physical injury  Outcome: Progressing     Problem: Cardiovascular - Adult  Goal: Maintains optimal cardiac output and hemodynamic stability  Outcome: Progressing     Problem: Cardiovascular - Adult  Goal: Absence of cardiac dysrhythmias or at baseline  Outcome: Progressing     Problem: Skin/Tissue Integrity - Adult  Goal: Skin integrity remains intact  Outcome: Progressing     Problem: Musculoskeletal - Adult  Goal: Return mobility to safest level of function  Outcome: Progressing     Problem: Musculoskeletal - Adult  Goal: Return ADL status to a safe level of function  Outcome: Progressing     Problem: Genitourinary - Adult  Goal: Absence of urinary retention  Outcome: Progressing     Problem: Infection - Adult  Goal: Absence of infection at discharge  Outcome: Progressing     Problem: Infection - Adult  Goal: Absence of infection

## 2025-01-31 NOTE — PROGRESS NOTES
[facility]   Nephrology progress note  1/31/2025, 10:24 AM      Pt Name:    Portillo Daly  MRN:     [id number]     YOB: 1940  Admit Date:    1/29/2025  8:08 PM    Chief Complaint: Nephrology following for MILLY/CKD IV    Subjective:  Patient was seen and examined this morning  No chest pain or shortness of breath  Poor historian  On high flow oxygen  Sitting at his bedside chair      Objective:  24HR INTAKE/OUTPUT:    Intake/Output Summary (Last 24 hours) at 1/31/2025 1024  Last data filed at 1/31/2025 0218  Gross per 24 hour   Intake 990 ml   Output 550 ml   Net 440 ml         I/O last 3 completed shifts:  In: 990 [P.O.:990]  Out: 550 [Urine:550]  No intake/output data recorded.   Admission weight: 95.1 kg (209 lb 10.5 oz)  Wt Readings from Last 3 Encounters:   01/30/25 95.1 kg (209 lb 10.5 oz)   01/27/25 95.3 kg (210 lb)   01/15/25 94.3 kg (208 lb)        Vitals :   Vitals:    01/31/25 0218 01/31/25 0459 01/31/25 0915 01/31/25 1007   BP: 120/66  102/75    Pulse: 62 60 60    Resp: 16 16 16    Temp: 97.9 °F (36.6 °C)  97.8 °F (36.6 °C)    TempSrc: Oral  Oral    SpO2: 94% 97% 97% 97%   Weight:       Height:           Physical examination  General Appearance: alert and cooperative with exam, appears comfortable, no distress  Mouth/Throat: Oral mucosa moist  Neck: No JVD  Lungs: Air entry diminished  Heart:  S1, S2 heard  GI: soft, non-tender, no guarding  Extremities: + LE edema    Medications:  Infusion:    sodium chloride      dextrose       Meds:    warfarin  1.25 mg Oral Once    sodium chloride flush  5-40 mL IntraVENous 2 times per day    atorvastatin  80 mg Oral Nightly    budesonide  500 mcg Nebulization BID    [Held by provider] bumetanide  2 mg Oral BID    calcitRIOL  0.25 mcg Oral Daily    insulin glargine  12 Units SubCUTAneous QAM    [Held by provider] losartan  25 mg Oral Daily    metoprolol succinate  100 mg Oral Daily    doxycycline (VIBRAMYCIN) IV  100 mg IntraVENous  Q12H    cefepime  1,000 mg IntraVENous Q12H    guaiFENesin  600 mg Oral BID    warfarin placeholder: dosing by pharmacy   Oral RX Placeholder    bumetanide  2 mg IntraVENous Once    insulin lispro  0-8 Units SubCUTAneous 4x Daily AC & HS     Meds prn: sodium chloride flush, sodium chloride, polyethylene glycol, acetaminophen **OR** acetaminophen, melatonin, prochlorperazine, glucose, dextrose bolus **OR** dextrose bolus, glucagon (rDNA), dextrose     Lab Data :  CBC:   Recent Labs     01/29/25 2031 01/30/25  0747 01/31/25  0441   WBC 16.6* 29.7* 25.3*   HGB 14.4 10.8* 10.2*   HCT 45.8 34.2* 31.6*    159 156     CMP:  Recent Labs     01/29/25 2102 01/29/25  2325 01/30/25  0747 01/30/25  1544 01/31/25  0441     --  135  --  132*   K 5.3*   < > 5.7* 4.8 4.4   CL 99  --  96*  --  95*   CO2 16*  --  20*  --  21*   BUN 34*  --  41*  --  51*   CREATININE 2.7*  --  3.4*  --  3.8*   GLUCOSE 139*  --  374*  --  142*   CALCIUM 8.8  --  7.8*  --  8.1*   MG  --   --  2.0  --  2.2   PHOS  --   --  3.3  --   --     < > = values in this interval not displayed.     Hepatic:   Recent Labs     01/29/25 2031   AST 35   ALT 33   BILITOT 0.8   ALKPHOS 179*         Impression and Plan:  MILLY on CKD IV:  unclear etiology.  Creatinine slowly rising. Possibly ATN due to some hypotension.  Ultrasound negative for hydronephrosis.  Urine sodium 23.  Possibly prerenal.  Received diuretics recently.  Hold diuretics at this time.  hold losartan.  Baseline serum creatinine typically runs around 2.5-3.0  Hyperkalemia: hold kcl and ARB.  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 It may contain minor errors which are inherent in voice recognition technology

## 2025-01-31 NOTE — CARE COORDINATION
Case Management Assessment Initial Evaluation    Date/Time of Evaluation: 2025 8:23 AM  Assessment Completed by: Monica Morrison RN    If patient is discharged prior to next notation, then this note serves as note for discharge by case management.    Patient Name: Portillo Daly                   YOB: 1940  Diagnosis: Hypoxemia [R09.02]  Sepsis (HCC) [A41.9]                   Date / Time: 2025  8:08 PM  Location: [address] [place] [address]     Patient Admission Status: Inpatient   Readmission Risk Low 0-14, Mod 15-19), High > 20: Readmission Risk Score: 24.9    Current PCP: Elinor Ly APRN - CNP  Health Care Decision Makers:   Primary Decision Maker: Jenelle Edmondson - Niece/Nephew - [phone]    Secondary Decision Maker: Andria Washington Alt POA - Child - [phone]    Supplemental (Other) Decision Maker: Gina Willis - Domestic Partner - [phone]    Additional Case Management Notes: Pneumonia and copd exacerbation. On HFO 30%, 40L. IV cefepime. IV doxycyline. Nebs. Bumex. Mucinex.     Procedures: none    Imagin/29 CXR: There is slightly increased left retrocardiac opacity. This could be   developing atelectasis or consolidation.   Renal US:   1. Left-sided renal cortical thinning and atrophy.  2. Unremarkable urinary bladder.   CT chest:   1. Complete atelectasis left lower lobe. Mucous plugging suspected.  2. Bilateral upper lobe tree-in-bud nodularity, slightly increased in the   right upper lobe, could be inflammatory or infectious in etiology.  3. Mediastinal lymphadenopathy, mainly new since previous study.  4. Additional findings as above.     Patient Goals/Plan/Treatment Preferences: Spoke with Frank, he resides at home with his s/o, Gina. She drives when out. He does not use dme but has home O2 from [facility]. 2L ATC and 6L with exertion. He is current with [facility], updated on admission through portal.        25 0691   Service Assessment   Patient Orientation Alert

## 2025-01-31 NOTE — PROGRESS NOTES
Hospitalist Progress Note      Patient:  Portillo Daly 84 y.o. male     Unit/Bed:-[facility]-A    Date of Admission: 1/29/2025      ASSESSMENT AND PLAN    Active Problems  Acute hypoxemic respiratory failure:   - 2/2 Pseudomonas PNA.   - Improving greatly, HFNC transitioned to 6L NC. Pt has a baseline of 3L at rest & 6L with activity.   - IS & Acapella.     Pseudomonas, Bacterial PNA   - CT shows complete atelectasis left lower lobe with mucous plugging suspected.    - Given patient's dramatic improvement with oxygenation, will continue to monitor the need for pulm consult & bronch.   -  PNA panel growing Pseudomonas. Continue Cefepime.     Pseudomonas Bacteremia   - BC 2 of 2 growing Pseudomonas. IV Cefepime ordered.   - Pt has been afebrile.     Sepsis secondary to pneumonia  - continue abx as stated above.   - Will draw CBC in AM    Acute on chronic renal failure:   - Nephrology notes reviewed. Most recent creatinine 3.8 with baseline of  2.5-3.0    - Nephrology believes this is ATN 2/2 hypotension    - Renal ultrasound reviewed with no acute findings  -  hold nephrotic agents   - continue to hold losartan    Acute on chronic systolic heart failure: Last known EF 45 to 50%.  -  IV Bumex given by nephrology but now holding due to renal function.   - Intake/output.  - Fluid restriction.     Hyperkalemia:   - Repeat potassium 5.7. Insulin/dextrose/Lokelma was ordered. Most recent potassium was 4.4  - Will draw BMP in AM    Chronic Conditions (reviewed and stable unless otherwise stated)  COPD: patient had exacerbation while in the ED. Given Solumedrol. Steroid inhaler BID.   DMII: Resume Lantus with high intensity sliding scale.   Paroxysmal A-fib: Status post pacemaker, continue with metoprolol and Coumadin.  Hypotension: Blood pressure on the low end of normal, albumin is being given. May need to hold metoprolol.      LDA: []CVC / []PICC / []Midline / []Kitchen / []Drains / []Mediport / [x]None  Antibiotics:

## 2025-01-31 NOTE — PROGRESS NOTES
Warfarin Pharmacy Consult Note    Warfarin Indication: Atrial fibrillation/Atrial flutter  Target INR: 2.0-3.0  Dose prior to admission: 2.5 mg MF, 1.25 mg TWThSS     Recent Labs     01/29/25 2031 01/30/25  0747 01/31/25  0441   HGB 14.4 10.8* 10.2*    159 156     Recent Labs     01/30/25  0746 01/31/25  0441   INR 3.49* 2.34*     Concurrent anticoagulants/antiplatelets: none  Significant warfarin drug-drug interactions: doxycycline IV, melatonin (HM)     Date INR Warfarin Dose   1/30/2025 3.49 No Coumadin    1/31/2025 2.34  1.25 mg                                                Monitoring:                   INR will be monitored daily.    **Please contact pharmacy for discharge instructions when indicated**    Brannon Maria, [facility] 1/31/2025 8:46 AM

## 2025-02-01 LAB
ANION GAP SERPL CALC-SCNC: 18 MEQ/L (ref 8–16)
BASOPHILS ABSOLUTE: 0 THOU/MM3 (ref 0–0.1)
BASOPHILS NFR BLD AUTO: 0.2 %
BUN SERPL-MCNC: 53 MG/DL (ref 7–22)
CALCIUM SERPL-MCNC: 8.7 MG/DL (ref 8.5–10.5)
CHLORIDE SERPL-SCNC: 101 MEQ/L (ref 98–111)
CO2 SERPL-SCNC: 21 MEQ/L (ref 23–33)
CREAT SERPL-MCNC: 3.4 MG/DL (ref 0.4–1.2)
DEPRECATED RDW RBC AUTO: 61.5 FL (ref 35–45)
EOSINOPHIL NFR BLD AUTO: 2.7 %
EOSINOPHILS ABSOLUTE: 0.5 THOU/MM3 (ref 0–0.4)
ERYTHROCYTE [DISTWIDTH] IN BLOOD BY AUTOMATED COUNT: 16.5 % (ref 11.5–14.5)
GFR SERPL CREATININE-BSD FRML MDRD: 17 ML/MIN/1.73M2
GLUCOSE BLD STRIP.AUTO-MCNC: 106 MG/DL (ref 70–108)
GLUCOSE BLD STRIP.AUTO-MCNC: 199 MG/DL (ref 70–108)
GLUCOSE BLD STRIP.AUTO-MCNC: 206 MG/DL (ref 70–108)
GLUCOSE BLD STRIP.AUTO-MCNC: 98 MG/DL (ref 70–108)
GLUCOSE SERPL-MCNC: 89 MG/DL (ref 70–108)
HCT VFR BLD AUTO: 34.8 % (ref 42–52)
HGB BLD-MCNC: 11.2 GM/DL (ref 14–18)
IMM GRANULOCYTES # BLD AUTO: 0.31 THOU/MM3 (ref 0–0.07)
IMM GRANULOCYTES NFR BLD AUTO: 1.8 %
INR PPP: 2.14 (ref 0.85–1.13)
LYMPHOCYTES ABSOLUTE: 0.4 THOU/MM3 (ref 1–4.8)
LYMPHOCYTES NFR BLD AUTO: 2.2 %
MAGNESIUM SERPL-MCNC: 2.2 MG/DL (ref 1.6–2.4)
MCH RBC QN AUTO: 32.6 PG (ref 26–33)
MCHC RBC AUTO-ENTMCNC: 32.2 GM/DL (ref 32.2–35.5)
MCV RBC AUTO: 101.2 FL (ref 80–94)
MONOCYTES ABSOLUTE: 0.6 THOU/MM3 (ref 0.4–1.3)
MONOCYTES NFR BLD AUTO: 3.4 %
MRSA DNA SPEC QL NAA+PROBE: NEGATIVE
NEUTROPHILS ABSOLUTE: 15.5 THOU/MM3 (ref 1.8–7.7)
NEUTROPHILS NFR BLD AUTO: 89.7 %
NRBC BLD AUTO-RTO: 0 /100 WBC
PLATELET # BLD AUTO: 137 THOU/MM3 (ref 130–400)
PMV BLD AUTO: 11.5 FL (ref 9.4–12.4)
POTASSIUM SERPL-SCNC: 4.2 MEQ/L (ref 3.5–5.2)
RBC # BLD AUTO: 3.44 MILL/MM3 (ref 4.7–6.1)
SODIUM SERPL-SCNC: 140 MEQ/L (ref 135–145)
WBC # BLD AUTO: 17.3 THOU/MM3 (ref 4.8–10.8)

## 2025-02-01 PROCEDURE — 80048 BASIC METABOLIC PNL TOTAL CA: CPT

## 2025-02-01 PROCEDURE — 6360000002 HC RX W HCPCS

## 2025-02-01 PROCEDURE — 83735 ASSAY OF MAGNESIUM: CPT

## 2025-02-01 PROCEDURE — 99232 SBSQ HOSP IP/OBS MODERATE 35: CPT | Performed by: INTERNAL MEDICINE

## 2025-02-01 PROCEDURE — 94640 AIRWAY INHALATION TREATMENT: CPT

## 2025-02-01 PROCEDURE — 6370000000 HC RX 637 (ALT 250 FOR IP): Performed by: HOSPITALIST

## 2025-02-01 PROCEDURE — 6370000000 HC RX 637 (ALT 250 FOR IP): Performed by: PHYSICIAN ASSISTANT

## 2025-02-01 PROCEDURE — 82948 REAGENT STRIP/BLOOD GLUCOSE: CPT

## 2025-02-01 PROCEDURE — 2580000003 HC RX 258

## 2025-02-01 PROCEDURE — 2580000003 HC RX 258: Performed by: PHYSICIAN ASSISTANT

## 2025-02-01 PROCEDURE — 36415 COLL VENOUS BLD VENIPUNCTURE: CPT

## 2025-02-01 PROCEDURE — 99232 SBSQ HOSP IP/OBS MODERATE 35: CPT | Performed by: PHYSICIAN ASSISTANT

## 2025-02-01 PROCEDURE — 6370000000 HC RX 637 (ALT 250 FOR IP)

## 2025-02-01 PROCEDURE — 94669 MECHANICAL CHEST WALL OSCILL: CPT

## 2025-02-01 PROCEDURE — 2140000000 HC CCU INTERMEDIATE R&B

## 2025-02-01 PROCEDURE — 2500000003 HC RX 250 WO HCPCS

## 2025-02-01 PROCEDURE — 85025 COMPLETE CBC W/AUTO DIFF WBC: CPT

## 2025-02-01 PROCEDURE — 85610 PROTHROMBIN TIME: CPT

## 2025-02-01 PROCEDURE — 6360000002 HC RX W HCPCS: Performed by: PHYSICIAN ASSISTANT

## 2025-02-01 RX ORDER — DIPHENHYDRAMINE HCL 25 MG
25 TABLET ORAL NIGHTLY PRN
Status: DISCONTINUED | OUTPATIENT
Start: 2025-02-01 | End: 2025-02-02

## 2025-02-01 RX ORDER — HYDROXYZINE HYDROCHLORIDE 10 MG/1
10 TABLET, FILM COATED ORAL 3 TIMES DAILY PRN
Status: DISPENSED | OUTPATIENT
Start: 2025-02-01

## 2025-02-01 RX ORDER — WARFARIN SODIUM 2 MG/1
2 TABLET ORAL
Status: COMPLETED | OUTPATIENT
Start: 2025-02-01 | End: 2025-02-01

## 2025-02-01 RX ADMIN — INSULIN GLARGINE 12 UNITS: 100 INJECTION, SOLUTION SUBCUTANEOUS at 11:25

## 2025-02-01 RX ADMIN — CEFEPIME 1000 MG: 1 INJECTION, POWDER, FOR SOLUTION INTRAMUSCULAR; INTRAVENOUS at 02:18

## 2025-02-01 RX ADMIN — DOXYCYCLINE 100 MG: 100 INJECTION, POWDER, LYOPHILIZED, FOR SOLUTION INTRAVENOUS at 00:46

## 2025-02-01 RX ADMIN — GUAIFENESIN 600 MG: 600 TABLET ORAL at 11:25

## 2025-02-01 RX ADMIN — WARFARIN SODIUM 2 MG: 2 TABLET ORAL at 16:46

## 2025-02-01 RX ADMIN — CALCITRIOL CAPSULES 0.25 MCG 0.25 MCG: 0.25 CAPSULE ORAL at 11:24

## 2025-02-01 RX ADMIN — INSULIN LISPRO 2 UNITS: 100 INJECTION, SOLUTION INTRAVENOUS; SUBCUTANEOUS at 20:07

## 2025-02-01 RX ADMIN — DIPHENHYDRAMINE HYDROCHLORIDE 25 MG: 25 TABLET ORAL at 00:43

## 2025-02-01 RX ADMIN — BUDESONIDE 500 MCG: 0.5 INHALANT RESPIRATORY (INHALATION) at 07:30

## 2025-02-01 RX ADMIN — DOXYCYCLINE 100 MG: 100 INJECTION, POWDER, LYOPHILIZED, FOR SOLUTION INTRAVENOUS at 12:47

## 2025-02-01 RX ADMIN — BUDESONIDE 500 MCG: 0.5 INHALANT RESPIRATORY (INHALATION) at 18:46

## 2025-02-01 RX ADMIN — GUAIFENESIN 600 MG: 600 TABLET ORAL at 20:07

## 2025-02-01 RX ADMIN — CEFEPIME 1000 MG: 1 INJECTION, POWDER, FOR SOLUTION INTRAMUSCULAR; INTRAVENOUS at 16:37

## 2025-02-01 RX ADMIN — INSULIN LISPRO 2 UNITS: 100 INJECTION, SOLUTION INTRAVENOUS; SUBCUTANEOUS at 17:51

## 2025-02-01 RX ADMIN — ATORVASTATIN CALCIUM 80 MG: 80 TABLET, FILM COATED ORAL at 20:07

## 2025-02-01 RX ADMIN — HYDROXYZINE HYDROCHLORIDE 10 MG: 10 TABLET ORAL at 13:56

## 2025-02-01 RX ADMIN — SODIUM CHLORIDE, PRESERVATIVE FREE 10 ML: 5 INJECTION INTRAVENOUS at 11:26

## 2025-02-01 NOTE — PROGRESS NOTES
Warfarin Pharmacy Consult Note    Warfarin Indication: Atrial fibrillation/Atrial flutter  Target INR: 2.0-3.0  Dose prior to admission: 2.5 mg MF, 1.25 mg TWThSS    Recent Labs     01/30/25  0747 01/31/25  0441 02/01/25  0658   HGB 10.8* 10.2* 11.2*    156 137     Recent Labs     01/30/25  0746 01/31/25  0441 02/01/25  0658   INR 3.49* 2.34* 2.14*     Concurrent anticoagulants/antiplatelets: none  Significant warfarin drug-drug interactions: doxycycline IV, melatonin (HM)     Date INR Warfarin Dose   1/30/2025 3.49 No Coumadin    1/31/2025 2.34  1.25 mg     2/1/2025  2.14 2 mg                                        Monitoring:                   INR will be monitored daily.    **Please contact pharmacy for discharge instructions when indicated**    Sienna Roblero, PharmD, BCPS  2/1/2025  10:13 AM

## 2025-02-01 NOTE — PROGRESS NOTES
[facility]   Nephrology progress note  2/1/2025, 11:46 AM      Pt Name:    Portillo Daly  MRN:     [id number]     YOB: 1940  Admit Date:    1/29/2025  8:08 PM    Chief Complaint: Nephrology following for MILLY/CKD IV    Subjective:  Patient was seen and examined this morning  No chest pain or shortness of breath  Poor historian  On NC        Objective:  24HR INTAKE/OUTPUT:    Intake/Output Summary (Last 24 hours) at 2/1/2025 1146  Last data filed at 2/1/2025 1145  Gross per 24 hour   Intake 701.02 ml   Output 1425 ml   Net -723.98 ml         I/O last 3 completed shifts:  In: 1491 [P.O.:1350; IV Piggyback:141]  Out: 1200 [Urine:1200]  I/O this shift:  In: -   Out: 425 [Urine:425]   Admission weight: 95.1 kg (209 lb 10.5 oz)  Wt Readings from Last 3 Encounters:   02/01/25 94 kg (207 lb 3.7 oz)   01/27/25 95.3 kg (210 lb)   01/15/25 94.3 kg (208 lb)        Vitals :   Vitals:    02/01/25 0518 02/01/25 0730 02/01/25 0807 02/01/25 1138   BP:   (!) 94/59 (!) 131/52   Pulse:   71 69   Resp:   20 18   Temp:   98.4 °F (36.9 °C) 97.7 °F (36.5 °C)   TempSrc:   Oral Oral   SpO2:  99% 97% 96%   Weight: 94 kg (207 lb 3.7 oz)      Height:           Physical examination  General Appearance: alert and cooperative with exam, appears comfortable, no distress  Mouth/Throat: Oral mucosa moist  Neck: No JVD  Lungs: Air entry diminished  Heart:  S1, S2 heard  GI: soft, non-tender, no guarding  Extremities: + LE edema    Medications:  Infusion:    sodium chloride      dextrose       Meds:    warfarin  2 mg Oral Once    sodium chloride flush  5-40 mL IntraVENous 2 times per day    atorvastatin  80 mg Oral Nightly    budesonide  500 mcg Nebulization BID    [Held by provider] bumetanide  2 mg Oral BID    calcitRIOL  0.25 mcg Oral Daily    insulin glargine  12 Units SubCUTAneous QAM    [Held by provider] losartan  25 mg Oral Daily    metoprolol succinate  100 mg Oral Daily    doxycycline (VIBRAMYCIN) IV  100

## 2025-02-01 NOTE — PLAN OF CARE
Problem: Respiratory - Adult  Goal: Clear lung sounds  Description: Clear lung sounds  2/1/2025 1851 by Mary Michaels, RCMC  Outcome: Progressing  Note: Txs to help improve lung aeration. Patient mutually agreed on goals.

## 2025-02-01 NOTE — PLAN OF CARE
Problem: Respiratory - Adult  Goal: Achieves optimal ventilation and oxygenation  2/1/2025 0209 by Keith Degroot RN  Outcome: Progressing  Flowsheets (Taken 2/1/2025 0209)  Achieves optimal ventilation and oxygenation:   Assess for changes in respiratory status   Position to facilitate oxygenation and minimize respiratory effort     Problem: Chronic Conditions and Co-morbidities  Goal: Patient's chronic conditions and co-morbidity symptoms are monitored and maintained or improved  2/1/2025 0209 by Keith Degroot RN  Outcome: Progressing  Flowsheets (Taken 2/1/2025 0209)  Care Plan - Patient's Chronic Conditions and Co-Morbidity Symptoms are Monitored and Maintained or Improved: Monitor and assess patient's chronic conditions and comorbid symptoms for stability, deterioration, or improvement     Problem: Discharge Planning  Goal: Discharge to home or other facility with appropriate resources  2/1/2025 0209 by Keith Degroot RN  Outcome: Progressing  Flowsheets (Taken 2/1/2025 0209)  Discharge to home or other facility with appropriate resources:   Identify barriers to discharge with patient and caregiver   Arrange for needed discharge resources and transportation as appropriate     Problem: Safety - Adult  Goal: Free from fall injury  2/1/2025 0209 by Keith Degroot RN  Outcome: Progressing  Flowsheets (Taken 2/1/2025 0209)  Free From Fall Injury: Instruct family/caregiver on patient safety     Problem: ABCDS Injury Assessment  Goal: Absence of physical injury  2/1/2025 0209 by Keith Degroot RN  Outcome: Progressing  Flowsheets (Taken 2/1/2025 0209)  Absence of Physical Injury: Implement safety measures based on patient assessment     Problem: Cardiovascular - Adult  Goal: Maintains optimal cardiac output and hemodynamic stability  2/1/2025 0209 by Keith Degroot RN  Outcome: Progressing  Flowsheets (Taken 2/1/2025 0209)  Maintains optimal cardiac output and hemodynamic stability:

## 2025-02-01 NOTE — PROGRESS NOTES
Hospitalist Progress Note      Patient:  Portillo Daly 84 y.o. male     Unit/Bed:-[facility]-A    Date of Admission: 1/29/2025      ASSESSMENT AND PLAN    Active Problems  Acute hypoxemic respiratory failure:   - 2/2 Pseudomonas PNA.   - Improving greatly, HFNC transitioned to 6L NC and now on 3L which is resting baseline for patient and 6L with activity.   - IS & Acapella.   - CXR in the AM     Pseudomonas, Bacterial PNA   - CT shows complete atelectasis left lower lobe with mucous plugging suspected.   - Given patient's dramatic improvement with oxygenation, will continue to monitor the need for pulm consult & bronch.   -  PNA panel growing Pseudomonas. Continue Cefepime.     Pseudomonas Bacteremia   - BC 2 of 2 growing Pseudomonas. IV Cefepime ordered.   - Pt has been afebrile.     Sepsis secondary to pneumonia  - continue abx as stated above.   -Leukocytosis improving; 16.6, 29.7, 25.3, 17.3.  - Will draw CBC in AM    Acute on chronic renal failure:   - Nephrology notes reviewed. Most recent creatinine 3.4 slight improvement from yesterday baseline of  2.5-3.0.    - Nephrology believes this is ATN 2/2 hypotension    - Renal ultrasound reviewed with no acute findings  -  hold nephrotic agents   - continue to hold losartan    Acute on chronic systolic heart failure: Last known EF 45 to 50%.  -  IV Bumex given by nephrology but now holding due to renal function.   - Intake/output.  - Fluid restriction.     Hyperkalemia:   - Repeat potassium 5.7. Insulin/dextrose/Lokelma was ordered. Most recent potassium was 4.2  - Will draw BMP in AM    Rash   - Likely medication induced vs contact dermatitis   - Vistaril for itching. Lotion for comfort.     Chronic Conditions (reviewed and stable unless otherwise stated)  COPD: patient had exacerbation while in the ED. Given Solumedrol. Steroid inhaler BID.   DMII: Resume Lantus with high intensity sliding scale.   Paroxysmal A-fib: Status post pacemaker, continue with     warfarin  2 mg Oral Once    sodium chloride flush  5-40 mL IntraVENous 2 times per day    atorvastatin  80 mg Oral Nightly    budesonide  500 mcg Nebulization BID    [Held by provider] bumetanide  2 mg Oral BID    calcitRIOL  0.25 mcg Oral Daily    insulin glargine  12 Units SubCUTAneous QAM    [Held by provider] losartan  25 mg Oral Daily    metoprolol succinate  100 mg Oral Daily    doxycycline (VIBRAMYCIN) IV  100 mg IntraVENous Q12H    cefepime  1,000 mg IntraVENous Q12H    guaiFENesin  600 mg Oral BID    warfarin placeholder: dosing by pharmacy   Oral RX Placeholder    bumetanide  2 mg IntraVENous Once    insulin lispro  0-8 Units SubCUTAneous 4x Daily AC & HS    PRN Meds: diphenhydrAMINE, hydrOXYzine HCl, sodium chloride flush, sodium chloride, polyethylene glycol, acetaminophen **OR** acetaminophen, melatonin, prochlorperazine, glucose, dextrose bolus **OR** dextrose bolus, glucagon (rDNA), dextrose    Exam:  BP (!) 131/52   Pulse 69   Temp 97.7 °F (36.5 °C) (Oral)   Resp 18   Ht 1.702 m (5' 7\")   Wt 94 kg (207 lb 3.7 oz)   SpO2 96%   BMI 32.46 kg/m²   General: No distress, appears stated age. Chronically ill appearing white male   Eyes:  PERRL. Conjunctivae/corneas clear.  HENT: Head normal appearing. Nares normal. Oral mucosa moist.  Hearing intact.   Neck: Supple, with full range of motion. Trachea midline.  No gross JVD appreciated.  Respiratory:  Normal effort.  Diminished lung sounds with wheezing noted bilaterally.  Cardiovascular: Normal rate, regular rhythm with normal S1/S2 without murmurs.    With 2+ pitting edema bilaterally.   Abdomen: Soft, non-tender, non-distended with normal bowel sounds.  Musculoskeletal: No joint swelling or tenderness. Normal tone. No abnormal movements.   Skin: Warm and dry.   Neurologic:  No focal sensory/motor deficits in the upper or lower extremities. Cranial nerves:  grossly non-focal 2-12.     Psychiatric: Alert and oriented, normal insight and thought

## 2025-02-01 NOTE — PLAN OF CARE
Problem: Respiratory - Adult  Goal: Achieves optimal ventilation and oxygenation  Outcome: Progressing  Flowsheets  Taken 1/31/2025 1007 by Nicole Porter RCP  Achieves optimal ventilation and oxygenation: Respiratory therapy support as indicated  Taken 1/31/2025 0915 by Georgia Draper RN  Achieves optimal ventilation and oxygenation: Assess for changes in respiratory status     Problem: Chronic Conditions and Co-morbidities  Goal: Patient's chronic conditions and co-morbidity symptoms are monitored and maintained or improved  Outcome: Progressing  Flowsheets (Taken 1/31/2025 0915)  Care Plan - Patient's Chronic Conditions and Co-Morbidity Symptoms are Monitored and Maintained or Improved: Monitor and assess patient's chronic conditions and comorbid symptoms for stability, deterioration, or improvement     Problem: Discharge Planning  Goal: Discharge to home or other facility with appropriate resources  Outcome: Progressing  Flowsheets (Taken 1/31/2025 0915)  Discharge to home or other facility with appropriate resources: Identify barriers to discharge with patient and caregiver     Problem: Safety - Adult  Goal: Free from fall injury  Outcome: Progressing     Problem: ABCDS Injury Assessment  Goal: Absence of physical injury  Outcome: Progressing     Problem: Cardiovascular - Adult  Goal: Maintains optimal cardiac output and hemodynamic stability  Outcome: Progressing  Flowsheets (Taken 1/31/2025 0915)  Maintains optimal cardiac output and hemodynamic stability: Monitor blood pressure and heart rate     Problem: Cardiovascular - Adult  Goal: Absence of cardiac dysrhythmias or at baseline  Outcome: Progressing  Flowsheets (Taken 1/31/2025 0915)  Absence of cardiac dysrhythmias or at baseline: Monitor cardiac rate and rhythm     Problem: Skin/Tissue Integrity - Adult  Goal: Skin integrity remains intact  Outcome: Progressing  Flowsheets  Taken 1/31/2025 1252  Skin Integrity Remains Intact: Monitor for areas of

## 2025-02-01 NOTE — PLAN OF CARE
Problem: Respiratory - Adult  Goal: Clear lung sounds  Description: Clear lung sounds  Outcome: Progressing     Problem: Respiratory - Adult  Goal: Achieves optimal ventilation and oxygenation  2/1/2025 0736 by Salvador Flores, AROLDO  Outcome: Progressing   Continue aerosol to help improve breath sounds.  Patient mutually agreed on goals.

## 2025-02-02 ENCOUNTER — APPOINTMENT (OUTPATIENT)
Dept: GENERAL RADIOLOGY | Age: 85
DRG: 871 | End: 2025-02-02
Payer: MEDICARE

## 2025-02-02 VITALS
HEART RATE: 74 BPM | OXYGEN SATURATION: 95 % | SYSTOLIC BLOOD PRESSURE: 146 MMHG | DIASTOLIC BLOOD PRESSURE: 66 MMHG | TEMPERATURE: 98.3 F | WEIGHT: 208.4 LBS | HEIGHT: 67 IN | RESPIRATION RATE: 20 BRPM | BODY MASS INDEX: 32.71 KG/M2

## 2025-02-02 LAB
ANION GAP SERPL CALC-SCNC: 15 MEQ/L (ref 8–16)
ARTERIAL PATENCY WRIST A: POSITIVE
BACTERIA BLD AEROBE CULT: ABNORMAL
BACTERIA SPEC RESP CULT: ABNORMAL
BACTERIA SPEC RESP CULT: ABNORMAL
BASE EXCESS BLDA CALC-SCNC: -2.5 MMOL/L (ref -2.5–2.5)
BASOPHILS ABSOLUTE: 0 THOU/MM3 (ref 0–0.1)
BASOPHILS NFR BLD AUTO: 0.2 %
BDY SITE: ABNORMAL
BUN SERPL-MCNC: 49 MG/DL (ref 7–22)
BURR CELLS BLD QL SMEAR: ABNORMAL
CALCIUM SERPL-MCNC: 8.5 MG/DL (ref 8.5–10.5)
CHLORIDE SERPL-SCNC: 102 MEQ/L (ref 98–111)
CO2 SERPL-SCNC: 22 MEQ/L (ref 23–33)
COLLECTED BY:: ABNORMAL
CREAT SERPL-MCNC: 2.8 MG/DL (ref 0.4–1.2)
DEPRECATED RDW RBC AUTO: 59.5 FL (ref 35–45)
DEVICE: ABNORMAL
EOSINOPHIL NFR BLD AUTO: 0.8 %
EOSINOPHILS ABSOLUTE: 0.2 THOU/MM3 (ref 0–0.4)
ERYTHROCYTE [DISTWIDTH] IN BLOOD BY AUTOMATED COUNT: 16.4 % (ref 11.5–14.5)
GFR SERPL CREATININE-BSD FRML MDRD: 21 ML/MIN/1.73M2
GLUCOSE BLD STRIP.AUTO-MCNC: 128 MG/DL (ref 70–108)
GLUCOSE BLD STRIP.AUTO-MCNC: 131 MG/DL (ref 70–108)
GLUCOSE BLD STRIP.AUTO-MCNC: 196 MG/DL (ref 70–108)
GLUCOSE BLD STRIP.AUTO-MCNC: 247 MG/DL (ref 70–108)
GLUCOSE SERPL-MCNC: 126 MG/DL (ref 70–108)
GRAM STN SPEC: ABNORMAL
HCO3 BLDA-SCNC: 20 MMOL/L (ref 23–28)
HCT VFR BLD AUTO: 34.9 % (ref 42–52)
HGB BLD-MCNC: 11.4 GM/DL (ref 14–18)
IMM GRANULOCYTES # BLD AUTO: 0.7 THOU/MM3 (ref 0–0.07)
IMM GRANULOCYTES NFR BLD AUTO: 3.4 %
INR PPP: 2.96 (ref 0.85–1.13)
LYMPHOCYTES ABSOLUTE: 0.4 THOU/MM3 (ref 1–4.8)
LYMPHOCYTES NFR BLD AUTO: 1.8 %
MCH RBC QN AUTO: 32.5 PG (ref 26–33)
MCHC RBC AUTO-ENTMCNC: 32.7 GM/DL (ref 32.2–35.5)
MCV RBC AUTO: 99.4 FL (ref 80–94)
MONOCYTES ABSOLUTE: 0.6 THOU/MM3 (ref 0.4–1.3)
MONOCYTES NFR BLD AUTO: 3.1 %
NEUTROPHILS ABSOLUTE: 18.7 THOU/MM3 (ref 1.8–7.7)
NEUTROPHILS NFR BLD AUTO: 90.7 %
NRBC BLD AUTO-RTO: 0 /100 WBC
ORGANISM: ABNORMAL
PCO2 BLDA: 25 MMHG (ref 35–45)
PH BLDA: 7.5 [PH] (ref 7.35–7.45)
PLATELET # BLD AUTO: 118 THOU/MM3 (ref 130–400)
PLATELET BLD QL SMEAR: ABNORMAL
PMV BLD AUTO: 12.3 FL (ref 9.4–12.4)
PO2 BLDA: 61 MMHG (ref 71–104)
POIKILOCYTES: ABNORMAL
POLYCHROMASIA BLD QL SMEAR: ABNORMAL
POTASSIUM SERPL-SCNC: 3.7 MEQ/L (ref 3.5–5.2)
RBC # BLD AUTO: 3.51 MILL/MM3 (ref 4.7–6.1)
SAO2 % BLDA: 94 %
SCAN OF BLOOD SMEAR: NORMAL
SODIUM SERPL-SCNC: 139 MEQ/L (ref 135–145)
TARGETS BLD QL SMEAR: ABNORMAL
TOXIC GRANULES BLD QL SMEAR: PRESENT
VARIANT LYMPHS BLD QL SMEAR: ABNORMAL %
VENTILATION MODE VENT: ABNORMAL
WBC # BLD AUTO: 20.6 THOU/MM3 (ref 4.8–10.8)

## 2025-02-02 PROCEDURE — 2500000003 HC RX 250 WO HCPCS

## 2025-02-02 PROCEDURE — 2140000000 HC CCU INTERMEDIATE R&B

## 2025-02-02 PROCEDURE — 85025 COMPLETE CBC W/AUTO DIFF WBC: CPT

## 2025-02-02 PROCEDURE — 87040 BLOOD CULTURE FOR BACTERIA: CPT

## 2025-02-02 PROCEDURE — 6370000000 HC RX 637 (ALT 250 FOR IP)

## 2025-02-02 PROCEDURE — 2580000003 HC RX 258

## 2025-02-02 PROCEDURE — 2580000003 HC RX 258: Performed by: INTERNAL MEDICINE

## 2025-02-02 PROCEDURE — 85610 PROTHROMBIN TIME: CPT

## 2025-02-02 PROCEDURE — 36600 WITHDRAWAL OF ARTERIAL BLOOD: CPT

## 2025-02-02 PROCEDURE — 99232 SBSQ HOSP IP/OBS MODERATE 35: CPT | Performed by: INTERNAL MEDICINE

## 2025-02-02 PROCEDURE — 6360000002 HC RX W HCPCS

## 2025-02-02 PROCEDURE — 71045 X-RAY EXAM CHEST 1 VIEW: CPT

## 2025-02-02 PROCEDURE — 6370000000 HC RX 637 (ALT 250 FOR IP): Performed by: PHYSICIAN ASSISTANT

## 2025-02-02 PROCEDURE — 6370000000 HC RX 637 (ALT 250 FOR IP): Performed by: HOSPITALIST

## 2025-02-02 PROCEDURE — 6360000002 HC RX W HCPCS: Performed by: PHYSICIAN ASSISTANT

## 2025-02-02 PROCEDURE — 82948 REAGENT STRIP/BLOOD GLUCOSE: CPT

## 2025-02-02 PROCEDURE — 94669 MECHANICAL CHEST WALL OSCILL: CPT

## 2025-02-02 PROCEDURE — 2580000003 HC RX 258: Performed by: PHYSICIAN ASSISTANT

## 2025-02-02 PROCEDURE — 82803 BLOOD GASES ANY COMBINATION: CPT

## 2025-02-02 PROCEDURE — 80048 BASIC METABOLIC PNL TOTAL CA: CPT

## 2025-02-02 PROCEDURE — 36415 COLL VENOUS BLD VENIPUNCTURE: CPT

## 2025-02-02 PROCEDURE — 6370000000 HC RX 637 (ALT 250 FOR IP): Performed by: INTERNAL MEDICINE

## 2025-02-02 PROCEDURE — 94640 AIRWAY INHALATION TREATMENT: CPT

## 2025-02-02 PROCEDURE — 2700000000 HC OXYGEN THERAPY PER DAY

## 2025-02-02 PROCEDURE — 51798 US URINE CAPACITY MEASURE: CPT

## 2025-02-02 PROCEDURE — 6360000002 HC RX W HCPCS: Performed by: INTERNAL MEDICINE

## 2025-02-02 RX ORDER — BUMETANIDE 1 MG/1
1 TABLET ORAL DAILY
Status: DISPENSED | OUTPATIENT
Start: 2025-02-02

## 2025-02-02 RX ORDER — BUMETANIDE 0.25 MG/ML
1 INJECTION, SOLUTION INTRAMUSCULAR; INTRAVENOUS ONCE
Status: COMPLETED | OUTPATIENT
Start: 2025-02-02 | End: 2025-02-02

## 2025-02-02 RX ORDER — TAMSULOSIN HYDROCHLORIDE 0.4 MG/1
0.4 CAPSULE ORAL DAILY
Status: DISPENSED | OUTPATIENT
Start: 2025-02-02

## 2025-02-02 RX ORDER — IPRATROPIUM BROMIDE AND ALBUTEROL SULFATE 2.5; .5 MG/3ML; MG/3ML
1 SOLUTION RESPIRATORY (INHALATION) EVERY 4 HOURS PRN
Status: ACTIVE | OUTPATIENT
Start: 2025-02-02

## 2025-02-02 RX ORDER — WARFARIN SODIUM 1 MG/1
0.5 TABLET ORAL
Status: COMPLETED | OUTPATIENT
Start: 2025-02-02 | End: 2025-02-02

## 2025-02-02 RX ORDER — DIPHENHYDRAMINE HYDROCHLORIDE 50 MG/ML
25 INJECTION INTRAMUSCULAR; INTRAVENOUS EVERY 6 HOURS PRN
Status: DISPENSED | OUTPATIENT
Start: 2025-02-02

## 2025-02-02 RX ADMIN — CEFEPIME 1000 MG: 1 INJECTION, POWDER, FOR SOLUTION INTRAMUSCULAR; INTRAVENOUS at 15:01

## 2025-02-02 RX ADMIN — DOXYCYCLINE 100 MG: 100 INJECTION, POWDER, LYOPHILIZED, FOR SOLUTION INTRAVENOUS at 01:12

## 2025-02-02 RX ADMIN — CALCITRIOL CAPSULES 0.25 MCG 0.25 MCG: 0.25 CAPSULE ORAL at 08:04

## 2025-02-02 RX ADMIN — INSULIN LISPRO 2 UNITS: 100 INJECTION, SOLUTION INTRAVENOUS; SUBCUTANEOUS at 20:47

## 2025-02-02 RX ADMIN — BUMETANIDE 1 MG: 0.25 INJECTION INTRAMUSCULAR; INTRAVENOUS at 22:38

## 2025-02-02 RX ADMIN — TAMSULOSIN HYDROCHLORIDE 0.4 MG: 0.4 CAPSULE ORAL at 10:42

## 2025-02-02 RX ADMIN — INSULIN GLARGINE 12 UNITS: 100 INJECTION, SOLUTION SUBCUTANEOUS at 08:04

## 2025-02-02 RX ADMIN — ATORVASTATIN CALCIUM 80 MG: 80 TABLET, FILM COATED ORAL at 20:39

## 2025-02-02 RX ADMIN — IPRATROPIUM BROMIDE AND ALBUTEROL SULFATE 1 DOSE: .5; 3 SOLUTION RESPIRATORY (INHALATION) at 04:16

## 2025-02-02 RX ADMIN — CEFEPIME 1000 MG: 1 INJECTION, POWDER, FOR SOLUTION INTRAMUSCULAR; INTRAVENOUS at 03:49

## 2025-02-02 RX ADMIN — INSULIN LISPRO 2 UNITS: 100 INJECTION, SOLUTION INTRAVENOUS; SUBCUTANEOUS at 18:09

## 2025-02-02 RX ADMIN — GUAIFENESIN 600 MG: 600 TABLET ORAL at 20:39

## 2025-02-02 RX ADMIN — WARFARIN SODIUM 0.5 MG: 1 TABLET ORAL at 18:12

## 2025-02-02 RX ADMIN — BUDESONIDE 500 MCG: 0.5 INHALANT RESPIRATORY (INHALATION) at 19:45

## 2025-02-02 RX ADMIN — HYDROXYZINE HYDROCHLORIDE 10 MG: 10 TABLET ORAL at 04:43

## 2025-02-02 RX ADMIN — DIPHENHYDRAMINE HYDROCHLORIDE 25 MG: 50 INJECTION INTRAMUSCULAR; INTRAVENOUS at 14:57

## 2025-02-02 RX ADMIN — GUAIFENESIN 600 MG: 600 TABLET ORAL at 08:04

## 2025-02-02 RX ADMIN — BUDESONIDE 500 MCG: 0.5 INHALANT RESPIRATORY (INHALATION) at 11:05

## 2025-02-02 RX ADMIN — BUMETANIDE 1 MG: 1 TABLET ORAL at 14:57

## 2025-02-02 RX ADMIN — SODIUM CHLORIDE, PRESERVATIVE FREE 5 ML: 5 INJECTION INTRAVENOUS at 08:06

## 2025-02-02 RX ADMIN — SODIUM CHLORIDE, PRESERVATIVE FREE 10 ML: 5 INJECTION INTRAVENOUS at 20:39

## 2025-02-02 NOTE — PROGRESS NOTES
[facility]   Nephrology progress note  2/2/2025, 11:50 AM      Pt Name:    Portillo Daly  MRN:     [id number]     YOB: 1940  Admit Date:    1/29/2025  8:08 PM    Chief Complaint: Nephrology following for MILLY/CKD IV    Subjective:  Patient was seen and examined this morning  No chest pain or shortness of breath  Poor historian  Sitting at his bedside chair  On nasal cannula        Objective:  24HR INTAKE/OUTPUT:    Intake/Output Summary (Last 24 hours) at 2/2/2025 1150  Last data filed at 2/2/2025 1002  Gross per 24 hour   Intake 1115.78 ml   Output 200 ml   Net 915.78 ml         I/O last 3 completed shifts:  In: 1456.8 [P.O.:700; IV Piggyback:756.8]  Out: 1025 [Urine:1025]  I/O this shift:  In: -   Out: 200 [Urine:200]   Admission weight: 95.1 kg (209 lb 10.5 oz)  Wt Readings from Last 3 Encounters:   02/02/25 94.5 kg (208 lb 6.4 oz)   01/27/25 95.3 kg (210 lb)   01/15/25 94.3 kg (208 lb)        Vitals :   Vitals:    02/02/25 0416 02/02/25 0532 02/02/25 0802 02/02/25 1108   BP:   (!) 140/51    Pulse: 70  70 70   Resp: 24  18 16   Temp:   98.5 °F (36.9 °C)    TempSrc:   Oral    SpO2:  94% 92% 90%   Weight:  94.5 kg (208 lb 6.4 oz)     Height:           Physical examination  General Appearance: alert and cooperative with exam, appears comfortable, no distress  Mouth/Throat: Oral mucosa moist  Neck: No JVD  Lungs: Air entry diminished  Heart:  S1, S2 heard  GI: soft, non-tender, no guarding  Extremities: + LE edema    Medications:  Infusion:    sodium chloride      dextrose       Meds:    cefepime  1,000 mg IntraVENous Q12H    tamsulosin  0.4 mg Oral Daily    warfarin  0.5 mg Oral Once    sodium chloride flush  5-40 mL IntraVENous 2 times per day    atorvastatin  80 mg Oral Nightly    budesonide  500 mcg Nebulization BID    [Held by provider] bumetanide  2 mg Oral BID    calcitRIOL  0.25 mcg Oral Daily    insulin glargine  12 Units SubCUTAneous QAM    [Held by provider] losartan  25

## 2025-02-02 NOTE — PROGRESS NOTES
Hospitalist Progress Note      Patient:  Portillo Daly 84 y.o. male     Unit/Bed:-/038-A    Date of Admission: 1/29/2025      ASSESSMENT AND PLAN    Active Problems  Acute hypoxemic respiratory failure:   - 2/2 Pseudomonas PNA.   - Improving greatly, initially on HFNC with transition to 6L NC now at baseline of 3L at rest and 6L with activity. Patient maintaining oxygenation at baseline.   - IS & Acapella.   - CXR personally reviewed by this provider; aeration improving, no pleural effusions     Pseudomonas, Bacterial PNA   - Patient continues to improve with oxygenation, will continue to monitor the need for pulm consult & bronch.   -  PNA panel growing Pseudomonas. Continue Cefepime; consulted with infectious disease will \discontinue doxycycline at this time due to itching.     Pseudomonas Bacteremia   - BC 2 of 2 growing Pseudomonas. IV Cefepime ordered.     Sepsis secondary to pneumonia  - continue abx as stated above.   -Leukocytosis; 29.7,25.3,17.3, with spike today 20.6; Patient afebrile and vitals stable.   - Will draw CBC in AM    Acute on chronic renal failure:   - Nephrology notes reviewed. Creatinine has greatly improved at 2.8 Patient baseline of  2.5-3.0. Will continue to monitor.     - Nephrology believes this is ATN 2/2 hypotension    - Renal ultrasound reviewed with no acute findings  -  Restart low dose Bumex   - continue to hold losartan    Acute on chronic systolic heart failure: Last known EF 45 to 50%.  -  PO Bumex started, 3+ pitting edema   - Intake/output.  - Fluid restriction.     Rash   - Likely medication induced vs contact dermatitis   - Benadryl for itching. Lotion for comfort.     Resolved Conditions  Hyperkalemia - potassium 5.7; Insulin/dextrose/Lokelma was ordered. Most recent 3.7; Will continue to monitor with daily BMP.     Chronic Conditions (reviewed and stable unless otherwise stated)  COPD: patient had exacerbation while in the ED. Given Solumedrol. Steroid inhaler

## 2025-02-02 NOTE — PLAN OF CARE
Problem: Respiratory - Adult  Goal: Clear lung sounds  Description: Clear lung sounds  2/1/2025 1851 by Mary Michaels, AROLDO  Outcome: Progressing  Note: Txs to help improve lung aeration. Patient mutually agreed on goals.       Problem: Respiratory - Adult  Goal: Achieves optimal ventilation and oxygenation  Outcome: Progressing  Flowsheets (Taken 2/2/2025 0027)  Achieves optimal ventilation and oxygenation:   Assess for changes in respiratory status   Position to facilitate oxygenation and minimize respiratory effort   Oxygen supplementation based on oxygen saturation or arterial blood gases   Assess for changes in mentation and behavior     Problem: Chronic Conditions and Co-morbidities  Goal: Patient's chronic conditions and co-morbidity symptoms are monitored and maintained or improved  Outcome: Progressing  Flowsheets (Taken 2/2/2025 0027)  Care Plan - Patient's Chronic Conditions and Co-Morbidity Symptoms are Monitored and Maintained or Improved: Monitor and assess patient's chronic conditions and comorbid symptoms for stability, deterioration, or improvement     Problem: Discharge Planning  Goal: Discharge to home or other facility with appropriate resources  Outcome: Progressing  Flowsheets (Taken 2/2/2025 0027)  Discharge to home or other facility with appropriate resources:   Identify barriers to discharge with patient and caregiver   Identify discharge learning needs (meds, wound care, etc)   Refer to discharge planning if patient needs post-hospital services based on physician order or complex needs related to functional status, cognitive ability or social support system   Arrange for needed discharge resources and transportation as appropriate     Problem: Safety - Adult  Goal: Free from fall injury  Outcome: Progressing  Flowsheets (Taken 2/2/2025 0027)  Free From Fall Injury: Instruct family/caregiver on patient safety     Problem: ABCDS Injury Assessment  Goal: Absence of physical injury  Outcome:  stability and optimal renal function maintained  Outcome: Progressing  Flowsheets (Taken 2/2/2025 0027)  Hemodynamic stability and optimal renal function maintained: Monitor intake, output and patient weight

## 2025-02-02 NOTE — CONSULTS
CONSULTATION NOTE :ID       Patient - Portillo Daly,  Age - 84 y.o.    - 1940      Room Number - 3B-38/038-A   N -  [id number]   [facility] # - [id number]  Date of Admission -  2025  8:08 PM  Patient's PCP: Elinor Ly APRN - CNP     Requesting Physician: Kayleen Jesus PA    REASON FOR CONSULTATION   Pseudomonas bacteremia  CHIEF COMPLAINT   Shortness of breath    HISTORY OF PRESENT ILLNESS       This is a very pleasant 84 y.o. male who was admitted to the hospital with a chief complaints of shortness of breath. He came from home. He lives with a friend. He has hx of COPD on oxygen presented with progressive shortness of braeth with fever and chills. He was noted to have atelectasis /infiltrate and positive blood cx for pseudomonas. He is on iv cefepime. He has hx of CKD,diabetes CAD and TAVR.he reports of swelling of the extremites and weakness. He would like to be stronger before  he goes home.he has no chest pain, has cough with phlegm. No hemoptysis.    PAST MEDICAL  HISTORY       Past Medical History:   Diagnosis Date    MILLY (acute kidney injury) ([facility])     Atrial fibrillation ([facility])     Cerebral artery occlusion with cerebral infarction ([facility])     CHF (congestive heart failure), NYHA class III, acute on chronic, combined ([facility])     COPD (chronic obstructive pulmonary disease) ([facility]) 8/3/2020    Coronary artery disease involving native coronary artery of native heart with angina pectoris ([facility])     Diabetes mellitus, type 2 ([facility]) 2020    Hyperlipidemia 2012    Hypertension     Pneumonia        PAST SURGICAL HISTORY     Past Surgical History:   Procedure Laterality Date    AORTIC VALVE REPLACEMENT      CARDIAC SURGERY      heart cath    CORONARY ANGIOPLASTY WITH STENT PLACEMENT      EP DEVICE PROCEDURE N/A 2024    Remove & replace PPM gen dual lead performed by Dane Garcia MD at [facility] [place] CARDIAC CATH LAB    HERNIA REPAIR      OTHER SURGICAL  \"ALKPHOS\", \"ALT\", \"AST\", \"BILITOT\", \"BILIDIR\", \"LABALBU\" in the last 72 hours.    Invalid input(s): \"PROT\"  Amylase and Lipase:No results for input(s): \"LACTA\", \"AMYLASE\" in the last 72 hours.  Lactic Acid: No results for input(s): \"LACTA\" in the last 72 hours.  Troponin: No results for input(s): \"CKTOTAL\", \"CKMB\", \"TROPONINI\" in the last 72 hours.  BNP: No results for input(s): \"BNP\" in the last 72 hours.  Lipids: No results for input(s): \"CHOL\", \"TRIG\", \"HDL\", \"LDL\" in the last 72 hours.    Invalid input(s): \"LDLCALC\"  ABGs: No results found for: \"PHART\", \"PO2ART\", \"XLZ9PCP\", \"CRC8EMI\", \"BEART\"    Cultures:      CXR:       UA: No results for input(s): \"SPECGRAV\", \"PHUR\", \"COLORU\", \"CLARITYU\", \"MUCUS\", \"PROTEINU\", \"BLOODU\", \"RBCUA\", \"WBCUA\", \"BACTERIA\", \"NITRU\", \"GLUCOSEU\", \"BILIRUBINUR\", \"UROBILINOGEN\", \"KETUA\", \"LABCAST\", \"LABCASTTY\", \"AMORPHOS\" in the last 72 hours.    Invalid input(s): \"CRYSTALS\"      IMAGING:    Micro:   Lab Results   Component Value Date/Time    BC No growth 24 hours. 01/29/2025 09:02 PM    BC  01/29/2025 09:02 PM     sensitivity done-previous specimen Refer to blood culture collected on 01/29/25 at 20:31 for sensitivity results.       Problem list of patient      Patient Active Problem List   Diagnosis Code    Atrial fibrillation with RVR ([facility]) I48.91    Acute respiratory failure with hypoxia J96.01    Acute kidney injury superimposed on stage 4 chronic kidney disease ([facility]) N17.9, N18.4    CKD (chronic kidney disease) stage 3, GFR 30-59 ml/min ([facility]) N18.30    Hyponatremia E87.1    Aortic stenosis, severe I35.0    Coronary artery disease involving native coronary artery of native heart without angina pectoris I25.10    CHF (congestive heart failure), NYHA class III, acute on chronic, combined ([facility]) I50.43    Cardiomyopathy ([facility]) I42.9    Hypotension I95.9    Metabolic acidosis E87.20    Sepsis without acute organ dysfunction ([facility]) A41.9    Essential hypertension I10    CKD (chronic kidney

## 2025-02-02 NOTE — PROGRESS NOTES
Patient refused straight cath at this time.  I have discussed the risks and benefits of this examination with him. The patient verbalizes understanding.

## 2025-02-02 NOTE — PROGRESS NOTES
Warfarin Pharmacy Consult Note    Warfarin Indication: Atrial fibrillation/Atrial flutter  Target INR: 2.0-3.0  Dose prior to admission: 2.5 mg MF, 1.25 mg TWThSS    Recent Labs     01/31/25  0441 02/01/25  0658 02/02/25  0713   HGB 10.2* 11.2* 11.4*    137 118*     Recent Labs     01/31/25  0441 02/01/25  0658 02/02/25  0713   INR 2.34* 2.14* 2.96*     Concurrent anticoagulants/antiplatelets: none  Significant warfarin drug-drug interactions: doxycycline IV, melatonin (HM)     Date INR Warfarin Dose   1/30/2025 3.49 No Coumadin    1/31/2025 2.34  1.25 mg     2/1/2025  2.14 2 mg     2/2/2025  2.96 0.5 mg                               Monitoring:                   INR will be monitored daily.    **Please contact pharmacy for discharge instructions when indicated**    Sienna Roblero, PharmD, BCPS  2/2/2025  9:58 AM

## 2025-02-03 PROBLEM — R78.81 BACTEREMIA: Status: ACTIVE | Noted: 2025-02-03

## 2025-02-03 LAB
ACANTHOCYTES: ABNORMAL
ANION GAP SERPL CALC-SCNC: 19 MEQ/L (ref 8–16)
BASOPHILS ABSOLUTE: 0.1 THOU/MM3 (ref 0–0.1)
BASOPHILS NFR BLD AUTO: 0.4 %
BUN SERPL-MCNC: 50 MG/DL (ref 7–22)
BURR CELLS BLD QL SMEAR: ABNORMAL
CALCIUM SERPL-MCNC: 8.8 MG/DL (ref 8.5–10.5)
CHLORIDE SERPL-SCNC: 103 MEQ/L (ref 98–111)
CO2 SERPL-SCNC: 18 MEQ/L (ref 23–33)
CREAT SERPL-MCNC: 2.8 MG/DL (ref 0.4–1.2)
DEPRECATED RDW RBC AUTO: 63.9 FL (ref 35–45)
EOSINOPHIL NFR BLD AUTO: 1.8 %
EOSINOPHILS ABSOLUTE: 0.5 THOU/MM3 (ref 0–0.4)
ERYTHROCYTE [DISTWIDTH] IN BLOOD BY AUTOMATED COUNT: 16.4 % (ref 11.5–14.5)
GFR SERPL CREATININE-BSD FRML MDRD: 21 ML/MIN/1.73M2
GLUCOSE BLD STRIP.AUTO-MCNC: 145 MG/DL (ref 70–108)
GLUCOSE BLD STRIP.AUTO-MCNC: 151 MG/DL (ref 70–108)
GLUCOSE BLD STRIP.AUTO-MCNC: 154 MG/DL (ref 70–108)
GLUCOSE BLD STRIP.AUTO-MCNC: 163 MG/DL (ref 70–108)
GLUCOSE SERPL-MCNC: 185 MG/DL (ref 70–108)
HCT VFR BLD AUTO: 36.9 % (ref 42–52)
HGB BLD-MCNC: 11.3 GM/DL (ref 14–18)
IMM GRANULOCYTES # BLD AUTO: 0.5 THOU/MM3 (ref 0–0.07)
IMM GRANULOCYTES NFR BLD AUTO: 1.8 %
INR PPP: 3.1 (ref 0.85–1.13)
LYMPHOCYTES ABSOLUTE: 0.6 THOU/MM3 (ref 1–4.8)
LYMPHOCYTES NFR BLD AUTO: 2.3 %
MCH RBC QN AUTO: 32.4 PG (ref 26–33)
MCHC RBC AUTO-ENTMCNC: 30.6 GM/DL (ref 32.2–35.5)
MCV RBC AUTO: 105.7 FL (ref 80–94)
MONOCYTES ABSOLUTE: 0.9 THOU/MM3 (ref 0.4–1.3)
MONOCYTES NFR BLD AUTO: 3.1 %
NEUTROPHILS ABSOLUTE: 25.5 THOU/MM3 (ref 1.8–7.7)
NEUTROPHILS NFR BLD AUTO: 90.6 %
NRBC BLD AUTO-RTO: 0 /100 WBC
PLATELET # BLD AUTO: 112 THOU/MM3 (ref 130–400)
PLATELET BLD QL SMEAR: ABNORMAL
PMV BLD AUTO: 12.5 FL (ref 9.4–12.4)
POIKILOCYTES: ABNORMAL
POTASSIUM SERPL-SCNC: 3.2 MEQ/L (ref 3.5–5.2)
RBC # BLD AUTO: 3.49 MILL/MM3 (ref 4.7–6.1)
SCAN OF BLOOD SMEAR: NORMAL
SODIUM SERPL-SCNC: 140 MEQ/L (ref 135–145)
TOXIC GRANULES BLD QL SMEAR: PRESENT
WBC # BLD AUTO: 28.2 THOU/MM3 (ref 4.8–10.8)

## 2025-02-03 PROCEDURE — 82948 REAGENT STRIP/BLOOD GLUCOSE: CPT

## 2025-02-03 PROCEDURE — 99232 SBSQ HOSP IP/OBS MODERATE 35: CPT | Performed by: INTERNAL MEDICINE

## 2025-02-03 PROCEDURE — 85610 PROTHROMBIN TIME: CPT

## 2025-02-03 PROCEDURE — 2700000000 HC OXYGEN THERAPY PER DAY

## 2025-02-03 PROCEDURE — 6370000000 HC RX 637 (ALT 250 FOR IP): Performed by: INTERNAL MEDICINE

## 2025-02-03 PROCEDURE — 6370000000 HC RX 637 (ALT 250 FOR IP): Performed by: PHYSICIAN ASSISTANT

## 2025-02-03 PROCEDURE — 85025 COMPLETE CBC W/AUTO DIFF WBC: CPT

## 2025-02-03 PROCEDURE — 2140000000 HC CCU INTERMEDIATE R&B

## 2025-02-03 PROCEDURE — 51798 US URINE CAPACITY MEASURE: CPT

## 2025-02-03 PROCEDURE — 6360000002 HC RX W HCPCS

## 2025-02-03 PROCEDURE — 97162 PT EVAL MOD COMPLEX 30 MIN: CPT

## 2025-02-03 PROCEDURE — 6360000002 HC RX W HCPCS: Performed by: INTERNAL MEDICINE

## 2025-02-03 PROCEDURE — 80048 BASIC METABOLIC PNL TOTAL CA: CPT

## 2025-02-03 PROCEDURE — 2580000003 HC RX 258: Performed by: INTERNAL MEDICINE

## 2025-02-03 PROCEDURE — 99233 SBSQ HOSP IP/OBS HIGH 50: CPT | Performed by: PHYSICIAN ASSISTANT

## 2025-02-03 PROCEDURE — 6370000000 HC RX 637 (ALT 250 FOR IP)

## 2025-02-03 PROCEDURE — 2500000003 HC RX 250 WO HCPCS

## 2025-02-03 PROCEDURE — 6360000002 HC RX W HCPCS: Performed by: PHYSICIAN ASSISTANT

## 2025-02-03 PROCEDURE — 94640 AIRWAY INHALATION TREATMENT: CPT

## 2025-02-03 PROCEDURE — 36415 COLL VENOUS BLD VENIPUNCTURE: CPT

## 2025-02-03 PROCEDURE — 97110 THERAPEUTIC EXERCISES: CPT

## 2025-02-03 RX ORDER — POTASSIUM CHLORIDE 1500 MG/1
40 TABLET, EXTENDED RELEASE ORAL ONCE
Status: COMPLETED | OUTPATIENT
Start: 2025-02-03 | End: 2025-02-03

## 2025-02-03 RX ORDER — BUMETANIDE 0.25 MG/ML
2 INJECTION, SOLUTION INTRAMUSCULAR; INTRAVENOUS 2 TIMES DAILY
Status: DISCONTINUED | OUTPATIENT
Start: 2025-02-03 | End: 2025-02-09

## 2025-02-03 RX ADMIN — SODIUM CHLORIDE, PRESERVATIVE FREE 10 ML: 5 INJECTION INTRAVENOUS at 09:00

## 2025-02-03 RX ADMIN — CEFEPIME 1000 MG: 1 INJECTION, POWDER, FOR SOLUTION INTRAMUSCULAR; INTRAVENOUS at 03:30

## 2025-02-03 RX ADMIN — BUMETANIDE 2 MG: 0.25 INJECTION INTRAMUSCULAR; INTRAVENOUS at 18:21

## 2025-02-03 RX ADMIN — BUDESONIDE 500 MCG: 0.5 INHALANT RESPIRATORY (INHALATION) at 21:23

## 2025-02-03 RX ADMIN — POTASSIUM CHLORIDE 40 MEQ: 1500 TABLET, EXTENDED RELEASE ORAL at 08:41

## 2025-02-03 RX ADMIN — CALCITRIOL CAPSULES 0.25 MCG 0.25 MCG: 0.25 CAPSULE ORAL at 13:29

## 2025-02-03 RX ADMIN — INSULIN GLARGINE 12 UNITS: 100 INJECTION, SOLUTION SUBCUTANEOUS at 11:49

## 2025-02-03 RX ADMIN — GUAIFENESIN, DEXTROMETHORPHAN HBR 1 TABLET: 600; 30 TABLET ORAL at 20:20

## 2025-02-03 RX ADMIN — HYDROXYZINE HYDROCHLORIDE 10 MG: 10 TABLET ORAL at 21:52

## 2025-02-03 RX ADMIN — Medication 3 MG: at 21:52

## 2025-02-03 RX ADMIN — TAMSULOSIN HYDROCHLORIDE 0.4 MG: 0.4 CAPSULE ORAL at 13:29

## 2025-02-03 RX ADMIN — IPRATROPIUM BROMIDE AND ALBUTEROL SULFATE 1 DOSE: .5; 3 SOLUTION RESPIRATORY (INHALATION) at 10:05

## 2025-02-03 RX ADMIN — BUDESONIDE 500 MCG: 0.5 INHALANT RESPIRATORY (INHALATION) at 10:05

## 2025-02-03 RX ADMIN — DIPHENHYDRAMINE HYDROCHLORIDE 25 MG: 50 INJECTION INTRAMUSCULAR; INTRAVENOUS at 03:28

## 2025-02-03 RX ADMIN — METOPROLOL SUCCINATE 100 MG: 100 TABLET, EXTENDED RELEASE ORAL at 08:41

## 2025-02-03 RX ADMIN — GUAIFENESIN, DEXTROMETHORPHAN HBR 1 TABLET: 600; 30 TABLET ORAL at 13:29

## 2025-02-03 RX ADMIN — PROCHLORPERAZINE EDISYLATE 10 MG: 5 INJECTION INTRAMUSCULAR; INTRAVENOUS at 00:57

## 2025-02-03 RX ADMIN — DIPHENHYDRAMINE HYDROCHLORIDE 25 MG: 50 INJECTION INTRAMUSCULAR; INTRAVENOUS at 15:35

## 2025-02-03 RX ADMIN — IPRATROPIUM BROMIDE AND ALBUTEROL SULFATE 1 DOSE: .5; 3 SOLUTION RESPIRATORY (INHALATION) at 01:27

## 2025-02-03 RX ADMIN — BUMETANIDE 2 MG: 0.25 INJECTION INTRAMUSCULAR; INTRAVENOUS at 08:41

## 2025-02-03 RX ADMIN — ATORVASTATIN CALCIUM 80 MG: 80 TABLET, FILM COATED ORAL at 20:20

## 2025-02-03 NOTE — PROGRESS NOTES
Assessment:  Portillo is a 84 year old male admitted on 3B for Sepsis medical issues. I attempted to see patient during my rounding on the unit. Patient is being tended to by nurse and medical staff. No encounter is made. I offered prayer at the door for healing and strength and proceeded to round on the unit.   Spiritual Health staff will follow up with patient for continue emotional and spiritual support. Spiritual Health staff service remain available to patient.

## 2025-02-03 NOTE — CARE COORDINATION
DISCHARGE PLANNING EVALUATION  2/3/25, 3:14 PM EST    Reason for Referral: Current with [facility].    Decision Maker: Patient is able to make his own decisions.    Current Services: [facility] unsure what services.    New Services Requested: none  Family/ Social/ Home environment: From home with his significant other Gina.  She takes care of the home and does all of the driving.  His niece Jenelle is his POA.   Payment Source:[employer]  Transportation at Discharge: family/friend  Post-acute (PAC) provider list was provided to patient. Patient was informed of their freedom to choose PAC provider. Discussed and offered to show the patient the relevant PAC Providers quality and resource use measures on Medicare Compare web site via computer based on patient's goals of care and treatment preferences. Questions regarding selection process were answered.      Teach Back Method used with Portillo regarding care plan and discharge planning.   Patient and his significant other Gina verbalized understanding of the plan of care and contribute to goal setting.       Patient preferences and discharge plan: Portillo plans to return home with his significant other and current [facility].  Sent a message to verify which disciplines he is current with.      Electronically signed by DEMIAN Mccurdy on 2/3/2025 at 3:14 PM

## 2025-02-03 NOTE — PROGRESS NOTES
[facility]   Nephrology progress note  2/3/2025, 9:57 AM      Pt Name:    Portillo Daly  MRN:     [id number]     YOB: 1940  Admit Date:    1/29/2025  8:08 PM    Chief Complaint: Nephrology following for MILLY/CKD IV    Subjective:  Patient was seen and examined this morning.  Overnight events reviewed with increasing SOB, CXR with pulmonary edema.   On 5 L NC this morning.        Objective:  24HR INTAKE/OUTPUT:    Intake/Output Summary (Last 24 hours) at 2/3/2025 0957  Last data filed at 2/3/2025 0854  Gross per 24 hour   Intake 350 ml   Output 920 ml   Net -570 ml         I/O last 3 completed shifts:  In: 850 [P.O.:850]  Out: 800 [Urine:800]  I/O this shift:  In: -   Out: 120 [Urine:120]   Admission weight: 95.1 kg (209 lb 10.5 oz)  Wt Readings from Last 3 Encounters:   02/03/25 96.7 kg (213 lb 3 oz)   01/27/25 95.3 kg (210 lb)   01/15/25 94.3 kg (208 lb)        Vitals :   Vitals:    02/02/25 2345 02/03/25 0127 02/03/25 0323 02/03/25 0830   BP: (!) 146/66  (!) 148/54 (!) 142/53   Pulse: 74 70 70 70   Resp: 20 20 20 22   Temp: 98.3 °F (36.8 °C)  98.5 °F (36.9 °C) 98.2 °F (36.8 °C)   TempSrc: Oral  Oral Oral   SpO2: 95% 95% 92% 98%   Weight:   96.7 kg (213 lb 3 oz)    Height:           Physical examination  General Appearance: alert and cooperative with exam, appears comfortable, no distress  Mouth/Throat: Oral mucosa moist  Neck: No JVD  Lungs: faint rales at bases  Heart:  S1, S2 heard  GI: soft, non-tender, no guarding  Extremities: + LE edema    Medications:  Infusion:    sodium chloride      dextrose       Meds:    dextromethorphan-guaiFENesin  1 tablet Oral BID    bumetanide  2 mg IntraVENous BID    cefepime  1,000 mg IntraVENous Q12H    tamsulosin  0.4 mg Oral Daily    [Held by provider] bumetanide  1 mg Oral Daily    sodium chloride flush  5-40 mL IntraVENous 2 times per day    atorvastatin  80 mg Oral Nightly    budesonide  500 mcg Nebulization BID    calcitRIOL  0.25 mcg

## 2025-02-03 NOTE — SIGNIFICANT EVENT
Received message from nursing that patient is more short of breath and has crackles throughout lung fields. ABG showed PO2 of 51 and CXR showed acute CHF. Added Bumex 1 mg and bipap.    After administration of bumex patient has urinated several times and feels a lot better now. Will D/C bipap for now, but will add again if patient decompensates later.

## 2025-02-03 NOTE — PROGRESS NOTES
[facility]  INPATIENT PHYSICAL THERAPY  EVALUATION  [facility] CCU-STEPDOWN 3B - 3B-38/038-A    Discharge Recommendations: Continue to assess pending progress  Equipment Recommendations: No             Time In: 08  Time Out: 915  Timed Code Treatment Minutes: 10 Minutes  Minutes: 20     Date: 2/3/2025  Patient Name: Portillo Daly,  Gender:  male        MRN: [id number]  : 1940  (84 y.o.)      Referring Practitioner: Kayleen Jesus PA  Diagnosis: Sepsis (HCC)  Additional Pertinent Hx: per chart review: Portillo Daly is a 83 yo obese male that was admitted due to acute respiratory failure secondary to pneumonia and sepsis. Patient drowsy during exam. He does state that he is feeling better now and not as short of breath as he was last night. Patient lying down in hospital bed. Patient does complain of continual itching but states is better after lotion application.  He denies any shortness of breath, chest pain, abdominal pain at this time.     Restrictions/Precautions:  Restrictions/Precautions: Fall Risk, Contact Precautions     Subjective:  Chart Reviewed: Yes  Patient assessed for rehabilitation services?: Yes  Subjective: Nurse approved visit.  Patient was cooperative initially and participated with ROM in supine and social history and then refused to get OOB.  PT plans to re-assess tomorrow to bed mobility, transfers, and gait.    General:  Orientation Level: Oriented X4  Vision: Impaired  Vision Exceptions: Wears glasses at all times  Hearing: Within functional limits     Pain: 0/10: denies c/o pain    Vitals: Vitals not assessed per clinical judgement, see nursing flowsheet    Social/Functional History:    Lives With: Significant other  Type of Home: Trailer  Home Layout: One level  Home Access: Stairs to enter with rails  Entrance Stairs - Number of Steps: 5 with (B) rails but unable to reach both at the same time  Entrance Stairs - Rails: Both  Home Equipment: Oxygen (2L baseline)

## 2025-02-03 NOTE — CARE COORDINATION
2/3/25, 3:13 PM EST    DISCHARGE ON GOING EVALUATION    [facility] day: 5  Location: [address] [place], [place] [address]A Reason for admit: Hypoxemia [R09.02]  Sepsis (HCC) [A41.9]     Procedures: none    Imaging since last note:   2/2 CXR: Acute CHF pattern     Barriers to Discharge: Required bipap overnight and received 1 x dose of IV bumex. Weaned to 5L NC. New order for bumx bid. Nebs. IV cefepime. Mucinex.     PCP: Elinor Ly APRN - CNP  Readmission Risk Score: 27    Patient Goals/Plan/Treatment Preferences: From home with s/o. Has home O2 from [facility]. Current with [facility].

## 2025-02-03 NOTE — PROGRESS NOTES
Warfarin Pharmacy Consult Note    Warfarin Indication: Atrial fibrillation  Target INR: 2.0-3.0  Dose prior to admission: Coumadin 2.5 mg MF, 1.25 mg TWThSS     Recent Labs     02/01/25  0658 02/02/25  0713 02/03/25  0616   HGB 11.2* 11.4* 11.3*    118* 112*     Recent Labs     02/01/25  0658 02/02/25  0713 02/03/25  0616   INR 2.14* 2.96* 3.10*     Concurrent anticoagulants/antiplatelets: none  Significant warfarin drug-drug interactions:  melatonin (HM)     Date INR Warfarin Dose   1/30/2025 3.49 No Coumadin    1/31/2025 2.34  1.25 mg     2/1/2025  2.14 2 mg     2/2/2025  2.96 0.5 mg    2/3/2025  3.10  No Coumadin                       Monitoring:                   INR will be monitored daily.    **Please contact pharmacy for discharge instructions when indicated**    Sugey Matthews, PharmD 2/3/2025 10:15 AM

## 2025-02-03 NOTE — PROGRESS NOTES
Occupational Therapy  [facility]  OCCUPATIONAL THERAPY MISSED TREATMENT NOTE  [facility] CCU-STEPDOWN 3B  3B-38/038-A      Date: 2/3/2025  Patient Name: Portillo Daly        CSN: [id number]   : 1940  (84 y.o.)  Gender: male                REASON FOR MISSED TREATMENT:  Per physical therapy, pt refused to get OOB for therapy eval this AM. On 2nd attempt, pt with poor respiratory status. RN requesting hold at this time. Will attempt again next tx date.

## 2025-02-03 NOTE — PROGRESS NOTES
Hospitalist Progress Note      Patient:  Portillo Daly 84 y.o. male     Unit/Bed:-[facility]-A    Date of Admission: 1/29/2025      ASSESSMENT AND PLAN    Active Problems  Acute hypoxemic respiratory failure:   - 2/2 Pseudomonas PNA & Pulmonary Edema   - Initially on HFNC was transitioned to 6L NC then to baseline of 3L at rest and 6L with activity. Patient was placed on Bipap over night for worsening shortness of breath most likely due to pulmonary edema. Patient now on 6L at rest.  - new CXR completed last night after worsening SOB, CXR personally reviewed by this provider; worsened from previous; pleural effusions noted with CHF pattern.   -pO2 was improving; most recent pO2 61 most likely due to fluid overload  - IS & Acapella.     Pseudomonas, Bacterial PNA   - ID consulted, continue Cefepime.   -  PNA panel growing Pseudomonas. Continue Cefepime.     Sepsis 2/2 Pseudomonas Bacteremia   - BC 2 of 2 growing Pseudomonas. IV Cefepime ordered.   -Leukocytosis; 29.7,25.3,17.3, with spike today 28.2; Patient afebrile and vitals stable.   - Will draw CBC in AM    Acute on chronic renal failure:   - Nephrology notes reviewed. Creatinine has greatly improved at 2.8 Patient baseline of  2.5-3.0. Will continue to monitor.     - Nephrology believes this is ATN 2/2 hypotension    - Renal ultrasound reviewed with no acute findings  -  IV Bumex 2mg BID   - continue to hold losartan  - over 400 cc noted on bladder scan per nurse. Kitchen Cath order placed.   - Continue to monitor UOP     Acute on chronic systolic heart failure: Last known EF 45 to 50%.  -  PO Bumex started, 1+ pitting edema improved from yesterday.   - Intake/output.  - Fluid restriction.     Hypokalemia   - Potassium 3.2 due to diuretics.   - Potassium 40 mEq once per nephrology     Rash   - Likely medication induced vs contact dermatitis   - Benadryl for itching. Lotion for comfort.     Resolved Conditions  Hyperkalemia - potassium 5.7;

## 2025-02-03 NOTE — PROGRESS NOTES
Progress note: Infectious diseases    Patient - Portillo Daly,  Age - 84 y.o.    - 1940      Room Number - 3B-38/038-A   MRN -  [id number]   [facility] # - [id number]  Date of Admission -  2025  8:08 PM    SUBJECTIVE:   Patient seen and evaluated at bedside this morning.  Overnight patient had increasing shortness of breath and crackles identified on PE.  CXR was obtained that showed concerns for CHF and patient was started on diuretics.  Patient has a increasing oxygen requirements as well.  This morning patient reports overall improvement in respiratory status.  He reports ongoing productive cough with grayish sputum and noted some shakes overnight.  He denied any chills, nausea, vomiting, chest pain, shortness of breath, dysuria, abdominal pain.    OBJECTIVE   VITALS    height is 1.702 m (5' 7\") and weight is 96.7 kg (213 lb 3 oz). His oral temperature is 98.5 °F (36.9 °C). His blood pressure is 148/54 (abnormal) and his pulse is 70. His respiration is 20 and oxygen saturation is 92%.       Wt Readings from Last 3 Encounters:   25 96.7 kg (213 lb 3 oz)   25 95.3 kg (210 lb)   01/15/25 94.3 kg (208 lb)       I/O (24 Hours)    Intake/Output Summary (Last 24 hours) at 2/3/2025 0814  Last data filed at 2/3/2025 0323  Gross per 24 hour   Intake 350 ml   Output 800 ml   Net -450 ml       General Appearance  Awake, alert, oriented,  not  In acute distress  HEENT - normocephalic, atraumatic, pink conjunctiva,  anicteric sclera  Neck - Supple, no mass  Lungs -bilateral crackles with decreased breath sounds.  Cardiovascular - Heart sounds are normal.  Regular rate and rhythm without murmur, gallop or rub.  Abdomen - soft, not distended, nontender,   Neurologic -A&O x4.  Skin - has petchial rashes with scratch marks  Extremities - Positive for edema.  No cyanosis, clubbing     MEDICATIONS:

## 2025-02-03 NOTE — PLAN OF CARE
Problem: Respiratory - Adult  Goal: Achieves optimal ventilation and oxygenation  Outcome: Progressing  Flowsheets (Taken 2/2/2025 2242)  Achieves optimal ventilation and oxygenation:   Assess for changes in respiratory status   Position to facilitate oxygenation and minimize respiratory effort   Assess for changes in mentation and behavior   Oxygen supplementation based on oxygen saturation or arterial blood gases     Problem: Chronic Conditions and Co-morbidities  Goal: Patient's chronic conditions and co-morbidity symptoms are monitored and maintained or improved  Outcome: Progressing  Flowsheets (Taken 2/2/2025 2242)  Care Plan - Patient's Chronic Conditions and Co-Morbidity Symptoms are Monitored and Maintained or Improved:   Monitor and assess patient's chronic conditions and comorbid symptoms for stability, deterioration, or improvement   Collaborate with multidisciplinary team to address chronic and comorbid conditions and prevent exacerbation or deterioration     Problem: Discharge Planning  Goal: Discharge to home or other facility with appropriate resources  Outcome: Progressing  Flowsheets (Taken 2/2/2025 2242)  Discharge to home or other facility with appropriate resources:   Identify barriers to discharge with patient and caregiver   Identify discharge learning needs (meds, wound care, etc)   Refer to discharge planning if patient needs post-hospital services based on physician order or complex needs related to functional status, cognitive ability or social support system   Arrange for needed discharge resources and transportation as appropriate     Problem: Safety - Adult  Goal: Free from fall injury  Outcome: Progressing  Flowsheets (Taken 2/2/2025 2242)  Free From Fall Injury: Instruct family/caregiver on patient safety     Problem: ABCDS Injury Assessment  Goal: Absence of physical injury  Outcome: Progressing  Flowsheets (Taken 2/2/2025 2242)  Absence of Physical Injury: Implement safety

## 2025-02-04 LAB
ANION GAP SERPL CALC-SCNC: 15 MEQ/L (ref 8–16)
BUN SERPL-MCNC: 53 MG/DL (ref 7–22)
CALCIUM SERPL-MCNC: 8.2 MG/DL (ref 8.5–10.5)
CHLORIDE SERPL-SCNC: 104 MEQ/L (ref 98–111)
CO2 SERPL-SCNC: 19 MEQ/L (ref 23–33)
CREAT SERPL-MCNC: 2.9 MG/DL (ref 0.4–1.2)
GFR SERPL CREATININE-BSD FRML MDRD: 20 ML/MIN/1.73M2
GLUCOSE BLD STRIP.AUTO-MCNC: 110 MG/DL (ref 70–108)
GLUCOSE BLD STRIP.AUTO-MCNC: 112 MG/DL (ref 70–108)
GLUCOSE BLD STRIP.AUTO-MCNC: 118 MG/DL (ref 70–108)
GLUCOSE BLD STRIP.AUTO-MCNC: 118 MG/DL (ref 70–108)
GLUCOSE BLD STRIP.AUTO-MCNC: 98 MG/DL (ref 70–108)
GLUCOSE SERPL-MCNC: 103 MG/DL (ref 70–108)
INR PPP: 4.8 (ref 0.85–1.13)
POTASSIUM SERPL-SCNC: 3.8 MEQ/L (ref 3.5–5.2)
SODIUM SERPL-SCNC: 138 MEQ/L (ref 135–145)

## 2025-02-04 PROCEDURE — 85610 PROTHROMBIN TIME: CPT

## 2025-02-04 PROCEDURE — 97166 OT EVAL MOD COMPLEX 45 MIN: CPT

## 2025-02-04 PROCEDURE — 97535 SELF CARE MNGMENT TRAINING: CPT

## 2025-02-04 PROCEDURE — 2140000000 HC CCU INTERMEDIATE R&B

## 2025-02-04 PROCEDURE — 2500000003 HC RX 250 WO HCPCS

## 2025-02-04 PROCEDURE — 6360000002 HC RX W HCPCS

## 2025-02-04 PROCEDURE — 99232 SBSQ HOSP IP/OBS MODERATE 35: CPT | Performed by: INTERNAL MEDICINE

## 2025-02-04 PROCEDURE — 6360000002 HC RX W HCPCS: Performed by: INTERNAL MEDICINE

## 2025-02-04 PROCEDURE — 6370000000 HC RX 637 (ALT 250 FOR IP): Performed by: INTERNAL MEDICINE

## 2025-02-04 PROCEDURE — 94640 AIRWAY INHALATION TREATMENT: CPT

## 2025-02-04 PROCEDURE — 2700000000 HC OXYGEN THERAPY PER DAY

## 2025-02-04 PROCEDURE — 6370000000 HC RX 637 (ALT 250 FOR IP): Performed by: PHYSICIAN ASSISTANT

## 2025-02-04 PROCEDURE — 80048 BASIC METABOLIC PNL TOTAL CA: CPT

## 2025-02-04 PROCEDURE — P9047 ALBUMIN (HUMAN), 25%, 50ML: HCPCS | Performed by: INTERNAL MEDICINE

## 2025-02-04 PROCEDURE — 6370000000 HC RX 637 (ALT 250 FOR IP)

## 2025-02-04 PROCEDURE — 82948 REAGENT STRIP/BLOOD GLUCOSE: CPT

## 2025-02-04 PROCEDURE — 36415 COLL VENOUS BLD VENIPUNCTURE: CPT

## 2025-02-04 RX ORDER — MIDODRINE HYDROCHLORIDE 10 MG/1
10 TABLET ORAL 2 TIMES DAILY WITH MEALS
Status: DISCONTINUED | OUTPATIENT
Start: 2025-02-04 | End: 2025-02-05

## 2025-02-04 RX ORDER — METOPROLOL SUCCINATE 25 MG/1
25 TABLET, EXTENDED RELEASE ORAL DAILY
Status: DISCONTINUED | OUTPATIENT
Start: 2025-02-05 | End: 2025-02-18 | Stop reason: HOSPADM

## 2025-02-04 RX ORDER — ALBUMIN (HUMAN) 12.5 G/50ML
25 SOLUTION INTRAVENOUS ONCE
Status: COMPLETED | OUTPATIENT
Start: 2025-02-04 | End: 2025-02-04

## 2025-02-04 RX ORDER — LEVOFLOXACIN 500 MG/1
500 TABLET, FILM COATED ORAL
Status: COMPLETED | OUTPATIENT
Start: 2025-02-04 | End: 2025-02-06

## 2025-02-04 RX ORDER — METOLAZONE 5 MG/1
5 TABLET ORAL ONCE
Status: COMPLETED | OUTPATIENT
Start: 2025-02-04 | End: 2025-02-04

## 2025-02-04 RX ORDER — DIPHENHYDRAMINE HYDROCHLORIDE, ZINC ACETATE 2; .1 G/100G; G/100G
CREAM TOPICAL 3 TIMES DAILY PRN
Status: DISCONTINUED | OUTPATIENT
Start: 2025-02-04 | End: 2025-02-18 | Stop reason: HOSPADM

## 2025-02-04 RX ORDER — MIDODRINE HYDROCHLORIDE 5 MG/1
5 TABLET ORAL ONCE
Status: COMPLETED | OUTPATIENT
Start: 2025-02-04 | End: 2025-02-04

## 2025-02-04 RX ORDER — MIDODRINE HYDROCHLORIDE 5 MG/1
5 TABLET ORAL 2 TIMES DAILY WITH MEALS
Status: DISCONTINUED | OUTPATIENT
Start: 2025-02-04 | End: 2025-02-04

## 2025-02-04 RX ADMIN — BUDESONIDE 500 MCG: 0.5 INHALANT RESPIRATORY (INHALATION) at 21:29

## 2025-02-04 RX ADMIN — SODIUM CHLORIDE, PRESERVATIVE FREE 10 ML: 5 INJECTION INTRAVENOUS at 20:34

## 2025-02-04 RX ADMIN — POTASSIUM BICARBONATE 20 MEQ: 782 TABLET, EFFERVESCENT ORAL at 16:52

## 2025-02-04 RX ADMIN — LEVOFLOXACIN 500 MG: 500 TABLET, FILM COATED ORAL at 10:28

## 2025-02-04 RX ADMIN — SODIUM CHLORIDE, PRESERVATIVE FREE 10 ML: 5 INJECTION INTRAVENOUS at 10:32

## 2025-02-04 RX ADMIN — BUDESONIDE 500 MCG: 0.5 INHALANT RESPIRATORY (INHALATION) at 06:04

## 2025-02-04 RX ADMIN — ALBUMIN (HUMAN) 25 G: 0.25 INJECTION, SOLUTION INTRAVENOUS at 12:05

## 2025-02-04 RX ADMIN — GUAIFENESIN, DEXTROMETHORPHAN HBR 1 TABLET: 600; 30 TABLET ORAL at 20:30

## 2025-02-04 RX ADMIN — ATORVASTATIN CALCIUM 80 MG: 80 TABLET, FILM COATED ORAL at 20:30

## 2025-02-04 RX ADMIN — INSULIN GLARGINE 12 UNITS: 100 INJECTION, SOLUTION SUBCUTANEOUS at 10:27

## 2025-02-04 RX ADMIN — MIDODRINE HYDROCHLORIDE 10 MG: 10 TABLET ORAL at 18:54

## 2025-02-04 RX ADMIN — MIDODRINE HYDROCHLORIDE 5 MG: 5 TABLET ORAL at 15:21

## 2025-02-04 RX ADMIN — MIDODRINE HYDROCHLORIDE 5 MG: 5 TABLET ORAL at 12:06

## 2025-02-04 RX ADMIN — CALCITRIOL CAPSULES 0.25 MCG 0.25 MCG: 0.25 CAPSULE ORAL at 10:27

## 2025-02-04 RX ADMIN — METOLAZONE 5 MG: 5 TABLET ORAL at 18:55

## 2025-02-04 RX ADMIN — IPRATROPIUM BROMIDE AND ALBUTEROL SULFATE 1 DOSE: .5; 3 SOLUTION RESPIRATORY (INHALATION) at 10:45

## 2025-02-04 RX ADMIN — BUMETANIDE 2 MG: 0.25 INJECTION INTRAMUSCULAR; INTRAVENOUS at 16:52

## 2025-02-04 RX ADMIN — GUAIFENESIN, DEXTROMETHORPHAN HBR 1 TABLET: 600; 30 TABLET ORAL at 10:27

## 2025-02-04 RX ADMIN — DIPHENHYDRAMINE HYDROCHLORIDE, ZINC ACETATE: 2; .1 CREAM TOPICAL at 11:03

## 2025-02-04 NOTE — PROGRESS NOTES
Pharmacy Medication History Note    List of current medications patient is taking is complete.    Source of information: Patient, patient wife, and fill history    Changes made to medication list:  Medications removed:  Zinc - not taking per patient    Medications adjusted:  insulin glargine from 20 units daily to 16 units daily     Other notes:  Duoneb solution is a new RX that was just mailed to patient's home but was not started prior to admission.   Home warfarin dosing was 2.5 mg 1/2 tab on MF, whole tab rest of the week.  Was previously on azithromycin 250 mg 1 tab PO daily. Last dispensed 11/16/24 for 90 DS but the patient was told to stop taking (unsure of exact stop date).  Denies use of other OTC or herbal medications.    Allergies reviewed    Electronically signed by Gayatri Edwards on 2/4/2025 at 11:23 AM

## 2025-02-04 NOTE — PROGRESS NOTES
Progress note: Infectious diseases    Patient - Portillo Daly,  Age - 84 y.o.    - 1940      Room Number - 3B-38/038-A   MRN -  [id number]   [facility] # - [id number]  Date of Admission -  2025  8:08 PM    SUBJECTIVE:   The rash on the legs is less,  OBJECTIVE   VITALS    height is 1.702 m (5' 7\") and weight is 96.7 kg (213 lb 3 oz). His oral temperature is 97.3 °F (36.3 °C). His blood pressure is 112/59 (abnormal) and his pulse is 68. His respiration is 20 and oxygen saturation is 94%.       Wt Readings from Last 3 Encounters:   25 96.7 kg (213 lb 3 oz)   25 95.3 kg (210 lb)   01/15/25 94.3 kg (208 lb)       I/O (24 Hours)    Intake/Output Summary (Last 24 hours) at 2025 0850  Last data filed at 2025 0545  Gross per 24 hour   Intake 2250 ml   Output 1470 ml   Net 780 ml       General Appearance  Awake, alert, oriented,     HEENT - normocephalic, atraumatic, pink conjunctiva,  anicteric sclera  Neck - Supple, no mass  Lungs -  Bilateral   air entry,  diminished breath sound  Cardiovascular - Heart sounds are normal.     Abdomen - soft, not distended, nontender,   Neurologic -orietned  Skin - maculopapular rash  Extremities - + edema, no cyanosis, clubbing     MEDICATIONS:      metOLazone  5 mg Oral Once    potassium bicarb-citric acid  20 mEq Oral Once    levoFLOXacin  500 mg Oral Q48H    dextromethorphan-guaiFENesin  1 tablet Oral BID    bumetanide  2 mg IntraVENous BID    tamsulosin  0.4 mg Oral Daily    [Held by provider] bumetanide  1 mg Oral Daily    sodium chloride flush  5-40 mL IntraVENous 2 times per day    atorvastatin  80 mg Oral Nightly    budesonide  500 mcg Nebulization BID    calcitRIOL  0.25 mcg Oral Daily    insulin glargine  12 Units SubCUTAneous QAM    [Held by provider] losartan  25 mg Oral Daily    metoprolol succinate  100 mg Oral Daily    warfarin placeholder: dosing by

## 2025-02-04 NOTE — PROGRESS NOTES
[facility]  PHYSICAL THERAPY MISSED TREATMENT NOTE  STRZ CCU-STEPDOWN 3B    Date: 2025  Patient Name: Portillo Daly        MRN: [id number]   : 1940  (84 y.o.)  Gender: male   Referring Practitioner: Kayleen Jesus PA            REASON FOR MISSED TREATMENT:  Patient sitting up in chair but refused physical therapy this date saying he could not do therapy because he has a catheter.  PT explained that was not a barrier to therapy and could easily be moved with patient.  Patient could not be convinced.  Will check back when able .

## 2025-02-04 NOTE — PROGRESS NOTES
pacemaker, continue with metoprolol and Coumadin.    LDA: []CVC / []PICC / []Midline / []Kitchen / []Drains / []Mediport / [x]None  Antibiotics: Cefepime and Doxycycline  Steroids: Pulmicort  Labs (still needed?): [x]Yes / []No  IVF (still needed?): [x]Yes / []No    Level of care: [x]Step Down / []Med-Surg  Bed Status: [x]Inpatient / []Observation  Telemetry: [x]Yes / []No  PT/OT: []Yes / [x]No    DVT Prophylaxis: [] Lovenox / [] Heparin / [] SCDs / [x] Already on Systemic Anticoagulation / [] None   Code status: Limited     Expected discharge date:  Unknown  Disposition: Home     ===================================================================    Chief Complaint: Acute hypoxemic respiratory failure    Subjective (past 24 hours): No acute events overnight. Pt states that he is very fatigued today. Pt was sitting in the chair. Pt was more SOB sitting today.       HPI / Hospital Course:  Portillo Daly is a 83 yo obese male that was admitted due to acute respiratory failure secondary to pneumonia and sepsis. Patient drowsy during exam. He does state that he is feeling better now and not as short of breath as he was last night. Patient lying down in hospital bed. Patient does complain of continual itching but states is better after lotion application.  He denies any shortness of breath, chest pain, abdominal pain at this time.     2/3: Patient became more short of breath over night. Bumex 1mg was given and patient was placed on Bipap. Patient INAD at this time. Patient drowsy during exam. He does state that he is feeling better now and not as short of breath as he was last night. Patient lying down in hospital bed. Patient does complain of continual itching but states is better after lotion application. Patient denies any shortness of breath, chest pain, or abdominal pain.      Medications:    Infusion Medications    sodium chloride      dextrose      Scheduled Medications    metOLazone  5 mg Oral Once    levoFLOXacin   500 mg Oral Q48H    [START ON 2/5/2025] metoprolol succinate  25 mg Oral Daily    midodrine  10 mg Oral BID WC    dextromethorphan-guaiFENesin  1 tablet Oral BID    bumetanide  2 mg IntraVENous BID    [Held by provider] tamsulosin  0.4 mg Oral Daily    [Held by provider] bumetanide  1 mg Oral Daily    sodium chloride flush  5-40 mL IntraVENous 2 times per day    atorvastatin  80 mg Oral Nightly    budesonide  500 mcg Nebulization BID    calcitRIOL  0.25 mcg Oral Daily    insulin glargine  12 Units SubCUTAneous QAM    [Held by provider] losartan  25 mg Oral Daily    warfarin placeholder: dosing by pharmacy   Oral RX Placeholder    bumetanide  2 mg IntraVENous Once    insulin lispro  0-8 Units SubCUTAneous 4x Daily AC & HS    PRN Meds: diphenhydrAMINE-zinc acetate, Menthol, ipratropium 0.5 mg-albuterol 2.5 mg, diphenhydrAMINE, hydrOXYzine HCl, sodium chloride flush, sodium chloride, polyethylene glycol, acetaminophen **OR** acetaminophen, melatonin, prochlorperazine, glucose, dextrose bolus **OR** dextrose bolus, glucagon (rDNA), dextrose    Exam:  BP (!) 104/50   Pulse 69   Temp 97.6 °F (36.4 °C)   Resp 22   Ht 1.702 m (5' 7\")   Wt 96.7 kg (213 lb 3 oz)   SpO2 94%   BMI 33.39 kg/m²   General: No distress, appears stated age. Chronically ill appearing white male   Eyes:  PERRL. Conjunctivae/corneas clear.  HENT: Head normal appearing. Nares normal. Oral mucosa moist.  Hearing intact.   Neck: Supple, with full range of motion. Trachea midline. No gross JVD appreciated.  Respiratory:  Normal effort. Diminished lung sounds with wheezing and crackles noted.  Cardiovascular: Normal rate, regular rhythm with normal S1/S2 without murmurs.    With 1+ pitting edema bilaterally.   Abdomen: Soft, non-tender, non-distended with normal bowel sounds.  Musculoskeletal: No joint swelling or tenderness. Normal tone. No abnormal movements.   Skin: Warm and dry. Erythematous rash noted on bilateral LE.     Neurologic:  No focal

## 2025-02-04 NOTE — PROGRESS NOTES
Patient helped from chair to bed. Patient positioned comfortably with call light in reach. Focused assessment performed. No changes from previous assessment.

## 2025-02-04 NOTE — PLAN OF CARE
Problem: Respiratory - Adult  Goal: Achieves optimal ventilation and oxygenation  Outcome: Progressing  Flowsheets (Taken 2/4/2025 0017)  Achieves optimal ventilation and oxygenation:   Assess for changes in respiratory status   Position to facilitate oxygenation and minimize respiratory effort   Assess for changes in mentation and behavior   Oxygen supplementation based on oxygen saturation or arterial blood gases  Note: Lung sounds, pulse ox, and breathing monitored throughout shift.  Discussed correct technique and importance of deep breathing & coughing exercises with patient.  Patient able to demonstrate cough & deep breathing exercises to nurse.       Problem: Chronic Conditions and Co-morbidities  Goal: Patient's chronic conditions and co-morbidity symptoms are monitored and maintained or improved  Outcome: Progressing  Flowsheets (Taken 2/4/2025 0017)  Care Plan - Patient's Chronic Conditions and Co-Morbidity Symptoms are Monitored and Maintained or Improved:   Monitor and assess patient's chronic conditions and comorbid symptoms for stability, deterioration, or improvement   Collaborate with multidisciplinary team to address chronic and comorbid conditions and prevent exacerbation or deterioration   Update acute care plan with appropriate goals if chronic or comorbid symptoms are exacerbated and prevent overall improvement and discharge     Problem: Discharge Planning  Goal: Discharge to home or other facility with appropriate resources  2/4/2025 0017 by Yennifer Patterson, RN  Outcome: Progressing  Flowsheets (Taken 2/4/2025 0017)  Discharge to home or other facility with appropriate resources:   Identify barriers to discharge with patient and caregiver   Identify discharge learning needs (meds, wound care, etc)   Arrange for needed discharge resources and transportation as appropriate     Problem: Safety - Adult  Goal: Free from fall injury  Outcome: Progressing  Flowsheets (Taken 2/4/2025 0017)  Free From

## 2025-02-04 NOTE — PROGRESS NOTES
Warfarin Pharmacy Consult Note    Warfarin Indication: Atrial fibrillation/Atrial flutter  Target INR: 2.0-3.0  Dose prior to admission: 2.5mg MF, 1.25mg all other days    Recent Labs     02/02/25  0713 02/03/25  0616   HGB 11.4* 11.3*   * 112*     Recent Labs     02/02/25  0713 02/03/25  0616 02/04/25  0500   INR 2.96* 3.10* 4.80*     Concurrent anticoagulants/antiplatelets: none  Significant warfarin drug-drug interactions:  melatonin (HM), levaquin q48h x 2 doses started 2/4/25     Date INR Warfarin Dose   1/30/2025 3.49 No Coumadin    1/31/2025 2.34  1.25 mg     2/1/2025  2.14 2 mg     2/2/2025  2.96 0.5 mg    2/3/2025  3.10  No Coumadin   2/4/2025  4.80  No coumadin                Monitoring:                   INR will be monitored daily.    **Please contact pharmacy for discharge instructions when indicated**    Nicole MACEDO.Ph., BCPS., 2/4/2025,11:59 AM

## 2025-02-04 NOTE — PROGRESS NOTES
Patient sitting in chair with family present. Call light in reach with no complaints at this time.

## 2025-02-04 NOTE — PROGRESS NOTES
Patient was found to be sitting in chair, alert and oriented times four, with his ABCs intact on 5 lpm of oxygen through a nasal cannula. PERRLA. Vitals were obtained and charted. ARCHIE Block was notified about the patient's blood pressure being 94/54. The patient had a negative stroke assessment. Lungs sounds equal bilaterally, crackles and diminished throughout. Heart Sounds S1 and S2, patient is in a ventricular paced rhythm.  Patient has a productive cough with clear mucous. Patient denied any pain, stating he felt weaker than normal. Upper extremity strength was moderate, pulses present and strong, and sensation present; equal bilaterally. Lower extremity strength was moderate, pulses present and strong, and sensation present; equal bilaterally. Patient has a yan catheter.

## 2025-02-04 NOTE — PROGRESS NOTES
[facility]  INPATIENT OCCUPATIONAL THERAPY  STRZ CCU-STEPDOWN 3B  EVALUATION      Discharge Recommendations: Continue to assess pending progress, Patient would benefit from continued therapy after discharge  Equipment Recommendations: No (continue to monitor for needs)        Time In: 745  Time Out: 825  Timed Code Treatment Minutes: 25 Minutes  Minutes: 40          Date: 2025  Patient Name: Portillo Daly,   Gender: male      MRN: [id number]  : 1940  (84 y.o.)  Referring Practitioner: Kayleen Jesus PA  Diagnosis: Sepsis (HCC)  Additional Pertinent Hx: Per EMR: \"84 y.o. male with PMHx of COPD, CKD, A-fib, TAVR, chronic respiratory failure with hypoxia who presents to [facility] with shortness of breath. Patient states that today he noted to have some chills and being more short of breath. He states that has had a long history of being short of breath, especially with ambulation. States that today he just knew he was sick. Noted to have O2 saturation of 78% prior to arrival to ED and wears 4-6 L NC at home. \"    Restrictions/Precautions:  Restrictions/Precautions: Fall Risk, Contact Precautions    Subjective  Chart Reviewed: Yes  Patient assessed for rehabilitation services?: Yes  Family / Caregiver Present: No    Subjective: Per RN, okay for therapy. Pt in bed upon therapist arrival w/ alarm on. Pt agreeable to OT session with minimal encouragement and education. Pleasant and cooperative. Hard of hearing. At end of session, pt up in the chair w/ alarm on and needs within reach.     Pain: 0/10  Pt perseverating on need for benadryl cream to be applied to back and legs. RN notified and to speak with doctor about getting order. Personal cream in room.    Vitals: on 5L via NC start of session. Per RN and pt, pt increases oxygen from 2L to 5L with activity at baseline. Increased from 5L to 8L for activity this date. SpO2 remained > 90% throughout, but tachypneic with activity.

## 2025-02-04 NOTE — PLAN OF CARE
Problem: Respiratory - Adult  Goal: Achieves optimal ventilation and oxygenation  2/4/2025 0917 by Mckayla Carrasco RN  Flowsheets (Taken 2/4/2025 0917)  Achieves optimal ventilation and oxygenation: Assess for changes in respiratory status  Note: Pt remains on 5L nasal cannula at rest. Baseline is 3L. Will wean at able.      Problem: Chronic Conditions and Co-morbidities  Goal: Patient's chronic conditions and co-morbidity symptoms are monitored and maintained or improved  2/4/2025 0917 by Mckayla Carrasco RN  Outcome: Progressing     Problem: Discharge Planning  Goal: Discharge to home or other facility with appropriate resources  2/4/2025 0917 by Mckayla Carrasco RN  Outcome: Progressing  Flowsheets (Taken 2/4/2025 0917)  Discharge to home or other facility with appropriate resources: Identify barriers to discharge with patient and caregiver  Note: Pt from home with girlfriend. Plans to return at discharge     Problem: Cardiovascular - Adult  Goal: Maintains optimal cardiac output and hemodynamic stability  2/4/2025 0917 by Mckayla Carrasco RN  Outcome: Progressing  Flowsheets (Taken 2/4/2025 0917)  Maintains optimal cardiac output and hemodynamic stability: Monitor blood pressure and heart rate  Note: BPs low this morning. Monitoring to be able to give diuretics.      Problem: Genitourinary - Adult  Goal: Absence of urinary retention  2/4/2025 0917 by Mckayla Carrasco RN  Outcome: Progressing  Flowsheets (Taken 2/4/2025 0917)  Absence of urinary retention: Place urinary catheter per Licensed Independent Practitioner order if needed  Note: Kitchen catheter in place.        Care plan reviewed with patient.  Patient verbalizes understanding of the care plan and contributed to goal setting.

## 2025-02-04 NOTE — CARE COORDINATION
2/4/25, 3:22 PM EST    DISCHARGE ON GOING EVALUATION    Portillo KAILEY [facility] day: 6  Location: [address] [place] [address] Reason for admit: Hypoxemia [R09.02]  Sepsis (HCC) [A41.9]     Procedures: none    Imaging since last note: none     Barriers to Discharge: Hospitalist, Nephrology and ID following. PT/OT. Fluid restriction. Nebulizers. IV bumex BID. Mucinex. DM management. Transition to PO levaquin q48h. Metolazone. Started on midodrine. IV albumin x1. Toprol XL decreased. 94% on 5L NC. Creat 2.9.   Vitals:    02/04/25 1045 02/04/25 1130 02/04/25 1408 02/04/25 1500   BP: (!) 92/58 96/64 (!) 93/51 (!) 101/52   Pulse: 72 70 70 70   Resp: 20 20 20 22   Temp:  97.5 °F (36.4 °C) 97.5 °F (36.4 °C) 97.6 °F (36.4 °C)   TempSrc:  Oral Oral    SpO2: 98% 94% 95% 94%   Weight:       Height:         PCP: Elinor Ly, WILBERTO - CNP  Readmission Risk Score: 27.1    Patient Goals/Plan/Treatment Preferences: From home w/ SO. O2 from [facility]. Current [facility]. HUGO following.

## 2025-02-04 NOTE — PALLIATIVE CARE
Initial Evaluation        Patient:   Portillo Daly  YOB: 1940  Age:  84 y.o.  Room:  [facility]38/038-A  MRN:  [id number]   Acct: [id number]    Date of Admission:  1/29/2025  8:08 PM  Date of Service:  2/4/2025  Completed By:  Ricky Roger RN        Reason for Palliative Care Evaluation:-   Code status     Current Concerns   Patient denies symptoms     Palliative Performance Scale   50%  Mainly sit/lie; Extensive disease; Can't do any work; Considerable assist; intake normal or reduced; LOC full/confusion     History    Patient admitted 1/29 from home with acute hypoxemic respiratory failure secondary to pneumonia, acute on chronic renal failure, acute on chronic HF. Other chronic conditions include COPD, DM, A-fib.      Goals of Care Discussions and Plan         Advance Care Planning   Goals of Care/Advance Care Planning (ACP) Conversation    Date of Conversation: 02/04/25    Individuals present for the conversation: Patient with decision making capacity and Significant other Gina      ACP documents on file prior to discussion:  -Power of  for Healthcare  -Living Will    Previously completed document/s not on file: Not discussed.    Healthcare Power of /Healthcare Surrogate Decision Makers:  Jenelle Ang (niece)     Conversation Summary: Met with patient at bedside. Significant other Gina present for conversation. Introduced palliative care and role on medical team, patient consented to further conversation. Discussed code status with patient. Reviewed FULL code versus DNRCC-A/DNI with patient. Education provided on cardiopulmonary resuscitation including potential risks and outcomes. Patient stated he believed he was a DNR. Significant other agrees he does not want resuscitation. Discussed mechanical ventilation. Patient would not want intubated for respiratory distress. This is consistent with LIMITED nox4 code status.Patient consented to ensure this is reflected in his  medical record. Denies other needs at this time.    Treatment Limitations: They would not want cardiopulmonary resuscitation in the event of cardiac arrest nor would they want to be intubated and mechanically ventilated. They would want to attempt to sustain life by all other medically effective means. This includes providing appropriate medical and surgical treatments as indicated to attempt to prolong life, including intensive care. This is consistent with a code status of Do Not Resuscitate/Do Not Intubate (Limited x4).    Resuscitation Status: Limited Limited Code details: Intubation/Re-intubation No; Defibrillation/Cardioversion No; Chest Compressions No; Resuscitative Medications No; Other No Comment    Documentation Completed:  -No new documents completed.    Plan/Follow-Up:  Kayleen CAO notified of conversation, orders received. Palliative care will continue to follow, please call for additional needs.    I spent 20 minutes with the patient and/or surrogate decision maker discussing the patient's wishes and goals.      Ricky Roger RN              Electronically signed by Ricky Roger RN on 2/4/2025 at 4:11 PM           Palliative Care Office: [phone]

## 2025-02-04 NOTE — PROGRESS NOTES
[facility]   Nephrology progress note  2/4/2025, 8:24 AM      Pt Name:    Portillo Daly  MRN:     [id number]     YOB: 1940  Admit Date:    1/29/2025  8:08 PM    Chief Complaint: Nephrology following for MILLY/CKD IV    Subjective:  Patient was seen and examined this morning.  He had yan placed yesterday due to retention.  Urine output overnight 500 mL.   Is on 5 L NC.           Objective:  24HR INTAKE/OUTPUT:    Intake/Output Summary (Last 24 hours) at 2/4/2025 0824  Last data filed at 2/4/2025 0545  Gross per 24 hour   Intake 2250 ml   Output 1470 ml   Net 780 ml         I/O last 3 completed shifts:  In: 2500 [P.O.:2500]  Out: 1870 [Urine:1870]  No intake/output data recorded.   Admission weight: 95.1 kg (209 lb 10.5 oz)  Wt Readings from Last 3 Encounters:   02/03/25 96.7 kg (213 lb 3 oz)   01/27/25 95.3 kg (210 lb)   01/15/25 94.3 kg (208 lb)        Vitals :   Vitals:    02/03/25 1454 02/03/25 2013 02/03/25 2256 02/04/25 0426   BP: (!) 116/47 (!) 109/56 (!) 105/51 (!) 112/59   Pulse: 67 69 69 69   Resp: 20 22 23 19   Temp: 97.9 °F (36.6 °C) 99.7 °F (37.6 °C) 99 °F (37.2 °C) 97.7 °F (36.5 °C)   TempSrc: Oral Oral Oral Oral   SpO2: 96% 95% 95% 96%   Weight:       Height:           Physical examination  General Appearance: alert and cooperative with exam, appears comfortable, no distress  Mouth/Throat: Oral mucosa moist  Neck: No JVD  Lungs:diminished, scant wheezes, crackles left lung base  Heart:  S1, S2 heard  GI: soft, non-tender, no guarding  Extremities: + LE edema    Medications:  Infusion:    sodium chloride      dextrose       Meds:    metOLazone  5 mg Oral Once    potassium bicarb-citric acid  20 mEq Oral Once    dextromethorphan-guaiFENesin  1 tablet Oral BID    bumetanide  2 mg IntraVENous BID    cefepime  1,000 mg IntraVENous Q12H    tamsulosin  0.4 mg Oral Daily    [Held by provider] bumetanide  1 mg Oral Daily    sodium chloride flush  5-40 mL IntraVENous 2 times

## 2025-02-05 ENCOUNTER — APPOINTMENT (OUTPATIENT)
Dept: GENERAL RADIOLOGY | Age: 85
DRG: 871 | End: 2025-02-05
Payer: MEDICARE

## 2025-02-05 LAB
ANION GAP SERPL CALC-SCNC: 15 MEQ/L (ref 8–16)
BUN SERPL-MCNC: 58 MG/DL (ref 7–22)
CALCIUM SERPL-MCNC: 8.4 MG/DL (ref 8.5–10.5)
CHLORIDE SERPL-SCNC: 101 MEQ/L (ref 98–111)
CO2 SERPL-SCNC: 23 MEQ/L (ref 23–33)
CREAT SERPL-MCNC: 2.9 MG/DL (ref 0.4–1.2)
DEPRECATED RDW RBC AUTO: 59.3 FL (ref 35–45)
ERYTHROCYTE [DISTWIDTH] IN BLOOD BY AUTOMATED COUNT: 16.3 % (ref 11.5–14.5)
GFR SERPL CREATININE-BSD FRML MDRD: 20 ML/MIN/1.73M2
GLUCOSE BLD STRIP.AUTO-MCNC: 108 MG/DL (ref 70–108)
GLUCOSE BLD STRIP.AUTO-MCNC: 118 MG/DL (ref 70–108)
GLUCOSE BLD STRIP.AUTO-MCNC: 144 MG/DL (ref 70–108)
GLUCOSE BLD STRIP.AUTO-MCNC: 161 MG/DL (ref 70–108)
GLUCOSE SERPL-MCNC: 97 MG/DL (ref 70–108)
HCT VFR BLD AUTO: 31.4 % (ref 42–52)
HGB BLD-MCNC: 10.5 GM/DL (ref 14–18)
INR PPP: 6.69 (ref 0.85–1.13)
MAGNESIUM SERPL-MCNC: 2.1 MG/DL (ref 1.6–2.4)
MCH RBC QN AUTO: 33 PG (ref 26–33)
MCHC RBC AUTO-ENTMCNC: 33.4 GM/DL (ref 32.2–35.5)
MCV RBC AUTO: 98.7 FL (ref 80–94)
PLATELET # BLD AUTO: 84 THOU/MM3 (ref 130–400)
PMV BLD AUTO: 12.9 FL (ref 9.4–12.4)
POTASSIUM SERPL-SCNC: 3.3 MEQ/L (ref 3.5–5.2)
RBC # BLD AUTO: 3.18 MILL/MM3 (ref 4.7–6.1)
SCAN OF BLOOD SMEAR: NORMAL
SODIUM SERPL-SCNC: 139 MEQ/L (ref 135–145)
WBC # BLD AUTO: 19.9 THOU/MM3 (ref 4.8–10.8)

## 2025-02-05 PROCEDURE — 97530 THERAPEUTIC ACTIVITIES: CPT

## 2025-02-05 PROCEDURE — 94761 N-INVAS EAR/PLS OXIMETRY MLT: CPT

## 2025-02-05 PROCEDURE — 99232 SBSQ HOSP IP/OBS MODERATE 35: CPT | Performed by: INTERNAL MEDICINE

## 2025-02-05 PROCEDURE — 6360000002 HC RX W HCPCS

## 2025-02-05 PROCEDURE — 82948 REAGENT STRIP/BLOOD GLUCOSE: CPT

## 2025-02-05 PROCEDURE — 80048 BASIC METABOLIC PNL TOTAL CA: CPT

## 2025-02-05 PROCEDURE — 83735 ASSAY OF MAGNESIUM: CPT

## 2025-02-05 PROCEDURE — 6360000002 HC RX W HCPCS: Performed by: INTERNAL MEDICINE

## 2025-02-05 PROCEDURE — 94640 AIRWAY INHALATION TREATMENT: CPT

## 2025-02-05 PROCEDURE — 85027 COMPLETE CBC AUTOMATED: CPT

## 2025-02-05 PROCEDURE — 36415 COLL VENOUS BLD VENIPUNCTURE: CPT

## 2025-02-05 PROCEDURE — 2140000000 HC CCU INTERMEDIATE R&B

## 2025-02-05 PROCEDURE — 2700000000 HC OXYGEN THERAPY PER DAY

## 2025-02-05 PROCEDURE — 6370000000 HC RX 637 (ALT 250 FOR IP): Performed by: PHYSICIAN ASSISTANT

## 2025-02-05 PROCEDURE — 97535 SELF CARE MNGMENT TRAINING: CPT

## 2025-02-05 PROCEDURE — 6370000000 HC RX 637 (ALT 250 FOR IP): Performed by: INTERNAL MEDICINE

## 2025-02-05 PROCEDURE — 85610 PROTHROMBIN TIME: CPT

## 2025-02-05 PROCEDURE — 6370000000 HC RX 637 (ALT 250 FOR IP)

## 2025-02-05 PROCEDURE — 71045 X-RAY EXAM CHEST 1 VIEW: CPT

## 2025-02-05 PROCEDURE — 2500000003 HC RX 250 WO HCPCS

## 2025-02-05 RX ORDER — MIDODRINE HYDROCHLORIDE 10 MG/1
10 TABLET ORAL
Status: DISCONTINUED | OUTPATIENT
Start: 2025-02-05 | End: 2025-02-18 | Stop reason: HOSPADM

## 2025-02-05 RX ORDER — METOLAZONE 5 MG/1
5 TABLET ORAL ONCE
Status: COMPLETED | OUTPATIENT
Start: 2025-02-05 | End: 2025-02-05

## 2025-02-05 RX ORDER — POTASSIUM CHLORIDE 1500 MG/1
40 TABLET, EXTENDED RELEASE ORAL ONCE
Status: COMPLETED | OUTPATIENT
Start: 2025-02-05 | End: 2025-02-05

## 2025-02-05 RX ORDER — POTASSIUM CHLORIDE 1500 MG/1
20 TABLET, EXTENDED RELEASE ORAL ONCE
Status: COMPLETED | OUTPATIENT
Start: 2025-02-05 | End: 2025-02-05

## 2025-02-05 RX ADMIN — BUDESONIDE 500 MCG: 0.5 INHALANT RESPIRATORY (INHALATION) at 21:56

## 2025-02-05 RX ADMIN — METOLAZONE 5 MG: 5 TABLET ORAL at 10:41

## 2025-02-05 RX ADMIN — IPRATROPIUM BROMIDE AND ALBUTEROL SULFATE 1 DOSE: .5; 3 SOLUTION RESPIRATORY (INHALATION) at 00:34

## 2025-02-05 RX ADMIN — GUAIFENESIN, DEXTROMETHORPHAN HBR 1 TABLET: 600; 30 TABLET ORAL at 10:09

## 2025-02-05 RX ADMIN — CALCITRIOL CAPSULES 0.25 MCG 0.25 MCG: 0.25 CAPSULE ORAL at 10:11

## 2025-02-05 RX ADMIN — DIPHENHYDRAMINE HYDROCHLORIDE, ZINC ACETATE: 2; .1 CREAM TOPICAL at 01:09

## 2025-02-05 RX ADMIN — MIDODRINE HYDROCHLORIDE 10 MG: 10 TABLET ORAL at 10:09

## 2025-02-05 RX ADMIN — INSULIN GLARGINE 12 UNITS: 100 INJECTION, SOLUTION SUBCUTANEOUS at 10:22

## 2025-02-05 RX ADMIN — MIDODRINE HYDROCHLORIDE 10 MG: 10 TABLET ORAL at 18:40

## 2025-02-05 RX ADMIN — SODIUM CHLORIDE, PRESERVATIVE FREE 10 ML: 5 INJECTION INTRAVENOUS at 10:34

## 2025-02-05 RX ADMIN — BUMETANIDE 2 MG: 0.25 INJECTION INTRAMUSCULAR; INTRAVENOUS at 10:41

## 2025-02-05 RX ADMIN — POTASSIUM CHLORIDE 40 MEQ: 1500 TABLET, EXTENDED RELEASE ORAL at 06:42

## 2025-02-05 RX ADMIN — GUAIFENESIN, DEXTROMETHORPHAN HBR 1 TABLET: 600; 30 TABLET ORAL at 22:11

## 2025-02-05 RX ADMIN — POTASSIUM CHLORIDE 20 MEQ: 1500 TABLET, EXTENDED RELEASE ORAL at 18:40

## 2025-02-05 RX ADMIN — BUMETANIDE 2 MG: 0.25 INJECTION INTRAMUSCULAR; INTRAVENOUS at 18:40

## 2025-02-05 RX ADMIN — ATORVASTATIN CALCIUM 80 MG: 80 TABLET, FILM COATED ORAL at 22:11

## 2025-02-05 NOTE — PROGRESS NOTES
Patient resting in bed. Family is at bedside. Denies any needs or concerns. Call I=light within reach.

## 2025-02-05 NOTE — PLAN OF CARE
Patients POA called and spoke with nurse about patients code status. She is upset that no one called her and discussed this with her. She feels the patient is not in his right mind and his significant other is [place] and may not comprehend what is spoken to her. Discussed current Limited Code status with POA and she states she would like to speak to patients daughter about the code status and what his wishes are. Continue current code status.     Electronically signed by WILBERTO Verdugo CNP on 2/4/2025 at 9:40 PM

## 2025-02-05 NOTE — CARE COORDINATION
2/5/25, 11:43 AM EST    DISCHARGE PLANNING EVALUATION    Met with patient and his domestic partner Gina.  Verified plan is still to return home with [place] and [facility] and [facility].

## 2025-02-05 NOTE — PROGRESS NOTES
Patient resting in bed. Family at bedside. Denies any pain or concerns. Patient has call light within reach.

## 2025-02-05 NOTE — CARE COORDINATION
2/5/25, 3:51 PM EST    DISCHARGE ON GOING EVALUATION    [facility] day: 7  Location: [address] [place] [address]- Reason for admit: Hypoxemia [R09.02]  Sepsis (HCC) [A41.9]     Procedures: none     Imaging since last note:   2/5 CXR: No significant interval change.     Barriers to Discharge: Hospitalist, Nephrology and ID following. PT/OT. Code status changed to [place] No x4 yesterday. 93% on 6L NC- baseline 3L. K 3.3-PO replacement. INR 6.69 IV bumex BID. Mucinex. Nebulizers. PO levaquin q48h. Metolazone x1.     PCP: Elinor Ly APRN - CNP  Readmission Risk Score: 27.1    Patient Goals/Plan/Treatment Preferences: [facility]. HUGO following.

## 2025-02-05 NOTE — PROGRESS NOTES
Warfarin Pharmacy Consult Note    Warfarin Indication: Atrial fibrillation/Atrial flutter  Target INR: 2.0-3.0  Dose prior to admission: 2.5 mg MF, 1.25mg all other days    Recent Labs     02/03/25  0616 02/05/25  0447   HGB 11.3* 10.5*   * 84*     Recent Labs     02/03/25  0616 02/04/25  0500 02/05/25  0447   INR 3.10* 4.80* 6.69*     Concurrent anticoagulants/antiplatelets: none  Significant warfarin drug-drug interactions:  melatonin (HM), levaquin q48h x 2 doses started 2/4/25     Date INR Warfarin Dose   1/30/2025 3.49 No Coumadin    1/31/2025 2.34  1.25 mg     2/1/2025  2.14 2 mg     2/2/2025  2.96 0.5 mg    2/3/2025  3.10  No Coumadin   2/4/2025  4.80  No coumadin    2/5/2025  6.69  No coumadin        Monitoring:                   INR will be monitored daily.    **Please contact pharmacy for discharge instructions when indicated**    Nicole MACEDO.Ph., BCPS., 2/5/2025,11:36 AM

## 2025-02-05 NOTE — PROGRESS NOTES
2100H: Patient's niece called questioning the code status of the patient and telling that patient is confused to decide for himself. She verbalized her annoyance of doctors speaking to the patient changing his code status. I informed her that I will let the doctor speak to her to discuss about this. Yazmin Lindo talked to the patient about this and to the niece who is the POA and according to her he will remain as Limited NO x 4 as of the moment.  0450H: Patient's niece called again bringing out her frustration of patient's Code Status.She's insisting that patient is confused and can't decide for himself. Asked her to come today to speak personally to the doctors and to the patient but she said she can't come because of work.I told that the doctor can call her today so they can discuss patient's decision but she said that she already spoke to the the Doctor last night and would not want to speak to the doctor again as they are busy.She recommended that the Doctor should call her daughter Andria  today instead to discuss the code status.

## 2025-02-05 NOTE — PROGRESS NOTES
Focused assessment performed with no changes from prior to assessment. Patient resting comfortable with call light within reach.

## 2025-02-05 NOTE — PROGRESS NOTES
Progress note: Infectious diseases    Patient - Portillo Daly,  Age - 84 y.o.    - 1940      Room Number - 3B-38/038-A   MRN -  [id number]   [facility] # - [id number]  Date of Admission -  2025  8:08 PM    SUBJECTIVE:   He is feeling better.  OBJECTIVE   VITALS    height is 1.702 m (5' 7\") and weight is 95 kg (209 lb 7 oz). His oral temperature is 97.5 °F (36.4 °C). His blood pressure is 111/64 and his pulse is 70. His respiration is 18 and oxygen saturation is 94%.       Wt Readings from Last 3 Encounters:   25 95 kg (209 lb 7 oz)   25 95.3 kg (210 lb)   01/15/25 94.3 kg (208 lb)       I/O (24 Hours)    Intake/Output Summary (Last 24 hours) at 2025 0912  Last data filed at 2025 0414  Gross per 24 hour   Intake 893.89 ml   Output 1975 ml   Net -1081.11 ml       General Appearance  Awake, alert, oriented,     HEENT - normocephalic, atraumatic, pink conjunctiva,  anicteric sclera  Neck - Supple, no mass  Lungs -  Bilateral   air entry,  diminished breath sound.  Cardiovascular - Heart sounds are normal.     Abdomen - soft, not distended, nontender,   Neurologic -orietned  Skin - maculopapular rash (less intense)  Extremities - + edema, no cyanosis, clubbing     MEDICATIONS:      midodrine  10 mg Oral TID     metOLazone  5 mg Oral Once    levoFLOXacin  500 mg Oral Q48H    metoprolol succinate  25 mg Oral Daily    dextromethorphan-guaiFENesin  1 tablet Oral BID    bumetanide  2 mg IntraVENous BID    [Held by provider] tamsulosin  0.4 mg Oral Daily    [Held by provider] bumetanide  1 mg Oral Daily    sodium chloride flush  5-40 mL IntraVENous 2 times per day    atorvastatin  80 mg Oral Nightly    budesonide  500 mcg Nebulization BID    calcitRIOL  0.25 mcg Oral Daily    insulin glargine  12 Units SubCUTAneous QAM    [Held by provider] losartan  25 mg Oral Daily    warfarin placeholder: dosing by  pharmacy   Oral RX Placeholder    bumetanide  2 mg IntraVENous Once    insulin lispro  0-8 Units SubCUTAneous 4x Daily AC & HS      sodium chloride      dextrose       diphenhydrAMINE-zinc acetate, Menthol, ipratropium 0.5 mg-albuterol 2.5 mg, diphenhydrAMINE, hydrOXYzine HCl, sodium chloride flush, sodium chloride, polyethylene glycol, acetaminophen **OR** acetaminophen, melatonin, prochlorperazine, glucose, dextrose bolus **OR** dextrose bolus, glucagon (rDNA), dextrose      LABS:     CBC:   Recent Labs     02/03/25  0616 02/05/25  0447   WBC 28.2* 19.9*   HGB 11.3* 10.5*   * 84*     BMP:    Recent Labs     02/03/25  0616 02/04/25  0500 02/05/25 0447    138 139   K 3.2* 3.8 3.3*    104 101   CO2 18* 19* 23   BUN 50* 53* 58*   CREATININE 2.8* 2.9* 2.9*   GLUCOSE 185* 103 97     Calcium:  Recent Labs     02/05/25  0447   CALCIUM 8.4*      Glucose:  Recent Labs     02/04/25  1704 02/04/25 2012 02/05/25  0755   POCGLU 98 112* 108     HgbA1C: No results for input(s): \"LABA1C\" in the last 72 hours.  INR:   Recent Labs     02/03/25  0616 02/04/25  0500 02/05/25  0447   INR 3.10* 4.80* 6.69*      CULTURES:   UA: No results for input(s): \"SPECGRAV\", \"PHUR\", \"COLORU\", \"CLARITYU\", \"MUCUS\", \"PROTEINU\", \"BLOODU\", \"RBCUA\", \"WBCUA\", \"BACTERIA\", \"NITRU\", \"GLUCOSEU\", \"BILIRUBINUR\", \"UROBILINOGEN\", \"KETUA\", \"LABCAST\", \"LABCASTTY\", \"AMORPHOS\" in the last 72 hours.    Invalid input(s): \"CRYSTALS\"  Micro:   Lab Results   Component Value Date/Time    BC No growth 24 hours. No growth 48 hours. 02/02/2025 11:02 AM      Problem list of patient:     Patient Active Problem List   Diagnosis Code    Atrial fibrillation with RVR ([facility]) I48.91    Acute respiratory failure with hypoxia J96.01    Acute kidney injury superimposed on stage 4 chronic kidney disease ([facility]) N17.9, N18.4    CKD (chronic kidney disease) stage 3, GFR 30-59 ml/min ([facility]) N18.30    Hyponatremia E87.1    Aortic stenosis, severe I35.0    Coronary artery

## 2025-02-05 NOTE — PROGRESS NOTES
Hospitalist Progress Note      Patient:  Portillo Daly 84 y.o. male     Unit/Bed:-/038-A    Date of Admission: 1/29/2025      ASSESSMENT AND PLAN    Active Problems  Acute hypoxemic respiratory failure:   - 2/2 Pseudomonas PNA & Pulmonary Edema   - Initially on HFNC was transitioned to 5L NC then to baseline of 3L at rest and 6L with activity.   - Most recent CXR: worsening pulmonary edema   - IS & Acapella.     Pseudomonas, Bacterial PNA   - ID consulted - case discussed, Cefepime was transitioned to Levaquin.  -  PNA panel growing Pseudomonas. Continue Levaquin.     Sepsis 2/2 Pseudomonas Bacteremia   - BC 2 of 2 growing Pseudomonas. Levaquin.    - Repeat blood culture showed NGTD.     Acute on chronic renal failure:   - Nephrology notes reviewed. Creatinine has greatly improved at 2.8 Patient baseline of  2.5-3.0. Will continue to monitor.     - Nephrology believes this is ATN 2/2 hypotension    - Renal ultrasound reviewed with no acute findings  -  IV Bumex 2mg BID   - continue to hold losartan  - over 400 cc noted on bladder scan per nurse. Kitchen Cath order placed.   - Continue to monitor UOP     Acute on chronic systolic heart failure: Last known EF 45 to 50%.  -  IV Bumex BID.   - Intake/output.  - Fluid restriction.     Hypotension   - BP has been soft throughout the whole day.   -  Midodrine, Albumin ordered.   - Most recent /50, Bumex given. Metolazone held.     Rash   - Likely medication induced. Cefepime transitioned to Levaquin. ,  No further new issues denies any pruritus.     Resolved Conditions    Hyperkalemia - potassium 5.7; Insulin/dextrose/Lokelma was ordered. Most recent 3.7; Will continue to monitor with daily BMP.     Hypokalemia - Potassium 3.8.    Chronic Conditions (reviewed and stable unless otherwise stated)  COPD: patient had exacerbation while in the ED. Given Solumedrol. Steroid inhaler BID.   DMII: Resume Lantus with high intensity sliding scale.   Paroxysmal A-fib:

## 2025-02-05 NOTE — PROGRESS NOTES
[facility]   Nephrology progress note  2/5/2025, 11:17 AM      Pt Name:    Portillo Daly  MRN:     [id number]     YOB: 1940  Admit Date:    1/29/2025  8:08 PM    Chief Complaint: Nephrology following for MILLY/CKD IV    Subjective:  Patient was seen and examined this morning.  Urine output improved overnight.   On 6 L NC.   Still has some SOB.   Added midodrine yesterday and bp is better.          Objective:  24HR INTAKE/OUTPUT:    Intake/Output Summary (Last 24 hours) at 2/5/2025 1117  Last data filed at 2/5/2025 1017  Gross per 24 hour   Intake 1033.89 ml   Output 1725 ml   Net -691.11 ml         I/O last 3 completed shifts:  In: 2143.9 [P.O.:1950; IV Piggyback:193.9]  Out: 2475 [Urine:2475]  I/O this shift:  In: 240 [P.O.:240]  Out: -    Admission weight: 95.1 kg (209 lb 10.5 oz)  Wt Readings from Last 3 Encounters:   02/05/25 95 kg (209 lb 7 oz)   01/27/25 95.3 kg (210 lb)   01/15/25 94.3 kg (208 lb)        Vitals :   Vitals:    02/05/25 0034 02/05/25 0040 02/05/25 0414 02/05/25 0800   BP:   (!) 126/54 111/64   Pulse:   70 70   Resp:   19 18   Temp:   97.5 °F (36.4 °C) 97.5 °F (36.4 °C)   TempSrc:   Oral Oral   SpO2: (!) 87% 92% 94% 94%   Weight:   95 kg (209 lb 7 oz)    Height:           Physical examination  General Appearance: alert and cooperative with exam, appears comfortable, no distress  Mouth/Throat: Oral mucosa moist  Neck: No JVD  Lungs:diminished, faint rales left lung base  Heart:  S1, S2 heard  GI: soft, non-tender, no guarding  Extremities: + LE edema, improving    Medications:  Infusion:    sodium chloride      dextrose       Meds:    midodrine  10 mg Oral TID WC    metOLazone  5 mg Oral Once    levoFLOXacin  500 mg Oral Q48H    metoprolol succinate  25 mg Oral Daily    dextromethorphan-guaiFENesin  1 tablet Oral BID    bumetanide  2 mg IntraVENous BID    [Held by provider] tamsulosin  0.4 mg Oral Daily    [Held by provider] bumetanide  1 mg Oral Daily    sodium  chloride flush  5-40 mL IntraVENous 2 times per day    atorvastatin  80 mg Oral Nightly    budesonide  500 mcg Nebulization BID    calcitRIOL  0.25 mcg Oral Daily    insulin glargine  12 Units SubCUTAneous QAM    [Held by provider] losartan  25 mg Oral Daily    warfarin placeholder: dosing by pharmacy   Oral RX Placeholder    bumetanide  2 mg IntraVENous Once    insulin lispro  0-8 Units SubCUTAneous 4x Daily AC & HS     Meds prn: diphenhydrAMINE-zinc acetate, Menthol, ipratropium 0.5 mg-albuterol 2.5 mg, diphenhydrAMINE, hydrOXYzine HCl, sodium chloride flush, sodium chloride, polyethylene glycol, acetaminophen **OR** acetaminophen, melatonin, prochlorperazine, glucose, dextrose bolus **OR** dextrose bolus, glucagon (rDNA), dextrose     Lab Data :  CBC:   Recent Labs     02/03/25  0616 02/05/25  0447   WBC 28.2* 19.9*   HGB 11.3* 10.5*   HCT 36.9* 31.4*   * 84*     CMP:  Recent Labs     02/03/25  0616 02/04/25  0500 02/05/25  0447    138 139   K 3.2* 3.8 3.3*    104 101   CO2 18* 19* 23   BUN 50* 53* 58*   CREATININE 2.8* 2.9* 2.9*   GLUCOSE 185* 103 97   CALCIUM 8.8 8.2* 8.4*   MG  --   --  2.1     Hepatic:   No results for input(s): \"LABALBU\", \"AST\", \"ALT\", \"BILITOT\", \"ALKPHOS\" in the last 72 hours.    Invalid input(s): \"ALB\"        Impression and Plan:  MILLY on CKD IV:  likely due to ATN from hypotension.  Ultrasound negative for hydronephrosis.  Urine sodium 23.    -improved, near baseline  -CXR with some pulmonary edema. Continue with IV bumex and metolazone. Is diuresing will  S/p hyperkalemia, resolved.  K now low from diuretics. 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

## 2025-02-05 NOTE — PROGRESS NOTES
Patient was in the bed.Alert and oriented x4. Vitals were obtained and charted. PERRLA. Lung sounds were equal bilaterally, crackles and diminished throughout. Heart sounds s1 and s2. Patient has a productive cough. Patient denied any pain. Upper extremity strength was moderate, pulses present and strong, equal bilaterally. Lower extremities strength was moderate, pulses present and strong, and sensation present. Patient has a yan catheter. Skin intact on body, with a rash scattered throughout. Notified ARCHIE Vicente. Patient denied any numbness or tingling.

## 2025-02-05 NOTE — PLAN OF CARE
Problem: Respiratory - Adult  Goal: Achieves optimal ventilation and oxygenation  Outcome: Progressing  Flowsheets (Taken 2/4/2025 0917 by Mckayla Carrasco RN)  Achieves optimal ventilation and oxygenation: Assess for changes in respiratory status     Problem: Chronic Conditions and Co-morbidities  Goal: Patient's chronic conditions and co-morbidity symptoms are monitored and maintained or improved  Outcome: Progressing  Flowsheets (Taken 2/4/2025 0017 by Yennifer Patterson RN)  Care Plan - Patient's Chronic Conditions and Co-Morbidity Symptoms are Monitored and Maintained or Improved:   Monitor and assess patient's chronic conditions and comorbid symptoms for stability, deterioration, or improvement   Collaborate with multidisciplinary team to address chronic and comorbid conditions and prevent exacerbation or deterioration   Update acute care plan with appropriate goals if chronic or comorbid symptoms are exacerbated and prevent overall improvement and discharge     Problem: Discharge Planning  Goal: Discharge to home or other facility with appropriate resources  Outcome: Progressing  Flowsheets (Taken 2/4/2025 0917 by Mckayla Carrasco RN)  Discharge to home or other facility with appropriate resources: Identify barriers to discharge with patient and caregiver     Problem: Safety - Adult  Goal: Free from fall injury  Outcome: Progressing  Flowsheets (Taken 2/4/2025 0017 by Yennifer Patterson, RN)  Free From Fall Injury: Instruct family/caregiver on patient safety     Problem: ABCDS Injury Assessment  Goal: Absence of physical injury  Outcome: Progressing  Flowsheets (Taken 2/4/2025 0017 by Yennifer Patterson, RN)  Absence of Physical Injury: Implement safety measures based on patient assessment     Problem: Cardiovascular - Adult  Goal: Maintains optimal cardiac output and hemodynamic stability  Outcome: Progressing     Problem: Cardiovascular - Adult  Goal: Absence of cardiac dysrhythmias or at baseline  Outcome:  Progressing     Problem: Skin/Tissue Integrity - Adult  Goal: Skin integrity remains intact  Outcome: Progressing  Flowsheets (Taken 2/4/2025 0017 by Yennifer Patterson, RN)  Skin Integrity Remains Intact: Monitor for areas of redness and/or skin breakdown     Problem: Musculoskeletal - Adult  Goal: Return mobility to safest level of function  Outcome: Progressing  Flowsheets (Taken 2/4/2025 0017 by Yennifer Patterson, RN)  Return Mobility to Safest Level of Function: Assess patient stability and activity tolerance for standing, transferring and ambulating with or without assistive devices     Problem: Musculoskeletal - Adult  Goal: Return ADL status to a safe level of function  Outcome: Progressing     Problem: Genitourinary - Adult  Goal: Absence of urinary retention  Outcome: Progressing     Problem: Infection - Adult  Goal: Absence of infection at discharge  Outcome: Progressing  Flowsheets (Taken 2/4/2025 0017 by Yennifer Patterson, RN)  Absence of infection at discharge:   Assess and monitor for signs and symptoms of infection   Monitor lab/diagnostic results   Monitor all insertion sites i.e., indwelling lines, tubes and drains     Problem: Infection - Adult  Goal: Absence of infection during hospitalization  Outcome: Progressing  Flowsheets (Taken 2/4/2025 0017 by Yennifer Patterson, RN)  Absence of infection during hospitalization:   Assess and monitor for signs and symptoms of infection   Monitor lab/diagnostic results   Monitor all insertion sites i.e., indwelling lines, tubes and drains     Problem: Metabolic/Fluid and Electrolytes - Adult  Goal: Electrolytes maintained within normal limits  Outcome: Progressing  Flowsheets (Taken 2/4/2025 0017 by Yennifer Patterson RN)  Electrolytes maintained within normal limits: Monitor labs and assess patient for signs and symptoms of electrolyte imbalances

## 2025-02-05 NOTE — ED PROVIDER NOTES
[facility]  [address] [facility] [address]  Phone: [phone]      CHIEF COMPLAINT       Chief Complaint   Patient presents with    Shortness of Breath       Nurses Notes reviewed and I agree except as noted in the HPI.      HISTORY OF PRESENT ILLNESS    Portillo Daly is a 84 y.o. male.    Patient brought in by squad for acute dyspnea.  Unclear duration of shortness of breath has had coughing.  Blood pressure is a bit marginal.  Is not febrile.  No elevation of the respiratory rate but was reportedly hypoxic    REVIEW OF SYSTEMS            PAST MEDICAL HISTORY    has a past medical history of MILLY (acute kidney injury) (HCC), Atrial fibrillation (HCC), Cerebral artery occlusion with cerebral infarction (HCC), CHF (congestive heart failure), NYHA class III, acute on chronic, combined (HCC), COPD (chronic obstructive pulmonary disease) (HCC), Coronary artery disease involving native coronary artery of native heart with angina pectoris (HCC), Diabetes mellitus, type 2 (HCC), Hyperlipidemia, Hypertension, and Pneumonia.    SURGICAL HISTORY      has a past surgical history that includes Pacemaker insertion; hernia repair; Cardiac surgery; Coronary angioplasty with stent; other surgical history (05/10/2018); Aortic valve replacement; and ep device procedure (N/A, 12/2/2024).    CURRENT MEDICATIONS       Current Discharge Medication List        CONTINUE these medications which have NOT CHANGED    Details   bumetanide (BUMEX) 2 MG tablet Take 1 tablet by mouth 2 times daily  Qty: 360 tablet, Refills: 1      warfarin (COUMADIN) 2.5 MG tablet TAKE AS DIRECTED BY [facility]  Qty: 90 tablet, Refills: 3      losartan (COZAAR) 25 MG tablet Take 1 tablet by mouth daily Hold for systolic blood pressure less than 110mmhg  Qty: 90 tablet, Refills: 2      insulin glargine (LANTUS SOLOSTAR) 100 UNIT/ML injection pen INJECT 20 UNITS UNDER THE SKIN EVERY MORNING  Qty: 15 mL, Refills: 3    Associated     HCO3 20 (*)     All other components within normal limits   ANION GAP - Abnormal; Notable for the following components:    Anion Gap 19.0 (*)     All other components within normal limits   GLOMERULAR FILTRATION RATE, ESTIMATED - Abnormal; Notable for the following components:    Est, Glom Filt Rate 21 (*)     All other components within normal limits   PROTIME-INR - Abnormal; Notable for the following components:    INR 4.80 (*)     All other components within normal limits   BASIC METABOLIC PANEL - Abnormal; Notable for the following components:    CO2 19 (*)     BUN 53 (*)     Creatinine 2.9 (*)     Calcium 8.2 (*)     All other components within normal limits   GLOMERULAR FILTRATION RATE, ESTIMATED - Abnormal; Notable for the following components:    Est, Glom Filt Rate 20 (*)     All other components within normal limits   PROTIME-INR - Abnormal; Notable for the following components:    INR 6.69 (*)     All other components within normal limits   BASIC METABOLIC PANEL W/ REFLEX TO MG FOR LOW K - Abnormal; Notable for the following components:    Potassium reflex Magnesium 3.3 (*)     BUN 58 (*)     Creatinine 2.9 (*)     Calcium 8.4 (*)     All other components within normal limits   CBC - Abnormal; Notable for the following components:    WBC 19.9 (*)     RBC 3.18 (*)     Hemoglobin 10.5 (*)     Hematocrit 31.4 (*)     MCV 98.7 (*)     RDW-CV 16.3 (*)     RDW-SD 59.3 (*)     Platelets 84 (*)     MPV 12.9 (*)     All other components within normal limits   GLOMERULAR FILTRATION RATE, ESTIMATED - Abnormal; Notable for the following components:    Est, Glom Filt Rate 20 (*)     All other components within normal limits   POCT GLUCOSE - Abnormal; Notable for the following components:    POC Glucose 347 (*)     All other components within normal limits   POCT GLUCOSE - Abnormal; Notable for the following components:    POC Glucose 352 (*)     All other components within normal limits   POCT GLUCOSE - Abnormal;

## 2025-02-06 ENCOUNTER — APPOINTMENT (OUTPATIENT)
Dept: GENERAL RADIOLOGY | Age: 85
DRG: 871 | End: 2025-02-06
Payer: MEDICARE

## 2025-02-06 LAB
ANION GAP SERPL CALC-SCNC: 16 MEQ/L (ref 8–16)
BUN SERPL-MCNC: 60 MG/DL (ref 7–22)
CALCIUM SERPL-MCNC: 9.1 MG/DL (ref 8.5–10.5)
CHLORIDE SERPL-SCNC: 100 MEQ/L (ref 98–111)
CO2 SERPL-SCNC: 23 MEQ/L (ref 23–33)
CREAT SERPL-MCNC: 3 MG/DL (ref 0.4–1.2)
DEPRECATED RDW RBC AUTO: 59.6 FL (ref 35–45)
ERYTHROCYTE [DISTWIDTH] IN BLOOD BY AUTOMATED COUNT: 16.5 % (ref 11.5–14.5)
GFR SERPL CREATININE-BSD FRML MDRD: 20 ML/MIN/1.73M2
GLUCOSE BLD STRIP.AUTO-MCNC: 121 MG/DL (ref 70–108)
GLUCOSE BLD STRIP.AUTO-MCNC: 125 MG/DL (ref 70–108)
GLUCOSE BLD STRIP.AUTO-MCNC: 158 MG/DL (ref 70–108)
GLUCOSE BLD STRIP.AUTO-MCNC: 163 MG/DL (ref 70–108)
GLUCOSE SERPL-MCNC: 111 MG/DL (ref 70–108)
HCT VFR BLD AUTO: 35.2 % (ref 42–52)
HGB BLD-MCNC: 11.7 GM/DL (ref 14–18)
INR PPP: 5.75 (ref 0.85–1.13)
MAGNESIUM SERPL-MCNC: 2.2 MG/DL (ref 1.6–2.4)
MCH RBC QN AUTO: 32.7 PG (ref 26–33)
MCHC RBC AUTO-ENTMCNC: 33.2 GM/DL (ref 32.2–35.5)
MCV RBC AUTO: 98.3 FL (ref 80–94)
PLATELET # BLD AUTO: 91 THOU/MM3 (ref 130–400)
PMV BLD AUTO: 12.8 FL (ref 9.4–12.4)
POTASSIUM SERPL-SCNC: 3.3 MEQ/L (ref 3.5–5.2)
RBC # BLD AUTO: 3.58 MILL/MM3 (ref 4.7–6.1)
SCAN OF BLOOD SMEAR: NORMAL
SODIUM SERPL-SCNC: 139 MEQ/L (ref 135–145)
WBC # BLD AUTO: 17.9 THOU/MM3 (ref 4.8–10.8)

## 2025-02-06 PROCEDURE — 83735 ASSAY OF MAGNESIUM: CPT

## 2025-02-06 PROCEDURE — 85610 PROTHROMBIN TIME: CPT

## 2025-02-06 PROCEDURE — 2700000000 HC OXYGEN THERAPY PER DAY

## 2025-02-06 PROCEDURE — 6370000000 HC RX 637 (ALT 250 FOR IP): Performed by: PHYSICIAN ASSISTANT

## 2025-02-06 PROCEDURE — 94640 AIRWAY INHALATION TREATMENT: CPT

## 2025-02-06 PROCEDURE — 51701 INSERT BLADDER CATHETER: CPT

## 2025-02-06 PROCEDURE — 85027 COMPLETE CBC AUTOMATED: CPT

## 2025-02-06 PROCEDURE — 99233 SBSQ HOSP IP/OBS HIGH 50: CPT | Performed by: INTERNAL MEDICINE

## 2025-02-06 PROCEDURE — 6360000002 HC RX W HCPCS: Performed by: INTERNAL MEDICINE

## 2025-02-06 PROCEDURE — 97530 THERAPEUTIC ACTIVITIES: CPT

## 2025-02-06 PROCEDURE — 82948 REAGENT STRIP/BLOOD GLUCOSE: CPT

## 2025-02-06 PROCEDURE — 97112 NEUROMUSCULAR REEDUCATION: CPT

## 2025-02-06 PROCEDURE — 2140000000 HC CCU INTERMEDIATE R&B

## 2025-02-06 PROCEDURE — 36415 COLL VENOUS BLD VENIPUNCTURE: CPT

## 2025-02-06 PROCEDURE — 6370000000 HC RX 637 (ALT 250 FOR IP): Performed by: INTERNAL MEDICINE

## 2025-02-06 PROCEDURE — 6370000000 HC RX 637 (ALT 250 FOR IP)

## 2025-02-06 PROCEDURE — 94761 N-INVAS EAR/PLS OXIMETRY MLT: CPT

## 2025-02-06 PROCEDURE — 99232 SBSQ HOSP IP/OBS MODERATE 35: CPT | Performed by: INTERNAL MEDICINE

## 2025-02-06 PROCEDURE — 2500000003 HC RX 250 WO HCPCS

## 2025-02-06 PROCEDURE — 51798 US URINE CAPACITY MEASURE: CPT

## 2025-02-06 PROCEDURE — 6360000002 HC RX W HCPCS

## 2025-02-06 PROCEDURE — 71045 X-RAY EXAM CHEST 1 VIEW: CPT

## 2025-02-06 PROCEDURE — 80048 BASIC METABOLIC PNL TOTAL CA: CPT

## 2025-02-06 PROCEDURE — 97535 SELF CARE MNGMENT TRAINING: CPT

## 2025-02-06 RX ORDER — POTASSIUM CHLORIDE 1500 MG/1
20 TABLET, EXTENDED RELEASE ORAL 2 TIMES DAILY WITH MEALS
Status: DISCONTINUED | OUTPATIENT
Start: 2025-02-06 | End: 2025-02-08

## 2025-02-06 RX ORDER — PHYTONADIONE 5 MG/1
2.5 TABLET ORAL ONCE
Status: COMPLETED | OUTPATIENT
Start: 2025-02-06 | End: 2025-02-06

## 2025-02-06 RX ORDER — MIDODRINE HYDROCHLORIDE 5 MG/1
5 TABLET ORAL ONCE
Status: COMPLETED | OUTPATIENT
Start: 2025-02-06 | End: 2025-02-06

## 2025-02-06 RX ORDER — METOLAZONE 5 MG/1
5 TABLET ORAL ONCE
Status: COMPLETED | OUTPATIENT
Start: 2025-02-06 | End: 2025-02-06

## 2025-02-06 RX ORDER — POTASSIUM CHLORIDE 1500 MG/1
40 TABLET, EXTENDED RELEASE ORAL ONCE
Status: COMPLETED | OUTPATIENT
Start: 2025-02-06 | End: 2025-02-06

## 2025-02-06 RX ADMIN — GUAIFENESIN, DEXTROMETHORPHAN HBR 1 TABLET: 600; 30 TABLET ORAL at 21:27

## 2025-02-06 RX ADMIN — BUMETANIDE 2 MG: 0.25 INJECTION INTRAMUSCULAR; INTRAVENOUS at 08:32

## 2025-02-06 RX ADMIN — BUMETANIDE 2 MG: 0.25 INJECTION INTRAMUSCULAR; INTRAVENOUS at 18:37

## 2025-02-06 RX ADMIN — BUDESONIDE 500 MCG: 0.5 INHALANT RESPIRATORY (INHALATION) at 09:19

## 2025-02-06 RX ADMIN — ATORVASTATIN CALCIUM 80 MG: 80 TABLET, FILM COATED ORAL at 21:27

## 2025-02-06 RX ADMIN — POTASSIUM CHLORIDE 20 MEQ: 1500 TABLET, EXTENDED RELEASE ORAL at 18:12

## 2025-02-06 RX ADMIN — IPRATROPIUM BROMIDE AND ALBUTEROL SULFATE 1 DOSE: .5; 3 SOLUTION RESPIRATORY (INHALATION) at 14:32

## 2025-02-06 RX ADMIN — METOPROLOL SUCCINATE 25 MG: 25 TABLET, FILM COATED, EXTENDED RELEASE ORAL at 08:49

## 2025-02-06 RX ADMIN — CALCITRIOL CAPSULES 0.25 MCG 0.25 MCG: 0.25 CAPSULE ORAL at 08:49

## 2025-02-06 RX ADMIN — BUDESONIDE 500 MCG: 0.5 INHALANT RESPIRATORY (INHALATION) at 21:46

## 2025-02-06 RX ADMIN — DIPHENHYDRAMINE HYDROCHLORIDE, ZINC ACETATE: 2; .1 CREAM TOPICAL at 18:22

## 2025-02-06 RX ADMIN — MIDODRINE HYDROCHLORIDE 5 MG: 5 TABLET ORAL at 22:34

## 2025-02-06 RX ADMIN — SODIUM CHLORIDE, PRESERVATIVE FREE 10 ML: 5 INJECTION INTRAVENOUS at 21:27

## 2025-02-06 RX ADMIN — GUAIFENESIN, DEXTROMETHORPHAN HBR 1 TABLET: 600; 30 TABLET ORAL at 08:50

## 2025-02-06 RX ADMIN — HYDROXYZINE HYDROCHLORIDE 10 MG: 10 TABLET ORAL at 15:53

## 2025-02-06 RX ADMIN — SODIUM CHLORIDE, PRESERVATIVE FREE 10 ML: 5 INJECTION INTRAVENOUS at 08:33

## 2025-02-06 RX ADMIN — LEVOFLOXACIN 500 MG: 500 TABLET, FILM COATED ORAL at 11:04

## 2025-02-06 RX ADMIN — ACETAMINOPHEN 650 MG: 325 TABLET ORAL at 15:25

## 2025-02-06 RX ADMIN — POTASSIUM CHLORIDE 40 MEQ: 1500 TABLET, EXTENDED RELEASE ORAL at 08:50

## 2025-02-06 RX ADMIN — INSULIN GLARGINE 12 UNITS: 100 INJECTION, SOLUTION SUBCUTANEOUS at 08:36

## 2025-02-06 RX ADMIN — MIDODRINE HYDROCHLORIDE 10 MG: 10 TABLET ORAL at 12:29

## 2025-02-06 RX ADMIN — PHYTONADIONE 2.5 MG: 5 TABLET ORAL at 15:57

## 2025-02-06 RX ADMIN — MIDODRINE HYDROCHLORIDE 10 MG: 10 TABLET ORAL at 18:12

## 2025-02-06 RX ADMIN — MIDODRINE HYDROCHLORIDE 10 MG: 10 TABLET ORAL at 08:49

## 2025-02-06 RX ADMIN — METOLAZONE 5 MG: 5 TABLET ORAL at 15:57

## 2025-02-06 ASSESSMENT — PAIN SCALES - GENERAL
PAINLEVEL_OUTOF10: 5
PAINLEVEL_OUTOF10: 4

## 2025-02-06 ASSESSMENT — PAIN DESCRIPTION - DESCRIPTORS: DESCRIPTORS: ACHING

## 2025-02-06 ASSESSMENT — PAIN DESCRIPTION - ORIENTATION: ORIENTATION: LEFT

## 2025-02-06 ASSESSMENT — PAIN DESCRIPTION - LOCATION: LOCATION: KNEE

## 2025-02-06 ASSESSMENT — PAIN - FUNCTIONAL ASSESSMENT: PAIN_FUNCTIONAL_ASSESSMENT: PREVENTS OR INTERFERES SOME ACTIVE ACTIVITIES AND ADLS

## 2025-02-06 ASSESSMENT — PAIN DESCRIPTION - PAIN TYPE: TYPE: ACUTE PAIN

## 2025-02-06 NOTE — PROGRESS NOTES
Received report from primary nurse Nicole. Pt in bed with unlabored respirations.   [facility]

## 2025-02-06 NOTE — PROGRESS NOTES
Patients hands are swollen, noted ring on left hand was tight. Patient then twisted yellow ring off of ring finger and sent the ring home with niece.

## 2025-02-06 NOTE — PROGRESS NOTES
[facility]   Nephrology progress note  2/6/2025, 1:54 PM      Pt Name:    Portillo Daly  MRN:     [id number]     YOB: 1940  Admit Date:    1/29/2025  8:08 PM    Chief Complaint: Nephrology following for MILLY/CKD IV    Subjective:  Patient was seen and examined this morning.  He was placed on HFNC this morning.   CXR reviewed.   He is diuresing well - had 3.8 liters urine output/24 hours.          Objective:  24HR INTAKE/OUTPUT:    Intake/Output Summary (Last 24 hours) at 2/6/2025 1354  Last data filed at 2/6/2025 1300  Gross per 24 hour   Intake 1015 ml   Output 3900 ml   Net -2885 ml         I/O last 3 completed shifts:  In: 1410 [P.O.:1410]  Out: 5250 [Urine:5250]  I/O this shift:  In: 415 [P.O.:415]  Out: 1350 [Urine:1350]   Admission weight: 95.1 kg (209 lb 10.5 oz)  Wt Readings from Last 3 Encounters:   02/05/25 95 kg (209 lb 7 oz)   01/27/25 95.3 kg (210 lb)   01/15/25 94.3 kg (208 lb)        Vitals :   Vitals:    02/06/25 0823 02/06/25 0915 02/06/25 1112 02/06/25 1348   BP: 129/66  (!) 124/49    Pulse: 70 72 69 75   Resp: 22 22 20 22   Temp: 98.1 °F (36.7 °C)  98.4 °F (36.9 °C)    TempSrc: Oral  Oral    SpO2: 96% 93% 96% 94%   Weight:       Height:           Physical examination  General Appearance: alert and cooperative with exam, appears comfortable, no distress  Mouth/Throat: Oral mucosa moist  Neck: No JVD  Lungs:diminished  Heart:  S1, S2 heard  GI: soft, non-tender, no guarding  Extremities: improving leg edema    Medications:  Infusion:    sodium chloride      dextrose       Meds:    metOLazone  5 mg Oral Once    potassium chloride  20 mEq Oral BID WC    midodrine  10 mg Oral TID WC    metoprolol succinate  25 mg Oral Daily    dextromethorphan-guaiFENesin  1 tablet Oral BID    bumetanide  2 mg IntraVENous BID    [Held by provider] tamsulosin  0.4 mg Oral Daily    [Held by provider] bumetanide  1 mg Oral Daily    sodium chloride flush  5-40 mL IntraVENous 2 times per

## 2025-02-06 NOTE — PROGRESS NOTES
Warfarin Pharmacy Consult Note     Warfarin Indication: Atrial fibrillation/Atrial flutter  Target INR: 2.0-3.0  Dose prior to admission: 2.5 mg MF, 1.25mg all other days          Recent Labs     02/05/25  0447 02/06/25  0450   HGB 10.5* 11.7*   PLT 84* 91*            Recent Labs     02/04/25  0500 02/05/25  0447 02/06/25  0450   INR 4.80* 6.69* 5.75*      Concurrent anticoagulants/antiplatelets: none  Significant warfarin drug-drug interactions:  melatonin (HM), levaquin q48h x 2 doses started 2/4/25     Date INR Warfarin Dose   1/30/2025 3.49 No Coumadin    1/31/2025 2.34  1.25 mg     2/1/2025  2.14 2 mg     2/2/2025  2.96 0.5 mg    2/3/2025  3.10  No Coumadin   2/4/2025  4.80  No coumadin    2/5/2025  6.69  No coumadin    2/6/2025 5.75 No coumadin         Monitoring:                   INR will be monitored daily.     **Please contact pharmacy for discharge instructions when indicated**     Matt Plaza [facility]

## 2025-02-06 NOTE — PROGRESS NOTES
Hospitalist Progress Note      Patient:  Portillo Daly 84 y.o. male     Unit/Bed:-[facility]    Date of Admission: 1/29/2025      ASSESSMENT AND PLAN    Active Problems  Acute hypoxemic respiratory failure:   - 2/2 Pseudomonas PNA & Pulmonary Edema   - back  on HFNC - Most recent CXR: results noted   - IS & Acapella.     Pseudomonas, Bacterial PNA   - ID consulted - case discussed, Cefepime was transitioned to Levaquin.  -  PNA panel growing Pseudomonas. Continue Levaquin. , ID team on case    Sepsis 2/2 Pseudomonas Bacteremia   - BC 2 of 2 growing Pseudomonas. Levaquin.    - Repeat blood culture showed NGTD.     Acute on chronic renal failure:   - Nephrology notes reviewed. Creatinine has greatly improved at 2.8 Patient baseline of  2.5-3.0. Will continue to monitor.     - Nephrology believes this is ATN 2/2 hypotension    - Renal ultrasound reviewed with no acute findings  -  IV Bumex 2mg BID   - continue to hold losartan  - good UOP       Acute on chronic systolic heart failure: Last known EF 45 to 50%.  -  IV Bumex BID.   - Intake/output.  - Fluid restriction.     Hypotension   - BP has been soft throughout the whole day.   -  Midodrine, Albumin ordered.   - BP has improved      Rash   - Likely medication induced. Cefepime transitioned to Levaquin. ,  No further new issues denies any pruritus.         Chronic Conditions (reviewed and stable unless otherwise stated)  COPD: patient had exacerbation while in the ED. Given Solumedrol. Steroid inhaler BID.   DMII: Resume Lantus with high intensity sliding scale.   Paroxysmal A-fib: Status post pacemaker, continue with metoprolol and Coumadin.    LDA: []CVC / []PICC / []Midline / []Kitchen / []Drains / []Mediport / [x]None  Antibiotics: Cefepime and Doxycycline  Steroids: Pulmicort  Labs (still needed?): [x]Yes / []No  IVF (still needed?): [x]Yes / []No    Level of care: [x]Step Down / []Med-Surg  Bed Status: [x]Inpatient / []Observation  Telemetry: [x]Yes /

## 2025-02-06 NOTE — PROGRESS NOTES
Progress note: Infectious diseases    Patient - Portillo Daly,  Age - 84 y.o.    - 1940      Room Number - 3B-38/038-A   MRN -  [id number]   [facility] # - [id number]  Date of Admission -  2025  8:08 PM    SUBJECTIVE:   Patient seen and evaluated this morning at bedside. He was placed on HFNC early this morning. This was for decreased SpO2. Patient denied any shortness of breath during this event. He denies any acute issues.   OBJECTIVE   VITALS    height is 1.702 m (5' 7\") and weight is 95 kg (209 lb 7 oz). His oral temperature is 98.1 °F (36.7 °C). His blood pressure is 129/66 and his pulse is 70. His respiration is 22 and oxygen saturation is 96%.       Wt Readings from Last 3 Encounters:   25 95 kg (209 lb 7 oz)   25 95.3 kg (210 lb)   01/15/25 94.3 kg (208 lb)       I/O (24 Hours)    Intake/Output Summary (Last 24 hours) at 2025 0833  Last data filed at 2025 0345  Gross per 24 hour   Intake 960 ml   Output 3800 ml   Net -2840 ml       General Appearance  Awake, alert, oriented,  not  In acute distress  HEENT - normocephalic, atraumatic, pink conjunctiva,  anicteric sclera  Neck - Supple, no mass  Lungs -  Bilateral air entry, no rhonchi, no wheeze. Diminished breath sounds. On high flow NC.   Cardiovascular - Heart sounds are normal.  Regular rate and rhythm without murmur, gallop or rub.  Abdomen - soft, not distended, nontender,   Neurologic -Oriented  Skin - Maculopapular rash  Extremities - Positive for edema, no cyanosis, clubbing     MEDICATIONS:      potassium chloride  40 mEq Oral Once    midodrine  10 mg Oral TID WC    levoFLOXacin  500 mg Oral Q48H    metoprolol succinate  25 mg Oral Daily    dextromethorphan-guaiFENesin  1 tablet Oral BID    bumetanide  2 mg IntraVENous BID    [Held by provider] tamsulosin  0.4 mg Oral Daily    [Held by provider] bumetanide  1 mg Oral Daily     hours.  Troponin: No results for input(s): \"CKTOTAL\", \"CKMB\", \"TROPONINI\" in the last 72 hours.  BNP: No results for input(s): \"BNP\" in the last 72 hours.    CULTURES:   UA: No results for input(s): \"SPECGRAV\", \"PHUR\", \"COLORU\", \"CLARITYU\", \"MUCUS\", \"PROTEINU\", \"BLOODU\", \"RBCUA\", \"WBCUA\", \"BACTERIA\", \"NITRU\", \"GLUCOSEU\", \"BILIRUBINUR\", \"UROBILINOGEN\", \"KETUA\", \"LABCAST\", \"LABCASTTY\", \"AMORPHOS\" in the last 72 hours.    Invalid input(s): \"CRYSTALS\"  Micro:   Lab Results   Component Value Date/Time    BC No growth 24 hours. No growth 48 hours. 02/02/2025 11:02 AM         Problem list of patient:     Patient Active Problem List   Diagnosis Code    Atrial fibrillation with RVR ([facility]) I48.91    Acute respiratory failure with hypoxia J96.01    Acute kidney injury superimposed on stage 4 chronic kidney disease ([facility]) N17.9, N18.4    CKD (chronic kidney disease) stage 3, GFR 30-59 ml/min ([facility]) N18.30    Hyponatremia E87.1    Aortic stenosis, severe I35.0    Coronary artery disease involving native coronary artery of native heart without angina pectoris I25.10    CHF (congestive heart failure), NYHA class III, acute on chronic, combined ([facility]) I50.43    Cardiomyopathy ([facility]) I42.9    Hypotension I95.9    Metabolic acidosis E87.20    Sepsis without acute organ dysfunction ([facility]) A41.9    Essential hypertension I10    CKD (chronic kidney disease) stage 4, GFR 15-29 ml/min ([facility]) N18.4    Anticoagulated on Coumadin Z79.01    Permanent atrial fibrillation ([facility]) I48.21    CVA (cerebral infarction) I63.9    History of transcatheter aortic valve replacement (TAVR) Z95.2    S/P PTCA (percutaneous transluminal coronary angioplasty) Z98.61    SSS (sick sinus syndrome) ([facility]) I49.5    Hyperlipidemia E78.5    Mitral valve disease I05.9    COPD exacerbation ([facility]) J44.1    Type 2 diabetes mellitus with insulin therapy ([facility]) E11.9, Z79.4    Encounter for therapeutic drug monitoring Z51.81    Pneumonia due to infectious organism J18.9    Acute on

## 2025-02-06 NOTE — PROGRESS NOTES
[facility]  INPATIENT PHYSICAL THERAPY  DAILY NOTE  STRZ CCU-STEPDOWN 3B - 3B-38/038-A      Discharge Recommendations: Subacute/Skilled Nursing Facility  Equipment Recommendations: No             Time In: 0950  Time Out: 1015  Timed Code Treatment Minutes: 25 Minutes  Minutes: 25          Date: 2025  Patient Name: Portillo Daly,  Gender:  male        MRN: [id number]  : 1940  (84 y.o.)     Referring Practitioner: Kayleen Jesus PA  Diagnosis: Sepsis (HCC)  Additional Pertinent Hx: per chart review: Portillo Daly is a 83 yo obese male that was admitted due to acute respiratory failure secondary to pneumonia and sepsis. Patient drowsy during exam. He does state that he is feeling better now and not as short of breath as he was last night. Patient lying down in hospital bed. Patient does complain of continual itching but states is better after lotion application.  He denies any shortness of breath, chest pain, abdominal pain at this time.     Prior Level of Function:  Lives With: Significant other  Type of Home: Trailer  Home Layout: One level  Home Access: Stairs to enter with rails  Entrance Stairs - Number of Steps: 5 with (B) rails but unable to reach both at the same time  Entrance Stairs - Rails: Both  Home Equipment: Oxygen, Cane (2L baseline)   Bathroom Shower/Tub: Tub/Shower unit (Patient states he sponge bathes because of the oxygen)  Bathroom Toilet: Standard    Prior Level of Assist for ADLs: Independent  Prior Level of Assist for Transfers: Independent  Active : Yes  Additional Comments: Patient ambulates without AD household distances.  Prior Level of Assist for Ambulation: Independent household ambulator, with or without device  Has the patient had two or more falls in the past year or any fall with injury in the past year?: No    Restrictions/Precautions:  Restrictions/Precautions: Fall Risk, Contact Precautions     SUBJECTIVE: Nurse approved visit and encouraging to  Transfer training, Neuromuscular re-education, Stair training, Gait training, Endurance training, Home exercise program, Therapeutic activities, Safety education & training  General Plan:  (4-5x GM)    Education:  Learners: Patient  Patient Education: Transfers, Energy Conservation    Goals:  Patient Goals : None stated  Short Term Goals  Time Frame for Short Term Goals: by discharge  Short Term Goal 1: Patient to be (I) with HEP for (B) LE strengthening.  Short Term Goal 2: Patient to transfer sit <>stand with walker with SBA.  Short Term Goal 3: Patient to ambulate with RW times 20 feet with SBA.  Short Term Goal 4: Patient to transfer sit<>supine with (I) with bed flat and without rail.  Short Term Goal 5: Patient to stand for duration of 3 minutes with support of walker with SpO2 >90%.  Long Term Goals  Time Frame for Long Term Goals : n/a due to short length of stay    Following session, patient left in safe position with all fall risk precautions in place.

## 2025-02-06 NOTE — PROGRESS NOTES
[facility]  STRZ CCU-STEPDOWN 3B  Occupational Therapy  Daily Note    Discharge Recommendations: Home with Home Health OT  Equipment Recommendations: No (continue to monitor for needs)         Time In: 1700  Time Out: 1740  Timed Code Treatment Minutes: 40 Minutes  Minutes: 40          Date: 2025  Patient Name: Portillo Daly,   Gender: male      Room: [address] [place] [address]  MRN: [id number]  : 1940  (84 y.o.)  Referring Practitioner: Kayleen Jesus PA  Diagnosis: Sepsis (HCC)  Additional Pertinent Hx: Per EMR: \"84 y.o. male with PMHx of COPD, CKD, A-fib, TAVR, chronic respiratory failure with hypoxia who presents to [facility] with shortness of breath. Patient states that today he noted to have some chills and being more short of breath. He states that has had a long history of being short of breath, especially with ambulation. States that today he just knew he was sick. Noted to have O2 saturation of 78% prior to arrival to ED and wears 4-6 L NC at home. \"    Restrictions/Precautions:  Restrictions/Precautions: Fall Risk, Contact Precautions     Social/Functional History:  Lives With: Significant other  Type of Home: Trailer  Home Layout: One level  Home Access: Stairs to enter with rails  Entrance Stairs - Number of Steps: 5 with (B) rails but unable to reach both at the same time  Entrance Stairs - Rails: Both  Home Equipment: Oxygen, Cane (2L baseline)   Bathroom Shower/Tub: Tub/Shower unit (Patient states he sponge bathes because of the oxygen)  Bathroom Toilet: Standard       Prior Level of Assist for ADLs: Independent  Prior Level of Assist for Transfers: Independent  Prior Level of Assist for Ambulation: Independent household ambulator, with or without device  Has the patient had two or more falls in the past year or any fall with injury in the past year?: No    Active : Yes  Patient's  Info: s/o  Leisure & Hobbies: going to [employer]  Additional Comments:

## 2025-02-06 NOTE — PROGRESS NOTES
[facility]  STRZ CCU-STEPDOWN 3B  Occupational Therapy  Daily Note    Discharge Recommendations: Subacute/skilled nursing facility  Equipment Recommendations: No (continue to monitor for needs)        Time In: 1042  Time Out: 1106  Timed Code Treatment Minutes: 24 Minutes  Minutes: 24          Date: 2025  Patient Name: Portillo Daly,   Gender: male      Room: [address] [place] [address]  MRN: [id number]  : 1940  (84 y.o.)  Referring Practitioner: Kayleen Jesus PA  Diagnosis: Sepsis (HCC)  Additional Pertinent Hx: Per EMR: \"84 y.o. male with PMHx of COPD, CKD, A-fib, TAVR, chronic respiratory failure with hypoxia who presents to [facility] with shortness of breath. Patient states that today he noted to have some chills and being more short of breath. He states that has had a long history of being short of breath, especially with ambulation. States that today he just knew he was sick. Noted to have O2 saturation of 78% prior to arrival to ED and wears 4-6 L NC at home. \"    Restrictions/Precautions:  Restrictions/Precautions: Fall Risk, Contact Precautions     Social/Functional History:  Lives With: Significant other  Type of Home: Trailer  Home Layout: One level  Home Access: Stairs to enter with rails  Entrance Stairs - Number of Steps: 5 with (B) rails but unable to reach both at the same time  Entrance Stairs - Rails: Both  Home Equipment: Oxygen, Cane (2L baseline)   Bathroom Shower/Tub: Tub/Shower unit (Patient states he sponge bathes because of the oxygen)  Bathroom Toilet: Standard       Prior Level of Assist for ADLs: Independent  Prior Level of Assist for Transfers: Independent  Prior Level of Assist for Ambulation: Independent household ambulator, with or without device  Has the patient had two or more falls in the past year or any fall with injury in the past year?: No    Active : Yes  Patient's  Info: s/o  Leisure & Hobbies: going to [employer]  Additional

## 2025-02-06 NOTE — PLAN OF CARE
Problem: Respiratory - Adult  Goal: Clear lung sounds  Description: Clear lung sounds  Outcome: Progressing  Goal: Achieves optimal ventilation and oxygenation  Outcome: Progressing   Patient mutually agreed on goals.

## 2025-02-07 LAB
ANION GAP SERPL CALC-SCNC: 16 MEQ/L (ref 8–16)
B PERT DNA NPH QL NAA+PROBE: NOT DETECTED
BACTERIA BLD AEROBE CULT: NORMAL
BORDETELLA PARAPERTUSSIS BY PCR: NOT DETECTED
BUN SERPL-MCNC: 71 MG/DL (ref 7–22)
C PNEUM DNA SPEC QL NAA+PROBE: NOT DETECTED
CALCIUM SERPL-MCNC: 9.2 MG/DL (ref 8.5–10.5)
CHLORIDE SERPL-SCNC: 99 MEQ/L (ref 98–111)
CO2 SERPL-SCNC: 24 MEQ/L (ref 23–33)
CREAT SERPL-MCNC: 3.3 MG/DL (ref 0.4–1.2)
DEPRECATED RDW RBC AUTO: 58.4 FL (ref 35–45)
ERYTHROCYTE [DISTWIDTH] IN BLOOD BY AUTOMATED COUNT: 16.2 % (ref 11.5–14.5)
FLUAV RNA NPH QL NAA+PROBE: NOT DETECTED
FLUBV RNA NPH QL NAA+PROBE: NOT DETECTED
GFR SERPL CREATININE-BSD FRML MDRD: 18 ML/MIN/1.73M2
GLUCOSE BLD STRIP.AUTO-MCNC: 138 MG/DL (ref 70–108)
GLUCOSE BLD STRIP.AUTO-MCNC: 146 MG/DL (ref 70–108)
GLUCOSE BLD STRIP.AUTO-MCNC: 196 MG/DL (ref 70–108)
GLUCOSE BLD STRIP.AUTO-MCNC: 216 MG/DL (ref 70–108)
GLUCOSE SERPL-MCNC: 187 MG/DL (ref 70–108)
HADV DNA NPH QL NAA+PROBE: NOT DETECTED
HCOV 229E RNA SPEC QL NAA+PROBE: NOT DETECTED
HCOV HKU1 RNA SPEC QL NAA+PROBE: NOT DETECTED
HCOV NL63 RNA SPEC QL NAA+PROBE: NOT DETECTED
HCOV OC43 RNA SPEC QL NAA+PROBE: NOT DETECTED
HCT VFR BLD AUTO: 33.4 % (ref 42–52)
HGB BLD-MCNC: 11.1 GM/DL (ref 14–18)
HMPV RNA NPH QL NAA+PROBE: NOT DETECTED
HPIV1 RNA NPH QL NAA+PROBE: NOT DETECTED
HPIV2 RNA NPH QL NAA+PROBE: NOT DETECTED
HPIV3 RNA NPH QL NAA+PROBE: NOT DETECTED
HPIV4 RNA NPH QL NAA+PROBE: NOT DETECTED
INR PPP: 3.53 (ref 0.85–1.13)
M PNEUMO DNA SPEC QL NAA+PROBE: NOT DETECTED
MCH RBC QN AUTO: 32.5 PG (ref 26–33)
MCHC RBC AUTO-ENTMCNC: 33.2 GM/DL (ref 32.2–35.5)
MCV RBC AUTO: 97.7 FL (ref 80–94)
PLATELET # BLD AUTO: 101 THOU/MM3 (ref 130–400)
PMV BLD AUTO: 13 FL (ref 9.4–12.4)
POTASSIUM SERPL-SCNC: 3.9 MEQ/L (ref 3.5–5.2)
RBC # BLD AUTO: 3.42 MILL/MM3 (ref 4.7–6.1)
RSV RNA NPH QL NAA+PROBE: NOT DETECTED
RV+EV RNA SPEC QL NAA+PROBE: NOT DETECTED
SARS-COV-2 RNA NPH QL NAA+NON-PROBE: NOT DETECTED
SODIUM SERPL-SCNC: 139 MEQ/L (ref 135–145)
WBC # BLD AUTO: 18 THOU/MM3 (ref 4.8–10.8)

## 2025-02-07 PROCEDURE — 6370000000 HC RX 637 (ALT 250 FOR IP): Performed by: HOSPITALIST

## 2025-02-07 PROCEDURE — 6370000000 HC RX 637 (ALT 250 FOR IP): Performed by: PHYSICIAN ASSISTANT

## 2025-02-07 PROCEDURE — 6360000002 HC RX W HCPCS

## 2025-02-07 PROCEDURE — 0202U NFCT DS 22 TRGT SARS-COV-2: CPT

## 2025-02-07 PROCEDURE — 2700000000 HC OXYGEN THERAPY PER DAY

## 2025-02-07 PROCEDURE — 97110 THERAPEUTIC EXERCISES: CPT

## 2025-02-07 PROCEDURE — 94640 AIRWAY INHALATION TREATMENT: CPT

## 2025-02-07 PROCEDURE — 6360000002 HC RX W HCPCS: Performed by: INTERNAL MEDICINE

## 2025-02-07 PROCEDURE — 99232 SBSQ HOSP IP/OBS MODERATE 35: CPT | Performed by: INTERNAL MEDICINE

## 2025-02-07 PROCEDURE — 2140000000 HC CCU INTERMEDIATE R&B

## 2025-02-07 PROCEDURE — 97116 GAIT TRAINING THERAPY: CPT

## 2025-02-07 PROCEDURE — 82948 REAGENT STRIP/BLOOD GLUCOSE: CPT

## 2025-02-07 PROCEDURE — 85027 COMPLETE CBC AUTOMATED: CPT

## 2025-02-07 PROCEDURE — 2500000003 HC RX 250 WO HCPCS

## 2025-02-07 PROCEDURE — 6370000000 HC RX 637 (ALT 250 FOR IP): Performed by: INTERNAL MEDICINE

## 2025-02-07 PROCEDURE — 97530 THERAPEUTIC ACTIVITIES: CPT

## 2025-02-07 PROCEDURE — 80048 BASIC METABOLIC PNL TOTAL CA: CPT

## 2025-02-07 PROCEDURE — 6370000000 HC RX 637 (ALT 250 FOR IP)

## 2025-02-07 PROCEDURE — 85610 PROTHROMBIN TIME: CPT

## 2025-02-07 PROCEDURE — 94761 N-INVAS EAR/PLS OXIMETRY MLT: CPT

## 2025-02-07 PROCEDURE — 36415 COLL VENOUS BLD VENIPUNCTURE: CPT

## 2025-02-07 RX ADMIN — MIDODRINE HYDROCHLORIDE 10 MG: 10 TABLET ORAL at 12:07

## 2025-02-07 RX ADMIN — ATORVASTATIN CALCIUM 80 MG: 80 TABLET, FILM COATED ORAL at 21:51

## 2025-02-07 RX ADMIN — IPRATROPIUM BROMIDE AND ALBUTEROL SULFATE 1 DOSE: .5; 3 SOLUTION RESPIRATORY (INHALATION) at 02:38

## 2025-02-07 RX ADMIN — BUDESONIDE 500 MCG: 0.5 INHALANT RESPIRATORY (INHALATION) at 09:18

## 2025-02-07 RX ADMIN — POTASSIUM CHLORIDE 20 MEQ: 1500 TABLET, EXTENDED RELEASE ORAL at 09:02

## 2025-02-07 RX ADMIN — SODIUM CHLORIDE, PRESERVATIVE FREE 10 ML: 5 INJECTION INTRAVENOUS at 09:17

## 2025-02-07 RX ADMIN — GUAIFENESIN, DEXTROMETHORPHAN HBR 1 TABLET: 600; 30 TABLET ORAL at 09:01

## 2025-02-07 RX ADMIN — BUMETANIDE 2 MG: 0.25 INJECTION INTRAMUSCULAR; INTRAVENOUS at 17:22

## 2025-02-07 RX ADMIN — GUAIFENESIN, DEXTROMETHORPHAN HBR 1 TABLET: 600; 30 TABLET ORAL at 21:51

## 2025-02-07 RX ADMIN — SODIUM CHLORIDE, PRESERVATIVE FREE 10 ML: 5 INJECTION INTRAVENOUS at 21:51

## 2025-02-07 RX ADMIN — INSULIN LISPRO 2 UNITS: 100 INJECTION, SOLUTION INTRAVENOUS; SUBCUTANEOUS at 21:51

## 2025-02-07 RX ADMIN — HYDROXYZINE HYDROCHLORIDE 10 MG: 10 TABLET ORAL at 23:26

## 2025-02-07 RX ADMIN — INSULIN LISPRO 2 UNITS: 100 INJECTION, SOLUTION INTRAVENOUS; SUBCUTANEOUS at 12:06

## 2025-02-07 RX ADMIN — BUMETANIDE 2 MG: 0.25 INJECTION INTRAMUSCULAR; INTRAVENOUS at 09:01

## 2025-02-07 RX ADMIN — MIDODRINE HYDROCHLORIDE 10 MG: 10 TABLET ORAL at 17:22

## 2025-02-07 RX ADMIN — MIDODRINE HYDROCHLORIDE 10 MG: 10 TABLET ORAL at 09:35

## 2025-02-07 RX ADMIN — INSULIN GLARGINE 12 UNITS: 100 INJECTION, SOLUTION SUBCUTANEOUS at 09:02

## 2025-02-07 RX ADMIN — BUDESONIDE 500 MCG: 0.5 INHALANT RESPIRATORY (INHALATION) at 18:30

## 2025-02-07 RX ADMIN — CALCITRIOL CAPSULES 0.25 MCG 0.25 MCG: 0.25 CAPSULE ORAL at 09:01

## 2025-02-07 RX ADMIN — POTASSIUM CHLORIDE 20 MEQ: 1500 TABLET, EXTENDED RELEASE ORAL at 17:22

## 2025-02-07 RX ADMIN — METOPROLOL SUCCINATE 25 MG: 25 TABLET, FILM COATED, EXTENDED RELEASE ORAL at 09:02

## 2025-02-07 NOTE — PROGRESS NOTES
Progress note: Infectious diseases    Patient - Portillo Daly,  Age - 84 y.o.    - 1940      Room Number - 3B-38/038-A   MRN -  [id number]   [facility] # - [id number]  Date of Admission -  2025  8:08 PM    SUBJECTIVE:   Patient seen and evaluated at chairside this morning. Patient reports that he greatly dislikes the sensation of HFNC and wishes to be done with it. Denies any other acute issues.   OBJECTIVE   VITALS    height is 1.702 m (5' 7\") and weight is 95 kg (209 lb 7 oz). His oral temperature is 97.8 °F (36.6 °C). His blood pressure is 121/57 (abnormal) and his pulse is 70. His respiration is 18 and oxygen saturation is 94%.       Wt Readings from Last 3 Encounters:   25 95 kg (209 lb 7 oz)   25 95.3 kg (210 lb)   01/15/25 94.3 kg (208 lb)       I/O (24 Hours)    Intake/Output Summary (Last 24 hours) at 2025 0842  Last data filed at 2025 0804  Gross per 24 hour   Intake 640 ml   Output 3075 ml   Net -2435 ml       General Appearance  Awake, alert, oriented,  not  In acute distress  HEENT - normocephalic, atraumatic, pink conjunctiva,  anicteric sclera  Neck - Supple, no mass  Lungs -  Bilateral air entry, no rhonchi, no wheeze. Diminished breath sounds bilaterally. On HFNC  Cardiovascular - Heart sounds are normal.  Regular rate and rhythm without murmur, gallop or rub.  Abdomen - soft, not distended, nontender,   Neurologic -Oriented  Skin - Maculopapular rash  Extremities - Positive for edema, no cyanosis, clubbing     MEDICATIONS:      potassium chloride  20 mEq Oral BID WC    midodrine  10 mg Oral TID WC    metoprolol succinate  25 mg Oral Daily    dextromethorphan-guaiFENesin  1 tablet Oral BID    bumetanide  2 mg IntraVENous BID    [Held by provider] tamsulosin  0.4 mg Oral Daily    [Held by provider] bumetanide  1 mg Oral Daily    sodium chloride flush  5-40 mL IntraVENous 2 times per

## 2025-02-07 NOTE — PROGRESS NOTES
Warfarin Pharmacy Consult Note    Warfarin Indication: Atrial fibrillation/Atrial flutter  Target INR: 2.0-3.0  Dose prior to admission: Coumadin 2.5 mg MF, 1.25 mg TWThSS     Recent Labs     02/05/25  0447 02/06/25  0450 02/07/25  0403   HGB 10.5* 11.7* 11.1*   PLT 84* 91* 101*     Recent Labs     02/05/25  0447 02/06/25  0450 02/07/25  0403   INR 6.69* 5.75* 3.53*     Concurrent anticoagulants/antiplatelets: none  Significant warfarin drug-drug interactions:  melatonin (HM), levaquin q48h x 2 doses started 2/4/25     Date INR Warfarin Dose   1/30/2025 3.49 No coumadin    1/31/2025 2.34  1.25 mg     2/1/2025  2.14 2 mg     2/2/2025  2.96 0.5 mg    2/3/2025  3.10  No coumadin   2/4/2025    4.80   No coumadin    2/5/2025    6.69   No coumadin    2/6/2025  5.75 No coumadin  Vitamin K 2.5 mg PO   2/7/2025  3.53 No coumadin     Monitoring:                   INR will be monitored daily.    **Please contact pharmacy for discharge instructions when indicated**    Prasanna WhitfieldD, BCPS 2/7/2025 7:40 AM

## 2025-02-07 NOTE — PROGRESS NOTES
Occupational Therapy  [facility]  STRZ CCU-STEPDOWN 3B  Occupational Therapy  Daily Note    Discharge Recommendations: Subacute/skilled nursing facility  Equipment Recommendations: No (continue to monitor for needs)      Time In: 0953  Time Out: 1004  Timed Code Treatment Minutes: 11 Minutes  Minutes: 11          Date: 2025  Patient Name: Portillo Daly,   Gender: male      Room: [address] [place] [address]  MRN: [id number]  : 1940  (84 y.o.)  Referring Practitioner: Kayleen Jesus PA  Diagnosis: Sepsis (HCC)  Additional Pertinent Hx: Per EMR: \"84 y.o. male with PMHx of COPD, CKD, A-fib, TAVR, chronic respiratory failure with hypoxia who presents to [facility] with shortness of breath. Patient states that today he noted to have some chills and being more short of breath. He states that has had a long history of being short of breath, especially with ambulation. States that today he just knew he was sick. Noted to have O2 saturation of 78% prior to arrival to ED and wears 4-6 L NC at home. \"    Restrictions/Precautions:  Restrictions/Precautions: Fall Risk, Contact Precautions     Social/Functional History:  Lives With: Significant other  Type of Home: Trailer  Home Layout: One level  Home Access: Stairs to enter with rails  Entrance Stairs - Number of Steps: 5 with (B) rails but unable to reach both at the same time  Entrance Stairs - Rails: Both  Home Equipment: Oxygen, Cane (2L baseline)   Bathroom Shower/Tub: Tub/Shower unit (Patient states he sponge bathes because of the oxygen)  Bathroom Toilet: Standard       Prior Level of Assist for ADLs: Independent  Prior Level of Assist for Transfers: Independent  Prior Level of Assist for Ambulation: Independent household ambulator, with or without device  Has the patient had two or more falls in the past year or any fall with injury in the past year?: No    Active : Yes  Patient's  Info: s/o  Leisure & Hobbies: going to [employer]  Additional Comments: Patient ambulates without AD household distances.    SUBJECTIVE: Pt sitting in chair reporting he is very uncomfortable and requesting to return to bed. Pt agreeable to OT tx.     PAIN: back pain, not rated    Vitals: Oxygen: 91%    COGNITION: Slow Processing, Decreased Insight, and Decreased Problem Solving    ADL:   No ADL's completed this session..    IADL:   Not Tested    BALANCE:  Sitting Balance:  Stand By Assistance.    Standing Balance: Minimal Assistance.      BED MOBILITY:  Sit to Supine: Stand By Assistance      TRANSFERS:  Sit to Stand:  Contact Guard Assistance.    Stand to Sit: Contact Guard Assistance.      FUNCTIONAL MOBILITY:  Assistive Device: None  Assist Level:  Minimal Assistance.   Distance:  3-4 steps from chair to EOB  Hand held assist provided       Functional Outcome Measures:   AM-PAC Inpatient Daily Activity Raw Score: 15     Modified Favian:  Current Functional Status:  Not Applicable    ASSESSMENT:     Activity Tolerance:  Patient tolerance of  treatment: Fair treatment tolerance, Limited by pain, Limited by fatigue, Limited by medical complications, and Reduced activity pace      Plan: Times Per Week: 4-5x  Current Treatment Recommendations: Strengthening, Balance training, Functional mobility training, Endurance training, Cognitive reorientation, Pain management, Safety education & training, Patient/Caregiver education & training, Equipment evaluation, education, & procurement, Positioning, Return to work related activity, Self-Care / ADL, Cognitive/Perceptual training    Education:  Learners: Patient  Importance of Increasing Activity and Fall Prevention    Goals  Short Term Goals  Time Frame for Short Term Goals: by discharge  Short Term Goal 1: Pt will complete ADL transfer w/ SUP via LRD.  Short Term Goal 2: Pt will complete LE dressing w/ Mod assist and LHAD PRN.  Short Term Goal 3: Pt will complete dynamic standing task >8 min for improved sinkside

## 2025-02-07 NOTE — PLAN OF CARE
Problem: Respiratory - Adult  Goal: Clear lung sounds  Description: Clear lung sounds  2/7/2025 0920 by Maryjane Yeh RCP  Outcome: Progressing  Note: Maintenance  Patient mutually agreed on goals.      2/6/2025 2150 by Sugey Meade RCP  Outcome: Progressing  Goal: Achieves optimal ventilation and oxygenation  2/6/2025 2150 by Sugey Meade RCP  Outcome: Progressing  2/6/2025 1952 by Georgia Draper, RN  Outcome: Progressing  Flowsheets (Taken 2/6/2025 0839)  Achieves optimal ventilation and oxygenation: Assess for changes in respiratory status

## 2025-02-07 NOTE — PROGRESS NOTES
[facility]   Nephrology progress note  2/7/2025, 11:39 AM      Pt Name:    Portillo Daly  MRN:     [id number]     YOB: 1940  Admit Date:    1/29/2025  8:08 PM    Chief Complaint: Nephrology following for MILLY/CKD IV    Subjective:  Patient was seen and examined this morning.  Continues to be on HFNC.  Kitchen was removed. Voiding ok.       Objective:  24HR INTAKE/OUTPUT:    Intake/Output Summary (Last 24 hours) at 2/7/2025 1139  Last data filed at 2/7/2025 0804  Gross per 24 hour   Intake 520 ml   Output 2175 ml   Net -1655 ml         I/O last 3 completed shifts:  In: 1015 [P.O.:1015]  Out: 5225 [Urine:5225]  I/O this shift:  In: -   Out: 400 [Urine:400]   Admission weight: 95.1 kg (209 lb 10.5 oz)  Wt Readings from Last 3 Encounters:   02/05/25 95 kg (209 lb 7 oz)   01/27/25 95.3 kg (210 lb)   01/15/25 94.3 kg (208 lb)        Vitals :   Vitals:    02/07/25 0246 02/07/25 0327 02/07/25 0819 02/07/25 0920   BP: 133/64  (!) 121/57    Pulse: 70 71 70    Resp: 18 18 18    Temp: 98 °F (36.7 °C)  97.8 °F (36.6 °C)    TempSrc: Oral  Oral    SpO2: 96% 94% 94% 94%   Weight:       Height:           Physical examination  General Appearance: alert and cooperative with exam, appears comfortable, no distress  Mouth/Throat: Oral mucosa moist  Neck: No JVD  Lungs:diminished with rhonchi  Heart:  S1, S2 heard  GI: soft, non-tender, no guarding  Extremities:no further leg edema noted    Medications:  Infusion:    sodium chloride      dextrose       Meds:    potassium chloride  20 mEq Oral BID WC    midodrine  10 mg Oral TID WC    metoprolol succinate  25 mg Oral Daily    dextromethorphan-guaiFENesin  1 tablet Oral BID    bumetanide  2 mg IntraVENous BID    [Held by provider] tamsulosin  0.4 mg Oral Daily    [Held by provider] bumetanide  1 mg Oral Daily    sodium chloride flush  5-40 mL IntraVENous 2 times per day    atorvastatin  80 mg Oral Nightly    budesonide  500 mcg Nebulization BID

## 2025-02-07 NOTE — PROGRESS NOTES
[facility]  INPATIENT PHYSICAL THERAPY  DAILY NOTE  [facility] CCU-STEPDOWN 3B - 3B-38/038-A      Discharge Recommendations: Subacute/Skilled Nursing Facility and Patient would benefit from continued PT at discharge  Equipment Recommendations: No               Time In: 0812  Time Out: 0841  Timed Code Treatment Minutes: 29 Minutes  Minutes: 29          Date: 2025  Patient Name: Portillo Daly,  Gender:  male        MRN: [id number]  : 1940  (84 y.o.)     Referring Practitioner: Kayleen Jesus PA  Diagnosis: Sepsis (HCC)  Additional Pertinent Hx: per chart review: Portillo Daly is a 83 yo obese male that was admitted due to acute respiratory failure secondary to pneumonia and sepsis. Patient drowsy during exam. He does state that he is feeling better now and not as short of breath as he was last night. Patient lying down in hospital bed. Patient does complain of continual itching but states is better after lotion application.  He denies any shortness of breath, chest pain, abdominal pain at this time.     Prior Level of Function:  Lives With: Significant other  Type of Home: Trailer  Home Layout: One level  Home Access: Stairs to enter with rails  Entrance Stairs - Number of Steps: 5 with (B) rails but unable to reach both at the same time  Entrance Stairs - Rails: Both  Home Equipment: Oxygen, Cane (2L baseline)   Bathroom Shower/Tub: Tub/Shower unit (Patient states he sponge bathes because of the oxygen)  Bathroom Toilet: Standard    Prior Level of Assist for ADLs: Independent  Prior Level of Assist for Transfers: Independent  Active : Yes  Additional Comments: Patient ambulates without AD household distances.  Prior Level of Assist for Ambulation: Independent household ambulator, with or without device  Has the patient had two or more falls in the past year or any fall with injury in the past year?: No    Restrictions/Precautions:  Restrictions/Precautions: Fall Risk, Contact

## 2025-02-07 NOTE — PLAN OF CARE
- Adult  Goal: Return mobility to safest level of function  Outcome: Progressing  Flowsheets (Taken 2/6/2025 0839)  Return Mobility to Safest Level of Function: Assess patient stability and activity tolerance for standing, transferring and ambulating with or without assistive devices     Problem: Musculoskeletal - Adult  Goal: Return ADL status to a safe level of function  Outcome: Progressing  Flowsheets (Taken 2/6/2025 0839)  Return ADL Status to a Safe Level of Function: Administer medication as ordered     Problem: Genitourinary - Adult  Goal: Absence of urinary retention  Outcome: Progressing  Flowsheets (Taken 2/6/2025 0839)  Absence of urinary retention:   Monitor intake/output and perform bladder scan as needed   Discuss catheterization for long term situations as appropriate     Problem: Infection - Adult  Goal: Absence of infection at discharge  Outcome: Progressing  Flowsheets (Taken 2/6/2025 0839)  Absence of infection at discharge: Assess and monitor for signs and symptoms of infection     Problem: Infection - Adult  Goal: Absence of infection during hospitalization  Outcome: Progressing  Flowsheets (Taken 2/6/2025 0839)  Absence of infection during hospitalization: Assess and monitor for signs and symptoms of infection     Problem: Metabolic/Fluid and Electrolytes - Adult  Goal: Electrolytes maintained within normal limits  Outcome: Progressing  Flowsheets (Taken 2/6/2025 0839)  Electrolytes maintained within normal limits: Monitor labs and assess patient for signs and symptoms of electrolyte imbalances     Problem: Metabolic/Fluid and Electrolytes - Adult  Goal: Hemodynamic stability and optimal renal function maintained  Outcome: Progressing  Flowsheets (Taken 2/6/2025 0839)  Hemodynamic stability and optimal renal function maintained: Monitor labs and assess for signs and symptoms of volume excess or deficit     Problem: Pain  Goal: Verbalizes/displays adequate comfort level or baseline comfort

## 2025-02-07 NOTE — PROGRESS NOTES
CREATININE 2.9* 3.0* 3.3*   MG 2.1 2.2  --      Pertinent Medications: statin, pulmicort, bumex (held), insulin, losartan (held), metoprolol, midodrine    Current Nutrition Intake & Therapies:    Recent PO intake: 1-25%, 26-50%  Recent Supplement Intake:  (prefers chocolate)    - Current intake likely does not meet estimated needs.   ADULT DIET; Dysphagia - Soft and Bite Sized; Low Sodium (2 gm); 2000 ml  ADULT ORAL NUTRITION SUPPLEMENT; Breakfast, Lunch, Dinner, HS Snack; Standard High Calorie/High Protein Oral Supplement    Anthropometric Measures:  Height: 170.2 cm (5' 7\")  Ideal Body Weight (IBW): 148 lbs  Admission Body Cheli: 94.8 kg (209 lb) (1/30, no edema)  Current Body Weight: 94.8 kg (209 lb) (2/85, +1 pitting edema)  Current BMI: Body mass index is 32.8 kg/m².  Usual Body Weight:  (12/16: 205 lb; 9/3: 207 lb; 5/8: 203 lb; 2/1: 209 lb)  Weight Adjustment for: No Adjustment  BMI Categories: Class I Obesity (30-34.9)    Estimated Daily Nutrient Needs:  Energy (kcal/day): 2706-1267 kcal (25-30 kcla/kg IBW)  Weight Used for energy calculation:  Ideal (67.3 kg)  Protein (g/day): 67-81 g (1-1.2 g/kg IBW)    Weight Used for protein calculation: Ideal (67.3 kg)  Fluid (ml/day): per provider (Defer to provider)    Nutrition Diagnosis:   Inadequate oral intake, in context of acute illness or injury related to early satiety as evidenced by intake 0-25%, intake 26-50%    Nutrition Interventions:   Nutrition and/ or Nutrient Delivery: Continue Current Diet, Continue Oral Nutrition Supplement   Nutrition Education/ Counseling: Education/Counseling initiated (discussed continued use of ONS at discharge)   Coordination of Nutrition Care: Continue to monitor while inpatient, Interdisciplinary Rounds     Goals:  Goals: Meet at least 75% of estimated needs, by next RD assessment     Nutrition Monitoring and Evaluation:   Food/ Nutrient Intake Outcomes: Food and Nutrient Intake, Supplement Intake   Physical Sings/ Symptoms  Outcomes: Biochemical Data, GI Status, Fluid Status or Edema, Hemodynamic Status, Nutrition Focused Physical Findings, Weight     Discharge Planning:    Continue Oral Nutrition Supplement      Frieda Velarde MS, RD, LD  Contact: [phone]

## 2025-02-07 NOTE — CARE COORDINATION
2/7/25, 12:35 PM EST    DISCHARGE ON GOING EVALUATION    Portillo [facility] day: 9  Location: [facility]038-A Reason for admit: Hypoxemia [R09.02]  Sepsis (HCC) [A41.9]     Procedures: none    Imaging since last note:   2/6 CXR: Mild cardiomegaly. Permanent dual-chamber pacemaker. 2. Tiny bilateral pleural effusions.  3. Moderate bibasilar atelectasis/pneumonia. 4. The infiltrates have worsened in appearance since prior but the previous noted findings of pulmonary vascular congestion and pulmonary edema have resolved.    Barriers to Discharge: Hospitalist, Nephrology and ID following. PT/OT. Patient placed on HHFNC yesterday. Attempt to wean to 6L NC unsuccessful. Placed back on HHFNC. Currently 94% on HHFNC 53% 60L. Creat 3.3. WBC 18.0. Hgb 11.1. Nebulziers. IV bumex BID. Mucinex. DM management. Toprol XL. Midodrine. Coumadin.     PCP: Elinor Ly APRN - CNP  Readmission Risk Score: 26.1    Patient Goals/Plan/Treatment Preferences: From home w/ SO. O2 from [facility]. Current [facility].  following.     Spoke w/ Dr. Lua regarding potential LTACH as patient on and off HHFNC; He doesn't want CM to discuss LTACH at this time; re-evaluate on Monday.

## 2025-02-07 NOTE — PALLIATIVE CARE
Follow Up / Progress Note        Patient:   Portillo Daly  YOB: 1940  Age:  84 y.o.  Room:  [address] [place] [address]  MRN:  [id number]         Family/Patient Discussion:  Saw patient at bedside. Patient requested repositioning for SOB. Patient on HFNC, repositioned for comfort. Emotional support offered. Patient denies other needs at this time.       Plan/Follow-Up:  Palliative care will continue to follow, please call for additional needs.         Electronically signed by Ricky Roger RN on 2/7/2025 at 10:53 AM             Palliative Care Office: [phone]

## 2025-02-07 NOTE — PLAN OF CARE
Problem: Respiratory - Adult  Goal: Clear lung sounds  Description: Clear lung sounds  Outcome: Progressing     Problem: Respiratory - Adult  Goal: Achieves optimal ventilation and oxygenation  2/6/2025 2150 by Sugey Meade RCP  Outcome: Progressing   Patient mutually agreed on goals.

## 2025-02-08 LAB
ANION GAP SERPL CALC-SCNC: 16 MEQ/L (ref 8–16)
BASOPHILS ABSOLUTE: 0.1 THOU/MM3 (ref 0–0.1)
BASOPHILS NFR BLD AUTO: 0.6 %
BUN SERPL-MCNC: 74 MG/DL (ref 7–22)
BURR CELLS BLD QL SMEAR: ABNORMAL
CALCIUM SERPL-MCNC: 9.7 MG/DL (ref 8.5–10.5)
CHLORIDE SERPL-SCNC: 96 MEQ/L (ref 98–111)
CO2 SERPL-SCNC: 26 MEQ/L (ref 23–33)
CREAT SERPL-MCNC: 3.3 MG/DL (ref 0.4–1.2)
DEPRECATED RDW RBC AUTO: 57.7 FL (ref 35–45)
EOSINOPHIL NFR BLD AUTO: 8.2 %
EOSINOPHILS ABSOLUTE: 1.7 THOU/MM3 (ref 0–0.4)
ERYTHROCYTE [DISTWIDTH] IN BLOOD BY AUTOMATED COUNT: 16.3 % (ref 11.5–14.5)
GFR SERPL CREATININE-BSD FRML MDRD: 18 ML/MIN/1.73M2
GLUCOSE BLD STRIP.AUTO-MCNC: 131 MG/DL (ref 70–108)
GLUCOSE BLD STRIP.AUTO-MCNC: 199 MG/DL (ref 70–108)
GLUCOSE BLD STRIP.AUTO-MCNC: 255 MG/DL (ref 70–108)
GLUCOSE BLD STRIP.AUTO-MCNC: 338 MG/DL (ref 70–108)
GLUCOSE SERPL-MCNC: 120 MG/DL (ref 70–108)
HCT VFR BLD AUTO: 34.2 % (ref 42–52)
HGB BLD-MCNC: 11.5 GM/DL (ref 14–18)
IMM GRANULOCYTES # BLD AUTO: 0.72 THOU/MM3 (ref 0–0.07)
IMM GRANULOCYTES NFR BLD AUTO: 3.5 %
INR PPP: 1.53 (ref 0.85–1.13)
LYMPHOCYTES ABSOLUTE: 2.8 THOU/MM3 (ref 1–4.8)
LYMPHOCYTES NFR BLD AUTO: 13.6 %
MCH RBC QN AUTO: 32.6 PG (ref 26–33)
MCHC RBC AUTO-ENTMCNC: 33.6 GM/DL (ref 32.2–35.5)
MCV RBC AUTO: 96.9 FL (ref 80–94)
MONOCYTES ABSOLUTE: 1.3 THOU/MM3 (ref 0.4–1.3)
MONOCYTES NFR BLD AUTO: 6.3 %
NEUTROPHILS ABSOLUTE: 13.8 THOU/MM3 (ref 1.8–7.7)
NEUTROPHILS NFR BLD AUTO: 67.8 %
NRBC BLD AUTO-RTO: 0 /100 WBC
PLATELET # BLD AUTO: 106 THOU/MM3 (ref 130–400)
PLATELET BLD QL SMEAR: ABNORMAL
PMV BLD AUTO: 13.2 FL (ref 9.4–12.4)
POIKILOCYTES: ABNORMAL
POTASSIUM SERPL-SCNC: 3.4 MEQ/L (ref 3.5–5.2)
RBC # BLD AUTO: 3.53 MILL/MM3 (ref 4.7–6.1)
SCAN OF BLOOD SMEAR: NORMAL
SODIUM SERPL-SCNC: 138 MEQ/L (ref 135–145)
VARIANT LYMPHS BLD QL SMEAR: ABNORMAL %
WBC # BLD AUTO: 20.3 THOU/MM3 (ref 4.8–10.8)

## 2025-02-08 PROCEDURE — 2140000000 HC CCU INTERMEDIATE R&B

## 2025-02-08 PROCEDURE — 6370000000 HC RX 637 (ALT 250 FOR IP): Performed by: INTERNAL MEDICINE

## 2025-02-08 PROCEDURE — 36415 COLL VENOUS BLD VENIPUNCTURE: CPT

## 2025-02-08 PROCEDURE — 6360000002 HC RX W HCPCS: Performed by: INTERNAL MEDICINE

## 2025-02-08 PROCEDURE — 94640 AIRWAY INHALATION TREATMENT: CPT

## 2025-02-08 PROCEDURE — 2500000003 HC RX 250 WO HCPCS

## 2025-02-08 PROCEDURE — 6370000000 HC RX 637 (ALT 250 FOR IP)

## 2025-02-08 PROCEDURE — 6370000000 HC RX 637 (ALT 250 FOR IP): Performed by: PHARMACIST

## 2025-02-08 PROCEDURE — 6370000000 HC RX 637 (ALT 250 FOR IP): Performed by: PHYSICIAN ASSISTANT

## 2025-02-08 PROCEDURE — 82948 REAGENT STRIP/BLOOD GLUCOSE: CPT

## 2025-02-08 PROCEDURE — 2700000000 HC OXYGEN THERAPY PER DAY

## 2025-02-08 PROCEDURE — 99232 SBSQ HOSP IP/OBS MODERATE 35: CPT | Performed by: INTERNAL MEDICINE

## 2025-02-08 PROCEDURE — 6360000002 HC RX W HCPCS

## 2025-02-08 PROCEDURE — 99233 SBSQ HOSP IP/OBS HIGH 50: CPT | Performed by: INTERNAL MEDICINE

## 2025-02-08 PROCEDURE — 85610 PROTHROMBIN TIME: CPT

## 2025-02-08 PROCEDURE — 80048 BASIC METABOLIC PNL TOTAL CA: CPT

## 2025-02-08 PROCEDURE — 6370000000 HC RX 637 (ALT 250 FOR IP): Performed by: HOSPITALIST

## 2025-02-08 PROCEDURE — 94761 N-INVAS EAR/PLS OXIMETRY MLT: CPT

## 2025-02-08 PROCEDURE — 85025 COMPLETE CBC W/AUTO DIFF WBC: CPT

## 2025-02-08 RX ORDER — IPRATROPIUM BROMIDE AND ALBUTEROL SULFATE 2.5; .5 MG/3ML; MG/3ML
1 SOLUTION RESPIRATORY (INHALATION) 3 TIMES DAILY
Status: DISCONTINUED | OUTPATIENT
Start: 2025-02-09 | End: 2025-02-09

## 2025-02-08 RX ORDER — WARFARIN SODIUM 1 MG/1
1 TABLET ORAL ONCE
Status: COMPLETED | OUTPATIENT
Start: 2025-02-08 | End: 2025-02-08

## 2025-02-08 RX ORDER — POTASSIUM CHLORIDE 1500 MG/1
20 TABLET, EXTENDED RELEASE ORAL
Status: DISCONTINUED | OUTPATIENT
Start: 2025-02-08 | End: 2025-02-12

## 2025-02-08 RX ORDER — POTASSIUM CHLORIDE 1500 MG/1
20 TABLET, EXTENDED RELEASE ORAL ONCE
Status: COMPLETED | OUTPATIENT
Start: 2025-02-08 | End: 2025-02-08

## 2025-02-08 RX ORDER — ALBUTEROL SULFATE 0.83 MG/ML
2.5 SOLUTION RESPIRATORY (INHALATION) EVERY 4 HOURS PRN
Status: DISCONTINUED | OUTPATIENT
Start: 2025-02-08 | End: 2025-02-18 | Stop reason: HOSPADM

## 2025-02-08 RX ADMIN — MIDODRINE HYDROCHLORIDE 10 MG: 10 TABLET ORAL at 17:43

## 2025-02-08 RX ADMIN — BUDESONIDE 500 MCG: 0.5 INHALANT RESPIRATORY (INHALATION) at 08:43

## 2025-02-08 RX ADMIN — MIDODRINE HYDROCHLORIDE 10 MG: 10 TABLET ORAL at 12:59

## 2025-02-08 RX ADMIN — GUAIFENESIN, DEXTROMETHORPHAN HBR 1 TABLET: 600; 30 TABLET ORAL at 08:03

## 2025-02-08 RX ADMIN — POTASSIUM CHLORIDE 20 MEQ: 1500 TABLET, EXTENDED RELEASE ORAL at 12:59

## 2025-02-08 RX ADMIN — INSULIN GLARGINE 12 UNITS: 100 INJECTION, SOLUTION SUBCUTANEOUS at 08:03

## 2025-02-08 RX ADMIN — GUAIFENESIN, DEXTROMETHORPHAN HBR 1 TABLET: 600; 30 TABLET ORAL at 21:20

## 2025-02-08 RX ADMIN — ATORVASTATIN CALCIUM 80 MG: 80 TABLET, FILM COATED ORAL at 21:20

## 2025-02-08 RX ADMIN — WARFARIN SODIUM 1 MG: 1 TABLET ORAL at 17:44

## 2025-02-08 RX ADMIN — POTASSIUM CHLORIDE 20 MEQ: 1500 TABLET, EXTENDED RELEASE ORAL at 17:43

## 2025-02-08 RX ADMIN — POTASSIUM CHLORIDE 20 MEQ: 1500 TABLET, EXTENDED RELEASE ORAL at 08:02

## 2025-02-08 RX ADMIN — SODIUM CHLORIDE, PRESERVATIVE FREE 10 ML: 5 INJECTION INTRAVENOUS at 08:10

## 2025-02-08 RX ADMIN — INSULIN LISPRO 2 UNITS: 100 INJECTION, SOLUTION INTRAVENOUS; SUBCUTANEOUS at 17:45

## 2025-02-08 RX ADMIN — INSULIN LISPRO 6 UNITS: 100 INJECTION, SOLUTION INTRAVENOUS; SUBCUTANEOUS at 21:20

## 2025-02-08 RX ADMIN — BUMETANIDE 2 MG: 0.25 INJECTION INTRAMUSCULAR; INTRAVENOUS at 08:03

## 2025-02-08 RX ADMIN — INSULIN LISPRO 4 UNITS: 100 INJECTION, SOLUTION INTRAVENOUS; SUBCUTANEOUS at 12:59

## 2025-02-08 RX ADMIN — MIDODRINE HYDROCHLORIDE 10 MG: 10 TABLET ORAL at 08:03

## 2025-02-08 RX ADMIN — METOPROLOL SUCCINATE 25 MG: 25 TABLET, FILM COATED, EXTENDED RELEASE ORAL at 08:03

## 2025-02-08 RX ADMIN — CALCITRIOL CAPSULES 0.25 MCG 0.25 MCG: 0.25 CAPSULE ORAL at 08:03

## 2025-02-08 RX ADMIN — SODIUM CHLORIDE, PRESERVATIVE FREE 10 ML: 5 INJECTION INTRAVENOUS at 21:21

## 2025-02-08 RX ADMIN — BUDESONIDE 500 MCG: 0.5 INHALANT RESPIRATORY (INHALATION) at 19:36

## 2025-02-08 RX ADMIN — BUMETANIDE 2 MG: 0.25 INJECTION INTRAMUSCULAR; INTRAVENOUS at 17:45

## 2025-02-08 NOTE — PROGRESS NOTES
Hospitalist Progress Note      Patient:  Portillo Daly 84 y.o. male     Unit/Bed:-/038-A    Date of Admission: 1/29/2025      ASSESSMENT AND PLAN    Active Problems  Acute hypoxemic respiratory failure:   - 2/2 Pseudomonas PNA & Pulmonary Edema .  Recheck chest x-ray tomorrow  -  on HFNC - Most recent CXR: results noted  , resp panel today is negative,   Today spoke with her daughter who came from out of town along with her  in detail.   -  on IS & Acapella.     Pseudomonas, Bacterial PNA   - ID consulted - case discussed, Cefepime was transitioned to Levaquin, today's last day per ID recommendations..  -  PNA panel growing Pseudomonas.  Completed Levaquin.    Sepsis 2/2 Pseudomonas Bacteremia   - BC 2 of 2 growing Pseudomonas. Levaquin.    - Repeat blood culture showed NGTD.     Acute on chronic renal failure:   - Nephrology notes reviewed. Creatinine has gone up to 3.8 ,  Patient baseline of  2.5-3.0. Will continue to monitor.   Diuretics held.  - Nephrology on the case  - Renal ultrasound reviewed with no acute findings  -  IV Bumex 2mg BID   - continue to hold losartan  - good UOP       Acute on chronic systolic heart failure: Last known EF 45 to 50%.  -  IV Bumex BID.   - Intake/output.  - Fluid restriction.     Hypotension resolved  - BP has been soft so   -  Midodrine added, and earlier albumin was given   - BP has improved      Rash   - Likely medication induced. Cefepime transitioned to Levaquin. ,  No further new issues denies any pruritus.         Chronic Conditions (reviewed and stable unless otherwise stated)      COPD: patient had exacerbation while in the ED. Given Solumedrol. Steroid inhaler BID.   DMII: Resume Lantus with high intensity sliding scale.   Paroxysmal A-fib: Status post pacemaker, continue with metoprolol and Coumadin.    LDA: []CVC / []PICC / []Midline / []Kitchen / []Drains / []Mediport / [x]None  Antibiotics: Cefepime and Doxycycline  Steroids: Pulmicort  Labs (still

## 2025-02-08 NOTE — PROGRESS NOTES
Hospitalist Progress Note      Patient:  Portillo Daly 84 y.o. male     Unit/Bed:-[facility]038-A    Date of Admission: 1/29/2025      ASSESSMENT AND PLAN    Active Problems  Acute hypoxemic respiratory failure:   - 2/2 Pseudomonas PNA & Pulmonary Edema   -  on HFNC - Most recent CXR: results noted  , resp panel today is negative, spoke with ID team  -  on IS & Acapella.     Pseudomonas, Bacterial PNA   - ID consulted - case discussed, Cefepime was transitioned to Levaquin.  -  PNA panel growing Pseudomonas. Continue Levaquin. , ID team on case    Sepsis 2/2 Pseudomonas Bacteremia   - BC 2 of 2 growing Pseudomonas. Levaquin.    - Repeat blood culture showed NGTD.     Acute on chronic renal failure:   - Nephrology notes reviewed. Creatinine has gone up to 3.8 ,  Patient baseline of  2.5-3.0. Will continue to monitor.   Diuretics held.  - Nephrology on the case  - Renal ultrasound reviewed with no acute findings  -  IV Bumex 2mg BID   - continue to hold losartan  - good UOP       Acute on chronic systolic heart failure: Last known EF 45 to 50%.  -  IV Bumex BID.   - Intake/output.  - Fluid restriction.     Hypotension resolved  - BP has been soft so   -  Midodrine added, and earlier albumin was given   - BP has improved      Rash   - Likely medication induced. Cefepime transitioned to Levaquin. ,  No further new issues denies any pruritus.         Chronic Conditions (reviewed and stable unless otherwise stated)      COPD: patient had exacerbation while in the ED. Given Solumedrol. Steroid inhaler BID.   DMII: Resume Lantus with high intensity sliding scale.   Paroxysmal A-fib: Status post pacemaker, continue with metoprolol and Coumadin.    LDA: []CVC / []PICC / []Midline / []Kitchen / []Drains / []Mediport / [x]None  Antibiotics: Cefepime and Doxycycline  Steroids: Pulmicort  Labs (still needed?): [x]Yes / []No  IVF (still needed?): [x]Yes / []No    Level of care: [x]Step Down / []Med-Surg  Bed Status: [x]Inpatient /  []Observation  Telemetry: [x]Yes / []No  PT/OT: []Yes / [x]No    DVT Prophylaxis: [] Lovenox / [] Heparin / [] SCDs / [x] Already on Systemic Anticoagulation / [] None   Code status: Limited     Expected discharge date:  Unknown  Disposition: may need [place] , spoke with pt and . Will need to revisit on Monday and get insurance approval.     ===================================================================    Chief Complaint: Acute hypoxemic respiratory failure    Subjective (past 24 hours):     No acute events overnight. Pt states that he is very fatigued today. Pt was sitting in the chair. Pt was more SOB sitting today.  Patient does not want us to call or contact his POA who is the niece, but only daughter.   Spoke with her lady friend , they live together , apparently her daughter from [facility] is coming  this weekend. Due to increase SOB and ongoing HFNC needs   Pt had a resp panel which is negative.   I spoke with him re ? [place].       HPI / Hospital Course:    Portillo Daly is a 83 yo obese male that was admitted due to acute respiratory failure secondary to pneumonia and sepsis. Patient drowsy during exam. He does state that he is feeling better now and not as short of breath as he was last night. Patient lying down in hospital bed. Patient does complain of continual itching but states is better after lotion application.          Medications:    Infusion Medications    sodium chloride      dextrose      Scheduled Medications    potassium chloride  20 mEq Oral BID WC    midodrine  10 mg Oral TID     metoprolol succinate  25 mg Oral Daily    dextromethorphan-guaiFENesin  1 tablet Oral BID    bumetanide  2 mg IntraVENous BID    [Held by provider] tamsulosin  0.4 mg Oral Daily    [Held by provider] bumetanide  1 mg Oral Daily    sodium chloride flush  5-40 mL IntraVENous 2 times per day    atorvastatin  80 mg Oral Nightly    budesonide  500 mcg Nebulization BID    calcitRIOL  0.25 mcg Oral Daily

## 2025-02-08 NOTE — PROGRESS NOTES
[facility]   Nephrology progress note  2/8/2025, 11:21 AM      Pt Name:    Portillo Daly  MRN:     [id number]     YOB: 1940  Admit Date:    1/29/2025  8:08 PM    Chief Complaint: Nephrology following for MILLY/CKD IV    Subjective:  Patient was seen and examined this morning.  Continues to be on HFNC.  Urine output somewhat reasonable      Objective:  24HR INTAKE/OUTPUT:    Intake/Output Summary (Last 24 hours) at 2/8/2025 1121  Last data filed at 2/8/2025 0912  Gross per 24 hour   Intake 150 ml   Output 1011 ml   Net -861 ml      Admission weight: 95.1 kg (209 lb 10.5 oz)  Wt Readings from Last 3 Encounters:   02/05/25 95 kg (209 lb 7 oz)   01/27/25 95.3 kg (210 lb)   01/15/25 94.3 kg (208 lb)        Vitals :   Vitals:    02/08/25 0253 02/08/25 0503 02/08/25 0800 02/08/25 0843   BP: (!) 122/55  (!) 117/59    Pulse: 70 70 70 72   Resp: 20 20 20 16   Temp: 96.8 °F (36 °C)  97.8 °F (36.6 °C)    TempSrc: Axillary  Oral    SpO2: 95% 96% 95% 94%   Weight:       Height:           Physical examination  General Appearance: Awake and alert no distress  Mouth/Throat: Oral mucosa moist  Neck: No JVD  Lungs:diminished   Heart:  S1, S2 heard  GI: soft, non-tender, no guarding  Extremities:no edema    Medications:  Infusion:    sodium chloride      dextrose       Meds:    warfarin  1 mg Oral Once    potassium chloride  20 mEq Oral BID WC    midodrine  10 mg Oral TID WC    metoprolol succinate  25 mg Oral Daily    dextromethorphan-guaiFENesin  1 tablet Oral BID    bumetanide  2 mg IntraVENous BID    [Held by provider] tamsulosin  0.4 mg Oral Daily    [Held by provider] bumetanide  1 mg Oral Daily    sodium chloride flush  5-40 mL IntraVENous 2 times per day    atorvastatin  80 mg Oral Nightly    budesonide  500 mcg Nebulization BID    calcitRIOL  0.25 mcg Oral Daily    insulin glargine  12 Units SubCUTAneous QAM    [Held by provider] losartan  25 mg Oral Daily    warfarin placeholder: dosing by

## 2025-02-08 NOTE — PLAN OF CARE
Problem: Respiratory - Adult  Goal: Achieves optimal ventilation and oxygenation  Outcome: Progressing  Flowsheets (Taken 2/7/2025 2321)  Achieves optimal ventilation and oxygenation:   Assess for changes in respiratory status   Position to facilitate oxygenation and minimize respiratory effort   Assess for changes in mentation and behavior   Oxygen supplementation based on oxygen saturation or arterial blood gases     Problem: Chronic Conditions and Co-morbidities  Goal: Patient's chronic conditions and co-morbidity symptoms are monitored and maintained or improved  Outcome: Progressing  Flowsheets (Taken 2/7/2025 2321)  Care Plan - Patient's Chronic Conditions and Co-Morbidity Symptoms are Monitored and Maintained or Improved:   Monitor and assess patient's chronic conditions and comorbid symptoms for stability, deterioration, or improvement   Collaborate with multidisciplinary team to address chronic and comorbid conditions and prevent exacerbation or deterioration     Problem: Discharge Planning  Goal: Discharge to home or other facility with appropriate resources  Outcome: Progressing  Flowsheets (Taken 2/7/2025 2321)  Discharge to home or other facility with appropriate resources:   Identify barriers to discharge with patient and caregiver   Identify discharge learning needs (meds, wound care, etc)   Arrange for needed discharge resources and transportation as appropriate   Refer to discharge planning if patient needs post-hospital services based on physician order or complex needs related to functional status, cognitive ability or social support system     Problem: Safety - Adult  Goal: Free from fall injury  Outcome: Progressing  Flowsheets (Taken 2/7/2025 2321)  Free From Fall Injury: Instruct family/caregiver on patient safety     Problem: ABCDS Injury Assessment  Goal: Absence of physical injury  Outcome: Progressing  Flowsheets (Taken 2/7/2025 2321)  Absence of Physical Injury: Implement safety

## 2025-02-08 NOTE — PROGRESS NOTES
Warfarin Pharmacy Consult Note    Warfarin Indication: Atrial fibrillation/Atrial flutter  Target INR: 2.0-3.0  Dose prior to admission: 2.5 mg MF, 1.25 mg TWThSS     Recent Labs     02/06/25  0450 02/07/25  0403 02/08/25 0456   HGB 11.7* 11.1* 11.5*   PLT 91* 101* 106*     Recent Labs     02/06/25  0450 02/07/25  0403 02/08/25 0456   INR 5.75* 3.53* 1.53*     Concurrent anticoagulants/antiplatelets: none  Significant warfarin drug-drug interactions:  melatonin (HM), levaquin q48h x 2 doses started 2/4/25     Date INR Warfarin Dose   1/30/2025 3.49 No coumadin    1/31/2025 2.34  1.25 mg     2/1/2025  2.14 2 mg     2/2/2025  2.96 0.5 mg    2/3/2025  3.10  No coumadin   2/4/2025    4.80   No coumadin    2/5/2025    6.69   No coumadin    2/6/2025  5.75 No coumadin  Vitamin K 2.5 mg PO   2/7/2025  3.53 No coumadin   2/8/2025 1.53 1 mg     Monitoring:                   INR will be monitored daily.    **Please contact pharmacy for discharge instructions when indicated**    Viky Patterson PharmD 2/8/2025 7:39 AM

## 2025-02-09 ENCOUNTER — APPOINTMENT (OUTPATIENT)
Dept: GENERAL RADIOLOGY | Age: 85
DRG: 871 | End: 2025-02-09
Payer: MEDICARE

## 2025-02-09 VITALS
WEIGHT: 190.04 LBS | SYSTOLIC BLOOD PRESSURE: 118 MMHG | TEMPERATURE: 98.3 F | OXYGEN SATURATION: 100 % | RESPIRATION RATE: 16 BRPM | DIASTOLIC BLOOD PRESSURE: 54 MMHG | BODY MASS INDEX: 29.83 KG/M2 | HEART RATE: 70 BPM | HEIGHT: 67 IN

## 2025-02-09 LAB
ANION GAP SERPL CALC-SCNC: 15 MEQ/L (ref 8–16)
BUN SERPL-MCNC: 77 MG/DL (ref 7–22)
CALCIUM SERPL-MCNC: 10 MG/DL (ref 8.5–10.5)
CHLORIDE SERPL-SCNC: 94 MEQ/L (ref 98–111)
CO2 SERPL-SCNC: 28 MEQ/L (ref 23–33)
CREAT SERPL-MCNC: 3.3 MG/DL (ref 0.4–1.2)
GFR SERPL CREATININE-BSD FRML MDRD: 18 ML/MIN/1.73M2
GLUCOSE BLD STRIP.AUTO-MCNC: 144 MG/DL (ref 70–108)
GLUCOSE BLD STRIP.AUTO-MCNC: 226 MG/DL (ref 70–108)
GLUCOSE BLD STRIP.AUTO-MCNC: 259 MG/DL (ref 70–108)
GLUCOSE BLD STRIP.AUTO-MCNC: 305 MG/DL (ref 70–108)
GLUCOSE SERPL-MCNC: 125 MG/DL (ref 70–108)
HEPARIN UNFRACTIONATED: 0.39 U/ML (ref 0.3–0.7)
HEPARIN UNFRACTIONATED: < 0.04 U/ML (ref 0.3–0.7)
INR PPP: 1.54 (ref 0.85–1.13)
POTASSIUM SERPL-SCNC: 3.5 MEQ/L (ref 3.5–5.2)
SODIUM SERPL-SCNC: 137 MEQ/L (ref 135–145)

## 2025-02-09 PROCEDURE — 6370000000 HC RX 637 (ALT 250 FOR IP): Performed by: PHARMACIST

## 2025-02-09 PROCEDURE — 6370000000 HC RX 637 (ALT 250 FOR IP): Performed by: INTERNAL MEDICINE

## 2025-02-09 PROCEDURE — 6360000002 HC RX W HCPCS: Performed by: INTERNAL MEDICINE

## 2025-02-09 PROCEDURE — 2500000003 HC RX 250 WO HCPCS

## 2025-02-09 PROCEDURE — 94761 N-INVAS EAR/PLS OXIMETRY MLT: CPT

## 2025-02-09 PROCEDURE — 6370000000 HC RX 637 (ALT 250 FOR IP): Performed by: PHYSICIAN ASSISTANT

## 2025-02-09 PROCEDURE — 6360000002 HC RX W HCPCS

## 2025-02-09 PROCEDURE — 85610 PROTHROMBIN TIME: CPT

## 2025-02-09 PROCEDURE — 82948 REAGENT STRIP/BLOOD GLUCOSE: CPT

## 2025-02-09 PROCEDURE — 36415 COLL VENOUS BLD VENIPUNCTURE: CPT

## 2025-02-09 PROCEDURE — 2140000000 HC CCU INTERMEDIATE R&B

## 2025-02-09 PROCEDURE — 6360000002 HC RX W HCPCS: Performed by: PHYSICIAN ASSISTANT

## 2025-02-09 PROCEDURE — 94640 AIRWAY INHALATION TREATMENT: CPT

## 2025-02-09 PROCEDURE — 6370000000 HC RX 637 (ALT 250 FOR IP): Performed by: HOSPITALIST

## 2025-02-09 PROCEDURE — 99232 SBSQ HOSP IP/OBS MODERATE 35: CPT | Performed by: INTERNAL MEDICINE

## 2025-02-09 PROCEDURE — 2700000000 HC OXYGEN THERAPY PER DAY

## 2025-02-09 PROCEDURE — 80048 BASIC METABOLIC PNL TOTAL CA: CPT

## 2025-02-09 PROCEDURE — 6370000000 HC RX 637 (ALT 250 FOR IP)

## 2025-02-09 PROCEDURE — 71045 X-RAY EXAM CHEST 1 VIEW: CPT

## 2025-02-09 PROCEDURE — 94669 MECHANICAL CHEST WALL OSCILL: CPT

## 2025-02-09 PROCEDURE — 99233 SBSQ HOSP IP/OBS HIGH 50: CPT | Performed by: INTERNAL MEDICINE

## 2025-02-09 PROCEDURE — 85520 HEPARIN ASSAY: CPT

## 2025-02-09 RX ORDER — IPRATROPIUM BROMIDE AND ALBUTEROL SULFATE 2.5; .5 MG/3ML; MG/3ML
1 SOLUTION RESPIRATORY (INHALATION) 3 TIMES DAILY
Status: DISCONTINUED | OUTPATIENT
Start: 2025-02-09 | End: 2025-02-09

## 2025-02-09 RX ORDER — HEPARIN SODIUM 1000 [USP'U]/ML
4000 INJECTION, SOLUTION INTRAVENOUS; SUBCUTANEOUS ONCE
Status: COMPLETED | OUTPATIENT
Start: 2025-02-09 | End: 2025-02-09

## 2025-02-09 RX ORDER — HEPARIN SODIUM 10000 [USP'U]/100ML
5-30 INJECTION, SOLUTION INTRAVENOUS CONTINUOUS
Status: DISCONTINUED | OUTPATIENT
Start: 2025-02-09 | End: 2025-02-11

## 2025-02-09 RX ORDER — WARFARIN SODIUM 2.5 MG/1
2.5 TABLET ORAL ONCE
Status: COMPLETED | OUTPATIENT
Start: 2025-02-09 | End: 2025-02-09

## 2025-02-09 RX ORDER — BUMETANIDE 0.25 MG/ML
2 INJECTION, SOLUTION INTRAMUSCULAR; INTRAVENOUS DAILY
Status: DISCONTINUED | OUTPATIENT
Start: 2025-02-09 | End: 2025-02-10

## 2025-02-09 RX ORDER — HEPARIN SODIUM 1000 [USP'U]/ML
2000 INJECTION, SOLUTION INTRAVENOUS; SUBCUTANEOUS PRN
Status: DISCONTINUED | OUTPATIENT
Start: 2025-02-09 | End: 2025-02-11

## 2025-02-09 RX ORDER — HEPARIN SODIUM 1000 [USP'U]/ML
4000 INJECTION, SOLUTION INTRAVENOUS; SUBCUTANEOUS PRN
Status: DISCONTINUED | OUTPATIENT
Start: 2025-02-09 | End: 2025-02-11

## 2025-02-09 RX ORDER — IPRATROPIUM BROMIDE AND ALBUTEROL SULFATE 2.5; .5 MG/3ML; MG/3ML
1 SOLUTION RESPIRATORY (INHALATION)
Status: DISCONTINUED | OUTPATIENT
Start: 2025-02-09 | End: 2025-02-10

## 2025-02-09 RX ADMIN — INSULIN GLARGINE 12 UNITS: 100 INJECTION, SOLUTION SUBCUTANEOUS at 08:11

## 2025-02-09 RX ADMIN — HYDROXYZINE HYDROCHLORIDE 10 MG: 10 TABLET ORAL at 21:47

## 2025-02-09 RX ADMIN — WARFARIN SODIUM 2.5 MG: 2.5 TABLET ORAL at 17:54

## 2025-02-09 RX ADMIN — BUDESONIDE 500 MCG: 0.5 INHALANT RESPIRATORY (INHALATION) at 09:45

## 2025-02-09 RX ADMIN — SODIUM CHLORIDE, PRESERVATIVE FREE 10 ML: 5 INJECTION INTRAVENOUS at 21:48

## 2025-02-09 RX ADMIN — MIDODRINE HYDROCHLORIDE 10 MG: 10 TABLET ORAL at 12:06

## 2025-02-09 RX ADMIN — MIDODRINE HYDROCHLORIDE 10 MG: 10 TABLET ORAL at 08:10

## 2025-02-09 RX ADMIN — INSULIN LISPRO 2 UNITS: 100 INJECTION, SOLUTION INTRAVENOUS; SUBCUTANEOUS at 17:54

## 2025-02-09 RX ADMIN — GUAIFENESIN, DEXTROMETHORPHAN HBR 1 TABLET: 600; 30 TABLET ORAL at 08:10

## 2025-02-09 RX ADMIN — POTASSIUM CHLORIDE 20 MEQ: 1500 TABLET, EXTENDED RELEASE ORAL at 17:53

## 2025-02-09 RX ADMIN — HEPARIN SODIUM 4000 UNITS: 1000 INJECTION INTRAVENOUS; SUBCUTANEOUS at 12:05

## 2025-02-09 RX ADMIN — ALBUTEROL SULFATE 2.5 MG: 2.5 SOLUTION RESPIRATORY (INHALATION) at 14:17

## 2025-02-09 RX ADMIN — SODIUM CHLORIDE, PRESERVATIVE FREE 10 ML: 5 INJECTION INTRAVENOUS at 08:15

## 2025-02-09 RX ADMIN — HEPARIN SODIUM 11 UNITS/KG/HR: 10000 INJECTION, SOLUTION INTRAVENOUS at 12:04

## 2025-02-09 RX ADMIN — POTASSIUM CHLORIDE 20 MEQ: 1500 TABLET, EXTENDED RELEASE ORAL at 08:10

## 2025-02-09 RX ADMIN — ATORVASTATIN CALCIUM 80 MG: 80 TABLET, FILM COATED ORAL at 21:47

## 2025-02-09 RX ADMIN — BUDESONIDE 500 MCG: 0.5 INHALANT RESPIRATORY (INHALATION) at 19:36

## 2025-02-09 RX ADMIN — BUMETANIDE 2 MG: 0.25 INJECTION INTRAMUSCULAR; INTRAVENOUS at 21:48

## 2025-02-09 RX ADMIN — IPRATROPIUM BROMIDE AND ALBUTEROL SULFATE 1 DOSE: .5; 3 SOLUTION RESPIRATORY (INHALATION) at 09:45

## 2025-02-09 RX ADMIN — METOPROLOL SUCCINATE 25 MG: 25 TABLET, FILM COATED, EXTENDED RELEASE ORAL at 08:10

## 2025-02-09 RX ADMIN — MIDODRINE HYDROCHLORIDE 10 MG: 10 TABLET ORAL at 17:53

## 2025-02-09 RX ADMIN — CALCITRIOL CAPSULES 0.25 MCG 0.25 MCG: 0.25 CAPSULE ORAL at 08:10

## 2025-02-09 RX ADMIN — BUMETANIDE 2 MG: 0.25 INJECTION INTRAMUSCULAR; INTRAVENOUS at 08:11

## 2025-02-09 RX ADMIN — IPRATROPIUM BROMIDE AND ALBUTEROL SULFATE 1 DOSE: .5; 3 SOLUTION RESPIRATORY (INHALATION) at 19:36

## 2025-02-09 RX ADMIN — POTASSIUM CHLORIDE 20 MEQ: 1500 TABLET, EXTENDED RELEASE ORAL at 12:06

## 2025-02-09 RX ADMIN — INSULIN LISPRO 4 UNITS: 100 INJECTION, SOLUTION INTRAVENOUS; SUBCUTANEOUS at 12:11

## 2025-02-09 RX ADMIN — INSULIN LISPRO 6 UNITS: 100 INJECTION, SOLUTION INTRAVENOUS; SUBCUTANEOUS at 21:48

## 2025-02-09 RX ADMIN — GUAIFENESIN, DEXTROMETHORPHAN HBR 1 TABLET: 600; 30 TABLET ORAL at 21:47

## 2025-02-09 NOTE — PROGRESS NOTES
Warfarin Pharmacy Consult Note    Warfarin Indication: Atrial fibrillation/Atrial flutter  Target INR: 2.0-3.0  Dose prior to admission: 2.5 mg MF, 1.25 mg TWThSS    Recent Labs     02/07/25  0403 02/08/25  0456   HGB 11.1* 11.5*   * 106*     Recent Labs     02/07/25  0403 02/08/25  0456 02/09/25  0446   INR 3.53* 1.53* 1.54*     Concurrent anticoagulants/antiplatelets: none  Significant warfarin drug-drug interactions:  melatonin (HM), levaquin q48h x 2 doses started 2/4/25     Date INR Warfarin Dose   1/30/2025 3.49 No coumadin    1/31/2025 2.34  1.25 mg     2/1/2025  2.14 2 mg     2/2/2025  2.96 0.5 mg    2/3/2025  3.10  No coumadin   2/4/2025    4.80   No coumadin    2/5/2025    6.69   No coumadin    2/6/2025  5.75 No coumadin  Vitamin K 2.5 mg PO   2/7/2025  3.53 No coumadin   2/8/2025 1.53 1 mg   2/9/2025 1.54 2.5 mg     Monitoring:                   INR will be monitored daily.    **Please contact pharmacy for discharge instructions when indicated**    Viky Patterson PharmD 2/9/2025 9:10 AM

## 2025-02-09 NOTE — PROGRESS NOTES
Progress note: Infectious diseases    Patient - Portillo Daly,  Age - 84 y.o.    - 1940      Room Number - 3B-38/038-A   MRN -  [id number]   [facility] # - [id number]  Date of Admission -  2025  8:08 PM    SUBJECTIVE:   He has no new complaints.  He is on high flow oxygen.  OBJECTIVE   VITALS    height is 1.702 m (5' 7\") and weight is 86.2 kg (190 lb 0.6 oz). His oral temperature is 98.6 °F (37 °C). His blood pressure is 127/50 (abnormal) and his pulse is 70. His respiration is 16 and oxygen saturation is 95%.       Wt Readings from Last 3 Encounters:   25 86.2 kg (190 lb 0.6 oz)   25 95.3 kg (210 lb)   01/15/25 94.3 kg (208 lb)       I/O (24 Hours)    Intake/Output Summary (Last 24 hours) at 2025 1006  Last data filed at 2025 0920  Gross per 24 hour   Intake 537 ml   Output 1845 ml   Net -1308 ml       General Appearance  Awake, alert, oriented, on high flow oxygen  HEENT - normocephalic, atraumatic, pink conjunctiva,  anicteric sclera  Neck - Supple, no mass  Lungs -  Bilateral air entry, on high flow oxygen, diminished breath sounds with rhonchi  Cardiovascular - Heart sounds are normal.  Regular rate and rhythm without murmur, gallop or rub.  Abdomen - soft, not distended, nontender,   Neurologic -Oriented  Skin - Maculopapular rash  Extremities -+ edema,      MEDICATIONS:      warfarin  2.5 mg Oral Once    ipratropium 0.5 mg-albuterol 2.5 mg  1 Dose Inhalation BID RT    potassium chloride  20 mEq Oral TID WC    midodrine  10 mg Oral TID WC    metoprolol succinate  25 mg Oral Daily    dextromethorphan-guaiFENesin  1 tablet Oral BID    bumetanide  2 mg IntraVENous BID    [Held by provider] tamsulosin  0.4 mg Oral Daily    [Held by provider] bumetanide  1 mg Oral Daily    sodium chloride flush  5-40 mL IntraVENous 2 times per day    atorvastatin  80 mg Oral Nightly    budesonide  500 mcg

## 2025-02-09 NOTE — PROGRESS NOTES
[facility]   Nephrology progress note  2/9/2025, 11:18 AM      Pt Name:    Portillo Daly  MRN:     [id number]     YOB: 1940  Admit Date:    1/29/2025  8:08 PM    Chief Complaint: Nephrology following for MILLY/CKD IV    Subjective:  Patient was seen and examined this morning.  Currently down to nasal cannula  Decent urine output      Objective:  24HR INTAKE/OUTPUT:    Intake/Output Summary (Last 24 hours) at 2/9/2025 1118  Last data filed at 2/9/2025 0920  Gross per 24 hour   Intake 537 ml   Output 1845 ml   Net -1308 ml      Admission weight: 95.1 kg (209 lb 10.5 oz)  Wt Readings from Last 3 Encounters:   02/09/25 86.2 kg (190 lb 0.6 oz)   01/27/25 95.3 kg (210 lb)   01/15/25 94.3 kg (208 lb)        Vitals :   Vitals:    02/09/25 0400 02/09/25 0436 02/09/25 0800 02/09/25 0952   BP:   (!) 127/50    Pulse:  70 70 70   Resp:  18 18 16   Temp:   98.6 °F (37 °C)    TempSrc:   Oral    SpO2: (!) 89% 94% 92% 95%   Weight:       Height:           Physical examination  General Appearance: Awake and alert no distress  Mouth/Throat: Oral mucosa moist  Neck: No JVD  Lungs:diminished but appears reasonably clear  Heart:  S1, S2 heard  GI: soft, non-tender, no guarding  Extremities:no edema    Medications:  Infusion:    heparin (PORCINE) Infusion      sodium chloride      dextrose       Meds:    warfarin  2.5 mg Oral Once    ipratropium 0.5 mg-albuterol 2.5 mg  1 Dose Inhalation BID RT    heparin (porcine)  4,000 Units IntraVENous Once    potassium chloride  20 mEq Oral TID WC    midodrine  10 mg Oral TID WC    metoprolol succinate  25 mg Oral Daily    dextromethorphan-guaiFENesin  1 tablet Oral BID    bumetanide  2 mg IntraVENous BID    [Held by provider] tamsulosin  0.4 mg Oral Daily    [Held by provider] bumetanide  1 mg Oral Daily    sodium chloride flush  5-40 mL IntraVENous 2 times per day    atorvastatin  80 mg Oral Nightly    budesonide  500 mcg Nebulization BID    calcitRIOL  0.25 mcg

## 2025-02-09 NOTE — PLAN OF CARE
Problem: Respiratory - Adult  Goal: Achieves optimal ventilation and oxygenation  2/9/2025 0110 by Keith Degroot RN  Outcome: Progressing  Flowsheets (Taken 2/9/2025 0110)  Achieves optimal ventilation and oxygenation:   Assess for changes in respiratory status   Position to facilitate oxygenation and minimize respiratory effort   Oxygen supplementation based on oxygen saturation or arterial blood gases   Assess for changes in mentation and behavior     Problem: Chronic Conditions and Co-morbidities  Goal: Patient's chronic conditions and co-morbidity symptoms are monitored and maintained or improved  Outcome: Progressing  Flowsheets (Taken 2/9/2025 0110)  Care Plan - Patient's Chronic Conditions and Co-Morbidity Symptoms are Monitored and Maintained or Improved:   Monitor and assess patient's chronic conditions and comorbid symptoms for stability, deterioration, or improvement   Collaborate with multidisciplinary team to address chronic and comorbid conditions and prevent exacerbation or deterioration     Problem: Discharge Planning  Goal: Discharge to home or other facility with appropriate resources  Outcome: Progressing  Flowsheets (Taken 2/9/2025 0110)  Discharge to home or other facility with appropriate resources:   Identify barriers to discharge with patient and caregiver   Arrange for needed discharge resources and transportation as appropriate   Identify discharge learning needs (meds, wound care, etc)   Refer to discharge planning if patient needs post-hospital services based on physician order or complex needs related to functional status, cognitive ability or social support system     Problem: Safety - Adult  Goal: Free from fall injury  Outcome: Progressing  Flowsheets (Taken 2/9/2025 0110)  Free From Fall Injury: Instruct family/caregiver on patient safety     Problem: ABCDS Injury Assessment  Goal: Absence of physical injury  Outcome: Progressing  Flowsheets (Taken 2/9/2025 0110)  Absence of

## 2025-02-09 NOTE — PLAN OF CARE
Problem: Respiratory - Adult  Goal: Clear lung sounds  Description: Clear lung sounds  Outcome: Progressing     Problem: Respiratory - Adult  Goal: Achieves optimal ventilation and oxygenation  Outcome: Progressing     Patient mutually agreed on goals.

## 2025-02-09 NOTE — PROGRESS NOTES
Hospitalist Progress Note      Patient:  Portillo Daly 84 y.o. male     Unit/Bed:3B-38/038-A    Date of Admission: 1/29/2025      ASSESSMENT AND PLAN    Active Problems    Acute hypoxemic respiratory failure:   - 2/2 Pseudomonas PNA & Pulmonary Edema .  Recheck chest x-ray tomorrow  -  on HFNC - Most recent CXR: results noted  , resp panel today is negative,   Today spoke with her daughter who came from out of town along with her  in detail. Oxygen is down to NC today vs HFC  -  on IS & Acapella.     Pseudomonas, Bacterial PNA   - ID consulted - case discussed, Cefepime was transitioned to Levaquin,and completed therapy yesterday.       Sepsis 2/2 Pseudomonas Bacteremia   - BC 2 of 2 growing Pseudomonas. Was on Levaquin.    - Repeat blood culture showed NGTD.     Acute on chronic renal failure:   - Nephrology notes reviewed. Creatinine has gone up to 3.8 ,  Patient baseline of  2.5-3.0. Will continue to monitor.   Diuretics held.  - Nephrology on the case  - Renal ultrasound reviewed with no acute findings  -  IV Bumex 2mg BID   - continue to hold losartan  - good UOP       DM-2 sugars 144, 259 , and 125 will add lantus 10 units daily and continue with SS   Using humalog.      Acute on chronic systolic heart failure: Last known EF 45 to 50%.  -  IV Bumex BID.   - Intake/output.  - Fluid restriction.     HTN runs soft BP , no fever or chills  On low dose BB and ARB being on hold and on proamatine.       Rash   - Likely medication induced. Cefepime transitioned to Levaquin, no new issues.     Chronic Conditions (reviewed and stable unless otherwise stated)      COPD: patient had exacerbation while in the ED. Given Solumedrol. Steroid inhaler BID.   DMII: Resume Lantus with high intensity sliding scale.   Paroxysmal A-fib: Status post pacemaker, continue with metoprolol and Coumadin, as INR is subtherapeutic initiate her IV heparin  and continue with coumadin , till INR 2-3 . Spoke with pharmacist and his

## 2025-02-10 PROBLEM — I50.32 CHRONIC DIASTOLIC CHF (CONGESTIVE HEART FAILURE), NYHA CLASS 2 (HCC): Status: ACTIVE | Noted: 2025-02-10

## 2025-02-10 LAB
ANION GAP SERPL CALC-SCNC: 15 MEQ/L (ref 8–16)
BUN SERPL-MCNC: 90 MG/DL (ref 7–22)
CALCIUM SERPL-MCNC: 9.9 MG/DL (ref 8.5–10.5)
CHLORIDE SERPL-SCNC: 95 MEQ/L (ref 98–111)
CO2 SERPL-SCNC: 29 MEQ/L (ref 23–33)
CREAT SERPL-MCNC: 3.2 MG/DL (ref 0.4–1.2)
GFR SERPL CREATININE-BSD FRML MDRD: 18 ML/MIN/1.73M2
GLUCOSE BLD STRIP.AUTO-MCNC: 153 MG/DL (ref 70–108)
GLUCOSE BLD STRIP.AUTO-MCNC: 166 MG/DL (ref 70–108)
GLUCOSE BLD STRIP.AUTO-MCNC: 292 MG/DL (ref 70–108)
GLUCOSE BLD STRIP.AUTO-MCNC: 344 MG/DL (ref 70–108)
GLUCOSE SERPL-MCNC: 133 MG/DL (ref 70–108)
HEPARIN UNFRACTIONATED: 0.27 U/ML (ref 0.3–0.7)
HEPARIN UNFRACTIONATED: 0.32 U/ML (ref 0.3–0.7)
HEPARIN UNFRACTIONATED: 0.44 U/ML (ref 0.3–0.7)
HEPARIN UNFRACTIONATED: 0.51 U/ML (ref 0.3–0.7)
INR PPP: 1.58 (ref 0.85–1.13)
POTASSIUM SERPL-SCNC: 3.8 MEQ/L (ref 3.5–5.2)
SODIUM SERPL-SCNC: 139 MEQ/L (ref 135–145)

## 2025-02-10 PROCEDURE — 36415 COLL VENOUS BLD VENIPUNCTURE: CPT

## 2025-02-10 PROCEDURE — 94640 AIRWAY INHALATION TREATMENT: CPT

## 2025-02-10 PROCEDURE — 6370000000 HC RX 637 (ALT 250 FOR IP): Performed by: HOSPITALIST

## 2025-02-10 PROCEDURE — 99232 SBSQ HOSP IP/OBS MODERATE 35: CPT | Performed by: STUDENT IN AN ORGANIZED HEALTH CARE EDUCATION/TRAINING PROGRAM

## 2025-02-10 PROCEDURE — 85610 PROTHROMBIN TIME: CPT

## 2025-02-10 PROCEDURE — 6370000000 HC RX 637 (ALT 250 FOR IP): Performed by: INTERNAL MEDICINE

## 2025-02-10 PROCEDURE — 6370000000 HC RX 637 (ALT 250 FOR IP): Performed by: PHYSICIAN ASSISTANT

## 2025-02-10 PROCEDURE — 94761 N-INVAS EAR/PLS OXIMETRY MLT: CPT

## 2025-02-10 PROCEDURE — 6360000002 HC RX W HCPCS

## 2025-02-10 PROCEDURE — 6370000000 HC RX 637 (ALT 250 FOR IP)

## 2025-02-10 PROCEDURE — 85520 HEPARIN ASSAY: CPT

## 2025-02-10 PROCEDURE — 82948 REAGENT STRIP/BLOOD GLUCOSE: CPT

## 2025-02-10 PROCEDURE — 80048 BASIC METABOLIC PNL TOTAL CA: CPT

## 2025-02-10 PROCEDURE — 99232 SBSQ HOSP IP/OBS MODERATE 35: CPT | Performed by: INTERNAL MEDICINE

## 2025-02-10 PROCEDURE — 6370000000 HC RX 637 (ALT 250 FOR IP): Performed by: PHARMACIST

## 2025-02-10 PROCEDURE — 94669 MECHANICAL CHEST WALL OSCILL: CPT

## 2025-02-10 PROCEDURE — 2140000000 HC CCU INTERMEDIATE R&B

## 2025-02-10 PROCEDURE — 6370000000 HC RX 637 (ALT 250 FOR IP): Performed by: STUDENT IN AN ORGANIZED HEALTH CARE EDUCATION/TRAINING PROGRAM

## 2025-02-10 PROCEDURE — 6360000002 HC RX W HCPCS: Performed by: INTERNAL MEDICINE

## 2025-02-10 PROCEDURE — 6360000002 HC RX W HCPCS: Performed by: PHYSICIAN ASSISTANT

## 2025-02-10 PROCEDURE — 2700000000 HC OXYGEN THERAPY PER DAY

## 2025-02-10 RX ORDER — IPRATROPIUM BROMIDE AND ALBUTEROL SULFATE 2.5; .5 MG/3ML; MG/3ML
1 SOLUTION RESPIRATORY (INHALATION)
Status: DISCONTINUED | OUTPATIENT
Start: 2025-02-11 | End: 2025-02-11

## 2025-02-10 RX ORDER — BUMETANIDE 0.25 MG/ML
1 INJECTION, SOLUTION INTRAMUSCULAR; INTRAVENOUS DAILY
Status: DISCONTINUED | OUTPATIENT
Start: 2025-02-11 | End: 2025-02-11

## 2025-02-10 RX ORDER — INSULIN GLARGINE 100 [IU]/ML
15 INJECTION, SOLUTION SUBCUTANEOUS EVERY MORNING
Status: DISCONTINUED | OUTPATIENT
Start: 2025-02-11 | End: 2025-02-18 | Stop reason: HOSPADM

## 2025-02-10 RX ORDER — INSULIN LISPRO 100 [IU]/ML
0-16 INJECTION, SOLUTION INTRAVENOUS; SUBCUTANEOUS
Status: DISCONTINUED | OUTPATIENT
Start: 2025-02-10 | End: 2025-02-18 | Stop reason: HOSPADM

## 2025-02-10 RX ORDER — WARFARIN SODIUM 2 MG/1
2 TABLET ORAL ONCE
Status: COMPLETED | OUTPATIENT
Start: 2025-02-10 | End: 2025-02-10

## 2025-02-10 RX ADMIN — POTASSIUM CHLORIDE 20 MEQ: 1500 TABLET, EXTENDED RELEASE ORAL at 16:42

## 2025-02-10 RX ADMIN — CALCITRIOL CAPSULES 0.25 MCG 0.25 MCG: 0.25 CAPSULE ORAL at 08:40

## 2025-02-10 RX ADMIN — MIDODRINE HYDROCHLORIDE 10 MG: 10 TABLET ORAL at 11:20

## 2025-02-10 RX ADMIN — MIDODRINE HYDROCHLORIDE 10 MG: 10 TABLET ORAL at 16:42

## 2025-02-10 RX ADMIN — POTASSIUM CHLORIDE 20 MEQ: 1500 TABLET, EXTENDED RELEASE ORAL at 11:20

## 2025-02-10 RX ADMIN — DIPHENHYDRAMINE HYDROCHLORIDE, ZINC ACETATE: 2; .1 CREAM TOPICAL at 16:43

## 2025-02-10 RX ADMIN — HEPARIN SODIUM 2000 UNITS: 1000 INJECTION INTRAVENOUS; SUBCUTANEOUS at 06:55

## 2025-02-10 RX ADMIN — ALBUTEROL SULFATE 2.5 MG: 2.5 SOLUTION RESPIRATORY (INHALATION) at 22:41

## 2025-02-10 RX ADMIN — BUDESONIDE 500 MCG: 0.5 INHALANT RESPIRATORY (INHALATION) at 18:33

## 2025-02-10 RX ADMIN — GUAIFENESIN, DEXTROMETHORPHAN HBR 1 TABLET: 600; 30 TABLET ORAL at 08:41

## 2025-02-10 RX ADMIN — HEPARIN SODIUM 13 UNITS/KG/HR: 10000 INJECTION, SOLUTION INTRAVENOUS at 16:37

## 2025-02-10 RX ADMIN — MIDODRINE HYDROCHLORIDE 10 MG: 10 TABLET ORAL at 08:40

## 2025-02-10 RX ADMIN — INSULIN LISPRO 8 UNITS: 100 INJECTION, SOLUTION INTRAVENOUS; SUBCUTANEOUS at 20:20

## 2025-02-10 RX ADMIN — INSULIN GLARGINE 12 UNITS: 100 INJECTION, SOLUTION SUBCUTANEOUS at 08:40

## 2025-02-10 RX ADMIN — ATORVASTATIN CALCIUM 80 MG: 80 TABLET, FILM COATED ORAL at 20:10

## 2025-02-10 RX ADMIN — IPRATROPIUM BROMIDE AND ALBUTEROL SULFATE 1 DOSE: .5; 3 SOLUTION RESPIRATORY (INHALATION) at 18:33

## 2025-02-10 RX ADMIN — IPRATROPIUM BROMIDE AND ALBUTEROL SULFATE 1 DOSE: .5; 3 SOLUTION RESPIRATORY (INHALATION) at 07:17

## 2025-02-10 RX ADMIN — POTASSIUM CHLORIDE 20 MEQ: 1500 TABLET, EXTENDED RELEASE ORAL at 08:40

## 2025-02-10 RX ADMIN — BUDESONIDE 500 MCG: 0.5 INHALANT RESPIRATORY (INHALATION) at 07:17

## 2025-02-10 RX ADMIN — INSULIN LISPRO 6 UNITS: 100 INJECTION, SOLUTION INTRAVENOUS; SUBCUTANEOUS at 11:30

## 2025-02-10 RX ADMIN — METOPROLOL SUCCINATE 25 MG: 25 TABLET, FILM COATED, EXTENDED RELEASE ORAL at 08:39

## 2025-02-10 RX ADMIN — WARFARIN SODIUM 2 MG: 2 TABLET ORAL at 16:54

## 2025-02-10 RX ADMIN — GUAIFENESIN, DEXTROMETHORPHAN HBR 1 TABLET: 600; 30 TABLET ORAL at 20:10

## 2025-02-10 NOTE — PROGRESS NOTES
Progress note: Infectious diseases    Patient - Portillo Daly,  Age - 84 y.o.    - 1940      Room Number - 3B-38/038-A   MRN -  [id number]   [facility] # - [id number]  Date of Admission -  2025  8:08 PM    SUBJECTIVE:   Patient seen and evaluated at bedside this morning. No acute complaints at this time. Patient has been weaned to NC.   OBJECTIVE   VITALS    height is 1.702 m (5' 7\") and weight is 86.2 kg (190 lb 0.6 oz). His oral temperature is 98.2 °F (36.8 °C). His blood pressure is 108/52 (abnormal) and his pulse is 71. His respiration is 17 and oxygen saturation is 98%.       Wt Readings from Last 3 Encounters:   25 86.2 kg (190 lb 0.6 oz)   25 95.3 kg (210 lb)   01/15/25 94.3 kg (208 lb)       I/O (24 Hours)    Intake/Output Summary (Last 24 hours) at 2/10/2025 0855  Last data filed at 2/10/2025 0444  Gross per 24 hour   Intake 737 ml   Output 1435 ml   Net -698 ml       General Appearance  Awake, alert, oriented,  not  In acute distress. On nasal oxygen  HEENT - normocephalic, atraumatic, pink conjunctiva,  anicteric sclera  Neck - Supple, no mass  Lungs -  Bilateral air entry on NC. Diminished bilateral breath sounds with rhonchi  Cardiovascular - Heart sounds are normal.  Regular rate and rhythm without murmur, gallop or rub.  Abdomen - soft, not distended, nontender,   Neurologic -Oriented  Skin - faint rash  Extremities - Positive for edema, the rash is improving    MEDICATIONS:      ipratropium 0.5 mg-albuterol 2.5 mg  1 Dose Inhalation BID RT    bumetanide  2 mg IntraVENous Daily    potassium chloride  20 mEq Oral TID WC    midodrine  10 mg Oral TID WC    metoprolol succinate  25 mg Oral Daily    dextromethorphan-guaiFENesin  1 tablet Oral BID    [Held by provider] tamsulosin  0.4 mg Oral Daily    [Held by provider] bumetanide  1 mg Oral Daily    sodium chloride flush  5-40 mL IntraVENous 2

## 2025-02-10 NOTE — PROGRESS NOTES
Hospitalist Progress Note      Patient:  Portillo Daly 84 y.o. male     Unit/Bed:-[facility]038-A    Date of Admission: 1/29/2025      ASSESSMENT AND PLAN    Active Problems    Acute hypoxemic respiratory failure:   - 2/2 Pseudomonas PNA & Pulmonary Edema. Recheck chest x-ray tomorrow  -  on HFNC - Most recent CXR: results noted, resp panel today is negative, Oxygen is down to NC today.  -  on IS & Acapella.     Pseudomonas, Bacterial PNA   - ID consulted - case discussed, Cefepime was transitioned to Levaquin,and completed therapy.     Sepsis 2/2 Pseudomonas Bacteremia   - BC 2 of 2 growing Pseudomonas. Was on Levaquin.    - Repeat blood culture showed NGTD.     Acute on chronic renal failure:   - Nephrology notes reviewed. Creatinine has gone up to 3.8 ,  Patient baseline of  2.5-3.0. Will continue to monitor.   Diuretics held.  - Nephrology on the case  - Renal ultrasound reviewed with no acute findings  -  IV Bumex 2mg BID   - continue to hold losartan    DM-2 sugars 144, 259 , and 125 will add lantus 15 units daily and with High SS   Using humalog.    Acute on chronic systolic heart failure: Last known EF 45 to 50%.  -  IV Bumex BID.   - Intake/output.  - Fluid restriction.     HTN runs soft BP , no fever or chills  On low dose BB and ARB being on hold and on proamatine.       Rash   - Likely medication induced. Cefepime transitioned to Levaquin, no new issues.     Chronic Conditions (reviewed and stable unless otherwise stated)      COPD: patient had exacerbation while in the ED. Given Solumedrol. Steroid inhaler BID.   DMII: Resume Lantus with high intensity sliding scale.   Paroxysmal A-fib: Status post pacemaker, continue with metoprolol and Coumadin, as INR is subtherapeutic initiate her IV heparin  and continue with coumadin , till INR 2-3 . Spoke with pharmacist and his RN.    LDA: []CVC / []PICC / []Midline / []Kitchen / []Drains / []Mediport / [x]None  Antibiotics: Cefepime and Doxycycline  Steroids:

## 2025-02-10 NOTE — PROGRESS NOTES
[facility]   Nephrology progress note  2/10/2025, 9:03 AM      Pt Name:    Portillo Daly  MRN:     [id number]     YOB: 1940  Admit Date:    1/29/2025  8:08 PM    Chief Complaint: Nephrology following for MILLY/CKD IV    Subjective:  Patient was seen and examined this morning.  Patient put out 1.4 L in last 24 hours  Overall -7.6 L      Objective:  24HR INTAKE/OUTPUT:    Intake/Output Summary (Last 24 hours) at 2/10/2025 0903  Last data filed at 2/10/2025 0444  Gross per 24 hour   Intake 737 ml   Output 1435 ml   Net -698 ml         I/O last 3 completed shifts:  In: 1037 [P.O.:1037]  Out: 2730 [Urine:2730]  No intake/output data recorded.   Admission weight: 95.1 kg (209 lb 10.5 oz)  Wt Readings from Last 3 Encounters:   02/09/25 86.2 kg (190 lb 0.6 oz)   01/27/25 95.3 kg (210 lb)   01/15/25 94.3 kg (208 lb)        Vitals :   Vitals:    02/09/25 2303 02/10/25 0147 02/10/25 0310 02/10/25 0828   BP: (!) 118/54  (!) 132/58 (!) 108/52   Pulse: 70  69 71   Resp: 16  17    Temp: 98.3 °F (36.8 °C)  97.9 °F (36.6 °C) 98.2 °F (36.8 °C)   TempSrc: Oral  Oral Oral   SpO2: 100% 95% 96% 98%   Weight:       Height:           Physical examination  General Appearance: alert and cooperative with exam, appears comfortable, no distress  Neck: No JVD visibly noted  Lungs: No use of accessory muscle use  GI: soft, non-tender, no guarding  Extremities: No pitting edema noted    Medications:  Infusion:    heparin (PORCINE) Infusion 13 Units/kg/hr (02/10/25 0655)    sodium chloride      dextrose       Meds:    ipratropium 0.5 mg-albuterol 2.5 mg  1 Dose Inhalation BID RT    bumetanide  2 mg IntraVENous Daily    potassium chloride  20 mEq Oral TID WC    midodrine  10 mg Oral TID WC    metoprolol succinate  25 mg Oral Daily    dextromethorphan-guaiFENesin  1 tablet Oral BID    [Held by provider] tamsulosin  0.4 mg Oral Daily    [Held by provider] bumetanide  1 mg Oral Daily    sodium chloride flush  5-40 mL

## 2025-02-10 NOTE — PLAN OF CARE
Problem: Respiratory - Adult  Goal: Clear lung sounds  Description: Clear lung sounds  Outcome: Progressing  Note: Pt had no questions on purpose or side effects of medication   Will continue with treatments as ordered    Pt mutually agreed upon goals

## 2025-02-10 NOTE — CARE COORDINATION
2/10/25, 3:14 PM EST    DISCHARGE PLANNING EVALUATION    Met with patient and his significant other.  He is aware he does not have the criteria for [facility].  Discussed ECF placement.  [facility] would like [facility].  Made a referral to Kita at the facility.  He will be a precert.

## 2025-02-10 NOTE — PROGRESS NOTES
[facility]  OCCUPATIONAL THERAPY MISSED TREATMENT NOTE  [facility] CCU-STEPDOWN 3B  3B-38/038-A      Date: 2/10/2025  Patient Name: Portillo Daly        CSN: [id number]   : 1940  (84 y.o.)  Gender: male   Referring Practitioner: Kayleen Jesus PA            REASON FOR MISSED TREATMENT:  OT attempted at this time, although pt had just returned to bed. Declined therapy due to fatigue. Will check back as able

## 2025-02-10 NOTE — PLAN OF CARE
Problem: Respiratory - Adult  Goal: Achieves optimal ventilation and oxygenation  Outcome: Progressing  Flowsheets  Taken 2/10/2025 0147 by Keith Degroot RN  Achieves optimal ventilation and oxygenation:   Assess for changes in respiratory status   Position to facilitate oxygenation and minimize respiratory effort   Assess for changes in mentation and behavior   Oxygen supplementation based on oxygen saturation or arterial blood gases  Taken 2/9/2025 1418 by Julio Henry RCP  Achieves optimal ventilation and oxygenation: Assess for changes in respiratory status     Problem: Chronic Conditions and Co-morbidities  Goal: Patient's chronic conditions and co-morbidity symptoms are monitored and maintained or improved  Outcome: Progressing  Flowsheets (Taken 2/10/2025 0147)  Care Plan - Patient's Chronic Conditions and Co-Morbidity Symptoms are Monitored and Maintained or Improved: Monitor and assess patient's chronic conditions and comorbid symptoms for stability, deterioration, or improvement     Problem: Discharge Planning  Goal: Discharge to home or other facility with appropriate resources  Outcome: Progressing  Flowsheets (Taken 2/10/2025 0147)  Discharge to home or other facility with appropriate resources:   Identify barriers to discharge with patient and caregiver   Identify discharge learning needs (meds, wound care, etc)   Refer to discharge planning if patient needs post-hospital services based on physician order or complex needs related to functional status, cognitive ability or social support system   Arrange for needed discharge resources and transportation as appropriate     Problem: Safety - Adult  Goal: Free from fall injury  Outcome: Progressing  Flowsheets (Taken 2/10/2025 0147)  Free From Fall Injury: Instruct family/caregiver on patient safety     Problem: ABCDS Injury Assessment  Goal: Absence of physical injury  Outcome: Progressing  Flowsheets (Taken 2/10/2025 0147)  Absence of
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
## 2025-02-11 LAB
ANION GAP SERPL CALC-SCNC: 18 MEQ/L (ref 8–16)
BUN SERPL-MCNC: 92 MG/DL (ref 7–22)
CALCIUM SERPL-MCNC: 9.7 MG/DL (ref 8.5–10.5)
CHLORIDE SERPL-SCNC: 91 MEQ/L (ref 98–111)
CO2 SERPL-SCNC: 25 MEQ/L (ref 23–33)
CREAT SERPL-MCNC: 3.4 MG/DL (ref 0.4–1.2)
DEPRECATED RDW RBC AUTO: 57.5 FL (ref 35–45)
ERYTHROCYTE [DISTWIDTH] IN BLOOD BY AUTOMATED COUNT: 16.6 % (ref 11.5–14.5)
GFR SERPL CREATININE-BSD FRML MDRD: 17 ML/MIN/1.73M2
GLUCOSE BLD STRIP.AUTO-MCNC: 137 MG/DL (ref 70–108)
GLUCOSE BLD STRIP.AUTO-MCNC: 151 MG/DL (ref 70–108)
GLUCOSE BLD STRIP.AUTO-MCNC: 179 MG/DL (ref 70–108)
GLUCOSE BLD STRIP.AUTO-MCNC: 337 MG/DL (ref 70–108)
GLUCOSE SERPL-MCNC: 304 MG/DL (ref 70–108)
HCT VFR BLD AUTO: 38.5 % (ref 42–52)
HEPARIN UNFRACTIONATED: 0.44 U/ML (ref 0.3–0.7)
HEPARIN UNFRACTIONATED: 0.48 U/ML (ref 0.3–0.7)
HGB BLD-MCNC: 13.2 GM/DL (ref 14–18)
INR PPP: 2.07 (ref 0.85–1.13)
MCH RBC QN AUTO: 32.8 PG (ref 26–33)
MCHC RBC AUTO-ENTMCNC: 34.3 GM/DL (ref 32.2–35.5)
MCV RBC AUTO: 95.5 FL (ref 80–94)
PHOSPHATE SERPL-MCNC: 3.9 MG/DL (ref 2.4–4.7)
PLATELET # BLD AUTO: 146 THOU/MM3 (ref 130–400)
PMV BLD AUTO: 11.9 FL (ref 9.4–12.4)
POTASSIUM SERPL-SCNC: 4.5 MEQ/L (ref 3.5–5.2)
PTH-INTACT SERPL-MCNC: 12.7 PG/ML (ref 15–65)
RBC # BLD AUTO: 4.03 MILL/MM3 (ref 4.7–6.1)
SODIUM SERPL-SCNC: 134 MEQ/L (ref 135–145)
WBC # BLD AUTO: 16.6 THOU/MM3 (ref 4.8–10.8)

## 2025-02-11 PROCEDURE — 99232 SBSQ HOSP IP/OBS MODERATE 35: CPT | Performed by: STUDENT IN AN ORGANIZED HEALTH CARE EDUCATION/TRAINING PROGRAM

## 2025-02-11 PROCEDURE — 85610 PROTHROMBIN TIME: CPT

## 2025-02-11 PROCEDURE — 6360000002 HC RX W HCPCS

## 2025-02-11 PROCEDURE — 6370000000 HC RX 637 (ALT 250 FOR IP): Performed by: INTERNAL MEDICINE

## 2025-02-11 PROCEDURE — 85027 COMPLETE CBC AUTOMATED: CPT

## 2025-02-11 PROCEDURE — 6370000000 HC RX 637 (ALT 250 FOR IP)

## 2025-02-11 PROCEDURE — 51798 US URINE CAPACITY MEASURE: CPT

## 2025-02-11 PROCEDURE — 97116 GAIT TRAINING THERAPY: CPT

## 2025-02-11 PROCEDURE — 6370000000 HC RX 637 (ALT 250 FOR IP): Performed by: STUDENT IN AN ORGANIZED HEALTH CARE EDUCATION/TRAINING PROGRAM

## 2025-02-11 PROCEDURE — 97530 THERAPEUTIC ACTIVITIES: CPT

## 2025-02-11 PROCEDURE — 2500000003 HC RX 250 WO HCPCS

## 2025-02-11 PROCEDURE — 36415 COLL VENOUS BLD VENIPUNCTURE: CPT

## 2025-02-11 PROCEDURE — 82948 REAGENT STRIP/BLOOD GLUCOSE: CPT

## 2025-02-11 PROCEDURE — 94761 N-INVAS EAR/PLS OXIMETRY MLT: CPT

## 2025-02-11 PROCEDURE — 2700000000 HC OXYGEN THERAPY PER DAY

## 2025-02-11 PROCEDURE — 94669 MECHANICAL CHEST WALL OSCILL: CPT

## 2025-02-11 PROCEDURE — 80048 BASIC METABOLIC PNL TOTAL CA: CPT

## 2025-02-11 PROCEDURE — 97110 THERAPEUTIC EXERCISES: CPT

## 2025-02-11 PROCEDURE — 85520 HEPARIN ASSAY: CPT

## 2025-02-11 PROCEDURE — 83970 ASSAY OF PARATHORMONE: CPT

## 2025-02-11 PROCEDURE — 99232 SBSQ HOSP IP/OBS MODERATE 35: CPT | Performed by: INTERNAL MEDICINE

## 2025-02-11 PROCEDURE — 2140000000 HC CCU INTERMEDIATE R&B

## 2025-02-11 PROCEDURE — 94640 AIRWAY INHALATION TREATMENT: CPT

## 2025-02-11 PROCEDURE — 84100 ASSAY OF PHOSPHORUS: CPT

## 2025-02-11 PROCEDURE — 97535 SELF CARE MNGMENT TRAINING: CPT

## 2025-02-11 RX ORDER — WARFARIN SODIUM 2.5 MG/1
1.25 TABLET ORAL
Status: COMPLETED | OUTPATIENT
Start: 2025-02-11 | End: 2025-02-11

## 2025-02-11 RX ORDER — BUMETANIDE 0.25 MG/ML
0.5 INJECTION, SOLUTION INTRAMUSCULAR; INTRAVENOUS 2 TIMES DAILY
Status: DISCONTINUED | OUTPATIENT
Start: 2025-02-12 | End: 2025-02-13

## 2025-02-11 RX ORDER — IPRATROPIUM BROMIDE AND ALBUTEROL SULFATE 2.5; .5 MG/3ML; MG/3ML
1 SOLUTION RESPIRATORY (INHALATION) 2 TIMES DAILY
Status: DISCONTINUED | OUTPATIENT
Start: 2025-02-11 | End: 2025-02-13

## 2025-02-11 RX ADMIN — MIDODRINE HYDROCHLORIDE 10 MG: 10 TABLET ORAL at 17:33

## 2025-02-11 RX ADMIN — IPRATROPIUM BROMIDE AND ALBUTEROL SULFATE 1 DOSE: .5; 3 SOLUTION RESPIRATORY (INHALATION) at 09:36

## 2025-02-11 RX ADMIN — GUAIFENESIN, DEXTROMETHORPHAN HBR 1 TABLET: 600; 30 TABLET ORAL at 19:56

## 2025-02-11 RX ADMIN — WARFARIN SODIUM 1.25 MG: 2.5 TABLET ORAL at 17:33

## 2025-02-11 RX ADMIN — BUDESONIDE 500 MCG: 0.5 INHALANT RESPIRATORY (INHALATION) at 21:16

## 2025-02-11 RX ADMIN — MIDODRINE HYDROCHLORIDE 10 MG: 10 TABLET ORAL at 12:33

## 2025-02-11 RX ADMIN — MIDODRINE HYDROCHLORIDE 10 MG: 10 TABLET ORAL at 09:02

## 2025-02-11 RX ADMIN — GUAIFENESIN, DEXTROMETHORPHAN HBR 1 TABLET: 600; 30 TABLET ORAL at 09:02

## 2025-02-11 RX ADMIN — SODIUM CHLORIDE, PRESERVATIVE FREE 10 ML: 5 INJECTION INTRAVENOUS at 19:56

## 2025-02-11 RX ADMIN — ATORVASTATIN CALCIUM 80 MG: 80 TABLET, FILM COATED ORAL at 19:56

## 2025-02-11 RX ADMIN — CALCITRIOL CAPSULES 0.25 MCG 0.25 MCG: 0.25 CAPSULE ORAL at 09:02

## 2025-02-11 RX ADMIN — INSULIN GLARGINE 15 UNITS: 100 INJECTION, SOLUTION SUBCUTANEOUS at 09:03

## 2025-02-11 RX ADMIN — IPRATROPIUM BROMIDE AND ALBUTEROL SULFATE 1 DOSE: .5; 3 SOLUTION RESPIRATORY (INHALATION) at 21:16

## 2025-02-11 RX ADMIN — POTASSIUM CHLORIDE 20 MEQ: 1500 TABLET, EXTENDED RELEASE ORAL at 17:33

## 2025-02-11 RX ADMIN — INSULIN LISPRO 12 UNITS: 100 INJECTION, SOLUTION INTRAVENOUS; SUBCUTANEOUS at 12:33

## 2025-02-11 RX ADMIN — POTASSIUM CHLORIDE 20 MEQ: 1500 TABLET, EXTENDED RELEASE ORAL at 12:33

## 2025-02-11 RX ADMIN — POTASSIUM CHLORIDE 20 MEQ: 1500 TABLET, EXTENDED RELEASE ORAL at 09:02

## 2025-02-11 RX ADMIN — BUDESONIDE 500 MCG: 0.5 INHALANT RESPIRATORY (INHALATION) at 09:35

## 2025-02-11 NOTE — PROGRESS NOTES
Hospitalist Progress Note      Patient:  Portillo Daly 84 y.o. male     Unit/Bed:-[facility]038-A    Date of Admission: 1/29/2025      ASSESSMENT AND PLAN    Active Problems    Acute hypoxemic respiratory failure:   - 2/2 Pseudomonas PNA & Pulmonary Edema. Recheck chest x-ray tomorrow  -  on HFNC - Most recent CXR: results noted, resp panel today is negative, Oxygen is down to NC today.  -  on IS & Acapella.     Pseudomonas, Bacterial PNA   - ID consulted - case discussed, Cefepime was transitioned to Levaquin,and completed therapy.     Sepsis 2/2 Pseudomonas Bacteremia   - BC 2 of 2 growing Pseudomonas. Was on Levaquin.   - Repeat blood culture showed NGTD.     Acute on chronic renal failure:   - Nephrology notes reviewed. Creatinine has gone up to 3.8 ,  Patient baseline of  2.5-3.0. Will continue to monitor. Diuretics held.  - Nephrology on the case  - Renal ultrasound reviewed with no acute findings  -  IV Bumex BID   - Continue to hold losartan    DM-2 sugars 144, 259 , and 125 will add lantus 15 units daily and with High SS   Using humalog.    Acute on chronic systolic heart failure: Last known EF 45 to 50%.  -  IV Bumex BID.   - Intake/output.  - Fluid restriction.     HTN runs soft BP , no fever or chills  On low dose BB and ARB being on hold and on proamatine.       Rash   - Likely medication induced. Cefepime transitioned to Levaquin, no new issues.     Chronic Conditions (reviewed and stable unless otherwise stated)      COPD: patient had exacerbation while in the ED. Given Solumedrol. Steroid inhaler BID.   DMII: Resume Lantus with high intensity sliding scale.   Paroxysmal A-fib: Status post pacemaker, continue with metoprolol and Coumadin, as INR is subtherapeutic initiate her IV heparin  and continue with coumadin , till INR 2-3 . Spoke with pharmacist and his RN.    LDA: []CVC / []PICC / []Midline / []Kitchen / []Drains / []Mediport / [x]None  Antibiotics: Cefepime and Doxycycline  Steroids:

## 2025-02-11 NOTE — PROGRESS NOTES
Patient resting in bed, family at bedside. Denies needs. Bed in low position, side rails X2, call light in reach.

## 2025-02-11 NOTE — CARE COORDINATION
2/11/25, 9:45 AM EST    DISCHARGE PLANNING EVALUATION    Patient has been accepted to [facility].  He will be a precert.      Update 2:58 pm: Updated patient this morning regarding acceptance to [facility].  Called Kita at the facility and she will start precert today.

## 2025-02-11 NOTE — PROGRESS NOTES
[facility]  INPATIENT PHYSICAL THERAPY  DAILY NOTE  [facility] CCU-STEPDOWN 3B - 3B-38/038-A      Discharge Recommendations: Subacute/Skilled Nursing Facility  Equipment Recommendations: No                 Time In: 1053  Time Out: 1143  Timed Code Treatment Minutes: 50 Minutes  Minutes: 50          Date: 2025  Patient Name: Portillo Daly,  Gender:  male        MRN: [id number]  : 1940  (84 y.o.)     Referring Practitioner: Kayleen Jesus PA  Diagnosis: Sepsis (HCC)  Additional Pertinent Hx: per chart review: Portillo Daly is a 83 yo obese male that was admitted due to acute respiratory failure secondary to pneumonia and sepsis. Patient drowsy during exam. He does state that he is feeling better now and not as short of breath as he was last night. Patient lying down in hospital bed. Patient does complain of continual itching but states is better after lotion application.  He denies any shortness of breath, chest pain, abdominal pain at this time.     Prior Level of Function:  Lives With: Significant other  Type of Home: Trailer  Home Layout: One level  Home Access: Stairs to enter with rails  Entrance Stairs - Number of Steps: 5 with (B) rails but unable to reach both at the same time  Entrance Stairs - Rails: Both  Home Equipment: Oxygen, Cane (2L baseline)   Bathroom Shower/Tub: Tub/Shower unit (Patient states he sponge bathes because of the oxygen)  Bathroom Toilet: Standard    Prior Level of Assist for ADLs: Independent  Prior Level of Assist for Transfers: Independent  Active : Yes  Additional Comments: Patient ambulates without AD household distances.  Prior Level of Assist for Ambulation: Independent household ambulator, with or without device  Has the patient had two or more falls in the past year or any fall with injury in the past year?: No    Restrictions/Precautions:  Restrictions/Precautions: Fall Risk, Contact Precautions     SUBJECTIVE: Pt was in chair on arrival stating

## 2025-02-11 NOTE — PROGRESS NOTES
[facility]   Nephrology progress note  2/11/2025, 9:33 AM      Pt Name:    Portillo Dlay  MRN:     [id number]     YOB: 1940  Admit Date:    1/29/2025  8:08 PM    Chief Complaint: Nephrology following for MILLY/CKD IV    Subjective:  Patient was seen and examined this morning.  On NC  No new complaints.     Objective:  24HR INTAKE/OUTPUT:    Intake/Output Summary (Last 24 hours) at 2/11/2025 0933  Last data filed at 2/11/2025 0843  Gross per 24 hour   Intake 1555.9 ml   Output 775 ml   Net 780.9 ml         I/O last 3 completed shifts:  In: 1818.9 [P.O.:1600; I.V.:218.9]  Out: 1760 [Urine:1760]  I/O this shift:  In: 237 [P.O.:237]  Out: -    Admission weight: 95.1 kg (209 lb 10.5 oz)  Wt Readings from Last 3 Encounters:   02/09/25 86.2 kg (190 lb 0.6 oz)   01/27/25 95.3 kg (210 lb)   01/15/25 94.3 kg (208 lb)        Vitals :   Vitals:    02/10/25 2241 02/10/25 2337 02/11/25 0348 02/11/25 0745   BP:  (!) 139/59 (!) 141/55 (!) 124/54   Pulse: 72 70 69 74   Resp: 18 18 18 16   Temp:  97.9 °F (36.6 °C) 97.4 °F (36.3 °C) 97.7 °F (36.5 °C)   TempSrc:  Oral Oral Oral   SpO2: 94% 98% 98% 98%   Weight:       Height:           Physical examination  General Appearance: alert and cooperative with exam, appears comfortable, no distress  Neck: No JVD visibly noted  Lungs: No use of accessory muscle use  GI: soft, non-tender, no guarding  Extremities: No pitting edema noted    Medications:  Infusion:    heparin (PORCINE) Infusion 13 Units/kg/hr (02/10/25 2121)    sodium chloride      dextrose       Meds:    [START ON 2/12/2025] bumetanide  0.5 mg IntraVENous BID    insulin glargine  15 Units SubCUTAneous QAM    insulin lispro  0-16 Units SubCUTAneous 4x Daily AC & HS    ipratropium 0.5 mg-albuterol 2.5 mg  1 Dose Inhalation TID RT    potassium chloride  20 mEq Oral TID WC    midodrine  10 mg Oral TID WC    metoprolol succinate  25 mg Oral Daily    dextromethorphan-guaiFENesin  1 tablet Oral BID

## 2025-02-11 NOTE — PROGRESS NOTES
Patient resting in bed. No change from previous assessment. Bed in low position call light in reach. Family members at bedside.

## 2025-02-11 NOTE — DISCHARGE INSTR - COC
Continuity of Care Form    Patient Name: Portillo Daly   :  1940  MRN:  [id number]    Admit date:  2025  Discharge date:  ***    Code Status Order: Limited   Advance Directives:   Advance Care Flowsheet Documentation             Admitting Physician:  Aj Martínez DO  PCP: Elinor Ly APRN - CNP    Discharging Nurse: ***  Discharging Hospital Unit/Room#: 3B-38/038-A  Discharging Unit Phone Number: ***    Emergency Contact:   Extended Emergency Contact Information  Primary Emergency Contact: Gina Willis   [facility]  Home Phone: [phone]  Mobile Phone: [phone]  Relation: Domestic Partner  Secondary Emergency Contact: DelvisEnriqueJenelle   [place]  Mobile Phone: [phone]  Relation: Niece/Nephew    Past Surgical History:  Past Surgical History:   Procedure Laterality Date    AORTIC VALVE REPLACEMENT      CARDIAC SURGERY      heart cath    CORONARY ANGIOPLASTY WITH STENT PLACEMENT      EP DEVICE PROCEDURE N/A 2024    Remove & replace PPM gen dual lead performed by Dane Garcia MD at [facility] CARDIAC CATH LAB    HERNIA REPAIR      OTHER SURGICAL HISTORY  05/10/2018    PACEMAKER INSERTION         Immunization History:   Immunization History   Administered Date(s) Administered    COVID-19, MODERNA BLUE border, Primary or Immunocompromised, (age 12y+), IM, 100 mcg/0.5mL 2021, 2021, 2021    COVID-19, [place] Vaccine, Vaccine Unspecified 2021, 2021, 2021    Influenza Vaccine, unspecified formulation 10/01/2013    Influenza, FLUAD, (age 65 y+), IM, Quadv, 0.5mL 10/19/2022, 2023    Influenza, FLUAD, (age 65 y+), IM, Trivalent PF, 0.5mL 2024    Influenza, FLUBLOK, (age 18 y+), Quadv PF, 0.5mL 10/21/2019    Influenza, FLUZONE High Dose (age 65 y+), IM, Quadv, 0.7mL 10/21/2020    Influenza, FLUZONE High Dose, (age 65 y+), IM, Trivalent PF, 0.5mL 10/13/2015, 10/18/2018, 10/19/2021    PPD Test 2018, 2018

## 2025-02-11 NOTE — PROGRESS NOTES
Patient in bed, eyes open. Speech clear. Alert to person place time situation. Mucous membranes moist, pink. Respirations unlabored. Heart sounds regular. Expiratory wheezes noted left anterior lung. Lung sounds clear lateral, posterior. Bowel sounds clear all four quadrants. No pain in abdomen upon palpation. Arm drift negative, free movement of arms noted. IV infusing right forearm, no signs of infiltration or phlebitis. Hand grasp strong. Skin turgor brisk. Capillary refill less than 2 seconds.Radial pulse strong equal. Leg lift strong bilaterally. Skin dry pink cool. Pedal push pull strong. Pedal pulses strong equal. Denies pain. Bed in low position. Call light in reach.

## 2025-02-11 NOTE — CARE COORDINATION
2/11/25, 11:24 AM EST    DISCHARGE ON GOING EVALUATION    [facility] day: 13  Location: [facility]038 Reason for admit: Hypoxemia [R09.02]  Sepsis (HCC) [A41.9]     Procedures: none     Imaging since last note:   2/9 CXR: No acute pulmonary disease. Borderline cardiomegaly.    Barriers to Discharge: Hospitalist, Nephrology and ID following. PT/OT priority for precert. Heparin gtt; bridging to coumadin. INR 2.07. Creat 3.4. Nebulizers. Mucinex. DM management. IV bumex BID. Off HHFNC; currently 97% on 4L NC.     PCP: Elinor Ly APRN - CNP  Readmission Risk Score: 23.8    Patient Goals/Plan/Treatment Preferences: [place]. Plan for precert to start today. SW following.

## 2025-02-11 NOTE — PLAN OF CARE
Problem: Respiratory - Adult  Goal: Achieves optimal ventilation and oxygenation  Outcome: Progressing  Flowsheets  Taken 2/11/2025 1105 by Temitope Kaye RN  Achieves optimal ventilation and oxygenation:   Assess for changes in respiratory status   Assess for changes in mentation and behavior   Position to facilitate oxygenation and minimize respiratory effort  Taken 2/11/2025 0936 by Julio Henry RCP  Achieves optimal ventilation and oxygenation: Assess for changes in respiratory status     Problem: Chronic Conditions and Co-morbidities  Goal: Patient's chronic conditions and co-morbidity symptoms are monitored and maintained or improved  Outcome: Progressing  Flowsheets (Taken 2/11/2025 1105)  Care Plan - Patient's Chronic Conditions and Co-Morbidity Symptoms are Monitored and Maintained or Improved:   Monitor and assess patient's chronic conditions and comorbid symptoms for stability, deterioration, or improvement   Collaborate with multidisciplinary team to address chronic and comorbid conditions and prevent exacerbation or deterioration   Update acute care plan with appropriate goals if chronic or comorbid symptoms are exacerbated and prevent overall improvement and discharge     Problem: Discharge Planning  Goal: Discharge to home or other facility with appropriate resources  Outcome: Progressing  Flowsheets (Taken 2/11/2025 1105)  Discharge to home or other facility with appropriate resources:   Identify barriers to discharge with patient and caregiver   Arrange for needed discharge resources and transportation as appropriate   Identify discharge learning needs (meds, wound care, etc)   Arrange for interpreters to assist at discharge as needed   Refer to discharge planning if patient needs post-hospital services based on physician order or complex needs related to functional status, cognitive ability or social support system     Problem: Safety - Adult  Goal: Free from fall injury  Outcome:

## 2025-02-11 NOTE — PROGRESS NOTES
Warfarin Pharmacy Consult Note    Warfarin Indication: Atrial fibrillation/Atrial flutter  Target INR: 2.0-3.0  Dose prior to admission: Coumadin 2.5 mg MF, 1.25 mg TWThSS     Recent Labs     02/11/25  1032   HGB 13.2*        Recent Labs     02/09/25  0446 02/10/25  0529 02/11/25  1031   INR 1.54* 1.58* 2.07*     Concurrent anticoagulants/antiplatelets: heparin drip  Significant warfarin drug-drug interactions:  melatonin (HM), levaquin q48h x 2 doses started 2/4/25     Date INR Warfarin Dose   1/30/2025 3.49 No coumadin    1/31/2025 2.34  1.25 mg     2/1/2025  2.14 2 mg     2/2/2025  2.96 0.5 mg    2/3/2025  3.10  No coumadin   2/4/2025    4.80   No coumadin    2/5/2025    6.69   No coumadin    2/6/2025  5.75 No coumadin  Vitamin K 2.5 mg PO   2/7/2025  3.53 No coumadin   2/8/2025 1.53 1 mg   2/9/2025 1.54 2.5 mg   2/10/25 1.58 2 mg    2/11/25  2.07  1.25 mg                 Monitoring:                   INR will be monitored daily.    **Please contact pharmacy for discharge instructions when indicated**    Sugey Matthews, PharmD 2/11/2025 12:39 PM

## 2025-02-11 NOTE — PROGRESS NOTES
Progress note: Infectious diseases    Patient - Portillo Daly,  Age - 84 y.o.    - 1940      Room Number - 3B-38/038-A   MRN -  [id number]   [facility] # - [id number]  Date of Admission -  2025  8:08 PM    SUBJECTIVE:   Patient seen and evaluated at bedside this morning. Denies any acute issues.   OBJECTIVE   VITALS    height is 1.702 m (5' 7\") and weight is 86.2 kg (190 lb 0.6 oz). His oral temperature is 97.7 °F (36.5 °C). His blood pressure is 124/54 (abnormal) and his pulse is 74. His respiration is 16 and oxygen saturation is 98%.       Wt Readings from Last 3 Encounters:   25 86.2 kg (190 lb 0.6 oz)   25 95.3 kg (210 lb)   01/15/25 94.3 kg (208 lb)       I/O (24 Hours)    Intake/Output Summary (Last 24 hours) at 2025 0837  Last data filed at 2025 0348  Gross per 24 hour   Intake 1318.9 ml   Output 775 ml   Net 543.9 ml       General Appearance  Awake, alert, oriented,  not  In acute distress  HEENT - normocephalic, atraumatic, pink conjunctiva,  anicteric sclera  Neck - Supple, no mass  Lungs -  Bilateral air entry on NC, Diminished bilateral breath sounds with rhonchi  Cardiovascular - Heart sounds are normal.  Regular rate and rhythm without murmur, gallop or rub.  Abdomen - soft, not distended, nontender,   Neurologic -Oriented  Skin - No bruising or bleeding  Extremities - No edema, no cyanosis, clubbing     MEDICATIONS:      [START ON 2025] bumetanide  0.5 mg IntraVENous BID    insulin glargine  15 Units SubCUTAneous QAM    insulin lispro  0-16 Units SubCUTAneous 4x Daily AC & HS    ipratropium 0.5 mg-albuterol 2.5 mg  1 Dose Inhalation TID RT    potassium chloride  20 mEq Oral TID WC    midodrine  10 mg Oral TID WC    metoprolol succinate  25 mg Oral Daily    dextromethorphan-guaiFENesin  1 tablet Oral BID    [Held by provider] tamsulosin  0.4 mg Oral Daily    sodium chloride

## 2025-02-11 NOTE — PROGRESS NOTES
[facility]  STRZ CCU-STEPDOWN 3B  Occupational Therapy  Daily Note    Discharge Recommendations: ECF with OT  Equipment Recommendations: No (continue to monitor for needs)        Time In: 912  Time Out: 944  Timed Code Treatment Minutes: 32 Minutes  Minutes: 32          Date: 2025  Patient Name: Portillo Daly,   Gender: male      Room: [address] [place] [address]  MRN: [id number]  : 1940  (84 y.o.)  Referring Practitioner: Kayleen Jesus PA  Diagnosis: Sepsis (HCC)  Additional Pertinent Hx: Per EMR: \"84 y.o. male with PMHx of COPD, CKD, A-fib, TAVR, chronic respiratory failure with hypoxia who presents to [facility] with shortness of breath. Patient states that today he noted to have some chills and being more short of breath. He states that has had a long history of being short of breath, especially with ambulation. States that today he just knew he was sick. Noted to have O2 saturation of 78% prior to arrival to ED and wears 4-6 L NC at home. \"    Restrictions/Precautions:  Restrictions/Precautions: Fall Risk, Contact Precautions     Social/Functional History:  Lives With: Significant other  Type of Home: Trailer  Home Layout: One level  Home Access: Stairs to enter with rails  Entrance Stairs - Number of Steps: 5 with (B) rails but unable to reach both at the same time  Entrance Stairs - Rails: Both  Home Equipment: Oxygen, Cane (2L baseline)   Bathroom Shower/Tub: Tub/Shower unit (Patient states he sponge bathes because of the oxygen)  Bathroom Toilet: Standard       Prior Level of Assist for ADLs: Independent  Prior Level of Assist for Transfers: Independent  Prior Level of Assist for Ambulation: Independent household ambulator, with or without device  Has the patient had two or more falls in the past year or any fall with injury in the past year?: No    Active : Yes  Patient's  Info: s/o  Leisure & Hobbies: going to [employer]  Additional Comments: Patient ambulates

## 2025-02-11 NOTE — PROGRESS NOTES
Spiritual Health History and Assessment/Progress Note  [facility]    (P) Follow-up,  ,  ,      Name: Portillo Daly MRN: [id number]    Age: 84 y.o.     Sex: male   Language: English   Latter day: Other   Sepsis (HCC)     Date: 2/11/2025            Total Time Calculated: (P) 20 min              Spiritual Assessment continued in STRZ CCU-STEPDOWN 3B        Referral/Consult From: (P) Rounding   Encounter Overview/Reason: (P) Follow-up  Service Provided For: (P) Patient and family together    Rosalie, Belief, Meaning:   Patient identifies as spiritual  Family/Friends have beliefs or practices that help with coping during difficult times      Importance and Influence:  Patient unable to assess at this time  Family/Friends have spiritual/personal beliefs that influence decisions regarding the patient's health    Community:  Patient feels well-supported. Support system includes: Spouse/Partner and Children  Family/Friends feel well-supported. Support system includes: Spouse/Partner    Assessment and Plan of Care:   Jose M as he likes to be called is in bed and has oxygen in his nasal. He is surrounded by family , his   SO from [facility].  His daughter and her  who are here to support him from [place] and his niece.  They hope that he could go to [facility] soon.  We had prayer together.   Patient Interventions include:   Family/Friends Interventions include: Explored spiritual coping/struggle/distress    Patient Plan of Care: No spiritual needs identified for follow-up  Family/Friends Plan of Care: Spiritual Care available upon further referral    Electronically signed by SHARIF Talley on 2/11/2025 at 6:26 PM

## 2025-02-12 ENCOUNTER — APPOINTMENT (OUTPATIENT)
Dept: GENERAL RADIOLOGY | Age: 85
DRG: 871 | End: 2025-02-12
Payer: MEDICARE

## 2025-02-12 LAB
ANION GAP SERPL CALC-SCNC: 19 MEQ/L (ref 8–16)
BACTERIA: ABNORMAL
BILIRUB UR QL STRIP: NEGATIVE
BUN SERPL-MCNC: 87 MG/DL (ref 7–22)
CALCIUM SERPL-MCNC: 9.4 MG/DL (ref 8.5–10.5)
CASTS #/AREA URNS LPF: ABNORMAL /LPF
CASTS #/AREA URNS LPF: ABNORMAL /LPF
CHARACTER UR: CLEAR
CHARCOAL URNS QL MICRO: ABNORMAL
CHLORIDE SERPL-SCNC: 95 MEQ/L (ref 98–111)
CO2 SERPL-SCNC: 26 MEQ/L (ref 23–33)
COLOR UR: YELLOW
CREAT SERPL-MCNC: 3.4 MG/DL (ref 0.4–1.2)
CRYSTALS URNS QL MICRO: ABNORMAL
EPITHELIAL CELLS, UA: ABNORMAL /HPF
GFR SERPL CREATININE-BSD FRML MDRD: 17 ML/MIN/1.73M2
GLUCOSE BLD STRIP.AUTO-MCNC: 168 MG/DL (ref 70–108)
GLUCOSE BLD STRIP.AUTO-MCNC: 197 MG/DL (ref 70–108)
GLUCOSE BLD STRIP.AUTO-MCNC: 220 MG/DL (ref 70–108)
GLUCOSE BLD STRIP.AUTO-MCNC: 266 MG/DL (ref 70–108)
GLUCOSE SERPL-MCNC: 149 MG/DL (ref 70–108)
GLUCOSE UR QL STRIP.AUTO: NEGATIVE MG/DL
HEPARIN UNFRACTIONATED: < 0.04 U/ML (ref 0.3–0.7)
HGB UR QL STRIP.AUTO: ABNORMAL
INR PPP: 2.16 (ref 0.85–1.13)
KETONES UR QL STRIP.AUTO: NEGATIVE
LEUKOCYTE ESTERASE UR QL STRIP.AUTO: NEGATIVE
NITRITE UR QL STRIP.AUTO: NEGATIVE
PH UR STRIP.AUTO: 7.5 [PH] (ref 5–9)
POTASSIUM SERPL-SCNC: 4.9 MEQ/L (ref 3.5–5.2)
PROT UR STRIP.AUTO-MCNC: 30 MG/DL
RBC #/AREA URNS HPF: ABNORMAL /HPF
RENAL EPI CELLS #/AREA URNS HPF: ABNORMAL /[HPF]
SODIUM SERPL-SCNC: 140 MEQ/L (ref 135–145)
SP GR UR REFRACT.AUTO: 1.01 (ref 1–1.03)
UROBILINOGEN UR QL STRIP.AUTO: 0.2 EU/DL (ref 0–1)
WBC #/AREA URNS HPF: ABNORMAL /HPF
YEAST LIKE FUNGI URNS QL MICRO: ABNORMAL

## 2025-02-12 PROCEDURE — 6370000000 HC RX 637 (ALT 250 FOR IP): Performed by: PHYSICIAN ASSISTANT

## 2025-02-12 PROCEDURE — 80048 BASIC METABOLIC PNL TOTAL CA: CPT

## 2025-02-12 PROCEDURE — 6370000000 HC RX 637 (ALT 250 FOR IP)

## 2025-02-12 PROCEDURE — 6360000002 HC RX W HCPCS

## 2025-02-12 PROCEDURE — 94640 AIRWAY INHALATION TREATMENT: CPT

## 2025-02-12 PROCEDURE — 85610 PROTHROMBIN TIME: CPT

## 2025-02-12 PROCEDURE — 36415 COLL VENOUS BLD VENIPUNCTURE: CPT

## 2025-02-12 PROCEDURE — 6370000000 HC RX 637 (ALT 250 FOR IP): Performed by: STUDENT IN AN ORGANIZED HEALTH CARE EDUCATION/TRAINING PROGRAM

## 2025-02-12 PROCEDURE — 71045 X-RAY EXAM CHEST 1 VIEW: CPT

## 2025-02-12 PROCEDURE — 94761 N-INVAS EAR/PLS OXIMETRY MLT: CPT

## 2025-02-12 PROCEDURE — 97116 GAIT TRAINING THERAPY: CPT

## 2025-02-12 PROCEDURE — 2500000003 HC RX 250 WO HCPCS

## 2025-02-12 PROCEDURE — 6360000002 HC RX W HCPCS: Performed by: INTERNAL MEDICINE

## 2025-02-12 PROCEDURE — 2700000000 HC OXYGEN THERAPY PER DAY

## 2025-02-12 PROCEDURE — 82948 REAGENT STRIP/BLOOD GLUCOSE: CPT

## 2025-02-12 PROCEDURE — 6370000000 HC RX 637 (ALT 250 FOR IP): Performed by: INTERNAL MEDICINE

## 2025-02-12 PROCEDURE — 6360000002 HC RX W HCPCS: Performed by: PHYSICIAN ASSISTANT

## 2025-02-12 PROCEDURE — 97530 THERAPEUTIC ACTIVITIES: CPT

## 2025-02-12 PROCEDURE — 81001 URINALYSIS AUTO W/SCOPE: CPT

## 2025-02-12 PROCEDURE — 85520 HEPARIN ASSAY: CPT

## 2025-02-12 PROCEDURE — 99232 SBSQ HOSP IP/OBS MODERATE 35: CPT | Performed by: INTERNAL MEDICINE

## 2025-02-12 PROCEDURE — 2140000000 HC CCU INTERMEDIATE R&B

## 2025-02-12 RX ORDER — POTASSIUM CHLORIDE 1500 MG/1
20 TABLET, EXTENDED RELEASE ORAL
Status: DISCONTINUED | OUTPATIENT
Start: 2025-02-13 | End: 2025-02-18 | Stop reason: HOSPADM

## 2025-02-12 RX ORDER — WARFARIN SODIUM 2.5 MG/1
1.25 TABLET ORAL
Status: COMPLETED | OUTPATIENT
Start: 2025-02-12 | End: 2025-02-12

## 2025-02-12 RX ADMIN — MIDODRINE HYDROCHLORIDE 10 MG: 10 TABLET ORAL at 09:09

## 2025-02-12 RX ADMIN — GUAIFENESIN, DEXTROMETHORPHAN HBR 1 TABLET: 600; 30 TABLET ORAL at 20:41

## 2025-02-12 RX ADMIN — CALCITRIOL CAPSULES 0.25 MCG 0.25 MCG: 0.25 CAPSULE ORAL at 09:09

## 2025-02-12 RX ADMIN — Medication 3 MG: at 20:48

## 2025-02-12 RX ADMIN — INSULIN GLARGINE 15 UNITS: 100 INJECTION, SOLUTION SUBCUTANEOUS at 09:08

## 2025-02-12 RX ADMIN — INSULIN LISPRO 4 UNITS: 100 INJECTION, SOLUTION INTRAVENOUS; SUBCUTANEOUS at 17:58

## 2025-02-12 RX ADMIN — BUDESONIDE 500 MCG: 0.5 INHALANT RESPIRATORY (INHALATION) at 08:02

## 2025-02-12 RX ADMIN — ALBUTEROL SULFATE 2.5 MG: 2.5 SOLUTION RESPIRATORY (INHALATION) at 15:41

## 2025-02-12 RX ADMIN — BUDESONIDE 500 MCG: 0.5 INHALANT RESPIRATORY (INHALATION) at 22:19

## 2025-02-12 RX ADMIN — ATORVASTATIN CALCIUM 80 MG: 80 TABLET, FILM COATED ORAL at 20:41

## 2025-02-12 RX ADMIN — INSULIN LISPRO 4 UNITS: 100 INJECTION, SOLUTION INTRAVENOUS; SUBCUTANEOUS at 12:18

## 2025-02-12 RX ADMIN — WARFARIN SODIUM 1.25 MG: 2.5 TABLET ORAL at 17:58

## 2025-02-12 RX ADMIN — POTASSIUM CHLORIDE 20 MEQ: 1500 TABLET, EXTENDED RELEASE ORAL at 09:09

## 2025-02-12 RX ADMIN — ACETAMINOPHEN 650 MG: 325 TABLET ORAL at 20:48

## 2025-02-12 RX ADMIN — BUMETANIDE 0.5 MG: 0.25 INJECTION INTRAMUSCULAR; INTRAVENOUS at 09:09

## 2025-02-12 RX ADMIN — INSULIN LISPRO 8 UNITS: 100 INJECTION, SOLUTION INTRAVENOUS; SUBCUTANEOUS at 20:41

## 2025-02-12 RX ADMIN — HYDROXYZINE HYDROCHLORIDE 10 MG: 10 TABLET ORAL at 10:31

## 2025-02-12 RX ADMIN — MIDODRINE HYDROCHLORIDE 10 MG: 10 TABLET ORAL at 12:18

## 2025-02-12 RX ADMIN — IPRATROPIUM BROMIDE AND ALBUTEROL SULFATE 1 DOSE: .5; 3 SOLUTION RESPIRATORY (INHALATION) at 22:19

## 2025-02-12 RX ADMIN — BUMETANIDE 0.5 MG: 0.25 INJECTION INTRAMUSCULAR; INTRAVENOUS at 17:58

## 2025-02-12 RX ADMIN — GUAIFENESIN, DEXTROMETHORPHAN HBR 1 TABLET: 600; 30 TABLET ORAL at 09:09

## 2025-02-12 RX ADMIN — ALBUTEROL SULFATE 2.5 MG: 2.5 SOLUTION RESPIRATORY (INHALATION) at 10:30

## 2025-02-12 RX ADMIN — METOPROLOL SUCCINATE 25 MG: 25 TABLET, FILM COATED, EXTENDED RELEASE ORAL at 09:09

## 2025-02-12 RX ADMIN — SODIUM CHLORIDE, PRESERVATIVE FREE 10 ML: 5 INJECTION INTRAVENOUS at 09:08

## 2025-02-12 RX ADMIN — Medication 3 MG: at 00:10

## 2025-02-12 RX ADMIN — IPRATROPIUM BROMIDE AND ALBUTEROL SULFATE 1 DOSE: .5; 3 SOLUTION RESPIRATORY (INHALATION) at 07:55

## 2025-02-12 NOTE — PROGRESS NOTES
[facility]  INPATIENT PHYSICAL THERAPY  DAILY NOTE  STRZ CCU-STEPDOWN 3B - 3B-38/038-A      Discharge Recommendations: Continue to assess pending progress and Subacute/Skilled Nursing Facility  Equipment Recommendations: Yes  Recommend Rolling Walker if going home              Time In: 1339  Time Out: 1408  Timed Code Treatment Minutes: 29 Minutes  Minutes: 29          Date: 2025  Patient Name: Portillo Daly,  Gender:  male        MRN: [id number]  : 1940  (84 y.o.)     Referring Practitioner: Kayleen Jesus PA  Diagnosis: Sepsis (HCC)  Additional Pertinent Hx: per chart review: Portillo Daly is a 83 yo obese male that was admitted due to acute respiratory failure secondary to pneumonia and sepsis. Patient drowsy during exam. He does state that he is feeling better now and not as short of breath as he was last night. Patient lying down in hospital bed. Patient does complain of continual itching but states is better after lotion application.  He denies any shortness of breath, chest pain, abdominal pain at this time.     Prior Level of Function:  Lives With: Significant other  Type of Home: Trailer  Home Layout: One level  Home Access: Stairs to enter with rails  Entrance Stairs - Number of Steps: 5 with (B) rails but unable to reach both at the same time  Entrance Stairs - Rails: Both  Home Equipment: Oxygen, Cane (2L baseline)   Bathroom Shower/Tub: Tub/Shower unit (Patient states he sponge bathes because of the oxygen)  Bathroom Toilet: Standard    Prior Level of Assist for ADLs: Independent  Prior Level of Assist for Transfers: Independent  Active : Yes  Additional Comments: Patient ambulates without AD household distances.  Prior Level of Assist for Ambulation: Independent household ambulator, with or without device  Has the patient had two or more falls in the past year or any fall with injury in the past year?: No    Restrictions/Precautions:  Restrictions/Precautions: Fall

## 2025-02-12 NOTE — CARE COORDINATION
2/12/25, 2:59 PM EST    DISCHARGE PLANNING EVALUATION    Patients precert has been approved.  Updated the patients daughter, son in law and significant other Gina.  It is good until 2/16.

## 2025-02-12 NOTE — PLAN OF CARE
redness and/or skin breakdown  2.  Assess vascular access sites hourly  3.  Every 4-6 hours minimum:  Change oxygen saturation probe site  4.  Every 4-6 hours:  If on nasal continuous positive airway pressure, respiratory therapy assess nares and determine need for appliance change or resting period  2/11/2025 2124 by Eduardo Solo RN  Outcome: Progressing  2/11/2025 1105 by Temitope Kaye RN  Outcome: Progressing  Flowsheets (Taken 2/11/2025 1105)  Skin Integrity Remains Intact:   Assess vascular access sites hourly   Monitor for areas of redness and/or skin breakdown     Problem: Musculoskeletal - Adult  Goal: Return mobility to safest level of function  2/11/2025 2124 by Eduardo Solo RN  Outcome: Progressing  2/11/2025 1105 by Temitope Kaye RN  Outcome: Progressing  Flowsheets (Taken 2/11/2025 1105)  Return Mobility to Safest Level of Function:   Assess patient stability and activity tolerance for standing, transferring and ambulating with or without assistive devices   Assist with transfers and ambulation using safe patient handling equipment as needed   Ensure adequate protection for wounds/incisions during mobilization   Obtain physical therapy/occupational therapy consults as needed   Apply continuous passive motion per provider or physical therapy orders to increase flexion toward goal   Instruct patient/family in ordered activity level  Goal: Return ADL status to a safe level of function  2/11/2025 2124 by Eduardo Solo RN  Outcome: Progressing  2/11/2025 1105 by Temitope Kaye RN  Outcome: Progressing  Flowsheets (Taken 2/11/2025 1105)  Return ADL Status to a Safe Level of Function:   Administer medication as ordered   Assess activities of daily living deficits and provide assistive devices as needed     Problem: Genitourinary - Adult  Goal: Absence of urinary retention  2/11/2025 2124 by Eduardo Solo RN  Outcome: Progressing  2/11/2025 1105 by Temitope Kaye RN  Outcome:

## 2025-02-12 NOTE — PROGRESS NOTES
[facility]   Nephrology progress note  2/12/2025, 10:33 AM      Pt Name:    Portillo Daly  MRN:     [id number]     YOB: 1940  Admit Date:    1/29/2025  8:08 PM    Chief Complaint: Nephrology following for MILLY/CKD IV    Subjective:  Patient was seen and examined this morning.  On NC  No new complaints.   Poor historian    Objective:  24HR INTAKE/OUTPUT:    Intake/Output Summary (Last 24 hours) at 2/12/2025 1033  Last data filed at 2/12/2025 0356  Gross per 24 hour   Intake 150 ml   Output 666 ml   Net -516 ml         I/O last 3 completed shifts:  In: 787 [P.O.:787]  Out: 1116 [Urine:1116]  No intake/output data recorded.   Admission weight: 95.1 kg (209 lb 10.5 oz)  Wt Readings from Last 3 Encounters:   02/09/25 86.2 kg (190 lb 0.6 oz)   01/27/25 95.3 kg (210 lb)   01/15/25 94.3 kg (208 lb)        Vitals :   Vitals:    02/12/25 0755 02/12/25 0815 02/12/25 0908 02/12/25 1000   BP:  127/81 127/81 132/73   Pulse: 76 81 81    Resp: 18 16     Temp:  97.5 °F (36.4 °C)     TempSrc:  Oral     SpO2: 93% 100%     Weight:       Height:           Physical examination  General Appearance: alert and cooperative with exam, appears comfortable, no distress  Neck: No JVD visibly noted  Lungs: No use of accessory muscle use  GI: soft, non-tender, no guarding  Extremities: No pitting edema noted    Medications:  Infusion:    sodium chloride      dextrose       Meds:    warfarin  1.25 mg Oral Once    bumetanide  0.5 mg IntraVENous BID    ipratropium 0.5 mg-albuterol 2.5 mg  1 Dose Inhalation BID    insulin glargine  15 Units SubCUTAneous QAM    insulin lispro  0-16 Units SubCUTAneous 4x Daily AC & HS    potassium chloride  20 mEq Oral TID WC    midodrine  10 mg Oral TID WC    metoprolol succinate  25 mg Oral Daily    dextromethorphan-guaiFENesin  1 tablet Oral BID    [Held by provider] tamsulosin  0.4 mg Oral Daily    sodium chloride flush  5-40 mL IntraVENous 2 times per day    atorvastatin  80 mg

## 2025-02-12 NOTE — PROGRESS NOTES
0800  Physical Assessment    Pt. AO x4, bp 127/81, pulse 81, RR 18, temp 97.5 oral, O2 91 on 6L, Patient denies pain at this time, pt. Alert and oriented. Speech clear and appropriate. PERRLA. Mucous membranes pink moist and intact. No JVD. Respirations labored with rhonchi present on expiration. Heart sounds normal. Abdomen round and non tender. Active x4. Upper extremities warm and dry with slight bruising on anterior aspect of both arms. Hand grasp weak and equal bilaterally. Skin turgor <3 sec cap refill <3 sec. ROM active in all extremities. Lower extremities skin cool and dry. +1 pitting edema bilaterally, pedal push and pull equal and strong bilaterally. Skin turgor <3 sec in lower extremities  Pt. Has IV in right antecubital space INT  Non painful w/o signs of infiltration  Call light within reach, bed alarm on    Chon Bermeo  [facility] [facility] [employer] Nursing Student

## 2025-02-12 NOTE — PROGRESS NOTES
Pt. Resting in bed with eyes closed. No further needs at this time. Call light within reach, bed alarm on.

## 2025-02-12 NOTE — PROGRESS NOTES
Progress note: Infectious diseases    Patient - Portillo Daly,  Age - 84 y.o.    - 1940      Room Number - 3B-38/038-A   MRN -  [id number]   [facility] # - [id number]  Date of Admission -  2025  8:08 PM    SUBJECTIVE:   Patient seen and evaluated at bedside this afternoon. Patient reports decreased urinary output and burning sensation with urination on . He reports productive cough with white sputum. Patient requiring increased supplemental oxygen.   OBJECTIVE   VITALS    height is 1.702 m (5' 7\") and weight is 86.2 kg (190 lb 0.6 oz). His oral temperature is 98.2 °F (36.8 °C). His blood pressure is 128/52 (abnormal) and his pulse is 72. His respiration is 16 and oxygen saturation is 99%.       Wt Readings from Last 3 Encounters:   25 86.2 kg (190 lb 0.6 oz)   25 95.3 kg (210 lb)   01/15/25 94.3 kg (208 lb)       I/O (24 Hours)    Intake/Output Summary (Last 24 hours) at 2025 1319  Last data filed at 2025 1214  Gross per 24 hour   Intake 270 ml   Output 976 ml   Net -706 ml       General Appearance  Awake, alert, oriented,  not  In acute distress  HEENT - normocephalic, atraumatic, pink conjunctiva,  anicteric sclera  Neck - Supple, no mass  Lungs -  Bilateral air entry on NC. Diminished breath sounds bilaterally  Cardiovascular - Heart sounds are normal.  Regular rate and rhythm without murmur, gallop or rub.  Abdomen - soft, not distended, nontender,   Neurologic -Oriented  Skin - No bruising or bleeding  Extremities - No edema, no cyanosis, clubbing     MEDICATIONS:      warfarin  1.25 mg Oral Once    [START ON 2025] potassium chloride  20 mEq Oral Daily with breakfast    bumetanide  0.5 mg IntraVENous BID    ipratropium 0.5 mg-albuterol 2.5 mg  1 Dose Inhalation BID    insulin glargine  15 Units SubCUTAneous QAM    insulin lispro  0-16 Units SubCUTAneous 4x Daily AC & HS    midodrine

## 2025-02-12 NOTE — PROGRESS NOTES
[facility]  OCCUPATIONAL THERAPY MISSED TREATMENT NOTE  STRZ CCU-STEPDOWN 3B  3B-38/038-A      Date: 2025  Patient Name: Portillo Daly        CSN: [id number]   : 1940  (84 y.o.)  Gender: male   Referring Practitioner: Kayleen Jesus PA            REASON FOR MISSED TREATMENT:  OT attempted X2 this date. At 1010, pt had just returned to bed after sitting up in chair, declined therapy due to fatigue. Checked back at 1400 and pt was working with PT. Will check back as able

## 2025-02-12 NOTE — PROGRESS NOTES
Hospitalist Progress Note      Patient:  Portillo Daly 84 y.o. male     Unit/Bed:3B-/[facility]-A    Date of Admission: 1/29/2025      ASSESSMENT AND PLAN    Active Problems    #Tremor and jerking like movement .  -Could be from building up of uremia.  -Will discuss with nephrology and if not will consult neurology.    #Acute hypoxemic respiratory failure:   - 2/2 Pseudomonas PNA & Pulmonary Edema.   - Was on HFNC,weaned down to 6l nc,but patient is complaining about feeling SOB.  -Completed course of antibiotics.  -Chest xray ordered.  - on IS & Acapella.     #Pseudomonas, Bacterial PNA   - ID consulted - case discussed, Cefepime was transitioned to Levaquin,and completed therapy.     #Sepsis 2/2 Pseudomonas Bacteremia   - BC 2 of 2 growing Pseudomonas. Was on Levaquin.   - Repeat blood culture showed NGTD.     #Acute on chronic renal failure:   - Nephrology notes reviewed.   -Creatinine 3.4> 3.4.  - Patient baseline of  2.5-3.0.   -Patient has been retaining.Had straight cath done today  - Nephrology on the case.May need dialysis.  - Renal ultrasound reviewed with no acute findings  -  IV Bumex BID   - Continue to hold losartan    #DM-2 sugars 144, 259 , and 125 will add lantus 15 units daily and with High SS   Using humalog.    #Acute on chronic systolic heart failure: Last known EF 45 to 50%.  -  IV Bumex BID. Toprol xl  - Intake/output.  - Fluid restriction.     #HTN runs soft BP , no fever or chills  -On low dose BB and ARB being on hold and on proamatine.       #Rash   - Likely medication induced. Cefepime transitioned to Levaquin, no new issues.     Chronic Conditions (reviewed and stable unless otherwise stated)      COPD: patient had exacerbation while in the ED. Given Solumedrol. Steroid inhaler BID.   DMII: Resume Lantus with high intensity sliding scale.   Paroxysmal A-fib: Status post pacemaker, continue with metoprolol and Coumadin, as INR is subtherapeutic initiate her IV heparin  and continue with

## 2025-02-12 NOTE — PLAN OF CARE
Problem: Respiratory - Adult  Goal: Clear lung sounds  Description: Clear lung sounds  Outcome: Progressing     Problem: Respiratory - Adult  Goal: Achieves optimal ventilation and oxygenation  2/12/2025 0804 by Mony Keen RCP  Outcome: Progressing   Breath sounds diminished, continuing treatments as ordered. Weaning oxygen back to baseline as tolerated.  Patient mutually agreed on goals.

## 2025-02-12 NOTE — PROGRESS NOTES
Warfarin Pharmacy Consult Note    Warfarin Indication: Atrial fibrillation/Atrial flutter  Target INR: 2.0-3.0  Dose prior to admission: Coumadin 2.5 mg MF, 1.25 mg TWThSS     Recent Labs     02/11/25  1032   HGB 13.2*        Recent Labs     02/10/25  0529 02/11/25  1031 02/12/25  0423   INR 1.58* 2.07* 2.16*     Concurrent anticoagulants/antiplatelets: none  Significant warfarin drug-drug interactions:  melatonin (HM), levaquin q48h x 2 doses started 2/4/25     Date INR Warfarin Dose   1/30/2025 3.49 No coumadin    1/31/2025 2.34  1.25 mg     2/1/2025  2.14 2 mg     2/2/2025  2.96 0.5 mg    2/3/2025  3.10  No coumadin   2/4/2025    4.80   No coumadin    2/5/2025    6.69   No coumadin    2/6/2025  5.75 No coumadin  Vitamin K 2.5 mg PO   2/7/2025  3.53 No coumadin   2/8/2025 1.53 1 mg   2/9/2025 1.54 2.5 mg   2/10/25 1.58 2 mg    2/11/25  2.07  1.25 mg    2/12/25  2.16  1.25 mg       Monitoring:                   INR will be monitored daily.    **Please contact pharmacy for discharge instructions when indicated**    Sugey Matthews, PharmD 2/12/2025 8:11 AM

## 2025-02-13 PROBLEM — R25.1 TREMOR DUE TO METABOLIC DISORDER: Status: ACTIVE | Noted: 2025-02-13

## 2025-02-13 PROBLEM — E88.9 TREMOR DUE TO METABOLIC DISORDER: Status: ACTIVE | Noted: 2025-02-13

## 2025-02-13 LAB
BUN SERPL-MCNC: 86 MG/DL (ref 7–22)
CALCIUM SERPL-MCNC: 9.8 MG/DL (ref 8.5–10.5)
CHLORIDE SERPL-SCNC: 98 MEQ/L (ref 98–111)
CREAT SERPL-MCNC: 3.7 MG/DL (ref 0.4–1.2)
DEPRECATED RDW RBC AUTO: 61.5 FL (ref 35–45)
ERYTHROCYTE [DISTWIDTH] IN BLOOD BY AUTOMATED COUNT: 17 % (ref 11.5–14.5)
GFR SERPL CREATININE-BSD FRML MDRD: 15 ML/MIN/1.73M2
GLUCOSE BLD STRIP.AUTO-MCNC: 103 MG/DL (ref 70–108)
GLUCOSE BLD STRIP.AUTO-MCNC: 179 MG/DL (ref 70–108)
GLUCOSE BLD STRIP.AUTO-MCNC: 215 MG/DL (ref 70–108)
GLUCOSE BLD STRIP.AUTO-MCNC: 318 MG/DL (ref 70–108)
GLUCOSE SERPL-MCNC: 95 MG/DL (ref 70–108)
HCT VFR BLD AUTO: 31.7 % (ref 42–52)
HEPARIN UNFRACTIONATED: < 0.04 U/ML (ref 0.3–0.7)
HGB BLD-MCNC: 10.1 GM/DL (ref 14–18)
INR PPP: 2.45 (ref 0.85–1.13)
MCH RBC QN AUTO: 31.8 PG (ref 26–33)
MCHC RBC AUTO-ENTMCNC: 31.9 GM/DL (ref 32.2–35.5)
MCV RBC AUTO: 99.7 FL (ref 80–94)
PLATELET # BLD AUTO: 190 THOU/MM3 (ref 130–400)
PMV BLD AUTO: 11.8 FL (ref 9.4–12.4)
POTASSIUM SERPL-SCNC: 4.3 MEQ/L (ref 3.5–5.2)
RBC # BLD AUTO: 3.18 MILL/MM3 (ref 4.7–6.1)
SODIUM SERPL-SCNC: 143 MEQ/L (ref 135–145)
WBC # BLD AUTO: 12.1 THOU/MM3 (ref 4.8–10.8)

## 2025-02-13 PROCEDURE — 99232 SBSQ HOSP IP/OBS MODERATE 35: CPT | Performed by: INTERNAL MEDICINE

## 2025-02-13 PROCEDURE — 51798 US URINE CAPACITY MEASURE: CPT

## 2025-02-13 PROCEDURE — 6370000000 HC RX 637 (ALT 250 FOR IP): Performed by: INTERNAL MEDICINE

## 2025-02-13 PROCEDURE — 6370000000 HC RX 637 (ALT 250 FOR IP): Performed by: PHYSICIAN ASSISTANT

## 2025-02-13 PROCEDURE — 6360000002 HC RX W HCPCS

## 2025-02-13 PROCEDURE — 2500000003 HC RX 250 WO HCPCS

## 2025-02-13 PROCEDURE — 97110 THERAPEUTIC EXERCISES: CPT

## 2025-02-13 PROCEDURE — 99223 1ST HOSP IP/OBS HIGH 75: CPT

## 2025-02-13 PROCEDURE — 94669 MECHANICAL CHEST WALL OSCILL: CPT

## 2025-02-13 PROCEDURE — 85027 COMPLETE CBC AUTOMATED: CPT

## 2025-02-13 PROCEDURE — 85610 PROTHROMBIN TIME: CPT

## 2025-02-13 PROCEDURE — 36415 COLL VENOUS BLD VENIPUNCTURE: CPT

## 2025-02-13 PROCEDURE — 85520 HEPARIN ASSAY: CPT

## 2025-02-13 PROCEDURE — 94761 N-INVAS EAR/PLS OXIMETRY MLT: CPT

## 2025-02-13 PROCEDURE — 51701 INSERT BLADDER CATHETER: CPT

## 2025-02-13 PROCEDURE — 80048 BASIC METABOLIC PNL TOTAL CA: CPT

## 2025-02-13 PROCEDURE — 97116 GAIT TRAINING THERAPY: CPT

## 2025-02-13 PROCEDURE — 6370000000 HC RX 637 (ALT 250 FOR IP)

## 2025-02-13 PROCEDURE — 94640 AIRWAY INHALATION TREATMENT: CPT

## 2025-02-13 PROCEDURE — 82948 REAGENT STRIP/BLOOD GLUCOSE: CPT

## 2025-02-13 PROCEDURE — 2140000000 HC CCU INTERMEDIATE R&B

## 2025-02-13 PROCEDURE — 6370000000 HC RX 637 (ALT 250 FOR IP): Performed by: STUDENT IN AN ORGANIZED HEALTH CARE EDUCATION/TRAINING PROGRAM

## 2025-02-13 PROCEDURE — 2700000000 HC OXYGEN THERAPY PER DAY

## 2025-02-13 RX ORDER — WARFARIN SODIUM 2.5 MG/1
1.25 TABLET ORAL
Status: COMPLETED | OUTPATIENT
Start: 2025-02-13 | End: 2025-02-13

## 2025-02-13 RX ORDER — IPRATROPIUM BROMIDE AND ALBUTEROL SULFATE 2.5; .5 MG/3ML; MG/3ML
1 SOLUTION RESPIRATORY (INHALATION)
Status: DISCONTINUED | OUTPATIENT
Start: 2025-02-14 | End: 2025-02-17

## 2025-02-13 RX ORDER — BUMETANIDE 1 MG/1
1 TABLET ORAL DAILY
Status: DISCONTINUED | OUTPATIENT
Start: 2025-02-14 | End: 2025-02-17

## 2025-02-13 RX ADMIN — INSULIN GLARGINE 15 UNITS: 100 INJECTION, SOLUTION SUBCUTANEOUS at 08:47

## 2025-02-13 RX ADMIN — MIDODRINE HYDROCHLORIDE 10 MG: 10 TABLET ORAL at 08:46

## 2025-02-13 RX ADMIN — BUDESONIDE 500 MCG: 0.5 INHALANT RESPIRATORY (INHALATION) at 10:10

## 2025-02-13 RX ADMIN — ATORVASTATIN CALCIUM 80 MG: 80 TABLET, FILM COATED ORAL at 22:01

## 2025-02-13 RX ADMIN — INSULIN LISPRO 12 UNITS: 100 INJECTION, SOLUTION INTRAVENOUS; SUBCUTANEOUS at 22:01

## 2025-02-13 RX ADMIN — BUDESONIDE 500 MCG: 0.5 INHALANT RESPIRATORY (INHALATION) at 20:10

## 2025-02-13 RX ADMIN — GUAIFENESIN, DEXTROMETHORPHAN HBR 1 TABLET: 600; 30 TABLET ORAL at 22:01

## 2025-02-13 RX ADMIN — GUAIFENESIN, DEXTROMETHORPHAN HBR 1 TABLET: 600; 30 TABLET ORAL at 08:46

## 2025-02-13 RX ADMIN — IPRATROPIUM BROMIDE AND ALBUTEROL SULFATE 1 DOSE: .5; 3 SOLUTION RESPIRATORY (INHALATION) at 20:10

## 2025-02-13 RX ADMIN — Medication 3 MG: at 23:06

## 2025-02-13 RX ADMIN — IPRATROPIUM BROMIDE AND ALBUTEROL SULFATE 1 DOSE: .5; 3 SOLUTION RESPIRATORY (INHALATION) at 10:10

## 2025-02-13 RX ADMIN — WARFARIN SODIUM 1.25 MG: 2.5 TABLET ORAL at 18:38

## 2025-02-13 RX ADMIN — MIDODRINE HYDROCHLORIDE 10 MG: 10 TABLET ORAL at 13:12

## 2025-02-13 RX ADMIN — HYDROXYZINE HYDROCHLORIDE 10 MG: 10 TABLET ORAL at 23:06

## 2025-02-13 RX ADMIN — POTASSIUM CHLORIDE 20 MEQ: 1500 TABLET, EXTENDED RELEASE ORAL at 08:46

## 2025-02-13 RX ADMIN — SODIUM CHLORIDE, PRESERVATIVE FREE 10 ML: 5 INJECTION INTRAVENOUS at 22:02

## 2025-02-13 ASSESSMENT — ENCOUNTER SYMPTOMS: SHORTNESS OF BREATH: 1

## 2025-02-13 NOTE — PLAN OF CARE
Problem: Respiratory - Adult  Goal: Achieves optimal ventilation and oxygenation  Outcome: Progressing  Flowsheets (Taken 2/13/2025 0130)  Achieves optimal ventilation and oxygenation:   Assess for changes in respiratory status   Assess for changes in mentation and behavior     Problem: Chronic Conditions and Co-morbidities  Goal: Patient's chronic conditions and co-morbidity symptoms are monitored and maintained or improved  Outcome: Progressing  Flowsheets (Taken 2/13/2025 0130)  Care Plan - Patient's Chronic Conditions and Co-Morbidity Symptoms are Monitored and Maintained or Improved: Monitor and assess patient's chronic conditions and comorbid symptoms for stability, deterioration, or improvement     Problem: Discharge Planning  Goal: Discharge to home or other facility with appropriate resources  Outcome: Progressing  Flowsheets (Taken 2/13/2025 0130)  Discharge to home or other facility with appropriate resources:   Identify barriers to discharge with patient and caregiver   Arrange for needed discharge resources and transportation as appropriate     Problem: Safety - Adult  Goal: Free from fall injury  Outcome: Progressing  Flowsheets (Taken 2/13/2025 0130)  Free From Fall Injury: Instruct family/caregiver on patient safety  Note: Bed locked & in low position, call light in reach, side-rails up x2, bed/chair alarm utilized, non-slip socks on when ambulating, reminded patient to use call light to call for assistance.       Problem: ABCDS Injury Assessment  Goal: Absence of physical injury  Outcome: Progressing  Flowsheets (Taken 2/13/2025 0130)  Absence of Physical Injury: Implement safety measures based on patient assessment     Problem: Cardiovascular - Adult  Goal: Maintains optimal cardiac output and hemodynamic stability  Outcome: Progressing  Flowsheets (Taken 2/13/2025 0130)  Maintains optimal cardiac output and hemodynamic stability:   Monitor blood pressure and heart rate   Assess for signs of

## 2025-02-13 NOTE — PROGRESS NOTES
.[facility]  OCCUPATIONAL THERAPY MISSED TREATMENT NOTE  [facility] CCU-STEPDOWN 3B  3B-38/038-A      Date: 2025  Patient Name: Portillo Daly        CSN: [id number]   : 1940  (84 y.o.)  Gender: male   Referring Practitioner: Kayleen Jesus PA            REASON FOR MISSED TREATMENT: Patient Refused.  Attempetd to see pt and he declined to part as he stated he couldn't breath and do therapy.  RN stated he was fine to see but pt continued to decline to part in therapy.  Will attempt again tomorrow as time allows

## 2025-02-13 NOTE — PROGRESS NOTES
[facility]  INPATIENT PHYSICAL THERAPY  DAILY NOTE  [facility] CCU-STEPDOWN 3B - 3B-38/038-A      Discharge Recommendations: Subacute/Skilled Nursing Facility  Equipment Recommendations: Yes  Recommend Rolling Walker if going home              Time In: 1344  Time Out: 1410  Timed Code Treatment Minutes: 26 Minutes  Minutes: 26          Date: 2025  Patient Name: Portillo Daly,  Gender:  male        MRN: [id number]  : 1940  (84 y.o.)     Referring Practitioner: Kayleen Jesus PA  Diagnosis: Sepsis (HCC)  Additional Pertinent Hx: per chart review: Portillo Daly is a 85 yo obese male that was admitted due to acute respiratory failure secondary to pneumonia and sepsis. Patient drowsy during exam. He does state that he is feeling better now and not as short of breath as he was last night. Patient lying down in hospital bed. Patient does complain of continual itching but states is better after lotion application.  He denies any shortness of breath, chest pain, abdominal pain at this time.     Prior Level of Function:  Lives With: Significant other  Type of Home: Trailer  Home Layout: One level  Home Access: Stairs to enter with rails  Entrance Stairs - Number of Steps: 5 with (B) rails but unable to reach both at the same time  Entrance Stairs - Rails: Both  Home Equipment: Oxygen, Cane (2L baseline)   Bathroom Shower/Tub: Tub/Shower unit (Patient states he sponge bathes because of the oxygen)  Bathroom Toilet: Standard    Prior Level of Assist for ADLs: Independent  Prior Level of Assist for Transfers: Independent  Active : Yes  Additional Comments: Patient ambulates without AD household distances.  Prior Level of Assist for Ambulation: Independent household ambulator, with or without device  Has the patient had two or more falls in the past year or any fall with injury in the past year?: No    Restrictions/Precautions:  Restrictions/Precautions: Fall Risk, Contact Precautions

## 2025-02-13 NOTE — CARE COORDINATION
2/13/25, 2:29 PM EST    DISCHARGE ON GOING EVALUATION    Portillo KAILEY [facility] day: 15  Location: [address] [place] [address]- Reason for admit: Hypoxemia [R09.02]  Sepsis (HCC) [A41.9]     Procedures: none     Imaging since last note:   2/12 CXR: Moderate megaly. Permanent dual chamber pacemaker. An aortic stent/valve is present. 2. Mild interstitial pneumonia/edema both lung bases. Tiny effusion left side. 3. No appreciable change from prior study.    Barriers to Discharge: Hospitalist, ID and Nephrology following. Neurology to see r/t jerking movements. Palliative care to see to discuss GOC. PT/OT- refused today. Placed on HFNC at 10L. Nebulizers. Mucinex. Atarax prn. DM management. Midodrine. PO bumex. Coumadin. Creat 3.7. WBC 12.1. Hgb 10.1.     PCP: Elinor Ly APRN - CNP  Readmission Risk Score: 26.4    Patient Goals/Plan/Treatment Preferences: [place]. Precert approved yesterday until 2/16. SW following.

## 2025-02-13 NOTE — PROGRESS NOTES
in the ED. Given Solumedrol. Steroid inhaler BID.   DMII: Resume Lantus with high intensity sliding scale.   Paroxysmal A-fib: Status post pacemaker, continue with metoprolol and Coumadin, as INR is subtherapeutic initiate her IV heparin  and continue with coumadin , till INR 2-3 . Spoke with pharmacist and his RN.    LDA: []CVC / []PICC / []Midline / []Kitchen / []Drains / []Mediport / [x]None  Antibiotics: Cefepime and Doxycycline  Steroids: Pulmicort  Labs (still needed?): [x]Yes / []No  IVF (still needed?): [x]Yes / []No    Level of care: [x]Step Down / []Med-Surg  Bed Status: [x]Inpatient / []Observation  Telemetry: [x]Yes / []No  PT/OT: []Yes / [x]No    DVT Prophylaxis: [] Lovenox / [] Heparin / [] SCDs / [x] Already on Systemic Anticoagulation / [] None   Code status: Limited     Expected discharge date:  Unknown  Disposition: may need [place]ika , spoke with pt and . Will need to revisit on Monday and get insurance approval.     ===================================================================    Chief Complaint: Acute hypoxemic respiratory failure    Subjective (past 24 hours):    Patient seen and examined at bed side.Continue to report SOB,was placed on 10l nc.Denies any new complaints.As per wife at bed side patient continuie to have jerking movements and wonder if I can consult neurology.    HPI / Hospital Course:    Portillo Daly is a 83 yo obese male that was admitted due to acute respiratory failure secondary to pneumonia and sepsis. Patient drowsy during exam. He does state that he is feeling better now and not as short of breath as he was last night. Patient lying down in hospital bed. Patient does complain of continual itching but states is better after lotion application.          Medications:    Infusion Medications    sodium chloride      dextrose      Scheduled Medications    warfarin  1.25 mg Oral Once    [START ON 2/14/2025] bumetanide  1 mg Oral Daily    potassium chloride  20 mEq

## 2025-02-13 NOTE — PROGRESS NOTES
ml/min ([facility]) N18.30    Hyponatremia E87.1    Aortic stenosis, severe I35.0    Coronary artery disease involving native coronary artery of native heart without angina pectoris I25.10    CHF (congestive heart failure), NYHA class III, acute on chronic, combined ([facility]) I50.43    Cardiomyopathy ([facility]) I42.9    Hypotension I95.9    Metabolic acidosis E87.20    Sepsis without acute organ dysfunction ([facility]) A41.9    Essential hypertension I10    CKD (chronic kidney disease) stage 4, GFR 15-29 ml/min ([facility]) N18.4    Anticoagulated on Coumadin Z79.01    Permanent atrial fibrillation ([facility]) I48.21    CVA (cerebral infarction) I63.9    History of transcatheter aortic valve replacement (TAVR) Z95.2    S/P PTCA (percutaneous transluminal coronary angioplasty) Z98.61    SSS (sick sinus syndrome) ([facility]) I49.5    Hyperlipidemia E78.5    Mitral valve disease I05.9    COPD exacerbation ([facility]) J44.1    Type 2 diabetes mellitus with insulin therapy ([facility]) E11.9, Z79.4    Encounter for therapeutic drug monitoring Z51.81    Pneumonia due to infectious organism J18.9    Acute on chronic systolic CHF (congestive heart failure) ([facility]) I50.23    Community acquired pneumonia of left lower lobe of lung J18.9    Pneumonia due to Pseudomonas species ([facility]) J15.1    COPD without exacerbation ([facility]) J44.9    Acute hypoxemic respiratory failure J96.01    Acute on chronic congestive heart failure ([facility]) I50.9    Acute on chronic respiratory failure with hypoxia J96.21    Dizziness R42    Acute on chronic hypoxic respiratory failure J96.21    Pneumonia due to influenza A virus J10.00    Leukocytosis D72.829    Chronic atrial fibrillation ([facility]) I48.20    Secondary hypercoagulable state ([facility]) D68.69    History of CVA (cerebrovascular accident) Z86.73    Obesity (BMI 30-39.9) E66.9    Type 2 diabetes mellitus with chronic kidney disease E11.22    Pacemaker battery depletion Z45.010    Complete heart block ([facility]) I44.2    COVID-19 U07.1    Sepsis ([facility]) A41.9

## 2025-02-13 NOTE — PROGRESS NOTES
Warfarin Pharmacy Consult Note    Warfarin Indication: Atrial fibrillation/Atrial flutter  Target INR: 2.0-3.0  Dose prior to admission: 2.5 mg MF, 1.25 mg TWThSS     Recent Labs     02/11/25  1032 02/13/25  0519   HGB 13.2* 10.1*    190     Recent Labs     02/11/25  1031 02/12/25  0423 02/13/25  0519   INR 2.07* 2.16* 2.45*     Concurrent anticoagulants/antiplatelets: none  Significant warfarin drug-drug interactions:  melatonin (HM)     Date INR Warfarin Dose   1/30/2025 3.49 No coumadin    1/31/2025 2.34  1.25 mg     2/1/2025  2.14 2 mg     2/2/2025  2.96 0.5 mg    2/3/2025  3.10  No coumadin   2/4/2025    4.80   No coumadin    2/5/2025    6.69   No coumadin    2/6/2025  5.75 No coumadin  Vitamin K 2.5 mg PO   2/7/2025  3.53 No coumadin   2/8/2025 1.53 1 mg   2/9/2025 1.54 2.5 mg   2/10/25 1.58 2 mg    2/11/25  2.07  1.25 mg    2/12/25  2.16  1.25 mg   2/13/25 2.45 1.25 mg         Monitoring:                   INR will be monitored daily.    **Please contact pharmacy for discharge instructions when indicated**    Sugey Matthews, PharmD 2/13/2025 7:34 AM

## 2025-02-13 NOTE — CONSULTS
Neurology Consult Note    Date:2/13/2025       Room:[facility]38/038-A  Patient Name:Portillo Daly     YOB: 1940     Age:84 y.o.    Requesting Physician: Rahul Connolly MD     Reason for Consult:  Evaluate for \"jerking movements\"    Chief Complaint:   Chief Complaint   Patient presents with    Shortness of Breath       Subjective     Portillo Daly is a 84 y.o. male with a history of CHF, COPD on oxygen at baseline, CAD, hypertension, prior CVA, hyperlipidemia, T2DM, CKD, A-fib on warfarin and has a pacemaker who was admitted to [facility] on 1/21/2025 for respiratory distress and found to be in acute respiratory failure secondary to pneumonia and sepsis.  Beginning about 2 to 3 days ago patient began having jerking/tremor like movements which prompted neurology consult.  On exam patient is alert and oriented x 3 and making jokes. He notes that he has been having episodes of tremors that are short  in duration include both his upper and lower extremities, bilaterally. His nurse and significant other note that these last for ~ 30 seconds and occasional also include babbling. He is not confused or post-ictal and he can recall these events.  Nursing has noted intermittent moments of confusion, not associated with his tremor episodes.  He has no history of seizures in the past. Of note, he does have declining kidney function and over the course of this admission his BUN has increased from 35 at time of admission to 86 today. He denies syncope.     Review of Systems   Review of Systems   Constitutional:  Positive for fatigue.   Eyes:  Negative for visual disturbance.   Respiratory:  Positive for shortness of breath.    Neurological:  Positive for tremors, facial asymmetry and weakness. Negative for headaches.   Psychiatric/Behavioral:  Negative for confusion.      Medications   Scheduled Meds:    warfarin  1.25 mg Oral Once    [START ON 2/14/2025] bumetanide  1 mg Oral Daily    potassium chloride  20 mEq Oral Daily

## 2025-02-13 NOTE — CARE COORDINATION
2/13/25, 11:44 AM EST    DISCHARGE PLANNING EVALUATION    Talked to patient today.  He is very down.  He told SW that he feels awful and just wants to take a nap in the dirt.  He is getting more agitated with being here.  His precert is back and he can go to [facility] when he is medically ready.  Called Kita at [facility] and made her aware he is not being discharged today.

## 2025-02-13 NOTE — PROGRESS NOTES
Spiritual Health History and Assessment/Progress Note  [facility]    (P) Spiritual/Emotional Needs,  ,  ,      Name: Portillo Daly MRN: [id number]    Age: 84 y.o.     Sex: male   Language: English   Catholic: Other   Sepsis (HCC)     Date: 2/13/2025            Total Time Calculated: (P) 7 min              Spiritual Assessment continued in [facility] CCU-STEPDOWN 3B        Referral/Consult From: (P) Rounding   Encounter Overview/Reason: (P) Spiritual/Emotional Needs  Service Provided For: (P) Patient and family together    Rosalie, Belief, Meaning:   Patient identifies as spiritual  Family/Friends identify as spiritual      Importance and Influence:  Patient has spiritual/personal beliefs that influence decisions regarding their health  Family/Friends have spiritual/personal beliefs that influence decisions regarding the patient's health    Community:  Patient feels well-supported. Support system includes: Extended family  Family/Friends feel well-supported. Support system includes: Extended family    Assessment and Plan of Care:   In my encounter with the 84 yr old patient the pt's family was supportively present. While rounding the unit 5K,  I provided spiritual care to patient and their family through conversation, I also came to assess their spiritual needs. The pt was admitted due to MRSA.     Patient Interventions include: Facilitated expression of thoughts and feelings  Family/Friends Interventions include: Facilitated expression of thoughts and feelings    Patient Plan of Care: Spiritual Care available upon further referral  Family/Friends Plan of Care: Spiritual Care available upon further referral    Electronically signed by SHARIF Little on 2/13/2025 at 4:04 PM

## 2025-02-13 NOTE — PROGRESS NOTES
[Held by provider] tamsulosin  0.4 mg Oral Daily    sodium chloride flush  5-40 mL IntraVENous 2 times per day    atorvastatin  80 mg Oral Nightly    budesonide  500 mcg Nebulization BID    [Held by provider] losartan  25 mg Oral Daily    warfarin placeholder: dosing by pharmacy   Oral RX Placeholder     Meds prn: albuterol, diphenhydrAMINE-zinc acetate, Menthol, diphenhydrAMINE, hydrOXYzine HCl, sodium chloride flush, sodium chloride, polyethylene glycol, acetaminophen **OR** acetaminophen, melatonin, prochlorperazine, glucose, dextrose bolus **OR** dextrose bolus, glucagon (rDNA), dextrose     Lab Data :  CBC:   Recent Labs     02/11/25  1032 02/13/25  0519   WBC 16.6* 12.1*   HGB 13.2* 10.1*   HCT 38.5* 31.7*    190       CMP:  Recent Labs     02/11/25  1031 02/12/25  0423 02/13/25  0519   * 140 143   K 4.5 4.9 4.3   CL 91* 95* 98   CO2 25 26  --    BUN 92* 87* 86*   CREATININE 3.4* 3.4* 3.7*   GLUCOSE 304* 149* 95   CALCIUM 9.7 9.4 9.8   PHOS 3.9  --   --      Hepatic:   No results for input(s): \"LABALBU\", \"AST\", \"ALT\", \"BILITOT\", \"ALKPHOS\" in the last 72 hours.    Invalid input(s): \"ALB\"        Impression and Plan:  MILLY on CKD IV:  likely due to ATN from hypotension.  Ultrasound negative for hydronephrosis.  Urine sodium 23.    Creatinine fluctuates.  Old labs reviewed.  Creatinine has been around 3.3-3.5 recently although previous baseline was 2.9-3.5.  Likely has progressive CKD.  No indication to start dialysis yet at this time.  Adjust diuretics  Ultrasound shows left renal atrophy    Hypokalemia from diuretics: Improved. Reduce K dosing  Pulmonary edema: Improving  Azotemia due to CHF and diuretics.  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  Patient is on calcitriol.  PTH 12.7. stop calcitriol.   Darius Zaidi MD  Kidney

## 2025-02-14 ENCOUNTER — TELEPHONE (OUTPATIENT)
Dept: FAMILY MEDICINE CLINIC | Age: 85
End: 2025-02-14

## 2025-02-14 LAB
ANION GAP SERPL CALC-SCNC: 10 MEQ/L (ref 8–16)
BUN SERPL-MCNC: 77 MG/DL (ref 7–22)
CALCIUM SERPL-MCNC: 9.9 MG/DL (ref 8.5–10.5)
CHLORIDE SERPL-SCNC: 99 MEQ/L (ref 98–111)
CO2 SERPL-SCNC: 32 MEQ/L (ref 23–33)
CREAT SERPL-MCNC: 3.4 MG/DL (ref 0.4–1.2)
GFR SERPL CREATININE-BSD FRML MDRD: 17 ML/MIN/1.73M2
GLUCOSE BLD STRIP.AUTO-MCNC: 130 MG/DL (ref 70–108)
GLUCOSE BLD STRIP.AUTO-MCNC: 232 MG/DL (ref 70–108)
GLUCOSE BLD STRIP.AUTO-MCNC: 243 MG/DL (ref 70–108)
GLUCOSE BLD STRIP.AUTO-MCNC: 244 MG/DL (ref 70–108)
GLUCOSE SERPL-MCNC: 120 MG/DL (ref 70–108)
INR PPP: 3 (ref 0.85–1.13)
POTASSIUM SERPL-SCNC: 4.6 MEQ/L (ref 3.5–5.2)
SODIUM SERPL-SCNC: 141 MEQ/L (ref 135–145)

## 2025-02-14 PROCEDURE — 6370000000 HC RX 637 (ALT 250 FOR IP): Performed by: STUDENT IN AN ORGANIZED HEALTH CARE EDUCATION/TRAINING PROGRAM

## 2025-02-14 PROCEDURE — 94640 AIRWAY INHALATION TREATMENT: CPT

## 2025-02-14 PROCEDURE — 94669 MECHANICAL CHEST WALL OSCILL: CPT

## 2025-02-14 PROCEDURE — 6370000000 HC RX 637 (ALT 250 FOR IP): Performed by: PHARMACIST

## 2025-02-14 PROCEDURE — 2140000000 HC CCU INTERMEDIATE R&B

## 2025-02-14 PROCEDURE — 99232 SBSQ HOSP IP/OBS MODERATE 35: CPT | Performed by: INTERNAL MEDICINE

## 2025-02-14 PROCEDURE — 6370000000 HC RX 637 (ALT 250 FOR IP)

## 2025-02-14 PROCEDURE — 2700000000 HC OXYGEN THERAPY PER DAY

## 2025-02-14 PROCEDURE — 2500000003 HC RX 250 WO HCPCS

## 2025-02-14 PROCEDURE — 36415 COLL VENOUS BLD VENIPUNCTURE: CPT

## 2025-02-14 PROCEDURE — 6370000000 HC RX 637 (ALT 250 FOR IP): Performed by: INTERNAL MEDICINE

## 2025-02-14 PROCEDURE — 85610 PROTHROMBIN TIME: CPT

## 2025-02-14 PROCEDURE — 6370000000 HC RX 637 (ALT 250 FOR IP): Performed by: PHYSICIAN ASSISTANT

## 2025-02-14 PROCEDURE — 82948 REAGENT STRIP/BLOOD GLUCOSE: CPT

## 2025-02-14 PROCEDURE — 94761 N-INVAS EAR/PLS OXIMETRY MLT: CPT

## 2025-02-14 PROCEDURE — 6360000002 HC RX W HCPCS

## 2025-02-14 PROCEDURE — 80048 BASIC METABOLIC PNL TOTAL CA: CPT

## 2025-02-14 RX ORDER — WARFARIN SODIUM 1 MG/1
1 TABLET ORAL ONCE
Status: COMPLETED | OUTPATIENT
Start: 2025-02-14 | End: 2025-02-14

## 2025-02-14 RX ADMIN — WARFARIN SODIUM 1 MG: 1 TABLET ORAL at 18:21

## 2025-02-14 RX ADMIN — INSULIN LISPRO 4 UNITS: 100 INJECTION, SOLUTION INTRAVENOUS; SUBCUTANEOUS at 12:54

## 2025-02-14 RX ADMIN — IPRATROPIUM BROMIDE AND ALBUTEROL SULFATE 1 DOSE: .5; 3 SOLUTION RESPIRATORY (INHALATION) at 13:44

## 2025-02-14 RX ADMIN — ATORVASTATIN CALCIUM 80 MG: 80 TABLET, FILM COATED ORAL at 19:52

## 2025-02-14 RX ADMIN — MIDODRINE HYDROCHLORIDE 10 MG: 10 TABLET ORAL at 17:49

## 2025-02-14 RX ADMIN — SODIUM CHLORIDE, PRESERVATIVE FREE 10 ML: 5 INJECTION INTRAVENOUS at 19:57

## 2025-02-14 RX ADMIN — MIDODRINE HYDROCHLORIDE 10 MG: 10 TABLET ORAL at 12:03

## 2025-02-14 RX ADMIN — METOPROLOL SUCCINATE 25 MG: 25 TABLET, FILM COATED, EXTENDED RELEASE ORAL at 08:14

## 2025-02-14 RX ADMIN — IPRATROPIUM BROMIDE AND ALBUTEROL SULFATE 1 DOSE: .5; 3 SOLUTION RESPIRATORY (INHALATION) at 21:51

## 2025-02-14 RX ADMIN — BUDESONIDE 500 MCG: 0.5 INHALANT RESPIRATORY (INHALATION) at 09:32

## 2025-02-14 RX ADMIN — POTASSIUM CHLORIDE 20 MEQ: 1500 TABLET, EXTENDED RELEASE ORAL at 08:14

## 2025-02-14 RX ADMIN — INSULIN LISPRO 4 UNITS: 100 INJECTION, SOLUTION INTRAVENOUS; SUBCUTANEOUS at 17:48

## 2025-02-14 RX ADMIN — GUAIFENESIN, DEXTROMETHORPHAN HBR 1 TABLET: 600; 30 TABLET ORAL at 08:14

## 2025-02-14 RX ADMIN — GUAIFENESIN, DEXTROMETHORPHAN HBR 1 TABLET: 600; 30 TABLET ORAL at 19:52

## 2025-02-14 RX ADMIN — INSULIN LISPRO 4 UNITS: 100 INJECTION, SOLUTION INTRAVENOUS; SUBCUTANEOUS at 20:01

## 2025-02-14 RX ADMIN — INSULIN GLARGINE 15 UNITS: 100 INJECTION, SOLUTION SUBCUTANEOUS at 08:17

## 2025-02-14 RX ADMIN — IPRATROPIUM BROMIDE AND ALBUTEROL SULFATE 1 DOSE: .5; 3 SOLUTION RESPIRATORY (INHALATION) at 09:32

## 2025-02-14 RX ADMIN — BUDESONIDE 500 MCG: 0.5 INHALANT RESPIRATORY (INHALATION) at 21:51

## 2025-02-14 RX ADMIN — BUMETANIDE 1 MG: 1 TABLET ORAL at 08:14

## 2025-02-14 NOTE — PLAN OF CARE
blood pressure and heart rate   Assess for signs of decreased cardiac output     Problem: Cardiovascular - Adult  Goal: Absence of cardiac dysrhythmias or at baseline  Outcome: Progressing     Problem: Skin/Tissue Integrity - Adult  Goal: Skin integrity remains intact  Description: 1.  Monitor for areas of redness and/or skin breakdown  2.  Assess vascular access sites hourly  3.  Every 4-6 hours minimum:  Change oxygen saturation probe site  4.  Every 4-6 hours:  If on nasal continuous positive airway pressure, respiratory therapy assess nares and determine need for appliance change or resting period  Outcome: Progressing  Flowsheets (Taken 2/13/2025 0130 by Maria Esther Cruz, RN)  Skin Integrity Remains Intact: Monitor for areas of redness and/or skin breakdown     Problem: Musculoskeletal - Adult  Goal: Return mobility to safest level of function  Outcome: Progressing  Flowsheets (Taken 2/13/2025 0130 by Maria Esther Cruz, RN)  Return Mobility to Safest Level of Function: Assess patient stability and activity tolerance for standing, transferring and ambulating with or without assistive devices     Problem: Musculoskeletal - Adult  Goal: Return ADL status to a safe level of function  Outcome: Progressing  Flowsheets (Taken 2/13/2025 0130 by Maria Esther Cruz, RN)  Return ADL Status to a Safe Level of Function: Administer medication as ordered     Problem: Genitourinary - Adult  Goal: Absence of urinary retention  Outcome: Progressing  Flowsheets (Taken 2/11/2025 1105 by Temitope Kaye, RN)  Absence of urinary retention:   Assess patient’s ability to void and empty bladder   Monitor intake/output and perform bladder scan as needed   Place urinary catheter per Licensed Independent Practitioner order if needed   Discuss with Licensed Independent Practitioner  medications to alleviate retention as needed   Discuss catheterization for long term situations as appropriate     Problem: Infection - Adult  Goal: Absence of infection at

## 2025-02-14 NOTE — PROGRESS NOTES
Hospitalist Progress Note      Patient:  Portillo Daly 84 y.o. male     Unit/Bed:3B-/[facility]    Date of Admission: 1/29/2025      ASSESSMENT AND PLAN    Active Problems    #Tremor and jerking like movement .  -Could be from building up of uremia.  -Neurology consult appreciated.    #Acute hypoxemic respiratory failure:   - 2/2 Pseudomonas PNA & Pulmonary Edema.   - Was on HFNC,weaned down to 6l nc,but patient is complaining about feeling SOB.  -Oxygen increased to 10L.Feeling better on 10l.  -Patient states his breathing is better today,will wean oxygen to 6l and see how he does on it.  -Completed course of antibiotics.  -Chest xray done showed Moderate megaly. Permanent dual chamber pacemaker. An aortic stent/valve is present.Mild interstitial pneumonia/edema both lung bases. Tiny effusion left side.No appreciable change from prior study.  - on IS & Acapella.     #Pseudomonas, Bacterial PNA   - ID consulted - case discussed, Cefepime was transitioned to Levaquin,and completed therapy.     #Sepsis 2/2 Pseudomonas Bacteremia   - BC 2 of 2 growing Pseudomonas. Was on Levaquin.   - Repeat blood culture showed NGTD.     #Acute on chronic renal failure:   - Nephrology notes reviewed.   -Creatinine 3.4> 3.4->3.7->3.4.  - Patient baseline of  2.5-3.0.   -Patient has been retaining.Had straight cath done today  - Nephrology on the case.May need dialysis.  - Renal ultrasound reviewed with no acute findings  -  IV Bumex BID   - Continue to hold losartan    #DM-2 sugars 144, 259 , and 125 will add lantus 15 units daily and with High SS   Using humalog.    #Acute on chronic systolic heart failure: Last known EF 45 to 50%.  -  IV Bumex BID. Toprol xl  - Intake/output.  - Fluid restriction.     #HTN runs soft BP , no fever or chills  -On low dose BB and ARB being on hold and on proamatine.       #Rash   - Likely medication induced. Cefepime transitioned to Levaquin, no new issues.     Chronic Conditions (reviewed and

## 2025-02-14 NOTE — TELEPHONE ENCOUNTER
I contacted Andria as we have FMLA forms here for her that will not go through using the fax number that she provided us. She is going to see if she can get a new fax number and contact us on Monday 02/17/2025.

## 2025-02-14 NOTE — PROGRESS NOTES
[facility]   Nephrology progress note  2/14/2025, 11:31 AM      Pt Name:    Portillo Daly  MRN:     [id number]     YOB: 1940  Admit Date:    1/29/2025  8:08 PM    Chief Complaint: Nephrology following for MILLY/CKD IV    Subjective:  Patient was seen and examined this morning.  On NC  Poor historian  He has no peripheral edema    Objective:  24HR INTAKE/OUTPUT:    Intake/Output Summary (Last 24 hours) at 2/14/2025 1131  Last data filed at 2/14/2025 1041  Gross per 24 hour   Intake 840 ml   Output 1275 ml   Net -435 ml         I/O last 3 completed shifts:  In: 1200 [P.O.:1200]  Out: 1825 [Urine:1825]  I/O this shift:  In: 240 [P.O.:240]  Out: 525 [Urine:525]   Admission weight: 95.1 kg (209 lb 10.5 oz)  Wt Readings from Last 3 Encounters:   02/14/25 83.1 kg (183 lb 3.2 oz)   01/27/25 95.3 kg (210 lb)   01/15/25 94.3 kg (208 lb)        Vitals :   Vitals:    02/13/25 2158 02/14/25 0250 02/14/25 0810 02/14/25 0933   BP: (!) 149/69 (!) 148/61 (!) 140/62    Pulse: 75 70 79    Resp: 20 18 19    Temp: 98 °F (36.7 °C) 97.6 °F (36.4 °C) 98.2 °F (36.8 °C)    TempSrc: Oral Oral Oral    SpO2: 94% 93% 93% 93%   Weight:  83.1 kg (183 lb 3.2 oz)     Height:           Physical examination  General Appearance: alert and cooperative with exam, appears comfortable, no distress  Neck: No JVD visibly noted  Lungs: No use of accessory muscle use  GI: soft, non-tender, no guarding  Extremities: No pitting edema noted    Medications:  Infusion:    sodium chloride      dextrose       Meds:    warfarin  1 mg Oral Once    bumetanide  1 mg Oral Daily    ipratropium 0.5 mg-albuterol 2.5 mg  1 Dose Inhalation TID RT    potassium chloride  20 mEq Oral Daily with breakfast    insulin glargine  15 Units SubCUTAneous QAM    insulin lispro  0-16 Units SubCUTAneous 4x Daily AC & HS    midodrine  10 mg Oral TID WC    metoprolol succinate  25 mg Oral Daily    dextromethorphan-guaiFENesin  1 tablet Oral BID    [Held by

## 2025-02-14 NOTE — PROGRESS NOTES
Warfarin Pharmacy Consult Note    Warfarin Indication: Atrial fibrillation/Atrial flutter  Target INR: 2.0-3.0  Dose prior to admission: 2.5 mg MF, 1.25 mg TWThSS     Recent Labs     02/11/25  1032 02/13/25  0519   HGB 13.2* 10.1*    190     Recent Labs     02/12/25  0423 02/13/25  0519 02/14/25  0649   INR 2.16* 2.45* 3.00*     Concurrent anticoagulants/antiplatelets: none  Significant warfarin drug-drug interactions:  melatonin (HM)     Date INR Warfarin Dose   1/30/2025 3.49 No coumadin    1/31/2025 2.34  1.25 mg     2/1/2025  2.14 2 mg     2/2/2025  2.96 0.5 mg    2/3/2025  3.10  No coumadin   2/4/2025    4.80   No coumadin    2/5/2025    6.69   No coumadin    2/6/2025  5.75 No coumadin  Vitamin K 2.5 mg PO   2/7/2025  3.53 No coumadin   2/8/2025 1.53 1 mg   2/9/2025 1.54 2.5 mg   2/10/25 1.58 2 mg    2/11/25  2.07  1.25 mg    2/12/25  2.16  1.25 mg   2/13/25 2.45 1.25 mg   2/14/2025 3 1 mg          Monitoring:                   INR will be monitored daily.    **Please contact pharmacy for discharge instructions when indicated**    MELVA ROSAS [facility]  2/14/2025  8:29 AM

## 2025-02-15 ENCOUNTER — APPOINTMENT (OUTPATIENT)
Dept: GENERAL RADIOLOGY | Age: 85
DRG: 871 | End: 2025-02-15
Payer: MEDICARE

## 2025-02-15 LAB
ANION GAP SERPL CALC-SCNC: 12 MEQ/L (ref 8–16)
BUN SERPL-MCNC: 65 MG/DL (ref 7–22)
CALCIUM SERPL-MCNC: 9.8 MG/DL (ref 8.5–10.5)
CHLORIDE SERPL-SCNC: 97 MEQ/L (ref 98–111)
CO2 SERPL-SCNC: 31 MEQ/L (ref 23–33)
CREAT SERPL-MCNC: 3 MG/DL (ref 0.4–1.2)
DEPRECATED RDW RBC AUTO: 61.3 FL (ref 35–45)
ERYTHROCYTE [DISTWIDTH] IN BLOOD BY AUTOMATED COUNT: 17.2 % (ref 11.5–14.5)
GFR SERPL CREATININE-BSD FRML MDRD: 20 ML/MIN/1.73M2
GLUCOSE BLD STRIP.AUTO-MCNC: 241 MG/DL (ref 70–108)
GLUCOSE BLD STRIP.AUTO-MCNC: 278 MG/DL (ref 70–108)
GLUCOSE SERPL-MCNC: 133 MG/DL (ref 70–108)
HCT VFR BLD AUTO: 34 % (ref 42–52)
HGB BLD-MCNC: 11.1 GM/DL (ref 14–18)
INR PPP: 2.9 (ref 0.85–1.13)
MCH RBC QN AUTO: 32.3 PG (ref 26–33)
MCHC RBC AUTO-ENTMCNC: 32.6 GM/DL (ref 32.2–35.5)
MCV RBC AUTO: 98.8 FL (ref 80–94)
PLATELET # BLD AUTO: 243 THOU/MM3 (ref 130–400)
PMV BLD AUTO: 11.5 FL (ref 9.4–12.4)
POTASSIUM SERPL-SCNC: 4.7 MEQ/L (ref 3.5–5.2)
RBC # BLD AUTO: 3.44 MILL/MM3 (ref 4.7–6.1)
SODIUM SERPL-SCNC: 140 MEQ/L (ref 135–145)
WBC # BLD AUTO: 13.8 THOU/MM3 (ref 4.8–10.8)

## 2025-02-15 PROCEDURE — 71045 X-RAY EXAM CHEST 1 VIEW: CPT

## 2025-02-15 PROCEDURE — 2700000000 HC OXYGEN THERAPY PER DAY

## 2025-02-15 PROCEDURE — 94640 AIRWAY INHALATION TREATMENT: CPT

## 2025-02-15 PROCEDURE — 2500000003 HC RX 250 WO HCPCS

## 2025-02-15 PROCEDURE — 6370000000 HC RX 637 (ALT 250 FOR IP): Performed by: INTERNAL MEDICINE

## 2025-02-15 PROCEDURE — 6360000002 HC RX W HCPCS

## 2025-02-15 PROCEDURE — 85027 COMPLETE CBC AUTOMATED: CPT

## 2025-02-15 PROCEDURE — 85610 PROTHROMBIN TIME: CPT

## 2025-02-15 PROCEDURE — 94761 N-INVAS EAR/PLS OXIMETRY MLT: CPT

## 2025-02-15 PROCEDURE — 80048 BASIC METABOLIC PNL TOTAL CA: CPT

## 2025-02-15 PROCEDURE — 6370000000 HC RX 637 (ALT 250 FOR IP): Performed by: PHARMACIST

## 2025-02-15 PROCEDURE — 6360000002 HC RX W HCPCS: Performed by: PHYSICIAN ASSISTANT

## 2025-02-15 PROCEDURE — 36415 COLL VENOUS BLD VENIPUNCTURE: CPT

## 2025-02-15 PROCEDURE — 99232 SBSQ HOSP IP/OBS MODERATE 35: CPT | Performed by: INTERNAL MEDICINE

## 2025-02-15 PROCEDURE — 6370000000 HC RX 637 (ALT 250 FOR IP)

## 2025-02-15 PROCEDURE — 6370000000 HC RX 637 (ALT 250 FOR IP): Performed by: PHYSICIAN ASSISTANT

## 2025-02-15 PROCEDURE — 6370000000 HC RX 637 (ALT 250 FOR IP): Performed by: STUDENT IN AN ORGANIZED HEALTH CARE EDUCATION/TRAINING PROGRAM

## 2025-02-15 PROCEDURE — 82948 REAGENT STRIP/BLOOD GLUCOSE: CPT

## 2025-02-15 PROCEDURE — 2140000000 HC CCU INTERMEDIATE R&B

## 2025-02-15 RX ORDER — WARFARIN SODIUM 2.5 MG/1
1.25 TABLET ORAL
Status: COMPLETED | OUTPATIENT
Start: 2025-02-15 | End: 2025-02-15

## 2025-02-15 RX ADMIN — IPRATROPIUM BROMIDE AND ALBUTEROL SULFATE 1 DOSE: .5; 3 SOLUTION RESPIRATORY (INHALATION) at 21:17

## 2025-02-15 RX ADMIN — Medication 3 MG: at 19:50

## 2025-02-15 RX ADMIN — POTASSIUM CHLORIDE 20 MEQ: 1500 TABLET, EXTENDED RELEASE ORAL at 08:48

## 2025-02-15 RX ADMIN — ATORVASTATIN CALCIUM 80 MG: 80 TABLET, FILM COATED ORAL at 19:51

## 2025-02-15 RX ADMIN — MIDODRINE HYDROCHLORIDE 10 MG: 10 TABLET ORAL at 08:48

## 2025-02-15 RX ADMIN — METOPROLOL SUCCINATE 25 MG: 25 TABLET, FILM COATED, EXTENDED RELEASE ORAL at 08:48

## 2025-02-15 RX ADMIN — DIPHENHYDRAMINE HYDROCHLORIDE 25 MG: 50 INJECTION INTRAMUSCULAR; INTRAVENOUS at 08:48

## 2025-02-15 RX ADMIN — HYDROXYZINE HYDROCHLORIDE 10 MG: 10 TABLET ORAL at 00:46

## 2025-02-15 RX ADMIN — BUMETANIDE 1 MG: 1 TABLET ORAL at 08:48

## 2025-02-15 RX ADMIN — WARFARIN SODIUM 1.25 MG: 2.5 TABLET ORAL at 17:14

## 2025-02-15 RX ADMIN — HYDROXYZINE HYDROCHLORIDE 10 MG: 10 TABLET ORAL at 19:50

## 2025-02-15 RX ADMIN — Medication 3 MG: at 00:46

## 2025-02-15 RX ADMIN — IPRATROPIUM BROMIDE AND ALBUTEROL SULFATE 1 DOSE: .5; 3 SOLUTION RESPIRATORY (INHALATION) at 09:21

## 2025-02-15 RX ADMIN — DIPHENHYDRAMINE HYDROCHLORIDE, ZINC ACETATE: 2; .1 CREAM TOPICAL at 19:51

## 2025-02-15 RX ADMIN — BUDESONIDE 500 MCG: 0.5 INHALANT RESPIRATORY (INHALATION) at 09:21

## 2025-02-15 RX ADMIN — MIDODRINE HYDROCHLORIDE 10 MG: 10 TABLET ORAL at 17:16

## 2025-02-15 RX ADMIN — SODIUM CHLORIDE, PRESERVATIVE FREE 10 ML: 5 INJECTION INTRAVENOUS at 08:48

## 2025-02-15 RX ADMIN — GUAIFENESIN, DEXTROMETHORPHAN HBR 1 TABLET: 600; 30 TABLET ORAL at 08:48

## 2025-02-15 RX ADMIN — IPRATROPIUM BROMIDE AND ALBUTEROL SULFATE 1 DOSE: .5; 3 SOLUTION RESPIRATORY (INHALATION) at 14:50

## 2025-02-15 RX ADMIN — INSULIN LISPRO 4 UNITS: 100 INJECTION, SOLUTION INTRAVENOUS; SUBCUTANEOUS at 17:14

## 2025-02-15 RX ADMIN — INSULIN LISPRO 8 UNITS: 100 INJECTION, SOLUTION INTRAVENOUS; SUBCUTANEOUS at 21:34

## 2025-02-15 RX ADMIN — BUDESONIDE 500 MCG: 0.5 INHALANT RESPIRATORY (INHALATION) at 21:17

## 2025-02-15 RX ADMIN — INSULIN GLARGINE 15 UNITS: 100 INJECTION, SOLUTION SUBCUTANEOUS at 10:34

## 2025-02-15 RX ADMIN — GUAIFENESIN, DEXTROMETHORPHAN HBR 1 TABLET: 600; 30 TABLET ORAL at 19:51

## 2025-02-15 NOTE — PROGRESS NOTES
exacerbation while in the ED. Given Solumedrol. Steroid inhaler BID.   DMII: Resume Lantus with high intensity sliding scale.   Paroxysmal A-fib: Status post pacemaker, continue with metoprolol and Coumadin, as INR is subtherapeutic initiate her IV heparin  and continue with coumadin , till INR 2-3 . Spoke with pharmacist and his RN.    LDA: []CVC / []PICC / []Midline / []Kitchen / []Drains / []Mediport / [x]None  Antibiotics: Cefepime and Doxycycline  Steroids: Pulmicort  Labs (still needed?): [x]Yes / []No  IVF (still needed?): [x]Yes / []No    Level of care: [x]Step Down / []Med-Surg  Bed Status: [x]Inpatient / []Observation  Telemetry: [x]Yes / []No  PT/OT: []Yes / [x]No    DVT Prophylaxis: [] Lovenox / [] Heparin / [] SCDs / [x] Already on Systemic Anticoagulation / [] None   Code status: Limited     Expected discharge date:  Unknown  Disposition: may need [place] , spoke with pt and . Will need to revisit on Monday and get insurance approval.     ===================================================================    Chief Complaint: Acute hypoxemic respiratory failure    Subjective (past 24 hours):    Patient seen and examined at bed side.Continue to report SOB,was placed on 10l nc again.Denies any other complaints.Patient not in any distress.    HPI / Hospital Course:    Portillo Daly is a 83 yo obese male that was admitted due to acute respiratory failure secondary to pneumonia and sepsis. Patient drowsy during exam. He does state that he is feeling better now and not as short of breath as he was last night. Patient lying down in hospital bed. Patient does complain of continual itching but states is better after lotion application.          Medications:    Infusion Medications    sodium chloride      dextrose      Scheduled Medications    warfarin  1.25 mg Oral Once    bumetanide  1 mg Oral Daily    ipratropium 0.5 mg-albuterol 2.5 mg  1 Dose Inhalation TID RT    potassium chloride  20 mEq Oral

## 2025-02-15 NOTE — PROGRESS NOTES
Warfarin Pharmacy Consult Note    Warfarin Indication: Atrial fibrillation/Atrial flutter  Target INR: 2.0-3.0  Dose prior to admission: 2.5 mg MF, 1.25 mg TWThSS     Recent Labs     02/13/25  0519 02/15/25  0724   HGB 10.1* 11.1*    243     Recent Labs     02/13/25  0519 02/14/25  0649 02/15/25  0724   INR 2.45* 3.00* 2.90*     Concurrent anticoagulants/antiplatelets: none  Significant warfarin drug-drug interactions:  melatonin (HM)     Date INR Warfarin Dose   1/30/2025 3.49 No coumadin    1/31/2025 2.34  1.25 mg     2/1/2025  2.14 2 mg     2/2/2025  2.96 0.5 mg    2/3/2025  3.10  No coumadin   2/4/2025    4.80   No coumadin    2/5/2025    6.69   No coumadin    2/6/2025  5.75 No coumadin  Vitamin K 2.5 mg PO   2/7/2025  3.53 No coumadin   2/8/2025 1.53 1 mg   2/9/2025 1.54 2.5 mg   2/10/25 1.58 2 mg    2/11/25  2.07  1.25 mg    2/12/25  2.16  1.25 mg   2/13/25 2.45 1.25 mg   2/14/2025 3.00 1 mg   2/15/2025 2.90 1.25 mg               Monitoring:                   INR will be monitored daily.    **Please contact pharmacy for discharge instructions when indicated**    Anna Hernández, PharmD, BCPS, BCCCP  2/15/2025 8:59 AM

## 2025-02-15 NOTE — PROGRESS NOTES
[facility]   Nephrology progress note  2/15/2025, 10:47 AM      Pt Name:    Portillo Daly  MRN:     [id number]     YOB: 1940  Admit Date:    1/29/2025  8:08 PM    Chief Complaint: Nephrology following for MILLY/CKD IV    Subjective:  Patient was seen and examined this morning.  On NC    Objective:  24HR INTAKE/OUTPUT:    Intake/Output Summary (Last 24 hours) at 2/15/2025 1047  Last data filed at 2/15/2025 0312  Gross per 24 hour   Intake 220 ml   Output 1110 ml   Net -890 ml         I/O last 3 completed shifts:  In: 1060 [P.O.:1060]  Out: 1985 [Urine:1985]  No intake/output data recorded.   Admission weight: 95.1 kg (209 lb 10.5 oz)  Wt Readings from Last 3 Encounters:   02/15/25 83.2 kg (183 lb 6.8 oz)   01/27/25 95.3 kg (210 lb)   01/15/25 94.3 kg (208 lb)        Vitals :   Vitals:    02/15/25 0425 02/15/25 0921 02/15/25 0924 02/15/25 0943   BP:    (!) 154/64   Pulse: 70 70 70 69   Resp: 20 20 20 20   Temp:    98.1 °F (36.7 °C)   TempSrc:    Oral   SpO2: 94% 91% 95% 95%   Weight: 83.2 kg (183 lb 6.8 oz)      Height:           Physical examination  General Appearance: alert and cooperative with exam, appears comfortable, no distress  Neck: No JVD visibly noted  Lungs: No use of accessory muscle use  GI: soft, non-tender, no guarding  Extremities: No pitting edema noted    Medications:  Infusion:    sodium chloride      dextrose       Meds:    warfarin  1.25 mg Oral Once    bumetanide  1 mg Oral Daily    ipratropium 0.5 mg-albuterol 2.5 mg  1 Dose Inhalation TID RT    potassium chloride  20 mEq Oral Daily with breakfast    insulin glargine  15 Units SubCUTAneous QAM    insulin lispro  0-16 Units SubCUTAneous 4x Daily AC & HS    midodrine  10 mg Oral TID WC    metoprolol succinate  25 mg Oral Daily    dextromethorphan-guaiFENesin  1 tablet Oral BID    [Held by provider] tamsulosin  0.4 mg Oral Daily    sodium chloride flush  5-40 mL IntraVENous 2 times per day    atorvastatin  80 mg

## 2025-02-15 NOTE — PLAN OF CARE
Problem: Respiratory - Adult  Goal: Clear lung sounds  Description: Clear lung sounds  Outcome: Progressing   Receiving nebulizer treatments to improve aeration and maintain COPD. Will continue with therapies as ordered.    Patient mutually agreed on goals.

## 2025-02-16 ENCOUNTER — APPOINTMENT (OUTPATIENT)
Dept: CT IMAGING | Age: 85
DRG: 871 | End: 2025-02-16
Payer: MEDICARE

## 2025-02-16 ENCOUNTER — APPOINTMENT (OUTPATIENT)
Dept: GENERAL RADIOLOGY | Age: 85
DRG: 871 | End: 2025-02-16
Payer: MEDICARE

## 2025-02-16 LAB
ANION GAP SERPL CALC-SCNC: 13 MEQ/L (ref 8–16)
BUN SERPL-MCNC: 66 MG/DL (ref 7–22)
CALCIUM SERPL-MCNC: 10 MG/DL (ref 8.5–10.5)
CHLORIDE SERPL-SCNC: 97 MEQ/L (ref 98–111)
CO2 SERPL-SCNC: 30 MEQ/L (ref 23–33)
CREAT SERPL-MCNC: 3.1 MG/DL (ref 0.4–1.2)
GFR SERPL CREATININE-BSD FRML MDRD: 19 ML/MIN/1.73M2
GLUCOSE BLD STRIP.AUTO-MCNC: 141 MG/DL (ref 70–108)
GLUCOSE BLD STRIP.AUTO-MCNC: 204 MG/DL (ref 70–108)
GLUCOSE BLD STRIP.AUTO-MCNC: 298 MG/DL (ref 70–108)
GLUCOSE SERPL-MCNC: 103 MG/DL (ref 70–108)
INR PPP: 3.04 (ref 0.85–1.13)
POTASSIUM SERPL-SCNC: 3.9 MEQ/L (ref 3.5–5.2)
SODIUM SERPL-SCNC: 140 MEQ/L (ref 135–145)

## 2025-02-16 PROCEDURE — 6370000000 HC RX 637 (ALT 250 FOR IP)

## 2025-02-16 PROCEDURE — 6370000000 HC RX 637 (ALT 250 FOR IP): Performed by: PHYSICIAN ASSISTANT

## 2025-02-16 PROCEDURE — 2500000003 HC RX 250 WO HCPCS

## 2025-02-16 PROCEDURE — 94640 AIRWAY INHALATION TREATMENT: CPT

## 2025-02-16 PROCEDURE — 6370000000 HC RX 637 (ALT 250 FOR IP): Performed by: INTERNAL MEDICINE

## 2025-02-16 PROCEDURE — 51798 US URINE CAPACITY MEASURE: CPT

## 2025-02-16 PROCEDURE — 36415 COLL VENOUS BLD VENIPUNCTURE: CPT

## 2025-02-16 PROCEDURE — 85610 PROTHROMBIN TIME: CPT

## 2025-02-16 PROCEDURE — 71045 X-RAY EXAM CHEST 1 VIEW: CPT

## 2025-02-16 PROCEDURE — 94761 N-INVAS EAR/PLS OXIMETRY MLT: CPT

## 2025-02-16 PROCEDURE — 6360000002 HC RX W HCPCS: Performed by: PHYSICIAN ASSISTANT

## 2025-02-16 PROCEDURE — 6370000000 HC RX 637 (ALT 250 FOR IP): Performed by: PHARMACIST

## 2025-02-16 PROCEDURE — 99232 SBSQ HOSP IP/OBS MODERATE 35: CPT | Performed by: INTERNAL MEDICINE

## 2025-02-16 PROCEDURE — 2140000000 HC CCU INTERMEDIATE R&B

## 2025-02-16 PROCEDURE — 80048 BASIC METABOLIC PNL TOTAL CA: CPT

## 2025-02-16 PROCEDURE — 6370000000 HC RX 637 (ALT 250 FOR IP): Performed by: STUDENT IN AN ORGANIZED HEALTH CARE EDUCATION/TRAINING PROGRAM

## 2025-02-16 PROCEDURE — 82948 REAGENT STRIP/BLOOD GLUCOSE: CPT

## 2025-02-16 PROCEDURE — 2700000000 HC OXYGEN THERAPY PER DAY

## 2025-02-16 PROCEDURE — 6360000002 HC RX W HCPCS

## 2025-02-16 RX ORDER — WARFARIN SODIUM 1 MG/1
1 TABLET ORAL
Status: COMPLETED | OUTPATIENT
Start: 2025-02-16 | End: 2025-02-16

## 2025-02-16 RX ADMIN — BUMETANIDE 1 MG: 1 TABLET ORAL at 09:16

## 2025-02-16 RX ADMIN — ATORVASTATIN CALCIUM 80 MG: 80 TABLET, FILM COATED ORAL at 20:20

## 2025-02-16 RX ADMIN — BUDESONIDE 500 MCG: 0.5 INHALANT RESPIRATORY (INHALATION) at 23:13

## 2025-02-16 RX ADMIN — BUDESONIDE 500 MCG: 0.5 INHALANT RESPIRATORY (INHALATION) at 09:45

## 2025-02-16 RX ADMIN — IPRATROPIUM BROMIDE AND ALBUTEROL SULFATE 1 DOSE: .5; 3 SOLUTION RESPIRATORY (INHALATION) at 09:45

## 2025-02-16 RX ADMIN — DIPHENHYDRAMINE HYDROCHLORIDE 25 MG: 50 INJECTION INTRAMUSCULAR; INTRAVENOUS at 05:13

## 2025-02-16 RX ADMIN — INSULIN GLARGINE 15 UNITS: 100 INJECTION, SOLUTION SUBCUTANEOUS at 09:16

## 2025-02-16 RX ADMIN — GUAIFENESIN, DEXTROMETHORPHAN HBR 1 TABLET: 600; 30 TABLET ORAL at 20:22

## 2025-02-16 RX ADMIN — METOPROLOL SUCCINATE 25 MG: 25 TABLET, FILM COATED, EXTENDED RELEASE ORAL at 09:16

## 2025-02-16 RX ADMIN — WARFARIN SODIUM 1 MG: 1 TABLET ORAL at 18:05

## 2025-02-16 RX ADMIN — IPRATROPIUM BROMIDE AND ALBUTEROL SULFATE 1 DOSE: .5; 3 SOLUTION RESPIRATORY (INHALATION) at 12:33

## 2025-02-16 RX ADMIN — INSULIN LISPRO 4 UNITS: 100 INJECTION, SOLUTION INTRAVENOUS; SUBCUTANEOUS at 22:46

## 2025-02-16 RX ADMIN — GUAIFENESIN, DEXTROMETHORPHAN HBR 1 TABLET: 600; 30 TABLET ORAL at 09:16

## 2025-02-16 RX ADMIN — HYDROXYZINE HYDROCHLORIDE 10 MG: 10 TABLET ORAL at 20:20

## 2025-02-16 RX ADMIN — SODIUM CHLORIDE, PRESERVATIVE FREE 10 ML: 5 INJECTION INTRAVENOUS at 09:20

## 2025-02-16 RX ADMIN — IPRATROPIUM BROMIDE AND ALBUTEROL SULFATE 1 DOSE: .5; 3 SOLUTION RESPIRATORY (INHALATION) at 23:08

## 2025-02-16 RX ADMIN — POTASSIUM CHLORIDE 20 MEQ: 1500 TABLET, EXTENDED RELEASE ORAL at 09:16

## 2025-02-16 RX ADMIN — MIDODRINE HYDROCHLORIDE 10 MG: 10 TABLET ORAL at 18:04

## 2025-02-16 RX ADMIN — INSULIN LISPRO 8 UNITS: 100 INJECTION, SOLUTION INTRAVENOUS; SUBCUTANEOUS at 12:00

## 2025-02-16 RX ADMIN — Medication 3 MG: at 20:20

## 2025-02-16 NOTE — PLAN OF CARE
Problem: Respiratory - Adult  Goal: Clear lung sounds  Description: Clear lung sounds  Outcome: Progressing   Receiving nebulizer treatments to improve aeration. Will continue wit therapies as ordered.    Patient mutually agreed on goals.

## 2025-02-16 NOTE — PROGRESS NOTES
Warfarin Pharmacy Consult Note    Warfarin Indication: Atrial fibrillation/Atrial flutter  Target INR: 2.0-3.0  Dose prior to admission: 2.5 mg MF, 1.25 mg TWThSS     Recent Labs     02/15/25  0724   HGB 11.1*        Recent Labs     02/14/25  0649 02/15/25  0724 02/16/25  0447   INR 3.00* 2.90* 3.04*     Concurrent anticoagulants/antiplatelets: none  Significant warfarin drug-drug interactions:  melatonin (HM)     Date INR Warfarin Dose   1/30/2025 3.49 No coumadin    1/31/2025 2.34  1.25 mg     2/1/2025  2.14 2 mg     2/2/2025  2.96 0.5 mg    2/3/2025  3.10  No coumadin   2/4/2025    4.80   No coumadin    2/5/2025    6.69   No coumadin    2/6/2025  5.75 No coumadin  Vitamin K 2.5 mg PO   2/7/2025  3.53 No coumadin   2/8/2025 1.53 1 mg   2/9/2025 1.54 2.5 mg   2/10/2025 1.58 2 mg    2/11/2025  2.07  1.25 mg    2/12/2025  2.16  1.25 mg   2/13/2025 2.45 1.25 mg   2/14/2025 3.00 1 mg   2/15/2025 2.90 1.25 mg   2/16/2025 3.04 1 mg          Monitoring:                   INR will be monitored daily.    **Please contact pharmacy for discharge instructions when indicated**    Anna Hernández, PharmD, BCPS, BCCCP  2/16/2025 7:24 AM

## 2025-02-16 NOTE — PROGRESS NOTES
Spiritual Health History and Assessment/Progress Note  [facility]    (P) Follow-up,  ,  ,      Name: Portillo Daly MRN: [id number]    Age: 84 y.o.     Sex: male   Language: English   Presybeterian: Other   Sepsis (HCC)     Date: 2/16/2025            Total Time Calculated: (P) 9 min              Spiritual Assessment continued in [facility] 3[facility]        Referral/Consult From: (P) Nurse   Encounter Overview/Reason: (P) Follow-up  Service Provided For: (P) Patient    Rosalie, Belief, Meaning:   Patient identifies as spiritual  Family/Friends No family/friends present      Importance and Influence:  Patient has spiritual/personal beliefs that influence decisions regarding their health  Family/Friends No family/friends present    Community:  Patient is connected with a spiritual community and expresses feelings of isolation: disconnected from family/friends  Family/Friends No family/friends present    Assessment and Plan of Care:   Pt's nurse wanted this  to see him as he Is feeling down.  He spoke of his daughter having gone back to her home due to pt's not getting better.  He is tired of being sick and may have given up.  He welcomed prayer and words of encouragement. He wanted ice and this was given to him.   Patient Interventions include: Facilitated expression of thoughts and feelings  Family/Friends Interventions include: No family/friends present    Patient Plan of Care: No spiritual needs identified for follow-up  Family/Friends Plan of Care: No spiritual needs identified for follow-up    Electronically signed by SHARIF Talley on 2/16/2025 at 6:01 PM

## 2025-02-16 NOTE — PLAN OF CARE
maintained  Outcome: Progressing  Flowsheets (Taken 2/16/2025 0910)  Hemodynamic stability and optimal renal function maintained: Monitor labs and assess for signs and symptoms of volume excess or deficit     Problem: Pain  Goal: Verbalizes/displays adequate comfort level or baseline comfort level  Outcome: Progressing  Flowsheets (Taken 2/16/2025 0906)  Verbalizes/displays adequate comfort level or baseline comfort level: Encourage patient to monitor pain and request assistance     Problem: Nutrition Deficit:  Goal: Optimize nutritional status  Outcome: Progressing     Problem: Skin/Tissue Integrity  Goal: Skin integrity remains intact  Description: 1.  Monitor for areas of redness and/or skin breakdown  2.  Assess vascular access sites hourly  3.  Every 4-6 hours minimum:  Change oxygen saturation probe site  4.  Every 4-6 hours:  If on nasal continuous positive airway pressure, respiratory therapy assess nares and determine need for appliance change or resting period  Outcome: Progressing  Flowsheets  Taken 2/16/2025 1353  Skin Integrity Remains Intact: Monitor for areas of redness and/or skin breakdown  Taken 2/16/2025 0910  Skin Integrity Remains Intact: Monitor for areas of redness and/or skin breakdown   Care plan reviewed with patient.  Patient verbalizes understanding of the care plan and contributed to goal setting.

## 2025-02-16 NOTE — PROGRESS NOTES
[facility]   Nephrology progress note  2/16/2025, 11:56 AM      Pt Name:    Portillo Daly  MRN:     [id number]     YOB: 1940  Admit Date:    1/29/2025  8:08 PM    Chief Complaint: Nephrology following for MILLY/CKD IV    Subjective:  Patient was seen and examined this morning.  On NC    Objective:  24HR INTAKE/OUTPUT:    Intake/Output Summary (Last 24 hours) at 2/16/2025 1156  Last data filed at 2/16/2025 1035  Gross per 24 hour   Intake 400 ml   Output 325 ml   Net 75 ml         I/O last 3 completed shifts:  In: 320 [P.O.:320]  Out: 935 [Urine:935]  I/O this shift:  In: 300 [P.O.:300]  Out: -    Admission weight: 95.1 kg (209 lb 10.5 oz)  Wt Readings from Last 3 Encounters:   02/16/25 77.7 kg (171 lb 4.8 oz)   01/27/25 95.3 kg (210 lb)   01/15/25 94.3 kg (208 lb)        Vitals :   Vitals:    02/16/25 0631 02/16/25 0906 02/16/25 0949 02/16/25 1115   BP:  (!) 133/45  138/63   Pulse: 82 87  70   Resp: 20 20  20   Temp:  97.7 °F (36.5 °C)  97.8 °F (36.6 °C)   TempSrc:  Oral  Oral   SpO2: 92% 93% 95% 96%   Weight:       Height:           Physical examination  General Appearance: alert and cooperative with exam, appears comfortable, no distress  Neck: No JVD visibly noted  Lungs: No use of accessory muscle use  GI: soft, non-tender, no guarding  Extremities: No pitting edema noted    Medications:  Infusion:    sodium chloride      dextrose       Meds:    warfarin  1 mg Oral Once    bumetanide  1 mg Oral Daily    ipratropium 0.5 mg-albuterol 2.5 mg  1 Dose Inhalation TID RT    potassium chloride  20 mEq Oral Daily with breakfast    insulin glargine  15 Units SubCUTAneous QAM    insulin lispro  0-16 Units SubCUTAneous 4x Daily AC & HS    midodrine  10 mg Oral TID WC    metoprolol succinate  25 mg Oral Daily    dextromethorphan-guaiFENesin  1 tablet Oral BID    [Held by provider] tamsulosin  0.4 mg Oral Daily    sodium chloride flush  5-40 mL IntraVENous 2 times per day    atorvastatin  80

## 2025-02-16 NOTE — PROGRESS NOTES
Hospitalist Progress Note      Patient:  Portillo Daly 84 y.o. male     Unit/Bed:3B-/[facility]    Date of Admission: 1/29/2025      ASSESSMENT AND PLAN    Active Problems    #Tremor and jerking like movement .  -Could be from building up of uremia.  -Neurology consult appreciated.No neurology workup needed at this time.    #Acute hypoxemic respiratory failure:   - 2/2 Pseudomonas PNA & Pulmonary Edema.   - Was on HFNC,weaned down to 6l nc,but patient is complaining about feeling SOB.  -Oxygen increased again to 10L.Feeling better on 10 l.  -Completed course of antibiotics.  -Chest xray done showed Moderate megaly. Permanent dual chamber pacemaker. An aortic stent/valve is present.Mild interstitial pneumonia/edema both lung bases. Tiny effusion left side.No appreciable change from prior study.  -Repeat chest xray Improved aeration in the left lower lobe with residual bibasilar atelectasis and small layering effusions.  -Will repeat CT chest due to constant requirement of 10 l nc.  - on IS & Acapella.     #Pseudomonas, Bacterial PNA   - ID consulted - case discussed, Cefepime was transitioned to Levaquin,and completed therapy.     #Sepsis 2/2 Pseudomonas Bacteremia   - BC 2 of 2 growing Pseudomonas. Was on Levaquin.   - Repeat blood culture showed NGTD.     #Acute on chronic renal failure:   - Nephrology notes reviewed.   -Creatinine 3.4> 3.4->3.7->3.4->3.0->3.1.  - Patient baseline of  2.5-3.0.   -Patient has been retaining.Had straight cath done today  - Nephrology on the case.No indication for dialysis at this time.  - Renal ultrasound reviewed with no acute findings  -  IV Bumex BID   - Continue to hold losartan    #DM-2 sugars 144, 259 , and 125 will add lantus 15 units daily and with High SS   Using humalog.    #Acute on chronic systolic heart failure: Last known EF 45 to 50%.  -  IV Bumex BID. Toprol xl  - Intake/output.  - Fluid restriction.     #HTN runs soft BP , no fever or chills  -On low dose BB and

## 2025-02-17 ENCOUNTER — APPOINTMENT (OUTPATIENT)
Dept: CT IMAGING | Age: 85
DRG: 871 | End: 2025-02-17
Payer: MEDICARE

## 2025-02-17 LAB
ANION GAP SERPL CALC-SCNC: 18 MEQ/L (ref 8–16)
ARTERIAL PATENCY WRIST A: POSITIVE
BASE EXCESS BLDA CALC-SCNC: 7 MMOL/L (ref -2.5–2.5)
BDY SITE: ABNORMAL
BUN SERPL-MCNC: 57 MG/DL (ref 7–22)
CALCIUM SERPL-MCNC: 9.5 MG/DL (ref 8.5–10.5)
CHLORIDE SERPL-SCNC: 97 MEQ/L (ref 98–111)
CO2 SERPL-SCNC: 28 MEQ/L (ref 23–33)
COLLECTED BY:: ABNORMAL
CREAT SERPL-MCNC: 2.6 MG/DL (ref 0.4–1.2)
DEPRECATED RDW RBC AUTO: 63.3 FL (ref 35–45)
DEVICE: ABNORMAL
ERYTHROCYTE [DISTWIDTH] IN BLOOD BY AUTOMATED COUNT: 17.5 % (ref 11.5–14.5)
FIO2 ON VENT O2 ANALYZER: 100 %
GFR SERPL CREATININE-BSD FRML MDRD: 23 ML/MIN/1.73M2
GLUCOSE BLD STRIP.AUTO-MCNC: 136 MG/DL (ref 70–108)
GLUCOSE BLD STRIP.AUTO-MCNC: 179 MG/DL (ref 70–108)
GLUCOSE BLD STRIP.AUTO-MCNC: 182 MG/DL (ref 70–108)
GLUCOSE BLD STRIP.AUTO-MCNC: 221 MG/DL (ref 70–108)
GLUCOSE SERPL-MCNC: 137 MG/DL (ref 70–108)
HCO3 BLDA-SCNC: 29 MMOL/L (ref 23–28)
HCT VFR BLD AUTO: 34.7 % (ref 42–52)
HGB BLD-MCNC: 11.3 GM/DL (ref 14–18)
INR PPP: 2.74 (ref 0.85–1.13)
MCH RBC QN AUTO: 32.6 PG (ref 26–33)
MCHC RBC AUTO-ENTMCNC: 32.6 GM/DL (ref 32.2–35.5)
MCV RBC AUTO: 100 FL (ref 80–94)
PCO2 BLDA: 34 MMHG (ref 35–45)
PH BLDA: 7.55 [PH] (ref 7.35–7.45)
PLATELET # BLD AUTO: 231 THOU/MM3 (ref 130–400)
PMV BLD AUTO: 12.1 FL (ref 9.4–12.4)
PO2 BLDA: 55 MMHG (ref 71–104)
POTASSIUM SERPL-SCNC: 3.8 MEQ/L (ref 3.5–5.2)
RBC # BLD AUTO: 3.47 MILL/MM3 (ref 4.7–6.1)
SAO2 % BLDA: 92 %
SODIUM SERPL-SCNC: 143 MEQ/L (ref 135–145)
VENTILATION MODE VENT: ABNORMAL
WBC # BLD AUTO: 16.6 THOU/MM3 (ref 4.8–10.8)

## 2025-02-17 PROCEDURE — 6360000002 HC RX W HCPCS

## 2025-02-17 PROCEDURE — 6360000002 HC RX W HCPCS: Performed by: INTERNAL MEDICINE

## 2025-02-17 PROCEDURE — 6360000002 HC RX W HCPCS: Performed by: PHYSICIAN ASSISTANT

## 2025-02-17 PROCEDURE — 80048 BASIC METABOLIC PNL TOTAL CA: CPT

## 2025-02-17 PROCEDURE — 97530 THERAPEUTIC ACTIVITIES: CPT

## 2025-02-17 PROCEDURE — 6370000000 HC RX 637 (ALT 250 FOR IP): Performed by: STUDENT IN AN ORGANIZED HEALTH CARE EDUCATION/TRAINING PROGRAM

## 2025-02-17 PROCEDURE — 6370000000 HC RX 637 (ALT 250 FOR IP)

## 2025-02-17 PROCEDURE — 36600 WITHDRAWAL OF ARTERIAL BLOOD: CPT

## 2025-02-17 PROCEDURE — 2500000003 HC RX 250 WO HCPCS

## 2025-02-17 PROCEDURE — 2700000000 HC OXYGEN THERAPY PER DAY

## 2025-02-17 PROCEDURE — 6370000000 HC RX 637 (ALT 250 FOR IP): Performed by: INTERNAL MEDICINE

## 2025-02-17 PROCEDURE — 82948 REAGENT STRIP/BLOOD GLUCOSE: CPT

## 2025-02-17 PROCEDURE — 2500000003 HC RX 250 WO HCPCS: Performed by: INTERNAL MEDICINE

## 2025-02-17 PROCEDURE — 94761 N-INVAS EAR/PLS OXIMETRY MLT: CPT

## 2025-02-17 PROCEDURE — 87081 CULTURE SCREEN ONLY: CPT

## 2025-02-17 PROCEDURE — 99232 SBSQ HOSP IP/OBS MODERATE 35: CPT | Performed by: INTERNAL MEDICINE

## 2025-02-17 PROCEDURE — 36415 COLL VENOUS BLD VENIPUNCTURE: CPT

## 2025-02-17 PROCEDURE — 94669 MECHANICAL CHEST WALL OSCILL: CPT

## 2025-02-17 PROCEDURE — 85027 COMPLETE CBC AUTOMATED: CPT

## 2025-02-17 PROCEDURE — 2140000000 HC CCU INTERMEDIATE R&B

## 2025-02-17 PROCEDURE — 85610 PROTHROMBIN TIME: CPT

## 2025-02-17 PROCEDURE — 82803 BLOOD GASES ANY COMBINATION: CPT

## 2025-02-17 PROCEDURE — 97535 SELF CARE MNGMENT TRAINING: CPT

## 2025-02-17 PROCEDURE — 94640 AIRWAY INHALATION TREATMENT: CPT

## 2025-02-17 PROCEDURE — 71250 CT THORAX DX C-: CPT

## 2025-02-17 PROCEDURE — 99223 1ST HOSP IP/OBS HIGH 75: CPT | Performed by: INTERNAL MEDICINE

## 2025-02-17 PROCEDURE — 87641 MR-STAPH DNA AMP PROBE: CPT

## 2025-02-17 PROCEDURE — 6370000000 HC RX 637 (ALT 250 FOR IP): Performed by: PHYSICIAN ASSISTANT

## 2025-02-17 RX ORDER — WARFARIN SODIUM 2.5 MG/1
1.25 TABLET ORAL
Status: COMPLETED | OUTPATIENT
Start: 2025-02-17 | End: 2025-02-17

## 2025-02-17 RX ORDER — BUMETANIDE 0.25 MG/ML
2 INJECTION, SOLUTION INTRAMUSCULAR; INTRAVENOUS 2 TIMES DAILY
Status: DISCONTINUED | OUTPATIENT
Start: 2025-02-17 | End: 2025-02-18 | Stop reason: HOSPADM

## 2025-02-17 RX ORDER — TOBRAMYCIN INHALATION SOLUTION 300 MG/5ML
300 INHALANT RESPIRATORY (INHALATION)
Status: DISCONTINUED | OUTPATIENT
Start: 2025-02-17 | End: 2025-02-18 | Stop reason: HOSPADM

## 2025-02-17 RX ORDER — METOLAZONE 5 MG/1
10 TABLET ORAL ONCE
Status: COMPLETED | OUTPATIENT
Start: 2025-02-17 | End: 2025-02-17

## 2025-02-17 RX ORDER — QUETIAPINE FUMARATE 25 MG/1
25 TABLET, FILM COATED ORAL ONCE
Status: COMPLETED | OUTPATIENT
Start: 2025-02-17 | End: 2025-02-17

## 2025-02-17 RX ORDER — OXYCODONE HYDROCHLORIDE 5 MG/1
2.5 TABLET ORAL EVERY 4 HOURS PRN
Status: DISCONTINUED | OUTPATIENT
Start: 2025-02-17 | End: 2025-02-18 | Stop reason: HOSPADM

## 2025-02-17 RX ORDER — IPRATROPIUM BROMIDE AND ALBUTEROL SULFATE 2.5; .5 MG/3ML; MG/3ML
1 SOLUTION RESPIRATORY (INHALATION)
Status: DISCONTINUED | OUTPATIENT
Start: 2025-02-17 | End: 2025-02-18 | Stop reason: HOSPADM

## 2025-02-17 RX ADMIN — IPRATROPIUM BROMIDE AND ALBUTEROL SULFATE 1 DOSE: .5; 3 SOLUTION RESPIRATORY (INHALATION) at 13:39

## 2025-02-17 RX ADMIN — IPRATROPIUM BROMIDE AND ALBUTEROL SULFATE 1 DOSE: .5; 3 SOLUTION RESPIRATORY (INHALATION) at 15:35

## 2025-02-17 RX ADMIN — DIPHENHYDRAMINE HYDROCHLORIDE 25 MG: 50 INJECTION INTRAMUSCULAR; INTRAVENOUS at 02:16

## 2025-02-17 RX ADMIN — BUMETANIDE 2 MG: 0.25 INJECTION INTRAMUSCULAR; INTRAVENOUS at 10:04

## 2025-02-17 RX ADMIN — GUAIFENESIN, DEXTROMETHORPHAN HBR 1 TABLET: 600; 30 TABLET ORAL at 10:00

## 2025-02-17 RX ADMIN — WARFARIN SODIUM 1.25 MG: 2.5 TABLET ORAL at 18:33

## 2025-02-17 RX ADMIN — OXYCODONE HYDROCHLORIDE 2.5 MG: 5 TABLET ORAL at 18:33

## 2025-02-17 RX ADMIN — IPRATROPIUM BROMIDE AND ALBUTEROL SULFATE 1 DOSE: .5; 3 SOLUTION RESPIRATORY (INHALATION) at 18:49

## 2025-02-17 RX ADMIN — IPRATROPIUM BROMIDE AND ALBUTEROL SULFATE 1 DOSE: .5; 3 SOLUTION RESPIRATORY (INHALATION) at 09:23

## 2025-02-17 RX ADMIN — QUETIAPINE FUMARATE 25 MG: 25 TABLET ORAL at 21:38

## 2025-02-17 RX ADMIN — BUDESONIDE 500 MCG: 0.5 INHALANT RESPIRATORY (INHALATION) at 09:22

## 2025-02-17 RX ADMIN — BUDESONIDE 500 MCG: 0.5 INHALANT RESPIRATORY (INHALATION) at 18:50

## 2025-02-17 RX ADMIN — INSULIN GLARGINE 15 UNITS: 100 INJECTION, SOLUTION SUBCUTANEOUS at 10:01

## 2025-02-17 RX ADMIN — Medication 3 MG: at 21:38

## 2025-02-17 RX ADMIN — INSULIN LISPRO 4 UNITS: 100 INJECTION, SOLUTION INTRAVENOUS; SUBCUTANEOUS at 18:34

## 2025-02-17 RX ADMIN — BUMETANIDE 2 MG: 0.25 INJECTION INTRAMUSCULAR; INTRAVENOUS at 18:33

## 2025-02-17 RX ADMIN — METOLAZONE 10 MG: 5 TABLET ORAL at 13:53

## 2025-02-17 RX ADMIN — MIDODRINE HYDROCHLORIDE 10 MG: 10 TABLET ORAL at 09:59

## 2025-02-17 RX ADMIN — WATER 2000 MG: 1 INJECTION INTRAMUSCULAR; INTRAVENOUS; SUBCUTANEOUS at 16:26

## 2025-02-17 RX ADMIN — POTASSIUM CHLORIDE 20 MEQ: 1500 TABLET, EXTENDED RELEASE ORAL at 09:59

## 2025-02-17 RX ADMIN — ACETAMINOPHEN 650 MG: 650 SUPPOSITORY RECTAL at 21:38

## 2025-02-17 RX ADMIN — TOBRAMYCIN 300 MG: 300 SOLUTION RESPIRATORY (INHALATION) at 15:35

## 2025-02-17 RX ADMIN — METOPROLOL SUCCINATE 25 MG: 25 TABLET, FILM COATED, EXTENDED RELEASE ORAL at 10:00

## 2025-02-17 RX ADMIN — SODIUM CHLORIDE, PRESERVATIVE FREE 10 ML: 5 INJECTION INTRAVENOUS at 10:05

## 2025-02-17 NOTE — CARE COORDINATION
25, 1:35 PM EST    DISCHARGE ON GOING EVALUATION    [facility]       [facility] day: 19  Location: [facility]38/038-A Reason for admit: Hypoxemia [R09.02]  Sepsis (HCC) [A41.9]     Procedures: none     Imaging since last note:   2/15 CXR: Mild cardiomegaly. Permanent dual-chamber pacemaker. An aortic stent/valve is  present. 2. Moderate bibasilar atelectasis/pneumonia. Tiny bilateral pleural effusions. These findings have worsened somewhat since prior. 3. Small nodular density projected over the anterior aspect of the right second rib, possibly an inflammatory nodule. Note that no lung nodule is seen on the recent CT thorax from 2025.   CXR: Improved aeration in the left lower lobe with residual bibasilar atelectasis and small layering effusions.   CT Chest: New right lower lobe consolidation and patchy right upper lobe groundglass  opacities suggest pneumonia in the appropriate clinical setting. CT follow-up to ensure resolution is advised. The area of dense retrocardiac left lower lobe  medial consolidation is not significantly changed. Opacification of the left lower lobe posterior bronchi suggesting endobronchial debris/mucus plugging  versus endobronchial lesion is unchanged. Further evaluation with bronchoscopy may be considered.    Barriers to Discharge: Hospitalist, ID, Nephrology and Palliative Care following. Pulmonology to see for dypnea and potential Bronchoscopy. PT/OT. Fluid restriction. O2 increased to 15L High Flow NC this AM. O2 sats 92%. Nebulizers. IV bumex BID. IV cefepime q12h. Mucinex. DM management. 1x PO Metolazone. Roxicodone prn. Coumadin. Creat 2.6. WBC 16.6. INR 2.74.     PCP: Elinor Ly, APRN - CNP  Readmission Risk Score: 26.1    Patient Goals/Plan/Treatment Preferences: From home w/ SO Gina. Accepted to [facility]. Precert approved,  .     Spoke w/ Dr. Connolly. Patient's oxygen needs continue to go up and he isn't appropriate for SNF or home at this

## 2025-02-17 NOTE — PROGRESS NOTES
[Held by provider] losartan  25 mg Oral Daily    warfarin placeholder: dosing by pharmacy   Oral RX Placeholder      sodium chloride      dextrose       albuterol, diphenhydrAMINE-zinc acetate, Menthol, diphenhydrAMINE, hydrOXYzine HCl, sodium chloride flush, sodium chloride, polyethylene glycol, acetaminophen **OR** acetaminophen, melatonin, prochlorperazine, glucose, dextrose bolus **OR** dextrose bolus, glucagon (rDNA), dextrose      LABS:     CBC:   Recent Labs     02/15/25  0724 02/17/25  0740   WBC 13.8* 16.6*   HGB 11.1* 11.3*    231     BMP:    Recent Labs     02/15/25  0724 02/16/25  0447    140   K 4.7 3.9   CL 97* 97*   CO2 31 30   BUN 65* 66*   CREATININE 3.0* 3.1*   GLUCOSE 133* 103     Calcium:  Recent Labs     02/16/25  0447   CALCIUM 10.0      Recent Labs     02/16/25  1700 02/16/25  2242 02/17/25  0822   POCGLU 141* 204* 136*     HgbA1C: No results for input(s): \"LABA1C\" in the last 72 hours.  INR:   Recent Labs     02/15/25  0724 02/16/25  0447 02/17/25  0740   INR 2.90* 3.04* 2.74*        CULTURES:   UA:   No results for input(s): \"SPECGRAV\", \"PHUR\", \"COLORU\", \"CLARITYU\", \"MUCUS\", \"PROTEINU\", \"BLOODU\", \"RBCUA\", \"WBCUA\", \"BACTERIA\", \"NITRU\", \"GLUCOSEU\", \"BILIRUBINUR\", \"UROBILINOGEN\", \"KETUA\", \"LABCAST\", \"LABCASTTY\", \"AMORPHOS\" in the last 72 hours.    Invalid input(s): \"CRYSTALS\"       Problem list of patient:     Patient Active Problem List   Diagnosis Code    Atrial fibrillation with RVR ([facility]) I48.91    Acute respiratory failure with hypoxia J96.01    Acute kidney injury superimposed on stage 4 chronic kidney disease (HCC) N17.9, N18.4    CKD (chronic kidney disease) stage 3, GFR 30-59 ml/min ([facility]) N18.30    Hyponatremia E87.1    Aortic stenosis, severe I35.0    Coronary artery disease involving native coronary artery of native heart without angina pectoris I25.10    CHF (congestive heart failure), NYHA class III, acute on chronic, combined ([facility]) I50.43    Cardiomyopathy ([facility]) I42.9

## 2025-02-17 NOTE — PROGRESS NOTES
[facility]  STRZ CCU-STEPDOWN 3B  Occupational Therapy  Daily Note    Discharge Recommendations:  LTACH versus SNF dependent on medical/oxygen needs   Equipment Recommendations: No (continue to monitor for needs)        Time In: 1454  Time Out: 1521  Timed Code Treatment Minutes: 27 Minutes  Minutes: 27          Date: 2025  Patient Name: Portillo Daly,   Gender: male      Room: [address] [place] [address]  MRN: [id number]  : 1940  (84 y.o.)  Referring Practitioner: Kayleen Jesus PA  Diagnosis: Sepsis (HCC)  Additional Pertinent Hx: Per EMR: \"84 y.o. male with PMHx of COPD, CKD, A-fib, TAVR, chronic respiratory failure with hypoxia who presents to [facility] with shortness of breath. Patient states that today he noted to have some chills and being more short of breath. He states that has had a long history of being short of breath, especially with ambulation. States that today he just knew he was sick. Noted to have O2 saturation of 78% prior to arrival to ED and wears 4-6 L NC at home. \"    Restrictions/Precautions:  Restrictions/Precautions: Fall Risk, Contact Precautions     Social/Functional History:  Lives With: Significant other  Type of Home: Trailer  Home Layout: One level  Home Access: Stairs to enter with rails  Entrance Stairs - Number of Steps: 5 with (B) rails but unable to reach both at the same time  Entrance Stairs - Rails: Both  Home Equipment: Oxygen, Cane (2L baseline)   Bathroom Shower/Tub: Tub/Shower unit (Patient states he sponge bathes because of the oxygen)  Bathroom Toilet: Standard       Prior Level of Assist for ADLs: Independent  Prior Level of Assist for Transfers: Independent  Prior Level of Assist for Ambulation: Independent household ambulator, with or without device  Has the patient had two or more falls in the past year or any fall with injury in the past year?: No    Active : Yes  Patient's  Info: s/o  Leisure & Hobbies: going to [employer]

## 2025-02-17 NOTE — PROGRESS NOTES
Warfarin Pharmacy Consult Note    Warfarin Indication: Atrial fibrillation/Atrial flutter  Target INR: 2.0-3.0  Dose prior to admission: 2.5 mg MF, 1.25 mg TWThSS     Recent Labs     02/15/25  0724 02/17/25  0740   HGB 11.1* 11.3*    231     Recent Labs     02/15/25  0724 02/16/25  0447 02/17/25  0740   INR 2.90* 3.04* 2.74*     Concurrent anticoagulants/antiplatelets: none  Significant warfarin drug-drug interactions:  melatonin (HM)     Date INR Warfarin Dose   1/30/2025 3.49 No coumadin    1/31/2025 2.34  1.25 mg     2/1/2025  2.14 2 mg     2/2/2025  2.96 0.5 mg    2/3/2025  3.10  No coumadin   2/4/2025    4.80   No coumadin    2/5/2025    6.69   No coumadin    2/6/2025  5.75 No coumadin  Vitamin K 2.5 mg PO   2/7/2025  3.53 No coumadin   2/8/2025 1.53 1 mg   2/9/2025 1.54 2.5 mg   2/10/2025 1.58 2 mg    2/11/2025  2.07  1.25 mg    2/12/2025  2.16  1.25 mg   2/13/2025 2.45 1.25 mg   2/14/2025 3.00 1 mg   2/15/2025 2.90 1.25 mg   2/16/2025 3.04 1 mg   2/17/25 2.74 1.25 mg     Monitoring:                   INR will be monitored daily.    **Please contact pharmacy for discharge instructions when indicated**    Matt Plaza [facility]

## 2025-02-17 NOTE — CARE COORDINATION
25, 9:02 AM EST    DISCHARGE PLANNING EVALUATION    Patients precert has .  Will need to restart when medically stable.

## 2025-02-17 NOTE — PROGRESS NOTES
Pharmacy Note - Extended Infusion Beta-Lactam Dose Adjustment    Cefepime 2000 mg q12h intermittent infusion for treatment of Hospital acquired pneumonia. Per [facility] Extended Infusion Beta-Lactam Policy, cefepime will be changed to 2000 mg loading dose followed by 1000 mg q12h extended infusion    Estimated Creatinine Clearance: Estimated Creatinine Clearance: 21 mL/min (A) (based on SCr of 2.6 mg/dL (H)).    Dialysis Status, MILLY, CKD: MILLY on CKD    BMI: Body mass index is 28.76 kg/m².    Rationale for Adjustment: Dose adjusted per [facility] Extended Infusion Policy based on renal function and indication. The above medication is renally eliminated and demonstrates time-dependent effects on bacterial eradication. Extended-infusion dosing strategy aims to enhance microbiologic and clinical efficacy.     Pharmacy will monitor renal function daily and adjust dose as necessary.      Please call with any questions.    Thank you,  Matt Plaza [facility]

## 2025-02-17 NOTE — PLAN OF CARE
(Taken 2/17/2025 0240)  Hemodynamic stability and optimal renal function maintained: Monitor labs and assess for signs and symptoms of volume excess or deficit     Problem: Pain  Goal: Verbalizes/displays adequate comfort level or baseline comfort level  2/17/2025 0240 by Yennifer Patterson RN  Outcome: Progressing  Flowsheets (Taken 2/17/2025 0240)  Verbalizes/displays adequate comfort level or baseline comfort level:   Encourage patient to monitor pain and request assistance   Administer analgesics based on type and severity of pain and evaluate response   Assess pain using appropriate pain scale   Implement non-pharmacological measures as appropriate and evaluate response  Note: Ongoing assessment & interventions provided throughout shift.  Reminded patient to report any pain, pressure, or shortness of breath to the nurse.  Pain medications provided per physician's orders.       Problem: Nutrition Deficit:  Goal: Optimize nutritional status  2/17/2025 0240 by Yennifer Patterson RN  Outcome: Progressing  Flowsheets (Taken 2/17/2025 0240)  Nutrient intake appropriate for improving, restoring, or maintaining nutritional needs:   Assess nutritional status and recommend course of action   Monitor oral intake, labs, and treatment plans     Problem: Skin/Tissue Integrity  Goal: Skin integrity remains intact  Description: 1.  Monitor for areas of redness and/or skin breakdown  2.  Assess vascular access sites hourly  3.  Every 4-6 hours minimum:  Change oxygen saturation probe site  4.  Every 4-6 hours:  If on nasal continuous positive airway pressure, respiratory therapy assess nares and determine need for appliance change or resting period  2/17/2025 0240 by Yennifer Patterson, RN  Outcome: Progressing  Flowsheets (Taken 2/17/2025 0240)  Skin Integrity Remains Intact: Monitor for areas of redness and/or skin breakdown  Note: Ongoing assessment & interventions provided throughout shift.  Skin assessments provided.

## 2025-02-17 NOTE — CONSULTS
Provider Consult Note        Patient:   Portillo Daly  YOB: 1940  Age:  84 y.o.  Room:  [facility]38/038-A  MRN:  [id number]   Acct: [id number]  PCP: Elinor Ly APRN - CNP    Date of Admission:  1/29/2025  8:08 PM  Date of Service:  2/18/2025    Reason for Consult: Goals of Care             Subjective   Chief Complaint:-   Shortness of Breath       History Obtained From:-  Patient, Electronic Medical Record, and Patient's primary nurse    History of Present Illness:-                   Portillo Daly is a 84 y.o. male who  has a past medical history of MILLY (acute kidney injury) ([facility]), Atrial fibrillation ([facility]), Cerebral artery occlusion with cerebral infarction (HCC), CHF (congestive heart failure), NYHA class III, acute on chronic, combined ([facility]), COPD (chronic obstructive pulmonary disease) ([facility]), Coronary artery disease involving native coronary artery of native heart with angina pectoris ([facility]), Diabetes mellitus, type 2 ([facility]), Hyperlipidemia, Hypertension, and Pneumonia. They present to the hospital with Shortness of Breath   and are admitted for Sepsis ([facility]).  Patient was recently diagnosed with COVID-19 and pneumonia prior to hospitalization.  Patient also has a history of COPD in which he follows with pulmonology for this.  It was noted on a visit in December 2024 with pulmonology that his DLCO had significantly decreased to 25.  On January 7, 2025 he had a CT chest to follow-up on the DLCO decline.  CT chest showing persistent dense retrocardiac left lower lobe medial consolidation, opacification of the left lower lobe posterior bronchi suggesting endobronchial debris/mucous plugging, patchy right lower lobe groundglass opacities and numerous scattered bilateral upper lobe tree-in-bud opacities suggesting airspace infection/inflammation. He was last seen on January 27, 2025.  Patient also has a history of congestive heart failure.  His last echo was on

## 2025-02-17 NOTE — PROGRESS NOTES
[facility]   Nephrology progress note  2/17/2025, 9:56 AM      Pt Name:    Portillo Daly  MRN:     [id number]     YOB: 1940  Admit Date:    1/29/2025  8:08 PM    Chief Complaint: Nephrology following for MILLY/CKD IV    Subjective:  Patient was seen and examined this morning.  Requiring a lot of oxygen  Overall negative fluid balance  But weights have gone up in last 24 hours    Objective:  24HR INTAKE/OUTPUT:    Intake/Output Summary (Last 24 hours) at 2/17/2025 0956  Last data filed at 2/17/2025 0928  Gross per 24 hour   Intake 990 ml   Output 1025 ml   Net -35 ml         I/O last 3 completed shifts:  In: 850 [P.O.:850]  Out: 1075 [Urine:1075]  I/O this shift:  In: 240 [P.O.:240]  Out: 275 [Urine:275]   Admission weight: 95.1 kg (209 lb 10.5 oz)  Wt Readings from Last 3 Encounters:   02/17/25 83.3 kg (183 lb 10.3 oz)   01/27/25 95.3 kg (210 lb)   01/15/25 94.3 kg (208 lb)        Vitals :   Vitals:    02/17/25 0346 02/17/25 0530 02/17/25 0922 02/17/25 0951   BP: 135/60   (!) 108/55   Pulse: 68 70 74 70   Resp: 22 22 22 22   Temp: 97.8 °F (36.6 °C)   97.5 °F (36.4 °C)   TempSrc: Oral   Oral   SpO2: 90% 91% 92% 91%   Weight: 83.3 kg (183 lb 10.3 oz)      Height:           Physical examination  General Appearance: Awake, chronically ill-appearing  Neck: No JVD visibly noted  Lungs: No use of accessory muscle use, diminished air at the bases, some fine crackles  GI: soft, non-tender, no guarding  Extremities: + Edema noted    Medications:  Infusion:    sodium chloride      dextrose       Meds:    bumetanide  1 mg Oral Daily    ipratropium 0.5 mg-albuterol 2.5 mg  1 Dose Inhalation TID RT    potassium chloride  20 mEq Oral Daily with breakfast    insulin glargine  15 Units SubCUTAneous QAM    insulin lispro  0-16 Units SubCUTAneous 4x Daily AC & HS    midodrine  10 mg Oral TID WC    metoprolol succinate  25 mg Oral Daily    dextromethorphan-guaiFENesin  1 tablet Oral BID    [Held by

## 2025-02-17 NOTE — CONSULTS
[facility]      Patient - Portillo Daly   MRN -  [id number]   [facility] # - [id number]   - 1940      Date of Admission -  2025  8:08 PM  Date of evaluation -  2025  Room - 3B-38/038-A   Hospital Day - 19  Consulting - Rahul Connolly MD Primary Care Physician - Elinor Ly APRN - YOLANDE     Problem List      Active Hospital Problems    Diagnosis Date Noted    Tremor due to metabolic disorder [E88.9, R25.1] 2025    Chronic diastolic CHF (congestive heart failure), NYHA class 2 (HCC) [I50.32] 02/10/2025    Bacteremia [R78.81] 2025    Hypoxemia [R09.02] 2025    Sepsis (HCC) [A41.9] 2025     Reason for Consult    Dyspnea  HPI   History Obtained From: Patient, patient's significant other and electronic medical record.    Portillo Daly is a 84 y.o. male with PMH notable for COPD, CKD, A-fib, TAVR, chronic hypoxic respiratory failure who presented to [facility] with shortness of breath.  Patient endorsed associated symptom of chills and dyspnea on exertion.  Was admitted and treated for acute hypoxic respiratory failure, found to have Pseudomonas bacteremia with associated sepsis, ID consulted.  Also developed acute on chronic renal failure, nephrology consulted and following.  Pulmonology service consulted for persistent dyspnea.    He is having shortness of breath: Yes  Onset: gradual   Duration:3day.  Diurnal variation:  None.    He denies orthopnea.  He denies paroxysmal nocturnal dyspnea.      He is having cough: Yes  Duration of cough: chronic .   His cough is associated with sputum production: No   Hemoptysis:No  Diurnal variation: None.       Relieving factors: No  Aggravating factors: No      He is having chest pain:No      PMHx   Past Medical History      Diagnosis Date    MILLY (acute kidney injury) (HCC)     Atrial fibrillation (HCC)     Cerebral artery occlusion with cerebral infarction (HCC)     CHF (congestive heart

## 2025-02-17 NOTE — PROGRESS NOTES
Continue to hold losartan    #DM-2 sugars 144, 259 , and 125 will add lantus 15 units daily and with High SS   Using humalog.    #Acute on chronic systolic heart failure: Last known EF 45 to 50%.  -  IV Bumex BID dose increased to 2 mg bid. Toprol xl  - Intake/output.Daily weights.  - Fluid restriction.     #HTN runs soft BP , no fever or chills  -On low dose BB and ARB being on hold and on proamatine.       #Rash   - Likely medication induced. Cefepime transitioned to Levaquin, no new issues.Completed course.         Chronic Conditions (reviewed and stable unless otherwise stated)      COPD: patient had exacerbation while in the ED. Given Solumedrol. Steroid inhaler BID.   DMII: Resume Lantus with high intensity sliding scale.   Paroxysmal A-fib: Status post pacemaker, continue with metoprolol and Coumadin, as INR is subtherapeutic initiate her IV heparin  and continue with coumadin , till INR 2-3 . Spoke with pharmacist and his RN.    LDA: []CVC / []PICC / []Midline / []Kitchen / []Drains / []Mediport / [x]None  Antibiotics: Cefepime and Doxycycline  Steroids: Pulmicort  Labs (still needed?): [x]Yes / []No  IVF (still needed?): [x]Yes / []No    Level of care: [x]Step Down / []Med-Surg  Bed Status: [x]Inpatient / []Observation  Telemetry: [x]Yes / []No  PT/OT: []Yes / [x]No    DVT Prophylaxis: [] Lovenox / [] Heparin / [] SCDs / [x] Already on Systemic Anticoagulation / [] None   Code status: Limited     Expected discharge date:  Unknown  Disposition: may need [place] , spoke with pt and . Will need to revisit on Monday and get insurance approval.     ===================================================================    Chief Complaint: Acute hypoxemic respiratory failure    Subjective (past 24 hours):    Patient seen and examined at bed side.Continue to report SOB, On 15 l nc now.Says feeling horrible.    HPI / Hospital Course:    Portillo Daly is a 83 yo obese male that was admitted due to acute

## 2025-02-17 NOTE — RT PROTOCOL NOTE
RT Inhaler-Nebulizer Bronchodilator Protocol Note    There is a bronchodilator order in the chart from a provider indicating to follow the RT Bronchodilator Protocol and there is an “Initiate RT Inhaler-Nebulizer Bronchodilator Protocol” order as well (see protocol at bottom of note).    CXR Findings:  No results found.    The findings from the last RT Protocol Assessment were as follows:   History Pulmonary Disease: Chronic pulmonary disease  Respiratory Pattern: Dyspnea on exertion or RR 21-25 bpm  Breath Sounds: Intermittent or unilateral wheezes  Cough: Strong, spontaneous, non-productive  Indication for Bronchodilator Therapy: Wheezing associated with pulm disorder  Bronchodilator Assessment Score: 8    Aerosolized bronchodilator medication orders have been revised according to the RT Inhaler-Nebulizer Bronchodilator Protocol below.    Respiratory Therapist to perform RT Therapy Protocol Assessment initially then follow the protocol.  Repeat RT Therapy Protocol Assessment PRN for score 0-3 or on second treatment, BID, and PRN for scores above 3.    No Indications - adjust the frequency to every 6 hours PRN wheezing or bronchospasm, if no treatments needed after 48 hours then discontinue using Per Protocol order mode.     If indication present, adjust the RT bronchodilator orders based on the Bronchodilator Assessment Score as indicated below.  Use Inhaler orders unless patient has one or more of the following: on home nebulizer, not able to hold breath for 10 seconds, is not alert and oriented, cannot activate and use MDI correctly, or respiratory rate 25 breaths per minute or more, then use the equivalent nebulizer order(s) with same Frequency and PRN reasons based on the score.  If a patient is on this medication at home then do not decrease Frequency below that used at home.    0-3 - enter or revise RT bronchodilator order(s) to equivalent RT Bronchodilator order with Frequency of every 4 hours PRN for 
RT Inhaler-Nebulizer Bronchodilator Protocol Note    There is a bronchodilator order in the chart from a provider indicating to follow the RT Bronchodilator Protocol and there is an “Initiate RT Inhaler-Nebulizer Bronchodilator Protocol” order as well (see protocol at bottom of note).    CXR Findings:  No results found.    The findings from the last RT Protocol Assessment were as follows:   History Pulmonary Disease: Chronic pulmonary disease  Respiratory Pattern: Dyspnea on exertion or RR 21-25 bpm  Breath Sounds: Slightly diminished and/or crackles  Cough: Strong, productive  Indication for Bronchodilator Therapy: Decreased or absent breath sounds  Bronchodilator Assessment Score: 7    Aerosolized bronchodilator medication orders have been revised according to the RT Inhaler-Nebulizer Bronchodilator Protocol below.    Respiratory Therapist to perform RT Therapy Protocol Assessment initially then follow the protocol.  Repeat RT Therapy Protocol Assessment PRN for score 0-3 or on second treatment, BID, and PRN for scores above 3.    No Indications - adjust the frequency to every 6 hours PRN wheezing or bronchospasm, if no treatments needed after 48 hours then discontinue using Per Protocol order mode.     If indication present, adjust the RT bronchodilator orders based on the Bronchodilator Assessment Score as indicated below.  Use Inhaler orders unless patient has one or more of the following: on home nebulizer, not able to hold breath for 10 seconds, is not alert and oriented, cannot activate and use MDI correctly, or respiratory rate 25 breaths per minute or more, then use the equivalent nebulizer order(s) with same Frequency and PRN reasons based on the score.  If a patient is on this medication at home then do not decrease Frequency below that used at home.    0-3 - enter or revise RT bronchodilator order(s) to equivalent RT Bronchodilator order with Frequency of every 4 hours PRN for wheezing or increased work 
RT Inhaler-Nebulizer Bronchodilator Protocol Note    There is a bronchodilator order in the chart from a provider indicating to follow the RT Bronchodilator Protocol and there is an “Initiate RT Inhaler-Nebulizer Bronchodilator Protocol” order as well (see protocol at bottom of note).    CXR Findings:  No results found.    The findings from the last RT Protocol Assessment were as follows:   History Pulmonary Disease: Chronic pulmonary disease  Respiratory Pattern: Dyspnea on exertion or RR 21-25 bpm  Breath Sounds: Slightly diminished and/or crackles  Cough: Strong, spontaneous, non-productive  Indication for Bronchodilator Therapy: Decreased or absent breath sounds  Bronchodilator Assessment Score: 6    Aerosolized bronchodilator medication orders have been revised according to the RT Inhaler-Nebulizer Bronchodilator Protocol below.    Respiratory Therapist to perform RT Therapy Protocol Assessment initially then follow the protocol.  Repeat RT Therapy Protocol Assessment PRN for score 0-3 or on second treatment, BID, and PRN for scores above 3.    No Indications - adjust the frequency to every 6 hours PRN wheezing or bronchospasm, if no treatments needed after 48 hours then discontinue using Per Protocol order mode.     If indication present, adjust the RT bronchodilator orders based on the Bronchodilator Assessment Score as indicated below.  Use Inhaler orders unless patient has one or more of the following: on home nebulizer, not able to hold breath for 10 seconds, is not alert and oriented, cannot activate and use MDI correctly, or respiratory rate 25 breaths per minute or more, then use the equivalent nebulizer order(s) with same Frequency and PRN reasons based on the score.  If a patient is on this medication at home then do not decrease Frequency below that used at home.    0-3 - enter or revise RT bronchodilator order(s) to equivalent RT Bronchodilator order with Frequency of every 4 hours PRN for wheezing 
RT Inhaler-Nebulizer Bronchodilator Protocol Note    There is a bronchodilator order in the chart from a provider indicating to follow the RT Bronchodilator Protocol and there is an “Initiate RT Inhaler-Nebulizer Bronchodilator Protocol” order as well (see protocol at bottom of note).    CXR Findings:  No results found.    The findings from the last RT Protocol Assessment were as follows:   History Pulmonary Disease: Chronic pulmonary disease  Respiratory Pattern: Regular pattern and RR 12-20 bpm  Breath Sounds: Inspiratory and expiratory or bilateral wheezing and/or rhonchi  Cough: Strong, productive  Indication for Bronchodilator Therapy: Decreased or absent breath sounds, On home bronchodilators  Bronchodilator Assessment Score: 9    Aerosolized bronchodilator medication orders have been revised according to the RT Inhaler-Nebulizer Bronchodilator Protocol below.    Respiratory Therapist to perform RT Therapy Protocol Assessment initially then follow the protocol.  Repeat RT Therapy Protocol Assessment PRN for score 0-3 or on second treatment, BID, and PRN for scores above 3.    No Indications - adjust the frequency to every 6 hours PRN wheezing or bronchospasm, if no treatments needed after 48 hours then discontinue using Per Protocol order mode.     If indication present, adjust the RT bronchodilator orders based on the Bronchodilator Assessment Score as indicated below.  Use Inhaler orders unless patient has one or more of the following: on home nebulizer, not able to hold breath for 10 seconds, is not alert and oriented, cannot activate and use MDI correctly, or respiratory rate 25 breaths per minute or more, then use the equivalent nebulizer order(s) with same Frequency and PRN reasons based on the score.  If a patient is on this medication at home then do not decrease Frequency below that used at home.    0-3 - enter or revise RT bronchodilator order(s) to equivalent RT Bronchodilator order with Frequency 
RT Inhaler-Nebulizer Bronchodilator Protocol Note    There is a bronchodilator order in the chart from a provider indicating to follow the RT Bronchodilator Protocol and there is an “Initiate RT Inhaler-Nebulizer Bronchodilator Protocol” order as well (see protocol at bottom of note).    CXR Findings:  No results found.    The findings from the last RT Protocol Assessment were as follows:   History Pulmonary Disease: Chronic pulmonary disease  Respiratory Pattern: Regular pattern and RR 12-20 bpm  Breath Sounds: Inspiratory and expiratory or bilateral wheezing and/or rhonchi  Cough: Strong, spontaneous, non-productive  Indication for Bronchodilator Therapy: Decreased or absent breath sounds  Bronchodilator Assessment Score: 8    Aerosolized bronchodilator medication orders have been revised according to the RT Inhaler-Nebulizer Bronchodilator Protocol below.    Respiratory Therapist to perform RT Therapy Protocol Assessment initially then follow the protocol.  Repeat RT Therapy Protocol Assessment PRN for score 0-3 or on second treatment, BID, and PRN for scores above 3.    No Indications - adjust the frequency to every 6 hours PRN wheezing or bronchospasm, if no treatments needed after 48 hours then discontinue using Per Protocol order mode.     If indication present, adjust the RT bronchodilator orders based on the Bronchodilator Assessment Score as indicated below.  Use Inhaler orders unless patient has one or more of the following: on home nebulizer, not able to hold breath for 10 seconds, is not alert and oriented, cannot activate and use MDI correctly, or respiratory rate 25 breaths per minute or more, then use the equivalent nebulizer order(s) with same Frequency and PRN reasons based on the score.  If a patient is on this medication at home then do not decrease Frequency below that used at home.    0-3 - enter or revise RT bronchodilator order(s) to equivalent RT Bronchodilator order with Frequency of every 
RT Inhaler-Nebulizer Bronchodilator Protocol Note    There is a bronchodilator order in the chart from a provider indicating to follow the RT Bronchodilator Protocol and there is an “Initiate RT Inhaler-Nebulizer Bronchodilator Protocol” order as well (see protocol at bottom of note).    CXR Findings:  XR CHEST PORTABLE    Result Date: 2/16/2025  Improved aeration in the left lower lobe with residual bibasilar atelectasis and small layering effusions. This document has been electronically signed by: Everton Jackson MD on 02/16/2025 01:15 AM      The findings from the last RT Protocol Assessment were as follows:   History Pulmonary Disease: Chronic pulmonary disease  Respiratory Pattern: Regular pattern and RR 12-20 bpm  Breath Sounds: Inspiratory and expiratory or bilateral wheezing and/or rhonchi  Cough: Strong, spontaneous, non-productive  Indication for Bronchodilator Therapy: Decreased or absent breath sounds, Wheezing associated with pulm disorder  Bronchodilator Assessment Score: 8    Aerosolized bronchodilator medication orders have been revised according to the RT Inhaler-Nebulizer Bronchodilator Protocol below.    Respiratory Therapist to perform RT Therapy Protocol Assessment initially then follow the protocol.  Repeat RT Therapy Protocol Assessment PRN for score 0-3 or on second treatment, BID, and PRN for scores above 3.    No Indications - adjust the frequency to every 6 hours PRN wheezing or bronchospasm, if no treatments needed after 48 hours then discontinue using Per Protocol order mode.     If indication present, adjust the RT bronchodilator orders based on the Bronchodilator Assessment Score as indicated below.  Use Inhaler orders unless patient has one or more of the following: on home nebulizer, not able to hold breath for 10 seconds, is not alert and oriented, cannot activate and use MDI correctly, or respiratory rate 25 breaths per minute or more, then use the equivalent nebulizer order(s) with 
RT Inhaler-Nebulizer Bronchodilator Protocol Note    There is a bronchodilator order in the chart from a provider indicating to follow the RT Bronchodilator Protocol and there is an “Initiate RT Inhaler-Nebulizer Bronchodilator Protocol” order as well (see protocol at bottom of note).    CXR Findings:  XR CHEST PORTABLE    Result Date: 2/16/2025  Improved aeration in the left lower lobe with residual bibasilar atelectasis and small layering effusions. This document has been electronically signed by: Everton Jackson MD on 02/16/2025 01:15 AM    XR CHEST PORTABLE    Result Date: 2/15/2025  1. Mild cardiomegaly. Permanent dual-chamber pacemaker. An aortic stent/valve is present. 2. Moderate bibasilar atelectasis/pneumonia. Tiny bilateral pleural effusions. These findings have worsened somewhat since prior. 3. Small nodular density projected over the anterior aspect of the right second rib, possibly an inflammatory nodule. Note that no lung nodule is seen on the recent CT thorax from 1/30/2025. **This report has been created using voice recognition software.  It may contain minor errors which are inherent in voice recognition technology.** Electronically signed by Dr. Jhony Zavala      The findings from the last RT Protocol Assessment were as follows:   History Pulmonary Disease: Chronic pulmonary disease  Respiratory Pattern: Regular pattern and RR 12-20 bpm  Breath Sounds: Inspiratory and expiratory or bilateral wheezing and/or rhonchi  Cough: Strong, productive  Indication for Bronchodilator Therapy: Decreased or absent breath sounds  Bronchodilator Assessment Score: 9    Aerosolized bronchodilator medication orders have been revised according to the RT Inhaler-Nebulizer Bronchodilator Protocol below.    Respiratory Therapist to perform RT Therapy Protocol Assessment initially then follow the protocol.  Repeat RT Therapy Protocol Assessment PRN for score 0-3 or on second treatment, BID, and PRN for scores above 
RT Inhaler-Nebulizer Bronchodilator Protocol Note    There is a bronchodilator order in the chart from a provider indicating to follow the RT Bronchodilator Protocol and there is an “Initiate RT Inhaler-Nebulizer Bronchodilator Protocol” order as well (see protocol at bottom of note).    CXR Findings:  XR CHEST PORTABLE    Result Date: 2/9/2025  No acute pulmonary disease. Borderline cardiomegaly. This document has been electronically signed by: Shai Shea MD on 02/09/2025 06:20 AM      The findings from the last RT Protocol Assessment were as follows:   History Pulmonary Disease: Chronic pulmonary disease  Respiratory Pattern: Regular pattern and RR 12-20 bpm  Breath Sounds: Inspiratory and expiratory or bilateral wheezing and/or rhonchi  Cough: Strong, spontaneous, non-productive  Indication for Bronchodilator Therapy: Decreased or absent breath sounds  Bronchodilator Assessment Score: 8    Aerosolized bronchodilator medication orders have been revised according to the RT Inhaler-Nebulizer Bronchodilator Protocol below.    Respiratory Therapist to perform RT Therapy Protocol Assessment initially then follow the protocol.  Repeat RT Therapy Protocol Assessment PRN for score 0-3 or on second treatment, BID, and PRN for scores above 3.    No Indications - adjust the frequency to every 6 hours PRN wheezing or bronchospasm, if no treatments needed after 48 hours then discontinue using Per Protocol order mode.     If indication present, adjust the RT bronchodilator orders based on the Bronchodilator Assessment Score as indicated below.  Use Inhaler orders unless patient has one or more of the following: on home nebulizer, not able to hold breath for 10 seconds, is not alert and oriented, cannot activate and use MDI correctly, or respiratory rate 25 breaths per minute or more, then use the equivalent nebulizer order(s) with same Frequency and PRN reasons based on the score.  If a patient is on this medication at home 
RT Inhaler-Nebulizer Bronchodilator Protocol Note    There is a bronchodilator order in the chart from a provider indicating to follow the RT Bronchodilator Protocol and there is an “Initiate RT Inhaler-Nebulizer Bronchodilator Protocol” order as well (see protocol at bottom of note).    CXR Findings:  XR CHEST PORTABLE    Result Date: 2/9/2025  No acute pulmonary disease. Borderline cardiomegaly. This document has been electronically signed by: Shai Shea MD on 02/09/2025 06:20 AM      The findings from the last RT Protocol Assessment were as follows:   History Pulmonary Disease: Chronic pulmonary disease  Respiratory Pattern: Regular pattern and RR 12-20 bpm  Breath Sounds: Inspiratory and expiratory or bilateral wheezing and/or rhonchi  Cough: Strong, spontaneous, non-productive  Indication for Bronchodilator Therapy: Decreased or absent breath sounds  Bronchodilator Assessment Score: 8    Aerosolized bronchodilator medication orders have been revised according to the RT Inhaler-Nebulizer Bronchodilator Protocol below.    Respiratory Therapist to perform RT Therapy Protocol Assessment initially then follow the protocol.  Repeat RT Therapy Protocol Assessment PRN for score 0-3 or on second treatment, BID, and PRN for scores above 3.    No Indications - adjust the frequency to every 6 hours PRN wheezing or bronchospasm, if no treatments needed after 48 hours then discontinue using Per Protocol order mode.     If indication present, adjust the RT bronchodilator orders based on the Bronchodilator Assessment Score as indicated below.  Use Inhaler orders unless patient has one or more of the following: on home nebulizer, not able to hold breath for 10 seconds, is not alert and oriented, cannot activate and use MDI correctly, or respiratory rate 25 breaths per minute or more, then use the equivalent nebulizer order(s) with same Frequency and PRN reasons based on the score.  If a patient is on this medication at home 
RT Inhaler-Nebulizer Bronchodilator Protocol Note    There is a bronchodilator order in the chart from a provider indicating to follow the RT Bronchodilator Protocol and there is an “Initiate RT Inhaler-Nebulizer Bronchodilator Protocol” order as well (see protocol at bottom of note).    CXR Findings:  XR CHEST PORTABLE    Result Date: 2/9/2025  No acute pulmonary disease. Borderline cardiomegaly. This document has been electronically signed by: Shai Shea MD on 02/09/2025 06:20 AM      The findings from the last RT Protocol Assessment were as follows:   History Pulmonary Disease: Chronic pulmonary disease  Respiratory Pattern: Regular pattern and RR 12-20 bpm  Breath Sounds: Slightly diminished and/or crackles  Cough: Strong, spontaneous, non-productive  Indication for Bronchodilator Therapy: Decreased or absent breath sounds  Bronchodilator Assessment Score: 4    Aerosolized bronchodilator medication orders have been revised according to the RT Inhaler-Nebulizer Bronchodilator Protocol below.    Respiratory Therapist to perform RT Therapy Protocol Assessment initially then follow the protocol.  Repeat RT Therapy Protocol Assessment PRN for score 0-3 or on second treatment, BID, and PRN for scores above 3.    No Indications - adjust the frequency to every 6 hours PRN wheezing or bronchospasm, if no treatments needed after 48 hours then discontinue using Per Protocol order mode.     If indication present, adjust the RT bronchodilator orders based on the Bronchodilator Assessment Score as indicated below.  Use Inhaler orders unless patient has one or more of the following: on home nebulizer, not able to hold breath for 10 seconds, is not alert and oriented, cannot activate and use MDI correctly, or respiratory rate 25 breaths per minute or more, then use the equivalent nebulizer order(s) with same Frequency and PRN reasons based on the score.  If a patient is on this medication at home then do not decrease Frequency 
below that used at home.    0-3 - enter or revise RT bronchodilator order(s) to equivalent RT Bronchodilator order with Frequency of every 4 hours PRN for wheezing or increased work of breathing using Per Protocol order mode.        4-6 - enter or revise RT Bronchodilator order(s) to two equivalent RT bronchodilator orders with one order with BID Frequency and one order with Frequency of every 4 hours PRN wheezing or increased work of breathing using Per Protocol order mode.        7-10 - enter or revise RT Bronchodilator order(s) to two equivalent RT bronchodilator orders with one order with TID Frequency and one order with Frequency of every 4 hours PRN wheezing or increased work of breathing using Per Protocol order mode.       11-13 - enter or revise RT Bronchodilator order(s) to one equivalent RT bronchodilator order with QID Frequency and an Albuterol order with Frequency of every 4 hours PRN wheezing or increased work of breathing using Per Protocol order mode.      Greater than 13 - enter or revise RT Bronchodilator order(s) to one equivalent RT bronchodilator order with every 4 hours Frequency and an Albuterol order with Frequency of every 2 hours PRN wheezing or increased work of breathing using Per Protocol order mode.     RT to enter RT Home Evaluation for COPD & MDI Assessment order using Per Protocol order mode.    Electronically signed by Julio Henry RCP on 2/9/2025 at 9:46 AM

## 2025-02-18 VITALS
BODY MASS INDEX: 31.11 KG/M2 | TEMPERATURE: 101.7 F | WEIGHT: 198.19 LBS | RESPIRATION RATE: 22 BRPM | OXYGEN SATURATION: 94 % | HEART RATE: 70 BPM | SYSTOLIC BLOOD PRESSURE: 93 MMHG | DIASTOLIC BLOOD PRESSURE: 50 MMHG | HEIGHT: 67 IN

## 2025-02-18 LAB — MRSA DNA SPEC QL NAA+PROBE: NEGATIVE

## 2025-02-18 PROCEDURE — 6360000002 HC RX W HCPCS: Performed by: INTERNAL MEDICINE

## 2025-02-18 PROCEDURE — 94761 N-INVAS EAR/PLS OXIMETRY MLT: CPT

## 2025-02-18 PROCEDURE — 6370000000 HC RX 637 (ALT 250 FOR IP)

## 2025-02-18 PROCEDURE — 94640 AIRWAY INHALATION TREATMENT: CPT

## 2025-02-18 PROCEDURE — 2700000000 HC OXYGEN THERAPY PER DAY

## 2025-02-18 PROCEDURE — 6370000000 HC RX 637 (ALT 250 FOR IP): Performed by: PHYSICIAN ASSISTANT

## 2025-02-18 PROCEDURE — 2580000003 HC RX 258: Performed by: INTERNAL MEDICINE

## 2025-02-18 PROCEDURE — 6360000002 HC RX W HCPCS: Performed by: PHYSICIAN ASSISTANT

## 2025-02-18 RX ADMIN — ALBUTEROL SULFATE 2.5 MG: 2.5 SOLUTION RESPIRATORY (INHALATION) at 02:56

## 2025-02-18 RX ADMIN — CEFEPIME 1000 MG: 1 INJECTION, POWDER, FOR SOLUTION INTRAMUSCULAR; INTRAVENOUS at 02:15

## 2025-02-18 RX ADMIN — HYDROXYZINE HYDROCHLORIDE 10 MG: 10 TABLET ORAL at 03:41

## 2025-02-18 RX ADMIN — ACETAMINOPHEN 650 MG: 650 SUPPOSITORY RECTAL at 03:41

## 2025-02-18 RX ADMIN — DIPHENHYDRAMINE HYDROCHLORIDE 25 MG: 50 INJECTION INTRAMUSCULAR; INTRAVENOUS at 01:59

## 2025-02-18 NOTE — PLAN OF CARE
Problem: Respiratory - Adult  Goal: Achieves optimal ventilation and oxygenation  Outcome: Progressing  Flowsheets (Taken 2/18/2025 0239)  Achieves optimal ventilation and oxygenation:   Assess for changes in respiratory status   Position to facilitate oxygenation and minimize respiratory effort   Assess for changes in mentation and behavior   Oxygen supplementation based on oxygen saturation or arterial blood gases  Note: Lung sounds, pulse ox, and breathing monitored throughout shift.  Discussed correct technique and importance of deep breathing & coughing exercises with patient.  Patient able to demonstrate cough & deep breathing exercises to nurse.       Problem: Chronic Conditions and Co-morbidities  Goal: Patient's chronic conditions and co-morbidity symptoms are monitored and maintained or improved  Outcome: Progressing  Flowsheets (Taken 2/18/2025 0239)  Care Plan - Patient's Chronic Conditions and Co-Morbidity Symptoms are Monitored and Maintained or Improved:   Monitor and assess patient's chronic conditions and comorbid symptoms for stability, deterioration, or improvement   Collaborate with multidisciplinary team to address chronic and comorbid conditions and prevent exacerbation or deterioration   Update acute care plan with appropriate goals if chronic or comorbid symptoms are exacerbated and prevent overall improvement and discharge     Problem: Discharge Planning  Goal: Discharge to home or other facility with appropriate resources  Outcome: Progressing  Flowsheets (Taken 2/18/2025 0239)  Discharge to home or other facility with appropriate resources: Identify barriers to discharge with patient and caregiver     Problem: Safety - Adult  Goal: Free from fall injury  Outcome: Progressing  Flowsheets (Taken 2/18/2025 0239)  Free From Fall Injury: Instruct family/caregiver on patient safety  Note: Bed locked & in low position, call light in reach, side-rails up x2, bed/chair alarm utilized, non-slip

## 2025-02-18 NOTE — PROGRESS NOTES
Patient became anxious, complaining of shortness of breath and desaturating on monitor. Oxycodone given for shortness of breath. Dr Connolly notified of patient being anxious. Orders received for Seroquel.

## 2025-02-18 NOTE — PROGRESS NOTES
2148 Jessica Dumont, NP notified of patient's increased work of breathing, hallucinations, agitation, and fever.  2203 Per Jessica Dumont, monitoring patient.   2347 Jessica Dumont notified of patient's persistent temperature after tylenol given, packed in ice, and increased confusion noted.   0003 Per Jessica Dumont, continuing to monitor.  0400 Dr. Heller took over patient care  0406 Dr. Heller notified of patient's worsening respiratory status, increased agitation, confusion, and temperature.   0425 Dr. Heller to bedside, new orders placed.  0448 This RN noted a desaturation in oxygen on monitor, promptly went to bedside.  0449 This RN noted apenic breathing and mottling on patient, Sheila RN to bedside. Non-rebreather placed on patient.   0451 Dr. Heller asked to promptly come to bedside.  0453 Dr. Heller arrives at bedside. Oxygen saturation declining rapidly.  0454 This RN and Sheila RN to listen for heart sounds.  0455 Dr. Heller pronounced time of death.

## 2025-02-18 NOTE — DEATH NOTES
Death Pronouncement Note  Patient's Name: Portillo Daly   Patient's YOB: 1940  MRN Number: [id number]    Admitting Provider: Aj Martínez DO  Attending Provider: Rahul Connolly MD    Patient was examined and the following were absent:   No respiratory effort noted  No response to verbal/physical stimuli  No Carotid or Femoral pulse palpated  Absence of Breath / Heart sounds   Absence of Neurological response and Pupils fixed/dilated    Confirmed TOD 0455    Preliminary Cause of Death: Cardiopulmonary arrest    Electronically signed by Carlos Winn MD on 2/18/25 at 5:01 AM EST

## 2025-02-18 NOTE — DEATH NOTES
[facility]  Notice of Patient Passing      Patient Name- Portillo Daly   Acct Number- [id number]   Attending Physician- Rahul Connolly MD    Admitted on-1/29/2025  8:08 PM     On 2/18/2025 at 0455 patient was found in 3b38 with:   Absence of vital signs.   Absence of neurological response.    Confirmed time of death at 0455.   Physician or On-call Physician notified of time of death- yes    Family present at time of death- no   Spiritual care present at time of death- no    Physician was notified and orders were obtained to release the body.   Post-Mortem documentation completed; form printed, signed, and given to admitting.    Ave Elkins RN RN Nursing Supervisor/ Manager  2/18/25   5:10 AM

## 2025-02-18 NOTE — PROGRESS NOTES
Hospitalist Progress Note      Patient:  Portillo Daly 84 y.o. male     Unit/Bed:3B-/038-A    Date of Admission: 2025    Patient was  before seen by me.    ASSESSMENT AND PLAN    Active Problems    #Tremor and jerking like movement .  -Could be from building up of uremia.  -Neurology consult appreciated.No neurology workup needed at this time.    #Acute hypoxemic respiratory failure:   - 2/2 Pseudomonas PNA & Pulmonary Edema.   - Was on HFNC,weaned down to 6l nc,but patient is complaining about feeling SOB.  -Oxygen increased again to 10L.Feeling better on 10 l.  -Completed course of antibiotics.  -Chest xray done showed Moderate megaly. Permanent dual chamber pacemaker. An aortic stent/valve is present.Mild interstitial pneumonia/edema both lung bases. Tiny effusion left side.No appreciable change from prior study.  -Repeat chest xray Improved aeration in the left lower lobe with residual bibasilar atelectasis and small layering effusions.  -CT chest ordered showed New right lower lobe consolidation and patchy right upper lobe groundglass opacities suggest pneumonia.  -Started on cefepime.Pulmonology consult appreciated.  -Bumex dose increased to 2 mg iv bid by nephrology as weight has increased .  - on IS & Acapella.     #Pseudomonas, Bacterial PNA   - ID consulted - case discussed, Cefepime was transitioned to Levaquin,and completed therapy.   -New multilobar pneumonia.Started on cefepime. ID following.    #Sepsis 2/2 Pseudomonas Bacteremia   - BC 2 of 2 growing Pseudomonas. Was on Levaquin.   - Repeat blood culture showed NGTD.   -Again started on cefepime due to new multilobar pneumonia.      #Acute on chronic renal failure:   - Nephrology notes reviewed.   -Creatinine 3.4> 3.4->3.7->3.4->3.0->3.1->2.6.  - Patient baseline of  2.5-3.0.   -Patient has been retaining.Had straight cath done today  - Nephrology on the case.No indication for dialysis at this time.  - Renal ultrasound reviewed

## 2025-05-18 NOTE — PLAN OF CARE
Problem: Respiratory - Adult  Goal: Clear lung sounds  Outcome: Progressing   Continue inhaled medications as ordered to improve breath sounds.   Pt agrees with plan of care Pulmonary / Critical Care Progress Note      Patient Name: Sumeet Gomes  : 1962  MRN: [id number]  Primary Care Physician:  Mirtha Alexander APRN  Date of admission: 2025    Subjective   Subjective   Follow-up for COPD with acute exacerbation, acute on chronic hypoxic and hypercapnic respiratory failure    Over past 24 hours: Remains on nebulizers such as Brovana, Pulmicort, Yupelri.  Continues with Daliresp..  Continues with Lasix 20 mg oral daily.  Currently on Solu-Medrol 40 mg every 8    No acute events overnight.     This morning,   Currently on 3 L nasal cannula  Resting in bed  Reports feeling some better  No fever or chills  Denies any chest pain chest tightness  Wheezing continues to improve  Wore NIPPV    Objective   Objective     Vitals:   Temp:  [97.5 °F (36.4 °C)-98.4 °F (36.9 °C)] 97.5 °F (36.4 °C)  Heart Rate:  [60-83] 60  Resp:  [16-22] 22  BP: ()/(60-72) 100/60  Flow (L/min) (Oxygen Therapy):  [3] 3  Physical Exam   Vital Signs Reviewed   General:  WDWN, Alert, NAD.    HEENT:  PERRL, EOMI.  OP, nares clear, no sinus tenderness  Neck:  Supple, no JVD, no thyromegaly  Chest:  good aeration, clear to auscultation bilaterally, tympanic to percussion bilaterally, no work of breathing noted  CV: RRR, no MGR, pulses 2+, equal.  Abd:  Soft, NT, ND, + BS, no HSM  EXT:  no clubbing, no cyanosis, no edema  Neuro:  A&Ox3, CN grossly intact, no focal deficits.  Skin: No rashes or lesions noted      Result Review    Result Review:  I have personally reviewed the results from the time of this admission to 2025 06:57 EDT and agree with these findings:  [x]  Laboratory  [x]  Microbiology  [x]  Radiology  [x]  EKG/Telemetry   [x]  Cardiology/Vascular   []  Pathology  []  Old records  []  Other:  Most notable findings include:         Lab 25  0501 25  0307 252 25   WBC 8.63 3.77  --  5.68   HEMOGLOBIN 12.8* 13.9  --  15.6   HEMATOCRIT 41.3 46.0  --   52.2*   PLATELETS 116* 117*  --  128*   SODIUM 141 140  --  144   POTASSIUM 4.3 5.0  --  4.8   CHLORIDE 95* 92*  --  92*   CO2 38.8* 39.7*  --  44.3*   BUN 21 16  --  13   CREATININE 0.57* 0.59* 0.61 0.54*   GLUCOSE 138* 120*  --  120*   CALCIUM 9.5 10.0  --  10.4   TOTAL PROTEIN  --   --   --  7.2   ALBUMIN  --   --   --  4.9   GLOBULIN  --   --   --  2.3       XR Chest 1 View  Result Date: 5/16/2025  XR CHEST 1 VW Date of Exam: 5/16/2025 8:40 PM EDT Indication: SOA Triage Protocol Comparison: Portable chest 5/12/2024 Findings: The cardiomediastinal silhouette is within normal limits. Lungs are clear. No focal consolidation, pneumothorax, or significant pleural effusion. Osseous structures grossly intact. Emphysema.     Impression: Impression: 1. No acute process. 2. Emphysema. Electronically Signed: Herson Hicks MD  5/16/2025 9:10 PM EDT  Workstation ID: [id number]      CT Chest With Contrast Diagnostic  Result Date: 5/17/2025  CT CHEST W CONTRAST DIAGNOSTIC Date of Exam: 5/17/2025 5:35 PM EDT Indication: resp failure, increased work of breathing. Comparison: Chest CT without contrast dated 5/13/2024 and single view chest radiograph from 5/16/2025 Technique: Axial CT images were obtained of the chest after the uneventful intravenous administration of iodinated contrast.  Reconstructed coronal and sagittal images were also obtained. Automated exposure control and iterative construction methods were  used. Findings: There is moderate to severe emphysema and stable linear areas of scarring in the right lower lobe and lingula. No suspicious pulmonary nodules or consolidations. No interstitial thickening or bronchial wall thickening. The central tracheobronchial tree is widely patent. No pleural or pericardial effusions or pneumothorax. No pathologically enlarged thoracic or axillary adenopathy. Heart size is within normal limits. Coronary artery calcifications are present. The thoracic aorta is not aneurysmal. Pulmonary  arteries are normal in caliber and well-opacified. There is a air debris level in the proximal esophagus at the level of the thoracic inlet. The esophagus is poorly distended. The stomach is distended and debris-filled. A distinct mass at the GE junction is not identified on this exam. Limited images of the upper abdomen are otherwise unremarkable except for partially visualized cyst in the right kidney. No acute bony abnormalities or aggressive appearing focal osseous lesions.     Impression: Impression: 1.Moderate to severe emphysema with stable areas of scarring in the right lower lobe and lingula. No acute intrathoracic abnormality. 2.The esophagus is poorly distended and there is an air debris level in the proximal esophagus at the level of the thoracic inlet, likely due to gastroesophageal reflux or possibly esophageal dysmotility. The stomach is distended and debris-filled. A distinct mass at the GE junction is not identified on this exam. Electronically Signed: Hansel Triplett DO  5/17/2025 6:04 PM EDT  Workstation ID: [id number]        Assessment & Plan   Assessment / Plan     Active Hospital Problems:  Active Hospital Problems    Diagnosis     **Acute on chronic respiratory failure with hypoxia and hypercapnia          Impression:   Very severe COPD with acute exacerbation  Acute on chronic hypoxic and hypercapnic respiratory failure  CO2 narcosis  Moderate malnutrition  Recurrent COPD with acute exacerbation  Increased work of breathing  Pulmonary hypertension, class II and class III, with possible component of class I  Chronic smoking       Plan:   Encourage NIPPV with naps and at nighttime  ABG improved  Continue Brovana, Pulmicort and Yupelri  Continue Daliresp daily  Continue with Solu-Medrol 40 mg IV every 8 hours.  Continue with Lasix 20 mg orally daily.  Check sputum culture if able to obtain  Continue Protonix 40 mg once daily.  Nicotine patch.  Start bronchopulmonary hygiene.  Encourage I-S and  arleth  Encourage activity as tolerated  Consult RT  for home NIPPV needs  Follow-up pulmonary clinic 1 to 2 weeks after discharge    VTE Prophylaxis:  Pharmacologic VTE prophylaxis orders are present.    CODE STATUS:   Code Status (Patient has no pulse and is not breathing): No CPR (Do Not Attempt to Resuscitate)  Medical Interventions (Patient has pulse or is breathing): Limited Support  Medical Intervention Limits: No intubation (DNI)  Level Of Support Discussed With: Patient      I personally reviewed pertinent labs, imaging and provider notes. Discussed with bedside nurse and will discuss with primary service.     Electronically signed by ZULY Ames, 05/18/25, 11:17 AM EDT.  Electronically signed by Ulices Moya MD, 5/18/2025, 06:57 EDT.    This visit was performed by BOTH a physician and an APC. I personally evaluated and examined the patient. I performed all aspects of MDM as documented. , I have reviewed and confirmed the accuracy of the patient's history as documented in this note., and I have reexamined the patient and the results are consistent with the previously documented exam. I have updated the documentation as necessary.     Electronically signed by Ulices Moya MD, 05/18/25, 1:58 PM EDT.